# Patient Record
Sex: MALE | Race: WHITE | NOT HISPANIC OR LATINO | Employment: PART TIME | URBAN - METROPOLITAN AREA
[De-identification: names, ages, dates, MRNs, and addresses within clinical notes are randomized per-mention and may not be internally consistent; named-entity substitution may affect disease eponyms.]

---

## 2017-01-03 ENCOUNTER — OFFICE VISIT (OUTPATIENT)
Dept: SLEEP MEDICINE | Facility: CLINIC | Age: 57
End: 2017-01-03
Attending: FAMILY MEDICINE
Payer: MEDICARE

## 2017-01-03 VITALS
HEART RATE: 70 BPM | HEIGHT: 70 IN | DIASTOLIC BLOOD PRESSURE: 74 MMHG | OXYGEN SATURATION: 92 % | WEIGHT: 315 LBS | SYSTOLIC BLOOD PRESSURE: 106 MMHG | BODY MASS INDEX: 45.1 KG/M2 | TEMPERATURE: 98.4 F

## 2017-01-03 DIAGNOSIS — E66.01 MORBID OBESITY DUE TO EXCESS CALORIES (H): ICD-10-CM

## 2017-01-03 DIAGNOSIS — G47.33 OSA (OBSTRUCTIVE SLEEP APNEA): Primary | ICD-10-CM

## 2017-01-03 DIAGNOSIS — J41.8 MIXED SIMPLE AND MUCOPURULENT CHRONIC BRONCHITIS (H): ICD-10-CM

## 2017-01-03 DIAGNOSIS — G47.00 INSOMNIA, UNSPECIFIED TYPE: ICD-10-CM

## 2017-01-03 PROCEDURE — 99204 OFFICE O/P NEW MOD 45 MIN: CPT | Performed by: PHYSICIAN ASSISTANT

## 2017-01-03 RX ORDER — ZOLPIDEM TARTRATE 10 MG/1
10 TABLET ORAL
Qty: 30 TABLET | Refills: 1 | Status: ON HOLD | COMMUNITY
Start: 2017-01-03 | End: 2019-09-14

## 2017-01-03 RX ORDER — ARIPIPRAZOLE 10 MG/1
5 TABLET ORAL DAILY
Qty: 30 TABLET | Status: ON HOLD | COMMUNITY
Start: 2017-01-03 | End: 2019-09-14

## 2017-01-03 NOTE — MR AVS SNAPSHOT
"              After Visit Summary   1/3/2017    Petey Archibald    MRN: [id number]           Patient Information     Date Of Birth          1960        Visit Information        Provider Department      1/3/2017 11:00 AM Goltz, Bennett Ezra, PA-C [facility]        Today's Diagnoses     NEL (obstructive sleep apnea)    -  1     Morbid obesity due to excess calories (H)  BMI 40-50         Mixed simple and mucopurulent chronic bronchitis (HCC) CT 4-06 wnl and neg bronch for hemoptesis spirometry 7-26-16  FVC=59% & FEV1=46%         Insomnia, unspecified type           Care Instructions    MY TREATMENT INFORMATION FOR SLEEP APNEA-  Petey THOMPSON Archibald    DOCTOR : Bennett Ezra Goltz, PA-C  SLEEP CENTER : [facility]     MY CONTACT NUMBER: [phone]  If I haven't had a sleep study yet, what can I expect?  A personal story from [employer]  https://www.[employer].com/watch?v=AxPLmlRpnCs    Am I having a home sleep study?  Here is a video in case you get home and want to make sure you have done it correctly  https://www.[employer].com/watch?v=QRZ4N2yCfm6&feature=youtu.be    Frequently asked questions:  1. What is Obstructive Sleep Apnea (NEL)? NEL is the most common type of sleep apnea. Apnea literally means, \"without breath.\" It is characterized by repetitive pauses in breathing, despite continued effort to breathe, and is usually associated with a reduction in blood oxygen saturation. Apneas can last 10 to over 60 seconds. It is caused by narrowing or collapse of the upper airway as muscles relax during sleep. Severity of sleep apnea is determined by frequency of breathing events and their effect on your sleep and oxygen levels determined during sleep testing.   2. What are the consequences of NEL? Symptoms include: daytime sleepiness- possibly increasing the risk of falling asleep while driving, unrefreshing/restless sleep, snoring, insomnia, waking frequently to urinate, waking with heartburn or reflux, reduced " concentration and memory, and morning headaches. Other health consequences may include development of high blood pressure and other cardiovascular disease in persons who are susceptible. Untreated NEL  can contribute to heart disease, stroke and diabetes.   3. What are the treatment options? In most situations, sleep apnea is a lifelong disease that must be managed with daily therapy. Medications are not effective for sleep apnea and surgery is generally not performed until other therapies have been tried. Therapy is usually tailored to the individual patient based on many factors including your wishes as well as severity of sleep apnea and severity of obesity. Continuous Positive Airway (CPAP) is the most reliable treatment. An oral device to hold your jaw forward is usually the next most reliable option. Other options include postioning devices (to keep you off your back), weight loss, and surgery including a tongue pacing device. There is more detail about some of these options below.    1. CPAP-  WHAT DOES IT DO AND HOW CAN I LEARN TO WEAR IT?                               BEFORE I START, CAN I WATCH A MOVIE TO GET A PLAN ON HOW TO USE CPAP?  https://www.[employer].com/watch?i=c0E27mh546T  Continuous positive airway pressure, or CPAP, is the most effective treatment for obstructive sleep apnea. It works by blowing room air, through a mask, to hold your throat open. A decision to use CPAP is a major step forward in the pursuit of a healthier life. The successful use of CPAP will help you breathe easier, sleep better and live healthier. You can choose CPAP equipment from any durable medical equipment provider that meets your needs.  Using CPAP can be a positive experience if you keep these vaughan points in mind:  1. Commitment  CPAP is not a quick fix for your problem. It involves a long-term commitment to improve your sleep and your health.    2. Communication  Stay in close communication with both your sleep doctor  "and your CPAP supplier. Ask lots of questions and seek help when you need it.    3. Consistency  Use CPAP all night, every night and for every nap. You will receive the maximum health benefits from CPAP when you use it every time that you sleep. This will also make it easier for your body to adjust to the treatment.    4. Correction  The first machine and mask that you try may not be the best ones for you. Work with your sleep doctor and your CPAP supplier to make corrections to your equipment selection. Ask about trying a different type of machine or mask if you have ongoing problems. Make sure that your mask is a good fit and learn to use your equipment properly.    5. Challenge  Tell a family member or close friend to ask you each morning if you used your CPAP the previous night. Have someone to challenge you to give it your best effort.    6. Connection   Your adjustment to CPAP will be easier if you are able to connect with others who use the same treatment. Ask your sleep doctor if there is a support group in your area for people who have sleep apnea, or look for one on the Internet.  7. Comfort   Increase your level of comfort by using a saline spray, decongestant or heated humidifier if CPAP irritates your nose, mouth or throat. Use your unit's \"ramp\" setting to slowly get used to the air pressure level. There may be soft pads you can buy that will fit over your mask straps. Look on www.CPAP.com for accessories that can help make CPAP use more comfortable.  8. Cleaning   Clean your mask, tubing and headgear on a regular basis. Put this time in your schedule so that you don't forget to do it. Check and replace the filters for your CPAP unit and humidifier.    9. Completion   Although you are never finished with CPAP therapy, you should reward yourself by celebrating the completion of your first month of treatment. Expect this first month to be your hardest period of adjustment. It will involve some trial and " error as you find the machine, mask and pressure settings that are right for you.    10. Continuation  After your first month of treatment, continue to make a daily commitment to use your CPAP all night, every night and for every nap.    CPAP-Tips to starting with success:  Begin using your CPAP for short periods of time during the day while you watch TV or read.    Use CPAP every night and for every nap. Using it less often reduces the health benefits and makes it harder for your body to get used to it.    Make small adjustments to your mask, tubing, straps and headgear until you get the right fit. Tightening the mask may actually worsen the leak.  If it leaves significant marks on your face or irritates the bridge of your nose, it may not be the best mask for you.  Speak with the person who supplied the mask and consider trying other masks. Insurances will allow you to try different masks during the first month of starting CPAP.  Insurance also covers a new mask, hose and filter about every 6 months.    Use a saline nasal spray to ease mild nasal congestion. Neti-Pot or saline nasal rinses may also help. Nasal gel sprays can help reduce nasal dryness.  Biotene mouthwash can be helpful to protect your teeth if you experience frequent dry mouth.  Dry mouth may be a sign of air escaping out of your mouth or out of the mask in the case of a full face mask.  Speak with your provider if you expect that is the case.     Take a nasal decongestant to relieve more severe nasal or sinus congestion.  Do not use Afrin (oxymetazoline) nasal spray more than 3 days in a row.  Speak with your sleep doctor if your nasal congestion is chronic.    Use a heated humidifier that fits your CPAP model to enhance your breathing comfort. Adjust the heat setting up if you get a dry nose or throat, down if you get condensation in the hose or mask.  Position the CPAP lower than you so that any condensation in the hose drains back into the  machine rather than towards the mask.    Try a system that uses nasal pillows if traditional masks give you problems.    Clean your mask, tubing and headgear once a week. Make sure the equipment dries fully.    Regularly check and replace the filters for your CPAP unit and humidifier.    Work closely with your sleep provider and your CPAP supplier to make sure that you have the machine, mask and air pressure setting that works best for you. It is better to stop using it and call your provider to solve problems than to lay awake all night frustrated with the device.    BESIDES CPAP, WHAT OTHER THERAPIES ARE THERE?  Positioning Device  Positioning devices are generally used when sleep apnea is mild and only occurs on your back.This example shows a pillow that straps around the waist. It may be appropriate for those whose sleep study shows milder sleep apnea that occurs primarily when lying flat on one's back. Preliminary studies have shown benefit but effectiveness at home may need to be verified by a home sleep test. These devices are generally not covered by medical insurance.                      Oral Appliance  What is oral appliance therapy?  An oral appliance is a small acrylic device that fits over the upper and lower teeth or tongue (similar to an orthodontic retainer or a mouth guard). This device slightly advances the lower jaw or tongue, which moves the base of the tongue forward, opens the airway, improves breathing and can effectively treat snoring and obstructive sleep apnea sleep apnea. The appliance is fabricated and customized by a qualified dentist with experience in treating snoring and sleep apnea. Oral appliances are usually well tolerated and have relatively high compliance by patients1, 2, 3.  When is an oral appliance indicated?  Oral appliance therapy is recommended as a first-line treatment for patients with primary snoring, mild sleep apnea, and for patients with moderate sleep apnea who  prefer appliance therapy to use of CPAP4, 5. Severity of sleep apnea is determined by sleep testing and is based on the number of respiratory events per hour of sleep.   How successful is oral appliance therapy?  The success rate of oral appliance therapy in patients with mild sleep apnea is 75-80% while in patients with moderate sleep apnea it is 50-70%. The chance of success in patients with severe sleep apnea is 40-50%. The research also shows that oral appliances have a beneficial effect on the cardiovascular health of NEL patients at the same magnitude as CPAP therapy7.  Oral appliances should be a second-line treatment in cases of severe sleep apnea, but if not completely successful then a combination therapy utilizing CPAP plus oral appliance therapy may be effective. Oral appliances tend to be effective in a broad range of patients although studies show that the patients who have the highest success are females, younger patients, those with milder disease, and less severe obesity. 3, 6.   The chances of success are lower in patients who have more severe NEL, are older, and those who are morbidly obese.     Example of an oral appliance   Finding a dentist that practices dental sleep medicine  Specific training is available through the American Academy of Dental Sleep Medicine for dentists interested in working in the field of sleep. To find a dentist who is educated in the field of sleep and the use of oral appliances, near you, visit the Web site of the American Academy of Dental Sleep Medicine; also see   http://www.accpstorage.org/newOrganization/patients/oralAppliances.pdf  To search for a dentist certified in these practices:  Http://aadsm.org/FindADentist.aspx?1  1. Gelacio et al. Objectively measured vs self-reported compliance during oral appliance therapy for sleep-disordered breathing. Chest 2013; 144(5): 5845-2556.  2. Garth et al. Objective measurement of compliance during oral  appliance therapy for sleep-disordered breathing. Thorax 2013; 68(1): 91-96.  3. Nanci, et al. Mandibular advancement devices in 620 men and women with NEL and snoring: tolerability and predictors of treatment success. Chest 2004; 125: 5145-5000.  4. Ivonne et al. Oral appliances for snoring and NEL: a review. Sleep 2006; 29: 244-262.  5. Mario et al. Oral appliance treatment for NEL: an update. J Clin Sleep Med 2014; 10(2): 215-227.  6. Hortencia et al. Predictors of OSAH treatment outcome. J Dent Res 2007; 86: 8586-5590.  Weight Loss:    Weight management is a personal decision.  If you are interested in exploring weight loss strategies, the following discussion covers the impact on weight loss on sleep apnea and the approaches that may be successful.    Weight loss decreases severity of sleep apnea in most people with obesity. For those with mild obesity who have developed snoring with weight gain, even 15-30 pound weight loss can improve and occasionally eliminate sleep apnea.  Structured and life-long dietary and health habits are necessary to lose weight and keep healthier weight levels.     Though there may be significant health benefits from weight loss, long-term weight loss is very difficult to achieve- studies show success with dietary management in less than 10% of people. In addition, substantial weight loss may require years of dietary control and may be difficult if patients have severe obesity. In these cases, surgical management may be considered.  Finally, older individuals who have tolerated obesity without health complications may be less likely to benefit from weight loss strategies.    Your BMI is There is no weight on file to calculate BMI.  Body mass index (BMI) is one way to tell whether you are at a healthy weight, overweight, or obese. It measures your weight in relation to your height.  A BMI of 18.5 to 24.9 is in the healthy range. A person with a BMI of 25 to 29.9 is  considered overweight, and someone with a BMI of 30 or greater is considered obese. More than two-thirds of American adults are considered overweight or obese.  Being overweight or obese increases the risk for further weight gain. Excess weight may lead to heart disease and diabetes.  Creating and following plans for healthy eating and physical activity may help you improve your health.  Weight control is part of healthy lifestyle and includes exercise, emotional health, and healthy eating habits. Careful eating habits lifelong are the mainstay of weight control. Though there are significant health benefits from weight loss, long-term weight loss with diet alone may be very difficult to achieve- studies show long-term success with dietary management in less than 10% of people. Attaining a healthy weight may be especially difficult to achieve in those with severe obesity. In some cases, medications, devices and surgical management might be considered.  What can you do?  If you are overweight or obese and are interested in methods for weight loss, you should discuss this with your provider.     Consider reducing daily calorie intake by 500 calories.     Keep a food journal.     Avoiding skipping meals, consider cutting portions instead.    Diet combined with exercise helps maintain muscle while optimizing fat loss. Strength training is particularly important for building and maintaining muscle mass. Exercise helps reduce stress, increase energy, and improves fitness. Increasing exercise without diet control, however, may not burn enough calories to loose weight.       Start walking three days a week 10-20 minutes at a time    Work towards walking thirty minutes five days a week     Eventually, increase the speed of your walking for 1-2 minutes at time    In addition, we recommend that you review healthy lifestyles and methods for weight loss available through the National Institutes of Health patient information  sites:  http://win.niddk.nih.gov/publications/index.htm    And look into health and wellness programs that may be available through your health insurance provider, employer, local community center, or robert club.    Weight management plan: Patient was referred to their PCP to discuss a diet and exercise plan.  Surgery:  Upper Airway Surgery for NEL  Surgery for NEL is a second-line treatment option in the management of sleep apnea.  Surgery should be considered for patients who are having a difficult time tolerating CPAP.    Surgery for NEL is directed at areas that are responsible for narrowing or complete obstruction of the airway during sleep.  There are a wide range of procedures available to enlarge and/or stabilize the airway to prevent blockage of breathing in the three major areas where it can occur: the palate, tongue, and nasal regions.  Successful surgical treatment depends on the accurate identification of the factors responsible for obstructive sleep apnea in each person.  A personalized approach is required because there is no single treatment that works well for everyone.  Because of anatomic variation, consultation with an examination by a sleep surgeon is a critical first step in determining what surgical options are best for each patient.  In some cases, examination during sedation may be recommended in order to guide the selection of procedures.  Patients will be counseled about risks and benefits as well as the typical recovery course after surgery. Surgery is typically not a cure for a person s NEL.  However, surgery will often significantly improve one s NEL severity (termed  success rate ).  Even in the absence of a cure, surgery will decrease the cardiovascular risk associated with OSA7; improve overall quality of life8 (sleepiness, functionality, sleep quality, etc).      Palate Procedures:  Patients with NEL often have narrowing of their airway in the region of their tonsils and uvula.  The  goals of palate procedures are to widen the airway in this region as well as to help the tissues resist collapse.  Modern palate procedure techniques focus on tissue conservation and soft tissue rearrangement, rather than tissue removal.  Often the uvula is preserved in this procedure. Residual sleep apnea is common in patient after pharyngoplasty with an average reduction in sleep apnea events of 33%2.      Tongue Procedures:  While patients are awake, the muscles that surround the throat are active and keep this region open for breathing. These muscles relax during sleep, allowing the tongue and other structures to collapse and block breathing.  There are several different tongue procedures available.  Selection of a tongue base procedure depends on characteristics seen on physical exam.  Generally, procedures are aimed at removing bulky tissues in this area or preventing the back of the tongue from falling back during sleep.  Success rates for tongue surgery range from 50-62%3.    Hypoglossal Nerve Stimulation:  Hypoglossal nerve stimulation has recently received approval from the [place] Food and Drug Administration for the treatment of obstructive sleep apnea.  This is based on research showing that the system was safe and effective in treating sleep apnea6.  Results showed that the median AHI score decreased 68%, from 29.3 to 9.0. This therapy uses an implant system that senses breathing patterns and delivers mild stimulation to airway muscles, which keeps the airway open during sleep.  The system consists of three fully implanted components: a small generator (similar in size to a pacemaker), a breathing sensor, and a stimulation lead.  Using a small handheld remote, a patient turns the therapy on before bed and off upon awakening.    Candidates for this device must be greater than 22 years of age, have moderate to severe NEL (AHI between 20-65), BMI less than 32, have tried CPAP/oral appliance without  success, and have appropriate upper airway anatomy (determined by a sleep endoscopy performed by Dr. Jean).    Hypoglossal Nerve Stimulation Pathway:    The sleep surgeon s office will work with the patient through the insurance prior-authorization process (including communications and appeals).    Nasal Procedures:  Nasal obstruction can interfere with nasal breathing during the day and night.  Studies have shown that relief of nasal obstruction can improve the ability of some patients to tolerate positive airway pressure therapy for obstructive sleep apnea1.  Treatment options include medications such as nasal saline, topical corticosteroid and antihistamine sprays, and oral medications such as antihistamines or decongestants. Non-surgical treatments can include external nasal dilators for selected patients. If these are not successful by themselves, surgery can improve the nasal airway either alone or in combination with these other options.  Combination Procedures:  Combination of surgical procedures and other treatments may be recommended, particularly if patients have more than one area of narrowing or persistent positional disease.  The success rate of combination surgery ranges from 66-80%2,3.    1. Brian CARDENAS. The Role of the Nose in Snoring and Obstructive Sleep Apnoea: An Update.  Eur Arch Otorhinolaryngol. 2011; 268: 1365-73.  2.  Capnhan SM; Barney JA; Mesha JR; Pallanch JF; Clary MB; Jess SG; Jaysno ADAMES. Surgical modifications of the upper airway for obstructive sleep apnea in adults: a systematic review and meta-analysis. SLEEP 2010;33(10):7398-8147. Oksana CONTRERAS. Hypopharyngeal surgery in obstructive sleep apnea: an evidence-based medicine review.  Arch Otolaryngol Head Neck Surg. 2006 Feb;132(2):206-13.  3. Kendrick MORAH1, Silverio Y, Asad KASHMIR. The efficacy of anatomically based multilevel surgery for obstructive sleep apnea. Otolaryngol Head Neck Surg. 2003 Oct;129(4):327-35.  4. Kezirian E, Goldberg A.  Hypopharyngeal Surgery in Obstructive Sleep Apnea: An Evidence-Based Medicine Review. Arch Otolaryngol Head Neck Surg. 2006 Feb;132(2):206-13.  5. Estela ORELLANA et al. Upper-Airway Stimulation for Obstructive Sleep Apnea.  N Engl J Med. 2014 Jan 9;370(2):139-49.  6. Stephanie Y et al. Increased Incidence of Cardiovascular Disease in Middle-aged Men with Obstructive Sleep Apnea. Am J Respir Crit Care Med; 2002 166: 159-165  7. Sammie EM et al. Studying Life Effects and Effectiveness of Palatopharyngoplasty (SLEEP) study: Subjective Outcomes of Isolated Uvulopalatopharyngoplasty. Otolaryngol Head Neck Surg. 2011; 144: 623-631.        Your BMI is Body mass index is 45.57 kg/(m^2).  Weight management is a personal decision.  If you are interested in exploring weight loss strategies, the following discussion covers the approaches that may be successful. Body mass index (BMI) is one way to tell whether you are at a healthy weight, overweight, or obese. It measures your weight in relation to your height.  A BMI of 18.5 to 24.9 is in the healthy range. A person with a BMI of 25 to 29.9 is considered overweight, and someone with a BMI of 30 or greater is considered obese. More than two-thirds of American adults are considered overweight or obese.  Being overweight or obese increases the risk for further weight gain. Excess weight may lead to heart disease and diabetes.  Creating and following plans for healthy eating and physical activity may help you improve your health.  Weight control is part of healthy lifestyle and includes exercise, emotional health, and healthy eating habits. Careful eating habits lifelong are the mainstay of weight control. Though there are significant health benefits from weight loss, long-term weight loss with diet alone may be very difficult to achieve- studies show long-term success with dietary management in less than 10% of people. Attaining a healthy weight may be especially difficult to achieve in  those with severe obesity. In some cases, medications, devices and surgical management might be considered.  What can you do?  If you are overweight or obese and are interested in methods for weight loss, you should discuss this with your provider.     Consider reducing daily calorie intake by 500 calories.     Keep a food journal.     Avoiding skipping meals, consider cutting portions instead.    Diet combined with exercise helps maintain muscle while optimizing fat loss. Strength training is particularly important for building and maintaining muscle mass. Exercise helps reduce stress, increase energy, and improves fitness. Increasing exercise without diet control, however, may not burn enough calories to loose weight.       Start walking three days a week 10-20 minutes at a time    Work towards walking thirty minutes five days a week     Eventually, increase the speed of your walking for 1-2 minutes at time    In addition, we recommend that you review healthy lifestyles and methods for weight loss available through the National Institutes of Health patient information sites:  http://win.niddk.nih.gov/publications/index.htm    And look into health and wellness programs that may be available through your health insurance provider, employer, local community center, or robert club.    Weight management plan: Patient was referred to their PCP to discuss a diet and exercise plan.                    Follow-ups after your visit        Follow-up notes from your care team     Return in about 2 weeks (around 1/17/2017).      Your next 10 appointments already scheduled     Jan 06, 2017 10:45 AM   Anticoagulation Visit with  ANTICOAGULATION CLINIC   [facility] ([facility]    [address] [place] [id number]   [phone]            Jan 23, 2017  8:30 PM   Psg Split W/Tcm with BED 5 SH SLEEP   [facility] ([place]  [facility])    [address]  [place] [id number] [phone]            Feb 06, 2017 10:30 AM   Return Sleep Patient with Bennett Ezra Goltz, PA-C   [facility] ([facility])    [address]  [place] [id number] [phone]              Future tests that were ordered for you today     Open Future Orders        Priority Expected Expires Ordered    Comprehensive Sleep Study Routine  7/2/2017 1/3/2017            Who to contact     If you have questions or need follow up information about today's clinic visit or your schedule please contact [facility] directly at [phone].  Normal or non-critical lab and imaging results will be communicated to you by Kalionhart, letter or phone within 4 business days after the clinic has received the results. If you do not hear from us within 7 days, please contact the clinic through Kalionhart or phone. If you have a critical or abnormal lab result, we will notify you by phone as soon as possible.  Submit refill requests through [employer] or call your pharmacy and they will forward the refill request to us. Please allow 3 business days for your refill to be completed.          Additional Information About Your Visit        [employer] Information     [employer] gives you secure access to your electronic health record. If you see a primary care provider, you can also send messages to your care team and make appointments. If you have questions, please call your primary care clinic.  If you do not have a primary care provider, please call [phone] and they will assist you.        Care EveryWhere ID     This is your Care EveryWhere ID. This could be used by other organizations to access your [place] medical records  [id number]        Your Vitals Were     Pulse Temperature Height BMI (Body Mass Index) Pulse Oximetry       70 98.4  F (36.9  C) (Oral) 1.778 m (5' 10\") 45.57 kg/m2 " 92%        Blood Pressure from Last 3 Encounters:   01/03/17 106/74   12/23/16 102/70   08/11/16 110/70    Weight from Last 3 Encounters:   01/03/17 144.062 kg (317 lb 9.6 oz)   12/23/16 142.883 kg (315 lb)   08/11/16 140.615 kg (310 lb)              We Performed the Following     SLEEP EVALUATION & MANAGEMENT REFERRAL - ADULT          Today's Medication Changes          These changes are accurate as of: 1/3/17  1:15 PM.  If you have any questions, ask your nurse or doctor.               These medicines have changed or have updated prescriptions.        Dose/Directions    ABILIFY 10 MG tablet   This may have changed:  how much to take   Generic drug:  ARIPiprazole        Dose:  5 mg   Take 0.5 tablets (5 mg) by mouth daily   Quantity:  30 tablet   Refills:  0       warfarin 4 MG tablet   Commonly known as:  COUMADIN   This may have changed:  additional instructions   Used for:  Long term current use of anticoagulant therapy, History of pulmonary embolism        Dose:  4 mg   Take 1 tablet (4 mg) by mouth daily Take 2.5 tabs of coumadin today (10 mg),Take 2 tabs (8 mg)daily   Quantity:  62 tablet   Refills:  2         Stop taking these medicines if you haven't already. Please contact your care team if you have questions.     hydrOXYzine 25 MG capsule   Commonly known as:  VISTARIL           traZODone 100 MG tablet   Commonly known as:  DESYREL           varenicline 0.5 MG X 11 & 1 MG X 42 tablet   Commonly known as:  CHANTIX STARTING MONTH BRE                    Primary Care Provider Office Phone # Fax #    Hortensia Peck [phone]       [facility] [address]  [facility] [address]        Thank you!     Thank you for choosing [facility]  for your care. Our goal is always to provide you with excellent care. Hearing back from our patients is one way we can continue to improve our services. Please take a few minutes to complete the written survey  that you may receive in the mail after your visit with us. Thank you!             Your Updated Medication List - Protect others around you: Learn how to safely use, store and throw away your medicines at www.disposemymeds.org.          This list is accurate as of: 1/3/17  1:15 PM.  Always use your most recent med list.                   Brand Name Dispense Instructions for use    ABILIFY 10 MG tablet   Generic drug:  ARIPiprazole     30 tablet    Take 0.5 tablets (5 mg) by mouth daily       acetaminophen 325 MG tablet    TYLENOL    100 tablet    Take 1-2 tablets (325-650 mg) by mouth every 6 hours as needed       albuterol 108 (90 BASE) MCG/ACT Inhaler    albuterol    1 Inhaler    Inhale 2 puffs into the lungs every 6 hours as needed       AMBIEN 10 MG tablet   Generic drug:  zolpidem     30 tablet    Take 1 tablet (10 mg) by mouth nightly as needed for sleep       ANUSOL-HC 25 MG Suppository   Generic drug:  hydrocortisone     14 Suppository    INSERT ONE SUPPOSITORY RECTALLY UP TO TWICE A DAY AS NEEDED       BACTROBAN 2 % ointment   Generic drug:  mupirocin      Apply topically 2 times daily       BUPROPION HCL PO      Take 150 mg by mouth every morning       clotrimazole-betamethasone cream    LOTRISONE    60 g    Apply topically 2 times daily       EPIPEN 0.3 MG/0.3ML injection   Generic drug:  EPINEPHrine      Inject 0.3 mg into the muscle once as needed.       Ferrous Gluconate 324 (37.5 FE) MG Tabs      Take 1 tablet by mouth 2 times daily.       furosemide 40 MG tablet    LASIX     Take 2 tablets by mouth daily       hydrocortisone 2.5 % cream    ANUSOL-HC    28.35 g    Place rectally 2 times daily       loratadine 10 MG tablet    CLARITIN     Take 10 mg by mouth daily.       mometasone-formoterol 200-5 MCG/ACT oral inhaler    DULERA    13 g    Inhale 2 puffs into the lungs 2 times daily       Multi-vitamin Tabs tablet   Generic drug:  multivitamin, therapeutic with minerals      1 TABLET DAILY       nexIUM  40 MG CR capsule   Generic drug:  esomeprazole      1 CAPSULE DAILY at 8:00 am       * order for DME     1 each    Equipment being ordered: support compression hose BK  2 pair black 30mm HG  To be applied on arising & removed while lying down to go to sleep       * order for DME     1 Device    Equipment being ordered: CPAP with mask and tubing       PHOSPHATE ENEMA RE      Place 1 enema rectally as needed.       Potassium Chloride ER 20 MEQ Tbcr      Take 1 tablet by mouth 2 times daily.       sertraline 100 MG tablet    ZOLOFT     Take 1.5 tablets by mouth daily.       simvastatin 40 MG tablet    ZOCOR     Take 1 tablet by mouth daily.       SINGULAIR 10 MG tablet   Generic drug:  montelukast      1 TABLET DAILY at 8:00 am       spironolactone 25 MG tablet    ALDACTONE     1 TABLET EVERY MORNING at 8:00 am       tiotropium 18 MCG capsule    SPIRIVA HANDIHALER    30 capsule    Once daily       triamcinolone 0.1 % cream    KENALOG    80 g    Apply topically 2 times daily as needed       Vitamin D-3 1000 UNITS Caps      Take 2 capsules by mouth daily       warfarin 4 MG tablet    COUMADIN    62 tablet    Take 1 tablet (4 mg) by mouth daily Take 2.5 tabs of coumadin today (10 mg),Take 2 tabs (8 mg)daily       ZEASORB-AF EX      Externally apply topically once a week       * Notice:  This list has 2 medication(s) that are the same as other medications prescribed for you. Read the directions carefully, and ask your doctor or other care provider to review them with you.

## 2017-01-03 NOTE — NURSING NOTE
"Chief Complaint   Patient presents with     Sleep Problem     Follow up for new CPAP eval       Initial /74 mmHg  Pulse 70  Temp(Src) 98.4  F (36.9  C) (Oral)  Ht 1.778 m (5' 10\")  Wt 144.062 kg (317 lb 9.6 oz)  BMI 45.57 kg/m2  SpO2 92% Estimated body mass index is 45.57 kg/(m^2) as calculated from the following:    Height as of this encounter: 1.778 m (5' 10\").    Weight as of this encounter: 144.062 kg (317 lb 9.6 oz).  BP completed using cuff size: large right arm  Chantal Hernandez MA  [facility] [place]      "
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
## 2017-01-06 ENCOUNTER — ANTICOAGULATION THERAPY VISIT (OUTPATIENT)
Dept: NURSING | Facility: CLINIC | Age: 57
End: 2017-01-06
Payer: MEDICARE

## 2017-01-06 DIAGNOSIS — Z86.711 HISTORY OF PULMONARY EMBOLISM: ICD-10-CM

## 2017-01-06 DIAGNOSIS — Z79.01 LONG-TERM (CURRENT) USE OF ANTICOAGULANTS: Primary | ICD-10-CM

## 2017-01-06 LAB — INR POINT OF CARE: 1.9 (ref 0.86–1.14)

## 2017-01-06 PROCEDURE — 99207 ZZC NO CHARGE NURSE ONLY: CPT

## 2017-01-06 PROCEDURE — 36416 COLLJ CAPILLARY BLOOD SPEC: CPT

## 2017-01-06 PROCEDURE — 85610 PROTHROMBIN TIME: CPT | Mod: QW

## 2017-01-06 NOTE — Clinical Note
[facility]  [address]  [place] [facility] [address]  [phone]                                                                                                           Petey Archibald  [address]  [facility] [address]    January 6, 2017          Petey was in the clinic today        Sincerely,    Jacky Burr MD

## 2017-01-06 NOTE — PROGRESS NOTES
ANTICOAGULATION FOLLOW-UP CLINIC VISIT    Patient Name:  Petey Archibald  Date:  1/6/2017  Contact Type:  Face to Face    SUBJECTIVE:        OBJECTIVE    INR PROTIME   Date Value Ref Range Status   01/06/2017 1.9* 0.86 - 1.14 Final       ASSESSMENT / PLAN  INR assessment THER    Recheck INR In: 4 WEEKS    INR Location Clinic      Anticoagulation Summary as of 1/6/2017     INR goal 2.0-3.0   Selected INR 1.9! (1/6/2017)   Maintenance plan 10 mg (4 mg x 2.5) on Fri; 8 mg (4 mg x 2) all other days   Full instructions 10 mg on Fri; 8 mg all other days   Weekly total 58 mg   Plan last modified Lisa Ponce RN (11/18/2016)   Next INR check 2/3/2017   Target end date Indefinite    Indications   History of pulmonary embolism [Z86.711]  Long-term (current) use of anticoagulants [Z79.01] [Z79.01]         Anticoagulation Episode Summary     INR check location [facility]    Preferred lab     Send INR reminders to [facility] INR/PROTIME    Comments       Anticoagulation Care Providers     Provider Role Specialty Phone number    Jacky Burr MD [facility] [phone]            See the Encounter Report to view Anticoagulation Flowsheet and Dosing Calendar (Go to Encounters tab in chart review, and find the Anticoagulation Therapy Visit)        Lisa Ponce, RN

## 2017-01-06 NOTE — MR AVS SNAPSHOT
Petey Archibald   1/6/2017 10:45 AM   Anticoagulation Therapy Visit    Description:  56 year old male   Provider:  [facility]   Department:  Bx Nurse           INR as of 1/6/2017     Selected INR 1.9! (1/6/2017)      Anticoagulation Summary as of 1/6/2017     INR goal 2.0-3.0   Selected INR 1.9! (1/6/2017)   Full instructions 10 mg on Fri; 8 mg all other days   Next INR check 2/3/2017    Indications   History of pulmonary embolism [Z86.711]  Long-term (current) use of anticoagulants [Z79.01] [Z79.01]         Your next Anticoagulation Clinic appointment(s)     Feb 03, 2017 11:15 AM   Anticoagulation Visit with  [facility] CLINIC   [facility]    [address] [place] [id number] [phone]              Contact Numbers     [facility]  Please call  [phone] to cancel and/or reschedule your appointment   The direct line to the anticoagulant nurse is [phone] on Monday, Wednesday, and Friday. On Thursday, the anticoagulant nurse can be reached directly at [phone].         January 2017 Details    Sun Mon Tue Wed Thu Fri Sat     1               2               3               4               5               6      10 mg   See details      7      8 mg           8      8 mg         9      8 mg         10      8 mg         11      8 mg         12      8 mg         13      10 mg         14      8 mg           15      8 mg         16      8 mg         17      8 mg         18      8 mg         19      8 mg         20      10 mg         21      8 mg           22      8 mg         23      8 mg         24      8 mg         25      8 mg         26      8 mg         27      10 mg         28      8 mg           29      8 mg         30      8 mg         31      8 mg              Date Details   01/06 This INR check               How to take your warfarin dose     To take:  8 mg Take  2 of the 4 mg tablets.    To take:  10 mg Take 2.5 of the 4 mg tablets.           February 2017 Details    Sun Mon Tue Wed Thu Fri Sat        1      8 mg         2      8 mg         3            4                 5               6               7               8               9               10               11                 12               13               14               15               16               17               18                 19               20               21               22               23               24               25                 26               27               28                    Date Details   No additional details    Date of next INR:  2/3/2017         How to take your warfarin dose     To take:  8 mg Take 2 of the 4 mg tablets.    To take:  10 mg Take 2.5 of the 4 mg tablets.

## 2017-01-23 ENCOUNTER — THERAPY VISIT (OUTPATIENT)
Dept: SLEEP MEDICINE | Facility: CLINIC | Age: 57
End: 2017-01-23
Payer: MEDICARE

## 2017-01-23 DIAGNOSIS — G47.33 OSA (OBSTRUCTIVE SLEEP APNEA): ICD-10-CM

## 2017-01-23 DIAGNOSIS — E66.01 MORBID OBESITY DUE TO EXCESS CALORIES (H): ICD-10-CM

## 2017-01-23 DIAGNOSIS — J41.8 MIXED SIMPLE AND MUCOPURULENT CHRONIC BRONCHITIS (H): ICD-10-CM

## 2017-01-23 PROCEDURE — 95811 POLYSOM 6/>YRS CPAP 4/> PARM: CPT | Performed by: PSYCHIATRY & NEUROLOGY

## 2017-01-24 NOTE — PROGRESS NOTES
Completed a split night PSG per provider order.    Preliminary AHI >30.  A final therapeutic PAP pressure was achieved.    Supine REM was not seen on therapeutic pressure. (patient declined supine sleep)    Patient reports feeling refreshed in AM.

## 2017-01-25 NOTE — PROCEDURES
"SLEEP STUDY INTERPRETATION  SPLIT-NIGHT STUDY      Patient: Petey Archibald  YOB: 1960  Study Date: 1/23/2017  MRN: [id number]  Referring Provider: MD Riddle Walter  Ordering Provider: Goltz, PA-C, Bennett    Indications for Polysomnography: The patient is a 56 y old Male who is 5' 10\" and weighs 317.0 lbs.  His BMI is 45.9, Hubbell sleepiness scale is 2.0 and neck size is 45.0.  Relevant medical history includes treated NEL, morbid obesity, COPD.  A diagnostic polysomnogram was performed to evaluate for Sleep Apnea\Hypoventilation\hypoxemia.  After 190.0 minutes of sleep time the patient exhibited sufficient respiratory events qualifying him for a CPAP trial which was then initiated.      Polysomnogram Data:  A full night polysomnogram recorded the standard physiologic parameters including EEG, EOG, EMG, ECG, nasal and oral airflow.  Respiratory parameters of chest and abdominal movements were recorded with respiratory inductance plethysmography.  Oxygen saturation was recorded by pulse oximetry.      Diagnostic PSG  Sleep Architecture: Sleep fragmentation  The total recording time of the diagnostic portion of the study was 215.2 minutes.  The total sleep time was 190.0 minutes.  During the diagnostic portion of the study the sleep latency was 0.1 minutes.  REM latency was 142.0 minutes.  Arousal index was 30.9 arousals per hour.  Sleep efficiency was 88.3%.  Wake after sleep onset was 12.0 minutes.   The patient spent 1.8% of total sleep time in Stage N1, 70.3% in Stage N2, 20.5% in Stage N3 and 7.4% in REM.       Respiration: Severe sleep disordered breathing best characterized as moderate NEL with severe O2 desaturations primarily during REM sleep.  Borderline hypoventilation that would have most likely been clear hypoventilation if he would have slept supine.     Events - During the diagnostic portion of the study, the polysomnogram revealed a presence of 3 obstructive, 11 central, and - mixed " apneas resulting in an apnea index of 4.4 events per hour.  There were 53 hypopneas resulting in a hypopnea index of 16.7 events per hour.  The combined apnea/hypopnea index was 21.2 events per hour.  The REM AHI was 38.6 events per hour.  The supine AHI was - events per hour.  The RERA index was 0.6 events per hour.  The RDI was 21.8 events per hour.     Snoring - was reported as loud.    Respiratory rate and pattern - was notable for normal respiratory rate and pattern.    Sustained Sleep Associated Hypoventilation - Transcutaneous carbon dioxide monitoring was used, significant hypoventilation was borderline with a peak over 55 but it did not last for 10 minutes.    Sleep Associated Hypoxemia - (Greater than 5 minutes O2 sat below 89%) was / was not present.  Baseline oxygen saturation was 91.7%. Lowest oxygen saturation was 61.5%.  Time spent less than or equal to 88% was 75.1 minutes.  Time spent less than or equal to 89% was 137.1 minutes.  21.8 0.6 21.2     Treatment PSG  Sleep Architecture: Decreased sleep fragmentation  At 02:35:59 AM the patient was placed on CPAP treatment and was titrated at pressures ranging from 5 cmH2O up to 10 cmH2O.  The total recording time of the treatment portion of the study was 205.1 minutes.  The total sleep time was 191.5 minutes.  During the treatment portion of the study the sleep latency was 1.0 minutes.  REM latency was 36.0 minutes.  Arousal index was 6.3 arousals per hour.  Sleep efficiency was normal/increased/decreased at 93.4%.  Wake after sleep onset was 8.0 minutes.  The patient spent 1.8% of total sleep time in Stage N1, 40.5% in Stage N2, 8.1% in Stage N3 and 49.6% in REM.       Respiration: Obstructive events noted on the baseline portion of the study was nearly fully resolved with CPAP therapy.  Borderline hypoventilation improved to normal levels. Hypoxemia improved but did not resolve to normal O2 saturations.    Of note the patient did not have REM supine.     The optimal pressure was 10 cmH2O with an AHI of 2.3 events per hour.  Time in REM supine on final pressure was 0 minutes.     Movement Activity:     Periodic Limb Movements  o During the diagnostic portion of the study, there were 42 PLMs recorded. The PLM index was 13.3 movements per hour.  The PLM Arousal Index was 4.4 per hour.  o During the treatment portion of the study, there were 15 PLMs recorded. The PLM index was 4.7 movements per hour.  The PLM Arousal Index was 0.6 per hour.    REM EMG Activity - Excessive muscle activity was not present.    Nocturnal Behavior - Abnormal sleep related behaviors were not noted.    Bruxism - None apparent.    Cardiac Summary: Predominantly narrow QRS complexes preceded by P waves suggestive of sinus rhythm.  Occasional wide QRS complexes suggestive of PAC/PVC s.  During the diagnostic portion of the study, the average pulse rate was 72.7 bpm.  The minimum pulse rate was 60.0 bpm while the maximum pulse rate was 100.0 bpm.    During the treatment portion of the study, the average pulse rate was 64.7 bpm.  The minimum pulse rate was 53.8 bpm while the maximum pulse rate was 91.8 bpm.     Assessment:     Severe sleep disordered breathing best characterized as moderate NEL with severe O2 desaturations primarily during REM sleep.  Borderline hypoventilation that would have most likely been clear hypoventilation if he would have slept supine.     Obstructive events noted on the baseline portion of the study was nearly fully resolved with CPAP therapy.  Borderline hypoventilation improved to normal levels. Hypoxemia improved with correction of upper airway obstruction but did not resolve to normal O2 saturations.    Of note the patient did not have REM supine.    Recommendations:    Treatment of NEL with Auto-titrating PAP therapy.  Recommend clinical follow up with sleep management team, including review of compliance measures.  o Patients residual hypoxemia is likely  secondary to COPD and would benefit from supplemental O2.   o Would recommend pulmonary medicine follow up.      Advise regarding the risks of drowsy driving.    Suggest optimizing sleep schedule and avoiding sleep deprivation.    Weight management     Diagnostic Code(s): G47.33, G47.36, G47.9      Joseph Bangura MD

## 2017-02-03 ENCOUNTER — ANTICOAGULATION THERAPY VISIT (OUTPATIENT)
Dept: NURSING | Facility: CLINIC | Age: 57
End: 2017-02-03
Payer: MEDICARE

## 2017-02-03 DIAGNOSIS — Z79.01 LONG-TERM (CURRENT) USE OF ANTICOAGULANTS: Primary | ICD-10-CM

## 2017-02-03 DIAGNOSIS — Z86.711 HISTORY OF PULMONARY EMBOLISM: ICD-10-CM

## 2017-02-03 LAB — INR POINT OF CARE: 2.5 (ref 0.86–1.14)

## 2017-02-03 PROCEDURE — 85610 PROTHROMBIN TIME: CPT | Mod: QW

## 2017-02-03 PROCEDURE — 99207 ZZC NO CHARGE NURSE ONLY: CPT

## 2017-02-03 PROCEDURE — 36416 COLLJ CAPILLARY BLOOD SPEC: CPT

## 2017-02-03 NOTE — PROGRESS NOTES
ANTICOAGULATION FOLLOW-UP CLINIC VISIT    Patient Name:  Petey Archibald  Date:  2/3/2017  Contact Type:  Face to Face    SUBJECTIVE:     Patient Findings     Positives No Problem Findings           OBJECTIVE    INR PROTIME   Date Value Ref Range Status   02/03/2017 2.5* 0.86 - 1.14 Final       ASSESSMENT / PLAN  INR assessment THER    Recheck INR In: 3 WEEKS    INR Location Clinic      Anticoagulation Summary as of 2/3/2017     INR goal 2.0-3.0   Selected INR 2.5 (2/3/2017)   Maintenance plan 10 mg (4 mg x 2.5) on Fri; 8 mg (4 mg x 2) all other days   Full instructions 2/3: Hold; 2/4: Hold; 2/5: Hold; 2/6: Hold; Otherwise 10 mg on Fri; 8 mg all other days   Weekly total 58 mg   Plan last modified Lisa Ponce RN (11/18/2016)   Next INR check 2/17/2017   Target end date Indefinite    Indications   History of pulmonary embolism [Z86.711]  Long-term (current) use of anticoagulants [Z79.01] [Z79.01]         Anticoagulation Episode Summary     INR check location [facility]    Preferred lab     Send INR reminders to [facility] INR/PROTIME    Comments       Anticoagulation Care Providers     Provider Role Specialty Phone number    Jacky Burr MD [facility] [phone]            See the Encounter Report to view Anticoagulation Flowsheet and Dosing Calendar (Go to Encounters tab in chart review, and find the Anticoagulation Therapy Visit)        Lisa Ponce, RN

## 2017-02-03 NOTE — MR AVS SNAPSHOT
Petey Archibald   2/3/2017 11:15 AM   Anticoagulation Therapy Visit    Description:  56 year old male   Provider:   ANTICOAGULATION CLINIC   Department:  Bx Nurse           INR as of 2/3/2017     Selected INR 2.5 (2/3/2017)      Anticoagulation Summary as of 2/3/2017     INR goal 2.0-3.0   Selected INR 2.5 (2/3/2017)   Full instructions 2/3: Hold; 2/4: Hold; 2/5: Hold; 2/6: Hold; Otherwise 10 mg on Fri; 8 mg all other days   Next INR check 2/17/2017    Indications   History of pulmonary embolism [Z86.711]  Long-term (current) use of anticoagulants [Z79.01] [Z79.01]         Your next Anticoagulation Clinic appointment(s)     Feb 03, 2017 11:15 AM   Anticoagulation [facility] with  [facility] CLINIC   [facility]    [address] [id number] [phone]            Feb 17, 2017 10:00 AM   Anticoagulation Visit with  ANTICOAGULATION CLINIC   [facility]    [address] [id number] [phone]              Contact Numbers     [facility]  Please call  [phone] to cancel and/or reschedule your appointment   The direct line to the anticoagulant nurse is [phone] on Monday, Wednesday, and Friday. On Thursday, the anticoagulant nurse can be reached directly at [phone].         February 2017 Details    Sun Mon Tue Wed Thu Fri Sat        1               2               3      Hold   See details      4      Hold           5      Hold         6      Hold         7      8 mg         8      8 mg         9      8 mg         10      10 mg         11      8 mg           12      8 mg         13      8 mg         14      8 mg         15      8 mg         16      8 mg         17            18                 19               20               21               22               23                24               25                 26               27               28                    Date Details   02/03 This INR check       Date of next INR:  2/17/2017         How to take your warfarin dose     To take:  8 mg Take 2 of the 4 mg tablets.    To take:  10 mg Take 2.5 of the 4 mg tablets.    Hold Do not take your warfarin dose. See the Details table to the right for additional instructions.

## 2017-02-07 ENCOUNTER — TRANSFERRED RECORDS (OUTPATIENT)
Dept: HEALTH INFORMATION MANAGEMENT | Facility: CLINIC | Age: 57
End: 2017-02-07

## 2017-02-09 ENCOUNTER — OFFICE VISIT (OUTPATIENT)
Dept: SLEEP MEDICINE | Facility: CLINIC | Age: 57
End: 2017-02-09
Payer: MEDICARE

## 2017-02-09 VITALS
BODY MASS INDEX: 45.1 KG/M2 | SYSTOLIC BLOOD PRESSURE: 112 MMHG | HEART RATE: 75 BPM | TEMPERATURE: 98.6 F | WEIGHT: 315 LBS | OXYGEN SATURATION: 95 % | HEIGHT: 70 IN | DIASTOLIC BLOOD PRESSURE: 64 MMHG

## 2017-02-09 DIAGNOSIS — G47.34 NOCTURNAL HYPOXEMIA: ICD-10-CM

## 2017-02-09 DIAGNOSIS — G47.33 OSA (OBSTRUCTIVE SLEEP APNEA): Primary | ICD-10-CM

## 2017-02-09 PROCEDURE — 99214 OFFICE O/P EST MOD 30 MIN: CPT | Performed by: PHYSICIAN ASSISTANT
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
## 2017-02-09 NOTE — PATIENT INSTRUCTIONS

## 2017-02-09 NOTE — NURSING NOTE
"Chief Complaint   Patient presents with     Sleep Problem     PSG results       Initial /64 mmHg  Pulse 75  Temp(Src) 98.6  F (37  C) (Oral)  Ht 1.778 m (5' 10\")  Wt 143.79 kg (317 lb)  BMI 45.48 kg/m2  SpO2 95% Estimated body mass index is 45.48 kg/(m^2) as calculated from the following:    Height as of this encounter: 1.778 m (5' 10\").    Weight as of this encounter: 143.79 kg (317 lb).  Medication Reconciliation: complete   Chantal Hernandez MA  [facility] [place]      "

## 2017-02-17 ENCOUNTER — ANTICOAGULATION THERAPY VISIT (OUTPATIENT)
Dept: NURSING | Facility: CLINIC | Age: 57
End: 2017-02-17
Payer: MEDICARE

## 2017-02-17 DIAGNOSIS — Z86.711 HISTORY OF PULMONARY EMBOLISM: ICD-10-CM

## 2017-02-17 DIAGNOSIS — Z79.01 LONG-TERM (CURRENT) USE OF ANTICOAGULANTS: ICD-10-CM

## 2017-02-17 LAB — INR POINT OF CARE: 1 (ref 0.86–1.14)

## 2017-02-17 PROCEDURE — 36416 COLLJ CAPILLARY BLOOD SPEC: CPT

## 2017-02-17 PROCEDURE — 99207 ZZC NO CHARGE NURSE ONLY: CPT

## 2017-02-17 PROCEDURE — 85610 PROTHROMBIN TIME: CPT | Mod: QW

## 2017-02-17 NOTE — PROGRESS NOTES
ANTICOAGULATION FOLLOW-UP CLINIC VISIT    Patient Name:  Petey Archibald  Date:  2/17/2017  Contact Type:  Face to Face    SUBJECTIVE:     Patient Findings     Positives No Problem Findings    Comments Has colonoscopy 2/22/2017           OBJECTIVE    INR Protime   Date Value Ref Range Status   02/17/2017 1.0 0.86 - 1.14 Final       ASSESSMENT / PLAN  INR assessment SUB    Recheck INR In: 2 WEEKS    INR Location Clinic      Anticoagulation Summary as of 2/17/2017     INR goal 2.0-3.0   Today's INR 1.0!   Maintenance plan 10 mg (4 mg x 2.5) on Fri; 8 mg (4 mg x 2) all other days   Full instructions 2/18: Hold; 2/19: Hold; 2/20: Hold; 2/21: Hold; 2/22: 10 mg; Otherwise 10 mg on Fri; 8 mg all other days   Weekly total 58 mg   Plan last modified Lisa Ponce RN (11/18/2016)   Next INR check 3/3/2017   Target end date Indefinite    Indications   History of pulmonary embolism [Z86.711]  Long-term (current) use of anticoagulants [Z79.01] [Z79.01]         Anticoagulation Episode Summary     INR check location [facility]    Preferred lab     Send INR reminders to [facility] INR/PROTIME    Comments       Anticoagulation Care Providers     Provider Role Specialty Phone number    Jacky Burr MD [facility] [phone]            See the Encounter Report to view Anticoagulation Flowsheet and Dosing Calendar (Go to Encounters tab in chart review, and find the Anticoagulation Therapy Visit)        Lisa Ponce, RN

## 2017-02-17 NOTE — MR AVS SNAPSHOT
Petey Archibald   2/17/2017 10:00 AM   Anticoagulation Therapy Visit    Description:  56 year old male   Provider:   ANTICOAGULATION CLINIC   Department:  Bx Nurse           INR as of 2/17/2017     Today's INR 1.0!      Anticoagulation Summary as of 2/17/2017     INR goal 2.0-3.0   Today's INR 1.0!   Full instructions 2/18: Hold; 2/19: Hold; 2/20: Hold; 2/21: Hold; 2/22: 10 mg; Otherwise 10 mg on Fri; 8 mg all other days   Next INR check 3/3/2017    Indications   History of pulmonary embolism [Z86.711]  Long-term (current) use of anticoagulants [Z79.01] [Z79.01]         Your next Anticoagulation Clinic appointment(s)     Mar 03, 2017  9:45 AM CST   Anticoagulation Visit with  [facility]ULATION CLINIC   [facility]    [address] [place] [id number] [phone]              Contact Numbers     [facility] access hospital  Please call  [phone] to cancel and/or reschedule your appointment   The direct line to the anticoagulant nurse is [phone] on Monday, Wednesday, and Friday. On Thursday, the anticoagulant nurse can be reached directly at [phone].         February 2017 Details    Sun Mon Tue Wed Thu Fri Sat        1               2               3               4                 5               6               7               8               9               10               11                 12               13               14               15               16               17      10 mg   See details      18      Hold           19      Hold         20      Hold         21      Hold         22      10 mg         23      8 mg         24      10 mg         25      8 mg           26      8 mg         27      8 mg         28      8 mg              Date Details   02/17 This INR check               How to take your warfarin dose     To take:  8 mg Take 2 of the 4 mg tablets.    To take:  10 mg Take 2.5  of the 4 mg tablets.    Hold Do not take your warfarin dose. See the Details table to the right for additional instructions.                March 2017 Details    Sun Mon Tue Wed Thu Fri Sat        1      8 mg         2      8 mg         3            4                 5               6               7               8               9               10               11                 12               13               14               15               16               17               18                 19               20               21               22               23               24               25                 26               27               28               29               30               31                 Date Details   No additional details    Date of next INR:  3/3/2017         How to take your warfarin dose     To take:  8 mg Take 2 of the 4 mg tablets.    To take:  10 mg Take 2.5 of the 4 mg tablets.

## 2017-02-22 ENCOUNTER — THERAPY VISIT (OUTPATIENT)
Dept: SLEEP MEDICINE | Facility: CLINIC | Age: 57
End: 2017-02-22
Payer: MEDICARE

## 2017-02-22 VITALS — TEMPERATURE: 98.2 F

## 2017-02-22 DIAGNOSIS — G47.34 NOCTURNAL HYPOXEMIA: ICD-10-CM

## 2017-02-22 DIAGNOSIS — G47.33 OSA (OBSTRUCTIVE SLEEP APNEA): ICD-10-CM

## 2017-02-22 PROCEDURE — 95811 POLYSOM 6/>YRS CPAP 4/> PARM: CPT | Performed by: PSYCHIATRY & NEUROLOGY

## 2017-02-22 NOTE — MR AVS SNAPSHOT
After Visit Summary   2/22/2017    Petey Archibald    MRN: [id number]           Patient Information     Date Of Birth          1960        Visit Information        Provider Department      2/22/2017 8:30 PM BED 3  SLEEP [facility]        Today's Diagnoses     NEL (obstructive sleep apnea)        Nocturnal hypoxemia           Follow-ups after your visit        Your next 10 appointments already scheduled     Mar 03, 2017  9:45 AM CST   Anticoagulation Visit with [facility]   [facility]    [address] [place] [address] [id number] [phone]              Who to contact     If you have questions or need follow up information about today's clinic visit or your schedule please contact [facility] directly at [phone].  Normal or non-critical lab and imaging results will be communicated to you by [place], letter or phone within 4 business days after the clinic has received the results. If you do not hear from us within 7 days, please contact the clinic through MyChart or phone. If you have a critical or abnormal lab result, we will notify you by phone as soon as possible.  Submit refill requests through [employer] or call your pharmacy and they will forward the refill request to us. Please allow 3 business days for your refill to be completed.          Additional Information About Your Visit        MyChart Information     [employer] gives you secure access to your electronic health record. If you see a primary care provider, you can also send messages to your care team and make appointments. If you have questions, please call your primary care clinic.  If you do not have a primary care provider, please call [phone] and they will assist you.        Care EveryWhere ID     This is your Care EveryWhere ID. This could be used by other organizations to  access your [place] medical records  [id number]        Your Vitals Were     Temperature                   98.2  F (36.8  C)            Blood Pressure from Last 3 Encounters:   02/09/17 112/64   01/03/17 106/74   12/23/16 102/70    Weight from Last 3 Encounters:   02/09/17 (!) 143.8 kg (317 lb)   01/03/17 (!) 144.1 kg (317 lb 9.6 oz)   12/23/16 (!) 142.9 kg (315 lb)              We Performed the Following     Comprehensive Sleep Study          Today's Medication Changes          These changes are accurate as of: 2/22/17 11:59 PM.  If you have any questions, ask your nurse or doctor.               These medicines have changed or have updated prescriptions.        Dose/Directions    warfarin 4 MG tablet   Commonly known as:  COUMADIN   This may have changed:  additional instructions   Used for:  Long term current use of anticoagulant therapy, History of pulmonary embolism        Dose:  4 mg   Take 1 tablet (4 mg) by mouth daily Take 2.5 tabs of coumadin today (10 mg),Take 2 tabs (8 mg)daily   Quantity:  62 tablet   Refills:  2                Primary Care Provider Office Phone # Fax #    Hortensia Yuliana Peck [phone]       [facility] [address] [facility] [address]        Thank you!     Thank you for choosing [facility]  for your care. Our goal is always to provide you with excellent care. Hearing back from our patients is one way we can continue to improve our services. Please take a few minutes to complete the written survey that you may receive in the mail after your visit with us. Thank you!             Your Updated Medication List - Protect others around you: Learn how to safely use, store and throw away your medicines at www.disposemymeds.org.          This list is accurate as of: 2/22/17 11:59 PM.  Always use your most recent med list.                   Brand Name Dispense Instructions for use    ABILIFY 10 MG tablet   Generic drug:   ARIPiprazole     30 tablet    Take 0.5 tablets (5 mg) by mouth daily       acetaminophen 325 MG tablet    TYLENOL    100 tablet    Take 1-2 tablets (325-650 mg) by mouth every 6 hours as needed       albuterol 108 (90 BASE) MCG/ACT Inhaler    albuterol    1 Inhaler    Inhale 2 puffs into the lungs every 6 hours as needed       AMBIEN 10 MG tablet   Generic drug:  zolpidem     30 tablet    Take 1 tablet (10 mg) by mouth nightly as needed for sleep       ANUSOL-HC 25 MG Suppository   Generic drug:  hydrocortisone     14 Suppository    INSERT ONE SUPPOSITORY RECTALLY UP TO TWICE A DAY AS NEEDED       BACTROBAN 2 % ointment   Generic drug:  mupirocin      Apply topically 2 times daily       BUPROPION HCL PO      Take 150 mg by mouth every morning       clotrimazole-betamethasone cream    LOTRISONE    60 g    Apply topically 2 times daily       EPIPEN 0.3 MG/0.3ML injection   Generic drug:  EPINEPHrine      Inject 0.3 mg into the muscle once as needed.       Ferrous Gluconate 324 (37.5 FE) MG Tabs      Take 1 tablet by mouth 2 times daily.       furosemide 40 MG tablet    LASIX     Take 2 tablets by mouth daily       hydrocortisone 2.5 % cream    ANUSOL-HC    28.35 g    Place rectally 2 times daily       loratadine 10 MG tablet    CLARITIN     Take 10 mg by mouth daily.       mometasone-formoterol 200-5 MCG/ACT oral inhaler    DULERA    13 g    Inhale 2 puffs into the lungs 2 times daily       Multi-vitamin Tabs tablet   Generic drug:  multivitamin, therapeutic with minerals      1 TABLET DAILY       nexIUM 40 MG CR capsule   Generic drug:  esomeprazole      1 CAPSULE DAILY at 8:00 am       * order for DME     1 each    Equipment being ordered: support compression hose BK  2 pair black 30mm HG  To be applied on arising & removed while lying down to go to sleep       * order for DME     1 Device    Equipment being ordered: CPAP with mask and tubing       PHOSPHATE ENEMA RE      Place 1 enema rectally as needed.        Potassium Chloride ER 20 MEQ Tbcr      Take 1 tablet by mouth 2 times daily.       sertraline 100 MG tablet    ZOLOFT     Take 1.5 tablets by mouth daily.       simvastatin 40 MG tablet    ZOCOR     Take 1 tablet by mouth daily.       SINGULAIR 10 MG tablet   Generic drug:  montelukast      1 TABLET DAILY at 8:00 am       spironolactone 25 MG tablet    ALDACTONE     1 TABLET EVERY MORNING at 8:00 am       tiotropium 18 MCG capsule    SPIRIVA HANDIHALER    30 capsule    Once daily       triamcinolone 0.1 % cream    KENALOG    80 g    Apply topically 2 times daily as needed       Vitamin D-3 1000 UNITS Caps      Take 2 capsules by mouth daily       warfarin 4 MG tablet    COUMADIN    62 tablet    Take 1 tablet (4 mg) by mouth daily Take 2.5 tabs of coumadin today (10 mg),Take 2 tabs (8 mg)daily       ZEASORB-AF EX      Externally apply topically once a week       * Notice:  This list has 2 medication(s) that are the same as other medications prescribed for you. Read the directions carefully, and ask your doctor or other care provider to review them with you.

## 2017-02-23 ENCOUNTER — TELEPHONE (OUTPATIENT)
Dept: FAMILY MEDICINE | Facility: CLINIC | Age: 57
End: 2017-02-23

## 2017-02-23 DIAGNOSIS — J41.8 MIXED SIMPLE AND MUCOPURULENT CHRONIC BRONCHITIS (H): Primary | ICD-10-CM

## 2017-02-23 NOTE — TELEPHONE ENCOUNTER
NO PA   For dulera     Needs to come in for instruction on the replacement :     Albuterol  2 puffs prior to the new asmanax  Bid    Use the albut prn during the day     asmanex is the same steroid  That is in dulera   The dulera also has albuterol in it, which the pt already has an Rx  For and can use as above     Needs to be seen to instruct in the above

## 2017-02-23 NOTE — PROGRESS NOTES
Completed a all night titration PSG per provider order.    A final therapeutic PAP pressure was achieved.    Supine REM was not seen on therapeutic pressure.    Patient reports feeling refreshed in AM.

## 2017-02-23 NOTE — LETTER
My COPD Action Plan   Name: Petey Archibald    YOB: 1960    Date: 2/23/2017    My doctor: Hortensia Peck    My clinic: [address] [id number] [phone]   My COPD Medicine:       My Controller Medications: Mometasone (Asmanex)     My Rescue Medication:  Albuterol     Use of Oxygen:  Oxygen Not Prescribed   My COPD Severity: Moderate = FeV1 < 79% -50%        GREEN ZONE       Doing well today      Usual level of activity and exercise    Usual amount of cough and mucus    No shortness of breath    Can think clearly    Sleep well at night    Feel like eating Actions:      Take daily medicines    Use oxygen as prescribed    Follow regular exercise and diet plan    Avoid cigarette smoke and other irritants that harm the lungs             YELLOW ZONE          Having a bad day or flare up      Short of breath more than usual    Change in color or amount of mucus    More coughing or wheezing    Fever or chills    Less energy; trouble completing activities    Trouble thinking or focusing    Using quick relief inhaler or nebulizer more often    Poor sleep; symptoms wake me up    Do not feel like eating Actions:      Take daily medicines    Use quick relief inhaler every 4 hours    If you use oxygen, call you doctor to see if you should adjust your oxygen    Do breathing exercises or other things to help you relax    Let a loved one, friend or neighbor know you are feeling worse    If you have one or more symptoms, consider taking steroids or antibiotics (if they were prescribed)    Call your care team if you have 2 or more symptoms or symptoms don't improve           RED ZONE       Need medical care now      Severe shortness of breath (feel you can't breath)    Not enough breath to do any activity    Trouble walking or talking    Trouble coughing up mucus or blood in mucus    Frequent coughing    Rescue  medicines are not working    Not able to sleep because of breathing    Feel confused or drowsy    Chest pain  Actions:      Call 911 or     Have someone take you to the emergency room if you can't reach your care team        Electronically signed by: Hortensia Peck, February 23, 2017     Annual Reminders:  Meet with Care Team, Flu Shot every Fall and Pneumonia Shot at least once.    [facility] - [place], [place] - [address]

## 2017-02-23 NOTE — LETTER
My Asthma Action Plan  Name: Petey Archibald   YOB: 1960  Date: 2/23/2017   My doctor: Hortensia Peck   My clinic: [facility]      My Control Medicine: asmanex        Dose: bid   My Rescue Medicine: Albuterol (Proair/Ventolin/Proventil) HFA        Dose: prn    My Asthma Severity: mild persistent  Avoid your asthma triggers: Patient is unaware of triggers        GREEN ZONE   Good Control    I feel good    No cough or wheeze    Can work, sleep and play without asthma symptoms       Take your asthma control medicine every day.     1. If exercise triggers your asthma, take your rescue medication    15 minutes before exercise or sports, and    During exercise if you have asthma symptoms  2. Spacer to use with inhaler: If you have a spacer, make sure to use it with your inhaler             YELLOW ZONE Getting Worse  I have ANY of these:    I do not feel good    Cough or wheeze    Chest feels tight    Wake up at night   1. Keep taking your Green Zone medications  2. Start taking your rescue medicine:    every 20 minutes for up to 1 hour. Then every 4 hours for 24-48 hours.  3. If you stay in the Yellow Zone for more than 12-24 hours, contact your doctor.  4. If you do not return to the Green Zone in 12-24 hours or you get worse, start taking your oral steroid medicine if prescribed by your provider.           RED ZONE Medical Alert - Get Help  I have ANY of these:    I feel awful    Medicine is not helping    Breathing getting harder    Trouble walking or talking    Nose opens wide to breathe       1. Take your rescue medicine NOW  2. If your provider has prescribed an oral steroid medicine, start taking it NOW  3. Call your doctor NOW  4. If you are still in the Red Zone after 20 minutes and you have not reached your doctor:    Take your rescue medicine again and    Call 911 or go to the emergency room right away    See your regular doctor within 2 weeks of an  Emergency Room or Urgent Care visit for follow-up treatment.        The above medication may be given at school or day care?: Yes  Child can carry and use inhaler(s) at school with approval of school nurse?: Yes        Electronically signed by: Hortensia Peck, February 23, 2017    Annual Reminders:  Meet with Asthma Educator,  Flu Shot in the Fall, consider Pneumonia Vaccination for patients with asthma (aged 19 and older).    Pharmacy: [facility] - [address]                    Asthma Triggers  How To Control Things That Make Your Asthma Worse    Triggers are things that make your asthma worse.  Look at the list below to help you find your triggers and what you can do about them.  You can help prevent asthma flare-ups by staying away from your triggers.      Trigger                                                          What you can do   Cigarette Smoke  Tobacco smoke can make asthma worse. Do not allow smoking in your home, car or around you.  Be sure no one smokes at a child s day care or school.  If you smoke, ask your health care provider for ways to help you quit.  Ask family members to quit too.  Ask your health care provider for a referral to Quit Plan to help you quit smoking, or call 8-494-670-PLAN.     Colds, Flu, Bronchitis  These are common triggers of asthma. Wash your hands often.  Don t touch your eyes, nose or mouth.  Get a flu shot every year.     Dust Mites  These are tiny bugs that live in cloth or carpet. They are too small to see. Wash sheets and blankets in hot water every week.   Encase pillows and mattress in dust mite proof covers.  Avoid having carpet if you can. If you have carpet, vacuum weekly.   Use a dust mask and HEPA vacuum.   Pollen and Outdoor Mold  Some people are allergic to trees, grass, or weed pollen, or molds. Try to keep your windows closed.  Limit time out doors when pollen count is high.   Ask you health care provider about  taking medicine during allergy season.     Animal Dander  Some people are allergic to skin flakes, urine or saliva from pets with fur or feathers. Keep pets with fur or feathers out of your home.    If you can t keep the pet outdoors, then keep the pet out of your bedroom.  Keep the bedroom door closed.  Keep pets off cloth furniture and away from stuffed toys.     Mice, Rats, and Cockroaches  Some people are allergic to the waste from these pests.   Cover food and garbage.  Clean up spills and food crumbs.  Store grease in the refrigerator.   Keep food out of the bedroom.   Indoor Mold  This can be a trigger if your home has high moisture. Fix leaking faucets, pipes, or other sources of water.   Clean moldy surfaces.  Dehumidify basement if it is damp and smelly.   Smoke, Strong Odors, and Sprays  These can reduce air quality. Stay away from strong odors and sprays, such as perfume, powder, hair spray, paints, smoke incense, paint, cleaning products, candles and new carpet.   Exercise or Sports  Some people with asthma have this trigger. Be active!  Ask your doctor about taking medicine before sports or exercise to prevent symptoms.    Warm up for 5-10 minutes before and after sports or exercise.     Other Triggers of Asthma  Cold air:  Cover your nose and mouth with a scarf.  Sometimes laughing or crying can be a trigger.  Some medicines and food can trigger asthma.

## 2017-02-23 NOTE — PROCEDURES
"SLEEP STUDY INTERPRETATION  TITRATION STUDY      Patient: Petey Archibald  YOB: 1960  Study Date: 2/22/2017  MRN: [id number]  Referring Provider: MD Riddle Walter  Ordering Provider: Goltz, PA-C, Bennett    Indications for Polysomnography: The patient is a 56 y old Male who is 5' 10\" and weighs 308.0 lbs.  His BMI is 44.6, [place] sleepiness scale is 2.0 and neck size is 45 cm.  Relevant medical history includes severe sleep disordered breathing.  A treatment polysomnogram PAP titration was performed to evaluate the efficacy of bilevel PAP for treatment of sleep disordered breathing.    Polysomnogram Data:  A full night polysomnogram recorded the standard physiologic parameters including EEG, EOG, EMG, ECG, nasal and oral airflow.  Respiratory parameters of chest and abdominal movements were recorded with respiratory inductance plethysmography.  Oxygen saturation was recorded by pulse oximetry.      Treatment PSG:  Sleep Architecture: Sleep fragmentation  The total recording time of the study was 475.0 minutes.  The total sleep time was 445.0 minutes.  Sleep latency was 1.0 minutes.  REM latency was 166.0 minutes.  Arousal index was 36.5 arousals per hour.  Sleep efficiency was 93.7%.  Wake after sleep onset was 27.5 minutes.   The patient spent 4.4% of total sleep time in Stage N1, 58.7% in Stage N2, 16.3% in Stage N3 and 20.7% in REM.     Respiration: Uncertain what ideal treatment for patients sleep disordered breathing based upon this study.  Obstructive apneas resolved with an EPAP of 8 (while lateral in NREM) however EPAP pressures continued to be raised with no increase in pressure support.  Patient did mostly well at 17/12 but suspect that was primarily due to sleep consolidation at that period of time.      Snoring - was reported as loud.    Respiratory rate and pattern - was notable for normal respiratory rate and pattern.    Sustained Sleep Associated Hypoventilation - Transcutaneous " carbon dioxide monitoring was used, however significant hypoventilation was not present.    Sleep Associated Hypoxemia - (Greater than 5 minutes O2 sat below 89%) was not present.  Baseline oxygen saturation was 91.2%. Lowest oxygen saturation was 77.2%.  Time spent less than or equal to 88% was 0 minutes.  Time spent less than or equal to 89% was 0 minutes.    Movement Activity:     Periodic Limb Activity - There were 93 PLMs during the entire study. The PLM index was 12.5 movements per hour.  The PLM Arousal Index was 3.4 per hour.    REM EMG Activity - Excessive muscle activity was not clearly present in the absence of sleep disordered breathing.    Nocturnal Behavior - Abnormal sleep related behaviors were not noted.    Bruxism - None apparent.    Cardiac Summary: Sinus with PVCs and PACs  The average pulse rate was 65.6 bpm.  The minimum pulse rate was 54.3 bpm while the maximum pulse rate was 92.0 bpm. The rhythm is normal sinus. Arrhythmias were / were not noted.    Assessment:     Uncertain what ideal treatment for patients sleep disordered breathing based upon this study.  Obstructive apneas resolved with an EPAP of 8 however EPAP pressures continued to be raised with no increase in pressure support.  Patient did mostly well at 17/12 but suspect that was primarily due to sleep consolidation at that period of time.    Recommendations:    Treatment options at this time include:  1. Repeat study with AVAPs therapy  2. Considering the absence of obstructive apneas while supine at EPAP of 10 as well as the patients BMI could empirically set a PS of 8 and give the patient bilevel with 18/10 and follow up with device downloads and overnight oximetry.     Advise regarding the risks of drowsy driving.    Suggest optimizing sleep schedule and avoiding sleep deprivation.    Weight management     Diagnostic Code(s): G47.33, G47.36, G47.9      Joseph Bangura MD

## 2017-02-23 NOTE — TELEPHONE ENCOUNTER
Prior Authorization Request    1. Prior Authorization for the medication mometasone-formoterol (DULERA) 200-5 MCG/ACT oral inhaler        Requesting Provider: Hortensia Peck          Pt name: Petey Archibald        Pt : 1960        Pt MRN: [id number]        Last Office Visit: 2016           Insurance: Payor: MEDICARE / Plan: MEDICARE / Product Type: Medicare /         Insurance ID Number: [unfilled] [id number]        Prior Auth Contact Phone number:[phone]     BIN#:[id number]   PCN#:[id number]            To be completed by provider:

## 2017-02-24 ENCOUNTER — TELEPHONE (OUTPATIENT)
Dept: FAMILY MEDICINE | Facility: CLINIC | Age: 57
End: 2017-02-24

## 2017-02-24 DIAGNOSIS — J41.8 MIXED SIMPLE AND MUCOPURULENT CHRONIC BRONCHITIS (H): Primary | ICD-10-CM

## 2017-02-24 NOTE — TELEPHONE ENCOUNTER
The asmanex 220 should be covered  I sent the Rx  Yesterday and it has the little covered symbol  !  Double chek on the dose and see if the 220 bid is covered or not as it should be   The dulera is not covered     If this doesn't work:  The patient's caregivers will need to ask insurance which steroid inhaler is covered so we do not need to continue to try one after the other    If insurance won't tell them, then I'll Rx another

## 2017-02-24 NOTE — TELEPHONE ENCOUNTER
Prior Authorization Request    1. Prior Authorization for the medication Asmanex        Requesting Provider: Hortensia Peck          Pt name: Petey Archibald        Pt : 1960        Pt MRN: [id number]        Last Office Visit: 2017           Insurance: Payor: MEDICARE / Plan: MEDICARE / Product Type: Medicare /         Insurance ID Number: [unfilled]        Prior Auth Contact Phone number:     BIN#:   PCN#:            To be completed by provider:     2.   Refuse or Start Prior Auth:  Do not start Prior Auth, medication change required.  Please see the updated orders.

## 2017-02-24 NOTE — TELEPHONE ENCOUNTER
Spoke with pharmacist who states Pulmicort preferred by pt's insurance. Please send new rx if appropriate. Thanks!

## 2017-02-28 ENCOUNTER — TELEPHONE (OUTPATIENT)
Dept: FAMILY MEDICINE | Facility: CLINIC | Age: 57
End: 2017-02-28

## 2017-02-28 ENCOUNTER — TRANSFERRED RECORDS (OUTPATIENT)
Dept: HEALTH INFORMATION MANAGEMENT | Facility: CLINIC | Age: 57
End: 2017-02-28

## 2017-02-28 ENCOUNTER — OFFICE VISIT (OUTPATIENT)
Dept: FAMILY MEDICINE | Facility: CLINIC | Age: 57
End: 2017-02-28
Payer: MEDICARE

## 2017-02-28 VITALS
SYSTOLIC BLOOD PRESSURE: 110 MMHG | DIASTOLIC BLOOD PRESSURE: 60 MMHG | RESPIRATION RATE: 18 BRPM | TEMPERATURE: 97.8 F | BODY MASS INDEX: 43.81 KG/M2 | HEART RATE: 88 BPM | HEIGHT: 70 IN | WEIGHT: 306 LBS | OXYGEN SATURATION: 95 %

## 2017-02-28 DIAGNOSIS — Z86.718 HISTORY OF DVT OF LOWER EXTREMITY: ICD-10-CM

## 2017-02-28 DIAGNOSIS — L03.119 CELLULITIS OF LOWER EXTREMITY, UNSPECIFIED LATERALITY: ICD-10-CM

## 2017-02-28 DIAGNOSIS — Z86.711 HISTORY OF PULMONARY EMBOLISM: ICD-10-CM

## 2017-02-28 DIAGNOSIS — I82.442 ACUTE DEEP VEIN THROMBOSIS (DVT) OF TIBIAL VEIN OF LEFT LOWER EXTREMITY (H): Primary | ICD-10-CM

## 2017-02-28 DIAGNOSIS — I80.03 THROMBOPHLEBITIS OF SUPERFICIAL VEINS OF BOTH LOWER EXTREMITIES: ICD-10-CM

## 2017-02-28 DIAGNOSIS — I87.2 STASIS DERMATITIS OF BOTH LEGS: ICD-10-CM

## 2017-02-28 PROCEDURE — 99214 OFFICE O/P EST MOD 30 MIN: CPT | Performed by: FAMILY MEDICINE

## 2017-02-28 NOTE — NURSING NOTE
"Chief Complaint   Patient presents with     Musculoskeletal Problem     /60 (BP Location: Right arm, Patient Position: Chair, Cuff Size: Adult Large)  Pulse 88  Temp 97.8  F (36.6  C) (Tympanic)  Resp 18  Ht 5' 10\" (1.778 m)  Wt (!) 306 lb (138.8 kg)  SpO2 95%  BMI 43.91 kg/m2 Estimated body mass index is 43.91 kg/(m^2) as calculated from the following:    Height as of this encounter: 5' 10\" (1.778 m).    Weight as of this encounter: 306 lb (138.8 kg).  BP completed using cuff size: large   Kanchan Ovalle CMA    Health Maintenance Due   Topic Date Due     ADVANCE DIRECTIVE PLANNING Q5 YRS (NO INBASKET)  12/06/1976     OP ANNUAL INR REFERRAL  08/05/2015     Health Maintenance reviewed at today's visit patient asked to schedule/complete:   None, Health Maintenance up to date.    "

## 2017-02-28 NOTE — PROGRESS NOTES
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           Duration: x 1 week-red & tender with rope like med lower lower legson the RT for 7 d and the Lt for 5 d     Does have severe bilat lower 3rd stasis dermatitis that is woody     Description  Location: Both Lower Legs    Intensity:  mild    Accompanying signs and symptoms: swelling, redness     History  Previous similar problem: YES  Previous evaluation:  none    Precipitating or alleviating factors:  Trauma or overuse: no   Aggravating factors include: none    Therapies tried and outcome: Support Socks    Is active     Off coumadin for colonoscopy 2-7-17 for 7 d and back on it 2-14-17  During this time his INR was down to 1 ie wnl this would have been about a wk ago        Depression Followup    Status since last visit: Stable in remission on meds     See PHQ-9 for current symptoms.  Other associated symptoms: None    Complicating factors:   Significant life event:  No   Current substance abuse:  None  Anxiety or Panic symptoms:  No    PHQ-9  English<3  PHQ-9   Any Language            Problem list and histories reviewed & adjusted, as indicated.  Additional history: as documented    Labs reviewed in EPIC    Reviewed and updated as needed this visit by clinical staff       Reviewed and updated as needed this visit by Provider         ROS:  C: NEGATIVE for fever, chills, change in weight  I: NEGATIVE for worrisome rashes, moles or lesions  E: NEGATIVE for vision changes or irritation  E/M: NEGATIVE for ear, mouth and throat problems  R: NEGATIVE for significant cough or SOB  B: NEGATIVE for masses, tenderness or discharge  CV: NEGATIVE for chest pain, palpitations or peripheral edema  GI: NEGATIVE for nausea, abdominal pain, heartburn, or change in bowel  "habits  : NEGATIVE for frequency, dysuria, or hematuria  M: NEGATIVE for significant arthralgias or myalgia-tender med legs and red -distally   N: NEGATIVE for weakness, dizziness or paresthesias  E: NEGATIVE for temperature intolerance, skin/hair changes  H: NEGATIVE for bleeding problems  P: NEGATIVE for changes in mood or affect    OBJECTIVE:                                                    /60 (BP Location: Right arm, Patient Position: Chair, Cuff Size: Adult Large)  Pulse 88  Temp 97.8  F (36.6  C) (Tympanic)  Resp 18  Ht 5' 10\" (1.778 m)  Wt (!) 306 lb (138.8 kg)  SpO2 95%  BMI 43.91 kg/m2  Body mass index is 43.91 kg/(m^2).  GENERAL: healthy, alert, no distress and obese  EYES: Eyes grossly normal to inspection, PERRL and conjunctivae and sclerae normal  RESP: lungs clear to auscultation - no rales, rhonchi or wheezes  CV: regular rate and rhythm, normal S1 S2, no S3 or S4, no murmur, click or rub, no peripheral edema and peripheral pulses strong  MS: no gross musculoskeletal defects noted, no edema  SKIN: no suspicious lesions or rashes--severe woody stasis dermatitis of lower 3rd of both legs with red tender cord the dist 1/2 of both medially   NEURO: Normal strength and tone, mentation intact and speech normal  PSYCH: mentation appears normal, affect normal/bright    Diagnostic Test Results:  US : bilat superficital greateer saphenous & DVT in post tib v on Lt       ASSESSMENT/PLAN:                                                              ICD-10-CM    1. Stasis dermatitis of both legs I83.11     I83.12    2. Cellulitis of lower extremity, unspecified laterality-bilat  L03.119 US Lower Extremity Venous Duplex Bilateral   3. History of DVT of lower extremity Z86.718 US Lower Extremity Venous Duplex Bilateral   4. History of pulmonary embolism Z86.711 US Lower Extremity Venous Duplex Bilateral       PLAN    Spoke with pt and caregiver after the US   Needs to be with legs elevated> heart or " active, not legs dependent   See INR nurse tomorrow   See me in a wk or so   Hortensia Peck MD  [facility]

## 2017-02-28 NOTE — MR AVS SNAPSHOT
After Visit Summary   2/28/2017    Petey Archibald    MRN: [id number]           Patient Information     Date Of Birth          1960        Visit Information        Provider Department      2/28/2017 10:20 AM Hortensia Peck MD [facility]        Today's Diagnoses     Stasis dermatitis of both legs    -  1    Cellulitis of lower extremity, unspecified laterality-bilat         History of DVT of lower extremity        History of pulmonary embolism           Follow-ups after your visit        Your next 10 appointments already scheduled     Mar 03, 2017  9:45 AM CST   [facility]agulation [facility] with [facility]   [facility]    [address]  [facility] [id number] [phone]            Mar 08, 2017  4:00 PM CST   Return Sleep Patient with Bennett Ezra Goltz, PA-C   [facility]    [address]  [facility] [id number] [phone]              Future tests that were ordered for you today     Open Future Orders        Priority Expected Expires Ordered    US Lower Extremity Venous Duplex Bilateral Routine  2/28/2018 2/28/2017            Who to contact     If you have questions or need follow up information about today's clinic visit or your schedule please contact [facility] directly at [phone].  Normal or non-critical lab and imaging results will be communicated to you by MyChart, letter or phone within 4 business days after the clinic has received the results. If you do not hear from us within 7 days, please contact the clinic through MyChart or phone. If you have a critical or abnormal lab result, we will notify you by phone as soon as possible.  Submit refill requests through [employer] or call your pharmacy and they will forward the refill request  "to us. Please allow 3 business days for your refill to be completed.          Additional Information About Your Visit        [facility] Information     [facility] gives you secure access to your electronic health record. If you see a primary care provider, you can also send messages to your care team and make appointments. If you have questions, please call your primary care clinic.  If you do not have a primary care provider, please call [phone] and they will assist you.        Care EveryWhere ID     This is your Care EveryWhere ID. This could be used by other organizations to access your [place] medical records  [id number]        Your Vitals Were     Pulse Temperature Respirations Height Pulse Oximetry BMI (Body Mass Index)    88 97.8  F (36.6  C) (Tympanic) 18 5' 10\" (1.778 m) 95% 43.91 kg/m2       Blood Pressure from Last 3 Encounters:   02/28/17 110/60   02/09/17 112/64   01/03/17 106/74    Weight from Last 3 Encounters:   02/28/17 (!) 306 lb (138.8 kg)   02/09/17 (!) 317 lb (143.8 kg)   01/03/17 (!) 317 lb 9.6 oz (144.1 kg)                 Today's Medication Changes          These changes are accurate as of: 2/28/17 10:53 AM.  If you have any questions, ask your nurse or doctor.               These medicines have changed or have updated prescriptions.        Dose/Directions    warfarin 4 MG tablet   Commonly known as:  COUMADIN   This may have changed:  additional instructions   Used for:  Long term current use of anticoagulant therapy, History of pulmonary embolism        Dose:  4 mg   Take 1 tablet (4 mg) by mouth daily Take 2.5 tabs of coumadin today (10 mg),Take 2 tabs (8 mg)daily   Quantity:  62 tablet   Refills:  2                Primary Care Provider Office Phone # Fax #    Hortensia Peck [phone]       [facility] [address] [facility] [address]        Thank you!     Thank you for choosing [facility]  for your care. " Our goal is always to provide you with excellent care. Hearing back from our patients is one way we can continue to improve our services. Please take a few minutes to complete the written survey that you may receive in the mail after your visit with us. Thank you!             Your Updated Medication List - Protect others around you: Learn how to safely use, store and throw away your medicines at www.disposemymeds.org.          This list is accurate as of: 2/28/17 10:53 AM.  Always use your most recent med list.                   Brand Name Dispense Instructions for use    ABILIFY 10 MG tablet   Generic drug:  ARIPiprazole     30 tablet    Take 0.5 tablets (5 mg) by mouth daily       acetaminophen 325 MG tablet    TYLENOL    100 tablet    Take 1-2 tablets (325-650 mg) by mouth every 6 hours as needed       albuterol 108 (90 BASE) MCG/ACT Inhaler    albuterol    1 Inhaler    Inhale 2 puffs into the lungs every 6 hours as needed       AMBIEN 10 MG tablet   Generic drug:  zolpidem     30 tablet    Take 1 tablet (10 mg) by mouth nightly as needed for sleep       ANUSOL-HC 25 MG Suppository   Generic drug:  hydrocortisone     14 Suppository    INSERT ONE SUPPOSITORY RECTALLY UP TO TWICE A DAY AS NEEDED       BACTROBAN 2 % ointment   Generic drug:  mupirocin      Apply topically 2 times daily       budesonide 180 MCG/ACT inhaler    PULMICORT FLEXHALER    3 Inhaler    Inhale 2 puffs into the lungs 2 times daily       BUPROPION HCL PO      Take 150 mg by mouth every morning       clotrimazole-betamethasone cream    LOTRISONE    60 g    Apply topically 2 times daily       EPIPEN 0.3 MG/0.3ML injection   Generic drug:  EPINEPHrine      Inject 0.3 mg into the muscle once as needed.       Ferrous Gluconate 324 (37.5 FE) MG Tabs      Take 1 tablet by mouth 2 times daily.       furosemide 40 MG tablet    LASIX     Take 2 tablets by mouth daily       hydrocortisone 2.5 % cream    ANUSOL-HC    28.35 g    Place rectally 2 times daily        loratadine 10 MG tablet    CLARITIN     Take 10 mg by mouth daily.       Mometasone Furoate 200 MCG/ACT Aero     1 Inhaler    Inhale 2 puffs into the lungs 2 times daily       mometasone-formoterol 200-5 MCG/ACT oral inhaler    DULERA    13 g    Inhale 2 puffs into the lungs 2 times daily       Multi-vitamin Tabs tablet   Generic drug:  multivitamin, therapeutic with minerals      1 TABLET DAILY       nexIUM 40 MG CR capsule   Generic drug:  esomeprazole      1 CAPSULE DAILY at 8:00 am       * order for DME     1 each    Equipment being ordered: support compression hose BK  2 pair black 30mm HG  To be applied on arising & removed while lying down to go to sleep       * order for DME     1 Device    Equipment being ordered: CPAP with mask and tubing       PHOSPHATE ENEMA RE      Place 1 enema rectally as needed.       Potassium Chloride ER 20 MEQ Tbcr      Take 1 tablet by mouth 2 times daily.       sertraline 100 MG tablet    ZOLOFT     Take 1.5 tablets by mouth daily.       simvastatin 40 MG tablet    ZOCOR     Take 1 tablet by mouth daily.       SINGULAIR 10 MG tablet   Generic drug:  montelukast      1 TABLET DAILY at 8:00 am       spironolactone 25 MG tablet    ALDACTONE     1 TABLET EVERY MORNING at 8:00 am       tiotropium 18 MCG capsule    SPIRIVA HANDIHALER    30 capsule    Once daily       triamcinolone 0.1 % cream    KENALOG    80 g    Apply topically 2 times daily as needed       Vitamin D-3 1000 UNITS Caps      Take 2 capsules by mouth daily       warfarin 4 MG tablet    COUMADIN    62 tablet    Take 1 tablet (4 mg) by mouth daily Take 2.5 tabs of coumadin today (10 mg),Take 2 tabs (8 mg)daily       ZEASORB-AF EX      Externally apply topically once a week       * Notice:  This list has 2 medication(s) that are the same as other medications prescribed for you. Read the directions carefully, and ask your doctor or other care provider to review them with you.

## 2017-02-28 NOTE — TELEPHONE ENCOUNTER
[facility] calling.  Patient positive for DVT.  They are holding patient at office until further advise from provider.  Please call [phone]    Lauryn Alvarado RN  [employer] [employer]

## 2017-02-28 NOTE — TELEPHONE ENCOUNTER
[facility] Called:    Patient has DVT lower left calf - posterior tibial.  As well as  Right superficial GSV clot mid calf to ankle.  Left GSV clot at ankle.    Will pass message on ASAP to Dr. Peck who saw pt today - Lauryn LIU is calling results over to [facility] office now - but will send this as FYI to Dr. Peck.    Cate Thurston MD

## 2017-03-01 ENCOUNTER — ANTICOAGULATION THERAPY VISIT (OUTPATIENT)
Dept: NURSING | Facility: CLINIC | Age: 57
End: 2017-03-01
Payer: MEDICARE

## 2017-03-01 DIAGNOSIS — Z79.01 LONG-TERM (CURRENT) USE OF ANTICOAGULANTS: ICD-10-CM

## 2017-03-01 DIAGNOSIS — Z86.711 HISTORY OF PULMONARY EMBOLISM: ICD-10-CM

## 2017-03-01 LAB — INR POINT OF CARE: 1.9 (ref 0.86–1.14)

## 2017-03-01 PROCEDURE — 99207 ZZC NO CHARGE NURSE ONLY: CPT

## 2017-03-01 PROCEDURE — 36416 COLLJ CAPILLARY BLOOD SPEC: CPT

## 2017-03-01 PROCEDURE — 85610 PROTHROMBIN TIME: CPT | Mod: QW

## 2017-03-01 ASSESSMENT — PATIENT HEALTH QUESTIONNAIRE - PHQ9: SUM OF ALL RESPONSES TO PHQ QUESTIONS 1-9: 2

## 2017-03-01 NOTE — MR AVS SNAPSHOT
Petey Archibald   3/1/2017 3:30 PM   Anticoagulation Therapy Visit    Description:  56 year old male   Provider:  [facility]   Department:  [facility] Nurse           INR as of 3/1/2017     Today's INR 1.9!      Anticoagulation Summary as of 3/1/2017     INR goal 2.0-3.0   Today's INR 1.9!   Full instructions 10 mg on Fri; 8 mg all other days   Next INR check 3/15/2017    Indications   History of pulmonary embolism [Z86.711]  Long-term (current) use of anticoagulants [Z79.01] [Z79.01]         Your next Anticoagulation Clinic appointment(s)     Mar 15, 2017  3:30 PM CDT   Anticoagulation Visit with  ANTICOAGULATION CLINIC   [facility]    [address] [place] [id number] [phone]              Contact Numbers     [facility]  Please call  [phone] to cancel and/or reschedule your appointment   The direct line to the anticoagulant nurse is [phone] on Monday, Wednesday, and Friday. On Thursday, the anticoagulant nurse can be reached directly at [phone].         March 2017 Details    Sun Mon Tue Wed Thu Fri Sat        1      8 mg   See details      2      8 mg         3      10 mg         4      8 mg           5      8 mg         6      8 mg         7      8 mg         8      8 mg         9      8 mg         10      10 mg         11      8 mg           12      8 mg         13      8 mg         14      8 mg         15            16               17               18                 19               20               21               22               23               24               25                 26               27               28               29               30               31                 Date Details   03/01 This INR check       Date of next INR:  3/15/2017         How to take your warfarin dose     To take:  8 mg Take 2 of the 4 mg tablets.    To take:  10 mg Take  2.5 of the 4 mg tablets.

## 2017-03-01 NOTE — PROGRESS NOTES
ANTICOAGULATION FOLLOW-UP CLINIC VISIT    Patient Name:  Petey Archibald  Date:  3/1/2017  Contact Type:  Face to Face    SUBJECTIVE:     Patient Findings     Positives No Problem Findings (new clot in lower left leg)           OBJECTIVE    INR Protime   Date Value Ref Range Status   03/01/2017 1.9 (A) 0.86 - 1.14 Final       ASSESSMENT / PLAN  INR assessment THER    Recheck INR In: 2 WEEKS    INR Location Clinic      Anticoagulation Summary as of 3/1/2017     INR goal 2.0-3.0   Today's INR 1.9!   Maintenance plan 10 mg (4 mg x 2.5) on Fri; 8 mg (4 mg x 2) all other days   Full instructions 10 mg on Fri; 8 mg all other days   Weekly total 58 mg   Plan last modified Lisa Ponce RN (11/18/2016)   Next INR check 3/15/2017   Target end date Indefinite    Indications   History of pulmonary embolism [Z86.711]  Long-term (current) use of anticoagulants [Z79.01] [Z79.01]         Anticoagulation Episode Summary     INR check location [facility]    Preferred lab     Send INR reminders to [facility] INR/PROTIME    Comments       Anticoagulation Care Providers     Provider Role Specialty Phone number    Jacky Burr MD [facility] [phone]            See the Encounter Report to view Anticoagulation Flowsheet and Dosing Calendar (Go to Encounters tab in chart review, and find the Anticoagulation Therapy Visit)        Lisa Ponce, RN

## 2017-03-08 ENCOUNTER — OFFICE VISIT (OUTPATIENT)
Dept: SLEEP MEDICINE | Facility: CLINIC | Age: 57
End: 2017-03-08
Payer: MEDICARE

## 2017-03-08 VITALS
BODY MASS INDEX: 43.98 KG/M2 | WEIGHT: 307.2 LBS | OXYGEN SATURATION: 93 % | HEIGHT: 70 IN | SYSTOLIC BLOOD PRESSURE: 122 MMHG | DIASTOLIC BLOOD PRESSURE: 71 MMHG | TEMPERATURE: 98.6 F | HEART RATE: 69 BPM

## 2017-03-08 DIAGNOSIS — G47.33 OSA (OBSTRUCTIVE SLEEP APNEA): Primary | ICD-10-CM

## 2017-03-08 DIAGNOSIS — G47.34 NOCTURNAL HYPOXEMIA: ICD-10-CM

## 2017-03-08 PROCEDURE — 99214 OFFICE O/P EST MOD 30 MIN: CPT | Performed by: PHYSICIAN ASSISTANT
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
## 2017-03-08 NOTE — PATIENT INSTRUCTIONS

## 2017-03-08 NOTE — MR AVS SNAPSHOT
After Visit Summary   3/8/2017    Petey Archibald    MRN: [id number]           Patient Information     Date Of Birth          1960        Visit Information        Provider Department      3/8/2017 4:00 PM Goltz, Bennett Ezra, PA-C [facility] [place]        Today's Diagnoses     NEL (obstructive sleep apnea)    -  1    Nocturnal hypoxemia          Care Instructions      Your BMI is Body mass index is 44.08 kg/(m^2).  Weight management is a personal decision.  If you are interested in exploring weight loss strategies, the following discussion covers the approaches that may be successful. Body mass index (BMI) is one way to tell whether you are at a healthy weight, overweight, or obese. It measures your weight in relation to your height.  A BMI of 18.5 to 24.9 is in the healthy range. A person with a BMI of 25 to 29.9 is considered overweight, and someone with a BMI of 30 or greater is considered obese. More than two-thirds of American adults are considered overweight or obese.  Being overweight or obese increases the risk for further weight gain. Excess weight may lead to heart disease and diabetes.  Creating and following plans for healthy eating and physical activity may help you improve your health.  Weight control is part of healthy lifestyle and includes exercise, emotional health, and healthy eating habits. Careful eating habits lifelong are the mainstay of weight control. Though there are significant health benefits from weight loss, long-term weight loss with diet alone may be very difficult to achieve- studies show long-term success with dietary management in less than 10% of people. Attaining a healthy weight may be especially difficult to achieve in those with severe obesity. In some cases, medications, devices and surgical management might be considered.  What can you do?  If you are overweight or obese and are interested in methods for weight loss, you should discuss this  with your provider.     Consider reducing daily calorie intake by 500 calories.     Keep a food journal.     Avoiding skipping meals, consider cutting portions instead.    Diet combined with exercise helps maintain muscle while optimizing fat loss. Strength training is particularly important for building and maintaining muscle mass. Exercise helps reduce stress, increase energy, and improves fitness. Increasing exercise without diet control, however, may not burn enough calories to loose weight.       Start walking three days a week 10-20 minutes at a time    Work towards walking thirty minutes five days a week     Eventually, increase the speed of your walking for 1-2 minutes at time    In addition, we recommend that you review healthy lifestyles and methods for weight loss available through the National Institutes of Health patient information sites:  http://win.niddk.nih.gov/publications/index.htm    And look into health and wellness programs that may be available through your health insurance provider, employer, local community center, or robert club.    Weight management plan: Patient was referred to their PCP to discuss a diet and exercise plan.            Follow-ups after your visit        Follow-up notes from your care team     Return in about 2 months (around 5/8/2017).      Your next 10 appointments already scheduled     Mar 15, 2017  3:30 PM CDT   An[facility] with [facility]   [facility]    [address] [place] [id number] [phone]              Who to contact     If you have questions or need follow up information about today's clinic visit or your schedule please contact [facility] directly at [phone].  Normal or non-critical lab and imaging results will be communicated to you by MyChart, letter or phone within 4 business days after the clinic has  "received the results. If you do not hear from us within 7 days, please contact the clinic through [employer] or phone. If you have a critical or abnormal lab result, we will notify you by phone as soon as possible.  Submit refill requests through [employer] or call your pharmacy and they will forward the refill request to us. Please allow 3 business days for your refill to be completed.          Additional Information About Your Visit        [employer] Information     [employer] gives you secure access to your electronic health record. If you see a primary care provider, you can also send messages to your care team and make appointments. If you have questions, please call your primary care clinic.  If you do not have a primary care provider, please call [phone] and they will assist you.        Care EveryWhere ID     This is your Care EveryWhere ID. This could be used by other organizations to access your [place] medical records  [id number]        Your Vitals Were     Pulse Temperature Height Pulse Oximetry BMI (Body Mass Index)       69 98.6  F (37  C) (Oral) 1.778 m (5' 10\") 93% 44.08 kg/m2        Blood Pressure from Last 3 Encounters:   03/08/17 122/71   02/28/17 110/60   02/09/17 112/64    Weight from Last 3 Encounters:   03/08/17 (!) 139.3 kg (307 lb 3.2 oz)   02/28/17 (!) 138.8 kg (306 lb)   02/09/17 (!) 143.8 kg (317 lb)              We Performed the Following     Comprehensive DME          Today's Medication Changes          These changes are accurate as of: 3/8/17  4:53 PM.  If you have any questions, ask your nurse or doctor.               These medicines have changed or have updated prescriptions.        Dose/Directions    warfarin 4 MG tablet   Commonly known as:  COUMADIN   This may have changed:  additional instructions   Used for:  Long term current use of anticoagulant therapy, History of pulmonary embolism        Dose:  4 mg   Take 1 tablet (4 mg) by mouth daily Take 2.5 tabs of coumadin today (10 " mg),Take 2 tabs (8 mg)daily   Quantity:  62 tablet   Refills:  2                Primary Care Provider Office Phone # Fax #    Hortensia Peck [phone]       [facility][1] [place] [address]  [facility] [address]        Thank you!     Thank you for choosing [facility]  for your care. Our goal is always to provide you with excellent care. Hearing back from our patients is one way we can continue to improve our services. Please take a few minutes to complete the written survey that you may receive in the mail after your visit with us. Thank you!             Your Updated Medication List - Protect others around you: Learn how to safely use, store and throw away your medicines at www.disposemymeds.org.          This list is accurate as of: 3/8/17  4:53 PM.  Always use your most recent med list.                   Brand Name Dispense Instructions for use    ABILIFY 10 MG tablet   Generic drug:  ARIPiprazole     30 tablet    Take 0.5 tablets (5 mg) by mouth daily       acetaminophen 325 MG tablet    TYLENOL    100 tablet    Take 1-2 tablets (325-650 mg) by mouth every 6 hours as needed       albuterol 108 (90 BASE) MCG/ACT Inhaler    albuterol    1 Inhaler    Inhale 2 puffs into the lungs every 6 hours as needed       AMBIEN 10 MG tablet   Generic drug:  zolpidem     30 tablet    Take 1 tablet (10 mg) by mouth nightly as needed for sleep       ANUSOL-HC 25 MG Suppository   Generic drug:  hydrocortisone     14 Suppository    INSERT ONE SUPPOSITORY RECTALLY UP TO TWICE A DAY AS NEEDED       BACTROBAN 2 % ointment   Generic drug:  mupirocin      Apply topically 2 times daily       budesonide 180 MCG/ACT inhaler    PULMICORT FLEXHALER    3 Inhaler    Inhale 2 puffs into the lungs 2 times daily       BUPROPION HCL PO      Take 150 mg by mouth every morning       clotrimazole-betamethasone cream    LOTRISONE    60 g    Apply topically 2 times daily       EPIPEN 0.3  MG/0.3ML injection   Generic drug:  EPINEPHrine      Inject 0.3 mg into the muscle once as needed.       Ferrous Gluconate 324 (37.5 FE) MG Tabs      Take 1 tablet by mouth 2 times daily.       furosemide 40 MG tablet    LASIX     Take 2 tablets by mouth daily       hydrocortisone 2.5 % cream    ANUSOL-HC    28.35 g    Place rectally 2 times daily       loratadine 10 MG tablet    CLARITIN     Take 10 mg by mouth daily.       Mometasone Furoate 200 MCG/ACT Aero     1 Inhaler    Inhale 2 puffs into the lungs 2 times daily       mometasone-formoterol 200-5 MCG/ACT oral inhaler    DULERA    13 g    Inhale 2 puffs into the lungs 2 times daily       Multi-vitamin Tabs tablet   Generic drug:  multivitamin, therapeutic with minerals      1 TABLET DAILY       nexIUM 40 MG CR capsule   Generic drug:  esomeprazole      1 CAPSULE DAILY at 8:00 am       * order for DME     1 each    Equipment being ordered: support compression hose BK  2 pair black 30mm HG  To be applied on arising & removed while lying down to go to sleep       * order for DME     1 Device    Equipment being ordered: CPAP with mask and tubing       PHOSPHATE ENEMA RE      Place 1 enema rectally as needed.       Potassium Chloride ER 20 MEQ Tbcr      Take 1 tablet by mouth 2 times daily.       sertraline 100 MG tablet    ZOLOFT     Take 1.5 tablets by mouth daily.       simvastatin 40 MG tablet    ZOCOR     Take 1 tablet by mouth daily.       SINGULAIR 10 MG tablet   Generic drug:  montelukast      1 TABLET DAILY at 8:00 am       spironolactone 25 MG tablet    ALDACTONE     1 TABLET EVERY MORNING at 8:00 am       tiotropium 18 MCG capsule    SPIRIVA HANDIHALER    30 capsule    Once daily       triamcinolone 0.1 % cream    KENALOG    80 g    Apply topically 2 times daily as needed       Vitamin D-3 1000 UNITS Caps      Take 2 capsules by mouth daily       warfarin 4 MG tablet    COUMADIN    62 tablet    Take 1 tablet (4 mg) by mouth daily Take 2.5 tabs of  coumadin today (10 mg),Take 2 tabs (8 mg)daily       ZEASORB-AF EX      Externally apply topically once a week       * Notice:  This list has 2 medication(s) that are the same as other medications prescribed for you. Read the directions carefully, and ask your doctor or other care provider to review them with you.

## 2017-03-08 NOTE — NURSING NOTE
"Chief Complaint   Patient presents with     Sleep Problem     Follow up sleep study       Initial /71 (BP Location: Right arm, Patient Position: Chair, Cuff Size: Adult Regular)  Pulse 69  Temp 98.6  F (37  C) (Oral)  Ht 1.778 m (5' 10\")  Wt (!) 139.3 kg (307 lb 3.2 oz)  SpO2 93%  BMI 44.08 kg/m2 Estimated body mass index is 44.08 kg/(m^2) as calculated from the following:    Height as of this encounter: 1.778 m (5' 10\").    Weight as of this encounter: 139.3 kg (307 lb 3.2 oz).  Medication Reconciliation: complete     Chantal Hernandez MA  [facility] [place]      "

## 2017-03-09 ENCOUNTER — TELEPHONE (OUTPATIENT)
Dept: SLEEP MEDICINE | Facility: CLINIC | Age: 57
End: 2017-03-09

## 2017-03-13 ENCOUNTER — TELEPHONE (OUTPATIENT)
Dept: FAMILY MEDICINE | Facility: CLINIC | Age: 57
End: 2017-03-13

## 2017-03-13 DIAGNOSIS — K21.9 GASTROESOPHAGEAL REFLUX DISEASE, ESOPHAGITIS PRESENCE NOT SPECIFIED: Primary | ICD-10-CM

## 2017-03-13 NOTE — TELEPHONE ENCOUNTER
Prior Authorization Request    1. Prior Authorization for the medication esomeprazole 40 mg dr juarez   (patient reported)        Requesting Provider: Hortensia Peck          Pt name: Petey Acrhibald        Pt : 1960        Pt MRN: [id number]        Last Office Visit: 2017           Insurance: Payor: MEDICARE / Plan: MEDICARE / Product Type: Medicare /         Insurance ID Number: [unfilled][id number]        Prior Auth Contact Phone number:[phone]     BIN#:[id number]   PCN#:[id number]            To be completed by provider:     2.   Refuse or Start Prior Auth:  Please start Prior Auth.      If requesting prior auth initiation:       Diagnosis (with code): GERD without esphagitis (K21.9)      Patient has been on this medication since:       Prior Medications, dates used, reason for failure:     Symptoms were not adequately controlled on other PPI medications      Reasons why other medications are not adequate substitutions:    Symptoms controlled with current medication

## 2017-03-13 NOTE — TELEPHONE ENCOUNTER
Called to do a PA over the phone because CMM could not find the patient. They said that there should be a turn around time of 24-72 hours.

## 2017-03-14 RX ORDER — DEXLANSOPRAZOLE 60 MG/1
60 CAPSULE, DELAYED RELEASE ORAL DAILY
Qty: 30 CAPSULE | Refills: 0 | Status: SHIPPED | OUTPATIENT
Start: 2017-03-14 | End: 2017-05-01

## 2017-03-14 NOTE — TELEPHONE ENCOUNTER
PA was denied due to patient not trying and failing the following medications: Dexilant, Omeprazole and Pantoprazole. Please Advise......

## 2017-03-14 NOTE — TELEPHONE ENCOUNTER
Patient can try dexilant first. I will order today and we can monitor him for 2-4 weeks, if no improvement or worsening we go to the next medication until all 3 have been tried and failed. [facility] Please notify them. Nicole Joy Siegler, PA-C

## 2017-03-15 NOTE — TELEPHONE ENCOUNTER
Vasu from [facility] called asking if Dexilant is replacing esomeprazole?  Informed pt will try Dexilant as a replacement first as PA was denied.

## 2017-03-17 ENCOUNTER — ANTICOAGULATION THERAPY VISIT (OUTPATIENT)
Dept: NURSING | Facility: CLINIC | Age: 57
End: 2017-03-17
Payer: MEDICARE

## 2017-03-17 DIAGNOSIS — Z86.711 HISTORY OF PULMONARY EMBOLISM: ICD-10-CM

## 2017-03-17 DIAGNOSIS — Z79.01 LONG-TERM (CURRENT) USE OF ANTICOAGULANTS: ICD-10-CM

## 2017-03-17 LAB — INR POINT OF CARE: 1.9 (ref 0.86–1.14)

## 2017-03-17 PROCEDURE — 36416 COLLJ CAPILLARY BLOOD SPEC: CPT

## 2017-03-17 PROCEDURE — 99207 ZZC NO CHARGE NURSE ONLY: CPT

## 2017-03-17 PROCEDURE — 85610 PROTHROMBIN TIME: CPT | Mod: QW

## 2017-03-17 NOTE — PROGRESS NOTES
ANTICOAGULATION FOLLOW-UP CLINIC VISIT    Patient Name:  Petey Archibald  Date:  3/17/2017  Contact Type:  Face to Face    SUBJECTIVE:        OBJECTIVE    INR Protime   Date Value Ref Range Status   03/17/2017 1.9 (A) 0.86 - 1.14 Final       ASSESSMENT / PLAN  INR assessment THER    Recheck INR In: 3 WEEKS    INR Location Clinic      Anticoagulation Summary as of 3/17/2017     INR goal 2.0-3.0   Today's INR 1.9!   Maintenance plan 10 mg (4 mg x 2.5) on Mon, Fri; 8 mg (4 mg x 2) all other days   Full instructions 10 mg on Mon, Fri; 8 mg all other days   Weekly total 60 mg   Plan last modified Lisa Ponce RN (3/17/2017)   Next INR check 4/7/2017   Target end date Indefinite    Indications   History of pulmonary embolism [Z86.711]  Long-term (current) use of anticoagulants [Z79.01] [Z79.01]         Anticoagulation Episode Summary     INR check location [facility]    Preferred lab     Send INR reminders to [facility] INR/PROTIME    Comments       Anticoagulation Care Providers     Provider Role Specialty Phone number    Jacky Burr MD [facility] [phone]            See the Encounter Report to view Anticoagulation Flowsheet and Dosing Calendar (Go to Encounters tab in chart review, and find the Anticoagulation Therapy Visit)        Lisa Ponce, RN

## 2017-03-17 NOTE — MR AVS SNAPSHOT
Petey Archibald   3/17/2017 8:45 AM   Anticoagulation Therapy Visit    Description:  56 year old male   Provider:  [facility]   Department:  Bx Nurse           INR as of 3/17/2017     Today's INR 1.9!      Anticoagulation Summary as of 3/17/2017     INR goal 2.0-3.0   Today's INR 1.9!   Full instructions 10 mg on Mon, Fri; 8 mg all other days   Next INR check 4/7/2017    Indications   History of pulmonary embolism [Z86.711]  Long-term (current) use of anticoagulants [Z79.01] [Z79.01]         Your next Anticoagulation Clinic appointment(s)     Apr 07, 2017 10:15 AM CDT   [facility]    [address] [place] [id number] [phone]              Contact Numbers     [facility]  Please call  [phone] to cancel and/or reschedule your appointment   The direct line to the anticoagulant nurse is [phone] on Monday, Wednesday, and Friday. On Thursday, the anticoagulant nurse can be reached directly at [phone].         March 2017 Details    Sun Mon Tue Wed Thu Fri Sat        1               2               3               4                 5               6               7               8               9               10               11                 12               13               14               15               16               17      10 mg   See details      18      8 mg           19      8 mg         20      10 mg         21      8 mg         22      8 mg         23      8 mg         24      10 mg         25      8 mg           26      8 mg         27      10 mg         28      8 mg         29      8 mg         30      8 mg         31      10 mg           Date Details   03/17 This INR check               How to take your warfarin dose     To take:  8 mg Take 2 of the 4 mg tablets.    To take:  10 mg Take 2.5 of the  4 mg tablets.           April 2017 Details    Sun Mon Tue Wed Thu Fri Sat           1      8 mg           2      8 mg         3      10 mg         4      8 mg         5      8 mg         6      8 mg         7            8                 9               10               11               12               13               14               15                 16               17               18               19               20               21               22                 23               24               25               26               27               28               29                 30                      Date Details   No additional details    Date of next INR:  4/7/2017         How to take your warfarin dose     To take:  8 mg Take 2 of the 4 mg tablets.    To take:  10 mg Take 2.5 of the 4 mg tablets.

## 2017-03-22 ENCOUNTER — TELEPHONE (OUTPATIENT)
Dept: FAMILY MEDICINE | Facility: CLINIC | Age: 57
End: 2017-03-22

## 2017-03-22 NOTE — TELEPHONE ENCOUNTER
Reason for Call:  Form, our goal is to have forms completed with 72 hours, however, some forms may require a visit or additional information.    Type of letter, form or note:  medical    Who is the form from?: ESTEFANI Alvarez    Where did the form come from: form was faxed in    What clinic location was the form placed at?: [facility]    Where the form was placed: Dr's Box: Hortensia Peck MD    What number is listed as a contact on the form?: Fax # [phone]       Additional comments: Physician's Orders - May 2017     Call taken on 3/22/2017 at 12:29 PM by Chandra Vela

## 2017-03-24 ENCOUNTER — DOCUMENTATION ONLY (OUTPATIENT)
Dept: SLEEP MEDICINE | Facility: CLINIC | Age: 57
End: 2017-03-24

## 2017-03-24 DIAGNOSIS — G47.33 OSA (OBSTRUCTIVE SLEEP APNEA): Primary | ICD-10-CM

## 2017-03-24 NOTE — PROGRESS NOTES
Patient was offered choice of vendor and chose [facility].  Patient Petey Archibald was set up at [place] on March 24, 2017. Patient received a Resmed AirCurve 10 Bilevel. Pressures were set at 16/8 cm H2O.   Patient s ramp is 14/7 cm H2O for 30 min and FLEX/EPR is 2.  Patient received a Resmed Mask name: AIRFIT N20  Nasal mask Size Medium, heated tubing and heated humidifier.  Patient is enrolled in the STM Program and does need to meet compliance. Patient has a follow up on 5/23/17 with Bennett Goltz, PA.    Arti Novak

## 2017-03-27 ENCOUNTER — DOCUMENTATION ONLY (OUTPATIENT)
Dept: SLEEP MEDICINE | Facility: CLINIC | Age: 57
End: 2017-03-27

## 2017-03-27 NOTE — PROGRESS NOTES
3 DAY STM VISIT    Patient contacted for 3 day STM visit  Message left for patient to return call    Current settings:  16/8  Cm H20     Assessment:  No data due to error.   Action plan: Pt to have f/u 14 day STM visit.

## 2017-03-27 NOTE — PROGRESS NOTES
Patient returned call.    Pt reports sleeping all the way through the night,  He is feeling benefit using device.

## 2017-04-07 ENCOUNTER — ANTICOAGULATION THERAPY VISIT (OUTPATIENT)
Dept: NURSING | Facility: CLINIC | Age: 57
End: 2017-04-07
Payer: MEDICARE

## 2017-04-07 DIAGNOSIS — Z79.01 LONG-TERM (CURRENT) USE OF ANTICOAGULANTS: ICD-10-CM

## 2017-04-07 DIAGNOSIS — Z86.711 HISTORY OF PULMONARY EMBOLISM: ICD-10-CM

## 2017-04-07 LAB — INR POINT OF CARE: 2.8 (ref 0.86–1.14)

## 2017-04-07 PROCEDURE — 99207 ZZC NO CHARGE NURSE ONLY: CPT

## 2017-04-07 PROCEDURE — 36416 COLLJ CAPILLARY BLOOD SPEC: CPT

## 2017-04-07 PROCEDURE — 85610 PROTHROMBIN TIME: CPT | Mod: QW

## 2017-04-07 NOTE — PROGRESS NOTES
ANTICOAGULATION FOLLOW-UP CLINIC VISIT    Patient Name:  Petey Archibald  Date:  4/7/2017  Contact Type:  Face to Face    SUBJECTIVE:     Patient Findings     Positives No Problem Findings           OBJECTIVE    INR Protime   Date Value Ref Range Status   04/07/2017 2.8 (A) 0.86 - 1.14 Final       ASSESSMENT / PLAN  INR assessment THER    Recheck INR In: 4 WEEKS    INR Location Clinic      Anticoagulation Summary as of 4/7/2017     INR goal 2.0-3.0   Today's INR 2.8   Maintenance plan 10 mg (4 mg x 2.5) on Mon, Fri; 8 mg (4 mg x 2) all other days   Full instructions 10 mg on Mon, Fri; 8 mg all other days   Weekly total 60 mg   Plan last modified Lisa Ponce RN (3/17/2017)   Next INR check 5/5/2017   Target end date Indefinite    Indications   History of pulmonary embolism [Z86.711]  Long-term (current) use of anticoagulants [Z79.01] [Z79.01]         Anticoagulation Episode Summary     INR check location [facility]    Preferred lab     Send INR reminders to [facility] INR/PROTIME    Comments       Anticoagulation Care Providers     Provider Role Specialty Phone number    Jacky Burr MD [facility] [phone]            See the Encounter Report to view Anticoagulation Flowsheet and Dosing Calendar (Go to Encounters tab in chart review, and find the Anticoagulation Therapy Visit)        Lisa Ponce RN

## 2017-04-07 NOTE — MR AVS SNAPSHOT
Petey Archibald   4/7/2017 10:15 AM   Anticoagulation Therapy Visit    Description:  56 year old male   Provider:  [facility]   Department:  Bx Nurse           INR as of 4/7/2017     Today's INR 2.8      Anticoagulation Summary as of 4/7/2017     INR goal 2.0-3.0   Today's INR 2.8   Full instructions 10 mg on Mon, Fri; 8 mg all other days   Next INR check 5/5/2017    Indications   History of pulmonary embolism [Z86.711]  Long-term (current) use of anticoagulants [Z79.01] [Z79.01]         Your next Anticoagulation Clinic appointment(s)     May 05, 2017 10:15 AM CDT   Anticoagulation Visit with  ANTICOAGULATION CLINIC   [facility]    [address] [place] [id number] [phone]              Contact Numbers     [facility]  Please call  [phone] to cancel and/or reschedule your appointment   The direct line to the anticoagulant nurse is [phone] on Monday, Wednesday, and Friday. On Thursday, the anticoagulant nurse can be reached directly at [phone].         April 2017 Details    Sun Mon Tue Wed Thu Fri Sat           1                 2               3               4               5               6               7      10 mg   See details      8      8 mg           9      8 mg         10      10 mg         11      8 mg         12      8 mg         13      8 mg         14      10 mg         15      8 mg           16      8 mg         17      10 mg         18      8 mg         19      8 mg         20      8 mg         21      10 mg         22      8 mg           23      8 mg         24      10 mg         25      8 mg         26      8 mg         27      8 mg         28      10 mg         29      8 mg           30      8 mg                Date Details   04/07 This INR check               How to take your warfarin dose     To take:  8 mg Take 2 of the 4 mg tablets.    To  take:  10 mg Take 2.5 of the 4 mg tablets.           May 2017 Details    Sun Mon Tue Wed Thu Fri Sat      1      10 mg         2      8 mg         3      8 mg         4      8 mg         5            6                 7               8               9               10               11               12               13                 14               15               16               17               18               19               20                 21               22               23               24               25               26               27                 28               29               30               31                   Date Details   No additional details    Date of next INR:  5/5/2017         How to take your warfarin dose     To take:  8 mg Take 2 of the 4 mg tablets.    To take:  10 mg Take 2.5 of the 4 mg tablets.

## 2017-04-10 ENCOUNTER — DOCUMENTATION ONLY (OUTPATIENT)
Dept: SLEEP MEDICINE | Facility: CLINIC | Age: 57
End: 2017-04-10

## 2017-04-10 NOTE — PROGRESS NOTES
14 DAY STM VISIT    Message left for patient to return call    Assessment: Pt not meeting objective benchmarks for leak.  Action plan: Waiting for patient to return call and pt to have 30 day STM visit.   Device settings:    Bi-Level 16/8 cm H20    Target EPAP (95% of Target) 14 day average (Resmed): 0cm H20      Objective measures: 14 day rolling measure     % compliance greater than four hours rolling average 14 days: 85.71 %     95% OF Leak in litres Rolling Average 14 Days: 50.72 lpm last data upload        AHI Rolling Average 14 Day: 2.29  last data upload        Time mask on face 14 day average: 455 min         Objective measure goal  Compliance   Goal >70%  Leak   Goal < 10%  AHI  Goal < 5  Usage  Goal >240

## 2017-04-18 ENCOUNTER — TRANSFERRED RECORDS (OUTPATIENT)
Dept: HEALTH INFORMATION MANAGEMENT | Facility: CLINIC | Age: 57
End: 2017-04-18

## 2017-04-22 ENCOUNTER — TELEPHONE (OUTPATIENT)
Dept: FAMILY MEDICINE | Facility: CLINIC | Age: 57
End: 2017-04-22

## 2017-04-22 NOTE — TELEPHONE ENCOUNTER
He is scheduled for Wednesday for a eye problem, [place] plaque, please see what symptoms he is having what visioal changes. If any. Any recnet eye evaluation    Jacky Burr MD

## 2017-04-24 ENCOUNTER — DOCUMENTATION ONLY (OUTPATIENT)
Dept: SLEEP MEDICINE | Facility: CLINIC | Age: 57
End: 2017-04-24

## 2017-04-24 NOTE — PROGRESS NOTES
30 DAY STM VISIT    Message left for patient to return call     Assessment: Pt meeting objective benchmarks.     Action plan: Waiting for patient to return call  and Pt to have 6 month STM visit  Patient has a follow up visit with Bennett Goltz, PA on 5/23/17.   Device settings: 16/8 cmH2O    Objective measures: 14 day rolling measures      % compliance greater than four hours rolling average 14 days: 92.8%     95% OF Leak in litres Rolling Average 14 Days: 33.69 lpm  last  upload     AHI Rolling Average 14 Day: 1.69 last  upload     Time mask on face 14 day average: 494 min        Objective measure goal  Compliance   Goal >70%  Leak   Goal < 10%  AHI  Goal < 5  Usage  Goal >240

## 2017-05-01 ENCOUNTER — TELEPHONE (OUTPATIENT)
Dept: FAMILY MEDICINE | Facility: CLINIC | Age: 57
End: 2017-05-01

## 2017-05-01 DIAGNOSIS — K21.9 GASTROESOPHAGEAL REFLUX DISEASE, ESOPHAGITIS PRESENCE NOT SPECIFIED: ICD-10-CM

## 2017-05-01 NOTE — LETTER
[facility]  [address]  [address]  [place] [address]  [phone]                                                                                                           Petey Archibald  [address]  [facility] [address]    May 2, 2017          Dear Petey Archibald,      Discontinue the asmanex                 Sincerely,    Hortensia Peck MD

## 2017-05-01 NOTE — TELEPHONE ENCOUNTER
Bertrand from pt's group home called requesting PCP sent written order to discontinue Asmanex.  Rx was discontinue at clinic and replaced by Pulmicort but group home needs written order. Please advice. Order can be faxed to [phone].

## 2017-05-02 NOTE — TELEPHONE ENCOUNTER
Dexilant 60 mg      Last Written Prescription Date: 3/14/17  Last Fill Quantity: 30,  # refills: 0   Last Office Visit with [facility], [facility] or [facility] prescribing provider: 2/28/17                                         Next 5 appointments (look out 90 days)     May 03, 2017 10:15 AM CDT   PHYSICAL with Jacky Burr MD   [facility]    [address] [place] [id number] [phone]

## 2017-05-03 ENCOUNTER — RADIANT APPOINTMENT (OUTPATIENT)
Dept: GENERAL RADIOLOGY | Facility: CLINIC | Age: 57
End: 2017-05-03
Attending: FAMILY MEDICINE
Payer: MEDICARE

## 2017-05-03 ENCOUNTER — ANTICOAGULATION THERAPY VISIT (OUTPATIENT)
Dept: NURSING | Facility: CLINIC | Age: 57
End: 2017-05-03
Payer: MEDICARE

## 2017-05-03 ENCOUNTER — OFFICE VISIT (OUTPATIENT)
Dept: FAMILY MEDICINE | Facility: CLINIC | Age: 57
End: 2017-05-03
Payer: MEDICARE

## 2017-05-03 VITALS
BODY MASS INDEX: 41.09 KG/M2 | RESPIRATION RATE: 18 BRPM | SYSTOLIC BLOOD PRESSURE: 92 MMHG | TEMPERATURE: 97.9 F | HEIGHT: 70 IN | OXYGEN SATURATION: 95 % | HEART RATE: 71 BPM | DIASTOLIC BLOOD PRESSURE: 60 MMHG | WEIGHT: 287 LBS

## 2017-05-03 DIAGNOSIS — Z79.01 LONG TERM CURRENT USE OF ANTICOAGULANT THERAPY: ICD-10-CM

## 2017-05-03 DIAGNOSIS — R06.2 WHEEZING: ICD-10-CM

## 2017-05-03 DIAGNOSIS — Z86.711 HISTORY OF PULMONARY EMBOLISM: ICD-10-CM

## 2017-05-03 DIAGNOSIS — F17.200 TOBACCO USE DISORDER: ICD-10-CM

## 2017-05-03 DIAGNOSIS — Z00.00 ROUTINE GENERAL MEDICAL EXAMINATION AT A HEALTH CARE FACILITY: ICD-10-CM

## 2017-05-03 DIAGNOSIS — F17.200 TOBACCO USE DISORDER: Primary | ICD-10-CM

## 2017-05-03 DIAGNOSIS — M79.672 LEFT FOOT PAIN: ICD-10-CM

## 2017-05-03 DIAGNOSIS — H34.01: ICD-10-CM

## 2017-05-03 DIAGNOSIS — I73.9 PERIPHERAL VASCULAR DISEASE (H): ICD-10-CM

## 2017-05-03 DIAGNOSIS — Z79.01 LONG-TERM (CURRENT) USE OF ANTICOAGULANTS: ICD-10-CM

## 2017-05-03 DIAGNOSIS — H34.219 HOLLENHORST PLAQUE: ICD-10-CM

## 2017-05-03 DIAGNOSIS — F17.200 TOBACCO DEPENDENCE: Chronic | ICD-10-CM

## 2017-05-03 LAB
ALBUMIN SERPL-MCNC: 3.8 G/DL (ref 3.4–5)
ALBUMIN UR-MCNC: NEGATIVE MG/DL
ALP SERPL-CCNC: 89 U/L (ref 40–150)
ALT SERPL W P-5'-P-CCNC: 25 U/L (ref 0–70)
ANION GAP SERPL CALCULATED.3IONS-SCNC: 9 MMOL/L (ref 3–14)
APPEARANCE UR: CLEAR
APTT PPP: 39 SEC (ref 22–37)
AST SERPL W P-5'-P-CCNC: 17 U/L (ref 0–45)
BILIRUB SERPL-MCNC: 0.4 MG/DL (ref 0.2–1.3)
BILIRUB UR QL STRIP: NEGATIVE
BUN SERPL-MCNC: 13 MG/DL (ref 7–30)
CALCIUM SERPL-MCNC: 9.4 MG/DL (ref 8.5–10.1)
CHLORIDE SERPL-SCNC: 107 MMOL/L (ref 94–109)
CHOLEST SERPL-MCNC: 175 MG/DL
CO2 SERPL-SCNC: 26 MMOL/L (ref 20–32)
COLOR UR AUTO: YELLOW
CREAT SERPL-MCNC: 0.99 MG/DL (ref 0.66–1.25)
ERYTHROCYTE [DISTWIDTH] IN BLOOD BY AUTOMATED COUNT: 14.7 % (ref 10–15)
GFR SERPL CREATININE-BSD FRML MDRD: 77 ML/MIN/1.7M2
GLUCOSE SERPL-MCNC: 100 MG/DL (ref 70–99)
GLUCOSE UR STRIP-MCNC: NEGATIVE MG/DL
HCT VFR BLD AUTO: 42.6 % (ref 40–53)
HDLC SERPL-MCNC: 82 MG/DL
HGB BLD-MCNC: 13.7 G/DL (ref 13.3–17.7)
HGB UR QL STRIP: NEGATIVE
INR POINT OF CARE: 3 (ref 2–3)
INR PPP: 2.45 (ref 0.86–1.14)
KETONES UR STRIP-MCNC: NEGATIVE MG/DL
LDLC SERPL CALC-MCNC: 73 MG/DL
LEUKOCYTE ESTERASE UR QL STRIP: NEGATIVE
MCH RBC QN AUTO: 30.4 PG (ref 26.5–33)
MCHC RBC AUTO-ENTMCNC: 32.2 G/DL (ref 31.5–36.5)
MCV RBC AUTO: 95 FL (ref 78–100)
NITRATE UR QL: NEGATIVE
NONHDLC SERPL-MCNC: 93 MG/DL
PH UR STRIP: 7 PH (ref 5–7)
PLATELET # BLD AUTO: 217 10E9/L (ref 150–450)
POTASSIUM SERPL-SCNC: 4 MMOL/L (ref 3.4–5.3)
PROT SERPL-MCNC: 7.8 G/DL (ref 6.8–8.8)
RBC # BLD AUTO: 4.51 10E12/L (ref 4.4–5.9)
SODIUM SERPL-SCNC: 142 MMOL/L (ref 133–144)
SP GR UR STRIP: 1.01 (ref 1–1.03)
TRIGL SERPL-MCNC: 102 MG/DL
URATE SERPL-MCNC: 5.4 MG/DL (ref 3.5–7.2)
URN SPEC COLLECT METH UR: NORMAL
UROBILINOGEN UR STRIP-ACNC: 0.2 EU/DL (ref 0.2–1)
WBC # BLD AUTO: 4.2 10E9/L (ref 4–11)

## 2017-05-03 PROCEDURE — 99396 PREV VISIT EST AGE 40-64: CPT | Mod: 25 | Performed by: FAMILY MEDICINE

## 2017-05-03 PROCEDURE — 85027 COMPLETE CBC AUTOMATED: CPT | Performed by: FAMILY MEDICINE

## 2017-05-03 PROCEDURE — 80053 COMPREHEN METABOLIC PANEL: CPT | Performed by: FAMILY MEDICINE

## 2017-05-03 PROCEDURE — 85610 PROTHROMBIN TIME: CPT | Mod: QW

## 2017-05-03 PROCEDURE — 84550 ASSAY OF BLOOD/URIC ACID: CPT | Performed by: FAMILY MEDICINE

## 2017-05-03 PROCEDURE — 93000 ELECTROCARDIOGRAM COMPLETE: CPT | Performed by: FAMILY MEDICINE

## 2017-05-03 PROCEDURE — 85730 THROMBOPLASTIN TIME PARTIAL: CPT | Performed by: FAMILY MEDICINE

## 2017-05-03 PROCEDURE — 81003 URINALYSIS AUTO W/O SCOPE: CPT | Performed by: FAMILY MEDICINE

## 2017-05-03 PROCEDURE — 80061 LIPID PANEL: CPT | Performed by: FAMILY MEDICINE

## 2017-05-03 PROCEDURE — 85610 PROTHROMBIN TIME: CPT | Performed by: FAMILY MEDICINE

## 2017-05-03 PROCEDURE — 36416 COLLJ CAPILLARY BLOOD SPEC: CPT

## 2017-05-03 PROCEDURE — 71020 XR CHEST 2 VW: CPT

## 2017-05-03 PROCEDURE — 99207 ZZC NO CHARGE NURSE ONLY: CPT

## 2017-05-03 RX ORDER — DEXLANSOPRAZOLE 60 MG/1
CAPSULE, DELAYED RELEASE ORAL
Qty: 90 CAPSULE | Refills: 2 | Status: SHIPPED | OUTPATIENT
Start: 2017-05-03 | End: 2018-04-11

## 2017-05-03 RX ORDER — WARFARIN SODIUM 4 MG/1
TABLET ORAL
Qty: 64 TABLET | Refills: 1 | Status: SHIPPED | OUTPATIENT
Start: 2017-05-03 | End: 2017-05-31

## 2017-05-03 NOTE — PROGRESS NOTES
ANTICOAGULATION FOLLOW-UP CLINIC VISIT    Patient Name:  Petey Archibald  Date:  5/3/2017  Contact Type:  Face to Face    SUBJECTIVE:      ASSESSMENT/PLAN:  See: ANTICOAGULATION QIC flow sheet.    BX ANTICOAGULATION CLINIC

## 2017-05-03 NOTE — PATIENT INSTRUCTIONS
Preventive Health Recommendations  Male Ages 50   64    Yearly exam:             See your health care provider every year in order to  o   Review health changes.   o   Discuss preventive care.    o   Review your medicines if your doctor has prescribed any.     Have a cholesterol test every 5 years, or more frequently if you are at risk for high cholesterol/heart disease.     Have a diabetes test (fasting glucose) every three years. If you are at risk for diabetes, you should have this test more often.     Have a colonoscopy at age 50, or have a yearly FIT test (stool test). These exams will check for colon cancer.      Talk with your health care provider about whether or not a prostate cancer screening test (PSA) is right for you.    You should be tested each year for STDs (sexually transmitted diseases), if you re at risk.     Shots: Get a flu shot each year. Get a tetanus shot every 10 years.     Nutrition:    Eat at least 5 servings of fruits and vegetables daily.     Eat whole-grain bread, whole-wheat pasta and brown rice instead of white grains and rice.     Talk to your provider about Calcium and Vitamin D.     Lifestyle    Exercise for at least 150 minutes a week (30 minutes a day, 5 days a week). This will help you control your weight and prevent disease.     Limit alcohol to one drink per day.     No smoking.     Wear sunscreen to prevent skin cancer.     See your dentist every six months for an exam and cleaning.     See your eye doctor       [facility]    Phone- [phone]      [facility]  [facility]  Stroke [facility]  [facility] [address] [facility]  [address]  [place] [address].   [address]  Fax- [phone]  Phone [phone]

## 2017-05-03 NOTE — LETTER
[facility]  [address]  [facility] [id number] [phone]                                                                                                           Petey Archibald  [address]  [facility] [address]    May 6, 2017      Dear Petey,    The results of your recent tests were reviewed and are enclosed.     Please see attached lab results   They are all normal     THE FOLLOWING ARE EXPLANATIONS OF SOME OF OUR LAB TESTS     YOU DID NOT NECESSARILY HAVE ALL OF THESE DONE     Hgb is the blood iron level   WBC means White Blood Cells   Platelets are small blood cells that help with forming the blood clots along with other blood factors.   Electrolytes are Sodium, Potassium, Calcium, Magnesium, Phosphorus.   Liver tests are: AST, ALT, Bilirubin, Alkaline Phosphatase.   Kidney tests are Creatinine, GFR.   HDL Cholesterol - is the good cholesterol and it is good to have it high.   LDL cholesterol is the bad cholesterol and it is good to have it low.   It is recommended to have LDL less than 130 for people with hypertension and to have it less than 100 for people with heart disease, diabetes and chronic kidney disease.   Triglycerides are another type of lipid that can cause heart disease, like the cholesterol and should be kept low   Thyroid tests are TSH, T4, T3   Glucose is sugar.   A1c is a test that gives us an idea about how well was controlled the diabetes for the last 3 months.   PSA stands for Prostate Specific Antigen and it can be elevated with prostate cancer or prostate inflammation.     Please continue on the same medications     Your EKG is normal     Your chest x ray is normal     Uric acid ( gout ) is normal < 6.0       Thank you for choosing [facility].  We appreciate the opportunity to serve you and look forward to supporting your healthcare needs in the future.    If you have any questions  or concerns, please call me or my staff at [phone].      Sincerely,    Jacky Burr MD

## 2017-05-03 NOTE — NURSING NOTE
"Chief Complaint   Patient presents with     Physical       Initial BP 92/60  Pulse 71  Temp 97.9  F (36.6  C)  Resp 18  Ht 5' 10\" (1.778 m)  Wt 287 lb (130.2 kg)  SpO2 95%  BMI 41.18 kg/m2 Estimated body mass index is 41.18 kg/(m^2) as calculated from the following:    Height as of this encounter: 5' 10\" (1.778 m).    Weight as of this encounter: 287 lb (130.2 kg).  Medication Reconciliation: cheri Stephenson CMA      "

## 2017-05-03 NOTE — PROGRESS NOTES
SUBJECTIVE:     CC: Petey Archibald is an 56 year old male who presents for preventative health visit.     Healthy Habits:    Do you get at least three servings of calcium containing foods daily (dairy, green leafy vegetables, etc.)? Pt tries    Amount of exercise or daily activities, outside of work: 7 day(s) per week    Problems taking medications regularly No    Medication side effects: No    Have you had an eye exam in the past two years? Yes, recently and would like to discuss new findings    Do you see a dentist twice per year? yes    Do you have sleep apnea, excessive snoring or daytime drowsiness?no    Here with attendant from the group home he lives in. Doing well overall. Has been trying to losse some weight had knees surgery year or so ago and this is functioining well.     PROBLEMS TO ADD ON...    Today's PHQ-2 Score:   PHQ-2 ( 1999 Pf[employer]) 2/28/2017 12/23/2016   Q1: Little interest or pleasure in doing things 2 0   Q2: Feeling down, depressed or hopeless 2 0   PHQ-2 Score 4 0       Abuse: Current or Past(Physical, Sexual or Emotional)- No  Do you feel safe in your environment - Yes    Social History   Substance Use Topics     Smoking status: Current Every Day Smoker     Packs/day: 2.00     Years: 30.00     Types: Cigarettes     Smokeless tobacco: Never Used      Comment: started at age 18      Alcohol use No     The patient does not drink >3 drinks per day nor >7 drinks per week.    Last PSA:   PSA   Date Value Ref Range Status   02/28/2014 0.55 0 - 4 ug/L Final       Recent Labs   Lab Test  05/11/16   1003  10/01/15   1635  03/04/15   1030   CHOL  192  193  213*   HDL  94  79  94   LDL  77  95  104   TRIG  107  96  73   CHOLHDLRATIO   --   2.4  2.3   NHDL  98   --    --        Reviewed orders with patient. Reviewed health maintenance and updated orders accordingly - Yes    Reviewed and updated as needed this visit by clinical staff  Tobacco  Allergies  Med Hx  Surg Hx  Fam Hx  Soc Hx     "    Reviewed and updated as needed this visit by Provider            ROS:  C: NEGATIVE for fever, chills, change in weight  I: NEGATIVE for worrisome rashes, moles or lesions  E: NEGATIVE for vision changes or irritation  ENT: NEGATIVE for ear, mouth and throat problems  R: NEGATIVE for significant cough or SOB  CV: NEGATIVE for chest pain, palpitations or peripheral edema  GI: NEGATIVE for nausea, abdominal pain, heartburn, or change in bowel habits   male: negative for dysuria, hematuria, decreased urinary stream, erectile dysfunction, urethral discharge  M: NEGATIVE for significant arthralgias or myalgia  N: NEGATIVE for weakness, dizziness or paresthesias  E: NEGATIVE for temperature intolerance, skin/hair changes  H: NEGATIVE for bleeding problems  PSYCHIATRIC: some cognitive delay and some oppositional behviors in large things going well.     Problem list, Medication list, Allergies, and Medical/Social/Surgical histories reviewed in EPIC and updated as appropriate.  OBJECTIVE:     BP 92/60  Pulse 71  Temp 97.9  F (36.6  C)  Resp 18  Ht 5' 10\" (1.778 m)  Wt 287 lb (130.2 kg)  SpO2 95%  BMI 41.18 kg/m2  EXAM:  GENERAL: healthy, alert and no distress  EYES: Eyes grossly normal to inspection, PERRL and conjunctivae and sclerae normal  HENT: ear canals and TM's normal, nose and mouth without ulcers or lesions  NECK: no adenopathy, no asymmetry, masses, or scars and thyroid normal to palpation  RESP: lungs clear to auscultation - no rales, rhonchi or wheezes  CV: regular rate and rhythm, normal S1 S2, no S3 or S4, no murmur, click or rub, no peripheral edema and peripheral pulses strong  ABDOMEN: soft, nontender, no hepatosplenomegaly, no masses and bowel sounds normal  MS: no gross musculoskeletal defects noted, no edema  SKIN: no suspicious lesions or rashes  NEURO: Normal strength and tone, sensory exam grossly normal, mentation intact and sl cognitive impairment  PSYCH: concentration poor, affect flat, " "appearance well groomed and cooperative during interview and examination.     ASSESSMENT/PLAN:         ICD-10-CM    1. Tobacco use disorder F17.200 XR Chest 2 Views   2. Wheezing R06.2 XR Chest 2 Views   3. Hollenhorst plaque H34.219 Lipid Profile (Chol, Trig, HDL, LDL calc)     CBC with platelets     Comprehensive metabolic panel     Partial thromboplastin time     INR     Echocardiogram Complete     US Carotid Bilateral   4. Peripheral vascular disease (H) I73.9 Echocardiogram Complete     US Carotid Bilateral   5. History of pulmonary embolism Z86.711 EKG 12-lead complete w/read - Clinics   6. Tobacco dependence:40-50 pk yr hx F17.200 EKG 12-lead complete w/read - Clinics     XR Chest 2 Views     Echocardiogram Complete     US Carotid Bilateral   7. Routine general medical examination at a health care facility Z00.00 EKG 12-lead complete w/read - Clinics     CBC with platelets     UA reflex to Microscopic and Culture   8. Left foot pain M79.672 Uric acid   9. Transient retinal artery occlusion, right  H34.01 Echocardiogram Complete     US Carotid Bilateral       COUNSELING:  Reviewed preventive health counseling, as reflected in patient instructions       Regular exercise       Healthy diet/nutrition       Vision screening       Hearing screening       reports that he has been smoking Cigarettes.  He has a 60.00 pack-year smoking history. He has never used smokeless tobacco.    Estimated body mass index is 41.18 kg/(m^2) as calculated from the following:    Height as of this encounter: 5' 10\" (1.778 m).    Weight as of this encounter: 287 lb (130.2 kg).   Weight management plan: disucssed decrease portion size.     Counseling Resources:  ATP IV Guidelines  Pooled Cohorts Equation Calculator  FRAX Risk Assessment  ICSI Preventive Guidelines  Dietary Guidelines for Americans, 2010  USDA's MyPlate  ASA Prophylaxis  Lung CA Screening    Jacky Burr MD  [facility]  "

## 2017-05-03 NOTE — MR AVS SNAPSHOT
After Visit Summary   5/3/2017    Petey Archibald    MRN: [id number]           Patient Information     Date Of Birth          1960        Visit Information        Provider Department      5/3/2017 10:15 AM Jacky Burr MD [facility]        Today's Diagnoses     Tobacco use disorder    -  1    Wheezing        Hollenhorst plaque        Peripheral vascular disease (H)        History of pulmonary embolism        Tobacco dependence:40-50 pk yr hx        Routine general medical examination at a health care facility        Left foot pain        Transient retinal artery occlusion, right           Care Instructions      Preventive Health Recommendations  Male Ages 50 - 64    Yearly exam:             See your health care provider every year in order to  o   Review health changes.   o   Discuss preventive care.    o   Review your medicines if your doctor has prescribed any.     Have a cholesterol test every 5 years, or more frequently if you are at risk for high cholesterol/heart disease.     Have a diabetes test (fasting glucose) every three years. If you are at risk for diabetes, you should have this test more often.     Have a colonoscopy at age 50, or have a yearly FIT test (stool test). These exams will check for colon cancer.      Talk with your health care provider about whether or not a prostate cancer screening test (PSA) is right for you.    You should be tested each year for STDs (sexually transmitted diseases), if you re at risk.     Shots: Get a flu shot each year. Get a tetanus shot every 10 years.     Nutrition:    Eat at least 5 servings of fruits and vegetables daily.     Eat whole-grain bread, whole-wheat pasta and brown rice instead of white grains and rice.     Talk to your provider about Calcium and Vitamin D.     Lifestyle    Exercise for at least 150 minutes a week (30 minutes a day, 5 days a week). This will help you control your weight and  prevent disease.     Limit alcohol to one drink per day.     No smoking.     Wear sunscreen to prevent skin cancer.     See your dentist every six months for an exam and cleaning.     See your eye doctor       Nikki CHIN    Phone- [phone]      [facility]  [facility],  Stroke [facility] V[facility]  [address]  [address]  [place].   [address]  Fax- [phone]  Phone [phone]                    Follow-ups after your visit        Your next 10 appointments already scheduled     May 23, 2017 10:00 AM CDT   Return Sleep Patient with Bennett Ezra Goltz, PA-C   [facility] ([facility])    [address]  [address]  [place] [address] [phone]              Future tests that were ordered for you today     Open Future Orders        Priority Expected Expires Ordered    Echocardiogram Complete Routine 5/4/2017 5/3/2018 5/3/2017    US Carotid Bilateral Routine  5/3/2018 5/3/2017            Who to contact     If you have questions or need follow up information about today's clinic visit or your schedule please contact [facility] directly at [phone].  Normal or non-critical lab and imaging results will be communicated to you by [employer], letter or phone within 4 business days after the clinic has received the results. If you do not hear from us within 7 days, please contact the clinic through [employer] or phone. If you have a critical or abnormal lab result, we will notify you by phone as soon as possible.  Submit refill requests through [employer] or call your pharmacy and they will forward the refill request to us. Please allow 3 business days for your refill to be completed.          Additional Information About Your Visit        [employer] Information     [employer] gives you secure access to your electronic health record. If you see a primary care provider, you can also send messages to your  "care team and make appointments. If you have questions, please call your primary care clinic.  If you do not have a primary care provider, please call [phone] and they will assist you.        Care EveryWhere ID     This is your Care EveryWhere ID. This could be used by other organizations to access your [facility] medical records  [id number]        Your Vitals Were     Pulse Temperature Respirations Height Pulse Oximetry BMI (Body Mass Index)    71 97.9  F (36.6  C) 18 5' 10\" (1.778 m) 95% 41.18 kg/m2       Blood Pressure from Last 3 Encounters:   05/03/17 92/60   03/08/17 122/71   02/28/17 110/60    Weight from Last 3 Encounters:   05/03/17 287 lb (130.2 kg)   03/08/17 (!) 307 lb 3.2 oz (139.3 kg)   02/28/17 (!) 306 lb (138.8 kg)              We Performed the Following     CBC with platelets     Comprehensive metabolic panel     EKG 12-lead complete w/read - Clinics     INR     Lipid Profile (Chol, Trig, HDL, LDL calc)     Partial thromboplastin time     UA reflex to Microscopic and Culture     Uric acid          Today's Medication Changes          These changes are accurate as of: 5/3/17 11:19 AM.  If you have any questions, ask your nurse or doctor.               These medicines have changed or have updated prescriptions.        Dose/Directions    warfarin 4 MG tablet   Commonly known as:  COUMADIN   This may have changed:  additional instructions   Used for:  Long term current use of anticoagulant therapy, History of pulmonary embolism        Dose:  4 mg   Take 1 tablet (4 mg) by mouth daily Take 2.5 tabs of coumadin today (10 mg),Take 2 tabs (8 mg)daily   Quantity:  62 tablet   Refills:  2                Primary Care Provider Office Phone # Fax #    Hortensia Peck [phone]       [facility] [address]  [facility] [address]        Thank you!     Thank you for choosing [facility]  for your care. Our goal is always to provide " you with excellent care. Hearing back from our patients is one way we can continue to improve our services. Please take a few minutes to complete the written survey that you may receive in the mail after your visit with us. Thank you!             Your Updated Medication List - Protect others around you: Learn how to safely use, store and throw away your medicines at www.disposemymeds.org.          This list is accurate as of: 5/3/17 11:19 AM.  Always use your most recent med list.                   Brand Name Dispense Instructions for use    ABILIFY 10 MG tablet   Generic drug:  ARIPiprazole     30 tablet    Take 0.5 tablets (5 mg) by mouth daily       acetaminophen 325 MG tablet    TYLENOL    100 tablet    Take 1-2 tablets (325-650 mg) by mouth every 6 hours as needed       albuterol 108 (90 BASE) MCG/ACT Inhaler    albuterol    1 Inhaler    Inhale 2 puffs into the lungs every 6 hours as needed       AMBIEN 10 MG tablet   Generic drug:  zolpidem     30 tablet    Take 1 tablet (10 mg) by mouth nightly as needed for sleep       ANUSOL-HC 25 MG Suppository   Generic drug:  hydrocortisone     14 Suppository    INSERT ONE SUPPOSITORY RECTALLY UP TO TWICE A DAY AS NEEDED       BACTROBAN 2 % ointment   Generic drug:  mupirocin      Apply topically 2 times daily       budesonide 180 MCG/ACT inhaler    PULMICORT FLEXHALER    3 Inhaler    Inhale 2 puffs into the lungs 2 times daily       BUPROPION HCL PO      Take 150 mg by mouth every morning       clotrimazole-betamethasone cream    LOTRISONE    60 g    Apply topically 2 times daily       DEXILANT 60 MG Cpdr CR capsule   Generic drug:  dexlansoprazole     90 capsule    TAKE 1 CAPSULE BY MOUTH ONCE DAILY *1 TOTAL FILL*       EPIPEN 0.3 MG/0.3ML injection   Generic drug:  EPINEPHrine      Inject 0.3 mg into the muscle once as needed.       Ferrous Gluconate 324 (37.5 FE) MG Tabs      Take 1 tablet by mouth 2 times daily.       furosemide 40 MG tablet    LASIX     Take 2  tablets by mouth daily       hydrocortisone 2.5 % cream    ANUSOL-HC    28.35 g    Place rectally 2 times daily       loratadine 10 MG tablet    CLARITIN     Take 10 mg by mouth daily.       Mometasone Furoate 200 MCG/ACT Aero     1 Inhaler    Inhale 2 puffs into the lungs 2 times daily       mometasone-formoterol 200-5 MCG/ACT oral inhaler    DULERA    13 g    Inhale 2 puffs into the lungs 2 times daily       Multi-vitamin Tabs tablet   Generic drug:  multivitamin, therapeutic with minerals      1 TABLET DAILY       nexIUM 40 MG CR capsule   Generic drug:  esomeprazole      1 CAPSULE DAILY at 8:00 am       * order for DME     1 each    Equipment being ordered: support compression hose BK  2 pair black 30mm HG  To be applied on arising & removed while lying down to go to sleep       * order for DME     1 Device    Equipment being ordered: CPAP with mask and tubing       PHOSPHATE ENEMA RE      Place 1 enema rectally as needed.       Potassium Chloride ER 20 MEQ Tbcr      Take 1 tablet by mouth 2 times daily.       sertraline 100 MG tablet    ZOLOFT     Take 1.5 tablets by mouth daily.       simvastatin 40 MG tablet    ZOCOR     Take 1 tablet by mouth daily.       SINGULAIR 10 MG tablet   Generic drug:  montelukast      1 TABLET DAILY at 8:00 am       spironolactone 25 MG tablet    ALDACTONE     1 TABLET EVERY MORNING at 8:00 am       tiotropium 18 MCG capsule    SPIRIVA HANDIHALER    30 capsule    Once daily       triamcinolone 0.1 % cream    KENALOG    80 g    Apply topically 2 times daily as needed       Vitamin D-3 1000 UNITS Caps      Take 2 capsules by mouth daily       warfarin 4 MG tablet    COUMADIN    62 tablet    Take 1 tablet (4 mg) by mouth daily Take 2.5 tabs of coumadin today (10 mg),Take 2 tabs (8 mg)daily       ZEASORB-AF EX      Externally apply topically once a week       * Notice:  This list has 2 medication(s) that are the same as other medications prescribed for you. Read the directions  carefully, and ask your doctor or other care provider to review them with you.

## 2017-05-04 NOTE — PROGRESS NOTES
.  Please see attached lab results  They are all normal     THE FOLLOWING ARE EXPLANATIONS OF SOME OF OUR LAB TESTS     YOU DID NOT NECESSARILY HAVE ALL OF THESE DONE     Hgb is the blood iron level  WBC means White Blood Cells  Platelets are small blood cells that help with forming the blood clots along with other blood factors.  Electrolytes are Sodium, Potassium, Calcium, Magnesium, Phosphorus.  Liver tests are: AST, ALT, Bilirubin, Alkaline Phosphatase.  Kidney tests are Creatinine, GFR.  HDL Cholesterol - is the good cholesterol and it is good to have it high.  LDL cholesterol is the bad cholesterol and it is good to have it low.  It is recommended to have LDL less than 130 for people with hypertension and to have it less than 100 for people with heart disease, diabetes and chronic kidney disease.  Triglycerides are another type of lipid that can cause heart disease, like the cholesterol and should be kept low   Thyroid tests are TSH, T4, T3  Glucose is sugar.  A1c is a test that gives us an idea about how well was controlled the diabetes for the last 3 months.   PSA stands for Prostate Specific Antigen and it can be elevated with prostate cancer or prostate inflammation.    Please continue on the same medications    Your EKG is normal     Your chest x ray is normal     Uric acid ( gout ) is normal < 6.0

## 2017-05-11 ENCOUNTER — RADIANT APPOINTMENT (OUTPATIENT)
Dept: CARDIOLOGY | Facility: CLINIC | Age: 57
End: 2017-05-11
Attending: FAMILY MEDICINE

## 2017-05-11 DIAGNOSIS — H34.219 HOLLENHORST PLAQUE: ICD-10-CM

## 2017-05-11 DIAGNOSIS — H34.01: ICD-10-CM

## 2017-05-11 DIAGNOSIS — F17.200 TOBACCO DEPENDENCE: Chronic | ICD-10-CM

## 2017-05-11 DIAGNOSIS — I73.9 PERIPHERAL VASCULAR DISEASE (H): ICD-10-CM

## 2017-05-11 RX ADMIN — Medication 6 ML: at 10:30

## 2017-05-11 NOTE — LETTER
[facility] ECHO  [address] [address]                                                                                                           Sumit Archibald  [address]  [facility] [address]    May 15, 2017      Dear Sumit,    The results of your recent tests were reviewed and are enclosed.   The heart evaluation is normal.   Results for orders placed or performed in visit on 17   ECHO COMPLETE WITH OPTISON    Narrative    [id number]  [id number]  [id number]  [id number]^MARLENE^VENKATESH^SACHIN           [facility]  Diagnostic and [facility]  [address] [place] [address]     Name: SUMIT ARCHIBALD  MRN: [id number]  : 1960  Study Date: 2017 10:12 AM  Age: 56 yrs  Gender: Male  [place] [facility] [place]  Reason For Study: Transient retinal artery occlusion  History: Transient retinal artery occlusion  Ordering Physician: VENKATESH ROSARIO  Referring Physician: VENKATESH ROSARIO  Performed By: Anny Fenton RDCS     BSA: 2.4 m2  Height: 70 in  Weight: 287 lb  BP: 95/65 mmHg  _____________________________________________________________________________  __        Procedure  Echocardiogram with two-dimensional, color and spectral Doppler performed.  Contrast Optison. Optison (NDC #1206-4029-86) given intravenously. Patient was  given 6 ml mixture of 3 ml Optison and 6 ml saline. 3 ml wasted.  _____________________________________________________________________________  __        Interpretation Summary  No cardiac source for embolus identified.  _____________________________________________________________________________  __        Left Ventricle  Global and regional left ventricular function is normal with an EF of 55-60%.  Left ventricular wall thickness is normal. Left ventricular size is normal.  Normal left ventricular filling for age. No regional wall motion abnormalities  are  seen.     Right Ventricle  Right ventricular function, chamber size, wall motion, and thickness are  normal.     Atria  The right atria appears normal. Moderate left atrial enlargement is present.  The atrial septum is intact as assessed by color Doppler .     Mitral Valve  Mild mitral annular calcification is present.        Aortic Valve  Aortic valve is normal in structure and function.     Tricuspid Valve  The tricuspid valve is normal. Trace tricuspid insufficiency is present. The  right ventricular systolic pressure is approximated at 25.7 mmHg plus the  right atrial pressure.     Pulmonic Valve  The pulmonic valve is normal. Trace pulmonic insufficiency is present.     Vessels  The aorta root is normal. The pulmonary artery is normal. The inferior vena  cava is normal.     Pericardium  No pericardial effusion is present.     _____________________________________________________________________________  __  MMode/2D Measurements & Calculations  IVSd: 0.78 cm  LVIDd: 4.8 cm  LVIDs: 3.2 cm  LVPWd: 0.72 cm  FS: 35.0 %  EDV(Teich): 110.0 ml  ESV(Teich): 39.5 ml     LV mass(C)d: 118.4 grams  LV mass(C)dI: 48.7 grams/m2  Ao root diam: 3.2 cm  LA dimension: 5.1 cm  asc Aorta Diam: 3.4 cm  LA/Ao: 1.6  LVOT diam: 2.1 cm  LVOT area: 3.5 cm2  LA Volume (BP): 108.0 ml  LA Volume Index (BP): 44.4 ml/m2        Doppler Measurements & Calculations  MV E max taqueria: 132.4 cm/sec  MV A max taqueria: 87.0 cm/sec  MV E/A: 1.5  MV dec time: 0.19 sec     Ao V2 max: 139.0 cm/sec  Ao max P.0 mmHg  Ao V2 mean: 106.0 cm/sec  Ao mean P.0 mmHg  Ao V2 VTI: 32.1 cm  SHERI(I,D): 3.4 cm2  SHERI(V,D): 3.3 cm2  LV V1 max P.8 mmHg  LV V1 max: 130.2 cm/sec  LV V1 VTI: 31.2 cm  SV(LVOT): 109.3 ml  PA acc time: 0.14 sec  TR max taqueria: 253.6 cm/sec  TR max P.7 mmHg  SHERI Index (cm2/m2): 1.4  Lateral E/e': 12.2  Medial E/e': 10.2     _____________________________________________________________________________  __           Report approved by: BINDU  Valeriano Nelson 05/11/2017 11:09 AM              Thank you for choosing [facility].  We appreciate the opportunity to serve you and look forward to supporting your healthcare needs in the future.    If you have any questions or concerns, please call me or my staff at [phone].      Sincerely,    Jacky Burr MD

## 2017-05-18 ENCOUNTER — OFFICE VISIT (OUTPATIENT)
Dept: SURGERY | Facility: CLINIC | Age: 57
End: 2017-05-18
Payer: MEDICARE

## 2017-05-18 VITALS
HEART RATE: 75 BPM | SYSTOLIC BLOOD PRESSURE: 116 MMHG | BODY MASS INDEX: 41.37 KG/M2 | WEIGHT: 289 LBS | HEIGHT: 70 IN | DIASTOLIC BLOOD PRESSURE: 61 MMHG

## 2017-05-18 DIAGNOSIS — D23.5 BENIGN NEOPLASM OF SKIN OF TRUNK, EXCEPT SCROTUM: Primary | ICD-10-CM

## 2017-05-18 PROCEDURE — 99203 OFFICE O/P NEW LOW 30 MIN: CPT | Performed by: SURGERY

## 2017-05-18 NOTE — LETTER
"  May 18, 2017    [facility]  Surgery Consultation    RE:  Petey Archibald-:  [birth year]     HPI:Patient is a 56 year old male who is here for consultation requested by Jacky Burr [phone] for evaluation of left elbow cyst. Has been present for a few months. The patient is very bothered by this small lesion. He constantly scratches it and causes irritation and pain. He does not have any other lesions. He would like removal. Patient denies fevers, chills, nausea, vomiting, SOB, chest pain, abdominal pain.     PMH:  has a past medical history of Borderline mental retardation (2013); COPD (chronic obstructive pulmonary disease) (H); History of DVT of lower extremity; History of pulmonary embolism; Hyperlipidemia; Mild intellectual disabilities; Morbid obesity (H); Peripheral edema; Peripheral vascular disease (H); Tobacco dependence; and Venous stasis dermatitis.  PSH:  has a past surgical history that includes Rectal surgery.  Social History:  reports that he has been smoking Cigarettes. He has a 60.00 pack-year smoking history. He has never used smokeless tobacco. He reports that he does not drink alcohol or use illicit drugs.  Family History: family history includes DIABETES in his brother; Unknown/Adopted in his father and mother. There is no history of Coronary Artery Disease, Hypertension, Hyperlipidemia, CEREBROVASCULAR DISEASE, Breast Cancer, Colon Cancer, Prostate Cancer, Other Cancer, Depression, Anxiety Disorder, MENTAL ILLNESS, Substance Abuse, Anesthesia Reaction, Asthma, OSTEOPOROSIS, Genetic Disorder, Thyroid Disease, or Obesity.  Medications/Allergies: Home medications and allergies reviewed.     ROS: The 10 point Review of Systems is negative other than noted in the HPI.     Physical Exam:  /61  Pulse 75  Ht 5' 10\" (1.778 m)  Wt 289 lb (131.1 kg)  BMI 41.47 kg/m2  General: Generally appears well.  Eyes- Sclera Clear- No icterus  Respiratory- Chest " rise equal Bilateral  Integumentary-left elbow-small 1 cm hypertrophic skin lesion. No surrounding erythema or ecchymosis.     Impression and Plan:  Patient is a 56 year old male with hypertrophic symptomatic skin lesion     PLAN:   I described the pathophysiology of the patient's condition including further workup and management. It appears to be a hypertrophic skin and not a concerning lesion. I offered the patient watchful waiting versus excision. He is obsessive about the lesion and would like it removed as soon as possible. He is on Coumadin so like him to hold it for at least 1-2 days since he has been supratherapeutic therapeutic in the past. I'll plan to remove it under local at his convenience. The risks associated with the procedure including, but not limited to, recurrence, pain, hematoma, infection, and anesthetic reaction were discussed with the patient. The patient indicated understanding of the discussion, asked appropriate questions, and provided consent.         Thank you very much for this consult.     Jose Azevedo M.D.  [facility]  [phone]

## 2017-05-18 NOTE — MR AVS SNAPSHOT
After Visit Summary   5/18/2017    Petey Archibald    MRN: [id number]           Patient Information     Date Of Birth          1960        Visit Information        Provider Department      5/18/2017 10:00 AM Jose Azevedo MD [facility] General Surgery      Care Instructions    Your surgery is scheduled for 5/26/17 12:15 pm at [facility]        Follow-ups after your visit        Your next 10 appointments already scheduled     May 23, 2017 10:00 AM CDT   Return Sleep Patient with Bennett Ezra Goltz, PA-C   [facility] ([facility])    [address]  [facility] [id number] [phone]            May 26, 2017   Procedure with Jose Azevedo MD   [facility] ([facility])    [address]  [facility] [address] [phone]           [facility] is located at [address] [place] [place]            May 31, 2017  3:30 PM CDT   Anticoagulation Visit with [facility]   [facility] ([facility])    [address]  [facility] [address] [phone]              Who to contact     If you have questions or need follow up information about today's clinic visit or your schedule please contact [facility] directly at [phone].  Normal or non-critical lab and imaging results will be communicated to you by SongAfterhart, letter or phone within 4 business days after the clinic has received the results. If you do not hear from us within 7 days, please contact the clinic through SongAfterhart or phone. If you have a critical or abnormal lab result, we will notify you by phone as soon as possible.  Submit refill requests through XenoOne or call your pharmacy and they will forward the refill request to us. Please allow 3 business  "days for your refill to be completed.          Additional Information About Your Visit        MyChart Information     [email or web address] gives you secure access to your electronic health record. If you see a primary care provider, you can also send messages to your care team and make appointments. If you have questions, please call your primary care clinic.  If you do not have a primary care provider, please call [phone] and they will assist you.        Care EveryWhere ID     This is your Care EveryWhere ID. This could be used by other organizations to access your [facility] medical records  [id number]        Your Vitals Were     Pulse Height BMI (Body Mass Index)             75 5' 10\" (1.778 m) 41.47 kg/m2          Blood Pressure from Last 3 Encounters:   05/18/17 116/61   05/03/17 92/60   03/08/17 122/71    Weight from Last 3 Encounters:   05/18/17 289 lb (131.1 kg)   05/03/17 287 lb (130.2 kg)   03/08/17 (!) 307 lb 3.2 oz (139.3 kg)              Today, you had the following     No orders found for display       Primary Care Provider Office Phone # Fax #    Jacky Burr [phone]       [facility] [address] [facility] [address]        Thank you!     Thank you for choosing [facility]  for your care. Our goal is always to provide you with excellent care. Hearing back from our patients is one way we can continue to improve our services. Please take a few minutes to complete the written survey that you may receive in the mail after your visit with us. Thank you!             Your Updated Medication List - Protect others around you: Learn how to safely use, store and throw away your medicines at www.disposemymeds.org.          This list is accurate as of: 5/18/17 11:11 AM.  Always use your most recent med list.                   Brand Name Dispense Instructions for use    ABILIFY 10 MG tablet   Generic drug:  ARIPiprazole     30 tablet    Take 0.5 tablets (5 " mg) by mouth daily       acetaminophen 325 MG tablet    TYLENOL    100 tablet    Take 1-2 tablets (325-650 mg) by mouth every 6 hours as needed       albuterol 108 (90 BASE) MCG/ACT Inhaler    albuterol    1 Inhaler    Inhale 2 puffs into the lungs every 6 hours as needed       AMBIEN 10 MG tablet   Generic drug:  zolpidem     30 tablet    Take 1 tablet (10 mg) by mouth nightly as needed for sleep       ANUSOL-HC 25 MG Suppository   Generic drug:  hydrocortisone     14 Suppository    INSERT ONE SUPPOSITORY RECTALLY UP TO TWICE A DAY AS NEEDED       BACTROBAN 2 % ointment   Generic drug:  mupirocin      Apply topically 2 times daily       budesonide 180 MCG/ACT inhaler    PULMICORT FLEXHALER    3 Inhaler    Inhale 2 puffs into the lungs 2 times daily       BUPROPION HCL PO      Take 150 mg by mouth every morning       clotrimazole-betamethasone cream    LOTRISONE    60 g    Apply topically 2 times daily       DEXILANT 60 MG Cpdr CR capsule   Generic drug:  dexlansoprazole     90 capsule    TAKE 1 CAPSULE BY MOUTH ONCE DAILY *1 TOTAL FILL*       EPIPEN 0.3 MG/0.3ML injection   Generic drug:  EPINEPHrine      Inject 0.3 mg into the muscle once as needed.       Ferrous Gluconate 324 (37.5 FE) MG Tabs      Take 1 tablet by mouth 2 times daily.       furosemide 40 MG tablet    LASIX     Take 2 tablets by mouth daily       hydrocortisone 2.5 % cream    ANUSOL-HC    28.35 g    Place rectally 2 times daily       loratadine 10 MG tablet    CLARITIN     Take 10 mg by mouth daily.       Mometasone Furoate 200 MCG/ACT Aero     1 Inhaler    Inhale 2 puffs into the lungs 2 times daily       mometasone-formoterol 200-5 MCG/ACT oral inhaler    DULERA    13 g    Inhale 2 puffs into the lungs 2 times daily       Multi-vitamin Tabs tablet   Generic drug:  multivitamin, therapeutic with minerals      1 TABLET DAILY       nexIUM 40 MG CR capsule   Generic drug:  esomeprazole      1 CAPSULE DAILY at 8:00 am       * order for DME     1  each    Equipment being ordered: support compression hose BK  2 pair black 30mm HG  To be applied on arising & removed while lying down to go to sleep       * order for DME     1 Device    Equipment being ordered: CPAP with mask and tubing       PHOSPHATE ENEMA RE      Place 1 enema rectally as needed.       Potassium Chloride ER 20 MEQ Tbcr      Take 1 tablet by mouth 2 times daily.       sertraline 100 MG tablet    ZOLOFT     Take 1.5 tablets by mouth daily.       simvastatin 40 MG tablet    ZOCOR     Take 1 tablet by mouth daily.       SINGULAIR 10 MG tablet   Generic drug:  montelukast      1 TABLET DAILY at 8:00 am       spironolactone 25 MG tablet    ALDACTONE     1 TABLET EVERY MORNING at 8:00 am       tiotropium 18 MCG capsule    SPIRIVA HANDIHALER    30 capsule    Once daily       triamcinolone 0.1 % cream    KENALOG    80 g    Apply topically 2 times daily as needed       Vitamin D-3 1000 UNITS Caps      Take 2 capsules by mouth daily       warfarin 4 MG tablet    COUMADIN    64 tablet    10 mg (2.5 tabs) mon & fri & 8 mg row       ZEASORB-AF EX      Externally apply topically once a week       * Notice:  This list has 2 medication(s) that are the same as other medications prescribed for you. Read the directions carefully, and ask your doctor or other care provider to review them with you.

## 2017-05-18 NOTE — PROGRESS NOTES
"[facility]  Surgery Consultation    HPI:Patient is a 56 year old male who is here for consultation requested by Jacky Burr [phone] for evaluation of left elbow cyst. Has been present for a few months. The patient is very bothered by this small lesion. He constantly scratches it and causes irritation and pain. He does not have any other lesions. He would like removal. Patient denies fevers, chills, nausea, vomiting, SOB, chest pain, abdominal pain.    PMH:   has a past medical history of Borderline mental retardation (2/20/2013); COPD (chronic obstructive pulmonary disease) (H); History of DVT of lower extremity; History of pulmonary embolism; Hyperlipidemia; Mild intellectual disabilities; Morbid obesity (H); Peripheral edema; Peripheral vascular disease (H); Tobacco dependence; and Venous stasis dermatitis.  PSH:    has a past surgical history that includes Rectal surgery.  Social History:    reports that he has been smoking Cigarettes.  He has a 60.00 pack-year smoking history. He has never used smokeless tobacco. He reports that he does not drink alcohol or use illicit drugs.  Family History:   family history includes DIABETES in his brother; Unknown/Adopted in his father and mother. There is no history of Coronary Artery Disease, Hypertension, Hyperlipidemia, CEREBROVASCULAR DISEASE, Breast Cancer, Colon Cancer, Prostate Cancer, Other Cancer, Depression, Anxiety Disorder, MENTAL ILLNESS, Substance Abuse, Anesthesia Reaction, Asthma, OSTEOPOROSIS, Genetic Disorder, Thyroid Disease, or Obesity.  Medications/Allergies: Home medications and allergies reviewed.    ROS:  The 10 point Review of Systems is negative other than noted in the HPI.    Physical Exam:  /61  Pulse 75  Ht 5' 10\" (1.778 m)  Wt 289 lb (131.1 kg)  BMI 41.47 kg/m2  General: Generally appears well.  Eyes- Sclera Clear- No icterus  Respiratory- Chest rise equal Bilateral  Integumentary-left elbow-small 1 cm " hypertrophic skin lesion. No surrounding erythema or ecchymosis.    Impression and Plan:  Patient is a 56 year old male with hypertrophic symptomatic skin lesion    PLAN:   I described the pathophysiology of the patient's condition including further workup and management. It appears to be a hypertrophic skin and not a concerning lesion. I offered the patient watchful waiting versus excision. He is obsessive about the lesion and would like it removed as soon as possible. He is on Coumadin so like him to hold it for at least 1-2 days since he has been supratherapeutic therapeutic in the past. I'll plan to remove it under local at his convenience. The risks associated with the procedure including, but not limited to, recurrence, pain, hematoma, infection, and anesthetic reaction were discussed with the patient. The patient indicated understanding of the discussion, asked appropriate questions, and provided consent.         Thank you very much for this consult.    Jose Azevedo M.D.  [facility]  [phone]    Please route or send letter to:  Primary Care Provider (PCP) and Referring Provider

## 2017-05-23 ENCOUNTER — OFFICE VISIT (OUTPATIENT)
Dept: SLEEP MEDICINE | Facility: CLINIC | Age: 57
End: 2017-05-23
Payer: MEDICARE

## 2017-05-23 VITALS
TEMPERATURE: 98.7 F | HEIGHT: 70 IN | WEIGHT: 295 LBS | BODY MASS INDEX: 42.23 KG/M2 | DIASTOLIC BLOOD PRESSURE: 77 MMHG | HEART RATE: 72 BPM | OXYGEN SATURATION: 97 % | SYSTOLIC BLOOD PRESSURE: 117 MMHG

## 2017-05-23 DIAGNOSIS — G47.33 OSA (OBSTRUCTIVE SLEEP APNEA): Primary | ICD-10-CM

## 2017-05-23 DIAGNOSIS — G47.34 NOCTURNAL HYPOXEMIA: ICD-10-CM

## 2017-05-23 PROCEDURE — 99214 OFFICE O/P EST MOD 30 MIN: CPT | Performed by: PHYSICIAN ASSISTANT

## 2017-05-23 NOTE — NURSING NOTE
"Chief Complaint   Patient presents with     CPAP Follow Up     follow up felipa       Initial /77  Pulse 72  Temp 98.7  F (37.1  C) (Oral)  Ht 1.778 m (5' 10\")  Wt 133.8 kg (295 lb)  SpO2 97%  BMI 42.33 kg/m2 Estimated body mass index is 42.33 kg/(m^2) as calculated from the following:    Height as of this encounter: 1.778 m (5' 10\").    Weight as of this encounter: 133.8 kg (295 lb).  Medication Reconciliation: complete   ESS 9/24  Courtney Dailey MA      "

## 2017-05-23 NOTE — MR AVS SNAPSHOT
After Visit Summary   5/23/2017    Petey Archibald    MRN: [id number]           Patient Information     Date Of Birth          1960        Visit Information        Provider Department      5/23/2017 10:00 AM Goltz, Bennett Ezra, PA-C [facility] [place]        Today's Diagnoses     NEL (obstructive sleep apnea)    -  1    Nocturnal hypoxemia          Care Instructions      Your BMI is Body mass index is 42.33 kg/(m^2).  Weight management is a personal decision.  If you are interested in exploring weight loss strategies, the following discussion covers the approaches that may be successful. Body mass index (BMI) is one way to tell whether you are at a healthy weight, overweight, or obese. It measures your weight in relation to your height.  A BMI of 18.5 to 24.9 is in the healthy range. A person with a BMI of 25 to 29.9 is considered overweight, and someone with a BMI of 30 or greater is considered obese. More than two-thirds of American adults are considered overweight or obese.  Being overweight or obese increases the risk for further weight gain. Excess weight may lead to heart disease and diabetes.  Creating and following plans for healthy eating and physical activity may help you improve your health.  Weight control is part of healthy lifestyle and includes exercise, emotional health, and healthy eating habits. Careful eating habits lifelong are the mainstay of weight control. Though there are significant health benefits from weight loss, long-term weight loss with diet alone may be very difficult to achieve- studies show long-term success with dietary management in less than 10% of people. Attaining a healthy weight may be especially difficult to achieve in those with severe obesity. In some cases, medications, devices and surgical management might be considered.  What can you do?  If you are overweight or obese and are interested in methods for weight loss, you should discuss  this with your provider.     Consider reducing daily calorie intake by 500 calories.     Keep a food journal.     Avoiding skipping meals, consider cutting portions instead.    Diet combined with exercise helps maintain muscle while optimizing fat loss. Strength training is particularly important for building and maintaining muscle mass. Exercise helps reduce stress, increase energy, and improves fitness. Increasing exercise without diet control, however, may not burn enough calories to loose weight.       Start walking three days a week 10-20 minutes at a time    Work towards walking thirty minutes five days a week     Eventually, increase the speed of your walking for 1-2 minutes at time    In addition, we recommend that you review healthy lifestyles and methods for weight loss available through the National Institutes of Health patient information sites:  http://win.niddk.nih.gov/publications/index.htm    And look into health and wellness programs that may be available through your health insurance provider, employer, local community center, or robert club.    Weight management plan: Patient was referred to their PCP to discuss a diet and exercise plan.            Follow-ups after your visit        Your next 10 appointments already scheduled     May 24, 2017 10:30 AM CDT   Oximetry Drop Off with  SLEEP CENTER DME   [facility] ([facility])    [address] [address] [phone]            May 24, 2017  3:30 PM CDT   Office Visit with Jacky Burr MD   [facility]    [address]  [facility] [id number] [phone]           Bring a current list of meds and any records pertaining to this visit.  For Physicals, please bring immunization records and any forms needing to be filled out.  Please arrive 10 minutes early to complete  paperwork.            May 26, 2017 12:00 PM CDT   [facility] Endoscopy with Jose Azevedo MD   [facility]    [facility])   [phone]            May 26, 2017   Procedure with Jose Azevedo MD   [facility] ([facility])    [address]  [place] [address] [phone]           [facility] is located at [address] GUY Friedman            May 31, 2017  3:30 PM CDT   Anticoagulation Visit with BX ANTICOAGULATION CLINIC   [facility])    [address]  [facility] [address] [phone]            Jun 23, 2017  3:30 PM CDT   Return Sleep Patient with Bennett Ezra Goltz, PA-C   [facility] ([facility])    [address]  [facility] [address] [phone]              Future tests that were ordered for you today     Open Future Orders        Priority Expected Expires Ordered    Overnight oximetry study Routine  11/19/2017 5/23/2017            Who to contact     If you have questions or need follow up information about today's clinic visit or your schedule please contact [facility] directly at [phone].  Normal or non-critical lab and imaging results will be communicated to you by MyChart, letter or phone within 4 business days after the clinic has received the results. If you do not hear from us within 7 days, please contact the clinic through MyChart or phone. If you have a critical or abnormal lab result, we will notify you by phone as soon as possible.  Submit refill requests through [employer] or call your pharmacy and they will forward the refill request to us. Please allow 3 business days for your refill to be completed.          Additional  "Information About Your Visit        MyChart Information     [employer] gives you secure access to your electronic health record. If you see a primary care provider, you can also send messages to your care team and make appointments. If you have questions, please call your primary care clinic.  If you do not have a primary care provider, please call [phone] and they will assist you.        Care EveryWhere ID     This is your Care EveryWhere ID. This could be used by other organizations to access your [place] medical records  [id number]        Your Vitals Were     Pulse Temperature Height Pulse Oximetry BMI (Body Mass Index)       72 98.7  F (37.1  C) (Oral) 1.778 m (5' 10\") 97% 42.33 kg/m2        Blood Pressure from Last 3 Encounters:   05/23/17 117/77   05/18/17 116/61   05/03/17 92/60    Weight from Last 3 Encounters:   05/23/17 133.8 kg (295 lb)   05/18/17 131.1 kg (289 lb)   05/03/17 130.2 kg (287 lb)              We Performed the Following     Comprehensive DME        Primary Care Provider Office Phone # Fax #    Jacky Burr [phone]       [facility] [address] [address] [facility] [address]        Thank you!     Thank you for choosing [facility]  for your care. Our goal is always to provide you with excellent care. Hearing back from our patients is one way we can continue to improve our services. Please take a few minutes to complete the written survey that you may receive in the mail after your visit with us. Thank you!             Your Updated Medication List - Protect others around you: Learn how to safely use, store and throw away your medicines at www.disposemymeds.org.          This list is accurate as of: 5/23/17 10:50 AM.  Always use your most recent med list.                   Brand Name Dispense Instructions for use    ABILIFY 10 MG tablet   Generic drug:  ARIPiprazole     30 tablet    Take 0.5 tablets (5 mg) by mouth daily       " acetaminophen 325 MG tablet    TYLENOL    100 tablet    Take 1-2 tablets (325-650 mg) by mouth every 6 hours as needed       albuterol 108 (90 BASE) MCG/ACT Inhaler    albuterol    1 Inhaler    Inhale 2 puffs into the lungs every 6 hours as needed       AMBIEN 10 MG tablet   Generic drug:  zolpidem     30 tablet    Take 1 tablet (10 mg) by mouth nightly as needed for sleep       ANUSOL-HC 25 MG Suppository   Generic drug:  hydrocortisone     14 Suppository    INSERT ONE SUPPOSITORY RECTALLY UP TO TWICE A DAY AS NEEDED       BACTROBAN 2 % ointment   Generic drug:  mupirocin      Apply topically 2 times daily       budesonide 180 MCG/ACT inhaler    PULMICORT FLEXHALER    3 Inhaler    Inhale 2 puffs into the lungs 2 times daily       BUPROPION HCL PO      Take 150 mg by mouth every morning       clotrimazole-betamethasone cream    LOTRISONE    60 g    Apply topically 2 times daily       DEXILANT 60 MG Cpdr CR capsule   Generic drug:  dexlansoprazole     90 capsule    TAKE 1 CAPSULE BY MOUTH ONCE DAILY *1 TOTAL FILL*       EPIPEN 0.3 MG/0.3ML injection   Generic drug:  EPINEPHrine      Inject 0.3 mg into the muscle once as needed.       Ferrous Gluconate 324 (37.5 FE) MG Tabs      Take 1 tablet by mouth 2 times daily.       furosemide 40 MG tablet    LASIX     Take 2 tablets by mouth daily       hydrocortisone 2.5 % cream    ANUSOL-HC    28.35 g    Place rectally 2 times daily       loratadine 10 MG tablet    CLARITIN     Take 10 mg by mouth daily.       Mometasone Furoate 200 MCG/ACT Aero     1 Inhaler    Inhale 2 puffs into the lungs 2 times daily       mometasone-formoterol 200-5 MCG/ACT oral inhaler    DULERA    13 g    Inhale 2 puffs into the lungs 2 times daily       Multi-vitamin Tabs tablet   Generic drug:  multivitamin, therapeutic with minerals      1 TABLET DAILY       nexIUM 40 MG CR capsule   Generic drug:  esomeprazole      1 CAPSULE DAILY at 8:00 am       * order for DME     1 each    Equipment being  ordered: support compression hose BK  2 pair black 30mm HG  To be applied on arising & removed while lying down to go to sleep       * order for DME     1 Device    Equipment being ordered: CPAP with mask and tubing       PHOSPHATE ENEMA RE      Place 1 enema rectally as needed.       Potassium Chloride ER 20 MEQ Tbcr      Take 1 tablet by mouth 2 times daily.       sertraline 100 MG tablet    ZOLOFT     Take 1.5 tablets by mouth daily.       simvastatin 40 MG tablet    ZOCOR     Take 1 tablet by mouth daily.       SINGULAIR 10 MG tablet   Generic drug:  montelukast      1 TABLET DAILY at 8:00 am       spironolactone 25 MG tablet    ALDACTONE     1 TABLET EVERY MORNING at 8:00 am       tiotropium 18 MCG capsule    SPIRIVA HANDIHALER    30 capsule    Once daily       triamcinolone 0.1 % cream    KENALOG    80 g    Apply topically 2 times daily as needed       Vitamin D-3 1000 UNITS Caps      Take 2 capsules by mouth daily       warfarin 4 MG tablet    COUMADIN    64 tablet    10 mg (2.5 tabs) mon & fri & 8 mg row       ZEASORB-AF EX      Externally apply topically once a week       * Notice:  This list has 2 medication(s) that are the same as other medications prescribed for you. Read the directions carefully, and ask your doctor or other care provider to review them with you.

## 2017-05-23 NOTE — MR AVS SNAPSHOT
After Visit Summary   5/23/2017    Petey Archibald    MRN: [id number]           Patient Information     Date Of Birth          1960        Visit Information        Provider Department      5/23/2017 10:30 AM BED 7  SLEEP [facility]        Today's Diagnoses     NEL (obstructive sleep apnea)    -  1       Follow-ups after your visit        Your next 10 appointments already scheduled     May 24, 2017 10:30 AM CDT   Oximetry Drop Off with  SLEEP CENTER DME   [facility] ([facility])    [address]  [facility] [address]   [phone]            May 24, 2017  3:30 PM CDT   Office Visit with Jacky Burr MD   [facility] ([facility])    [address]  [facility] [address] [phone]           Bring a current list of meds and any records pertaining to this visit.  For Physicals, please bring immunization records and any forms needing to be filled out.  Please arrive 10 minutes early to complete paperwork.            May 26, 2017 12:00 PM CDT   [facility] Endoscopy with Jose Azevedo MD   [facility])    [facility])   [phone]            May 26, 2017   Procedure with Jose Azevedo MD   [facility] ([facility])    [address] [facility] [address]   [phone]           [facility] is located at [address] [place]            May 31, 2017  3:30 PM CDT   Anticoagulation Visit with BX ANTICOAGULATION CLINIC   [facility]    [address]  [facility] [id number] [phone]            Jun 23, 2017  3:30 PM CDT   Return Sleep Patient  with Bennett Ezra Goltz, PA-C   [facility] ([facility])    [address] [address] [phone]              Future tests that were ordered for you today     Open Future Orders        Priority Expected Expires Ordered    Overnight oximetry study Routine  11/19/2017 5/23/2017            Who to contact     If you have questions or need follow up information about today's clinic visit or your schedule please contact [facility] directly at [phone].  Normal or non-critical lab and imaging results will be communicated to you by [employer], letter or phone within 4 business days after the clinic has received the results. If you do not hear from us within 7 days, please contact the clinic through [employer] or phone. If you have a critical or abnormal lab result, we will notify you by phone as soon as possible.  Submit refill requests through [employer] ([email or web address]) or call your pharmacy and they will forward the refill request to us. Please allow 3 business days for your refill to be completed.          Additional Information About Your Visit        [employer] Information     [employer] ([email or web address]) gives you secure access to your electronic health record. If you see a primary care provider, you can also send messages to your care team and make appointments. If you have questions, please call your primary care clinic.  If you do not have a primary care provider, please call [phone] and they will assist you.        Care EveryWhere ID     This is your Care EveryWhere ID. This could be used by other organizations to access your [place] medical records  [id number]         Blood Pressure from Last 3 Encounters:   05/23/17 117/77   05/18/17 116/61   05/03/17 92/60    Weight from Last 3 Encounters:   05/23/17 133.8 kg (295 lb)   05/18/17 131.1 kg (289 lb)   05/03/17 130.2 kg (287 lb)              Today, you had the following     No orders found for display        Primary Care Provider Office Phone # Fax #    Jacky Burr [phone]       [facility] [address]  [facility] [address]        Thank you!     Thank you for choosing [facility]  for your care. Our goal is always to provide you with excellent care. Hearing back from our patients is one way we can continue to improve our services. Please take a few minutes to complete the written survey that you may receive in the mail after your visit with us. Thank you!             Your Updated Medication List - Protect others around you: Learn how to safely use, store and throw away your medicines at www.disposemymeds.org.          This list is accurate as of: 5/23/17  2:12 PM.  Always use your most recent med list.                   Brand Name Dispense Instructions for use    ABILIFY 10 MG tablet   Generic drug:  ARIPiprazole     30 tablet    Take 0.5 tablets (5 mg) by mouth daily       acetaminophen 325 MG tablet    TYLENOL    100 tablet    Take 1-2 tablets (325-650 mg) by mouth every 6 hours as needed       albuterol 108 (90 BASE) MCG/ACT Inhaler    albuterol    1 Inhaler    Inhale 2 puffs into the lungs every 6 hours as needed       AMBIEN 10 MG tablet   Generic drug:  zolpidem     30 tablet    Take 1 tablet (10 mg) by mouth nightly as needed for sleep       ANUSOL-HC 25 MG Suppository   Generic drug:  hydrocortisone     14 Suppository    INSERT ONE SUPPOSITORY RECTALLY UP TO TWICE A DAY AS NEEDED       BACTROBAN 2 % ointment   Generic drug:  mupirocin      Apply topically 2 times daily       budesonide 180 MCG/ACT inhaler    PULMICORT FLEXHALER    3 Inhaler    Inhale 2 puffs into the lungs 2 times daily       BUPROPION HCL PO      Take 150 mg by mouth every morning       clotrimazole-betamethasone cream    LOTRISONE    60 g    Apply topically 2 times daily       DEXILANT 60 MG Cpdr CR capsule   Generic drug:  dexlansoprazole     90 capsule    TAKE 1 CAPSULE BY  MOUTH ONCE DAILY *1 TOTAL FILL*       EPIPEN 0.3 MG/0.3ML injection   Generic drug:  EPINEPHrine      Inject 0.3 mg into the muscle once as needed.       Ferrous Gluconate 324 (37.5 FE) MG Tabs      Take 1 tablet by mouth 2 times daily.       furosemide 40 MG tablet    LASIX     Take 2 tablets by mouth daily       hydrocortisone 2.5 % cream    ANUSOL-HC    28.35 g    Place rectally 2 times daily       loratadine 10 MG tablet    CLARITIN     Take 10 mg by mouth daily.       Mometasone Furoate 200 MCG/ACT Aero     1 Inhaler    Inhale 2 puffs into the lungs 2 times daily       mometasone-formoterol 200-5 MCG/ACT oral inhaler    DULERA    13 g    Inhale 2 puffs into the lungs 2 times daily       Multi-vitamin Tabs tablet   Generic drug:  multivitamin, therapeutic with minerals      1 TABLET DAILY       nexIUM 40 MG CR capsule   Generic drug:  esomeprazole      1 CAPSULE DAILY at 8:00 am       * order for DME     1 each    Equipment being ordered: support compression hose BK  2 pair black 30mm HG  To be applied on arising & removed while lying down to go to sleep       * order for DME     1 Device    Equipment being ordered: CPAP with mask and tubing       PHOSPHATE ENEMA RE      Place 1 enema rectally as needed.       Potassium Chloride ER 20 MEQ Tbcr      Take 1 tablet by mouth 2 times daily.       sertraline 100 MG tablet    ZOLOFT     Take 1.5 tablets by mouth daily.       simvastatin 40 MG tablet    ZOCOR     Take 1 tablet by mouth daily.       SINGULAIR 10 MG tablet   Generic drug:  montelukast      1 TABLET DAILY at 8:00 am       spironolactone 25 MG tablet    ALDACTONE     1 TABLET EVERY MORNING at 8:00 am       tiotropium 18 MCG capsule    SPIRIVA HANDIHALER    30 capsule    Once daily       triamcinolone 0.1 % cream    KENALOG    80 g    Apply topically 2 times daily as needed       Vitamin D-3 1000 UNITS Caps      Take 2 capsules by mouth daily       warfarin 4 MG tablet    COUMADIN    64 tablet    10 mg (2.5  tabs) mon & fri & 8 mg row       ZEASORB-AF EX      Externally apply topically once a week       * Notice:  This list has 2 medication(s) that are the same as other medications prescribed for you. Read the directions carefully, and ask your doctor or other care provider to review them with you.

## 2017-05-23 NOTE — PROGRESS NOTES
Patient instructed on JESSIKA use. Patient demonstrated and verbalized knowledge of use. Insurance was verified by financial securing. Device programmed. Device will be returned tomorrow before noon.      Araceli Daly  Clinical Coordinator

## 2017-05-23 NOTE — PATIENT INSTRUCTIONS
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
## 2017-05-24 ENCOUNTER — OFFICE VISIT (OUTPATIENT)
Dept: FAMILY MEDICINE | Facility: CLINIC | Age: 57
End: 2017-05-24
Payer: MEDICARE

## 2017-05-24 ENCOUNTER — DOCUMENTATION ONLY (OUTPATIENT)
Dept: SLEEP MEDICINE | Facility: CLINIC | Age: 57
End: 2017-05-24

## 2017-05-24 VITALS
DIASTOLIC BLOOD PRESSURE: 68 MMHG | TEMPERATURE: 97.7 F | BODY MASS INDEX: 42.33 KG/M2 | SYSTOLIC BLOOD PRESSURE: 106 MMHG | WEIGHT: 295 LBS | RESPIRATION RATE: 18 BRPM | OXYGEN SATURATION: 97 % | HEART RATE: 68 BPM

## 2017-05-24 DIAGNOSIS — Z79.01 LONG-TERM (CURRENT) USE OF ANTICOAGULANTS: ICD-10-CM

## 2017-05-24 DIAGNOSIS — Z86.711 HISTORY OF PULMONARY EMBOLISM: ICD-10-CM

## 2017-05-24 DIAGNOSIS — L98.9 SKIN LESION: Primary | ICD-10-CM

## 2017-05-24 PROCEDURE — 99213 OFFICE O/P EST LOW 20 MIN: CPT | Performed by: FAMILY MEDICINE

## 2017-05-24 NOTE — MR AVS SNAPSHOT
After Visit Summary   5/24/2017    Petey Archibald    MRN: [id number]           Patient Information     Date Of Birth          1960        Visit Information        Provider Department      5/24/2017 3:30 PM Jacky Burr MD [facility]         Follow-ups after your visit        Your next 10 appointments already scheduled     May 26, 2017 12:00 PM CDT   [facility] Endoscopy with Jose Azevedo MD   [facility]    [facility] ([facility])   [phone]            May 26, 2017   Procedure with Jose Azevedo MD   [facility] Endoscopy ([facility])    [address] [address] [phone]           [facility] is located at [address] [place]            May 31, 2017  3:30 PM CDT   Anticoagulation Visit with [facility]UL[facility]   [facility] ([facility])    [address]  [facility] [address] [phone]            Jun 23, 2017  3:30 PM CDT   Return Sleep Patient with Bennett Ezra Goltz, PA-C   [facility] ([facility])    [address]  [place] [facility] [address] [phone]              Future tests that were ordered for you today     Open Future Orders        Priority Expected Expires Ordered    Overnight oximetry study Routine  11/19/2017 5/23/2017            Who to contact     If you have questions or need follow up information about today's clinic visit or your schedule please contact [facility] directly at [phone].  Normal or non-critical lab and imaging results will be communicated to you by MyChart, letter or phone within 4 business days after the clinic has received the results. If you do not  hear from us within 7 days, please contact the clinic through [employer] or phone. If you have a critical or abnormal lab result, we will notify you by phone as soon as possible.  Submit refill requests through [employer] or call your pharmacy and they will forward the refill request to us. Please allow 3 business days for your refill to be completed.          Additional Information About Your Visit        ttwickharClinical Ink Information     [employer] gives you secure access to your electronic health record. If you see a primary care provider, you can also send messages to your care team and make appointments. If you have questions, please call your primary care clinic.  If you do not have a primary care provider, please call [phone] and they will assist you.        Care EveryWhere ID     This is your Care EveryWhere ID. This could be used by other organizations to access your [facility] medical records  [id number]        Your Vitals Were     Pulse Temperature Respirations Pulse Oximetry BMI (Body Mass Index)       68 97.7  F (36.5  C) 18 97% 42.33 kg/m2        Blood Pressure from Last 3 Encounters:   05/24/17 106/68   05/23/17 117/77   05/18/17 116/61    Weight from Last 3 Encounters:   05/24/17 295 lb (133.8 kg)   05/23/17 295 lb (133.8 kg)   05/18/17 289 lb (131.1 kg)              Today, you had the following     No orders found for display       Primary Care Provider Office Phone # Fax #    Jacky Burr [phone]       [facility] [address] [facility] [address]        Thank you!     Thank you for choosing [facility]  for your care. Our goal is always to provide you with excellent care. Hearing back from our patients is one way we can continue to improve our services. Please take a few minutes to complete the written survey that you may receive in the mail after your visit with us. Thank you!             Your Updated Medication List - Protect others  around you: Learn how to safely use, store and throw away your medicines at www.disposemymeds.org.          This list is accurate as of: 5/24/17  5:04 PM.  Always use your most recent med list.                   Brand Name Dispense Instructions for use    ABILIFY 10 MG tablet   Generic drug:  ARIPiprazole     30 tablet    Take 0.5 tablets (5 mg) by mouth daily       acetaminophen 325 MG tablet    TYLENOL    100 tablet    Take 1-2 tablets (325-650 mg) by mouth every 6 hours as needed       albuterol 108 (90 BASE) MCG/ACT Inhaler    albuterol    1 Inhaler    Inhale 2 puffs into the lungs every 6 hours as needed       AMBIEN 10 MG tablet   Generic drug:  zolpidem     30 tablet    Take 1 tablet (10 mg) by mouth nightly as needed for sleep       ANUSOL-HC 25 MG Suppository   Generic drug:  hydrocortisone     14 Suppository    INSERT ONE SUPPOSITORY RECTALLY UP TO TWICE A DAY AS NEEDED       BACTROBAN 2 % ointment   Generic drug:  mupirocin      Apply topically 2 times daily       budesonide 180 MCG/ACT inhaler    PULMICORT FLEXHALER    3 Inhaler    Inhale 2 puffs into the lungs 2 times daily       BUPROPION HCL PO      Take 150 mg by mouth every morning       clotrimazole-betamethasone cream    LOTRISONE    60 g    Apply topically 2 times daily       DEXILANT 60 MG Cpdr CR capsule   Generic drug:  dexlansoprazole     90 capsule    TAKE 1 CAPSULE BY MOUTH ONCE DAILY *1 TOTAL FILL*       EPIPEN 0.3 MG/0.3ML injection   Generic drug:  EPINEPHrine      Inject 0.3 mg into the muscle once as needed.       Ferrous Gluconate 324 (37.5 FE) MG Tabs      Take 1 tablet by mouth 2 times daily.       furosemide 40 MG tablet    LASIX     Take 2 tablets by mouth daily       hydrocortisone 2.5 % cream    ANUSOL-HC    28.35 g    Place rectally 2 times daily       loratadine 10 MG tablet    CLARITIN     Take 10 mg by mouth daily.       Mometasone Furoate 200 MCG/ACT Aero     1 Inhaler    Inhale 2 puffs into the lungs 2 times daily        mometasone-formoterol 200-5 MCG/ACT oral inhaler    DULERA    13 g    Inhale 2 puffs into the lungs 2 times daily       Multi-vitamin Tabs tablet   Generic drug:  multivitamin, therapeutic with minerals      1 TABLET DAILY       nexIUM 40 MG CR capsule   Generic drug:  esomeprazole      1 CAPSULE DAILY at 8:00 am       * order for DME     1 each    Equipment being ordered: support compression hose BK  2 pair black 30mm HG  To be applied on arising & removed while lying down to go to sleep       * order for DME     1 Device    Equipment being ordered: CPAP with mask and tubing       PHOSPHATE ENEMA RE      Place 1 enema rectally as needed.       Potassium Chloride ER 20 MEQ Tbcr      Take 1 tablet by mouth 2 times daily.       sertraline 100 MG tablet    ZOLOFT     Take 1.5 tablets by mouth daily.       simvastatin 40 MG tablet    ZOCOR     Take 1 tablet by mouth daily.       SINGULAIR 10 MG tablet   Generic drug:  montelukast      1 TABLET DAILY at 8:00 am       spironolactone 25 MG tablet    ALDACTONE     1 TABLET EVERY MORNING at 8:00 am       tiotropium 18 MCG capsule    SPIRIVA HANDIHALER    30 capsule    Once daily       triamcinolone 0.1 % cream    KENALOG    80 g    Apply topically 2 times daily as needed       Vitamin D-3 1000 UNITS Caps      Take 2 capsules by mouth daily       warfarin 4 MG tablet    COUMADIN    64 tablet    10 mg (2.5 tabs) mon & fri & 8 mg row       ZEASORB-AF EX      Externally apply topically once a week       * Notice:  This list has 2 medication(s) that are the same as other medications prescribed for you. Read the directions carefully, and ask your doctor or other care provider to review them with you.
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
## 2017-05-24 NOTE — NURSING NOTE
"Chief Complaint   Patient presents with     Medication Request       Initial /68  Pulse 68  Temp 97.7  F (36.5  C)  Resp 18  Wt 295 lb (133.8 kg)  SpO2 97%  BMI 42.33 kg/m2 Estimated body mass index is 42.33 kg/(m^2) as calculated from the following:    Height as of 5/23/17: 5' 10\" (1.778 m).    Weight as of this encounter: 295 lb (133.8 kg).  Medication Reconciliation: cheri Stephenson CMA      "

## 2017-05-25 NOTE — NURSING NOTE
Overnight oximetry returned to clinic today 05/24/2017. The encounter was routed to the provider for review. A copy of these results have also been scanned.    Recording date:05/23/2017    Duration: 07:09:28    Araceli Edmond  Sleep Clinic - Specialist

## 2017-05-26 ENCOUNTER — TELEPHONE (OUTPATIENT)
Dept: SLEEP MEDICINE | Facility: CLINIC | Age: 57
End: 2017-05-26

## 2017-05-26 ENCOUNTER — SURGERY (OUTPATIENT)
Age: 57
End: 2017-05-26

## 2017-05-26 ENCOUNTER — HOSPITAL ENCOUNTER (OUTPATIENT)
Facility: CLINIC | Age: 57
Discharge: HOME | End: 2017-05-26
Attending: SURGERY | Admitting: SURGERY
Payer: MEDICARE

## 2017-05-26 ENCOUNTER — OFFICE VISIT (OUTPATIENT)
Dept: SURGERY | Facility: PHYSICIAN GROUP | Age: 57
End: 2017-05-26
Payer: MEDICARE

## 2017-05-26 VITALS — SYSTOLIC BLOOD PRESSURE: 116 MMHG | DIASTOLIC BLOOD PRESSURE: 78 MMHG | HEART RATE: 69 BPM

## 2017-05-26 DIAGNOSIS — G47.34 NOCTURNAL HYPOXEMIA: ICD-10-CM

## 2017-05-26 DIAGNOSIS — G47.33 OSA (OBSTRUCTIVE SLEEP APNEA): Primary | ICD-10-CM

## 2017-05-26 PROCEDURE — 11601 EXC TR-EXT MAL+MARG 0.6-1 CM: CPT | Performed by: SURGERY

## 2017-05-26 PROCEDURE — 11600 EXC TR-EXT MAL+MARG 0.5 CM/<: CPT | Performed by: SURGERY

## 2017-05-26 PROCEDURE — 88305 TISSUE EXAM BY PATHOLOGIST: CPT | Performed by: SURGERY

## 2017-05-26 PROCEDURE — 11401 EXC TR-EXT B9+MARG 0.6-1 CM: CPT | Performed by: SURGERY

## 2017-05-26 PROCEDURE — 12031 INTMD RPR S/A/T/EXT 2.5 CM/<: CPT

## 2017-05-26 PROCEDURE — 88305 TISSUE EXAM BY PATHOLOGIST: CPT | Mod: 26 | Performed by: SURGERY

## 2017-05-26 PROCEDURE — 12031 INTMD RPR S/A/T/EXT 2.5 CM/<: CPT | Performed by: SURGERY

## 2017-05-26 RX ORDER — BUPIVACAINE HYDROCHLORIDE 5 MG/ML
10 INJECTION, SOLUTION EPIDURAL; INTRACAUDAL ONCE
Status: DISCONTINUED | OUTPATIENT
Start: 2017-05-26 | End: 2017-05-26 | Stop reason: HOSPADM

## 2017-05-26 RX ORDER — LIDOCAINE HYDROCHLORIDE 10 MG/ML
10 INJECTION, SOLUTION EPIDURAL; INFILTRATION; INTRACAUDAL; PERINEURAL ONCE
Status: DISCONTINUED | OUTPATIENT
Start: 2017-05-26 | End: 2017-05-26 | Stop reason: HOSPADM

## 2017-05-26 NOTE — TELEPHONE ENCOUNTER
Oximetry results were obtained for the date of 5/23/2017.  The patient was on a treatment of BiPAP 16/8 cm.  The study showed a valid recording time of 7:09 with a 4% desaturation index of 7.9/hr.  The mean oxygen saturation was 90.8% and the lowest SpO2 was 81% (which appears to be artifact).  The patient spent 9.9 minutes below 89% SpO2.    This study does suggest residual hypoxemia and possible mild residual apnea. A few of the desaturation events appear to be artifacts of movement, but others may be true. He had a period of about an hour and a half where his oxygen sat between 88-89% in the middle of the night.  I called and left a message with one of the staff at his living facility that I would be placing an order for supplemental oxygen to be bled in to his BiPAP, 2 lpm. The staff member said he would notify the manager on Monday.  Bennett Goltz, PA-C

## 2017-05-26 NOTE — LETTER
[facility]  [address]           [address]           [place] [address]            [phone]    Petey Archibald  [address]  [facility] [place] [place] [address]  May 31, 2017      Dear Petey,    This letter is to inform you of the results of your pathology report from your recent procedure.   FINAL DIAGNOSIS:   Skin, left elbow, excision   - Verrucous keratosis extending to margin of biopsy   - No evidence of malignancy     This is a benign (non-cancerous) lesion that does not require any further treatment.       If you have any further questions, please do not hesitate to call: [facility] [phone].    Sincerely,  Jose Azevedo M.D.  [facility]

## 2017-05-26 NOTE — OP NOTE
Pre-Operative Diagnosis- Left elbow skin lesion    Post-Operative Diagnosis- Same    Procedure- Excision of 1 cm left elbow skin lesion with layered closure    Surgeon- Jolly    Assistant- None    Anesthesia- 1% lidocaine with epi    Specimen-Skin lesion    Procedure  Consent was obtained. A surgical time out was performed. The patient was prepped and draped in the usual sterile fashion with Chloraprep. Using a sterile technique, 1 percent lidocaine with epinephrine was used to infiltrate the area. After adequate anesthesia was confirmed, a number 15 scalpel was used to make an elliptical incision overlying the lesion. Using sharp dissection a 1 cm skin nodule wascircumferentially freed. There was no bleeding and the wound bed was dry.   The skin was closed in multiple layers with a 3.0 and 4.0 Vicryl. Steri-strips were applied. Site was dry with no visible bleeding. Patient tolerated the procedure well without complications. All sponge, instrument, and needle counts were correct at the conclusion of the case.      Jose Azevedo M.D.  [facility]  [phone]    Please route or send letter to:  Primary Care Provider (PCP) and Referring Provider

## 2017-05-30 LAB — COPATH REPORT: NORMAL

## 2017-05-31 ENCOUNTER — ANTICOAGULATION THERAPY VISIT (OUTPATIENT)
Dept: NURSING | Facility: CLINIC | Age: 57
End: 2017-05-31
Payer: MEDICARE

## 2017-05-31 DIAGNOSIS — Z79.01 LONG-TERM (CURRENT) USE OF ANTICOAGULANTS: ICD-10-CM

## 2017-05-31 DIAGNOSIS — Z86.711 HISTORY OF PULMONARY EMBOLISM: ICD-10-CM

## 2017-05-31 DIAGNOSIS — Z79.01 LONG TERM CURRENT USE OF ANTICOAGULANT THERAPY: ICD-10-CM

## 2017-05-31 LAB — INR POINT OF CARE: 1.6 (ref 2–3)

## 2017-05-31 PROCEDURE — 36416 COLLJ CAPILLARY BLOOD SPEC: CPT

## 2017-05-31 PROCEDURE — 85610 PROTHROMBIN TIME: CPT | Mod: QW

## 2017-05-31 PROCEDURE — 99207 ZZC NO CHARGE NURSE ONLY: CPT

## 2017-05-31 RX ORDER — WARFARIN SODIUM 4 MG/1
TABLET ORAL
Qty: 64 TABLET | Refills: 1 | Status: SHIPPED | OUTPATIENT
Start: 2017-05-31 | End: 2017-06-14

## 2017-05-31 NOTE — PROGRESS NOTES
ANTICOAGULATION FOLLOW-UP CLINIC VISIT    Patient Name:  Petey Archibald  Date:  5/31/2017  Contact Type:  Face to Face    SUBJECTIVE:  Bleeding Signs/Symptoms: None  Thromboembolic Signs/Symptoms: None    Medication Changes:  No  Dietary Changes:  No  Bacterial/Viral Infection:  No    Missed Coumadin Doses:  None  Other Concerns:  No      ASSESSMENT/PLAN:  See: ANTICOAGULATION QIC flow sheet.    {BX ANTICOAGULATION CLINIC

## 2017-05-31 NOTE — MR AVS SNAPSHOT
Petey Archibald   5/31/2017 3:30 PM   Anticoagulation Therapy Visit    Description:  56 year old male   Provider:  [facility]   Department:  Bx Nurse           INR as of 5/31/2017     Today's INR 1.6!      Anticoagulation Summary as of 5/31/2017     INR goal 2.0-3.0   Today's INR 1.6!   Full instructions 5/31: 12 mg; Otherwise 10 mg on Mon, Fri; 8 mg all other days   Next INR check 6/14/2017    Indications   History of pulmonary embolism [Z86.711]  Long-term (current) use of anticoagulants [Z79.01] [Z79.01]         Your next Anticoagulation Clinic appointment(s)     Jun 14, 2017  3:30 PM CDT   Anticoagulation Visit with  ANTICOAGULATION CLINIC   [facility] ([facility])    [address] [place] [address] [id number] [phone]              Contact Numbers     [facility]  Please call  [phone] to cancel and/or reschedule your appointment   The direct line to the anticoagulant nurse is [phone] on Monday, Wednesday, and Friday. On Thursday, the anticoagulant nurse can be reached directly at [phone].         May 2017 Details    Sun Mon Tue Wed Thu Fri Sat      1               2               3               4               5               6                 7               8               9               10               11               12               13                 14               15               16               17               18               19               20                 21               22               23               24               25               26               27                 28               29               30               31      12 mg   See details          Date Details   05/31 This INR check               How to take your warfarin dose     To take:  12 mg Take 3 of the 4 mg tablets.           June 2017 Details    Sun Mon Tue Wed Thu Fri Sat         1       8 mg         2      10 mg         3      8 mg           4      8 mg         5      10 mg         6      8 mg         7      8 mg         8      8 mg         9      10 mg         10      8 mg           11      8 mg         12      10 mg         13      8 mg         14            15               16               17                 18               19               20               21               22               23               24                 25               26               27               28               29               30                 Date Details   No additional details    Date of next INR:  6/14/2017         How to take your warfarin dose     To take:  8 mg Take 2 of the 4 mg tablets.    To take:  10 mg Take 2.5 of the 4 mg tablets.

## 2017-06-11 ENCOUNTER — NURSE TRIAGE (OUTPATIENT)
Dept: NURSING | Facility: CLINIC | Age: 57
End: 2017-06-11

## 2017-06-14 ENCOUNTER — ANTICOAGULATION THERAPY VISIT (OUTPATIENT)
Dept: NURSING | Facility: CLINIC | Age: 57
End: 2017-06-14
Payer: MEDICARE

## 2017-06-14 DIAGNOSIS — Z79.01 LONG TERM CURRENT USE OF ANTICOAGULANT THERAPY: ICD-10-CM

## 2017-06-14 DIAGNOSIS — Z79.01 LONG-TERM (CURRENT) USE OF ANTICOAGULANTS: ICD-10-CM

## 2017-06-14 DIAGNOSIS — Z86.711 HISTORY OF PULMONARY EMBOLISM: ICD-10-CM

## 2017-06-14 LAB — INR POINT OF CARE: 2.3 (ref 2–3)

## 2017-06-14 PROCEDURE — 36416 COLLJ CAPILLARY BLOOD SPEC: CPT

## 2017-06-14 PROCEDURE — 85610 PROTHROMBIN TIME: CPT | Mod: QW

## 2017-06-14 PROCEDURE — 99207 ZZC NO CHARGE NURSE ONLY: CPT

## 2017-06-14 RX ORDER — WARFARIN SODIUM 4 MG/1
TABLET ORAL
Qty: 64 TABLET | Refills: 1 | Status: SHIPPED | OUTPATIENT
Start: 2017-06-14 | End: 2017-07-12

## 2017-06-14 RX ORDER — WARFARIN SODIUM 4 MG/1
TABLET ORAL
Qty: 64 TABLET | Refills: 1 | Status: SHIPPED | OUTPATIENT
Start: 2017-06-14 | End: 2017-09-06

## 2017-06-14 NOTE — MR AVS SNAPSHOT
Petey Archibald   6/14/2017 3:30 PM   Anticoagulation Therapy Visit    Description:  56 year old male   Provider:  [facility]   Department:  Bx Nurse           INR as of 6/14/2017     Today's INR 2.3      Anticoagulation Summary as of 6/14/2017     INR goal 2.0-3.0   Today's INR 2.3   Full instructions 10 mg on Mon, Fri; 8 mg all other days   Next INR check 7/12/2017    Indications   History of pulmonary embolism [Z86.711]  Long-term (current) use of anticoagulants [Z79.01] [Z79.01]         Your next Anticoagulation Clinic appointment(s)     Jul 12, 2017  3:30 PM CDT   Anticoagulation Visit with  ANTICOAGULATION CLINIC   [facility] ([facility])    [address] [place] [address] [phone]              Contact Numbers     [facility]  Please call  [phone] to cancel and/or reschedule your appointment   The direct line to the anticoagulant nurse is [phone] on Monday, Wednesday, and Friday. On Thursday, the anticoagulant nurse can be reached directly at [phone].         June 2017 Details    Sun Mon Tue Wed Thu Fri Sat         1               2               3                 4               5               6               7               8               9               10                 11               12               13               14      8 mg   See details      15      8 mg         16      10 mg         17      8 mg           18      8 mg         19      10 mg         20      8 mg         21      8 mg         22      8 mg         23      10 mg         24      8 mg           25      8 mg         26      10 mg         27      8 mg         28      8 mg         29      8 mg         30      10 mg           Date Details   06/14 This INR check               How to take your warfarin dose     To take:  8 mg Take 2 of the 4 mg tablets.    To take:  10 mg Take 2.5 of the 4 mg  tablets.           July 2017 Details    Sun Mon Tue Wed Thu Fri Sat           1      8 mg           2      8 mg         3      10 mg         4      8 mg         5      8 mg         6      8 mg         7      10 mg         8      8 mg           9      8 mg         10      10 mg         11      8 mg         12            13               14               15                 16               17               18               19               20               21               22                 23               24               25               26               27               28               29                 30               31                     Date Details   No additional details    Date of next INR:  7/12/2017         How to take your warfarin dose     To take:  8 mg Take 2 of the 4 mg tablets.    To take:  10 mg Take 2.5 of the 4 mg tablets.

## 2017-06-14 NOTE — PROGRESS NOTES
ANTICOAGULATION FOLLOW-UP CLINIC VISIT    Patient Name:  Petey Archibald  Date:  6/14/2017    SUBJECTIVE:    Indication: {INDICATION FOR ANTICOAGULATION:344573}    Bleeding Signs/Symptoms:  None  Thromboembolic Signs/Symptoms:  None    Medication Changes:  No  Dietary Changes:  No  Activity Changes: No  Bacterial/Viral Infection:  No    Missed Coumadin Doses:  None    Other Concerns:  No    OBJECTIVE:    INR Today:  2.3  Current Dose: 60 mg weekly    Therapeutic INR for goal of 2-3    PLAN:    New Dose: No Change      Next INR: 1 month    A Coumadin dosing card (or book) was given to the patient today.    Face to face time with the patient: 15 minutes.        Prudence Machuca RN             ANTICOAGULATION FOLLOW-UP CLINIC VISIT    Patient Name:  Petey Archibald  Date:  6/14/2017  Contact Type:  Face to Face    SUBJECTIVE:     Patient Findings     Positives No Problem Findings           OBJECTIVE    INR Protime   Date Value Ref Range Status   06/14/2017 2.3 2.0 - 3.0 Final       ASSESSMENT / PLAN  INR assessment THER    Recheck INR In: 4 WEEKS    INR Location Clinic      Anticoagulation Summary as of 6/14/2017     INR goal 2.0-3.0   Today's INR 2.3   Maintenance plan 10 mg (4 mg x 2.5) on Mon, Fri; 8 mg (4 mg x 2) all other days   Full instructions 10 mg on Mon, Fri; 8 mg all other days   Weekly total 60 mg   No change documented Prudence Machuca RN   Plan last modified Lisa Ponce RN (3/17/2017)   Next INR check 7/12/2017   Target end date Indefinite    Indications   History of pulmonary embolism [Z86.711]  Long-term (current) use of anticoagulants [Z79.01] [Z79.01]         Anticoagulation Episode Summary     INR check location [facility]    Preferred lab     Send INR reminders to [facility] INR/PROTIME    Comments       Anticoagulation Care Providers     Provider Role Specialty Phone number    Jacky Burr MD [facility] [phone]            See the Encounter Report to view  Anticoagulation Flowsheet and Dosing Calendar (Go to Encounters tab in chart review, and find the Anticoagulation Therapy Visit)        Prudence Machuca RN

## 2017-07-12 ENCOUNTER — ANTICOAGULATION THERAPY VISIT (OUTPATIENT)
Dept: NURSING | Facility: CLINIC | Age: 57
End: 2017-07-12
Payer: MEDICARE

## 2017-07-12 DIAGNOSIS — Z79.01 LONG TERM CURRENT USE OF ANTICOAGULANT THERAPY: ICD-10-CM

## 2017-07-12 DIAGNOSIS — Z86.711 HISTORY OF PULMONARY EMBOLISM: ICD-10-CM

## 2017-07-12 DIAGNOSIS — Z79.01 LONG-TERM (CURRENT) USE OF ANTICOAGULANTS: ICD-10-CM

## 2017-07-12 LAB — INR POINT OF CARE: 2 (ref 2–3)

## 2017-07-12 PROCEDURE — 36416 COLLJ CAPILLARY BLOOD SPEC: CPT

## 2017-07-12 PROCEDURE — 99207 ZZC NO CHARGE NURSE ONLY: CPT

## 2017-07-12 PROCEDURE — 85610 PROTHROMBIN TIME: CPT | Mod: QW

## 2017-07-12 RX ORDER — WARFARIN SODIUM 4 MG/1
TABLET ORAL
Qty: 64 TABLET | Refills: 1 | Status: SHIPPED | OUTPATIENT
Start: 2017-07-12 | End: 2017-08-09

## 2017-07-12 NOTE — MR AVS SNAPSHOT
Petey Archibald   7/12/2017 3:30 PM   Anticoagulation Therapy Visit    Description:  56 year old male   Provider:  [facility]   Department:  Bx Nurse           INR as of 7/12/2017     Today's INR 2.0      Anticoagulation Summary as of 7/12/2017     INR goal 2.0-3.0   Today's INR 2.0   Full instructions 10 mg on Mon, Fri; 8 mg all other days   Next INR check 8/9/2017    Indications   History of pulmonary embolism [Z86.711]  Long-term (current) use of anticoagulants [Z79.01] [Z79.01]         Your next Anticoagulation Clinic appointment(s)     Aug 09, 2017  3:30 PM CDT   Anticoagulation Visit with  ANTICOAGULATION CLINIC   [facility] ([facility])    [address] [place] [address] [phone]              Contact Numbers     [facility]  Please call  [phone] to cancel and/or reschedule your appointment   The direct line to the anticoagulant nurse is [phone] on Monday, Wednesday, and Friday. On Thursday, the anticoagulant nurse can be reached directly at [phone].         July 2017 Details    Sun Mon Tue Wed Thu Fri Sat           1                 2               3               4               5               6               7               8                 9               10               11               12      8 mg   See details      13      8 mg         14      10 mg         15      8 mg           16      8 mg         17      10 mg         18      8 mg         19      8 mg         20      8 mg         21      10 mg         22      8 mg           23      8 mg         24      10 mg         25      8 mg         26      8 mg         27      8 mg         28      10 mg         29      8 mg           30      8 mg         31      10 mg               Date Details   07/12 This INR check               How to take your warfarin dose     To take:  8 mg Take 2 of the 4 mg tablets.    To  take:  10 mg Take 2.5 of the 4 mg tablets.           August 2017 Details    Sun Mon Tue Wed Thu Fri Sat       1      8 mg         2      8 mg         3      8 mg         4      10 mg         5      8 mg           6      8 mg         7      10 mg         8      8 mg         9            10               11               12                 13               14               15               16               17               18               19                 20               21               22               23               24               25               26                 27               28               29               30               31                  Date Details   No additional details    Date of next INR:  8/9/2017         How to take your warfarin dose     To take:  8 mg Take 2 of the 4 mg tablets.    To take:  10 mg Take 2.5 of the 4 mg tablets.

## 2017-07-12 NOTE — PROGRESS NOTES
ANTICOAGULATION FOLLOW-UP CLINIC VISIT    Patient Name:  Petey Archibald  Date:  7/12/2017  Contact Type:  Face to Face    SUBJECTIVE:  Bleeding Signs/Symptoms: None  Thromboembolic Signs/Symptoms: None    Medication Changes:  No  Dietary Changes:  No  Bacterial/Viral Infection:  No    Missed Coumadin Doses:  None  Other Concerns:  No      ASSESSMENT/PLAN:  See: ANTICOAGULATION QIC flow sheet.    {[facility]

## 2017-08-09 ENCOUNTER — ANTICOAGULATION THERAPY VISIT (OUTPATIENT)
Dept: NURSING | Facility: CLINIC | Age: 57
End: 2017-08-09
Payer: MEDICARE

## 2017-08-09 DIAGNOSIS — Z79.01 LONG-TERM (CURRENT) USE OF ANTICOAGULANTS: ICD-10-CM

## 2017-08-09 DIAGNOSIS — Z86.711 HISTORY OF PULMONARY EMBOLISM: ICD-10-CM

## 2017-08-09 DIAGNOSIS — Z79.01 LONG TERM CURRENT USE OF ANTICOAGULANT THERAPY: ICD-10-CM

## 2017-08-09 LAB — INR POINT OF CARE: 2.5 (ref 2–3)

## 2017-08-09 PROCEDURE — 99207 ZZC NO CHARGE NURSE ONLY: CPT

## 2017-08-09 PROCEDURE — 85610 PROTHROMBIN TIME: CPT | Mod: QW

## 2017-08-09 PROCEDURE — 36416 COLLJ CAPILLARY BLOOD SPEC: CPT

## 2017-08-09 RX ORDER — WARFARIN SODIUM 4 MG/1
TABLET ORAL
Qty: 64 TABLET | Refills: 1 | Status: SHIPPED | OUTPATIENT
Start: 2017-08-09 | End: 2017-11-01

## 2017-08-09 RX ORDER — WARFARIN SODIUM 4 MG/1
TABLET ORAL
Qty: 64 TABLET | Refills: 1 | Status: SHIPPED | OUTPATIENT
Start: 2017-08-09 | End: 2017-10-04

## 2017-08-09 NOTE — MR AVS SNAPSHOT
Petey Archibald   8/9/2017 3:30 PM   Anticoagulation Therapy Visit    Description:  56 year old male   Provider:  [facility]   Department:  Bx Nurse           INR as of 8/9/2017     Today's INR 2.5      Anticoagulation Summary as of 8/9/2017     INR goal 2.0-3.0   Today's INR 2.5   Full instructions 10 mg on Mon, Fri; 8 mg all other days   Next INR check 9/6/2017    Indications   History of pulmonary embolism [Z86.711]  Long-term (current) use of anticoagulants [Z79.01] [Z79.01]         Your next Anticoagulation Clinic appointment(s)     Sep 06, 2017  3:30 PM CDT   Anticoagulation Visit with  ANTICOAGULATION CLINIC   [facility] ([facility])    [address] [place] [address] [phone]              Contact Numbers     [facility]  Please call  [phone] to cancel and/or reschedule your appointment   The direct line to the anticoagulant nurse is [phone] on Monday, Wednesday, and Friday. On Thursday, the anticoagulant nurse can be reached directly at [phone].         August 2017 Details    Sun Mon Tue Wed Thu Fri Sat       1               2               3               4               5                 6               7               8               9      8 mg   See details      10      8 mg         11      10 mg         12      8 mg           13      8 mg         14      10 mg         15      8 mg         16      8 mg         17      8 mg         18      10 mg         19      8 mg           20      8 mg         21      10 mg         22      8 mg         23      8 mg         24      8 mg         25      10 mg         26      8 mg           27      8 mg         28      10 mg         29      8 mg         30      8 mg         31      8 mg            Date Details   08/09 This INR check               How to take your warfarin dose     To take:  8 mg Take 2 of the 4 mg tablets.    To  take:  10 mg Take 2.5 of the 4 mg tablets.           September 2017 Details    Sun Mon Tue Wed Thu Fri Sat          1      10 mg         2      8 mg           3      8 mg         4      10 mg         5      8 mg         6            7               8               9                 10               11               12               13               14               15               16                 17               18               19               20               21               22               23                 24               25               26               27               28               29               30                Date Details   No additional details    Date of next INR:  9/6/2017         How to take your warfarin dose     To take:  8 mg Take 2 of the 4 mg tablets.    To take:  10 mg Take 2.5 of the 4 mg tablets.

## 2017-08-09 NOTE — PROGRESS NOTES
ANTICOAGULATION FOLLOW-UP CLINIC VISIT    Patient Name:  Petey Archibald  Date:  8/9/2017  Contact Type:  Face to Face    SUBJECTIVE:     Patient Findings     Positives Antibiotic use or infection    Comments Has cold symptoms.            OBJECTIVE    INR Protime   Date Value Ref Range Status   08/09/2017 2.5 2.0 - 3.0 Final       ASSESSMENT / PLAN  INR assessment THER    Recheck INR In: 4 WEEKS    INR Location Clinic      Anticoagulation Summary as of 8/9/2017     INR goal 2.0-3.0   Today's INR 2.5   Maintenance plan 10 mg (4 mg x 2.5) on Mon, Fri; 8 mg (4 mg x 2) all other days   Full instructions 10 mg on Mon, Fri; 8 mg all other days   Weekly total 60 mg   No change documented Prudence Machuca RN   Plan last modified Lisa Ponce RN (3/17/2017)   Next INR check 9/6/2017   Target end date Indefinite    Indications   History of pulmonary embolism [Z86.711]  Long-term (current) use of anticoagulants [Z79.01] [Z79.01]         Anticoagulation Episode Summary     INR check location [facility]    Preferred lab     Send INR reminders to [facility] INR/PROTIME    Comments       Anticoagulation Care Providers     Provider Role Specialty Phone number    Jacky Burr MD [facility] [phone]            See the Encounter Report to view Anticoagulation Flowsheet and Dosing Calendar (Go to Encounters tab in chart review, and find the Anticoagulation Therapy Visit)        Prudence Machuca RN

## 2017-08-09 NOTE — PROGRESS NOTES
"  ANTICOAGULATION FOLLOW-UP CLINIC VISIT    Patient Name:  Petey Archibald  Date:  8/9/2017    SUBJECTIVE:    Indication: {INDICATION FOR ANTICOAGULATION:075126}    Bleeding Signs/Symptoms:  {NONESTARS:528934::\"None\"}  Thromboembolic Signs/Symptoms:  {NONESTARS:113042::\"None\"}    Medication Changes:  {YES/NO WITH DEFAULT:942541:a:\"No\"}  Dietary Changes:  {YES/NO WITH DEFAULT:414566:a:\"No\"}  Activity Changes: {YES/NO WITH DEFAULT:222834::\"No\"}  Bacterial/Viral Infection:  {YES/NO WITH DEFAULT:154292:a:\"No\"}    Missed Coumadin Doses:  {NONE/THIS WEEK/OVER WEEK:313550:a:\"None\"}    Other Concerns:  {YES/NO WITH DEFAULT:395314:a:\"No\"}    OBJECTIVE:    INR Today:  ***  Current Dose:  ***    ASSESSMENT:    {THERAPEUTIC/SUBTHERAPEUTIC/SUPRATHERAPEUTIC:405069:a:\"Therapeutic\"} INR for goal of {NUMBERS 2-3/2.5/3.5/3-4:659070:a:\"2-3\"}    PLAN:    New Dose: {NO CHANGE:715669::\"No Change\"}      Next INR: {TIME FRAME RECHECK:769898::\"1 month\"}    A Coumadin dosing card (or book) was given to the patient today.    Face to face time with the patient: *** minutes.        ***            "

## 2017-08-10 ENCOUNTER — HOSPITAL ENCOUNTER (EMERGENCY)
Facility: CLINIC | Age: 57
Discharge: HOME | End: 2017-08-10
Attending: EMERGENCY MEDICINE | Admitting: EMERGENCY MEDICINE
Payer: MEDICARE

## 2017-08-10 VITALS
DIASTOLIC BLOOD PRESSURE: 55 MMHG | WEIGHT: 300 LBS | SYSTOLIC BLOOD PRESSURE: 106 MMHG | BODY MASS INDEX: 42.95 KG/M2 | OXYGEN SATURATION: 96 % | RESPIRATION RATE: 20 BRPM | HEIGHT: 70 IN | TEMPERATURE: 98.2 F

## 2017-08-10 DIAGNOSIS — T63.444A BEE STING REACTION, UNDETERMINED INTENT, INITIAL ENCOUNTER: ICD-10-CM

## 2017-08-10 PROCEDURE — 99282 EMERGENCY DEPT VISIT SF MDM: CPT

## 2017-08-10 ASSESSMENT — ENCOUNTER SYMPTOMS: TROUBLE SWALLOWING: 0

## 2017-08-10 NOTE — ED AVS SNAPSHOT
Emergency Department    [address] [facility] [address]    Phone:  [phone]    Fax:  [phone]                                       Petey Archibald   MRN: [id number]    Department:   Emergency Department   Date of Visit:  8/10/2017           Patient Information     Date Of Birth          1960        Your diagnoses for this visit were:     Bee sting reaction, undetermined intent, initial encounter        You were seen by Carlos Sandoval MD.      Follow-up Information     Call Jacky Burr MD.    Specialty:  Family Practice    Why:  As needed    Contact information:    [address]  [facility] [address] [phone]          Follow up with  Emergency Department.    Specialty:  EMERGENCY MEDICINE    Why:  If symptoms worsen    Contact information:    [address] [facility] [address] [phone]      Discharge References/Attachments     INSECT STING, LOCAL REACTION (ENGLISH)      Future Appointments        Provider Department Dept Phone Center    9/6/2017 3:30 PM [facility]TICO[facility]ULATION CLINIC [facility] [phone] BLX      24 Hour Appointment Hotline       To make an appointment at any [facility], call [phone] ([phone]). If you don't have a family doctor or clinic, we will help you find one. [facility] are conveniently located to serve the needs of you and your family.             Review of your medicines      Our records show that you are taking the medicines listed below. If these are incorrect, please call your family doctor or clinic.        Dose / Directions Last dose taken    ABILIFY 10 MG tablet   Dose:  5 mg   Quantity:  30 tablet   Generic drug:  ARIPiprazole        Take 0.5 tablets (5 mg) by mouth daily   Refills:  0        acetaminophen 325 MG tablet   Commonly known as:  TYLENOL   Dose:  325-650 mg   Quantity:  100 tablet        Take 1-2 tablets (325-650 mg) by mouth every 6  hours as needed   Refills:  3        albuterol 108 (90 BASE) MCG/ACT Inhaler   Commonly known as:  albuterol   Dose:  2 puff   Quantity:  1 Inhaler        Inhale 2 puffs into the lungs every 6 hours as needed   Refills:  0        AMBIEN 10 MG tablet   Dose:  10 mg   Quantity:  30 tablet   Generic drug:  zolpidem        Take 1 tablet (10 mg) by mouth nightly as needed for sleep   Refills:  1        ANUSOL-HC 25 MG Suppository   Quantity:  14 Suppository   Generic drug:  hydrocortisone        INSERT ONE SUPPOSITORY RECTALLY UP TO TWICE A DAY AS NEEDED   Refills:  0        BACTROBAN 2 % ointment   Generic drug:  mupirocin        Apply topically 2 times daily   Refills:  0        budesonide 180 MCG/ACT inhaler   Commonly known as:  PULMICORT FLEXHALER   Dose:  2 puff   Quantity:  3 Inhaler        Inhale 2 puffs into the lungs 2 times daily   Refills:  1        BUPROPION HCL PO   Dose:  150 mg        Take 150 mg by mouth every morning   Refills:  0        clotrimazole-betamethasone cream   Commonly known as:  LOTRISONE   Quantity:  60 g        Apply topically 2 times daily   Refills:  5        DEXILANT 60 MG Cpdr CR capsule   Quantity:  90 capsule   Generic drug:  dexlansoprazole        TAKE 1 CAPSULE BY MOUTH ONCE DAILY *1 TOTAL FILL*   Refills:  2        EPIPEN 0.3 MG/0.3ML injection   Dose:  0.3 mg   Generic drug:  EPINEPHrine        Inject 0.3 mg into the muscle once as needed.   Refills:  0        Ferrous Gluconate 324 (37.5 FE) MG Tabs   Dose:  1 tablet        Take 1 tablet by mouth 2 times daily.   Refills:  0        furosemide 40 MG tablet   Commonly known as:  LASIX   Dose:  2 tablet        Take 2 tablets by mouth daily   Refills:  0        hydrocortisone 2.5 % cream   Commonly known as:  ANUSOL-HC   Quantity:  28.35 g        Place rectally 2 times daily   Refills:  3        loratadine 10 MG tablet   Commonly known as:  CLARITIN   Dose:  10 mg        Take 10 mg by mouth daily.   Refills:  0        Mometasone  Furoate 200 MCG/ACT Aero   Dose:  2 puff   Quantity:  1 Inhaler        Inhale 2 puffs into the lungs 2 times daily   Refills:  0        mometasone-formoterol 200-5 MCG/ACT oral inhaler   Commonly known as:  DULERA   Dose:  2 puff   Quantity:  13 g        Inhale 2 puffs into the lungs 2 times daily   Refills:  1        Multi-vitamin Tabs tablet   Generic drug:  multivitamin, therapeutic with minerals        1 TABLET DAILY   Refills:  0        * order for DME   Quantity:  1 each        Equipment being ordered: support compression hose BK  2 pair black 30mm HG  To be applied on arising & removed while lying down to go to sleep   Refills:  0        * order for DME   Quantity:  1 Device        Equipment being ordered: CPAP with mask and tubing   Refills:  0        * order for DME   Quantity:  1 Device        Oxygen 2 Li/min at night and bled into BiPAP   Refills:  0        PHOSPHATE ENEMA RE   Dose:  1 enema        Place 1 enema rectally as needed.   Refills:  0        Potassium Chloride ER 20 MEQ Tbcr   Dose:  1 tablet        Take 1 tablet by mouth 2 times daily.   Refills:  0        sertraline 100 MG tablet   Commonly known as:  ZOLOFT   Dose:  1.5 tablet        Take 1.5 tablets by mouth daily.   Refills:  0        simvastatin 40 MG tablet   Commonly known as:  ZOCOR   Dose:  1 tablet        Take 1 tablet by mouth daily.   Refills:  0        SINGULAIR 10 MG tablet   Generic drug:  montelukast        1 TABLET DAILY at 8:00 am   Refills:  0        spironolactone 25 MG tablet   Commonly known as:  ALDACTONE        1 TABLET EVERY MORNING at 8:00 am   Refills:  0        tiotropium 18 MCG capsule   Commonly known as:  SPIRIVA HANDIHALER   Quantity:  30 capsule        Once daily   Refills:  11        triamcinolone 0.1 % cream   Commonly known as:  KENALOG   Quantity:  80 g        Apply topically 2 times daily as needed   Refills:  3        Vitamin D-3 1000 UNITS Caps   Dose:  2 capsule        Take 2 capsules by mouth daily    Refills:  0        * warfarin 4 MG tablet   Commonly known as:  COUMADIN   Quantity:  64 tablet        12 MG (3 TABS) today only,10 mg( 2.5 tabs Mondays and Fridays, 8 mg all other days ( 2 tabs)   Refills:  1        * warfarin 4 MG tablet   Commonly known as:  COUMADIN   Quantity:  64 tablet        Take 10 mg Monday and Friday (2.5 tabs) and 8 mg all other days (2 tabs)   Refills:  1        * warfarin 4 MG tablet   Commonly known as:  COUMADIN   Quantity:  64 tablet        Take 10 mg Monday and Friday (2.5 tabs) and 8 mg all other days (2 tabs)   Refills:  1        ZEASORB-AF EX        Externally apply topically once a week   Refills:  0        * Notice:  This list has 6 medication(s) that are the same as other medications prescribed for you. Read the directions carefully, and ask your doctor or other care provider to review them with you.            Orders Needing Specimen Collection     None      Pending Results     No orders found from 8/8/2017 to 8/11/2017.            Pending Culture Results     No orders found from 8/8/2017 to 8/11/2017.            Pending Results Instructions     If you had any lab results that were not finalized at the time of your Discharge, you can call the ED Lab Result RN at [phone]. You will be contacted by this team for any positive Lab results or changes in treatment. The nurses are available 7 days a week from 10A to 6:30P.  You can leave a message 24 hours per day and they will return your call.        Test Results From Your Hospital Stay               Clinical Quality Measure: Blood Pressure Screening     Your blood pressure was checked while you were in the emergency department today. The last reading we obtained was  BP: 105/59 . Please read the guidelines below about what these numbers mean and what you should do about them.  If your systolic blood pressure (the top number) is less than 120 and your diastolic blood pressure (the bottom number) is less than 80, then your  blood pressure is normal. There is nothing more that you need to do about it.  If your systolic blood pressure (the top number) is 120-139 or your diastolic blood pressure (the bottom number) is 80-89, your blood pressure may be higher than it should be. You should have your blood pressure rechecked within a year by a primary care provider.  If your systolic blood pressure (the top number) is 140 or greater or your diastolic blood pressure (the bottom number) is 90 or greater, you may have high blood pressure. High blood pressure is treatable, but if left untreated over time it can put you at risk for heart attack, stroke, or kidney failure. You should have your blood pressure rechecked by a primary care provider within the next 4 weeks.  If your provider in the emergency department today gave you specific instructions to follow-up with your doctor or provider even sooner than that, you should follow that instruction and not wait for up to 4 weeks for your follow-up visit.        Thank you for choosing [facility]       Thank you for choosing [facility] for your care. Our goal is always to provide you with excellent care. Hearing back from our patients is one way we can continue to improve our services. Please take a few minutes to complete the written survey that you may receive in the mail after you visit with us. Thank you!        [employer] Information     [employer] gives you secure access to your electronic health record. If you see a primary care provider, you can also send messages to your care team and make appointments. If you have questions, please call your primary care clinic.  If you do not have a primary care provider, please call [phone] and they will assist you.        Care EveryWhere ID     This is your Care EveryWhere ID. This could be used by other organizations to access your [facility] medical records  [id number]        Equal Access to Services     GUERRERO OZUNA AH: pranay Anthony  elke obrien waxay idiin hayaan adeeg kharash la'aan ah. So [facility] [phone].    ATENCIÓN: Si habla tapan, tiene a combs disposición servicios gratuitos de asistencia lingüística. Llame al [phone].    We comply with applicable federal civil rights laws and [place] laws. We do not discriminate on the basis of race, color, national origin, age, disability sex, sexual orientation or gender identity.            After Visit Summary       This is your record. Keep this with you and show to your community pharmacist(s) and doctor(s) at your next visit.

## 2017-08-10 NOTE — ED AVS SNAPSHOT
Emergency Department    [address] [place] [address]    Phone:  [phone]    Fax:  [phone]                                       Petey Archibald   MRN: [id number]    Department:   Emergency Department   Date of Visit:  8/10/2017           After Visit Summary Signature Page     I have received my discharge instructions, and my questions have been answered. I have discussed any challenges I see with this plan with the nurse or doctor.    ..........................................................................................................................................  Patient/Patient Representative Signature      ..........................................................................................................................................  Patient Representative Print Name and Relationship to Patient    ..................................................               ................................................  Date                                            Time    ..........................................................................................................................................  Reviewed by Signature/Title    ...................................................              ..............................................  Date                                                            Time

## 2017-08-10 NOTE — LETTER
To Whom it may concern:      Petey Archibald was seen in our Emergency Department today, 08/10/17.     Sincerely,  Carlos Sandoval MD

## 2017-08-10 NOTE — ED NOTES
Bed: ED19  Expected date:   Expected time:   Means of arrival:   Comments:  John 431  56/M  Allergic reaction

## 2017-08-10 NOTE — ED PROVIDER NOTES
"  History     Chief Complaint:  Allergic reaction    HPI   Petey Archibald is a 56 year old male with a history of allergy to bees, who presents via EMS with an allergic reaction. The patient states that he was outdoors smoking this morning at 0630 when he was stung on the left lower quadrant of his stomach by a bee. He gave himself a shot of epinephrine in his right leg and called EMS. En route he was administered 50 mg of IV benadryl. He did have some hives on his left arm for EMS. The patient notes a history of a bee allergy that he was diagnosed with by his primary care provider, however he denies having ever had a serious reaction with difficulty swallowing or breathing. He denies currently having any difficulty with swallowing or breathing.    Allergies:  Augmentin  Bees  Penicillins    Medications:    Coumadin  Dexilant  Budesonide  Mometasone furoate  Abilify  Ambien  Kenalog  Albuterol  Dulera  Tiotropium  Lotrisone  Lasix  Zoloft  Claritin  Epinephrine  Simvastatin  Spriranolactone    Past Medical History:    Borderline mental retardation  COPD  DVT  PE  Hyperlipidemia  Morbid obesity  Peripheral edema  Peripheral vascular disease  Tobacco dependance  Venous statis dermatitis    Past Surgical History:    Excise malignant lesions  Rectal surgery    Family History:    Diabetes    Social History:  The patient was accompanied to the ED by EMS.  Smoking Status: Yes  Smokeless Tobacco: No  Alcohol Use: No   Marital Status:  Single [1]    Review of Systems   HENT: Negative for trouble swallowing.    Skin: Positive for rash.   All other systems reviewed and are negative.    Physical Exam   Vitals:  Patient Vitals for the past 24 hrs:   BP Temp Temp src Heart Rate Resp SpO2 Height Weight   08/10/17 0800 105/59 - - 75 - 93 % - -   08/10/17 0730 102/53 - - 81 - 97 % - -   08/10/17 0715 102/53 - - - - 96 % - -   08/10/17 0704 104/54 98.2  F (36.8  C) Oral 71 16 97 % 1.778 m (5' 10\") 136.1 kg (300 lb)      Patient " "Vitals for the past 24 hrs:   BP Temp Temp src Heart Rate Resp SpO2 Height Weight   08/10/17 0800 105/59 - - 75 - 93 % - -   08/10/17 0730 102/53 - - 81 - 97 % - -   08/10/17 0715 102/53 - - - - 96 % - -   08/10/17 0704 104/54 98.2  F (36.8  C) Oral 71 16 97 % 1.778 m (5' 10\") 136.1 kg (300 lb)         Physical Exam  Nursing note and vitals reviewed.  Constitutional:  Oriented to person, place, and time. Cooperative.   HENT:   Nose:    Nose normal.   Mouth/Throat:   Mucous membranes are normal. No intraoral edema.  Eyes:    Conjunctivae normal and EOM are normal.      Pupils are equal, round, and reactive to light.   Neck:    Trachea normal.   Cardiovascular:  Normal rate, regular rhythm, normal heart sounds and normal pulses. No murmur heard.  Pulmonary/Chest:  Scattered expiratory wheezing.  Abdominal:   Soft. Normal appearance and bowel sounds are normal.      There is no tenderness.      There is no rebound and no CVA tenderness.   Musculoskeletal:  Extremities atraumatic x 4.   Lymphadenopathy:  No cervical adenopathy.   Neurological:   Alert and oriented to person, place, and time. Normal strength.      No cranial nerve deficit or sensory deficit. GCS eye subscore is 4. GCS verbal subscore is 5. GCS motor subscore is 6.   Skin:    Skin is intact.  1 Urticarial lesion to the left abdominal wall about 5 cm in diameter.  Psychiatric:   Normal mood and affect.     Emergency Department Course     Emergency Department Course:  Nursing notes and vitals reviewed.  I performed an exam of the patient as documented above.     0810 I rechecked with the patient    I discussed the treatment plan with the patient. They expressed understanding of this plan and consented to discharge. They will be discharged home with instructions for care and follow up. In addition, the patient will return to the emergency department if their symptoms persist, worsen, if new symptoms arise or if there is any concern.  All questions were " answered.     I personally answered all related questions prior to discharge.    Impression & Plan      Medical Decision Making:  Petey Archibald is a 56 year old male who came in by ambulance for further evaluation of a bee sting reaction. He gave himself an epi shot injection despite the fact that he only had hives present. EMS also provided him with IV benadryl. Upon arrival here he has one urticarial lesion to his abdominal wall but no other symptoms or worrisome findings on exam. We watched him here for about an hour and a half to make sure that he did not have any worsening symptoms. He seemed fine here and safe for discharge. I recommended using cold compresses and benadryl as needed. Certainly if he does develop any systemic reaction, he should seek immediately reevaluation. I also instructed him not to use the Epipen for a limited local reaction in the future.    Diagnosis:    ICD-10-CM    1. Bee sting reaction, undetermined intent, initial encounter T63.444A       Disposition:   Discharged    Scribe Disclosure:  I, Ke De Anda, am serving as a scribe at 7:11 AM on 8/10/2017 to document services personally performed by Carlos Sandoval MD, based on my observations and the provider's statements to me.    EMERGENCY DEPARTMENT       Carlos Sandoval MD  08/10/17 9120

## 2017-08-10 NOTE — ED NOTES
Pt resting comfortably watching TV with caregiver at bedside. C/o itching at sites of hive on left wrist and sting on abdomen. No breathing or swallowing difficulty.

## 2017-08-31 ENCOUNTER — MEDICAL CORRESPONDENCE (OUTPATIENT)
Dept: HEALTH INFORMATION MANAGEMENT | Facility: CLINIC | Age: 57
End: 2017-08-31

## 2017-08-31 ENCOUNTER — TELEPHONE (OUTPATIENT)
Dept: FAMILY MEDICINE | Facility: CLINIC | Age: 57
End: 2017-08-31

## 2017-08-31 NOTE — TELEPHONE ENCOUNTER
Patient's group home assistant came in with another patient but brought forms in for Petey regarding a refill for Petey's Epi-pen. Forms given to Dr. Ojeda. Dr. ojeda signed the forms and I made copies for patients chart.

## 2017-09-06 ENCOUNTER — ANTICOAGULATION THERAPY VISIT (OUTPATIENT)
Dept: NURSING | Facility: CLINIC | Age: 57
End: 2017-09-06
Payer: MEDICARE

## 2017-09-06 DIAGNOSIS — Z86.711 HISTORY OF PULMONARY EMBOLISM: ICD-10-CM

## 2017-09-06 DIAGNOSIS — Z79.01 LONG-TERM (CURRENT) USE OF ANTICOAGULANTS: ICD-10-CM

## 2017-09-06 DIAGNOSIS — Z79.01 LONG TERM CURRENT USE OF ANTICOAGULANT THERAPY: ICD-10-CM

## 2017-09-06 LAB — INR POINT OF CARE: 2.6 (ref 2–3)

## 2017-09-06 PROCEDURE — 99207 ZZC NO CHARGE NURSE ONLY: CPT

## 2017-09-06 PROCEDURE — 36416 COLLJ CAPILLARY BLOOD SPEC: CPT

## 2017-09-06 PROCEDURE — 85610 PROTHROMBIN TIME: CPT | Mod: QW

## 2017-09-06 RX ORDER — WARFARIN SODIUM 4 MG/1
TABLET ORAL
Qty: 64 TABLET | Refills: 1 | Status: SHIPPED | OUTPATIENT
Start: 2017-09-06 | End: 2017-12-13

## 2017-09-06 NOTE — MR AVS SNAPSHOT
Petey Archibald   9/6/2017 3:30 PM   Anticoagulation Therapy Visit    Description:  56 year old male   Provider:   ANTICOAGULATION CLINIC   Department:  Bx Nurse           INR as of 9/6/2017     Today's INR 2.6      Anticoagulation Summary as of 9/6/2017     INR goal 2.0-3.0   Today's INR 2.6   Full instructions 10 mg on Mon, Fri; 8 mg all other days   Next INR check 10/4/2017    Indications   History of pulmonary embolism [Z86.711]  Long-term (current) use of anticoagulants [Z79.01] [Z79.01]         Your next Anticoagulation Clinic appointment(s)     Oct 04, 2017  3:15 PM CDT   Anticoagulation Visit with  ANTICOAGULATION CLINIC   [facility] ([facility])    [address] [place] [address] [phone]              Contact Numbers     [facility]  Please call  [phone] to cancel and/or reschedule your appointment   The direct line to the anticoagulant nurse is [phone] on Monday, Wednesday, and Friday. On Thursday, the anticoagulant nurse can be reached directly at [phone].         September 2017 Details    Sun Mon Tue Wed Thu Fri Sat          1               2                 3               4               5               6      8 mg   See details      7      8 mg         8      10 mg         9      8 mg           10      8 mg         11      10 mg         12      8 mg         13      8 mg         14      8 mg         15      10 mg         16      8 mg           17      8 mg         18      10 mg         19      8 mg         20      8 mg         21      8 mg         22      10 mg         23      8 mg           24      8 mg         25      10 mg         26      8 mg         27      8 mg         28      8 mg         29      10 mg         30      8 mg          Date Details   09/06 This INR check               How to take your warfarin dose     To take:  8 mg Take 2 of the 4 mg tablets.    To  take:  10 mg Take 2.5 of the 4 mg tablets.           October 2017 Details    Sun Mon Tue Wed Thu Fri Sat     1      8 mg         2      10 mg         3      8 mg         4            5               6               7                 8               9               10               11               12               13               14                 15               16               17               18               19               20               21                 22               23               24               25               26               27               28                 29               30               31                    Date Details   No additional details    Date of next INR:  10/4/2017         How to take your warfarin dose     To take:  8 mg Take 2 of the 4 mg tablets.    To take:  10 mg Take 2.5 of the 4 mg tablets.

## 2017-09-06 NOTE — PROGRESS NOTES
ANTICOAGULATION FOLLOW-UP CLINIC VISIT    Patient Name:  Petey Archibald  Date:  9/6/2017  Contact Type:  Face to Face    SUBJECTIVE:     Patient Findings     Positives No Problem Findings           OBJECTIVE    INR Protime   Date Value Ref Range Status   09/06/2017 2.6 2.0 - 3.0 Final       ASSESSMENT / PLAN  INR assessment THER    Recheck INR In: 4 WEEKS    INR Location Clinic      Anticoagulation Summary as of 9/6/2017     INR goal 2.0-3.0   Today's INR 2.6   Maintenance plan 10 mg (4 mg x 2.5) on Mon, Fri; 8 mg (4 mg x 2) all other days   Full instructions 10 mg on Mon, Fri; 8 mg all other days   Weekly total 60 mg   No change documented Prudence Machuca RN   Plan last modified Lisa Ponce RN (3/17/2017)   Next INR check 10/4/2017   Target end date Indefinite    Indications   History of pulmonary embolism [Z86.711]  Long-term (current) use of anticoagulants [Z79.01] [Z79.01]         Anticoagulation Episode Summary     INR check location [facility]    Preferred lab     Send INR reminders to [facility] INR/PROTIME    Comments       Anticoagulation Care Providers     Provider Role Specialty Phone number    Jacky Burr MD [facility] [phone]            See the Encounter Report to view Anticoagulation Flowsheet and Dosing Calendar (Go to Encounters tab in chart review, and find the Anticoagulation Therapy Visit)        Prudence Machuca, RN

## 2017-09-22 ENCOUNTER — DOCUMENTATION ONLY (OUTPATIENT)
Dept: SLEEP MEDICINE | Facility: CLINIC | Age: 57
End: 2017-09-22

## 2017-09-22 NOTE — PROGRESS NOTES
6 Month [facility] visit    Data only recheck    Objective measures: 14 day rolling measures     Device settings:    BiPAP 16/8 cm    Objective measures: 14 day rolling measures      Compliance   (Goal >70%)  % compliance greater than four hours rolling average 14 days: 100 %      Leak   (Goal < 10%)  No Data Recorded/100      AHI  (Goal < 5)  AHI Rolling Average 14 Day: 1.54      Usage  (Goal >240)  Time mask on face 14 day average: 491 min    Assessment:Pt meeting objective benchmarks.   Action plan:  pt to follow up per provider request (1-2 yrs)

## 2017-10-04 ENCOUNTER — ANTICOAGULATION THERAPY VISIT (OUTPATIENT)
Dept: NURSING | Facility: CLINIC | Age: 57
End: 2017-10-04
Payer: MEDICARE

## 2017-10-04 DIAGNOSIS — Z86.711 HISTORY OF PULMONARY EMBOLISM: ICD-10-CM

## 2017-10-04 DIAGNOSIS — Z79.01 LONG TERM CURRENT USE OF ANTICOAGULANT THERAPY: ICD-10-CM

## 2017-10-04 DIAGNOSIS — Z79.01 LONG-TERM (CURRENT) USE OF ANTICOAGULANTS: ICD-10-CM

## 2017-10-04 LAB — INR POINT OF CARE: 2.1 (ref 2–3)

## 2017-10-04 PROCEDURE — 99207 ZZC NO CHARGE NURSE ONLY: CPT

## 2017-10-04 PROCEDURE — 36416 COLLJ CAPILLARY BLOOD SPEC: CPT

## 2017-10-04 PROCEDURE — 85610 PROTHROMBIN TIME: CPT | Mod: QW

## 2017-10-04 RX ORDER — WARFARIN SODIUM 4 MG/1
TABLET ORAL
Qty: 64 TABLET | Refills: 1 | Status: SHIPPED | OUTPATIENT
Start: 2017-10-04 | End: 2017-11-29

## 2017-10-04 NOTE — MR AVS SNAPSHOT
Petey Archibald   10/4/2017 3:15 PM   Anticoagulation Therapy Visit    Description:  56 year old male   Provider:  [facility]   Department:  Bx Nurse           INR as of 10/4/2017     Today's INR 2.1      Anticoagulation Summary as of 10/4/2017     INR goal 2.0-3.0   Today's INR 2.1   Full instructions 10 mg on Mon, Fri; 8 mg all other days   Next INR check 11/1/2017    Indications   History of pulmonary embolism [Z86.711]  Long-term (current) use of anticoagulants [Z79.01] [Z79.01]         Your next Anticoagulation Clinic appointment(s)     Nov 01, 2017  3:15 PM CDT   Anticoagulation Visit with  [facility]ULATION CLINIC   [facility]    [address] [place] [id number] [phone]              Contact Numbers     [facility]  Please call  [phone] to cancel and/or reschedule your appointment   The direct line to the anticoagulant nurse is [phone] on Monday, Wednesday, and Friday. On Thursday, the anticoagulant nurse can be reached directly at [phone].         October 2017 Details    Sun Mon Tue Wed Thu Fri Sat     1               2               3               4      8 mg   See details      5      8 mg         6      10 mg         7      8 mg           8      8 mg         9      10 mg         10      8 mg         11      8 mg         12      8 mg         13      10 mg         14      8 mg           15      8 mg         16      10 mg         17      8 mg         18      8 mg         19      8 mg         20      10 mg         21      8 mg           22      8 mg         23      10 mg         24      8 mg         25      8 mg         26      8 mg         27      10 mg         28      8 mg           29      8 mg         30      10 mg         31      8 mg              Date Details   10/04 This INR check               How to take your warfarin dose     To take:  8 mg Take 2  of the 4 mg tablets.    To take:  10 mg Take 2.5 of the 4 mg tablets.           November 2017 Details    Sun Mon Tue Wed Thu Fri Sat        1            2               3               4                 5               6               7               8               9               10               11                 12               13               14               15               16               17               18                 19               20               21               22               23               24               25                 26               27               28               29               30                  Date Details   No additional details    Date of next INR:  11/1/2017         How to take your warfarin dose     To take:  8 mg Take 2 of the 4 mg tablets.

## 2017-10-04 NOTE — PROGRESS NOTES
ANTICOAGULATION FOLLOW-UP CLINIC VISIT    Patient Name:  Petey Archibald  Date:  10/4/2017  Contact Type:  Face to Face    SUBJECTIVE:     Patient Findings     Positives No Problem Findings           OBJECTIVE    INR Protime   Date Value Ref Range Status   10/04/2017 2.1 2.0 - 3.0 Final       ASSESSMENT / PLAN  INR assessment THER    Recheck INR In: 4 WEEKS    INR Location Clinic      Anticoagulation Summary as of 10/4/2017     INR goal 2.0-3.0   Today's INR 2.1   Maintenance plan 10 mg (4 mg x 2.5) on Mon, Fri; 8 mg (4 mg x 2) all other days   Full instructions 10 mg on Mon, Fri; 8 mg all other days   Weekly total 60 mg   No change documented Prudence Machuca RN   Plan last modified Lisa Ponce RN (3/17/2017)   Next INR check 11/1/2017   Target end date Indefinite    Indications   History of pulmonary embolism [Z86.711]  Long-term (current) use of anticoagulants [Z79.01] [Z79.01]         Anticoagulation Episode Summary     INR check location [facility]    Preferred lab     Send INR reminders to [facility] INR/PROTIME    Comments       Anticoagulation Care Providers     Provider Role Specialty Phone number    Silvino Baker MD [facility] [phone]            See the Encounter Report to view Anticoagulation Flowsheet and Dosing Calendar (Go to Encounters tab in chart review, and find the Anticoagulation Therapy Visit)        Prudence Machuca RN

## 2017-11-01 ENCOUNTER — ANTICOAGULATION THERAPY VISIT (OUTPATIENT)
Dept: NURSING | Facility: CLINIC | Age: 57
End: 2017-11-01
Payer: MEDICARE

## 2017-11-01 DIAGNOSIS — Z79.01 LONG TERM CURRENT USE OF ANTICOAGULANT THERAPY: ICD-10-CM

## 2017-11-01 DIAGNOSIS — Z86.711 HISTORY OF PULMONARY EMBOLISM: ICD-10-CM

## 2017-11-01 DIAGNOSIS — Z79.01 LONG-TERM (CURRENT) USE OF ANTICOAGULANTS: ICD-10-CM

## 2017-11-01 LAB — INR POINT OF CARE: 2.6 (ref 2–3)

## 2017-11-01 PROCEDURE — 36416 COLLJ CAPILLARY BLOOD SPEC: CPT

## 2017-11-01 PROCEDURE — 85610 PROTHROMBIN TIME: CPT | Mod: QW

## 2017-11-01 PROCEDURE — 99207 ZZC NO CHARGE NURSE ONLY: CPT

## 2017-11-01 RX ORDER — WARFARIN SODIUM 4 MG/1
TABLET ORAL
Qty: 64 TABLET | Refills: 1 | Status: SHIPPED | OUTPATIENT
Start: 2017-11-01 | End: 2017-11-01

## 2017-11-01 RX ORDER — WARFARIN SODIUM 4 MG/1
TABLET ORAL
Qty: 64 TABLET | Refills: 1 | Status: SHIPPED | OUTPATIENT
Start: 2017-11-01 | End: 2018-01-31

## 2017-11-01 NOTE — MR AVS SNAPSHOT
Petey Archibald   11/1/2017 3:15 PM   Anticoagulation Therapy Visit    Description:  56 year old male   Provider:   ANTICOAGULATION CLINIC   Department:  Bx Nurse           INR as of 11/1/2017     Today's INR 2.6      Anticoagulation Summary as of 11/1/2017     INR goal 2.0-3.0   Today's INR 2.6   Full instructions 10 mg on Mon, Fri; 8 mg all other days   Next INR check 11/29/2017    Indications   History of pulmonary embolism [Z86.711]  Long-term (current) use of anticoagulants [Z79.01] [Z79.01]         Your next Anticoagulation Clinic appointment(s)     Nov 29, 2017  3:15 PM CST   Anticoagulation Visit with  [facility]ATION CLINIC   [facility]    [address] [place] [address] [phone]              Contact Numbers     [facility]  Please call  [phone] to cancel and/or reschedule your appointment   The direct line to the anticoagulant nurse is [phone] on Monday, Wednesday, and Friday. On Thursday, the anticoagulant nurse can be reached directly at [phone].         November 2017 Details    Sun Mon Tue Wed Thu Fri Sat        1      8 mg   See details      2      8 mg         3      10 mg         4      8 mg           5      8 mg         6      10 mg         7      8 mg         8      8 mg         9      8 mg         10      10 mg         11      8 mg           12      8 mg         13      10 mg         14      8 mg         15      8 mg         16      8 mg         17      10 mg         18      8 mg           19      8 mg         20      10 mg         21      8 mg         22      8 mg         23      8 mg         24      10 mg         25      8 mg           26      8 mg         27      10 mg         28      8 mg         29            30                  Date Details   11/01 This INR check       Date of next INR:  11/29/2017         How to take your warfarin dose     To take:   8 mg Take 2 of the 4 mg tablets.    To take:  10 mg Take 2.5 of the 4 mg tablets.

## 2017-11-01 NOTE — PROGRESS NOTES
ANTICOAGULATION FOLLOW-UP CLINIC VISIT    Patient Name:  Petey Archibald  Date:  11/1/2017  Contact Type:  Face to Face    SUBJECTIVE:     Patient Findings     Positives No Problem Findings           OBJECTIVE    INR Protime   Date Value Ref Range Status   11/01/2017 2.6 2.0 - 3.0 Final       ASSESSMENT / PLAN  INR assessment THER    Recheck INR In: 4 WEEKS    INR Location Clinic      Anticoagulation Summary as of 11/1/2017     INR goal 2.0-3.0   Today's INR 2.6   Maintenance plan 10 mg (4 mg x 2.5) on Mon, Fri; 8 mg (4 mg x 2) all other days   Full instructions 10 mg on Mon, Fri; 8 mg all other days   Weekly total 60 mg   No change documented Prudence Machuca RN   Plan last modified Lisa Ponce RN (3/17/2017)   Next INR check 11/29/2017   Target end date Indefinite    Indications   History of pulmonary embolism [Z86.711]  Long-term (current) use of anticoagulants [Z79.01] [Z79.01]         Anticoagulation Episode Summary     INR check location [facility]    Preferred lab     Send INR reminders to [facility] INR/PROTIME    Comments       Anticoagulation Care Providers     Provider Role Specialty Phone number    Silvino Baker MD [facility] [phone]            See the Encounter Report to view Anticoagulation Flowsheet and Dosing Calendar (Go to Encounters tab in chart review, and find the Anticoagulation Therapy Visit)        Prudence Machuca RN

## 2017-11-29 ENCOUNTER — ANTICOAGULATION THERAPY VISIT (OUTPATIENT)
Dept: NURSING | Facility: CLINIC | Age: 57
End: 2017-11-29
Payer: MEDICARE

## 2017-11-29 DIAGNOSIS — Z79.01 LONG-TERM (CURRENT) USE OF ANTICOAGULANTS: ICD-10-CM

## 2017-11-29 DIAGNOSIS — Z79.01 LONG TERM CURRENT USE OF ANTICOAGULANT THERAPY: ICD-10-CM

## 2017-11-29 DIAGNOSIS — Z86.711 HISTORY OF PULMONARY EMBOLISM: ICD-10-CM

## 2017-11-29 LAB — INR POINT OF CARE: 3.7 (ref 2–3)

## 2017-11-29 PROCEDURE — 99207 ZZC NO CHARGE NURSE ONLY: CPT

## 2017-11-29 PROCEDURE — 36416 COLLJ CAPILLARY BLOOD SPEC: CPT

## 2017-11-29 PROCEDURE — 85610 PROTHROMBIN TIME: CPT | Mod: QW

## 2017-11-29 RX ORDER — WARFARIN SODIUM 4 MG/1
TABLET ORAL
Qty: 64 TABLET | Refills: 1 | Status: SHIPPED | OUTPATIENT
Start: 2017-11-29 | End: 2018-09-05

## 2017-11-29 NOTE — MR AVS SNAPSHOT
Petey Archibald   11/29/2017 3:15 PM   Anticoagulation Therapy Visit    Description:  56 year old male   Provider:  [facility]   Department:  Bx Nurse           INR as of 11/29/2017     Today's INR 3.7!      Anticoagulation Summary as of 11/29/2017     INR goal 2.0-3.0   Today's INR 3.7!   Full instructions 11/29: Hold; Otherwise 10 mg on Mon, Fri; 8 mg all other days   Next INR check 12/13/2017    Indications   History of pulmonary embolism [Z86.711]  Long-term (current) use of anticoagulants [Z79.01] [Z79.01]         Your next Anticoagulation Clinic appointment(s)     Dec 13, 2017  3:15 PM CST   Anticoagulation Visit with  ANTICOAGULATION CLINIC   [facility]    [address] [place] [id number] [phone]              Contact Numbers     [facility]  Please call  [phone] to cancel and/or reschedule your appointment   The direct line to the anticoagulant nurse is [phone] on Monday, Wednesday, and Friday. On Thursday, the anticoagulant nurse can be reached directly at [phone].         November 2017 Details    Sun Mon Tue Wed Thu Fri Sat        1               2               3               4                 5               6               7               8               9               10               11                 12               13               14               15               16               17               18                 19               20               21               22               23               24               25                 26               27               28               29      Hold   See details      30      8 mg            Date Details   11/29 This INR check               How to take your warfarin dose     To take:  8 mg Take 2 of the 4 mg tablets.    Hold Do not take your warfarin dose. See the Details table to the right  for additional instructions.                December 2017 Details    Sun Mon Tue Wed Thu Fri Sat          1      10 mg         2      8 mg           3      8 mg         4      10 mg         5      8 mg         6      8 mg         7      8 mg         8      10 mg         9      8 mg           10      8 mg         11      10 mg         12      8 mg         13            14               15               16                 17               18               19               20               21               22               23                 24               25               26               27               28               29               30                 31                      Date Details   No additional details    Date of next INR:  12/13/2017         How to take your warfarin dose     To take:  8 mg Take 2 of the 4 mg tablets.    To take:  10 mg Take 2.5 of the 4 mg tablets.

## 2017-11-29 NOTE — PROGRESS NOTES
ANTICOAGULATION FOLLOW-UP CLINIC VISIT    Patient Name:  Petey Archibald  Date:  11/29/2017  Contact Type:  Face to Face    SUBJECTIVE:     Patient Findings     Positives Inflammation    Comments Pain in feet (callus, lump)           OBJECTIVE    INR Protime   Date Value Ref Range Status   11/29/2017 3.7 (A) 2.0 - 3.0 Final       ASSESSMENT / PLAN  INR assessment SUPRA    Recheck INR In: 2 WEEKS    INR Location Clinic      Anticoagulation Summary as of 11/29/2017     INR goal 2.0-3.0   Today's INR 3.7!   Maintenance plan 10 mg (4 mg x 2.5) on Mon, Fri; 8 mg (4 mg x 2) all other days   Full instructions 11/29: Hold; Otherwise 10 mg on Mon, Fri; 8 mg all other days   Weekly total 60 mg   Plan last modified Lisa Ponce RN (3/17/2017)   Next INR check 12/13/2017   Target end date Indefinite    Indications   History of pulmonary embolism [Z86.711]  Long-term (current) use of anticoagulants [Z79.01] [Z79.01]         Anticoagulation Episode Summary     INR check location [facility]    Preferred lab     Send INR reminders to [facility] INR/PROTIME    Comments       Anticoagulation Care Providers     Provider Role Specialty Phone number    Silvino Baker MD [facility] [phone]            See the Encounter Report to view Anticoagulation Flowsheet and Dosing Calendar (Go to Encounters tab in chart review, and find the Anticoagulation Therapy Visit)        Prudence Machuca RN

## 2017-11-30 ENCOUNTER — OFFICE VISIT (OUTPATIENT)
Dept: FAMILY MEDICINE | Facility: CLINIC | Age: 57
End: 2017-11-30
Payer: MEDICARE

## 2017-11-30 VITALS
DIASTOLIC BLOOD PRESSURE: 68 MMHG | RESPIRATION RATE: 20 BRPM | TEMPERATURE: 98.1 F | HEART RATE: 67 BPM | OXYGEN SATURATION: 94 % | SYSTOLIC BLOOD PRESSURE: 100 MMHG | BODY MASS INDEX: 43.19 KG/M2 | WEIGHT: 301 LBS

## 2017-11-30 DIAGNOSIS — L84 CALLUS OF FOOT: Primary | ICD-10-CM

## 2017-11-30 PROCEDURE — 99213 OFFICE O/P EST LOW 20 MIN: CPT | Performed by: FAMILY MEDICINE

## 2017-11-30 NOTE — PROGRESS NOTES
SUBJECTIVE:   Petey Archibald is a 56 year old male who presents to clinic today for the following health issues:      Patient is accompanied by , Bertrand.    Callus and Bunion       Duration: x 2-4 weeks    Description (location/character/radiation): Both feet. Podiatrist came to home and shaved skin off but has since grown back.    Intensity:  moderate    Accompanying signs and symptoms:     History (similar episodes/previous evaluation): YES    Precipitating or alleviating factors: Walking    Therapies tried and outcome: Soaking   Callus on sides of both feet at base of 5th toe          Problem list and histories reviewed & adjusted, as indicated.  Additional history: as documented    Labs reviewed in EPIC    Reviewed and updated as needed this visit by clinical staffTobacco  Allergies  Meds  Problems  Med Hx  Surg Hx  Fam Hx  Soc Hx        Reviewed and updated as needed this visit by Provider  Allergies  Meds  Problems         ROS:  CONSTITUTIONAL:NEGATIVE for fever, chills, change in weight  INTEGUMENTARY/SKIN: lumps or bumps callus on both feet    OBJECTIVE:                                                    /68 (BP Location: Left arm, Patient Position: Sitting, Cuff Size: Adult Large)  Pulse 67  Temp 98.1  F (36.7  C)  Resp 20  Wt (!) 301 lb (136.5 kg)  SpO2 94%  BMI 43.19 kg/m2  Body mass index is 43.19 kg/(m^2).  GENERAL APPEARANCE: healthy, alert and no distress  SKIN: no suspicious lesions or rashes and callus on both feet, 5th metatarsal head.  Callus shaved down with scalpel.  No bleeding    Diagnostic test results:  none        ASSESSMENT/PLAN:                                                      ICD-10-CM    1. Callus of foot L84        Follow up with Provider - as needed   Patient Instructions   Wear good fitting shoes.    Use curated.by board or pumice stone to file down callus as it starts to form      Raúl Riddle MD  [facility]  [facility]ER[facility]ES

## 2017-11-30 NOTE — PATIENT INSTRUCTIONS
Wear good fitting shoes.    Use norris board or pumice stone to file down callus as it starts to form

## 2017-11-30 NOTE — MR AVS SNAPSHOT
After Visit Summary   11/30/2017    Petey Archibald    MRN: [id number]           Patient Information     Date Of Birth          1960        Visit Information        Provider Department      11/30/2017 3:30 PM Raúl Riddle MD [facility]        Today's Diagnoses     Callus of foot    -  1      Care Instructions    Wear good fitting shoes.    Use norris board or pumice stone to file down callus as it starts to form          Follow-ups after your visit        Follow-up notes from your care team     Return if symptoms worsen or fail to improve.      Your next 10 appointments already scheduled     Dec 13, 2017  3:15 PM CST   Anticoagulation Visit with [facility]   [facility]    [address] [place] [id number] [phone]              Who to contact     If you have questions or need follow up information about today's clinic visit or your schedule please contact [facility] directly at [phone].  Normal or non-critical lab and imaging results will be communicated to you by MyChart, letter or phone within 4 business days after the clinic has received the results. If you do not hear from us within 7 days, please contact the clinic through MyChart or phone. If you have a critical or abnormal lab result, we will notify you by phone as soon as possible.  Submit refill requests through [employer] or call your pharmacy and they will forward the refill request to us. Please allow 3 business days for your refill to be completed.          Additional Information About Your Visit        MyChart Information     [employer] gives you secure access to your electronic health record. If you see a primary care provider, you can also send messages to your care team and make appointments. If you have questions, please call your primary care  clinic.  If you do not have a primary care provider, please call [phone] and they will assist you.        Care EveryWhere ID     This is your Care EveryWhere ID. This could be used by other organizations to access your [facility] medical records  [id number]        Your Vitals Were     Pulse Temperature Respirations Pulse Oximetry BMI (Body Mass Index)       67 98.1  F (36.7  C) 20 94% 43.19 kg/m2        Blood Pressure from Last 3 Encounters:   11/30/17 100/68   08/10/17 106/55   05/26/17 116/78    Weight from Last 3 Encounters:   11/30/17 (!) 301 lb (136.5 kg)   08/10/17 300 lb (136.1 kg)   05/24/17 295 lb (133.8 kg)              Today, you had the following     No orders found for display       Primary Care Provider Office Phone # Fax #    Jacky Burr [phone]       [address] [facility] [address]        Equal Access to Services     [facility] Center[facility] : Hadii aad ku hadasho Soomaali, waaxda luqadaha, qaybta kaalmada adeegyada, waxay idiin hayeileenn yue khamina villasenor . So [facility] [phone].    ATENCIÓN: Si habla español, tiene a combs disposición servicios gratuitos de asistencia lingüística. Llame al [phone].    We comply with applicable federal civil rights laws and [place] laws. We do not discriminate on the basis of race, color, national origin, age, disability, sex, sexual orientation, or gender identity.            Thank you!     [facility]. Our goal is always to provide you with excellent care. Hearing back from our patients is one way we can continue to improve our services. Please take a few minutes to complete the written survey that you may receive in the mail after your visit with us. Thank you!             Your Updated Medication List - Protect others around you: Learn how to safely use, store and throw away your medicines at www.disposemymeds.org.          This list is accurate as of: 11/30/17   5:14 PM.  Always use your most recent med list.                   Brand Name Dispense Instructions for use Diagnosis    ABILIFY 10 MG tablet   Generic drug:  ARIPiprazole     30 tablet    Take 0.5 tablets (5 mg) by mouth daily        acetaminophen 325 MG tablet    TYLENOL    100 tablet    Take 1-2 tablets (325-650 mg) by mouth every 6 hours as needed    Knee pain, right       albuterol 108 (90 BASE) MCG/ACT Inhaler    PROAIR HFA    1 Inhaler    Inhale 2 puffs into the lungs every 6 hours as needed    Pulmonary emphysema, unspecified emphysema type (H)       AMBIEN 10 MG tablet   Generic drug:  zolpidem     30 tablet    Take 1 tablet (10 mg) by mouth nightly as needed for sleep        ANUSOL-HC 25 MG Suppository   Generic drug:  hydrocortisone     14 Suppository    INSERT ONE SUPPOSITORY RECTALLY UP TO TWICE A DAY AS NEEDED    External hemorrhoids       BACTROBAN 2 % ointment   Generic drug:  mupirocin      Apply topically 2 times daily        budesonide 180 MCG/ACT inhaler    PULMICORT FLEXHALER    3 Inhaler    Inhale 2 puffs into the lungs 2 times daily    Mixed simple and mucopurulent chronic bronchitis (H)       BUPROPION HCL PO      Take 150 mg by mouth every morning        clotrimazole-betamethasone cream    LOTRISONE    60 g    Apply topically 2 times daily    Dermatitis       DEXILANT 60 MG Cpdr CR capsule   Generic drug:  dexlansoprazole     90 capsule    TAKE 1 CAPSULE BY MOUTH ONCE DAILY *1 TOTAL FILL*    Gastroesophageal reflux disease, esophagitis presence not specified       EPIPEN 0.3 MG/0.3ML injection   Generic drug:  EPINEPHrine      Inject 0.3 mg into the muscle once as needed.        Ferrous Gluconate 324 (37.5 FE) MG Tabs      Take 1 tablet by mouth 2 times daily.        furosemide 40 MG tablet    LASIX     Take 2 tablets by mouth daily        hydrocortisone 2.5 % cream    ANUSOL-HC    28.35 g    Place rectally 2 times daily    Rectal pain       loratadine 10 MG tablet    CLARITIN     Take 10 mg  by mouth daily.        Mometasone Furoate 200 MCG/ACT Aero     1 Inhaler    Inhale 2 puffs into the lungs 2 times daily    Mixed simple and mucopurulent chronic bronchitis (H)       mometasone-formoterol 200-5 MCG/ACT oral inhaler    DULERA    13 g    Inhale 2 puffs into the lungs 2 times daily    Intermittent asthma, uncomplicated       Multi-vitamin Tabs tablet   Generic drug:  multivitamin, therapeutic with minerals      1 TABLET DAILY        * order for DME     1 each    Equipment being ordered: support compression hose BK  2 pair black 30mm HG  To be applied on arising & removed while lying down to go to sleep    Localized edema       * order for DME     1 Device    Equipment being ordered: CPAP with mask and tubing    NEL (obstructive sleep apnea)       * order for DME     1 Device    Oxygen 2 Li/min at night and bled into BiPAP    Nocturnal hypoxemia       PHOSPHATE ENEMA RE      Place 1 enema rectally as needed.        Potassium Chloride ER 20 MEQ Tbcr      Take 1 tablet by mouth 2 times daily.        sertraline 100 MG tablet    ZOLOFT     Take 1.5 tablets by mouth daily.        simvastatin 40 MG tablet    ZOCOR     Take 1 tablet by mouth daily.        SINGULAIR 10 MG tablet   Generic drug:  montelukast      1 TABLET DAILY at 8:00 am        spironolactone 25 MG tablet    ALDACTONE     1 TABLET EVERY MORNING at 8:00 am        tiotropium 18 MCG capsule    SPIRIVA HANDIHALER    30 capsule    Once daily    Wheezing       triamcinolone 0.1 % cream    KENALOG    80 g    Apply topically 2 times daily as needed    Dermatitis       Vitamin D-3 1000 UNITS Caps      Take 2 capsules by mouth daily        * warfarin 4 MG tablet    COUMADIN    64 tablet    10 mg( 2.5 tabs Mondays and Fridays, 8 mg all other days ( 2 tabs)    Long term current use of anticoagulant therapy, History of pulmonary embolism       * warfarin 4 MG tablet    COUMADIN    64 tablet    Take 10 mg Monday and Friday (2.5 tabs) and 8 mg all other days  (2 tabs)    Long term current use of anticoagulant therapy, History of pulmonary embolism       * warfarin 4 MG tablet    COUMADIN    64 tablet    Hold warfarin today ( 11/29/17) 10 mg Monday and Friday and 8 mg all other days.    Long term current use of anticoagulant therapy, History of pulmonary embolism       ZEASORB-AF EX      Externally apply topically once a week        * Notice:  This list has 6 medication(s) that are the same as other medications prescribed for you. Read the directions carefully, and ask your doctor or other care provider to review them with you.

## 2017-11-30 NOTE — NURSING NOTE
"Chief Complaint   Patient presents with     Bunion       Initial /68 (BP Location: Left arm, Patient Position: Sitting, Cuff Size: Adult Large)  Pulse 67  Temp 98.1  F (36.7  C)  Resp 20  Wt (!) 301 lb (136.5 kg)  SpO2 94%  BMI 43.19 kg/m2 Estimated body mass index is 43.19 kg/(m^2) as calculated from the following:    Height as of 8/10/17: 5' 10\" (1.778 m).    Weight as of this encounter: 301 lb (136.5 kg).  Medication Reconciliation: complete     Princess ANTONIO Palumbo, HONG      "

## 2017-12-13 ENCOUNTER — ANTICOAGULATION THERAPY VISIT (OUTPATIENT)
Dept: NURSING | Facility: CLINIC | Age: 57
End: 2017-12-13
Payer: MEDICARE

## 2017-12-13 DIAGNOSIS — Z79.01 LONG TERM CURRENT USE OF ANTICOAGULANT THERAPY: ICD-10-CM

## 2017-12-13 DIAGNOSIS — Z86.711 HISTORY OF PULMONARY EMBOLISM: ICD-10-CM

## 2017-12-13 LAB — INR POINT OF CARE: 2.3 (ref 0.86–1.14)

## 2017-12-13 PROCEDURE — 85610 PROTHROMBIN TIME: CPT | Mod: QW

## 2017-12-13 PROCEDURE — 99207 ZZC NO CHARGE NURSE ONLY: CPT

## 2017-12-13 PROCEDURE — 36416 COLLJ CAPILLARY BLOOD SPEC: CPT

## 2017-12-13 RX ORDER — WARFARIN SODIUM 4 MG/1
TABLET ORAL
Qty: 45 TABLET | Refills: 1 | Status: SHIPPED | OUTPATIENT
Start: 2017-12-13 | End: 2018-01-03

## 2017-12-13 NOTE — PROGRESS NOTES
ANTICOAGULATION FOLLOW-UP CLINIC VISIT    Patient Name:  Petey Archibald  Date:  12/13/2017  Contact Type:  Face to Face    SUBJECTIVE:     Patient Findings     Positives No Problem Findings           OBJECTIVE    INR Protime   Date Value Ref Range Status   12/13/2017 2.3 (A) 0.86 - 1.14 Final       ASSESSMENT / PLAN  INR assessment THER    Recheck INR In: 3 WEEKS    INR Location Clinic      Anticoagulation Summary as of 12/13/2017     INR goal 2.0-3.0   Today's INR 2.3   Maintenance plan 10 mg (4 mg x 2.5) on Mon, Fri; 8 mg (4 mg x 2) all other days   Full instructions 10 mg on Mon, Fri; 8 mg all other days   Weekly total 60 mg   No change documented Owen Beckett RN   Plan last modified Lisa Ponce RN (3/17/2017)   Next INR check 1/3/2018   Target end date Indefinite    Indications   History of pulmonary embolism [Z86.711]  Long-term (current) use of anticoagulants [Z79.01] [Z79.01]         Anticoagulation Episode Summary     INR check location [facility]    Preferred lab     Send INR reminders to [facility] INR/PROTIME    Comments       Anticoagulation Care Providers     Provider Role Specialty Phone number    Silvino Bakre MD [facility] [phone]            See the Encounter Report to view Anticoagulation Flowsheet and Dosing Calendar (Go to Encounters tab in chart review, and find the Anticoagulation Therapy Visit)    Dosage adjustment made based on physician directed care plan. Patient is here with staff, coumadin refilled and Rx e-scribed to [facility]. Patient currently is living at [facility] [phone].     Owen Beckett RN

## 2017-12-13 NOTE — MR AVS SNAPSHOT
Petey Archibald   12/13/2017 3:15 PM   Anticoagulation Therapy Visit    Description:  57 year old male   Provider:   ANTICOAGULATION CLINIC   Department:  Bx Nurse           INR as of 12/13/2017     Today's INR 2.3      Anticoagulation Summary as of 12/13/2017     INR goal 2.0-3.0   Today's INR 2.3   Full instructions 10 mg on Mon, Fri; 8 mg all other days   Next INR check 1/3/2018    Indications   History of pulmonary embolism [Z86.711]  Long-term (current) use of anticoagulants [Z79.01] [Z79.01]         Your next Anticoagulation Clinic appointment(s)     Jan 03, 2018  3:15 PM CST   Anticoagulation Visit with  ANTICOAGULATION CLINIC   [facility]    [address] [place] [id number] [phone]              Contact Numbers     [facility]  Please call  [phone] to cancel and/or reschedule your appointment   The direct line to the anticoagulant nurse is [phone] on Monday, Wednesday, and Friday. On Thursday, the anticoagulant nurse can be reached directly at [phone].         December 2017 Details    Sun Mon Tue Wed Thu Fri Sat          1               2                 3               4               5               6               7               8               9                 10               11               12               13      8 mg   See details      14      8 mg         15      10 mg         16      8 mg           17      8 mg         18      10 mg         19      8 mg         20      8 mg         21      8 mg         22      10 mg         23      8 mg           24      8 mg         25      10 mg         26      8 mg         27      8 mg         28      8 mg         29      10 mg         30      8 mg           31      8 mg                Date Details   12/13 This INR check               How to take your warfarin dose     To take:  8 mg Take 2 of the 4 mg tablets.    To  take:  10 mg Take 2.5 of the 4 mg tablets.           January 2018 Details    Sun Mon Tue Wed Thu Fri Sat      1      10 mg         2      8 mg         3            4               5               6                 7               8               9               10               11               12               13                 14               15               16               17               18               19               20                 21               22               23               24               25               26               27                 28               29               30               31                   Date Details   No additional details    Date of next INR:  1/3/2018         How to take your warfarin dose     To take:  8 mg Take 2 of the 4 mg tablets.    To take:  10 mg Take 2.5 of the 4 mg tablets.

## 2018-01-03 ENCOUNTER — ANTICOAGULATION THERAPY VISIT (OUTPATIENT)
Dept: NURSING | Facility: CLINIC | Age: 58
End: 2018-01-03
Payer: MEDICARE

## 2018-01-03 DIAGNOSIS — Z79.01 LONG TERM CURRENT USE OF ANTICOAGULANT THERAPY: ICD-10-CM

## 2018-01-03 DIAGNOSIS — Z86.711 HISTORY OF PULMONARY EMBOLISM: ICD-10-CM

## 2018-01-03 LAB — INR POINT OF CARE: 2 (ref 0.86–1.14)

## 2018-01-03 PROCEDURE — 85610 PROTHROMBIN TIME: CPT | Mod: QW

## 2018-01-03 PROCEDURE — 99207 ZZC NO CHARGE NURSE ONLY: CPT

## 2018-01-03 PROCEDURE — 36416 COLLJ CAPILLARY BLOOD SPEC: CPT

## 2018-01-03 RX ORDER — WARFARIN SODIUM 4 MG/1
TABLET ORAL
Qty: 60 TABLET | Refills: 1 | Status: SHIPPED | OUTPATIENT
Start: 2018-01-03 | End: 2018-05-23

## 2018-01-03 NOTE — PROGRESS NOTES
ANTICOAGULATION FOLLOW-UP CLINIC VISIT    Patient Name:  Petey Archibald  Date:  1/3/2018  Contact Type:  Face to Face    SUBJECTIVE:     Patient Findings     Positives No Problem Findings           OBJECTIVE    INR Protime   Date Value Ref Range Status   01/03/2018 2.0 (A) 0.86 - 1.14 Final       ASSESSMENT / PLAN  INR assessment THER    Recheck INR In: 4 WEEKS    INR Location Clinic      Anticoagulation Summary as of 1/3/2018     INR goal 2.0-3.0   Today's INR 2.0   Maintenance plan 10 mg (4 mg x 2.5) on Mon, Fri; 8 mg (4 mg x 2) all other days   Full instructions 10 mg on Mon, Fri; 8 mg all other days   Weekly total 60 mg   No change documented Owen Beckett RN   Plan last modified Lisa Ponce RN (1/2/2018)   Next INR check 1/31/2018   Target end date Indefinite    Indications   History of pulmonary embolism [Z86.711]  Long-term (current) use of anticoagulants [Z79.01] [Z79.01]         Anticoagulation Episode Summary     [facility] check [facility]    Preferred lab     Send INR reminders to [facility] INR/PROTIME    Comments       Anticoagulation Care Providers     Provider Role Specialty Phone number    Silvino Baker MD [facility] [phone]            See the Encounter Report to view Anticoagulation Flowsheet and Dosing Calendar (Go to Encounters tab in chart review, and find the Anticoagulation Therapy Visit)    Dosage adjustment made based on physician directed care plan. No changes made. Rx sent to [facility].     Owen Beckett RN

## 2018-01-03 NOTE — MR AVS SNAPSHOT
Petey Archibald   1/3/2018 3:15 PM   Anticoagulation Therapy Visit    Description:  57 year old male   Provider:   ANTICOAGULATION CLINIC   Department:  Bx Nurse           INR as of 1/3/2018     Today's INR 2.0      Anticoagulation Summary as of 1/3/2018     INR goal 2.0-3.0   Today's INR 2.0   Full instructions 10 mg on Mon, Fri; 8 mg all other days   Next INR check 1/31/2018    Indications   History of pulmonary embolism [Z86.711]  Long-term (current) use of anticoagulants [Z79.01] [Z79.01]         Your next Anticoagulation Clinic appointment(s)     Jan 31, 2018  3:15 PM CST   Anticoagulation Visit with  [facility]ICOAGULATION CLINIC   [facility]    [address] [place] [address] [phone]              Contact Numbers     [facility]  Please call  [phone] to cancel and/or reschedule your appointment   The direct line to the anticoagulant nurse is [phone] on Monday, Wednesday, and Friday. On Thursday, the anticoagulant nurse can be reached directly at [phone].         January 2018 Details    Sun Mon Tue Wed Thu Fri Sat      1               2               3      8 mg   See details      4      8 mg         5      10 mg         6      8 mg           7      8 mg         8      10 mg         9      8 mg         10      8 mg         11      8 mg         12      10 mg         13      8 mg           14      8 mg         15      10 mg         16      8 mg         17      8 mg         18      8 mg         19      10 mg         20      8 mg           21      8 mg         22      10 mg         23      8 mg         24      8 mg         25      8 mg         26      10 mg         27      8 mg           28      8 mg         29      10 mg         30      8 mg         31                Date Details   01/03 This INR check       Date of next INR:  1/31/2018         How to take your warfarin dose      To take:  8 mg Take 2 of the 4 mg tablets.    To take:  10 mg Take 2.5 of the 4 mg tablets.

## 2018-01-11 ENCOUNTER — TELEPHONE (OUTPATIENT)
Dept: NURSING | Facility: CLINIC | Age: 58
End: 2018-01-11

## 2018-01-11 ENCOUNTER — TELEPHONE (OUTPATIENT)
Dept: FAMILY MEDICINE | Facility: CLINIC | Age: 58
End: 2018-01-11

## 2018-01-11 NOTE — TELEPHONE ENCOUNTER
[facility]  calling asking for the diagnosis codes for the patient.     Went over them in detail with her.     Bee Alcala RN  01/11/18  3:53 PM

## 2018-01-11 NOTE — TELEPHONE ENCOUNTER
[facility] looking for orders on patient. Need hold and bridging orders before they can schedule colonoscopy. Will forward to provider.

## 2018-01-31 ENCOUNTER — ANTICOAGULATION THERAPY VISIT (OUTPATIENT)
Dept: NURSING | Facility: CLINIC | Age: 58
End: 2018-01-31
Payer: MEDICARE

## 2018-01-31 DIAGNOSIS — Z79.01 LONG-TERM (CURRENT) USE OF ANTICOAGULANTS: ICD-10-CM

## 2018-01-31 DIAGNOSIS — Z86.711 HISTORY OF PULMONARY EMBOLISM: ICD-10-CM

## 2018-01-31 DIAGNOSIS — Z79.01 LONG TERM CURRENT USE OF ANTICOAGULANT THERAPY: ICD-10-CM

## 2018-01-31 LAB — INR POINT OF CARE: 2.1 (ref 2–3)

## 2018-01-31 PROCEDURE — 36416 COLLJ CAPILLARY BLOOD SPEC: CPT

## 2018-01-31 PROCEDURE — 85610 PROTHROMBIN TIME: CPT | Mod: QW

## 2018-01-31 PROCEDURE — 99207 ZZC NO CHARGE NURSE ONLY: CPT

## 2018-01-31 RX ORDER — WARFARIN SODIUM 4 MG/1
TABLET ORAL
Qty: 64 TABLET | Refills: 1 | Status: SHIPPED | OUTPATIENT
Start: 2018-01-31 | End: 2018-02-28

## 2018-01-31 NOTE — MR AVS SNAPSHOT
Petey Archibald   1/31/2018 3:15 PM   Anticoagulation Therapy Visit    Description:  57 year old male   Provider:  [facility] ANTICOAGULATION CLINIC   Department:  Bx Nurse           INR as of 1/31/2018     Today's INR 2.1      Anticoagulation Summary as of 1/31/2018     INR goal 2.0-3.0   Today's INR 2.1   Full instructions 10 mg on Mon, Fri; 8 mg all other days   Next INR check 2/28/2018    Indications   History of pulmonary embolism [Z86.711]  Long-term (current) use of anticoagulants [Z79.01] [Z79.01]         Your next Anticoagulation Clinic appointment(s)     Feb 28, 2018  3:30 PM CST   Anticoagulation Visit with  ANTICOAGULATION CLINIC   [facility]    [address] [place] [address] [phone]              Contact Numbers     [facility]  Please call  [phone] to cancel and/or reschedule your appointment   The direct line to the anticoagulant nurse is [phone] on Monday, Wednesday, and Friday. On Thursday, the anticoagulant nurse can be reached directly at [phone].         January 2018 Details    Sun Mon Tue Wed Thu Fri Sat      1               2               3               4               5               6                 7               8               9               10               11               12               13                 14               15               16               17               18               19               20                 21               22               23               24               25               26               27                 28               29               30               31      8 mg   See details          Date Details   01/31 This INR check               How to take your warfarin dose     To take:  8 mg Take 2 of the 4 mg tablets.           February 2018 Details    Sun Mon Tue Wed Thu Fri Sat         1      8 mg         2       10 mg         3      8 mg           4      8 mg         5      10 mg         6      8 mg         7      8 mg         8      8 mg         9      10 mg         10      8 mg           11      8 mg         12      10 mg         13      8 mg         14      8 mg         15      8 mg         16      10 mg         17      8 mg           18      8 mg         19      10 mg         20      8 mg         21      8 mg         22      8 mg         23      10 mg         24      8 mg           25      8 mg         26      10 mg         27      8 mg         28                Date Details   No additional details    Date of next INR:  2/28/2018         How to take your warfarin dose     To take:  8 mg Take 2 of the 4 mg tablets.    To take:  10 mg Take 2.5 of the 4 mg tablets.

## 2018-01-31 NOTE — PROGRESS NOTES
ANTICOAGULATION FOLLOW-UP CLINIC VISIT    Patient Name:  Petey Archibald  Date:  1/31/2018  Contact Type:  Face to Face    SUBJECTIVE:     Patient Findings     Positives No Problem Findings           OBJECTIVE    INR Protime   Date Value Ref Range Status   01/31/2018 2.1 2.0 - 3.0 Final       ASSESSMENT / PLAN  INR assessment THER    Recheck INR In: 4 WEEKS    INR Location Clinic      Anticoagulation Summary as of 1/31/2018     INR goal 2.0-3.0   Today's INR 2.1   Maintenance plan 10 mg (4 mg x 2.5) on Mon, Fri; 8 mg (4 mg x 2) all other days   Full instructions 10 mg on Mon, Fri; 8 mg all other days   Weekly total 60 mg   No change documented Prudence Machuca RN   Plan last modified Lisa Ponce RN (1/2/2018)   Next INR check 2/28/2018   Target end date Indefinite    Indications   History of pulmonary embolism [Z86.711]  Long-term (current) use of anticoagulants [Z79.01] [Z79.01]         Anticoagulation Episode Summary     INR check location [facility]    Preferred lab     Send INR reminders to [facility] INR/PROTIME    Comments       Anticoagulation Care Providers     Provider Role Specialty Phone number    Silvino Baker MD [facility] [phone]            See the Encounter Report to view Anticoagulation Flowsheet and Dosing Calendar (Go to Encounters tab in chart review, and find the Anticoagulation Therapy Visit)        Prudence Machuca RN

## 2018-02-02 ENCOUNTER — OFFICE VISIT (OUTPATIENT)
Dept: FAMILY MEDICINE | Facility: CLINIC | Age: 58
End: 2018-02-02
Payer: MEDICARE

## 2018-02-02 VITALS
TEMPERATURE: 97 F | HEIGHT: 70 IN | WEIGHT: 313 LBS | DIASTOLIC BLOOD PRESSURE: 72 MMHG | BODY MASS INDEX: 44.81 KG/M2 | OXYGEN SATURATION: 99 % | SYSTOLIC BLOOD PRESSURE: 110 MMHG | RESPIRATION RATE: 20 BRPM | HEART RATE: 64 BPM

## 2018-02-02 DIAGNOSIS — Z12.11 SPECIAL SCREENING FOR MALIGNANT NEOPLASMS, COLON: ICD-10-CM

## 2018-02-02 DIAGNOSIS — Z01.818 PREOP GENERAL PHYSICAL EXAM: Primary | ICD-10-CM

## 2018-02-02 DIAGNOSIS — R41.83: ICD-10-CM

## 2018-02-02 DIAGNOSIS — Z86.0100 HISTORY OF COLONIC POLYPS: ICD-10-CM

## 2018-02-02 PROCEDURE — 99214 OFFICE O/P EST MOD 30 MIN: CPT | Performed by: FAMILY MEDICINE
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
## 2018-02-02 NOTE — MR AVS SNAPSHOT
After Visit Summary   2/2/2018    Petey Archibald    MRN: [id number]           Patient Information     Date Of Birth          1960        Visit Information        Provider Department      2/2/2018 4:00 PM Raúl Riddle MD [facility]        Today's Diagnoses     Preop general physical exam    -  1    History of colonic polyps        Special screening for malignant neoplasms, colon        Borderline mental retardation          Care Instructions      Before Your Surgery      Call your surgeon if there is any change in your health. This includes signs of a cold or flu (such as a sore throat, runny nose, cough, rash or fever).    Do not smoke, drink alcohol or take over the counter medicine (unless your surgeon or primary care doctor tells you to) for the 24 hours before and after surgery.    If you take prescribed drugs: Follow your doctor s orders about which medicines to take and which to stop until after surgery.    Eating and drinking prior to surgery: follow the instructions from your surgeon    Take a shower or bath the night before surgery. Use the soap your surgeon gave you to gently clean your skin. If you do not have soap from your surgeon, use your regular soap. Do not shave or scrub the surgery site.  Wear clean pajamas and have clean sheets on your bed.           Follow-ups after your visit        Your next 10 appointments already scheduled     Feb 28, 2018  3:30 PM CST   Anticoagulation Visit with [facility]   [facility] ([facility])    [address] [place] [id number] [phone]              Who to contact     If you have questions or need follow up information about today's clinic visit or your schedule please contact [facility] directly at [phone].  Normal or non-critical lab and imaging results will be  "communicated to you by [employer], letter or phone within 4 business days after the clinic has received the results. If you do not hear from us within 7 days, please contact the clinic through [employer] or phone. If you have a critical or abnormal lab result, we will notify you by phone as soon as possible.  Submit refill requests through [employer] or call your pharmacy and they will forward the refill request to us. Please allow 3 business days for your refill to be completed.          Additional Information About Your Visit        [employer] Information     [employer] gives you secure access to your electronic health record. If you see a primary care provider, you can also send messages to your care team and make appointments. If you have questions, please call your primary care clinic.  If you do not have a primary care provider, please call [phone] and they will assist you.        Care EveryWhere ID     This is your Care EveryWhere ID. This could be used by other organizations to access your [facility] medical records  [id number]        Your Vitals Were     Pulse Temperature Respirations Height Pulse Oximetry BMI (Body Mass Index)    64 97  F (36.1  C) (Tympanic) 20 5' 10\" (1.778 m) 99% 44.91 kg/m2       Blood Pressure from Last 3 Encounters:   02/02/18 110/72   11/30/17 100/68   08/10/17 106/55    Weight from Last 3 Encounters:   02/02/18 (!) 313 lb (142 kg)   11/30/17 (!) 301 lb (136.5 kg)   08/10/17 300 lb (136.1 kg)              Today, you had the following     No orders found for display       Primary Care Provider    Jacky Burr MD       No address on file        Equal Access to Services     [facility]: Hadii aad ku hadasho Sosungali, waaxda luqadaha, qaybta kaalmada eleni, lashae villasenor . So [facility] [phone].    ATENCIÓN: Si habla español, tiene a combs disposición servicios gratuitos de asistencia lingüística. Llame al [phone].    We comply with applicable federal " civil rights laws and [place] laws. We do not discriminate on the basis of race, color, national origin, age, disability, sex, sexual orientation, or gender identity.            Thank you!     Thank you for choosing [facility]  for your care. Our goal is always to provide you with excellent care. Hearing back from our patients is one way we can continue to improve our services. Please take a few minutes to complete the written survey that you may receive in the mail after your visit with us. Thank you!             Your Updated Medication List - Protect others around you: Learn how to safely use, store and throw away your medicines at www.disposemymeds.org.          This list is accurate as of 2/2/18  4:53 PM.  Always use your most recent med list.                   Brand Name Dispense Instructions for use Diagnosis    ABILIFY 10 MG tablet   Generic drug:  ARIPiprazole     30 tablet    Take 0.5 tablets (5 mg) by mouth daily        acetaminophen 325 MG tablet    TYLENOL    100 tablet    Take 1-2 tablets (325-650 mg) by mouth every 6 hours as needed    Knee pain, right       albuterol 108 (90 BASE) MCG/ACT Inhaler    PROAIR HFA    1 Inhaler    Inhale 2 puffs into the lungs every 6 hours as needed    Pulmonary emphysema, unspecified emphysema type (H)       AMBIEN 10 MG tablet   Generic drug:  zolpidem     30 tablet    Take 1 tablet (10 mg) by mouth nightly as needed for sleep        ANUSOL-HC 25 MG Suppository   Generic drug:  hydrocortisone     14 Suppository    INSERT ONE SUPPOSITORY RECTALLY UP TO TWICE A DAY AS NEEDED    External hemorrhoids       BACTROBAN 2 % ointment   Generic drug:  mupirocin      Apply topically 2 times daily        budesonide 180 MCG/ACT inhaler    PULMICORT FLEXHALER    3 Inhaler    Inhale 2 puffs into the lungs 2 times daily    Mixed simple and mucopurulent chronic bronchitis (H)       BUPROPION HCL PO      Take 150 mg by mouth every morning         clotrimazole-betamethasone cream    LOTRISONE    60 g    Apply topically 2 times daily    Dermatitis       DEXILANT 60 MG Cpdr CR capsule   Generic drug:  dexlansoprazole     90 capsule    TAKE 1 CAPSULE BY MOUTH ONCE DAILY *1 TOTAL FILL*    Gastroesophageal reflux disease, esophagitis presence not specified       EPIPEN 0.3 MG/0.3ML injection   Generic drug:  EPINEPHrine      Inject 0.3 mg into the muscle once as needed.        Ferrous Gluconate 324 (37.5 FE) MG Tabs      Take 1 tablet by mouth 2 times daily.        furosemide 40 MG tablet    LASIX     Take 2 tablets by mouth daily        hydrocortisone 2.5 % cream    ANUSOL-HC    28.35 g    Place rectally 2 times daily    Rectal pain       loratadine 10 MG tablet    CLARITIN     Take 10 mg by mouth daily.        Mometasone Furoate 200 MCG/ACT Aero     1 Inhaler    Inhale 2 puffs into the lungs 2 times daily    Mixed simple and mucopurulent chronic bronchitis (H)       mometasone-formoterol 200-5 MCG/ACT oral inhaler    DULERA    13 g    Inhale 2 puffs into the lungs 2 times daily    Intermittent asthma, uncomplicated       Multi-vitamin Tabs tablet   Generic drug:  multivitamin, therapeutic with minerals      1 TABLET DAILY        * order for DME     1 each    Equipment being ordered: support compression hose BK  2 pair black 30mm HG  To be applied on arising & removed while lying down to go to sleep    Localized edema       * order for DME     1 Device    Equipment being ordered: CPAP with mask and tubing    NEL (obstructive sleep apnea)       * order for DME     1 Device    Oxygen 2 Li/min at night and bled into BiPAP    Nocturnal hypoxemia       PHOSPHATE ENEMA RE      Place 1 enema rectally as needed.        Potassium Chloride ER 20 MEQ Tbcr      Take 1 tablet by mouth 2 times daily.        sertraline 100 MG tablet    ZOLOFT     Take 1.5 tablets by mouth daily.        simvastatin 40 MG tablet    ZOCOR     Take 1 tablet by mouth daily.        SINGULAIR 10 MG  tablet   Generic drug:  montelukast      1 TABLET DAILY at 8:00 am        spironolactone 25 MG tablet    ALDACTONE     1 TABLET EVERY MORNING at 8:00 am        tiotropium 18 MCG capsule    SPIRIVA HANDIHALER    30 capsule    Once daily    Wheezing       triamcinolone 0.1 % cream    KENALOG    80 g    Apply topically 2 times daily as needed    Dermatitis       Vitamin D-3 1000 UNITS Caps      Take 2 capsules by mouth daily        * warfarin 4 MG tablet    COUMADIN    64 tablet    Hold warfarin today ( 11/29/17) 10 mg Monday and Friday and 8 mg all other days.    Long term current use of anticoagulant therapy, History of pulmonary embolism       * warfarin 4 MG tablet    COUMADIN    60 tablet    10 mg( 2.5 tabs Mondays and Fridays, 8 mg all other days ( 2 tabs)    Long term current use of anticoagulant therapy, History of pulmonary embolism       * warfarin 4 MG tablet    COUMADIN    64 tablet    Take 10 mg Monday and Friday (2.5 tabs) and 8 mg all other days (2 tabs)    Long term current use of anticoagulant therapy, History of pulmonary embolism       ZEASORB-AF EX      Externally apply topically once a week        * Notice:  This list has 6 medication(s) that are the same as other medications prescribed for you. Read the directions carefully, and ask your doctor or other care provider to review them with you.

## 2018-02-21 ENCOUNTER — TRANSFERRED RECORDS (OUTPATIENT)
Dept: HEALTH INFORMATION MANAGEMENT | Facility: CLINIC | Age: 58
End: 2018-02-21

## 2018-02-28 ENCOUNTER — ANTICOAGULATION THERAPY VISIT (OUTPATIENT)
Dept: NURSING | Facility: CLINIC | Age: 58
End: 2018-02-28
Payer: MEDICARE

## 2018-02-28 DIAGNOSIS — Z86.711 HISTORY OF PULMONARY EMBOLISM: ICD-10-CM

## 2018-02-28 DIAGNOSIS — Z79.01 LONG TERM CURRENT USE OF ANTICOAGULANT THERAPY: ICD-10-CM

## 2018-02-28 DIAGNOSIS — Z79.01 LONG-TERM (CURRENT) USE OF ANTICOAGULANTS: ICD-10-CM

## 2018-02-28 LAB — INR POINT OF CARE: 1.7 (ref 2–3)

## 2018-02-28 PROCEDURE — 36416 COLLJ CAPILLARY BLOOD SPEC: CPT

## 2018-02-28 PROCEDURE — 85610 PROTHROMBIN TIME: CPT | Mod: QW

## 2018-02-28 PROCEDURE — 99207 ZZC NO CHARGE NURSE ONLY: CPT

## 2018-02-28 RX ORDER — WARFARIN SODIUM 4 MG/1
TABLET ORAL
Qty: 65 TABLET | Refills: 1 | Status: SHIPPED | OUTPATIENT
Start: 2018-02-28 | End: 2018-04-04

## 2018-02-28 NOTE — MR AVS SNAPSHOT
Petey Archibald   2/28/2018 3:30 PM   Anticoagulation Therapy Visit    Description:  57 year old male   Provider:  [facility]   Department:  Bx Nurse           INR as of 2/28/2018     Today's INR 1.7!      Anticoagulation Summary as of 2/28/2018     INR goal 2.0-3.0   Today's INR 1.7!   Full instructions 2/28: 10 mg; Otherwise 10 mg on Mon, Fri; 8 mg all other days   Next INR check 3/14/2018    Indications   History of pulmonary embolism [Z86.711]  Long-term (current) use of anticoagulants [Z79.01] [Z79.01]         Your next Anticoagulation Clinic appointment(s)     Mar 14, 2018  3:30 PM CDT   Anticoagulation Visit with  ANTICOAGULATION CLINIC   [facility]    [address] [place] [address] [id number] [phone]              Contact Numbers     [facility]  Please call  [phone] to cancel and/or reschedule your appointment   The direct line to the anticoagulant nurse is [phone] on Monday, Wednesday, and Friday. On Thursday, the anticoagulant nurse can be reached directly at [phone].         February 2018 Details    Sun Mon Tue Wed Thu Fri Sat         1               2               3                 4               5               6               7               8               9               10                 11               12               13               14               15               16               17                 18               19               20               21               22               23               24                 25               26               27               28      10 mg   See details          Date Details   02/28 This INR check               How to take your warfarin dose     To take:  10 mg Take 2.5 of the 4 mg tablets.           March 2018 Details    Sun Mon Tue Wed Thu Fri Sat         1      8 mg         2      10 mg         3       8 mg           4      8 mg         5      10 mg         6      8 mg         7      8 mg         8      8 mg         9      10 mg         10      8 mg           11      8 mg         12      10 mg         13      8 mg         14            15               16               17                 18               19               20               21               22               23               24                 25               26               27               28               29               30               31                Date Details   No additional details    Date of next INR:  3/14/2018         How to take your warfarin dose     To take:  8 mg Take 2 of the 4 mg tablets.    To take:  10 mg Take 2.5 of the 4 mg tablets.

## 2018-02-28 NOTE — TELEPHONE ENCOUNTER
.     Requested Prescriptions   Pending Prescriptions Disp Refills     warfarin (COUMADIN) 4 MG tablet 64 tablet 1     Sig: Take 10 mg Monday and Friday (2.5 tabs) and 8 mg all other days (2 tabs)    There is no refill protocol information for this order        Prescription approved per [facility] Refill Protocol.

## 2018-02-28 NOTE — PROGRESS NOTES
ANTICOAGULATION FOLLOW-UP CLINIC VISIT    Patient Name:  Petey Archibald  Date:  2/28/2018  Contact Type:  Face to Face    SUBJECTIVE:     Patient Findings     Positives No Problem Findings    Comments Post colonoscopy.             OBJECTIVE    INR Protime   Date Value Ref Range Status   02/28/2018 1.7 (A) 2.0 - 3.0 Final       ASSESSMENT / PLAN  INR assessment SUB    Recheck INR In: 2 WEEKS    INR Location Clinic      Anticoagulation Summary as of 2/28/2018     INR goal 2.0-3.0   Today's INR 1.7!   Maintenance plan 10 mg (4 mg x 2.5) on Mon, Fri; 8 mg (4 mg x 2) all other days   Full instructions 2/28: 10 mg; Otherwise 10 mg on Mon, Fri; 8 mg all other days   Weekly total 60 mg   Plan last modified Lisa Ponce RN (1/2/2018)   Next INR check 3/14/2018   Target end date Indefinite    Indications   History of pulmonary embolism [Z86.711]  Long-term (current) use of anticoagulants [Z79.01] [Z79.01]         Anticoagulation Episode Summary     INR check location [facility]    Preferred lab     Send INR reminders to [facility] INR/PROTIME    Comments       Anticoagulation Care Providers     Provider Role Specialty Phone number    Silvino Baker MD [facility] [phone]            See the Encounter Report to view Anticoagulation Flowsheet and Dosing Calendar (Go to Encounters tab in chart review, and find the Anticoagulation Therapy Visit)        Prudence Machuca RN

## 2018-03-08 ENCOUNTER — TELEPHONE (OUTPATIENT)
Dept: FAMILY MEDICINE | Facility: CLINIC | Age: 58
End: 2018-03-08

## 2018-03-08 NOTE — TELEPHONE ENCOUNTER
Prior Authorization Retail Medication Request    Medication/Dose: DEXILANT 60 MG CPDR CR capsule  ICD code (if different than what is on RX):  Previously Tried and Failed:  Rationale:    Insurance Name: [employer]   Insurance ID: [id number]      Pharmacy Information (if different than what is on RX)  Name:   Phone:

## 2018-03-12 ENCOUNTER — OFFICE VISIT (OUTPATIENT)
Dept: FAMILY MEDICINE | Facility: CLINIC | Age: 58
End: 2018-03-12
Payer: MEDICARE

## 2018-03-12 VITALS
HEIGHT: 70 IN | OXYGEN SATURATION: 96 % | HEART RATE: 68 BPM | TEMPERATURE: 98.1 F | SYSTOLIC BLOOD PRESSURE: 100 MMHG | BODY MASS INDEX: 44.09 KG/M2 | DIASTOLIC BLOOD PRESSURE: 60 MMHG | WEIGHT: 308 LBS

## 2018-03-12 DIAGNOSIS — M25.461 EFFUSION OF KNEE JOINT RIGHT: ICD-10-CM

## 2018-03-12 DIAGNOSIS — M25.561 ACUTE PAIN OF RIGHT KNEE: Primary | ICD-10-CM

## 2018-03-12 PROCEDURE — 99213 OFFICE O/P EST LOW 20 MIN: CPT | Performed by: FAMILY MEDICINE

## 2018-03-12 NOTE — PROGRESS NOTES
"  SUBJECTIVE:   Petey Archibald is a 57 year old male who presents to clinic today for the following health issues:        Musculoskeletal problem/pain      Duration: Fell yesterday at [facility]    Description  Location: Right knee    Intensity:  moderate    Accompanying signs and symptoms: swelling and discoloration of area    History  Previous similar problem: no   Previous evaluation:  none    Precipitating or alleviating factors:  Trauma or overuse: no   Aggravating factors include: sitting    Therapies tried and outcome: ice      Bloody nose      Duration: One episode unable to tell length of time    Description (location/character/radiation): Bilateral nostril blood    Intensity:  moderate    Accompanying signs and symptoms: None    History (similar episodes/previous evaluation): None    Precipitating or alleviating factors: None    Therapies tried and outcome: None       Problem list and histories reviewed & adjusted, as indicated.  Additional history: as documented    Labs reviewed in EPIC    Reviewed and updated as needed this visit by clinical staff  Tobacco  Allergies  Meds  Problems  Med Hx  Surg Hx  Fam Hx  Soc Hx        Reviewed and updated as needed this visit by Provider  Allergies  Meds  Problems         ROS:  CONSTITUTIONAL: NEGATIVE for fever, chills, change in weight  EYES: NEGATIVE for vision changes or irritation  ENT/MOUTH: NEGATIVE for ear, mouth and throat problems.  Hx recent Nose bleed  RESP: NEGATIVE for significant cough or SOB  CV: NEGATIVE for chest pain, palpitations or peripheral edema  GI: NEGATIVE for nausea, abdominal pain, heartburn, or change in bowel habits  MUSCULOSKELETAL:+right knee pain  NEURO: NEGATIVE for weakness, dizziness or paresthesias    OBJECTIVE:     /60 (BP Location: Left arm, Patient Position: Sitting, Cuff Size: Adult Large)  Pulse 68  Temp 98.1  F (36.7  C) (Tympanic)  Ht 5' 10\" (1.778 m)  Wt (!) 308 lb (139.7 kg)  SpO2 96%  BMI 44.19 " kg/m2  Body mass index is 44.19 kg/(m^2).   GENERAL: alert and no acute distress  EYES: Eyes grossly normal to inspection, PERRL and conjunctivae and sclerae normal  HENT: ear canals and TM's normal, nose and mouth without ulcers or lesions.  No active nosebleeding  NECK: no adenopathy, no asymmetry, masses, or scars and thyroid normal to palpation  RESP: lungs clear to auscultation - no rales, rhonchi or wheezes  CV: regular rate and rhythm, normal S1 S2, no S3 or S4, no murmur, click or rub, no peripheral edema and peripheral pulses strong  ABDOMEN: soft, nontender, no hepatosplenomegaly, no masses and bowel sounds normal  MS: Normal ROM/strength in b/l LE's (knees and ankles).  +mild point tenderness over bruise/swelling in right knee; mild amount of ballotable fluid in right knee. Negative anterior/posterior drawer signs and Carlitos's/Apley's tests.  Gait/ambulation is normal--no hesitation or unilateral side favored.  SKIN: no suspicious lesions or rashes  NEURO: Normal strength and tone, mentation intact and speech normal  PSYCH: mentation appears normal, affect normal/bright    Diagnostic Test Results:  none     ASSESSMENT/PLAN:     Problem List Items Addressed This Visit     Right knee pain - Primary      Other Visit Diagnoses     Effusion of knee joint right               --Recommended RICE therapy: rest (avoid repetitive movements that cause pain to knee, etc), ice packs 3-4 times daily x 1-2 weeks, compression with knee brace, and elevation of the joint to help decrease swelling.    May use OTC Tylenol prn pain.   Avoid NSAIDs due to warfarin use.    --RTC for scheduled INR check this Wednesday.  If pain/sweling worsens, may see provided during INR OV (or sooner if needed)      Jovana Martin, DO  [facility]

## 2018-03-12 NOTE — NURSING NOTE
"Chief Complaint   Patient presents with     Fall     Fell at [facility] yesterday with injury to right knee     Epistaxis     One episode of bloody nose on Saturday.       Initial /60 (BP Location: Left arm, Patient Position: Sitting, Cuff Size: Adult Large)  Pulse 68  Temp 98.1  F (36.7  C) (Tympanic)  Ht 5' 10\" (1.778 m)  Wt (!) 308 lb (139.7 kg)  SpO2 96%  BMI 44.19 kg/m2 Estimated body mass index is 44.19 kg/(m^2) as calculated from the following:    Height as of this encounter: 5' 10\" (1.778 m).    Weight as of this encounter: 308 lb (139.7 kg).  Medication Reconciliation: complete     Debbie Kwon LPN  "

## 2018-03-14 ENCOUNTER — ANTICOAGULATION THERAPY VISIT (OUTPATIENT)
Dept: NURSING | Facility: CLINIC | Age: 58
End: 2018-03-14
Payer: MEDICARE

## 2018-03-14 LAB — INR POINT OF CARE: 2.9 (ref 0.86–1.14)

## 2018-03-14 PROCEDURE — 36416 COLLJ CAPILLARY BLOOD SPEC: CPT

## 2018-03-14 PROCEDURE — 85610 PROTHROMBIN TIME: CPT | Mod: QW

## 2018-03-14 PROCEDURE — 99207 ZZC NO CHARGE NURSE ONLY: CPT

## 2018-03-14 NOTE — MR AVS SNAPSHOT
Petey Archibald   3/14/2018 3:30 PM   Anticoagulation Therapy Visit    Description:  57 year old male   Provider[facility]  [facility]   Department:  Bx Nurse           INR as of 3/14/2018     Today's INR 2.9      Anticoagulation Summary as of 3/14/2018     INR goal 2.0-3.0   Today's INR 2.9   Full instructions 10 mg on Mon, Fri; 8 mg all other days   Next INR check 4/4/2018    Indications   History of pulmonary embolism [Z86.711]  Long-term (current) use of anticoagulants [Z79.01] [Z79.01]         Your next Anticoagulation Clinic appointment(s)     Apr 04, 2018  3:30 PM CDT   Anticoagulation Visit with  ANTICOAGULATION CLINIC   [facility] ([facility])    [address] [place] [address] [id number] [phone]              Contact Numbers     [facility]  Please call  [phone] to cancel and/or reschedule your appointment   The direct line to the anticoagulant nurse is [phone] on Monday, Wednesday, and Friday. On Thursday, the anticoagulant nurse can be reached directly at [phone].         March 2018 Details    Sun Mon Tue Wed Thu Fri Sat         1               2               3                 4               5               6               7               8               9               10                 11               12               13               14      8 mg   See details      15      8 mg         16      10 mg         17      8 mg           18      8 mg         19      10 mg         20      8 mg         21      8 mg         22      8 mg         23      10 mg         24      8 mg           25      8 mg         26      10 mg         27      8 mg         28      8 mg         29      8 mg         30      10 mg         31      8 mg          Date Details   03/14 This INR check               How to take your warfarin dose     To take:  8 mg Take 2 of the 4 mg tablets.    To take:  10 mg Take  2.5 of the 4 mg tablets.           April 2018 Details    Sun Mon Tue Wed Thu Fri Sat     1      8 mg         2      10 mg         3      8 mg         4            5               6               7                 8               9               10               11               12               13               14                 15               16               17               18               19               20               21                 22               23               24               25               26               27               28                 29               30                     Date Details   No additional details    Date of next INR:  4/4/2018         How to take your warfarin dose     To take:  8 mg Take 2 of the 4 mg tablets.    To take:  10 mg Take 2.5 of the 4 mg tablets.

## 2018-03-15 NOTE — TELEPHONE ENCOUNTER
Prior Authorization Approval    Authorization Effective Date: 3/15/2018  Authorization Expiration Date: 12/31/2018  Medication: DEXILANT 60 MG CPDR CR capsule - APPROVED  Approved Dose/Quantity: 90 FOR 90 DAYS   Reference #: [id number]   Insurance Company: [employer] ([facility]) - Phone [phone] Fax [phone]  Expected CoPay: $0.00     CoPay Card Available:      Foundation Assistance Needed:    Which Pharmacy is filling the prescription (Not needed for infusion/clinic administered): [facility] - [place], [place] - [address] [address]  Pharmacy Notified: Yes  Patient Notified: Yes

## 2018-03-26 ENCOUNTER — TELEPHONE (OUTPATIENT)
Dept: FAMILY MEDICINE | Facility: CLINIC | Age: 58
End: 2018-03-26

## 2018-03-27 ENCOUNTER — NURSE TRIAGE (OUTPATIENT)
Dept: NURSING | Facility: CLINIC | Age: 58
End: 2018-03-27

## 2018-03-27 NOTE — TELEPHONE ENCOUNTER
This should be okay, patient to continue with regular administration time tomorrow. Recheck INR on 4/4/18 as scheduled. Triage to call after 10 pm with this message today.

## 2018-03-27 NOTE — TELEPHONE ENCOUNTER
Clinic Action Needed:Yes  Reason for Call: Petey a worker from the [facility] group [place] where the patient lives, called to let the clinic know they gave him his regular Monday dose of warfarin this morning, instead of this evening as they usually do. Patient is fine, at his baseline status. They would like a call from the clinic after 10 am on Tuesday to speak with the nurse from the [facility], to let them know if any changes are needed with the warfarin and INR routine.   Routed to: Dr Riddle's care team    Estephania Thakkar RN, [facility]

## 2018-03-27 NOTE — TELEPHONE ENCOUNTER
Call back to group home without answer left message to call back and ask to speak with the triage nurse.

## 2018-03-28 NOTE — TELEPHONE ENCOUNTER
"Caller is patient's personal care attendant. Said the other PCA in the home forgot to give the patient his Dexilant medication both yesterday and today. Caller asks what he should do about that. Denies that patient is c/o heartburn this evening. Nurse told caller to resume patient's usual medication schedule tomorrow morning. Caller indicated that he understood.     Diamond Rubalcava RN/FNA    Additional Information    Negative: Drug overdose and nurse unable to answer question    Negative: Caller requesting information not related to medicine    Negative: Caller requesting a prescription for Strep throat and has a positive culture result    Negative: Rash while taking a medication or within 3 days of stopping it    Negative: Immunization reaction suspected    Negative: [1] Asthma and [2] having symptoms of asthma (cough, wheezing, etc)    Negative: MORE THAN A DOUBLE DOSE of a prescription or over-the-counter (OTC) drug    Negative: [1] DOUBLE DOSE (an extra dose or lesser amount) of over-the-counter (OTC) drug AND [2] any symptoms (e.g., dizziness, nausea, pain, sleepiness)    Negative: [1] DOUBLE DOSE (an extra dose or lesser amount) of prescription drug AND [2] any symptoms (e.g., dizziness, nausea, pain, sleepiness)    Negative: Took another person's prescription drug    Negative: [1] DOUBLE DOSE (an extra dose or lesser amount) of prescription drug AND [2] NO symptoms (Exception: a double dose of antibiotics)    Negative: Diabetes drug error or overdose (e.g., insulin or extra dose)    Negative: [1] Request for URGENT new prescription or refill of \"essential\" medication (i.e., likelihood of harm to patient if not taken) AND [2] triager unable to fill per unit policy    Negative: [1] Prescription not at pharmacy AND [2] was prescribed today by PCP    Negative: Pharmacy calling with prescription questions and triager unable to answer question    Negative: Caller has URGENT medication question about med that PCP " prescribed and triager unable to answer question    Negative: Caller has NON-URGENT medication question about med that PCP prescribed and triager unable to answer question    Negative: Caller requesting a NON-URGENT new prescription or refill and triager unable to refill per unit policy    Negative: Caller has medication question about med not prescribed by PCP and triager unable to answer question (e.g., compatibility with other med, storage)    Negative: [1] DOUBLE DOSE (an extra dose or lesser amount) of over-the-counter (OTC) drug AND [2] NO symptoms (all triage questions negative)    Negative: [1] DOUBLE DOSE (an extra dose or lesser amount) of antibiotic drug AND [2] NO symptoms (all triage questions negative)    Caller has medication question only, adult not sick, and triager answers question    Protocols used: [facility]

## 2018-04-04 ENCOUNTER — ANTICOAGULATION THERAPY VISIT (OUTPATIENT)
Dept: NURSING | Facility: CLINIC | Age: 58
End: 2018-04-04
Payer: MEDICARE

## 2018-04-04 DIAGNOSIS — Z79.01 LONG-TERM (CURRENT) USE OF ANTICOAGULANTS: ICD-10-CM

## 2018-04-04 DIAGNOSIS — Z79.01 LONG TERM CURRENT USE OF ANTICOAGULANT THERAPY: ICD-10-CM

## 2018-04-04 DIAGNOSIS — Z86.711 HISTORY OF PULMONARY EMBOLISM: ICD-10-CM

## 2018-04-04 LAB — INR POINT OF CARE: 3.4 (ref 2–3)

## 2018-04-04 PROCEDURE — 85610 PROTHROMBIN TIME: CPT | Mod: QW

## 2018-04-04 PROCEDURE — 36416 COLLJ CAPILLARY BLOOD SPEC: CPT

## 2018-04-04 PROCEDURE — 99207 ZZC NO CHARGE NURSE ONLY: CPT

## 2018-04-04 RX ORDER — WARFARIN SODIUM 4 MG/1
TABLET ORAL
Qty: 65 TABLET | Refills: 1 | Status: SHIPPED | OUTPATIENT
Start: 2018-04-04 | End: 2018-04-25

## 2018-04-04 NOTE — PROGRESS NOTES
ANTICOAGULATION FOLLOW-UP CLINIC VISIT    Patient Name:  Petey Archibald  Date:  4/4/2018  Contact Type:  Face to Face    SUBJECTIVE:     Patient Findings     Positives No Problem Findings           OBJECTIVE    INR Protime   Date Value Ref Range Status   04/04/2018 3.4 (A) 2.0 - 3.0 Final       ASSESSMENT / PLAN  INR assessment SUPRA    Recheck INR In: 3 WEEKS    INR Location Clinic      Anticoagulation Summary as of 4/4/2018     INR goal 2.0-3.0   Today's INR 3.4!   Maintenance plan 10 mg (4 mg x 2.5) on Mon, Fri; 8 mg (4 mg x 2) all other days   Full instructions 4/4: Hold; Otherwise 10 mg on Mon, Fri; 8 mg all other days   Weekly total 60 mg   Plan last modified Lisa Ponce RN (1/2/2018)   Next INR check 4/25/2018   Target end date Indefinite    Indications   History of pulmonary embolism [Z86.711]  Long-term (current) use of anticoagulants [Z79.01] [Z79.01]         Anticoagulation Episode Summary     INR check location [facility]    Preferred lab     Send INR reminders to [facility] INR/PROTIME    Comments       Anticoagulation Care Providers     Provider Role Specialty Phone number    Silvino Baker MD [facility] [phone]            See the Encounter Report to view Anticoagulation Flowsheet and Dosing Calendar (Go to Encounters tab in chart review, and find the Anticoagulation Therapy Visit)        Prudence Machuca RN

## 2018-04-04 NOTE — MR AVS SNAPSHOT
Petey Archibald   4/4/2018 3:30 PM   Anticoagulation Therapy Visit    Description:  57 year old male   Provider:  [facility]   Department:  Bx Nurse           INR as of 4/4/2018     Today's INR 3.4!      Anticoagulation Summary as of 4/4/2018     INR goal 2.0-3.0   Today's INR 3.4!   Full instructions 4/4: Hold; Otherwise 10 mg on Mon, Fri; 8 mg all other days   Next INR check 4/25/2018    Indications   History of pulmonary embolism [Z86.711]  Long-term (current) use of anticoagulants [Z79.01] [Z79.01]         Your next Anticoagulation Clinic appointment(s)     Apr 25, 2018  3:30 PM CDT   Anticoagulation Visit with  [facility]ULATION CLINIC   [facility]    [address] [place] [address] [id number] [phone]              Contact Numbers     [facility]  Please call  [phone] to cancel and/or reschedule your appointment   The direct line to the anticoagulant nurse is [phone] on Monday, Wednesday, and Friday. On Thursday, the anticoagulant nurse can be reached directly at [phone].         April 2018 Details    Sun Mon Tue Wed Thu Fri Sat     1               2               3               4      Hold   See details      5      8 mg         6      10 mg         7      8 mg           8      8 mg         9      10 mg         10      8 mg         11      8 mg         12      8 mg         13      10 mg         14      8 mg           15      8 mg         16      10 mg         17      8 mg         18      8 mg         19      8 mg         20      10 mg         21      8 mg           22      8 mg         23      10 mg         24      8 mg         25            26               27               28                 29               30                     Date Details   04/04 This INR check       Date of next INR:  4/25/2018         How to take your warfarin dose     To take:  8 mg Take 2 of  the 4 mg tablets.    To take:  10 mg Take 2.5 of the 4 mg tablets.    Hold Do not take your warfarin dose. See the Details table to the right for additional instructions.

## 2018-04-11 DIAGNOSIS — K21.9 GASTROESOPHAGEAL REFLUX DISEASE, ESOPHAGITIS PRESENCE NOT SPECIFIED: ICD-10-CM

## 2018-04-11 NOTE — TELEPHONE ENCOUNTER
"Requested Prescriptions   Pending Prescriptions Disp Refills     DEXILANT 60 MG CPDR CR capsule [Pharmacy Med Name: DEXILANT 60MG DR CAPS]  Last Written Prescription Date:  5/3/17  Last Fill Quantity: 90,  # refills: 2   Last office visit: 3/12/2018 with prescribing provider:  Veronica   Future Office Visit:      10     Sig: TAKE 1 CAPSULE BY MOUTH ONCE DAILY (FOR HEARTBURN)    PPI Protocol Passed    4/11/2018  3:33 PM       Passed - Not on Clopidogrel (unless Pantoprazole ordered)       Passed - No diagnosis of osteoporosis on record       Passed - Recent (12 mo) or future (30 days) visit within the authorizing provider's specialty    Patient had office visit in the last 12 months or has a visit in the next 30 days with authorizing provider or within the authorizing provider's specialty.  See \"Patient Info\" tab in inbasket, or \"Choose Columns\" in Meds & Orders section of the refill encounter.           Passed - Patient is age 18 or older          "

## 2018-04-12 RX ORDER — DEXLANSOPRAZOLE 60 MG/1
CAPSULE, DELAYED RELEASE ORAL
Qty: 90 CAPSULE | Refills: 2 | Status: SHIPPED | OUTPATIENT
Start: 2018-04-12 | End: 2021-01-22

## 2018-04-25 ENCOUNTER — ANTICOAGULATION THERAPY VISIT (OUTPATIENT)
Dept: NURSING | Facility: CLINIC | Age: 58
End: 2018-04-25
Payer: MEDICARE

## 2018-04-25 DIAGNOSIS — Z79.01 LONG-TERM (CURRENT) USE OF ANTICOAGULANTS: ICD-10-CM

## 2018-04-25 DIAGNOSIS — Z79.01 LONG TERM CURRENT USE OF ANTICOAGULANT THERAPY: ICD-10-CM

## 2018-04-25 DIAGNOSIS — Z86.711 HISTORY OF PULMONARY EMBOLISM: ICD-10-CM

## 2018-04-25 LAB — INR POINT OF CARE: 2.2 (ref 2–3)

## 2018-04-25 PROCEDURE — 85610 PROTHROMBIN TIME: CPT | Mod: QW

## 2018-04-25 PROCEDURE — 99207 ZZC NO CHARGE NURSE ONLY: CPT

## 2018-04-25 PROCEDURE — 36416 COLLJ CAPILLARY BLOOD SPEC: CPT

## 2018-04-25 RX ORDER — WARFARIN SODIUM 4 MG/1
TABLET ORAL
Qty: 65 TABLET | Refills: 1 | Status: SHIPPED | OUTPATIENT
Start: 2018-04-25 | End: 2018-05-23

## 2018-04-25 NOTE — MR AVS SNAPSHOT
Petey Archibald   4/25/2018 3:30 PM   Anticoagulation Therapy Visit    Description:  57 year old male   Provider:  [facility]   Department:  [facility]           INR as of 4/25/2018     Today's INR 2.2      Anticoagulation Summary as of 4/25/2018     INR goal 2.0-3.0   Today's INR 2.2   Full instructions 10 mg on Mon, Fri; 8 mg all other days   Next INR check 5/23/2018    Indications   History of pulmonary embolism [Z86.711]  Long-term (current) use of anticoagulants [Z79.01] [Z79.01]         Your next Anticoagulation Clinic appointment(s)     May 23, 2018  3:30 PM CDT   Anticoagulation Visit with  [facility] CLINIC   [facility]    [address] [place] [id number] [phone]              Contact Numbers     [facility]  Please call  [phone] to cancel and/or reschedule your appointment   The direct line to the anticoagulant nurse is [phone] on Monday, Wednesday, and Friday. On Thursday, the anticoagulant nurse can be reached directly at [phone].         April 2018 Details    Sun Mon Tue Wed Thu Fri Sat     1               2               3               4               5               6               7                 8               9               10               11               12               13               14                 15               16               17               18               19               20               21                 22               23               24               25      8 mg   See details      26      8 mg         27      10 mg         28      8 mg           29      8 mg         30      10 mg               Date Details   04/25 This INR check               How to take your warfarin dose     To take:  8 mg Take 2 of the 4 mg tablets.    To take:  10 mg Take 2.5 of the 4 mg tablets.           May 2018 Details    Sun Mon Tue  Wed Thu Fri Sat       1      8 mg         2      8 mg         3      8 mg         4      10 mg         5      8 mg           6      8 mg         7      10 mg         8      8 mg         9      8 mg         10      8 mg         11      10 mg         12      8 mg           13      8 mg         14      10 mg         15      8 mg         16      8 mg         17      8 mg         18      10 mg         19      8 mg           20      8 mg         21      10 mg         22      8 mg         23            24               25               26                 27               28               29               30               31                  Date Details   No additional details    Date of next INR:  5/23/2018         How to take your warfarin dose     To take:  8 mg Take 2 of the 4 mg tablets.    To take:  10 mg Take 2.5 of the 4 mg tablets.

## 2018-04-25 NOTE — PROGRESS NOTES
ANTICOAGULATION FOLLOW-UP CLINIC VISIT    Patient Name:  Petey Archibald  Date:  4/25/2018  Contact Type:  Face to Face    SUBJECTIVE:     Patient Findings     Positives No Problem Findings           OBJECTIVE    INR Protime   Date Value Ref Range Status   04/25/2018 2.2 2.0 - 3.0 Final       ASSESSMENT / PLAN  INR assessment THER    Recheck INR In: 4 WEEKS    INR Location Clinic      Anticoagulation Summary as of 4/25/2018     INR goal 2.0-3.0   Today's INR 2.2   Maintenance plan 10 mg (4 mg x 2.5) on Mon, Fri; 8 mg (4 mg x 2) all other days   Full instructions 10 mg on Mon, Fri; 8 mg all other days   Weekly total 60 mg   No change documented Prudence Machuca RN   Plan last modified Lisa Ponce RN (1/2/2018)   Next INR check 5/23/2018   Target end date Indefinite    Indications   History of pulmonary embolism [Z86.711]  Long-term (current) use of anticoagulants [Z79.01] [Z79.01]         Anticoagulation Episode Summary     INR check location [facility]    Preferred lab     Send INR reminders to [facility] INR/PROTIME    Comments       Anticoagulation Care Providers     Provider Role Specialty Phone number    Silvino Baker MD [facility] [phone]            See the Encounter Report to view Anticoagulation Flowsheet and Dosing Calendar (Go to Encounters tab in chart review, and find the Anticoagulation Therapy Visit)        Prudence Machuca RN

## 2018-04-27 ENCOUNTER — TELEPHONE (OUTPATIENT)
Dept: FAMILY MEDICINE | Facility: CLINIC | Age: 58
End: 2018-04-27

## 2018-04-27 NOTE — TELEPHONE ENCOUNTER
Reason for Call:  Form, our goal is to have forms completed with 72 hours, however, some forms may require a visit or additional information.    Type of letter, form or note:  medical    Who is the form from?: Home care    Where did the form come from: form was faxed in    What clinic location was the form placed at?: [facility]    Where the form was placed: 's Box: Raúl Riddle MD    What number is listed as a contact on the form?: [phone]  Phone        Additional comments: ESTEFANI campo [facility] recert     Call taken on 4/27/2018 at 4:49 PM by Catherine Lombardo

## 2018-05-03 ENCOUNTER — OFFICE VISIT (OUTPATIENT)
Dept: FAMILY MEDICINE | Facility: CLINIC | Age: 58
End: 2018-05-03
Payer: MEDICARE

## 2018-05-03 VITALS
BODY MASS INDEX: 43.48 KG/M2 | RESPIRATION RATE: 20 BRPM | SYSTOLIC BLOOD PRESSURE: 108 MMHG | OXYGEN SATURATION: 96 % | TEMPERATURE: 98.5 F | DIASTOLIC BLOOD PRESSURE: 62 MMHG | WEIGHT: 303 LBS | HEART RATE: 67 BPM

## 2018-05-03 DIAGNOSIS — L84 CALLUS OF FOOT: Primary | ICD-10-CM

## 2018-05-03 PROCEDURE — 99213 OFFICE O/P EST LOW 20 MIN: CPT | Performed by: FAMILY MEDICINE

## 2018-05-03 NOTE — PATIENT INSTRUCTIONS
Wear shoes with wider toe box, avoid pressure on metatarsal area  Use pumice stone to keep callus from forming

## 2018-05-03 NOTE — MR AVS SNAPSHOT
After Visit Summary   5/3/2018    Petey Archibald    MRN: [id number]           Patient Information     Date Of Birth          1960        Visit Information        Provider Department      5/3/2018 11:30 AM Raúl Riddle MD [facility]        Today's Diagnoses     Callus of foot    -  1      Care Instructions    Wear shoes with wider toe box, avoid pressure on metatarsal area  Use pumice stone to keep callus from forming           Follow-ups after your visit        Follow-up notes from your care team     Return if symptoms worsen or fail to improve.      Your next 10 appointments already scheduled     May 23, 2018  3:30 PM CDT   Anticoagulation Visit with [facility]   [facility]    [address] [place] [id number] [phone]              Who to contact     If you have questions or need follow up information about today's clinic visit or your schedule please contact [facility] directly at [phone].  Normal or non-critical lab and imaging results will be communicated to you by SpecifiedByhart, letter or phone within 4 business days after the clinic has received the results. If you do not hear from us within 7 days, please contact the clinic through SpecifiedByhart or phone. If you have a critical or abnormal lab result, we will notify you by phone as soon as possible.  Submit refill requests through [employer] or call your pharmacy and they will forward the refill request to us. Please allow 3 business days for your refill to be completed.          Additional Information About Your Visit        MyChart Information     [employer] gives you secure access to your electronic health record. If you see a primary care provider, you can also send messages to your care team and make appointments. If you have questions, please call your primary  care clinic.  If you do not have a primary care provider, please call [phone] and they will assist you.        Care EveryWhere ID     This is your Care EveryWhere ID. This could be used by other organizations to access your [facility] medical records  [id number]        Your Vitals Were     Pulse Temperature Respirations Pulse Oximetry BMI (Body Mass Index)       67 98.5  F (36.9  C) (Tympanic) 20 96% 43.48 kg/m2        Blood Pressure from Last 3 Encounters:   05/03/18 108/62   03/12/18 100/60   02/02/18 110/72    Weight from Last 3 Encounters:   05/03/18 303 lb (137.4 kg)   03/12/18 (!) 308 lb (139.7 kg)   02/02/18 (!) 313 lb (142 kg)              Today, you had the following     No orders found for display       Primary Care Provider Office Phone # Fax #    Raúl Riddle [phone]       [address] [facility] [address]        Equal Access to Services     [facility]: Hadii aad ku hadasho Soomaali, waaxda luqadaha, qaybta kaalmada adeegyada, waxay idiin hayeileenn yue villasenor . So [facility] [phone].    ATENCIÓN: Si habla español, tiene a combs disposición servicios gratuitos de asistencia lingüística. Llame al [phone].    We comply with applicable federal civil rights laws and [place] laws. We do not discriminate on the basis of race, color, national origin, age, disability, sex, sexual orientation, or gender identity.            Thank you!     Thank you for choosing [facility]  for your care. Our goal is always to provide you with excellent care. Hearing back from our patients is one way we can continue to improve our services. Please take a few minutes to complete the written survey that you may receive in the mail after your visit with us. Thank you!             Your Updated Medication List - Protect others around you: Learn how to safely use, store and throw away your medicines at www.disposemymeds.org.          This list is accurate as  of 5/3/18 11:59 AM.  Always use your most recent med list.                   Brand Name Dispense Instructions for use Diagnosis    ABILIFY 10 MG tablet   Generic drug:  ARIPiprazole     30 tablet    Take 0.5 tablets (5 mg) by mouth daily        acetaminophen 325 MG tablet    TYLENOL    100 tablet    Take 1-2 tablets (325-650 mg) by mouth every 6 hours as needed    Knee pain, right       albuterol 108 (90 Base) MCG/ACT Inhaler    PROAIR HFA    1 Inhaler    Inhale 2 puffs into the lungs every 6 hours as needed    Pulmonary emphysema, unspecified emphysema type (H)       AMBIEN 10 MG tablet   Generic drug:  zolpidem     30 tablet    Take 1 tablet (10 mg) by mouth nightly as needed for sleep        ANUSOL-HC 25 MG Suppository   Generic drug:  hydrocortisone     14 Suppository    INSERT ONE SUPPOSITORY RECTALLY UP TO TWICE A DAY AS NEEDED    External hemorrhoids       BACTROBAN 2 % ointment   Generic drug:  mupirocin      Apply topically 2 times daily        budesonide 180 MCG/ACT inhaler    PULMICORT FLEXHALER    3 Inhaler    Inhale 2 puffs into the lungs 2 times daily    Mixed simple and mucopurulent chronic bronchitis (H)       BUPROPION HCL PO      Take 150 mg by mouth every morning        clotrimazole-betamethasone cream    LOTRISONE    60 g    Apply topically 2 times daily    Dermatitis       DEXILANT 60 MG Cpdr CR capsule   Generic drug:  dexlansoprazole     90 capsule    TAKE 1 CAPSULE BY MOUTH ONCE DAILY (FOR HEARTBURN)    Gastroesophageal reflux disease, esophagitis presence not specified       EPIPEN 0.3 MG/0.3ML injection   Generic drug:  EPINEPHrine      Inject 0.3 mg into the muscle once as needed.        Ferrous Gluconate 324 (37.5 Fe) MG Tabs      Take 1 tablet by mouth 2 times daily.        furosemide 40 MG tablet    LASIX     Take 2 tablets by mouth daily        hydrocortisone 2.5 % cream    ANUSOL-HC    28.35 g    Place rectally 2 times daily    Rectal pain       loratadine 10 MG tablet    CLARITIN      Take 10 mg by mouth daily.        Mometasone Furoate 200 MCG/ACT Aero     1 Inhaler    Inhale 2 puffs into the lungs 2 times daily    Mixed simple and mucopurulent chronic bronchitis (H)       mometasone-formoterol 200-5 MCG/ACT oral inhaler    DULERA    13 g    Inhale 2 puffs into the lungs 2 times daily    Intermittent asthma, uncomplicated       Multi-vitamin Tabs tablet   Generic drug:  multivitamin, therapeutic with minerals      1 TABLET DAILY        * order for DME     1 each    Equipment being ordered: support compression hose BK  2 pair black 30mm HG  To be applied on arising & removed while lying down to go to sleep    Localized edema       * order for DME     1 Device    Equipment being ordered: CPAP with mask and tubing    NEL (obstructive sleep apnea)       * order for DME     1 Device    Oxygen 2 Li/min at night and bled into BiPAP    Nocturnal hypoxemia       PHOSPHATE ENEMA RE      Place 1 enema rectally as needed.        Potassium Chloride ER 20 MEQ Tbcr      Take 1 tablet by mouth 2 times daily.        sertraline 100 MG tablet    ZOLOFT     Take 1.5 tablets by mouth daily.        simvastatin 40 MG tablet    ZOCOR     Take 1 tablet by mouth daily.        SINGULAIR 10 MG tablet   Generic drug:  montelukast      1 TABLET DAILY at 8:00 am        spironolactone 25 MG tablet    ALDACTONE     1 TABLET EVERY MORNING at 8:00 am        tiotropium 18 MCG capsule    SPIRIVA HANDIHALER    30 capsule    Once daily    Wheezing       triamcinolone 0.1 % cream    KENALOG    80 g    Apply topically 2 times daily as needed    Dermatitis       Vitamin D-3 1000 units Caps      Take 2 capsules by mouth daily        * warfarin 4 MG tablet    COUMADIN    64 tablet    Hold warfarin today ( 11/29/17) 10 mg Monday and Friday and 8 mg all other days.    Long term current use of anticoagulant therapy, History of pulmonary embolism       * warfarin 4 MG tablet    COUMADIN    60 tablet    10 mg( 2.5 tabs Mondays and Fridays, 8  mg all other days ( 2 tabs)    Long term current use of anticoagulant therapy, History of pulmonary embolism       * warfarin 4 MG tablet    COUMADIN    65 tablet    10 mg (2.5 tabs) Mon and Fri and 8 mg (2 tabs) all other days.    Long term current use of anticoagulant therapy, History of pulmonary embolism       ZEASORB-AF EX      Externally apply topically once a week        * Notice:  This list has 6 medication(s) that are the same as other medications prescribed for you. Read the directions carefully, and ask your doctor or other care provider to review them with you.

## 2018-05-03 NOTE — NURSING NOTE
"Chief Complaint   Patient presents with     Musculoskeletal Problem     LT foot       Initial /62 (BP Location: Right arm, Patient Position: Sitting, Cuff Size: Adult Large)  Pulse 67  Temp 98.5  F (36.9  C) (Tympanic)  Resp 20  Wt 303 lb (137.4 kg)  SpO2 96%  BMI 43.48 kg/m2 Estimated body mass index is 43.48 kg/(m^2) as calculated from the following:    Height as of 3/12/18: 5' 10\" (1.778 m).    Weight as of this encounter: 303 lb (137.4 kg).  Medication Reconciliation: complete     Princess ANTONIO Palumbo CMA      "

## 2018-05-03 NOTE — PROGRESS NOTES
SUBJECTIVE:   Petey Archibald is a 57 year old male who presents to clinic today for the following health issues:    Joint Pain    Onset: couple months    Description:   Location: LT foot, LT side  Character: painful    Intensity: moderate    Progression of Symptoms: worse    Accompanying Signs & Symptoms:  Other symptoms: swelling and redness    History:   Previous similar pain: YES      Precipitating factors:   Trauma or overuse: no     Alleviating factors:  Improved by: nothing    Therapies Tried and outcome: None. Was advised by group home nurse to soak it but he says he did not have anything to soak it in.    Pain from shoes rubbing on side of foot          Problem list and histories reviewed & adjusted, as indicated.  Additional history: as documented    Labs reviewed in EPIC    Reviewed and updated as needed this visit by clinical staff  Tobacco  Allergies  Meds  Problems  Med Hx  Surg Hx  Fam Hx  Soc Hx        Reviewed and updated as needed this visit by Provider  Allergies  Meds  Problems         ROS:  CONSTITUTIONAL:NEGATIVE for fever, chills, change in weight  INTEGUMENTARY/SKIN: callus both feet  MUSCULOSKELETAL: POSITIVE  for arthralgias feet    OBJECTIVE:                                                    /62 (BP Location: Right arm, Patient Position: Sitting, Cuff Size: Adult Large)  Pulse 67  Temp 98.5  F (36.9  C) (Tympanic)  Resp 20  Wt 303 lb (137.4 kg)  SpO2 96%  BMI 43.48 kg/m2  Body mass index is 43.48 kg/(m^2).  GENERAL APPEARANCE: healthy, alert and no distress  MS: extremities normal- no gross deformities noted  ORTHO: Ankle Exam:   Knee:normal appearance, normal on palpation  Lower leg:normal appearance, normal on palpation    ANKLE  Inspection:Swelling:none   Tender:none  Non-tender:all normal  Range of Motion:dorsiflexion:  full, plantarflexion:  full, inversion:  full, eversion:  full  Strength:dorsiflexion:  5/5, plantarflexion:  5/5, inversion: 5/5,  eversion:5/5  Special tests:negative anterior drawer, negative talar tilt, negative valgus stress  Stance: normal    FOOT  foot exam : Inspection Palpation:   Swelling: lateral swelling  Tender::peroneal tendon:  None,  Slight tenderness at 5th metatarsal head  Non-tender:remainder of exam  Range of Motion:flexion of toes:  full, extension of toes  full      SKIN: callus on both feet at lateral 5th metatarsal head  Soft tissue puffiness Lt foot lateral area but no localized fluid    Callus debrided, shaved down on both feet    Diagnostic test results:  none      ASSESSMENT/PLAN:                                                        ICD-10-CM    1. Callus of foot L84     bilateral feet       Follow up with Provider - as needed    Patient Instructions   Wear shoes with wider toe box, avoid pressure on metatarsal area  Use pumice stone to keep callus from forming       Raúl Riddle MD  [facility]

## 2018-05-10 DIAGNOSIS — Z91.030 ALLERGIC TO BEES: Primary | ICD-10-CM

## 2018-05-10 NOTE — TELEPHONE ENCOUNTER
"Requested Prescriptions   Pending Prescriptions Disp Refills     EPIPEN 2-BRE 0.3 MG/0.3ML injection 2-pack [Pharmacy Med Name: EPIPEN 2-BRE INJ 0.3MG]  Last Written Prescription Date:  n/a  Last Fill Quantity: n/a,  # refills: n/a   Last office visit: 5/3/2018 with prescribing provider:  Venkatesh   Future Office Visit:      0     Sig: INJECT 0.3MG INTRAMUSCULAR ONE TIME FOR ONE DOSE AS NEEDED FOR ANAPHYLAXIS (CALL 911 IF YOU HAVE TO GIVE) (FOR BEE STINGS) **LABEL EACH PEN*    Anaphylaxis Kits Protocol Passed    5/10/2018  4:30 PM       Passed - Recent (12 mo) or future (30 days) visit witin the authorizing provider's specialty    Patient had office visit in the last 12 months or has a visit in the next 30 days with authorizing provider or within the authorizing provider's specialty.  See \"Patient Info\" tab in inbasket, or \"Choose Columns\" in Meds & Orders section of the refill encounter.           Passed - Patient is age 5 or older          "

## 2018-05-11 DIAGNOSIS — E78.00 HYPERCHOLESTEROLEMIA: ICD-10-CM

## 2018-05-11 DIAGNOSIS — K21.9 GASTROESOPHAGEAL REFLUX DISEASE, ESOPHAGITIS PRESENCE NOT SPECIFIED: Primary | ICD-10-CM

## 2018-05-11 DIAGNOSIS — R60.0 LOCALIZED EDEMA: ICD-10-CM

## 2018-05-12 NOTE — TELEPHONE ENCOUNTER
"Requested Prescriptions   Pending Prescriptions Disp Refills     potassium chloride SA (K-DUR/KLOR-CON M) 20 MEQ CR tablet [Pharmacy Med Name: POTASSIUM Cl 20MEQ MICRO ER TB]  Last Written Prescription Date:   Last Fill Quantity: ,  # refills:    Last office visit: 5/3/2018 with prescribing provider:  DAWNA   Future Office Visit:      11     Sig: TAKE 1 TABLET BY MOUTH TWICE DAILY. (LOW POTASSIUM)    Potassium Supplements Protocol Failed    5/11/2018  4:06 PM       Failed - Normal serum potassium in past 12 months    Recent Labs   Lab Test  05/03/17   1059   POTASSIUM  4.0                   Passed - Recent (12 mo) or future (30 days) visit within the authorizing provider's specialty    Patient had office visit in the last 12 months or has a visit in the next 30 days with authorizing provider or within the authorizing provider's specialty.  See \"Patient Info\" tab in inbasket, or \"Choose Columns\" in Meds & Orders section of the refill encounter.           Passed - Patient is age 18 or older        furosemide (LASIX) 40 MG tablet [Pharmacy Med Name: FUROSEMIDE TAB 40MG]  Last Written Prescription Date:    Last Fill Quantity: ,  # refills:    Last office visit: 5/3/2018 with prescribing provider:  DAWNA   Future Office Visit:      11     Sig: TAKE 1 TABLET BY MOUTH TWICE DAILY (7AM & 3PM) (DIURETIC)    Diuretics (Including Combos) Protocol Failed    5/11/2018  4:06 PM       Failed - Normal serum creatinine on file in past 12 months    Recent Labs   Lab Test  05/03/17   1059   CR  0.99             Failed - Normal serum potassium on file in past 12 months    Recent Labs   Lab Test  05/03/17   1059   POTASSIUM  4.0                   Failed - Normal serum sodium on file in past 12 months    Recent Labs   Lab Test  05/03/17   1059   NA  142             Passed - Blood pressure under 140/90 in past 12 months    BP Readings from Last 3 Encounters:   05/03/18 108/62   03/12/18 100/60   02/02/18 110/72                Passed - " "Recent (12 mo) or future (30 days) visit within the authorizing provider's specialty    Patient had office visit in the last 12 months or has a visit in the next 30 days with authorizing provider or within the authorizing provider's specialty.  See \"Patient Info\" tab in inbasket, or \"Choose Columns\" in Meds & Orders section of the refill encounter.           Passed - Patient is age 18 or older        simvastatin (ZOCOR) 40 MG tablet [Pharmacy Med Name: SIMVASTATIN 40MG TABS]  Last Written Prescription Date:    Last Fill Quantity: ,  # refills:    Last office visit: 5/3/2018 with prescribing provider:  DAWNA   Future Office Visit:      11     Sig: TAKE 1 TABLET BY MOUTH EVERY MORNING.  (CHOLESTEROL)    Statins Protocol Failed    5/11/2018  4:06 PM       Failed - LDL on file in past 12 months    Recent Labs   Lab Test  05/03/17   1059   LDL  73            Passed - No abnormal creatine kinase in past 12 months    No lab results found.            Passed - Recent (12 mo) or future (30 days) visit within the authorizing provider's specialty    Patient had office visit in the last 12 months or has a visit in the next 30 days with authorizing provider or within the authorizing provider's specialty.  See \"Patient Info\" tab in inbasket, or \"Choose Columns\" in Meds & Orders section of the refill encounter.           Passed - Patient is age 18 or older        spironolactone (ALDACTONE) 25 MG tablet [Pharmacy Med Name: SPIRONOLACTONE 25MG TABS]  Last Written Prescription Date:    Last Fill Quantity: ,  # refills:    Last office visit: 5/3/2018 with prescribing provider:  DAWNA   Future Office Visit:      11     Sig: TAKE 1 TABLET BY MOUTH ONCE DAILY. (HIGH BLOOD PRESSURE)    Diuretics (Including Combos) Protocol Failed    5/11/2018  4:06 PM       Failed - Normal serum creatinine on file in past 12 months    Recent Labs   Lab Test  05/03/17   1059   CR  0.99             Failed - Normal serum potassium on file in past 12 months    " "Recent Labs   Lab Test  05/03/17   1059   POTASSIUM  4.0                   Failed - Normal serum sodium on file in past 12 months    Recent Labs   Lab Test  05/03/17   1059   NA  142             Passed - Blood pressure under 140/90 in past 12 months    BP Readings from Last 3 Encounters:   05/03/18 108/62   03/12/18 100/60   02/02/18 110/72                Passed - Recent (12 mo) or future (30 days) visit within the authorizing provider's specialty    Patient had office visit in the last 12 months or has a visit in the next 30 days with authorizing provider or within the authorizing provider's specialty.  See \"Patient Info\" tab in inbasket, or \"Choose Columns\" in Meds & Orders section of the refill encounter.           Passed - Patient is age 18 or older        montelukast (SINGULAIR) 10 MG tablet [Pharmacy Med Name: MONTELUKAST TAB 10MG]  Last Written Prescription Date:    Last Fill Quantity: ,  # refills:    Last office visit: 5/3/2018 with prescribing provider:  DAWNA   Future Office Visit:      11     Sig: TAKE 1 TABLET BY MOUTH EVERY MORNING. (ASTHMA)    Leukotriene Inhibitors Protocol Passed    5/11/2018  4:06 PM       Passed - Patient is age 12 or older    If patient is under 16, ok to refill using age based dosing.          Passed - Recent (12 mo) or future (30 days) visit within the authorizing provider's specialty    Patient had office visit in the last 12 months or has a visit in the next 30 days with authorizing provider or within the authorizing provider's specialty.  See \"Patient Info\" tab in inbasket, or \"Choose Columns\" in Meds & Orders section of the refill encounter.              "

## 2018-05-14 PROBLEM — Z91.030 ALLERGIC TO BEES: Status: ACTIVE | Noted: 2018-05-14

## 2018-05-14 RX ORDER — EPINEPHRINE 0.3 MG/.3ML
INJECTION INTRAMUSCULAR
Qty: 2 ML | Refills: 3 | Status: SHIPPED | OUTPATIENT
Start: 2018-05-14 | End: 2018-12-06

## 2018-05-14 RX ORDER — FUROSEMIDE 40 MG
TABLET ORAL
Qty: 62 TABLET | Refills: 11 | Status: SHIPPED | OUTPATIENT
Start: 2018-05-14 | End: 2019-08-12

## 2018-05-14 RX ORDER — SIMVASTATIN 40 MG
TABLET ORAL
Qty: 31 TABLET | Refills: 11 | Status: SHIPPED | OUTPATIENT
Start: 2018-05-14 | End: 2021-04-13

## 2018-05-14 RX ORDER — POTASSIUM CHLORIDE 1500 MG/1
TABLET, EXTENDED RELEASE ORAL
Qty: 62 TABLET | Refills: 11 | Status: SHIPPED | OUTPATIENT
Start: 2018-05-14 | End: 2021-03-23

## 2018-05-14 RX ORDER — MONTELUKAST SODIUM 10 MG/1
TABLET ORAL
Qty: 90 TABLET | Refills: 1 | Status: SHIPPED | OUTPATIENT
Start: 2018-05-14 | End: 2018-11-09

## 2018-05-14 RX ORDER — SPIRONOLACTONE 25 MG/1
TABLET ORAL
Qty: 31 TABLET | Refills: 11 | Status: SHIPPED | OUTPATIENT
Start: 2018-05-14 | End: 2021-04-13

## 2018-05-14 NOTE — TELEPHONE ENCOUNTER
Routing refill request to provider for review/approval because:  All medications need associated diagnoses and patient is overdue for the pended labs per the RN protocol, RNs unable to fill.

## 2018-05-23 ENCOUNTER — ANTICOAGULATION THERAPY VISIT (OUTPATIENT)
Dept: NURSING | Facility: CLINIC | Age: 58
End: 2018-05-23
Payer: MEDICARE

## 2018-05-23 DIAGNOSIS — Z79.01 LONG TERM CURRENT USE OF ANTICOAGULANT THERAPY: ICD-10-CM

## 2018-05-23 DIAGNOSIS — Z79.01 LONG-TERM (CURRENT) USE OF ANTICOAGULANTS: ICD-10-CM

## 2018-05-23 DIAGNOSIS — Z86.711 HISTORY OF PULMONARY EMBOLISM: ICD-10-CM

## 2018-05-23 LAB — INR POINT OF CARE: 2.3 (ref 2–3)

## 2018-05-23 PROCEDURE — 36416 COLLJ CAPILLARY BLOOD SPEC: CPT

## 2018-05-23 PROCEDURE — 85610 PROTHROMBIN TIME: CPT | Mod: QW

## 2018-05-23 PROCEDURE — 99207 ZZC NO CHARGE NURSE ONLY: CPT

## 2018-05-23 RX ORDER — WARFARIN SODIUM 4 MG/1
TABLET ORAL
Qty: 65 TABLET | Refills: 1 | Status: SHIPPED | OUTPATIENT
Start: 2018-05-23 | End: 2018-07-25

## 2018-05-23 NOTE — MR AVS SNAPSHOT
Petey Archibald   5/23/2018 3:30 PM   Anticoagulation Therapy Visit    Description:  57 year old male   Provider:  [facility]   Department:  Bx Nurse           INR as of 5/23/2018     Today's INR 2.3      Anticoagulation Summary as of 5/23/2018     INR goal 2.0-3.0   Today's INR 2.3   Full warfarin instructions 10 mg on Mon, Fri; 8 mg all other days   Next INR check 6/20/2018    Indications   History of pulmonary embolism [Z86.711]  Long-term (current) use of anticoagulants [Z79.01] [Z79.01]         Your next Anticoagulation Clinic appointment(s)     Jun 20, 2018  4:00 PM CDT   Anticoagulation Visit with  ANTICOAGULATION CLINIC   [facility] ([facility])    [address] [place] [address] [phone]              Contact Numbers     [facility]  Please call  [phone] to cancel and/or reschedule your appointment   The direct line to the anticoagulant nurse is [phone] on Monday, Wednesday, and Friday. On Thursday, the anticoagulant nurse can be reached directly at [phone].         May 2018 Details    Sun Mon Tue Wed Thu Fri Sat       1               2               3               4               5                 6               7               8               9               10               11               12                 13               14               15               16               17               18               19                 20               21               22               23      8 mg   See details      24      8 mg         25      10 mg         26      8 mg           27      8 mg         28      10 mg         29      8 mg         30      8 mg         31      8 mg            Date Details   05/23 This INR check               How to take your warfarin dose     To take:  8 mg Take 2 of the 4 mg tablets.    To take:  10 mg Take 2.5 of the 4 mg tablets.            June 2018 Details    Sun Mon Tue Wed Thu Fri Sat          1      10 mg         2      8 mg           3      8 mg         4      10 mg         5      8 mg         6      8 mg         7      8 mg         8      10 mg         9      8 mg           10      8 mg         11      10 mg         12      8 mg         13      8 mg         14      8 mg         15      10 mg         16      8 mg           17      8 mg         18      10 mg         19      8 mg         20            21               22               23                 24               25               26               27               28               29               30                Date Details   No additional details    Date of next INR:  6/20/2018         How to take your warfarin dose     To take:  8 mg Take 2 of the 4 mg tablets.    To take:  10 mg Take 2.5 of the 4 mg tablets.

## 2018-05-23 NOTE — PROGRESS NOTES
ANTICOAGULATION FOLLOW-UP CLINIC VISIT    Patient Name:  Petey Archibald  Date:  5/23/2018  Contact Type:  Face to Face    SUBJECTIVE:     Patient Findings     Positives Bruising           OBJECTIVE    INR Protime   Date Value Ref Range Status   05/23/2018 2.3 2.0 - 3.0 Final       ASSESSMENT / PLAN  INR assessment THER    Recheck INR In: 4 WEEKS    INR Location Clinic      Anticoagulation Summary as of 5/23/2018     INR goal 2.0-3.0   Today's INR 2.3   Warfarin maintenance plan 10 mg (4 mg x 2.5) on Mon, Fri; 8 mg (4 mg x 2) all other days   Full warfarin instructions 10 mg on Mon, Fri; 8 mg all other days   Weekly warfarin total 60 mg   No change documented Prudence Machuca RN   Plan last modified Lisa Ponce RN (1/2/2018)   Next INR check 6/20/2018   Target end date Indefinite    Indications   History of pulmonary embolism [Z86.711]  Long-term (current) use of anticoagulants [Z79.01] [Z79.01]         Anticoagulation Episode Summary     INR check location [facility]    Preferred lab     Send INR reminders to [facility] INR/PROTIME    Comments       Anticoagulation Care Providers     Provider Role Specialty Phone number    Silvino Baker MD [facility] [phone]            See the Encounter Report to view Anticoagulation Flowsheet and Dosing Calendar (Go to Encounters tab in chart review, and find the Anticoagulation Therapy Visit)        Prudence Machuca, RN

## 2018-06-22 ENCOUNTER — ANTICOAGULATION THERAPY VISIT (OUTPATIENT)
Dept: NURSING | Facility: CLINIC | Age: 58
End: 2018-06-22
Payer: MEDICARE

## 2018-06-22 DIAGNOSIS — Z79.01 LONG-TERM (CURRENT) USE OF ANTICOAGULANTS: ICD-10-CM

## 2018-06-22 DIAGNOSIS — Z86.711 HISTORY OF PULMONARY EMBOLISM: ICD-10-CM

## 2018-06-22 LAB — INR POINT OF CARE: 2.2 (ref 0.86–1.14)

## 2018-06-22 PROCEDURE — 85610 PROTHROMBIN TIME: CPT | Mod: QW

## 2018-06-22 PROCEDURE — 99207 ZZC NO CHARGE NURSE ONLY: CPT

## 2018-06-22 PROCEDURE — 36416 COLLJ CAPILLARY BLOOD SPEC: CPT

## 2018-06-22 NOTE — PROGRESS NOTES
ANTICOAGULATION FOLLOW-UP CLINIC VISIT    Patient Name:  Petey Archiblad  Date:  6/22/2018  Contact Type:  Face to Face    SUBJECTIVE:     Patient Findings     Positives No Problem Findings           OBJECTIVE    INR Protime   Date Value Ref Range Status   06/22/2018 2.2 (A) 0.86 - 1.14 Final       ASSESSMENT / PLAN  INR assessment THER    Recheck INR In: 4 WEEKS    INR Location Clinic      Anticoagulation Summary as of 6/22/2018     INR goal 2.0-3.0   Today's INR 2.2   Warfarin maintenance plan 10 mg (4 mg x 2.5) on Mon, Fri; 8 mg (4 mg x 2) all other days   Full warfarin instructions 10 mg on Mon, Fri; 8 mg all other days   Weekly warfarin total 60 mg   No change documented Lisa Ponce RN   Plan last modified Lisa Ponce RN (1/2/2018)   Next INR check 7/25/2018   Target end date Indefinite    Indications   History of pulmonary embolism [Z86.711]  Long-term (current) use of anticoagulants [Z79.01] [Z79.01]         Anticoagulation Episode Summary     INR check location [facility]    Preferred lab     Send INR reminders to [facility] INR/PROTIME    Comments       Anticoagulation Care Providers     Provider Role Specialty Phone number    Sivlino Baker MD [facility] [phone]            See the Encounter Report to view Anticoagulation Flowsheet and Dosing Calendar (Go to Encounters tab in chart review, and find the Anticoagulation Therapy Visit)        Lisa Ponce RN

## 2018-06-22 NOTE — MR AVS SNAPSHOT
Petey Archibald   6/22/2018 2:30 PM   Anticoagulation Therapy Visit    Description:  57 year old male   Provider:  [facility]   Department:  Bx Nurse           INR as of 6/22/2018     Today's INR 2.2      Anticoagulation Summary as of 6/22/2018     INR goal 2.0-3.0   Today's INR 2.2   Full warfarin instructions 10 mg on Mon, Fri; 8 mg all other days   Next INR check 7/25/2018    Indications   History of pulmonary embolism [Z86.711]  Long-term (current) use of anticoagulants [Z79.01] [Z79.01]         Your next Anticoagulation Clinic appointment(s)     Jul 25, 2018  3:30 PM CDT   Anticoagulation [facility] with  [facility] CLINIC   [facility]    [address] [place] [address] [phone]              Contact Numbers     [facility]  Please call  [phone] to cancel and/or reschedule your appointment   The direct line to the anticoagulant nurse is [phone] on Monday, Wednesday, and Friday. On Thursday, the anticoagulant nurse can be reached directly at [phone].         June 2018 Details    Sun Mon Tue Wed Thu Fri Sat          1               2                 3               4               5               6               7               8               9                 10               11               12               13               14               15               16                 17               18               19               20               21               22      10 mg   See details      23      8 mg           24      8 mg         25      10 mg         26      8 mg         27      8 mg         28      8 mg         29      10 mg         30      8 mg          Date Details   06/22 This INR check               How to take your warfarin dose     To take:  8 mg Take 2 of the 4 mg tablets.    To take:  10 mg Take 2.5 of the 4 mg tablets.           July 2018  Details    Sun Mon Tue Wed Thu Fri Sat     1      8 mg         2      10 mg         3      8 mg         4      8 mg         5      8 mg         6      10 mg         7      8 mg           8      8 mg         9      10 mg         10      8 mg         11      8 mg         12      8 mg         13      10 mg         14      8 mg           15      8 mg         16      10 mg         17      8 mg         18      8 mg         19      8 mg         20      10 mg         21      8 mg           22      8 mg         23      10 mg         24      8 mg         25            26               27               28                 29               30               31                    Date Details   No additional details    Date of next INR:  7/25/2018         How to take your warfarin dose     To take:  8 mg Take 2 of the 4 mg tablets.    To take:  10 mg Take 2.5 of the 4 mg tablets.

## 2018-07-25 ENCOUNTER — ANTICOAGULATION THERAPY VISIT (OUTPATIENT)
Dept: NURSING | Facility: CLINIC | Age: 58
End: 2018-07-25
Payer: MEDICARE

## 2018-07-25 DIAGNOSIS — Z86.711 HISTORY OF PULMONARY EMBOLISM: ICD-10-CM

## 2018-07-25 DIAGNOSIS — Z79.01 LONG TERM CURRENT USE OF ANTICOAGULANT THERAPY: ICD-10-CM

## 2018-07-25 LAB — INR POINT OF CARE: 2.4 (ref 2–3)

## 2018-07-25 PROCEDURE — 36416 COLLJ CAPILLARY BLOOD SPEC: CPT

## 2018-07-25 PROCEDURE — 99207 ZZC NO CHARGE NURSE ONLY: CPT

## 2018-07-25 PROCEDURE — 85610 PROTHROMBIN TIME: CPT | Mod: QW

## 2018-07-25 RX ORDER — WARFARIN SODIUM 4 MG/1
TABLET ORAL
Qty: 65 TABLET | Refills: 1 | Status: SHIPPED | OUTPATIENT
Start: 2018-07-25 | End: 2018-09-05

## 2018-07-25 NOTE — TELEPHONE ENCOUNTER
Requested Prescriptions   Pending Prescriptions Disp Refills     warfarin (COUMADIN) 4 MG tablet 65 tablet 1     Sig: 10 mg( 2.5 tabs Mondays and Fridays, 8 mg all other days ( 2 tabs)    There is no refill protocol information for this order

## 2018-07-25 NOTE — MR AVS SNAPSHOT
Petey Archibald   7/25/2018 3:30 PM   Anticoagulation Therapy Visit    Description:  57 year old male   Provider[facility]  [facility]   Department:  Bx Nurse           INR as of 7/25/2018     Today's INR 2.4      Anticoagulation Summary as of 7/25/2018     INR goal 2.0-3.0   Today's INR 2.4   Full warfarin instructions 10 mg on Mon, Fri; 8 mg all other days   Next INR check 9/5/2018    Indications   History of pulmonary embolism [Z86.711]  Long-term (current) use of anticoagulants [Z79.01] [Z79.01]         Your next Anticoagulation Clinic appointment(s)     Sep 05, 2018  3:15 PM CDT   Anticoagulation Visit with  ANTICOAGULATION CLINIC   [facility]    [address] [place] [address] [phone]              Contact Numbers     [facility]  Please call  [phone] to cancel and/or reschedule your appointment   The direct line to the anticoagulant nurse is [phone] on Monday, Wednesday, and Friday. On Thursday, the anticoagulant nurse can be reached directly at [phone].         July 2018 Details    Sun Mon Tue Wed Thu Fri Sat     1               2               3               4               5               6               7                 8               9               10               11               12               13               14                 15               16               17               18               19               20               21                 22               23               24               25      8 mg   See details      26      8 mg         27      10 mg         28      8 mg           29      8 mg         30      10 mg         31      8 mg              Date Details   07/25 This INR check               How to take your warfarin dose     To take:  8 mg Take 2 of the 4 mg tablets.    To take:  10 mg Take 2.5 of the 4 mg tablets.            August 2018 Details    Sun Mon Tue Wed Thu Fri Sat        1      8 mg         2      8 mg         3      10 mg         4      8 mg           5      8 mg         6      10 mg         7      8 mg         8      8 mg         9      8 mg         10      10 mg         11      8 mg           12      8 mg         13      10 mg         14      8 mg         15      8 mg         16      8 mg         17      10 mg         18      8 mg           19      8 mg         20      10 mg         21      8 mg         22      8 mg         23      8 mg         24      10 mg         25      8 mg           26      8 mg         27      10 mg         28      8 mg         29      8 mg         30      8 mg         31      10 mg           Date Details   No additional details            How to take your warfarin dose     To take:  8 mg Take 2 of the 4 mg tablets.    To take:  10 mg Take 2.5 of the 4 mg tablets.           September 2018 Details    Sun Mon Tue Wed u Fri Sat           1      8 mg           2      8 mg         3      10 mg         4      8 mg         5            6               7               8                 9               10               11               12               13               14               15                 16               17               18               19               20               21               22                 23               24               25               26               27               28               29                 30                      Date Details   No additional details    Date of next INR:  9/5/2018         How to take your warfarin dose     To take:  8 mg Take 2 of the 4 mg tablets.    To take:  10 mg Take 2.5 of the 4 mg tablets.

## 2018-07-25 NOTE — PROGRESS NOTES
ANTICOAGULATION FOLLOW-UP CLINIC VISIT    Patient Name:  Petey Archibald  Date:  7/25/2018  Contact Type:  Face to Face    SUBJECTIVE:     Patient Findings     Positives No Problem Findings           OBJECTIVE    INR Protime   Date Value Ref Range Status   07/25/2018 2.4 2.0 - 3.0 Final       ASSESSMENT / PLAN  INR assessment THER    Recheck INR In: 6 WEEKS    INR Location Clinic      Anticoagulation Summary as of 7/25/2018     INR goal 2.0-3.0   Today's INR 2.4   Warfarin maintenance plan 10 mg (4 mg x 2.5) on Mon, Fri; 8 mg (4 mg x 2) all other days   Full warfarin instructions 10 mg on Mon, Fri; 8 mg all other days   Weekly warfarin total 60 mg   No change documented Prudence Machuca RN   Plan last modified Lisa Ponce RN (1/2/2018)   Next INR check 9/5/2018   Target end date Indefinite    Indications   History of pulmonary embolism [Z86.711]  Long-term (current) use of anticoagulants [Z79.01] [Z79.01]         Anticoagulation Episode Summary     INR check location [facility]    Preferred lab     Send INR reminders to [facility] INR/PROTIME    Comments       Anticoagulation Care Providers     Provider Role Specialty Phone number    Silvino Baker MD [facility] [phone]            See the Encounter Report to view Anticoagulation Flowsheet and Dosing Calendar (Go to Encounters tab in chart review, and find the Anticoagulation Therapy Visit)        Prudence Machuca, RN

## 2018-08-14 ENCOUNTER — TELEPHONE (OUTPATIENT)
Dept: FAMILY MEDICINE | Facility: CLINIC | Age: 58
End: 2018-08-14

## 2018-08-14 ENCOUNTER — OFFICE VISIT (OUTPATIENT)
Dept: FAMILY MEDICINE | Facility: CLINIC | Age: 58
End: 2018-08-14
Payer: MEDICARE

## 2018-08-14 VITALS
HEART RATE: 69 BPM | RESPIRATION RATE: 18 BRPM | OXYGEN SATURATION: 96 % | SYSTOLIC BLOOD PRESSURE: 110 MMHG | DIASTOLIC BLOOD PRESSURE: 66 MMHG | BODY MASS INDEX: 43.19 KG/M2 | WEIGHT: 301 LBS

## 2018-08-14 DIAGNOSIS — M79.675 PAIN AND SWELLING OF TOE OF LEFT FOOT: ICD-10-CM

## 2018-08-14 DIAGNOSIS — M79.89 PAIN AND SWELLING OF TOE OF LEFT FOOT: ICD-10-CM

## 2018-08-14 DIAGNOSIS — L05.01 PILONIDAL CYST WITH ABSCESS: Primary | ICD-10-CM

## 2018-08-14 LAB
GRAM STN SPEC: ABNORMAL
SPECIMEN SOURCE: ABNORMAL

## 2018-08-14 PROCEDURE — 87077 CULTURE AEROBIC IDENTIFY: CPT | Performed by: PHYSICIAN ASSISTANT

## 2018-08-14 PROCEDURE — 10080 I&D PILONIDAL CYST SIMPLE: CPT | Performed by: PHYSICIAN ASSISTANT

## 2018-08-14 PROCEDURE — 87205 SMEAR GRAM STAIN: CPT | Performed by: PHYSICIAN ASSISTANT

## 2018-08-14 PROCEDURE — 99214 OFFICE O/P EST MOD 30 MIN: CPT | Mod: 25 | Performed by: PHYSICIAN ASSISTANT

## 2018-08-14 PROCEDURE — 87070 CULTURE OTHR SPECIMN AEROBIC: CPT | Performed by: PHYSICIAN ASSISTANT

## 2018-08-14 RX ORDER — METRONIDAZOLE 500 MG/1
500 TABLET ORAL 2 TIMES DAILY
Qty: 14 TABLET | Refills: 0 | Status: SHIPPED | OUTPATIENT
Start: 2018-08-14 | End: 2018-09-05

## 2018-08-14 RX ORDER — CIPROFLOXACIN 500 MG/1
500 TABLET, FILM COATED ORAL 2 TIMES DAILY
Qty: 14 TABLET | Refills: 0 | Status: SHIPPED | OUTPATIENT
Start: 2018-08-14 | End: 2018-09-05

## 2018-08-14 ASSESSMENT — ENCOUNTER SYMPTOMS
PSYCHIATRIC NEGATIVE: 1
RESPIRATORY NEGATIVE: 1
CARDIOVASCULAR NEGATIVE: 1
NEUROLOGICAL NEGATIVE: 1
ROS GI COMMENTS: AS IN HPI
EYES NEGATIVE: 1
CONSTITUTIONAL NEGATIVE: 1

## 2018-08-14 NOTE — MR AVS SNAPSHOT
After Visit Summary   8/14/2018    Petey Archibald    MRN: [id number]           Patient Information     Date Of Birth          1960        Visit Information        Provider Department      8/14/2018 11:50 AM Leah Engle PA-C [facility]        Today's Diagnoses     Pain and swelling of toe of left foot    -  1    Pilonidal cyst with abscess           Follow-ups after your visit        Additional Services     ORTHO  REFERRAL       [facility] is referring you to the Orthopedic  Services at [facility].       The  Representative will assist you in the coordination of your Orthopedic and Musculoskeletal Care as prescribed by your physician.    The  Representative will call you within 1 business day to help schedule your appointment, or you may contact the  Representative at:    All areas ~ [phone]     Type of Referral : [facility]       Timeframe requested: 3 - 5 days    Coverage of these services is subject to the terms and limitations of your health insurance plan.  Please call member services at your health plan with any benefit or coverage questions.      If X-rays, CT or MRI's have been performed, please contact the facility where they were done to arrange for , prior to your scheduled appointment.  Please bring this referral request to your appointment and present it to your specialist.                  Your next 10 appointments already scheduled     Sep 05, 2018  3:15 PM CDT   Anticoagulation Visit with [facility]   [facility]    [address] [place] [address] [id number] [phone]              Who to contact     If you have questions or need follow up information about today's clinic visit or your schedule please  [facility] at [phone].  Normal or non-critical lab and imaging results will be communicated to you by MyChart, letter or phone within 4 business days after the clinic has received the results. If you do not hear from us within 7 days, please contact the clinic through [employer] or phone. If you have a critical or abnormal lab result, we will notify you by phone as soon as possible.  Submit refill requests through [employer] or call your pharmacy and they will forward the refill request to us. Please allow 3 business days for your refill to be completed.          Additional Information About Your Visit        [employer] Information     [employer] gives you secure access to your electronic health record. If you see a primary care provider, you can also send messages to your care team and make appointments. If you have questions, please call your primary care clinic.  If you do not have a primary care provider, please call [phone] and they will assist you.        Care EveryWhere ID     This is your Care EveryWhere ID. This could be used by other organizations to access your [place] medical records  [id number]        Your Vitals Were     Pulse Respirations Pulse Oximetry BMI (Body Mass Index)          69 18 96% 43.19 kg/m2         Blood Pressure from Last 3 Encounters:   08/14/18 110/66   05/03/18 108/62   03/12/18 100/60    Weight from Last 3 Encounters:   08/14/18 301 lb (136.5 kg)   05/03/18 303 lb (137.4 kg)   03/12/18 (!) 308 lb (139.7 kg)              We Performed the Following     DEPRESSION ACTION PLAN (DAP)     ORTHO  REFERRAL          Today's Medication Changes          These changes are accurate as of 8/14/18 12:50 PM.  If you have any questions, ask your nurse or doctor.               Start taking these medicines.        Dose/Directions    ciprofloxacin 500 MG tablet   Commonly known as:  CIPRO   Used for:  Pilonidal cyst with abscess   Started by:   Leah Engle PA-C        Dose:  500 mg   Take 1 tablet (500 mg) by mouth 2 times daily   Quantity:  14 tablet   Refills:  0       metroNIDAZOLE 500 MG tablet   Commonly known as:  FLAGYL   Used for:  Pilonidal cyst with abscess   Started by:  Leah Engle PA-C        Dose:  500 mg   Take 1 tablet (500 mg) by mouth 2 times daily   Quantity:  14 tablet   Refills:  0            Where to get your medicines      These medications were sent to [facility] - [facility] [id number] [place] [address]  [id number] [place] [address]     Phone:  [phone]     ciprofloxacin 500 MG tablet    metroNIDAZOLE 500 MG tablet                Primary Care Provider Office Phone # Fax #    Raúl Riddle [phone] [address] [facility] [address]        Equal Access to Services     [facility] : Hadii deysi ku hadasho Sosungali, waaxda luqadaha, qaybta kaalmada adeegyada, waxay flaviain hayeileenn yue villasenor . So [facility] [phone].    ATENCIÓN: Si habla español, tiene a combs disposición servicios gratuitos de asistencia lingüística. Llame al [phone].    We comply with applicable federal civil rights laws and [place] laws. We do not discriminate on the basis of race, color, national origin, age, disability, sex, sexual orientation, or gender identity.            Thank you!     Thank you for choosing [facility]  for your care. Our goal is always to provide you with excellent care. Hearing back from our patients is one way we can continue to improve our services. Please take a few minutes to complete the written survey that you may receive in the mail after your visit with us. Thank you!             Your Updated Medication List - Protect others around you: Learn how to safely use, store and throw away your medicines at www.disposemymeds.org.          This list is accurate as of 8/14/18 12:50 PM.  Always use your most  recent med list.                   Brand Name Dispense Instructions for use Diagnosis    ABILIFY 10 MG tablet   Generic drug:  ARIPiprazole     30 tablet    Take 0.5 tablets (5 mg) by mouth daily        acetaminophen 325 MG tablet    TYLENOL    100 tablet    Take 1-2 tablets (325-650 mg) by mouth every 6 hours as needed    Knee pain, right       albuterol 108 (90 Base) MCG/ACT inhaler    PROAIR HFA    1 Inhaler    Inhale 2 puffs into the lungs every 6 hours as needed    Pulmonary emphysema, unspecified emphysema type (H)       AMBIEN 10 MG tablet   Generic drug:  zolpidem     30 tablet    Take 1 tablet (10 mg) by mouth nightly as needed for sleep        ANUSOL-HC 25 MG Suppository   Generic drug:  hydrocortisone     14 Suppository    INSERT ONE SUPPOSITORY RECTALLY UP TO TWICE A DAY AS NEEDED    External hemorrhoids       BACTROBAN 2 % ointment   Generic drug:  mupirocin      Apply topically 2 times daily        BUPROPION HCL PO      Take 150 mg by mouth every morning        ciprofloxacin 500 MG tablet    CIPRO    14 tablet    Take 1 tablet (500 mg) by mouth 2 times daily    Pilonidal cyst with abscess       clotrimazole-betamethasone cream    LOTRISONE    60 g    Apply topically 2 times daily    Dermatitis       DEXILANT 60 MG Cpdr CR capsule   Generic drug:  dexlansoprazole     90 capsule    TAKE 1 CAPSULE BY MOUTH ONCE DAILY (FOR HEARTBURN)    Gastroesophageal reflux disease, esophagitis presence not specified       * EPIPEN 0.3 MG/0.3ML injection   Generic drug:  EPINEPHrine      Inject 0.3 mg into the muscle once as needed.        * EPIPEN 2-BRE 0.3 MG/0.3ML injection 2-pack   Generic drug:  EPINEPHrine     2 mL    INJECT 0.3MG INTRAMUSCULAR ONE TIME FOR ONE DOSE AS NEEDED FOR ANAPHYLAXIS (CALL 911 IF YOU HAVE TO GIVE) (FOR BEE STINGS) **LABEL EACH PEN*    Allergic to bees       * Ferrous Gluconate 324 (37.5 Fe) MG Tabs      Take 1 tablet by mouth 2 times daily.        * ferrous gluconate 324 (38 Fe) MG tablet     FERGON    200 tablet    TAKE 1 TABLET BY MOUTH TWICE DAILY (IRON SUPPLEMENT)    Other iron deficiency anemias (CODE)       furosemide 40 MG tablet    LASIX    62 tablet    TAKE 1 TABLET BY MOUTH TWICE DAILY (7AM & 3PM) (DIURETIC)    Localized edema       hydrocortisone 2.5 % cream    ANUSOL-HC    28.35 g    Place rectally 2 times daily    Rectal pain       loratadine 10 MG tablet    CLARITIN     Take 10 mg by mouth daily.        metroNIDAZOLE 500 MG tablet    FLAGYL    14 tablet    Take 1 tablet (500 mg) by mouth 2 times daily    Pilonidal cyst with abscess       mometasone furoate 200 MCG/ACT inhaler    ASMANEX HFA    1 Inhaler    Inhale 2 puffs into the lungs 2 times daily    Mixed simple and mucopurulent chronic bronchitis (H)       mometasone-formoterol 200-5 MCG/ACT oral inhaler    DULERA    13 g    Inhale 2 puffs into the lungs 2 times daily    Intermittent asthma, uncomplicated       montelukast 10 MG tablet    SINGULAIR    90 tablet    TAKE 1 TABLET BY MOUTH EVERY MORNING. (ASTHMA)    Gastroesophageal reflux disease, esophagitis presence not specified       Multi-vitamin Tabs tablet   Generic drug:  multivitamin, therapeutic with minerals      1 TABLET DAILY        * order for DME     1 each    Equipment being ordered: support compression hose BK  2 pair black 30mm HG  To be applied on arising & removed while lying down to go to sleep    Localized edema       * order for DME     1 Device    Equipment being ordered: CPAP with mask and tubing    NEL (obstructive sleep apnea)       * order for DME     1 Device    Oxygen 2 Li/min at night and bled into BiPAP    Nocturnal hypoxemia       PHOSPHATE ENEMA RE      Place 1 enema rectally as needed.        Potassium Chloride ER 20 MEQ Tbcr      Take 1 tablet by mouth 2 times daily.        potassium chloride SA 20 MEQ CR tablet    K-DUR/KLOR-CON M    62 tablet    TAKE 1 TABLET BY MOUTH TWICE DAILY. (LOW POTASSIUM)    Localized edema       PULMICORT FLEXHALER 180  MCG/ACT inhaler   Generic drug:  budesonide     1 each    INHALE 2 PUFFS INTO THE LUNGS TWICE DAILY. (COPD)    Mixed simple and mucopurulent chronic bronchitis (H)       sertraline 100 MG tablet    ZOLOFT     Take 1.5 tablets by mouth daily.        simvastatin 40 MG tablet    ZOCOR    31 tablet    TAKE 1 TABLET BY MOUTH EVERY MORNING.  (CHOLESTEROL)    Hypercholesterolemia       SPIRIVA HANDIHALER 18 MCG capsule   Generic drug:  tiotropium     90 capsule    INHALE CONTENTS OF ONE CAPSULE BY MOUTH ONCE DAILY. (BRONCHITIS & EMPHYSEMA)    Wheezing       spironolactone 25 MG tablet    ALDACTONE    31 tablet    TAKE 1 TABLET BY MOUTH ONCE DAILY. (HIGH BLOOD PRESSURE)    Localized edema       triamcinolone 0.1 % cream    KENALOG    80 g    Apply topically 2 times daily as needed    Dermatitis       Vitamin D-3 1000 units Caps      Take 2 capsules by mouth daily        * warfarin 4 MG tablet    COUMADIN    64 tablet    Hold warfarin today ( 11/29/17) 10 mg Monday and Friday and 8 mg all other days.    Long term current use of anticoagulant therapy, History of pulmonary embolism       * warfarin 4 MG tablet    COUMADIN    65 tablet    10 mg (2.5 tabs) Mon and Fri and 8 mg (2 tabs) all other days.    Long term current use of anticoagulant therapy, History of pulmonary embolism       * warfarin 4 MG tablet    COUMADIN    65 tablet    10 mg( 2.5 tabs Mondays and Fridays, 8 mg all other days ( 2 tabs)    Long term current use of anticoagulant therapy, History of pulmonary embolism       ZEASORB-AF EX      Externally apply topically once a week        * Notice:  This list has 10 medication(s) that are the same as other medications prescribed for you. Read the directions carefully, and ask your doctor or other care provider to review them with you.
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
## 2018-08-14 NOTE — TELEPHONE ENCOUNTER
Pharmacy calling regarding coumadin and cipro.   Per Tiago- I will be check INR next week. Ok to fill  Maya Silveira RN- Triage [employer]

## 2018-08-14 NOTE — LETTER
My Depression Action Plan  Name: Petey Archibald   Date of Birth 1960  Date: 8/14/2018    My doctor: Raúl Riddle   My clinic: [address] [address]  [phone]          GREEN    ZONE   Good Control    What it looks like:     Things are going generally well. You have normal up s and down s. You may even feel depressed from time to time, but bad moods usually last less than a day.   What you need to do:  1. Continue to care for yourself (see self care plan)  2. Check your depression survival kit and update it as needed  3. Follow your physician s recommendations including any medication.  4. Do not stop taking medication unless you consult with your physician first.           YELLOW         ZONE Getting Worse    What it looks like:     Depression is starting to interfere with your life.     It may be hard to get out of bed; you may be starting to isolate yourself from others.    Symptoms of depression are starting to last most all day and this has happened for several days.     You may have suicidal thoughts but they are not constant.   What you need to do:     1. Call your care team, your response to treatment will improve if you keep your care team informed of your progress. Yellow periods are signs an adjustment may need to be made.     2. Continue your self-care, even if you have to fake it!    3. Talk to someone in your support network    4. Open up your depression survival kit           RED    ZONE Medical Alert - Get Help    What it looks like:     Depression is seriously interfering with your life.     You may experience these or other symptoms: You can t get out of bed most days, can t work or engage in other necessary activities, you have trouble taking care of basic hygiene, or basic responsibilities, thoughts of suicide or death that will not go away, self-injurious behavior.     What you need to  do:  1. Call your care team and request a same-day appointment. If they are not available (weekends or after hours) call your local crisis line, emergency room or 911.            Depression Self Care Plan / Survival Kit    Self-Care for Depression  Here s the deal. Your body and mind are really not as separate as most people think.  What you do and think affects how you feel and how you feel influences what you do and think. This means if you do things that people who feel good do, it will help you feel better.  Sometimes this is all it takes.  There is also a place for medication and therapy depending on how severe your depression is, so be sure to consult with your medical provider and/ or Behavioral Health Consultant if your symptoms are worsening or not improving.     In order to better manage my stress, I will:    Exercise  Get some form of exercise, every day. This will help reduce pain and release endorphins, the  feel good  chemicals in your brain. This is almost as good as taking antidepressants!  This is not the same as joining a gym and then never going! (they count on that by the way ) It can be as simple as just going for a walk or doing some gardening, anything that will get you moving.      Hygiene   Maintain good hygiene (Get out of bed in the morning, Make your bed, Brush your teeth, Take a shower, and Get dressed like you were going to work, even if you are unemployed).  If your clothes don't fit try to get ones that do.    Diet  I will strive to eat foods that are good for me, drink plenty of water, and avoid excessive sugar, caffeine, alcohol, and other mood-altering substances.  Some foods that are helpful in depression are: complex carbohydrates, B vitamins, flaxseed, fish or fish oil, fresh fruits and vegetables.    Psychotherapy  I agree to participate in Individual Therapy (if recommended).    Medication  If prescribed medications, I agree to take them.  Missing doses can result in serious  side effects.  I understand that drinking alcohol, or other illicit drug use, may cause potential side effects.  I will not stop my medication abruptly without first discussing it with my provider.    Staying Connected With Others  I will stay in touch with my friends, family members, and my primary care provider/team.    Use your imagination  Be creative.  We all have a creative side; it doesn t matter if it s oil painting, sand castles, or mud pies! This will also kick up the endorphins.    Witness Beauty  (AKA stop and smell the roses) Take a look outside, even in mid-winter. Notice colors, textures. Watch the squirrels and birds.     Service to others  Be of service to others.  There is always someone else in need.  By helping others we can  get out of ourselves  and remember the really important things.  This also provides opportunities for practicing all the other parts of the program.    Humor  Laugh and be silly!  Adjust your TV habits for less news and crime-drama and more comedy.    Control your stress  Try breathing deep, massage therapy, biofeedback, and meditation. Find time to relax each day.     My support system    Clinic Contact:  Phone number:    Contact 1:  Phone number:    Contact 2:  Phone number:    Scientologist/:  Phone number:    Therapist:  Phone number:    Local crisis center:    Phone number:    Other community support:  Phone number:

## 2018-08-16 ENCOUNTER — OFFICE VISIT (OUTPATIENT)
Dept: PODIATRY | Facility: CLINIC | Age: 58
End: 2018-08-16
Payer: MEDICARE

## 2018-08-16 VITALS
DIASTOLIC BLOOD PRESSURE: 62 MMHG | WEIGHT: 301 LBS | HEIGHT: 70 IN | SYSTOLIC BLOOD PRESSURE: 108 MMHG | BODY MASS INDEX: 43.09 KG/M2

## 2018-08-16 DIAGNOSIS — L84 CALLUS: Primary | ICD-10-CM

## 2018-08-16 PROCEDURE — 11056 PARNG/CUTG B9 HYPRKR LES 2-4: CPT | Performed by: PODIATRIST

## 2018-08-16 PROCEDURE — 99203 OFFICE O/P NEW LOW 30 MIN: CPT | Mod: 25 | Performed by: PODIATRIST

## 2018-08-16 NOTE — PATIENT INSTRUCTIONS
Thank you for choosing [facility]!    DR. MENDOSA'S CLINIC LOCATIONS:   MONDAY - [place] TUESDAY - [place]   [address] [facility]  [address]   [place] [address] [place] [address]   [phone]       THURSDAY AM - [place] THURSDAY PM - [place]   [address]   [place] [address] [place] [address]   [phone]       FRIDAY AM - [place] SET UP SURGERY: [phone] [address] APPOINTMENTS: [phone]   [place] [address] BILLING QUESTIONS: [phone] FAX NUMBER: [phone]     Follow Up:  As needed    CALLUS / CORNS / IPKs  When there is excessive friction or pressure on the skin, the body responds by making the skin thicker to protect the deeper structures from becoming exposed. While this works well to protect the deeper structures, the thickened skin can increase pressure and pain.    CALLUS: Flat, diffuse thickening are simple calluses and they are usually caused by friction. Often these are the result of rubbing on a shoe or going barefoot.    CORNS: Calluses with a central core between the toes are called corns. These result from prominent joints on adjacent toes rubbing together. Theses are a symptom of bone malalignment and will always recur unless the underlying bones are addressed surgically.    IPKs: Calluses with a central core on the ball of the foot are usually IPKs (intractable plantar keratosis). These are caused by excessive pressure from the metatarsals, the bones that make up the ball of the foot. Often one of these bones is too long or too prominent.  Again, these will always recur unless the underlying bone issue is addressed. There is no cure for these. They will either go away by themselves, recur, or more could develop.    ROUTINE MAINTENANCE  1. File them down with a pumice stone or callus file a couple times a week.   2. An electric callus  removing device. Amope Pedi Perfect Electronic Pedicure Foot File and Callus Remover can be a good option.   3. Lotion can be applied to soften the callus. A urea based cream such as Kersal or Vanicream or thicker cream with shea butter are good options.  4. Toe spacers or toe covers can be used for corns, gel pads can be used for other lesions on the bottom of the foot.   If there is a surgical pathology noted, such as a prominent bone, often this needs to be addressed surgically to minimize recurrence. However, sometimes the lesion simply migrates to another spot after surgery, so it is not a guaranteed cure.         PRICE THERAPY  Many aches and pains throughout the foot and ankle can be helped with many simple treatments. This is usually described as PRICE Therapy.      P - Protection - often times, inflammation/pain in the lower extremity is not able to improve simply because the areas involved are never allowed to rest. Every step we take can bother the problematic area. Protecting those areas is an important step in the healing process. This may involve a walking cast boot, a special insert/orthotic device, an ankle brace, or simply avoiding barefoot walking.    R - Rest - in addition to protecting the foot/ankle, resting is an important, but often times difficult, treatment option. Getting off your feet when they bother you, and specifically avoiding activities that cause pain/discomfort, are very beneficial to prevent, and treat, foot/ankle pain.      I - Ice - icing regularly can help to decrease inflammation and swelling in the foot, thus decreasing pain. Using an ice pack or a bag of frozen veggies works very well. Ice for 20 minutes multiple times per day as needed.  Do not place the ice directly on the skin as this can cause tissue damage.    C - Compression - using a compression wrap or an ACE wrap can help to decrease swelling, which can help to decrease pain. Wearing the wraps is generally not  needed at night, but they should be worn on a regular basis when you are going to be on your feet for prolonged periods as gravity tends to pull fluids down to your feet/ankles.    E - Elevation - elevating your lower extremities multiple times daily for 15-20 minutes can help to decrease swelling, which works well in decreasing pain levels.    NSAID/Tylenol - Anti-inflammatories like Aleve or ibuprofen, and/or a pain medication, such as Tylenol, can help to improve pain levels and get the issue resolved sooner rather than later. Anyone with liver issues should be careful with Tylenol, and anyone with high blood pressure or heart, stomach or kidney issues should be careful with anti-inflammatories. Please ask if you have questions about these medications, including dosage.      Body Mass Index (BMI)  Many things can cause foot and ankle problems. Foot structure, activity level, foot mechanics and injuries are common causes of pain. One very important issue that often goes unmentioned, is body weight. Extra weight can cause increased stress on muscles, ligaments, bones and tendons. Sometimes just a few extra pounds is all it takes to put one over her/his threshold. Without reducing that stress, it can be difficult to alleviate pain. Some people are uncomfortable addressing this issue, but we feel it is important for you to think about it. As Foot &  Ankle specialists, our job is addressing the lower extremity problem and possible causes. Regarding extra body weight, we encourage patients to discuss diet and weight management plans with their primary care doctors. It is this team approach that gives you the best opportunity for pain relief and getting you back on your feet.

## 2018-08-16 NOTE — MR AVS SNAPSHOT
After Visit Summary   8/16/2018    Petey Archibald    MRN: [id number]           Patient Information     Date Of Birth          1960        Visit Information        Provider Department      8/16/2018 11:15 AM Domingo Mendosa DPM [facility]        Care Instructions    Thank you for choosing [facility]!    DR. MENDOSA'S CLINIC LOCATIONS:   MONDAY - [place] TUESDAY - [place]   [address] [place] [address]  [address]   [place] [address] [place] [address]   [phone]       THURSDAY AM - [place] THURSDAY PM - [place]   [address]   [place] [address] [place] [address]   [phone]       FRIDAY AM - [place] SET UP SURGERY: [phone] [address] APPOINTMENTS: [phone]   [place] [address] BILLING QUESTIONS: [phone] FAX NUMBER: [phone]     Follow Up:  As needed    CALLUS / CORNS / IPKs  When there is excessive friction or pressure on the skin, the body responds by making the skin thicker to protect the deeper structures from becoming exposed. While this works well to protect the deeper structures, the thickened skin can increase pressure and pain.    CALLUS: Flat, diffuse thickening are simple calluses and they are usually caused by friction. Often these are the result of rubbing on a shoe or going barefoot.    CORNS: Calluses with a central core between the toes are called corns. These result from prominent joints on adjacent toes rubbing together. Theses are a symptom of bone malalignment and will always recur unless the underlying bones are addressed surgically.    IPKs: Calluses with a central core on the ball of the foot are usually IPKs (intractable plantar keratosis). These are caused by excessive pressure from the metatarsals, the bones that make up the ball of the foot. Often one of these bones is too long or too  prominent.  Again, these will always recur unless the underlying bone issue is addressed. There is no cure for these. They will either go away by themselves, recur, or more could develop.    ROUTINE MAINTENANCE  1. File them down with a pumice stone or callus file a couple times a week.   2. An electric callus removing device. Amope Pedi Perfect Electronic Pedicure Foot File and Callus Remover can be a good option.   3. Lotion can be applied to soften the callus. A urea based cream such as Kersal or Vanicream or thicker cream with shea butter are good options.  4. Toe spacers or toe covers can be used for corns, gel pads can be used for other lesions on the bottom of the foot.   If there is a surgical pathology noted, such as a prominent bone, often this needs to be addressed surgically to minimize recurrence. However, sometimes the lesion simply migrates to another spot after surgery, so it is not a guaranteed cure.         PRICE THERAPY  Many aches and pains throughout the foot and ankle can be helped with many simple treatments. This is usually described as PRICE Therapy.      P - Protection - often times, inflammation/pain in the lower extremity is not able to improve simply because the areas involved are never allowed to rest. Every step we take can bother the problematic area. Protecting those areas is an important step in the healing process. This may involve a walking cast boot, a special insert/orthotic device, an ankle brace, or simply avoiding barefoot walking.    R - Rest - in addition to protecting the foot/ankle, resting is an important, but often times difficult, treatment option. Getting off your feet when they bother you, and specifically avoiding activities that cause pain/discomfort, are very beneficial to prevent, and treat, foot/ankle pain.      I - Ice - icing regularly can help to decrease inflammation and swelling in the foot, thus decreasing pain. Using an ice pack or a bag of frozen veggies  works very well. Ice for 20 minutes multiple times per day as needed.  Do not place the ice directly on the skin as this can cause tissue damage.    C - Compression - using a compression wrap or an ACE wrap can help to decrease swelling, which can help to decrease pain. Wearing the wraps is generally not needed at night, but they should be worn on a regular basis when you are going to be on your feet for prolonged periods as gravity tends to pull fluids down to your feet/ankles.    E - Elevation - elevating your lower extremities multiple times daily for 15-20 minutes can help to decrease swelling, which works well in decreasing pain levels.    NSAID/Tylenol - Anti-inflammatories like Aleve or ibuprofen, and/or a pain medication, such as Tylenol, can help to improve pain levels and get the issue resolved sooner rather than later. Anyone with liver issues should be careful with Tylenol, and anyone with high blood pressure or heart, stomach or kidney issues should be careful with anti-inflammatories. Please ask if you have questions about these medications, including dosage.      Body Mass Index (BMI)  Many things can cause foot and ankle problems. Foot structure, activity level, foot mechanics and injuries are common causes of pain. One very important issue that often goes unmentioned, is body weight. Extra weight can cause increased stress on muscles, ligaments, bones and tendons. Sometimes just a few extra pounds is all it takes to put one over her/his threshold. Without reducing that stress, it can be difficult to alleviate pain. Some people are uncomfortable addressing this issue, but we feel it is important for you to think about it. As Foot &  Ankle specialists, our job is addressing the lower extremity problem and possible causes. Regarding extra body weight, we encourage patients to discuss diet and weight management plans with their primary care doctors. It is this team approach that gives you the best  opportunity for pain relief and getting you back on your feet.            Follow-ups after your visit        Your next 10 appointments already scheduled     Aug 21, 2018 11:50 AM CDT   [facility] Engle [facility]-C   [facility]    [address] [place] [address]   [phone]            Sep 05, 2018  3:15 PM CDT   Anticoagulation Visit with [facility]   [facility]    [address] [place] [id number]   [phone]              Who to contact     If you have questions or need follow up information about today's clinic visit or your schedule please contact [facility] directly at [phone].  Normal or non-critical lab and imaging results will be communicated to you by MyChart, letter or phone within 4 business days after the clinic has received the results. If you do not hear from us within 7 days, please contact the clinic through [employer] or phone. If you have a critical or abnormal lab result, we will notify you by phone as soon as possible.  Submit refill requests through [employer] or call your pharmacy and they will forward the refill request to us. Please allow 3 business days for your refill to be completed.          Additional Information About Your Visit        MyChart Information     [employer] gives you secure access to your electronic health record. If you see a primary care provider, you can also send messages to your care team and make appointments. If you have questions, please call your primary care clinic.  If you do not have a primary care provider, please call [phone] and they will assist you.        Care EveryWhere ID     This is your Care EveryWhere ID. This could be used by other organizations to access your [facility] medical records  [id number]       "  Your Vitals Were     Height BMI (Body Mass Index)                5' 10\" (1.778 m) 43.19 kg/m2           Blood Pressure from Last 3 Encounters:   08/16/18 108/62   08/14/18 110/66   05/03/18 108/62    Weight from Last 3 Encounters:   08/16/18 301 lb (136.5 kg)   08/14/18 301 lb (136.5 kg)   05/03/18 303 lb (137.4 kg)              Today, you had the following     No orders found for display       Primary Care Provider Office Phone # Fax #    Raúl Riddle [phone] [address] [facility] [address]        Equal Access to Services     BABAK OZUNA : Hadii deysi braggo Sodana, waaxda luqadaha, qaybta kaalmada adeegyada, lashae villasenor . So [facility] [phone].    ATENCIÓN: Si habla español, tiene a combs disposición servicios gratuitos de asistencia lingüística. [facility] [phone].    We comply with applicable federal civil rights laws and [place] laws. We do not discriminate on the basis of race, color, national origin, age, disability, sex, sexual orientation, or gender identity.            Thank you!     Thank you for choosing [facility]  for your care. Our goal is always to provide you with excellent care. Hearing back from our patients is one way we can continue to improve our services. Please take a few minutes to complete the written survey that you may receive in the mail after your visit with us. Thank you!             Your Updated Medication List - Protect others around you: Learn how to safely use, store and throw away your medicines at www.disposemymeds.org.          This list is accurate as of 8/16/18 11:41 AM.  Always use your most recent med list.                   Brand Name Dispense Instructions for use Diagnosis    ABILIFY 10 MG tablet   Generic drug:  ARIPiprazole     30 tablet    Take 0.5 tablets (5 mg) by mouth daily        acetaminophen 325 MG tablet    TYLENOL    100 tablet    Take 1-2 tablets (325-650 mg) by mouth every " 6 hours as needed    Knee pain, right       albuterol 108 (90 Base) MCG/ACT inhaler    PROAIR HFA    1 Inhaler    Inhale 2 puffs into the lungs every 6 hours as needed    Pulmonary emphysema, unspecified emphysema type (H)       AMBIEN 10 MG tablet   Generic drug:  zolpidem     30 tablet    Take 1 tablet (10 mg) by mouth nightly as needed for sleep        ANUSOL-HC 25 MG Suppository   Generic drug:  hydrocortisone     14 Suppository    INSERT ONE SUPPOSITORY RECTALLY UP TO TWICE A DAY AS NEEDED    External hemorrhoids       BACTROBAN 2 % ointment   Generic drug:  mupirocin      Apply topically 2 times daily        BUPROPION HCL PO      Take 150 mg by mouth every morning        ciprofloxacin 500 MG tablet    CIPRO    14 tablet    Take 1 tablet (500 mg) by mouth 2 times daily    Pilonidal cyst with abscess       clotrimazole-betamethasone cream    LOTRISONE    60 g    Apply topically 2 times daily    Dermatitis       DEXILANT 60 MG Cpdr CR capsule   Generic drug:  dexlansoprazole     90 capsule    TAKE 1 CAPSULE BY MOUTH ONCE DAILY (FOR HEARTBURN)    Gastroesophageal reflux disease, esophagitis presence not specified       * EPIPEN 0.3 MG/0.3ML injection   Generic drug:  EPINEPHrine      Inject 0.3 mg into the muscle once as needed.        * EPIPEN 2-BRE 0.3 MG/0.3ML injection 2-pack   Generic drug:  EPINEPHrine     2 mL    INJECT 0.3MG INTRAMUSCULAR ONE TIME FOR ONE DOSE AS NEEDED FOR ANAPHYLAXIS (CALL 911 IF YOU HAVE TO GIVE) (FOR BEE STINGS) **LABEL EACH PEN*    Allergic to bees       * Ferrous Gluconate 324 (37.5 Fe) MG Tabs      Take 1 tablet by mouth 2 times daily.        * ferrous gluconate 324 (38 Fe) MG tablet    FERGON    200 tablet    TAKE 1 TABLET BY MOUTH TWICE DAILY (IRON SUPPLEMENT)    Other iron deficiency anemias (CODE)       furosemide 40 MG tablet    LASIX    62 tablet    TAKE 1 TABLET BY MOUTH TWICE DAILY (7AM & 3PM) (DIURETIC)    Localized edema       hydrocortisone 2.5 % cream    ANUSOL-HC     28.35 g    Place rectally 2 times daily    Rectal pain       loratadine 10 MG tablet    CLARITIN     Take 10 mg by mouth daily.        metroNIDAZOLE 500 MG tablet    FLAGYL    14 tablet    Take 1 tablet (500 mg) by mouth 2 times daily    Pilonidal cyst with abscess       mometasone furoate 200 MCG/ACT inhaler    ASMANEX HFA    1 Inhaler    Inhale 2 puffs into the lungs 2 times daily    Mixed simple and mucopurulent chronic bronchitis (H)       mometasone-formoterol 200-5 MCG/ACT oral inhaler    DULERA    13 g    Inhale 2 puffs into the lungs 2 times daily    Intermittent asthma, uncomplicated       montelukast 10 MG tablet    SINGULAIR    90 tablet    TAKE 1 TABLET BY MOUTH EVERY MORNING. (ASTHMA)    Gastroesophageal reflux disease, esophagitis presence not specified       Multi-vitamin Tabs tablet   Generic drug:  multivitamin, therapeutic with minerals      1 TABLET DAILY        * order for DME     1 each    Equipment being ordered: support compression hose BK  2 pair black 30mm HG  To be applied on arising & removed while lying down to go to sleep    Localized edema       * order for DME     1 Device    Equipment being ordered: CPAP with mask and tubing    NEL (obstructive sleep apnea)       * order for DME     1 Device    Oxygen 2 Li/min at night and bled into BiPAP    Nocturnal hypoxemia       PHOSPHATE ENEMA RE      Place 1 enema rectally as needed.        Potassium Chloride ER 20 MEQ Tbcr      Take 1 tablet by mouth 2 times daily.        potassium chloride SA 20 MEQ CR tablet    K-DUR/KLOR-CON M    62 tablet    TAKE 1 TABLET BY MOUTH TWICE DAILY. (LOW POTASSIUM)    Localized edema       PULMICORT FLEXHALER 180 MCG/ACT inhaler   Generic drug:  budesonide     1 each    INHALE 2 PUFFS INTO THE LUNGS TWICE DAILY. (COPD)    Mixed simple and mucopurulent chronic bronchitis (H)       sertraline 100 MG tablet    ZOLOFT     Take 1.5 tablets by mouth daily.        simvastatin 40 MG tablet    ZOCOR    31 tablet    TAKE  1 TABLET BY MOUTH EVERY MORNING.  (CHOLESTEROL)    Hypercholesterolemia       SPIRIVA HANDIHALER 18 MCG capsule   Generic drug:  tiotropium     90 capsule    INHALE CONTENTS OF ONE CAPSULE BY MOUTH ONCE DAILY. (BRONCHITIS & EMPHYSEMA)    Wheezing       spironolactone 25 MG tablet    ALDACTONE    31 tablet    TAKE 1 TABLET BY MOUTH ONCE DAILY. (HIGH BLOOD PRESSURE)    Localized edema       triamcinolone 0.1 % cream    KENALOG    80 g    Apply topically 2 times daily as needed    Dermatitis       Vitamin D-3 1000 units Caps      Take 2 capsules by mouth daily        * warfarin 4 MG tablet    COUMADIN    64 tablet    Hold warfarin today ( 11/29/17) 10 mg Monday and Friday and 8 mg all other days.    Long term current use of anticoagulant therapy, History of pulmonary embolism       * warfarin 4 MG tablet    COUMADIN    65 tablet    10 mg (2.5 tabs) Mon and Fri and 8 mg (2 tabs) all other days.    Long term current use of anticoagulant therapy, History of pulmonary embolism       * warfarin 4 MG tablet    COUMADIN    65 tablet    10 mg( 2.5 tabs Mondays and Fridays, 8 mg all other days ( 2 tabs)    Long term current use of anticoagulant therapy, History of pulmonary embolism       ZEASORB-AF EX      Externally apply topically once a week        * Notice:  This list has 10 medication(s) that are the same as other medications prescribed for you. Read the directions carefully, and ask your doctor or other care provider to review them with you.

## 2018-08-16 NOTE — LETTER
"    8/16/2018         RE: Petey Archibald  [address]  [facility] [place] [address]        Dear Colleague,    Thank you for referring your patient, Petey Archibald, to the [facility]. Please see a copy of my visit note below.    Foot & Ankle Surgery  August 16, 2018    CC: \"wart?\"    I was asked to see Petey Archibald regarding the chief complaint by:  ELIAS Engle PA-C    HPI:  Pt is a 57 year old male who presents with above complaint.  Skin lesion \"left, both\" x 1 month.  Describes burning pain, shooting pain. 8/10 daily, worse with walking/standing. He has had this shaved in the past by Dr Dang    ROS:   Pos for CC.  The patient denies current nausea, vomiting, chills, fevers, belly pain, calf pain, chest pain or SOB.  Complete remainder of ROS is otherwise neg.    VITALS:    Vitals:    08/16/18 1126   BP: 108/62   Weight: 301 lb (136.5 kg)   Height: 5' 10\" (1.778 m)       PMH:    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     COPD (chronic obstructive pulmonary disease) (H)      History of DVT of lower extremity      History of pulmonary embolism      Hyperlipidemia      Mild intellectual disabilities      Morbid obesity (H)      Peripheral edema      Peripheral vascular disease (H)      Tobacco dependence      Venous stasis dermatitis        SXHX:    Past Surgical History:   Procedure Laterality Date     EXCISE MALIGNANT LESIONS, TRUNK/ARMS/LEGS 0.5CM OR LESS Left 5/26/2017    Procedure: EXCISE MALIGNANT LESIONS, TRUNK/ARMS/LEGS 0.5CM OR LESS;  EXCISION LEFT ELBOW MASS;  Surgeon: Jose Azevedo MD;  Location: [facility]     RECTAL SURGERY      perianal abscess?        MEDS:    Current Outpatient Prescriptions   Medication     acetaminophen (TYLENOL) 325 MG tablet     albuterol (ALBUTEROL) 108 (90 BASE) MCG/ACT inhaler     ANUSOL-HC 25 MG RE SUPP     ARIPiprazole (ABILIFY) 10 MG tablet     BUPROPION HCL PO     Cholecalciferol (VITAMIN D-3) 1000 " UNITS CAPS     ciprofloxacin (CIPRO) 500 MG tablet     clotrimazole-betamethasone (LOTRISONE) cream     DEXILANT 60 MG CPDR CR capsule     EPINEPHrine (EPIPEN) 0.3 MG/0.3ML injection     EPIPEN 2-BRE 0.3 MG/0.3ML injection 2-pack     ferrous gluconate (FERGON) 324 (38 Fe) MG tablet     Ferrous Gluconate 324 (37.5 FE) MG TABS     furosemide (LASIX) 40 MG tablet     hydrocortisone (ANUSOL-HC) 2.5 % rectal cream     loratadine (CLARITIN) 10 MG tablet     metroNIDAZOLE (FLAGYL) 500 MG tablet     Miconazole Nitrate (ZEASORB-AF EX)     Mometasone Furoate 200 MCG/ACT AERO     mometasone-formoterol (DULERA) 200-5 MCG/ACT oral inhaler     montelukast (SINGULAIR) 10 MG tablet     MULTI-VITAMIN OR TABS     mupirocin (BACTROBAN) 2 % ointment     order for DME     order for DME     order for DME     Potassium Chloride ER 20 MEQ TBCR     potassium chloride SA (K-DUR/KLOR-CON M) 20 MEQ CR tablet     PULMICORT FLEXHALER 180 MCG/ACT inhaler     sertraline (ZOLOFT) 100 MG tablet     simvastatin (ZOCOR) 40 MG tablet     Sodium Phosphates (PHOSPHATE ENEMA RE)     SPIRIVA HANDIHALER 18 MCG capsule     spironolactone (ALDACTONE) 25 MG tablet     triamcinolone (KENALOG) 0.1 % cream     warfarin (COUMADIN) 4 MG tablet     warfarin (COUMADIN) 4 MG tablet     warfarin (COUMADIN) 4 MG tablet     zolpidem (AMBIEN) 10 MG tablet     No current facility-administered medications for this visit.        ALL:     Allergies   Allergen Reactions     Augmentin      Bees      Penicillins        FMH:    Family History   Problem Relation Age of Onset     Diabetes Brother      Unknown/Adopted Mother      Unknown/Adopted Father      Coronary Artery Disease No family hx of      Hypertension No family hx of      Hyperlipidemia No family hx of      Cerebrovascular Disease No family hx of      Breast Cancer No family hx of      Colon Cancer No family hx of      Prostate Cancer No family hx of      Other Cancer No family hx of      Depression No family hx of       Anxiety Disorder No family hx of      Mental Illness No family hx of      Substance Abuse No family hx of      Anesthesia Reaction No family hx of      Asthma No family hx of      Osteoperosis No family hx of      Genetic Disorder No family hx of      Thyroid Disease No family hx of      Obesity No family hx of        SocHx:    Social History     Social History     Marital status: Single     Spouse name: N/A     Number of children: N/A     Years of education: N/A     Occupational History     Not on file.     Social History Main Topics     Smoking status: Current Every Day Smoker     Packs/day: 2.00     Years: 30.00     Types: Cigarettes     Smokeless tobacco: Never Used      Comment: started at age 18      Alcohol use No     Drug use: No     Sexual activity: No     Other Topics Concern     Parent/Sibling W/ Cabg, Mi Or Angioplasty Before 65f 55m? No     Social History Narrative           EXAMINATION:  Gen:   No apparent distress  Neuro:   A&Ox3, no deficits  Psych:    Answering questions appropriately for age and situation with normal affect  Head:    NCAT  Eye:    Visual scanning without deficit  Ear:    Response to auditory stimuli wnl  Lung:    Non-labored breathing on RA noted  Abd:    NTND per patient report  Lymph:    Neg for pitting/non-pitting edema BLE  Vasc:    Pulses palpable, CFT minimally delayed  Neuro:    Light touch sensation intact to all sensory nerve distributions without paresthesias  Derm:    Mild callusing L>R foot lateral 5th metatarsal head  MSK:    ROM, strength wnl without limitation, no pain on palpation noted.  Calf:    Neg for redness, swelling or tenderness      Assessment:  57 year old male with callus L>R foot      Plan:  Discussed etiologies, anatomy and options  1.  Callus L>R foot lateral 5th MPJ  -We discussed that many calluses are caused by pressure or shearing forces on the skin, and these can often be treated sufficiently with shoes, inserts and local callus debridement.  Some  calluses, however, can have a deep core, and while home treatments are helpful, occasional clinical debridement may be necessary.  In certain cases, surgical excision may be required if no other treatments have proven sufficient.  -Our home callus instruction handout was dispensed, detailing home therapies to minimize regrowth of the calluses.    -debrided with 15 blade x 2 without incident      Follow up:  prn or sooner with acute issues      Patient's medical history was reviewed today    Body mass index is 43.19 kg/(m^2).  Weight management plan: Patient was referred to their PCP to discuss a diet and exercise plan.        Domingo De La Torre DPM FACFAS FACFAOM  Podiatric Foot & Ankle Surgeon  [facility]  [phone]        Again, thank you for allowing me to participate in the care of your patient.        Sincerely,        Domingo De La Torre DPM, TRU

## 2018-08-17 LAB
BACTERIA SPEC CULT: ABNORMAL
BACTERIA SPEC CULT: ABNORMAL
SPECIMEN SOURCE: ABNORMAL

## 2018-08-21 ENCOUNTER — OFFICE VISIT (OUTPATIENT)
Dept: FAMILY MEDICINE | Facility: CLINIC | Age: 58
End: 2018-08-21
Payer: MEDICARE

## 2018-08-21 VITALS
RESPIRATION RATE: 18 BRPM | BODY MASS INDEX: 43.48 KG/M2 | DIASTOLIC BLOOD PRESSURE: 74 MMHG | SYSTOLIC BLOOD PRESSURE: 120 MMHG | OXYGEN SATURATION: 99 % | WEIGHT: 303 LBS | TEMPERATURE: 97.6 F | HEART RATE: 61 BPM

## 2018-08-21 DIAGNOSIS — L05.01 PILONIDAL CYST WITH ABSCESS: Primary | ICD-10-CM

## 2018-08-21 DIAGNOSIS — F32.5 MAJOR DEPRESSION IN COMPLETE REMISSION (H): ICD-10-CM

## 2018-08-21 PROCEDURE — 99024 POSTOP FOLLOW-UP VISIT: CPT | Performed by: PHYSICIAN ASSISTANT

## 2018-08-21 ASSESSMENT — ENCOUNTER SYMPTOMS
PSYCHIATRIC NEGATIVE: 1
NEUROLOGICAL NEGATIVE: 1
RESPIRATORY NEGATIVE: 1
CARDIOVASCULAR NEGATIVE: 1
GASTROINTESTINAL NEGATIVE: 1
MUSCULOSKELETAL NEGATIVE: 1
CONSTITUTIONAL NEGATIVE: 1
EYES NEGATIVE: 1
ROS SKIN COMMENTS: AS IN HPI

## 2018-08-21 ASSESSMENT — ANXIETY QUESTIONNAIRES
3. WORRYING TOO MUCH ABOUT DIFFERENT THINGS: SEVERAL DAYS
2. NOT BEING ABLE TO STOP OR CONTROL WORRYING: NOT AT ALL
1. FEELING NERVOUS, ANXIOUS, OR ON EDGE: NOT AT ALL
4. TROUBLE RELAXING: SEVERAL DAYS
7. FEELING AFRAID AS IF SOMETHING AWFUL MIGHT HAPPEN: SEVERAL DAYS
GAD7 TOTAL SCORE: 6
6. BECOMING EASILY ANNOYED OR IRRITABLE: MORE THAN HALF THE DAYS
5. BEING SO RESTLESS THAT IT IS HARD TO SIT STILL: SEVERAL DAYS
7. FEELING AFRAID AS IF SOMETHING AWFUL MIGHT HAPPEN: SEVERAL DAYS

## 2018-08-21 ASSESSMENT — PATIENT HEALTH QUESTIONNAIRE - PHQ9
SUM OF ALL RESPONSES TO PHQ QUESTIONS 1-9: 0
SUM OF ALL RESPONSES TO PHQ QUESTIONS 1-9: 0
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
## 2018-08-22 ENCOUNTER — TELEPHONE (OUTPATIENT)
Dept: FAMILY MEDICINE | Facility: CLINIC | Age: 58
End: 2018-08-22

## 2018-08-22 ENCOUNTER — NURSE TRIAGE (OUTPATIENT)
Dept: NURSING | Facility: CLINIC | Age: 58
End: 2018-08-22

## 2018-08-22 ASSESSMENT — PATIENT HEALTH QUESTIONNAIRE - PHQ9: SUM OF ALL RESPONSES TO PHQ QUESTIONS 1-9: 0

## 2018-08-22 ASSESSMENT — ANXIETY QUESTIONNAIRES: GAD7 TOTAL SCORE: 6

## 2018-08-23 NOTE — TELEPHONE ENCOUNTER
Bertrand, staff person at [facility], [phone] called to report medications that were missed this a.m.  Iron  Montulecast  Spironolactone  Lasix  Simvastatin  sertraline  Aripiprazole  Bupropion  dexilant  Multi vitamin  V-D  Loratidine,  Pulmicort  Potassium  Clotrimazole-betamethasone  Bertrand will give the BID meds, He'll call if any problems come up.  Routed: P [id number] BX Dr Venkatesh GONZALEZ, RN [facility]

## 2018-08-23 NOTE — TELEPHONE ENCOUNTER
Bertrand, staff person at [facility], [phone] called to report medications that were missed this a.m.  Iron  Montulecast  Spironolactone  Lasix  Simvastatin  sertraline  Aripiprazole  Bupropion  dexilant  Multi vitamin  V-D  Loratidine,  Pulmicort  Potassium  Clotrimazole-betamethasone  Bertrand will give the BID meds, He'll call if any problems come up.  Routed: P [id number] BX Dr Venkatesh GONZALEZ, RN [facility]

## 2018-08-27 NOTE — PROGRESS NOTES
"Foot & Ankle Surgery  August 16, 2018    CC: \"wart?\"    I was asked to see Petey Archibald regarding the chief complaint by:  ELIAS Engle PA-C    HPI:  Pt is a 57 year old male who presents with above complaint.  Skin lesion \"left, both\" x 1 month.  Describes burning pain, shooting pain. 8/10 daily, worse with walking/standing. He has had this shaved in the past by Dr Dang    ROS:   Pos for CC.  The patient denies current nausea, vomiting, chills, fevers, belly pain, calf pain, chest pain or SOB.  Complete remainder of ROS is otherwise neg.    VITALS:    Vitals:    08/16/18 1126   BP: 108/62   Weight: 301 lb (136.5 kg)   Height: 5' 10\" (1.778 m)       PMH:    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     COPD (chronic obstructive pulmonary disease) (H)      History of DVT of lower extremity      History of pulmonary embolism      Hyperlipidemia      Mild intellectual disabilities      Morbid obesity (H)      Peripheral edema      Peripheral vascular disease (H)      Tobacco dependence      Venous stasis dermatitis        SXHX:    Past Surgical History:   Procedure Laterality Date     EXCISE MALIGNANT LESIONS, TRUNK/ARMS/LEGS 0.5CM OR LESS Left 5/26/2017    Procedure: EXCISE MALIGNANT LESIONS, TRUNK/ARMS/LEGS 0.5CM OR LESS;  EXCISION LEFT ELBOW MASS;  Surgeon: Jose Azevedo MD;  Location: [facility]     RECTAL SURGERY      perianal abscess?        MEDS:    Current Outpatient Prescriptions   Medication     acetaminophen (TYLENOL) 325 MG tablet     albuterol (ALBUTEROL) 108 (90 BASE) MCG/ACT inhaler     ANUSOL-HC 25 MG RE SUPP     ARIPiprazole (ABILIFY) 10 MG tablet     BUPROPION HCL PO     Cholecalciferol (VITAMIN D-3) 1000 UNITS CAPS     ciprofloxacin (CIPRO) 500 MG tablet     clotrimazole-betamethasone (LOTRISONE) cream     DEXILANT 60 MG CPDR CR capsule     EPINEPHrine (EPIPEN) 0.3 MG/0.3ML injection     EPIPEN 2-BRE 0.3 MG/0.3ML injection 2-pack     ferrous gluconate " (FERGON) 324 (38 Fe) MG tablet     Ferrous Gluconate 324 (37.5 FE) MG TABS     furosemide (LASIX) 40 MG tablet     hydrocortisone (ANUSOL-HC) 2.5 % rectal cream     loratadine (CLARITIN) 10 MG tablet     metroNIDAZOLE (FLAGYL) 500 MG tablet     Miconazole Nitrate (ZEASORB-AF EX)     Mometasone Furoate 200 MCG/ACT AERO     mometasone-formoterol (DULERA) 200-5 MCG/ACT oral inhaler     montelukast (SINGULAIR) 10 MG tablet     MULTI-VITAMIN OR TABS     mupirocin (BACTROBAN) 2 % ointment     order for DME     order for DME     order for DME     Potassium Chloride ER 20 MEQ TBCR     potassium chloride SA (K-DUR/KLOR-CON M) 20 MEQ CR tablet     PULMICORT FLEXHALER 180 MCG/ACT inhaler     sertraline (ZOLOFT) 100 MG tablet     simvastatin (ZOCOR) 40 MG tablet     Sodium Phosphates (PHOSPHATE ENEMA RE)     SPIRIVA HANDIHALER 18 MCG capsule     spironolactone (ALDACTONE) 25 MG tablet     triamcinolone (KENALOG) 0.1 % cream     warfarin (COUMADIN) 4 MG tablet     warfarin (COUMADIN) 4 MG tablet     warfarin (COUMADIN) 4 MG tablet     zolpidem (AMBIEN) 10 MG tablet     No current facility-administered medications for this visit.        ALL:     Allergies   Allergen Reactions     Augmentin      Bees      Penicillins        FMH:    Family History   Problem Relation Age of Onset     Diabetes Brother      Unknown/Adopted Mother      Unknown/Adopted Father      Coronary Artery Disease No family hx of      Hypertension No family hx of      Hyperlipidemia No family hx of      Cerebrovascular Disease No family hx of      Breast Cancer No family hx of      Colon Cancer No family hx of      Prostate Cancer No family hx of      Other Cancer No family hx of      Depression No family hx of      Anxiety Disorder No family hx of      Mental Illness No family hx of      Substance Abuse No family hx of      Anesthesia Reaction No family hx of      Asthma No family hx of      Osteoperosis No family hx of      Genetic Disorder No family hx of       Thyroid Disease No family hx of      Obesity No family hx of        SocHx:    Social History     Social History     Marital status: Single     Spouse name: N/A     Number of children: N/A     Years of education: N/A     Occupational History     Not on file.     Social History Main Topics     Smoking status: Current Every Day Smoker     Packs/day: 2.00     Years: 30.00     Types: Cigarettes     Smokeless tobacco: Never Used      Comment: started at age 18      Alcohol use No     Drug use: No     Sexual activity: No     Other Topics Concern     Parent/Sibling W/ Cabg, Mi Or Angioplasty Before 65f 55m? No     Social History Narrative           EXAMINATION:  Gen:   No apparent distress  Neuro:   A&Ox3, no deficits  Psych:    Answering questions appropriately for age and situation with normal affect  Head:    NCAT  Eye:    Visual scanning without deficit  Ear:    Response to auditory stimuli wnl  Lung:    Non-labored breathing on RA noted  Abd:    NTND per patient report  Lymph:    Neg for pitting/non-pitting edema BLE  Vasc:    Pulses palpable, CFT minimally delayed  Neuro:    Light touch sensation intact to all sensory nerve distributions without paresthesias  Derm:    Mild callusing L>R foot lateral 5th metatarsal head  MSK:    ROM, strength wnl without limitation, no pain on palpation noted.  Calf:    Neg for redness, swelling or tenderness      Assessment:  57 year old male with callus L>R foot      Plan:  Discussed etiologies, anatomy and options  1.  Callus L>R foot lateral 5th MPJ  -We discussed that many calluses are caused by pressure or shearing forces on the skin, and these can often be treated sufficiently with shoes, inserts and local callus debridement.  Some calluses, however, can have a deep core, and while home treatments are helpful, occasional clinical debridement may be necessary.  In certain cases, surgical excision may be required if no other treatments have proven sufficient.  -Our home callus  instruction handout was dispensed, detailing home therapies to minimize regrowth of the calluses.    -debrided with 15 blade x 2 without incident      Follow up:  prn or sooner with acute issues      Patient's medical history was reviewed today    Body mass index is 43.19 kg/(m^2).  Weight management plan: Patient was referred to their PCP to discuss a diet and exercise plan.        Domingo De La Torre DPM FACFAS FACFAOM  Podiatric Foot & Ankle Surgeon  [facility]  [phone]

## 2018-09-05 ENCOUNTER — ANTICOAGULATION THERAPY VISIT (OUTPATIENT)
Dept: NURSING | Facility: CLINIC | Age: 58
End: 2018-09-05
Payer: MEDICARE

## 2018-09-05 ENCOUNTER — OFFICE VISIT (OUTPATIENT)
Dept: FAMILY MEDICINE | Facility: CLINIC | Age: 58
End: 2018-09-05
Payer: MEDICARE

## 2018-09-05 VITALS
BODY MASS INDEX: 43.48 KG/M2 | HEART RATE: 76 BPM | DIASTOLIC BLOOD PRESSURE: 74 MMHG | OXYGEN SATURATION: 95 % | WEIGHT: 303 LBS | TEMPERATURE: 97.5 F | SYSTOLIC BLOOD PRESSURE: 112 MMHG

## 2018-09-05 DIAGNOSIS — L03.319 CELLULITIS AND ABSCESS OF TRUNK: Primary | ICD-10-CM

## 2018-09-05 DIAGNOSIS — L02.219 CELLULITIS AND ABSCESS OF TRUNK: Primary | ICD-10-CM

## 2018-09-05 DIAGNOSIS — Z86.711 HISTORY OF PULMONARY EMBOLISM: ICD-10-CM

## 2018-09-05 DIAGNOSIS — Z79.01 LONG TERM CURRENT USE OF ANTICOAGULANT THERAPY: ICD-10-CM

## 2018-09-05 LAB — INR POINT OF CARE: 3.4 (ref 2–3)

## 2018-09-05 PROCEDURE — 99207 ZZC NO CHARGE NURSE ONLY: CPT

## 2018-09-05 PROCEDURE — 99213 OFFICE O/P EST LOW 20 MIN: CPT | Performed by: PHYSICIAN ASSISTANT

## 2018-09-05 PROCEDURE — 36416 COLLJ CAPILLARY BLOOD SPEC: CPT

## 2018-09-05 PROCEDURE — 85610 PROTHROMBIN TIME: CPT | Mod: QW

## 2018-09-05 RX ORDER — CEPHALEXIN 500 MG/1
500 CAPSULE ORAL 3 TIMES DAILY
Qty: 30 CAPSULE | Refills: 0 | Status: SHIPPED | OUTPATIENT
Start: 2018-09-05 | End: 2019-01-15

## 2018-09-05 RX ORDER — WARFARIN SODIUM 4 MG/1
TABLET ORAL
Qty: 64 TABLET | Refills: 1 | Status: SHIPPED | OUTPATIENT
Start: 2018-09-05 | End: 2018-09-19

## 2018-09-05 NOTE — PROGRESS NOTES
ANTICOAGULATION FOLLOW-UP CLINIC VISIT    Patient Name:  Petey Archibald  Date:  9/5/2018  Contact Type:  Face to Face    SUBJECTIVE:        OBJECTIVE    INR Protime   Date Value Ref Range Status   09/05/2018 3.4 (A) 2.0 - 3.0 Final       ASSESSMENT / PLAN  INR assessment SUPRA    Recheck INR In: 2 WEEKS    INR Location Clinic      Anticoagulation Summary as of 9/5/2018     INR goal 2.0-3.0   Today's INR 3.4!   Warfarin maintenance plan 10 mg (4 mg x 2.5) on Mon, Fri; 8 mg (4 mg x 2) all other days   Full warfarin instructions 9/5: Hold; Otherwise 10 mg on Mon, Fri; 8 mg all other days   Weekly warfarin total 60 mg   Plan last modified Lisa Ponce RN (1/2/2018)   Next INR check 9/19/2018   Target end date Indefinite    Indications   History of pulmonary embolism [Z86.711]  Long-term (current) use of anticoagulants [Z79.01] [Z79.01]         Anticoagulation Episode Summary     INR check location [facility]    Preferred lab     Send INR reminders to [facility] INR/PROTIME    Comments       Anticoagulation Care Providers     Provider Role Specialty Phone number    Silvino Baker MD [facility] [phone]            See the Encounter Report to view Anticoagulation Flowsheet and Dosing Calendar (Go to Encounters tab in chart review, and find the Anticoagulation Therapy Visit)        Prudence Machuca RN

## 2018-09-05 NOTE — LETTER
[facility]  [address]  [place] [address]  Phone: [phone]  Fax: [phone]    September 5, 2018        Petey Archibald  [address]  [place] [address]          To whom it may concern:    RE: Petey Archibald    Patient was seen and treated today at our clinic and missed work.        Please contact me for questions or concerns.      Sincerely,        Yolande aLcy PA-C

## 2018-09-05 NOTE — MR AVS SNAPSHOT
Petey Archibald   9/5/2018 3:15 PM   Anticoagulation Therapy Visit    Description:  57 year old male   Provider:  [facility]   Department:  Bx Nurse           INR as of 9/5/2018     Today's INR 3.4!      Anticoagulation Summary as of 9/5/2018     INR goal 2.0-3.0   Today's INR 3.4!   Full warfarin instructions 9/5: Hold; Otherwise 10 mg on Mon, Fri; 8 mg all other days   Next INR check 9/19/2018    Indications   History of pulmonary embolism [Z86.711]  Long-term (current) use of anticoagulants [Z79.01] [Z79.01]         Your next Anticoagulation Clinic appointment(s)     Sep 19, 2018  3:15 PM CDT   Anticoagulation Visit with  ANTICOAGULATION CLINIC   [facility]    [address] [place] [id number] [phone]              Contact Numbers     [facility]  Please call  [phone] to cancel and/or reschedule your appointment   The direct line to the anticoagulant nurse is [phone] on Monday, Wednesday, and Friday. On Thursday, the anticoagulant nurse can be reached directly at [phone].         September 2018 Details    Sun Mon Tue Wed Thu Fri Sat           1                 2               3               4               5      Hold   See details      6      8 mg         7      10 mg         8      8 mg           9      8 mg         10      10 mg         11      8 mg         12      8 mg         13      8 mg         14      10 mg         15      8 mg           16      8 mg         17      10 mg         18      8 mg         19            20               21               22                 23               24               25               26               27               28               29                 30                      Date Details   09/05 This INR check       Date of next INR:  9/19/2018         How to take your warfarin dose     To take:  8 mg Take 2 of the 4  mg tablets.    To take:  10 mg Take 2.5 of the 4 mg tablets.    Hold Do not take your warfarin dose. See the Details table to the right for additional instructions.
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
## 2018-09-05 NOTE — MR AVS SNAPSHOT
After Visit Summary   9/5/2018    Petey Archibald    MRN: [id number]           Patient Information     Date Of Birth          1960        Visit Information        Provider Department      9/5/2018 3:50 PM Yolande Lacy PA-C [facility]        Today's Diagnoses     Cellulitis and abscess of trunk    -  1      Care Instructions      Warm compresses/ soaks 10-15 minutes 3-4 times daily.    Start antibiotics today    Watch for fever, red streaking coming from the area.  Cellulitis:  Most skin infections follow breaks in the skin (for example, from cuts, puncture wounds, animal bites, splinters, thorns, or burns). Bacteria (especially staphylococcus or streptococcus) then invade the wound and cause the infection. Some infections start with a closed rash (that is, the skin is not broken). Examples are insect bites, chickenpox, scabies, or acne. If a child picks at these rashes, the skin can become broken and then infected.   Deeper wounds (for example, puncture wounds) are much more likely to become infected than superficial wounds (for example, scrapes). The hands are at increased risk for infection from puncture wounds. The penetrating claws or teeth of cats pose a major risk for infection.   Cellulitis (skin infection) can sometimes start without any recent wound infection. This type of cellulitis is spread from the bloodstream and can be serious it is not treated.   How long does it last?   With appropriate antibiotics and warm soaks, the wound infection should improve within 24 to 48 hours.  Any red streaking or red patches should stop spreading. The area of the wound should also be much less tender within 48 hours.   What is the treatment?   Antibiotics -finish complete course even if infection has already improved  Warm soaks or local heat For open wounds that are infected, proper cleaning is important for healing. Soak the wounded area in warm water  or put a warm, wet cloth on the wound for 20 minutes three times a day. Use a warm saltwater solution containing 2 teaspoons of table salt per quart of water. Use this solution to remove all the pus and loose scabs. (Don't use hydrogen peroxide because it will damage healthy tissue and delay healing.)   Fever and pain relief Take acetaminophen or ibuprofen for fever over 102F (39C) or the pain relief.   How can I help prevent infections?   Wash all new wounds vigorously with soap and water for 5 to 10 minutes to remove dirt and bacteria. Soak puncture wounds in warm, soapy water for 15 minutes. Do this as soon as possible after the injury occurs, because the longer you wait, the less the benefit.   Do not pick at insect bites, scabs, or other areas of irritated skin.   Applying an antibiotic ointment after cleaning might be helpful.   Call IMMEDIATELY if:   The redness keeps spreading.   The wound becomes extremely painful.   Call during office hours if:   The fever is not gone 48 hours after starting the antibiotic.   The wound infection does not look better 3 days after taking an antibiotic.   The wound isn't completely healed within 10 days.             Follow-ups after your visit        Your next 10 appointments already scheduled     Sep 19, 2018  3:15 PM CDT   Anticoagulation Visit with [facility]   [facility]    [address] [place] [id number] [phone]              Who to contact     If you have questions or need follow up information about today's clinic visit or your schedule please contact [facility] directly at [phone].  Normal or non-critical lab and imaging results will be communicated to you by MyChart, letter or phone within 4 business days after the clinic has received the results. If you do not hear from us within 7 days, please contact the  clinic through [employer] or phone. If you have a critical or abnormal lab result, we will notify you by phone as soon as possible.  Submit refill requests through [employer] or call your pharmacy and they will forward the refill request to us. Please allow 3 business days for your refill to be completed.          Additional Information About Your Visit        [employer] Information     [employer] gives you secure access to your electronic health record. If you see a primary care provider, you can also send messages to your care team and make appointments. If you have questions, please call your primary care clinic.  If you do not have a primary care provider, please call [phone] and they will assist you.        Care EveryWhere ID     This is your Care EveryWhere ID. This could be used by other organizations to access your [place] medical records  [id number]        Your Vitals Were     Pulse Temperature Pulse Oximetry BMI (Body Mass Index)          76 97.5  F (36.4  C) (Tympanic) 95% 43.48 kg/m2         Blood Pressure from Last 3 Encounters:   09/05/18 112/74   08/21/18 120/74   08/16/18 108/62    Weight from Last 3 Encounters:   09/05/18 303 lb (137.4 kg)   08/21/18 303 lb (137.4 kg)   08/16/18 301 lb (136.5 kg)              Today, you had the following     No orders found for display         Today's Medication Changes          These changes are accurate as of 9/5/18  4:03 PM.  If you have any questions, ask your nurse or doctor.               Start taking these medicines.        Dose/Directions    cephALEXin 500 MG capsule   Commonly known as:  KEFLEX   Used for:  Cellulitis and abscess of trunk   Started by:  Yolande Lacy PA-C        Dose:  500 mg   Take 1 capsule (500 mg) by mouth 3 times daily   Quantity:  30 capsule   Refills:  0         These medicines have changed or have updated prescriptions.        Dose/Directions    warfarin 4 MG tablet   Commonly known as:  COUMADIN   This may have changed:     - additional instructions  - Another medication with the same name was removed. Continue taking this medication, and follow the directions you see here.   Used for:  Long term current use of anticoagulant therapy, History of pulmonary embolism   Changed by:  Raúl Riddle MD        Hold warfarin today (9/05/18) 10 mg Monday and Friday and 8 mg all other days.   Quantity:  64 tablet   Refills:  1            Where to get your medicines      These medications were sent to [facility] - [place], [place] - [address]  [id number] [place] [address] [address] [facility] [address]     Phone:  [phone]     cephALEXin 500 MG capsule         Some of these will need a paper prescription and others can be bought over the counter.  Ask your nurse if you have questions.     Bring a paper prescription for each of these medications     warfarin 4 MG tablet                Primary Care Provider Office Phone # Fax #    Raúl Riddle [phone] [address] [facility] [address]        Equal Access to Services     BAB[facility] HospitalJASSI AH: Hadii aad ku hadasho Soomaali, waaxda luqadaha, qaybta kaalmada adeegyada, waxay idiin hayaan yue kharaduran villasenor . So [facility] [phone].    ATENCIÓN: Si habla español, tiene a combs disposición servicios gratuitos de asistencia lingüística. LlOur [facility] [phone].    We comply with applicable federal civil rights laws and [place] laws. We do not discriminate on the basis of race, color, national origin, age, disability, sex, sexual orientation, or gender identity.            Thank you!     Thank you for choosing [facility]  for your care. Our goal is always to provide you with excellent care. Hearing back from our patients is one way we can continue to improve our services. Please take a few minutes to complete the written survey that you may receive in the mail after your visit with us. Thank you!             Your Updated Medication List  - Protect others around you: Learn how to safely use, store and throw away your medicines at www.disposemymeds.org.          This list is accurate as of 9/5/18  4:03 PM.  Always use your most recent med list.                   Brand Name Dispense Instructions for use Diagnosis    ABILIFY 10 MG tablet   Generic drug:  ARIPiprazole     30 tablet    Take 0.5 tablets (5 mg) by mouth daily        acetaminophen 325 MG tablet    TYLENOL    100 tablet    Take 1-2 tablets (325-650 mg) by mouth every 6 hours as needed    Knee pain, right       albuterol 108 (90 Base) MCG/ACT inhaler    PROAIR HFA    1 Inhaler    Inhale 2 puffs into the lungs every 6 hours as needed    Pulmonary emphysema, unspecified emphysema type (H)       AMBIEN 10 MG tablet   Generic drug:  zolpidem     30 tablet    Take 1 tablet (10 mg) by mouth nightly as needed for sleep        ANUSOL-HC 25 MG Suppository   Generic drug:  hydrocortisone     14 Suppository    INSERT ONE SUPPOSITORY RECTALLY UP TO TWICE A DAY AS NEEDED    External hemorrhoids       BACTROBAN 2 % ointment   Generic drug:  mupirocin      Apply topically 2 times daily        BUPROPION HCL PO      Take 150 mg by mouth every morning        cephALEXin 500 MG capsule    KEFLEX    30 capsule    Take 1 capsule (500 mg) by mouth 3 times daily    Cellulitis and abscess of trunk       clotrimazole-betamethasone cream    LOTRISONE    60 g    Apply topically 2 times daily    Dermatitis       DEXILANT 60 MG Cpdr CR capsule   Generic drug:  dexlansoprazole     90 capsule    TAKE 1 CAPSULE BY MOUTH ONCE DAILY (FOR HEARTBURN)    Gastroesophageal reflux disease, esophagitis presence not specified       * EPIPEN 0.3 MG/0.3ML injection   Generic drug:  EPINEPHrine      Inject 0.3 mg into the muscle once as needed.        * EPIPEN 2-BRE 0.3 MG/0.3ML injection 2-pack   Generic drug:  EPINEPHrine     2 mL    INJECT 0.3MG INTRAMUSCULAR ONE TIME FOR ONE DOSE AS NEEDED FOR ANAPHYLAXIS (CALL 911 IF YOU HAVE TO  GIVE) (FOR BEE STINGS) **LABEL EACH PEN*    Allergic to bees       * Ferrous Gluconate 324 (37.5 Fe) MG Tabs      Take 1 tablet by mouth 2 times daily.        * ferrous gluconate 324 (38 Fe) MG tablet    FERGON    200 tablet    TAKE 1 TABLET BY MOUTH TWICE DAILY (IRON SUPPLEMENT)    Other iron deficiency anemias (CODE)       furosemide 40 MG tablet    LASIX    62 tablet    TAKE 1 TABLET BY MOUTH TWICE DAILY (7AM & 3PM) (DIURETIC)    Localized edema       hydrocortisone 2.5 % cream    ANUSOL-HC    28.35 g    Place rectally 2 times daily    Rectal pain       loratadine 10 MG tablet    CLARITIN     Take 10 mg by mouth daily.        mometasone furoate 200 MCG/ACT inhaler    ASMANEX HFA    1 Inhaler    Inhale 2 puffs into the lungs 2 times daily    Mixed simple and mucopurulent chronic bronchitis (H)       mometasone-formoterol 200-5 MCG/ACT oral inhaler    DULERA    13 g    Inhale 2 puffs into the lungs 2 times daily    Intermittent asthma, uncomplicated       montelukast 10 MG tablet    SINGULAIR    90 tablet    TAKE 1 TABLET BY MOUTH EVERY MORNING. (ASTHMA)    Gastroesophageal reflux disease, esophagitis presence not specified       Multi-vitamin Tabs tablet   Generic drug:  multivitamin, therapeutic with minerals      1 TABLET DAILY        * order for DME     1 each    Equipment being ordered: support compression hose BK  2 pair black 30mm HG  To be applied on arising & removed while lying down to go to sleep    Localized edema       * order for DME     1 Device    Equipment being ordered: CPAP with mask and tubing    NEL (obstructive sleep apnea)       * order for DME     1 Device    Oxygen 2 Li/min at night and bled into BiPAP    Nocturnal hypoxemia       PHOSPHATE ENEMA RE      Place 1 enema rectally as needed.        Potassium Chloride ER 20 MEQ Tbcr      Take 1 tablet by mouth 2 times daily.        potassium chloride SA 20 MEQ CR tablet    K-DUR/KLOR-CON M    62 tablet    TAKE 1 TABLET BY MOUTH TWICE DAILY. (LOW  POTASSIUM)    Localized edema       PULMICORT FLEXHALER 180 MCG/ACT inhaler   Generic drug:  budesonide     1 each    INHALE 2 PUFFS INTO THE LUNGS TWICE DAILY. (COPD)    Mixed simple and mucopurulent chronic bronchitis (H)       sertraline 100 MG tablet    ZOLOFT     Take 1.5 tablets by mouth daily.        simvastatin 40 MG tablet    ZOCOR    31 tablet    TAKE 1 TABLET BY MOUTH EVERY MORNING.  (CHOLESTEROL)    Hypercholesterolemia       SPIRIVA HANDIHALER 18 MCG capsule   Generic drug:  tiotropium     90 capsule    INHALE CONTENTS OF ONE CAPSULE BY MOUTH ONCE DAILY. (BRONCHITIS & EMPHYSEMA)    Wheezing       spironolactone 25 MG tablet    ALDACTONE    31 tablet    TAKE 1 TABLET BY MOUTH ONCE DAILY. (HIGH BLOOD PRESSURE)    Localized edema       triamcinolone 0.1 % cream    KENALOG    80 g    Apply topically 2 times daily as needed    Dermatitis       Vitamin D-3 1000 units Caps      Take 2 capsules by mouth daily        warfarin 4 MG tablet    COUMADIN    64 tablet    Hold warfarin today (9/05/18) 10 mg Monday and Friday and 8 mg all other days.    Long term current use of anticoagulant therapy, History of pulmonary embolism       ZEASORB-AF EX      Externally apply topically once a week        * Notice:  This list has 7 medication(s) that are the same as other medications prescribed for you. Read the directions carefully, and ask your doctor or other care provider to review them with you.

## 2018-09-05 NOTE — PATIENT INSTRUCTIONS
Warm compresses/ soaks 10-15 minutes 3-4 times daily.    Start antibiotics today    Watch for fever, red streaking coming from the area.  Cellulitis:  Most skin infections follow breaks in the skin (for example, from cuts, puncture wounds, animal bites, splinters, thorns, or burns). Bacteria (especially staphylococcus or streptococcus) then invade the wound and cause the infection. Some infections start with a closed rash (that is, the skin is not broken). Examples are insect bites, chickenpox, scabies, or acne. If a child picks at these rashes, the skin can become broken and then infected.   Deeper wounds (for example, puncture wounds) are much more likely to become infected than superficial wounds (for example, scrapes). The hands are at increased risk for infection from puncture wounds. The penetrating claws or teeth of cats pose a major risk for infection.   Cellulitis (skin infection) can sometimes start without any recent wound infection. This type of cellulitis is spread from the bloodstream and can be serious it is not treated.   How long does it last?   With appropriate antibiotics and warm soaks, the wound infection should improve within 24 to 48 hours.  Any red streaking or red patches should stop spreading. The area of the wound should also be much less tender within 48 hours.   What is the treatment?   Antibiotics -finish complete course even if infection has already improved  Warm soaks or local heat For open wounds that are infected, proper cleaning is important for healing. Soak the wounded area in warm water or put a warm, wet cloth on the wound for 20 minutes three times a day. Use a warm saltwater solution containing 2 teaspoons of table salt per quart of water. Use this solution to remove all the pus and loose scabs. (Don't use hydrogen peroxide because it will damage healthy tissue and delay healing.)   Fever and pain relief Take acetaminophen or ibuprofen for fever over 102F (39C) or the pain  relief.   How can I help prevent infections?   Wash all new wounds vigorously with soap and water for 5 to 10 minutes to remove dirt and bacteria. Soak puncture wounds in warm, soapy water for 15 minutes. Do this as soon as possible after the injury occurs, because the longer you wait, the less the benefit.   Do not pick at insect bites, scabs, or other areas of irritated skin.   Applying an antibiotic ointment after cleaning might be helpful.   Call IMMEDIATELY if:   The redness keeps spreading.   The wound becomes extremely painful.   Call during office hours if:   The fever is not gone 48 hours after starting the antibiotic.   The wound infection does not look better 3 days after taking an antibiotic.   The wound isn't completely healed within 10 days.

## 2018-09-10 ENCOUNTER — RADIANT APPOINTMENT (OUTPATIENT)
Dept: GENERAL RADIOLOGY | Facility: CLINIC | Age: 58
End: 2018-09-10
Attending: PHYSICIAN ASSISTANT
Payer: MEDICARE

## 2018-09-10 ENCOUNTER — OFFICE VISIT (OUTPATIENT)
Dept: FAMILY MEDICINE | Facility: CLINIC | Age: 58
End: 2018-09-10
Payer: MEDICARE

## 2018-09-10 VITALS
HEART RATE: 77 BPM | BODY MASS INDEX: 42.76 KG/M2 | SYSTOLIC BLOOD PRESSURE: 132 MMHG | RESPIRATION RATE: 16 BRPM | WEIGHT: 298 LBS | OXYGEN SATURATION: 97 % | TEMPERATURE: 97.8 F | DIASTOLIC BLOOD PRESSURE: 78 MMHG

## 2018-09-10 DIAGNOSIS — S63.641A SPRAIN OF METACARPOPHALANGEAL (MCP) JOINT OF RIGHT THUMB, INITIAL ENCOUNTER: Primary | ICD-10-CM

## 2018-09-10 DIAGNOSIS — M79.644 THUMB PAIN, RIGHT: ICD-10-CM

## 2018-09-10 DIAGNOSIS — F17.200 TOBACCO DEPENDENCE: Chronic | ICD-10-CM

## 2018-09-10 PROCEDURE — 99214 OFFICE O/P EST MOD 30 MIN: CPT | Performed by: PHYSICIAN ASSISTANT

## 2018-09-10 PROCEDURE — 73130 X-RAY EXAM OF HAND: CPT | Mod: RT

## 2018-09-10 NOTE — MR AVS SNAPSHOT
After Visit Summary   9/10/2018    Petey Archibald    MRN: [id number]           Patient Information     Date Of Birth          1960        Visit Information        Provider Department      9/10/2018 12:50 PM Yolande Lacy PA-C [facility]        Today's Diagnoses     Sprain of metacarpophalangeal (MCP) joint of right thumb, initial encounter    -  1    Thumb pain, right        Tobacco dependence:40-50 pk yr hx           Follow-ups after your visit        Follow-up notes from your care team     Return in about 1 week (around 9/17/2018) for Recheck if needed.      Your next 10 appointments already scheduled     Sep 19, 2018  3:15 PM CDT   [facility]   [facility]    [address] [place] [id number] [phone]              Who to contact     If you have questions or need follow up information about today's clinic visit or your schedule please contact [facility] directly at [phone].  Normal or non-critical lab and imaging results will be communicated to you by MyChart, letter or phone within 4 business days after the clinic has received the results. If you do not hear from us within 7 days, please contact the clinic through [employer] or phone. If you have a critical or abnormal lab result, we will notify you by phone as soon as possible.  Submit refill requests through [employer] or call your pharmacy and they will forward the refill request to us. Please allow 3 business days for your refill to be completed.          Additional Information About Your Visit        MyChart Information     [employer] gives you secure access to your electronic health record. If you see a primary care provider, you can also send messages to your care team and make appointments. If you have questions,  please call your primary care clinic.  If you do not have a primary care provider, please call [phone] and they will assist you.        Care EveryWhere ID     This is your Care EveryWhere ID. This could be used by other organizations to access your [place] medical records  [id number]        Your Vitals Were     Pulse Temperature Respirations Pulse Oximetry BMI (Body Mass Index)       77 97.8  F (36.6  C) (Tympanic) 16 97% 42.76 kg/m2        Blood Pressure from Last 3 Encounters:   09/10/18 132/78   09/05/18 112/74   08/21/18 120/74    Weight from Last 3 Encounters:   09/10/18 298 lb (135.2 kg)   09/05/18 303 lb (137.4 kg)   08/21/18 303 lb (137.4 kg)              We Performed the Following     COPD ACTION PLAN     XR Hand Right G/E 3 Views        Primary Care Provider Office Phone # Fax #    Raúl Riddle [phone]       [address] [facility] [facility] [address]        Equal Access to Services     [facility]: Hadii aad ku hadasho Soomaali, waaxda luqadaha, qaybta kaalmada adeegyada, waxbrennen villasenor . So [facility] [phone].    ATENCIÓN: Si habla español, tiene a combs disposición servicios gratuitos de asistencia lingüística. Llame al [phone].    We comply with applicable federal civil rights laws and [place] laws. We do not discriminate on the basis of race, color, national origin, age, disability, sex, sexual orientation, or gender identity.            Thank you!     [facility]  [facility]. Our goal is always to provide you with excellent care. Hearing back from our patients is one way we can continue to improve our services. Please take a few minutes to complete the written survey that you may receive in the mail after your visit with us. Thank you!             Your Updated Medication List - Protect others around you: Learn how to safely use, store and throw away your medicines at  www.disposemymeds.org.          This list is accurate as of 9/10/18  2:07 PM.  Always use your most recent med list.                   Brand Name Dispense Instructions for use Diagnosis    ABILIFY 10 MG tablet   Generic drug:  ARIPiprazole     30 tablet    Take 0.5 tablets (5 mg) by mouth daily        acetaminophen 325 MG tablet    TYLENOL    100 tablet    Take 1-2 tablets (325-650 mg) by mouth every 6 hours as needed    Knee pain, right       albuterol 108 (90 Base) MCG/ACT inhaler    PROAIR HFA    1 Inhaler    Inhale 2 puffs into the lungs every 6 hours as needed    Pulmonary emphysema, unspecified emphysema type (H)       AMBIEN 10 MG tablet   Generic drug:  zolpidem     30 tablet    Take 1 tablet (10 mg) by mouth nightly as needed for sleep        ANUSOL-HC 25 MG Suppository   Generic drug:  hydrocortisone     14 Suppository    INSERT ONE SUPPOSITORY RECTALLY UP TO TWICE A DAY AS NEEDED    External hemorrhoids       BACTROBAN 2 % ointment   Generic drug:  mupirocin      Apply topically 2 times daily        BUPROPION HCL PO      Take 150 mg by mouth every morning        cephALEXin 500 MG capsule    KEFLEX    30 capsule    Take 1 capsule (500 mg) by mouth 3 times daily    Cellulitis and abscess of trunk       clotrimazole-betamethasone cream    LOTRISONE    60 g    Apply topically 2 times daily    Dermatitis       DEXILANT 60 MG Cpdr CR capsule   Generic drug:  dexlansoprazole     90 capsule    TAKE 1 CAPSULE BY MOUTH ONCE DAILY (FOR HEARTBURN)    Gastroesophageal reflux disease, esophagitis presence not specified       * EPIPEN 0.3 MG/0.3ML injection   Generic drug:  EPINEPHrine      Inject 0.3 mg into the muscle once as needed.        * EPIPEN 2-BRE 0.3 MG/0.3ML injection 2-pack   Generic drug:  EPINEPHrine     2 mL    INJECT 0.3MG INTRAMUSCULAR ONE TIME FOR ONE DOSE AS NEEDED FOR ANAPHYLAXIS (CALL 911 IF YOU HAVE TO GIVE) (FOR BEE STINGS) **LABEL EACH PEN*    Allergic to bees       * Ferrous Gluconate 324 (37.5  Fe) MG Tabs      Take 1 tablet by mouth 2 times daily.        * ferrous gluconate 324 (38 Fe) MG tablet    FERGON    200 tablet    TAKE 1 TABLET BY MOUTH TWICE DAILY (IRON SUPPLEMENT)    Other iron deficiency anemias (CODE)       furosemide 40 MG tablet    LASIX    62 tablet    TAKE 1 TABLET BY MOUTH TWICE DAILY (7AM & 3PM) (DIURETIC)    Localized edema       hydrocortisone 2.5 % cream    ANUSOL-HC    28.35 g    Place rectally 2 times daily    Rectal pain       loratadine 10 MG tablet    CLARITIN     Take 10 mg by mouth daily.        mometasone furoate 200 MCG/ACT inhaler    ASMANEX HFA    1 Inhaler    Inhale 2 puffs into the lungs 2 times daily    Mixed simple and mucopurulent chronic bronchitis (H)       mometasone-formoterol 200-5 MCG/ACT oral inhaler    DULERA    13 g    Inhale 2 puffs into the lungs 2 times daily    Intermittent asthma, uncomplicated       montelukast 10 MG tablet    SINGULAIR    90 tablet    TAKE 1 TABLET BY MOUTH EVERY MORNING. (ASTHMA)    Gastroesophageal reflux disease, esophagitis presence not specified       Multi-vitamin Tabs tablet   Generic drug:  multivitamin, therapeutic with minerals      1 TABLET DAILY        * order for DME     1 each    Equipment being ordered: support compression hose BK  2 pair black 30mm HG  To be applied on arising & removed while lying down to go to sleep    Localized edema       * order for DME     1 Device    Equipment being ordered: CPAP with mask and tubing    NEL (obstructive sleep apnea)       * order for DME     1 Device    Oxygen 2 Li/min at night and bled into BiPAP    Nocturnal hypoxemia       PHOSPHATE ENEMA RE      Place 1 enema rectally as needed.        Potassium Chloride ER 20 MEQ Tbcr      Take 1 tablet by mouth 2 times daily.        potassium chloride SA 20 MEQ CR tablet    K-DUR/KLOR-CON M    62 tablet    TAKE 1 TABLET BY MOUTH TWICE DAILY. (LOW POTASSIUM)    Localized edema       PULMICORT FLEXHALER 180 MCG/ACT inhaler   Generic drug:   budesonide     1 each    INHALE 2 PUFFS INTO THE LUNGS TWICE DAILY. (COPD)    Mixed simple and mucopurulent chronic bronchitis (H)       sertraline 100 MG tablet    ZOLOFT     Take 1.5 tablets by mouth daily.        simvastatin 40 MG tablet    ZOCOR    31 tablet    TAKE 1 TABLET BY MOUTH EVERY MORNING.  (CHOLESTEROL)    Hypercholesterolemia       SPIRIVA HANDIHALER 18 MCG capsule   Generic drug:  tiotropium     90 capsule    INHALE CONTENTS OF ONE CAPSULE BY MOUTH ONCE DAILY. (BRONCHITIS & EMPHYSEMA)    Wheezing       spironolactone 25 MG tablet    ALDACTONE    31 tablet    TAKE 1 TABLET BY MOUTH ONCE DAILY. (HIGH BLOOD PRESSURE)    Localized edema       triamcinolone 0.1 % cream    KENALOG    80 g    Apply topically 2 times daily as needed    Dermatitis       Vitamin D-3 1000 units Caps      Take 2 capsules by mouth daily        warfarin 4 MG tablet    COUMADIN    64 tablet    Hold warfarin today (9/05/18) 10 mg Monday and Friday and 8 mg all other days.    Long term current use of anticoagulant therapy, History of pulmonary embolism       ZEASORB-AF EX      Externally apply topically once a week        * Notice:  This list has 7 medication(s) that are the same as other medications prescribed for you. Read the directions carefully, and ask your doctor or other care provider to review them with you.

## 2018-09-10 NOTE — PROGRESS NOTES
SUBJECTIVE:   Petey Archibald is a 57 year old male who presents to clinic today for the following health issues:    fall      Duration: Saturday    Description (location/character/radiation): patient states that he [place] a nature center and tripped over a log, injuring his right hand, forearm and knee. Then yesterday he noticed a large bruise on his right thigh. He states that his hand hurts the most, hard to lift anything, palm, pain in fingers as well    Intensity:  mild, moderate    Accompanying signs and symptoms: see above    History (similar episodes/previous evaluation): None    Precipitating or alleviating factors: None    Therapies tried and outcome: None     Reviewed and updated as needed this visit by clinical staff  Tobacco  Allergies  Meds  Problems  Med Hx  Surg Hx  Fam Hx  Soc Hx        Reviewed and updated as needed this visit by Provider  Tobacco  Allergies  Meds  Problems  Med Hx  Surg Hx  Fam Hx  Soc Hx        Additional complaints: None    HPI additional notes: Petey presents today with   Chief Complaint   Patient presents with     Fall          ROS:  C: Negative for fever, chills, recent change in weight  Skin: POSITIVE for laceration. Negative for warmth, swelling or redness  ENT: Negative for ear, mouth and throat problems  MS: Positive for right thumb/hand pain  P: Negative for changes in mood or affect    Chart Review:  History   Smoking Status     Current Every Day Smoker     Packs/day: 2.00     Years: 30.00     Types: Cigarettes   Smokeless Tobacco     Never Used     Comment: started at age 18      PHQ-9 SCORE 12/23/2016 2/28/2017 8/21/2018   Total Score - - -   Total Score MyChart - - 0   Total Score 1 2 0     CARL-7 SCORE 7/21/2016 12/23/2016 8/21/2018   Total Score - - 6 (mild anxiety)   Total Score 13 0 6     Recent Labs   Lab Test  05/03/17   1059  05/11/16   1003  10/01/15   1635   02/13/12   2201   02/01/11   1644   A1C   --    --    --    --   5.7   --    5.9   LDL  73  77  95   < >   --    < >  110.8*   HDL  82  94  79   < >   --    < >  68*   TRIG  102  107  96   < >   --    < >  121   ALT  25  28  36   < >   --    < >  34.0   CR  0.99  1.10   --    < >   --    < >  1.1   GFRESTIMATED  77  69   --    < >   --    --    --    GFRESTBLACK  >90   GFR Calc    83   --    < >   --    --    --    POTASSIUM  4.0  4.0   --    < >   --    < >  3.9    < > = values in this interval not displayed.      BP Readings from Last 3 Encounters:   09/10/18 132/78   09/05/18 112/74   08/21/18 120/74    Wt Readings from Last 3 Encounters:   09/10/18 298 lb (135.2 kg)   09/05/18 303 lb (137.4 kg)   08/21/18 303 lb (137.4 kg)                  Patient Active Problem List   Diagnosis     Ankle pain     GERD (gastroesophageal reflux disease)     Peripheral vascular disease (H)     History of pulmonary embolism     Tobacco dependence:40-50 pk yr hx     Borderline mental retardation     History of DVT of lower extremity     Right knee pain     Burn of second degree of trunk.leg,perineum 2ndary to urine exposure      Pilonidal cyst     Asymptomatic varicose veins, bilateral     Callus of foot on Rt lat dist  since 8-15      Morbid obesity due to excess calories (H)  BMI 40-50     Hypercholesterolemia     Mixed simple and mucopurulent chronic bronchitis (HCC) CT 4-06 wnl and neg bronch for hemoptesis spirometry 7-26-16  FVC=59% & FEV1=46%     Major depression in complete remission (HCC) on meds      Long-term (current) use of anticoagulants [Z79.01]     Glucose intolerance (impaired glucose tolerance)     Other iron deficiency anemia     Localized edema L>R ankles      Erectile dysfunction, unspecified erectile dysfunction type     Stasis dermatitis of both legs     Arch pain of left foot 2ndary to edema and tendonitis      NEL (obstructive sleep apnea)     Hematemesis without nausea after smoking      Anxiety     Thrombophlebitis of superficial veins of both lower extremities:  Greater Saphenous VV      Acute deep vein thrombosis (DVT) of tibial vein of left lower extremity (H)     History of colonic polyps     Allergic to bees     Past Surgical History:   Procedure Laterality Date     EXCISE MALIGNANT LESIONS, TRUNK/ARMS/LEGS 0.5CM OR LESS Left 5/26/2017    Procedure: EXCISE MALIGNANT LESIONS, TRUNK/ARMS/LEGS 0.5CM OR LESS;  EXCISION LEFT ELBOW MASS;  Surgeon: Jose Azevedo MD;  Location: [facility]     RECTAL SURGERY      perianal abscess?     Problem list, Medication list, Allergies, Medical/Social/Surg hx reviewed in [facility], updated as appropriate.   OBJECTIVE:                                                    /78  Pulse 77  Temp 97.8  F (36.6  C) (Tympanic)  Resp 16  Wt 298 lb (135.2 kg)  SpO2 97%  BMI 42.76 kg/m2  Body mass index is 42.76 kg/(m^2).  GENERAL: healthy, alert, in no acute distress  ORTHO: Hand/Finger Exam: Inspection:Thumb:  slight swelling, ecchymosis  Tender: Metacarpals:  1st metacarpal  Non-tender: Carpals:  scaphoid, lunate, trapezium, triquetrum, Thumb:   proximal phalanx, distal phalanx, PIP joint, no thenar eminence wasting  Range of Motion Thumb:   opposition   decreased, flexion MCP   decreased, extension MCP   decreased, flexion IP   decreased, extension IP   decreased  Strength: extension  4-/5, flexion  4-/5, abduction  4-/5, adduction  4-/5, opposition  4-/5  PSYCH: Alert and oriented times 3;  Able to articulate logical thoughts. Affect is normal.    Diagnostic test results: WNL, awaiting radiologist review.       ASSESSMENT/PLAN:                                                          ICD-10-CM    1. Sprain of metacarpophalangeal (MCP) joint of right thumb, initial encounter S63.641A    2. Thumb pain, right M79.644 XR Hand Right G/E 3 Views   3. Tobacco dependence:40-50 pk yr hx F17.200 COPD ACTION PLAN     Discussed result with pt. Will call pt if radiologist reading differs.  Pt should wear wrist brace for next three days, can remove  sooner if symptoms improve.  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  Okay to work during the day as he does not need to use his right hand as much.  Tylenol for pain.    Forms completed for pt group home with diagnoses and assessments today.      Please see patient instructions for treatment details.    Return in about 1 week (around 9/17/2018) for Recheck if needed.    Yolande Lacy PA-C  [facility]

## 2018-09-19 ENCOUNTER — ANTICOAGULATION THERAPY VISIT (OUTPATIENT)
Dept: NURSING | Facility: CLINIC | Age: 58
End: 2018-09-19
Payer: MEDICARE

## 2018-09-19 DIAGNOSIS — Z86.711 HISTORY OF PULMONARY EMBOLISM: ICD-10-CM

## 2018-09-19 DIAGNOSIS — Z79.01 LONG TERM CURRENT USE OF ANTICOAGULANT THERAPY: Primary | ICD-10-CM

## 2018-09-19 LAB — INR POINT OF CARE: 2.2 (ref 2–3)

## 2018-09-19 PROCEDURE — 36416 COLLJ CAPILLARY BLOOD SPEC: CPT

## 2018-09-19 PROCEDURE — 85610 PROTHROMBIN TIME: CPT | Mod: QW

## 2018-09-19 PROCEDURE — 99207 ZZC NO CHARGE NURSE ONLY: CPT

## 2018-09-19 RX ORDER — WARFARIN SODIUM 4 MG/1
TABLET ORAL
Qty: 75 TABLET | Refills: 1 | Status: SHIPPED | OUTPATIENT
Start: 2018-09-19 | End: 2018-10-10

## 2018-09-19 NOTE — PROGRESS NOTES
ANTICOAGULATION FOLLOW-UP CLINIC VISIT    Patient Name:  Petey Archibald  Date:  9/19/2018  Contact Type:  Face to Face    SUBJECTIVE:     Patient Findings     Positives Inflammation    Comments Sprained right wrist one week ago.           OBJECTIVE    INR Protime   Date Value Ref Range Status   09/19/2018 2.2 2.0 - 3.0 Final       ASSESSMENT / PLAN  INR assessment THER    Recheck INR In: 3 WEEKS    INR Location Clinic      Anticoagulation Summary as of 9/19/2018     INR goal 2.0-3.0   Today's INR 2.2   Warfarin maintenance plan 10 mg (4 mg x 2.5) on Mon, Fri; 8 mg (4 mg x 2) all other days   Full warfarin instructions 10 mg on Mon, Fri; 8 mg all other days   Weekly warfarin total 60 mg   No change documented Prudence Machuca RN   Plan last modified Lisa Ponce RN (1/2/2018)   Next INR check 10/10/2018   Target end date Indefinite    Indications   History of pulmonary embolism [Z86.711]  Long-term (current) use of anticoagulants [Z79.01] [Z79.01]         Anticoagulation Episode Summary     INR check location [facility]    Preferred lab     Send INR reminders to [facility] INR/PROTIME    Comments       Anticoagulation Care Providers     Provider Role Specialty Phone number    Silvino Baker MD [facility] [phone]            See the Encounter Report to view Anticoagulation Flowsheet and Dosing Calendar (Go to Encounters tab in chart review, and find the Anticoagulation Therapy Visit)        Prudence Machuca RN

## 2018-09-19 NOTE — MR AVS SNAPSHOT
Petey Archibald   9/19/2018 3:15 PM   Anticoagulation Therapy Visit    Description:  57 year old male   Provider:  [facility]   Department:  Bx Nurse           INR as of 9/19/2018     Today's INR 2.2      Anticoagulation Summary as of 9/19/2018     INR goal 2.0-3.0   Today's INR 2.2   Full warfarin instructions 10 mg on Mon, Fri; 8 mg all other days   Next INR check 10/10/2018    Indications   History of pulmonary embolism [Z86.711]  Long-term (current) use of anticoagulants [Z79.01] [Z79.01]         Your next Anticoagulation Clinic appointment(s)     Oct 10, 2018  3:15 PM CDT   Anticoagulation Visit with  ANTICOAGULATION CLINIC   [facility]    [address] [place] [id number] [phone]              Contact Numbers     [facility]  Please call  [phone] to cancel and/or reschedule your appointment   The direct line to the anticoagulant nurse is [phone] on Monday, Wednesday, and Friday. On Thursday, the anticoagulant nurse can be reached directly at [phone].         September 2018 Details    Sun Mon Tue Wed Thu Fri Sat           1                 2               3               4               5               6               7               8                 9               10               11               12               13               14               15                 16               17               18               19      8 mg   See details      20      8 mg         21      10 mg         22      8 mg           23      8 mg         24      10 mg         25      8 mg         26      8 mg         27      8 mg         28      10 mg         29      8 mg           30      8 mg                Date Details   09/19 This INR check               How to take your warfarin dose     To take:  8 mg Take 2 of the 4 mg tablets.    To take:  10 mg Take 2.5 of the 4 mg  tablets.           October 2018 Details    Sun Mon Tue Wed Thu Fri Sat      1      10 mg         2      8 mg         3      8 mg         4      8 mg         5      10 mg         6      8 mg           7      8 mg         8      10 mg         9      8 mg         10            11               12               13                 14               15               16               17               18               19               20                 21               22               23               24               25               26               27                 28               29               30               31                   Date Details   No additional details    Date of next INR:  10/10/2018         How to take your warfarin dose     To take:  8 mg Take 2 of the 4 mg tablets.    To take:  10 mg Take 2.5 of the 4 mg tablets.

## 2018-10-10 ENCOUNTER — ANTICOAGULATION THERAPY VISIT (OUTPATIENT)
Dept: NURSING | Facility: CLINIC | Age: 58
End: 2018-10-10
Payer: MEDICARE

## 2018-10-10 DIAGNOSIS — Z86.711 HISTORY OF PULMONARY EMBOLISM: ICD-10-CM

## 2018-10-10 DIAGNOSIS — Z79.01 LONG TERM CURRENT USE OF ANTICOAGULANT THERAPY: ICD-10-CM

## 2018-10-10 LAB — INR POINT OF CARE: 2.5 (ref 2–3)

## 2018-10-10 PROCEDURE — 85610 PROTHROMBIN TIME: CPT | Mod: QW

## 2018-10-10 PROCEDURE — 99207 ZZC NO CHARGE NURSE ONLY: CPT

## 2018-10-10 PROCEDURE — 36416 COLLJ CAPILLARY BLOOD SPEC: CPT

## 2018-10-10 RX ORDER — WARFARIN SODIUM 4 MG/1
TABLET ORAL
Qty: 75 TABLET | Refills: 1 | Status: SHIPPED | OUTPATIENT
Start: 2018-10-10 | End: 2018-11-07

## 2018-10-10 NOTE — MR AVS SNAPSHOT
Petey Archibald   10/10/2018 3:15 PM   Anticoagulation Therapy Visit    Description:  57 year old male   Provider:  [facility]   Department:  Bx Nurse           INR as of 10/10/2018     Today's INR 2.5      Anticoagulation Summary as of 10/10/2018     INR goal 2.0-3.0   Today's INR 2.5   Full warfarin instructions 10 mg on Mon, Fri; 8 mg all other days   Next INR check 11/7/2018    Indications   History of pulmonary embolism [Z86.711]  Long-term (current) use of anticoagulants [Z79.01] [Z79.01]         Your next Anticoagulation Clinic appointment(s)     Nov 07, 2018  3:15 PM CST   Anticoagulation Visit with  ANTICOAGULATION CLINIC   [facility]    [address] [place] [id number] [phone]              Contact Numbers     [facility]  Please call  [phone] to cancel and/or reschedule your appointment   The direct line to the anticoagulant nurse is [phone] on Monday, Wednesday, and Friday. On Thursday, the anticoagulant nurse can be reached directly at [phone].         October 2018 Details    Sun Mon Tue Wed Thu Fri Sat      1               2               3               4               5               6                 7               8               9               10      8 mg   See details      11      8 mg         12      10 mg         13      8 mg           14      8 mg         15      10 mg         16      8 mg         17      8 mg         18      8 mg         19      10 mg         20      8 mg           21      8 mg         22      10 mg         23      8 mg         24      8 mg         25      8 mg         26      10 mg         27      8 mg           28      8 mg         29      10 mg         30      8 mg         31      8 mg             Date Details   10/10 This INR check               How to take your warfarin dose     To take:  8 mg Take 2 of the 4 mg  tablets.    To take:  10 mg Take 2.5 of the 4 mg tablets.           November 2018 Details    Sun Mon Tue Wed Thu Fri Sat         1      8 mg         2      10 mg         3      8 mg           4      8 mg         5      10 mg         6      8 mg         7            8               9               10                 11               12               13               14               15               16               17                 18               19               20               21               22               23               24                 25               26               27               28               29               30                 Date Details   No additional details    Date of next INR:  11/7/2018         How to take your warfarin dose     To take:  8 mg Take 2 of the 4 mg tablets.    To take:  10 mg Take 2.5 of the 4 mg tablets.

## 2018-10-10 NOTE — PROGRESS NOTES
ANTICOAGULATION FOLLOW-UP CLINIC VISIT    Patient Name:  Petey Archibald  Date:  10/10/2018  Contact Type:  Face to Face    SUBJECTIVE:        OBJECTIVE    INR Protime   Date Value Ref Range Status   10/10/2018 2.5 2.0 - 3.0 Final       ASSESSMENT / PLAN  INR assessment THER    Recheck INR In: 4 WEEKS    INR Location Clinic      Anticoagulation Summary as of 10/10/2018     INR goal 2.0-3.0   Today's INR 2.5   Warfarin maintenance plan 10 mg (4 mg x 2.5) on Mon, Fri; 8 mg (4 mg x 2) all other days   Full warfarin instructions 10 mg on Mon, Fri; 8 mg all other days   Weekly warfarin total 60 mg   No change documented Prudence Machuca RN   Plan last modified Lisa Ponce RN (1/2/2018)   Next INR check 11/7/2018   Target end date Indefinite    Indications   History of pulmonary embolism [Z86.711]  Long-term (current) use of anticoagulants [Z79.01] [Z79.01]         Anticoagulation Episode Summary     INR check location [facility]    Preferred lab     Send INR reminders to [facility] INR/PROTIME    Comments       Anticoagulation Care Providers     Provider Role Specialty Phone number    Silvino Baker MD [facility] [phone]            See the Encounter Report to view Anticoagulation Flowsheet and Dosing Calendar (Go to Encounters tab in chart review, and find the Anticoagulation Therapy Visit)        Prudence Machuca RN

## 2018-11-07 ENCOUNTER — NURSE TRIAGE (OUTPATIENT)
Dept: NURSING | Facility: CLINIC | Age: 58
End: 2018-11-07

## 2018-11-07 ENCOUNTER — ANTICOAGULATION THERAPY VISIT (OUTPATIENT)
Dept: NURSING | Facility: CLINIC | Age: 58
End: 2018-11-07
Payer: MEDICARE

## 2018-11-07 DIAGNOSIS — Z86.711 HISTORY OF PULMONARY EMBOLISM: ICD-10-CM

## 2018-11-07 DIAGNOSIS — Z79.01 LONG TERM CURRENT USE OF ANTICOAGULANT THERAPY: ICD-10-CM

## 2018-11-07 LAB — INR POINT OF CARE: 3.4 (ref 2–3)

## 2018-11-07 PROCEDURE — 85610 PROTHROMBIN TIME: CPT | Mod: QW

## 2018-11-07 PROCEDURE — 36416 COLLJ CAPILLARY BLOOD SPEC: CPT

## 2018-11-07 PROCEDURE — 99207 ZZC NO CHARGE NURSE ONLY: CPT

## 2018-11-07 RX ORDER — WARFARIN SODIUM 4 MG/1
TABLET ORAL
Qty: 75 TABLET | Refills: 1 | Status: SHIPPED | OUTPATIENT
Start: 2018-11-07 | End: 2018-11-21

## 2018-11-07 NOTE — MR AVS SNAPSHOT
Petey Archibald   11/7/2018 3:15 PM   Anticoagulation Therapy Visit    Description:  57 year old male   Provider:   ANTICOAGULATION CLINIC   Department:  Bx Nurse           INR as of 11/7/2018     Today's INR 3.4!      Anticoagulation Summary as of 11/7/2018     INR goal 2.0-3.0   Today's INR 3.4!   Full warfarin instructions 10 mg on Mon; 8 mg all other days   Next INR check 11/21/2018    Indications   History of pulmonary embolism [Z86.711]  Long-term (current) use of anticoagulants [Z79.01] [Z79.01]         Your next Anticoagulation Clinic appointment(s)     Nov 21, 2018  3:30 PM CST   Anticoagulation Visit with  ANTICOAGULATION CLINIC   [facility] ([facility]    [address] [place] [id number] [phone]              Contact Numbers     [facility]  Please call  [phone] to cancel and/or reschedule your appointment   The direct line to the anticoagulant nurse is [phone] on Monday, Wednesday, and Friday. On Thursday, the anticoagulant nurse can be reached directly at [phone].         November 2018 Details    Sun Mon Tue Wed Thu Fri Sat         1               2               3                 4               5               6               7      8 mg   See details      8      8 mg         9      8 mg         10      8 mg           11      8 mg         12      10 mg         13      8 mg         14      8 mg         15      8 mg         16      8 mg         17      8 mg           18      8 mg         19      10 mg         20      8 mg         21            22               23               24                 25               26               27               28               29               30                 Date Details   11/07 This INR check       Date of next INR:  11/21/2018         How to take your warfarin dose     To take:  8 mg Take 2 of the 4 mg tablets.    To take:  10 mg  Take 2.5 of the 4 mg tablets.

## 2018-11-07 NOTE — PROGRESS NOTES
ANTICOAGULATION FOLLOW-UP CLINIC VISIT    Patient Name:  Petey Archibald  Date:  11/7/2018  Contact Type:  Face to Face    SUBJECTIVE:     Patient Findings     Positives Change in diet/appetite           OBJECTIVE    INR Protime   Date Value Ref Range Status   11/07/2018 3.4 (A) 2.0 - 3.0 Final       ASSESSMENT / PLAN  INR assessment SUPRA    Recheck INR In: 2 WEEKS    INR Location Clinic      Anticoagulation Summary as of 11/7/2018     INR goal 2.0-3.0   Today's INR 3.4!   Warfarin maintenance plan 10 mg (4 mg x 2.5) on Mon; 8 mg (4 mg x 2) all other days   Full warfarin instructions 10 mg on Mon; 8 mg all other days   Weekly warfarin total 58 mg   Plan last modified Prudence Machuca RN (11/7/2018)   Next INR check 11/21/2018   Target end date Indefinite    Indications   History of pulmonary embolism [Z86.711]  Long-term (current) use of anticoagulants [Z79.01] [Z79.01]         Anticoagulation Episode Summary     INR check location [facility]    Preferred lab     Send INR reminders to [facility] INR/PROTIME    Comments       Anticoagulation Care Providers     Provider Role Specialty Phone number    Silvino Baker MD [facility] [phone]            See the Encounter Report to view Anticoagulation Flowsheet and Dosing Calendar (Go to Encounters tab in chart review, and find the Anticoagulation Therapy Visit)        Prudence Machuca RN

## 2018-11-07 NOTE — TELEPHONE ENCOUNTER
Clinic Action Needed:No    Reason for Call: nurse Bertrand with [facility] calling to request that Warfarin orders be sent to [facility] from his INR order today.  nurse Bertrand has orders, but orders were never sent to [facility].  Phoned pharmacy and spoke to pharmacist Loy.  Gave verbal orders as written by Anticoagulation nurse Prudence Machuca RN.  See notes and order dated 11/7/2018 in chart for 10 mg Monday and 8 mg all other days, INR recheck on 11/21/18.  Loy will send out Warfarin tonight.     Routed to: Not routed.    Valeri Egan, RN  [facility]

## 2018-11-18 ENCOUNTER — HOSPITAL ENCOUNTER (EMERGENCY)
Facility: CLINIC | Age: 58
Discharge: HOME | End: 2018-11-18
Attending: EMERGENCY MEDICINE | Admitting: EMERGENCY MEDICINE
Payer: MEDICARE

## 2018-11-18 VITALS
TEMPERATURE: 98.5 F | RESPIRATION RATE: 16 BRPM | DIASTOLIC BLOOD PRESSURE: 59 MMHG | SYSTOLIC BLOOD PRESSURE: 118 MMHG | WEIGHT: 310.8 LBS | HEIGHT: 77 IN | BODY MASS INDEX: 36.7 KG/M2 | OXYGEN SATURATION: 97 %

## 2018-11-18 DIAGNOSIS — S05.01XA ABRASION OF RIGHT CORNEA, INITIAL ENCOUNTER: ICD-10-CM

## 2018-11-18 PROCEDURE — 25000125 ZZHC RX 250

## 2018-11-18 PROCEDURE — 25000125 ZZHC RX 250: Performed by: EMERGENCY MEDICINE

## 2018-11-18 PROCEDURE — 99283 EMERGENCY DEPT VISIT LOW MDM: CPT

## 2018-11-18 RX ORDER — PROPARACAINE HYDROCHLORIDE 5 MG/ML
1 SOLUTION/ DROPS OPHTHALMIC ONCE
Status: COMPLETED | OUTPATIENT
Start: 2018-11-18 | End: 2018-11-18

## 2018-11-18 RX ORDER — POLYMYXIN B SULFATE AND TRIMETHOPRIM 1; 10000 MG/ML; [USP'U]/ML
1 SOLUTION OPHTHALMIC
Qty: 1 BOTTLE | Refills: 0 | Status: SHIPPED | OUTPATIENT
Start: 2018-11-18 | End: 2018-11-25

## 2018-11-18 RX ADMIN — FLUORESCEIN SODIUM: 1 STRIP OPHTHALMIC at 10:31

## 2018-11-18 RX ADMIN — PROPARACAINE HYDROCHLORIDE 1 DROP: 5 SOLUTION/ DROPS OPHTHALMIC at 10:08

## 2018-11-18 ASSESSMENT — VISUAL ACUITY
OS: 20/100
OD: OTHER (SEE COMMENTS)

## 2018-11-18 ASSESSMENT — ENCOUNTER SYMPTOMS
SORE THROAT: 0
EYE DISCHARGE: 1
EYE ITCHING: 1
EYE REDNESS: 1
EYE PAIN: 1
PHOTOPHOBIA: 0

## 2018-11-18 NOTE — ED AVS SNAPSHOT
Emergency Department    [address] [place] [id number]    Phone:  [phone]    Fax:  [phone]                                       Petey Archibald   MRN: [id number]    Department:   Emergency Department   Date of Visit:  11/18/2018           Patient Information     Date Of Birth          1960        Your diagnoses for this visit were:     Abrasion of right cornea, initial encounter        You were seen by Alyce Garcia MD.      Follow-up Information     Please follow up.    Why:  Follow up with your opthamologist in 3 days        Discharge Instructions         Corneal Abrasion    You have received a scratch or scrape (abrasion) to your cornea. The cornea is the clear part in the front of the eye. This sensitive area is very painful when injured. You may make tears frequently, and your vision may be blurry until the injury heals. You may be sensitive to light.  This part of the body heals quickly. You can expect the pain to go away within 24 to 48 hours. If the abrasion is large or deep, your doctor may apply an eye patch, although this is not always done. An antibiotic ointment or eye drops may also be used to prevent infection.  Numbing drops may be used to relieve the pain temporarily so that your eyes can be examined. But these drops can t be prescribed for home use because that would prevent healing and lead to more serious problems. Also, if you can t feel your eye, there is a chance of accidentally injuring it further without knowing it.  Home care    A cold pack may be applied over the eye (or eye patch) for 20 minutes at a time, to reduce pain. To make a cold pack, put ice cubes in a plastic bag that seals at the top. Wrap the bag in a clean, thin towel or cloth.    You may use acetaminophen or ibuprofen to control pain, unless another pain medicine was prescribed. Note: If you have chronic liver or kidney disease or have ever had a stomach ulcer or GI bleeding,  talk with your doctor before using these medicines.    Rest your eyes and don t read until symptoms are gone.    If you use contact lenses, don t wear them until all symptoms are gone.    If your vision is affected by the corneal abrasion or if an eye patch was applied, don t drive a motor vehicle or operate machinery until all symptoms are gone. You may have trouble judging distances using only one eye.    If your eyes are sensitive to light, try wearing sunglasses, or stay indoors until symptoms go away.  Follow-up care  Follow up with your healthcare provider, or as advised.    If no patch was put on your eye and the pain continues for more than 48 hours, you should have another exam. Contact your healthcare provider to arrange this.    If your eye was patched and you were asked to remove the patch yourself, see your healthcare provider. Contact your healthcare provider if you still have pain after the patch is removed.    If you were given a return appointment for patch removal and re-examination, be sure to keep the appointment. Leaving the patch in place longer than advised could be harmful.  When to seek medical advice  Call your healthcare provider right away if any of these occur.    Increasing eye pain or pain that does not improve after 24 hours    Discharge from the eye    Increasing redness of the eye or swelling of the eyelids    Worsening vision    Symptoms get worse after the abrasion has healed  Date Last Reviewed: 7/1/2017 2000-2018 [employer]. [address] [place] [address]. All rights reserved. This information is not intended as a substitute for professional medical care. Always follow your healthcare professional's instructions.          Your next 10 appointments already scheduled     Nov 21, 2018  3:30 PM CST   Anticoagulation Visit with [facility]   [facility] ([facility])    [address]  [address] [id number] [phone]              24 Hour Appointment Hotline       To make an appointment at any [facility], call [phone] ([phone]). If you don't have a family doctor or clinic, we will help you find one. [facility] are conveniently located to serve the needs of you and your family.             Review of your medicines      START taking        Dose / Directions Last dose taken    trimethoprim-polymyxin b ophthalmic solution   Commonly known as:  POLYTRIM   Dose:  1 drop   Quantity:  1 Bottle        Apply 1 drop to eye every 3 hours for 7 days   Refills:  0          Our records show that you are taking the medicines listed below. If these are incorrect, please call your family doctor or clinic.        Dose / Directions Last dose taken    ABILIFY 10 MG tablet   Dose:  5 mg   Quantity:  30 tablet   Generic drug:  ARIPiprazole        Take 0.5 tablets (5 mg) by mouth daily   Refills:  0        acetaminophen 325 MG tablet   Commonly known as:  TYLENOL   Dose:  325-650 mg   Quantity:  100 tablet        Take 1-2 tablets (325-650 mg) by mouth every 6 hours as needed   Refills:  3        albuterol 108 (90 Base) MCG/ACT inhaler   Commonly known as:  PROAIR HFA   Dose:  2 puff   Quantity:  1 Inhaler        Inhale 2 puffs into the lungs every 6 hours as needed   Refills:  0        AMBIEN 10 MG tablet   Dose:  10 mg   Quantity:  30 tablet   Generic drug:  zolpidem        Take 1 tablet (10 mg) by mouth nightly as needed for sleep   Refills:  1        ANUSOL-HC 25 MG Suppository   Quantity:  14 Suppository   Generic drug:  hydrocortisone        INSERT ONE SUPPOSITORY RECTALLY UP TO TWICE A DAY AS NEEDED   Refills:  0        BACTROBAN 2 % ointment   Generic drug:  mupirocin        Apply topically 2 times daily   Refills:  0        BUPROPION HCL PO   Dose:  150 mg        Take 150 mg by mouth every morning   Refills:  0        cephALEXin 500 MG capsule   Commonly  known as:  KEFLEX   Dose:  500 mg   Quantity:  30 capsule        Take 1 capsule (500 mg) by mouth 3 times daily   Refills:  0        clotrimazole-betamethasone cream   Commonly known as:  LOTRISONE   Quantity:  60 g        Apply topically 2 times daily   Refills:  5        DEXILANT 60 MG Cpdr CR capsule   Quantity:  90 capsule   Generic drug:  dexlansoprazole        TAKE 1 CAPSULE BY MOUTH ONCE DAILY (FOR HEARTBURN)   Refills:  2        * EPIPEN 0.3 MG/0.3ML injection   Dose:  0.3 mg   Generic drug:  EPINEPHrine        Inject 0.3 mg into the muscle once as needed.   Refills:  0        * EPIPEN 2-BRE 0.3 MG/0.3ML injection 2-pack   Quantity:  2 mL   Generic drug:  EPINEPHrine        INJECT 0.3MG INTRAMUSCULAR ONE TIME FOR ONE DOSE AS NEEDED FOR ANAPHYLAXIS (CALL 911 IF YOU HAVE TO GIVE) (FOR BEE STINGS) **LABEL EACH PEN*   Refills:  3        * Ferrous Gluconate 324 (37.5 Fe) MG Tabs   Dose:  1 tablet        Take 1 tablet by mouth 2 times daily.   Refills:  0        * ferrous gluconate 324 (38 Fe) MG tablet   Commonly known as:  FERGON   Quantity:  200 tablet        TAKE 1 TABLET BY MOUTH TWICE DAILY (IRON SUPPLEMENT)   Refills:  3        furosemide 40 MG tablet   Commonly known as:  LASIX   Quantity:  62 tablet        TAKE 1 TABLET BY MOUTH TWICE DAILY (7AM & 3PM) (DIURETIC)   Refills:  11        hydrocortisone 2.5 % cream   Commonly known as:  ANUSOL-HC   Quantity:  28.35 g        Place rectally 2 times daily   Refills:  3        loratadine 10 MG tablet   Commonly known as:  CLARITIN   Dose:  10 mg        Take 10 mg by mouth daily.   Refills:  0        mometasone furoate 200 MCG/ACT inhaler   Commonly known as:  ASMANEX HFA   Dose:  2 puff   Quantity:  1 Inhaler        Inhale 2 puffs into the lungs 2 times daily   Refills:  0        mometasone-formoterol 200-5 MCG/ACT oral inhaler   Commonly known as:  DULERA   Dose:  2 puff   Quantity:  13 g        Inhale 2 puffs into the lungs 2 times daily   Refills:  1         montelukast 10 MG tablet   Commonly known as:  SINGULAIR   Quantity:  30 tablet        TAKE 1 TABLET BY MOUTH EVERY MORNING. (ASTHMA)   Refills:  11        Multi-vitamin Tabs tablet   Generic drug:  multivitamin, therapeutic with minerals        1 TABLET DAILY   Refills:  0        * order for DME   Quantity:  1 each        Equipment being ordered: support compression hose BK  2 pair black 30mm HG  To be applied on arising & removed while lying down to go to sleep   Refills:  0        * order for DME   Quantity:  1 Device        Equipment being ordered: CPAP with mask and tubing   Refills:  0        * order for DME   Quantity:  1 Device        Oxygen 2 Li/min at night and bled into BiPAP   Refills:  0        PHOSPHATE ENEMA RE   Dose:  1 enema        Place 1 enema rectally as needed.   Refills:  0        potassium chloride ER 20 MEQ ER tablet   Commonly known as:  K-TAB   Dose:  1 tablet        Take 1 tablet by mouth 2 times daily.   Refills:  0        potassium chloride SA 20 MEQ CR tablet   Commonly known as:  K-DUR/KLOR-CON M   Quantity:  62 tablet        TAKE 1 TABLET BY MOUTH TWICE DAILY. (LOW POTASSIUM)   Refills:  11        PULMICORT FLEXHALER 180 MCG/ACT inhaler   Quantity:  1 each   Generic drug:  budesonide        INHALE 2 PUFFS INTO THE LUNGS TWICE DAILY   Refills:  10        sertraline 100 MG tablet   Commonly known as:  ZOLOFT   Dose:  1.5 tablet        Take 1.5 tablets by mouth daily.   Refills:  0        simvastatin 40 MG tablet   Commonly known as:  ZOCOR   Quantity:  31 tablet        TAKE 1 TABLET BY MOUTH EVERY MORNING.  (CHOLESTEROL)   Refills:  11        SPIRIVA HANDIHALER 18 MCG capsule   Quantity:  90 capsule   Generic drug:  tiotropium        INHALE CONTENTS OF ONE CAPSULE BY MOUTH ONCE DAILY. (BRONCHITIS & EMPHYSEMA)   Refills:  1        spironolactone 25 MG tablet   Commonly known as:  ALDACTONE   Quantity:  31 tablet        TAKE 1 TABLET BY MOUTH ONCE DAILY. (HIGH BLOOD PRESSURE)   Refills:   11        triamcinolone 0.1 % cream   Commonly known as:  KENALOG   Quantity:  80 g        Apply topically 2 times daily as needed   Refills:  3        Vitamin D-3 1000 units Caps   Dose:  2 capsule        Take 2 capsules by mouth daily   Refills:  0        warfarin 4 MG tablet   Commonly known as:  COUMADIN   Quantity:  75 tablet        10 mg Monday and 8 mg all other days.   Refills:  1        ZEASORB-AF EX        Externally apply topically once a week   Refills:  0        * Notice:  This list has 7 medication(s) that are the same as other medications prescribed for you. Read the directions carefully, and ask your doctor or other care provider to review them with you.            Prescriptions were sent or printed at these locations (1 Prescription)                   Other Prescriptions                Printed at Department/Unit printer (1 of 1)         trimethoprim-polymyxin b (POLYTRIM) ophthalmic solution                Orders Needing Specimen Collection     None      Pending Results     No orders found from 11/16/2018 to 11/19/2018.            Pending Culture Results     No orders found from 11/16/2018 to 11/19/2018.            Pending Results Instructions     If you had any lab results that were not finalized at the time of your Discharge, you can call the ED Lab Result RN at [phone]. You will be contacted by this team for any positive Lab results or changes in treatment. The nurses are available 7 days a week from 10A to 6:30P.  You can leave a message 24 hours per day and they will return your call.        Test Results From Your Hospital Stay               Clinical Quality Measure: Blood Pressure Screening     Your blood pressure was checked while you were in the emergency department today. The last reading we obtained was  BP: 118/59 . Please read the guidelines below about what these numbers mean and what you should do about them.  If your systolic blood pressure (the top number) is less than 120 and  your diastolic blood pressure (the bottom number) is less than 80, then your blood pressure is normal. There is nothing more that you need to do about it.  If your systolic blood pressure (the top number) is 120-139 or your diastolic blood pressure (the bottom number) is 80-89, your blood pressure may be higher than it should be. You should have your blood pressure rechecked within a year by a primary care provider.  If your systolic blood pressure (the top number) is 140 or greater or your diastolic blood pressure (the bottom number) is 90 or greater, you may have high blood pressure. High blood pressure is treatable, but if left untreated over time it can put you at risk for heart attack, stroke, or kidney failure. You should have your blood pressure rechecked by a primary care provider within the next 4 weeks.  If your provider in the emergency department today gave you specific instructions to follow-up with your doctor or provider even sooner than that, you should follow that instruction and not wait for up to 4 weeks for your follow-up visit.        Thank you for choosing [facility]       Thank you for choosing [facility] for your care. Our goal is always to provide you with excellent care. Hearing back from our patients is one way we can continue to improve our services. Please take a few minutes to complete the written survey that you may receive in the mail after you visit with us. Thank you!        [employer] Information     [employer] gives you secure access to your electronic health record. If you see a primary care provider, you can also send messages to your care team and make appointments. If you have questions, please call your primary care clinic.  If you do not have a primary care provider, please call [phone] and they will assist you.        Care EveryWhere ID     This is your Care EveryWhere ID. This could be used by other organizations to access your [facility] medical records  [id number]         Equal Access to Services     GUERRERO OZUNA : Caleb Hopkins, pranay obrien, lashae sullivan. So [facility] [phone].    ATENCIÓN: Si habla español, tiene a combs disposición servicios gratuitos de asistencia lingüística. Llame al [phone].    We comply with applicable federal civil rights laws and [place] laws. We do not discriminate on the basis of race, color, national origin, age, disability, sex, sexual orientation, or gender identity.            After Visit Summary       This is your record. Keep this with you and show to your community pharmacist(s) and doctor(s) at your next visit.

## 2018-11-18 NOTE — DISCHARGE INSTRUCTIONS
Corneal Abrasion    You have received a scratch or scrape (abrasion) to your cornea. The cornea is the clear part in the front of the eye. This sensitive area is very painful when injured. You may make tears frequently, and your vision may be blurry until the injury heals. You may be sensitive to light.  This part of the body heals quickly. You can expect the pain to go away within 24 to 48 hours. If the abrasion is large or deep, your doctor may apply an eye patch, although this is not always done. An antibiotic ointment or eye drops may also be used to prevent infection.  Numbing drops may be used to relieve the pain temporarily so that your eyes can be examined. But these drops can t be prescribed for home use because that would prevent healing and lead to more serious problems. Also, if you can t feel your eye, there is a chance of accidentally injuring it further without knowing it.  Home care    A cold pack may be applied over the eye (or eye patch) for 20 minutes at a time, to reduce pain. To make a cold pack, put ice cubes in a plastic bag that seals at the top. Wrap the bag in a clean, thin towel or cloth.    You may use acetaminophen or ibuprofen to control pain, unless another pain medicine was prescribed. Note: If you have chronic liver or kidney disease or have ever had a stomach ulcer or GI bleeding, talk with your doctor before using these medicines.    Rest your eyes and don t read until symptoms are gone.    If you use contact lenses, don t wear them until all symptoms are gone.    If your vision is affected by the corneal abrasion or if an eye patch was applied, don t drive a motor vehicle or operate machinery until all symptoms are gone. You may have trouble judging distances using only one eye.    If your eyes are sensitive to light, try wearing sunglasses, or stay indoors until symptoms go away.  Follow-up care  Follow up with your healthcare provider, or as advised.    If no patch was put on  your eye and the pain continues for more than 48 hours, you should have another exam. Contact your healthcare provider to arrange this.    If your eye was patched and you were asked to remove the patch yourself, see your healthcare provider. Contact your healthcare provider if you still have pain after the patch is removed.    If you were given a return appointment for patch removal and re-examination, be sure to keep the appointment. Leaving the patch in place longer than advised could be harmful.  When to seek medical advice  Call your healthcare provider right away if any of these occur.    Increasing eye pain or pain that does not improve after 24 hours    Discharge from the eye    Increasing redness of the eye or swelling of the eyelids    Worsening vision    Symptoms get worse after the abrasion has healed  Date Last Reviewed: 7/1/2017 2000-2018 [employer]. [address] [place] [address], [place] [address]. All rights reserved. This information is not intended as a substitute for professional medical care. Always follow your healthcare professional's instructions.

## 2018-11-18 NOTE — LETTER
November 18, 2018      To Whom It May Concern:      Petey Archibald was seen in our Emergency Department today, 11/18/18.  I expect his condition to improve over the next 1 days.  He may return to work/school when improved.    Sincerely,        Alyce Garcia MD

## 2018-11-18 NOTE — ED PROVIDER NOTES
"  History     Chief Complaint:  Eye Problem     HPI   Petey Archibald is a 57 year old male on Warfarin after a pulmonary embolism and with a history of COPD and borderline mental retardation who presents to the emergency department today for evaluation of an eye problem. Patient reports that last night around 2300, he noticed right eye pain, itching, redness, and tearing. He notes having a cut on his thumb last night for which he touched his eye and says pain occurred shortly after. He endorses blurry vision when looking at the chart here and says the numbing drops helped his pain. He denies photophobia, recent illness, throat pain, issues with his left eye, and he does not wear contacts.      Allergies:  Augmentin  Bees  Penicillins      Medications:    Bupropion   Dexilant   Lasix  Zoloft  Zocor   Aldactone   Warfarin     Past Medical History:    Borderline mental retardation   COPD (chronic obstructive pulmonary disease)   DVT of lower extremity   Pulmonary embolism   Hyperlipidemia   Mild intellectual disabilities   Morbid obesity   Peripheral edema   Peripheral vascular disease   Venous stasis dermatitis    Past Surgical History:    Excise malignant lesions, trunk/arms/legs, left  Rectal surgery     Family History:    Brother: Diabetes  No other significant family history.     Social History:  Smoking Status: Current Every Day Smoker   Packs/day: 2.00   Years: 30.00   Types: Cigarettes   Smokeless Tobacco: Never Used  Alcohol Use: Negative    Marital Status: Single      Review of Systems   HENT: Negative for sore throat.    Eyes: Positive for pain (right), discharge (right), redness (right), itching (right) and visual disturbance (right). Negative for photophobia.   All other systems reviewed and are negative.    Physical Exam   First Vitals:  BP: 118/59  Heart Rate: 72  Temp: 98.5  F (36.9  C)  Resp: 16  Height: 195.6 cm (6' 5\")  Weight: 141 kg (310 lb 12.8 oz)  SpO2: 97 %  Visual Acuity-Left: " 20/100  Visual Acuity-Right: other (see comments) (Patient states that entire visual field is blurred )    Physical Exam  Physical Exam   Constitutional:  Patient is oriented to person, place, and time. They appear well-developed and well-nourished. Mild distress secondary to eye pain.    HENT:   Mouth/Throat:   Oropharynx is clear and moist.   Eyes:    Conjunctival injection. No mattering. No photophobia. Pupils are equal, round, and reactive to light. Wood's Lamp shows corneal abrasion at 6 o'clock with no ulceration, dendrites, or laceration.  Neck:    Normal range of motion.   Cardiovascular: Normal rate, regular rhythm and normal heart sounds.  Exam reveals no gallop and no friction rub.  No murmur heard.  Pulmonary/Chest:  Effort normal and breath sounds normal. Patient has no wheezes. Patient has no rales.   Abdominal:   Soft. Bowel sounds are normal. Patient exhibits no mass. There is no tenderness. There is no rebound and no guarding.   Musculoskeletal:  Normal range of motion. Patient exhibits no edema.   Neurological:   Patient is alert and oriented to person, place, and time. Patient has normal strength. No cranial nerve deficit or sensory deficit. GCS 15  Skin:   Skin is warm and dry. No rash noted. No erythema.   Psychiatric:   Patient has a normal mood and affect. Patient's behavior is normal. Judgment and thought content normal.     Emergency Department Course     Interventions:  1031 fluorescein 1 mg Right Eye   1008 Alcaine 1 drop Right Eye     Emergency Department Course:    0958 Nursing notes and vitals reviewed.    1015 I performed an exam of the patient as documented above.     1034 I personally answered all related questions prior to discharge.    Impression & Plan      Medical Decision Making:  Petey Archibald is a 57 year old male who presents for evaluation of eye pain. A broad differential diagnosis was considered including bacterial conjunctivitis, viral conjunctivitis, foreign body,  corneal abrasion, chemical vs allergic conjunctivitis, corneal ulcer, HSV, herpes zoster ophthalmicus, endophthalmitis, orbital cellulitis, etc. Exam is consistent with a corneal abrasion. The patient was treated with antibiotics and is to follow-up with opthalmology for a 24 hour recheck. Instructed to return for visual acuity changes, fever, orbital swelling or any worsening.     Diagnosis:    ICD-10-CM    1. Abrasion of right cornea, initial encounter S05.01XA      Disposition:   The patient is discharged to home.    Discharge Medications:  Discharge Medication List as of 11/18/2018 10:27 AM      START taking these medications    Details   trimethoprim-polymyxin b (POLYTRIM) ophthalmic solution Apply 1 drop to eye every 3 hours for 7 days, Disp-1 Bottle, R-0, Local Print           Scribe Disclosure:  Niki TA, am serving as a scribe at 10:14 AM on 11/18/2018 to document services personally performed by Alyce Garcia MD based on my observations and the provider's statements to me.       EMERGENCY DEPARTMENT       Alyce Garcia MD  11/18/18 [id number]

## 2018-11-18 NOTE — ED AVS SNAPSHOT
Emergency Department    [address] [place] [address]    Phone:  [phone]    Fax:  [phone]                                       Petey Archibald   MRN: [id number]    Department:   Emergency Department   Date of Visit:  11/18/2018           After Visit Summary Signature Page     I have received my discharge instructions, and my questions have been answered. I have discussed any challenges I see with this plan with the nurse or doctor.    ..........................................................................................................................................  Patient/Patient Representative Signature      ..........................................................................................................................................  Patient Representative Print Name and Relationship to Patient    ..................................................               ................................................  Date                                   Time    ..........................................................................................................................................  Reviewed by Signature/Title    ...................................................              ..............................................  Date                                               Time          22EPIC Rev 08/18

## 2018-11-21 ENCOUNTER — ANTICOAGULATION THERAPY VISIT (OUTPATIENT)
Dept: NURSING | Facility: CLINIC | Age: 58
End: 2018-11-21
Payer: MEDICARE

## 2018-11-21 DIAGNOSIS — Z79.01 LONG TERM CURRENT USE OF ANTICOAGULANT THERAPY: ICD-10-CM

## 2018-11-21 DIAGNOSIS — Z86.711 HISTORY OF PULMONARY EMBOLISM: ICD-10-CM

## 2018-11-21 LAB — INR POINT OF CARE: 2.8 (ref 2–3)

## 2018-11-21 PROCEDURE — 85610 PROTHROMBIN TIME: CPT | Mod: QW

## 2018-11-21 PROCEDURE — 36416 COLLJ CAPILLARY BLOOD SPEC: CPT

## 2018-11-21 PROCEDURE — 99207 ZZC NO CHARGE NURSE ONLY: CPT

## 2018-11-21 RX ORDER — WARFARIN SODIUM 4 MG/1
TABLET ORAL
Qty: 75 TABLET | Refills: 1 | Status: SHIPPED | OUTPATIENT
Start: 2018-11-21 | End: 2018-12-05

## 2018-11-21 NOTE — PROGRESS NOTES
ANTICOAGULATION FOLLOW-UP CLINIC VISIT    Patient Name:  Petey Archibald  Date:  11/21/2018  Contact Type:  Face to Face    SUBJECTIVE:     Patient Findings     Positives No Problem Findings           OBJECTIVE    INR Protime   Date Value Ref Range Status   11/21/2018 2.8 2.0 - 3.0 Final       ASSESSMENT / PLAN  INR assessment THER    Recheck INR In: 2 WEEKS    INR Location Clinic      Anticoagulation Summary as of 11/21/2018     INR goal 2.0-3.0   Today's INR 2.8   Warfarin maintenance plan 10 mg (4 mg x 2.5) on Mon; 8 mg (4 mg x 2) all other days   Full warfarin instructions 10 mg on Mon; 8 mg all other days   Weekly warfarin total 58 mg   No change documented Prudence Machuca RN   Plan last modified Prudence Machuca RN (11/7/2018)   Next INR check 12/5/2018   Target end date Indefinite    Indications   History of pulmonary embolism [Z86.711]  Long-term (current) use of anticoagulants [Z79.01] [Z79.01]         Anticoagulation Episode Summary     INR check location [facility]    Preferred lab     Send INR reminders to [facility] INR/PROTIME    Comments       Anticoagulation Care Providers     Provider Role Specialty Phone number    OliviaSilvino puentes MD [facility] [phone]            See the Encounter Report to view Anticoagulation Flowsheet and Dosing Calendar (Go to Encounters tab in chart review, and find the Anticoagulation Therapy Visit)        Prudence Machuca RN

## 2018-11-21 NOTE — MR AVS SNAPSHOT
Petey Archibald   11/21/2018 3:30 PM   Anticoagulation Therapy Visit    Description:  57 year old male   Provider:   ANTICOAGULATION CLINIC   Department:  Bx Nurse           INR as of 11/21/2018     Today's INR 2.8      Anticoagulation Summary as of 11/21/2018     INR goal 2.0-3.0   Today's INR 2.8   Full warfarin instructions 10 mg on Mon; 8 mg all other days   Next INR check 12/5/2018    Indications   History of pulmonary embolism [Z86.711]  Long-term (current) use of anticoagulants [Z79.01] [Z79.01]         Your next Anticoagulation Clinic appointment(s)     Dec 05, 2018  3:30 PM CST   Anticoagulation Visit with  ANTICOAGULATION CLINIC   [facility] ([facility])    [address] [place] [address] [phone]              Contact Numbers     [facility]  Please call  [phone] to cancel and/or reschedule your appointment   The direct line to the anticoagulant nurse is [phone] on Monday, Wednesday, and Friday. On Thursday, the anticoagulant nurse can be reached directly at [phone].         November 2018 Details    Sun Mon Tue Wed Thu Fri Sat         1               2               3                 4               5               6               7               8               9               10                 11               12               13               14               15               16               17                 18               19               20               21      8 mg   See details      22      8 mg         23      8 mg         24      8 mg           25      8 mg         26      10 mg         27      8 mg         28      8 mg         29      8 mg         30      8 mg           Date Details   11/21 This INR check               How to take your warfarin dose     To take:  8 mg Take 2 of the 4 mg tablets.    To take:  10 mg Take 2.5 of the 4 mg tablets.            December 2018 Details    Sun Mon Tue Wed Thu Fri Sat           1      8 mg           2      8 mg         3      10 mg         4      8 mg         5            6               7               8                 9               10               11               12               13               14               15                 16               17               18               19               20               21               22                 23               24               25               26               27               28               29                 30               31                     Date Details   No additional details    Date of next INR:  12/5/2018         How to take your warfarin dose     To take:  8 mg Take 2 of the 4 mg tablets.    To take:  10 mg Take 2.5 of the 4 mg tablets.

## 2018-11-23 DIAGNOSIS — R06.2 WHEEZING: ICD-10-CM

## 2018-11-24 NOTE — TELEPHONE ENCOUNTER
"Requested Prescriptions   Pending Prescriptions Disp Refills     SPIRIVA HANDIHALER 18 MCG inhaled capsule [Pharmacy Med Name: SPIRIVA 18MCG HANDIHALER]  Last Written Prescription Date:  05/07/2018  Last Fill Quantity: 90,  # refills: 1   Last office visit: 9/10/2018 with prescribing provider:  ROSA   Future Office Visit:      11     Sig: INHALE CONTENTS OF 1 CAPSULE INTO THE LUNGS ONCE DAILY (BRONCHITIS & EMPHYSEMA)    Asthma Maintenance Inhalers - Anticholinergics Passed    11/23/2018  8:01 PM       Passed - Patient is age 12 years or older       Passed - Recent (12 mo) or future (30 days) visit within the authorizing provider's specialty    Patient had office visit in the last 12 months or has a visit in the next 30 days with authorizing provider or within the authorizing provider's specialty.  See \"Patient Info\" tab in inbasket, or \"Choose Columns\" in Meds & Orders section of the refill encounter.                "

## 2018-11-26 RX ORDER — TIOTROPIUM BROMIDE 18 UG/1
CAPSULE ORAL; RESPIRATORY (INHALATION)
Qty: 90 CAPSULE | Refills: 0 | Status: SHIPPED | OUTPATIENT
Start: 2018-11-26 | End: 2019-02-22

## 2018-11-26 NOTE — TELEPHONE ENCOUNTER
Prescription approved per [facility] Refill Protocol.

## 2018-11-28 ENCOUNTER — RADIANT APPOINTMENT (OUTPATIENT)
Dept: GENERAL RADIOLOGY | Facility: CLINIC | Age: 58
End: 2018-11-28
Attending: FAMILY MEDICINE
Payer: MEDICARE

## 2018-11-28 ENCOUNTER — OFFICE VISIT (OUTPATIENT)
Dept: FAMILY MEDICINE | Facility: CLINIC | Age: 58
End: 2018-11-28
Payer: MEDICARE

## 2018-11-28 VITALS
OXYGEN SATURATION: 97 % | SYSTOLIC BLOOD PRESSURE: 110 MMHG | TEMPERATURE: 98.2 F | HEIGHT: 77 IN | RESPIRATION RATE: 22 BRPM | DIASTOLIC BLOOD PRESSURE: 56 MMHG | WEIGHT: 313 LBS | BODY MASS INDEX: 36.96 KG/M2 | HEART RATE: 89 BPM

## 2018-11-28 DIAGNOSIS — M79.671 RIGHT FOOT PAIN: Primary | ICD-10-CM

## 2018-11-28 DIAGNOSIS — M79.671 RIGHT FOOT PAIN: ICD-10-CM

## 2018-11-28 DIAGNOSIS — R10.32 LEFT GROIN PAIN: ICD-10-CM

## 2018-11-28 PROCEDURE — 84550 ASSAY OF BLOOD/URIC ACID: CPT | Performed by: FAMILY MEDICINE

## 2018-11-28 PROCEDURE — 73630 X-RAY EXAM OF FOOT: CPT | Mod: RT

## 2018-11-28 PROCEDURE — 36415 COLL VENOUS BLD VENIPUNCTURE: CPT | Performed by: FAMILY MEDICINE

## 2018-11-28 PROCEDURE — 99214 OFFICE O/P EST MOD 30 MIN: CPT | Performed by: FAMILY MEDICINE
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
## 2018-11-28 NOTE — NURSING NOTE
"Chief Complaint   Patient presents with     Groin Pain     Musculoskeletal Problem     /56  Pulse 89  Temp 98.2  F (36.8  C) (Tympanic)  Resp 22  Ht 6' 5\" (1.956 m)  Wt 313 lb (142 kg)  SpO2 97%  BMI 37.12 kg/m2 Estimated body mass index is 37.12 kg/(m^2) as calculated from the following:    Height as of this encounter: 6' 5\" (1.956 m).    Weight as of this encounter: 313 lb (142 kg).  BP completed using cuff size: jin Ovalle CMA    Health Maintenance Due   Topic Date Due     MEDICARE ANNUAL WELLNESS VISIT  12/06/1976     ADVANCE DIRECTIVE PLANNING Q5 YRS  12/06/2013     OP ANNUAL INR REFERRAL  08/05/2015     INFLUENZA VACCINE (1) 09/01/2018     Health Maintenance reviewed at today's visit patient asked to schedule/complete:   Routine Health Visit: Patient agrees to schedule  Immunizations:  Patient agrees to schedule    "

## 2018-11-28 NOTE — MR AVS SNAPSHOT
After Visit Summary   11/28/2018    Petey Archibald    MRN: [id number]           Patient Information     Date Of Birth          1960        Visit Information        Provider Department      11/28/2018 1:45 PM Silvino Baker MD [facility]        Today's Diagnoses     Right foot pain    -  1    Left groin pain          Care Instructions    Will use tylenol for pain. No restrictions on activities.  Will check uric acid to see if the patient has gout in his right forefoot.  As he is chronically on warfarin our only choice at this point for pain control is Tylenol.  I had a discussion with the patient and the person from the group home that if his uric acid does come back elevated the treatment will be to get him on medication to prevent this in the future.  Given his anticoagulation he stuck a little bit with treatment if this is in fact gout.  I did not see anything on his x-ray that would suggest any bony abnormalities.    I think the left groin pain is musculo skeletal in nature probably more muscular.  We again talked about Tylenol as theTreatment for that. He will return if he is not improving with the treatment.  I did not restrict his activities at all.  His job requires him to stand all day and I think he is able to do that.          Follow-ups after your visit        Follow-up notes from your care team     Return in about 2 weeks (around 12/12/2018), or if symptoms worsen or fail to improve.      Your next 10 appointments already scheduled     Dec 05, 2018  3:30 PM CST   Anticoagulation Visit with [facility]   [facility] ([facility])    [address] [place] [id number] [phone]              Who to contact     If you have questions or need follow up information about today's clinic visit or your schedule please contact [facility]  "[facility] directly at [phone].  Normal or non-critical lab and imaging results will be communicated to you by MyChart, letter or phone within 4 business days after the clinic has received the results. If you do not hear from us within 7 days, please contact the clinic through MyChart or phone. If you have a critical or abnormal lab result, we will notify you by phone as soon as possible.  Submit refill requests through [employer] or call your pharmacy and they will forward the refill request to us. Please allow 3 business days for your refill to be completed.          Additional Information About Your Visit        [employer] Information     [employer] gives you secure access to your electronic health record. If you see a primary care provider, you can also send messages to your care team and make appointments. If you have questions, please call your primary care clinic.  If you do not have a primary care provider, please call [phone] and they will assist you.        Care EveryWhere ID     This is your Care EveryWhere ID. This could be used by other organizations to access your [facility] medical records  [id number]        Your Vitals Were     Pulse Temperature Respirations Height Pulse Oximetry BMI (Body Mass Index)    89 98.2  F (36.8  C) (Tympanic) 22 6' 5\" (1.956 m) 97% 37.12 kg/m2       Blood Pressure from Last 3 Encounters:   11/28/18 110/56   11/18/18 118/59   09/10/18 132/78    Weight from Last 3 Encounters:   11/28/18 313 lb (142 kg)   11/18/18 310 lb 12.8 oz (141 kg)   09/10/18 298 lb (135.2 kg)              We Performed the Following     Uric acid        Primary Care Provider Office Phone # Fax #    Raúl Riddle [phone]       [address] [facility] [address]        Equal Access to Services     GUERRERO OZUNA : Caleb braggo Sosungali, waaxda luqadaha, qaybta kaalmada adeegyada, lashae carlson. So [facility] [phone].    ATENCIÓN: Si " ileana phelan, tiene a combs disposición servicios gratuitos de asistencia lingüística. Jenn jean-baptiste [phone].    We comply with applicable federal civil rights laws and [place] laws. We do not discriminate on the basis of race, color, national origin, age, disability, sex, sexual orientation, or gender identity.            Thank you!     Thank you for choosing [facility]  for your care. Our goal is always to provide you with excellent care. Hearing back from our patients is one way we can continue to improve our services. Please take a few minutes to complete the written survey that you may receive in the mail after your visit with us. Thank you!             Your Updated Medication List - Protect others around you: Learn how to safely use, store and throw away your medicines at www.disposemymeds.org.          This list is accurate as of 11/28/18  4:07 PM.  Always use your most recent med list.                   Brand Name Dispense Instructions for use Diagnosis    ABILIFY 10 MG tablet   Generic drug:  ARIPiprazole     30 tablet    Take 0.5 tablets (5 mg) by mouth daily        acetaminophen 325 MG tablet    TYLENOL    100 tablet    Take 1-2 tablets (325-650 mg) by mouth every 6 hours as needed    Knee pain, right       albuterol 108 (90 Base) MCG/ACT inhaler    PROAIR HFA    1 Inhaler    Inhale 2 puffs into the lungs every 6 hours as needed    Pulmonary emphysema, unspecified emphysema type (H)       AMBIEN 10 MG tablet   Generic drug:  zolpidem     30 tablet    Take 1 tablet (10 mg) by mouth nightly as needed for sleep        ANUSOL-HC 25 MG suppository   Generic drug:  hydrocortisone     14 Suppository    INSERT ONE SUPPOSITORY RECTALLY UP TO TWICE A DAY AS NEEDED    External hemorrhoids       BACTROBAN 2 % external ointment   Generic drug:  mupirocin      Apply topically 2 times daily        BUPROPION HCL PO      Take 150 mg by mouth every morning        cephALEXin 500 MG capsule     KEFLEX    30 capsule    Take 1 capsule (500 mg) by mouth 3 times daily    Cellulitis and abscess of trunk       clotrimazole-betamethasone 1-0.05 % external cream    LOTRISONE    60 g    Apply topically 2 times daily    Dermatitis       DEXILANT 60 MG Cpdr CR capsule   Generic drug:  dexlansoprazole     90 capsule    TAKE 1 CAPSULE BY MOUTH ONCE DAILY (FOR HEARTBURN)    Gastroesophageal reflux disease, esophagitis presence not specified       * EPIPEN 0.3 MG/0.3ML injection   Generic drug:  EPINEPHrine      Inject 0.3 mg into the muscle once as needed.        * EPIPEN 2-BRE 0.3 MG/0.3ML injection 2-pack   Generic drug:  EPINEPHrine     2 mL    INJECT 0.3MG INTRAMUSCULAR ONE TIME FOR ONE DOSE AS NEEDED FOR ANAPHYLAXIS (CALL 911 IF YOU HAVE TO GIVE) (FOR BEE STINGS) **LABEL EACH PEN*    Allergic to bees       * Ferrous Gluconate 324 (37.5 Fe) MG Tabs      Take 1 tablet by mouth 2 times daily.        * ferrous gluconate 324 (38 Fe) MG tablet    FERGON    200 tablet    TAKE 1 TABLET BY MOUTH TWICE DAILY (IRON SUPPLEMENT)    Other iron deficiency anemias (CODE)       furosemide 40 MG tablet    LASIX    62 tablet    TAKE 1 TABLET BY MOUTH TWICE DAILY (7AM & 3PM) (DIURETIC)    Localized edema       hydrocortisone 2.5 % cream    ANUSOL-HC    28.35 g    Place rectally 2 times daily    Rectal pain       loratadine 10 MG tablet    CLARITIN     Take 10 mg by mouth daily.        mometasone furoate 200 MCG/ACT inhaler    ASMANEX HFA    1 Inhaler    Inhale 2 puffs into the lungs 2 times daily    Mixed simple and mucopurulent chronic bronchitis (H)       mometasone-formoterol 200-5 MCG/ACT inhaler    DULERA    13 g    Inhale 2 puffs into the lungs 2 times daily    Intermittent asthma, uncomplicated       montelukast 10 MG tablet    SINGULAIR    30 tablet    TAKE 1 TABLET BY MOUTH EVERY MORNING. (ASTHMA)    Gastroesophageal reflux disease, esophagitis presence not specified       Multi-vitamin tablet   Generic drug:  multivitamin  w/minerals      1 TABLET DAILY        * order for DME     1 each    Equipment being ordered: support compression hose BK  2 pair black 30mm HG  To be applied on arising & removed while lying down to go to sleep    Localized edema       * order for DME     1 Device    Equipment being ordered: CPAP with mask and tubing    NEL (obstructive sleep apnea)       * order for DME     1 Device    Oxygen 2 Li/min at night and bled into BiPAP    Nocturnal hypoxemia       PHOSPHATE ENEMA RE      Place 1 enema rectally as needed.        potassium chloride ER 20 MEQ CR tablet    K-TAB     Take 1 tablet by mouth 2 times daily.        potassium chloride ER 20 MEQ CR tablet    K-DUR/KLOR-CON M    62 tablet    TAKE 1 TABLET BY MOUTH TWICE DAILY. (LOW POTASSIUM)    Localized edema       PULMICORT FLEXHALER 180 MCG/ACT inhaler   Generic drug:  budesonide     1 each    INHALE 2 PUFFS INTO THE LUNGS TWICE DAILY    Mixed simple and mucopurulent chronic bronchitis (H)       sertraline 100 MG tablet    ZOLOFT     Take 1.5 tablets by mouth daily.        simvastatin 40 MG tablet    ZOCOR    31 tablet    TAKE 1 TABLET BY MOUTH EVERY MORNING.  (CHOLESTEROL)    Hypercholesterolemia       SPIRIVA HANDIHALER 18 MCG inhaled capsule   Generic drug:  tiotropium     90 capsule    INHALE CONTENTS OF 1 CAPSULE INTO THE LUNGS ONCE DAILY (BRONCHITIS & EMPHYSEMA)    Wheezing       spironolactone 25 MG tablet    ALDACTONE    31 tablet    TAKE 1 TABLET BY MOUTH ONCE DAILY. (HIGH BLOOD PRESSURE)    Localized edema       triamcinolone 0.1 % external cream    KENALOG    80 g    Apply topically 2 times daily as needed    Dermatitis       Vitamin D-3 1000 units Caps      Take 2 capsules by mouth daily        warfarin 4 MG tablet    COUMADIN    75 tablet    10 mg Monday and 8 mg all other days.    Long term current use of anticoagulant therapy, History of pulmonary embolism       ZEASORB-AF EX      Externally apply topically once a week        * Notice:  This list  has 7 medication(s) that are the same as other medications prescribed for you. Read the directions carefully, and ask your doctor or other care provider to review them with you.

## 2018-11-28 NOTE — PATIENT INSTRUCTIONS
Will use tylenol for pain. No restrictions on activities.  Will check uric acid to see if the patient has gout in his right forefoot.  As he is chronically on warfarin our only choice at this point for pain control is Tylenol.  I had a discussion with the patient and the person from the group home that if his uric acid does come back elevated the treatment will be to get him on medication to prevent this in the future.  Given his anticoagulation he stuck a little bit with treatment if this is in fact gout.  I did not see anything on his x-ray that would suggest any bony abnormalities.    I think the left groin pain is musculo skeletal in nature probably more muscular.  We again talked about Tylenol as theTreatment for that. He will return if he is not improving with the treatment.  I did not restrict his activities at all.  His job requires him to stand all day and I think he is able to do that.

## 2018-11-29 LAB — URATE SERPL-MCNC: 5.2 MG/DL (ref 3.5–7.2)

## 2018-11-29 NOTE — PROGRESS NOTES
Dear Petey,    Your tests were all normal. A copy of your tests are available in My Chart.    Glad to see you at your appointment.  If you have any questions feel free to call.      Sincerely,      PAVAN Stratton.

## 2018-12-05 ENCOUNTER — NURSE TRIAGE (OUTPATIENT)
Dept: NURSING | Facility: CLINIC | Age: 58
End: 2018-12-05

## 2018-12-05 ENCOUNTER — ANTICOAGULATION THERAPY VISIT (OUTPATIENT)
Dept: NURSING | Facility: CLINIC | Age: 58
End: 2018-12-05
Payer: MEDICARE

## 2018-12-05 DIAGNOSIS — Z79.01 LONG TERM CURRENT USE OF ANTICOAGULANT THERAPY: ICD-10-CM

## 2018-12-05 DIAGNOSIS — Z86.711 HISTORY OF PULMONARY EMBOLISM: ICD-10-CM

## 2018-12-05 LAB — INR POINT OF CARE: 2.8 (ref 2–3)

## 2018-12-05 PROCEDURE — 99207 ZZC NO CHARGE NURSE ONLY: CPT

## 2018-12-05 PROCEDURE — 85610 PROTHROMBIN TIME: CPT | Mod: QW

## 2018-12-05 PROCEDURE — 36416 COLLJ CAPILLARY BLOOD SPEC: CPT

## 2018-12-05 RX ORDER — WARFARIN SODIUM 4 MG/1
TABLET ORAL
Qty: 75 TABLET | Refills: 1 | Status: SHIPPED | OUTPATIENT
Start: 2018-12-05 | End: 2019-01-02

## 2018-12-05 NOTE — PROGRESS NOTES
ANTICOAGULATION FOLLOW-UP CLINIC VISIT    Patient Name:  Petey Archibald  Date:  12/5/2018  Contact Type:  Face to Face    SUBJECTIVE:     Patient Findings     Positives No Problem Findings           OBJECTIVE    INR Protime   Date Value Ref Range Status   12/05/2018 2.8 2.0 - 3.0 Final       ASSESSMENT / PLAN  INR assessment THER    Recheck INR In: 4 WEEKS    INR Location Clinic      Anticoagulation Summary as of 12/5/2018     INR goal 2.0-3.0   Today's INR 2.8   Warfarin maintenance plan 10 mg (4 mg x 2.5) on Mon; 8 mg (4 mg x 2) all other days   Full warfarin instructions 10 mg on Mon; 8 mg all other days   Weekly warfarin total 58 mg   No change documented Prudence Machuca RN   Plan last modified Prudence Machuca RN (11/7/2018)   Next INR check 1/2/2019   Target end date Indefinite    Indications   History of pulmonary embolism [Z86.711]  Long-term (current) use of anticoagulants [Z79.01] [Z79.01]         Anticoagulation Episode Summary     INR check location [facility]    Preferred lab     Send INR reminders to [facility] INR/PROTIME    Comments       Anticoagulation Care Providers     Provider Role Specialty Phone number    OliviaSilvino puentes MD [facility] [phone]            See the Encounter Report to view Anticoagulation Flowsheet and Dosing Calendar (Go to Encounters tab in chart review, and find the Anticoagulation Therapy Visit)        Prudence Machuca RN

## 2018-12-05 NOTE — MR AVS SNAPSHOT
Petey Archibald   12/5/2018 3:30 PM   Anticoagulation Therapy Visit    Description:  57 year old male   Provider:   ANTICOAGULATION CLINIC   Department:  Bx Nurse           INR as of 12/5/2018     Today's INR 2.8      Anticoagulation Summary as of 12/5/2018     INR goal 2.0-3.0   Today's INR 2.8   Full warfarin instructions 10 mg on Mon; 8 mg all other days   Next INR check 1/2/2019    Indications   History of pulmonary embolism [Z86.711]  Long-term (current) use of anticoagulants [Z79.01] [Z79.01]         Your next Anticoagulation Clinic appointment(s)     Jan 02, 2019  3:30 PM CST   Anticoagulation Visit with  ANTICOAGULATION CLINIC   [facility] ([facility]    [address] [place] [id number] [phone]              Contact Numbers     [facility]  Please call  [phone] to cancel and/or reschedule your appointment   The direct line to the anticoagulant nurse is [phone] on Monday, Wednesday, and Friday. On Thursday, the anticoagulant nurse can be reached directly at [phone].         December 2018 Details    Sun Mon Tue Wed Thu Fri Sat           1                 2               3               4               5      8 mg   See details      6      8 mg         7      8 mg         8      8 mg           9      8 mg         10      10 mg         11      8 mg         12      8 mg         13      8 mg         14      8 mg         15      8 mg           16      8 mg         17      10 mg         18      8 mg         19      8 mg         20      8 mg         21      8 mg         22      8 mg           23      8 mg         24      10 mg         25      8 mg         26      8 mg         27      8 mg         28      8 mg         29      8 mg           30      8 mg         31      10 mg               Date Details   12/05 This INR check               How to take your warfarin dose     To take:  8 mg  Take 2 of the 4 mg tablets.    To take:  10 mg Take 2.5 of the 4 mg tablets.           January 2019 Details    Sun Mon Tue Wed Thu Fri Sat       1      8 mg         2            3               4               5                 6               7               8               9               10               11               12                 13               14               15               16               17               18               19                 20               21               22               23               24               25               26                 27               28               29               30               31                  Date Details   No additional details    Date of next INR:  1/2/2019         How to take your warfarin dose     To take:  8 mg Take 2 of the 4 mg tablets.

## 2018-12-06 DIAGNOSIS — Z91.030 ALLERGIC TO BEES: ICD-10-CM

## 2018-12-06 NOTE — TELEPHONE ENCOUNTER
"Requested Prescriptions   Pending Prescriptions Disp Refills     EPINEPHrine (EPIPEN 2-BRE) 0.3 MG/0.3ML injection 2-pack  Last Written Prescription Date:  5/14/2018  Last Fill Quantity: 2 mL,  # refills: 3   Last office visit: 11/28/2018 with prescribing provider:  Olivia   Future Office Visit:     2 mL 3    Anaphylaxis Kits Protocol Passed    12/6/2018  2:34 PM       Passed - Recent (12 mo) or future (30 days) visit witin the authorizing provider's specialty    Patient had office visit in the last 12 months or has a visit in the next 30 days with authorizing provider or within the authorizing provider's specialty.  See \"Patient Info\" tab in inbasket, or \"Choose Columns\" in Meds & Orders section of the refill encounter.             Passed - Patient is age 5 or older           "

## 2018-12-06 NOTE — TELEPHONE ENCOUNTER
Patient's ESTEFANI FrankAntonio  Elif Thurston (ph # [phone]) called about patient's Warfarin prescription that the clinic faxed and verbally called to [facility] today and the pharmacy now is saying that they never received on their end. 8:11 PM advised RX would be called to pharmacy and will call the group home back once completed. 8:12 PM Called [facility] # [phone] and spoke with pharmacist who will get prescription entered and filled and delivered out to patient. 8:16 PM Called and advised patient's  that the prescription will be worked on to get filled and delivered to the group home.    Reason for Disposition    [1] Prescription not at pharmacy AND [2] was prescribed today by PCP    Protocols used: MEDICATION QUESTION CALL-ADULT-AH

## 2018-12-10 RX ORDER — EPINEPHRINE 0.3 MG/.3ML
INJECTION SUBCUTANEOUS
Qty: 2 ML | Refills: 3 | Status: SHIPPED | OUTPATIENT
Start: 2018-12-10

## 2019-01-02 ENCOUNTER — ANTICOAGULATION THERAPY VISIT (OUTPATIENT)
Dept: NURSING | Facility: CLINIC | Age: 59
End: 2019-01-02
Payer: MEDICARE

## 2019-01-02 DIAGNOSIS — Z86.711 HISTORY OF PULMONARY EMBOLISM: ICD-10-CM

## 2019-01-02 DIAGNOSIS — Z79.01 LONG TERM CURRENT USE OF ANTICOAGULANT THERAPY: ICD-10-CM

## 2019-01-02 LAB — INR POINT OF CARE: 2.1 (ref 0.86–1.14)

## 2019-01-02 PROCEDURE — 99207 ZZC NO CHARGE NURSE ONLY: CPT

## 2019-01-02 PROCEDURE — 85610 PROTHROMBIN TIME: CPT | Mod: QW

## 2019-01-02 PROCEDURE — 36416 COLLJ CAPILLARY BLOOD SPEC: CPT

## 2019-01-02 RX ORDER — WARFARIN SODIUM 4 MG/1
TABLET ORAL
Qty: 75 TABLET | Refills: 1 | Status: SHIPPED | OUTPATIENT
Start: 2019-01-02 | End: 2019-02-06

## 2019-01-12 NOTE — MR AVS SNAPSHOT
Petey Archibald   5/3/2017 10:45 AM   Anticoagulation Therapy Visit    Description:  56 year old male   Provider:  [facility]   Department:  Bx Nurse           INR as of 5/3/2017     Today's INR 3.0      Anticoagulation Summary as of 5/3/2017     INR goal 2.0-3.0   Today's INR 3.0   Full instructions 10 mg on Mon, Fri; 8 mg all other days   Next INR check 5/31/2017    Indications   History of pulmonary embolism [Z86.711]  Long-term (current) use of anticoagulants [Z79.01] [Z79.01]         Your next Anticoagulation Clinic appointment(s)     May 31, 2017  3:30 PM CDT   Anticoagulation Visit with  ANTICOAGULATION CLINIC   [facility]    [address] [place] [id number] [phone]              Contact Numbers     [facility]  Please call  [phone] to cancel and/or reschedule your appointment   The direct line to the anticoagulant nurse is [phone] on Monday, Wednesday, and Friday. On Thursday, the anticoagulant nurse can be reached directly at [phone].         May 2017 Details    Sun Mon Tue Wed Thu Fri Sat      1               2               3      8 mg   See details      4      8 mg         5      10 mg         6      8 mg           7      8 mg         8      10 mg         9      8 mg         10      8 mg         11      8 mg         12      10 mg         13      8 mg           14      8 mg         15      10 mg         16      8 mg         17      8 mg         18      8 mg         19      10 mg         20      8 mg           21      8 mg         22      10 mg         23      8 mg         24      8 mg         25      8 mg         26      10 mg         27      8 mg           28      8 mg         29      10 mg         30      8 mg         31                Date Details   05/03 This INR check       Date of next INR:  5/31/2017         How to take your warfarin dose     To  take:  8 mg Take 2 of the 4 mg tablets.    To take:  10 mg Take 2.5 of the 4 mg tablets.            12-Jan-2019 09:49

## 2019-01-15 ENCOUNTER — OFFICE VISIT (OUTPATIENT)
Dept: FAMILY MEDICINE | Facility: CLINIC | Age: 59
End: 2019-01-15
Payer: MEDICARE

## 2019-01-15 VITALS
DIASTOLIC BLOOD PRESSURE: 68 MMHG | HEART RATE: 68 BPM | BODY MASS INDEX: 37.95 KG/M2 | TEMPERATURE: 97 F | SYSTOLIC BLOOD PRESSURE: 110 MMHG | RESPIRATION RATE: 18 BRPM | OXYGEN SATURATION: 99 % | WEIGHT: 315 LBS

## 2019-01-15 DIAGNOSIS — K61.0 PERIANAL ABSCESS: Primary | ICD-10-CM

## 2019-01-15 PROCEDURE — 99213 OFFICE O/P EST LOW 20 MIN: CPT | Performed by: PHYSICIAN ASSISTANT

## 2019-01-15 ASSESSMENT — ENCOUNTER SYMPTOMS
MUSCULOSKELETAL NEGATIVE: 1
CONSTITUTIONAL NEGATIVE: 1
PSYCHIATRIC NEGATIVE: 1
ROS GI COMMENTS: AS IN HPI
RESPIRATORY NEGATIVE: 1
NEUROLOGICAL NEGATIVE: 1
ROS SKIN COMMENTS: AS IN HPI
CARDIOVASCULAR NEGATIVE: 1
EYES NEGATIVE: 1
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
## 2019-01-17 ENCOUNTER — TRANSFERRED RECORDS (OUTPATIENT)
Dept: HEALTH INFORMATION MANAGEMENT | Facility: CLINIC | Age: 59
End: 2019-01-17

## 2019-01-21 ENCOUNTER — TELEPHONE (OUTPATIENT)
Dept: FAMILY MEDICINE | Facility: CLINIC | Age: 59
End: 2019-01-21

## 2019-01-21 NOTE — TELEPHONE ENCOUNTER
Doris Galeana called for OK for 4 day hold for Petey's coumadin for a colonoscopy.  ok given to Zaina [phone]  Verified with Dr Riddle

## 2019-02-06 ENCOUNTER — ANTICOAGULATION THERAPY VISIT (OUTPATIENT)
Dept: NURSING | Facility: CLINIC | Age: 59
End: 2019-02-06
Payer: MEDICARE

## 2019-02-06 DIAGNOSIS — Z86.711 HISTORY OF PULMONARY EMBOLISM: ICD-10-CM

## 2019-02-06 DIAGNOSIS — Z79.01 LONG TERM CURRENT USE OF ANTICOAGULANT THERAPY: ICD-10-CM

## 2019-02-06 LAB — INR POINT OF CARE: 2.3 (ref 0.86–1.14)

## 2019-02-06 PROCEDURE — 36416 COLLJ CAPILLARY BLOOD SPEC: CPT

## 2019-02-06 PROCEDURE — 85610 PROTHROMBIN TIME: CPT | Mod: QW

## 2019-02-06 PROCEDURE — 99207 ZZC NO CHARGE NURSE ONLY: CPT

## 2019-02-06 RX ORDER — WARFARIN SODIUM 4 MG/1
TABLET ORAL
Qty: 75 TABLET | Refills: 1 | Status: SHIPPED | OUTPATIENT
Start: 2019-02-06 | End: 2019-03-06

## 2019-02-06 NOTE — TELEPHONE ENCOUNTER
warfarin (COUMADIN) 4 MG tablet  Last Written Prescription Date: 1/2/19  Last Fill Quantity: 75,  # refills: 1   Last office visit: 2/6/2019 with prescribing provider: BLANCA Chacon   Future Office Visit:      Dosing: 10 mg every Mon; 8 mg all other days    Prescription approved per [facility] Refill Protocol.

## 2019-02-06 NOTE — PROGRESS NOTES
ANTICOAGULATION FOLLOW-UP CLINIC VISIT    Patient Name:  Petey Archibald  Date:  2019  Contact Type:  Face to Face    SUBJECTIVE:     Patient Findings     Positives:   No Problem Findings           OBJECTIVE    INR Protime   Date Value Ref Range Status   2019 2.3 (A) 0.86 - 1.14 Final       ASSESSMENT / PLAN  INR assessment THER    Recheck INR In: 4 WEEKS    INR Location Clinic      Anticoagulation Summary  As of 2019    INR goal:   2.0-3.0   TTR:   73.1 % (2.9 y)   INR used for dosin.3 (2019)   Warfarin maintenance plan:   10 mg (4 mg x 2.5) every Mon; 8 mg (4 mg x 2) all other days   Full warfarin instructions:   10 mg every Mon; 8 mg all other days   Weekly warfarin total:   58 mg   No change documented:   Owen Beckett RN   Plan last modified:   Prudence Machuca RN (2018)   Next INR check:   2019   Target end date:   Indefinite    Indications    History of pulmonary embolism [Z86.711]  Long-term (current) use of anticoagulants [Z79.01] [Z79.01]             Anticoagulation Episode Summary     INR check location:   [facility]    Preferred lab:       Send INR reminders to:   [facility] INR/PROTIME    Comments:         Anticoagulation Care Providers     Provider Role Specialty Phone number    Silvino Baker MD [facility] [phone]            See the Encounter Report to view Anticoagulation Flowsheet and Dosing Calendar (Go to Encounters tab in chart review, and find the Anticoagulation Therapy Visit)    Dosage adjustment made based on physician directed care plan.    Owen Beckett RN

## 2019-02-08 ENCOUNTER — APPOINTMENT (OUTPATIENT)
Dept: GENERAL RADIOLOGY | Facility: CLINIC | Age: 59
End: 2019-02-08
Attending: EMERGENCY MEDICINE
Payer: MEDICARE

## 2019-02-08 ENCOUNTER — HOSPITAL ENCOUNTER (EMERGENCY)
Facility: CLINIC | Age: 59
Discharge: HOME | End: 2019-02-08
Attending: EMERGENCY MEDICINE | Admitting: EMERGENCY MEDICINE
Payer: MEDICARE

## 2019-02-08 VITALS
BODY MASS INDEX: 41.75 KG/M2 | SYSTOLIC BLOOD PRESSURE: 142 MMHG | OXYGEN SATURATION: 96 % | HEIGHT: 73 IN | TEMPERATURE: 98.7 F | HEART RATE: 70 BPM | RESPIRATION RATE: 12 BRPM | WEIGHT: 315 LBS | DIASTOLIC BLOOD PRESSURE: 93 MMHG

## 2019-02-08 DIAGNOSIS — R79.1 SUBTHERAPEUTIC INTERNATIONAL NORMALIZED RATIO (INR): ICD-10-CM

## 2019-02-08 DIAGNOSIS — R07.9 CHEST PAIN, UNSPECIFIED TYPE: ICD-10-CM

## 2019-02-08 LAB
ALBUMIN SERPL-MCNC: 3.4 G/DL (ref 3.4–5)
ALP SERPL-CCNC: 83 U/L (ref 40–150)
ALT SERPL W P-5'-P-CCNC: 29 U/L (ref 0–70)
ANION GAP SERPL CALCULATED.3IONS-SCNC: 5 MMOL/L (ref 3–14)
AST SERPL W P-5'-P-CCNC: 24 U/L (ref 0–45)
BASOPHILS # BLD AUTO: 0 10E9/L (ref 0–0.2)
BASOPHILS NFR BLD AUTO: 0.3 %
BILIRUB SERPL-MCNC: 0.4 MG/DL (ref 0.2–1.3)
BUN SERPL-MCNC: 17 MG/DL (ref 7–30)
CALCIUM SERPL-MCNC: 8.9 MG/DL (ref 8.5–10.1)
CHLORIDE SERPL-SCNC: 105 MMOL/L (ref 94–109)
CO2 SERPL-SCNC: 28 MMOL/L (ref 20–32)
CREAT SERPL-MCNC: 1.03 MG/DL (ref 0.66–1.25)
D DIMER PPP FEU-MCNC: <0.3 UG/ML FEU (ref 0–0.5)
DIFFERENTIAL METHOD BLD: ABNORMAL
EOSINOPHIL # BLD AUTO: 0.1 10E9/L (ref 0–0.7)
EOSINOPHIL NFR BLD AUTO: 2.8 %
ERYTHROCYTE [DISTWIDTH] IN BLOOD BY AUTOMATED COUNT: 14.7 % (ref 10–15)
GFR SERPL CREATININE-BSD FRML MDRD: 78 ML/MIN/{1.73_M2}
GLUCOSE SERPL-MCNC: 98 MG/DL (ref 70–99)
HCT VFR BLD AUTO: 39 % (ref 40–53)
HGB BLD-MCNC: 12.6 G/DL (ref 13.3–17.7)
IMM GRANULOCYTES # BLD: 0 10E9/L (ref 0–0.4)
IMM GRANULOCYTES NFR BLD: 0.3 %
INR PPP: 1.81 (ref 0.86–1.14)
INTERPRETATION ECG - MUSE: NORMAL
LYMPHOCYTES # BLD AUTO: 1.2 10E9/L (ref 0.8–5.3)
LYMPHOCYTES NFR BLD AUTO: 30.5 %
MCH RBC QN AUTO: 30.1 PG (ref 26.5–33)
MCHC RBC AUTO-ENTMCNC: 32.3 G/DL (ref 31.5–36.5)
MCV RBC AUTO: 93 FL (ref 78–100)
MONOCYTES # BLD AUTO: 0.3 10E9/L (ref 0–1.3)
MONOCYTES NFR BLD AUTO: 8.5 %
NEUTROPHILS # BLD AUTO: 2.3 10E9/L (ref 1.6–8.3)
NEUTROPHILS NFR BLD AUTO: 57.6 %
NRBC # BLD AUTO: 0 10*3/UL
NRBC BLD AUTO-RTO: 0 /100
PLATELET # BLD AUTO: 213 10E9/L (ref 150–450)
POTASSIUM SERPL-SCNC: 4 MMOL/L (ref 3.4–5.3)
PROT SERPL-MCNC: 7.8 G/DL (ref 6.8–8.8)
RBC # BLD AUTO: 4.19 10E12/L (ref 4.4–5.9)
SODIUM SERPL-SCNC: 138 MMOL/L (ref 133–144)
TROPONIN I SERPL-MCNC: <0.015 UG/L (ref 0–0.04)
WBC # BLD AUTO: 4 10E9/L (ref 4–11)

## 2019-02-08 PROCEDURE — 85025 COMPLETE CBC W/AUTO DIFF WBC: CPT | Performed by: EMERGENCY MEDICINE

## 2019-02-08 PROCEDURE — A9270 NON-COVERED ITEM OR SERVICE: HCPCS | Mod: GY | Performed by: EMERGENCY MEDICINE

## 2019-02-08 PROCEDURE — 99285 EMERGENCY DEPT VISIT HI MDM: CPT | Mod: 25

## 2019-02-08 PROCEDURE — 85379 FIBRIN DEGRADATION QUANT: CPT | Performed by: EMERGENCY MEDICINE

## 2019-02-08 PROCEDURE — 85610 PROTHROMBIN TIME: CPT | Performed by: EMERGENCY MEDICINE

## 2019-02-08 PROCEDURE — 80053 COMPREHEN METABOLIC PANEL: CPT | Performed by: EMERGENCY MEDICINE

## 2019-02-08 PROCEDURE — 71101 X-RAY EXAM UNILAT RIBS/CHEST: CPT | Mod: RT

## 2019-02-08 PROCEDURE — 25000132 ZZH RX MED GY IP 250 OP 250 PS 637: Mod: GY | Performed by: EMERGENCY MEDICINE

## 2019-02-08 PROCEDURE — 93005 ELECTROCARDIOGRAM TRACING: CPT

## 2019-02-08 PROCEDURE — 84484 ASSAY OF TROPONIN QUANT: CPT | Performed by: EMERGENCY MEDICINE

## 2019-02-08 RX ORDER — ACETAMINOPHEN 500 MG
1000 TABLET ORAL ONCE
Status: COMPLETED | OUTPATIENT
Start: 2019-02-08 | End: 2019-02-08

## 2019-02-08 RX ADMIN — ACETAMINOPHEN 1000 MG: 500 TABLET, FILM COATED ORAL at 13:59

## 2019-02-08 ASSESSMENT — MIFFLIN-ST. JEOR: SCORE: 2313.58

## 2019-02-08 NOTE — ED AVS SNAPSHOT
Emergency Department  [address] [place] [address]  Phone:  [phone]  Fax:  [phone]                                    Petey Archibald   MRN: [id number]    Department:   Emergency Department   Date of Visit:  2/8/2019           After Visit Summary Signature Page    I have received my discharge instructions, and my questions have been answered. I have discussed any challenges I see with this plan with the nurse or doctor.    ..........................................................................................................................................  Patient/Patient Representative Signature      ..........................................................................................................................................  Patient Representative Print Name and Relationship to Patient    ..................................................               ................................................  Date                                   Time    ..........................................................................................................................................  Reviewed by Signature/Title    ...................................................              ..............................................  Date                                               Time          22EPIC Rev 08/18

## 2019-02-08 NOTE — ED PROVIDER NOTES
History     Chief Complaint:  Chest pain     HPI   history somewhat limited as patient is a poor historian.  Supplemented by electronic chart review and his group home staff at bedside    Petey Archibald is a 58 year old male, with a history of borderline mental retardation, HDL, COPD, PE, and DVT, who presents with his group home facilitator to the emergency department for evaluation of right sided chest pain. The patient reports he was sitting at the table early this morning when he started experiencing some low right sided chest pain with inhalation.  He denies any shortness of breath, fever, cough, or headache. He notes he has fallen twice outside in the past three days due to ice and notes some hand pain and knee pain, but reports these are getting better and he is not actually worried about those areas at all today.  He remains ambulatory. He denies taking any pain medication today. He notes a history of DVT, but denies any history of PE or heart problems, although per his chart he has a history of PE.      CARDIAC RISK FACTORS:  Sex:    M  Tobacco:   Yes  Hypertension:   No  Hyperlipidemia:  Yes  Diabetes:   No  Family History:  Unknown    PE/DVT RISK FACTORS:  Sex:    M  Hormones:   No  Tobacco:   Yes  Cancer:   No  Travel:   No  Surgery:   No  Other immobilization: No  Personal history:  Yes  Family history:  Unknown    Allergies:  Augmentin  Penicillins     Medications:    Albuterol  Abilify  Bupropion  Dexilant  Epipen  Ferrous gluconate  Lasix  Miconazole  Mometasone Furoate  Singulair  Potassium Chloride  Spironolactone  Warfarin  Ambien    Past Medical History:    Thrombophlebitis  DVT  Anxiety  NEL  ED  Anemia  Hypercholesterolemia  Depression  Pilonidal cyst  Varicose veins  Borderline mental retardation  Peripheral vascular disease  PE  GERD  COPD  HDL  Tobacco dependence    Past Surgical History:    Excise malignant lesions  Rectal surgery    Family History:    Family history limited due to  "adoption.  Sister: diabetes    Social History:  Smoking status: Current every day smoker of 2.0 ppd for 30 years.  Alcohol use: No  The patient presents to the emergency department with his group home facilitator.    Review of Systems   All other systems reviewed and are negative.    Physical Exam   Patient Vitals for the past 24 hrs:   BP Temp Temp src Pulse Heart Rate Resp SpO2 Height Weight   02/08/19 1439 -- -- -- -- -- 12 -- -- --   02/08/19 1237 (!) 142/93 -- -- 70 64 19 96 % -- --   02/08/19 1147 125/70 98.7  F (37.1  C) Oral 65 -- 16 97 % 1.854 m (6' 1\") 145 kg (319 lb 9.6 oz)     Physical Exam  General: Nontoxic-appearing male recumbent in room 15, group home staff at bedside  HENT: mucous membranes moist  CV: regular rate, regular rhythm, mild symmetric lower extremity edema, no JVD, palpable symmetric radial pulses, no murmur audible  Resp: clear throughout, normal effort, no crackles or wheezing  GI: abdomen soft protuberant and nontender, no guarding, negative Kelly's sign  MSK: no bony tenderness to chest  Skin: appropriately warm and dry, no erythema or vesicles to chest wall, venous stasis changes to both lower legs  Neuro: alert, clear speech, oriented though somewhat poor historian  Psych: calm, cooperative      Emergency Department Course   ECG (11:50:51):  Rate 65 bpm. UT interval 130. QRS duration 96. QT/QTc 360/374. P-R-T axes 10 4 19. Normal sinus rhythm. Interpreted at 1205 by Clif Stinson MD.    Imaging:  Radiographic findings were communicated with the patient and group home facilitator who voiced understanding of the findings.    Ribs XR, unilat 3 views + PA chest, right  IMPRESSION:   1. No radiographic evidence of acute trauma within the chest.  2. No visualized right rib fracture.  As read by Radiology.    Laboratory:  CBC: HGB 12.6 (L) o/w WNL (WBC 4.0, )  CMP: AWNL (Creatinine 1.03)    Troponin I (1236): <0.015  INR: 1.81 (H)  D dimer: <0.3    Interventions:  1359 " Tylenol 1000 mg PO    Emergency Department Course:  Past medical records, nursing notes, and vitals reviewed.  1203: I performed an exam of the patient and obtained history, as documented above.    IV inserted and blood drawn.    I performed electronic chart review in [facility].  The patient was placed on continuous cardiac and pulse ox monitoring.    The patient was sent for a ribs x-ray while in the emergency department, findings above.    1353: I rechecked the patient. Explained findings to the patient.    Findings and plan explained to the Patient and other:group home facilitator. Patient discharged home with instructions regarding supportive care, medications, and reasons to return. The importance of close follow-up was reviewed.   Impression & Plan    Medical Decision Making:  The cause of his presenting right-sided chest discomfort is unconfirmed despite thorough evaluation as summarized above.  With his history of PE, recurrent PE was certainly considered especially given his slightly subtherapeutic INR, though his vital signs are not highly suggestive of clinically significant PE and his d-dimer is normal.  I do not think the radiation and other risks associated with PE scan can be currently justified.  ACS thought to be very unlikely given the nature of his discomfort as well as a normal troponin after many hours of symptoms.  No evidence of shingles, rib fracture, hemothorax or pneumothorax, pneumonia, or referred pain from a abdominal or retroperitoneal conditions such as biliary colic.  He declined any additional analgesics.  He and his group home staff are very comfortable with the plan for discharge to his group home, with close outpatient follow-up and return precautions reviewed for sudden worsening at any hour.    Diagnosis:    ICD-10-CM   1. Chest pain, unspecified type R07.9           2. Subtherapeutic international normalized ratio (INR) R79.1     Disposition:  Discharged to home with instructions  for follow up.      This record was created at least in part using electronic voice recognition software, so please excuse any typographical errors.    Julio Burt  2/8/2019    EMERGENCY DEPARTMENT  Scribe Disclosure:  I, Julio Burt, am serving as a scribe at 12:03 PM on 2/8/2019 to document services personally performed by Clif Stinson MD based on my observations and the provider's statements to me.      Clif Stinson MD  02/08/19 3446

## 2019-02-08 NOTE — LETTER
February 8, 2019      To Whom It May Concern:      Petey Archibald was seen in our Emergency Department today, 02/08/19.  He may return to work/school as he wishes.    Sincerely,

## 2019-02-09 DIAGNOSIS — E78.2 MIXED HYPERLIPIDEMIA: ICD-10-CM

## 2019-02-09 DIAGNOSIS — F33.1 MAJOR DEPRESSIVE DISORDER, RECURRENT EPISODE, MODERATE (H): ICD-10-CM

## 2019-02-09 DIAGNOSIS — R60.0 LOCALIZED EDEMA: ICD-10-CM

## 2019-02-11 ENCOUNTER — PATIENT OUTREACH (OUTPATIENT)
Dept: CARE COORDINATION | Facility: CLINIC | Age: 59
End: 2019-02-11

## 2019-02-11 NOTE — PROGRESS NOTES
Clinic Care Coordination Contact  [facility]/Voicemail    Referral Source: ED Follow-Up  Clinical Data: Care Coordinator Outreach  Outreach attempted x 2.  Please see contact log for detailed outreach attempt dates. SW CC left contact information for call back about an additional needs.   Plan: No further outreaches will be made at this time unless a new referral is made or a change in the pt's status occurs.  JAMEEL Gil   Social Work Care Coordinator  [phone]

## 2019-02-19 ENCOUNTER — OFFICE VISIT (OUTPATIENT)
Dept: FAMILY MEDICINE | Facility: CLINIC | Age: 59
End: 2019-02-19
Payer: MEDICARE

## 2019-02-19 VITALS
BODY MASS INDEX: 42.88 KG/M2 | WEIGHT: 315 LBS | OXYGEN SATURATION: 97 % | DIASTOLIC BLOOD PRESSURE: 64 MMHG | SYSTOLIC BLOOD PRESSURE: 110 MMHG | RESPIRATION RATE: 18 BRPM | HEART RATE: 77 BPM

## 2019-02-19 DIAGNOSIS — S70.01XA: ICD-10-CM
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
## 2019-02-19 DIAGNOSIS — S76.212A GROIN STRAIN, LEFT, INITIAL ENCOUNTER: ICD-10-CM

## 2019-02-19 PROCEDURE — 99213 OFFICE O/P EST LOW 20 MIN: CPT | Performed by: FAMILY MEDICINE
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
## 2019-02-20 ENCOUNTER — TELEPHONE (OUTPATIENT)
Dept: FAMILY MEDICINE | Facility: CLINIC | Age: 59
End: 2019-02-20

## 2019-02-20 NOTE — TELEPHONE ENCOUNTER
Prior Authorization Retail Medication Request    Medication/Dose:   ICD code (if different than what is on RX):     Previously Tried and Failed:     Rationale:       Insurance Name:  [employer]  Insurance ID:  [id number]      Pharmacy Information (if different than what is on RX)  Name:  [facility]   Phone:  [phone]

## 2019-02-22 DIAGNOSIS — R06.2 WHEEZING: ICD-10-CM

## 2019-02-25 RX ORDER — TIOTROPIUM BROMIDE 18 UG/1
CAPSULE ORAL; RESPIRATORY (INHALATION)
Qty: 90 CAPSULE | Refills: 1 | Status: ON HOLD | OUTPATIENT
Start: 2019-02-25 | End: 2020-07-26

## 2019-02-25 NOTE — TELEPHONE ENCOUNTER
"Requested Prescriptions   Pending Prescriptions Disp Refills     SPIRIVA HANDIHALER 18 MCG inhaled capsule [Pharmacy Med Name: SPIRIVA 18MCG HANDIHALER]  Last Written Prescription Date:  11/26/2018  Last Fill Quantity: 90,  # refills: 0   Last office visit: 2/19/2019 with prescribing provider:  Dr Baker   ACT Total Scores 2/18/2013 5/1/2013 12/16/2013   ACT TOTAL SCORE 22 25 20   ASTHMA ER VISITS 0 = None 0 = None 0 = None   ASTHMA HOSPITALIZATIONS 0 = None 0 = None 0 = None       Future Office Visit:      11     Sig: INHALE CONTENTS OF 1 CAPSULE INTO THE LUNGS ONCE DAILY. FOR BRONCHITIS AND EMPHYSEMA.    Asthma Maintenance Inhalers - Anticholinergics Passed - 2/22/2019  7:56 PM       Passed - Patient is age 12 years or older       Passed - Recent (12 mo) or future (30 days) visit within the authorizing provider's specialty    Patient had office visit in the last 12 months or has a visit in the next 30 days with authorizing provider or within the authorizing provider's specialty.  See \"Patient Info\" tab in inbasket, or \"Choose Columns\" in Meds & Orders section of the refill encounter.             Passed - Medication is active on med list          "

## 2019-02-26 NOTE — TELEPHONE ENCOUNTER
Prior Authorization Approval    Authorization Effective Date: 2/26/2019  Authorization Expiration Date: 12/31/2019  Medication: DEXILANT 60 MG CPDR CR capsule- APPROVED  Approved Dose/Quantity:   Reference #: [id number]   Insurance Company: [employer] Part D - Phone [phone] Fax [phone]  Expected CoPay:       CoPay Card Available:      Foundation Assistance Needed:    Which Pharmacy is filling the prescription (Not needed for infusion/clinic administered): [facility] [employer] - [facility] [id number] [place] [address]. S.  Pharmacy Notified: Yes, medication will be mailed to the patient.  Patient Notified: Yes

## 2019-02-26 NOTE — TELEPHONE ENCOUNTER
PA Initiation    Medication: DEXILANT 60 MG CPDR CR capsule- INITIATED  Insurance Company: [employer] Part D - Phone [phone] Fax [phone]  Pharmacy Filling the Rx: [facility] [employer] - [place], [place] - [address]  Filling Pharmacy Phone: [phone]  Filling Pharmacy Fax:    Start Date: 2/26/2019

## 2019-03-06 ENCOUNTER — ANTICOAGULATION THERAPY VISIT (OUTPATIENT)
Dept: NURSING | Facility: CLINIC | Age: 59
End: 2019-03-06
Payer: MEDICARE

## 2019-03-06 ENCOUNTER — TELEPHONE (OUTPATIENT)
Dept: FAMILY MEDICINE | Facility: CLINIC | Age: 59
End: 2019-03-06

## 2019-03-06 DIAGNOSIS — Z86.711 HISTORY OF PULMONARY EMBOLISM: ICD-10-CM

## 2019-03-06 DIAGNOSIS — Z79.01 LONG TERM CURRENT USE OF ANTICOAGULANT THERAPY: ICD-10-CM

## 2019-03-06 LAB — INR POINT OF CARE: 2.3 (ref 0.86–1.14)

## 2019-03-06 PROCEDURE — 36416 COLLJ CAPILLARY BLOOD SPEC: CPT

## 2019-03-06 PROCEDURE — 85610 PROTHROMBIN TIME: CPT | Mod: QW

## 2019-03-06 PROCEDURE — 99207 ZZC NO CHARGE NURSE ONLY: CPT

## 2019-03-06 RX ORDER — WARFARIN SODIUM 4 MG/1
TABLET ORAL
Qty: 75 TABLET | Refills: 1 | Status: SHIPPED | OUTPATIENT
Start: 2019-03-06 | End: 2019-04-03

## 2019-03-06 NOTE — TELEPHONE ENCOUNTER
warfarin (COUMADIN) 4 MG tablet 75 tablet 1 2/6/2019  No   Sig: 10 mg Monday and 8 mg all other days.   Sent to pharmacy as: warfarin (COUMADIN) 4 MG tablet     Prescription approved per [facility] Refill Protocol.

## 2019-03-06 NOTE — PROGRESS NOTES
ANTICOAGULATION FOLLOW-UP CLINIC VISIT    Patient Name:  Petey Archibald  Date:  3/6/2019  Contact Type:  Face to Face    SUBJECTIVE:     Patient Findings     Positives:   No Problem Findings           OBJECTIVE    INR Protime   Date Value Ref Range Status   2019 2.3 (A) 0.86 - 1.14 Final       ASSESSMENT / PLAN  INR assessment THER    Recheck INR In: 4 WEEKS    INR Location Clinic      Anticoagulation Summary  As of 3/6/2019    INR goal:   2.0-3.0   TTR:   72.8 % (2.9 y)   INR used for dosin.3 (3/6/2019)   Warfarin maintenance plan:   10 mg (4 mg x 2.5) every Mon; 8 mg (4 mg x 2) all other days   Full warfarin instructions:   10 mg every Mon; 8 mg all other days   Weekly warfarin total:   58 mg   No change documented:   Owen Beckett RN   Plan last modified:   Prudence Machuca RN (2018)   Next INR check:   4/3/2019   Target end date:   Indefinite    Indications    History of pulmonary embolism [Z86.711]  Long-term (current) use of anticoagulants [Z79.01] [Z79.01]             Anticoagulation Episode Summary     INR check location:   [facility]    Preferred lab:       Send INR reminders to:   [facility] INR/PROTIME    Comments:         Anticoagulation Care Providers     Provider Role Specialty Phone number    Silvino Baker MD [facility] [phone]            See the Encounter Report to view Anticoagulation Flowsheet and Dosing Calendar (Go to Encounters tab in chart review, and find the Anticoagulation Therapy Visit)    Dosage adjustment made based on physician directed care plan.    Owen Beckett RN

## 2019-03-20 ENCOUNTER — HOSPITAL ENCOUNTER (EMERGENCY)
Facility: CLINIC | Age: 59
Discharge: HOME | End: 2019-03-21
Attending: EMERGENCY MEDICINE | Admitting: EMERGENCY MEDICINE
Payer: MEDICARE

## 2019-03-20 DIAGNOSIS — R05.9 COUGH: ICD-10-CM

## 2019-03-20 PROCEDURE — 87804 INFLUENZA ASSAY W/OPTIC: CPT | Mod: 91 | Performed by: EMERGENCY MEDICINE

## 2019-03-20 PROCEDURE — 99284 EMERGENCY DEPT VISIT MOD MDM: CPT | Mod: 25

## 2019-03-20 ASSESSMENT — ENCOUNTER SYMPTOMS
RHINORRHEA: 0
COUGH: 1
DIAPHORESIS: 1
BLOOD IN STOOL: 0
FEVER: 0
SHORTNESS OF BREATH: 0
ABDOMINAL PAIN: 1
CHILLS: 0

## 2019-03-20 ASSESSMENT — MIFFLIN-ST. JEOR: SCORE: 2247.35

## 2019-03-20 NOTE — ED AVS SNAPSHOT
Emergency Department  [address] [place] [address]  Phone:  [phone]  Fax:  [phone]                                    Petey Archibald   MRN: [id number]    Department:   Emergency Department   Date of Visit:  3/20/2019           After Visit Summary Signature Page    I have received my discharge instructions, and my questions have been answered. I have discussed any challenges I see with this plan with the nurse or doctor.    ..........................................................................................................................................  Patient/Patient Representative Signature      ..........................................................................................................................................  Patient Representative Print Name and Relationship to Patient    ..................................................               ................................................  Date                                   Time    ..........................................................................................................................................  Reviewed by Signature/Title    ...................................................              ..............................................  Date                                               Time          22EPIC Rev 08/18

## 2019-03-21 ENCOUNTER — APPOINTMENT (OUTPATIENT)
Dept: GENERAL RADIOLOGY | Facility: CLINIC | Age: 59
End: 2019-03-21
Attending: EMERGENCY MEDICINE
Payer: MEDICARE

## 2019-03-21 VITALS
TEMPERATURE: 98 F | HEIGHT: 69 IN | RESPIRATION RATE: 18 BRPM | DIASTOLIC BLOOD PRESSURE: 69 MMHG | BODY MASS INDEX: 46.65 KG/M2 | SYSTOLIC BLOOD PRESSURE: 120 MMHG | HEART RATE: 81 BPM | WEIGHT: 315 LBS | OXYGEN SATURATION: 94 %

## 2019-03-21 LAB
FLUAV+FLUBV AG SPEC QL: NEGATIVE
FLUAV+FLUBV AG SPEC QL: NEGATIVE
SPECIMEN SOURCE: NORMAL

## 2019-03-21 PROCEDURE — 71046 X-RAY EXAM CHEST 2 VIEWS: CPT

## 2019-03-21 NOTE — ED TRIAGE NOTES
Pt shalonda from [facility] group home in [place] for cough x several days, pt states worse today and now having shortness of breath while taking stairs

## 2019-03-21 NOTE — ED NOTES
Bed: ED25  Expected date: 3/20/19  Expected time: 11:00 PM  Means of arrival: Ambulance  Comments:  AYDEN [address] 58M SOB

## 2019-03-21 NOTE — ED PROVIDER NOTES
"  History     Chief Complaint:    Cough       HPI   Petey Archibald is an anticoagulated 58 year old male with a history of COPD, PE, and DVT who presents to the ED via EMS from his group home for evaluation of a cough. The patient states that he developed a dry cough yesterday. His cough has persisted today and he also complains of some diaphoresis and stomach pain if he coughs. He presented tonight out of concerns for pneumonia. Of note, several of the other residents at his group home also have coughs as well. He denies any increased shortness of breath from baseline, chest pain, hemoptysis, fever, chills, rhinorrhea, otalgia, black or bloody bowel movements, or urinary symptoms.     Allergies:  Augmentin  Bees  Penicillins     Medications:    Tylenol  Albuterol  Anusol  Abilify  Bupropion  Dexilant  Lasix  Zeasorb  Dulera  Singulair  Zoloft  Zocor  Spiriva  Coumadin  Ambien     Past Medical History:    Borderline mental retardation  COPD  DVT  PE  HLD  Mild intellectual disabilities  Morbid obesity  Peripheral edema   Venous stasis dermatitis    Past Surgical History:    Excise malignant lesions, trunk/arms/legs  Rectal surgery    Family History:    Diabetes    Social History:  Current every day smoker.  Negative for alcohol use.  Marital Status:  Single [1]      Review of Systems   Constitutional: Positive for diaphoresis. Negative for chills and fever.   HENT: Negative for ear pain and rhinorrhea.    Respiratory: Positive for cough. Negative for shortness of breath.    Cardiovascular: Negative for chest pain.   Gastrointestinal: Positive for abdominal pain. Negative for blood in stool.   All other systems reviewed and are negative.    Physical Exam   First Vitals:  BP: 125/73  Pulse: 86  Temp: 98  F (36.7  C)  Resp: 18  Height: 175.3 cm (5' 9\")  Weight: 144.7 kg (319 lb)  SpO2: 93 %      Physical Exam  SKIN:  Warm, dry.  HEMATOLOGIC/IMMUNOLOGIC/LYMPHATIC:  No pallor.  HENT:  Normal phonation.  No stridor.  " No JVD.  EYES:  Conjunctivae normal.  CARDIOVASCULAR:  Regular rate and rhythm.  No murmur.  RESPIRATORY:  No respiratory distress, breath sounds equal and normal.  MUSCULOSKELETAL:  Morbidly obese body habitus.  NEUROLOGIC:  Alert, conversant.  PSYCHIATRIC:  Normal mood.    Emergency Department Course   Imaging:  Radiographic findings were communicated with the patient who voiced understanding of the findings.  XR Chest PA and LAT:   Minimal interstitial prominence in the lower lungs. No  consolidative infiltrates or other acute findings. Stable heart size,  near the upper limits of normal as per radiology.    Laboratory:  Influenza A/B: negative    Emergency Department Course:  Nursing notes and vitals reviewed. ([id number]) I performed an exam of the patient as documented above.     The patient was sent for a chest XR while in the emergency department, findings above.     I rechecked the patient and discussed the results of his workup thus far.     Findings and plan explained to the Patient. Patient discharged home with instructions regarding supportive care, medications, and reasons to return. The importance of close follow-up was reviewed.     I personally reviewed the laboratory results with the Patient and answered all related questions prior to discharge.   Impression & Plan    Medical Decision Making:  This patient presents with a dry cough. Considered pneumonia or influenza. He is anticoagulated and has recently been therapeutic with respect to his thrombus. I do not think he required other testing and he is clinically well appearing. Vital signs reassuring. Advised follow up with primary care if needed.       Diagnosis:    ICD-10-CM    1. Cough R05        Disposition:  discharged to home    Scribe Disclosure:  I,  Toni Lujan, am serving as a scribe on 3/20/2019 at 11:36 PM to personally document services performed by Blayne Worrell MD based on my observations and the provider's statements to me.         Toni Lujan  3/20/2019    EMERGENCY DEPARTMENT       Blayne Worrell MD  03/21/19 1600

## 2019-03-29 ENCOUNTER — OFFICE VISIT (OUTPATIENT)
Dept: FAMILY MEDICINE | Facility: CLINIC | Age: 59
End: 2019-03-29
Payer: MEDICARE

## 2019-03-29 ENCOUNTER — ANCILLARY PROCEDURE (OUTPATIENT)
Dept: GENERAL RADIOLOGY | Facility: CLINIC | Age: 59
End: 2019-03-29
Attending: FAMILY MEDICINE
Payer: MEDICARE

## 2019-03-29 VITALS
TEMPERATURE: 96.7 F | HEIGHT: 69 IN | WEIGHT: 315 LBS | RESPIRATION RATE: 20 BRPM | DIASTOLIC BLOOD PRESSURE: 60 MMHG | BODY MASS INDEX: 46.65 KG/M2 | SYSTOLIC BLOOD PRESSURE: 128 MMHG | OXYGEN SATURATION: 96 % | HEART RATE: 85 BPM

## 2019-03-29 DIAGNOSIS — R93.89 ABNORMAL CHEST X-RAY: ICD-10-CM

## 2019-03-29 DIAGNOSIS — J41.8 MIXED SIMPLE AND MUCOPURULENT CHRONIC BRONCHITIS (H): ICD-10-CM

## 2019-03-29 DIAGNOSIS — F17.200 TOBACCO DEPENDENCE: ICD-10-CM

## 2019-03-29 DIAGNOSIS — J44.1 CHRONIC OBSTRUCTIVE PULMONARY DISEASE WITH ACUTE EXACERBATION (H): Primary | ICD-10-CM

## 2019-03-29 DIAGNOSIS — R09.89 ABNORMAL LUNG SOUNDS: ICD-10-CM

## 2019-03-29 DIAGNOSIS — F32.5 MAJOR DEPRESSION IN COMPLETE REMISSION (H): ICD-10-CM

## 2019-03-29 LAB
BASOPHILS # BLD AUTO: 0 10E9/L (ref 0–0.2)
BASOPHILS NFR BLD AUTO: 0.2 %
DIFFERENTIAL METHOD BLD: ABNORMAL
EOSINOPHIL # BLD AUTO: 0.1 10E9/L (ref 0–0.7)
EOSINOPHIL NFR BLD AUTO: 2.6 %
ERYTHROCYTE [DISTWIDTH] IN BLOOD BY AUTOMATED COUNT: 15 % (ref 10–15)
HCT VFR BLD AUTO: 40.7 % (ref 40–53)
HGB BLD-MCNC: 13.4 G/DL (ref 13.3–17.7)
LYMPHOCYTES # BLD AUTO: 1.3 10E9/L (ref 0.8–5.3)
LYMPHOCYTES NFR BLD AUTO: 24.1 %
MCH RBC QN AUTO: 30.5 PG (ref 26.5–33)
MCHC RBC AUTO-ENTMCNC: 32.9 G/DL (ref 31.5–36.5)
MCV RBC AUTO: 93 FL (ref 78–100)
MONOCYTES # BLD AUTO: 0.6 10E9/L (ref 0–1.3)
MONOCYTES NFR BLD AUTO: 10.9 %
NEUTROPHILS # BLD AUTO: 3.4 10E9/L (ref 1.6–8.3)
NEUTROPHILS NFR BLD AUTO: 62.2 %
PLATELET # BLD AUTO: 252 10E9/L (ref 150–450)
RBC # BLD AUTO: 4.39 10E12/L (ref 4.4–5.9)
WBC # BLD AUTO: 5.4 10E9/L (ref 4–11)

## 2019-03-29 PROCEDURE — 99214 OFFICE O/P EST MOD 30 MIN: CPT | Performed by: FAMILY MEDICINE

## 2019-03-29 PROCEDURE — 71046 X-RAY EXAM CHEST 2 VIEWS: CPT | Mod: FY

## 2019-03-29 PROCEDURE — 85025 COMPLETE CBC W/AUTO DIFF WBC: CPT | Performed by: FAMILY MEDICINE

## 2019-03-29 PROCEDURE — 36415 COLL VENOUS BLD VENIPUNCTURE: CPT | Performed by: FAMILY MEDICINE

## 2019-03-29 RX ORDER — GUAIFENESIN 600 MG/1
1200 TABLET, EXTENDED RELEASE ORAL 2 TIMES DAILY
Qty: 60 TABLET | Refills: 0 | Status: SHIPPED | OUTPATIENT
Start: 2019-03-29 | End: 2020-05-18

## 2019-03-29 ASSESSMENT — MIFFLIN-ST. JEOR: SCORE: 2247.35

## 2019-03-29 NOTE — LETTER
[facility]  [address]  [facility] [address]  [phone]                                                                                                           Petey Archibald  [address]  [facility] [address]    April 1, 2019      Dear Petey,    Please see attached lab results   They are all normal     THE FOLLOWING ARE EXPLANATIONS OF SOME OF OUR LAB TESTS     YOU DID NOT NECESSARILY HAVE ALL OF THESE DONE     Hgb is the blood iron level   WBC means White Blood Cells   Platelets are small blood    cells that help with forming the blood clots along with other blood factors.   Electrolytes are Sodium, Potassium, Calcium, Magnesium, Phosphorus.   Liver tests are: AST, ALT, Bilirubin, Alkaline Phosphatase.   Kidney tests are Creatinine, GFR.   HDL Choles   terol - is the good cholesterol and it is good to have it high.   LDL cholesterol is the bad cholesterol and it is good to have it low.   It is recommended to have LDL less than 130 for people with hypertension and to have it less than 100 for people with   heart disease, diabetes and chronic kidney disease.   Triglycerides are another type of lipid that can cause heart disease, like the cholesterol and should be kept low   Thyroid tests are TSH, T4, T3   Glucose is sugar.   A1c is a test that gives us an idea    about how well was controlled the diabetes for the last 3 months.   PSA stands for Prostate Specific Antigen and it can be elevated with prostate cancer or prostate inflammation.     Please continue on the same medications     Thank you for choosing [facility].  We appreciate the opportunity to serve you and look forward to supporting your healthcare needs in the future.    If you have any questions or concerns, please call me or my staff at [phone].      Sincerely,    Hortensia Peck MD    Results for orders placed or performed in visit  on 03/29/19   XR Chest 2 Views    Narrative    CHEST TWO VIEW 3/29/2019 4:52 PM     HISTORY: Acute bronchitis. Tobacco dependence.     COMPARISON: 3/21/2019.      Impression    IMPRESSION: Persistent mild interstitial prominence at the lung bases  which could represent nodular infiltrates or pulmonary nodules. No  significant pleural effusion or pneumothorax. Cardiac silhouette is  within normal limits. The bones are unremarkable.     DARION COLE MD   CBC with platelets differential   Result Value Ref Range    WBC 5.4 4.0 - 11.0 10e9/L    RBC Count 4.39 (L) 4.4 - 5.9 10e12/L    Hemoglobin 13.4 13.3 - 17.7 g/dL    Hematocrit 40.7 40.0 - 53.0 %    MCV 93 78 - 100 fl    MCH 30.5 26.5 - 33.0 pg    MCHC 32.9 31.5 - 36.5 g/dL    RDW 15.0 10.0 - 15.0 %    Platelet Count 252 150 - 450 10e9/L    % Neutrophils 62.2 %    % Lymphocytes 24.1 %    % Monocytes 10.9 %    % Eosinophils 2.6 %    % Basophils 0.2 %    Absolute Neutrophil 3.4 1.6 - 8.3 10e9/L    Absolute Lymphocytes 1.3 0.8 - 5.3 10e9/L    Absolute Monocytes 0.6 0.0 - 1.3 10e9/L    Absolute Eosinophils 0.1 0.0 - 0.7 10e9/L    Absolute Basophils 0.0 0.0 - 0.2 10e9/L    Diff Method Automated Method

## 2019-03-29 NOTE — LETTER
[facility]  [address]  [facility] [address] [phone]                                                                                                           Petey Archibald  [address]  [facility] [address]    April 1, 2019      Dear Petey,    Chronic changes continue at the lung bases     With the heavy smoking history , you need a lung CT to screen for lung cancer    Enclosed is a copy of the results.     Thank you for choosing [facility].  We appreciate the opportunity to serve you and look forward to supporting your healthcare needs in the future.    If you have any questions or concerns, please call me or my staff at [phone].      Sincerely,    Hortensia Peck MD

## 2019-03-29 NOTE — PATIENT INSTRUCTIONS
1. QUIT SMOKING !!!!!!    2.  Run a cold air vaporizer as much as possible. If you cannot,  boil water and breath the warm vapors 2-3 times a day to try to open up the sinuses take 2400mgm of guaifenesin per 24 hours   You can do this by taking  Mucinex plain blue  1200 mg  One tablet twice a day (This may come as 600mg/tablet and you need to take 2 tabs twice a day) or you could buy the cheaper  generic 400mgm / tab and take 2 tablets 3 x a day or 1 and 1/2 tablets 4 x a day . .Guaifenesin is  the major component of most cough syrups, because it makes the mucus less thick, and therefore it drains out better and you are less likely to cough from it dripping on the back of your throat.  Irrigate the  nose with plain water under the kitchen sink faucet or the shower.  Ines pots, spray bottles, etc accumulate bacteria and are not recommended.   The tickle in the throat is also helped by gargling with vinegar and honey mixture, or pop or mouth wash as these coat the throat.  Please try to rinse teeth with water after using these .   Do not use sudafed or pseudephedrine as it dries the mucus up so it is harder to get it out, and it can raise your BP     3. , Weight Loss Tips  1. Do not eat after 6 hrs before your expected bedtime  2. Have your heaviest meal for breakfast, a slightly lighter meal at lunch and a snack 6 hrs before bed  3. No sugar/calorie drinks except milk ie no fruit juice, pop, alcohol.  4. Drink milk 30min before meals to decrease your hunger. Also it is excellent as part of your last meal of the day snack  5. Drink lots of water  6. Increase fiber in diet: all bran cereal, salads, popcorn etc  7. Have only one small serving of fruit a day about 1/2 cup (as this is high in sugar)  8. EXERCISE is the bottom line. Without it, you will gain weight even on a low calorie diet. Best if done 2-3X a day as can    Being overweight contributes to high blood pressure and high cholesterol, both of which cause  heart attacks, strokes and kidney failure, prediabetes and diabetes, arthritis, and liver disease     4. Shingrex is a 2 shot series that prevents shingles 97% of the time, as opposed to the old shingles shot that only prevented it at 40-50%  It costs less for medicare at a pharmacy  You should get it starting at 50 yrs old get the 2nd shot 5-6 mo after the first one

## 2019-03-29 NOTE — PROGRESS NOTES
SUBJECTIVE:   Petey Archibald is a 58 year old male who presents to clinic today for the following health issues:    ED/UC Follow Up    Facility:  [facility]  Date of visit: 03/20/2019  Reason for visit: Cough   Current Status: Not Better    ENT Symptoms             Symptoms: cc Present Absent Comment   Fever/Chills   x    Fatigue   x    Muscle Aches   x    Eye Irritation   x    Sneezing   x    Nasal Baldev/Drg   x    Sinus Pressure/Pain   x    Loss of smell   x    Dental pain   x    Sore Throat  x     Swollen Glands  x     Ear Pain/Fullness   x    Cough  x     Wheeze   x    Chest Pain   x    Shortness of breath   x    Rash   x    Other   x      Symptom duration:  03/10/2019   Symptom severity:  Mild   Treatments tried:  Tylenol   Contacts:  None     COPD Follow-Up      Symptoms are currently: stable    Current fatigue or dyspnea with ambulation: worsened from baseline    Shortness of breath: stable    Increased or change in Cough/Sputum: Yes    Fever(s): No    Baseline ambulation without stopping to rest: na. Able to walk up 2 flights of stairs without stopping to rest.    Any ER/UC or hospital admissions since your last visit? Yes - ER/UC     History   Smoking Status     Current Every Day Smoker     Packs/day: 1.00     Years: 40 yrs and conttinues to smoke     Types: Cigarettes   Smokeless Tobacco     Never Used     Comment: started at age 18     FEV1,=66%FVC=61%  In 7-16  CXR  3-21-19  Prominent bibasilar interstitial markings    Depression and Anxiety Follow-Up      Status since last visit: No change    Other associated symptoms:None    Complicating factors:     Significant life event: No     Current substance abuse: None    PHQ 12/23/2016 2/28/2017 8/21/2018   PHQ-9 Total Score 1 2 0   Q9: Suicide Ideation Not at all Not at all Not at all     CARL-7 SCORE 7/21/2016 12/23/2016 8/21/2018   Total Score - - 6 (mild anxiety)   Total Score 13 0 6       PHQ-9  English=1  PHQ-9   Any Language  CARL-7=12  Suicide Assessment  "Five-step Evaluation and Treatment (SAFE-T)      Amount of exercise or physical activity: None    Problems taking medications regularly: No    Medication side effects: none    Diet: regular (no restrictions)        Problem list and histories reviewed & adjusted, as indicated.  Additional history: as documented    Labs reviewed in EPIC    Reviewed and updated as needed this visit by clinical staff  Tobacco  Allergies  Meds  Problems  Med Hx  Surg Hx  Fam Hx  Soc Hx        Reviewed and updated as needed this visit by Provider         ROS:  CONSTITUTIONAL: NEGATIVE for fever, chills, change in weight  INTEGUMENTARY/SKIN: NEGATIVE for worrisome rashes, moles or lesions  EYES: NEGATIVE for vision changes or irritation  ENT/MOUTH: POSITIVE for , nasal congestion, postnasal drainage, rhinorrhea-clear and sore throat  RESP:POSITIVE for , cough-productive and dyspnea on exertion  BREAST: NEGATIVE for masses, tenderness or discharge  CV: NEGATIVE for chest pain, palpitations or peripheral edema  GI: NEGATIVE for nausea, abdominal pain, heartburn, or change in bowel habits  : NEGATIVE for frequency, dysuria, or hematuria  MUSCULOSKELETAL: NEGATIVE for significant arthralgias or myalgia  NEURO: NEGATIVE for weakness, dizziness or paresthesias  ENDOCRINE: NEGATIVE for temperature intolerance, skin/hair changes  HEME: NEGATIVE for bleeding problems  PSYCHIATRIC: NEGATIVE for changes in mood or affect    OBJECTIVE:     /60   Pulse 85   Temp 96.7  F (35.9  C) (Tympanic)   Resp 20   Ht 1.753 m (5' 9\")   Wt 144.7 kg (319 lb)   SpO2 96%   BMI 47.11 kg/m    Body mass index is 47.11 kg/m .  GENERAL: healthy, alert, no distress and obese  EYES: Eyes grossly normal to inspection, PERRL and conjunctivae and sclerae normal  HENT: ear canals and TM's normal, nose and mouth without ulcers or lesions  NECK: no adenopathy, no asymmetry, masses, or scars and thyroid normal to palpation  RESP: rhonchi bibasilar  CV: regular " rate and rhythm, normal S1 S2, no S3 or S4, no murmur, click or rub, no peripheral edema and peripheral pulses strong  MS: no gross musculoskeletal defects noted, no edema  SKIN: no suspicious lesions or rashes  NEURO: Normal strength and tone, mentation intact and speech normal  PSYCH: confused, affect normal/bright and appearance disheveled    Diagnostic Test Results:  Results for orders placed or performed in visit on 03/29/19   XR Chest 2 Views    Narrative    CHEST TWO VIEW 3/29/2019 4:52 PM     HISTORY: Acute bronchitis. Tobacco dependence.     COMPARISON: 3/21/2019.      Impression    IMPRESSION: Persistent mild interstitial prominence at the lung bases  which could represent nodular infiltrates or pulmonary nodules. No  significant pleural effusion or pneumothorax. Cardiac silhouette is  within normal limits. The bones are unremarkable.     DARION COLE MD   CBC with platelets differential   Result Value Ref Range    WBC 5.4 4.0 - 11.0 10e9/L    RBC Count 4.39 (L) 4.4 - 5.9 10e12/L    Hemoglobin 13.4 13.3 - 17.7 g/dL    Hematocrit 40.7 40.0 - 53.0 %    MCV 93 78 - 100 fl    MCH 30.5 26.5 - 33.0 pg    MCHC 32.9 31.5 - 36.5 g/dL    RDW 15.0 10.0 - 15.0 %    Platelet Count 252 150 - 450 10e9/L    % Neutrophils 62.2 %    % Lymphocytes 24.1 %    % Monocytes 10.9 %    % Eosinophils 2.6 %    % Basophils 0.2 %    Absolute Neutrophil 3.4 1.6 - 8.3 10e9/L    Absolute Lymphocytes 1.3 0.8 - 5.3 10e9/L    Absolute Monocytes 0.6 0.0 - 1.3 10e9/L    Absolute Eosinophils 0.1 0.0 - 0.7 10e9/L    Absolute Basophils 0.0 0.0 - 0.2 10e9/L    Diff Method Automated Method        ASSESSMENT/PLAN:               ICD-10-CM    1. Chronic obstructive pulmonary disease with acute exacerbation (H) J44.1    2. Mixed simple and mucopurulent chronic bronchitis (H) J41.8    3. Abnormal chest x-ray-3-19 prominent bibasilar interstitial  R93.89    4. Tobacco dependence:40-50 pk yr hx F17.200 CBC with platelets differential     XR Chest 2 Views    5. Major depression in complete remission (HCC) on meds  F32.5    6. Abnormal lung sounds-bibasilar rhonchi R09.89        Patient Instructions   1. QUIT SMOKING !!!!!!    2.  Run a cold air vaporizer as much as possible. If you cannot,  boil water and breath the warm vapors 2-3 times a day to try to open up the sinuses take 2400mgm of guaifenesin per 24 hours   You can do this by taking  Mucinex plain blue  1200 mg  One tablet twice a day (This may come as 600mg/tablet and you need to take 2 tabs twice a day) or you could buy the cheaper  generic 400mgm / tab and take 2 tablets 3 x a day or 1 and 1/2 tablets 4 x a day . .Guaifenesin is  the major component of most cough syrups, because it makes the mucus less thick, and therefore it drains out better and you are less likely to cough from it dripping on the back of your throat.  Irrigate the  nose with plain water under the kitchen sink faucet or the shower.  Ines pots, spray bottles, etc accumulate bacteria and are not recommended.   The tickle in the throat is also helped by gargling with vinegar and honey mixture, or pop or mouth wash as these coat the throat.  Please try to rinse teeth with water after using these .   Do not use sudafed or pseudephedrine as it dries the mucus up so it is harder to get it out, and it can raise your BP     3. , Weight Loss Tips  1. Do not eat after 6 hrs before your expected bedtime  2. Have your heaviest meal for breakfast, a slightly lighter meal at lunch and a snack 6 hrs before bed  3. No sugar/calorie drinks except milk ie no fruit juice, pop, alcohol.  4. Drink milk 30min before meals to decrease your hunger. Also it is excellent as part of your last meal of the day snack  5. Drink lots of water  6. Increase fiber in diet: all bran cereal, salads, popcorn etc  7. Have only one small serving of fruit a day about 1/2 cup (as this is high in sugar)  8. EXERCISE is the bottom line. Without it, you will gain weight even on a  low calorie diet. Best if done 2-3X a day as can    Being overweight contributes to high blood pressure and high cholesterol, both of which cause heart attacks, strokes and kidney failure, prediabetes and diabetes, arthritis, and liver disease     4. Shingrex is a 2 shot series that prevents shingles 97% of the time, as opposed to the old shingles shot that only prevented it at 40-50%  It costs less for medicare at a pharmacy  You should get it starting at 50 yrs old get the 2nd shot 5-6 mo after the first one        Pt with chronic bronchitis --may need inhalers -will try as above 1st     Refuses to quit smoking    Hortensia Peck MD  [facility]

## 2019-03-29 NOTE — NURSING NOTE
"Chief Complaint   Patient presents with     Hospital F/U     URI     Recheck Medication     /60   Pulse 85   Temp 96.7  F (35.9  C) (Tympanic)   Resp 20   Ht 1.753 m (5' 9\")   Wt 144.7 kg (319 lb)   SpO2 96%   BMI 47.11 kg/m   Estimated body mass index is 47.11 kg/m  as calculated from the following:    Height as of this encounter: 1.753 m (5' 9\").    Weight as of this encounter: 144.7 kg (319 lb).  BP completed using cuff size: jin Ovalle CMA    Health Maintenance Due   Topic Date Due     ZOSTER IMMUNIZATION (1 of 2) 12/06/2008     ADVANCE DIRECTIVE PLANNING Q5 YRS  12/06/2013     OP ANNUAL INR REFERRAL  08/05/2015     PHQ-9 Q6 MONTHS  02/21/2019     COLONOSCOPY Q1 YR  02/21/2019     Health Maintenance reviewed at today's visit patient asked to schedule/complete:   Colon Cancer:  Patient agrees to schedule  Depression:  Patient agrees to schedule  Immunizations:  Patient agrees to schedule    "

## 2019-03-30 PROBLEM — R09.89 ABNORMAL LUNG SOUNDS: Status: ACTIVE | Noted: 2019-03-30

## 2019-04-02 ENCOUNTER — OFFICE VISIT (OUTPATIENT)
Dept: FAMILY MEDICINE | Facility: CLINIC | Age: 59
End: 2019-04-02
Payer: MEDICARE

## 2019-04-02 ENCOUNTER — TELEPHONE (OUTPATIENT)
Dept: FAMILY MEDICINE | Facility: CLINIC | Age: 59
End: 2019-04-02

## 2019-04-02 VITALS
WEIGHT: 315 LBS | SYSTOLIC BLOOD PRESSURE: 132 MMHG | BODY MASS INDEX: 46.65 KG/M2 | HEART RATE: 70 BPM | OXYGEN SATURATION: 98 % | HEIGHT: 69 IN | RESPIRATION RATE: 14 BRPM | DIASTOLIC BLOOD PRESSURE: 76 MMHG

## 2019-04-02 DIAGNOSIS — R09.89 ABNORMAL LUNG SOUNDS: ICD-10-CM

## 2019-04-02 DIAGNOSIS — E66.01 MORBID OBESITY (H): ICD-10-CM

## 2019-04-02 DIAGNOSIS — R93.89 ABNORMAL CHEST X-RAY: ICD-10-CM

## 2019-04-02 DIAGNOSIS — F33.0 MILD EPISODE OF RECURRENT MAJOR DEPRESSIVE DISORDER (H): ICD-10-CM

## 2019-04-02 DIAGNOSIS — F17.200 TOBACCO DEPENDENCE: ICD-10-CM

## 2019-04-02 DIAGNOSIS — I80.03 THROMBOPHLEBITIS OF SUPERFICIAL VEINS OF BOTH LOWER EXTREMITIES: ICD-10-CM

## 2019-04-02 DIAGNOSIS — Z01.818 PREOP GENERAL PHYSICAL EXAM: Primary | ICD-10-CM

## 2019-04-02 DIAGNOSIS — Z86.711 HISTORY OF PULMONARY EMBOLISM: ICD-10-CM

## 2019-04-02 DIAGNOSIS — J41.8 MIXED SIMPLE AND MUCOPURULENT CHRONIC BRONCHITIS (H): ICD-10-CM

## 2019-04-02 DIAGNOSIS — G47.33 OSA (OBSTRUCTIVE SLEEP APNEA): ICD-10-CM

## 2019-04-02 DIAGNOSIS — Z86.0100 HISTORY OF COLONIC POLYPS: ICD-10-CM

## 2019-04-02 DIAGNOSIS — E78.00 HYPERCHOLESTEROLEMIA: ICD-10-CM

## 2019-04-02 DIAGNOSIS — Z79.01 LONG TERM CURRENT USE OF ANTICOAGULANT THERAPY: ICD-10-CM

## 2019-04-02 DIAGNOSIS — R09.02 HYPOXIA: ICD-10-CM

## 2019-04-02 DIAGNOSIS — R60.0 LOCALIZED EDEMA: ICD-10-CM

## 2019-04-02 DIAGNOSIS — R41.83: ICD-10-CM

## 2019-04-02 DIAGNOSIS — R73.02 GLUCOSE INTOLERANCE (IMPAIRED GLUCOSE TOLERANCE): ICD-10-CM

## 2019-04-02 LAB
BASOPHILS # BLD AUTO: 0 10E9/L (ref 0–0.2)
BASOPHILS NFR BLD AUTO: 0.2 %
DIFFERENTIAL METHOD BLD: ABNORMAL
EOSINOPHIL # BLD AUTO: 0.2 10E9/L (ref 0–0.7)
EOSINOPHIL NFR BLD AUTO: 3.4 %
ERYTHROCYTE [DISTWIDTH] IN BLOOD BY AUTOMATED COUNT: 15.1 % (ref 10–15)
HCT VFR BLD AUTO: 39.6 % (ref 40–53)
HGB BLD-MCNC: 12.9 G/DL (ref 13.3–17.7)
LYMPHOCYTES # BLD AUTO: 1.4 10E9/L (ref 0.8–5.3)
LYMPHOCYTES NFR BLD AUTO: 30.6 %
MCH RBC QN AUTO: 30.3 PG (ref 26.5–33)
MCHC RBC AUTO-ENTMCNC: 32.6 G/DL (ref 31.5–36.5)
MCV RBC AUTO: 93 FL (ref 78–100)
MONOCYTES # BLD AUTO: 0.6 10E9/L (ref 0–1.3)
MONOCYTES NFR BLD AUTO: 13.3 %
NEUTROPHILS # BLD AUTO: 2.3 10E9/L (ref 1.6–8.3)
NEUTROPHILS NFR BLD AUTO: 52.5 %
PLATELET # BLD AUTO: 227 10E9/L (ref 150–450)
RBC # BLD AUTO: 4.26 10E12/L (ref 4.4–5.9)
WBC # BLD AUTO: 4.4 10E9/L (ref 4–11)

## 2019-04-02 PROCEDURE — 99215 OFFICE O/P EST HI 40 MIN: CPT | Performed by: FAMILY MEDICINE

## 2019-04-02 PROCEDURE — 85025 COMPLETE CBC W/AUTO DIFF WBC: CPT | Performed by: FAMILY MEDICINE

## 2019-04-02 PROCEDURE — 36415 COLL VENOUS BLD VENIPUNCTURE: CPT | Performed by: FAMILY MEDICINE

## 2019-04-02 PROCEDURE — 82947 ASSAY GLUCOSE BLOOD QUANT: CPT | Performed by: FAMILY MEDICINE

## 2019-04-02 ASSESSMENT — ANXIETY QUESTIONNAIRES
1. FEELING NERVOUS, ANXIOUS, OR ON EDGE: SEVERAL DAYS
3. WORRYING TOO MUCH ABOUT DIFFERENT THINGS: MORE THAN HALF THE DAYS
6. BECOMING EASILY ANNOYED OR IRRITABLE: NEARLY EVERY DAY
IF YOU CHECKED OFF ANY PROBLEMS ON THIS QUESTIONNAIRE, HOW DIFFICULT HAVE THESE PROBLEMS MADE IT FOR YOU TO DO YOUR WORK, TAKE CARE OF THINGS AT HOME, OR GET ALONG WITH OTHER PEOPLE: SOMEWHAT DIFFICULT
7. FEELING AFRAID AS IF SOMETHING AWFUL MIGHT HAPPEN: MORE THAN HALF THE DAYS
2. NOT BEING ABLE TO STOP OR CONTROL WORRYING: SEVERAL DAYS
GAD7 TOTAL SCORE: 11
5. BEING SO RESTLESS THAT IT IS HARD TO SIT STILL: SEVERAL DAYS

## 2019-04-02 ASSESSMENT — PATIENT HEALTH QUESTIONNAIRE - PHQ9
SUM OF ALL RESPONSES TO PHQ QUESTIONS 1-9: 13
5. POOR APPETITE OR OVEREATING: SEVERAL DAYS

## 2019-04-02 ASSESSMENT — MIFFLIN-ST. JEOR: SCORE: 2258.69

## 2019-04-02 ASSESSMENT — PAIN SCALES - GENERAL: PAINLEVEL: NO PAIN (0)

## 2019-04-02 NOTE — LETTER
My Depression Action Plan  Name: Petey Archibald   Date of Birth 1960  Date: 4/2/2019    My doctor: Raúl Riddle   My clinic: [address] [id number] [phone]          GREEN    ZONE   Good Control    What it looks like:     Things are going generally well. You have normal up s and down s. You may even feel depressed from time to time, but bad moods usually last less than a day.   What you need to do:  1. Continue to care for yourself (see self care plan)  2. Check your depression survival kit and update it as needed  3. Follow your physician s recommendations including any medication.  4. Do not stop taking medication unless you consult with your physician first.           YELLOW         ZONE Getting Worse    What it looks like:     Depression is starting to interfere with your life.     It may be hard to get out of bed; you may be starting to isolate yourself from others.    Symptoms of depression are starting to last most all day and this has happened for several days.     You may have suicidal thoughts but they are not constant.   What you need to do:     1. Call your care team, your response to treatment will improve if you keep your care team informed of your progress. Yellow periods are signs an adjustment may need to be made.     2. Continue your self-care, even if you have to fake it!    3. Talk to someone in your support network    4. Open up your depression survival kit           RED    ZONE Medical Alert - Get Help    What it looks like:     Depression is seriously interfering with your life.     You may experience these or other symptoms: You can t get out of bed most days, can t work or engage in other necessary activities, you have trouble taking care of basic hygiene, or basic responsibilities, thoughts of suicide or death that will not go away, self-injurious behavior.     What you need to  do:  1. Call your care team and request a same-day appointment. If they are not available (weekends or after hours) call your local crisis line, emergency room or 911.            Depression Self Care Plan / Survival Kit    Self-Care for Depression  Here s the deal. Your body and mind are really not as separate as most people think.  What you do and think affects how you feel and how you feel influences what you do and think. This means if you do things that people who feel good do, it will help you feel better.  Sometimes this is all it takes.  There is also a place for medication and therapy depending on how severe your depression is, so be sure to consult with your medical provider and/ or Behavioral Health Consultant if your symptoms are worsening or not improving.     In order to better manage my stress, I will:    Exercise  Get some form of exercise, every day. This will help reduce pain and release endorphins, the  feel good  chemicals in your brain. This is almost as good as taking antidepressants!  This is not the same as joining a gym and then never going! (they count on that by the way ) It can be as simple as just going for a walk or doing some gardening, anything that will get you moving.      Hygiene   Maintain good hygiene (Get out of bed in the morning, Make your bed, Brush your teeth, Take a shower, and Get dressed like you were going to work, even if you are unemployed).  If your clothes don't fit try to get ones that do.    Diet  I will strive to eat foods that are good for me, drink plenty of water, and avoid excessive sugar, caffeine, alcohol, and other mood-altering substances.  Some foods that are helpful in depression are: complex carbohydrates, B vitamins, flaxseed, fish or fish oil, fresh fruits and vegetables.    Psychotherapy  I agree to participate in Individual Therapy (if recommended).    Medication  If prescribed medications, I agree to take them.  Missing doses can result in serious  side effects.  I understand that drinking alcohol, or other illicit drug use, may cause potential side effects.  I will not stop my medication abruptly without first discussing it with my provider.    Staying Connected With Others  I will stay in touch with my friends, family members, and my primary care provider/team.    Use your imagination  Be creative.  We all have a creative side; it doesn t matter if it s oil painting, sand castles, or mud pies! This will also kick up the endorphins.    Witness Beauty  (AKA stop and smell the roses) Take a look outside, even in mid-winter. Notice colors, textures. Watch the squirrels and birds.     Service to others  Be of service to others.  There is always someone else in need.  By helping others we can  get out of ourselves  and remember the really important things.  This also provides opportunities for practicing all the other parts of the program.    Humor  Laugh and be silly!  Adjust your TV habits for less news and crime-drama and more comedy.    Control your stress  Try breathing deep, massage therapy, biofeedback, and meditation. Find time to relax each day.     My support system    Clinic Contact:  Phone number:    Contact 1:  Phone number:    Contact 2:  Phone number:    [facility]/:  Phone number:    Therapist:  Phone number:    Local crisis center:    Phone number:    Other community support:  Phone number:

## 2019-04-02 NOTE — PATIENT INSTRUCTIONS
1. Please stop fish oil,  aspirin, any NSAIDs ( incuding aleve advil, ibuprofen, etc--use tylenol= acetaminophen instead)  vitamin D, and multivitamins for 7 days prior to and 7 days after surgery     2.  Weight Loss Tips  1. Do not eat after 6 hrs before your expected bedtime  2. Have your heaviest meal for breakfast, a slightly lighter meal at lunch and a snack 6 hrs before bed  3. No sugar/calorie drinks except milk ie no fruit juice, pop, alcohol.  4. Drink milk 30min before meals to decrease your hunger. Also it is excellent as part of your last meal of the day snack  5. Drink lots of water  6. Increase fiber in diet: all bran cereal, salads, popcorn etc  7. Have only one small serving of fruit a day about 1/2 cup (as this is high in sugar)  8. EXERCISE is the bottom line. Without it, you will gain weight even on a low calorie diet. Best if done 2-3X a day as can    Being overweight contributes to high blood pressure and high cholesterol, both of which cause heart attacks, strokes and kidney failure, prediabetes and diabetes, arthritis, and liver disease     Before Your Surgery      Call your surgeon if there is any change in your health. This includes signs of a cold or flu (such as a sore throat, runny nose, cough, rash or fever).    Do not smoke, drink alcohol or take over the counter medicine (unless your surgeon or primary care doctor tells you to) for the 24 hours before and after surgery.    If you take prescribed drugs: Follow your doctor s orders about which medicines to take and which to stop until after surgery.    Eating and drinking prior to surgery: follow the instructions from your surgeon    Take a shower or bath the night before surgery. Use the soap your surgeon gave you to gently clean your skin. If you do not have soap from your surgeon, use your regular soap. Do not shave or scrub the surgery site.  Wear clean pajamas and have clean sheets on your bed.     1. PT NEEDS TO KEEP INR < 2.0 FOR  THE COLONOSCOPY ON 4-15-19 ##########  Should hold the warfarin for 2 days prior and after
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
## 2019-04-02 NOTE — LETTER
My COPD Action Plan     Name: Petey Archibald    YOB: 1960   Date: 4/2/2019    My doctor: Hortensia Peck MD   My clinic: [address] [id number] [phone]  My Controller Medicine: Formoterol/mometasone (Dulera)   Dose: bid     My Rescue Medicine: Albuterol (Proair/Ventolin/Proventil) inhaler   Dose: prn     My Flare Up Medicine: 0   Dose: 0     My COPD Severity: Moderate = FeV1 < 79% -50%      Use of Oxygen: Oxygen Not Prescribed      Make sure you've had your pneumonia   vaccines.          GREEN ZONE       Doing well today      Usual level of activity and exercise    Usual amount of cough and mucus    No shortness of breath    Usual level of health (thinking clearly, sleeping well, feel like eating) Actions:      Take daily medicines    Use oxygen as prescribed    Follow regular exercise and diet plan    Avoid cigarette smoke and other irritants that harm the lungs           YELLOW ZONE          Having a bad day or flare up      Short of breath more than usual    A lot more sputum (mucus) than usual    Sputum looks yellow, green, tan, brown or bloody    More coughing or wheezing    Fever or chills    Less energy; trouble completing activities    Trouble thinking or focusing    Using quick relief inhaler or nebulizer more often    Poor sleep; symptoms wake me up    Do not feel like eating Actions:      Get plenty of rest    Take daily medicines    Use quick relief inhaler every -- hours    If you use oxygen, call you doctor to see if you should adjust your oxygen    Do breathing exercises or other things to help you relax    Let a loved one, friend or neighbor know you are feeling worse    Call your care team if you have 2 or more symptoms.  Start taking steroids or antibiotics if directed by your care team           RED ZONE       Need medical care now      Severe shortness of breath (feel you can't  breathe)    Fever, chills    Not enough breath to do any activity    Trouble coughing up mucus, walking or talking    Blood in mucus    Frequent coughing   Rescue medicines are not working    Not able to sleep because of breathing    Feel confused or drowsy    Chest pain    Actions:      Call your health care team.  If you cannot reach your care team, call 911 or go to the emergency room.        Annual Reminders:  Meet with Care Team, Flu Shot every Fall  Pharmacy: [facility] - [place], [place] - [address]

## 2019-04-02 NOTE — LETTER
April 5, 2019      Petey Archibald  [address] [place] [address]        Dear ,    We are writing to inform you of your test results.    They are all normal     THE FOLLOWING ARE EXPLANATIONS OF SOME OF OUR LAB TESTS     YOU DID NOT NECESSARILY HAVE ALL OF THESE DONE     Hgb is the blood iron level   WBC means White Blood Cells   Platelets are small blood    cells that help with forming the blood clots along with other blood factors.   Electrolytes are Sodium, Potassium, Calcium, Magnesium, Phosphorus.   Liver tests are: AST, ALT, Bilirubin, Alkaline Phosphatase.   Kidney tests are Creatinine, GFR.   HDL Choles   terol - is the good cholesterol and it is good to have it high.   LDL cholesterol is the bad cholesterol and it is good to have it low.   It is recommended to have LDL less than 130 for people with hypertension and to have it less than 100 for people with   heart disease, diabetes and chronic kidney disease.   Triglycerides are another type of lipid that can cause heart disease, like the cholesterol and should be kept low   Thyroid tests are TSH, T4, T3   Glucose is sugar.   A1c is a test that gives us an idea    about how well was controlled the diabetes for the last 3 months.   PSA stands for Prostate Specific Antigen and it can be elevated with prostate cancer or prostate inflammation.     Please continue on the same medications    Resulted Orders   CBC with platelets differential   Result Value Ref Range    WBC 4.4 4.0 - 11.0 10e9/L    RBC Count 4.26 (L) 4.4 - 5.9 10e12/L    Hemoglobin 12.9 (L) 13.3 - 17.7 g/dL    Hematocrit 39.6 (L) 40.0 - 53.0 %    MCV 93 78 - 100 fl    MCH 30.3 26.5 - 33.0 pg    MCHC 32.6 31.5 - 36.5 g/dL    RDW 15.1 (H) 10.0 - 15.0 %    Platelet Count 227 150 - 450 10e9/L    Diff Method Automated Method     % Neutrophils 52.5 %    % Lymphocytes 30.6 %    % Monocytes 13.3 %    % Eosinophils 3.4 %    % Basophils 0.2 %    Absolute Neutrophil 2.3 1.6 - 8.3  10e9/L    Absolute Lymphocytes 1.4 0.8 - 5.3 10e9/L    Absolute Monocytes 0.6 0.0 - 1.3 10e9/L    Absolute Eosinophils 0.2 0.0 - 0.7 10e9/L    Absolute Basophils 0.0 0.0 - 0.2 10e9/L   Glucose   Result Value Ref Range    Glucose 90 70 - 99 mg/dL       If you have any questions or concerns, please call the clinic at the number listed above.       Sincerely,        Hortensia Peck MD
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
## 2019-04-02 NOTE — TELEPHONE ENCOUNTER
You will see pt next wk for INR   Needs to hold coumadin for 2-15-19 procedure     Was to be a colonoscopy but is per Dr Ohara, a urologist and [place] [facility] at [facility] where they usually do not go as have their own colonoscopy centers

## 2019-04-03 ENCOUNTER — ANTICOAGULATION THERAPY VISIT (OUTPATIENT)
Dept: NURSING | Facility: CLINIC | Age: 59
End: 2019-04-03
Payer: MEDICARE

## 2019-04-03 DIAGNOSIS — Z79.01 LONG TERM CURRENT USE OF ANTICOAGULANT THERAPY: ICD-10-CM

## 2019-04-03 DIAGNOSIS — Z86.711 HISTORY OF PULMONARY EMBOLISM: ICD-10-CM

## 2019-04-03 LAB
GLUCOSE SERPL-MCNC: 90 MG/DL (ref 70–99)
INR POINT OF CARE: 2.3 (ref 0.86–1.14)

## 2019-04-03 PROCEDURE — 36416 COLLJ CAPILLARY BLOOD SPEC: CPT

## 2019-04-03 PROCEDURE — 99207 ZZC NO CHARGE NURSE ONLY: CPT

## 2019-04-03 PROCEDURE — 85610 PROTHROMBIN TIME: CPT | Mod: QW

## 2019-04-03 RX ORDER — WARFARIN SODIUM 4 MG/1
TABLET ORAL
Qty: 75 TABLET | Refills: 1 | Status: SHIPPED | OUTPATIENT
Start: 2019-04-03 | End: 2019-05-08

## 2019-04-03 ASSESSMENT — ANXIETY QUESTIONNAIRES: GAD7 TOTAL SCORE: 11

## 2019-04-03 NOTE — PROGRESS NOTES
ANTICOAGULATION FOLLOW-UP CLINIC VISIT    Patient Name:  Petey Archibald  Date:  4/3/2019  Contact Type:  Face to Face    SUBJECTIVE:     Patient Findings            OBJECTIVE    INR Protime   Date Value Ref Range Status   2019 2.3 (A) 0.86 - 1.14 Final       ASSESSMENT / PLAN  INR assessment THER    Recheck INR In: 4 WEEKS    INR Location Clinic      Anticoagulation Summary  As of 4/3/2019    INR goal:   2.0-3.0   TTR:   73.5 % (3 y)   INR used for dosin.3 (4/3/2019)   Warfarin maintenance plan:   10 mg (4 mg x 2.5) every Mon; 8 mg (4 mg x 2) all other days   Full warfarin instructions:   10 mg every Mon; 8 mg all other days   Weekly warfarin total:   58 mg   No change documented:   Owen Beckett RN   Plan last modified:   Prudence Machuca RN (2018)   Next INR check:   2019   Target end date:   Indefinite    Indications    History of pulmonary embolism [Z86.711]  Long term current use of anticoagulant therapy [Z79.01]             Anticoagulation Episode Summary     INR check location:   [facility]    Preferred lab:       Send INR reminders to:   [facility] INR/PROTIME    Comments:         Anticoagulation Care Providers     Provider Role Specialty Phone number    Silvino Baker MD [facility] [phone]            See the Encounter Report to view Anticoagulation Flowsheet and Dosing Calendar (Go to Encounters tab in chart review, and find the Anticoagulation Therapy Visit)    Dosage adjustment made based on physician directed care plan.    The patient was assessed for diet, medication, and activity level changes, missed or extra doses, bruising or bleeding, with no problem findings.    Coumadin Rx sent to [facility].     Owen Beckett RN

## 2019-04-10 ENCOUNTER — TELEPHONE (OUTPATIENT)
Dept: FAMILY MEDICINE | Facility: CLINIC | Age: 59
End: 2019-04-10

## 2019-04-10 NOTE — TELEPHONE ENCOUNTER
Our goal is to have forms completed within 72 hours, however some forms may require a visit or additional information.    What clinic location was the form placed at [facility] or [place].?     Who is the form from?   Where did the form come from? Faxed to clinic   The form was placed in the inbox of Raúl Riddle MD      Please fax to [phone]  Phone number: [phone]    Additional comments: ESTEFANI herreraell med review     Call take on 4/10/2019 at 10:21 AM by Catherine Lombardo

## 2019-04-15 ENCOUNTER — ANESTHESIA EVENT (OUTPATIENT)
Dept: GASTROENTEROLOGY | Facility: CLINIC | Age: 59
End: 2019-04-15
Payer: MEDICARE

## 2019-04-15 ENCOUNTER — HOSPITAL ENCOUNTER (OUTPATIENT)
Facility: CLINIC | Age: 59
Discharge: HOME | End: 2019-04-15
Attending: INTERNAL MEDICINE | Admitting: INTERNAL MEDICINE
Payer: MEDICARE

## 2019-04-15 ENCOUNTER — ANESTHESIA (OUTPATIENT)
Dept: GASTROENTEROLOGY | Facility: CLINIC | Age: 59
End: 2019-04-15
Payer: MEDICARE

## 2019-04-15 VITALS
RESPIRATION RATE: 14 BRPM | DIASTOLIC BLOOD PRESSURE: 78 MMHG | SYSTOLIC BLOOD PRESSURE: 118 MMHG | OXYGEN SATURATION: 94 % | HEART RATE: 72 BPM

## 2019-04-15 LAB — COLONOSCOPY: NORMAL

## 2019-04-15 PROCEDURE — 88305 TISSUE EXAM BY PATHOLOGIST: CPT | Performed by: INTERNAL MEDICINE

## 2019-04-15 PROCEDURE — 25000128 H RX IP 250 OP 636: Performed by: NURSE ANESTHETIST, CERTIFIED REGISTERED

## 2019-04-15 PROCEDURE — 45380 COLONOSCOPY AND BIOPSY: CPT | Performed by: INTERNAL MEDICINE

## 2019-04-15 PROCEDURE — 45385 COLONOSCOPY W/LESION REMOVAL: CPT | Performed by: INTERNAL MEDICINE

## 2019-04-15 PROCEDURE — 25000125 ZZHC RX 250: Performed by: ANESTHESIOLOGY

## 2019-04-15 PROCEDURE — 88305 TISSUE EXAM BY PATHOLOGIST: CPT | Mod: 26,76 | Performed by: INTERNAL MEDICINE

## 2019-04-15 PROCEDURE — 40000010 ZZH STATISTIC ANES STAT CODE-CRNA PER MINUTE: Performed by: INTERNAL MEDICINE

## 2019-04-15 PROCEDURE — 25000125 ZZHC RX 250: Performed by: NURSE ANESTHETIST, CERTIFIED REGISTERED

## 2019-04-15 PROCEDURE — 37000008 ZZH ANESTHESIA TECHNICAL FEE, 1ST 30 MIN: Performed by: INTERNAL MEDICINE

## 2019-04-15 PROCEDURE — 25800030 ZZH RX IP 258 OP 636: Performed by: NURSE ANESTHETIST, CERTIFIED REGISTERED

## 2019-04-15 RX ORDER — PROPOFOL 10 MG/ML
INJECTION, EMULSION INTRAVENOUS PRN
Status: DISCONTINUED | OUTPATIENT
Start: 2019-04-15 | End: 2019-04-15

## 2019-04-15 RX ORDER — FLUMAZENIL 0.1 MG/ML
0.2 INJECTION, SOLUTION INTRAVENOUS
Status: DISCONTINUED | OUTPATIENT
Start: 2019-04-15 | End: 2019-04-15 | Stop reason: HOSPADM

## 2019-04-15 RX ORDER — IPRATROPIUM BROMIDE AND ALBUTEROL SULFATE 2.5; .5 MG/3ML; MG/3ML
3 SOLUTION RESPIRATORY (INHALATION) ONCE
Status: COMPLETED | OUTPATIENT
Start: 2019-04-15 | End: 2019-04-15

## 2019-04-15 RX ORDER — PROPOFOL 10 MG/ML
INJECTION, EMULSION INTRAVENOUS CONTINUOUS PRN
Status: DISCONTINUED | OUTPATIENT
Start: 2019-04-15 | End: 2019-04-15

## 2019-04-15 RX ORDER — ONDANSETRON 2 MG/ML
4 INJECTION INTRAMUSCULAR; INTRAVENOUS EVERY 6 HOURS PRN
Status: DISCONTINUED | OUTPATIENT
Start: 2019-04-15 | End: 2019-04-15 | Stop reason: HOSPADM

## 2019-04-15 RX ORDER — ONDANSETRON 2 MG/ML
INJECTION INTRAMUSCULAR; INTRAVENOUS PRN
Status: DISCONTINUED | OUTPATIENT
Start: 2019-04-15 | End: 2019-04-15

## 2019-04-15 RX ORDER — LIDOCAINE HYDROCHLORIDE 20 MG/ML
INJECTION, SOLUTION INFILTRATION; PERINEURAL PRN
Status: DISCONTINUED | OUTPATIENT
Start: 2019-04-15 | End: 2019-04-15

## 2019-04-15 RX ORDER — SODIUM CHLORIDE, SODIUM LACTATE, POTASSIUM CHLORIDE, CALCIUM CHLORIDE 600; 310; 30; 20 MG/100ML; MG/100ML; MG/100ML; MG/100ML
INJECTION, SOLUTION INTRAVENOUS CONTINUOUS PRN
Status: DISCONTINUED | OUTPATIENT
Start: 2019-04-15 | End: 2019-04-15

## 2019-04-15 RX ORDER — NALOXONE HYDROCHLORIDE 0.4 MG/ML
.1-.4 INJECTION, SOLUTION INTRAMUSCULAR; INTRAVENOUS; SUBCUTANEOUS
Status: DISCONTINUED | OUTPATIENT
Start: 2019-04-15 | End: 2019-04-15 | Stop reason: HOSPADM

## 2019-04-15 RX ORDER — ONDANSETRON 4 MG/1
4 TABLET, ORALLY DISINTEGRATING ORAL EVERY 6 HOURS PRN
Status: DISCONTINUED | OUTPATIENT
Start: 2019-04-15 | End: 2019-04-15 | Stop reason: HOSPADM

## 2019-04-15 RX ADMIN — SODIUM CHLORIDE, POTASSIUM CHLORIDE, SODIUM LACTATE AND CALCIUM CHLORIDE: 600; 310; 30; 20 INJECTION, SOLUTION INTRAVENOUS at 08:48

## 2019-04-15 RX ADMIN — PROPOFOL 20 MG: 10 INJECTION, EMULSION INTRAVENOUS at 08:51

## 2019-04-15 RX ADMIN — PROPOFOL 15 MG: 10 INJECTION, EMULSION INTRAVENOUS at 08:59

## 2019-04-15 RX ADMIN — IPRATROPIUM BROMIDE AND ALBUTEROL SULFATE 3 ML: .5; 3 SOLUTION RESPIRATORY (INHALATION) at 08:34

## 2019-04-15 RX ADMIN — ONDANSETRON 4 MG: 2 INJECTION INTRAMUSCULAR; INTRAVENOUS at 08:57

## 2019-04-15 RX ADMIN — PROPOFOL 100 MCG/KG/MIN: 10 INJECTION, EMULSION INTRAVENOUS at 08:52

## 2019-04-15 RX ADMIN — DEXMEDETOMIDINE HYDROCHLORIDE 8 MCG: 100 INJECTION, SOLUTION INTRAVENOUS at 08:52

## 2019-04-15 RX ADMIN — DEXMEDETOMIDINE HYDROCHLORIDE 12 MCG: 100 INJECTION, SOLUTION INTRAVENOUS at 08:48

## 2019-04-15 RX ADMIN — LIDOCAINE HYDROCHLORIDE 60 MG: 20 INJECTION, SOLUTION INFILTRATION; PERINEURAL at 08:53

## 2019-04-15 ASSESSMENT — COPD QUESTIONNAIRES
CAT_SEVERITY: MODERATE
COPD: 1

## 2019-04-15 ASSESSMENT — LIFESTYLE VARIABLES: TOBACCO_USE: 1

## 2019-04-15 NOTE — ANESTHESIA PREPROCEDURE EVALUATION
Anesthesia Pre-Procedure Evaluation    Patient: Petey Archibald   MRN: [id number] : 1960          Preoperative Diagnosis: GI BLEED    Procedure(s):  COLONOSCOPY    Past Medical History:   Diagnosis Date     Borderline mental retardation 2013     COPD (chronic obstructive pulmonary disease) (H)      History of DVT of lower extremity      History of pulmonary embolism      Hyperlipidemia      Mild intellectual disabilities      Morbid obesity (H)      Peripheral edema      Peripheral vascular disease (H)      Tobacco dependence      Venous stasis dermatitis      Past Surgical History:   Procedure Laterality Date     EXCISE MALIGNANT LESIONS, TRUNK/ARMS/LEGS 0.5CM OR LESS Left 2017    Procedure: EXCISE MALIGNANT LESIONS, TRUNK/ARMS/LEGS 0.5CM OR LESS;  EXCISION LEFT ELBOW MASS;  Surgeon: Jose Azevedo MD;  Location: [facility]     RECTAL SURGERY      perianal abscess?       Anesthesia Evaluation     . Pt has had prior anesthetic. Type: General    No history of anesthetic complications          ROS/MED HX    ENT/Pulmonary:     (+)sleep apnea, tobacco use, Current use moderate COPD, uses CPAP , . Other pulmonary disease Recent Rhonci noted, did not say URI.   (-) asthma and recent URI   Neurologic:     (+)Developmental delay  level of function: borderline MR listed     Cardiovascular:     (+) Dyslipidemia, ----. Taking blood thinners Pt has received instructions: Instructions Given to patient: stopped coumadin 5 days ago. . . :. .      (-) hypertension and CAD   METS/Exercise Tolerance:  4 - Raking leaves, gardening   Hematologic:     (+) History of blood clots pt is anticoagulated, -      Musculoskeletal:         GI/Hepatic:     (+) GERD      (-) liver disease   Renal/Genitourinary:      (-) renal disease   Endo:     (+) Obesity, .   (-) Type I DM and Type II DM   Psychiatric:     (+) psychiatric history depression      Infectious Disease:         Malignancy:         Other:                       "    Physical Exam      Airway   Mallampati: II  TM distance: >3 FB  Neck ROM: full    Dental   (+) chipped and missing  Comment: Many rotting and missing teeth. Nothing loose per patient.    Cardiovascular   Rhythm and rate: regular and normal  (-) no murmur    Pulmonary (+) wheezes       Other findings: Wheezing bilaterally        Lab Results   Component Value Date    WBC 4.4 04/02/2019    HGB 12.9 (L) 04/02/2019    HCT 39.6 (L) 04/02/2019     04/02/2019     02/08/2019    POTASSIUM 4.0 02/08/2019    CHLORIDE 105 02/08/2019    CO2 28 02/08/2019    BUN 17 02/08/2019    CR 1.03 02/08/2019    GLC 90 04/02/2019    JESSICA 8.9 02/08/2019    PHOS 3.5 03/12/2013    ALBUMIN 3.4 02/08/2019    PROTTOTAL 7.8 02/08/2019    ALT 29 02/08/2019    AST 24 02/08/2019    ALKPHOS 83 02/08/2019    BILITOTAL 0.4 02/08/2019    PTT 39 (H) 05/03/2017    INR 2.3 (A) 04/03/2019    FIBR 0.0 02/11/2011       Preop Vitals  BP Readings from Last 3 Encounters:   04/02/19 132/76   03/29/19 128/60   03/21/19 120/69    Pulse Readings from Last 3 Encounters:   04/02/19 70   03/29/19 85   03/21/19 81      Resp Readings from Last 3 Encounters:   04/02/19 14   03/29/19 20   03/21/19 18    SpO2 Readings from Last 3 Encounters:   04/02/19 98%   03/29/19 96%   03/21/19 94%      Temp Readings from Last 1 Encounters:   03/29/19 35.9  C (96.7  F) (Tympanic)    Ht Readings from Last 1 Encounters:   04/02/19 1.753 m (5' 9\")      Wt Readings from Last 1 Encounters:   04/02/19 145.8 kg (321 lb 8 oz)    Estimated body mass index is 47.48 kg/m  as calculated from the following:    Height as of 4/2/19: 1.753 m (5' 9\").    Weight as of 4/2/19: 145.8 kg (321 lb 8 oz).       Anesthesia Plan      History & Physical Review  History and physical reviewed and following examination; no interval change.    ASA Status:  3 .    NPO Status:  > 8 hours    Plan for MAC Reason for MAC:  Deep or markedly invasive procedure (G8) and Chronic cardiopulmonary disease (G9)  PONV " prophylaxis:  Ondansetron (or other 5HT-3)  Preop duoneb ordered and to be used prior to colonoscopy.       Postoperative Care      Consents  Anesthetic plan, risks, benefits and alternatives discussed with:  Patient..                 Thomas Ruiz MD

## 2019-04-15 NOTE — PROCEDURES
PRE-PROCEDURE H&P    CHIEF COMPLAINT / REASON FOR PROCEDURE:  Surveillance colonoscopy, history of polyps in the past, suboptimal prep    PERTINENT HISTORY :    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     COPD (chronic obstructive pulmonary disease) (H)      History of DVT of lower extremity      History of pulmonary embolism      Hyperlipidemia      Mild intellectual disabilities      Morbid obesity (H)      Peripheral edema      Peripheral vascular disease (H)      Tobacco dependence      Venous stasis dermatitis       Past Surgical History:   Procedure Laterality Date     EXCISE MALIGNANT LESIONS, TRUNK/ARMS/LEGS 0.5CM OR LESS Left 5/26/2017    Procedure: EXCISE MALIGNANT LESIONS, TRUNK/ARMS/LEGS 0.5CM OR LESS;  EXCISION LEFT ELBOW MASS;  Surgeon: Jose Azevedo MD;  Location: [facility]     RECTAL SURGERY      perianal abscess?         Bleeding tendencies:  No    Relevant Family History:  NONE     Relevant Social History:  NONE      A relevant review of systems was performed and was negative    Current symptoms include: none    ALLERGIES/SENSITIVITIES:   Allergies   Allergen Reactions     Bees      Augmentin Rash     Penicillins Rash       CURRENT MEDICATIONS:   Prior to Admission Medications   Prescriptions Last Dose Informant Patient Reported? Taking?   ANUSOL-HC 25 MG RE SUPP   No No   Sig: INSERT ONE SUPPOSITORY RECTALLY UP TO TWICE A DAY AS NEEDED   ARIPiprazole (ABILIFY) 10 MG tablet   Yes No   Sig: Take 0.5 tablets (5 mg) by mouth daily   BUPROPION HCL PO   Yes No   Sig: Take 150 mg by mouth every morning   Cholecalciferol (VITAMIN D-3) 1000 UNITS CAPS   Yes No   Sig: Take 2 capsules by mouth daily   DEXILANT 60 MG CPDR CR capsule   No No   Sig: TAKE 1 CAPSULE BY MOUTH ONCE DAILY (FOR HEARTBURN)   EPINEPHrine (EPIPEN 2-BRE) 0.3 MG/0.3ML injection 2-pack   No No   Sig: INJECT 0.3MG INTRAMUSCULAR ONE TIME FOR ONE DOSE AS NEEDED FOR ANAPHYLAXIS (CALL 911 IF YOU  HAVE TO GIVE) (FOR BEE STINGS) **LABEL EACH PEN*   EPINEPHrine (EPIPEN) 0.3 MG/0.3ML injection   Yes No   Sig: Inject 0.3 mg into the muscle once as needed.   Ferrous Gluconate 324 (37.5 FE) MG TABS   Yes No   Sig: Take 1 tablet by mouth 2 times daily.   MULTI-VITAMIN OR TABS   Yes No   Si TABLET DAILY   Miconazole Nitrate (ZEASORB-AF EX)   Yes No   Sig: Externally apply topically once a week   Mometasone Furoate 200 MCG/ACT AERO   No No   Sig: Inhale 2 puffs into the lungs 2 times daily   PULMICORT FLEXHALER 180 MCG/ACT inhaler   No No   Sig: INHALE 2 PUFFS INTO THE LUNGS TWICE DAILY   SPIRIVA HANDIHALER 18 MCG inhaled capsule   No No   Sig: INHALE CONTENTS OF 1 CAPSULE INTO THE LUNGS ONCE DAILY. FOR BRONCHITIS AND EMPHYSEMA.   Sodium Phosphates (PHOSPHATE ENEMA RE)   Yes No   Sig: Place 1 enema rectally as needed.   acetaminophen (TYLENOL) 325 MG tablet   No No   Sig: Take 1-2 tablets (325-650 mg) by mouth every 6 hours as needed   albuterol (ALBUTEROL) 108 (90 BASE) MCG/ACT inhaler   No No   Sig: Inhale 2 puffs into the lungs every 6 hours as needed   clotrimazole-betamethasone (LOTRISONE) cream   No No   Sig: Apply topically 2 times daily   ferrous gluconate (FERGON) 324 (38 Fe) MG tablet   No No   Sig: TAKE 1 TABLET BY MOUTH TWICE DAILY (IRON SUPPLEMENT)   furosemide (LASIX) 40 MG tablet   No No   Sig: TAKE 1 TABLET BY MOUTH TWICE DAILY (7AM & 3PM) (DIURETIC)   guaiFENesin (MUCINEX) 600 MG 12 hr tablet   No No   Sig: Take 2 tablets (1,200 mg) by mouth 2 times daily   hydrocortisone (ANUSOL-HC) 2.5 % rectal cream   No No   Sig: Place rectally 2 times daily   loratadine (CLARITIN) 10 MG tablet   Yes No   Sig: Take 10 mg by mouth daily.   mometasone-formoterol (DULERA) 200-5 MCG/ACT oral inhaler   No No   Sig: Inhale 2 puffs into the lungs 2 times daily   montelukast (SINGULAIR) 10 MG tablet   No No   Sig: TAKE 1 TABLET BY MOUTH EVERY MORNING. (ASTHMA)   mupirocin (BACTROBAN) 2 % ointment   Yes No   Sig: Apply  topically 2 times daily   order for DME   No No   Sig: Equipment being ordered: support compression hose BK  2 pair black 30mm HG   To be applied on arising & removed while lying down to go to sleep   order for DME   No No   Sig: Equipment being ordered: CPAP with mask and tubing   order for DME   No No   Sig: Oxygen 2 Li/min  at night and bled into BiPAP   potassium chloride SA (K-DUR/KLOR-CON M) 20 MEQ CR tablet   No No   Sig: TAKE 1 TABLET BY MOUTH TWICE DAILY. (LOW POTASSIUM)   sertraline (ZOLOFT) 100 MG tablet   Yes No   Sig: Take 1.5 tablets by mouth daily.   simvastatin (ZOCOR) 40 MG tablet   No No   Sig: TAKE 1 TABLET BY MOUTH EVERY MORNING.  (CHOLESTEROL)   spironolactone (ALDACTONE) 25 MG tablet   No No   Sig: TAKE 1 TABLET BY MOUTH ONCE DAILY. (HIGH BLOOD PRESSURE)   triamcinolone (KENALOG) 0.1 % cream   No No   Sig: Apply topically 2 times daily as needed   trimethoprim-polymyxin b (POLYTRIM) ophthalmic solution   No No   Sig: Apply 1 drop to eye every 3 hours for 7 days   warfarin (COUMADIN) 4 MG tablet 4/10/2019  No No   Sig: 10 mg Monday and 8 mg all other days.   zolpidem (AMBIEN) 10 MG tablet   Yes No   Sig: Take 1 tablet (10 mg) by mouth nightly as needed for sleep      Facility-Administered Medications: None        PRE-SEDATION ASSESSMENT:    Lung Exam:  normal  Heart Exam:  normal  Airway Exam: normal  Previous reaction to anesthesia/sedation:   No  Sedation plan based on assessment: mac  ASA Classification:  3 - Severe systemic disease, but not incapacitating    Comments: rule out polyps    IMPRESSION:  Screening     PLAN:  colonoscopy     Jovana Ohara MD  [facility]  Office: [phone]

## 2019-04-15 NOTE — ANESTHESIA CARE TRANSFER NOTE
Patient: Petey Archibald    Procedure(s):  COMBINED COLONOSCOPY, SINGLE OR MULTIPLE BIOPSY/POLYPECTOMY BY BIOPSY    Diagnosis: GI BLEED  Diagnosis Additional Information: No value filed.    Anesthesia Type:   MAC     Note:  Airway :Face Mask  Destination: endo #34.        Vitals: (Last set prior to Anesthesia Care Transfer)    CRNA VITALS  4/15/2019 0845 - 4/15/2019 0915      4/15/2019             Resp Rate (set):  10                Electronically Signed By: MARINO Briones CRNA  April 15, 2019  9:15 AM

## 2019-04-15 NOTE — ANESTHESIA POSTPROCEDURE EVALUATION
Patient: Petey Archibald    Procedure(s):  COMBINED COLONOSCOPY, SINGLE OR MULTIPLE BIOPSY/POLYPECTOMY BY BIOPSY    Diagnosis:GI BLEED  Diagnosis Additional Information: No value filed.    Anesthesia Type:  MAC    Note:  Anesthesia Post Evaluation    Patient location during evaluation: PACU  Patient participation: Able to fully participate in evaluation  Level of consciousness: awake and alert  Pain management: adequate  Airway patency: patent  Cardiovascular status: acceptable  Respiratory status: acceptable  Hydration status: acceptable  PONV: none     Anesthetic complications: None          Last vitals:  Vitals:    04/15/19 0920 04/15/19 0930 04/15/19 0940   BP: 104/60 120/80 118/78   Pulse: 69 76 72   Resp:      SpO2: 98% 94% 94%         Electronically Signed By: Thomas Ruiz MD  April 15, 2019  3:04 PM

## 2019-04-16 LAB — COPATH REPORT: NORMAL

## 2019-04-24 ENCOUNTER — ANTICOAGULATION THERAPY VISIT (OUTPATIENT)
Dept: NURSING | Facility: CLINIC | Age: 59
End: 2019-04-24
Payer: MEDICARE

## 2019-04-24 DIAGNOSIS — Z79.01 LONG TERM CURRENT USE OF ANTICOAGULANT THERAPY: ICD-10-CM

## 2019-04-24 DIAGNOSIS — Z86.711 HISTORY OF PULMONARY EMBOLISM: ICD-10-CM

## 2019-04-24 LAB — INR POINT OF CARE: 1.4 (ref 0.86–1.14)

## 2019-04-24 PROCEDURE — 99207 ZZC NO CHARGE NURSE ONLY: CPT

## 2019-04-24 PROCEDURE — 36416 COLLJ CAPILLARY BLOOD SPEC: CPT

## 2019-04-24 PROCEDURE — 85610 PROTHROMBIN TIME: CPT | Mod: QW

## 2019-04-24 NOTE — PROGRESS NOTES
ANTICOAGULATION FOLLOW-UP CLINIC VISIT    Patient Name:  Petey Archibald  Date:  2019  Contact Type:  Face to Face    SUBJECTIVE:     Patient Findings            OBJECTIVE    INR Protime   Date Value Ref Range Status   2019 1.4 (A) 0.86 - 1.14 Final       ASSESSMENT / PLAN  INR assessment SUB    Recheck INR In: 2 WEEKS    INR Location Clinic      Anticoagulation Summary  As of 2019    INR goal:   2.0-3.0   TTR:   72.7 % (3.1 y)   INR used for dosin.4! (2019)   Warfarin maintenance plan:   10 mg (4 mg x 2.5) every Mon; 8 mg (4 mg x 2) all other days   Full warfarin instructions:   : 12 mg; Otherwise 10 mg every Mon; 8 mg all other days   Weekly warfarin total:   58 mg   Plan last modified:   Prudence Machuca RN (2018)   Next INR check:   2019   Target end date:   Indefinite    Indications    History of pulmonary embolism [Z86.711]  Long term current use of anticoagulant therapy [Z79.01]             Anticoagulation Episode Summary     INR check location:   Anticoagulation Clinic    Preferred lab:       Send INR reminders to:   [facility] INR/PROTIME    Comments:         Anticoagulation Care Providers     Provider Role Specialty Phone number    Silvino Baker MD [facility] [phone]            See the Encounter Report to view Anticoagulation Flowsheet and Dosing Calendar (Go to Encounters tab in chart review, and find the Anticoagulation Therapy Visit)    Dosage adjustment made based on physician directed care plan.    The patient was assessed for diet, medication, and activity level changes, missed or extra doses, bruising or bleeding, with no problem findings.      Owen Beckett RN

## 2019-05-02 ENCOUNTER — HOSPITAL ENCOUNTER (OUTPATIENT)
Dept: CT IMAGING | Facility: CLINIC | Age: 59
Discharge: HOME | End: 2019-05-02
Attending: FAMILY MEDICINE | Admitting: FAMILY MEDICINE
Payer: MEDICARE

## 2019-05-02 DIAGNOSIS — F17.200 TOBACCO DEPENDENCE: ICD-10-CM

## 2019-05-02 DIAGNOSIS — R93.89 ABNORMAL CHEST X-RAY: ICD-10-CM

## 2019-05-02 DIAGNOSIS — R09.89 ABNORMAL LUNG SOUNDS: ICD-10-CM

## 2019-05-02 PROCEDURE — 71250 CT THORAX DX C-: CPT

## 2019-05-07 ENCOUNTER — OFFICE VISIT (OUTPATIENT)
Dept: FAMILY MEDICINE | Facility: CLINIC | Age: 59
End: 2019-05-07
Payer: MEDICARE

## 2019-05-07 VITALS
SYSTOLIC BLOOD PRESSURE: 102 MMHG | DIASTOLIC BLOOD PRESSURE: 64 MMHG | RESPIRATION RATE: 16 BRPM | HEART RATE: 77 BPM | TEMPERATURE: 98.4 F | OXYGEN SATURATION: 96 % | WEIGHT: 315 LBS | BODY MASS INDEX: 46.52 KG/M2

## 2019-05-07 DIAGNOSIS — E66.01 MORBID OBESITY DUE TO EXCESS CALORIES (H): ICD-10-CM

## 2019-05-07 DIAGNOSIS — R73.02 GLUCOSE INTOLERANCE (IMPAIRED GLUCOSE TOLERANCE): ICD-10-CM

## 2019-05-07 DIAGNOSIS — K61.1 PERIRECTAL ABSCESS: Primary | ICD-10-CM

## 2019-05-07 PROCEDURE — 99214 OFFICE O/P EST MOD 30 MIN: CPT | Performed by: FAMILY MEDICINE

## 2019-05-07 PROCEDURE — 46050 I&D PERIANAL ABSCESS SUPFC: CPT | Performed by: FAMILY MEDICINE

## 2019-05-07 PROCEDURE — 99207 ZZC NO CHARGE LOS: CPT | Performed by: FAMILY MEDICINE

## 2019-05-07 RX ORDER — CLINDAMYCIN HCL 300 MG
300 CAPSULE ORAL 3 TIMES DAILY
Qty: 21 CAPSULE | Refills: 0 | Status: ON HOLD | OUTPATIENT
Start: 2019-05-07 | End: 2019-09-14

## 2019-05-07 RX ORDER — IBUPROFEN 200 MG
1 CAPSULE ORAL 3 TIMES DAILY PRN
Qty: 25 EACH | Refills: 1 | Status: SHIPPED | OUTPATIENT
Start: 2019-05-07

## 2019-05-07 NOTE — PROGRESS NOTES
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  When to seek medical advice  Call your healthcare provider right away if any of these occur:    Increasing redness or swelling    Red streaks in the skin leading away from the wound    Increasing local pain or swelling    Continued pus draining from the wound 2 days after treatment    Fever of 100.4 F (38 C) or higher, or as directed by your healthcare provider    Boil returns when you are at home  Date Last Reviewed: 9/1/2016 2000-2018 [employer]. [address] [place] [address]. All rights reserved. This information is not intended as a substitute for professional medical care. Always follow your healthcare professional's instructions.           Patient Education     Perianal Abscess, Antibiotic Treatment  Glands near the anus can become blocked. This can lead to infection. If the infection can't drain, a collection of pus called an abscess may form. Symptoms of an abscess include anal or rectal pain, itching, swelling, and fever. Frequently the abscess results in a fistula, which is an abnormal connection between the abscess and the skin where pus drains. A fistula may sometimes be seen on exam and may require other testing and treatments.  Your healthcare provider will likely  drain the abscess. In some cases, he or she will also prescribe antibiotics. People with artificial valves, diabetes, weak immune systems, and certain other conditions always need antibiotics.  Home care    Abscesses are almost always drained. Follow any instructions from your provider about care of the incision site.    If you are prescribed antibiotics, take them exactly as prescribed. Take all of the antibiotic medicine as prescribed. Continue it even if you start feeling better. Finish all of the medicine unless your healthcare provider tells you to stop.    Try sitz baths. Sit in a tub filled with about 6 inches of hot water for 15 to 30 minutes. Test the water temperature before sitting down to ensure it will not burn you. Repeat this twice a day until pain is relieved.    Unless a pain medicine has been prescribed, you may take an over-the-counter medicine, such as ibuprofen, for pain.     Passing stools may be painful. If so, ask your healthcare provider about using a stool-softener for a short time. Gradually adding fiber to your diet, or taking a fiber supplement, is also helpful.  Follow-up care  Follow up with your doctor, or as advised.  When to seek medical advice  Call your healthcare provider right away if any of the following occur:    Increasing pain, swelling, or redness    Pus draining from the abscess    Fever of 100.4 F (38 C) or higher that continues for a day after starting antibiotics, or as directed by your healthcare provider    Other symptoms such as rectal bleeding, abdominal pain, or chronic diarrhea. Your provider will evaluate whether the abscess may indicate other medical conditions.  Date Last Reviewed: 3/1/2018    8299-8542 [employer]. [address] [place] [address], [place] [address]. All rights reserved. This information is not intended as a substitute for professional medical care. Always follow your healthcare professional's instructions.           Patient Education      Patient Education    Clindamycin Hydrochloride Oral capsule    Clindamycin Palmitate Hydrochloride Oral solution    Clindamycin Phosphate Medicated topical pledget    Clindamycin Phosphate Solution for injection    Clindamycin Phosphate Topical foam    Clindamycin Phosphate Topical gel    Clindamycin Phosphate Topical lotion    Clindamycin Phosphate Topical solution    Clindamycin Phosphate Vaginal cream    Clindamycin Phosphate Vaginal suppository  Clindamycin Hydrochloride Oral capsule  What is this medicine?  CLINDAMYCIN (GARY Lyon) is a lincosamide antibiotic. It is used to treat certain kinds of bacterial infections. It will not work for colds, flu, or other viral infections.  This medicine may be used for other purposes; ask your health care provider or pharmacist if you have questions.  What should I tell my health care provider before I take this medicine?  They need to know if you have any of these conditions:    kidney disease    liver disease    stomach problems like colitis    an unusual or allergic reaction to clindamycin, lincomycin, or other medicines, foods, dyes like tartrazine or preservatives    pregnant or trying to get pregnant    breast-feeding  How should I use this medicine?  Take this medicine by mouth with a full glass of water. Follow the directions on the prescription label. You can take this medicine with food or on an empty stomach. If the medicine upsets your stomach, take it with food. Take your medicine at regular intervals. Do not take your medicine more often than directed. Take all of your medicine as directed even if you think your are better. Do not skip doses or stop your medicine early.  Talk to your pediatrician regarding the use of this medicine in children. Special care may be needed.  Overdosage: If you think you have taken too much of this medicine contact a poison control center or emergency room at once.  NOTE: This medicine is only for you. Do not share this  medicine with others.  What if I miss a dose?  If you miss a dose, take it as soon as you can. If it is almost time for your next dose, take only that dose. Do not take double or extra doses.  What may interact with this medicine?    birth control pills    chloramphenicol    erythromycin    kaolin products  This list may not describe all possible interactions. Give your health care provider a list of all the medicines, herbs, non-prescription drugs, or dietary supplements you use. Also tell them if you smoke, drink alcohol, or use illegal drugs. Some items may interact with your medicine.  What should I watch for while using this medicine?  Tell your doctor or healthcare professional if your symptoms do not start to get better or if they get worse.  Do not treat diarrhea with over the counter products. Contact your doctor if you have diarrhea that lasts more than 2 days or if it is severe and watery.  What side effects may I notice from receiving this medicine?  Side effects that you should report to your doctor or health care professional as soon as possible:    allergic reactions like skin rash, itching or hives, swelling of the face, lips, or tongue    dark urine    pain on swallowing    redness, blistering, peeling or loosening of the skin, including inside the mouth    unusual bleeding or bruising    unusually weak or tired    yellowing of eyes or skin  Side effects that usually do not require medical attention (report to your doctor or health care professional if they continue or are bothersome):    diarrhea    itching in the rectal or genital area    joint pain    nausea, vomiting    stomach pain  This list may not describe all possible side effects. Call your doctor for medical advice about side effects. You may report side effects to FDA at 9-719-BLS-4566.  Where should I keep my medicine?  Keep out of the reach of children.  Store at room temperature between 20 and 25 degrees C (68 and 77 degrees F). Throw  away any unused medicine after the expiration date.  NOTE:This sheet is a summary. It may not cover all possible information. If you have questions about this medicine, talk to your doctor, pharmacist, or health care provider. Copyright  2016 [employer]                   Dear Petey,    PERIANAL ABSCESS DRAINED 5-10 ML OF PUS    WITHOUT COMPLICATION     KEEP 4 X 4 GAUZE CHANGE 2-3 TIMES PER DAY     SOAK TWICE DAILY     RETURN TO WORK ON Saturday MAY 11        Thank you for choosing [facility].  We appreciate the opportunity to serve you and look forward to supporting your healthcare needs in the future.        If you have any questions or concerns, please call me or my staff at [phone].            Sincerely,        Tobi Messina Jr MD      .(K61.1) Perirectal abscess  (primary encounter diagnosis)  Comment:    Plan: clindamycin (CLEOCIN) 300 MG capsule         THREE TIMES DAILY X 7 DAYS                    ALL THE ABOVE PROBLEMS ARE STABLE AND MED CHANGES AS NOTED        Diet:  MEDITERRANEAN DIET AND DIABETES AND WEIGHT LOSS         Exercise:  AS TOLERATED   Exercises Range of motion, balance, isometric, and strengthening exercises 30 repetitions twice daily of involved joints            .TOBI MESSINA MD 5/7/2019 12:34 PM  May 8, 2019      As there is no other assessment/plan for any of the other conditions, audits to: 01588 (office visit-no charge) and 75729 I&D Perirectal Abscess.

## 2019-05-07 NOTE — Clinical Note
As there is no other assessment/plan for any of the other conditions, audits to: 56744 (office visit-no charge) and 40642 I&D Perirectal Abscess. I DON'T KNOW HOW OR WHERE TO PUT IN THE SECOND CODE   ,THAT IS PERIRECTAL ABSCESS

## 2019-05-07 NOTE — PATIENT INSTRUCTIONS
Patient Education     Abscess (Incision & Drainage)  An abscess is sometimes called a boil. It happens when bacteria get trapped under the skin and start to grow. Pus forms inside the abscess as the body responds to the bacteria. An abscess can happen with an insect bite, ingrown hair, blocked oil gland, pimple, cyst, or puncture wound.  Your healthcare provider has drained the pus from your abscess. If the abscess pocket was large, your healthcare provider may have put in gauze packing. Your provider will need to remove it on your next visit. He or she may also replace it at that time. You may not need antibiotics to treat a simple abscess, unless the infection is spreading into the skin around the wound (cellulitis).  The wound will take about 1 to 2 weeks to heal, depending on the size of the abscess. Healthy tissue will grow from the bottom and sides of the opening until it seals over.  Home care  These tips can help your wound heal:    The wound may drain for the first 2 days. Cover the wound with a clean dry dressing. Change the dressing if it becomes soaked with blood or pus.    If a gauze packing was placed inside the abscess pocket, you may be told to remove it yourself. You may do this in the shower. Once the packing is removed, you should wash the area in the shower, or clean the area as directed by your provider. Continue to do this until the skin opening has closed. Make sure you wash your hands after changing the packing or cleaning the wound.    If you were prescribed antibiotics, take them as directed until they are all gone.    You may use acetaminophen or ibuprofen to control pain, unless another pain medicine was prescribed. If you have liver disease or ever had a stomach ulcer, talk with your doctor before using these medicines.  Follow-up care  Follow up with your healthcare provider, or as advised. If a gauze packing was put in your wound, it should be removed in 1 to 2 days. Check your wound  every day for any signs that the infection is getting worse. The signs are listed below.  When to seek medical advice  Call your healthcare provider right away if any of these occur:    Increasing redness or swelling    Red streaks in the skin leading away from the wound    Increasing local pain or swelling    Continued pus draining from the wound 2 days after treatment    Fever of 100.4 F (38 C) or higher, or as directed by your healthcare provider    Boil returns when you are at home  Date Last Reviewed: 9/1/2016 2000-2018 [employer]. [address] [place] [address]. All rights reserved. This information is not intended as a substitute for professional medical care. Always follow your healthcare professional's instructions.           Patient Education     Perianal Abscess, Antibiotic Treatment  Glands near the anus can become blocked. This can lead to infection. If the infection can't drain, a collection of pus called an abscess may form. Symptoms of an abscess include anal or rectal pain, itching, swelling, and fever. Frequently the abscess results in a fistula, which is an abnormal connection between the abscess and the skin where pus drains. A fistula may sometimes be seen on exam and may require other testing and treatments.  Your healthcare provider will likely drain the abscess. In some cases, he or she will also prescribe antibiotics. People with artificial valves, diabetes, weak immune systems, and certain other conditions always need antibiotics.  Home care    Abscesses are almost always drained. Follow any instructions from your provider about care of the incision site.    If you are prescribed antibiotics, take them exactly as prescribed. Take all of the antibiotic medicine as prescribed. Continue it even if you start feeling better. Finish all of the medicine unless your healthcare provider tells you to stop.    Try sitz baths. Sit in a tub filled with about 6 inches of hot  water for 15 to 30 minutes. Test the water temperature before sitting down to ensure it will not burn you. Repeat this twice a day until pain is relieved.    Unless a pain medicine has been prescribed, you may take an over-the-counter medicine, such as ibuprofen, for pain.     Passing stools may be painful. If so, ask your healthcare provider about using a stool-softener for a short time. Gradually adding fiber to your diet, or taking a fiber supplement, is also helpful.  Follow-up care  Follow up with your doctor, or as advised.  When to seek medical advice  Call your healthcare provider right away if any of the following occur:    Increasing pain, swelling, or redness    Pus draining from the abscess    Fever of 100.4 F (38 C) or higher that continues for a day after starting antibiotics, or as directed by your healthcare provider    Other symptoms such as rectal bleeding, abdominal pain, or chronic diarrhea. Your provider will evaluate whether the abscess may indicate other medical conditions.  Date Last Reviewed: 3/1/2018    7356-3274 [employer]. [address] [place] [address]. All rights reserved. This information is not intended as a substitute for professional medical care. Always follow your healthcare professional's instructions.           Patient Education     Patient Education    Clindamycin Hydrochloride Oral capsule    Clindamycin Palmitate Hydrochloride Oral solution    Clindamycin Phosphate Medicated topical pledget    Clindamycin Phosphate Solution for injection    Clindamycin Phosphate Topical foam    Clindamycin Phosphate Topical gel    Clindamycin Phosphate Topical lotion    Clindamycin Phosphate Topical solution    Clindamycin Phosphate Vaginal cream    Clindamycin Phosphate Vaginal suppository  Clindamycin Hydrochloride Oral capsule  What is this medicine?  CLINDAMYCIN (KLIN da MYE sin) is a lincosamide antibiotic. It is used to treat certain kinds of bacterial  infections. It will not work for colds, flu, or other viral infections.  This medicine may be used for other purposes; ask your health care provider or pharmacist if you have questions.  What should I tell my health care provider before I take this medicine?  They need to know if you have any of these conditions:    kidney disease    liver disease    stomach problems like colitis    an unusual or allergic reaction to clindamycin, lincomycin, or other medicines, foods, dyes like tartrazine or preservatives    pregnant or trying to get pregnant    breast-feeding  How should I use this medicine?  Take this medicine by mouth with a full glass of water. Follow the directions on the prescription label. You can take this medicine with food or on an empty stomach. If the medicine upsets your stomach, take it with food. Take your medicine at regular intervals. Do not take your medicine more often than directed. Take all of your medicine as directed even if you think your are better. Do not skip doses or stop your medicine early.  Talk to your pediatrician regarding the use of this medicine in children. Special care may be needed.  Overdosage: If you think you have taken too much of this medicine contact a poison control center or emergency room at once.  NOTE: This medicine is only for you. Do not share this medicine with others.  What if I miss a dose?  If you miss a dose, take it as soon as you can. If it is almost time for your next dose, take only that dose. Do not take double or extra doses.  What may interact with this medicine?    birth control pills    chloramphenicol    erythromycin    kaolin products  This list may not describe all possible interactions. Give your health care provider a list of all the medicines, herbs, non-prescription drugs, or dietary supplements you use. Also tell them if you smoke, drink alcohol, or use illegal drugs. Some items may interact with your medicine.  What should I watch for while  using this medicine?  Tell your doctor or healthcare professional if your symptoms do not start to get better or if they get worse.  Do not treat diarrhea with over the counter products. Contact your doctor if you have diarrhea that lasts more than 2 days or if it is severe and watery.  What side effects may I notice from receiving this medicine?  Side effects that you should report to your doctor or health care professional as soon as possible:    allergic reactions like skin rash, itching or hives, swelling of the face, lips, or tongue    dark urine    pain on swallowing    redness, blistering, peeling or loosening of the skin, including inside the mouth    unusual bleeding or bruising    unusually weak or tired    yellowing of eyes or skin  Side effects that usually do not require medical attention (report to your doctor or health care professional if they continue or are bothersome):    diarrhea    itching in the rectal or genital area    joint pain    nausea, vomiting    stomach pain  This list may not describe all possible side effects. Call your doctor for medical advice about side effects. You may report side effects to FDA at 5-620-JTG-3074.  Where should I keep my medicine?  Keep out of the reach of children.  Store at room temperature between 20 and 25 degrees C (68 and 77 degrees F). Throw away any unused medicine after the expiration date.  NOTE:This sheet is a summary. It may not cover all possible information. If you have questions about this medicine, talk to your doctor, pharmacist, or health care provider. Copyright  2016 [employer]                   Dear Petey,    PERIANAL ABSCESS DRAINED 5-10 ML OF PUS    WITHOUT COMPLICATION     KEEP 4 X 4 GAUZE CHANGE 2-3 TIMES PER DAY     SOAK TWICE DAILY     RETURN TO WORK ON Saturday MAY 11        Thank you for choosing [facility].  We appreciate the opportunity to serve you and look forward to supporting your healthcare needs in  the future.        If you have any questions or concerns, please call me or my staff at [phone].            Sincerely,        Wilman Peck Jr MD      .(K61.1) Perirectal abscess  (primary encounter diagnosis)  Comment:    Plan: clindamycin (CLEOCIN) 300 MG capsule         THREE TIMES DAILY X 7 DAYS

## 2019-05-07 NOTE — LETTER
[address]  [facility] [address] [phone]                                                                                                               Petey Archibald    [address]    [facility] [address]        May 7, 2019            Dear Petey,    PERIANAL ABSCESS DRAINED 5-10 ML OF PUS    WITHOUT COMPLICATION     KEEP 4 X 4 GAUZE CHANGE 2-3 TIMES PER DAY     SOAK TWICE DAILY     RETURN TO WORK ON Saturday MAY 11        Thank you for choosing [facility].  We appreciate the opportunity to serve you and look forward to supporting your healthcare needs in the future.        If you have any questions or concerns, please call me or my staff at [phone].            Sincerely,        Wilman Peck Jr MD

## 2019-05-08 ENCOUNTER — ANTICOAGULATION THERAPY VISIT (OUTPATIENT)
Dept: NURSING | Facility: CLINIC | Age: 59
End: 2019-05-08
Payer: MEDICARE

## 2019-05-08 ENCOUNTER — TELEPHONE (OUTPATIENT)
Dept: FAMILY MEDICINE | Facility: CLINIC | Age: 59
End: 2019-05-08

## 2019-05-08 DIAGNOSIS — Z79.01 LONG TERM CURRENT USE OF ANTICOAGULANT THERAPY: ICD-10-CM

## 2019-05-08 DIAGNOSIS — Z86.711 HISTORY OF PULMONARY EMBOLISM: ICD-10-CM

## 2019-05-08 LAB — INR POINT OF CARE: 3.7 (ref 0.86–1.14)

## 2019-05-08 PROCEDURE — 36416 COLLJ CAPILLARY BLOOD SPEC: CPT

## 2019-05-08 PROCEDURE — 99207 ZZC NO CHARGE NURSE ONLY: CPT

## 2019-05-08 PROCEDURE — 85610 PROTHROMBIN TIME: CPT | Mod: QW

## 2019-05-08 RX ORDER — WARFARIN SODIUM 4 MG/1
TABLET ORAL
Qty: 75 TABLET | Refills: 1 | Status: SHIPPED | OUTPATIENT
Start: 2019-05-08 | End: 2019-06-21

## 2019-05-08 NOTE — PROGRESS NOTES
ANTICOAGULATION FOLLOW-UP CLINIC VISIT    Patient Name:  Petey Archibald  Date:  5/8/2019  Contact Type:  Face to Face    SUBJECTIVE:  Patient Findings     Positives:   Other complaints (Yesterday pt had PERIANAL ABSCESS DRAINED 5-10 ML OF PUS)        Clinical Outcomes     Negatives:   Major bleeding event, Thromboembolic event, Anticoagulation-related hospital admission, Anticoagulation-related ED visit, Anticoagulation-related fatality           OBJECTIVE    INR Protime   Date Value Ref Range Status   05/08/2019 3.7 (A) 0.86 - 1.14 Final       ASSESSMENT / PLAN  INR assessment SUPRA    Recheck INR In: 2 WEEKS    INR Location Clinic      Anticoagulation Summary  As of 5/8/2019    INR goal:   2.0-3.0   TTR:   72.4 % (3.1 y)   INR used for dosing:   3.7! (5/8/2019)   Warfarin maintenance plan:   10 mg (4 mg x 2.5) every Mon; 8 mg (4 mg x 2) all other days   Full warfarin instructions:   5/8: Hold; Otherwise 10 mg every Mon; 8 mg all other days   Weekly warfarin total:   58 mg   Plan last modified:   Prudence Machuca RN (11/7/2018)   Next INR check:   5/22/2019   Target end date:   Indefinite    Indications    History of pulmonary embolism [Z86.711]  Long term current use of anticoagulant therapy [Z79.01]             Anticoagulation Episode Summary     INR check location:   Anticoagulation Clinic    Preferred lab:       Send INR reminders to:   [facility] INR/PROTIME    Comments:         Anticoagulation Care Providers     Provider Role Specialty Phone number    Silvino Baker MD [facility] [phone]            See the Encounter Report to view Anticoagulation Flowsheet and Dosing Calendar (Go to Encounters tab in chart review, and find the Anticoagulation Therapy Visit)    Dosage adjustment made based on physician directed care plan.    The patient was assessed for diet, medication, and activity level changes, missed or extra doses, bruising or bleeding, with no problem findings.    Rx sent to  [facility].     Owen Beckett RN

## 2019-05-08 NOTE — TELEPHONE ENCOUNTER
INR today is 3.7. Pt instructed to hold warfarin on 5/8/19 then resume 10 mg on mondays and 8 mg all other days, pt to recheck INR in clinic on 5/22/19.      warfarin (COUMADIN) 4 MG tablet  Last Written Prescription Date:  4/3/19  Last Fill Quantity: 75,  # refills: 1   Last office visit: 5/7/2019 with prescribing provider:  Dr. Wilman Peck    Future Office Visit:      Prescription approved per [facility] [place] [facility].

## 2019-05-23 ENCOUNTER — OFFICE VISIT (OUTPATIENT)
Dept: SLEEP MEDICINE | Facility: CLINIC | Age: 59
End: 2019-05-23
Payer: MEDICARE

## 2019-05-23 VITALS
SYSTOLIC BLOOD PRESSURE: 125 MMHG | HEIGHT: 69 IN | OXYGEN SATURATION: 94 % | RESPIRATION RATE: 16 BRPM | WEIGHT: 315 LBS | HEART RATE: 63 BPM | DIASTOLIC BLOOD PRESSURE: 73 MMHG | BODY MASS INDEX: 46.65 KG/M2

## 2019-05-23 DIAGNOSIS — G47.33 OSA (OBSTRUCTIVE SLEEP APNEA): Primary | ICD-10-CM

## 2019-05-23 DIAGNOSIS — R09.02 HYPOXIA: ICD-10-CM

## 2019-05-23 DIAGNOSIS — F51.04 CHRONIC INSOMNIA: ICD-10-CM

## 2019-05-23 PROCEDURE — 99214 OFFICE O/P EST MOD 30 MIN: CPT | Performed by: PHYSICIAN ASSISTANT

## 2019-05-23 ASSESSMENT — MIFFLIN-ST. JEOR: SCORE: 2260.96

## 2019-05-23 NOTE — PATIENT INSTRUCTIONS
As much as possible, try to only be in bed 8 hours per night with a consistent bedtime and wake time.      Your BMI is Body mass index is 47.55 kg/m .  Weight management is a personal decision.  If you are interested in exploring weight loss strategies, the following discussion covers the approaches that may be successful. Body mass index (BMI) is one way to tell whether you are at a healthy weight, overweight, or obese. It measures your weight in relation to your height.  A BMI of 18.5 to 24.9 is in the healthy range. A person with a BMI of 25 to 29.9 is considered overweight, and someone with a BMI of 30 or greater is considered obese. More than two-thirds of American adults are considered overweight or obese.  Being overweight or obese increases the risk for further weight gain. Excess weight may lead to heart disease and diabetes.  Creating and following plans for healthy eating and physical activity may help you improve your health.  Weight control is part of healthy lifestyle and includes exercise, emotional health, and healthy eating habits. Careful eating habits lifelong are the mainstay of weight control. Though there are significant health benefits from weight loss, long-term weight loss with diet alone may be very difficult to achieve- studies show long-term success with dietary management in less than 10% of people. Attaining a healthy weight may be especially difficult to achieve in those with severe obesity. In some cases, medications, devices and surgical management might be considered.  What can you do?  If you are overweight or obese and are interested in methods for weight loss, you should discuss this with your provider.     Consider reducing daily calorie intake by 500 calories.     Keep a food journal.     Avoiding skipping meals, consider cutting portions instead.    Diet combined with exercise helps maintain muscle while optimizing fat loss. Strength training is particularly important for  building and maintaining muscle mass. Exercise helps reduce stress, increase energy, and improves fitness. Increasing exercise without diet control, however, may not burn enough calories to loose weight.       Start walking three days a week 10-20 minutes at a time    Work towards walking thirty minutes five days a week     Eventually, increase the speed of your walking for 1-2 minutes at time    In addition, we recommend that you review healthy lifestyles and methods for weight loss available through the National Institutes of Health patient information sites:  http://win.niddk.nih.gov/publications/index.htm    And look into health and wellness programs that may be available through your health insurance provider, employer, local community center, or [employer].    Weight management plan: Patient was referred to their PCP to discuss a diet and exercise plan.
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
## 2019-05-23 NOTE — NURSING NOTE
"Chief Complaint   Patient presents with     CPAP Follow Up       Initial /73   Pulse 63   Resp 16   Ht 1.753 m (5' 9\")   Wt 146.1 kg (322 lb)   SpO2 94%   BMI 47.55 kg/m   Estimated body mass index is 47.55 kg/m  as calculated from the following:    Height as of this encounter: 1.753 m (5' 9\").    Weight as of this encounter: 146.1 kg (322 lb).    Medication Reconciliation: complete     ESS 3  Jesusita Briseno        "

## 2019-05-24 ENCOUNTER — ANTICOAGULATION THERAPY VISIT (OUTPATIENT)
Dept: NURSING | Facility: CLINIC | Age: 59
End: 2019-05-24
Payer: MEDICARE

## 2019-05-24 DIAGNOSIS — Z79.01 LONG TERM CURRENT USE OF ANTICOAGULANT THERAPY: ICD-10-CM

## 2019-05-24 DIAGNOSIS — Z86.711 HISTORY OF PULMONARY EMBOLISM: ICD-10-CM

## 2019-05-24 LAB — INR POINT OF CARE: 2.3 (ref 0.86–1.14)

## 2019-05-24 PROCEDURE — 85610 PROTHROMBIN TIME: CPT | Mod: QW

## 2019-05-24 PROCEDURE — 36416 COLLJ CAPILLARY BLOOD SPEC: CPT

## 2019-05-24 PROCEDURE — 99207 ZZC NO CHARGE NURSE ONLY: CPT

## 2019-05-24 NOTE — PROGRESS NOTES
ANTICOAGULATION FOLLOW-UP CLINIC VISIT    Patient Name:  Petey Archibald  Date:  2019  Contact Type:  Face to Face    SUBJECTIVE:  Patient Findings     Comments:   The patient was assessed for diet, medication, and activity level changes, missed or extra doses, bruising or bleeding, with no problem findings.          Clinical Outcomes     Comments:   The patient was assessed for diet, medication, and activity level changes, missed or extra doses, bruising or bleeding, with no problem findings.             OBJECTIVE    INR Protime   Date Value Ref Range Status   2019 2.3 (A) 0.86 - 1.14 Final       ASSESSMENT / PLAN  INR assessment THER    Recheck INR In: 4 WEEKS    INR Location Clinic      Anticoagulation Summary  As of 2019    INR goal:   2.0-3.0   TTR:   72.1 % (3.2 y)   INR used for dosin.3 (2019)   Warfarin maintenance plan:   10 mg (4 mg x 2.5) every Mon; 8 mg (4 mg x 2) all other days   Full warfarin instructions:   10 mg every Mon; 8 mg all other days   Weekly warfarin total:   58 mg   Plan last modified:   Prudence Machuca RN (2018)   Next INR check:   2019   Target end date:   Indefinite    Indications    History of pulmonary embolism [Z86.711]  Long term current use of anticoagulant therapy [Z79.01]             Anticoagulation Episode Summary     INR check location:   Anticoagulation Clinic    Preferred lab:       Send INR reminders to:   [facility] INR/PROTIME    Comments:         Anticoagulation Care Providers     Provider Role Specialty Phone number    OliviaSilvino MD [facility] [phone]            See the Encounter Report to view Anticoagulation Flowsheet and Dosing Calendar (Go to Encounters tab in chart review, and find the Anticoagulation Therapy Visit)        Lisa Ponce RN

## 2019-06-05 ENCOUNTER — ANCILLARY PROCEDURE (OUTPATIENT)
Dept: GENERAL RADIOLOGY | Facility: CLINIC | Age: 59
End: 2019-06-05
Attending: PHYSICIAN ASSISTANT
Payer: MEDICARE

## 2019-06-05 ENCOUNTER — OFFICE VISIT (OUTPATIENT)
Dept: FAMILY MEDICINE | Facility: CLINIC | Age: 59
End: 2019-06-05
Payer: MEDICARE

## 2019-06-05 VITALS
DIASTOLIC BLOOD PRESSURE: 60 MMHG | HEART RATE: 70 BPM | TEMPERATURE: 98 F | SYSTOLIC BLOOD PRESSURE: 120 MMHG | RESPIRATION RATE: 16 BRPM | WEIGHT: 315 LBS | BODY MASS INDEX: 47.26 KG/M2

## 2019-06-05 DIAGNOSIS — R10.32 LEFT GROIN PAIN: ICD-10-CM

## 2019-06-05 DIAGNOSIS — M16.12 PRIMARY OSTEOARTHRITIS OF LEFT HIP: Primary | ICD-10-CM

## 2019-06-05 DIAGNOSIS — E66.01 MORBID OBESITY (H): ICD-10-CM

## 2019-06-05 PROCEDURE — 99214 OFFICE O/P EST MOD 30 MIN: CPT | Performed by: PHYSICIAN ASSISTANT

## 2019-06-05 PROCEDURE — 73502 X-RAY EXAM HIP UNI 2-3 VIEWS: CPT | Mod: FY

## 2019-06-05 ASSESSMENT — ENCOUNTER SYMPTOMS
GASTROINTESTINAL NEGATIVE: 1
RESPIRATORY NEGATIVE: 1
EYES NEGATIVE: 1
CONSTITUTIONAL NEGATIVE: 1
PSYCHIATRIC NEGATIVE: 1
CARDIOVASCULAR NEGATIVE: 1
NEUROLOGICAL NEGATIVE: 1

## 2019-06-05 NOTE — PATIENT INSTRUCTIONS
Patient Education     Osteoarthritis  Osteoarthritis (also called degenerative joint disease) happens when the cartilage in a joint becomes damaged and worn. This may be due to age, wear and tear, overuse of the joint, or other problems. Osteoarthritis can affect any joint. But it is most common in hands, knees, spine, hips, and feet. Symptoms include joint stiffness, pain, and swelling.  Home care    When a joint is more sore than usual, rest it for a day or two.    Heat can help relieve stiffness. Take a hot bath or apply a heating pad for up to 30 minutes at a time. If symptoms are worse in the morning, using heat just after awakening can help relax the muscle and soothe the joints.     Ice helps relieve pain and swelling. It is often used after activity. Use a cold pack wrapped in a thin cloth on the joint for 10 to 15 minutes at a time.     Alternating hot and cold can also help relieve pain. Try this for 20 minutes at a time, several times per day.    Exercise helps prevent the muscles and ligaments around the joint from becoming weak. It also helps maintain function in the joint.  Be as active as you can. Talk to your healthcare provider about what activity program is best for you.    Excess weight puts a lot of extra strain on weight-bearing joints of the lower back, hips, knees, feet and ankles. If you are overweight, talk to your healthcare provider about a safe and effective weight loss program.    Use anti-inflammatory medicines as prescribed for pain. This includes acetaminophen or NSAIDs such as ibuprofen or naproxen. If needed, topical or injected medicines may be recommended. Talk to your healthcare provider if these options are not enough to manage your pain.    Talk with your healthcare provider about devices that might help improve your function and reduce pain.  Follow-up care  Follow up with your healthcare provider as advised by our staff.  When to seek medical advice  Call your healthcare  provider right away if any of these occur:    Redness or swelling of a painful joint    Discharge or pus from a painful joint    Fever of 100.4 F (38 C) or higher, or as directed by your healthcare provider    Worsening joint pain    Decreased ability to move the joint or bear weight on the joint  Date Last Reviewed: 3/1/2017    2020-4521 [employer]. [address] [place] [address]. All rights reserved. This information is not intended as a substitute for professional medical care. Always follow your healthcare professional's instructions.
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
## 2019-06-21 ENCOUNTER — ANTICOAGULATION THERAPY VISIT (OUTPATIENT)
Dept: NURSING | Facility: CLINIC | Age: 59
End: 2019-06-21
Payer: MEDICARE

## 2019-06-21 DIAGNOSIS — Z79.01 LONG TERM CURRENT USE OF ANTICOAGULANT THERAPY: ICD-10-CM

## 2019-06-21 DIAGNOSIS — Z86.711 HISTORY OF PULMONARY EMBOLISM: Primary | ICD-10-CM

## 2019-06-21 LAB — INR POINT OF CARE: 2.9 (ref 0.86–1.14)

## 2019-06-21 PROCEDURE — 85610 PROTHROMBIN TIME: CPT | Mod: QW

## 2019-06-21 PROCEDURE — 36416 COLLJ CAPILLARY BLOOD SPEC: CPT

## 2019-06-21 PROCEDURE — 99207 ZZC NO CHARGE NURSE ONLY: CPT

## 2019-06-21 RX ORDER — WARFARIN SODIUM 4 MG/1
TABLET ORAL
Qty: 135 TABLET | Refills: 0 | Status: SHIPPED | OUTPATIENT
Start: 2019-06-21 | End: 2019-07-17

## 2019-06-21 RX ORDER — WARFARIN SODIUM 10 MG/1
TABLET ORAL
Qty: 45 TABLET | Refills: 0 | Status: SHIPPED | OUTPATIENT
Start: 2019-06-21 | End: 2019-08-28

## 2019-07-17 DIAGNOSIS — Z79.01 LONG TERM CURRENT USE OF ANTICOAGULANT THERAPY: ICD-10-CM

## 2019-07-17 DIAGNOSIS — Z86.711 HISTORY OF PULMONARY EMBOLISM: ICD-10-CM

## 2019-07-17 RX ORDER — WARFARIN SODIUM 4 MG/1
TABLET ORAL
Qty: 135 TABLET | Refills: 0 | Status: SHIPPED | OUTPATIENT
Start: 2019-07-17 | End: 2019-07-22

## 2019-07-17 NOTE — TELEPHONE ENCOUNTER
"Requested Prescriptions   Pending Prescriptions Disp Refills     warfarin (COUMADIN) 4 MG tablet  Last Written Prescription Date:  2019  Last Fill Quantity: 135 tablet,  # refills: 0   Last office visit: 2019 with prescribing provider:  Kadie   Future Office Visit:     135 tablet 0     Si mg on all days except on  or as directed by the anticoagulation clinic       Vitamin K Antagonists Failed - 2019  3:37 PM        Failed - INR is within goal in the past 6 weeks     Confirm INR is within goal in the past 6 weeks.     Recent Labs   Lab Test 19   INR 2.9*                       Passed - Recent (12 mo) or future (30 days) visit within the authorizing provider's specialty     Patient had office visit in the last 12 months or has a visit in the next 30 days with authorizing provider or within the authorizing provider's specialty.  See \"Patient Info\" tab in inbasket, or \"Choose Columns\" in Meds & Orders section of the refill encounter.              Passed - Medication is active on med list        Passed - Patient is 18 years of age or older           "

## 2019-07-17 NOTE — TELEPHONE ENCOUNTER
Full warfarin instructions: 10 mg every Mon; 8 mg all other days    Prescription approved per [facility] Refill Protocol.

## 2019-07-17 NOTE — TELEPHONE ENCOUNTER
Reason for Call:  Medication or medication refill:    Do you use a [facility]?  Name of the pharmacy and phone number for the current request:  [facility]    Name of the medication requested: warfarin    Other request: pt will run out before appt on monday    Can we leave a detailed message on this number? YES    Phone number patient can be reached at: Home number on file [phone] (home)    Best Time: any    Call taken on 7/17/2019 at 3:36 PM by LONNIE THOMPSON

## 2019-07-22 ENCOUNTER — ANTICOAGULATION THERAPY VISIT (OUTPATIENT)
Dept: NURSING | Facility: CLINIC | Age: 59
End: 2019-07-22
Payer: MEDICARE

## 2019-07-22 DIAGNOSIS — Z86.711 HISTORY OF PULMONARY EMBOLISM: ICD-10-CM

## 2019-07-22 DIAGNOSIS — Z79.01 LONG TERM CURRENT USE OF ANTICOAGULANT THERAPY: ICD-10-CM

## 2019-07-22 LAB — INR POINT OF CARE: 2.6 (ref 0.86–1.14)

## 2019-07-22 PROCEDURE — 85610 PROTHROMBIN TIME: CPT | Mod: QW

## 2019-07-22 PROCEDURE — 99207 ZZC NO CHARGE NURSE ONLY: CPT

## 2019-07-22 PROCEDURE — 36416 COLLJ CAPILLARY BLOOD SPEC: CPT

## 2019-07-22 RX ORDER — WARFARIN SODIUM 4 MG/1
TABLET ORAL
Qty: 175 TABLET | Refills: 0 | Status: SHIPPED | OUTPATIENT
Start: 2019-07-22 | End: 2019-08-28

## 2019-07-22 NOTE — PROGRESS NOTES
ANTICOAGULATION FOLLOW-UP CLINIC VISIT    Patient Name:  Petey Archibald  Date:  2019  Contact Type:  Face to Face    SUBJECTIVE:  Patient Findings     Comments:   The patient was assessed for diet, medication, and activity level changes, missed or extra doses, bruising or bleeding, with no problem findings.          Clinical Outcomes     Negatives:   Major bleeding event, Thromboembolic event, Anticoagulation-related hospital admission, Anticoagulation-related ED visit, Anticoagulation-related fatality    Comments:   The patient was assessed for diet, medication, and activity level changes, missed or extra doses, bruising or bleeding, with no problem findings.             OBJECTIVE    INR Protime   Date Value Ref Range Status   2019 2.6 (A) 0.86 - 1.14 Final       ASSESSMENT / PLAN  No question data found.  Anticoagulation Summary  As of 2019    INR goal:   2.0-3.0   TTR:   73.4 % (3.3 y)   INR used for dosin.6 (2019)   Warfarin maintenance plan:   10 mg (4 mg x 2.5) every Mon; 8 mg (4 mg x 2) all other days   Full warfarin instructions:   10 mg every Mon; 8 mg all other days   Weekly warfarin total:   58 mg   Plan last modified:   Prudence Machuca RN (2018)   Next INR check:   2019   Target end date:   Indefinite    Indications    History of pulmonary embolism [Z86.711]  Long term current use of anticoagulant therapy [Z79.01]             Anticoagulation Episode Summary     INR check location:   Anticoagulation Clinic    Preferred lab:       Send INR reminders to:   EDU SUGGS    Comments:         Anticoagulation Care Providers     Provider Role Specialty Phone number    OliviaSilvino puentes MD [facility] [phone]            See the Encounter Report to view Anticoagulation Flowsheet and Dosing Calendar (Go to Encounters tab in chart review, and find the Anticoagulation Therapy Visit)        Ysabel Tena RN

## 2019-08-01 ENCOUNTER — OFFICE VISIT (OUTPATIENT)
Dept: FAMILY MEDICINE | Facility: CLINIC | Age: 59
End: 2019-08-01
Payer: MEDICARE

## 2019-08-01 VITALS
HEIGHT: 69 IN | OXYGEN SATURATION: 95 % | BODY MASS INDEX: 46.65 KG/M2 | RESPIRATION RATE: 18 BRPM | TEMPERATURE: 97.8 F | HEART RATE: 97 BPM | WEIGHT: 315 LBS | SYSTOLIC BLOOD PRESSURE: 110 MMHG | DIASTOLIC BLOOD PRESSURE: 50 MMHG

## 2019-08-01 DIAGNOSIS — Z79.01 LONG TERM CURRENT USE OF ANTICOAGULANT THERAPY: ICD-10-CM

## 2019-08-01 DIAGNOSIS — I80.03 THROMBOPHLEBITIS OF SUPERFICIAL VEINS OF BOTH LOWER EXTREMITIES: ICD-10-CM

## 2019-08-01 DIAGNOSIS — F41.9 ANXIETY: ICD-10-CM

## 2019-08-01 DIAGNOSIS — R09.02 HYPOXIA: ICD-10-CM

## 2019-08-01 DIAGNOSIS — J41.8 MIXED SIMPLE AND MUCOPURULENT CHRONIC BRONCHITIS (H): ICD-10-CM

## 2019-08-01 DIAGNOSIS — F32.5 MAJOR DEPRESSION IN COMPLETE REMISSION (H): ICD-10-CM

## 2019-08-01 DIAGNOSIS — R91.8 PULMONARY NODULES: ICD-10-CM

## 2019-08-01 DIAGNOSIS — G47.33 OSA (OBSTRUCTIVE SLEEP APNEA): ICD-10-CM

## 2019-08-01 DIAGNOSIS — Z86.711 HISTORY OF PULMONARY EMBOLISM: ICD-10-CM

## 2019-08-01 DIAGNOSIS — F17.200 TOBACCO DEPENDENCE: ICD-10-CM

## 2019-08-01 DIAGNOSIS — I87.2 STASIS DERMATITIS OF BOTH LEGS: ICD-10-CM

## 2019-08-01 DIAGNOSIS — E66.01 MORBID OBESITY (H): ICD-10-CM

## 2019-08-01 DIAGNOSIS — R73.02 GLUCOSE INTOLERANCE (IMPAIRED GLUCOSE TOLERANCE): ICD-10-CM

## 2019-08-01 DIAGNOSIS — E78.00 HYPERCHOLESTEROLEMIA: ICD-10-CM

## 2019-08-01 DIAGNOSIS — Z00.00 ROUTINE GENERAL MEDICAL EXAMINATION AT A HEALTH CARE FACILITY: Primary | ICD-10-CM

## 2019-08-01 DIAGNOSIS — L02.215 PERINEAL ABSCESS, SUPERFICIAL: ICD-10-CM

## 2019-08-01 DIAGNOSIS — R60.0 LOCALIZED EDEMA: ICD-10-CM

## 2019-08-01 PROCEDURE — 99214 OFFICE O/P EST MOD 30 MIN: CPT | Mod: 25 | Performed by: FAMILY MEDICINE

## 2019-08-01 PROCEDURE — 99396 PREV VISIT EST AGE 40-64: CPT | Performed by: FAMILY MEDICINE

## 2019-08-01 RX ORDER — CEPHALEXIN 500 MG/1
500 CAPSULE ORAL 4 TIMES DAILY
Qty: 40 CAPSULE | Refills: 0 | Status: SHIPPED | OUTPATIENT
Start: 2019-08-01 | End: 2019-08-27

## 2019-08-01 ASSESSMENT — ANXIETY QUESTIONNAIRES
2. NOT BEING ABLE TO STOP OR CONTROL WORRYING: NOT AT ALL
5. BEING SO RESTLESS THAT IT IS HARD TO SIT STILL: NOT AT ALL
6. BECOMING EASILY ANNOYED OR IRRITABLE: SEVERAL DAYS
IF YOU CHECKED OFF ANY PROBLEMS ON THIS QUESTIONNAIRE, HOW DIFFICULT HAVE THESE PROBLEMS MADE IT FOR YOU TO DO YOUR WORK, TAKE CARE OF THINGS AT HOME, OR GET ALONG WITH OTHER PEOPLE: NOT DIFFICULT AT ALL
1. FEELING NERVOUS, ANXIOUS, OR ON EDGE: NOT AT ALL
7. FEELING AFRAID AS IF SOMETHING AWFUL MIGHT HAPPEN: NOT AT ALL
3. WORRYING TOO MUCH ABOUT DIFFERENT THINGS: NOT AT ALL
GAD7 TOTAL SCORE: 1

## 2019-08-01 ASSESSMENT — MIFFLIN-ST. JEOR: SCORE: 2270.03

## 2019-08-01 ASSESSMENT — PATIENT HEALTH QUESTIONNAIRE - PHQ9
SUM OF ALL RESPONSES TO PHQ QUESTIONS 1-9: 2
5. POOR APPETITE OR OVEREATING: NOT AT ALL

## 2019-08-01 ASSESSMENT — ACTIVITIES OF DAILY LIVING (ADL): CURRENT_FUNCTION: TRANSPORTATION REQUIRES ASSISTANCE

## 2019-08-01 NOTE — PATIENT INSTRUCTIONS
Preventive Health Recommendations  Male Ages 50 - 64    Yearly exam:             See your health care provider every year in order to  o   Review health changes.   o   Discuss preventive care.    o   Review your medicines if your doctor has prescribed any.     Have a cholesterol test every 5 years, or more frequently if you are at risk for high cholesterol/heart disease.     Have a diabetes test (fasting glucose) every three years. If you are at risk for diabetes, you should have this test more often.     Have a colonoscopy at age 50, or have a yearly FIT test (stool test). These exams will check for colon cancer.      Talk with your health care provider about whether or not a prostate cancer screening test (PSA) is right for you.    You should be tested each year for STDs (sexually transmitted diseases), if you re at risk.     Shots: Get a flu shot each year. Get a tetanus shot every 10 years.     Nutrition:    Eat at least 5 servings of fruits and vegetables daily.     Eat whole-grain bread, whole-wheat pasta and brown rice instead of white grains and rice.     Get adequate Calcium and Vitamin D.     Lifestyle    Exercise for at least 150 minutes a week (30 minutes a day, 5 days a week). This will help you control your weight and prevent disease.     Limit alcohol to one drink per day.     No smoking.     Wear sunscreen to prevent skin cancer.     See your dentist every six months for an exam and cleaning.     See your eye doctor every 1 to 2 years.    1. Please do your breast exam every mo, when you  Change the  calendar page or set an alarm on your cell phone Do a  visual check for dimples, inversion or indentation or any different position of the nipple Feel manually  for any 1cm or larger  size mass ie about the size of an almond Be sure to cover the entire area of both breasts : this extends back to the back on either side and from the collar bone to the bottom of the breasts where you can begin to feel  ribs.  Men are advised to do this exam also as they now have a higher rate of breast cancer , like women do .     2.  Weight Loss Tips  1. Do not eat after 6 hrs before your expected bedtime  2. Have your heaviest meal for breakfast, a slightly lighter meal at lunch and a snack 6 hrs before bed  3. No sugar/calorie drinks except milk ie no fruit juice, pop, alcohol.  4. Drink milk 30min before meals to decrease your hunger. Also it is excellent as part of your last meal of the day snack  5. Drink lots of water  6. Increase fiber in diet: all bran cereal, salads, popcorn etc  7. Have only one small serving of fruit a day about 1/2 cup (as this is high in sugar)  8. EXERCISE is the bottom line. Without it, you will gain weight even on a low calorie diet. Best if done 2-3X a day as can    Being overweight contributes to high blood pressure and high cholesterol, both of which cause heart attacks, strokes and kidney failure, prediabetes and diabetes, arthritis, and liver disease     3. Shingrex is a 2 shot series that prevents shingles 97% of the time, as opposed to the old shingles shot that only prevented it at 40-50%  It costs less for medicare at a pharmacy  You should get it starting at 50 yrs old get the 2nd shot 5-6 mo after the first one    4. Warm soaks 10min 2 times a day     Press dry

## 2019-08-01 NOTE — PROGRESS NOTES
"SUBJECTIVE:   CC: Petey Archibald is an 58 year old male who presents for preventative health visit.     Healthy Habits:     In general, how would you rate your overall health?  Fair    Frequency of exercise:  1 day/week    Duration of exercise:  N/A    Do you usually eat at least 4 servings of fruit and vegetables a day, include whole grains    & fiber and avoid regularly eating high fat or \"junk\" foods?  No    Taking medications regularly:  Yes    Barriers to taking medications:  None    Medication side effects:  Other    Ability to successfully perform activities of daily living:  Transportation requires assistance    Home Safety:  No safety concerns identified    Hearing Impairment:  No hearing concerns    In the past 6 months, have you been bothered by leaking of urine?  No    In general, how would you rate your overall mental or emotional health?  Fair      PHQ-2 Total Score: 0    Additional concerns today:  Yes    CELLULITIS AND PUS LESION     -post Rt pros buttock  - onset ?? 3-4 d   -hx of perirectal abscess inpast   -has hi glucose   -n systemic symptoms   - leaves for /[facility] [place] in 2 d so refuses I&D     Glucose Intolerance  Follow-up      How often are you checking your blood sugar? Not at all    What time of day are you checking your blood sugars (select all that apply)?      Have you had any blood sugars above 200?  No    Have you had any blood sugars below 70?  No    What symptoms do you notice when your blood sugar is low?  None    What concerns do you have today about your diabetes? None     Do you have any of these symptoms? (Select all that apply)  No numbness or tingling in feet.  No redness, sores or blisters on feet.  No complaints of excessive thirst.  No reports of blurry vision.  No significant changes to weight.     Have you had a diabetic eye exam in the last 12 months? No    BP Readings from Last 2 Encounters:   08/01/19 110/50   06/05/19 120/60     Hemoglobin A1C (%)   Date Value "   02/13/2012 5.7   02/01/2011 5.9     LDL Cholesterol Calculated (mg/dL)   Date Value   05/03/2017 73   05/11/2016 77       Diabetes Management Resources  Hyperlipidemia:ldl Follow-Up      Are you having any of the following symptoms? (Select all that apply)  No complaints of shortness of breath, chest pain or pressure.  No increased sweating or nausea with activity.  No left-sided neck or arm pain.  No complaints of pain in calves when walking 1-2 blocks.    Are you regularly taking any medication or supplement to lower your cholesterol?   SIMVASTATIN 40MGM     Are you having muscle aches or other side effects that you think could be caused by your cholesterol lowering medication?  No    TOBACCO ABUSE    50 pk yr hx   --conts to smoke   - ABNORMAL CT  CT 5-19 : RML 4mm node--> rept in a yr     MORBID OBESITY    --BMI = 47  --sedentary life style   -comorbid GLu intol, NEL , hi LDL     Depression and Anxiety with Mental Decrease     How are you doing with your depression since your last visit? No change    How are you doing with your anxiety since your last visit?  No change    Are you having other symptoms that might be associated with depression or anxiety? No    Have you had a significant life event? No     Do you have any concerns with your use of alcohol or other drugs? No    Social History     Tobacco Use     Smoking status: Current Every Day Smoker     Packs/day: 1.00     Years: 30.00     Pack years: 30.00     Types: Cigarettes     Smokeless tobacco: Current User     Tobacco comment: started at age 18    Substance Use Topics     Alcohol use: No     Alcohol/week: 0.0 oz     Drug use: No     PHQ 8/21/2018 4/2/2019 8/1/2019   PHQ-9 Total Score 0 13 2   Q9: Thoughts of better off dead/self-harm past 2 weeks Not at all More than half the days Not at all     CARL-7 SCORE 8/21/2018 4/2/2019 8/1/2019   Total Score 6 (mild anxiety) - -   Total Score 6 11 1           Suicide Assessment Five-step Evaluation and Treatment  (SAFE-T)          Chronic Anticoagulation for recur DVT (with edema and stasis dermatitis ) , PE      Duration: yrs    Description (location/character/radiation): above    Intensity:  severe    Accompanying signs and symptoms: edema of legs     History (similar episodes/previous evaluation): None    Precipitating or alleviating factors: None    Therapies tried and outcome: None          COPD; s/po PE, NEL  with hYPOXIA       Overall, how are your COPD symptoms since your last clinic visit?  No change    How much fatigue or shortness of breath do you have when you are walking?  None    How much shortness of breath do you have when you are resting?  None    How often do you cough? Rarely    Have you noticed any change in your sputum/phlegm?  No    Have you experienced a recent fever? No    Please describe how far you can walk without stopping to rest:  1-2 miles    How many flights of stairs are you able to walk up without stopping?  None    Have you had any Emergency Room Visits, Urgent Care Visits, or Hospital Admissions because of your COPD since your last office visit?  No    History   Smoking Status     Current Every Day Smoker     Packs/day: 1.00     Years: 30.00     Types: Cigarettes   Smokeless Tobacco     Current User     Comment: started at age 18      No results found for: FEV1, HNT4QRZ    Today's PHQ-2 Score:   PHQ-2 ( 1999 Pfizer) 8/1/2019   Q1: Little interest or pleasure in doing things 0   Q2: Feeling down, depressed or hopeless 0   PHQ-2 Score 0   Q1: Little interest or pleasure in doing things Not at all   Q2: Feeling down, depressed or hopeless Not at all   PHQ-2 Score 0     Abuse: Current or Past(Physical, Sexual or Emotional)- No  Do you feel safe in your environment? Yes    Social History     Tobacco Use     Smoking status: Current Every Day Smoker     Packs/day: 1.00     Years: 30.00     Pack years: 30.00     Types: Cigarettes     Smokeless tobacco: Current User     Tobacco comment: started at  "age 18    Substance Use Topics     Alcohol use: No     Alcohol/week: 0.0 oz     Alcohol Use 8/1/2019   Prescreen: >3 drinks/day or >7 drinks/week? Not Applicable   Prescreen: >3 drinks/day or >7 drinks/week? -   No flowsheet data found.    Last PSA:   PSA   Date Value Ref Range Status   02/28/2014 0.55 0 - 4 ug/L Final     Reviewed orders with patient. Reviewed health maintenance and updated orders accordingly - Yes  Labs reviewed in EPIC    Reviewed and updated as needed this visit by clinical staff  Tobacco  Allergies  Meds  Problems  Med Hx  Surg Hx  Fam Hx  Soc Hx          Reviewed and updated as needed this visit by Provider            Review of Systems  CONSTITUTIONAL: NEGATIVE for fever, chills, change in weight  INTEGUMENTARY/SKIN: NEGATIVE for worrisome rashes, moles or lesions pOS pus lesion of buttocks   Severe woody dermatitis of lower legs with 2+ edema of ankles   EYES: NEGATIVE for vision changes or irritation  ENT: NEGATIVE for ear, mouth and throat problems  RESP: NEGATIVE for significant cough or SOB  CV: NEGATIVE for chest pain, palpitations or peripheral edema  GI: NEGATIVE for nausea, abdominal pain, heartburn, or change in bowel habits   male: negative for dysuria, hematuria, decreased urinary stream, erectile dysfunction, urethral discharge  MUSCULOSKELETAL: NEGATIVE for significant arthralgias or myalgia  NEURO: NEGATIVE for weakness, dizziness or paresthesias  ENDOCRINE: NEGATIVE for temperature intolerance, skin/hair changes  HEME/ALLERGY/IMMUNE: NEGATIVE for bleeding problems  PSYCHIATRIC: NEGATIVE for changes in mood or affect    OBJECTIVE:   /50   Pulse 97   Temp 97.8  F (36.6  C) (Tympanic)   Resp 18   Ht 1.753 m (5' 9\")   Wt 147 kg (324 lb)   SpO2 95%   BMI 47.85 kg/m      Physical Exam  GENERAL: healthy, alert, no distress and obese  EYES: Eyes grossly normal to inspection, PERRL and conjunctivae and sclerae normal  NECK: no adenopathy, no asymmetry, masses, or " scars and thyroid normal to palpation  RESP: lungs clear to auscultation - no rales, rhonchi or wheezes  CV: regular rate and rhythm, normal S1 S2, no S3 or S4, no murmur, click or rub, no peripheral edema and peripheral pulses strong  ABDOMEN: soft, nontender, no hepatosplenomegaly, no masses and bowel sounds normal  MS: no gross musculoskeletal defects noted, no edema  SKIN: no suspicious lesions or rashes  POSITIVE for worrisome rashes, moles or lesions pOS pus lesion of buttocks 1.5 cm diam with some surrounding red   Severe woody dermatitis of lower legs with 2+ edema of ankles   NEURO: Normal strength and tone, mentation intact and speech normal  PSYCH: mentation appears normal, affect normal/bright    Diagnostic Test Results:  Labs reviewed in Epic    ASSESSMENT/PLAN:       ICD-10-CM    1. Routine general medical examination at a health care facility Z00.00    2. Perineal abscess, superficial-issue of treating  L02.215    3. Pulmonary nodules: RML < 4mm--> rept CT in 5-20 R91.8    4. Mixed simple and mucopurulent chronic bronchitis (H) J41.8    5. NEL (obstructive sleep apnea) G47.33    6. Tobacco dependence:40-50 pk yr hx F17.200 TOBACCO CESSATION ORDER FOR    7. History of pulmonary embolism Z86.711    8. Hypoxia R09.02    9. Hypercholesterolemia E78.00    10. Glucose intolerance (impaired glucose tolerance) R73.02    11. Long term current use of anticoagulant therapy Z79.01    12. Localized edema L>R ankles  R60.0    13. Stasis dermatitis of both legs I87.2    14. Thrombophlebitis of superficial veins of both lower extremities: Greater Saphenous VV  I80.03    15. Major depression in complete remission (H) F32.5    16. Anxiety F41.9    17. Morbid obesity (H) BMI 40-50 E66.01        COUNSELING:   Reviewed preventive health counseling, as reflected in patient instructions       Regular exercise       Healthy diet/nutrition    Patient Instructions     Preventive Health Recommendations  Male Ages 50 -  64    Yearly exam:             See your health care provider every year in order to  o   Review health changes.   o   Discuss preventive care.    o   Review your medicines if your doctor has prescribed any.     Have a cholesterol test every 5 years, or more frequently if you are at risk for high cholesterol/heart disease.     Have a diabetes test (fasting glucose) every three years. If you are at risk for diabetes, you should have this test more often.     Have a colonoscopy at age 50, or have a yearly FIT test (stool test). These exams will check for colon cancer.      Talk with your health care provider about whether or not a prostate cancer screening test (PSA) is right for you.    You should be tested each year for STDs (sexually transmitted diseases), if you re at risk.     Shots: Get a flu shot each year. Get a tetanus shot every 10 years.     Nutrition:    Eat at least 5 servings of fruits and vegetables daily.     Eat whole-grain bread, whole-wheat pasta and brown rice instead of white grains and rice.     Get adequate Calcium and Vitamin D.     Lifestyle    Exercise for at least 150 minutes a week (30 minutes a day, 5 days a week). This will help you control your weight and prevent disease.     Limit alcohol to one drink per day.     No smoking.     Wear sunscreen to prevent skin cancer.     See your dentist every six months for an exam and cleaning.     See your eye doctor every 1 to 2 years.    1. Please do your breast exam every mo, when you  Change the  calendar page or set an alarm on your cell phone Do a  visual check for dimples, inversion or indentation or any different position of the nipple Feel manually  for any 1cm or larger  size mass ie about the size of an almond Be sure to cover the entire area of both breasts : this extends back to the back on either side and from the collar bone to the bottom of the breasts where you can begin to feel ribs.  Men are advised to do this exam also as they now  "have a higher rate of breast cancer , like women do .     2.  Weight Loss Tips  1. Do not eat after 6 hrs before your expected bedtime  2. Have your heaviest meal for breakfast, a slightly lighter meal at lunch and a snack 6 hrs before bed  3. No sugar/calorie drinks except milk ie no fruit juice, pop, alcohol.  4. Drink milk 30min before meals to decrease your hunger. Also it is excellent as part of your last meal of the day snack  5. Drink lots of water  6. Increase fiber in diet: all bran cereal, salads, popcorn etc  7. Have only one small serving of fruit a day about 1/2 cup (as this is high in sugar)  8. EXERCISE is the bottom line. Without it, you will gain weight even on a low calorie diet. Best if done 2-3X a day as can    Being overweight contributes to high blood pressure and high cholesterol, both of which cause heart attacks, strokes and kidney failure, prediabetes and diabetes, arthritis, and liver disease     3. Shingrex is a 2 shot series that prevents shingles 97% of the time, as opposed to the old shingles shot that only prevented it at 40-50%  It costs less for medicare at a pharmacy  You should get it starting at 50 yrs old get the 2nd shot 5-6 mo after the first one    4. Warm soaks 10min 2 times a day     Press dry       Estimated body mass index is 47.85 kg/m  as calculated from the following:    Height as of this encounter: 1.753 m (5' 9\").    Weight as of this encounter: 147 kg (324 lb).     Weight management plan: Discussed healthy diet and exercise guidelines     reports that he has been smoking cigarettes.  He has a 30.00 pack-year smoking history. He uses smokeless tobacco.  Tobacco Cessation Action Plan: Information offered: Patient not interested at this time    Counseling Resources:  ATP IV Guidelines  Pooled Cohorts Equation Calculator  FRAX Risk Assessment  ICSI Preventive Guidelines  Dietary Guidelines for Americans, 2010  USDA's MyPlate  ASA Prophylaxis  Lung CA Screening    Hortensia " MD Liv  [facility]    Today's PHQ-2 Score:   PHQ-2 ( 1999 Pfizer) 8/1/2019 8/1/2019   Q1: Little interest or pleasure in doing things 0 0   Q2: Feeling down, depressed or hopeless 0 0   PHQ-2 Score 0 0   Q1: Little interest or pleasure in doing things Not at all Not at all   Q2: Feeling down, depressed or hopeless Not at all Not at all   PHQ-2 Score 0 0         Social History     Tobacco Use     Smoking status: Current Every Day Smoker     Packs/day: 1.00     Years: 30.00     Pack years: 30.00     Types: Cigarettes     Smokeless tobacco: Current User     Tobacco comment: started at age 18    Substance Use Topics     Alcohol use: No     Alcohol/week: 0.0 oz     If you drink alcohol do you typically have >3 drinks per day or >7 drinks per week? No                      Last PSA:   PSA   Date Value Ref Range Status   02/28/2014 0.55 0 - 4 ug/L Final         Reviewed and updated as needed this visit by clinical staff  Tobacco  Allergies  Meds  Problems  Med Hx  Surg Hx  Fam Hx  Soc Hx          Reviewed and updated as needed this visit by Provider            Counseling Resources:  ATP IV Guidelines  Pooled Cohorts Equation Calculator  FRAX Risk Assessment  ICSI Preventive Guidelines  Dietary Guidelines for Americans, 2010  USDA's MyPlate  ASA Prophylaxis  Lung CA Screening    Hortensia Peck MD  [facility]

## 2019-08-02 ASSESSMENT — ANXIETY QUESTIONNAIRES: GAD7 TOTAL SCORE: 1

## 2019-08-12 ENCOUNTER — OFFICE VISIT (OUTPATIENT)
Dept: FAMILY MEDICINE | Facility: CLINIC | Age: 59
End: 2019-08-12
Payer: MEDICARE

## 2019-08-12 VITALS
HEART RATE: 74 BPM | OXYGEN SATURATION: 97 % | TEMPERATURE: 98 F | DIASTOLIC BLOOD PRESSURE: 70 MMHG | RESPIRATION RATE: 18 BRPM | SYSTOLIC BLOOD PRESSURE: 120 MMHG

## 2019-08-12 DIAGNOSIS — R60.0 LOCALIZED EDEMA: ICD-10-CM

## 2019-08-12 DIAGNOSIS — I87.2 VENOUS STASIS DERMATITIS OF LEFT LOWER EXTREMITY: Primary | ICD-10-CM

## 2019-08-12 DIAGNOSIS — R73.02 GLUCOSE INTOLERANCE (IMPAIRED GLUCOSE TOLERANCE): ICD-10-CM

## 2019-08-12 PROCEDURE — 99214 OFFICE O/P EST MOD 30 MIN: CPT | Performed by: PHYSICIAN ASSISTANT

## 2019-08-12 RX ORDER — FUROSEMIDE 40 MG
TABLET ORAL
Qty: 270 TABLET | Refills: 3 | Status: ON HOLD | OUTPATIENT
Start: 2019-08-12 | End: 2019-09-14

## 2019-08-12 NOTE — PROGRESS NOTES
Subjective     Petey Archibald is a 58 year old male who presents to clinic today for the following health issues:    HPI   Wound Check      Duration: 1 week    Description (location/character/radiation): rt lower leg    Intensity:  moderate    Accompanying signs and symptoms: ulcer like sore, clear liquid discharge    History (similar episodes/previous evaluation): None    Precipitating or alleviating factors: None    Therapies tried and outcome: antibiotic cream, bandage- caused burning so pt took off bandage     He does not tolerate tight pressure bandages        Patient Active Problem List   Diagnosis     Ankle pain     Morbid obesity     GERD (gastroesophageal reflux disease)     Peripheral vascular disease (H)     History of pulmonary embolism     Tobacco dependence:40-50 pk yr hx     Borderline mental retardation     History of DVT of lower extremity     Right knee pain     Burn of second degree of trunk.leg,perineum 2ndary to urine exposure      Pilonidal cyst     Asymptomatic varicose veins, bilateral     Callus of foot on Rt lat dist  since 8-15      Morbid obesity due to excess calories (H)  BMI 40-50     Hypercholesterolemia     Mixed simple and mucopurulent chronic bronchitis (HCC) CT 4-06 wnl and neg bronch for hemoptesis spirometry 7-26-16  FVC=59% & w mod restriction  and lung age of 82 in 56 y/o      Major depression in complete remission (HCC) on meds      Long term current use of anticoagulant therapy     Glucose intolerance (impaired glucose tolerance)     Other iron deficiency anemia     Localized edema L>R ankles      Erectile dysfunction, unspecified erectile dysfunction type     Stasis dermatitis of both legs     Arch pain of left foot 2ndary to edema and tendonitis      NEL (obstructive sleep apnea)     Hematemesis without nausea after smoking      Anxiety     Thrombophlebitis of superficial veins of both lower extremities: Greater Saphenous VV      Acute deep vein thrombosis (DVT) of  tibial vein of left lower extremity (H)     History of colonic polyps     Allergic to bees     Abnormal lung sounds-bibasilar rhonchi     Hypoxia     Abnormal chest x-ray-3-19 prominent bibasilar interstitial      Past Surgical History:   Procedure Laterality Date     COLONOSCOPY N/A 4/15/2019    Procedure: COMBINED COLONOSCOPY, SINGLE OR MULTIPLE BIOPSY/POLYPECTOMY BY BIOPSY;  Surgeon: Jovana Ohara MD;  Location:  GI     EXCISE MALIGNANT LESIONS, TRUNK/ARMS/LEGS 0.5CM OR LESS Left 5/26/2017    Procedure: EXCISE MALIGNANT LESIONS, TRUNK/ARMS/LEGS 0.5CM OR LESS;  EXCISION LEFT ELBOW MASS;  Surgeon: Jose Azevedo MD;  Location:  GI     RECTAL SURGERY      perianal abscess?       Social History     Tobacco Use     Smoking status: Current Every Day Smoker     Packs/day: 1.00     Years: 30.00     Pack years: 30.00     Types: Cigarettes     Smokeless tobacco: Current User     Tobacco comment: started at age 18    Substance Use Topics     Alcohol use: No     Alcohol/week: 0.0 oz     Family History   Problem Relation Age of Onset     Diabetes Brother      Unknown/Adopted Mother      Unknown/Adopted Father          Current Outpatient Medications   Medication Sig Dispense Refill     acetaminophen (TYLENOL) 325 MG tablet Take 1-2 tablets (325-650 mg) by mouth every 6 hours as needed 100 tablet 3     albuterol (ALBUTEROL) 108 (90 BASE) MCG/ACT inhaler Inhale 2 puffs into the lungs every 6 hours as needed 1 Inhaler 0     ANUSOL-HC 25 MG RE SUPP INSERT ONE SUPPOSITORY RECTALLY UP TO TWICE A DAY AS NEEDED 14 Suppository 0     ARIPiprazole (ABILIFY) 10 MG tablet Take 0.5 tablets (5 mg) by mouth daily 30 tablet      BUPROPION HCL PO Take 150 mg by mouth every morning       cephALEXin (KEFLEX) 500 MG capsule Take 1 capsule (500 mg) by mouth 4 times daily 40 capsule 0     Cholecalciferol (VITAMIN D-3) 1000 UNITS CAPS Take 2 capsules by mouth daily       clindamycin (CLEOCIN) 300 MG capsule Take 1 capsule (300  "mg) by mouth 3 times daily 21 capsule 0     clotrimazole-betamethasone (LOTRISONE) cream Apply topically 2 times daily 60 g 5     DEXILANT 60 MG CPDR CR capsule TAKE 1 CAPSULE BY MOUTH ONCE DAILY (FOR HEARTBURN) 90 capsule 2     EPINEPHrine (EPIPEN 2-BRE) 0.3 MG/0.3ML injection 2-pack INJECT 0.3MG INTRAMUSCULAR ONE TIME FOR ONE DOSE AS NEEDED FOR ANAPHYLAXIS (CALL 911 IF YOU HAVE TO GIVE) (FOR BEE STINGS) **LABEL EACH PEN* 2 mL 3     EPINEPHrine (EPIPEN) 0.3 MG/0.3ML injection Inject 0.3 mg into the muscle once as needed.       ferrous gluconate (FERGON) 324 (38 Fe) MG tablet TAKE 1 TABLET BY MOUTH TWICE DAILY (IRON SUPPLEMENT) 200 tablet 3     Ferrous Gluconate 324 (37.5 FE) MG TABS Take 1 tablet by mouth 2 times daily.       furosemide (LASIX) 40 MG tablet Take 40 mg daily in AM and 80 mg daily in  tablet 3     Gauze Pads & Dressings (GAUZE PADS 4\"X4\") 4\"X4\" PADS 1 each 3 times daily as needed 25 each 1     guaiFENesin (MUCINEX) 600 MG 12 hr tablet Take 2 tablets (1,200 mg) by mouth 2 times daily 60 tablet 0     hydrocortisone (ANUSOL-HC) 2.5 % rectal cream Place rectally 2 times daily 28.35 g 3     loratadine (CLARITIN) 10 MG tablet Take 10 mg by mouth daily.       Miconazole Nitrate (ZEASORB-AF EX) Externally apply topically once a week       Mometasone Furoate 200 MCG/ACT AERO Inhale 2 puffs into the lungs 2 times daily 1 Inhaler 0     mometasone-formoterol (DULERA) 200-5 MCG/ACT oral inhaler Inhale 2 puffs into the lungs 2 times daily 13 g 1     montelukast (SINGULAIR) 10 MG tablet TAKE 1 TABLET BY MOUTH EVERY MORNING. (ASTHMA) 30 tablet 11     MULTI-VITAMIN OR TABS 1 TABLET DAILY       mupirocin (BACTROBAN) 2 % ointment Apply topically 2 times daily       order for DME Oxygen 2 Li/min  at night and bled into BiPAP 1 Device 0     order for DME Equipment being ordered: CPAP with mask and tubing 1 Device 0     order for DME Equipment being ordered: support compression hose BK  2 pair black 30mm HG   To be " applied on arising & removed while lying down to go to sleep 1 each 0     potassium chloride SA (K-DUR/KLOR-CON M) 20 MEQ CR tablet TAKE 1 TABLET BY MOUTH TWICE DAILY. (LOW POTASSIUM) 62 tablet 11     PULMICORT FLEXHALER 180 MCG/ACT inhaler INHALE 2 PUFFS INTO THE LUNGS TWICE DAILY 1 each 10     sertraline (ZOLOFT) 100 MG tablet Take 1.5 tablets by mouth daily.       simvastatin (ZOCOR) 40 MG tablet TAKE 1 TABLET BY MOUTH EVERY MORNING.  (CHOLESTEROL) 31 tablet 11     Sodium Phosphates (PHOSPHATE ENEMA RE) Place 1 enema rectally as needed.       SPIRIVA HANDIHALER 18 MCG inhaled capsule INHALE CONTENTS OF 1 CAPSULE INTO THE LUNGS ONCE DAILY. FOR BRONCHITIS AND EMPHYSEMA. 90 capsule 1     spironolactone (ALDACTONE) 25 MG tablet TAKE 1 TABLET BY MOUTH ONCE DAILY. (HIGH BLOOD PRESSURE) 31 tablet 11     triamcinolone (KENALOG) 0.1 % cream Apply topically 2 times daily as needed 80 g 3     warfarin (COUMADIN) 10 MG tablet 10 mg on Mondays or as directed by the anticoagulation clinic 45 tablet 0     warfarin (COUMADIN) 4 MG tablet Take 10 MG( 2 1/2 tablets) on Monday and 8 MG (2 tablets) all other days,  or as directed by the anticoagulation clinic 175 tablet 0     zolpidem (AMBIEN) 10 MG tablet Take 1 tablet (10 mg) by mouth nightly as needed for sleep 30 tablet 1     Allergies   Allergen Reactions     Bees      Augmentin Rash     Penicillins Rash       Reviewed and updated as needed this visit by Provider       Review of Systems   Constitutional: Negative.    HENT: Negative.    Eyes: Negative.    Respiratory: Negative.    Cardiovascular: Negative.    Gastrointestinal: Negative.    Genitourinary: Negative.    Musculoskeletal: Negative.    Skin:        As in HPI   Neurological: Negative.    Psychiatric/Behavioral: Negative.          Objective    /70 (Cuff Size: Adult Large)   Pulse 74   Temp 98  F (36.7  C) (Tympanic)   Resp 18   SpO2 97%   Physical Exam   Constitutional: He is oriented to person, place, and  time. He appears well-developed and well-nourished. No distress.   HENT:   Head: Normocephalic.   Right Ear: External ear normal.   Left Ear: External ear normal.   Nose: Nose normal.   Eyes: Conjunctivae and EOM are normal.   Neck: Normal range of motion.   Pulmonary/Chest: Effort normal.   Musculoskeletal:        Right ankle: He exhibits swelling.        Left ankle: He exhibits swelling.        Right lower leg: He exhibits swelling and edema.        Left lower leg: He exhibits swelling and edema. He exhibits no tenderness and no bony tenderness.   Neurological: He is alert and oriented to person, place, and time.   Skin: He is not diaphoretic.   Bilateral lower legs chronic hyperpigmentation, L > R.  Overlying open area of drainage on left side.  No purulent drainage.     Psychiatric: He has a normal mood and affect. Judgment normal.       Diagnostic Test Results:  No results found for this or any previous visit (from the past 24 hour(s)).        Assessment & Plan   Problem List Items Addressed This Visit        Endocrine    Glucose intolerance (impaired glucose tolerance)       Other    Localized edema L>R ankles     Relevant Medications    furosemide (LASIX) 40 MG tablet      Other Visit Diagnoses     Venous stasis dermatitis of left lower extremity    -  Primary         He just completed Keflex for a pilonidal abscess today - I would like him to watch the left leg wound closely and if it gets worse in the next few days he needs to come in right away.  At this time, it looks more consistent with dermatitis than cellulitis.   Increase Lasix for one week to see if fluid retention improves  Elevate the leg whenever sedentary  We also discussed the importance of weight loss    Tobacco Cessation:   reports that he has been smoking cigarettes.  He has a 30.00 pack-year smoking history. He uses smokeless tobacco.  Tobacco Cessation Action Plan: not addressed today      BMI:   Estimated body mass index is 47.85 kg/m  as  "calculated from the following:    Height as of 8/1/19: 1.753 m (5' 9\").    Weight as of 8/1/19: 147 kg (324 lb).   Weight management plan: Discussed healthy diet and exercise guidelines        There are no Patient Instructions on file for this visit.    No follow-ups on file.    Leah Engle PA-C  [facility]    "

## 2019-08-13 ASSESSMENT — ENCOUNTER SYMPTOMS
MUSCULOSKELETAL NEGATIVE: 1
ROS SKIN COMMENTS: AS IN HPI
PSYCHIATRIC NEGATIVE: 1
NEUROLOGICAL NEGATIVE: 1
CARDIOVASCULAR NEGATIVE: 1
RESPIRATORY NEGATIVE: 1
GASTROINTESTINAL NEGATIVE: 1
EYES NEGATIVE: 1
CONSTITUTIONAL NEGATIVE: 1

## 2019-08-22 ENCOUNTER — APPOINTMENT (OUTPATIENT)
Dept: GENERAL RADIOLOGY | Facility: CLINIC | Age: 59
End: 2019-08-22
Attending: EMERGENCY MEDICINE
Payer: MEDICARE

## 2019-08-22 ENCOUNTER — TELEPHONE (OUTPATIENT)
Dept: FAMILY MEDICINE | Facility: CLINIC | Age: 59
End: 2019-08-22

## 2019-08-22 ENCOUNTER — OFFICE VISIT (OUTPATIENT)
Dept: FAMILY MEDICINE | Facility: CLINIC | Age: 59
End: 2019-08-22
Payer: MEDICARE

## 2019-08-22 ENCOUNTER — HOSPITAL ENCOUNTER (EMERGENCY)
Facility: CLINIC | Age: 59
Discharge: HOME | End: 2019-08-22
Attending: EMERGENCY MEDICINE | Admitting: EMERGENCY MEDICINE
Payer: MEDICARE

## 2019-08-22 ENCOUNTER — APPOINTMENT (OUTPATIENT)
Dept: ULTRASOUND IMAGING | Facility: CLINIC | Age: 59
End: 2019-08-22
Attending: EMERGENCY MEDICINE
Payer: MEDICARE

## 2019-08-22 VITALS
WEIGHT: 315 LBS | RESPIRATION RATE: 16 BRPM | HEART RATE: 80 BPM | DIASTOLIC BLOOD PRESSURE: 82 MMHG | OXYGEN SATURATION: 94 % | TEMPERATURE: 97.5 F | BODY MASS INDEX: 49.47 KG/M2 | SYSTOLIC BLOOD PRESSURE: 122 MMHG

## 2019-08-22 VITALS
RESPIRATION RATE: 16 BRPM | SYSTOLIC BLOOD PRESSURE: 136 MMHG | TEMPERATURE: 97.7 F | HEIGHT: 69 IN | DIASTOLIC BLOOD PRESSURE: 83 MMHG | BODY MASS INDEX: 46.65 KG/M2 | WEIGHT: 315 LBS | HEART RATE: 71 BPM | OXYGEN SATURATION: 99 %

## 2019-08-22 DIAGNOSIS — R73.02 GLUCOSE INTOLERANCE (IMPAIRED GLUCOSE TOLERANCE): ICD-10-CM

## 2019-08-22 DIAGNOSIS — L02.215 PERINEAL ABSCESS, SUPERFICIAL: ICD-10-CM

## 2019-08-22 DIAGNOSIS — R60.0 LOWER EXTREMITY EDEMA: ICD-10-CM

## 2019-08-22 DIAGNOSIS — L97.921 ULCER OF LEFT LOWER EXTREMITY, LIMITED TO BREAKDOWN OF SKIN (H): Primary | ICD-10-CM

## 2019-08-22 DIAGNOSIS — Z86.718 HISTORY OF DVT OF LOWER EXTREMITY: Primary | ICD-10-CM

## 2019-08-22 DIAGNOSIS — Z79.01 LONG TERM CURRENT USE OF ANTICOAGULANT THERAPY: ICD-10-CM

## 2019-08-22 DIAGNOSIS — I87.2 STASIS DERMATITIS OF BOTH LEGS: ICD-10-CM

## 2019-08-22 DIAGNOSIS — I89.0 LYMPHEDEMA OF LEFT LEG: ICD-10-CM

## 2019-08-22 DIAGNOSIS — I87.2 VENOUS STASIS DERMATITIS OF LEFT LOWER EXTREMITY: ICD-10-CM

## 2019-08-22 DIAGNOSIS — E66.01 MORBID OBESITY (H): ICD-10-CM

## 2019-08-22 DIAGNOSIS — R79.1 SUBTHERAPEUTIC INTERNATIONAL NORMALIZED RATIO (INR): ICD-10-CM

## 2019-08-22 LAB
ANION GAP SERPL CALCULATED.3IONS-SCNC: 3 MMOL/L (ref 3–14)
BASOPHILS # BLD AUTO: 0 10E9/L (ref 0–0.2)
BASOPHILS NFR BLD AUTO: 0.2 %
BUN SERPL-MCNC: 19 MG/DL (ref 7–30)
CALCIUM SERPL-MCNC: 8.8 MG/DL (ref 8.5–10.1)
CHLORIDE SERPL-SCNC: 106 MMOL/L (ref 94–109)
CO2 BLDCOV-SCNC: 28 MMOL/L (ref 21–28)
CO2 SERPL-SCNC: 30 MMOL/L (ref 20–32)
CREAT SERPL-MCNC: 1.01 MG/DL (ref 0.66–1.25)
DIFFERENTIAL METHOD BLD: ABNORMAL
EOSINOPHIL # BLD AUTO: 0.1 10E9/L (ref 0–0.7)
EOSINOPHIL NFR BLD AUTO: 3.1 %
ERYTHROCYTE [DISTWIDTH] IN BLOOD BY AUTOMATED COUNT: 15.4 % (ref 10–15)
GFR SERPL CREATININE-BSD FRML MDRD: 80 ML/MIN/{1.73_M2}
GLUCOSE SERPL-MCNC: 108 MG/DL (ref 70–99)
HCT VFR BLD AUTO: 37.8 % (ref 40–53)
HGB BLD-MCNC: 12.1 G/DL (ref 13.3–17.7)
IMM GRANULOCYTES # BLD: 0 10E9/L (ref 0–0.4)
IMM GRANULOCYTES NFR BLD: 0.5 %
INR PPP: 1.1 (ref 0.86–1.14)
LACTATE BLD-SCNC: 0.8 MMOL/L (ref 0.7–2.1)
LYMPHOCYTES # BLD AUTO: 1 10E9/L (ref 0.8–5.3)
LYMPHOCYTES NFR BLD AUTO: 23.7 %
MCH RBC QN AUTO: 30.3 PG (ref 26.5–33)
MCHC RBC AUTO-ENTMCNC: 32 G/DL (ref 31.5–36.5)
MCV RBC AUTO: 95 FL (ref 78–100)
MONOCYTES # BLD AUTO: 0.5 10E9/L (ref 0–1.3)
MONOCYTES NFR BLD AUTO: 10.8 %
NEUTROPHILS # BLD AUTO: 2.6 10E9/L (ref 1.6–8.3)
NEUTROPHILS NFR BLD AUTO: 61.7 %
NRBC # BLD AUTO: 0 10*3/UL
NRBC BLD AUTO-RTO: 0 /100
PCO2 BLDV: 54 MM HG (ref 40–50)
PH BLDV: 7.31 PH (ref 7.32–7.43)
PLATELET # BLD AUTO: 233 10E9/L (ref 150–450)
PO2 BLDV: 24 MM HG (ref 25–47)
POTASSIUM SERPL-SCNC: 4.1 MMOL/L (ref 3.4–5.3)
RBC # BLD AUTO: 4 10E12/L (ref 4.4–5.9)
SAO2 % BLDV FROM PO2: 36 %
SODIUM SERPL-SCNC: 139 MMOL/L (ref 133–144)
WBC # BLD AUTO: 4.2 10E9/L (ref 4–11)

## 2019-08-22 PROCEDURE — 25000128 H RX IP 250 OP 636: Performed by: EMERGENCY MEDICINE

## 2019-08-22 PROCEDURE — 99285 EMERGENCY DEPT VISIT HI MDM: CPT | Mod: 25

## 2019-08-22 PROCEDURE — 85610 PROTHROMBIN TIME: CPT | Performed by: EMERGENCY MEDICINE

## 2019-08-22 PROCEDURE — 96372 THER/PROPH/DIAG INJ SC/IM: CPT

## 2019-08-22 PROCEDURE — 99214 OFFICE O/P EST MOD 30 MIN: CPT | Performed by: FAMILY MEDICINE

## 2019-08-22 PROCEDURE — 83605 ASSAY OF LACTIC ACID: CPT

## 2019-08-22 PROCEDURE — 80048 BASIC METABOLIC PNL TOTAL CA: CPT | Performed by: EMERGENCY MEDICINE

## 2019-08-22 PROCEDURE — 82803 BLOOD GASES ANY COMBINATION: CPT

## 2019-08-22 PROCEDURE — 93971 EXTREMITY STUDY: CPT | Mod: LT

## 2019-08-22 PROCEDURE — 85025 COMPLETE CBC W/AUTO DIFF WBC: CPT | Performed by: EMERGENCY MEDICINE

## 2019-08-22 PROCEDURE — 73590 X-RAY EXAM OF LOWER LEG: CPT | Mod: LT

## 2019-08-22 PROCEDURE — 25000132 ZZH RX MED GY IP 250 OP 250 PS 637: Mod: GY | Performed by: EMERGENCY MEDICINE

## 2019-08-22 RX ORDER — ACETAMINOPHEN 325 MG/1
650 TABLET ORAL ONCE
Status: COMPLETED | OUTPATIENT
Start: 2019-08-22 | End: 2019-08-22

## 2019-08-22 RX ADMIN — ACETAMINOPHEN 650 MG: 325 TABLET, FILM COATED ORAL at 06:37

## 2019-08-22 RX ADMIN — ENOXAPARIN SODIUM 150 MG: 150 INJECTION SUBCUTANEOUS at 07:25

## 2019-08-22 ASSESSMENT — ENCOUNTER SYMPTOMS
WOUND: 1
FEVER: 0

## 2019-08-22 ASSESSMENT — MIFFLIN-ST. JEOR: SCORE: 2329

## 2019-08-22 NOTE — ED NOTES
Bed: ED06  Expected date:   Expected time:   Means of arrival:   Comments:  429 58m male sores on legs green here now

## 2019-08-22 NOTE — PATIENT INSTRUCTIONS
1.  Weight Loss Tips  1. Do not eat after 6 hrs before your expected bedtime  2. Have your heaviest meal for breakfast, a slightly lighter meal at lunch and a snack 6 hrs before bed  3. No sugar/calorie drinks except milk ie no fruit juice, pop, alcohol.  4. Drink milk 30min before meals to decrease your hunger. Also it is excellent as part of your last meal of the day snack  5. Drink lots of water  6. Increase fiber in diet: all bran cereal, salads, popcorn etc  7. Have only one small serving of fruit a day about 1/2 cup (as this is high in sugar)  8. EXERCISE is the bottom line. Without it, you will gain weight even on a low calorie diet. Best if done 2-3X a day as can    Being overweight contributes to high blood pressure and high cholesterol, both of which cause heart attacks, strokes and kidney failure, prediabetes and diabetes, arthritis, and liver disease     2. Warm water soaks 10min 2 times a day and squeeze dry     3. STOP SMOKING !!!!!!! @@@@@@

## 2019-08-22 NOTE — ED TRIAGE NOTES
Patient from group home.  Cellulitis to left lower leg.  Has appt later today.  Insomnia.  Throbbing pain.  Drainage, group home changing dressing everyday.  No fever.  Blood glucose 86.  Was on antibiotic, EMS thinks cephalexin.  Unsure if he has completed or is still taking.

## 2019-08-22 NOTE — ED PROVIDER NOTES
"  History     Chief Complaint:  Wound Infection    HPI   Petey Archibald is a 58 year old male who is on warfarin who presents for a wound check on his left lower leg. The patient reports he has had this pain for a few weeks. He was prescribed antibiotics and reports he finished them a few weeks ago for a presumed cellulitis. He describes the pain as throbbing and shooting, which he says is new today. He reports drainage and lives in a group home and the staff their has been changing his dressing every day. He notes pain in his groin starting a few days ago as well. He states this pain only hurts when he sits and moves, and he believes this might be a muscle pull. He denies fever.    Allergies:  Augmentin  Penicillins    Medications:    Lasix  Keflex  Coumadin  Epipen  Singulair    Past Medical History:    Borderline mental retardation  COPD  Deep venous thrombosis  Pulmonary embolism  Hyperlipidemia  Mild intellectual disabilities  Obesity  Peripheral edema  Peripheral vascular disease  Sleep apnea  Venous stasis dermatitis    Past Surgical History:    Rectal surgery    Family History:    Diabetes    Social History:  Smoking status: Yes, 1.0 packs/day  Alcohol use: No  Drug use: No  PCP: Raúl Riddle  Marital Status:  Single [1]    Review of Systems   Constitutional: Negative for fever.   Skin: Positive for wound.   All other systems reviewed and are negative.        Physical Exam     Patient Vitals for the past 24 hrs:   BP Temp Temp src Pulse Heart Rate Resp SpO2 Height Weight   08/22/19 0514 129/70 97.7  F (36.5  C) Oral 77 77 16 94 % 1.753 m (5' 9\") (!) 152.9 kg (337 lb)     Physical Exam  Nursing note and vitals reviewed.  Constitutional:  Oriented to person, place, and time. Cooperative.   HENT:   Nose:    Nose normal.   Mouth/Throat:   Mucous membranes are normal.   Eyes:    Conjunctivae normal and EOM are normal.      Pupils are equal, round, and reactive to light.   Neck:    Trachea normal. "   Cardiovascular:  Normal rate, regular rhythm, and normal heart sounds. No murmur heard. 1+ DP pulse in the left.   Pulmonary/Chest:  Effort normal and scattered expiratory wheezes.   Abdominal:   Soft. Normal appearance and bowel sounds are normal.      There is no tenderness.      There is no rebound and no CVA tenderness.   Musculoskeletal:  1-2+ bilateral lower extremity pitting edema with chronic venous stasis changes to left lower extremity as well as a small open wound which is weeping but no significant erythema or warmth.   Lymphadenopathy:  No cervical adenopathy.   Neurological:   Alert and oriented to person, place, and time. Normal strength.      No cranial nerve deficit or sensory deficit. GCS eye subscore is 4. GCS verbal subscore is 5. GCS motor subscore is 6.   Skin:    Skin is intact. No rash noted.   Psychiatric:   Normal mood and affect.    Emergency Department Course   Imaging:  Radiographic findings were communicated with the patient who voiced understanding of the findings.    US Lower Extremity Venous Duplex Left  No evidence for deep venous thrombosis in the left lower  extremity veins.  As read by Radiology.    XR Tibia & Fibula Left 2 Views  No acute fracture or dislocation. No bone destruction to  suggest osteomyelitis.  As read by Radiology.    Laboratory:  CBC: HGB 12.1 (L) o/w WNL (WBC 4.2, )  BMP: Glucose 108 (H) o/w WNL (Creatinine 1.01)  INR: 1.10  ISTAT gases: pH 7.31 (L) 54 (H)/24 (L)/28/36; Lactic Acid (Resulted: 0629): 0.8     Interventions:  0637: Tylenol 650 mg PO  0725: Lovenox 150 mg Subcutaneous    Emergency Department Course:  Past medical records, nursing notes, and vitals reviewed.  0600: I performed an exam of the patient and obtained history, as documented above.  The patient was sent for a US Lower Extremity Venous Duplex Left, XR Tibia & Fibula Left 2 Views while in the emergency department, findings above.  Blood drawn.    0645: I rechecked the patient.  Explained findings to the patient.    0744: I rechecked the patient. Findings and plan explained to the Patient. Patient discharged home with instructions regarding supportive care, medications, and reasons to return. The importance of close follow-up was reviewed.     Impression & Plan    Medical Decision Making:  This is a 58-year-old male who came in for further evaluation of pain, swelling, and weeping to his left lower leg.  Essentially the only thing that is new today apparently is the sharp shooting pains that he has.  The wound itself in the leg itself work as they have though recently according to the patient.  His work-up was started by Dr. Gama prior to my arrival which included an x-ray.  I then proceeded with the above blood work and an ultrasound of his lower extremity when his INR came back in the normal range.  It is a little unclear why his INR is normal, as his group home supplies his medications.  He was provided an injection of Lovenox here.  He has an appointment later today with his primary physician, and I instructed him to keep that appointment.  I will send him out with a prescription for 5 more days of Lovenox as well in order to hopefully get his INR back to a therapeutic range.  I indicated that his physician may end up referring him to the wound clinic if this wound continues to have problems healing.  At this point I felt comfortable discharging him home.    Diagnosis:    ICD-10-CM   1. Lower extremity edema R60.0   2. Venous stasis dermatitis of left lower extremity I87.2   3. Subtherapeutic international normalized ratio (INR) R79.1     Disposition:  discharged to home    Discharge Medications:  New Prescriptions   ENOXAPARIN (LOVENOX) 150 MG/ML SYRINGE    Inject 1 mL (150 mg) Subcutaneous every 12 hours     Mohsen Giron  8/22/2019    EMERGENCY DEPARTMENT  I, Mohsen Girno, am serving as a scribe at 6:00 AM on 8/22/2019 to document services personally performed by Lori  Carlos REZA MD based on my observations and the provider's statements to me.        Carlos Sandoval MD  08/22/19 0889

## 2019-08-22 NOTE — TELEPHONE ENCOUNTER
Patient will have INR Lab drawn at a different [facility] today rather than INR Clinic Nurse.    Standing order placed for INR per Protocol.    Dorothy Gilman, RN, RN  Anticoagulation Nurse - [facility], [place]

## 2019-08-22 NOTE — PROGRESS NOTES
Subjective     Petey Archibald is a 58 year old male who presents to clinic today for the following health issues:    HPI   ED/UC Followup:    Facility:  [facility]  Date of visit: 08/22/2019  Reason for visit: Wound Check/leg pain  Current Status: Pain in legs has subsided, recheck wound.       Glucose Intolerance   Follow-up      How often are you checking your blood sugar? Not at all    Hi FBS     What time of day are you checking your blood sugars (select all that apply)?      Have you had any blood sugars above 200?  No    Have you had any blood sugars below 70?  No    What symptoms do you notice when your blood sugar is low?  None    What concerns do you have today about your diabetes? None     Do you have any of these symptoms? (Select all that apply)  No numbness or tingling in feet.  No redness, sores or blisters on feet.  No complaints of excessive thirst.  No reports of blurry vision.  No significant changes to weight.     Have you had a diabetic eye exam in the last 12 months? No    BP Readings from Last 2 Encounters:   08/22/19 122/82   08/22/19 136/83     Hemoglobin A1C (%)   Date Value   02/13/2012 5.7   02/01/2011 5.9     LDL Cholesterol Calculated (mg/dL)   Date Value   05/03/2017 73   05/11/2016 77       MORBID OBESITY    --BMI + 49.9  -comorbid glu intol   --sedentary life style     LYPHEDEDEMA- Lt leg /STASIS DERMATITIS -bilat   WEEPING SUPERFICIAL WOUND - Lt pretib     -onset ? In [facility] ?  -has not elevated   - aree wrapping   -long hx of edema of anl==kles & legs       PERIRECTAL ABSCESS     - I&d a mo ago just before road trip to [facility]   -resolved on the trip         Reviewed and updated as needed this visit by Provider  Tobacco  Allergies  Meds  Problems  Med Hx  Surg Hx  Fam Hx         Review of Systems   ROS COMP: CONSTITUTIONAL: NEGATIVE for fever, chills, change in weight  INTEGUMENTARY/SKIN: NEGATIVE for worrisome rashes, moles or lesions POS ulcer Lt leg and bilat edema   EYES:  NEGATIVE for vision changes or irritation  ENT/MOUTH: NEGATIVE for ear, mouth and throat problems  RESP: NEGATIVE for significant cough or SOB  BREAST: NEGATIVE for masses, tenderness or discharge  CV: NEGATIVE for chest pain, palpitations or peripheral edema  GI: NEGATIVE for nausea, abdominal pain, heartburn, or change in bowel habits  : NEGATIVE for frequency, dysuria, or hematuria  MUSCULOSKELETAL: NEGATIVE for significant arthralgias or myalgia  NEURO: NEGATIVE for weakness, dizziness or paresthesias  ENDOCRINE: NEGATIVE for temperature intolerance, skin/hair changes  HEME: NEGATIVE for bleeding problems  PSYCHIATRIC: NEGATIVE for changes in mood or affect      Objective    /82 (Patient Position: Sitting, Cuff Size: Adult Regular)   Pulse 80   Temp 97.5  F (36.4  C) (Tympanic)   Resp 16   Wt (!) 152 kg (335 lb)   SpO2 94%   BMI 49.47 kg/m    Body mass index is 49.47 kg/m .  Physical Exam   GENERAL: healthy, alert and no distress  EYES: Eyes grossly normal to inspection, PERRL and conjunctivae and sclerae normal  RESP: lungs clear to auscultation - no rales, rhonchi or wheezes  CV: regular rate and rhythm, normal S1 S2, no S3 or S4, no murmur, click or rub, no peripheral edema and peripheral pulses strong  MS: no gross musculoskeletal defects noted, no edema  SKIN: no suspicious lesions or rashes POS  8-10 cm area of loss of one layer of skin and weeping thru this ; bilat 1=2+ pretib pitting with brown changes   NEURO: Normal strength and tone, mentation intact and speech normal  PSYCH: tangential, affect normal/bright, judgement and insight impaired and appearance disheveled    Diagnostic Test Results:  Labs reviewed in Epic  Results for orders placed or performed during the hospital encounter of 08/22/19   XR Tibia & Fibula Left 2 Views    Narrative    XR TIBIA & FIBULA LT 2 VW  8/22/2019 6:01 AM     HISTORY: Wound, pain.    COMPARISON: None.       Impression    IMPRESSION: No acute fracture or  dislocation. No bone destruction to  suggest osteomyelitis.    LYNN CAMACHO MD   US Lower Extremity Venous Duplex Left    Narrative    US LOWER EXTREMITY VENOUS DUPLEX LEFT   8/22/2019 7:30 AM     HISTORY: Left leg pain.    COMPARISON: None.    TECHNIQUE: Spectral doppler and waveform analysis were performed on  the left lower extremity veins.    FINDINGS: The left common femoral, femoral, and popliteal veins are  patent, compressible, and negative for deep venous thrombosis. Calf  veins are segmentally seen but appear negative for DVT where  visualized. The peroneal veins could not be visualized. The exam was  somewhat limited related to patient body habitus.      Impression    IMPRESSION: No evidence for deep venous thrombosis in the left lower  extremity veins.    MELA HIDALGO MD   CBC with platelets + differential   Result Value Ref Range    WBC 4.2 4.0 - 11.0 10e9/L    RBC Count 4.00 (L) 4.4 - 5.9 10e12/L    Hemoglobin 12.1 (L) 13.3 - 17.7 g/dL    Hematocrit 37.8 (L) 40.0 - 53.0 %    MCV 95 78 - 100 fl    MCH 30.3 26.5 - 33.0 pg    MCHC 32.0 31.5 - 36.5 g/dL    RDW 15.4 (H) 10.0 - 15.0 %    Platelet Count 233 150 - 450 10e9/L    Diff Method Automated Method     % Neutrophils 61.7 %    % Lymphocytes 23.7 %    % Monocytes 10.8 %    % Eosinophils 3.1 %    % Basophils 0.2 %    % Immature Granulocytes 0.5 %    Nucleated RBCs 0 0 /100    Absolute Neutrophil 2.6 1.6 - 8.3 10e9/L    Absolute Lymphocytes 1.0 0.8 - 5.3 10e9/L    Absolute Monocytes 0.5 0.0 - 1.3 10e9/L    Absolute Eosinophils 0.1 0.0 - 0.7 10e9/L    Absolute Basophils 0.0 0.0 - 0.2 10e9/L    Abs Immature Granulocytes 0.0 0 - 0.4 10e9/L    Absolute Nucleated RBC 0.0    Basic metabolic panel   Result Value Ref Range    Sodium 139 133 - 144 mmol/L    Potassium 4.1 3.4 - 5.3 mmol/L    Chloride 106 94 - 109 mmol/L    Carbon Dioxide 30 20 - 32 mmol/L    Anion Gap 3 3 - 14 mmol/L    Glucose 108 (H) 70 - 99 mg/dL    Urea Nitrogen 19 7 - 30 mg/dL     "Creatinine 1.01 0.66 - 1.25 mg/dL    GFR Estimate 80 >60 mL/min/[1.73_m2]    GFR Estimate If Black >90 >60 mL/min/[1.73_m2]    Calcium 8.8 8.5 - 10.1 mg/dL   INR   Result Value Ref Range    INR 1.10 0.86 - 1.14   ISTAT gases lactate abimael POCT   Result Value Ref Range    Ph Venous 7.31 (L) 7.32 - 7.43 pH    PCO2 Venous 54 (H) 40 - 50 mm Hg    PO2 Venous 24 (L) 25 - 47 mm Hg    Bicarbonate Venous 28 21 - 28 mmol/L    O2 Sat Venous 36 %    Lactic Acid 0.8 0.7 - 2.1 mmol/L           Assessment & Plan       ICD-10-CM    1. Ulcer of left lower extremity, limited to breakdown of skin (H) Lt pretib middle  L97.921    2. Lymphedema of left leg I89.0    3. Stasis dermatitis of both legs I87.2    4. Perineal abscess, superficial-resolved L02.215    5. Glucose intolerance (impaired glucose tolerance) R73.02    6. Morbid obesity (H) BMI 40-50 E66.01         Tobacco Cessation:   reports that he has been smoking cigarettes.  He has a 30.00 pack-year smoking history. He uses smokeless tobacco.  Tobacco Cessation Action Plan: Self help information given to patient      BMI:   Estimated body mass index is 49.47 kg/m  as calculated from the following:    Height as of an earlier encounter on 8/22/19: 1.753 m (5' 9\").    Weight as of this encounter: 152 kg (335 lb).   Weight management plan: Discussed healthy diet and exercise guidelines        Patient Instructions   1.  Weight Loss Tips  1. Do not eat after 6 hrs before your expected bedtime  2. Have your heaviest meal for breakfast, a slightly lighter meal at lunch and a snack 6 hrs before bed  3. No sugar/calorie drinks except milk ie no fruit juice, pop, alcohol.  4. Drink milk 30min before meals to decrease your hunger. Also it is excellent as part of your last meal of the day snack  5. Drink lots of water  6. Increase fiber in diet: all bran cereal, salads, popcorn etc  7. Have only one small serving of fruit a day about 1/2 cup (as this is high in sugar)  8. EXERCISE is the bottom " line. Without it, you will gain weight even on a low calorie diet. Best if done 2-3X a day as can    Being overweight contributes to high blood pressure and high cholesterol, both of which cause heart attacks, strokes and kidney failure, prediabetes and diabetes, arthritis, and liver disease     2. Warm water soaks 10min 2 times a day and squeeze dry     3. STOP SMOKING !!!!!!! @@@@@@  as the ulcer won't heal     Return in about 5 days (around 8/27/2019) for wound check.    Hortensia Peck MD  [facility]

## 2019-08-22 NOTE — PROGRESS NOTES
"Subjective     Petey Archibald is a 58 year old male who presents to clinic today for the following health issues:    HPI   Edema      Duration: ***    Description (location/character/radiation): ***    Intensity:  {mild,moderate,severe:686445}    Accompanying signs and symptoms: ***    History (similar episodes/previous evaluation): {.:468790::\"None\"}    Precipitating or alleviating factors: {.:724030::\"None\"}    Therapies tried and outcome: {.:848652::\"None\"}     {additonal problems for provider to add (Optional):807447}    {HIST REVIEW/ LINKS 2 (Optional):719649}    {Additional problems for the provider to add (optional):765129}  Reviewed and updated as needed this visit by Provider         Review of Systems   {ROS COMP (Optional):690104}      Objective    There were no vitals taken for this visit.  There is no height or weight on file to calculate BMI.  Physical Exam   {Exam List (Optional):946759}    {Diagnostic Test Results (Optional):385029::\"Diagnostic Test Results:\",\"Labs reviewed in Epic\"}        {PROVIDER CHARTING PREFERENCE:423419}      "

## 2019-08-22 NOTE — ED AVS SNAPSHOT
Emergency Department  [address] [place] [id number]  Phone:  [phone]  Fax:  [phone]                                    Petey Archibald   MRN: [id number]    Department:   Emergency Department   Date of Visit:  8/22/2019           After Visit Summary Signature Page    I have received my discharge instructions, and my questions have been answered. I have discussed any challenges I see with this plan with the nurse or doctor.    ..........................................................................................................................................  Patient/Patient Representative Signature      ..........................................................................................................................................  Patient Representative Print Name and Relationship to Patient    ..................................................               ................................................  Date                                   Time    ..........................................................................................................................................  Reviewed by Signature/Title    ...................................................              ..............................................  Date                                               Time          22EPIC Rev 08/18

## 2019-08-23 ENCOUNTER — NURSE TRIAGE (OUTPATIENT)
Dept: NURSING | Facility: CLINIC | Age: 59
End: 2019-08-23

## 2019-08-24 ENCOUNTER — TELEPHONE (OUTPATIENT)
Dept: FAMILY MEDICINE | Facility: CLINIC | Age: 59
End: 2019-08-24

## 2019-08-24 DIAGNOSIS — Z86.718 HISTORY OF DVT OF LOWER EXTREMITY: ICD-10-CM

## 2019-08-24 DIAGNOSIS — Z86.711 HISTORY OF PULMONARY EMBOLISM: Primary | ICD-10-CM

## 2019-08-24 DIAGNOSIS — Z79.01 LONG TERM CURRENT USE OF ANTICOAGULANT THERAPY: ICD-10-CM

## 2019-08-24 NOTE — TELEPHONE ENCOUNTER
Called Dai back at group home.  Pt runs out of the Lovenox shots this evening.  He is still getting Warfarin but INR done in ER on 8/22 was very low.  She is not sure if the Warfarin tablets are old or not  Refill the Lovenox to have him continue getting that twice daily.  He has appt to see Dr Peck on Tuesday.  He should get INR done then also

## 2019-08-24 NOTE — TELEPHONE ENCOUNTER
Reason for Call:  Other call back    Detailed comments: Dai RN at Petey's group  Home needs direction and orders for Petey's INR yet today and is asking for the doctor to return a call today.    Petey is taking Lovenox 2 x day, also taking Warfarin,     Question is need an order for more Warfarin after today.    Phone Number Patient can be reached at:   Dai: [phone]    Best Time: yet this morning    Can we leave a detailed message on this number? YES    Call taken on 8/24/2019 at 8:51 AM by Radha Roth

## 2019-08-24 NOTE — TELEPHONE ENCOUNTER
Dai LIU from [facility] calls in with question regarding pt's coumadin ( Hx of DVT and PEs )    Pt seen in ED yesterday for wound check of left lower leg  Pt had subtherapeutic INR - given Rx for Lovenox     Dai now calls - saying   Pt did receive 8 mg coumadin today   As well 150 mg Lovenox BID today     So actually call is after the fact - but the RN Dai's question is > does he continue to receive both the coumadin and Lovenox ??    Last INR per Dai was 1.10 -- thus we assume yes he should be receiving both to get that INR up     But Dai will call PMD in morning Saturday ( prior to giving both coumadin and Lovenox ) to make sure pt IS to continue both medications     Protocol and care advice reviewed  Caller states understanding of the recommended disposition  Advised to call back if further questions or concerns    Sergo Padron , RN / [facility]        Reason for Disposition    Caller has URGENT medication question about med that PCP prescribed and triager unable to answer question    Protocols used: MEDICATION QUESTION CALL-A-

## 2019-08-27 ENCOUNTER — OFFICE VISIT (OUTPATIENT)
Dept: FAMILY MEDICINE | Facility: CLINIC | Age: 59
End: 2019-08-27
Payer: MEDICARE

## 2019-08-27 ENCOUNTER — TELEPHONE (OUTPATIENT)
Dept: FAMILY MEDICINE | Facility: CLINIC | Age: 59
End: 2019-08-27

## 2019-08-27 VITALS
DIASTOLIC BLOOD PRESSURE: 86 MMHG | WEIGHT: 315 LBS | OXYGEN SATURATION: 95 % | SYSTOLIC BLOOD PRESSURE: 130 MMHG | HEART RATE: 88 BPM | BODY MASS INDEX: 46.65 KG/M2 | TEMPERATURE: 97.5 F | RESPIRATION RATE: 14 BRPM | HEIGHT: 69 IN

## 2019-08-27 DIAGNOSIS — Z86.718 HISTORY OF DVT OF LOWER EXTREMITY: ICD-10-CM

## 2019-08-27 DIAGNOSIS — L97.921 ULCER OF LEFT LOWER EXTREMITY, LIMITED TO BREAKDOWN OF SKIN (H): Primary | ICD-10-CM

## 2019-08-27 DIAGNOSIS — I89.0 LYMPHEDEMA OF LEFT LEG: ICD-10-CM

## 2019-08-27 DIAGNOSIS — Z79.01 LONG TERM CURRENT USE OF ANTICOAGULANT THERAPY: ICD-10-CM

## 2019-08-27 DIAGNOSIS — R73.02 GLUCOSE INTOLERANCE (IMPAIRED GLUCOSE TOLERANCE): ICD-10-CM

## 2019-08-27 DIAGNOSIS — F17.200 TOBACCO DEPENDENCE: ICD-10-CM

## 2019-08-27 DIAGNOSIS — Z86.711 HISTORY OF PULMONARY EMBOLISM: ICD-10-CM

## 2019-08-27 DIAGNOSIS — L02.419 CELLULITIS AND ABSCESS OF LEG: ICD-10-CM

## 2019-08-27 DIAGNOSIS — I87.2 VENOUS STASIS DERMATITIS OF LEFT LOWER EXTREMITY: ICD-10-CM

## 2019-08-27 DIAGNOSIS — E66.01 MORBID OBESITY (H): ICD-10-CM

## 2019-08-27 DIAGNOSIS — L03.119 CELLULITIS AND ABSCESS OF LEG: ICD-10-CM

## 2019-08-27 LAB — INR PPP: 1.38 (ref 0.86–1.14)

## 2019-08-27 PROCEDURE — 85610 PROTHROMBIN TIME: CPT | Performed by: FAMILY MEDICINE

## 2019-08-27 PROCEDURE — 99214 OFFICE O/P EST MOD 30 MIN: CPT | Performed by: FAMILY MEDICINE

## 2019-08-27 PROCEDURE — 36415 COLL VENOUS BLD VENIPUNCTURE: CPT | Performed by: FAMILY MEDICINE

## 2019-08-27 RX ORDER — CEPHALEXIN 500 MG/1
500 CAPSULE ORAL 4 TIMES DAILY
Qty: 40 CAPSULE | Refills: 0 | Status: ON HOLD | OUTPATIENT
Start: 2019-08-27 | End: 2019-09-14

## 2019-08-27 ASSESSMENT — MIFFLIN-ST. JEOR: SCORE: 2283.64

## 2019-08-27 NOTE — TELEPHONE ENCOUNTER
Patient was seen by Dr. Peck today for wound assessment/care.    Patient's INR is still sub-therapeutic at 1.4 today.  He is also on Lovenox BID.    Current coumadin dosing is 10 mg Monday, and 8 mg all other day.    Okay to increase coumadin dosing? Or prefer to stay at current dose due to wound?    Please advise.  Thank you.  Dorothy Gilman, RN, RN  Anticoagulation Nurse - [facility] [place]

## 2019-08-27 NOTE — PROGRESS NOTES
Subjective     Petey Archibald is a 58 year old male who presents to clinic today for the following health issues:    HPI     Lt LOwer Leg Venous Stasis Ulcer with Stasis Dermatitis with Chronic Lymphedema - now worse with new area Cellulitis Lt lat calf   With Hx of DVT on chronic anticoagulation       Duration: x 2-3 weeks    Onset post long car ride to [facility]     Description  Location: Left Lower Leg    Intensity:  severe    Accompanying signs and symptoms: drainage, swelling and redness with weeping from ant pretib ulcer     New areas of red with cntral punctatate pedro lat prox Lt calf since last wk     History  Previous similar problem: no   Previous evaluation:  Yes    Precipitating or alleviating factors:  Trauma or overuse: no   Aggravating factors include: none  Therapies tried and outcome: Wrap with nonbrathable dresing  and Antibiotic Ointment  Has been elevating leg about 2hr x 2 per day     Glucose Intolerance Follow-up      How often are you checking your blood sugar? Not at all    Hi FBS     What time of day are you checking your blood sugars (select all that apply)?      Have you had any blood sugars above 200?  No    Have you had any blood sugars below 70?  No    What symptoms do you notice when your blood sugar is low?  None    What concerns do you have today about your diabetes? None     Do you have any of these symptoms? (Select all that apply)  No numbness or tingling in feet.  No redness, sores or blisters on feet.  No complaints of excessive thirst.  No reports of blurry vision.  No significant changes to weight.     Have you had a diabetic eye exam in the last 12 months? No    BP Readings from Last 2 Encounters:   08/22/19 122/82   08/22/19 136/83     Hemoglobin A1C (%)   Date Value   02/13/2012 5.7   02/01/2011 5.9     LDL Cholesterol Calculated (mg/dL)   Date Value   05/03/2017 73   05/11/2016 77       MORBID OBESITY      --BMI + 49.9    -comorbid glu intol     --sedentary life style  "      TOBACCO ABUSE      History   Smoking Status     Current Every Day Smoker     Packs/day: 1.00     Years: 40 yrs and conttinues to smoke     Types: Cigarettes   Smokeless Tobacco     Never Used     Comment: started at age 18     FEV1,=66%FVC=61%  In 7-16  CXR  3-21-19  Prominent bibasilar interstitial markings  The nicotine causes poor healing of his wounds         How many servings of fruits and vegetables do you eat daily?  2-3    On average, how many sweetened beverages do you drink each day (soda, juice, sweet tea, etc)?   5    How many days per week do you miss taking your medication? 0              Reviewed and updated as needed this visit by Provider  Allergies         Review of Systems   ROS COMP: CONSTITUTIONAL: NEGATIVE for fever, chills, change in weight POS for continuing to gain   INTEGUMENTARY/SKIN: NEGATIVE for worrisome rashes, moles or lesions POS Lt leg ulcers and new cellulitis   EYES: NEGATIVE for vision changes or irritation  ENT/MOUTH: NEGATIVE for ear, mouth and throat problems  RESP: NEGATIVE for significant cough or SOB  BREAST: NEGATIVE for masses, tenderness or discharge  CV: NEGATIVE for chest pain, palpitations or peripheral edema  GI: NEGATIVE for nausea, abdominal pain, heartburn, or change in bowel habits  : NEGATIVE for frequency, dysuria, or hematuria  MUSCULOSKELETAL: NEGATIVE for significant arthralgias or myalgia  NEURO: NEGATIVE for weakness, dizziness or paresthesias  ENDOCRINE: NEGATIVE for temperature intolerance, skin/hair changes  HEME: NEGATIVE for bleeding problems  PSYCHIATRIC: NEGATIVE for changes in mood or affect  Angry when finds he cannot rtw       Objective    /86   Pulse 88   Temp 97.5  F (36.4  C) (Tympanic)   Resp 14   Ht 1.753 m (5' 9\")   Wt 148.3 kg (327 lb)   SpO2 95%   BMI 48.29 kg/m    Body mass index is 48.29 kg/m .  Physical Exam   GENERAL: healthy, alert, no distress and morbidly obese  EYES: Eyes grossly normal to inspection, PERRL " "and conjunctivae and sclerae normal  RESP: lungs clear to auscultation - no rales, rhonchi or wheezes  MS: no gross musculoskeletal defects noted, no edema  SKIN: no suspicious lesions or rashes POS 3 ulcers through skin on mid pretib : 2x1.3cm, 1x.5 cm and one 1 cm diam more distally --all oozing fluid   POS new red area x 12 cm prox lat calf with central punctate oozing blood & tender   4+ pre tib pitting with underlying brown changes   NEURO: Normal strength and tone, mentation intact and speech normal  PSYCH: concentration poor, inattentive, affect normal/bright, anxious, judgement and insight impaired and appearance well groomed  ANGRY  When told he cannot rtw     Diagnostic Test Results:  Labs reviewed in Epic  Results for orders placed or performed in visit on 08/27/19   INR   Result Value Ref Range    INR 1.38 (H) 0.86 - 1.14           Assessment & Plan       ICD-10-CM    1. Ulcer of left lower extremity, limited to breakdown of skin (H) Lt pretib middle  L97.921 cephALEXin (KEFLEX) 500 MG capsule   2. Lymphedema of left leg I89.0    3. Cellulitis and abscess of leg- new Lt prox lat calf L03.119     L02.419    4. Venous stasis dermatitis of left lower extremity I87.2    5. History of DVT of lower extremity Z86.718 INR   6. Long term current use of anticoagulant therapy Z79.01 INR   7. Tobacco dependence:40-50 pk yr hx F17.200    8. Glucose intolerance (impaired glucose tolerance) R73.02    9. Morbid obesity (H) BMI 40-50 E66.01         Tobacco Cessation:   reports that he has been smoking cigarettes.  He has a 30.00 pack-year smoking history. He uses smokeless tobacco.  Tobacco Cessation Action Plan: Self help information given to patient      BMI:   Estimated body mass index is 48.29 kg/m  as calculated from the following:    Height as of this encounter: 1.753 m (5' 9\").    Weight as of this encounter: 148.3 kg (327 lb).   Weight management plan: Discussed healthy diet and exercise guidelines        Patient " Instructions   1. Keep the injured area above the level of the heart as much as possible to help decrease the pain and  the healing time . Put pillows on either side while sleeping to keep the arm or leg elevated   Keep it elevated   As much as can     Put books under foot of bed to elevate leg     2. Warm soaks 10min 2 times a day and press dry    3. Cover only with 1-2 layers of breathable cotton dressing  Could be high cotton socks   Will need to be changed when seeps through  NO slicky coverings and NO ointment     Keep open to air as much as can    NO TAPE on skin     4. Weight Loss Tips  1. Do not eat after 6 hrs before your expected bedtime  2. Have your heaviest meal for breakfast, a slightly lighter meal at lunch and a snack 6 hrs before bed  3. No sugar/calorie drinks except milk ie no fruit juice, pop, alcohol.  4. Drink milk 30min before meals to decrease your hunger. Also it is excellent as part of your last meal of the day snack  5. Drink lots of water  6. Increase fiber in diet: all bran cereal, salads, popcorn etc  7. Have only one small serving of fruit a day about 1/2 cup (as this is high in sugar)  8. EXERCISE is the bottom line. Without it, you will gain weight even on a low calorie diet. Best if done 2-3X a day as can    Being overweight contributes to high blood pressure and high cholesterol, both of which cause heart attacks, strokes and kidney failure, prediabetes and diabetes, arthritis, and liver disease     5. Would be best to quit smoking as the nicotine results in poor healing     6 as this likely started up on the long car trip to [facility] in early 8-19, he should not go to the state fair or anywhere that requires long standing as this would exacerbate it further     I  Explained the treatment and the reason for it   That he needs to treat aggressively so will heal   stacey has nicotine on board and hi sugar--both of which decrease healing   Plus the edema and oozing      Return in  about 3 days (around 8/30/2019).    Hortensia Peck MD  [facility]

## 2019-08-27 NOTE — TELEPHONE ENCOUNTER
Spoke with  Braden. Patient was seen in clinic today for re-assessment of lower leg wound.    He was given Cephalexin at appt to take 1 cap 4 times/day for 10 days.    He is also on Lovenox BID and has been since INR sub-therapeutic.    Patient's INR result today is still sub-therapeutic at 1.4.    Message sent to provider seen today to get further directions on coumadin dosing. Okay to increase dose due to wound?    Will await a message back from Provider.       Once received, group [facility] will need to be contact.      Dorothy Gilman, RN, RN  Anticoagulation Nurse - [facility] [place]

## 2019-08-27 NOTE — PATIENT INSTRUCTIONS
1. Keep the injured area above the level of the heart as much as possible to help decrease the pain and  the healing time . Put pillows on either side while sleeping to keep the arm or leg elevated   Keep it elevated   As much as can     Put books under foot of bed to elevate leg     2. Warm soaks 10min 2 times a day and press dry    3. Cover only with 1-2 layers of breathable cotton dressing  Could be high cotton socks   Will need to be changed when seeps through  NO slicky coverings and NO ointment     Keep open to air as much as can    NO TAPE on skin     4. Weight Loss Tips  1. Do not eat after 6 hrs before your expected bedtime  2. Have your heaviest meal for breakfast, a slightly lighter meal at lunch and a snack 6 hrs before bed  3. No sugar/calorie drinks except milk ie no fruit juice, pop, alcohol.  4. Drink milk 30min before meals to decrease your hunger. Also it is excellent as part of your last meal of the day snack  5. Drink lots of water  6. Increase fiber in diet: all bran cereal, salads, popcorn etc  7. Have only one small serving of fruit a day about 1/2 cup (as this is high in sugar)  8. EXERCISE is the bottom line. Without it, you will gain weight even on a low calorie diet. Best if done 2-3X a day as can    Being overweight contributes to high blood pressure and high cholesterol, both of which cause heart attacks, strokes and kidney failure, prediabetes and diabetes, arthritis, and liver disease     5. Would be best to quit smoking as the nicotine results in poor healing     6 as this likely started up on the long car trip to [facility] in early 8-19, he should not go to the state fair or anywhere that requires long standing as this would exacerbate it further

## 2019-08-27 NOTE — NURSING NOTE
"Chief Complaint   Patient presents with     RECHECK     /86   Pulse 88   Temp 97.5  F (36.4  C) (Tympanic)   Resp 14   Ht 1.753 m (5' 9\")   Wt 148.3 kg (327 lb)   SpO2 95%   BMI 48.29 kg/m   Estimated body mass index is 48.29 kg/m  as calculated from the following:    Height as of this encounter: 1.753 m (5' 9\").    Weight as of this encounter: 148.3 kg (327 lb).  BP completed using cuff size: jin Ovalle CMA    Health Maintenance Due   Topic Date Due     ADVANCE CARE PLANNING  1960     ZOSTER IMMUNIZATION (1 of 2) 12/06/2008     Health Maintenance reviewed at today's visit patient asked to schedule/complete:   Immunizations:  Patient agrees to schedule    "

## 2019-08-28 RX ORDER — WARFARIN SODIUM 4 MG/1
TABLET ORAL
Qty: 175 TABLET | Refills: 0 | Status: SHIPPED | OUTPATIENT
Start: 2019-08-28 | End: 2019-08-30

## 2019-08-28 RX ORDER — WARFARIN SODIUM 10 MG/1
TABLET ORAL
Qty: 45 TABLET | Refills: 0 | Status: ON HOLD | OUTPATIENT
Start: 2019-08-28 | End: 2019-09-14

## 2019-08-28 NOTE — TELEPHONE ENCOUNTER
Left a message for [facility] to give us a call back.  We would like to increase patient's coumadin dosing for the next couple of days to boost INR. He is currently on Lovenox.  Dorothy Gliman, RN, RN  Anticoagulation Nurse - [facility], [place]

## 2019-08-28 NOTE — TELEPHONE ENCOUNTER
[facility] called and confirmed dosing for coumadin.     After INR result for tomorrow, specific dosing and next INR date will need to be on new script for Coumadin to [facility].    Dorothy Gilman, RN, RN  Anticoagulation Nurse - [facility], [place]

## 2019-08-28 NOTE — TELEPHONE ENCOUNTER
Noted. See INR Results note for details as well.  Dorothy Gilman, RN, RN  Anticoagulation Nurse - [facility], [place]

## 2019-08-28 NOTE — TELEPHONE ENCOUNTER
Hortensia Peck MD   Physician   Family Practice   Telephone Encounter   Signed   Creation Time:  8/27/2019  5:38 PM                  Yes  Increase the coumadin to get him off lovenex, which he dislikes==the subcutaneous heme

## 2019-08-28 NOTE — TELEPHONE ENCOUNTER
Patient has an appointment for wound check tomorrow in clinic. Spoke to Nurse Dai, with [facility].  Coumadin dose will be increased this evening and continue lovenox.  Patient will have INR Checked in Clinic tomorrow.  Dorothy Gilman, RN, RN  Anticoagulation Nurse - [facility], [place]      Prescription approved per [facility].

## 2019-08-29 ENCOUNTER — TELEPHONE (OUTPATIENT)
Dept: FAMILY MEDICINE | Facility: CLINIC | Age: 59
End: 2019-08-29

## 2019-08-29 ENCOUNTER — OFFICE VISIT (OUTPATIENT)
Dept: FAMILY MEDICINE | Facility: CLINIC | Age: 59
End: 2019-08-29
Payer: MEDICARE

## 2019-08-29 VITALS
WEIGHT: 315 LBS | TEMPERATURE: 98.9 F | SYSTOLIC BLOOD PRESSURE: 120 MMHG | HEIGHT: 69 IN | OXYGEN SATURATION: 97 % | DIASTOLIC BLOOD PRESSURE: 70 MMHG | RESPIRATION RATE: 16 BRPM | HEART RATE: 99 BPM | BODY MASS INDEX: 46.65 KG/M2

## 2019-08-29 DIAGNOSIS — E66.01 MORBID OBESITY (H): ICD-10-CM

## 2019-08-29 DIAGNOSIS — I89.0 LYMPHEDEMA OF LEFT LEG: ICD-10-CM

## 2019-08-29 DIAGNOSIS — L97.921 ULCER OF LEFT LOWER EXTREMITY, LIMITED TO BREAKDOWN OF SKIN (H): Primary | ICD-10-CM

## 2019-08-29 PROCEDURE — 99213 OFFICE O/P EST LOW 20 MIN: CPT | Performed by: FAMILY MEDICINE

## 2019-08-29 ASSESSMENT — MIFFLIN-ST. JEOR: SCORE: 2274.57

## 2019-08-29 NOTE — TELEPHONE ENCOUNTER
Nurse at [facility] is concerned about sore on shin. Rates it as a stage 1-2 wound.  Dr. ANTONIO Peck wanted dressing change with acrylic but RN, Dai, mentions every time this is done a layer of skin is peeled off along with dressing.  Dai has tried using Telfa instead and notices wound heals better with that instead of acrylic and does not peel his skin off.       Would like provider to recheck and consider continuing with Telfa and/or letting wound not be covered (unless going outside) as patient will be going back to work soon.    Petey has appt today at 1pm with provider. Should discuss.    Princess ANTONIO Palumbo, CMA

## 2019-08-29 NOTE — PROGRESS NOTES
Subjective     Petey Archibald is a 58 year old male who presents to clinic today for the following health issues:    HPI     Left Lower Leg Venous Stasis Ulcer w/ Stasis Dermatitis w/ Chronic Lymphedema - Now Worse w/ New Area Cellulitis Left Lat Calf   w/ Hx of DVT on Chronic Anticoagulation       Duration: x 2-3 weeks    Onset post long car ride to [facility]     Description  Location: Left Lower Leg    Intensity:  severe    Accompanying signs and symptoms: drainage, swelling and redness with weeping from ant pretib ulcer     New areas of red with cntral punctatate pedro lat prox Lt calf since last wk     History  Previous similar problem: no   Previous evaluation:  Yes    Precipitating or alleviating factors:  Trauma or overuse: no   Aggravating factors include: none  Therapies tried and outcome: Wrap with breathable dresing  Has been elevating leg about 2hr x 2 per day   Bid 10 min warm soaks and leaving open amap           BP Readings from Last 2 Encounters:   08/22/19 122/82   08/22/19 136/83     Hemoglobin A1C (%)   Date Value   02/13/2012 5.7   02/01/2011 5.9     LDL Cholesterol Calculated (mg/dL)   Date Value   05/03/2017 73   05/11/2016 77     MORBID OBESITY      --BMI + 49.9    -comorbid glu intol     --sedentary life style       How many servings of fruits and vegetables do you eat daily?  2-3    On average, how many sweetened beverages do you drink each day (soda, juice, sweet tea, etc)?   5    How many days per week do you miss taking your medication? 0                Reviewed and updated as needed this visit by Provider         Review of Systems   ROS COMP: CONSTITUTIONAL: NEGATIVE for fever, chills, change in weight  INTEGUMENTARY/SKIN: NEGATIVE for worrisome rashes, moles or lesions POS ulcerand edema of LT lower leg   EYES: NEGATIVE for vision changes or irritation  ENT/MOUTH: NEGATIVE for ear, mouth and throat problems  RESP: NEGATIVE for significant cough or SOB  BREAST: NEGATIVE for masses,  "tenderness or discharge  CV: NEGATIVE for chest pain, palpitations or peripheral edema  GI: NEGATIVE for nausea, abdominal pain, heartburn, or change in bowel habits  : NEGATIVE for frequency, dysuria, or hematuria  MUSCULOSKELETAL: NEGATIVE for significant arthralgias or myalgia  NEURO: NEGATIVE for weakness, dizziness or paresthesias  ENDOCRINE: NEGATIVE for temperature intolerance, skin/hair changes  HEME: NEGATIVE for bleeding problems  PSYCHIATRIC: NEGATIVE for changes in mood or affect      Objective    /70   Pulse 99   Temp 98.9  F (37.2  C) (Tympanic)   Resp 16   Ht 1.753 m (5' 9\")   Wt 147.4 kg (325 lb)   SpO2 97%   BMI 47.99 kg/m    Body mass index is 47.99 kg/m .  Physical Exam   GENERAL: healthy, alert, no distress and obese  EYES: Eyes grossly normal to inspection, PERRL and conjunctivae and sclerae normal  RESP: lungs clear to auscultation - no rales, rhonchi or wheezes  MS: no gross musculoskeletal defects noted, no edema  SKIN: no suspicious lesions or rashes  POS 2+ pretib pit on Lt and leg purple to red with 15cm diam red and firm lat prox calf with central punture --> black blood   Ulcer addy=w prox coalesced : 3x1.2 cm and dist < 1cm square and looks dry and forming yellow eschar -tho has been oozing   NEURO: Normal strength and tone, mentation intact and speech normal  PSYCH: mentation appears normal, affect normal/bright    Diagnostic Test Results:  Labs reviewed in Epic  Results for orders placed or performed in visit on 08/27/19   INR   Result Value Ref Range    INR 1.38 (H) 0.86 - 1.14           Assessment & Plan       ICD-10-CM    1. Ulcer of left lower extremity, limited to breakdown of skin (H) Lt pretib middle  L97.921    2. Lymphedema of left leg I89.0    3. Morbid obesity (H) BMI 40-50 E66.01         Tobacco Cessation:   reports that he has been smoking cigarettes.  He has a 30.00 pack-year smoking history. He uses smokeless tobacco.  Tobacco Cessation Action Plan: Self " "help information given to patient      BMI:   Estimated body mass index is 47.99 kg/m  as calculated from the following:    Height as of this encounter: 1.753 m (5' 9\").    Weight as of this encounter: 147.4 kg (325 lb).   Weight management plan: Discussed healthy diet and exercise guidelines        Patient Instructions   Cont bid 10min warm water soak with pressing dry     Keep elevated amap     Max 2 layer cotton or open --amap       Return in about 5 days (around 9/3/2019) for wound check.    Hortensia Peck MD  [facility]        "

## 2019-08-29 NOTE — PATIENT INSTRUCTIONS
Cont bid 10min warm water soak with pressing dry     Keep elevated amap     Max 2 layer cotton or open --amap

## 2019-08-29 NOTE — NURSING NOTE
"Chief Complaint   Patient presents with     RECHECK     /70   Pulse 99   Temp 98.9  F (37.2  C) (Tympanic)   Resp 16   Ht 1.753 m (5' 9\")   Wt 147.4 kg (325 lb)   SpO2 97%   BMI 47.99 kg/m   Estimated body mass index is 47.99 kg/m  as calculated from the following:    Height as of this encounter: 1.753 m (5' 9\").    Weight as of this encounter: 147.4 kg (325 lb).  BP completed using cuff size: jin Ovalle CMA    Health Maintenance Due   Topic Date Due     ADVANCE CARE PLANNING  1960     ZOSTER IMMUNIZATION (1 of 2) 12/06/2008     Health Maintenance reviewed at today's visit patient asked to schedule/complete:   Immunizations:  Patient agrees to schedule    "

## 2019-08-30 ENCOUNTER — ANTICOAGULATION THERAPY VISIT (OUTPATIENT)
Dept: NURSING | Facility: CLINIC | Age: 59
End: 2019-08-30
Payer: MEDICARE

## 2019-08-30 DIAGNOSIS — Z86.718 HISTORY OF DVT OF LOWER EXTREMITY: ICD-10-CM

## 2019-08-30 DIAGNOSIS — Z79.01 LONG TERM CURRENT USE OF ANTICOAGULANT THERAPY: ICD-10-CM

## 2019-08-30 DIAGNOSIS — Z86.711 HISTORY OF PULMONARY EMBOLISM: ICD-10-CM

## 2019-08-30 LAB — INR POINT OF CARE: 1.3 (ref 0.86–1.14)

## 2019-08-30 PROCEDURE — 36416 COLLJ CAPILLARY BLOOD SPEC: CPT

## 2019-08-30 PROCEDURE — 99207 ZZC NO CHARGE NURSE ONLY: CPT

## 2019-08-30 PROCEDURE — 85610 PROTHROMBIN TIME: CPT | Mod: QW

## 2019-08-30 RX ORDER — WARFARIN SODIUM 4 MG/1
TABLET ORAL
Qty: 30 TABLET | Refills: 0 | Status: SHIPPED | OUTPATIENT
Start: 2019-08-30 | End: 2019-09-04

## 2019-08-30 NOTE — PROGRESS NOTES
ANTICOAGULATION FOLLOW-UP CLINIC VISIT    Patient Name:  Petey Archibald  Date:  2019  Contact Type:  Face to Face    SUBJECTIVE:  Patient Findings     Positives:   Emergency department visit    Comments:   Pt is here with staff from a group home. Pt and staff do not know why pt's INR is subtherapeutic. Pt denies changes in diet,activity or medication. Pt is on an antibiotic for an infected leg wound. Pt was in the ER on 19.        Clinical Outcomes     Comments:   Pt is here with staff from a group home. Pt and staff do not know why pt's INR is subtherapeutic. Pt denies changes in diet,activity or medication. Pt is on an antibiotic for an infected leg wound. Pt was in the ER on 19.           OBJECTIVE    INR Protime   Date Value Ref Range Status   2019 1.3 (A) 0.86 - 1.14 Final       ASSESSMENT / PLAN  INR assessment SUB    Recheck INR In: 4 DAYS    INR Location Clinic      Anticoagulation Summary  As of 2019    INR goal:   2.0-3.0   TTR:   72.1 % (3.4 y)   INR used for dosin.3! (2019)   Warfarin maintenance plan:   8 mg (4 mg x 2) every Wed; 10 mg (4 mg x 2.5) all other days   Full warfarin instructions:   : 12 mg; Otherwise 8 mg every Wed; 10 mg all other days   Weekly warfarin total:   68 mg   Plan last modified:   Nereida Grant RN (2019)   Next INR check:   9/3/2019   Target end date:   Indefinite    Indications    History of pulmonary embolism [Z86.711]  Long term current use of anticoagulant therapy [Z79.01]             Anticoagulation Episode Summary     INR check location:   Anticoagulation Clinic    Preferred lab:       Send INR reminders to:   [facility]    Comments:   [facility]; A new Coumadin Prescription will need to sent to [facility] with current dose and next INR check.      Anticoagulation Care Providers     Provider Role Specialty Phone number    Silvino Baker MD [facility] [phone]            See  the Encounter Report to view Anticoagulation Flowsheet and Dosing Calendar (Go to Encounters tab in chart review, and find the Anticoagulation Therapy Visit)    Pt INR is 1.3 today. Pt was in the ER on 8/22/19 and treated for an infected leg wound with an antibiotic. Pt has been seeing  for wound evaluation. Dr. Peck initiated lovenox every 12 hours for the subtherapeutic INR level. Staff from the [facility] have been giving this to pt BID. Pt and staff advised to take 12 mg today 8/30/19 then 8 mg on Wednesdays and 10 mg all the other days. This is a maintenance dose increase for now. Pt and staff advised to continue lovenox until INR is therapeutic. Pt scheduled for follow up with  on 9/3/19. Pt will have an IV blood draw to check INR then. The result will be sent to [facility] dose since the [facility] clinic is not available. Refills of lovenox and warfarin sent to [facility]. Staff aware if signs of clotting (pain, tenderness, swelling, color change in leg or arm, SOB) and bleeding occur (blood in stool, urine, large bruising, bleeding gums, nosebleeds) to have INR check sooner. If sx severe report to ER or concerned for stroke call 911. If general questions or concerns arise, call clinic.       Nereida Grant RN

## 2019-09-03 ENCOUNTER — OFFICE VISIT (OUTPATIENT)
Dept: FAMILY MEDICINE | Facility: CLINIC | Age: 59
End: 2019-09-03
Payer: MEDICARE

## 2019-09-03 VITALS
HEART RATE: 83 BPM | SYSTOLIC BLOOD PRESSURE: 126 MMHG | WEIGHT: 315 LBS | DIASTOLIC BLOOD PRESSURE: 88 MMHG | HEIGHT: 69 IN | RESPIRATION RATE: 18 BRPM | TEMPERATURE: 97.5 F | BODY MASS INDEX: 46.65 KG/M2 | OXYGEN SATURATION: 96 %

## 2019-09-03 DIAGNOSIS — Z79.01 LONG TERM CURRENT USE OF ANTICOAGULANT THERAPY: ICD-10-CM

## 2019-09-03 DIAGNOSIS — L97.921 ULCER OF LEFT LOWER EXTREMITY, LIMITED TO BREAKDOWN OF SKIN (H): Primary | ICD-10-CM

## 2019-09-03 DIAGNOSIS — Z86.711 HISTORY OF PULMONARY EMBOLISM: ICD-10-CM

## 2019-09-03 DIAGNOSIS — E66.01 MORBID OBESITY (H): ICD-10-CM

## 2019-09-03 LAB — INR PPP: 1.64 (ref 0.86–1.14)

## 2019-09-03 PROCEDURE — 99213 OFFICE O/P EST LOW 20 MIN: CPT | Performed by: FAMILY MEDICINE

## 2019-09-03 PROCEDURE — 85610 PROTHROMBIN TIME: CPT | Performed by: FAMILY MEDICINE

## 2019-09-03 PROCEDURE — 36415 COLL VENOUS BLD VENIPUNCTURE: CPT | Performed by: FAMILY MEDICINE

## 2019-09-03 ASSESSMENT — MIFFLIN-ST. JEOR: SCORE: 2333.54

## 2019-09-03 NOTE — PATIENT INSTRUCTIONS
1.  Weight Loss Tips  1. Do not eat after 6 hrs before your expected bedtime  2. Have your heaviest meal for breakfast, a slightly lighter meal at lunch and a snack 6 hrs before bed  3. No sugar/calorie drinks except milk ie no fruit juice, pop, alcohol.  4. Drink milk 30min before meals to decrease your hunger. Also it is excellent as part of your last meal of the day snack  5. Drink lots of water  6. Increase fiber in diet: all bran cereal, salads, popcorn etc  7. Have only one small serving of fruit a day about 1/2 cup (as this is high in sugar)  8. EXERCISE is the bottom line. Without it, you will gain weight even on a low calorie diet. Best if done 2-3X a day as can    Being overweight contributes to high blood pressure and high cholesterol, both of which cause heart attacks, strokes and kidney failure, prediabetes and diabetes, arthritis, and liver disease ''    2. See the INR nurse when she is in ####needs  To get the INR > 2 before can discharge the lovenox      3. Continue 10mgm a day of warfarin     4. Leave the leg ulcer open --no dressing     Or only a single layer of cotton dressing eg a sock     5. Continue the soaks in plain warm water 10min 2 times a day and press dry

## 2019-09-03 NOTE — PROGRESS NOTES
Subjective     Petey Archibald is a 58 year old male who presents to clinic today for the following health issues:    HPI   Follow up      Duration:     Description (location/character/radiation): pt is here to follow up on left leg ulcer.    Intensity:  moderate    Accompanying signs and symptoms: none    History (similar episodes/previous evaluation): None    Precipitating or alleviating factors: None    Therapies tried and outcome: None     Left Lower Leg Venous Stasis Ulcer w/ Stasis Dermatitis w/ Chronic Lymphedema - Now Worse w/ New Area Cellulitis Left Lat Calf   w/ Hx of DVT on Chronic Anticoagulation       Duration: x 2-3 weeks    Onset post long car ride to [facility]     Description  Location: Left Lower Leg    Intensity:  severe    Accompanying signs and symptoms: drainage, swelling and redness with weeping from ant pretib ulcer     New areas of red with cntral punctatate pedro lat prox Lt calf since last wk     History  Previous similar problem: no   Previous evaluation:  Yes    Precipitating or alleviating factors:  Trauma or overuse: no   Aggravating factors include: none  Therapies tried and outcome: Wrap with breathable dresing  Has been elevating leg about 2hr x 2 per day   Bid 10 min warm soaks and leaving open amap           BP Readings from Last 2 Encounters:   08/22/19 122/82   08/22/19 136/83     Hemoglobin A1C (%)   Date Value   02/13/2012 5.7   02/01/2011 5.9     LDL Cholesterol Calculated (mg/dL)   Date Value   05/03/2017 73   05/11/2016 77     MORBID OBESITY      --BMI + 49.9    -comorbid glu intol     --sedentary life style       ANTICOAGULATION    -chronic for recur DVT and PE   -placed on lovenox and now needs to get the INR on coumadin> 2 to stop it         How many servings of fruits and vegetables do you eat daily?  2-3    On average, how many sweetened beverages do you drink each day (soda, juice, sweet tea, etc)?   5    How many days per week do you miss taking your medication?  "0                Reviewed and updated as needed this visit by Provider         Review of Systems   ROS COMP: CONSTITUTIONAL: NEGATIVE for fever, chills, change in weight  INTEGUMENTARY/SKIN: NEGATIVE for worrisome rashes, moles or lesions POS ulcerand edema of LT lower leg   EYES: NEGATIVE for vision changes or irritation  ENT/MOUTH: NEGATIVE for ear, mouth and throat problems  RESP: NEGATIVE for significant cough or SOB  BREAST: NEGATIVE for masses, tenderness or discharge  CV: NEGATIVE for chest pain, palpitations or peripheral edema  GI: NEGATIVE for nausea, abdominal pain, heartburn, or change in bowel habits  : NEGATIVE for frequency, dysuria, or hematuria  MUSCULOSKELETAL: NEGATIVE for significant arthralgias or myalgia  NEURO: NEGATIVE for weakness, dizziness or paresthesias  ENDOCRINE: NEGATIVE for temperature intolerance, skin/hair changes  HEME: NEGATIVE for bleeding problems  PSYCHIATRIC: NEGATIVE for changes in mood or affect      Objective    /88 (Patient Position: Sitting, Cuff Size: Adult Large)   Pulse 83   Temp 97.5  F (36.4  C) (Tympanic)   Resp 18   Ht 1.753 m (5' 9\")   Wt (!) 153.3 kg (338 lb)   SpO2 96%   BMI 49.91 kg/m    Body mass index is 49.91 kg/m .  Physical Exam   GENERAL: healthy, alert, no distress and obese  EYES: Eyes grossly normal to inspection, PERRL and conjunctivae and sclerae normal  RESP: lungs clear to auscultation - no rales, rhonchi or wheezes  MS: no gross musculoskeletal defects noted, no edema  SKIN: no suspicious lesions or rashes  POS 2+ pretib pit on Lt and leg purple to red with 15cm diam red and firm lat prox calf with central punture --> black blood   Ulcer addy=w prox coalesced : 3x1.2 cm and dist < 1cm square and looks dry and forming yellow eschar -no longer  oozing and is cicatricing with 2 areas of epithelialization more prox x 1.6 cm ech   NEURO: Normal strength and tone, mentation intact and speech normal  PSYCH: mentation appears normal, affect " "normal/bright    Diagnostic Test Results:  Labs reviewed in Epic  Results for orders placed or performed in visit on 09/03/19   INR   Result Value Ref Range    INR 1.64 (H) 0.86 - 1.14           Assessment & Plan       ICD-10-CM    1. Ulcer of left lower extremity, limited to breakdown of skin (H) Lt pretib middle  L97.921    2. History of pulmonary embolism Z86.711 INR   3. Long term current use of anticoagulant therapy Z79.01    4. Morbid obesity (H) BMI 40-50 E66.01         Tobacco Cessation:   reports that he has been smoking cigarettes.  He has a 30.00 pack-year smoking history. He uses smokeless tobacco.  Tobacco Cessation Action Plan: Self help information given to patient      BMI:   Estimated body mass index is 47.99 kg/m  as calculated from the following:    Height as of this encounter: 1.753 m (5' 9\").    Weight as of this encounter: 147.4 kg (325 lb).   Weight management plan: Discussed healthy diet and exercise guidelines        Patient Instructions   1.  Weight Loss Tips  1. Do not eat after 6 hrs before your expected bedtime  2. Have your heaviest meal for breakfast, a slightly lighter meal at lunch and a snack 6 hrs before bed  3. No sugar/calorie drinks except milk ie no fruit juice, pop, alcohol.  4. Drink milk 30min before meals to decrease your hunger. Also it is excellent as part of your last meal of the day snack  5. Drink lots of water  6. Increase fiber in diet: all bran cereal, salads, popcorn etc  7. Have only one small serving of fruit a day about 1/2 cup (as this is high in sugar)  8. EXERCISE is the bottom line. Without it, you will gain weight even on a low calorie diet. Best if done 2-3X a day as can    Being overweight contributes to high blood pressure and high cholesterol, both of which cause heart attacks, strokes and kidney failure, prediabetes and diabetes, arthritis, and liver disease ''    2. See the INR nurse when she is in ####    3. Continue 10mgm a day of warfarin     4. " Leave the leg ulcer open --no dressing     Or only a single layer of cotton dressing eg a sock     5. Continue the soaks in plain warm water 10min 2 times a day and press dry       Return in about 3 days (around 9/6/2019).    Hortensia Peck MD  [facility]

## 2019-09-04 ENCOUNTER — TELEPHONE (OUTPATIENT)
Dept: FAMILY MEDICINE | Facility: CLINIC | Age: 59
End: 2019-09-04

## 2019-09-04 ENCOUNTER — ANTICOAGULATION THERAPY VISIT (OUTPATIENT)
Dept: NURSING | Facility: CLINIC | Age: 59
End: 2019-09-04
Payer: MEDICARE

## 2019-09-04 DIAGNOSIS — Z79.01 LONG TERM CURRENT USE OF ANTICOAGULANT THERAPY: ICD-10-CM

## 2019-09-04 DIAGNOSIS — Z86.711 HISTORY OF PULMONARY EMBOLISM: ICD-10-CM

## 2019-09-04 LAB — INR POINT OF CARE: 1.9 (ref 0.86–1.14)

## 2019-09-04 PROCEDURE — 99207 ZZC NO CHARGE NURSE ONLY: CPT

## 2019-09-04 PROCEDURE — 36416 COLLJ CAPILLARY BLOOD SPEC: CPT

## 2019-09-04 PROCEDURE — 85610 PROTHROMBIN TIME: CPT | Mod: QW

## 2019-09-04 RX ORDER — WARFARIN SODIUM 4 MG/1
TABLET ORAL
Qty: 17 TABLET | Refills: 0 | Status: ON HOLD | OUTPATIENT
Start: 2019-09-04 | End: 2019-09-14

## 2019-09-04 NOTE — PROGRESS NOTES
ANTICOAGULATION FOLLOW-UP CLINIC VISIT    Patient Name:  Petey Archibald  Date:  2019  Contact Type:  Face to Face    SUBJECTIVE:  Patient Findings     Comments:   cellulitis        Clinical Outcomes     Comments:   cellulitis           OBJECTIVE    INR Protime   Date Value Ref Range Status   2019 1.9 (A) 0.86 - 1.14 Final       ASSESSMENT / PLAN  INR assessment SUB    Recheck INR In: 1 WEEK    INR Location Clinic      Anticoagulation Summary  As of 2019    INR goal:   2.0-3.0   TTR:   71.8 % (3.4 y)   INR used for dosin.9! (2019)   Warfarin maintenance plan:   8 mg (4 mg x 2) every Wed; 10 mg (4 mg x 2.5) all other days   Full warfarin instructions:   8 mg every Wed; 10 mg all other days   Weekly warfarin total:   68 mg   Plan last modified:   Lisa Ponce RN (2019)   Next INR check:   2019   Target end date:   Indefinite    Indications    History of pulmonary embolism [Z86.711]  Long term current use of anticoagulant therapy [Z79.01]             Anticoagulation Episode Summary     INR check location:   Anticoagulation Clinic    Preferred lab:       Send INR reminders to:   [facility]    Comments:   [facility]; A new Coumadin Prescription will need to sent to [facility] with current dose and next INR check.      Anticoagulation Care Providers     Provider Role Specialty Phone number    Silvino Baker MD [facility] [phone]            See the Encounter Report to view Anticoagulation Flowsheet and Dosing Calendar (Go to Encounters tab in chart review, and find the Anticoagulation Therapy Visit)        Lisa Ponce RN

## 2019-09-04 NOTE — TELEPHONE ENCOUNTER
Per chart review, a separate telephone encounter was made this afternoon with the group home requesting INR results. A nurse spoke with them. No further action needed.

## 2019-09-04 NOTE — TELEPHONE ENCOUNTER
Dai, nurse at the group home calling with multiple requests    She is requesting to have patient stop lovenox injections as they have to send a nurse to the group home twice a day to give the injections.  INR was 1.9 today in clinic,  Please advise if lovenox can be stopped or check an INR sooner than 9/11    Pt has OV tomorrow and they are hoping he can get a note to go back to work as well.    Also wondering if warfarin RX was called or sent in to [facility] as pt currently has no warfarin.

## 2019-09-04 NOTE — TELEPHONE ENCOUNTER
Reason for Call:  Request for results:    Name of test or procedure: inr        Date of test of procedure: 9-3    Location of the test or procedure: [facility]    OK to leave the result message on voice mail or with a family member? YES    Phone number Patient can be reached at:  Home number on file [phone] (home)    Additional comments: [phone]  Call taken on 9/4/2019 at 10:17 AM by LONNIE THOMPSON

## 2019-09-04 NOTE — TELEPHONE ENCOUNTER
Receipt of Escript for warfarin by [facility] noted for today when chart reviewed.    Alyce Thurston, PharmD BCACP  Anticoagulation Clinical Pharmacist

## 2019-09-04 NOTE — TELEPHONE ENCOUNTER
INR can be checked at clinic when he is at clinic for his appt tomorrow.  Then can determine about stopping the Lovenox.  Notify group home that Rx for Warfarin was sent to [facility] pharmacy and they have it

## 2019-09-05 ENCOUNTER — OFFICE VISIT (OUTPATIENT)
Dept: FAMILY MEDICINE | Facility: CLINIC | Age: 59
End: 2019-09-05
Payer: MEDICARE

## 2019-09-05 VITALS
HEIGHT: 69 IN | DIASTOLIC BLOOD PRESSURE: 60 MMHG | WEIGHT: 315 LBS | SYSTOLIC BLOOD PRESSURE: 120 MMHG | HEART RATE: 96 BPM | OXYGEN SATURATION: 95 % | BODY MASS INDEX: 46.65 KG/M2 | RESPIRATION RATE: 20 BRPM | TEMPERATURE: 97.3 F

## 2019-09-05 DIAGNOSIS — Z79.01 LONG TERM CURRENT USE OF ANTICOAGULANT THERAPY: ICD-10-CM

## 2019-09-05 DIAGNOSIS — Z86.718 HISTORY OF DVT OF LOWER EXTREMITY: ICD-10-CM

## 2019-09-05 DIAGNOSIS — I89.0 LYMPHEDEMA OF LEFT LEG: ICD-10-CM

## 2019-09-05 DIAGNOSIS — L02.419 CELLULITIS AND ABSCESS OF LEG: ICD-10-CM

## 2019-09-05 DIAGNOSIS — L03.119 CELLULITIS AND ABSCESS OF LEG: ICD-10-CM

## 2019-09-05 DIAGNOSIS — Z86.711 HISTORY OF PULMONARY EMBOLISM: ICD-10-CM

## 2019-09-05 DIAGNOSIS — F17.200 TOBACCO DEPENDENCE: ICD-10-CM

## 2019-09-05 DIAGNOSIS — E66.01 MORBID OBESITY (H): ICD-10-CM

## 2019-09-05 DIAGNOSIS — L97.921 ULCER OF LEFT LOWER EXTREMITY, LIMITED TO BREAKDOWN OF SKIN (H): Primary | ICD-10-CM

## 2019-09-05 LAB — INR PPP: 1.74 (ref 0.86–1.14)

## 2019-09-05 PROCEDURE — 85610 PROTHROMBIN TIME: CPT | Performed by: FAMILY MEDICINE

## 2019-09-05 PROCEDURE — 36415 COLL VENOUS BLD VENIPUNCTURE: CPT | Performed by: FAMILY MEDICINE

## 2019-09-05 PROCEDURE — 99213 OFFICE O/P EST LOW 20 MIN: CPT | Performed by: FAMILY MEDICINE

## 2019-09-05 ASSESSMENT — MIFFLIN-ST. JEOR: SCORE: 2306.32

## 2019-09-05 NOTE — PATIENT INSTRUCTIONS
1. Continue warm water soaks for 10min 2 times a day     2. Press dry     3. Keep open to the air or only 1 layer of cotton on the wound     4. Elevate the leg as much as possible

## 2019-09-05 NOTE — TELEPHONE ENCOUNTER
Spoke with Guillermo at group home, regarding need for INR check today while Petey is in clinic, an also placed lab appointment for INR using standing order at 1:45 after office visit.    Alyce Thurston, PharmD BCACP  Anticoagulation Clinical Pharmacist

## 2019-09-05 NOTE — TELEPHONE ENCOUNTER
Call from Brent Lala nurse who covers patients group home.     Patient receives his Lovenox 9am and 9pm. He only has 1 left for this evening then he will be out. So if patient is to receive this shot at 0900 on 9/6/2019 he will need an order sent to [facility].     This was pended for provider review.

## 2019-09-05 NOTE — PROGRESS NOTES
Subjective     Petey Archibald is a 58 year old male who presents to clinic today for the following health issues:    HPI     Left Lower Leg Venous Stasis Ulcer w/ Stasis Dermatitis w/ Chronic Lymphedema -  w/ Cellulitis Left Lat Calf -Now Improving   w/ Hx of DVT on Chronic Anticoagulation       Duration: > 2-3 weeks    Onset post long car ride to [facility]     Description  Location: Left Lower Leg-ulcer and edema   With new area cellulitis 2 weeks ago     Intensity:  severe    Accompanying signs and symptoms: drainage, swelling and redness with weeping from ant pretib ulcer     New areas of red with cntral punctatate pedro lat prox Lt calf since last wk     History  Previous similar problem: no   Previous evaluation:  Yes    Precipitating or alleviating factors:  Trauma or overuse: no   Aggravating factors include: none  Therapies tried and outcome: Wrap with breathable dressing  Has been elevating leg about 2hr x 2 per day   Bid 10 min warm soaks and leaving open amap --> doing much better --dry and not oozing and getting smaller       ANTICOAGULATION FOR DVTs     - INR was low and conts to be 1.64 today   Was 1.94 2 d ago   -no more lovenox left and wants to be off the lovenox     BP Readings from Last 2 Encounters:   08/22/19 122/82   08/22/19 136/83     Hemoglobin A1C (%)   Date Value   02/13/2012 5.7   02/01/2011 5.9     LDL Cholesterol Calculated (mg/dL)   Date Value   05/03/2017 73   05/11/2016 77     MORBID OBESITY      --BMI + 49.9    -comorbid glu intol     --sedentary life style     TOBACCO ABUSE    History   Smoking Status     Current Every Day Smoker     Packs/day: 1.00     Years: 42 yrs and conttinues to smoke     Types: Cigarettes   Smokeless Tobacco     Never Used     Comment: started at age 18     FEV1,=66%FVC=61%  In 7-16  CXR  3-21-19  Prominent bibasilar interstitial markings  The nicotine causes poor healing of his wounds       How many servings of fruits and vegetables do you eat daily?   "2-3    On average, how many sweetened beverages do you drink each day (soda, juice, sweet tea, etc)?   5    How many days per week do you miss taking your medication? 0              Reviewed and updated as needed this visit by Provider         Review of Systems   ROS COMP: CONSTITUTIONAL: NEGATIVE for fever, chills, change in weight  INTEGUMENTARY/SKIN: NEGATIVE for worrisome rashes, moles or lesions POS purple to brown Lt leg with 1+ pitting edema and 4.5 cm Lt lat middle ulcer with dry eschar and some epithelial islands ; the lat prox Lt calf is still tense but the area is decreasing and nontender   POS multiple purple spots on abdomen from lovenox   EYES: NEGATIVE for vision changes or irritation  ENT/MOUTH: NEGATIVE for ear, mouth and throat problems  RESP: NEGATIVE for significant cough or SOB  BREAST: NEGATIVE for masses, tenderness or discharge  CV: NEGATIVE for chest pain, palpitations or peripheral edema  GI: NEGATIVE for nausea, abdominal pain, heartburn, or change in bowel habits  : NEGATIVE for frequency, dysuria, or hematuria  MUSCULOSKELETAL: NEGATIVE for significant arthralgias or myalgia  NEURO: NEGATIVE for weakness, dizziness or paresthesias  ENDOCRINE: NEGATIVE for temperature intolerance, skin/hair changes  HEME: NEGATIVE for bleeding problems  PSYCHIATRIC: NEGATIVE for changes in mood or affect      Objective    /60   Pulse 96   Temp 97.3  F (36.3  C) (Tympanic)   Resp 20   Ht 1.753 m (5' 9\")   Wt (!) 150.6 kg (332 lb)   SpO2 95%   BMI 49.03 kg/m    Body mass index is 49.03 kg/m .  Physical Exam   GENERAL: healthy, alert, no distress and obese  EYES: Eyes grossly normal to inspection, PERRL and conjunctivae and sclerae normal  RESP: lungs clear to auscultation - no rales, rhonchi or wheezes  MS: no gross musculoskeletal defects noted, no edema  SKIN: no suspicious lesions or rashes POS purple to brown Lt leg with 1+ pitting edema and 4.5 cm Lt lat middle ulcer with dry eschar and " "some epithelial islands ; the lat prox Lt calf is still tense but the area is decreasing and nontender   POS multiple purple spots on abdomen from lovenox  NEURO: Normal strength and tone, mentation intact and speech normal  PSYCH: mentation appears normal, affect normal/bright --up set as wants off the lovenox     Diagnostic Test Results:  Labs reviewed in Epic  Results for orders placed or performed in visit on 09/05/19   INR   Result Value Ref Range    INR 1.74 (H) 0.86 - 1.14           Assessment & Plan       ICD-10-CM    1. Ulcer of left lower extremity, limited to breakdown of skin (H) Lt pretib middle  L97.921    2. Cellulitis and abscess of leg- new Lt prox lat calf L03.119     L02.419    3. Lymphedema of left leg I89.0    4. History of DVT of lower extremity Z86.718 INR   5. Long term current use of anticoagulant therapy Z79.01 INR   6. Tobacco dependence:40-50 pk yr hx F17.200    7. Morbid obesity (H) BMI 40-50 E66.01         Tobacco Cessation:   reports that he has been smoking cigarettes.  He has a 30.00 pack-year smoking history. He uses smokeless tobacco.  Tobacco Cessation Action Plan: Self help information given to patient      BMI:   Estimated body mass index is 49.03 kg/m  as calculated from the following:    Height as of this encounter: 1.753 m (5' 9\").    Weight as of this encounter: 150.6 kg (332 lb).   Weight management plan: Discussed healthy diet and exercise guidelines        Patient Instructions   1. Continue warm water soaks for 10min 2 times a day     2. Press dry     3. Keep open to the air or only 1 layer of cotton on the wound     4. Elevate the leg as much as possible       Return in about 4 days (around 9/9/2019) for wound check.    Hortensia Peck MD  [facility]        "

## 2019-09-05 NOTE — LETTER
[facility]  [address]  [place] [address]  Phone: [phone]  Fax: [phone]          November 15, 2019    Petey Archibald                                                                                                                                                        [address]  [place] [address]              Dear Petey,    We have been unsuccessful in our attempts to reach you by phone. We had received a refill request for the enoxaparin (LOVENOX) 150MG/ML syringe.  This is not active on your medication list.  Are you still needing this?    Group Home Staff: Please call clinic and update us. [facility] [phone] between the hours of 7:00am - 5:00 pm, Monday through Friday.      Sincerely,    Princess ANTONIO Palumbo [facility] TEAM

## 2019-09-05 NOTE — NURSING NOTE
"Chief Complaint   Patient presents with     RECHECK     /60   Pulse 96   Temp 97.3  F (36.3  C) (Tympanic)   Resp 20   Ht 1.753 m (5' 9\")   Wt (!) 150.6 kg (332 lb)   SpO2 95%   BMI 49.03 kg/m   Estimated body mass index is 49.03 kg/m  as calculated from the following:    Height as of this encounter: 1.753 m (5' 9\").    Weight as of this encounter: 150.6 kg (332 lb).  BP completed using cuff size: jin Ovalle CMA    Health Maintenance Due   Topic Date Due     ADVANCE CARE PLANNING  1960     ZOSTER IMMUNIZATION (1 of 2) 12/06/2008     INFLUENZA VACCINE (1) 09/01/2019     Health Maintenance reviewed at today's visit patient asked to schedule/complete:   Immunizations:  Patient agrees to schedule    "

## 2019-09-06 ENCOUNTER — TELEPHONE (OUTPATIENT)
Dept: FAMILY MEDICINE | Facility: CLINIC | Age: 59
End: 2019-09-06

## 2019-09-06 DIAGNOSIS — Z79.01 LONG TERM CURRENT USE OF ANTICOAGULANT THERAPY: ICD-10-CM

## 2019-09-06 DIAGNOSIS — Z86.711 HISTORY OF PULMONARY EMBOLISM: ICD-10-CM

## 2019-09-06 RX ORDER — WARFARIN SODIUM 5 MG/1
TABLET ORAL
Qty: 8 TABLET | Refills: 0 | Status: SHIPPED | OUTPATIENT
Start: 2019-09-06 | End: 2019-09-09

## 2019-09-06 NOTE — TELEPHONE ENCOUNTER
ANTICOAGULATION MANAGEMENT     Patient Name:  Petey Archibald  Date:  9/6/2019    ASSESSMENT /SUBJECTIVE:      Today's INR result of 1.74 is Subtherapeutic. Goal INR of 2.0-3.0      Warfarin dose taken: Warfarin taken as previously instructed    Diet: No new diet changes affecting INR    Medication changes/ interactions: No new medications/supplements affecting INR    Previous INR Subtherapeutic     S/S of bleeding or thromboembolism No    New injury or illness:  Yes: patient has an ulcer, is elevating leg     Upcoming surgery, procedure or cardioversion:  No    Additional findings: patient is currently on lovenox for subtherapeutic INR and group home is having difficulty finding staff to give injection and patient does not like the injections, spoke with Dr. Peck today and she would like to discontinue the lovenox injections and increase wafarin dosing to increase INR.        Plan    Spoke with Dai group home nurse and discussed dosing.  5 MG tablets sent to [facility] pharmacy and she will transcribe the orders for the group home staff.   regarding INR result and instructed       Warfarin Dosing Instructions:Change your warfarin dose to 15 MG Friday and Saturday and 10 MG Sunday     Instructed patient to follow up no later than: 9/9/19, appointment scheduled for 11 am.     Education provided: No      Dai group home nurse,  verbalizes understanding and agrees to warfarin dosing plan.    Instructed to call the Anticoagulation Clinic for any changes, questions or concerns. (#[phone])        OBJECTIVE    INR   Date Value Ref Range Status   09/05/2019 1.74 (H) 0.86 - 1.14 Final             Anticoagulation Summary  As of 9/6/2019    INR goal:   2.0-3.0   TTR:   71.8 % (3.4 y)   INR used for dosing:   No new INR was available at the time of this encounter.   Warfarin maintenance plan:   No maintenance plan   Full warfarin instructions:   9/6: 15 mg; 9/7: 15 mg; 9/8: 10 mg   Plan last modified:    Ysabel Tena RN (9/6/2019)   Next INR check:   9/9/2019   Target end date:   Indefinite    Indications    History of pulmonary embolism [Z86.711]  Long term current use of anticoagulant therapy [Z79.01]             Anticoagulation Episode Summary     INR check location:   Anticoagulation Clinic    Preferred lab:       [facility]    Comments:   [facility]; A new Coumadin Prescription will need to sent to [facility] with current dose and next INR check.      Anticoagulation Care Providers     Provider Role Specialty Phone number    Silvino Baker MD [facility] [phone]

## 2019-09-07 NOTE — TELEPHONE ENCOUNTER
enoxaparin (LOVENOX) 150 MG/ML syringe  Last Written Prescription Date:  08/30/2019  Last Fill Quantity: 8,  # refills: 1   Last office visit: 9/5/2019 with prescribing provider:  09/05/2019   Future Office Visit:   Next 5 appointments (look out 90 days)    Sep 10, 2019 10:00 AM CDT  [facility] Visit [facility] [address] [place] [address]  [phone]         Requested Prescriptions   Pending Prescriptions Disp Refills     enoxaparin (LOVENOX) 150 MG/ML syringe 8 Syringe 1     Sig: Inject 1 mL (150 mg) Subcutaneous every 12 hours       There is no refill protocol information for this order

## 2019-09-09 ENCOUNTER — ANTICOAGULATION THERAPY VISIT (OUTPATIENT)
Dept: NURSING | Facility: CLINIC | Age: 59
End: 2019-09-09
Payer: MEDICARE

## 2019-09-09 DIAGNOSIS — Z86.711 HISTORY OF PULMONARY EMBOLISM: ICD-10-CM

## 2019-09-09 DIAGNOSIS — Z79.01 LONG TERM CURRENT USE OF ANTICOAGULANT THERAPY: ICD-10-CM

## 2019-09-09 LAB — INR POINT OF CARE: 3.5 (ref 0.86–1.14)

## 2019-09-09 PROCEDURE — 99207 ZZC NO CHARGE NURSE ONLY: CPT

## 2019-09-09 PROCEDURE — 85610 PROTHROMBIN TIME: CPT | Mod: QW

## 2019-09-09 PROCEDURE — 36416 COLLJ CAPILLARY BLOOD SPEC: CPT

## 2019-09-09 RX ORDER — WARFARIN SODIUM 5 MG/1
TABLET ORAL
Qty: 14 TABLET | Refills: 0 | Status: ON HOLD | OUTPATIENT
Start: 2019-09-09 | End: 2019-09-14

## 2019-09-09 NOTE — PROGRESS NOTES
ANTICOAGULATION FOLLOW-UP CLINIC VISIT    Patient Name:  Petey Archibald  Date:  9/9/2019  Contact Type:  Face to Face    SUBJECTIVE:         OBJECTIVE    INR Protime   Date Value Ref Range Status   09/09/2019 3.5 (A) 0.86 - 1.14 Final       ASSESSMENT / PLAN  INR assessment SUPRA      Anticoagulation Summary  As of 9/9/2019    INR goal:   2.0-3.0   TTR:   71.7 % (3.5 y)   INR used for dosing:   3.5! (9/9/2019)   Warfarin maintenance plan:   No maintenance plan   Full warfarin instructions:   9/9: 7.5 mg; 9/10: 10 mg; 9/11: 10 mg; 9/12: 10 mg; 9/13: 10 mg; 9/14: 10 mg; 9/15: 10 mg   Plan last modified:   Ysabel Tena RN (9/6/2019)   Next INR check:   9/16/2019   Target end date:   Indefinite    Indications    History of pulmonary embolism [Z86.711]  Long term current use of anticoagulant therapy [Z79.01]             Anticoagulation Episode Summary     INR check location:   Anticoagulation Clinic    Preferred lab:       Send INR reminders to:   [facility]    Comments:   [facility]; A new Coumadin Prescription will need to sent to [facility] with current dose and next INR check.      Anticoagulation Care Providers     Provider Role Specialty Phone number    Silvino Baker MD [facility] [phone]            See the Encounter Report to view Anticoagulation Flowsheet and Dosing Calendar (Go to Encounters tab in chart review, and find the Anticoagulation Therapy Visit)        Ysabel Tena RN

## 2019-09-10 ENCOUNTER — OFFICE VISIT (OUTPATIENT)
Dept: FAMILY MEDICINE | Facility: CLINIC | Age: 59
End: 2019-09-10
Payer: MEDICARE

## 2019-09-10 ENCOUNTER — TELEPHONE (OUTPATIENT)
Dept: FAMILY MEDICINE | Facility: CLINIC | Age: 59
End: 2019-09-10

## 2019-09-10 VITALS
DIASTOLIC BLOOD PRESSURE: 72 MMHG | WEIGHT: 315 LBS | BODY MASS INDEX: 46.65 KG/M2 | RESPIRATION RATE: 18 BRPM | OXYGEN SATURATION: 96 % | TEMPERATURE: 98.1 F | HEART RATE: 75 BPM | HEIGHT: 69 IN | SYSTOLIC BLOOD PRESSURE: 120 MMHG

## 2019-09-10 DIAGNOSIS — L97.921 ULCER OF LEFT LOWER EXTREMITY, LIMITED TO BREAKDOWN OF SKIN (H): Primary | ICD-10-CM

## 2019-09-10 DIAGNOSIS — Z79.01 LONG TERM CURRENT USE OF ANTICOAGULANT THERAPY: ICD-10-CM

## 2019-09-10 DIAGNOSIS — E66.01 MORBID OBESITY DUE TO EXCESS CALORIES (H): ICD-10-CM

## 2019-09-10 DIAGNOSIS — I89.0 LYMPHEDEMA OF LEFT LEG: ICD-10-CM

## 2019-09-10 PROCEDURE — 99213 OFFICE O/P EST LOW 20 MIN: CPT | Performed by: FAMILY MEDICINE

## 2019-09-10 ASSESSMENT — MIFFLIN-ST. JEOR: SCORE: 2315.39

## 2019-09-10 NOTE — PROGRESS NOTES
Subjective     Petey Archibald is a 58 year old male who presents to clinic today for the following health issues:    HPI     Left Lower Leg Venous Stasis Ulcer w/ Stasis Dermatitis w/ Chronic Lymphedema -  w/ Cellulitis Left Lat Calf -Now Improving   w/ Hx of DVT on Chronic Anticoagulation       Duration: > 2-3 weeks    Onset post long car ride to [facility]     Description  Location: Left Lower Leg-ulcer and edema   With new area cellulitis 2 weeks ago     Intensity:  severe    Accompanying signs and symptoms: drainage, swelling and redness with weeping from ant pretib ulcer     New areas of red with cntral punctatate pedro lat prox Lt calf since last wk     History  Previous similar problem: no   Previous evaluation:  Yes    Precipitating or alleviating factors:  Trauma or overuse: no   Aggravating factors include: none  Therapies tried and outcome: Wrap with breathable dressing  Has been elevating leg about 2hr x 2 per day   Bid 10 min warm soaks and leaving open amap --> doing much better --dry and not oozing and getting smaller     ANTICOAGULATION FOR DVTs       - INR was low and conts to be 1.64 today     Was 1.94 2 d ago now up to 3.5 on 10mgm aday      off the lovenox for 3 d     BP Readings from Last 2 Encounters:   08/22/19 122/82   08/22/19 136/83     Hemoglobin A1C (%)   Date Value   02/13/2012 5.7   02/01/2011 5.9     LDL Cholesterol Calculated (mg/dL)   Date Value   05/03/2017 73   05/11/2016 77     MORBID OBESITY      --BMI + 49.9    -comorbid glu intol     --sedentary life style     TOBACCO ABUSE    History   Smoking Status     Current Every Day Smoker     Packs/day: 1.00     Years: 42 yrs and conttinues to smoke     Types: Cigarettes   Smokeless Tobacco     Never Used     Comment: started at age 18     FEV1,=66%FVC=61%  In 7-16  CXR  3-21-19  Prominent bibasilar interstitial markings  The nicotine causes poor healing of his wounds       How many servings of fruits and vegetables do you eat daily?   2-3    On average, how many sweetened beverages do you drink each day (soda, juice, sweet tea, etc)?   5    How many days per week do you miss taking your medication? 0              Reviewed and updated as needed this visit by Provider         Review of Systems   ROS COMP: CONSTITUTIONAL: NEGATIVE for fever, chills, change in weight  INTEGUMENTARY/SKIN: NEGATIVE for worrisome rashes, moles or lesions - Lt leg ulcer   EYES: NEGATIVE for vision changes or irritation  ENT/MOUTH: NEGATIVE for ear, mouth and throat problems  RESP: NEGATIVE for significant cough or SOB  BREAST: NEGATIVE for masses, tenderness or discharge  CV: NEGATIVE for chest pain, palpitations or peripheral edema  GI: NEGATIVE for nausea, abdominal pain, heartburn, or change in bowel habits  : NEGATIVE for frequency, dysuria, or hematuria  MUSCULOSKELETAL: NEGATIVE for significant arthralgias or myalgia  NEURO: NEGATIVE for weakness, dizziness or paresthesias  ENDOCRINE: NEGATIVE for temperature intolerance, skin/hair changes  HEME: NEGATIVE for bleeding problems  PSYCHIATRIC: NEGATIVE for changes in mood or affect      Objective    There were no vitals taken for this visit.  There is no height or weight on file to calculate BMI.  Physical Exam   GENERAL: healthy, alert, no distress and obese  EYES: Eyes grossly normal to inspection, PERRL and conjunctivae and sclerae normal  RESP: lungs clear to auscultation - no rales, rhonchi or wheezes  MS: no gross musculoskeletal defects noted, no edema  SKIN: no suspicious lesions or rashes  POS 3x1.2 cm dry eschar over Lt lower leg middle of lat ulcer and Lat area nontender tho firm fibrous x 8 cm diam   NEURO: Normal strength and tone, mentation intact and speech normal  PSYCH: mentation appears normal, affect normal/bright    Diagnostic Test Results:  Labs reviewed in Epic  Results for orders placed or performed in visit on 09/09/19   INR point of care   Result Value Ref Range    INR Protime 3.5 (A) 0.86 -  "1.14           Assessment & Plan       ICD-10-CM    1. Ulcer of left lower extremity, limited to breakdown of skin (H) Lt pretib middle  L97.921    2. Lymphedema of left leg I89.0    3. Long term current use of anticoagulant therapy Z79.01    4. Morbid obesity due to excess calories (H)  BMI 40-50 E66.01         Tobacco Cessation:   reports that he has been smoking cigarettes.  He has a 30.00 pack-year smoking history. He uses smokeless tobacco.  Tobacco Cessation Action Plan: Self help information given to patient      BMI:   Estimated body mass index is 49.32 kg/m  as calculated from the following:    Height as of this encounter: 1.753 m (5' 9\").    Weight as of this encounter: 151.5 kg (334 lb).   Weight management plan: Discussed healthy diet and exercise guidelines        Patient Instructions   1. Stop the soaks     2. Continue to keep the sore on your Left leg  open  To the air     3. SEE THE INR NURSE TOMORROW = Sept 11, 2019 #####    As the inr was up to 3.5 suddenly on 10mgm a day , it may overshoot   We need to monitor q few d to be sure     Return in about 2 weeks (around 9/24/2019) for wound check.    Hortensia Peck MD  [facility]        "

## 2019-09-10 NOTE — LETTER
[facility]  [address]  [place] [address]  [phone]                                                                                                           Petey Archibald  [address]  [place] [address]    September 10, 2019      Dear Petey,    Seen by me today.    Ok to return to your day program     Thank you for choosing [facility].  We appreciate the opportunity to serve you and look forward to supporting your healthcare needs in the future.    If you have any questions or concerns, please call me or my staff at [phone].      Sincerely,    Hortensia Peck MD

## 2019-09-10 NOTE — PATIENT INSTRUCTIONS
1. Stop the soaks     2. Continue to keep the sore on your Left leg  open  To the air     3. SEE THE INR NURSE TOMORROW = Sept 11, 2019 #####

## 2019-09-10 NOTE — TELEPHONE ENCOUNTER
Due to staffing at [facility] clinic left VM inquiring if OK to move to lab only INR 09/16,or to appointment earlier in the day.  Requested call back to [phone].      Standing INR order on file, would need to be rescheduled to lab, or earlier in day.      Alyce Thurston, PharmD BCACP  Anticoagulation Clinical Pharmacist

## 2019-09-10 NOTE — NURSING NOTE
"Chief Complaint   Patient presents with     RECHECK     /72 (Patient Position: Sitting, Cuff Size: Adult Regular)   Pulse 75   Temp 98.1  F (36.7  C) (Tympanic)   Resp 18   Ht 1.753 m (5' 9\")   Wt (!) 151.5 kg (334 lb)   SpO2 96%   BMI 49.32 kg/m   Estimated body mass index is 49.32 kg/m  as calculated from the following:    Height as of this encounter: 1.753 m (5' 9\").    Weight as of this encounter: 151.5 kg (334 lb).  BP completed using cuff size: large   Kanchan Ovalle CMA    Health Maintenance Due   Topic Date Due     ADVANCE CARE PLANNING  1960     ZOSTER IMMUNIZATION (1 of 2) 12/06/2008     INFLUENZA VACCINE (1) 09/01/2019     Health Maintenance reviewed at today's visit patient asked to schedule/complete:   Immunizations:  Patient agrees to schedule    "

## 2019-09-10 NOTE — LETTER
[facility]  [address]  [place] [address] [phone]                                                                                                           Petey Archibald  [address]  [place] [address]    September 10, 2019      Dear Petey,    Seen by me today.    OK to RTW on 9-11-19     Thank you for choosing [facility].  We appreciate the opportunity to serve you and look forward to supporting your healthcare needs in the future.    If you have any questions or concerns, please call me or my staff at [phone].      Sincerely,    Hortensia Peck MD

## 2019-09-11 NOTE — TELEPHONE ENCOUNTER
Will be seen today by [facility] nurse 09/11/2019, appt note to reschedule 09/16 appt if needs recheck 09/16.    Aylce Thurston, PharmD BCACP  Anticoagulation Clinical Pharmacist

## 2019-09-13 ENCOUNTER — HOSPITAL ENCOUNTER (INPATIENT)
Facility: CLINIC | Age: 59
LOS: 6 days | Discharge: NURSING, REHAB OR LONG-TERM CARE | DRG: 603 | End: 2019-09-20
Attending: EMERGENCY MEDICINE | Admitting: INTERNAL MEDICINE
Payer: MEDICARE

## 2019-09-13 DIAGNOSIS — L03.116 LEFT LEG CELLULITIS: Primary | ICD-10-CM

## 2019-09-13 DIAGNOSIS — R79.89 ELEVATED SERUM CREATININE: ICD-10-CM

## 2019-09-13 DIAGNOSIS — S81.802A WOUND OF LEFT LOWER EXTREMITY, INITIAL ENCOUNTER: ICD-10-CM

## 2019-09-13 DIAGNOSIS — L03.116 CELLULITIS OF LEFT LOWER EXTREMITY: ICD-10-CM

## 2019-09-13 PROCEDURE — 96374 THER/PROPH/DIAG INJ IV PUSH: CPT

## 2019-09-13 PROCEDURE — 99285 EMERGENCY DEPT VISIT HI MDM: CPT | Mod: 25

## 2019-09-14 ENCOUNTER — APPOINTMENT (OUTPATIENT)
Dept: ULTRASOUND IMAGING | Facility: CLINIC | Age: 59
DRG: 603 | End: 2019-09-14
Attending: EMERGENCY MEDICINE
Payer: MEDICARE

## 2019-09-14 PROBLEM — L03.116 LEFT LEG CELLULITIS: Status: ACTIVE | Noted: 2019-09-14

## 2019-09-14 LAB
ALBUMIN SERPL-MCNC: 3.2 G/DL (ref 3.4–5)
ALP SERPL-CCNC: 79 U/L (ref 40–150)
ALT SERPL W P-5'-P-CCNC: 32 U/L (ref 0–70)
ANION GAP SERPL CALCULATED.3IONS-SCNC: 7 MMOL/L (ref 3–14)
AST SERPL W P-5'-P-CCNC: 21 U/L (ref 0–45)
BASOPHILS # BLD AUTO: 0 10E9/L (ref 0–0.2)
BASOPHILS NFR BLD AUTO: 0.1 %
BILIRUB SERPL-MCNC: 0.4 MG/DL (ref 0.2–1.3)
BUN SERPL-MCNC: 25 MG/DL (ref 7–30)
CALCIUM SERPL-MCNC: 9 MG/DL (ref 8.5–10.1)
CHLORIDE SERPL-SCNC: 101 MMOL/L (ref 94–109)
CO2 BLDCOV-SCNC: 31 MMOL/L (ref 21–28)
CO2 SERPL-SCNC: 27 MMOL/L (ref 20–32)
CREAT SERPL-MCNC: 1.41 MG/DL (ref 0.66–1.25)
DIFFERENTIAL METHOD BLD: ABNORMAL
EOSINOPHIL # BLD AUTO: 0.1 10E9/L (ref 0–0.7)
EOSINOPHIL NFR BLD AUTO: 1.4 %
ERYTHROCYTE [DISTWIDTH] IN BLOOD BY AUTOMATED COUNT: 15.1 % (ref 10–15)
GFR SERPL CREATININE-BSD FRML MDRD: 53 ML/MIN/{1.73_M2}
GLUCOSE SERPL-MCNC: 98 MG/DL (ref 70–99)
HCT VFR BLD AUTO: 36.7 % (ref 40–53)
HGB BLD-MCNC: 11.8 G/DL (ref 13.3–17.7)
IMM GRANULOCYTES # BLD: 0 10E9/L (ref 0–0.4)
IMM GRANULOCYTES NFR BLD: 0.3 %
INR PPP: 2.35 (ref 0.86–1.14)
LACTATE BLD-SCNC: 0.9 MMOL/L (ref 0.7–2.1)
LYMPHOCYTES # BLD AUTO: 1.2 10E9/L (ref 0.8–5.3)
LYMPHOCYTES NFR BLD AUTO: 14.5 %
MCH RBC QN AUTO: 30.1 PG (ref 26.5–33)
MCHC RBC AUTO-ENTMCNC: 32.2 G/DL (ref 31.5–36.5)
MCV RBC AUTO: 94 FL (ref 78–100)
MONOCYTES # BLD AUTO: 0.8 10E9/L (ref 0–1.3)
MONOCYTES NFR BLD AUTO: 10.3 %
NEUTROPHILS # BLD AUTO: 5.9 10E9/L (ref 1.6–8.3)
NEUTROPHILS NFR BLD AUTO: 73.4 %
NRBC # BLD AUTO: 0 10*3/UL
NRBC BLD AUTO-RTO: 0 /100
PCO2 BLDV: 49 MM HG (ref 40–50)
PH BLDV: 7.41 PH (ref 7.32–7.43)
PLATELET # BLD AUTO: 270 10E9/L (ref 150–450)
PO2 BLDV: 17 MM HG (ref 25–47)
POTASSIUM SERPL-SCNC: 3.9 MMOL/L (ref 3.4–5.3)
PROT SERPL-MCNC: 7.7 G/DL (ref 6.8–8.8)
RBC # BLD AUTO: 3.92 10E12/L (ref 4.4–5.9)
SAO2 % BLDV FROM PO2: 24 %
SODIUM SERPL-SCNC: 135 MMOL/L (ref 133–144)
WBC # BLD AUTO: 8.2 10E9/L (ref 4–11)

## 2019-09-14 PROCEDURE — 83605 ASSAY OF LACTIC ACID: CPT

## 2019-09-14 PROCEDURE — 25000128 H RX IP 250 OP 636: Performed by: EMERGENCY MEDICINE

## 2019-09-14 PROCEDURE — 99223 1ST HOSP IP/OBS HIGH 75: CPT | Mod: AI | Performed by: INTERNAL MEDICINE

## 2019-09-14 PROCEDURE — 25000128 H RX IP 250 OP 636: Performed by: INTERNAL MEDICINE

## 2019-09-14 PROCEDURE — 25000132 ZZH RX MED GY IP 250 OP 250 PS 637: Mod: GY | Performed by: INTERNAL MEDICINE

## 2019-09-14 PROCEDURE — 12000000 ZZH R&B MED SURG/OB

## 2019-09-14 PROCEDURE — 93971 EXTREMITY STUDY: CPT | Mod: LT

## 2019-09-14 PROCEDURE — 85610 PROTHROMBIN TIME: CPT | Performed by: EMERGENCY MEDICINE

## 2019-09-14 PROCEDURE — 40000275 ZZH STATISTIC RCP TIME EA 10 MIN

## 2019-09-14 PROCEDURE — 80053 COMPREHEN METABOLIC PANEL: CPT | Performed by: EMERGENCY MEDICINE

## 2019-09-14 PROCEDURE — 85025 COMPLETE CBC W/AUTO DIFF WBC: CPT | Performed by: EMERGENCY MEDICINE

## 2019-09-14 PROCEDURE — 99207 ZZC MOONLIGHTING INDICATOR: CPT | Performed by: INTERNAL MEDICINE

## 2019-09-14 PROCEDURE — 25800030 ZZH RX IP 258 OP 636: Performed by: INTERNAL MEDICINE

## 2019-09-14 PROCEDURE — 94660 CPAP INITIATION&MGMT: CPT

## 2019-09-14 PROCEDURE — 99207 ZZC NON-BILLABLE SERV PER CHARTING: CPT | Performed by: INTERNAL MEDICINE

## 2019-09-14 PROCEDURE — 25000132 ZZH RX MED GY IP 250 OP 250 PS 637: Mod: GY | Performed by: EMERGENCY MEDICINE

## 2019-09-14 PROCEDURE — 82803 BLOOD GASES ANY COMBINATION: CPT

## 2019-09-14 RX ORDER — TRAZODONE HYDROCHLORIDE 100 MG/1
100 TABLET ORAL
COMMUNITY
End: 2020-05-18

## 2019-09-14 RX ORDER — WARFARIN SODIUM 5 MG/1
10 TABLET ORAL DAILY
COMMUNITY
End: 2019-09-22

## 2019-09-14 RX ORDER — POTASSIUM CHLORIDE 1500 MG/1
20-40 TABLET, EXTENDED RELEASE ORAL
Status: DISCONTINUED | OUTPATIENT
Start: 2019-09-14 | End: 2019-09-20 | Stop reason: HOSPADM

## 2019-09-14 RX ORDER — METOCLOPRAMIDE 10 MG/1
10 TABLET ORAL EVERY 6 HOURS PRN
Status: DISCONTINUED | OUTPATIENT
Start: 2019-09-14 | End: 2019-09-20 | Stop reason: HOSPADM

## 2019-09-14 RX ORDER — HYDROCODONE BITARTRATE AND ACETAMINOPHEN 5; 325 MG/1; MG/1
2 TABLET ORAL ONCE
Status: COMPLETED | OUTPATIENT
Start: 2019-09-14 | End: 2019-09-14

## 2019-09-14 RX ORDER — FUROSEMIDE 40 MG
40 TABLET ORAL EVERY MORNING
COMMUNITY
End: 2020-08-06

## 2019-09-14 RX ORDER — WARFARIN SODIUM 5 MG/1
10 TABLET ORAL
Status: COMPLETED | OUTPATIENT
Start: 2019-09-14 | End: 2019-09-14

## 2019-09-14 RX ORDER — BUPROPION HYDROCHLORIDE 150 MG/1
150 TABLET, EXTENDED RELEASE ORAL DAILY
COMMUNITY
End: 2021-06-02

## 2019-09-14 RX ORDER — LORATADINE 10 MG/1
10 TABLET ORAL DAILY
Status: DISCONTINUED | OUTPATIENT
Start: 2019-09-14 | End: 2019-09-20 | Stop reason: HOSPADM

## 2019-09-14 RX ORDER — METOCLOPRAMIDE HYDROCHLORIDE 5 MG/ML
10 INJECTION INTRAMUSCULAR; INTRAVENOUS EVERY 6 HOURS PRN
Status: DISCONTINUED | OUTPATIENT
Start: 2019-09-14 | End: 2019-09-20 | Stop reason: HOSPADM

## 2019-09-14 RX ORDER — ALBUTEROL SULFATE 90 UG/1
2 AEROSOL, METERED RESPIRATORY (INHALATION) EVERY 6 HOURS PRN
Status: DISCONTINUED | OUTPATIENT
Start: 2019-09-14 | End: 2019-09-20 | Stop reason: HOSPADM

## 2019-09-14 RX ORDER — CEFTRIAXONE 1 G/1
1 INJECTION, POWDER, FOR SOLUTION INTRAMUSCULAR; INTRAVENOUS EVERY 24 HOURS
Status: DISCONTINUED | OUTPATIENT
Start: 2019-09-14 | End: 2019-09-17

## 2019-09-14 RX ORDER — POTASSIUM CHLORIDE 7.45 MG/ML
10 INJECTION INTRAVENOUS
Status: DISCONTINUED | OUTPATIENT
Start: 2019-09-14 | End: 2019-09-20 | Stop reason: HOSPADM

## 2019-09-14 RX ORDER — ARIPIPRAZOLE 5 MG/1
5 TABLET ORAL DAILY
Status: DISCONTINUED | OUTPATIENT
Start: 2019-09-14 | End: 2019-09-20 | Stop reason: HOSPADM

## 2019-09-14 RX ORDER — SIMVASTATIN 40 MG
40 TABLET ORAL AT BEDTIME
Status: DISCONTINUED | OUTPATIENT
Start: 2019-09-14 | End: 2019-09-20 | Stop reason: HOSPADM

## 2019-09-14 RX ORDER — POTASSIUM CHLORIDE 1.5 G/1.58G
20-40 POWDER, FOR SOLUTION ORAL
Status: DISCONTINUED | OUTPATIENT
Start: 2019-09-14 | End: 2019-09-20 | Stop reason: HOSPADM

## 2019-09-14 RX ORDER — POTASSIUM CHLORIDE 29.8 MG/ML
20 INJECTION INTRAVENOUS
Status: DISCONTINUED | OUTPATIENT
Start: 2019-09-14 | End: 2019-09-20 | Stop reason: HOSPADM

## 2019-09-14 RX ORDER — OXYCODONE HYDROCHLORIDE 5 MG/1
5-10 TABLET ORAL
Status: DISCONTINUED | OUTPATIENT
Start: 2019-09-14 | End: 2019-09-20 | Stop reason: HOSPADM

## 2019-09-14 RX ORDER — ZOLPIDEM TARTRATE 5 MG/1
10 TABLET ORAL
Status: DISCONTINUED | OUTPATIENT
Start: 2019-09-14 | End: 2019-09-20 | Stop reason: HOSPADM

## 2019-09-14 RX ORDER — ARIPIPRAZOLE 5 MG/1
5 TABLET ORAL DAILY
COMMUNITY
End: 2024-03-05

## 2019-09-14 RX ORDER — PROCHLORPERAZINE MALEATE 10 MG
10 TABLET ORAL EVERY 6 HOURS PRN
Status: DISCONTINUED | OUTPATIENT
Start: 2019-09-14 | End: 2019-09-20 | Stop reason: HOSPADM

## 2019-09-14 RX ORDER — NALOXONE HYDROCHLORIDE 0.4 MG/ML
.1-.4 INJECTION, SOLUTION INTRAMUSCULAR; INTRAVENOUS; SUBCUTANEOUS
Status: DISCONTINUED | OUTPATIENT
Start: 2019-09-14 | End: 2019-09-20 | Stop reason: HOSPADM

## 2019-09-14 RX ORDER — VARENICLINE TARTRATE 1 MG/1
1 TABLET, FILM COATED ORAL 2 TIMES DAILY
COMMUNITY
End: 2020-05-18

## 2019-09-14 RX ORDER — PROCHLORPERAZINE 25 MG
25 SUPPOSITORY, RECTAL RECTAL EVERY 12 HOURS PRN
Status: DISCONTINUED | OUTPATIENT
Start: 2019-09-14 | End: 2019-09-20 | Stop reason: HOSPADM

## 2019-09-14 RX ORDER — MULTIVITAMIN,THERAPEUTIC
1 TABLET ORAL DAILY
COMMUNITY
End: 2021-04-13

## 2019-09-14 RX ORDER — FUROSEMIDE 40 MG
40 TABLET ORAL
Status: DISCONTINUED | OUTPATIENT
Start: 2019-09-14 | End: 2019-09-20 | Stop reason: HOSPADM

## 2019-09-14 RX ORDER — ACETAMINOPHEN 325 MG/1
650 TABLET ORAL EVERY 4 HOURS PRN
Status: DISCONTINUED | OUTPATIENT
Start: 2019-09-14 | End: 2019-09-18

## 2019-09-14 RX ORDER — BUPROPION HYDROCHLORIDE 75 MG/1
150 TABLET ORAL EVERY MORNING
Status: DISCONTINUED | OUTPATIENT
Start: 2019-09-14 | End: 2019-09-20 | Stop reason: HOSPADM

## 2019-09-14 RX ORDER — POTASSIUM CHLORIDE 1500 MG/1
20 TABLET, EXTENDED RELEASE ORAL DAILY
Status: DISCONTINUED | OUTPATIENT
Start: 2019-09-14 | End: 2019-09-20 | Stop reason: HOSPADM

## 2019-09-14 RX ORDER — LIDOCAINE 40 MG/G
CREAM TOPICAL
Status: DISCONTINUED | OUTPATIENT
Start: 2019-09-14 | End: 2019-09-20 | Stop reason: HOSPADM

## 2019-09-14 RX ORDER — FERROUS GLUCONATE 324(38)MG
324 TABLET ORAL
Status: DISCONTINUED | OUTPATIENT
Start: 2019-09-14 | End: 2019-09-20 | Stop reason: HOSPADM

## 2019-09-14 RX ORDER — NALTREXONE HYDROCHLORIDE 50 MG/1
100 TABLET, FILM COATED ORAL DAILY
COMMUNITY
End: 2021-03-23

## 2019-09-14 RX ORDER — SODIUM CHLORIDE 9 MG/ML
INJECTION, SOLUTION INTRAVENOUS CONTINUOUS
Status: ACTIVE | OUTPATIENT
Start: 2019-09-14 | End: 2019-09-14

## 2019-09-14 RX ORDER — MONTELUKAST SODIUM 10 MG/1
10 TABLET ORAL AT BEDTIME
Status: DISCONTINUED | OUTPATIENT
Start: 2019-09-14 | End: 2019-09-20 | Stop reason: HOSPADM

## 2019-09-14 RX ORDER — ONDANSETRON 4 MG/1
4 TABLET, ORALLY DISINTEGRATING ORAL EVERY 6 HOURS PRN
Status: DISCONTINUED | OUTPATIENT
Start: 2019-09-14 | End: 2019-09-20 | Stop reason: HOSPADM

## 2019-09-14 RX ORDER — ONDANSETRON 2 MG/ML
4 INJECTION INTRAMUSCULAR; INTRAVENOUS EVERY 6 HOURS PRN
Status: DISCONTINUED | OUTPATIENT
Start: 2019-09-14 | End: 2019-09-20 | Stop reason: HOSPADM

## 2019-09-14 RX ORDER — CLOTRIMAZOLE 1 %
CREAM (GRAM) TOPICAL 2 TIMES DAILY
Status: DISCONTINUED | OUTPATIENT
Start: 2019-09-14 | End: 2019-09-20 | Stop reason: HOSPADM

## 2019-09-14 RX ORDER — POTASSIUM CL/LIDO/0.9 % NACL 10MEQ/0.1L
10 INTRAVENOUS SOLUTION, PIGGYBACK (ML) INTRAVENOUS
Status: DISCONTINUED | OUTPATIENT
Start: 2019-09-14 | End: 2019-09-20 | Stop reason: HOSPADM

## 2019-09-14 RX ORDER — CEFAZOLIN SODIUM 1 G/3ML
1 INJECTION, POWDER, FOR SOLUTION INTRAMUSCULAR; INTRAVENOUS ONCE
Status: COMPLETED | OUTPATIENT
Start: 2019-09-14 | End: 2019-09-14

## 2019-09-14 RX ORDER — ACETAMINOPHEN 325 MG/1
325-650 TABLET ORAL EVERY 6 HOURS PRN
Status: DISCONTINUED | OUTPATIENT
Start: 2019-09-14 | End: 2019-09-20 | Stop reason: HOSPADM

## 2019-09-14 RX ORDER — PANTOPRAZOLE SODIUM 40 MG/1
40 TABLET, DELAYED RELEASE ORAL DAILY
Status: DISCONTINUED | OUTPATIENT
Start: 2019-09-14 | End: 2019-09-20 | Stop reason: HOSPADM

## 2019-09-14 RX ORDER — FUROSEMIDE 80 MG
80 TABLET ORAL EVERY EVENING
COMMUNITY
End: 2020-08-06

## 2019-09-14 RX ADMIN — SIMVASTATIN 40 MG: 40 TABLET, FILM COATED ORAL at 03:44

## 2019-09-14 RX ADMIN — SIMVASTATIN 40 MG: 40 TABLET, FILM COATED ORAL at 19:48

## 2019-09-14 RX ADMIN — UMECLIDINIUM 1 PUFF: 62.5 AEROSOL, POWDER ORAL at 09:20

## 2019-09-14 RX ADMIN — CEFTRIAXONE SODIUM 1 G: 1 INJECTION, POWDER, FOR SOLUTION INTRAMUSCULAR; INTRAVENOUS at 05:24

## 2019-09-14 RX ADMIN — SERTRALINE HYDROCHLORIDE 150 MG: 100 TABLET ORAL at 09:10

## 2019-09-14 RX ADMIN — MONTELUKAST 10 MG: 10 TABLET, FILM COATED ORAL at 03:44

## 2019-09-14 RX ADMIN — ARIPIPRAZOLE 5 MG: 5 TABLET ORAL at 09:10

## 2019-09-14 RX ADMIN — MELATONIN 2000 UNITS: at 09:10

## 2019-09-14 RX ADMIN — CLOTRIMAZOLE: 1 CREAM TOPICAL at 14:09

## 2019-09-14 RX ADMIN — ACETAMINOPHEN 650 MG: 325 TABLET, FILM COATED ORAL at 17:00

## 2019-09-14 RX ADMIN — MONTELUKAST 10 MG: 10 TABLET, FILM COATED ORAL at 19:48

## 2019-09-14 RX ADMIN — HYDROCODONE BITARTRATE AND ACETAMINOPHEN 2 TABLET: 5; 325 TABLET ORAL at 00:37

## 2019-09-14 RX ADMIN — VANCOMYCIN HYDROCHLORIDE 2000 MG: 5 INJECTION, POWDER, LYOPHILIZED, FOR SOLUTION INTRAVENOUS at 05:54

## 2019-09-14 RX ADMIN — FUROSEMIDE 40 MG: 40 TABLET ORAL at 09:11

## 2019-09-14 RX ADMIN — Medication 1 MG: at 19:48

## 2019-09-14 RX ADMIN — Medication 1 MG: at 03:44

## 2019-09-14 RX ADMIN — FERROUS GLUCONATE 324 MG: 324 TABLET ORAL at 09:10

## 2019-09-14 RX ADMIN — LORATADINE 10 MG: 10 TABLET ORAL at 09:11

## 2019-09-14 RX ADMIN — SODIUM CHLORIDE: 9 INJECTION, SOLUTION INTRAVENOUS at 03:57

## 2019-09-14 RX ADMIN — FUROSEMIDE 40 MG: 40 TABLET ORAL at 17:00

## 2019-09-14 RX ADMIN — BUPROPION HYDROCHLORIDE 150 MG: 75 TABLET, FILM COATED ORAL at 09:10

## 2019-09-14 RX ADMIN — WARFARIN SODIUM 10 MG: 5 TABLET ORAL at 17:03

## 2019-09-14 RX ADMIN — FLUTICASONE FUROATE AND VILANTEROL TRIFENATATE 1 PUFF: 200; 25 POWDER RESPIRATORY (INHALATION) at 09:20

## 2019-09-14 RX ADMIN — POTASSIUM CHLORIDE 20 MEQ: 1500 TABLET, EXTENDED RELEASE ORAL at 09:11

## 2019-09-14 RX ADMIN — CEFAZOLIN 1 G: 1 INJECTION, POWDER, FOR SOLUTION INTRAMUSCULAR; INTRAVENOUS at 00:23

## 2019-09-14 RX ADMIN — CLOTRIMAZOLE: 1 CREAM TOPICAL at 19:48

## 2019-09-14 RX ADMIN — PANTOPRAZOLE SODIUM 40 MG: 40 TABLET, DELAYED RELEASE ORAL at 09:10

## 2019-09-14 ASSESSMENT — ENCOUNTER SYMPTOMS
DIAPHORESIS: 1
FEVER: 0
WOUND: 1
COLOR CHANGE: 1
MYALGIAS: 0
CHILLS: 0

## 2019-09-14 ASSESSMENT — ACTIVITIES OF DAILY LIVING (ADL)
ADLS_ACUITY_SCORE: 12
ADLS_ACUITY_SCORE: 10

## 2019-09-14 ASSESSMENT — MIFFLIN-ST. JEOR: SCORE: 2315.38

## 2019-09-14 NOTE — PHARMACY-ADMISSION MEDICATION HISTORY
Admission medication history interview status for the 9/13/2019  admission is complete. See EPIC admission navigator for prior to admission medications     Medication history source reliability:Moderate. Patient reported he does not know his medications and that his pharmacy would have most up to date records. Patient reported last doses of medications as yesterday.     Actions taken by pharmacist (provider contacted, etc): Called [facility] pharmacy to obtain medication list.      Additional medication history information not noted on PTA med list :  1. Patient no longer using enoxaparin, switched to warfarin. Latest warfarin dose was 7.5 mg on Monday 9/9 and 10 mg all other days and recheck INR on 9/16.     Medication reconciliation/reorder completed by provider prior to medication history? Yes    Time spent in this activity: 40 minutes    Prior to Admission medications    Medication Sig Last Dose Taking? Auth Provider   acetaminophen (TYLENOL) 325 MG tablet Take 1-2 tablets (325-650 mg) by mouth every 6 hours as needed PRN Yes Raúl Riddle MD   albuterol (ALBUTEROL) 108 (90 BASE) MCG/ACT inhaler Inhale 2 puffs into the lungs every 6 hours as needed PRN Yes Wilman Peck MD   ARIPiprazole (ABILIFY) 5 MG tablet Take 5 mg by mouth daily 9/13/2019 Yes Unknown, Entered By History   buPROPion (WELLBUTRIN SR) 150 MG 12 hr tablet Take 300 mg by mouth daily (2 x 150 mg tablet) 9/13/2019 Yes Unknown, Entered By History   DEXILANT 60 MG CPDR CR capsule TAKE 1 CAPSULE BY MOUTH ONCE DAILY (FOR HEARTBURN) 9/13/2019 Yes Raúl Riddle MD   EPINEPHrine (EPIPEN 2-BRE) 0.3 MG/0.3ML injection 2-pack INJECT 0.3MG INTRAMUSCULAR ONE TIME FOR ONE DOSE AS NEEDED FOR ANAPHYLAXIS (CALL 911 IF YOU HAVE TO GIVE) (FOR BEE STINGS) **LABEL EACH PEN* PRN Yes Raúl Riddle MD   ferrous gluconate (FERGON) 324 (38 Fe) MG tablet TAKE 1 TABLET BY MOUTH TWICE DAILY (IRON SUPPLEMENT) 9/13/2019 Yes Raúl Riddle MD   furosemide  (LASIX) 40 MG tablet Take 40 mg by mouth every morning 9/13/2019 Yes Unknown, Entered By History   furosemide (LASIX) 80 MG tablet Take 80 mg by mouth every evening 9/13/2019 Yes Unknown, Entered By History   guaiFENesin (MUCINEX) 600 MG 12 hr tablet Take 2 tablets (1,200 mg) by mouth 2 times daily 9/13/2019 Yes Hortensia Peck MD   loratadine (CLARITIN) 10 MG tablet Take 10 mg by mouth daily. 9/13/2019 Yes Reported, Patient   montelukast (SINGULAIR) 10 MG tablet TAKE 1 TABLET BY MOUTH EVERY MORNING. (ASTHMA) 9/13/2019 Yes Raúl Riddle MD   multivitamin, therapeutic (THERA-VIT) TABS tablet Take 1 tablet by mouth daily 9/13/2019 Yes Unknown, Entered By History   naltrexone (DEPADE/REVIA) 50 MG tablet Take 100 mg by mouth daily ( 2 x 50 mg tablet) 9/13/2019 Yes Unknown, Entered By History   potassium chloride SA (K-DUR/KLOR-CON M) 20 MEQ CR tablet TAKE 1 TABLET BY MOUTH TWICE DAILY. (LOW POTASSIUM) 9/13/2019 Yes Raúl Riddle MD   PULMICORT FLEXHALER 180 MCG/ACT inhaler INHALE 2 PUFFS INTO THE LUNGS TWICE DAILY 9/13/2019 Yes Raúl Riddle MD   sertraline (ZOLOFT) 100 MG tablet Take 100 mg by mouth daily  9/13/2019 Yes Reported, Patient   simvastatin (ZOCOR) 40 MG tablet TAKE 1 TABLET BY MOUTH EVERY MORNING.  (CHOLESTEROL) 9/13/2019 Yes Raúl Riddle MD   SPIRIVA HANDIHALER 18 MCG inhaled capsule INHALE CONTENTS OF 1 CAPSULE INTO THE LUNGS ONCE DAILY. FOR BRONCHITIS AND EMPHYSEMA. 9/13/2019 Yes Hortensia Peck MD   spironolactone (ALDACTONE) 25 MG tablet TAKE 1 TABLET BY MOUTH ONCE DAILY. (HIGH BLOOD PRESSURE) 9/13/2019 Yes Raúl Riddle MD   traZODone (DESYREL) 100 MG tablet Take 100 mg by mouth nightly as needed for sleep PRN Yes Unknown, Entered By History   varenicline (CHANTIX) 1 MG tablet Take 1 mg by mouth 2 times daily 9/13/2019 Yes Unknown, Entered By History   warfarin ANTICOAGULANT (COUMADIN) 5 MG tablet Take 10 mg by mouth daily From 9/10/19 until INR recheck on  "9/16/19 9/13/2019 Yes Unknown, Entered By History   Gauze Pads & Dressings (GAUZE PADS 4\"X4\") 4\"X4\" PADS 1 each 3 times daily as needed   Wilman Peck MD   order for DME Oxygen 2 Li/min  at night and bled into BiPAP   Goltz, Bennett Ezra, PA-C   order for DME Equipment being ordered: CPAP with mask and tubing   Raúl Riddle MD   order for DME Equipment being ordered: support compression hose BK  2 pair black 30mm HG   To be applied on arising & removed while lying down to go to sleep   Hortensia Peck MD         "

## 2019-09-14 NOTE — H&P
[facility]    History and Physical  Hospitalist       Date of Admission:  9/13/2019    Assessment & Plan      This is a 58-year-old male with history of sleep apnea on CPAP, peripheral vascular disease, chronic venous stasis dermatitis, hyperlipidemia, history of DVT and PE in the past, on chronic anticoagulation on Coumadin, intellectual disabilities, GERD, recent history of left lower extremity cellulitis, now comes to the ER again with left leg swelling, pain and redness.     ASSESSMENT AND PLAN:   1.  Left lower extremity cellulitis:  This is a 58-year-old male with multiple comorbidities as mentioned above, with left leg swelling, redness, ulcers and tenderness, all consistent with cellulitis at this time.  Ultrasound of the lower extremity is negative for DVT.  I am not concerned about the DVT, as he is already anticoagulated.  We will start him on vancomycin and ceftriaxone, as he is ALLERGIC TO PENICILLIN.  We will ask PT/OT to put a lymphedema wrap, ask the nurse to evaluate his left leg, and we will see how he does.  If he does not get better and continues  to have problems, it will be a good idea to obtain the MRI to rule out any abscess or osteomyelitis.   2.  Acute on chronic renal failure:  Baseline creatinine 0.9-1.1.  The patient is on Lasix 40 in the morning and 80 at night, and Aldactone.  I will decrease the Lasix to 40 b.i.d. from now and hold Aldactone, give him IV fluids for a few hours, and we will repeat his creatinine in the morning   3.  Sleep apnea:  On CPAP.  We will continue with that.   4.  History of peripheral edema and chronic venous stasis:  OT to put a lymphedema wrap.   5.  History of chronic obstructive pulmonary disease:  Is in good control at this time.  He is on Dulera and Singulair.  We will continue with that.   6.  History of deep venous thrombosis and pulmonary embolism in the past:  On chronic anticoagulation with Coumadin.  INR is therapeutic at 2.35.   We will ask Pharmacy to dose  his Coumadin.   7.  Gastroesophageal reflux disease:  On PPI.  We will continue with that.      DEEP VENOUS THROMBOSIS PROPHYLAXIS:  With Coumadin.      CODE STATUS:  Full code.      The case was discussed with the ER physician and the nursing staff taking care of the patient.         KALI REED MD         DVT Prophylaxis: Warfarin  Code Status: Full Code    Disposition: Expected discharge in 2 days once stable.    Kali Reed MD    Primary Care Physician   Hortensia Peck    Chief Complaint   Left leg swelling, redness and wound     History is obtained from the patient    History of Present Illness   Admitted:     09/13/2019      HISTORY OF PRESENT ILLNESS:  This is a 58-year-old male with history of sleep apnea on CPAP, peripheral vascular disease, chronic venous stasis dermatitis, hyperlipidemia, history of DVT and PE in the past, on chronic anticoagulation on Coumadin, intellectual disabilities, GERD, recent history of left lower extremity cellulitis, now comes to the ER again with left leg swelling, pain and redness.      According to the patient, he has infection to his left leg 2 weeks ago, and ulcer, and was getting better.  He was just done with 2 weeks of antibiotic, Keflex, last Wednesday and was doing fine until 2 days, then he started noticing that his left leg started to swell again and redness today.  He was at work and started hurting, swelling and redness and drainage from the ulcers, so he came to the ER.  The patient denies any fever, chills, nausea, vomiting, headache, dizziness, lightheadedness, abdominal pain, back pain, dysuria, hematuria, constipation, diarrhea, but does have a lot of sweating.  His leg is not getting better and getting worse, so he comes to the ER for evaluation.  The rest of the review of system is negative at this time.     Past Medical History    I have reviewed this patient's medical history and updated it with pertinent information if  needed.   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     COPD (chronic obstructive pulmonary disease) (H)      History of DVT of lower extremity      History of pulmonary embolism      Hyperlipidemia      Mild intellectual disabilities      Morbid obesity (H)      Peripheral edema      Peripheral vascular disease (H)      Sleep apnea      Tobacco dependence      Venous stasis dermatitis        Past Surgical History   I have reviewed this patient's surgical history and updated it with pertinent information if needed.  Past Surgical History:   Procedure Laterality Date     COLONOSCOPY N/A 4/15/2019    Procedure: COMBINED COLONOSCOPY, SINGLE OR MULTIPLE BIOPSY/POLYPECTOMY BY BIOPSY;  Surgeon: Jovana Ohara MD;  Location:  GI     EXCISE MALIGNANT LESIONS, TRUNK/ARMS/LEGS 0.5CM OR LESS Left 5/26/2017    Procedure: EXCISE MALIGNANT LESIONS, TRUNK/ARMS/LEGS 0.5CM OR LESS;  EXCISION LEFT ELBOW MASS;  Surgeon: Jose Azevedo MD;  Location:  GI     RECTAL SURGERY      perianal abscess?       Prior to Admission Medications   Prior to Admission Medications   Prescriptions Last Dose Informant Patient Reported? Taking?   ANUSOL-HC 25 MG RE SUPP   No No   Sig: INSERT ONE SUPPOSITORY RECTALLY UP TO TWICE A DAY AS NEEDED   ARIPiprazole (ABILIFY) 10 MG tablet   Yes No   Sig: Take 0.5 tablets (5 mg) by mouth daily   BUPROPION HCL PO   Yes No   Sig: Take 150 mg by mouth every morning   Cholecalciferol (VITAMIN D-3) 1000 UNITS CAPS   Yes No   Sig: Take 2 capsules by mouth daily   DEXILANT 60 MG CPDR CR capsule   No No   Sig: TAKE 1 CAPSULE BY MOUTH ONCE DAILY (FOR HEARTBURN)   EPINEPHrine (EPIPEN 2-BRE) 0.3 MG/0.3ML injection 2-pack   No No   Sig: INJECT 0.3MG INTRAMUSCULAR ONE TIME FOR ONE DOSE AS NEEDED FOR ANAPHYLAXIS (CALL 911 IF YOU HAVE TO GIVE) (FOR BEE STINGS) **LABEL EACH PEN*   EPINEPHrine (EPIPEN) 0.3 MG/0.3ML injection   Yes No   Sig: Inject 0.3 mg into the muscle  "once as needed.   Ferrous Gluconate 324 (37.5 FE) MG TABS   Yes No   Sig: Take 1 tablet by mouth 2 times daily.   Gauze Pads & Dressings (GAUZE PADS 4\"X4\") 4\"X4\" PADS   No No   Si each 3 times daily as needed   MULTI-VITAMIN OR TABS   Yes No   Si TABLET DAILY   Miconazole Nitrate (ZEASORB-AF EX)   Yes No   Sig: Externally apply topically once a week   Mometasone Furoate 200 MCG/ACT AERO   No No   Sig: Inhale 2 puffs into the lungs 2 times daily   PULMICORT FLEXHALER 180 MCG/ACT inhaler   No No   Sig: INHALE 2 PUFFS INTO THE LUNGS TWICE DAILY   SPIRIVA HANDIHALER 18 MCG inhaled capsule   No No   Sig: INHALE CONTENTS OF 1 CAPSULE INTO THE LUNGS ONCE DAILY. FOR BRONCHITIS AND EMPHYSEMA.   Sodium Phosphates (PHOSPHATE ENEMA RE)   Yes No   Sig: Place 1 enema rectally as needed.   acetaminophen (TYLENOL) 325 MG tablet   No No   Sig: Take 1-2 tablets (325-650 mg) by mouth every 6 hours as needed   albuterol (ALBUTEROL) 108 (90 BASE) MCG/ACT inhaler   No No   Sig: Inhale 2 puffs into the lungs every 6 hours as needed   cephALEXin (KEFLEX) 500 MG capsule   No No   Sig: Take 1 capsule (500 mg) by mouth 4 times daily   clindamycin (CLEOCIN) 300 MG capsule   No No   Sig: Take 1 capsule (300 mg) by mouth 3 times daily   clotrimazole-betamethasone (LOTRISONE) cream   No No   Sig: Apply topically 2 times daily   enoxaparin (LOVENOX) 150 MG/ML syringe   No No   Sig: Inject 1 mL (150 mg) Subcutaneous every 12 hours   ferrous gluconate (FERGON) 324 (38 Fe) MG tablet   No No   Sig: TAKE 1 TABLET BY MOUTH TWICE DAILY (IRON SUPPLEMENT)   furosemide (LASIX) 40 MG tablet   No No   Sig: Take 40 mg daily in AM and 80 mg daily in PM   guaiFENesin (MUCINEX) 600 MG 12 hr tablet   No No   Sig: Take 2 tablets (1,200 mg) by mouth 2 times daily   hydrocortisone (ANUSOL-HC) 2.5 % rectal cream   No No   Sig: Place rectally 2 times daily   loratadine (CLARITIN) 10 MG tablet   Yes No   Sig: Take 10 mg by mouth daily.   mometasone-formoterol " (DULERA) 200-5 MCG/ACT oral inhaler   No No   Sig: Inhale 2 puffs into the lungs 2 times daily   montelukast (SINGULAIR) 10 MG tablet   No No   Sig: TAKE 1 TABLET BY MOUTH EVERY MORNING. (ASTHMA)   mupirocin (BACTROBAN) 2 % ointment   Yes No   Sig: Apply topically 2 times daily   order for DME   No No   Sig: Equipment being ordered: support compression hose BK  2 pair black 30mm HG   To be applied on arising & removed while lying down to go to sleep   order for DME   No No   Sig: Equipment being ordered: CPAP with mask and tubing   order for DME   No No   Sig: Oxygen 2 Li/min  at night and bled into BiPAP   potassium chloride SA (K-DUR/KLOR-CON M) 20 MEQ CR tablet   No No   Sig: TAKE 1 TABLET BY MOUTH TWICE DAILY. (LOW POTASSIUM)   sertraline (ZOLOFT) 100 MG tablet   Yes No   Sig: Take 1.5 tablets by mouth daily.   simvastatin (ZOCOR) 40 MG tablet   No No   Sig: TAKE 1 TABLET BY MOUTH EVERY MORNING.  (CHOLESTEROL)   spironolactone (ALDACTONE) 25 MG tablet   No No   Sig: TAKE 1 TABLET BY MOUTH ONCE DAILY. (HIGH BLOOD PRESSURE)   triamcinolone (KENALOG) 0.1 % cream   No No   Sig: Apply topically 2 times daily as needed   warfarin (COUMADIN) 10 MG tablet   No No   Sig: 10 mg on Mondays or as directed by the anticoagulation clinic   warfarin (COUMADIN) 4 MG tablet   No No   Sig: Take 2 tablets (8 mg) on Wed and 2.5 tablets (10 mg) all other days or as directed by the Anticoagulation Clinic. Next INR check 9/11/19   warfarin (COUMADIN) 5 MG tablet   No No   Sig: Take 1 1/2 tablets(7.5MG) on 9/9  and 2 tablets (10 MG) all other days and recheck INR on 9/16/19 as directed by the anticoagulation clinc   zolpidem (AMBIEN) 10 MG tablet   Yes No   Sig: Take 1 tablet (10 mg) by mouth nightly as needed for sleep      Facility-Administered Medications: None     Allergies   Allergies   Allergen Reactions     Bees      Augmentin Rash     Penicillins Rash       Social History   I have reviewed this patient's social history and  updated it with pertinent information if needed. Petey Archibald  reports that he has been smoking cigarettes.  He has a 30.00 pack-year smoking history. He uses smokeless tobacco. He reports that he does not drink alcohol or use drugs.    Family History   I have reviewed this patient's family history and updated it with pertinent information if needed.   Family History   Problem Relation Age of Onset     Diabetes Brother      Unknown/Adopted Mother      Unknown/Adopted Father        Review of Systems   CONSTITUTIONAL:  negative  EYES:  negative  HEENT:  negative  RESPIRATORY:  negative  CARDIOVASCULAR:  negative  GASTROINTESTINAL:  negative  GENITOURINARY:  negative  INTEGUMENT/BREAST:  negative  HEMATOLOGIC/LYMPHATIC:  negative  ALLERGIC/IMMUNOLOGIC:  negative  ENDOCRINE:  negative  MUSCULOSKELETAL:  positive for  Left leg swelling and pain   NEUROLOGICAL:  negative  BEHAVIOR/PSYCH:  negative    Physical Exam   Temp: 99.4  F (37.4  C) Temp src: Oral BP: 109/66 Pulse: 87   Resp: 16 SpO2: 98 % O2 Device: None (Room air)    Vital Signs with Ranges  Temp:  [99.4  F (37.4  C)] 99.4  F (37.4  C)  Pulse:  [87] 87  Resp:  [16] 16  BP: (109)/(66) 109/66  SpO2:  [98 %] 98 %  334 lbs 0 oz    Constitutional: Awake, alert, cooperative, no apparent distress.  Eyes: Conjunctiva and pupils examined and normal.  HEENT: Moist mucous membranes, normal dentition.  Respiratory: Clear to auscultation bilaterally, no crackles or wheezing.  Cardiovascular: Regular rate and rhythm, normal S1 and S2, and no murmur noted.  GI: Soft, distended because of obesity, non-tender, normal bowel sounds.  Lymph/Hematologic: No anterior cervical or supraclavicular adenopathy.  Skin: No rashes, no cyanosis, no edema.  Musculoskeletal: Left lower extremity is swollen from ankle to the knee, has anterior shin ulcer with clear serous drainage, erythema, warmth and tenderness positive.  Neurologic: Cranial nerves 2-12 intact, normal strength and  sensation.  Psychiatric: Alert, oriented to person, place and time, no obvious anxiety or depression.    Data   Data reviewed today:  I personally reviewed no images or EKG's today. U/S negative for DVT  Recent Labs   Lab 09/14/19  0018 09/09/19   WBC 8.2  --    HGB 11.8*  --    MCV 94  --      --    INR 2.35* 3.5*     --    POTASSIUM 3.9  --    CHLORIDE 101  --    CO2 27  --    BUN 25  --    CR 1.41*  --    ANIONGAP 7  --    JESSICA 9.0  --    GLC 98  --    ALBUMIN 3.2*  --    PROTTOTAL 7.7  --    BILITOTAL 0.4  --    ALKPHOS 79  --    ALT 32  --    AST 21  --        Recent Results (from the past 24 hour(s))   US Lower Extremity Venous Duplex Left    Narrative    ULTRASOUND VENOUS LEFT LOWER EXTREMITY WITH DOPPLER   9/14/2019 1:14  AM     HISTORY: Left leg swelling.    COMPARISON: 8/22/2019.    TECHNIQUE: Spectral waveform and color Doppler evaluation were  performed.    FINDINGS: Normal compressibility of the left common femoral, femoral,  popliteal, posterior tibial, peroneal and greater saphenous veins.  Unremarkable Doppler waveform evaluation of the left common femoral,  femoral and popliteal veins.      Impression    IMPRESSION: No evidence of thrombus in the major veins of the left  lower extremity.    DMITRI HARTMAN MD

## 2019-09-14 NOTE — H&P
Admitted:     09/13/2019      HISTORY OF PRESENT ILLNESS:  This is a 58-year-old male with history of sleep apnea on CPAP, peripheral vascular disease, chronic venous stasis dermatitis, hyperlipidemia, history of DVT and PE in the past, on chronic anticoagulation on Coumadin, intellectual disabilities, GERD, recent history of left lower extremity cellulitis, now comes to the ER again with left leg swelling, pain and redness.      According to the patient, he has infection to his left leg 2 weeks ago, and ulcer, and was getting better.  He was just done with 2 weeks of antibiotic, Keflex, last Wednesday and was doing fine until 2 days, then he started noticing that his left leg started to swell again and redness today.  He was at work and started hurting, swelling and redness and drainage from the ulcers, so he came to the ER.  The patient denies any fever, chills, nausea, vomiting, headache, dizziness, lightheadedness, abdominal pain, back pain, dysuria, hematuria, constipation, diarrhea, but does have a lot of sweating.  His leg is not getting better and getting worse, so he comes to the ER for evaluation.  The rest of the review of system is negative at this time.      ASSESSMENT AND PLAN:   1.  Left lower extremity cellulitis:  This is a 58-year-old male with multiple comorbidities as mentioned above, with left leg swelling, redness, ulcers and tenderness, all consistent with cellulitis at this time.  Ultrasound of the lower extremity is negative for DVT.  I am not concerned about the DVT, as he is already anticoagulated.  We will start him on vancomycin and ceftriaxone, as he is ALLERGIC TO PENICILLIN.  We will ask PT/OT to put a lymphedema wrap, ask the nurse to evaluate his left leg, and we will see how he does.  If he does not get better and continues  to have problems, it will be a good idea to obtain the MRI to rule out any abscess or osteomyelitis.   2.  Acute on chronic renal failure:  Baseline creatinine  0.9-1.1.  The patient is on Lasix 40 in the morning and 80 at night, and Aldactone.  I will decrease the Lasix to 40 b.i.d. from now and hold Aldactone, give him IV fluids for a few hours, and we will repeat his creatinine in the morning   3.  Sleep apnea:  On CPAP.  We will continue with that.   4.  History of peripheral edema and chronic venous stasis:  OT to put a lymphedema wrap.   5.  History of chronic obstructive pulmonary disease:  Is in good control at this time.  He is on Dulera and Singulair.  We will continue with that.   6.  History of deep venous thrombosis and pulmonary embolism in the past:  On chronic anticoagulation with Coumadin.  INR is therapeutic at 2.35.  We will ask Pharmacy to dose  his Coumadin.   7.  Gastroesophageal reflux disease:  On PPI.  We will continue with that.      DEEP VENOUS THROMBOSIS PROPHYLAXIS:  With Coumadin.      CODE STATUS:  Full code.      The case was discussed with the ER physician and the nursing staff taking care of the patient.         MATHEUS SAWYER MD             D: 2019   T: 2019   MT: MICHELLE      Name:     SUMIT HUTCHINS   MRN:      [id number]        Account:      [id number]   :      1960        Admitted:     2019                   Document: [id number]       cc: Raúl Riddle MD

## 2019-09-14 NOTE — PLAN OF CARE
A&Ox4. VSS on CPAP overnight. Up SBA. Denies pain, nausea, SOB, but stated SANCHEZ. CMS intact. Rashy redness under bilat breasts. Rash over bilat buttocks with multiple scabs from itching, continues into raised, dry, patchy rash over post and anterior thighs. LLE with open wound on shin, wound cleansed and adaptic placed with gauze wrap, scant purulent drainage noted. +3 LLE edema through knee with redness and tenderness. IVF infusing at 100/hr, intmt abx. Lymphedema and WOC to see today.

## 2019-09-14 NOTE — PROVIDER NOTIFICATION
MD Notification    Notified Person: MD    Notified Person Name: Chrystal Reed    Notification Date/Time: 9/14/19 @ 0530    Notification Interaction: page/phone    Purpose of Notification: pt has dry, raised rash over buttocks through upper thighs, patches on anterior/posterior thighs. Should isolation precautions be initiated until evaluated?    Orders Received: place patient on contact isolation and review with care team on day shift.    Comments:

## 2019-09-14 NOTE — ED PROVIDER NOTES
History     Chief Complaint:  Wound Infection     HPI   Petey Archibald is a 58 year old male on Coumadin with history of COPD, DVT in the left lower extremity, and PE who presents with a wound infection. The patient reports that he had this wound from some time ago that it has since reopened. He states that he has received antibiotic shots to clear up the redness, last one 3 days ago, but the redness, as well as new swelling, returned today, prompting him to come in. He notes that the wound did scab over for some time, so his PCP cleared him to return to work, but it eventually worsened. He states that today's presentation is worse and different than yesterday. He denies any fever, chills, or body aches, but endorses that he was extremely diaphoretic at work today.     Allergies:  Augmentin  Penicillins     Medications:    Albuterol  Abilify  Bupropion  Dexilant  Lovenox  Lasix  Singulair  Simvastatin  Zoloft  Warfarin  Ambien    Past Medical History:    Borderline mental retardation  COPD  H/o DVT of lower extremity  H/o PE  Hyperlipidemia  Mild intellectual disabilities  Morbid obesity  Peripheral edema  Peripheral vascular disease  Sleep apnea  Tobacco dependence  Venous stasis dermatitis    Past Surgical History:    Excise malignant lesions, trunk/arm/legs  Rectal surgery    Family History:    Diabetes    Social History:  Smoking status: Current every day smoker  Alcohol use: No  Drug use: No  PCP: Raúl Riddle  Presents to the ED with himself  Marital Status:  Single     Review of Systems   Constitutional: Positive for diaphoresis. Negative for chills and fever.   Cardiovascular: Positive for leg swelling.   Musculoskeletal: Negative for myalgias.   Skin: Positive for color change and wound.   All other systems reviewed and are negative.        Physical Exam     Patient Vitals for the past 24 hrs:   BP Temp Temp src Pulse Resp SpO2 Weight   09/14/19 0002 109/66 99.4  F (37.4  C) Oral 87 16 98 % (!) 151.5  kg (334 lb)       Physical Exam  Constitutional:  Appears well-developed and well-nourished. Cooperative.   HENT:   Head:    Atraumatic.   Mouth/Throat:   Oropharynx is without erythema or exudate and mucous     membranes are tacky.  Eyes:    Conjunctivae normal and EOM are normal.      Pupils are equal, round, and reactive to light.   Neck:    Normal range of motion. Neck supple.   Cardiovascular:  Normal rate, regular rhythm, normal heart sounds and radial and    dorsalis pedis pulses are 2+ and symmetric.    Pulmonary/Chest:  Effort normal and breath sounds normal.   Abdominal:   Soft. Bowel sounds are normal.      No splenomegaly or hepatomegaly. No tenderness. No rebound.   Musculoskeletal:  No calf tenderness. Normal range of motion. No edema and no tenderness.   Neurological:  Alert. Normal strength. No cranial nerve deficit.  Skin:    2+ edema bilateral lower extremities, left more than right. Chronic venous stasis changes, bilateral more than right. There is a shallow wound on his right anterior medial shin that has some adherent purulent looking material and cloudy, serous drainage. The skin surrounding this wound and extending to his knee and medial distal thigh is warm and erythematous, without creputus or areas of fluctuance. Skin is warm and dry.   Psychiatric:   Normal mood and affect.     Emergency Department Course   Imaging:  Radiographic findings were communicated with the patient who voiced understanding of the findings.    US Lower Extremity Venous Duplex Left  No evidence of thrombus in the major veins of the left  lower extremity.  As read by Radiology.    Laboratory:  ISTAT VBG: pH: 7.41, PCO2: 49, PO2: 17 (L), Bicarbonate: 31 (H), O2 Sat: 24, Lactic Acid: 0.9  CBC: HGB 11.8 (L) o/w WNL (WBC 8.2, )  CMP: Creatinine 1.41 (H), GFR 53 (L), Albumin 3.2 (L)  INR: 2.35 (H)    Interventions:  0023: 1 g Ancef IV  0037: 2 tablets 5-325 mg Norco PO    Emergency Department Course:  Past medical  records, nursing notes, and vitals reviewed.  0007: I performed an exam of the patient and obtained history, as documented above.    IV inserted and blood drawn.    The patient was sent for a lower extremity venous duplex ultrasound while in the emergency department, findings above.    Findings and plan explained to the patient who consents to admission.     Discussed the patient with Dr. Reed, who will admit the patient to a medical bed for further monitoring, evaluation, and treatment.     Impression & Plan    Medical Decision Making:  The patient presents complaining of increased redness and swelling and pain and drainage on his left lower leg. He has a chronic wound.     His wound seemed to get infected and he saw his doctor about this on the 27th. He took a course of Keflex, which he has completed. He said that the wound was looking better over the last week, but once again it appears infected.  He has not have fever, but said that he felt sweaty all day.     On exam, he has an open wound, but has some cloudy, serous discharge. There is surrounding erythema of the wound and it extends proximally up his leg. This looks like cellultitis. I gave him a dose of IV Keflex. Because of the rapid progression of redness that he has seen today, we will bring him into the hospital for further IV antibiotics. Thankfully, there is no sign of sepsis. Lab testing was returned and looks unremarkable aside from mild elevation in the patient's creatinine.    I discussed the case with Dr. Reed, who agreed to accept for admission. I discussed the plan for hospitalization with the patient and he voices understanding.     Diagnosis:    ICD-10-CM    1. Wound of left lower extremity, initial encounter S81.802A    2. Cellulitis of left lower extremity L03.116    3. Elevated serum creatinine R79.89        Disposition: Admitted to medical bed.     I, Martita Tadeo, am serving as a scribe at 12:07 AM on 9/14/2019 to document services  personally performed by Ar Reich MD based on my observations and the provider's statements to me.     Martita Tadeo  9/13/2019    EMERGENCY DEPARTMENT       Ar Reich MD  09/15/19 0606

## 2019-09-14 NOTE — PHARMACY-VANCOMYCIN DOSING SERVICE
Pharmacy Vancomycin Initial Note  Date of Service 2019  Patient's  1960  58 year old, male    Indication: Skin and Soft Tissue Infection    Current estimated CrCl = CrCl cannot be calculated (Unknown ideal weight.).    Creatinine for last 3 days  2019: 12:18 AM Creatinine 1.41 mg/dL    Recent Vancomycin Level(s) for last 3 days  No results found for requested labs within last 72 hours.      Vancomycin IV Administrations (past 72 hours)      No vancomycin orders with administrations in past 72 hours.                Nephrotoxins and other renal medications (From now, onward)    Start     Dose/Rate Route Frequency Ordered Stop    19 0800  furosemide (LASIX) tablet 40 mg      40 mg Oral 2 TIMES DAILY. 19 0303      19 0500  vancomycin (VANCOCIN) 2,000 mg in sodium chloride 0.9 % 500 mL intermittent infusion      2,000 mg  over 2 Hours Intravenous ONCE 19 0438      19 0438  vancomycin place liao - receiving intermittent dosing      1 each Intravenous SEE ADMIN INSTRUCTIONS 19 043            Contrast Orders - past 72 hours (72h ago, onward)    None                Plan:  1.  Start vancomycin  2000 mg IV once.   2.  Goal Trough Level: 15-20 mg/L   3.  Pharmacy will check trough levels as appropriate in 1-3 Days.    4. Serum creatinine levels will be ordered daily for the first week of therapy and at least twice weekly for subsequent weeks.    5. Roslyn method utilized to dose vancomycin therapy: Method 1    [facility]

## 2019-09-14 NOTE — PROGRESS NOTES
"Brief f/u not:  Pt admitted earlier this morning. Admission H&P, diagnostics & treatment plan reviewed.  BP 99/49 (BP Location: Right arm)   Pulse 75   Temp 98.8  F (37.1  C) (Oral)   Resp 18   Ht 1.753 m (5' 9\")   Wt (!) 151.5 kg (334 lb)   SpO2 94%   BMI 49.32 kg/m     Feet- v dirty + dry scaly skin L>R. Poss tinea pedis on both feet.    Imp/plan:  Continue current treatment plan.  - clean feet  - apply moisturizing lotion foot feet     Tinea pedis:  - start clotrimazole cr bid  - encourage pt not to walk barefoot, keep feet well moisturized & clean    Nora Berger MD.  Hospitalist [phone] (7am -6 pm)    "

## 2019-09-14 NOTE — ED NOTES
"[facility]  ED Nurse Handoff Report    ED Chief complaint: Wound Infection (pt has wound to left lower leg - noticed redness and warmth going up the leg today. denies fevers)      ED Diagnosis:   Final diagnoses:   Wound of left lower extremity, initial encounter   Cellulitis of left lower extremity   Elevated serum creatinine       Code Status: Full Code    Allergies:   Allergies   Allergen Reactions     Bees      Augmentin Rash     Penicillins Rash       Activity level - Baseline/Home:  Independent  Activity Level - Current:   Independent    Patient's Preferred language: english   Needed?: No    Isolation: No  Infection: Not Applicable  Bariatric?: Yes    Vital Signs:   Vitals:    09/14/19 0002   BP: 109/66   Pulse: 87   Resp: 16   Temp: 99.4  F (37.4  C)   TempSrc: Oral   SpO2: 98%   Weight: (!) 151.5 kg (334 lb)       Cardiac Rhythm: ,        Pain level:      Is this patient confused?: No   Does this patient have a guardian?  No         If yes, is there guardianship documents in the Epic \"Code/ACP\" activity?  N/A         Guardian Notified?  N/A  [place] - Suicide Severity Rating Scale Completed?  Yes  If yes, what color did the patient score?  White    Patient Report: Initial Complaint: pt with lower left leg wound that has been having redness and warmth spread up toward knee and inner thigh today. Pt states he has been having intermittent chills today, but no fevers.   Focused Assessment: left leg more swollen than right, wound noted with mild purulent drainage, redness and warmth noted spreading up toward left knee and inner thigh. Pt c/o pain to area   Tests Performed: labs, US  Abnormal Results: no dvt noted on ultrasound  Treatments provided: pain management and antibiotics    Family Comments: n/a    OBS brochure/video discussed/provided to patient/family: N/A              Name of person given brochure if not patient: n/a              Relationship to patient: n/a    ED " Medications:   Medications   ceFAZolin (ANCEF) 1 g vial to attach to  ml bag for ADULT or 50 ml bag for PEDS (1 g Intravenous New Bag 9/14/19 0023)   HYDROcodone-acetaminophen (NORCO) 5-325 MG per tablet 2 tablet (2 tablets Oral Given 9/14/19 0037)       Drips infusing?:  No    For the majority of the shift this patient was Green.   Interventions performed were reassurance and information.    Severe Sepsis OR Septic Shock Diagnosis Present: No    To be done/followed up on inpatient unit:  n/a    ED NURSE PHONE NUMBER: *[phone]

## 2019-09-14 NOTE — PLAN OF CARE
A/ox4. Up with SBA. LLE dressing CDI. LLE red, warm, +2-3 edema. Redness outlined. Regular diet. Rash on buttocks/upper posterior legs. Dyspnea on exertion. CPAP when sleeping. T max 100.6, tylenol in use. Feet washed with soap and water. Discharge pending progress.

## 2019-09-14 NOTE — PROGRESS NOTES
RECEIVING UNIT ED HANDOFF REVIEW    ED Nurse Handoff Report was reviewed by: Joanne Smith RN on September 14, 2019 at 2:42 AM

## 2019-09-15 LAB
ANION GAP SERPL CALCULATED.3IONS-SCNC: 3 MMOL/L (ref 3–14)
BASOPHILS # BLD AUTO: 0 10E9/L (ref 0–0.2)
BASOPHILS NFR BLD AUTO: 0.2 %
BUN SERPL-MCNC: 21 MG/DL (ref 7–30)
CALCIUM SERPL-MCNC: 8.8 MG/DL (ref 8.5–10.1)
CHLORIDE SERPL-SCNC: 105 MMOL/L (ref 94–109)
CO2 SERPL-SCNC: 30 MMOL/L (ref 20–32)
CREAT SERPL-MCNC: 1.06 MG/DL (ref 0.66–1.25)
DIFFERENTIAL METHOD BLD: ABNORMAL
EOSINOPHIL # BLD AUTO: 0.2 10E9/L (ref 0–0.7)
EOSINOPHIL NFR BLD AUTO: 3.2 %
ERYTHROCYTE [DISTWIDTH] IN BLOOD BY AUTOMATED COUNT: 14.9 % (ref 10–15)
GFR SERPL CREATININE-BSD FRML MDRD: 75 ML/MIN/{1.73_M2}
GLUCOSE SERPL-MCNC: 129 MG/DL (ref 70–99)
HCT VFR BLD AUTO: 33.8 % (ref 40–53)
HGB BLD-MCNC: 10.7 G/DL (ref 13.3–17.7)
IMM GRANULOCYTES # BLD: 0 10E9/L (ref 0–0.4)
IMM GRANULOCYTES NFR BLD: 0.3 %
INR PPP: 2.38 (ref 0.86–1.14)
LYMPHOCYTES # BLD AUTO: 0.8 10E9/L (ref 0.8–5.3)
LYMPHOCYTES NFR BLD AUTO: 13.5 %
MCH RBC QN AUTO: 29.8 PG (ref 26.5–33)
MCHC RBC AUTO-ENTMCNC: 31.7 G/DL (ref 31.5–36.5)
MCV RBC AUTO: 94 FL (ref 78–100)
MONOCYTES # BLD AUTO: 0.5 10E9/L (ref 0–1.3)
MONOCYTES NFR BLD AUTO: 8.6 %
NEUTROPHILS # BLD AUTO: 4.4 10E9/L (ref 1.6–8.3)
NEUTROPHILS NFR BLD AUTO: 74.2 %
NRBC # BLD AUTO: 0 10*3/UL
NRBC BLD AUTO-RTO: 0 /100
PLATELET # BLD AUTO: 235 10E9/L (ref 150–450)
POTASSIUM SERPL-SCNC: 3.8 MMOL/L (ref 3.4–5.3)
RBC # BLD AUTO: 3.59 10E12/L (ref 4.4–5.9)
SODIUM SERPL-SCNC: 138 MMOL/L (ref 133–144)
VANCOMYCIN SERPL-MCNC: 3.6 MG/L
WBC # BLD AUTO: 5.9 10E9/L (ref 4–11)

## 2019-09-15 PROCEDURE — 80202 ASSAY OF VANCOMYCIN: CPT | Performed by: INTERNAL MEDICINE

## 2019-09-15 PROCEDURE — 25000128 H RX IP 250 OP 636: Performed by: INTERNAL MEDICINE

## 2019-09-15 PROCEDURE — 25000132 ZZH RX MED GY IP 250 OP 250 PS 637: Mod: GY | Performed by: INTERNAL MEDICINE

## 2019-09-15 PROCEDURE — 12000000 ZZH R&B MED SURG/OB

## 2019-09-15 PROCEDURE — 80048 BASIC METABOLIC PNL TOTAL CA: CPT | Performed by: INTERNAL MEDICINE

## 2019-09-15 PROCEDURE — 25800030 ZZH RX IP 258 OP 636: Performed by: INTERNAL MEDICINE

## 2019-09-15 PROCEDURE — 85610 PROTHROMBIN TIME: CPT | Performed by: INTERNAL MEDICINE

## 2019-09-15 PROCEDURE — 99233 SBSQ HOSP IP/OBS HIGH 50: CPT | Performed by: INTERNAL MEDICINE

## 2019-09-15 PROCEDURE — 85025 COMPLETE CBC W/AUTO DIFF WBC: CPT | Performed by: INTERNAL MEDICINE

## 2019-09-15 PROCEDURE — 36415 COLL VENOUS BLD VENIPUNCTURE: CPT | Performed by: INTERNAL MEDICINE

## 2019-09-15 RX ORDER — VARENICLINE TARTRATE 1 MG/1
1 TABLET, FILM COATED ORAL 2 TIMES DAILY
Status: DISCONTINUED | OUTPATIENT
Start: 2019-09-15 | End: 2019-09-20 | Stop reason: HOSPADM

## 2019-09-15 RX ORDER — NALTREXONE HYDROCHLORIDE 50 MG/1
100 TABLET, FILM COATED ORAL
Status: DISCONTINUED | OUTPATIENT
Start: 2019-09-15 | End: 2019-09-20 | Stop reason: HOSPADM

## 2019-09-15 RX ORDER — WARFARIN SODIUM 5 MG/1
10 TABLET ORAL
Status: COMPLETED | OUTPATIENT
Start: 2019-09-15 | End: 2019-09-15

## 2019-09-15 RX ORDER — BUPROPION HYDROCHLORIDE 150 MG/1
300 TABLET, EXTENDED RELEASE ORAL DAILY
Status: DISCONTINUED | OUTPATIENT
Start: 2019-09-15 | End: 2019-09-15

## 2019-09-15 RX ORDER — ARIPIPRAZOLE 5 MG/1
5 TABLET ORAL DAILY
Status: DISCONTINUED | OUTPATIENT
Start: 2019-09-15 | End: 2019-09-15

## 2019-09-15 RX ORDER — NALTREXONE HYDROCHLORIDE 50 MG/1
100 TABLET, FILM COATED ORAL DAILY
Status: DISCONTINUED | OUTPATIENT
Start: 2019-09-15 | End: 2019-09-15

## 2019-09-15 RX ORDER — TRAZODONE HYDROCHLORIDE 50 MG/1
100 TABLET, FILM COATED ORAL
Status: DISCONTINUED | OUTPATIENT
Start: 2019-09-15 | End: 2019-09-20 | Stop reason: HOSPADM

## 2019-09-15 RX ADMIN — CLOTRIMAZOLE: 1 CREAM TOPICAL at 22:49

## 2019-09-15 RX ADMIN — CLOTRIMAZOLE: 1 CREAM TOPICAL at 08:48

## 2019-09-15 RX ADMIN — UMECLIDINIUM 1 PUFF: 62.5 AEROSOL, POWDER ORAL at 08:34

## 2019-09-15 RX ADMIN — FUROSEMIDE 40 MG: 40 TABLET ORAL at 17:03

## 2019-09-15 RX ADMIN — MONTELUKAST 10 MG: 10 TABLET, FILM COATED ORAL at 19:55

## 2019-09-15 RX ADMIN — VANCOMYCIN HYDROCHLORIDE 2000 MG: 5 INJECTION, POWDER, LYOPHILIZED, FOR SOLUTION INTRAVENOUS at 19:55

## 2019-09-15 RX ADMIN — MELATONIN 2000 UNITS: at 08:29

## 2019-09-15 RX ADMIN — ACETAMINOPHEN 650 MG: 325 TABLET, FILM COATED ORAL at 22:45

## 2019-09-15 RX ADMIN — ACETAMINOPHEN 650 MG: 325 TABLET, FILM COATED ORAL at 08:45

## 2019-09-15 RX ADMIN — FERROUS GLUCONATE 324 MG: 324 TABLET ORAL at 08:30

## 2019-09-15 RX ADMIN — FLUTICASONE FUROATE AND VILANTEROL TRIFENATATE 1 PUFF: 200; 25 POWDER RESPIRATORY (INHALATION) at 08:33

## 2019-09-15 RX ADMIN — POTASSIUM CHLORIDE 20 MEQ: 1500 TABLET, EXTENDED RELEASE ORAL at 08:29

## 2019-09-15 RX ADMIN — VARENICLINE TARTRATE 1 MG: 1 TABLET, FILM COATED ORAL at 19:55

## 2019-09-15 RX ADMIN — FUROSEMIDE 40 MG: 40 TABLET ORAL at 08:30

## 2019-09-15 RX ADMIN — SERTRALINE HYDROCHLORIDE 150 MG: 100 TABLET ORAL at 08:30

## 2019-09-15 RX ADMIN — VARENICLINE TARTRATE 1 MG: 1 TABLET, FILM COATED ORAL at 12:50

## 2019-09-15 RX ADMIN — ARIPIPRAZOLE 5 MG: 5 TABLET ORAL at 08:29

## 2019-09-15 RX ADMIN — PANTOPRAZOLE SODIUM 40 MG: 40 TABLET, DELAYED RELEASE ORAL at 08:29

## 2019-09-15 RX ADMIN — LORATADINE 10 MG: 10 TABLET ORAL at 08:30

## 2019-09-15 RX ADMIN — SIMVASTATIN 40 MG: 40 TABLET, FILM COATED ORAL at 19:55

## 2019-09-15 RX ADMIN — BUPROPION HYDROCHLORIDE 150 MG: 75 TABLET, FILM COATED ORAL at 08:30

## 2019-09-15 RX ADMIN — Medication 1 MG: at 19:55

## 2019-09-15 RX ADMIN — WARFARIN SODIUM 10 MG: 5 TABLET ORAL at 17:03

## 2019-09-15 RX ADMIN — VANCOMYCIN HYDROCHLORIDE 2000 MG: 5 INJECTION, POWDER, LYOPHILIZED, FOR SOLUTION INTRAVENOUS at 10:00

## 2019-09-15 RX ADMIN — CEFTRIAXONE SODIUM 1 G: 1 INJECTION, POWDER, FOR SOLUTION INTRAMUSCULAR; INTRAVENOUS at 05:31

## 2019-09-15 ASSESSMENT — ACTIVITIES OF DAILY LIVING (ADL)
ADLS_ACUITY_SCORE: 12

## 2019-09-15 ASSESSMENT — MIFFLIN-ST. JEOR: SCORE: 2350.77

## 2019-09-15 NOTE — PLAN OF CARE
Shift Note: A&O, up with SBA, dressing to LLE CDI, CMS intact, red and warm, redness outlined, +2-3 edema, pt states his leg is feeling much better, slight pain and itching to left knee, scabbing red rash on buttocks and back of upper legs, low grade temps, baseline tremors to bilat UE, CPAP at night, SANCHEZ, O2 stable on RA, WOC and lymphedema consult

## 2019-09-15 NOTE — PLAN OF CARE
A&Ox4. Baseline tremors in BUE. VSS on room air (wears CPAP at night). SANCHEZ. Up with SBA. Redness to LLE receding marked borders. CMS intact, +2 edema to LLE. Dressing changed to wound on LLE. Tylenol given x1 for mild pain to L knee, provided relief. Continues on IV antibiotics. Scabbing red rash on buttocks and back of upper legs, JEANMARIE. Denies itchiness. WOC and lymphedema consults. Plan for return to group home in 1-2 more days.

## 2019-09-15 NOTE — PROGRESS NOTES
[facility]    Medicine Progress Note - Hospitalist Service        Date of Admission:  9/13/2019 11:58 PM    Assessment & Plan:   This is a 58-year-old male with history of sleep apnea on CPAP, peripheral vascular disease, chronic venous stasis dermatitis, hyperlipidemia, history of DVT and PE in the past, on chronic anticoagulation on Coumadin, intellectual disabilities, GERD, recent history of left lower extremity cellulitis, now comes to the ER again with left leg swelling, pain and redness.      Left lower extremity cellulitis with chronic left leg wound   History of peripheral edema and chronic venous stasis    -History of chronic left leg wound, presenting with redness, warmth of the left lower extremity consistent with cellulitis.  -Ultrasound of the lower extremity is negative for DVT.   -Continue vancomycin and ceftriaxone, monitor renal function closely.  Anticipate discontinuing vancomycin tomorrow.  -PT consult for lymphedema treatment  -Elevate left lower extremity  -Wound care consult  -Will likely need outpatient wound care referral at discharge.  -Erythema and warmth improving    Acute kidney injury on CKD-3  -Baseline creatinine 0.9-1.1.    -Presented with a creatinine of 1.41, now improved to 1.06 with fluid overnight.  -To new Lasix at a reduced dose of 40 mg p.o. twice daily.  Hold Aldactone for now.  Anticipate resuming at discharge.    Obstructive sleep apnea:    -CPAP per home settings.    History of chronic obstructive pulmonary disease:    -Not in acute exacerbation.  Continue prior to admission Dulera and Singulair.      History of deep venous thrombosis and pulmonary embolism   -On chronic anticoagulation with Coumadin.    -INR is therapeutic   -Pharmacy to dose  his Coumadin.     Severe obesity  -Lifestyle modification and follow-up as outpatient.    Borderline mental retardation  -Patient apparently lives in a group home.  -No active issues with this at the  "moment.      Diet: Combination Diet Regular Diet Adult     DVT Prophylaxis: Warfarin   Tapia Catheter: not present  Code Status: Full Code     Disposition Plan    Expected discharge: 2 - 3 days, recommended to prior living arrangement   Entered: Kevin Chavez MD 09/15/2019, 8:55 AM        The patient's care was discussed with the Bedside Nurse and Patient.    Kevin Chavez MD  Hospitalist Service  [facility]    ______________________________________________________________________    Interval History   Afebrile. LLE  Erythema and warmth slightly better, area of redness appears to be receding.  No chest pain or dyspnea.    Data reviewed today: I reviewed all medications, new labs and imaging results over the last 24 hours. I personally reviewed no images or EKG's today.    Physical Exam   Vital signs:  Temp: 99.2  F (37.3  C) Temp src: Oral BP: 108/49 Pulse: 74   Resp: 14 SpO2: 94 % O2 Device: None (Room air)   Height: 175.3 cm (5' 9\") Weight: (!) 151.5 kg (334 lb)  Estimated body mass index is 49.32 kg/m  as calculated from the following:    Height as of this encounter: 1.753 m (5' 9\").    Weight as of this encounter: 151.5 kg (334 lb).      Wt Readings from Last 2 Encounters:   09/14/19 (!) 151.5 kg (334 lb)   09/10/19 (!) 151.5 kg (334 lb)       Gen: AAOX3, NAD  HEENT: Supple neck, moist oral mucosa, no pallor  Resp: CTA B/L, normal WOB, no crackles, no wheezes  CVS: RRR, no murmur  Abd/GI: Soft, non-tender. BS- normoactive.  No G/R/R  Skin: Erythema of the left leg with a chronic appearing wound on the left shin.  Erythematous area marked, receding, color fading, warmth+  MSK: Chronic venous stasis, edema with chronic discoloration.    Neuro- CN- intact. No focal deficits.        Data   Recent Labs   Lab 09/15/19  0527 09/14/19  0018 09/09/19   WBC 5.9 8.2  --    HGB 10.7* 11.8*  --    MCV 94 94  --     270  --    INR 2.38* 2.35* 3.5*    135  --    POTASSIUM 3.8 3.9  --  "   CHLORIDE 105 101  --    CO2 30 27  --    BUN 21 25  --    CR 1.06 1.41*  --    ANIONGAP 3 7  --    JESSICA 8.8 9.0  --    * 98  --    ALBUMIN  --  3.2*  --    PROTTOTAL  --  7.7  --    BILITOTAL  --  0.4  --    ALKPHOS  --  79  --    ALT  --  32  --    AST  --  21  --        No results found for this or any previous visit (from the past 24 hour(s)).  Medications     Warfarin Therapy Reminder         ARIPiprazole  5 mg Oral Daily     buPROPion  150 mg Oral QAM     cefTRIAXone  1 g Intravenous Q24H     clotrimazole   Topical BID     ferrous gluconate  324 mg Oral Daily with breakfast     fluticasone-vilanterol  1 puff Inhalation Daily     furosemide  40 mg Oral BID     loratadine  10 mg Oral Daily     montelukast  10 mg Oral At Bedtime     pantoprazole  40 mg Oral Daily     potassium chloride ER  20 mEq Oral Daily     sertraline  150 mg Oral Daily     simvastatin  40 mg Oral At Bedtime     sodium chloride (PF)  3 mL Intracatheter Q8H     umeclidinium  1 puff Inhalation Daily     vancomycin (VANCOCIN) IV  2,000 mg Intravenous Q12H     vitamin D3  2,000 Units Oral Daily     warfarin ANTICOAGULANT  10 mg Oral ONCE at 18:00

## 2019-09-15 NOTE — CONSULTS
Asked my hospitalist to review possible error on PTA medication list of patient taking Naltrexone. Upon review of patients chart notes I am unable to find any discussion regarding the addition of this med by patients medical team. Naltrexone does have a off label indication for weight control. This pt is clinically morbidly obese. Pharmacy intern who obtained med history called pt's pharmacy([facility]) to obtain med list because pt.was unable to provide for her. Naltrexone was told verbally from pt pharmacy that it was on his profile thus pharmacy intern added.     Ke Thurston RP

## 2019-09-15 NOTE — PHARMACY-VANCOMYCIN DOSING SERVICE
Pharmacy Vancomycin Note  Date of Service September 15, 2019  Patient's  1960   58 year old, male    Indication: Skin and Soft Tissue Infection  Goal Trough Level: 10-15 mg/L  Day of Therapy: 2  Current Vancomycin regimen:  2000 mg IV x 1 intermittant due to crcl    Current estimated CrCl = Estimated Creatinine Clearance: 108 mL/min (based on SCr of 1.06 mg/dL).    Creatinine for last 3 days  2019: 12:18 AM Creatinine 1.41 mg/dL  9/15/2019:  5:27 AM Creatinine 1.06 mg/dL    Recent Vancomycin Levels (past 3 days)  9/15/2019:  5:27 AM Vancomycin Level 3.6 mg/L    Vancomycin IV Administrations (past 72 hours)                   vancomycin (VANCOCIN) 2,000 mg in sodium chloride 0.9 % 500 mL intermittent infusion (mg) 2,000 mg New Bag 19 0554                Nephrotoxins and other renal medications (From now, onward)    Start     Dose/Rate Route Frequency Ordered Stop    09/15/19 0815  vancomycin (VANCOCIN) 2,000 mg in sodium chloride 0.9 % 500 mL intermittent infusion      2,000 mg  over 2 Hours Intravenous EVERY 12 HOURS 09/15/19 0805      19 0800  furosemide (LASIX) tablet 40 mg      40 mg Oral 2 TIMES DAILY. 19 0303               Contrast Orders - past 72 hours (72h ago, onward)    None          Interpretation of levels and current regimen:  Trough level is  Subtherapeutic    Has serum creatinine changed > 50% in last 72 hours: Yes    Urine output:  good urine output    Renal Function: Improving    Plan:  1.  Increase Dose to 2000 mg iv q12hr  2.  Pharmacy will check trough levels as appropriate in 1-3 Days.    3. Serum creatinine levels will be ordered daily for the first week of therapy and at least twice weekly for subsequent weeks.      Ke Thurston RPH        .

## 2019-09-16 ENCOUNTER — TELEPHONE (OUTPATIENT)
Dept: FAMILY MEDICINE | Facility: CLINIC | Age: 59
End: 2019-09-16

## 2019-09-16 LAB
ANION GAP SERPL CALCULATED.3IONS-SCNC: 5 MMOL/L (ref 3–14)
BUN SERPL-MCNC: 21 MG/DL (ref 7–30)
CALCIUM SERPL-MCNC: 8.7 MG/DL (ref 8.5–10.1)
CHLORIDE SERPL-SCNC: 106 MMOL/L (ref 94–109)
CO2 SERPL-SCNC: 29 MMOL/L (ref 20–32)
CREAT SERPL-MCNC: 0.91 MG/DL (ref 0.66–1.25)
GFR SERPL CREATININE-BSD FRML MDRD: >90 ML/MIN/{1.73_M2}
GLUCOSE SERPL-MCNC: 114 MG/DL (ref 70–99)
INR PPP: 2.3 (ref 0.86–1.14)
POTASSIUM SERPL-SCNC: 3.7 MMOL/L (ref 3.4–5.3)
SODIUM SERPL-SCNC: 140 MMOL/L (ref 133–144)

## 2019-09-16 PROCEDURE — 25000132 ZZH RX MED GY IP 250 OP 250 PS 637: Mod: GY | Performed by: INTERNAL MEDICINE

## 2019-09-16 PROCEDURE — 80048 BASIC METABOLIC PNL TOTAL CA: CPT | Performed by: INTERNAL MEDICINE

## 2019-09-16 PROCEDURE — 25000128 H RX IP 250 OP 636: Performed by: INTERNAL MEDICINE

## 2019-09-16 PROCEDURE — 99232 SBSQ HOSP IP/OBS MODERATE 35: CPT | Performed by: INTERNAL MEDICINE

## 2019-09-16 PROCEDURE — 40000275 ZZH STATISTIC RCP TIME EA 10 MIN

## 2019-09-16 PROCEDURE — 40000894 ZZH STATISTIC OT IP EVAL DEFER

## 2019-09-16 PROCEDURE — 85610 PROTHROMBIN TIME: CPT | Performed by: INTERNAL MEDICINE

## 2019-09-16 PROCEDURE — 12000000 ZZH R&B MED SURG/OB

## 2019-09-16 PROCEDURE — 36415 COLL VENOUS BLD VENIPUNCTURE: CPT | Performed by: INTERNAL MEDICINE

## 2019-09-16 RX ORDER — WARFARIN SODIUM 5 MG/1
10 TABLET ORAL
Status: COMPLETED | OUTPATIENT
Start: 2019-09-16 | End: 2019-09-16

## 2019-09-16 RX ADMIN — CLOTRIMAZOLE: 1 CREAM TOPICAL at 08:28

## 2019-09-16 RX ADMIN — MELATONIN 2000 UNITS: at 07:48

## 2019-09-16 RX ADMIN — BUPROPION HYDROCHLORIDE 150 MG: 75 TABLET, FILM COATED ORAL at 07:48

## 2019-09-16 RX ADMIN — PANTOPRAZOLE SODIUM 40 MG: 40 TABLET, DELAYED RELEASE ORAL at 07:50

## 2019-09-16 RX ADMIN — FERROUS GLUCONATE 324 MG: 324 TABLET ORAL at 07:50

## 2019-09-16 RX ADMIN — VARENICLINE TARTRATE 1 MG: 1 TABLET, FILM COATED ORAL at 20:22

## 2019-09-16 RX ADMIN — ARIPIPRAZOLE 5 MG: 5 TABLET ORAL at 07:49

## 2019-09-16 RX ADMIN — POTASSIUM CHLORIDE 20 MEQ: 1500 TABLET, EXTENDED RELEASE ORAL at 07:49

## 2019-09-16 RX ADMIN — Medication 1 MG: at 21:44

## 2019-09-16 RX ADMIN — VARENICLINE TARTRATE 1 MG: 1 TABLET, FILM COATED ORAL at 07:48

## 2019-09-16 RX ADMIN — CEFTRIAXONE SODIUM 1 G: 1 INJECTION, POWDER, FOR SOLUTION INTRAMUSCULAR; INTRAVENOUS at 05:38

## 2019-09-16 RX ADMIN — FUROSEMIDE 40 MG: 40 TABLET ORAL at 15:52

## 2019-09-16 RX ADMIN — CLOTRIMAZOLE: 1 CREAM TOPICAL at 20:22

## 2019-09-16 RX ADMIN — LORATADINE 10 MG: 10 TABLET ORAL at 07:49

## 2019-09-16 RX ADMIN — UMECLIDINIUM 1 PUFF: 62.5 AEROSOL, POWDER ORAL at 07:50

## 2019-09-16 RX ADMIN — FUROSEMIDE 40 MG: 40 TABLET ORAL at 07:48

## 2019-09-16 RX ADMIN — SERTRALINE HYDROCHLORIDE 150 MG: 100 TABLET ORAL at 07:50

## 2019-09-16 RX ADMIN — SIMVASTATIN 40 MG: 40 TABLET, FILM COATED ORAL at 21:44

## 2019-09-16 RX ADMIN — FLUTICASONE FUROATE AND VILANTEROL TRIFENATATE 1 PUFF: 200; 25 POWDER RESPIRATORY (INHALATION) at 07:49

## 2019-09-16 RX ADMIN — WARFARIN SODIUM 10 MG: 5 TABLET ORAL at 18:28

## 2019-09-16 RX ADMIN — MONTELUKAST 10 MG: 10 TABLET, FILM COATED ORAL at 21:44

## 2019-09-16 ASSESSMENT — ACTIVITIES OF DAILY LIVING (ADL)
ADLS_ACUITY_SCORE: 14

## 2019-09-16 ASSESSMENT — MIFFLIN-ST. JEOR: SCORE: 2329.46

## 2019-09-16 NOTE — PLAN OF CARE
Discharge Planner OT   Patient plan for discharge: Group home  Current status: OT/Lymph orders received, chart reviewed, and discussed with patient and RN. Patient Awaiting wound care consult.  Per discussion with patient, RN and per chart, plans for return to group home as early as tomorrow. Will defer lymph services to next level of care  (likely pt will benefit from OP Lymph services).   Barriers to return to prior living situation: Defer to medical team   Recommendations for discharge: Defer to medical team   Rationale for recommendations: Due to short length of stay, will defer lymph services to next level of care. Will cancel/complete IP OT orders/evaluation.        Entered by: Raúl Valera 09/16/2019 10:25 AM

## 2019-09-16 NOTE — TELEPHONE ENCOUNTER
Petey was no show for appointment due to being hospitalized 09/13/2019 for lower leg cellulitis.  No concern for DVT, INR at admission 2.35 per venous draw.    On IV vanco and ceftriaxone, warfarin dosing per inpatient pharmacy.    Will need follow up INR scheduled after discharge.    Alyce Thurston, PharmD BCACP  Anticoagulation Clinical Pharmacist

## 2019-09-16 NOTE — PLAN OF CARE
Shift Note: A&O, up with assist of one, cellulitis to LLE, covered with nonadherent dressing and gauze, WOC to see pt today, lymphedema consult for after WOC sees pt, redness outlined and receding, tylenol given for left knee pain along with ice pack, +2 edema to LLE, CMS intact, rash to buttocks and upper legs, pink/red and scabbed, baseline tremors to bilat UE

## 2019-09-16 NOTE — PROGRESS NOTES
[facility]    Medicine Progress Note - Hospitalist Service        Date of Admission:  9/13/2019 11:58 PM    Assessment & Plan:   This is a 58-year-old male with history of sleep apnea on CPAP, peripheral vascular disease, chronic venous stasis dermatitis, hyperlipidemia, history of DVT and PE in the past, on chronic anticoagulation on Coumadin, intellectual disabilities, GERD, recent history of left lower extremity cellulitis, now comes to the ER again with left leg swelling, pain and redness.      Left lower extremity cellulitis with chronic left leg wound   History of peripheral edema and chronic venous stasis    -History of chronic left leg wound, presenting with redness, warmth of the left lower extremity consistent with cellulitis.  -Ultrasound of the lower extremity is negative for DVT.   -PT consult for lymphedema treatment  -Elevate left lower extremity  -Wound care consult  -Will likely need outpatient wound care referral at discharge.  -Erythema and warmth improving  -Disontinue vancomycin after today's dose. Continue ceftriaxone.    Acute kidney injury on CKD-3  -Baseline creatinine 0.9-1.1.    -Presented with a creatinine of 1.41, now improved to 1.06 with fluid overnight.  -Lasix at a reduced dose of 40 mg p.o. twice daily.  Hold Aldactone for now.  Anticipate resuming at discharge.    Obstructive sleep apnea:    -CPAP per home settings.    History of chronic obstructive pulmonary disease:    -Not in acute exacerbation.  Continue prior to admission Dulera and Singulair.      History of deep venous thrombosis and pulmonary embolism   -On chronic anticoagulation with Coumadin.    -INR is therapeutic   -Pharmacy to dose Coumadin.     Severe obesity  -Lifestyle modification and follow-up as outpatient.    Borderline mental retardation  -Patient apparently lives in a group home.  -No active issues with this at the moment.      Diet: Combination Diet Regular Diet Adult     DVT Prophylaxis:  "Warfarin   Tapia Catheter: not present  Code Status: Full Code     Disposition Plan    Expected discharge: 2 - 3 days, recommended to prior living arrangement   Entered: Kevin Chavez MD 09/16/2019, 7:57 AM        The patient's care was discussed with the Bedside Nurse and Patient.    Kevin Chavez MD  Hospitalist Service  [facility]    ______________________________________________________________________    Interval History   Afebrile.  Left lower extremity erythema improving.  Denies chest pain or dyspnea.  No diarrhea.  Renal function stable.    Data reviewed today: I reviewed all medications, new labs and imaging results over the last 24 hours. I personally reviewed no images or EKG's today.    Physical Exam   Vital signs:  Temp: 98.9  F (37.2  C) Temp src: Oral BP: 122/67 Pulse: 74   Resp: 16 SpO2: 96 % O2 Device: None (Room air)   Height: 175.3 cm (5' 9\") Weight: (!) 152.9 kg (337 lb 1.6 oz)  Estimated body mass index is 49.78 kg/m  as calculated from the following:    Height as of this encounter: 1.753 m (5' 9\").    Weight as of this encounter: 152.9 kg (337 lb 1.6 oz).      Wt Readings from Last 2 Encounters:   09/16/19 (!) 152.9 kg (337 lb 1.6 oz)   09/10/19 (!) 151.5 kg (334 lb)       Gen: AAOX3, NAD  Resp: CTA B/L, normal WOB, no crackles, no wheezes  CVS: RRR, no murmur  Abd/GI: Soft, non-tender. BS- normoactive.   Skin: Erythema of the left leg with a chronic appearing wound on the left shin.  Erythematous area fading and receding  MSK: Chronic venous stasis, edema with chronic discoloration.    Neuro- CN- intact. No focal deficits.        Data   Recent Labs   Lab 09/16/19  0653 09/15/19  0527 09/14/19  0018   WBC  --  5.9 8.2   HGB  --  10.7* 11.8*   MCV  --  94 94   PLT  --  235 270   INR 2.30* 2.38* 2.35*    138 135   POTASSIUM 3.7 3.8 3.9   CHLORIDE 106 105 101   CO2 29 30 27   BUN 21 21 25   CR 0.91 1.06 1.41*   ANIONGAP 5 3 7   JESSICA 8.7 8.8 9.0   * 129* 98 "   ALBUMIN  --   --  3.2*   PROTTOTAL  --   --  7.7   BILITOTAL  --   --  0.4   ALKPHOS  --   --  79   ALT  --   --  32   AST  --   --  21       No results found for this or any previous visit (from the past 24 hour(s)).  Medications     Warfarin Therapy Reminder         ARIPiprazole  5 mg Oral Daily     buPROPion  150 mg Oral QAM     cefTRIAXone  1 g Intravenous Q24H     clotrimazole   Topical BID     ferrous gluconate  324 mg Oral Daily with breakfast     fluticasone-vilanterol  1 puff Inhalation Daily     furosemide  40 mg Oral BID     loratadine  10 mg Oral Daily     montelukast  10 mg Oral At Bedtime     pantoprazole  40 mg Oral Daily     potassium chloride ER  20 mEq Oral Daily     sertraline  150 mg Oral Daily     simvastatin  40 mg Oral At Bedtime     sodium chloride (PF)  3 mL Intracatheter Q8H     umeclidinium  1 puff Inhalation Daily     vancomycin (VANCOCIN) IV  2,000 mg Intravenous Q12H     varenicline  1 mg Oral BID     vitamin D3  2,000 Units Oral Daily

## 2019-09-17 LAB
CREAT SERPL-MCNC: 0.89 MG/DL (ref 0.66–1.25)
GFR SERPL CREATININE-BSD FRML MDRD: >90 ML/MIN/{1.73_M2}
INR PPP: 2.54 (ref 0.86–1.14)
PROCALCITONIN SERPL-MCNC: 0.1 NG/ML

## 2019-09-17 PROCEDURE — 84145 PROCALCITONIN (PCT): CPT | Performed by: INTERNAL MEDICINE

## 2019-09-17 PROCEDURE — 25000132 ZZH RX MED GY IP 250 OP 250 PS 637: Mod: GY | Performed by: INTERNAL MEDICINE

## 2019-09-17 PROCEDURE — G0463 HOSPITAL OUTPT CLINIC VISIT: HCPCS

## 2019-09-17 PROCEDURE — 85610 PROTHROMBIN TIME: CPT | Performed by: INTERNAL MEDICINE

## 2019-09-17 PROCEDURE — 25000125 ZZHC RX 250: Performed by: INTERNAL MEDICINE

## 2019-09-17 PROCEDURE — 40000901 ZZH STATISTIC WOC PT EDUCATION, 0-15 MIN

## 2019-09-17 PROCEDURE — 82565 ASSAY OF CREATININE: CPT | Performed by: INTERNAL MEDICINE

## 2019-09-17 PROCEDURE — 12000000 ZZH R&B MED SURG/OB

## 2019-09-17 PROCEDURE — 99232 SBSQ HOSP IP/OBS MODERATE 35: CPT | Performed by: INTERNAL MEDICINE

## 2019-09-17 PROCEDURE — 36415 COLL VENOUS BLD VENIPUNCTURE: CPT | Performed by: INTERNAL MEDICINE

## 2019-09-17 PROCEDURE — 25000128 H RX IP 250 OP 636: Performed by: INTERNAL MEDICINE

## 2019-09-17 RX ORDER — CEFTRIAXONE 2 G/1
2 INJECTION, POWDER, FOR SOLUTION INTRAMUSCULAR; INTRAVENOUS EVERY 24 HOURS
Status: DISCONTINUED | OUTPATIENT
Start: 2019-09-18 | End: 2019-09-20

## 2019-09-17 RX ORDER — CLINDAMYCIN PHOSPHATE 900 MG/50ML
900 INJECTION, SOLUTION INTRAVENOUS EVERY 8 HOURS
Status: DISCONTINUED | OUTPATIENT
Start: 2019-09-17 | End: 2019-09-20

## 2019-09-17 RX ADMIN — MELATONIN 2000 UNITS: at 08:27

## 2019-09-17 RX ADMIN — PANTOPRAZOLE SODIUM 40 MG: 40 TABLET, DELAYED RELEASE ORAL at 08:27

## 2019-09-17 RX ADMIN — Medication 1 MG: at 21:58

## 2019-09-17 RX ADMIN — LORATADINE 10 MG: 10 TABLET ORAL at 08:27

## 2019-09-17 RX ADMIN — CEFTRIAXONE SODIUM 1 G: 1 INJECTION, POWDER, FOR SOLUTION INTRAMUSCULAR; INTRAVENOUS at 05:33

## 2019-09-17 RX ADMIN — CLINDAMYCIN PHOSPHATE 900 MG: 900 INJECTION, SOLUTION INTRAVENOUS at 18:34

## 2019-09-17 RX ADMIN — SIMVASTATIN 40 MG: 40 TABLET, FILM COATED ORAL at 21:58

## 2019-09-17 RX ADMIN — CLINDAMYCIN PHOSPHATE 900 MG: 900 INJECTION, SOLUTION INTRAVENOUS at 10:49

## 2019-09-17 RX ADMIN — ACETAMINOPHEN 650 MG: 325 TABLET, FILM COATED ORAL at 03:44

## 2019-09-17 RX ADMIN — WARFARIN SODIUM 8 MG: 5 TABLET ORAL at 18:56

## 2019-09-17 RX ADMIN — POTASSIUM CHLORIDE 20 MEQ: 1500 TABLET, EXTENDED RELEASE ORAL at 08:27

## 2019-09-17 RX ADMIN — FERROUS GLUCONATE 324 MG: 324 TABLET ORAL at 08:27

## 2019-09-17 RX ADMIN — MONTELUKAST 10 MG: 10 TABLET, FILM COATED ORAL at 21:58

## 2019-09-17 RX ADMIN — VARENICLINE TARTRATE 1 MG: 1 TABLET, FILM COATED ORAL at 20:07

## 2019-09-17 RX ADMIN — FUROSEMIDE 40 MG: 40 TABLET ORAL at 16:16

## 2019-09-17 RX ADMIN — CLOTRIMAZOLE: 1 CREAM TOPICAL at 10:48

## 2019-09-17 RX ADMIN — UMECLIDINIUM 1 PUFF: 62.5 AEROSOL, POWDER ORAL at 08:29

## 2019-09-17 RX ADMIN — BUPROPION HYDROCHLORIDE 150 MG: 75 TABLET, FILM COATED ORAL at 08:27

## 2019-09-17 RX ADMIN — ARIPIPRAZOLE 5 MG: 5 TABLET ORAL at 08:27

## 2019-09-17 RX ADMIN — SERTRALINE HYDROCHLORIDE 150 MG: 100 TABLET ORAL at 08:26

## 2019-09-17 RX ADMIN — VARENICLINE TARTRATE 1 MG: 1 TABLET, FILM COATED ORAL at 08:27

## 2019-09-17 RX ADMIN — FUROSEMIDE 40 MG: 40 TABLET ORAL at 08:27

## 2019-09-17 RX ADMIN — CLOTRIMAZOLE: 1 CREAM TOPICAL at 20:08

## 2019-09-17 RX ADMIN — FLUTICASONE FUROATE AND VILANTEROL TRIFENATATE 1 PUFF: 200; 25 POWDER RESPIRATORY (INHALATION) at 08:29

## 2019-09-17 ASSESSMENT — ACTIVITIES OF DAILY LIVING (ADL)
ADLS_ACUITY_SCORE: 14

## 2019-09-17 ASSESSMENT — MIFFLIN-ST. JEOR: SCORE: 2309.04

## 2019-09-17 NOTE — PROGRESS NOTES
"[facility]  WO Nurse Inpatient Wound Assessment   Reason for consultation: Evaluate and treat LLE wound     Assessment  Left anterior lower extremity wound due to Venous Ulcer- wound is currently an intact scab with pinpoint areas of serosanguinous drainage noted to previous dressing. Pt reports scab falls off with soaking at times and has been around for a couple months. Redness, slight warmth and swelling noted to LLE but has not moved beyond line traced on skin.  Status: initial assessment, has scar tissue present and scabbed over    Treatment Plan  LLE wound: Tues and Fri and PRN when soiled   1. Clean with microklenz and gently pat dry (if previous dressing sticks to wound please use NS or cleanser to help remove)  2. Cut 4x3 cm piece of adaptic and apply over scab.   3. Label Optifoam or Mepilex Foam dressing with time/date, initials and \"T\" for treatment  4. Apply foam dressing so that scab is under dressing- may pull back for assessments and re-stick due to silicone dressing  5. Ok to apply wraps or compression garment over this- just please be careful to not roll up dressing with application    Orders Written  Recommended provider order: Outpatient wound clinic or lymphadema clinic to address LE swelling  WO Nurse follow-up plan:signing off  Nursing to notify the Provider(s) and re-consult the [facility] Nurse if wound(s) deteriorates or new skin concern.    Patient History  According to provider note(s):  This is a 58-year-old male with history of sleep apnea on CPAP, peripheral vascular disease, chronic venous stasis dermatitis, hyperlipidemia, history of DVT and PE in the past, on chronic anticoagulation on Coumadin, intellectual disabilities, GERD, recent history of left lower extremity cellulitis, now comes to the ER again with left leg swelling, pain and redness.     Objective Data  Containment of urine/stool: unknown    Active Diet Order  Orders Placed This Encounter      Combination Diet " Regular Diet Adult      Output:   I/O last 3 completed shifts:  In: -   Out: 3100 [Urine:3100]    Risk Assessment:   Sensory Perception: 4-->no impairment  Moisture: 4-->rarely moist  Activity: 3-->walks occasionally  Mobility: 3-->slightly limited  Nutrition: 3-->adequate  Friction and Shear: 3-->no apparent problem  Mynor Score: 20                          Labs:   Recent Labs   Lab 09/17/19  0728  09/15/19  0527 09/14/19  0018   ALBUMIN  --   --   --  3.2*   HGB  --   --  10.7* 11.8*   INR 2.54*   < > 2.38* 2.35*   WBC  --   --  5.9 8.2    < > = values in this interval not displayed.       Physical Exam  Skin inspection: focused LLE    Wound Location:  Left anterior lower extremity  Date of last photo - none taken today  Wound History: Pt reports wound has been present for a couple months- has been seeing regular doctor for wound care. PCP was having him soak LE and apply a dressing  Measurements (length x width x depth, in cm) 8 cm x 4 cm  x  0.1 cm   Wound Base: 98 % eschar, may have slight open cracks but largely scabbed over currently  Tunneling N/A  Undermining N/A  Palpation of the wound bed: normal   Periwound skin: dry/scaly, hemosiderin staining and erythema  Color: normal and consistent with surrounding tissue and red  Temperature: normal   Drainage:, scant  Description of drainage: serosanguinous  Odor: none  Pain: denies , none    Interventions  Current support surface: Standard  Atmos Air mattress  Current off-loading measures: Heel off-loading boot(s)  Visual inspection of wound(s) completed  Wound Care: done per plan of care  Supplies: gathered, placed at the bedside, discussed with RN and discussed with patient  Education provided: plan of care, wound progress and need to follow-up outpatient with wound clinic  Discussed plan of care with Patient and Nurse    Amarilis Claros RN

## 2019-09-17 NOTE — PLAN OF CARE
Afebrile. WOC assessed wound today and wrote for dsg orders. Up ambulating georges independently with steady gait. Elevating leg when in bed. ID consulted today.

## 2019-09-17 NOTE — PROGRESS NOTES
Focus: Lt shin ulcer  S: Consulted for above focus. History, progress notes and orders reviewed. Unable to complete consult today secondary to staffing issues. Will f/u in am prior to pts anticipated discharge  Serina SETH RN

## 2019-09-17 NOTE — PLAN OF CARE
A&Ox4, VSS. Had a headache overnight- tylenol given; effective. Tolerating regular diet. LLE redness, marked, elevated on pillows. Dressing CDI. Iced arm after IV vanco infiltrated. Plan to discharge back to group home today.

## 2019-09-17 NOTE — CONSULTS
[facility]    Infectious Disease Consultation     Date of Admission:  9/13/2019  Date of Consult (When I saw the patient): 09/17/19    Assessment & Plan   Petey Archibald is a 58 year old male who was admitted on 9/13/2019.     Impression:  1. 58 y.o with history of DVT in the left lower extremity, and PE who presents with a wound infection.   2. The patient reports that he had this wound from some time ago that it has since reopened.  3. There is surrounding redness and cellulitis.   4. He was started on IV Clindamycin and Ancef.     Recommendations:   Continue on the same antibiotics  Keep the leg elevated at all times   Will continue to follow along     Jenifer Quispe MD    Reason for Consult   Reason for consult: I was asked by Dr. Chavez to evaluate this patient for LLE cellulitis.    Primary Care Physician   Hortensia Peck    Chief Complaint   LLE cellulitis     History is obtained from the patient and medical records    History of Present Illness   Petey Archibald is a 58 year old male with history of sleep apnea on CPAP, peripheral vascular disease, chronic venous stasis dermatitis, hyperlipidemia, history of DVT and PE in the past, on chronic anticoagulation on Coumadin, intellectual disabilities, GERD, recent history of left lower extremity cellulitis, now comes to the ER again with left leg swelling, pain and redness.     Past Medical History   I have reviewed this patient's medical history and updated it with pertinent information if needed.   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     COPD (chronic obstructive pulmonary disease) (H)      History of DVT of lower extremity      History of pulmonary embolism      Hyperlipidemia      Mild intellectual disabilities      Morbid obesity (H)      Peripheral edema      Peripheral vascular disease (H)      Sleep apnea      Tobacco dependence      Venous stasis dermatitis        Past Surgical  "History   I have reviewed this patient's surgical history and updated it with pertinent information if needed.  Past Surgical History:   Procedure Laterality Date     COLONOSCOPY N/A 4/15/2019    Procedure: COMBINED COLONOSCOPY, SINGLE OR MULTIPLE BIOPSY/POLYPECTOMY BY BIOPSY;  Surgeon: Jovana Ohara MD;  Location:  GI     EXCISE MALIGNANT LESIONS, TRUNK/ARMS/LEGS 0.5CM OR LESS Left 2017    Procedure: EXCISE MALIGNANT LESIONS, TRUNK/ARMS/LEGS 0.5CM OR LESS;  EXCISION LEFT ELBOW MASS;  Surgeon: Jose Azevedo MD;  Location:  GI     RECTAL SURGERY      perianal abscess?       Prior to Admission Medications   Prior to Admission Medications   Prescriptions Last Dose Informant Patient Reported? Taking?   ARIPiprazole (ABILIFY) 5 MG tablet 2019 Pharmacy Yes Yes   Sig: Take 5 mg by mouth daily   DEXILANT 60 MG CPDR CR capsule 2019 Pharmacy No Yes   Sig: TAKE 1 CAPSULE BY MOUTH ONCE DAILY (FOR HEARTBURN)   EPINEPHrine (EPIPEN 2-BRE) 0.3 MG/0.3ML injection 2-pack PRN Pharmacy No Yes   Sig: INJECT 0.3MG INTRAMUSCULAR ONE TIME FOR ONE DOSE AS NEEDED FOR ANAPHYLAXIS (CALL 911 IF YOU HAVE TO GIVE) (FOR BEE STINGS) **LABEL EACH PEN*   Gauze Pads & Dressings (GAUZE PADS 4\"X4\") 4\"X4\" PADS   No No   Si each 3 times daily as needed   PULMICORT FLEXHALER 180 MCG/ACT inhaler 2019 Pharmacy No Yes   Sig: INHALE 2 PUFFS INTO THE LUNGS TWICE DAILY   SPIRIVA HANDIHALER 18 MCG inhaled capsule 2019 Pharmacy No Yes   Sig: INHALE CONTENTS OF 1 CAPSULE INTO THE LUNGS ONCE DAILY. FOR BRONCHITIS AND EMPHYSEMA.   acetaminophen (TYLENOL) 325 MG tablet PRN Pharmacy No Yes   Sig: Take 1-2 tablets (325-650 mg) by mouth every 6 hours as needed   albuterol (ALBUTEROL) 108 (90 BASE) MCG/ACT inhaler PRN Pharmacy No Yes   Sig: Inhale 2 puffs into the lungs every 6 hours as needed   buPROPion (WELLBUTRIN SR) 150 MG 12 hr tablet 2019 Pharmacy Yes Yes   Sig: Take 300 mg by mouth daily (2 x 150 mg tablet) "   ferrous gluconate (FERGON) 324 (38 Fe) MG tablet 9/13/2019 Pharmacy No Yes   Sig: TAKE 1 TABLET BY MOUTH TWICE DAILY (IRON SUPPLEMENT)   furosemide (LASIX) 40 MG tablet 9/13/2019 Pharmacy Yes Yes   Sig: Take 40 mg by mouth every morning   furosemide (LASIX) 80 MG tablet 9/13/2019 Pharmacy Yes Yes   Sig: Take 80 mg by mouth every evening   guaiFENesin (MUCINEX) 600 MG 12 hr tablet 9/13/2019 Pharmacy No Yes   Sig: Take 2 tablets (1,200 mg) by mouth 2 times daily   loratadine (CLARITIN) 10 MG tablet 9/13/2019 Pharmacy Yes Yes   Sig: Take 10 mg by mouth daily.   montelukast (SINGULAIR) 10 MG tablet 9/13/2019 Pharmacy No Yes   Sig: TAKE 1 TABLET BY MOUTH EVERY MORNING. (ASTHMA)   multivitamin, therapeutic (THERA-VIT) TABS tablet 9/13/2019 Pharmacy Yes Yes   Sig: Take 1 tablet by mouth daily   naltrexone (DEPADE/REVIA) 50 MG tablet 9/13/2019 Pharmacy Yes Yes   Sig: Take 100 mg by mouth daily ( 2 x 50 mg tablet)   order for DME   No No   Sig: Equipment being ordered: support compression hose BK  2 pair black 30mm HG   To be applied on arising & removed while lying down to go to sleep   order for DME   No No   Sig: Equipment being ordered: CPAP with mask and tubing   order for DME   No No   Sig: Oxygen 2 Li/min  at night and bled into BiPAP   potassium chloride SA (K-DUR/KLOR-CON M) 20 MEQ CR tablet 9/13/2019 Pharmacy No Yes   Sig: TAKE 1 TABLET BY MOUTH TWICE DAILY. (LOW POTASSIUM)   sertraline (ZOLOFT) 100 MG tablet 9/13/2019 Pharmacy Yes Yes   Sig: Take 100 mg by mouth daily    simvastatin (ZOCOR) 40 MG tablet 9/13/2019 Pharmacy No Yes   Sig: TAKE 1 TABLET BY MOUTH EVERY MORNING.  (CHOLESTEROL)   spironolactone (ALDACTONE) 25 MG tablet 9/13/2019 Pharmacy No Yes   Sig: TAKE 1 TABLET BY MOUTH ONCE DAILY. (HIGH BLOOD PRESSURE)   traZODone (DESYREL) 100 MG tablet PRN Pharmacy Yes Yes   Sig: Take 100 mg by mouth nightly as needed for sleep   varenicline (CHANTIX) 1 MG tablet 9/13/2019 Pharmacy Yes Yes   Sig: Take 1 mg by  mouth 2 times daily   warfarin ANTICOAGULANT (COUMADIN) 5 MG tablet 9/13/2019 Pharmacy Yes Yes   Sig: Take 10 mg by mouth daily From 9/10/19 until INR recheck on 9/16/19      Facility-Administered Medications: None     Allergies   Allergies   Allergen Reactions     Bees      Augmentin Rash     Penicillins Rash       Immunization History   Immunization History   Administered Date(s) Administered     FLU 6-35 months 10/20/2016     Influenza (H1N1) 05/20/2010     Influenza (High Dose) 3 valent vaccine 10/03/2013     Influenza (IIV3) PF 10/12/1999, 11/23/2012, 10/03/2013     Influenza Vaccine IM > 6 months Valent IIV4 10/25/2014, 10/01/2015, 10/20/2016, 10/30/2017, 10/20/2018     Pneumo Conj 13-V (2010&after) 07/26/2016     Pneumococcal 23 valent 11/01/2003, 05/20/2010     TDAP Vaccine (Adacel) 02/18/2013     Td (Adult), Adsorbed 12/22/1999       Social History   I have reviewed this patient's social history and updated it with pertinent information if needed. Petey JAY Archibald  reports that he has been smoking cigarettes.  He has a 30.00 pack-year smoking history. He uses smokeless tobacco. He reports that he does not drink alcohol or use drugs.    Family History   I have reviewed this patient's family history and updated it with pertinent information if needed.   Family History   Problem Relation Age of Onset     Diabetes Brother      Unknown/Adopted Mother      Unknown/Adopted Father        Review of Systems   The 10 point Review of Systems is negative other than noted in the HPI or here.     Physical Exam   Temp: 97.8  F (36.6  C) Temp src: Oral BP: 129/68 Pulse: 70   Resp: 16 SpO2: 95 % O2 Device: None (Room air)    Vital Signs with Ranges  Temp:  [96.4  F (35.8  C)-99.2  F (37.3  C)] 97.8  F (36.6  C)  Pulse:  [67-79] 70  Resp:  [16] 16  BP: (115-129)/(63-68) 129/68  SpO2:  [95 %] 95 %  332 lbs 9.6 oz  Body mass index is 49.12 kg/m .    GENERAL APPEARANCE:  alert and no distress  EYES: Eyes grossly normal to  inspection, PERRL and conjunctivae and sclerae normal  HENT: ear canals and TM's normal and nose and mouth without ulcers or lesions  NECK: no adenopathy, no asymmetry, masses, or scars and thyroid normal to palpation  RESP: lungs clear to auscultation - no rales, rhonchi or wheezes  CV: regular rates and rhythm, normal S1 S2, no S3 or S4 and no murmur, click or rub  LYMPHATICS: normal ant/post cervical and supraclavicular nodes  ABDOMEN: soft, nontender, without hepatosplenomegaly or masses and bowel sounds normal  MS: left leg redness with a small wound in the shin   SKIN: no suspicious lesions or rashes      Data   Lab Results   Component Value Date    WBC 5.9 09/15/2019    HGB 10.7 (L) 09/15/2019    HCT 33.8 (L) 09/15/2019     09/15/2019     09/16/2019    POTASSIUM 3.7 09/16/2019    CHLORIDE 106 09/16/2019    CO2 29 09/16/2019    BUN 21 09/16/2019    CR 0.89 09/17/2019     (H) 09/16/2019    DD <0.3 02/08/2019    TROPI <0.015 02/08/2019    AST 21 09/14/2019    ALT 32 09/14/2019    ALKPHOS 79 09/14/2019    BILITOTAL 0.4 09/14/2019    INR 2.54 (H) 09/17/2019     No results for input(s): CULT in the last 168 hours.  Recent Labs   Lab Test 08/14/18  1334   CULT Single colony  Coagulase negative Staphylococcus  Susceptibility testing not routinely done  *  Moderate growth  Gram positive bacilli resembling diphtheroids  No further identification  *       Amount of time performed on this consult: 45 minutes. This includes face to face assessment and care coordination with the primary team.

## 2019-09-17 NOTE — PROGRESS NOTES
[facility]    Medicine Progress Note - Hospitalist Service        Date of Admission:  9/13/2019 11:58 PM    Assessment & Plan:   This is a 58-year-old male with history of sleep apnea on CPAP, peripheral vascular disease, chronic venous stasis dermatitis, hyperlipidemia, history of DVT and PE in the past, on chronic anticoagulation on Coumadin, intellectual disabilities, GERD, recent history of left lower extremity cellulitis, now comes to the ER again with left leg swelling, pain and redness.      Left lower extremity cellulitis with chronic left leg wound   History of peripheral edema and chronic venous stasis    -History of chronic left leg wound, presenting with redness, warmth of the left lower extremity consistent with cellulitis.  -Ultrasound of the lower extremity is negative for DVT.   -PT consult for lymphedema treatment  -Elevate left lower extremity  -Wound care following  -Will likely need outpatient wound care referral at discharge.  -Erythema and warmth about the same today  -Slow improvement, continue ceftriaxone, add clindamycin.  We will get second opinion from ID regarding antibiotic coverage     Acute kidney injury on CKD-3  -Baseline creatinine 0.9-1.1.    -Presented with a creatinine of 1.41, now improved to 1.06 with fluid overnight.  -Lasix at a reduced dose of 40 mg p.o. twice daily.  Hold Aldactone for now.  Anticipate resuming at discharge.     Obstructive sleep apnea:    -CPAP per home settings.     History of chronic obstructive pulmonary disease:    -Not in acute exacerbation.  Continue prior to admission Dulera and Singulair.       History of deep venous thrombosis and pulmonary embolism   -On chronic anticoagulation with Coumadin.    -INR is therapeutic   -Pharmacy to dose Coumadin.      Severe obesity  -Lifestyle modification and follow-up as outpatient.     Borderline mental retardation  -Patient apparently lives in a group home.  -No active issues with this at the  "moment.    Diet: Combination Diet Regular Diet Adult     DVT Prophylaxis: Warfarin   Tapia Catheter: not present  Code Status: Full Code     Disposition Plan    Expected discharge: 1-2 days, pending improvement of cellulitis  Entered: Kevin Chavez MD 09/17/2019, 9:19 AM        The patient's care was discussed with the Bedside Nurse and Patient.    Kevin Chavez MD  Hospitalist Service  [facility]    ______________________________________________________________________    Interval History   Left lower extremity erythema about the same.  Afebrile.  No nausea or vomiting.  No diarrhea.    Data reviewed today: I reviewed all medications, new labs and imaging results over the last 24 hours. I personally reviewed no images or EKG's today.    Physical Exam   Vital signs:  Temp: 97.8  F (36.6  C) Temp src: Oral BP: 129/68 Pulse: 70   Resp: 16 SpO2: 95 % O2 Device: None (Room air)   Height: 175.3 cm (5' 9\") Weight: (!) 150.9 kg (332 lb 9.6 oz)  Estimated body mass index is 49.12 kg/m  as calculated from the following:    Height as of this encounter: 1.753 m (5' 9\").    Weight as of this encounter: 150.9 kg (332 lb 9.6 oz).      Wt Readings from Last 2 Encounters:   09/17/19 (!) 150.9 kg (332 lb 9.6 oz)   09/10/19 (!) 151.5 kg (334 lb)       Gen: AAOX2-3, NAD  HEENT: Supple neck, moist oral mucosa, no pallor  Resp: CTA B/L, normal WOB, no crackles, no wheezes  CVS: RRR, no murmur  Abd/GI: Soft, non-tender. BS- normoactive.   Skin: Warm, dry no rashes  MSK: Left lower extremity erythema unchanged from yesterday, within the marked lines.  Neuro- CN- intact. No focal deficits.      Data   Recent Labs   Lab 09/17/19 0728 09/16/19 0653 09/15/19  0527 09/14/19  0018   WBC  --   --  5.9 8.2   HGB  --   --  10.7* 11.8*   MCV  --   --  94 94   PLT  --   --  235 270   INR 2.54* 2.30* 2.38* 2.35*   NA  --  140 138 135   POTASSIUM  --  3.7 3.8 3.9   CHLORIDE  --  106 105 101   CO2  --  29 30 27   BUN  --  21 " 21 25   CR 0.89 0.91 1.06 1.41*   ANIONGAP  --  5 3 7   JESSICA  --  8.7 8.8 9.0   GLC  --  114* 129* 98   ALBUMIN  --   --   --  3.2*   PROTTOTAL  --   --   --  7.7   BILITOTAL  --   --   --  0.4   ALKPHOS  --   --   --  79   ALT  --   --   --  32   AST  --   --   --  21       No results found for this or any previous visit (from the past 24 hour(s)).  Medications     Warfarin Therapy Reminder         ARIPiprazole  5 mg Oral Daily     buPROPion  150 mg Oral QAM     [START ON 9/18/2019] cefTRIAXone  2 g Intravenous Q24H     clindamycin  900 mg Intravenous Q8H     clotrimazole   Topical BID     ferrous gluconate  324 mg Oral Daily with breakfast     fluticasone-vilanterol  1 puff Inhalation Daily     furosemide  40 mg Oral BID     loratadine  10 mg Oral Daily     montelukast  10 mg Oral At Bedtime     pantoprazole  40 mg Oral Daily     potassium chloride ER  20 mEq Oral Daily     sertraline  150 mg Oral Daily     simvastatin  40 mg Oral At Bedtime     sodium chloride (PF)  3 mL Intracatheter Q8H     umeclidinium  1 puff Inhalation Daily     varenicline  1 mg Oral BID     vitamin D3  2,000 Units Oral Daily

## 2019-09-17 NOTE — PLAN OF CARE
VSS. LLE improving, WOC nurse still to see. Ambulating in halls with A1 and to elevate leg with 2 pillows when in bed. Possible discharge back to group home tomorrow on PO antbs

## 2019-09-18 LAB
CREAT SERPL-MCNC: 0.81 MG/DL (ref 0.66–1.25)
GFR SERPL CREATININE-BSD FRML MDRD: >90 ML/MIN/{1.73_M2}
INR PPP: 3.09 (ref 0.86–1.14)

## 2019-09-18 PROCEDURE — 99232 SBSQ HOSP IP/OBS MODERATE 35: CPT | Performed by: INTERNAL MEDICINE

## 2019-09-18 PROCEDURE — 82565 ASSAY OF CREATININE: CPT | Performed by: INTERNAL MEDICINE

## 2019-09-18 PROCEDURE — 12000000 ZZH R&B MED SURG/OB

## 2019-09-18 PROCEDURE — 25000128 H RX IP 250 OP 636: Performed by: INTERNAL MEDICINE

## 2019-09-18 PROCEDURE — 85610 PROTHROMBIN TIME: CPT | Performed by: INTERNAL MEDICINE

## 2019-09-18 PROCEDURE — 25000132 ZZH RX MED GY IP 250 OP 250 PS 637: Mod: GY

## 2019-09-18 PROCEDURE — 25000125 ZZHC RX 250: Performed by: INTERNAL MEDICINE

## 2019-09-18 PROCEDURE — 25000132 ZZH RX MED GY IP 250 OP 250 PS 637: Mod: GY | Performed by: INTERNAL MEDICINE

## 2019-09-18 PROCEDURE — 36415 COLL VENOUS BLD VENIPUNCTURE: CPT | Performed by: INTERNAL MEDICINE

## 2019-09-18 RX ORDER — WARFARIN SODIUM 5 MG/1
5 TABLET ORAL
Status: COMPLETED | OUTPATIENT
Start: 2019-09-18 | End: 2019-09-18

## 2019-09-18 RX ADMIN — SERTRALINE HYDROCHLORIDE 150 MG: 100 TABLET ORAL at 08:09

## 2019-09-18 RX ADMIN — FUROSEMIDE 40 MG: 40 TABLET ORAL at 16:01

## 2019-09-18 RX ADMIN — FLUTICASONE FUROATE AND VILANTEROL TRIFENATATE 1 PUFF: 200; 25 POWDER RESPIRATORY (INHALATION) at 08:02

## 2019-09-18 RX ADMIN — VARENICLINE TARTRATE 1 MG: 1 TABLET, FILM COATED ORAL at 08:08

## 2019-09-18 RX ADMIN — ARIPIPRAZOLE 5 MG: 5 TABLET ORAL at 08:12

## 2019-09-18 RX ADMIN — ZOLPIDEM TARTRATE 10 MG: 5 TABLET, FILM COATED ORAL at 22:07

## 2019-09-18 RX ADMIN — LORATADINE 10 MG: 10 TABLET ORAL at 08:12

## 2019-09-18 RX ADMIN — MONTELUKAST 10 MG: 10 TABLET, FILM COATED ORAL at 22:03

## 2019-09-18 RX ADMIN — CEFTRIAXONE SODIUM 2 G: 2 INJECTION, POWDER, FOR SOLUTION INTRAMUSCULAR; INTRAVENOUS at 06:02

## 2019-09-18 RX ADMIN — CLINDAMYCIN PHOSPHATE 900 MG: 900 INJECTION, SOLUTION INTRAVENOUS at 17:49

## 2019-09-18 RX ADMIN — BUPROPION HYDROCHLORIDE 150 MG: 75 TABLET, FILM COATED ORAL at 08:11

## 2019-09-18 RX ADMIN — SIMVASTATIN 40 MG: 40 TABLET, FILM COATED ORAL at 22:03

## 2019-09-18 RX ADMIN — FUROSEMIDE 40 MG: 40 TABLET ORAL at 08:08

## 2019-09-18 RX ADMIN — POTASSIUM CHLORIDE 20 MEQ: 1500 TABLET, EXTENDED RELEASE ORAL at 08:13

## 2019-09-18 RX ADMIN — CLINDAMYCIN PHOSPHATE 900 MG: 900 INJECTION, SOLUTION INTRAVENOUS at 09:27

## 2019-09-18 RX ADMIN — VARENICLINE TARTRATE 1 MG: 1 TABLET, FILM COATED ORAL at 20:06

## 2019-09-18 RX ADMIN — MELATONIN 2000 UNITS: at 08:08

## 2019-09-18 RX ADMIN — CLOTRIMAZOLE: 1 CREAM TOPICAL at 08:02

## 2019-09-18 RX ADMIN — CLOTRIMAZOLE: 1 CREAM TOPICAL at 20:06

## 2019-09-18 RX ADMIN — CLINDAMYCIN PHOSPHATE 900 MG: 900 INJECTION, SOLUTION INTRAVENOUS at 01:49

## 2019-09-18 RX ADMIN — WARFARIN SODIUM 5 MG: 5 TABLET ORAL at 17:49

## 2019-09-18 RX ADMIN — PANTOPRAZOLE SODIUM 40 MG: 40 TABLET, DELAYED RELEASE ORAL at 08:08

## 2019-09-18 RX ADMIN — FERROUS GLUCONATE 324 MG: 324 TABLET ORAL at 08:12

## 2019-09-18 RX ADMIN — UMECLIDINIUM 1 PUFF: 62.5 AEROSOL, POWDER ORAL at 08:02

## 2019-09-18 ASSESSMENT — ACTIVITIES OF DAILY LIVING (ADL)
ADLS_ACUITY_SCORE: 14

## 2019-09-18 ASSESSMENT — MIFFLIN-ST. JEOR: SCORE: 2318.57

## 2019-09-18 NOTE — PLAN OF CARE
VSS on RA. A&O x4. Pt denies pain. LLE red, marked and receding. Mepilex dressing changed on LLE wound. Scant purulent drainage present. Pt elevating LLE. Sequential Compression device on R leg, on & off per pt comfort. Ambulated the halls independently x2 this am. PIV saline locked. Adequate PO intake. Voiding and stooling pattern normal per pt statement. Plan continues to be discharging back to group home when switched to PO ABX. Pt wants discharge asap.

## 2019-09-18 NOTE — PLAN OF CARE
A& O x 4, VSS, no c/o pain.  LLE, red, marked and receding; has mepilex on, CDI. Up independently. Using urinal at bedside, good UOP. Educated pt on elevating LLE, not always compliant. PIV, SL, getting intermittent IV ABX.  Discharge back to group home when able to switch po ABX.

## 2019-09-18 NOTE — PROGRESS NOTES
[facility]    Infectious Disease Progress Note    Date of Service (when I saw the patient): 09/18/2019     Assessment & Plan   Petey Archibald is a 58 year old male who was admitted on 9/13/2019.       Impression:  1. 58 y.o with history of DVT in the left lower extremity, and PE who presents with a wound infection.   2. The patient reports that he had this wound from some time ago that it has since reopened.  3. There is surrounding redness and cellulitis.   4. He was started on IV Clindamycin and Ancef.      Recommendations:   Continue on the same antibiotics, I say another 24- 48 hours   Keep the leg elevated at all times   Will continue to follow along        Jenifer Quispe MD    Interval History   Leg is about the same   No fevers   Induration persists     Physical Exam   Temp: 96.4  F (35.8  C) Temp src: Oral BP: 116/65 Pulse: 79 Heart Rate: 71 Resp: 16 SpO2: 96 % O2 Device: None (Room air)    Vitals:    09/16/19 0130 09/17/19 0551 09/18/19 0658   Weight: (!) 152.9 kg (337 lb 1.6 oz) (!) 150.9 kg (332 lb 9.6 oz) (!) 151.8 kg (334 lb 11.2 oz)     Vital Signs with Ranges  Temp:  [96.4  F (35.8  C)-99  F (37.2  C)] 96.4  F (35.8  C)  Pulse:  [79] 79  Heart Rate:  [71-82] 71  Resp:  [16] 16  BP: (116-138)/(65-78) 116/65  SpO2:  [94 %-98 %] 96 %    Constitutional: Awake, alert, cooperative, no apparent distress  Lungs: Clear to auscultation bilaterally, no crackles or wheezing  Cardiovascular: Regular rate and rhythm, normal S1 and S2, and no murmur noted  Abdomen: Normal bowel sounds, soft, non-distended, non-tender  Skin: No rashes, no cyanosis, no edema  Other:    Medications     Warfarin Therapy Reminder         ARIPiprazole  5 mg Oral Daily     buPROPion  150 mg Oral QAM     cefTRIAXone  2 g Intravenous Q24H     clindamycin  900 mg Intravenous Q8H     clotrimazole   Topical BID     ferrous gluconate  324 mg Oral Daily with breakfast     fluticasone-vilanterol  1 puff Inhalation Daily      furosemide  40 mg Oral BID     loratadine  10 mg Oral Daily     montelukast  10 mg Oral At Bedtime     pantoprazole  40 mg Oral Daily     potassium chloride ER  20 mEq Oral Daily     sertraline  150 mg Oral Daily     simvastatin  40 mg Oral At Bedtime     sodium chloride (PF)  3 mL Intracatheter Q8H     umeclidinium  1 puff Inhalation Daily     varenicline  1 mg Oral BID     vitamin D3  2,000 Units Oral Daily     warfarin ANTICOAGULANT  5 mg Oral ONCE at 18:00       Data   All microbiology laboratory data reviewed.  Recent Labs   Lab Test 09/15/19  0527 09/14/19  0018 08/22/19  0550   WBC 5.9 8.2 4.2   HGB 10.7* 11.8* 12.1*   HCT 33.8* 36.7* 37.8*   MCV 94 94 95    270 233     Recent Labs   Lab Test 09/18/19  0717 09/17/19  0728 09/16/19  0653   CR 0.81 0.89 0.91     No lab results found.  Recent Labs   Lab Test 08/14/18  1334   CULT Single colony  Coagulase negative Staphylococcus  Susceptibility testing not routinely done  *  Moderate growth  Gram positive bacilli resembling diphtheroids  No further identification  *       Attestation:  Total time on the floor involved in the patient's care: 35 minutes. Total time spent in counseling/care coordination: >50%

## 2019-09-18 NOTE — PROGRESS NOTES
[facility]    Medicine Progress Note - Hospitalist Service        Date of Admission:  9/13/2019 11:58 PM    Assessment & Plan:   This is a 58-year-old male with history of sleep apnea on CPAP, peripheral vascular disease, chronic venous stasis dermatitis, hyperlipidemia, history of DVT and PE in the past, on chronic anticoagulation on Coumadin, intellectual disabilities, GERD, recent history of left lower extremity cellulitis, now comes to the ER again with left leg swelling, pain and redness.      Left lower extremity cellulitis with chronic left leg wound   History of peripheral edema and chronic venous stasis    -History of chronic left leg wound, presenting with redness, warmth of the left lower extremity consistent with cellulitis.  -Ultrasound of the lower extremity is negative for DVT.   -Elevate left lower extremity  -Wound care following  -Will likely need outpatient wound care referral at discharge.  -Erythema and warmth about the same today  -Slow improvement, continue ceftriaxone, and clindamycin.    -ID following, anticipate another 24 to 48 hours of IV antibiotics.  -Patient very eager to be discharged but explained to him that his cellulitis is not substantially better to transition to p.o. antibiotics yet    Acute kidney injury on CKD-3  -Baseline creatinine 0.9-1.1.    -Presented with a creatinine of 1.41, now improved to 1.06 with fluid overnight.  -Lasix at a reduced dose of 40 mg p.o. twice daily.    -Hold Aldactone for now.    -Anticipate resuming at discharge.     Obstructive sleep apnea:    -CPAP per home settings.     History of chronic obstructive pulmonary disease:    -Not in acute exacerbation.  Continue prior to admission Dulera and Singulair.       History of deep venous thrombosis and pulmonary embolism   -On chronic anticoagulation with Coumadin.    -INR is therapeutic   -Pharmacy to dose Coumadin.      Severe obesity  -Lifestyle modification and follow-up as  "outpatient.     Borderline mental retardation  -Patient apparently lives in a group home.  -No active issues with this at the moment.    Diet: Combination Diet Regular Diet Adult     DVT Prophylaxis: Warfarin   Tapia Catheter: not present  Code Status: Full Code     Disposition Plan    Expected discharge: 1-2 days, pending improvement of cellulitis  Entered: Kevin Chavez MD 09/18/2019, 2:29 PM        The patient's care was discussed with the Bedside Nurse and Patient.    Kevin Chavez MD  Hospitalist Service  [facility]    ______________________________________________________________________    Interval History   Very slow improvement.  The redness is about the same.  Continues to be slightly warm.  Afebrile.    Data reviewed today: I reviewed all medications, new labs and imaging results over the last 24 hours. I personally reviewed no images or EKG's today.    Physical Exam   Vital signs:  Temp: 96.4  F (35.8  C) Temp src: Oral BP: 116/65 Pulse: 79 Heart Rate: 71 Resp: 16 SpO2: 96 % O2 Device: None (Room air)   Height: 175.3 cm (5' 9\") Weight: (!) 151.8 kg (334 lb 11.2 oz)  Estimated body mass index is 49.43 kg/m  as calculated from the following:    Height as of this encounter: 1.753 m (5' 9\").    Weight as of this encounter: 151.8 kg (334 lb 11.2 oz).      Wt Readings from Last 2 Encounters:   09/18/19 (!) 151.8 kg (334 lb 11.2 oz)   09/10/19 (!) 151.5 kg (334 lb)       Gen: AAOX2-3, NAD  HEENT: Supple neck, moist oral mucosa, no pallor  Resp: CTA B/L, normal WOB, no crackles, no wheezes  CVS: RRR, no murmur  Abd/GI: Soft, non-tender. BS- normoactive.   Skin: Warm, dry no rashes  MSK: Left lower extremity erythema unchanged from yesterday, within the marked lines however color not substantially different.  Neuro- CN- intact. No focal deficits.      Data   Recent Labs   Lab 09/18/19  0717 09/17/19  0728 09/16/19  0653 09/15/19  0527 09/14/19  0018   WBC  --   --   --  5.9 8.2   HGB  --  "  --   --  10.7* 11.8*   MCV  --   --   --  94 94   PLT  --   --   --  235 270   INR 3.09* 2.54* 2.30* 2.38* 2.35*   NA  --   --  140 138 135   POTASSIUM  --   --  3.7 3.8 3.9   CHLORIDE  --   --  106 105 101   CO2  --   --  29 30 27   BUN  --   --  21 21 25   CR 0.81 0.89 0.91 1.06 1.41*   ANIONGAP  --   --  5 3 7   JESSICA  --   --  8.7 8.8 9.0   GLC  --   --  114* 129* 98   ALBUMIN  --   --   --   --  3.2*   PROTTOTAL  --   --   --   --  7.7   BILITOTAL  --   --   --   --  0.4   ALKPHOS  --   --   --   --  79   ALT  --   --   --   --  32   AST  --   --   --   --  21       No results found for this or any previous visit (from the past 24 hour(s)).  Medications     Warfarin Therapy Reminder         ARIPiprazole  5 mg Oral Daily     buPROPion  150 mg Oral QAM     cefTRIAXone  2 g Intravenous Q24H     clindamycin  900 mg Intravenous Q8H     clotrimazole   Topical BID     ferrous gluconate  324 mg Oral Daily with breakfast     fluticasone-vilanterol  1 puff Inhalation Daily     furosemide  40 mg Oral BID     loratadine  10 mg Oral Daily     montelukast  10 mg Oral At Bedtime     pantoprazole  40 mg Oral Daily     potassium chloride ER  20 mEq Oral Daily     sertraline  150 mg Oral Daily     simvastatin  40 mg Oral At Bedtime     sodium chloride (PF)  3 mL Intracatheter Q8H     umeclidinium  1 puff Inhalation Daily     varenicline  1 mg Oral BID     vitamin D3  2,000 Units Oral Daily     warfarin ANTICOAGULANT  5 mg Oral ONCE at 18:00

## 2019-09-19 LAB
CREAT SERPL-MCNC: 0.84 MG/DL (ref 0.66–1.25)
GFR SERPL CREATININE-BSD FRML MDRD: >90 ML/MIN/{1.73_M2}
INR PPP: 2.85 (ref 0.86–1.14)

## 2019-09-19 PROCEDURE — 25000132 ZZH RX MED GY IP 250 OP 250 PS 637: Mod: GY | Performed by: INTERNAL MEDICINE

## 2019-09-19 PROCEDURE — 82565 ASSAY OF CREATININE: CPT | Performed by: INTERNAL MEDICINE

## 2019-09-19 PROCEDURE — 36415 COLL VENOUS BLD VENIPUNCTURE: CPT | Performed by: INTERNAL MEDICINE

## 2019-09-19 PROCEDURE — 85610 PROTHROMBIN TIME: CPT | Performed by: INTERNAL MEDICINE

## 2019-09-19 PROCEDURE — 25000125 ZZHC RX 250: Performed by: INTERNAL MEDICINE

## 2019-09-19 PROCEDURE — 25000132 ZZH RX MED GY IP 250 OP 250 PS 637: Mod: GY

## 2019-09-19 PROCEDURE — 25000128 H RX IP 250 OP 636: Performed by: INTERNAL MEDICINE

## 2019-09-19 PROCEDURE — 12000000 ZZH R&B MED SURG/OB

## 2019-09-19 PROCEDURE — 99232 SBSQ HOSP IP/OBS MODERATE 35: CPT | Performed by: INTERNAL MEDICINE

## 2019-09-19 RX ADMIN — FLUTICASONE FUROATE AND VILANTEROL TRIFENATATE 1 PUFF: 200; 25 POWDER RESPIRATORY (INHALATION) at 08:25

## 2019-09-19 RX ADMIN — POTASSIUM CHLORIDE 20 MEQ: 1500 TABLET, EXTENDED RELEASE ORAL at 08:26

## 2019-09-19 RX ADMIN — WARFARIN SODIUM 7.5 MG: 2.5 TABLET ORAL at 17:55

## 2019-09-19 RX ADMIN — BUPROPION HYDROCHLORIDE 150 MG: 75 TABLET, FILM COATED ORAL at 08:26

## 2019-09-19 RX ADMIN — CLOTRIMAZOLE: 1 CREAM TOPICAL at 10:15

## 2019-09-19 RX ADMIN — MONTELUKAST 10 MG: 10 TABLET, FILM COATED ORAL at 21:11

## 2019-09-19 RX ADMIN — CEFTRIAXONE SODIUM 2 G: 2 INJECTION, POWDER, FOR SOLUTION INTRAMUSCULAR; INTRAVENOUS at 05:06

## 2019-09-19 RX ADMIN — CLINDAMYCIN PHOSPHATE 900 MG: 900 INJECTION, SOLUTION INTRAVENOUS at 10:15

## 2019-09-19 RX ADMIN — CLINDAMYCIN PHOSPHATE 900 MG: 900 INJECTION, SOLUTION INTRAVENOUS at 17:55

## 2019-09-19 RX ADMIN — FUROSEMIDE 40 MG: 40 TABLET ORAL at 16:21

## 2019-09-19 RX ADMIN — LORATADINE 10 MG: 10 TABLET ORAL at 08:26

## 2019-09-19 RX ADMIN — MELATONIN 2000 UNITS: at 08:33

## 2019-09-19 RX ADMIN — TRAZODONE HYDROCHLORIDE 100 MG: 50 TABLET ORAL at 21:11

## 2019-09-19 RX ADMIN — VARENICLINE TARTRATE 1 MG: 1 TABLET, FILM COATED ORAL at 21:11

## 2019-09-19 RX ADMIN — UMECLIDINIUM 1 PUFF: 62.5 AEROSOL, POWDER ORAL at 08:25

## 2019-09-19 RX ADMIN — FUROSEMIDE 40 MG: 40 TABLET ORAL at 08:27

## 2019-09-19 RX ADMIN — FERROUS GLUCONATE 324 MG: 324 TABLET ORAL at 08:26

## 2019-09-19 RX ADMIN — PANTOPRAZOLE SODIUM 40 MG: 40 TABLET, DELAYED RELEASE ORAL at 08:27

## 2019-09-19 RX ADMIN — ARIPIPRAZOLE 5 MG: 5 TABLET ORAL at 08:26

## 2019-09-19 RX ADMIN — CLOTRIMAZOLE: 1 CREAM TOPICAL at 21:11

## 2019-09-19 RX ADMIN — SERTRALINE HYDROCHLORIDE 150 MG: 100 TABLET ORAL at 08:26

## 2019-09-19 RX ADMIN — SIMVASTATIN 40 MG: 40 TABLET, FILM COATED ORAL at 21:11

## 2019-09-19 RX ADMIN — VARENICLINE TARTRATE 1 MG: 1 TABLET, FILM COATED ORAL at 08:26

## 2019-09-19 RX ADMIN — CLINDAMYCIN PHOSPHATE 900 MG: 900 INJECTION, SOLUTION INTRAVENOUS at 01:12

## 2019-09-19 ASSESSMENT — ACTIVITIES OF DAILY LIVING (ADL)
ADLS_ACUITY_SCORE: 14

## 2019-09-19 NOTE — PROGRESS NOTES
SPIRITUAL HE[facility] Progress Note  [facility]    Received request for  visit, and made a referral to Fr. Parker.  Chaplains are available per additional need or request for support.                                                                                                                                                 Latoya Kamara M.A.  Staff   Pager [phone]  Phone [phone]

## 2019-09-19 NOTE — PLAN OF CARE
[id number] shift. Vitals stable. A&O, ambulating halls independently. No c/o pain. Eating well. LLE 2-3+ edema and redness. Wound to LLE- mepilex CDI. ID following, continuing IV abx. Discharge back to group home 1-2 days.

## 2019-09-19 NOTE — PROGRESS NOTES
[facility]    Medicine Progress Note - Hospitalist Service        Date of Admission:  9/13/2019 11:58 PM    Assessment & Plan:   This is a 58-year-old male with history of sleep apnea on CPAP, peripheral vascular disease, chronic venous stasis dermatitis, hyperlipidemia, history of DVT and PE in the past, on chronic anticoagulation on Coumadin, intellectual disabilities, GERD, recent history of left lower extremity cellulitis, now comes to the ER again with left leg swelling, pain and redness.      Left lower extremity cellulitis with chronic left leg wound   History of peripheral edema and chronic venous stasis    -History of chronic left leg wound, presenting with redness, warmth of the left lower extremity consistent with cellulitis.  -Ultrasound of the lower extremity is negative for DVT.   -Elevate left lower extremity  -Wound care following  -Will likely need outpatient wound care referral at discharge.  -Erythema and warmth about the same today  -Slow improvement, continue ceftriaxone, and clindamycin.    -ID following, anticipate another 24 to 48 hours of IV antibiotics.  -Patient very eager to be discharged but explained to him that his cellulitis is not substantially better to transition to p.o. antibiotics yet    Acute kidney injury on CKD-3  -Baseline creatinine 0.9-1.1.    -Presented with a creatinine of 1.41, now improved to 1.06 with fluid overnight.  -Lasix at a reduced dose of 40 mg p.o. twice daily.    -Hold Aldactone for now.    -Anticipate resuming at discharge.     Obstructive sleep apnea:    -CPAP per home settings.     History of chronic obstructive pulmonary disease:    -Not in acute exacerbation.  Continue prior to admission Dulera and Singulair.       History of deep venous thrombosis and pulmonary embolism   -On chronic anticoagulation with Coumadin.    -INR is therapeutic   -Pharmacy to dose Coumadin.      Severe obesity  -Lifestyle modification and follow-up as  "outpatient.     Borderline mental retardation  -Patient apparently lives in a group home.  -No active issues with this at the moment.    Diet: Combination Diet Regular Diet Adult     DVT Prophylaxis: Warfarin   Tapia Catheter: not present  Code Status: Full Code     Disposition Plan    Expected discharge: 1-2 days, pending improvement of cellulitis  Entered: Nadira Westfall MD 09/19/2019, 12:53 PM        The patient's care was discussed with the Bedside Nurse and Patient.        ______________________________________________________________________    Interval History   Very slow improvement.  The redness is about the same.  Continues to be slightly warm.  Afebrile.    Data reviewed today: I reviewed all medications, new labs and imaging results over the last 24 hours. I personally reviewed no images or EKG's today.    Physical Exam   Vital signs:  Temp: 97.6  F (36.4  C) Temp src: Tympanic BP: 134/69   Heart Rate: 69 Resp: 16 SpO2: 95 % O2 Device: None (Room air)   Height: 175.3 cm (5' 9\") Weight: (!) 151.8 kg (334 lb 11.2 oz)  Estimated body mass index is 49.43 kg/m  as calculated from the following:    Height as of this encounter: 1.753 m (5' 9\").    Weight as of this encounter: 151.8 kg (334 lb 11.2 oz).      Wt Readings from Last 2 Encounters:   09/18/19 (!) 151.8 kg (334 lb 11.2 oz)   09/10/19 (!) 151.5 kg (334 lb)       Gen: AAOX2-3, NAD  HEENT: Supple neck, moist oral mucosa, no pallor  Resp: CTA B/L, normal WOB, no crackles, no wheezes  CVS: RRR, no murmur  Abd/GI: Soft, non-tender. BS- normoactive.   Skin: Warm, dry no rashes  MSK: Left lower extremity erythema unchanged from yesterday, within the marked lines however color not substantially different.  Neuro- CN- intact. No focal deficits.      Data   Recent Labs   Lab 09/19/19  0728 09/18/19  0717 09/17/19  0728 09/16/19  0653 09/15/19  0527 09/14/19  0018   WBC  --   --   --   --  5.9 8.2   HGB  --   --   --   --  10.7* 11.8*   MCV  --   --   --   --  94 " 94   PLT  --   --   --   --  235 270   INR 2.85* 3.09* 2.54* 2.30* 2.38* 2.35*   NA  --   --   --  140 138 135   POTASSIUM  --   --   --  3.7 3.8 3.9   CHLORIDE  --   --   --  106 105 101   CO2  --   --   --  29 30 27   BUN  --   --   --  21 21 25   CR 0.84 0.81 0.89 0.91 1.06 1.41*   ANIONGAP  --   --   --  5 3 7   JESSICA  --   --   --  8.7 8.8 9.0   GLC  --   --   --  114* 129* 98   ALBUMIN  --   --   --   --   --  3.2*   PROTTOTAL  --   --   --   --   --  7.7   BILITOTAL  --   --   --   --   --  0.4   ALKPHOS  --   --   --   --   --  79   ALT  --   --   --   --   --  32   AST  --   --   --   --   --  21       No results found for this or any previous visit (from the past 24 hour(s)).  Medications     Warfarin Therapy Reminder         ARIPiprazole  5 mg Oral Daily     buPROPion  150 mg Oral QAM     cefTRIAXone  2 g Intravenous Q24H     clindamycin  900 mg Intravenous Q8H     clotrimazole   Topical BID     ferrous gluconate  324 mg Oral Daily with breakfast     fluticasone-vilanterol  1 puff Inhalation Daily     furosemide  40 mg Oral BID     loratadine  10 mg Oral Daily     montelukast  10 mg Oral At Bedtime     pantoprazole  40 mg Oral Daily     potassium chloride ER  20 mEq Oral Daily     sertraline  150 mg Oral Daily     simvastatin  40 mg Oral At Bedtime     sodium chloride (PF)  3 mL Intracatheter Q8H     umeclidinium  1 puff Inhalation Daily     varenicline  1 mg Oral BID     vitamin D3  2,000 Units Oral Daily     warfarin ANTICOAGULANT  7.5 mg Oral ONCE at 18:00

## 2019-09-19 NOTE — PLAN OF CARE
A&Ox4. VSS on RA. IV intermittently infusing anx. Independent. Tolerating regular diet. Lung sounds diminished. Redden/pink buttocks. CMS intact. LLE +2 edema, RLE +1. Mepilex on leg CDI. Denies pain.

## 2019-09-19 NOTE — CONSULTS
SW Consult Note:    Care Transition Initial Assessment - SW     Met with: Patient    Active Problems:    Left leg cellulitis    Cellulitis of left lower extremity       DATA  Lives With: facility resident      Quality of Family Relationships: helpful, involved, supportive  Description of Support System: Supportive, Involved  Who is your support system?: Facility resident(s)/Staff  Support Assessment: Adequate family and caregiver support, Adequate social supports.   Identified issues/concerns regarding health management: Patient is a 58 year old male that was admitted to the hospital on 9/13/19 with a primary diagnosis of left leg cellulitis.  The tentative date for discharge is 9/20/19.  Reviewed chart and spoke with patient regarding discharge plans.  Patient reports that he resides at [facility] and is ready to get back home.  SW discussed transportation upon discharge with patient and he stated the facility would come to pick him up and facilitated a call for SW to talk with Ray at the [facility].  Ray confirmed that they will come to get patient when ready for discharge.  SW will call [phone] when discharge plan is known.          Quality of Family Relationships: helpful, involved, supportive     ASSESSMENT  Cognitive Status:  awake, alert and oriented  Concerns to be addressed: Discharge planning.     PLAN  Financial costs for the patient includes: N/A  Patient given options and choices for discharge: Return to [facility]  Patient/family is agreeable to the plan?  Yes  Transportation/person available to transport on day of discharge: Group Home Staff  Patient Goals and Preferences: [facility]  Patient anticipates discharging to: [facility]    SHER Garzon, LGSW

## 2019-09-19 NOTE — PROGRESS NOTES
Renar did not meet with this patient regarding discharge planning.  Patient may be ready for discharge to home today with outpatient follow up.  Renar did get the patient into the wound healing clinic for the week of 9/23 and patient has a PCP follow up for INR draw as well.  SW to meet with patient regarding any other discharge planning needs/transportation.

## 2019-09-19 NOTE — PROGRESS NOTES
[facility]    Infectious Disease Progress Note    Date of Service (when I saw the patient): 09/19/2019     Assessment & Plan   Petey Archibald is a 58 year old male who was admitted on 9/13/2019.       Impression:  1. 58 y.o with history of DVT in the left lower extremity, and PE who presents with a wound infection.   2. The patient reports that he had this wound from some time ago that it has since reopened.  3. There is surrounding redness and cellulitis.   4. He was started on IV Clindamycin and Ancef.      Recommendations:   Continue on the same antibiotics  Keep the leg elevated at all times   Will continue to follow along        Jenifer Quispe MD    Interval History   Leg is about the same   No fevers   Induration persists     Physical Exam   Temp: 97.6  F (36.4  C) Temp src: Tympanic BP: 134/69   Heart Rate: 69 Resp: 16 SpO2: 95 % O2 Device: None (Room air)    Vitals:    09/16/19 0130 09/17/19 0551 09/18/19 0658   Weight: (!) 152.9 kg (337 lb 1.6 oz) (!) 150.9 kg (332 lb 9.6 oz) (!) 151.8 kg (334 lb 11.2 oz)     Vital Signs with Ranges  Temp:  [96.9  F (36.1  C)-99.1  F (37.3  C)] 97.6  F (36.4  C)  Heart Rate:  [66-74] 69  Resp:  [16-18] 16  BP: ()/(54-69) 134/69  SpO2:  [94 %-97 %] 95 %    Constitutional: Awake, alert, cooperative, no apparent distress  Lungs: Clear to auscultation bilaterally, no crackles or wheezing  Cardiovascular: Regular rate and rhythm, normal S1 and S2, and no murmur noted  Abdomen: Normal bowel sounds, soft, non-distended, non-tender  Skin: No rashes, no cyanosis, no edema  Other:    Medications     Warfarin Therapy Reminder         ARIPiprazole  5 mg Oral Daily     buPROPion  150 mg Oral QAM     cefTRIAXone  2 g Intravenous Q24H     clindamycin  900 mg Intravenous Q8H     clotrimazole   Topical BID     ferrous gluconate  324 mg Oral Daily with breakfast     fluticasone-vilanterol  1 puff Inhalation Daily     furosemide  40 mg Oral BID     loratadine  10 mg  Oral Daily     montelukast  10 mg Oral At Bedtime     pantoprazole  40 mg Oral Daily     potassium chloride ER  20 mEq Oral Daily     sertraline  150 mg Oral Daily     simvastatin  40 mg Oral At Bedtime     sodium chloride (PF)  3 mL Intracatheter Q8H     umeclidinium  1 puff Inhalation Daily     varenicline  1 mg Oral BID     vitamin D3  2,000 Units Oral Daily     warfarin ANTICOAGULANT  7.5 mg Oral ONCE at 18:00       Data   All microbiology laboratory data reviewed.  Recent Labs   Lab Test 09/15/19  0527 09/14/19  0018 08/22/19  0550   WBC 5.9 8.2 4.2   HGB 10.7* 11.8* 12.1*   HCT 33.8* 36.7* 37.8*   MCV 94 94 95    270 233     Recent Labs   Lab Test 09/19/19  0728 09/18/19  0717 09/17/19  0728   CR 0.84 0.81 0.89     No lab results found.  Recent Labs   Lab Test 08/14/18  1334   CULT Single colony  Coagulase negative Staphylococcus  Susceptibility testing not routinely done  *  Moderate growth  Gram positive bacilli resembling diphtheroids  No further identification  *       Attestation:  Total time on the floor involved in the patient's care: 35 minutes. Total time spent in counseling/care coordination: >50%

## 2019-09-19 NOTE — PLAN OF CARE
AO x4.  VSS on room air.  Cleocin IV antibiotic.  IV currently saline locked.  Good appetite on regular diet.  Ambulating independently within room and in hallways.  +2 edema in left leg, cellulitis in left lower extremities outlined with pen and receding.  Mepilex dressing on shin wound, scant amount of yellow drainage.  Lotrimin cream applied to bilateral feet.  Denied pain.  Plan for 7.5 mg coumadin this evening and probably discharge back to his group home tomorrow, 9/20/19.  Nursing will continue to monitor.

## 2019-09-20 VITALS
SYSTOLIC BLOOD PRESSURE: 114 MMHG | TEMPERATURE: 98.2 F | RESPIRATION RATE: 18 BRPM | BODY MASS INDEX: 46.65 KG/M2 | OXYGEN SATURATION: 93 % | DIASTOLIC BLOOD PRESSURE: 72 MMHG | HEIGHT: 69 IN | HEART RATE: 80 BPM | WEIGHT: 315 LBS

## 2019-09-20 LAB
CREAT SERPL-MCNC: 0.87 MG/DL (ref 0.66–1.25)
GFR SERPL CREATININE-BSD FRML MDRD: >90 ML/MIN/{1.73_M2}
INR PPP: 2.73 (ref 0.86–1.14)

## 2019-09-20 PROCEDURE — 25000132 ZZH RX MED GY IP 250 OP 250 PS 637: Mod: GY | Performed by: INTERNAL MEDICINE

## 2019-09-20 PROCEDURE — 25000125 ZZHC RX 250: Performed by: INTERNAL MEDICINE

## 2019-09-20 PROCEDURE — 99239 HOSP IP/OBS DSCHRG MGMT >30: CPT | Performed by: INTERNAL MEDICINE

## 2019-09-20 PROCEDURE — G0463 HOSPITAL OUTPT CLINIC VISIT: HCPCS

## 2019-09-20 PROCEDURE — 36415 COLL VENOUS BLD VENIPUNCTURE: CPT | Performed by: INTERNAL MEDICINE

## 2019-09-20 PROCEDURE — 85610 PROTHROMBIN TIME: CPT | Performed by: INTERNAL MEDICINE

## 2019-09-20 PROCEDURE — 82565 ASSAY OF CREATININE: CPT | Performed by: INTERNAL MEDICINE

## 2019-09-20 PROCEDURE — 25000128 H RX IP 250 OP 636: Performed by: INTERNAL MEDICINE

## 2019-09-20 RX ORDER — WARFARIN SODIUM 5 MG/1
10 TABLET ORAL
Status: DISCONTINUED | OUTPATIENT
Start: 2019-09-20 | End: 2019-09-20 | Stop reason: HOSPADM

## 2019-09-20 RX ORDER — ZOLPIDEM TARTRATE 10 MG/1
10 TABLET ORAL
Qty: 30 TABLET | Refills: 1 | COMMUNITY
Start: 2019-09-20 | End: 2020-05-18

## 2019-09-20 RX ORDER — CLOTRIMAZOLE 1 %
CREAM (GRAM) TOPICAL 2 TIMES DAILY
Qty: 60 G | Refills: 0 | Status: SHIPPED | OUTPATIENT
Start: 2019-09-20

## 2019-09-20 RX ORDER — CEPHALEXIN 500 MG/1
500 CAPSULE ORAL EVERY 6 HOURS
Qty: 28 CAPSULE | Refills: 0 | Status: SHIPPED | OUTPATIENT
Start: 2019-09-20 | End: 2019-09-27

## 2019-09-20 RX ORDER — CEPHALEXIN 500 MG/1
500 CAPSULE ORAL EVERY 6 HOURS SCHEDULED
Status: DISCONTINUED | OUTPATIENT
Start: 2019-09-20 | End: 2019-09-20 | Stop reason: HOSPADM

## 2019-09-20 RX ADMIN — BUPROPION HYDROCHLORIDE 150 MG: 75 TABLET, FILM COATED ORAL at 09:37

## 2019-09-20 RX ADMIN — UMECLIDINIUM 1 PUFF: 62.5 AEROSOL, POWDER ORAL at 09:38

## 2019-09-20 RX ADMIN — FLUTICASONE FUROATE AND VILANTEROL TRIFENATATE 1 PUFF: 200; 25 POWDER RESPIRATORY (INHALATION) at 09:38

## 2019-09-20 RX ADMIN — MELATONIN 2000 UNITS: at 09:37

## 2019-09-20 RX ADMIN — ACETAMINOPHEN 650 MG: 325 TABLET, FILM COATED ORAL at 06:02

## 2019-09-20 RX ADMIN — SERTRALINE HYDROCHLORIDE 150 MG: 100 TABLET ORAL at 09:38

## 2019-09-20 RX ADMIN — CLINDAMYCIN PHOSPHATE 900 MG: 900 INJECTION, SOLUTION INTRAVENOUS at 02:12

## 2019-09-20 RX ADMIN — LORATADINE 10 MG: 10 TABLET ORAL at 09:38

## 2019-09-20 RX ADMIN — PANTOPRAZOLE SODIUM 40 MG: 40 TABLET, DELAYED RELEASE ORAL at 09:37

## 2019-09-20 RX ADMIN — VARENICLINE TARTRATE 1 MG: 1 TABLET, FILM COATED ORAL at 09:37

## 2019-09-20 RX ADMIN — CEFTRIAXONE SODIUM 2 G: 2 INJECTION, POWDER, FOR SOLUTION INTRAMUSCULAR; INTRAVENOUS at 06:03

## 2019-09-20 RX ADMIN — CLOTRIMAZOLE: 1 CREAM TOPICAL at 09:40

## 2019-09-20 RX ADMIN — CLINDAMYCIN PHOSPHATE 900 MG: 900 INJECTION, SOLUTION INTRAVENOUS at 09:41

## 2019-09-20 RX ADMIN — FERROUS GLUCONATE 324 MG: 324 TABLET ORAL at 09:38

## 2019-09-20 RX ADMIN — CEPHALEXIN 500 MG: 500 CAPSULE ORAL at 14:13

## 2019-09-20 RX ADMIN — POTASSIUM CHLORIDE 20 MEQ: 1500 TABLET, EXTENDED RELEASE ORAL at 09:37

## 2019-09-20 RX ADMIN — ARIPIPRAZOLE 5 MG: 5 TABLET ORAL at 09:38

## 2019-09-20 RX ADMIN — FUROSEMIDE 40 MG: 40 TABLET ORAL at 09:38

## 2019-09-20 ASSESSMENT — ACTIVITIES OF DAILY LIVING (ADL)
ADLS_ACUITY_SCORE: 12
ADLS_ACUITY_SCORE: 14
ADLS_ACUITY_SCORE: 12

## 2019-09-20 NOTE — PROGRESS NOTES
Writer called [facility] [phone] as patient has discharge rx as cephalexin and clotrimazole.  Confirmed that the cephalexin is in route to be delivered to the group home and he needs clotrimazole over the counter (not covered).  Writer updated bedside RN regarding meds and she will send the patient with the clotrimazole in his bin.  SW getting transportation arranged.

## 2019-09-20 NOTE — DISCHARGE INSTRUCTIONS
Left shin wound:  Every other day and prn:  1.  Cleanse with wound cleanser.   2.  Apply skin prep to periwound if needed to help dressing stick and to protect from tape, let dry.   3.  Drizzle a small amount of Skintegrity wound gel over the main scabby and open areas.   4.  Cover with PolyMem strip.  Secure with additional tape or foam dressing as needed.      *Follow-up with wound clinic prn if worsens or does not heal in next few weeks  *Clean and moisturize lower legs and feet daily, use Sween 24 cream to left plantar foot/heel.

## 2019-09-20 NOTE — PROVIDER NOTIFICATION
Clarified with Dr. Westfall regarding coumadin order for discharge. Per MD, okay to continue 10 mg and recheck INR as stated on discharge order.

## 2019-09-20 NOTE — DISCHARGE SUMMARY
[facility]  Discharge Summary        Petey Archibald MRN# [id number]   YOB: 1960 Age: 58 year old     Date of Admission:  9/13/2019  Date of Discharge:  9/20/2019  Admitting Physician:  Kali Reed MD  Discharge Physician: Nadira Westfall MD  Discharging Service: Hospitalist     Primary Provider: Hortensia Peck  Primary Care Physician Phone Number: [phone]         Discharge Diagnoses/Problem Oriented Hospital Course (Providers):    Petey Archibald was admitted on 9/13/2019 by Kali Reed MD and I would refer you to their history and physical.  The following problems were addressed during his hospitalization:  Assessment & Plan:   This is a 58-year-old male with history of sleep apnea on CPAP, peripheral vascular disease, chronic venous stasis dermatitis, hyperlipidemia, history of DVT and PE in the past, on chronic anticoagulation on Coumadin, intellectual disabilities, GERD, recent history of left lower extremity cellulitis, now comes to the ER again with left leg swelling, pain and redness.      Left lower extremity cellulitis with chronic left leg wound   History of peripheral edema and chronic venous stasis    -History of chronic left leg wound, presenting with redness, warmth of the left lower extremity consistent with cellulitis.  -Ultrasound of the lower extremity is negative for DVT.   -Elevate left lower extremity  -Wound care following  -Will likely need outpatient wound care referral at discharge.  -Erythema and warmth about the same today  -Slow improvement, continue ceftriaxone, and clindamycin.    -ID following ok to discharge with keflex for 7days   Pt has rash on this bottom needs out pt follow up with dermatology. We dont have dermatology [facility] at [facility]    Acute kidney injury on CKD-3  -Baseline creatinine 0.9-1.1.    -Presented with a creatinine of 1.41, now improved to 1.06 with fluid overnight.  -Lasix at a reduced  dose of 40 mg p.o. twice daily.    -Hold Aldactone for now.    -Anticipate resuming at discharge.     Obstructive sleep apnea:    -CPAP per home settings.     History of chronic obstructive pulmonary disease:    -Not in acute exacerbation.  Continue prior to admission Dulera and Singulair.       History of deep venous thrombosis and pulmonary embolism   -On chronic anticoagulation with Coumadin.    -INR is therapeutic   -Pharmacy to dose Coumadin.      Severe obesity  -Lifestyle modification and follow-up as outpatient.     Borderline mental retardation  -Patient apparently lives in a group home.  -No active issues with this at the moment.     Diet: Combination Diet Regular Diet Adult     DVT Prophylaxis: Warfarin   Tapia Catheter: not present  Code Status: Full Code        Disposition Plan      Expected discharge: back to group home today     Nadira Westfall MD         Code Status:      Full Code        Brief Hospital Stay Summary Sent Home With Patient in AVS:        Reason for your hospital stay      LLE cellulitis                 Important Results:      See below         Pending Results:        Unresulted Labs Ordered in the Past 30 Days of this Admission     No orders found from 8/14/2019 to 9/14/2019.            Discharge Instructions and Follow-Up:      Follow-up Appointments     Follow-up and recommended labs and tests       Follow up with primary care provider, Hortensia Peck, within 7 days for   hospital follow- up.  The following labs/tests are recommended: recheck   CBC,bmp ,  INR in  1week.    F/u with dermatology in 1-2weeks for rash on his bottom               Discharge Disposition:      Discharged to home        Discharge Medications:        Current Discharge Medication List      START taking these medications    Details   cephALEXin (KEFLEX) 500 MG capsule Take 1 capsule (500 mg) by mouth every 6 hours for 7 days  Qty: 28 capsule, Refills: 0    Associated Diagnoses: Left leg cellulitis       clotrimazole (LOTRIMIN) 1 % external cream Apply topically 2 times daily  Qty: 60 g, Refills: 0    Associated Diagnoses: Left leg cellulitis      vitamin D3 2000 units tablet Take 2,000 Units by mouth daily         CONTINUE these medications which have CHANGED    Details   zolpidem (AMBIEN) 10 MG tablet Take 1 tablet (10 mg) by mouth nightly as needed for sleep  Qty: 30 tablet, Refills: 1         CONTINUE these medications which have NOT CHANGED    Details   acetaminophen (TYLENOL) 325 MG tablet Take 1-2 tablets (325-650 mg) by mouth every 6 hours as needed  Qty: 100 tablet, Refills: 3    Associated Diagnoses: Knee pain, right      albuterol (ALBUTEROL) 108 (90 BASE) MCG/ACT inhaler Inhale 2 puffs into the lungs every 6 hours as needed  Qty: 1 Inhaler, Refills: 0    Associated Diagnoses: Pulmonary emphysema, unspecified emphysema type (H)      ARIPiprazole (ABILIFY) 5 MG tablet Take 5 mg by mouth daily      buPROPion (WELLBUTRIN SR) 150 MG 12 hr tablet Take 300 mg by mouth daily (2 x 150 mg tablet)      DEXILANT 60 MG CPDR CR capsule TAKE 1 CAPSULE BY MOUTH ONCE DAILY (FOR HEARTBURN)  Qty: 90 capsule, Refills: 2    Associated Diagnoses: Gastroesophageal reflux disease, esophagitis presence not specified      EPINEPHrine (EPIPEN 2-BRE) 0.3 MG/0.3ML injection 2-pack INJECT 0.3MG INTRAMUSCULAR ONE TIME FOR ONE DOSE AS NEEDED FOR ANAPHYLAXIS (CALL 911 IF YOU HAVE TO GIVE) (FOR BEE STINGS) **LABEL EACH PEN*  Qty: 2 mL, Refills: 3    Associated Diagnoses: Allergic to bees      ferrous gluconate (FERGON) 324 (38 Fe) MG tablet TAKE 1 TABLET BY MOUTH TWICE DAILY (IRON SUPPLEMENT)  Qty: 200 tablet, Refills: 3    Associated Diagnoses: Other iron deficiency anemias (CODE)      !! furosemide (LASIX) 40 MG tablet Take 40 mg by mouth every morning      !! furosemide (LASIX) 80 MG tablet Take 80 mg by mouth every evening      guaiFENesin (MUCINEX) 600 MG 12 hr tablet Take 2 tablets (1,200 mg) by mouth 2 times daily  Qty: 60  "tablet, Refills: 0    Comments: Needs to be seen for more      loratadine (CLARITIN) 10 MG tablet Take 10 mg by mouth daily.      montelukast (SINGULAIR) 10 MG tablet TAKE 1 TABLET BY MOUTH EVERY MORNING. (ASTHMA)  Qty: 30 tablet, Refills: 11    Associated Diagnoses: Gastroesophageal reflux disease, esophagitis presence not specified      multivitamin, therapeutic (THERA-VIT) TABS tablet Take 1 tablet by mouth daily      naltrexone (DEPADE/REVIA) 50 MG tablet Take 100 mg by mouth daily ( 2 x 50 mg tablet)      potassium chloride SA (K-DUR/KLOR-CON M) 20 MEQ CR tablet TAKE 1 TABLET BY MOUTH TWICE DAILY. (LOW POTASSIUM)  Qty: 62 tablet, Refills: 11    Comments: URGENT REQUEST [id number] MMO  Associated Diagnoses: Localized edema      PULMICORT FLEXHALER 180 MCG/ACT inhaler INHALE 2 PUFFS INTO THE LUNGS TWICE DAILY  Qty: 1 each, Refills: 10    Associated Diagnoses: Mixed simple and mucopurulent chronic bronchitis (H)      sertraline (ZOLOFT) 100 MG tablet Take 100 mg by mouth daily       simvastatin (ZOCOR) 40 MG tablet TAKE 1 TABLET BY MOUTH EVERY MORNING.  (CHOLESTEROL)  Qty: 31 tablet, Refills: 11    Comments: URGENT REQUEST [id number] MMO  Associated Diagnoses: Hypercholesterolemia      SPIRIVA HANDIHALER 18 MCG inhaled capsule INHALE CONTENTS OF 1 CAPSULE INTO THE LUNGS ONCE DAILY. FOR BRONCHITIS AND EMPHYSEMA.  Qty: 90 capsule, Refills: 1    Associated Diagnoses: Wheezing      spironolactone (ALDACTONE) 25 MG tablet TAKE 1 TABLET BY MOUTH ONCE DAILY. (HIGH BLOOD PRESSURE)  Qty: 31 tablet, Refills: 11    Comments: URGENT REQUEST [id number] MMO  Associated Diagnoses: Localized edema      traZODone (DESYREL) 100 MG tablet Take 100 mg by mouth nightly as needed for sleep      varenicline (CHANTIX) 1 MG tablet Take 1 mg by mouth 2 times daily      warfarin ANTICOAGULANT (COUMADIN) 5 MG tablet Take 10 mg by mouth daily From 9/10/19 until INR recheck on 9/16/19      Gauze Pads & Dressings (GAUZE PADS 4\"X4\") 4\"X4\" PADS 1 each 3 " "times daily as needed  Qty: 25 each, Refills: 1    Associated Diagnoses: Perirectal abscess      !! order for DME Oxygen 2 Li/min  at night and bled into BiPAP  Qty: 1 Device, Refills: 0    Associated Diagnoses: Nocturnal hypoxemia      !! order for DME Equipment being ordered: CPAP with mask and tubing  Qty: 1 Device, Refills: 0    Associated Diagnoses: NEL (obstructive sleep apnea)      !! order for DME Equipment being ordered: support compression hose BK  2 pair black 30mm HG   To be applied on arising & removed while lying down to go to sleep  Qty: 1 each, Refills: 0    Associated Diagnoses: Localized edema       !! - Potential duplicate medications found. Please discuss with provider.      STOP taking these medications       BUPROPION HCL PO Comments:   Reason for Stopping:         Cholecalciferol (VITAMIN D-3) 1000 UNITS CAPS Comments:   Reason for Stopping:         mometasone-formoterol (DULERA) 200-5 MCG/ACT oral inhaler Comments:   Reason for Stopping:                 Allergies:         Allergies   Allergen Reactions     Bees      Augmentin Rash     Penicillins Rash           Consultations This Hospital Stay:      Consultation during this admission received from infectious disease        Condition and Physical on Discharge:      Discharge condition: Stable   Vitals: Blood pressure 114/72, pulse 80, temperature 98.2  F (36.8  C), temperature source Oral, resp. rate 18, height 1.753 m (5' 9\"), weight (!) 151.8 kg (334 lb 11.2 oz), SpO2 93 %.     Constitutional: Alert and awake, obese    Lungs: CTA   Cardiovascular: RRR   Abdomen: Soft, NT, ND, BS+   Skin: LLE cellulitis improving.    Other:          Discharge Time:      Greater than 30 minutes.        Image Results From This Hospital Stay (For Non-EPIC Providers):        Results for orders placed or performed during the hospital encounter of 09/13/19   US Lower Extremity Venous Duplex Left    Narrative    ULTRASOUND VENOUS LEFT LOWER EXTREMITY WITH DOPPLER   " 9/14/2019 1:14  AM     HISTORY: Left leg swelling.    COMPARISON: 8/22/2019.    TECHNIQUE: Spectral waveform and color Doppler evaluation were  performed.    FINDINGS: Normal compressibility of the left common femoral, femoral,  popliteal, posterior tibial, peroneal and greater saphenous veins.  Unremarkable Doppler waveform evaluation of the left common femoral,  femoral and popliteal veins.      Impression    IMPRESSION: No evidence of thrombus in the major veins of the left  lower extremity.    DMITRI HARTMAN MD             Most Recent Lab Results In EPIC (For Non-EPIC Providers):    Most Recent 3 CBC's:  Recent Labs   Lab Test 09/15/19  0527 09/14/19  0018 08/22/19  0550   WBC 5.9 8.2 4.2   HGB 10.7* 11.8* 12.1*   MCV 94 94 95    270 233      Most Recent 3 BMP's:  Recent Labs   Lab Test 09/20/19  0634 09/19/19 0728 09/18/19 0717  09/16/19  0653 09/15/19  0527 09/14/19  0018   NA  --   --   --   --  140 138 135   POTASSIUM  --   --   --   --  3.7 3.8 3.9   CHLORIDE  --   --   --   --  106 105 101   CO2  --   --   --   --  29 30 27   BUN  --   --   --   --  21 21 25   CR 0.87 0.84 0.81   < > 0.91 1.06 1.41*   ANIONGAP  --   --   --   --  5 3 7   JESSICA  --   --   --   --  8.7 8.8 9.0   GLC  --   --   --   --  114* 129* 98    < > = values in this interval not displayed.     Most Recent 3 Troponin's:  Recent Labs   Lab Test 02/08/19  1236   TROPI <0.015     Most Recent 3 INR's:  Recent Labs   Lab Test 09/20/19 0634 09/19/19 0728 09/18/19 0717   INR 2.73* 2.85* 3.09*     Most Recent 2 LFT's:  Recent Labs   Lab Test 09/14/19  0018 02/08/19  1236   AST 21 24   ALT 32 29   ALKPHOS 79 83   BILITOTAL 0.4 0.4     Most Recent Cholesterol Panel:  Recent Labs   Lab Test 05/03/17  1059   CHOL 175   LDL 73   HDL 82   TRIG 102     Most Recent 6 Bacteria Isolates From Any Culture (See EPIC Reports for Culture Details):  Recent Labs   Lab Test 08/14/18  1334   CULT Single colony  Coagulase negative  Staphylococcus  Susceptibility testing not routinely done  *  Moderate growth  Gram positive bacilli resembling diphtheroids  No further identification  *     Most Recent TSH, T4 and HgbA1c:   Recent Labs   Lab Test 02/13/12  2201   A1C 5.7

## 2019-09-20 NOTE — PROGRESS NOTES
EXCUSE FROM WORK DUE TO ILLNESS      Mr. Petey Archibald was hospitalized due to acute medical illness. He can return to work without restrictions on 9/21/19    Nadira Westfall MD   hospitalist   [facility]   [phone]

## 2019-09-20 NOTE — PLAN OF CARE
AO x4, VSS on RA. Denies pain.  Refuses PCDs-ambulation encouraged.  LLE red/edematous, keep elevated with 2 pillows. Lotramin cream applied to cracked skin on L foot.  IV abx continued, frequent urination in urinal. Trazodone given for sleep.  Discharge back to group home probably tomorrow on PO abx, nursing continue to monitor.

## 2019-09-20 NOTE — PROGRESS NOTES
[facility]    Infectious Disease Progress Note    Date of Service (when I saw the patient): 09/20/2019     Assessment & Plan   Petey Archibald is a 58 year old male who was admitted on 9/13/2019.       Impression:  1. 58 y.o with history of DVT in the left lower extremity, and PE who presents with a wound infection.   2. The patient reports that he had this wound from some time ago that it has since reopened.  3. There is surrounding redness and cellulitis.   4. He was started on IV Clindamycin and ceftriaxone.      Recommendations:   7 days of IV done switch to oral keflex for 7 more days.        Jenifer Quispe MD    Interval History   No fevers   The leg is improved     Physical Exam   Temp: 98.2  F (36.8  C) Temp src: Oral BP: 114/72 Pulse: 80 Heart Rate: 85 Resp: 18 SpO2: 93 % O2 Device: None (Room air)    Vitals:    09/16/19 0130 09/17/19 0551 09/18/19 0658   Weight: (!) 152.9 kg (337 lb 1.6 oz) (!) 150.9 kg (332 lb 9.6 oz) (!) 151.8 kg (334 lb 11.2 oz)     Vital Signs with Ranges  Temp:  [97.2  F (36.2  C)-98.6  F (37  C)] 98.2  F (36.8  C)  Pulse:  [68-80] 80  Heart Rate:  [85] 85  Resp:  [16-18] 18  BP: (100-127)/(52-73) 114/72  SpO2:  [93 %-96 %] 93 %    Constitutional: Awake, alert, cooperative, no apparent distress  Lungs: Clear to auscultation bilaterally, no crackles or wheezing  Cardiovascular: Regular rate and rhythm, normal S1 and S2, and no murmur noted  Abdomen: Normal bowel sounds, soft, non-distended, non-tender  Skin: No rashes, no cyanosis, no edema  Other:    Medications     Warfarin Therapy Reminder         ARIPiprazole  5 mg Oral Daily     buPROPion  150 mg Oral QAM     cefTRIAXone  2 g Intravenous Q24H     clindamycin  900 mg Intravenous Q8H     clotrimazole   Topical BID     ferrous gluconate  324 mg Oral Daily with breakfast     fluticasone-vilanterol  1 puff Inhalation Daily     furosemide  40 mg Oral BID     loratadine  10 mg Oral Daily     montelukast  10 mg Oral At  Bedtime     pantoprazole  40 mg Oral Daily     potassium chloride ER  20 mEq Oral Daily     sertraline  150 mg Oral Daily     simvastatin  40 mg Oral At Bedtime     sodium chloride (PF)  3 mL Intracatheter Q8H     umeclidinium  1 puff Inhalation Daily     varenicline  1 mg Oral BID     vitamin D3  2,000 Units Oral Daily     warfarin ANTICOAGULANT  10 mg Oral ONCE at 18:00       Data   All microbiology laboratory data reviewed.  Recent Labs   Lab Test 09/15/19  0527 09/14/19  0018 08/22/19  0550   WBC 5.9 8.2 4.2   HGB 10.7* 11.8* 12.1*   HCT 33.8* 36.7* 37.8*   MCV 94 94 95    270 233     Recent Labs   Lab Test 09/20/19  0634 09/19/19  0728 09/18/19  0717   CR 0.87 0.84 0.81     No lab results found.  Recent Labs   Lab Test 08/14/18  1334   CULT Single colony  Coagulase negative Staphylococcus  Susceptibility testing not routinely done  *  Moderate growth  Gram positive bacilli resembling diphtheroids  No further identification  *       Attestation:  Total time on the floor involved in the patient's care: 35 minutes. Total time spent in counseling/care coordination: >50%

## 2019-09-20 NOTE — PROGRESS NOTES
[facility]  WO Nurse Inpatient Wound Assessment   Reason for consultation: Evaluate and treat LLE wound     Assessment  Left anterior lower extremity wound due to possibly Venous Ulcer- remains stable and scabby but will adjust wound cares to help promote more effective debridement and healing of area.      Posterior thighs and buttocks with newer large rashy area, defined edges - unclear etiology, possibly fungal, defer to MD for diagnosis and treatment.     Treatment Plan  Left shin wound:  Every other day and prn:  1.  Cleanse with wound cleanser.   2.  Apply skin prep to periwound if needed to help dressing stick and to protect from tape, let dry.   3.  Drizzle a small amount of Skintegrity wound gel over the main scabby and open areas.   4.  Cover with PolyMem strip.  Secure with additional tape or foam dressing as needed.      Clean and moisturize lower legs and feet daily, use Sween 24 cream to left plantar foot/heel.      Orders updated  Recommended provider order: Outpatient wound clinic or lymphadema clinic to address LE swelling  WOC Nurse follow-up plan:  Weekly and prn; pt planning to discharge back to group home today  Nursing to notify the Provider(s) and re-consult the WO Nurse if wound(s) deteriorates or new skin concern.    Patient History  According to provider note(s):  This is a 58-year-old male with history of sleep apnea on CPAP, peripheral vascular disease, chronic venous stasis dermatitis, hyperlipidemia, history of DVT and PE in the past, on chronic anticoagulation on Coumadin, intellectual disabilities, GERD, recent history of left lower extremity cellulitis, now comes to the ER again with left leg swelling, pain and redness.     Objective Data  Containment of urine/stool: unknown    Active Diet Order  Orders Placed This Encounter      Combination Diet Regular Diet Adult    Output:   I/O last 3 completed shifts:  In: 1240 [P.O.:1240]  Out: 5350 [Urine:5350]    Risk  Assessment:   Sensory Perception: 4-->no impairment  Moisture: 4-->rarely moist  Activity: 3-->walks occasionally  Mobility: 4-->no limitation  Nutrition: 3-->adequate  Friction and Shear: 3-->no apparent problem  Mynor Score: 21                          Labs:   Recent Labs   Lab 09/20/19  0634  09/15/19  0527 09/14/19  0018   ALBUMIN  --   --   --  3.2*   HGB  --   --  10.7* 11.8*   INR 2.73*   < > 2.38* 2.35*   WBC  --   --  5.9 8.2    < > = values in this interval not displayed.       Physical Exam  Skin inspection: focused LLE    Wound Location:  Left anterior lower extremity  Date of last photo 9-20-19            Wound History: Pt reports wound has been present for a couple months- has been seeing regular doctor for wound care. PCP was having him soak LE and apply a dressing  Measurements (length x width x depth, in cm) 8 cm x 4 cm  x  0.1 cm - main area is really just 4 x 2cm, scabs obscuring depth but looks shallow  Wound Base: greenish-tinged dark scabby tissue, mostly dry; scant red-yellow moist tissue  Tunneling N/A  Undermining N/A  Palpation of the wound bed: normal   Periwound skin: dry/scaly, hemosiderin staining and erythema  Color: normal and consistent with surrounding tissue and red  Temperature: normal   Drainage: scant  Description of drainage: serosanguinous  Odor: none  Pain: denies , none     Posterior thighs/buttocks: pt denies much itching or discomfort, denies hx eczema or other skin issues.   9-20-19          Interventions  Current support surface: Standard  Atmos Air mattress  Current off-loading measures: pillows  Visual inspection of wound(s) completed  Wound Care: done per plan of care  Supplies: gathered, placed at the bedside, discussed with RN and discussed with patient  Education provided: plan of care  Discussed plan of care with Patient and Nurse    Sandi Weinberg RN

## 2019-09-20 NOTE — TELEPHONE ENCOUNTER
Patient is still Inpatient.  Dorothy Gilman, RN  Anticoagulation Nurse - [facility], [place]

## 2019-09-20 NOTE — PROGRESS NOTES
KELLEN Discharge Note:    D/I:  SW placed call to [facility] and spoke with Ray ([phone]) who stated that he will come to  patient for discharge at 4:30 today. Requesting that nurse bring patient down to door for pickup and to discuss discharge orders.  Discussed with bedside nurse and she is in agreement.     P: SW will remain available to assist as needed.    Herbert Hines, MSW, LGSW

## 2019-09-20 NOTE — PLAN OF CARE
Pt A&Ox4; VSS; on RA. LLE edematous/reddened; denies numbness or tingling; LLE elevated on 2 pillows; wound dressing changed; switched to PO abx. Rash on buttocks/posterior thighs/MD aware/dermatology consult as out pt. Pt up independently. Tolerating PO. Voiding without difficulties. Pt discharging back to group home this evening. Will continue to monitor.

## 2019-09-22 ENCOUNTER — NURSE TRIAGE (OUTPATIENT)
Dept: NURSING | Facility: CLINIC | Age: 59
End: 2019-09-22

## 2019-09-22 DIAGNOSIS — I82.442 ACUTE DEEP VEIN THROMBOSIS (DVT) OF TIBIAL VEIN OF LEFT LOWER EXTREMITY (H): ICD-10-CM

## 2019-09-22 DIAGNOSIS — Z86.711 HISTORY OF PULMONARY EMBOLISM: Primary | ICD-10-CM

## 2019-09-22 RX ORDER — WARFARIN SODIUM 5 MG/1
10 TABLET ORAL DAILY
Qty: 60 TABLET | Refills: 0 | Status: SHIPPED | OUTPATIENT
Start: 2019-09-22 | End: 2019-09-23

## 2019-09-22 NOTE — TELEPHONE ENCOUNTER
On call note, Pt was discharged without Rx for Coumadin.  Rx to restart the Coumadin (Warfarin) at 10 mg daily until INR can be checked on 9/23/19.  Then dosing will be adjusted based on INR

## 2019-09-22 NOTE — TELEPHONE ENCOUNTER
"Dai (Nurse from [facility]). Dai reports patient was released from hospital yesterday for cellulitis of leg. Stating patient was sent home with out Coumadin. Stating staff had requested a prescription for Coumadin from her today.    Discharge instructions state Follow-up Appointments     Follow-up and recommended labs and tests       Follow up with primary care provider, Hortensia Peck, within 7 days for   hospital follow- up.  The following labs/tests are recommended: recheck   CBC,bmp ,  INR in  1week.    F/u with dermatology in 1-2weeks for rash on his bottom         Paged on call Dr Riddle through [employer] at 1257 pm to call Dai at  [phone]. Advised Dai if no return call with in 20 minutes to call back. Caller verbalized understanding. Denies further questions.    Afua Mckeon RN  [facility]      Reason for Disposition    [1] Request for URGENT new prescription or refill of \"essential\" medication (i.e., likelihood of harm to patient if not taken) AND [2] triager unable to fill per unit policy    Additional Information    Negative: Drug overdose and nurse unable to answer question    Negative: Caller requesting information not related to medicine    Negative: Caller requesting a prescription for Strep throat and has a positive culture result    Negative: Rash while taking a medication or within 3 days of stopping it    Negative: Immunization reaction suspected    Negative: [1] Asthma and [2] having symptoms of asthma (cough, wheezing, etc)    Negative: MORE THAN A DOUBLE DOSE of a prescription or over-the-counter (OTC) drug    Negative: [1] DOUBLE DOSE (an extra dose or lesser amount) of over-the-counter (OTC) drug AND [2] any symptoms (e.g., dizziness, nausea, pain, sleepiness)    Negative: [1] DOUBLE DOSE (an extra dose or lesser amount) of prescription drug AND [2] any symptoms (e.g., dizziness, nausea, pain, sleepiness)    Negative: Took another person's " prescription drug    Negative: [1] DOUBLE DOSE (an extra dose or lesser amount) of prescription drug AND [2] NO symptoms (Exception: a double dose of antibiotics)    Negative: Diabetes drug error or overdose (e.g., insulin or extra dose)    Protocols used: MEDICATION QUESTION CALL-A-AH

## 2019-09-23 ENCOUNTER — TELEPHONE (OUTPATIENT)
Dept: FAMILY MEDICINE | Facility: CLINIC | Age: 59
End: 2019-09-23

## 2019-09-23 ENCOUNTER — ANTICOAGULATION THERAPY VISIT (OUTPATIENT)
Dept: NURSING | Facility: CLINIC | Age: 59
End: 2019-09-23
Payer: MEDICARE

## 2019-09-23 DIAGNOSIS — Z79.01 LONG TERM CURRENT USE OF ANTICOAGULANT THERAPY: ICD-10-CM

## 2019-09-23 DIAGNOSIS — Z86.711 HISTORY OF PULMONARY EMBOLISM: ICD-10-CM

## 2019-09-23 LAB — INR POINT OF CARE: 3.1 (ref 0.86–1.14)

## 2019-09-23 PROCEDURE — 36416 COLLJ CAPILLARY BLOOD SPEC: CPT

## 2019-09-23 PROCEDURE — 85610 PROTHROMBIN TIME: CPT | Mod: QW

## 2019-09-23 PROCEDURE — 99207 ZZC NO CHARGE NURSE ONLY: CPT

## 2019-09-23 RX ORDER — WARFARIN SODIUM 4 MG/1
TABLET ORAL
Qty: 14 TABLET | Refills: 0 | Status: SHIPPED | OUTPATIENT
Start: 2019-09-23 | End: 2019-10-18

## 2019-09-23 NOTE — PATIENT INSTRUCTIONS
If bleeding starts on leg, get INR checked anytime during clinic hours.    If you have any questions and want to talk to an INR nurse immediately call [phone] Monday-Friday 8AM to 5:30PM.

## 2019-09-23 NOTE — PROGRESS NOTES
ANTICOAGULATION FOLLOW-UP CLINIC VISIT    Patient Name:  Petey Archibald  Date:  9/23/2019  Contact Type:  Face to Face    SUBJECTIVE:  Patient Findings     Positives:   Change in medications (on Keflex until 09/27/2019), Change in diet/appetite (decreased appetite), Hospital admission (for cellulitus, discharged 09/20/2019), Bruising (at IV sites, fading)    Comments:   Assessed for S/S bleeding, clotting, medication, diet, health, activity and alcohol changes.  Range of motion with decreased swelling, now resolving and improving.        Clinical Outcomes     Negatives:   Major bleeding event, Thromboembolic event, Anticoagulation-related hospital admission, Anticoagulation-related ED visit, Anticoagulation-related fatality    Comments:   Assessed for S/S bleeding, clotting, medication, diet, health, activity and alcohol changes.  Range of motion with decreased swelling, now resolving and improving.           OBJECTIVE    INR Protime   Date Value Ref Range Status   09/23/2019 3.1 (A) 0.86 - 1.14 Final       ASSESSMENT / PLAN  INR assessment SUPRA    Recheck INR In: 4 DAYS    INR Location Clinic      Anticoagulation Summary  As of 9/23/2019    INR goal:   2.0-3.0   TTR:   71.6 % (3.5 y)   INR used for dosing:   3.1! (9/23/2019)   Warfarin maintenance plan:   10 mg (4 mg x 2.5) every Mon; 8 mg (4 mg x 2) all other days   Full warfarin instructions:   9/23: 6 mg; 9/25: 6 mg; Otherwise 10 mg every Mon; 8 mg all other days   Weekly warfarin total:   58 mg   Plan last modified:   Alyce Thurston RPH (9/23/2019)   Next INR check:   9/27/2019   Target end date:   Indefinite    Indications    History of pulmonary embolism [Z86.711]  Long term current use of anticoagulant therapy [Z79.01]             Anticoagulation Episode Summary     INR check location:   Anticoagulation Clinic    Preferred lab:       Send INR reminders to:   EDU SUGGS    Comments:   REM intermediate; A new Coumadin Prescription will  need to sent to [facility] with current dose and next INR check.      Anticoagulation Care Providers     Provider Role Specialty Phone number    Silvino Baker MD [facility] [phone]            See the Encounter Report to view Anticoagulation Flowsheet and Dosing Calendar (Go to Encounters tab in chart review, and find the Anticoagulation Therapy Visit)    Post hospital discharge.  Will decrease dose ~5% from last week total, and more evenly spread to account for half hold and higher dosing given over weekend.  Will start to go back towards previous maintenance dose prior to cellulitis, 8mg daily except 10mg Mondays.  Recheck Friday, instructed to come in for INR if acute bleeding, central INR phone # given for nurse questions.    Alyce Thurston [facility]

## 2019-09-23 NOTE — TELEPHONE ENCOUNTER
Dai, nurse, call to setup INR appt for patient.  He was just Discharged from hospital over the weekend.  Appt scheduled for today at .  Dorothy Gilman RN  Anticoagulation Nurse - [facility], [place]

## 2019-09-24 ENCOUNTER — HOSPITAL ENCOUNTER (OUTPATIENT)
Dept: WOUND CARE | Facility: CLINIC | Age: 59
Discharge: HOME | End: 2019-09-24
Attending: SURGERY | Admitting: SURGERY
Payer: MEDICARE

## 2019-09-24 ENCOUNTER — OFFICE VISIT (OUTPATIENT)
Dept: FAMILY MEDICINE | Facility: CLINIC | Age: 59
End: 2019-09-24
Payer: MEDICARE

## 2019-09-24 VITALS
SYSTOLIC BLOOD PRESSURE: 135 MMHG | WEIGHT: 315 LBS | RESPIRATION RATE: 20 BRPM | HEART RATE: 71 BPM | TEMPERATURE: 97.2 F | DIASTOLIC BLOOD PRESSURE: 74 MMHG | BODY MASS INDEX: 49.44 KG/M2

## 2019-09-24 VITALS
RESPIRATION RATE: 18 BRPM | HEIGHT: 69 IN | HEART RATE: 88 BPM | DIASTOLIC BLOOD PRESSURE: 80 MMHG | BODY MASS INDEX: 46.65 KG/M2 | WEIGHT: 315 LBS | OXYGEN SATURATION: 92 % | TEMPERATURE: 97.1 F | SYSTOLIC BLOOD PRESSURE: 138 MMHG

## 2019-09-24 DIAGNOSIS — I73.9 PAD (PERIPHERAL ARTERY DISEASE) (H): ICD-10-CM

## 2019-09-24 DIAGNOSIS — L03.119 CELLULITIS AND ABSCESS OF LEG: ICD-10-CM

## 2019-09-24 DIAGNOSIS — L97.922 ULCER OF LEFT LOWER EXTREMITY WITH FAT LAYER EXPOSED (H): Primary | ICD-10-CM

## 2019-09-24 DIAGNOSIS — L02.419 CELLULITIS AND ABSCESS OF LEG: ICD-10-CM

## 2019-09-24 DIAGNOSIS — L97.921 ULCER OF LEFT LOWER EXTREMITY, LIMITED TO BREAKDOWN OF SKIN (H): Primary | ICD-10-CM

## 2019-09-24 DIAGNOSIS — Z23 NEED FOR PROPHYLACTIC VACCINATION AND INOCULATION AGAINST INFLUENZA: ICD-10-CM

## 2019-09-24 PROCEDURE — G0008 ADMIN INFLUENZA VIRUS VAC: HCPCS | Performed by: FAMILY MEDICINE

## 2019-09-24 PROCEDURE — 99213 OFFICE O/P EST LOW 20 MIN: CPT | Performed by: SURGERY

## 2019-09-24 PROCEDURE — A6209 FOAM DRSG <=16 SQ IN W/O BDR: HCPCS

## 2019-09-24 PROCEDURE — 97602 WOUND(S) CARE NON-SELECTIVE: CPT

## 2019-09-24 PROCEDURE — G0463 HOSPITAL OUTPT CLINIC VISIT: HCPCS

## 2019-09-24 PROCEDURE — 90682 RIV4 VACC RECOMBINANT DNA IM: CPT | Performed by: FAMILY MEDICINE

## 2019-09-24 PROCEDURE — 99214 OFFICE O/P EST MOD 30 MIN: CPT | Mod: 25 | Performed by: FAMILY MEDICINE

## 2019-09-24 RX ORDER — NICOTINE 21 MG/24HR
1 PATCH, TRANSDERMAL 24 HOURS TRANSDERMAL EVERY 24 HOURS
Qty: 30 PATCH | Refills: 3 | Status: SHIPPED | OUTPATIENT
Start: 2019-09-24 | End: 2020-05-18

## 2019-09-24 ASSESSMENT — MIFFLIN-ST. JEOR: SCORE: 2315.39

## 2019-09-24 NOTE — PROGRESS NOTES
Subjective     Petey Archibald is a 58 year old male who presents to clinic today for the following health issues:    HPI     Hospital Follow-up Visit:    Hospital/Nursing Home/IP Rehab Facility: [facility]  Date of Admission: 09/13/2019  Date of Discharge: 09/20/2019  Reason(s) for Admission:  left leg swelling, pain and redness-reonset s/p worked on ft all day             Problems taking medications regularly:  None       Medication changes since discharge: Yes       Problems adhering to non-medication therapy:  None    Summary of hospitalization:  [facility] discharge summary reviewed  Diagnostic Tests/Treatments reviewed.  Follow up needed: wound care clinic   Other Healthcare Providers Involved in Patient s Care:         Surgical follow-up appointment - wo clinic   Update since discharge: improved.     Post Discharge Medication Reconciliation: discharge medications reconciled, continue medications without change.  Plan of care communicated with patient and caregiver     Coding guidelines for this visit:  Type of Medical   Decision Making Face-to-Face Visit       within 7 Days of discharge Face-to-Face Visit        within 14 days of discharge   Moderate Complexity 07908 [id number]   High Complexity 20345 19155        Left Lower Leg Venous Stasis Ulcer w/ Stasis Dermatitis w/ Chronic Lymphedema -  w/ Cellulitis Left Lat Calf -Now Improving   w/ Hx of DVT on Chronic Anticoagulation       Duration: > 2-3 weeks    Onset post long car ride to [place] in 8-19     Description  Location: Left Lower Leg-ulcer and edema   With new area cellulitis > 2 weeks ago - was stable but reactivated by working on ft last wk --> admission    Intensity:  severe    Accompanying signs and symptoms: drainage, swelling and redness with weeping from ant pretib ulcer -now resolving again and is smaller than when last seen 3 weeks ago     New areas of red with cntral punctatate pedro lat prox Lt calf since last wk      History  Previous similar problem: no   Previous evaluation:  Yes    Precipitating or alleviating factors:  Trauma or overuse: no   Aggravating factors include: none  Therapies tried and outcome: Wrap with breathable dressing  Has been elevating leg about 2hr x 2 per day   Bid 10 min warm soaks and leaving open amap --> doing much better --dry and not oozing and getting smaller       BP Readings from Last 2 Encounters:   08/22/19 122/82   08/22/19 136/83     Hemoglobin A1C (%)   Date Value   02/13/2012 5.7   02/01/2011 5.9     LDL Cholesterol Calculated (mg/dL)   Date Value   05/03/2017 73   05/11/2016 77     MORBID OBESITY      --BMI + 49.9    -comorbid glu intol     --sedentary life style     TOBACCO ABUSE    History   Smoking Status     Current Every Day Smoker     Packs/day: 1.00     Years: 40 yrs and conttinues to smoke     Types: Cigarettes   Smokeless Tobacco     Never Used     Comment: started at age 18     FEV1,=66%FVC=61%  In 7-16  CXR  3-21-19  Prominent bibasilar interstitial markings  The nicotine causes poor healing of his wounds       How many servings of fruits and vegetables do you eat daily?  2-3    On average, how many sweetened beverages do you drink each day (soda, juice, sweet tea, etc)?   5    How many days per week do you miss taking your medication? 0              Reviewed and updated as needed this visit by Provider         Review of Systems   ROS COMP: CONSTITUTIONAL: NEGATIVE for fever, chills, change in weight  INTEGUMENTARY/SKIN: NEGATIVE for worrisome rashes, moles or lesions POS ulcer Lt leg with edema and red   EYES: NEGATIVE for vision changes or irritation  ENT/MOUTH: NEGATIVE for ear, mouth and throat problems  RESP: NEGATIVE for significant cough or SOB  BREAST: NEGATIVE for masses, tenderness or discharge  CV: NEGATIVE for chest pain, palpitations or peripheral edema  GI: NEGATIVE for nausea, abdominal pain, heartburn, or change in bowel habits  : NEGATIVE for frequency,  dysuria, or hematuria  MUSCULOSKELETAL: NEGATIVE for significant arthralgias or myalgia  NEURO: NEGATIVE for weakness, dizziness or paresthesias  ENDOCRINE: NEGATIVE for temperature intolerance, skin/hair changes  HEME: NEGATIVE for bleeding problems  PSYCHIATRIC: NEGATIVE for changes in mood or affect      Objective    There were no vitals taken for this visit.  There is no height or weight on file to calculate BMI.  Physical Exam   GENERAL: healthy, alert, no distress and obese  EYES: Eyes grossly normal to inspection, PERRL and conjunctivae and sclerae normal  RESP: lungs clear to auscultation - no rales, rhonchi or wheezes  MS: no gross musculoskeletal defects noted, no edema  SKIN: no suspicious lesions or rashes POS  Lt leg with edema and red edson lat and firm   Ulcer now smaller with 3 of them x 2-3 cm diam @ lower pretib --one is epithelialized and the others have thickening edges that are closing   NEURO: Normal strength and tone, mentation intact and speech normal  PSYCH: mentation appears normal, affect normal/bright    Diagnostic Test Results:  Labs reviewed in Epic  Results for orders placed or performed in visit on 09/23/19   INR point of care   Result Value Ref Range    INR Protime 3.1 (A) 0.86 - 1.14           Assessment & Plan       ICD-10-CM    1. Ulcer of left lower extremity, limited to breakdown of skin (H) Lt pretib middle  L97.921    2. Cellulitis and abscess of leg- new Lt prox lat calf L03.119     L02.419    3. Need for prophylactic vaccination and inoculation against influenza Z23 INFLUENZA QUAD, RECOMBINANT, P-FREE (RIV4) (FLUBLOCK) [24433]     Vaccine Administration, Initial [70613]     ADMIN INFLUENZA (For MEDICARE Patients ONLY) []        Tobacco Cessation:   reports that he has been smoking cigarettes. He has a 30.00 pack-year smoking history. He uses smokeless tobacco.  Tobacco Cessation Action Plan: Information offered: Patient not interested at this time      BMI:   Estimated  "body mass index is 49.32 kg/m  as calculated from the following:    Height as of this encounter: 1.753 m (5' 9\").    Weight as of this encounter: 151.5 kg (334 lb).   Weight management plan: Discussed healthy diet and exercise guidelines        Patient Instructions   1. Continue the prior wound care     2. Keep the injured area above the level of the heart as much as possible to help decrease the pain and  the healing time . Put pillows on either side while sleeping to keep the arm or leg elevated  And no work till healed     3.  Weight Loss Tips  1. Do not eat after 6 hrs before your expected bedtime  2. Have your heaviest meal for breakfast, a slightly lighter meal at lunch and a snack 6 hrs before bed  3. No sugar/calorie drinks except milk ie no fruit juice, pop, alcohol.  4. Drink milk 30min before meals to decrease your hunger. Also it is excellent as part of your last meal of the day snack  5. Drink lots of water  6. Increase fiber in diet: all bran cereal, salads, popcorn etc  7. Have only one small serving of fruit a day about 1/2 cup (as this is high in sugar)  8. EXERCISE is the bottom line. Without it, you will gain weight even on a low calorie diet. Best if done 2-3X a day as can    Being overweight contributes to high blood pressure and high cholesterol, both of which cause heart attacks, strokes and kidney failure, prediabetes and diabetes, arthritis, and liver disease         Return in about 1 week (around 10/1/2019) for wound check.    Hortensia Peck MD  [facility]        "

## 2019-09-24 NOTE — PROGRESS NOTES
Patient arrived for wound care visit. Certified Wound Care Nurse time spent evaluating patient record, completed a full evaluation and documented wound(s) & pablito-wound skin; provided recommendation based on treatment plan. Applied dressing, reviewed discharge instructions, patient education, and discussed plan of care with appropriate medical team staff members and patient and/or family members.

## 2019-09-24 NOTE — DISCHARGE INSTRUCTIONS
"      [facility]  [address] [facility] [address] [address]  Appointment Phone [phone] Nurse Advisors [phone]    Petey Archibald      1960  [facility] Cardiovascular Support Blood Health Vascular 1 tablet twice daily   Use Nicotine Patch Daily to stop smoking.  Wound Dressing Change:Left Anterior Lower Leg  Cleanse wound and surrounding skin with: soap and water in the shower  Cover wound with Plurogel on Hydrofera blue ready  Secure with roll gauze   Change dressing daily.    Compression: Apply Edemawear to be worn at all times.   You have a compression wrap Spandigrip G until you get the Edemawear    M. Ruslan Davis M.D.. September 24, 2019    Call us at [phone] if you have any questions about your wounds, have redness or swelling around your wound, have a fever of 101 or greater or if you have any other problems or concerns. We answer the phone Monday through Friday 8 am to 4 pm, please leave a message as we check the voicemail frequently throughout the day.     Follow up with Provider -2 weeks   No working until next follow up in 2 weeks    Please order \"EdemaWear Open Toe\"  from [employer] or call [phone] to place an order for 50 gram tube    How to Quit Smoking  Smoking is one of the hardest habits to break. About half of all people who have ever smoked have been able to quit. Most people who still smoke want to quit. Here are some of the best ways to stop smoking.    Keep trying  Most smokers make many attempts at quitting before they are successful. It s important not to give up.  Go cold turkey  Most former smokers quit cold turkey (all at once). Trying to cut back gradually doesn't seem to work as well, perhaps because it continues the smoking habit. Also, it is possible to inhale more while smoking fewer cigarettes. This results in the same amount of nicotine in your body.  Get support  Support " programs can be a big help, especially for heavy smokers. These groups offer lectures, ways to change behavior, and peer support. Here are some ways to find a support program:    Free national quitline: 800-QUIT-NOW (326-290-6622).    Hospital quit-smoking programs.    American Lung Association: (285.858.4648).    American Cancer Society (025-363-7346).  Support at home is important too. Nonsmokers can offer praise and encouragement. If the smoker in your life finds it hard to quit, encourage them to keep trying.  Over-the-counter medicines  Nicotine replacement therapy may make quitting easier. Certain aids, such as the nicotine patch, gum, and lozenges, are available without a prescription. It is best to use these under a doctor s care, though. The skin patch provides a steady supply of nicotine. Nicotine gum and lozenges give temporary bursts of low levels of nicotine. Both methods reduce the craving for cigarettes. Warning: If you have nausea, vomiting, dizziness, weakness, or a fast heartbeat, stop using these products and see your doctor.  Prescription medicines  After reviewing your smoking patterns and past attempts to quit, your doctor may offer a prescription medicine such as bupropion, varenicline, a nicotine inhaler, or nasal spray. Each has advantages and side effects. Your doctor can review these with you.  Health benefits of quitting  The benefits of quitting start right away and keep improving the longer you go without smoking. These benefits occur at any age.  So whether you are 17 or 70, quitting is a good decision. Some of the benefits include:    20 minutes: Blood pressure and pulse return to normal. Improve wound healing    8 hours: Oxygen levels return to normal.    2 days: Ability to smell and taste begin to improve as damaged nerves regrow.    2 to 3 weeks: Circulation and lung function improve.    1 to 9 months: Coughing, congestion, and shortness of breath decrease; tiredness  "decreases.    1 year: Risk of heart attack decreases by half.    5 years: Risk of lung cancer decreases by half; risk of stroke becomes the same as a nonsmoker s.  For more on how to quit smoking, try these online resources:     Smokefree.gov    \"Clearing the Air\" booklet from the National Cancer [employer]: smokefree.gov/sites/default/files/pdf/clearing-the-air-accessible.pdf  Date Last Reviewed: 3/1/2017    8834-6583 [employer]. [address] [place] [address] 8[address]86. All rights reserved. This information is not intended as a substitute for professional medical care. Always follow your healthcare professional's instructions.             "

## 2019-09-24 NOTE — NURSING NOTE
"Chief Complaint   Patient presents with     Hospital F/U     RECHECK     Imm/Inj     /80   Pulse 88   Temp 97.1  F (36.2  C) (Tympanic)   Resp 18   Ht 1.753 m (5' 9\")   Wt (!) 151.5 kg (334 lb)   SpO2 92%   BMI 49.32 kg/m   Estimated body mass index is 49.32 kg/m  as calculated from the following:    Height as of this encounter: 1.753 m (5' 9\").    Weight as of this encounter: 151.5 kg (334 lb).  BP completed using cuff size: regular   Kanchan Ovalle CMA    Health Maintenance Due   Topic Date Due     ADVANCE CARE PLANNING  1960     ZOSTER IMMUNIZATION (1 of 2) 12/06/2008     Health Maintenance reviewed at today's visit patient asked to schedule/complete:   Immunizations:  Patient agrees to schedule    "

## 2019-09-25 ENCOUNTER — TELEPHONE (OUTPATIENT)
Dept: WOUND CARE | Facility: CLINIC | Age: 59
End: 2019-09-25

## 2019-09-25 ENCOUNTER — PATIENT OUTREACH (OUTPATIENT)
Dept: CARE COORDINATION | Facility: CLINIC | Age: 59
End: 2019-09-25

## 2019-09-25 DIAGNOSIS — L03.116 CELLULITIS OF LEFT LOWER EXTREMITY: Primary | ICD-10-CM

## 2019-09-25 NOTE — PROGRESS NOTES
Clinic Care Coordination Contact  Care Team Conversations    [facility] RN called and spoke with patient and group home staff to follow up on the patient's recent hospitalization for LLE cellulitis.    The patient reported he can bend his leg, he does have some peeling on his knee.  He confirmed he has been doing his leg wraps as directed and has been elevating his legs as much as possible.  The patient requested that [facility] RN speak with Braden, one of his group home staff members for further information.    Braden updated that they were informed today that the medications prescribed to the patient yesterday are not covered by his insurance (nicotine patches, hesperidin, vitamin d3).  Braden was instructed to have patient purchase hesperidin on [employer] and that patient would need to pay for the nicotine patches and vitamin d3 out-of-pocket.  [facility] RN agreed to call back if any additional options/resources are available to patient.    [facility] RN called and spoke with patient's pharmacy ([facility] ph: [phone]); they updated that the patient's insurance that would cover OTC medications ([employer] plan) is inactive as of 8/30/19.  He still has Part D insurance coverage ([employer]).    [facility] RN called back to group [facility] to update with this information and discuss options; had to leave voicemail on machine and requested call back.    Herbert Martinez RN  Clinic Care Coordinator  [facility]  Ph: [phone]

## 2019-09-25 NOTE — TELEPHONE ENCOUNTER
[facility] called and left a message on the nurse triage line that the 3 prescriptions that were sent to their pharmacy yesterday are not covered by the patient's insurance and that Hesperidin is only available through [employer]. Will route to Dr. Davis's .

## 2019-09-25 NOTE — TELEPHONE ENCOUNTER
Patient was notified during his office appointment that these would not be covered and he should be paying for them out of pocket.  Will advise to get hesperidin online.  Braden was notified at group home

## 2019-09-26 NOTE — PATIENT INSTRUCTIONS
1. Continue the prior wound care     2. Keep the injured area above the level of the heart as much as possible to help decrease the pain and  the healing time . Put pillows on either side while sleeping to keep the arm or leg elevated  And no work till healed     3.  Weight Loss Tips  1. Do not eat after 6 hrs before your expected bedtime  2. Have your heaviest meal for breakfast, a slightly lighter meal at lunch and a snack 6 hrs before bed  3. No sugar/calorie drinks except milk ie no fruit juice, pop, alcohol.  4. Drink milk 30min before meals to decrease your hunger. Also it is excellent as part of your last meal of the day snack  5. Drink lots of water  6. Increase fiber in diet: all bran cereal, salads, popcorn etc  7. Have only one small serving of fruit a day about 1/2 cup (as this is high in sugar)  8. EXERCISE is the bottom line. Without it, you will gain weight even on a low calorie diet. Best if done 2-3X a day as can    Being overweight contributes to high blood pressure and high cholesterol, both of which cause heart attacks, strokes and kidney failure, prediabetes and diabetes, arthritis, and liver disease

## 2019-09-26 NOTE — PROGRESS NOTES
Clinic Care Coordination Contact  Care Team Conversations    [facility] RN spoke with Bertrand at the patient's group home to inform him of the patient's insurance situation as revealed by patient's pharmacy yesterday.  Bertrand stated they were actually already aware of this situation and are currently working on it.  [facility] RN reviewed the patient's new medication prescriptions with Bertrand, informing him that the hesperidin-diosmin would need to be purchased online per wound clinic RN and that the other two medications (nicotine patches and vitamin d3) would be out-of-pocket.  Recommended [employer] as possible option to obtain a few weeks of nicotine patches and [employer] as a resource for medication coupons.  Bertrand noted the above information and agreed to contact [facility] RN with additional questions/concerns.    Herbert Martinez, RN  Clinic Care Coordinator  [facility]  Ph: [phone]

## 2019-09-27 ENCOUNTER — ANTICOAGULATION THERAPY VISIT (OUTPATIENT)
Dept: ANTICOAGULATION | Facility: CLINIC | Age: 59
End: 2019-09-27
Payer: MEDICARE

## 2019-09-27 ENCOUNTER — TELEPHONE (OUTPATIENT)
Dept: FAMILY MEDICINE | Facility: CLINIC | Age: 59
End: 2019-09-27

## 2019-09-27 DIAGNOSIS — Z86.711 HISTORY OF PULMONARY EMBOLISM: ICD-10-CM

## 2019-09-27 DIAGNOSIS — Z79.01 LONG TERM CURRENT USE OF ANTICOAGULANT THERAPY: ICD-10-CM

## 2019-09-27 LAB — INR POINT OF CARE: 1.7 (ref 0.86–1.14)

## 2019-09-27 PROCEDURE — 85610 PROTHROMBIN TIME: CPT | Mod: QW

## 2019-09-27 PROCEDURE — 99207 ZZC NO CHARGE NURSE ONLY: CPT

## 2019-09-27 PROCEDURE — 36416 COLLJ CAPILLARY BLOOD SPEC: CPT

## 2019-09-27 NOTE — PROGRESS NOTES
ANTICOAGULATION FOLLOW-UP CLINIC VISIT    Patient Name:  ePtey Archibald  Date:  2019  Contact Type:  Face to Face    SUBJECTIVE:  Patient Findings     Comments:   Signs/symptoms of thrombosis - .nop  The patient was assessed for diet, medication, and activity level changes, missed or extra doses, bruising or bleeding, with no problem findings.          Clinical Outcomes     Comments:   The patient was assessed for diet, medication, and activity level changes, missed or extra doses, bruising or bleeding, with no problem findings.             OBJECTIVE    INR Protime   Date Value Ref Range Status   2019 1.7 (A) 0.86 - 1.14 Final       ASSESSMENT / PLAN  INR assessment SUB    Recheck INR In: 1 WEEK    INR Location Clinic      Anticoagulation Summary  As of 2019    INR goal:   2.0-3.0   TTR:   71.6 % (3.5 y)   INR used for dosin.7! (2019)   Warfarin maintenance plan:   10 mg (4 mg x 2.5) every Mon; 8 mg (4 mg x 2) all other days   Full warfarin instructions:   : 10 mg; Otherwise 10 mg every Mon; 8 mg all other days   Weekly warfarin total:   58 mg   Plan last modified:   Alyce Thurston RP (2019)   Next INR check:   10/4/2019   Target end date:   Indefinite    Indications    History of pulmonary embolism [Z86.711]  Long term current use of anticoagulant therapy [Z79.01]             Anticoagulation Episode Summary     INR check location:   Anticoagulation Clinic    Preferred lab:       Send INR reminders to:   EDU SUGGS    Comments:   REM correction; A new Coumadin Prescription will need to sent to [facility] with current dose and next INR check.      Anticoagulation Care Providers     Provider Role Specialty Phone number    Silvino Baker MD [facility] [phone]            See the Encounter Report to view Anticoagulation Flowsheet and Dosing Calendar (Go to Encounters tab in chart review, and find the Anticoagulation Therapy  Visit)        Lisa Ponce RN

## 2019-09-27 NOTE — TELEPHONE ENCOUNTER
Braden, [facility] patient missed 11:00 INR appt today; rescheduled appt to [place] location.  Dorothy Gilman, RN  Anticoagulation Nurse - [facility] [place]

## 2019-10-04 ENCOUNTER — ANTICOAGULATION THERAPY VISIT (OUTPATIENT)
Dept: ANTICOAGULATION | Facility: CLINIC | Age: 59
End: 2019-10-04
Payer: MEDICARE

## 2019-10-04 DIAGNOSIS — Z86.711 HISTORY OF PULMONARY EMBOLISM: ICD-10-CM

## 2019-10-04 DIAGNOSIS — Z79.01 LONG TERM CURRENT USE OF ANTICOAGULANT THERAPY: ICD-10-CM

## 2019-10-04 LAB — INR POINT OF CARE: 2 (ref 0.86–1.14)

## 2019-10-04 PROCEDURE — 99207 ZZC NO CHARGE NURSE ONLY: CPT

## 2019-10-04 PROCEDURE — 85610 PROTHROMBIN TIME: CPT | Mod: QW

## 2019-10-04 PROCEDURE — 36416 COLLJ CAPILLARY BLOOD SPEC: CPT

## 2019-10-04 RX ORDER — WARFARIN SODIUM 4 MG/1
TABLET ORAL
Qty: 16 TABLET
Start: 2019-10-04 | End: 2020-01-08

## 2019-10-04 NOTE — PROGRESS NOTES
ANTICOAGULATION FOLLOW-UP CLINIC VISIT    Patient Name:  Petey Archibald  Date:  10/4/2019  Contact Type:  Face to Face    SUBJECTIVE:  Patient Findings     Comments:   The patient was assessed for diet, medication, and activity level changes, missed or extra doses, bruising or bleeding, with no problem findings.          Clinical Outcomes     Comments:   The patient was assessed for diet, medication, and activity level changes, missed or extra doses, bruising or bleeding, with no problem findings.             OBJECTIVE    INR Protime   Date Value Ref Range Status   10/04/2019 2.0 (A) 0.86 - 1.14 Final       ASSESSMENT / PLAN  INR assessment THER    Recheck INR In: 2 WEEKS    INR Location Clinic      Anticoagulation Summary  As of 10/4/2019    INR goal:   2.0-3.0   TTR:   71.3 % (3.5 y)   INR used for dosin.0 (10/4/2019)   Warfarin maintenance plan:   10 mg (4 mg x 2.5) every Mon; 8 mg (4 mg x 2) all other days   Full warfarin instructions:   10 mg every Mon; 8 mg all other days   Weekly warfarin total:   58 mg   Plan last modified:   Alyce Thurston [facility] (2019)   Next INR check:   10/18/2019   Target end date:   Indefinite    Indications    History of pulmonary embolism [Z86.711]  Long term current use of anticoagulant therapy [Z79.01]             Anticoagulation Episode Summary     INR check location:   Anticoagulation Clinic    Preferred lab:       Send INR reminders to:   [facility] CALLIE SUGGS    Comments:   REM shelter; A new Coumadin Prescription will need to sent to [facility] with current dose and next INR check.      Anticoagulation Care Providers     Provider Role Specialty Phone number    Silvino Baker MD [facility] [phone]            See the Encounter Report to view Anticoagulation Flowsheet and Dosing Calendar (Go to Encounters tab in chart review, and find the Anticoagulation Therapy Visit)        Lisa Ponce RN

## 2019-10-09 ENCOUNTER — HOSPITAL ENCOUNTER (OUTPATIENT)
Dept: WOUND CARE | Facility: CLINIC | Age: 59
Discharge: HOME | End: 2019-10-09
Attending: SURGERY | Admitting: SURGERY
Payer: MEDICARE

## 2019-10-09 VITALS — DIASTOLIC BLOOD PRESSURE: 73 MMHG | TEMPERATURE: 97.8 F | RESPIRATION RATE: 16 BRPM | SYSTOLIC BLOOD PRESSURE: 114 MMHG

## 2019-10-09 DIAGNOSIS — L97.922 ULCER OF LEFT LOWER EXTREMITY WITH FAT LAYER EXPOSED (H): ICD-10-CM

## 2019-10-09 DIAGNOSIS — I89.0 LYMPHEDEMA OF LEFT LEG: Primary | ICD-10-CM

## 2019-10-09 PROCEDURE — G0463 HOSPITAL OUTPT CLINIC VISIT: HCPCS

## 2019-10-09 PROCEDURE — 99213 OFFICE O/P EST LOW 20 MIN: CPT | Performed by: SURGERY

## 2019-10-09 NOTE — ADDENDUM NOTE
Encounter addended by: Crystal Spencer RN on: 10/9/2019 3:46 PM   Actions taken: LDA properties accepted, Clinical Note Signed

## 2019-10-09 NOTE — DISCHARGE INSTRUCTIONS
[facility]  [address] [facility] [address], [place] [address]  Appointment Phone [phone] Nurse Advisors [phone]    Petey Archibald      1960  Kiera Clarke Cardiovascular Support Blood Health Vascular 1 tablet twice daily     Call [phone] to get medication or Amazon    Wound Dressing Change:Bilateral Lower leg  Cleanse skin with soap and water, Apply Cerave Lotion  Apply Edemawear on to skin  Apply Twice a Day  Apply Coolflex Velcro Wraps applied in the AM and taken at bed time.      FERNANDO Davis M.D.. October 9, 2019    Call us at [phone] if you have any questions about your wounds, have redness or swelling around your wound, have a fever of 101 or greater or if you have any other problems or concerns. We answer the phone Monday through Friday 8 am to 4 pm, please leave a message as we check the voicemail frequently throughout the day.     Follow up with Provider - 3 months

## 2019-10-09 NOTE — PROGRESS NOTES
[facility] Progress Note    Subject: Petey Archibald left lower extremity ulcerations have resolved and closed since her last evaluation.  He ceased tobacco utilization.  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  Personal care attendant present who resides at [facility].  He denies pain    Patient Active Problem List   Diagnosis     Ankle pain     Morbid obesity     GERD (gastroesophageal reflux disease)     Peripheral vascular disease (H)     History of pulmonary embolism     Tobacco dependence:40-50 pk yr hx     Borderline mental retardation     History of DVT of lower extremity     Right knee pain     Burn of second degree of trunk.leg,perineum 2ndary to urine exposure      Pilonidal cyst     Asymptomatic varicose veins, bilateral     Callus of foot on Rt lat dist  since 8-15      Morbid obesity due to excess calories (H)  BMI 40-50     Hypercholesterolemia     Mixed simple and mucopurulent chronic bronchitis (HCC) CT 4-06 wnl and neg bronch for hemoptesis spirometry 7-26-16  FVC=59% & w mod restriction  and lung age of 82 in 54 y/o      Major depression in complete remission (HCC) on meds      Long term current use of anticoagulant therapy     Glucose intolerance (impaired glucose tolerance)     Other iron deficiency anemia     Localized edema L>R ankles      Erectile dysfunction, unspecified erectile dysfunction type     Stasis dermatitis of both legs     Arch pain of left foot 2ndary to edema and tendonitis      NEL (obstructive sleep apnea)     Hematemesis without nausea after smoking      Anxiety     Thrombophlebitis of superficial veins of both lower extremities: Greater Saphenous VV      Acute deep vein thrombosis (DVT) of tibial vein of left lower extremity (H)     History of colonic polyps     Allergic to bees     Abnormal lung sounds-bibasilar rhonchi     Hypoxia     Abnormal chest x-ray-3-19 prominent bibasilar interstitial      Ulcer of left lower extremity with fat layer  exposed (H)     Lymphedema of left leg     Left leg cellulitis     Cellulitis of left lower extremity     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     COPD (chronic obstructive pulmonary disease) (H)      History of DVT of lower extremity      History of pulmonary embolism      Hyperlipidemia      Mild intellectual disabilities      Morbid obesity (H)      Peripheral edema      Peripheral vascular disease (H)      Sleep apnea      Tobacco dependence      Venous stasis dermatitis      Exam:  /73 (BP Location: Left arm)   Temp 97.8  F (36.6  C) (Temporal)   Resp 16      General appearance is morbidly obese 58-year-old male, palpable left dorsalis pedis pulse, closed wounds left lower extremity with chronic venous stasis changes and subdermal edema, lymphatic dysfunction, Phlebolymphedema.        Impression: Closed left lower extremity venous hypertensive ulceration, smoking cessation past several weeks.    Plan: Ceramide based cream to lower extremities twice a day, EdemaWear directly on skin covered with cool flex Velcro compression.  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  Discussed goal of low recurrence rate with daily cares as described.  Lifelong tobacco cessation.  Patient will return to the clinic in 12 weeks time    Rustam Davis MD on 10/9/2019 at 2:59 PM

## 2019-10-18 ENCOUNTER — ANTICOAGULATION THERAPY VISIT (OUTPATIENT)
Dept: NURSING | Facility: CLINIC | Age: 59
End: 2019-10-18
Payer: MEDICARE

## 2019-10-18 DIAGNOSIS — Z86.711 HISTORY OF PULMONARY EMBOLISM: ICD-10-CM

## 2019-10-18 DIAGNOSIS — Z79.01 LONG TERM CURRENT USE OF ANTICOAGULANT THERAPY: ICD-10-CM

## 2019-10-18 LAB — INR POINT OF CARE: 2.9 (ref 0.86–1.14)

## 2019-10-18 PROCEDURE — 36416 COLLJ CAPILLARY BLOOD SPEC: CPT

## 2019-10-18 PROCEDURE — 99207 ZZC NO CHARGE NURSE ONLY: CPT

## 2019-10-18 PROCEDURE — 85610 PROTHROMBIN TIME: CPT | Mod: QW

## 2019-10-18 RX ORDER — WARFARIN SODIUM 4 MG/1
TABLET ORAL
Qty: 45 TABLET | Refills: 0 | Status: SHIPPED | OUTPATIENT
Start: 2019-10-18 | End: 2019-11-08

## 2019-10-18 NOTE — PROGRESS NOTES
ANTICOAGULATION FOLLOW-UP CLINIC VISIT    Patient Name:  Petey Archibald  Date:  10/18/2019  Contact Type:  Face to Face    SUBJECTIVE:  Patient Findings     Comments:   The patient was assessed for diet, medication, and activity level changes, missed or extra doses, bruising or bleeding, with no problem findings.        Clinical Outcomes     Negatives:   Major bleeding event, Thromboembolic event, Anticoagulation-related hospital admission, Anticoagulation-related ED visit, Anticoagulation-related fatality    Comments:   The patient was assessed for diet, medication, and activity level changes, missed or extra doses, bruising or bleeding, with no problem findings.           OBJECTIVE    INR Protime   Date Value Ref Range Status   10/18/2019 2.9 (A) 0.86 - 1.14 Final       ASSESSMENT / PLAN  INR assessment THER    Recheck INR In: 3 WEEKS    INR Location Clinic      Anticoagulation Summary  As of 10/18/2019    INR goal:   2.0-3.0   TTR:   71.6 % (3.5 y)   INR used for dosin.9 (10/18/2019)   Warfarin maintenance plan:   10 mg (4 mg x 2.5) every Mon; 8 mg (4 mg x 2) all other days   Full warfarin instructions:   10 mg every Mon; 8 mg all other days   Weekly warfarin total:   58 mg   No change documented:   Nereida Grant RN   Plan last modified:   Alyce Thurston [facility] (2019)   Next INR check:   2019   Target end date:   Indefinite    Indications    History of pulmonary embolism [Z86.711]  Long term current use of anticoagulant therapy [Z79.01]             Anticoagulation Episode Summary     INR check location:   Anticoagulation Clinic    Preferred lab:       Send INR reminders to:   EDU SUGGS    Comments:   REM half-way; A new Coumadin Prescription will need to sent to [facility] with current dose and next INR check.      Anticoagulation Care Providers     Provider Role Specialty Phone number    Silvino Baker MD [facility] [phone]            See  the Encounter Report to view Anticoagulation Flowsheet and Dosing Calendar (Go to Encounters tab in chart review, and find the Anticoagulation Therapy Visit)    Pt INR is 2.9 today. Pt is here with staff from a group home. Pt and staff advised to continue taking 10 mg on Mondays and 8 mg all the other days. Recheck INR in 3 weeks or sooner as needed. Rx of warfarin sent to [facility] as requested. Petey and Group Home staff aware if signs of clotting (pain, tenderness, swelling, color change in leg or arm, SOB) and bleeding occur (blood in stool, urine, large bruising, bleeding gums, nosebleeds) to have INR check sooner. If sx severe report to ER or concerned for stroke call 911. If general questions or concerns arise, call clinic.         Nereida Grant RN

## 2019-10-24 ENCOUNTER — OFFICE VISIT (OUTPATIENT)
Dept: FAMILY MEDICINE | Facility: CLINIC | Age: 59
End: 2019-10-24
Payer: MEDICARE

## 2019-10-24 VITALS
HEART RATE: 76 BPM | TEMPERATURE: 97 F | BODY MASS INDEX: 46.65 KG/M2 | RESPIRATION RATE: 18 BRPM | DIASTOLIC BLOOD PRESSURE: 80 MMHG | WEIGHT: 315 LBS | OXYGEN SATURATION: 97 % | SYSTOLIC BLOOD PRESSURE: 126 MMHG | HEIGHT: 69 IN

## 2019-10-24 DIAGNOSIS — I89.0 LYMPHEDEMA OF LEFT LEG: ICD-10-CM

## 2019-10-24 DIAGNOSIS — F17.200 TOBACCO DEPENDENCE: ICD-10-CM

## 2019-10-24 DIAGNOSIS — I87.2 STASIS DERMATITIS OF BOTH LEGS: ICD-10-CM

## 2019-10-24 DIAGNOSIS — L97.921 ULCER OF LEFT LOWER EXTREMITY, LIMITED TO BREAKDOWN OF SKIN (H): Primary | ICD-10-CM

## 2019-10-24 DIAGNOSIS — E66.01 MORBID OBESITY (H): ICD-10-CM

## 2019-10-24 PROCEDURE — 99213 OFFICE O/P EST LOW 20 MIN: CPT | Performed by: FAMILY MEDICINE

## 2019-10-24 ASSESSMENT — MIFFLIN-ST. JEOR: SCORE: 2319.93

## 2019-10-24 NOTE — LETTER
[facility]  [address]  [place] [facility] [place] [facility] [address]  [phone]                                                                                                           Petey Archibald  [address]  [facility] [address]    October 24, 2019      Dear Petey,    Seen by me today.        Thank you for choosing [facility].  We appreciate the opportunity to serve you and look forward to supporting your healthcare needs in the future.    If you have any questions or concerns, please call me or my staff at [phone].      Sincerely,    Hortensia Peck MD

## 2019-10-24 NOTE — PROGRESS NOTES
Subjective     Petey Archibald is a 58 year old male who presents to clinic today for the following health issues:    HPI     Left Lower Leg Venous Stasis Ulcer w/ Stasis Dermatitis w/ Chronic Lymphedema -  w/ Cellulitis Left Lat Calf -Now Improving   w/ Hx of DVT on Chronic Anticoagulation       Duration: below  And has resolved and reoccurred several times, related to dependency ie when goes to work even for 2 hrs --> worse but pt loves his job     Was gone 10-9 19 at wound care appt     Onset post long car ride to [facility] in 6-19     Description  Location: Left Lower Leg-ulcer and edema   With new area cellulitis 2 weeks ago     Intensity:  severe    Accompanying signs and symptoms: drainage, swelling and redness with weeping from ant pretib ulcer     New areas of red with cntral punctatate pedro lat prox Lt calf since last wk     History  Previous similar problem: no   Previous evaluation:  Yes    Precipitating or alleviating factors:  Trauma or overuse: no   Aggravating factors include: none  Therapies tried and outcome: Wrap with breathable dressing  Has been elevating leg about 2hr x 2 per day   Bid 10 min warm soaks and leaving open amap --> doing much better -lexi one small one reappeared over the last wk   Using cerava cream bid on leg         BP Readings from Last 2 Encounters:   08/22/19 122/82   08/22/19 136/83     Hemoglobin A1C (%)   Date Value   02/13/2012 5.7   02/01/2011 5.9     LDL Cholesterol Calculated (mg/dL)   Date Value   05/03/2017 73   05/11/2016 77     MORBID OBESITY      --BMI + 49.9    -comorbid glu intol     --sedentary life style     Eats bags of candy --large one over 2 days     TOBACCO ABUSE    History   Smoking Status     Current Every Day Smoker     Packs/day: 1.00     Years: 40 yrs and conttinues to smoke     Types: Cigarettes   Smokeless Tobacco     Never Used     Comment: started at age 18     FEV1,=66%FVC=61%  In 7-16  CXR  3-21-19  Prominent bibasilar interstitial  "markings  The nicotine causes poor healing of his wounds   Is slowly stopping   States he is down to 2-4 a day and his last cig was last pm   Wants to quit for the 1st time       How many servings of fruits and vegetables do you eat daily?  2-3    On average, how many sweetened beverages do you drink each day (soda, juice, sweet tea, etc)?   5    How many days per week do you miss taking your medication? 0              Reviewed and updated as needed this visit by Provider  Tobacco  Allergies  Meds  Problems  Med Hx  Surg Hx  Fam Hx         Review of Systems   ROS COMP: CONSTITUTIONAL: NEGATIVE for fever, chills, change in weight-gaining  INTEGUMENTARY/SKIN: NEGATIVE for worrisome rashes, moles or lesions POS stasis dermatitis  And ulcer Lt pretib  EYES: NEGATIVE for vision changes or irritation  ENT/MOUTH: NEGATIVE for ear, mouth and throat problems  RESP: NEGATIVE for significant cough or SOB  BREAST: NEGATIVE for masses, tenderness or discharge  CV: NEGATIVE for chest pain, palpitations or peripheral edema  GI: NEGATIVE for nausea, abdominal pain, heartburn, or change in bowel habits  : NEGATIVE for frequency, dysuria, or hematuria  MUSCULOSKELETAL: NEGATIVE for significant arthralgias or myalgia  NEURO: NEGATIVE for weakness, dizziness or paresthesias  ENDOCRINE: NEGATIVE for temperature intolerance, skin/hair changes  HEME: NEGATIVE for bleeding problems  PSYCHIATRIC: NEGATIVE for changes in mood or affect      Objective    /80   Pulse 76   Temp 97  F (36.1  C) (Tympanic)   Resp 18   Ht 1.753 m (5' 9\")   Wt (!) 152 kg (335 lb)   SpO2 97%   BMI 49.47 kg/m    Body mass index is 49.47 kg/m .  Physical Exam   GENERAL: healthy, alert, no distress and obese  EYES: Eyes grossly normal to inspection, PERRL and conjunctivae and sclerae normal  RESP: lungs clear to auscultation - no rales, rhonchi or wheezes  MS: no gross musculoskeletal defects noted, no edema  SKIN: no suspicious lesions or rashes  " "POS ulcer x 1x.5cm Lt mid pretib  wpurple to red skin change and 1+ pretib pitting bilat   NEURO: Normal strength and tone, mentation intact and speech normal  PSYCH: inattentive, affect normal/bright, judgement and insight impaired and appearance well groomed    Diagnostic Test Results:  Labs reviewed in Epic        Assessment & Plan       ICD-10-CM    1. Ulcer of left lower extremity, limited to breakdown of skin (H) Lt pretib middle  L97.921    2. Lymphedema of left leg I89.0    3. Stasis dermatitis of both legs I87.2    4. Tobacco dependence:40-50 pk yr hx F17.200    5. Morbid obesity (H) BMI 40-50 E66.01         Tobacco Cessation:   reports that he has been smoking cigarettes. He has a 30.00 pack-year smoking history. He uses smokeless tobacco.  Tobacco Cessation Action Plan: pt is cutting down   Now on last cig last pm     BMI:   Estimated body mass index is 49.47 kg/m  as calculated from the following:    Height as of this encounter: 1.753 m (5' 9\").    Weight as of this encounter: 152 kg (335 lb).   Weight management plan: Discussed healthy diet and exercise guidelines   Discussed not eating a bag of candy at nite         Patient Instructions   1.  Weight Loss Tips  1. Do not eat after 6 hrs before your expected bedtime  2. Have your heaviest meal for breakfast, a slightly lighter meal at lunch and a snack 6 hrs before bed  3. No sugar/calorie drinks except milk ie no fruit juice, pop, alcohol.  4. Drink milk 30min before meals to decrease your hunger. Also it is excellent as part of your last meal of the day snack  5. Drink lots of water  6. Increase fiber in diet: all bran cereal, salads, popcorn etc  7. Have only one small serving of fruit a day about 1/2 cup (as this is high in sugar)  8. EXERCISE is the bottom line. Without it, you will gain weight even on a low calorie diet. Best if done 2-3X a day as can    Being overweight contributes to high blood pressure and high cholesterol, both of which cause " heart attacks, strokes and kidney failure, prediabetes and diabetes, arthritis, and liver disease     2. Up to 2 hrs/ d work --now only one d a wk     3.Keep the injured area above the level of the heart as much as possible to help decrease the pain and  the healing time . Put pillows on either side while sleeping to keep the arm or leg elevated     Put foot of bed on books    Will chek into a new mattress       Return in about 2 weeks (around 11/7/2019).    Hortensia Peck MD  [facility]

## 2019-10-24 NOTE — NURSING NOTE
"Chief Complaint   Patient presents with     RECHECK     /80   Pulse 76   Temp 97  F (36.1  C) (Tympanic)   Resp 18   Ht 1.753 m (5' 9\")   Wt (!) 152 kg (335 lb)   SpO2 97%   BMI 49.47 kg/m   Estimated body mass index is 49.47 kg/m  as calculated from the following:    Height as of this encounter: 1.753 m (5' 9\").    Weight as of this encounter: 152 kg (335 lb).  BP completed using cuff size: jin Ovalle CMA    Health Maintenance Due   Topic Date Due     ADVANCE CARE PLANNING  1960     ZOSTER IMMUNIZATION (1 of 2) 12/06/2008     Health Maintenance reviewed at today's visit patient asked to schedule/complete:   Immunizations:  Patient agrees to schedule    "

## 2019-10-24 NOTE — PATIENT INSTRUCTIONS
1.  Weight Loss Tips  1. Do not eat after 6 hrs before your expected bedtime  2. Have your heaviest meal for breakfast, a slightly lighter meal at lunch and a snack 6 hrs before bed  3. No sugar/calorie drinks except milk ie no fruit juice, pop, alcohol.  4. Drink milk 30min before meals to decrease your hunger. Also it is excellent as part of your last meal of the day snack  5. Drink lots of water  6. Increase fiber in diet: all bran cereal, salads, popcorn etc  7. Have only one small serving of fruit a day about 1/2 cup (as this is high in sugar)  8. EXERCISE is the bottom line. Without it, you will gain weight even on a low calorie diet. Best if done 2-3X a day as can    Being overweight contributes to high blood pressure and high cholesterol, both of which cause heart attacks, strokes and kidney failure, prediabetes and diabetes, arthritis, and liver disease     2. Up to 2 hrs/ d work --now only one d a wk     3.Keep the injured area above the level of the heart as much as possible to help decrease the pain and  the healing time . Put pillows on either side while sleeping to keep the arm or leg elevated     Put foot of bed on books    Will chek into a new mattress

## 2019-11-06 NOTE — TELEPHONE ENCOUNTER
Medication discontinued when admitted to ED 9/13/19.     Called home number on file [phone].  This number is actually staff member Hunter Montes De Oca's cell.  Will update to list just as an additional contact # instead.  Hunter was driving when I called but believes patient is still taking this med.    Home number is [phone].  LVM asking staff to call clinic back to clarify if patient is still taking Enoxaparin.  Waiting callback.    Princess ANTONIO Palumbo, [facility]

## 2019-11-08 ENCOUNTER — ANTICOAGULATION THERAPY VISIT (OUTPATIENT)
Dept: NURSING | Facility: CLINIC | Age: 59
End: 2019-11-08
Payer: MEDICARE

## 2019-11-08 DIAGNOSIS — Z79.01 LONG TERM CURRENT USE OF ANTICOAGULANT THERAPY: ICD-10-CM

## 2019-11-08 DIAGNOSIS — Z86.711 HISTORY OF PULMONARY EMBOLISM: ICD-10-CM

## 2019-11-08 LAB — INR POINT OF CARE: 2.4 (ref 0.86–1.14)

## 2019-11-08 PROCEDURE — 99207 ZZC NO CHARGE NURSE ONLY: CPT

## 2019-11-08 PROCEDURE — 85610 PROTHROMBIN TIME: CPT | Mod: QW

## 2019-11-08 PROCEDURE — 36416 COLLJ CAPILLARY BLOOD SPEC: CPT

## 2019-11-08 RX ORDER — WARFARIN SODIUM 4 MG/1
TABLET ORAL
Qty: 60 TABLET | Refills: 0 | Status: SHIPPED | OUTPATIENT
Start: 2019-11-08 | End: 2019-12-06

## 2019-11-08 NOTE — PROGRESS NOTES
ANTICOAGULATION FOLLOW-UP CLINIC VISIT    Patient Name:  Petey Archibald  Date:  2019  Contact Type:  Face to Face    SUBJECTIVE:  Patient Findings     Comments:   The patient was assessed for diet, medication, and activity level changes, missed or extra doses, bruising or bleeding, with no problem findings.          Clinical Outcomes     Negatives:   Major bleeding event, Thromboembolic event, Anticoagulation-related hospital admission, Anticoagulation-related ED visit, Anticoagulation-related fatality    Comments:   The patient was assessed for diet, medication, and activity level changes, missed or extra doses, bruising or bleeding, with no problem findings.             OBJECTIVE    INR Protime   Date Value Ref Range Status   2019 2.4 (A) 0.86 - 1.14 Final       ASSESSMENT / PLAN  No question data found.  Anticoagulation Summary  As of 2019    INR goal:   2.0-3.0   TTR:   72.0 % (3.6 y)   INR used for dosin.4 (2019)   Warfarin maintenance plan:   10 mg (4 mg x 2.5) every Mon; 8 mg (4 mg x 2) all other days   Full warfarin instructions:   10 mg every Mon; 8 mg all other days   Weekly warfarin total:   58 mg   No change documented:   Ysabel Tena RN   Plan last modified:   Alyce Thurston [facility] (2019)   Next INR check:   2019   Target end date:   Indefinite    Indications    History of pulmonary embolism [Z86.711]  Long term current use of anticoagulant therapy [Z79.01]             Anticoagulation Episode Summary     INR check location:   Anticoagulation Clinic    Preferred lab:       Send INR reminders to:   EDU SUGGS    Comments:   REM FDC; A new Coumadin Prescription will need to sent to [facility] with current dose and next INR check.      Anticoagulation Care Providers     Provider Role Specialty Phone number    Silvino Baker MD [facility] [phone]            See the Encounter Report to view Anticoagulation  Flowsheet and Dosing Calendar (Go to Encounters tab in chart review, and find the Anticoagulation Therapy Visit)        Ysabel Tena RN

## 2019-11-15 ENCOUNTER — OFFICE VISIT (OUTPATIENT)
Dept: FAMILY MEDICINE | Facility: CLINIC | Age: 59
End: 2019-11-15
Payer: MEDICARE

## 2019-11-15 VITALS
WEIGHT: 315 LBS | HEIGHT: 69 IN | BODY MASS INDEX: 46.65 KG/M2 | HEART RATE: 87 BPM | RESPIRATION RATE: 12 BRPM | TEMPERATURE: 99.1 F | DIASTOLIC BLOOD PRESSURE: 60 MMHG | OXYGEN SATURATION: 94 % | SYSTOLIC BLOOD PRESSURE: 110 MMHG

## 2019-11-15 DIAGNOSIS — E66.01 MORBID OBESITY (H): ICD-10-CM

## 2019-11-15 DIAGNOSIS — R25.1 TREMOR: Primary | ICD-10-CM

## 2019-11-15 DIAGNOSIS — I89.0 LYMPHEDEMA OF LEFT LEG: ICD-10-CM

## 2019-11-15 DIAGNOSIS — I87.2 STASIS DERMATITIS OF BOTH LEGS: ICD-10-CM

## 2019-11-15 DIAGNOSIS — B36.9 FUNGAL DERMATITIS: ICD-10-CM

## 2019-11-15 PROCEDURE — 99214 OFFICE O/P EST MOD 30 MIN: CPT | Performed by: FAMILY MEDICINE

## 2019-11-15 ASSESSMENT — MIFFLIN-ST. JEOR: SCORE: 2319.93

## 2019-11-15 NOTE — NURSING NOTE
"Chief Complaint   Patient presents with     RECHECK     BP 90/52   Pulse 87   Temp 99.1  F (37.3  C) (Tympanic)   Resp 12   Ht 1.753 m (5' 9\")   Wt (!) 152 kg (335 lb)   SpO2 94%   BMI 49.47 kg/m   Estimated body mass index is 49.47 kg/m  as calculated from the following:    Height as of this encounter: 1.753 m (5' 9\").    Weight as of this encounter: 152 kg (335 lb).  BP completed using cuff size: andres Ovalle CMA    Health Maintenance Due   Topic Date Due     ADVANCE CARE PLANNING  1960     ZOSTER IMMUNIZATION (1 of 2) 12/06/2008     Health Maintenance reviewed at today's visit patient asked to schedule/complete:   Immunizations:  Patient agrees to schedule    "

## 2019-11-15 NOTE — TELEPHONE ENCOUNTER
2ND ATTEMPT  Called group home again. Left message asking someone call clinic back and let us know if patient is still taking this or not.     Letter mailed out just in case.    Princess ANTONIO Palumbo, [facility]

## 2019-11-15 NOTE — PATIENT INSTRUCTIONS
1.  Weight Loss Tips  1. Do not eat after 6 hrs before your expected bedtime  2. Have your heaviest meal for breakfast, a slightly lighter meal at lunch and a snack 6 hrs before bed  3. No sugar/calorie drinks except milk ie no fruit juice, pop, alcohol.  4. Drink milk 30min before meals to decrease your hunger. Also it is excellent as part of your last meal of the day snack  5. Drink lots of water  6. Increase fiber in diet: all bran cereal, salads, popcorn etc  7. Have only one small serving of fruit a day about 1/2 cup (as this is high in sugar)  8. EXERCISE is the bottom line. Without it, you will gain weight even on a low calorie diet. Best if done 2-3X a day as can    Being overweight contributes to high blood pressure and high cholesterol, both of which cause heart attacks, strokes and kidney failure, prediabetes and diabetes, arthritis, and liver disease     You must also decrease your caloric intake and especially decrease the carbs or carbohydrates as these are the most harmful regarding the above health risks    2. Keep the injured area above the level of the heart as much as possible to help decrease the pain and  the healing time . Put pillows on either side while sleeping to keep the arm or leg elevated   The left leg especially to keep the ulcers from forming     3. Lotrimin to lt GROIN and keep open and dry

## 2019-11-15 NOTE — PROGRESS NOTES
Subjective     Petey Archibald is a 58 year old male who presents to clinic today for the following health issues:    HPI     Left Lower Leg Venous Stasis with Hx of Ulcer-now healed  w/ Stasis Dermatitis w/ Chronic Lymphedema -  w/ Cellulitis Left Lat Calf -Now Improving   w/ Hx of DVT on Chronic Anticoagulation       Duration: below  And has resolved and reoccurred several times, related to dependency ie when goes to work even for 2 hrs --> worse but pt loves his job     Was gone 10-9 19 at wound care appt & conts without ulcer     Initial Onset post long car ride to [facility] in 6-19 & reoccurred post standing as a --now only doing 2 hr / wk     Description  Location: Left Lower Leg-> RTand edema     Intensity:  severe    Accompanying signs and symptoms: leg aches after long standing         History  Previous similar problem: no   Previous evaluation:  Yes    Precipitating or alleviating factors:  Trauma or overuse: no   Aggravating factors include: none  Therapies tried and outcome: Wrap with breathable dressing  Has been elevating leg about 2hr x 2 per day   Bid 10 min warm soaks and leaving open amap --> doing much better -lexi one small one reappeared over the last wk   Using cerava cream bid on leg   BP Readings from Last 2 Encounters:   08/22/19 122/82   08/22/19 136/83     Hemoglobin A1C (%)   Date Value   02/13/2012 5.7   02/01/2011 5.9     LDL Cholesterol Calculated (mg/dL)   Date Value   05/03/2017 73   05/11/2016 77     MORBID OBESITY      --BMI + 49.9    -comorbid glu intol     --sedentary life style     Eats bags of candy --large one over 2 days     TOBACCO ABUSE    History   Smoking Status     Current Every Day Smoker     Packs/day: 1.00     Years: 42 yrs and conttinues to smoke- tho intermittently tries to quit      Types: Cigarettes   Smokeless Tobacco     Never Used     Comment: started at age 18     FEV1,=66%FVC=61%  In 7-16  CXR  3-21-19  Prominent bibasilar interstitial  markings  The nicotine causes poor healing of his wounds   Is slowly stopping   States he is down to 2-4 a day and his last cig was last pm   Wants to quit for the 1st time       How many servings of fruits and vegetables do you eat daily?  2-3    On average, how many sweetened beverages do you drink each day (soda, juice, sweet tea, etc)?   5    How many days per week do you miss taking your medication? 0    Rash:Lt Groin       Duration: weeks     Description  Location: above   Itching: mild    Intensity:  moderate    Accompanying signs and symptoms: None    History (similar episodes/previous evaluation): yes    Precipitating or alleviating factors:  New exposures:  None but sits a lot with legs with panniculus resting on legs   Recent travel: no      Therapies tried and outcome: none but has lotrimin at the house     TREMOR of hands       Duration: up to 10 yrs but much worse since 11-12-19 with rapid uncontrollable hand flapping so he cannot eat     Description (location/character/radiation): above     Intensity:  severe    Accompanying signs and symptoms: 0    History (similar episodes/previous evaluation): None    Precipitating or alleviating factors: None    Therapies tried and outcome: None             Reviewed and updated as needed this visit by Provider         Review of Systems   ROS COMP: CONSTITUTIONAL: NEGATIVE for fever, chills, change in weight  INTEGUMENTARY/SKIN: NEGATIVE for worrisome rashes, moles or lesions POS red rash Lt groin   EYES: NEGATIVE for vision changes or irritation  ENT/MOUTH: NEGATIVE for ear, mouth and throat problems  RESP: NEGATIVE for significant cough or SOB  BREAST: NEGATIVE for masses, tenderness or discharge  CV: NEGATIVE for chest pain, palpitations or peripheral edema  GI: NEGATIVE for nausea, abdominal pain, heartburn, or change in bowel habits  : NEGATIVE for frequency, dysuria, or hematuria  MUSCULOSKELETAL: NEGATIVE for significant arthralgias or myalgia  NEURO:  "NEGATIVE for weakness, dizziness or paresthesias POS hand tremor   ENDOCRINE: NEGATIVE for temperature intolerance, skin/hair changes  HEME: NEGATIVE for bleeding problems  PSYCHIATRIC: NEGATIVE for changes in mood or affect      Objective    BP 90/52   Pulse 87   Temp 99.1  F (37.3  C) (Tympanic)   Resp 12   Ht 1.753 m (5' 9\")   Wt (!) 152 kg (335 lb)   SpO2 94%   BMI 49.47 kg/m    Body mass index is 49.47 kg/m .  Physical Exam   GENERAL: healthy, alert and no distress  EYES: Eyes grossly normal to inspection, PERRL and conjunctivae and sclerae normal  RESP: lungs clear to auscultation - no rales, rhonchi or wheezes  CV: regular rate and rhythm, normal S1 S2, no S3 or S4, no murmur, click or rub, no peripheral edema and peripheral pulses strong  MS: no gross musculoskeletal defects noted, no edema  SKIN: no suspicious lesions or rashes POS red rash Lt groin -confluent   NEURO: Normal strength and tone, mentation intact and speech normal POS rapid flapping of hands so cannot hold anything   PSYCH: concentration poor, inattentive, affect normal/bright, anxious, judgement and insight impaired and appearance well groomed    Diagnostic Test Results:  Labs reviewed in Epic        Assessment & Plan       ICD-10-CM    1. Tremor of UEs for 10 yrs but worse since 11-12-19  R25.1 NEUROLOGY ADULT REFERRAL   2. Lymphedema of left leg I89.0    3. Stasis dermatitis of both legs I87.2    4. Fungal dermatitis:Lt Groin - hx of recurrence  B36.9    5. Morbid obesity (H) BMI 40-50 E66.01         Tobacco Cessation:   reports that he has been smoking cigarettes. He has a 30.00 pack-year smoking history. He uses smokeless tobacco.  Tobacco Cessation Action Plan: Information offered: Patient not interested at this time      BMI:   Estimated body mass index is 49.47 kg/m  as calculated from the following:    Height as of this encounter: 1.753 m (5' 9\").    Weight as of this encounter: 152 kg (335 lb).   Weight management plan: " Discussed healthy diet and exercise guidelines        Patient Instructions   1.  Weight Loss Tips  1. Do not eat after 6 hrs before your expected bedtime  2. Have your heaviest meal for breakfast, a slightly lighter meal at lunch and a snack 6 hrs before bed  3. No sugar/calorie drinks except milk ie no fruit juice, pop, alcohol.  4. Drink milk 30min before meals to decrease your hunger. Also it is excellent as part of your last meal of the day snack  5. Drink lots of water  6. Increase fiber in diet: all bran cereal, salads, popcorn etc  7. Have only one small serving of fruit a day about 1/2 cup (as this is high in sugar)  8. EXERCISE is the bottom line. Without it, you will gain weight even on a low calorie diet. Best if done 2-3X a day as can    Being overweight contributes to high blood pressure and high cholesterol, both of which cause heart attacks, strokes and kidney failure, prediabetes and diabetes, arthritis, and liver disease     You must also decrease your caloric intake and especially decrease the carbs or carbohydrates as these are the most harmful regarding the above health risks    2. Keep the injured area above the level of the heart as much as possible to help decrease the pain and  the healing time . Put pillows on either side while sleeping to keep the arm or leg elevated   The left leg especially to keep the ulcers from forming     3. Lotrimin to lt GROIN and keep open and dry       Return in about 3 months (around 2/15/2020) for BP Recheck, depression.    Hortensia Peck MD  [facility]

## 2019-11-21 ENCOUNTER — TELEPHONE (OUTPATIENT)
Dept: FAMILY MEDICINE | Facility: CLINIC | Age: 59
End: 2019-11-21

## 2019-11-21 ENCOUNTER — MEDICAL CORRESPONDENCE (OUTPATIENT)
Dept: HEALTH INFORMATION MANAGEMENT | Facility: CLINIC | Age: 59
End: 2019-11-21

## 2019-11-21 NOTE — TELEPHONE ENCOUNTER
"Our goal is to have forms completed within 72 hours, however some forms may require a visit or additional information.    What clinic location was the form placed at [facility] or [place].?     Who is the form from?   Where did the form come from? Faxed to clinic   The form was placed in the inbox of Hortensia Peck MD      Please fax to [phone]  Phone number: [phone]    Additional comments[facility] self adherent wrap 4\" X 5 yd    Call take on 11/21/2019 at 3:13 PM by Catherine Lombardo                                "

## 2019-12-06 ENCOUNTER — ANTICOAGULATION THERAPY VISIT (OUTPATIENT)
Dept: NURSING | Facility: CLINIC | Age: 59
End: 2019-12-06
Payer: MEDICARE

## 2019-12-06 DIAGNOSIS — Z79.01 LONG TERM CURRENT USE OF ANTICOAGULANT THERAPY: ICD-10-CM

## 2019-12-06 DIAGNOSIS — Z86.711 HISTORY OF PULMONARY EMBOLISM: ICD-10-CM

## 2019-12-06 LAB — INR POINT OF CARE: 3.2 (ref 0.86–1.14)

## 2019-12-06 PROCEDURE — 36416 COLLJ CAPILLARY BLOOD SPEC: CPT

## 2019-12-06 PROCEDURE — 99207 ZZC NO CHARGE NURSE ONLY: CPT

## 2019-12-06 PROCEDURE — 85610 PROTHROMBIN TIME: CPT | Mod: QW

## 2019-12-06 RX ORDER — WARFARIN SODIUM 4 MG/1
TABLET ORAL
Qty: 30 TABLET | Refills: 0 | Status: SHIPPED | OUTPATIENT
Start: 2019-12-06 | End: 2019-12-20

## 2019-12-06 NOTE — PROGRESS NOTES
ANTICOAGULATION FOLLOW-UP CLINIC VISIT    Patient Name:  Petey Archibald  Date:  12/6/2019  Contact Type:  Face to Face    SUBJECTIVE:  Patient Findings     Comments:   The patient was assessed for diet, medication, and activity level changes, missed or extra doses, bruising or bleeding, with no problem findings.          Clinical Outcomes     Negatives:   Major bleeding event, Thromboembolic event, Anticoagulation-related hospital admission, Anticoagulation-related ED visit, Anticoagulation-related fatality    Comments:   The patient was assessed for diet, medication, and activity level changes, missed or extra doses, bruising or bleeding, with no problem findings.             OBJECTIVE    INR Protime   Date Value Ref Range Status   12/06/2019 3.2 (A) 0.86 - 1.14 Final       ASSESSMENT / PLAN  No question data found.  Anticoagulation Summary  As of 12/6/2019    INR goal:   2.0-3.0   TTR:   74.0 % (11.9 mo)   INR used for dosing:   3.2! (12/6/2019)   Warfarin maintenance plan:   10 mg (4 mg x 2.5) every Mon; 8 mg (4 mg x 2) all other days   Full warfarin instructions:   10 mg every Mon; 8 mg all other days   Weekly warfarin total:   58 mg   No change documented:   Ysabel Tena RN   Plan last modified:   Alyce Thurston [facility] (9/23/2019)   Next INR check:   12/20/2019   Target end date:   Indefinite    Indications    History of pulmonary embolism [Z86.711]  Long term current use of anticoagulant therapy [Z79.01]             Anticoagulation Episode Summary     INR check location:   Anticoagulation Clinic    Preferred lab:       Send INR reminders to:   EDU SUGGS    Comments:   REM snf; A new Coumadin Prescription will need to sent to [facility] with current dose and next INR check.      Anticoagulation Care Providers     Provider Role Specialty Phone number    Silvino Baker MD [facility] [phone]            See the Encounter Report to view Anticoagulation  Flowsheet and Dosing Calendar (Go to Encounters tab in chart review, and find the Anticoagulation Therapy Visit)    Patient denies any known changes.  Last 2 readings were within range.  Will continue current maintenance dose and recheck INR in 2 weeks.  Rx sent to [facility] is aware if signs of clotting (pain, tenderness, swelling, color change in leg or arm, SOB) and bleeding occur (blood in stool, urine, large bruising, bleeding gums, nosebleeds) to have INR check sooner. If sx severe report to ER or concerned for stroke call 911. If general questions or concerns arise, call clinic.         Ysabel Tena RN

## 2019-12-10 DIAGNOSIS — L97.922 ULCER OF LEFT LOWER EXTREMITY WITH FAT LAYER EXPOSED (H): ICD-10-CM

## 2019-12-10 NOTE — TELEPHONE ENCOUNTER
"Requested Prescriptions   Pending Prescriptions Disp Refills     cholecalciferol (VITAMIN D3) 5000 units (125 mcg) capsule [Pharmacy Med Name: VITAMIN D3 5,000 IU CAPS]  Last Written Prescription Date:  9/24/2019  Last Fill Quantity: 60 tablet,  # refills: 0   Last office visit: 11/15/2019 with prescribing provider:  DUGLAS Peck   Future Office Visit:   Next 5 appointments (look out 90 days)    Jan 08, 2020  9:15 AM CST  Return Visit with Rustam Davis MD  [facility] [place] [facility] [address] [facility] [address] [place] [id number]  [phone]           11     Sig: TAKE 2 CAPSULES (10,000UNITS) BY MOUTH ONCE DAILY. **NON-COVERED MED** *1 TOTAL FILL*       Vitamin Supplements (Adult) Protocol Passed - 12/10/2019  7:09 AM        Passed - High dose Vitamin D not ordered        Passed - Recent (12 mo) or future (30 days) visit within the authorizing provider's specialty     Patient has had an office visit with the authorizing provider or a provider within the authorizing providers department within the previous 12 mos or has a future within next 30 days. See \"Patient Info\" tab in inbasket, or \"Choose Columns\" in Meds & Orders section of the refill encounter.              Passed - Medication is active on med list           "

## 2019-12-12 NOTE — TELEPHONE ENCOUNTER
Prescription approved per [facility] Refill Protocol for 6 months of refills since last appointment, which was 11/15/2019.    MD had asked for pt to return around 2/15/2020. Med filled through 02/2020.    [employer] message sent to pt informing pt that recheck is coming up.

## 2019-12-19 ENCOUNTER — TRANSFERRED RECORDS (OUTPATIENT)
Dept: HEALTH INFORMATION MANAGEMENT | Facility: CLINIC | Age: 59
End: 2019-12-19

## 2019-12-20 ENCOUNTER — ANTICOAGULATION THERAPY VISIT (OUTPATIENT)
Dept: NURSING | Facility: CLINIC | Age: 59
End: 2019-12-20
Payer: MEDICARE

## 2019-12-20 DIAGNOSIS — Z79.01 LONG TERM CURRENT USE OF ANTICOAGULANT THERAPY: ICD-10-CM

## 2019-12-20 DIAGNOSIS — Z86.711 HISTORY OF PULMONARY EMBOLISM: ICD-10-CM

## 2019-12-20 LAB — INR POINT OF CARE: 3.6 (ref 0.86–1.14)

## 2019-12-20 PROCEDURE — 85610 PROTHROMBIN TIME: CPT | Mod: QW

## 2019-12-20 PROCEDURE — 99207 ZZC NO CHARGE NURSE ONLY: CPT

## 2019-12-20 PROCEDURE — 36416 COLLJ CAPILLARY BLOOD SPEC: CPT

## 2019-12-20 RX ORDER — WARFARIN SODIUM 4 MG/1
TABLET ORAL
Qty: 30 TABLET | Refills: 0 | Status: SHIPPED | OUTPATIENT
Start: 2019-12-20 | End: 2020-01-03

## 2019-12-20 NOTE — PROGRESS NOTES
ANTICOAGULATION FOLLOW-UP CLINIC VISIT    Patient Name:  Petey Archibald  Date:  12/20/2019  Contact Type:  Face to Face    SUBJECTIVE:  Patient Findings     Positives:   Signs/symptoms of bleeding (patient states that he did cough up a small amount of blood today when coughing.  )    Comments:   Patient denies any identifiable changes that caused the supratherapeutic INR.           Clinical Outcomes     Comments:   Patient denies any identifiable changes that caused the supratherapeutic INR.              OBJECTIVE    INR Protime   Date Value Ref Range Status   12/20/2019 3.6 (A) 0.86 - 1.14 Final       ASSESSMENT / PLAN  No question data found.  Anticoagulation Summary  As of 12/20/2019    INR goal:   2.0-3.0   TTR:   70.0 % (11.9 mo)   INR used for dosing:   3.6! (12/20/2019)   Warfarin maintenance plan:   8 mg (4 mg x 2) every day   Full warfarin instructions:   12/20: 4 mg; Otherwise 8 mg every day   Weekly warfarin total:   56 mg   Plan last modified:   Ysabel Tena RN (12/20/2019)   Next INR check:   1/3/2020   Target end date:   Indefinite    Indications    History of pulmonary embolism [Z86.711]  Long term current use of anticoagulant therapy [Z79.01]             Anticoagulation Episode Summary     INR check location:   Anticoagulation Clinic    Preferred lab:       Send INR reminders to:   [facility]    Comments:   REM halfway; A new Coumadin Prescription will need to sent to [facility] with current dose and next INR check.      Anticoagulation Care Providers     Provider Role Specialty Phone number    Silvino Baker MD [facility] [phone]            See the Encounter Report to view Anticoagulation Flowsheet and Dosing Calendar (Go to Encounters tab in chart review, and find the Anticoagulation Therapy Visit)    Dosage adjustment made based on physician directed care plan.    Patient did state that he coughed up a small amount of blood today when he was  coughing.  No further episodes.  Advised to be seen by PCP if continues.  Denies any other changes.  Will decrease dose today, take 4 MG and then decrease maintenance dose ~4 % and recheck INR in 2 weeks.  Rx also sent to [facility].     Ysabel Tena RN

## 2020-01-03 ENCOUNTER — ANTICOAGULATION THERAPY VISIT (OUTPATIENT)
Dept: NURSING | Facility: CLINIC | Age: 60
End: 2020-01-03
Payer: MEDICARE

## 2020-01-03 DIAGNOSIS — Z79.01 LONG TERM CURRENT USE OF ANTICOAGULANT THERAPY: ICD-10-CM

## 2020-01-03 DIAGNOSIS — Z86.711 HISTORY OF PULMONARY EMBOLISM: ICD-10-CM

## 2020-01-03 LAB — INR POINT OF CARE: 2.6 (ref 0.86–1.14)

## 2020-01-03 PROCEDURE — 36416 COLLJ CAPILLARY BLOOD SPEC: CPT

## 2020-01-03 PROCEDURE — 85610 PROTHROMBIN TIME: CPT | Mod: QW

## 2020-01-03 PROCEDURE — 99207 ZZC NO CHARGE NURSE ONLY: CPT

## 2020-01-03 RX ORDER — WARFARIN SODIUM 4 MG/1
TABLET ORAL
Qty: 30 TABLET | Refills: 0 | Status: SHIPPED | OUTPATIENT
Start: 2020-01-03 | End: 2020-01-08

## 2020-01-03 NOTE — PROGRESS NOTES
ANTICOAGULATION FOLLOW-UP CLINIC VISIT    Patient Name:  Petey Archibald  Date:  1/3/2020  Contact Type:  Face to Face    SUBJECTIVE:  Patient Findings     Comments:   The patient was assessed for diet, medication, and activity level changes, missed or extra doses, bruising or bleeding, with no problem findings.          Clinical Outcomes     Negatives:   Major bleeding event, Thromboembolic event, Anticoagulation-related hospital admission, Anticoagulation-related ED visit, Anticoagulation-related fatality    Comments:   The patient was assessed for diet, medication, and activity level changes, missed or extra doses, bruising or bleeding, with no problem findings.             OBJECTIVE    INR Protime   Date Value Ref Range Status   2020 2.6 (A) 0.86 - 1.14 Final       ASSESSMENT / PLAN  INR assessment THER    Recheck INR In: 2 WEEKS    INR Location Clinic      Anticoagulation Summary  As of 1/3/2020    INR goal:   2.0-3.0   TTR:   67.7 % (11.9 mo)   INR used for dosin.6 (1/3/2020)   Warfarin maintenance plan:   8 mg (4 mg x 2) every day   Full warfarin instructions:   8 mg every day   Weekly warfarin total:   56 mg   No change documented:   Ysabel Tena RN   Plan last modified:   Ysabel Tena RN (2019)   Next INR check:   2020   Priority:   Maintenance   Target end date:   Indefinite    Indications    History of pulmonary embolism [Z86.711]  Long term current use of anticoagulant therapy [Z79.01]             Anticoagulation Episode Summary     INR check location:   Anticoagulation Clinic    Preferred lab:       Send INR reminders to:   EDU SUGGS    Comments:   REM long-term; A new Coumadin Prescription will need to sent to [facility] with current dose and next INR check.      Anticoagulation Care Providers     Provider Role Specialty Phone number    Silvino Baker MD [facility] [phone]            See the Encounter Report to view  Anticoagulation Flowsheet and Dosing Calendar (Go to Encounters tab in chart review, and find the Anticoagulation Therapy Visit)    Dosage adjustment made based on physician directed care plan.     Pt denies any changes in diet,health or activity. Petey is aware if signs of clotting (pain, tenderness, swelling, color change in leg or arm, SOB) and bleeding occur (blood in stool, urine, large bruising, bleeding gums, nosebleeds) to have INR check sooner. If sx severe report to ER or concerned for stroke call 911. If general questions or concerns arise, call clinic.         Ysabel Tena RN

## 2020-01-04 ENCOUNTER — HOSPITAL ENCOUNTER (EMERGENCY)
Facility: CLINIC | Age: 60
Discharge: HOME | End: 2020-01-05
Attending: EMERGENCY MEDICINE | Admitting: EMERGENCY MEDICINE
Payer: MEDICARE

## 2020-01-04 DIAGNOSIS — K61.0 PERIANAL ABSCESS: ICD-10-CM

## 2020-01-04 DIAGNOSIS — Z79.01 ANTICOAGULATED ON COUMADIN: ICD-10-CM

## 2020-01-04 DIAGNOSIS — N17.9 ACUTE KIDNEY INJURY (H): ICD-10-CM

## 2020-01-04 PROCEDURE — 87070 CULTURE OTHR SPECIMN AEROBIC: CPT | Performed by: EMERGENCY MEDICINE

## 2020-01-04 PROCEDURE — 87077 CULTURE AEROBIC IDENTIFY: CPT | Performed by: EMERGENCY MEDICINE

## 2020-01-04 PROCEDURE — 87186 SC STD MICRODIL/AGAR DIL: CPT | Performed by: EMERGENCY MEDICINE

## 2020-01-04 PROCEDURE — 99285 EMERGENCY DEPT VISIT HI MDM: CPT | Mod: 25

## 2020-01-04 RX ORDER — ACETAMINOPHEN 325 MG/1
650 TABLET ORAL ONCE
Status: DISCONTINUED | OUTPATIENT
Start: 2020-01-04 | End: 2020-01-05 | Stop reason: HOSPADM

## 2020-01-04 ASSESSMENT — MIFFLIN-ST. JEOR: SCORE: 2208.33

## 2020-01-04 NOTE — LETTER
January 5, 2020      To Whom It May Concern:      Petey Archibald was seen in our Emergency Department today, 01/05/20.  I expect his condition to improve over the next 2 days.  He may return to work/school when improved.    Sincerely,        Rosy Priest RN

## 2020-01-04 NOTE — ED AVS SNAPSHOT
Emergency Department  [address] [place] [id number]  Phone:  [phone]  Fax:  [phone]                                    Petey Archibald   MRN: [id number]    Department:   Emergency Department   Date of Visit:  1/4/2020           After Visit Summary Signature Page    I have received my discharge instructions, and my questions have been answered. I have discussed any challenges I see with this plan with the nurse or doctor.    ..........................................................................................................................................  Patient/Patient Representative Signature      ..........................................................................................................................................  Patient Representative Print Name and Relationship to Patient    ..................................................               ................................................  Date                                   Time    ..........................................................................................................................................  Reviewed by Signature/Title    ...................................................              ..............................................  Date                                               Time          22EPIC Rev 08/18

## 2020-01-05 ENCOUNTER — APPOINTMENT (OUTPATIENT)
Dept: CT IMAGING | Facility: CLINIC | Age: 60
End: 2020-01-05
Attending: EMERGENCY MEDICINE
Payer: MEDICARE

## 2020-01-05 VITALS
TEMPERATURE: 98.3 F | DIASTOLIC BLOOD PRESSURE: 57 MMHG | RESPIRATION RATE: 19 BRPM | SYSTOLIC BLOOD PRESSURE: 101 MMHG | HEIGHT: 64 IN | OXYGEN SATURATION: 100 % | HEART RATE: 79 BPM | WEIGHT: 315 LBS | BODY MASS INDEX: 53.78 KG/M2

## 2020-01-05 LAB
ANION GAP SERPL CALCULATED.3IONS-SCNC: 7 MMOL/L (ref 3–14)
APTT PPP: 63 SEC (ref 22–37)
BASOPHILS # BLD AUTO: 0 10E9/L (ref 0–0.2)
BASOPHILS NFR BLD AUTO: 0.3 %
BUN SERPL-MCNC: 22 MG/DL (ref 7–30)
CALCIUM SERPL-MCNC: 8.9 MG/DL (ref 8.5–10.1)
CHLORIDE SERPL-SCNC: 103 MMOL/L (ref 94–109)
CO2 BLDCOV-SCNC: 26 MMOL/L (ref 21–28)
CO2 SERPL-SCNC: 26 MMOL/L (ref 20–32)
CREAT SERPL-MCNC: 1.32 MG/DL (ref 0.66–1.25)
DIFFERENTIAL METHOD BLD: ABNORMAL
EOSINOPHIL # BLD AUTO: 0.1 10E9/L (ref 0–0.7)
EOSINOPHIL NFR BLD AUTO: 1.2 %
ERYTHROCYTE [DISTWIDTH] IN BLOOD BY AUTOMATED COUNT: 16.7 % (ref 10–15)
GFR SERPL CREATININE-BSD FRML MDRD: 58 ML/MIN/{1.73_M2}
GLUCOSE SERPL-MCNC: 95 MG/DL (ref 70–99)
HCT VFR BLD AUTO: 32.8 % (ref 40–53)
HGB BLD-MCNC: 10.4 G/DL (ref 13.3–17.7)
IMM GRANULOCYTES # BLD: 0 10E9/L (ref 0–0.4)
IMM GRANULOCYTES NFR BLD: 0.1 %
INR PPP: 2.36 (ref 0.86–1.14)
LACTATE BLD-SCNC: 0.8 MMOL/L (ref 0.7–2.1)
LACTATE BLD-SCNC: 0.9 MMOL/L (ref 0.7–2)
LYMPHOCYTES # BLD AUTO: 1.3 10E9/L (ref 0.8–5.3)
LYMPHOCYTES NFR BLD AUTO: 16.9 %
MCH RBC QN AUTO: 29.2 PG (ref 26.5–33)
MCHC RBC AUTO-ENTMCNC: 31.7 G/DL (ref 31.5–36.5)
MCV RBC AUTO: 92 FL (ref 78–100)
MONOCYTES # BLD AUTO: 0.7 10E9/L (ref 0–1.3)
MONOCYTES NFR BLD AUTO: 9.6 %
NEUTROPHILS # BLD AUTO: 5.5 10E9/L (ref 1.6–8.3)
NEUTROPHILS NFR BLD AUTO: 71.9 %
NRBC # BLD AUTO: 0 10*3/UL
NRBC BLD AUTO-RTO: 0 /100
PCO2 BLDV: 45 MM HG (ref 40–50)
PH BLDV: 7.38 PH (ref 7.32–7.43)
PLATELET # BLD AUTO: 231 10E9/L (ref 150–450)
PO2 BLDV: 28 MM HG (ref 25–47)
POTASSIUM SERPL-SCNC: 3.5 MMOL/L (ref 3.4–5.3)
RBC # BLD AUTO: 3.56 10E12/L (ref 4.4–5.9)
SAO2 % BLDV FROM PO2: 50 %
SODIUM SERPL-SCNC: 136 MMOL/L (ref 133–144)
WBC # BLD AUTO: 7.6 10E9/L (ref 4–11)

## 2020-01-05 PROCEDURE — 74177 CT ABD & PELVIS W/CONTRAST: CPT

## 2020-01-05 PROCEDURE — 85730 THROMBOPLASTIN TIME PARTIAL: CPT | Performed by: EMERGENCY MEDICINE

## 2020-01-05 PROCEDURE — 25000125 ZZHC RX 250: Performed by: EMERGENCY MEDICINE

## 2020-01-05 PROCEDURE — 80048 BASIC METABOLIC PNL TOTAL CA: CPT | Performed by: EMERGENCY MEDICINE

## 2020-01-05 PROCEDURE — 85025 COMPLETE CBC W/AUTO DIFF WBC: CPT | Performed by: EMERGENCY MEDICINE

## 2020-01-05 PROCEDURE — 25000128 H RX IP 250 OP 636: Performed by: EMERGENCY MEDICINE

## 2020-01-05 PROCEDURE — 83605 ASSAY OF LACTIC ACID: CPT

## 2020-01-05 PROCEDURE — 82803 BLOOD GASES ANY COMBINATION: CPT

## 2020-01-05 PROCEDURE — 85610 PROTHROMBIN TIME: CPT | Performed by: EMERGENCY MEDICINE

## 2020-01-05 PROCEDURE — 83605 ASSAY OF LACTIC ACID: CPT | Performed by: EMERGENCY MEDICINE

## 2020-01-05 RX ORDER — CIPROFLOXACIN 500 MG/1
500 TABLET, FILM COATED ORAL 2 TIMES DAILY
Qty: 10 TABLET | Refills: 0 | Status: SHIPPED | OUTPATIENT
Start: 2020-01-05 | End: 2020-05-18

## 2020-01-05 RX ORDER — IOPAMIDOL 755 MG/ML
135 INJECTION, SOLUTION INTRAVASCULAR ONCE
Status: COMPLETED | OUTPATIENT
Start: 2020-01-05 | End: 2020-01-05

## 2020-01-05 RX ORDER — METRONIDAZOLE 500 MG/1
500 TABLET ORAL 2 TIMES DAILY
Qty: 10 TABLET | Refills: 0 | Status: SHIPPED | OUTPATIENT
Start: 2020-01-05 | End: 2020-05-18

## 2020-01-05 RX ADMIN — IOPAMIDOL 135 ML: 755 INJECTION, SOLUTION INTRAVENOUS at 01:35

## 2020-01-05 RX ADMIN — SODIUM CHLORIDE 80 ML: 9 INJECTION, SOLUTION INTRAVENOUS at 01:34

## 2020-01-05 ASSESSMENT — ENCOUNTER SYMPTOMS
ABDOMINAL PAIN: 0
NAUSEA: 0
FEVER: 0
VOMITING: 0
RECTAL PAIN: 1

## 2020-01-05 NOTE — ED TRIAGE NOTES
Patient presents to the ED with complaints of rectal pain and bleeding for the past week but did not report to group home staff until today. Patient says that stools appear normal in color but that he doesn't really pay that much attention.

## 2020-01-05 NOTE — DISCHARGE INSTRUCTIONS
Antibiotics as instructed. These will likely affect Coumadin level, so you need a repeat INR in 3-5 days.  Kidney function is up a little today. Increase fluids. This should be rechecked in 3-5 days as well to ensure it is better and not worsening.   Tylenol as needed for pain.  Return with worsening pain, redness, fever >100.4F, vomiting, abdominal pain, or ANY OTHER CONCERNS.  You will likely continue to have a small amount of bloody appearing drainage from the site. Consider sitting in a few inches of warm water in the bathtub (Sitz bath) 2 times a day for the next few days.   See your doctor this week for labs as above and wound recheck.

## 2020-01-05 NOTE — ED PROVIDER NOTES
"  History     Chief Complaint:  Rectal Bleeding       The history is provided by the patient.      Petey Archibald is a 59 year old male on Coumadin for history of DVT/PE who presents with rectal bleeding. Petey has had non-radiating pain \"in my rectum\" for the last week, especially when he sits. He told staff at his group home about this just today. They evaluated the area and identified bleeding so he was sent for further evaluation. He denies abdominal pain, nausea, vomiting, or fevers. He denies any urinary symptoms. His last bowel movement was 3 days ago which is normal for him. He has had a colonoscopy in the past and was told he had polyps.     Allergies:  Bees  Augmentin  Penicillins     Medications:    Abilify   Wellbutrin SR  Dexilant   Ferrous gluconate   Furosemide  Hesperidin-Diosmin  Potassium chloride SA  Sertraline   Simvastatin   Trazodone   Varenicline   Warfarin  Zolpidem    Past Medical History:    Borderline mental retardation   COPD (chronic obstructive pulmonary disease)  GERD  DVT of lower extremity  Depression    Pulmonary embolism   Hyperlipidemia   Mild intellectual disabilities   Morbid obesity    Peripheral edema   Peripheral vascular disease    Sleep apnea   Tobacco dependence   Venous stasis dermatitis     Past Surgical History:    Rectal surgery   Biopsy/polypectomy     Family History:    Diabetes     Social History:  Smoking Status: current every day smoker  Smokeless Tobacco: current user  Alcohol Use: No  Drug Use: No  PCP: Hortensia Peck   Presents alone.    Review of Systems   Constitutional: Negative for fever.   Gastrointestinal: Positive for anal bleeding and rectal pain. Negative for abdominal pain, blood in stool, constipation, diarrhea, nausea and vomiting.   Genitourinary: Negative.    Skin: Positive for color change.   All other systems reviewed and are negative.    Physical Exam     Patient Vitals for the past 24 hrs:   BP Temp Temp src Pulse Heart Rate " "Resp SpO2 Height Weight   01/05/20 0254 101/57 -- -- 79 -- 19 100 % -- --   01/04/20 2317 137/69 98.3  F (36.8  C) Oral -- 94 16 97 % 1.626 m (5' 4\") 149.2 kg (329 lb)      Physical Exam  General: Well-developed and well-nourished. Well appearing middle aged  man. Cooperative.  Head:  Atraumatic.  Eyes:  Conjunctivae, lids, and sclerae are normal.  ENT:    Normal nose. Moist mucous membranes.  Neck:  Supple. Normal range of motion.  CV:  Regular rate and rhythm. Normal heart sounds with no murmurs, rubs, or gallops detected.  Resp:  No respiratory distress. Clear to auscultation bilaterally without decreased breath sounds, wheezing, rales, or rhonchi.  GI:  Soft, protuberant abdomen. Non-distended. Non-tender.    :  There is an area of tenderness on the medial right gluteus posterior and lateral to the anus actively draining bloody/purulent discharge without significant surrounding fluctuance or induration as photographed below.  TYRONE without return of blood.  MS:  Normal ROM. Trace bilateral lower extremity edema.  Skin:  Warm. Non-diaphoretic. No pallor. Discoloration with mild erythema on the bilateral buttocks and posterior thighs with excoriations of the bilateral buttocks as photographed below. Superficial, chronic appearing wound on the plantar right heel without tenderness, exudate, or surrounding cellulitis.  Neuro:  Awake. A&Ox3. Normal strength.  Psych: Normal mood and affect. Normal speech.  Vitals reviewed.               Emergency Department Course     Imaging:  Radiology findings were communicated with the patient who voiced understanding of the findings.    CT Abdomen Pelvis w Contrast  Final Result  IMPRESSION:   1.  Small amount of superficial inflammatory change right gluteal region medially.  2.  No focal abscess demonstrated.  3.  No evidence of obstruction.  Reading per radiology     Laboratory:  Laboratory findings were communicated with the patient who voiced understanding of the " "findings.    ISTAT gases lactate abimael POCT: pH: 7.38, PCO2: 45, PO2: 28, Bicarbonate: 26, O2 Sat: 50, Lactic acid: 0.8     CBC:  WBC 7.6, HGB 10.4 (L), , o/w WNL     INR: 2.36 (H)    BMP: GFR estimate 58 (L), o/w WNL (Creatinine: 1.32 (H))     Partial thromboplastin time: 63 (H)    Lactic Acid whole blood (0002): 0.9      Wound culture: pending     Emergency Department Course:  Past medical records, nursing notes, and vitals reviewed.    2338 I performed an exam of the patient as documented above.    IV was inserted and blood was drawn for laboratory testing, results above.     The patient was sent for a CT abdomen pelvis while in the emergency department, results above.       0210 Patient rechecked and updated. He is comfortable with discharge.      Findings and plan explained to the patient. Patient discharged home with instructions regarding supportive care, medications, and reasons to return. The importance of close follow-up was reviewed. The patient was prescribed ciprofloxacin and Flagyl.       Impression & Plan   Medical Decision Making:  Petey is a 59 year old man on Coumadin for history of DVT/PE presenting with 1 week of pain \"in my rectum\" of which he just told his group home workers.  They looked in the area and noted bleeding and sent him to the emergency department.  On exam he appears quite well and has no evidence of systemic disease such as fever or vomiting.  He has no abdominal tenderness. He does have abnormal skin coloring on the buttocks and posterior thighs.  Portions of this seems to simply be hyperpigmentation and are not blanching although other portions are mildly erythematous and blanching.  There are also excoriations to the buttocks as photographed above.  Most notably he has an area of active purulent and bloody drainage perianally on the medial right buttock.  This is tender and likely the source for patient's pain and symptoms today.  I probed to this opening with a cotton " swab and it did seem to be somewhat deep.  However, there is no surrounding fluctuance to suggest persistent abscess and no induration.  As such, he was sent for CT scan of the abdomen and pelvis to evaluate extent of this which, fortunately, reveals only a small amount of superficial changes without any residual abscess.  His laboratory studies are significant for creatinine of 1.32 which is increased from his baseline of 0.87 just over 3 months ago.  However, he has no leukocytosis or lactic acidosis with baseline normocytic anemia.  His INR is therapeutic at 2.36.      He required no interventions while under my care and his work-up here is overall reassuring.  At this point there is no indication for admission.  I will treat this perianal abscess with antibiotics (Cipro and Flagyl as he is allergic to penicillins) though he will need close monitoring of this and the skin changes of his buttocks and posterior thighs.  These changes are not clearly cellulitis and do not require separate antibiotics. I discussed with patient that antibiotics will affect Coumadin and he needs a repeat INR in the next few days.  He also understands that his kidney function is increased today and he should increase his fluids and have repeat creatinine in the next few days to ensure it is not worsening.  He will use Tylenol for pain but will return with worsening pain or development of any new concerns.  He understands he will likely continue have some drainage from the site and may benefit from sitz baths.  RN relayed these instructions to patient's group home as well so he can have appropriate primary care follow-up this week for wound recheck and laboratory studies.  All the patient's questions were answered and he verbalized understanding.  Amenable to discharge.    Discharge Diagnosis:    ICD-10-CM    1. Perianal abscess K61.0    2. Acute kidney injury (H) N17.9    3. Anticoagulated on Coumadin Z79.01        Disposition:  The  patient was discharged home.    Discharge Medications:  New Prescriptions    CIPROFLOXACIN (CIPRO) 500 MG TABLET    Take 1 tablet (500 mg) by mouth 2 times daily for 5 days    METRONIDAZOLE (FLAGYL) 500 MG TABLET    Take 1 tablet (500 mg) by mouth 2 times daily for 5 days       Scribe Disclosure:  CELY Azar Parker, am serving as a scribe at 11:38 PM on 1/4/2020 to document services personally performed by Rita Leach MD based on my observations and the provider's statements to me.      1/4/2020   Rita Leach MD Dixson, Kylie S, MD  01/07/20 2050

## 2020-01-05 NOTE — ED NOTES
RN spoke with patient and received permission to call group home and review patient's condition, course here in the ER, and discharge instructions with .RN called [facility] at [phone] and spoke with Zac (caregiver at group home). RN updated Zac on patient condition, discharge diagnoses, and follow up care. RN reviewed all discharge instructions with caregiver. Zac verbalized understanding. Zac is going to wake up additional group home client and come to get patient. RN updated patient on continued POC and waits.

## 2020-01-05 NOTE — ED NOTES
RN ambulated patient to triage lobby after reviewing discharge instructions with patient. Patient gait is stable. Patient updated on continued POC and wait for group home caregiver. Patient verbalized understanding. Patient took at seat on bench in vestibule. Triage staff updated regarding patient discharge and wait for group home ride. Care of patient relinquished.

## 2020-01-07 ENCOUNTER — TELEPHONE (OUTPATIENT)
Dept: FAMILY MEDICINE | Facility: CLINIC | Age: 60
End: 2020-01-07

## 2020-01-07 LAB
BACTERIA SPEC CULT: ABNORMAL
BACTERIA SPEC CULT: ABNORMAL
Lab: ABNORMAL
SPECIMEN SOURCE: ABNORMAL

## 2020-01-07 ASSESSMENT — ENCOUNTER SYMPTOMS
DIARRHEA: 0
COLOR CHANGE: 1
ANAL BLEEDING: 1
CONSTIPATION: 0
BLOOD IN STOOL: 0
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
## 2020-01-07 NOTE — TELEPHONE ENCOUNTER
Phone message left at Group Home asking them to please schedule Petey for an INR. He was placed on antibiotic and Flagyl when at the ER and needs and INR check. Asked them to schedule for either tomorrow or Friday.

## 2020-01-08 ENCOUNTER — OFFICE VISIT (OUTPATIENT)
Dept: FAMILY MEDICINE | Facility: CLINIC | Age: 60
End: 2020-01-08
Payer: MEDICARE

## 2020-01-08 ENCOUNTER — ANTICOAGULATION THERAPY VISIT (OUTPATIENT)
Dept: NURSING | Facility: CLINIC | Age: 60
End: 2020-01-08
Payer: MEDICARE

## 2020-01-08 ENCOUNTER — HOSPITAL ENCOUNTER (OUTPATIENT)
Dept: WOUND CARE | Facility: CLINIC | Age: 60
Discharge: HOME | End: 2020-01-08
Attending: SURGERY | Admitting: SURGERY
Payer: MEDICARE

## 2020-01-08 ENCOUNTER — TELEPHONE (OUTPATIENT)
Dept: EMERGENCY MEDICINE | Facility: CLINIC | Age: 60
End: 2020-01-08

## 2020-01-08 VITALS
TEMPERATURE: 97.6 F | SYSTOLIC BLOOD PRESSURE: 126 MMHG | HEART RATE: 75 BPM | RESPIRATION RATE: 19 BRPM | DIASTOLIC BLOOD PRESSURE: 75 MMHG

## 2020-01-08 VITALS
DIASTOLIC BLOOD PRESSURE: 66 MMHG | SYSTOLIC BLOOD PRESSURE: 134 MMHG | BODY MASS INDEX: 57.5 KG/M2 | WEIGHT: 315 LBS | TEMPERATURE: 98.2 F | HEART RATE: 83 BPM | OXYGEN SATURATION: 96 % | RESPIRATION RATE: 16 BRPM

## 2020-01-08 DIAGNOSIS — Z79.01 LONG TERM CURRENT USE OF ANTICOAGULANT THERAPY: ICD-10-CM

## 2020-01-08 DIAGNOSIS — Z86.711 HISTORY OF PULMONARY EMBOLISM: ICD-10-CM

## 2020-01-08 DIAGNOSIS — N17.9 ACUTE RENAL FAILURE, UNSPECIFIED ACUTE RENAL FAILURE TYPE (H): ICD-10-CM

## 2020-01-08 DIAGNOSIS — K61.0 PERIANAL ABSCESS: Primary | ICD-10-CM

## 2020-01-08 DIAGNOSIS — Z86.718 HISTORY OF DVT OF LOWER EXTREMITY: ICD-10-CM

## 2020-01-08 LAB — INR POINT OF CARE: 2.3 (ref 0.86–1.14)

## 2020-01-08 PROCEDURE — G0463 HOSPITAL OUTPT CLINIC VISIT: HCPCS

## 2020-01-08 PROCEDURE — 99212 OFFICE O/P EST SF 10 MIN: CPT | Performed by: SURGERY

## 2020-01-08 PROCEDURE — 36416 COLLJ CAPILLARY BLOOD SPEC: CPT

## 2020-01-08 PROCEDURE — 99207 ZZC NO CHARGE NURSE ONLY: CPT

## 2020-01-08 PROCEDURE — 85610 PROTHROMBIN TIME: CPT | Mod: QW

## 2020-01-08 PROCEDURE — 99214 OFFICE O/P EST MOD 30 MIN: CPT | Performed by: PHYSICIAN ASSISTANT

## 2020-01-08 PROCEDURE — 82565 ASSAY OF CREATININE: CPT | Performed by: PHYSICIAN ASSISTANT

## 2020-01-08 RX ORDER — DIPHENHYDRAMINE HCL 50 MG
50 CAPSULE ORAL EVERY 4 HOURS PRN
COMMUNITY
Start: 2020-01-01 | End: 2021-06-02

## 2020-01-08 RX ORDER — MICONAZOLE NITRATE 20 MG/G
POWDER TOPICAL
Status: ON HOLD | COMMUNITY
Start: 2019-05-09 | End: 2020-07-26

## 2020-01-08 RX ORDER — WARFARIN SODIUM 4 MG/1
TABLET ORAL
Qty: 10 TABLET | Refills: 0 | Status: SHIPPED | OUTPATIENT
Start: 2020-01-08 | End: 2020-01-20

## 2020-01-08 RX ORDER — GABAPENTIN 100 MG/1
400 CAPSULE ORAL 3 TIMES DAILY
COMMUNITY
Start: 2019-12-19

## 2020-01-08 ASSESSMENT — ENCOUNTER SYMPTOMS
NEUROLOGICAL NEGATIVE: 1
CONSTITUTIONAL NEGATIVE: 1
GASTROINTESTINAL NEGATIVE: 1
MUSCULOSKELETAL NEGATIVE: 1
PSYCHIATRIC NEGATIVE: 1
CARDIOVASCULAR NEGATIVE: 1
ROS SKIN COMMENTS: AS IN HPI
RESPIRATORY NEGATIVE: 1
EYES NEGATIVE: 1

## 2020-01-08 ASSESSMENT — PATIENT HEALTH QUESTIONNAIRE - PHQ9: SUM OF ALL RESPONSES TO PHQ QUESTIONS 1-9: 6

## 2020-01-08 NOTE — TELEPHONE ENCOUNTER
"[facility] Emergency Department Lab result notification [Adult-Male]    [place] [place] ED lab result protocol used  General Culture Protocol    Reason for call  Notify of lab results, assess symptoms,  review ED providers recommendations/discharge instructions (if necessary) and advise per ED lab result f/u protocol    Lab Result (including Rx patient on, if applicable)  Final Wound culture (perianal abscess) report on 1/7/20  Emergency Dept discharge antibiotic prescribed: Ciprofloxacin (Cipro) 500 mg tablet, 1 tablet (500 mg) by mouth 2 times daily for 5 days AND Flagyl.  #1. Bacteria, Light growth Methicillin resistant Staphylococcus aureus, which is [RESISTANT] to antibiotics.  Incision and Drainage performed in [facility] ED [Yes / No]: No  Patient to be notified of result, symptoms assessed and advised per [facility] ED lab result protocol.    Information table from ED Provider visit on 1/4/20  Symptoms reported at ED visit (Chief complaint, HPI)    Petey Archibald is a 59 year old male on Coumadin for history of DVT/PE who presents with rectal bleeding. Petey has had non-radiating pain \"in my rectum\" for the last week, especially when he sits. He told staff at his group home about this just today. They evaluated the area and identified bleeding so he was sent for further evaluation. He denies abdominal pain, nausea, vomiting, or fevers. He denies any urinary symptoms. His last bowel movement was 3 days ago which is normal for him. He has had a colonoscopy in the past and was told he had polyps.    Significant Medical hx, if applicable (i.e. CKD, diabetes) None   Allergies Allergies   Allergen Reactions     Bees      Augmentin Rash     Penicillins Rash      Weight, if applicable Wt Readings from Last 2 Encounters:   01/04/20 149.2 kg (329 lb)   11/15/19 (!) 152 kg (335 lb)      Coumadin/Warfarin [Yes /No] Yes   Creatinine Level (mg/dl) Creatinine   Date Value Ref Range Status   01/05/2020 1.32 (H) " "0.66 - 1.25 mg/dL Final      Creatinine clearance (ml/min), if applicable Serum creatinine: 1.32 mg/dL (H) 01/05/20 0002  Estimated creatinine clearance: 79.1 mL/min (A)   ED providers Impression and Plan (applicable information) Petey is a 59 year old man on Coumadin for history of DVT/PE presenting with 1 week of pain \"in my rectum\" of which he just told his group home workers.  They looked in the area and noted bleeding and sent him to the emergency department.  On exam he appears quite well and has no evidence of systemic disease such as fever or vomiting.  He has no abdominal tenderness. He does have abnormal skin coloring on the buttocks and posterior thighs.  Portions of this seems to simply be hyperpigmentation and are not blanching although other portions are mildly erythematous and blanching.  There are also excoriations to the buttocks as photographed above.  Most notably he has an area of active purulent and bloody drainage perianally on the medial right buttock.  This is tender and likely the source for patient's pain and symptoms today.  I probed to this opening with a cotton swab and it did seem to be somewhat deep.  However, there is no surrounding fluctuance to suggest persistent abscess and no induration.  As such, he was sent for CT scan of the abdomen and pelvis to evaluate extent of this which, fortunately, reveals only a small amount of superficial changes without any residual abscess.  His laboratory studies are significant for creatinine of 1.32 which is increased from his baseline of 0.87 just over 3 months ago.  However, he has no leukocytosis or lactic acidosis with baseline normocytic anemia.  His INR is therapeutic at 2.36.       He required no interventions while under my care and his work-up here is overall reassuring.  At this point there is no indication for admission.  I will treat this perianal abscess with antibiotics (Cipro and Flagyl as he is allergic to penicillins) though he " will need close monitoring of this and the skin changes of his buttocks and posterior thighs.  These changes are not clearly cellulitis and do not require separate antibiotics. I discussed with patient that antibiotics will affect Coumadin and he needs a repeat INR in the next few days.  He also understands that his kidney function is increased today and he should increase his fluids and have repeat creatinine in the next few days to ensure it is not worsening.  He will use Tylenol for pain but will return with worsening pain or development of any new concerns.  He understands he will likely continue have some drainage from the site and may benefit from sitz baths.  RN relayed these instructions to patient's group home as well so he can have appropriate primary care follow-up this week for wound recheck and laboratory studies.  All the patient's questions were answered and he verbalized understanding.  Amenable to discharge.   ED diagnosis  Perianal abscess      ED provider Rita Leach MD RN Assessment (Patient s current Symptoms), include time called.  [Insert Left message here if message left]  Spoke with staff person, unable to give this nurse an update on any ongoing symptoms.  Supervisor / nurse available at house later today at 13:00, advised to call back.   No nurse available on call currently.   Will attempt to call supervisor today at 13;00 when available for better update on status / symptoms.    [place] Emergency Department Provider Name & Recommendations (included time consulted)  Did review with Dr. Treierweiler, if improving, culture more likely a contaminant.  If doing well, okay to continue with currently prescribed antibiotics.   If patient not improving or doing well, to continue with previously prescribed antibiotics and add Bactrim DS 1 tab PO BID x 7 days.           PCP follow-up Questions asked: YES       Yadira Dunbar RN    [facility]   Lung Nodule and ED Lab Results  F/U RN  Epic pool (ED late result f/u RN) : P [id number]   # [phone]    Copy of Lab result  Order   Wound Culture Aerobic Bacterial [[id number]] (Order [id number])   Order Requisition     Wound Culture Aerobic Bacterial (Order #[id number]) on 1/4/20   Exam Information     Exam Date Exam Time Accession # Results    1/4/20 11:47 PM [id number]    Component Results     Specimen Information: Wound        Component Collected Lab   Specimen Description 01/04/2020 11:47    Wound Right Buttock    Special Requests 01/04/2020 11:47    Specimen collected in eSwab transport (white cap)    Culture Micro 01/04/2020 11:47    Heavy growth   Mixed fecal kade    Culture Micro Abnormal  01/04/2020 11:47    Light growth   Methicillin resistant Staphylococcus aureus (MRSA)    Susceptibility     Methicillin resistant staphylococcus aureus (mrsa)     Antibiotic Interpretation Sensitivity Method Status   CLINDAMYCIN Sensitive <=0.25 ug/mL ELLIE Final    This isolate DOES NOT demonstrate inducible clindamycin resistance in vitro.   Clindamycin is susceptible and could be used when indicated, however,   erythromycin is resistant and should not be used.   ERYTHROMYCIN Resistant >=8 ug/mL ELLIE Final   GENTAMICIN Sensitive <=0.5 ug/mL ELLIE Final   LINEZOLID Sensitive 2 ug/mL ELLIE Final   OXACILLIN Resistant >=4 ug/mL ELLIE Final   TETRACYCLINE Sensitive <=1 ug/mL ELLIE Final   Trimethoprim/Sulfa Sensitive <=0.5/9.5 ug/mL ELLIE Final   VANCOMYCIN Sensitive 1 ug/mL ELLIE Final

## 2020-01-08 NOTE — PROGRESS NOTES
ANTICOAGULATION FOLLOW-UP CLINIC VISIT    Patient Name:  Petey Archibald  Date:  2020  Contact Type:  Face to Face    SUBJECTIVE:  Patient Findings     Comments:   The patient was assessed for diet, medication, and activity level changes, missed or extra doses, bruising or bleeding,  On antibiotic for perianal abcess.         Clinical Outcomes     Comments:   The patient was assessed for diet, medication, and activity level changes, missed or extra doses, bruising or bleeding,  On antibiotic for perianal abcess.            OBJECTIVE    INR Protime   Date Value Ref Range Status   2020 2.3 (A) 0.86 - 1.14 Final       ASSESSMENT / PLAN  INR assessment THER    Recheck INR In: 2 WEEKS    INR Location Clinic      Anticoagulation Summary  As of 2020    INR goal:   2.0-3.0   TTR:   67.7 % (11.9 mo)   INR used for dosin.3 (2020)   Warfarin maintenance plan:   8 mg (4 mg x 2) every day   Full warfarin instructions:   8 mg every day   Weekly warfarin total:   56 mg   Plan last modified:   Ysabel Tena RN (2019)   Next INR check:   2020   Priority:   Maintenance   Target end date:   Indefinite    Indications    History of pulmonary embolism [Z86.711]  Long term current use of anticoagulant therapy [Z79.01]             Anticoagulation Episode Summary     INR check location:   Anticoagulation Clinic    Preferred lab:       Send INR reminders to:   EDU SUGGS    Comments:   [facility] living; A new Coumadin Prescription will need to sent to [facility] with current dose and next INR check.      Anticoagulation Care Providers     Provider Role Specialty Phone number    Silvino Baker MD [facility] [phone]            See the Encounter Report to view Anticoagulation Flowsheet and Dosing Calendar (Go to Encounters tab in chart review, and find the Anticoagulation Therapy Visit)        Lisa Ponce RN               A

## 2020-01-08 NOTE — LETTER
[facility]  [address]  [place] [address]  Phone: [phone]  Fax: [phone]    January 8, 2020        Petey Archibald  [address]  [place] [facility] [address]          To whom it may concern:    RE: Petey Archibald    Patient was seen and treated today at our clinic and missed work.    Please contact me for questions or concerns.      Sincerely,        Leah Engle PA-C

## 2020-01-08 NOTE — DISCHARGE INSTRUCTIONS
[facility]  [address] [facility] [address] [place] [address]  Appointment Phone [phone]     Petey THOMPSON Dragan      1960  Your wound is Healed!! Dressings are no longer required.     Compression:Apply and wear daily for the rest of your life.  You have a compression socks or wraps that are supposed to be removed at night and put back on first thing in the morning.   Please remove compression dressing if toes turn blue and/or tingle and can not be relieved by raising the leg for one hour.      FERNANDO Davis M.D.. January 8, 2020    Once the ulcer has healed, you will need to use compression stocking to help the pumping action in your veins. The stockings will also reduce swelling.  Home care  Follow these tips when caring for yourself at home:    Use any special compression dressings or stockings as directed.    Walk regularly. This helps the blood flow better in your legs.    Maintain a healthy weight. If you are overweight, talk with your provider about a weight loss program.    If you smoke, quit smoking. This will ease your symptoms and lower the chance that the disease will get worse. Join a stop-smoking program or ask your provider for help in quitting.    Check your feet and legs for skin breaks or color changes. Report these to your provider. This could be an early sign of an ulcer.    When standing, shift your weight from one leg to the other.    When sitting for long periods, put your feet up. Move your feet and ankles often to get your calf muscles moving. Get up and walk from time to time.
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
## 2020-01-09 ENCOUNTER — TELEPHONE (OUTPATIENT)
Dept: FAMILY MEDICINE | Facility: CLINIC | Age: 60
End: 2020-01-09

## 2020-01-09 DIAGNOSIS — Z79.01 LONG TERM CURRENT USE OF ANTICOAGULANT THERAPY: ICD-10-CM

## 2020-01-09 DIAGNOSIS — Z86.711 HISTORY OF PULMONARY EMBOLISM: ICD-10-CM

## 2020-01-09 LAB
CREAT SERPL-MCNC: 1.24 MG/DL (ref 0.66–1.25)
GFR SERPL CREATININE-BSD FRML MDRD: 62 ML/MIN/{1.73_M2}

## 2020-01-09 NOTE — RESULT ENCOUNTER NOTE
Petey    Your lab tests are complete and I have reviewed the results.     - Your lab results look great; everything is normal.    If you have any questions or concerns, please feel free to call or send a LEYIO message.    Sincerely,  Tiago Engle PA-C

## 2020-01-09 NOTE — TELEPHONE ENCOUNTER
Anticoagulation Management    [facility] on c[facility]floxacin 500 mg BID for 5 days starting this evening.  Patient scheduled to have INR rechecked on 1/22/20 in clinic with INR nurse.  Consulted with pharmacist and due to patient being started on metrodinazole 500 mg BID for 5 days on 1/6/20 and the weekend, patient was advised to be seen to have INR redrawn tomorrow 1/10/20    Called and spoke with [facility] and scheduled an appt with INR nurse for 11am tomorrow.  Group home staff requested appt be made and that he would speak with  to make sure it works for transportation.  Advised that INR nurse is only there until 11am and if need to go in afternoon to go to the lab. Staff verbalized understanding.    At this time, patient plans to be seen by INR nurse at 11am.          Lore Dugan RN

## 2020-01-09 NOTE — TELEPHONE ENCOUNTER
Spoke with Group home caregiver and he encouraged to call back after 1P to speak with Braden, caregiver whom brought Petey to clinic yesterday.    Tawanda Pierre RN  [facility]  Emergency Dept Lab Result RN  Ph# [phone]

## 2020-01-10 NOTE — TELEPHONE ENCOUNTER
See clinic provider, Leah Engle PA-C, note on 1/8/2020  Symptoms improving.    Dr Jhaveri recommended colorectal surgery consult.      Close encounter.     Tawanda Pierre RN  [facility]  Emergency Dept Lab Result RN  Ph# [phone]

## 2020-01-20 DIAGNOSIS — Z79.01 LONG TERM CURRENT USE OF ANTICOAGULANT THERAPY: ICD-10-CM

## 2020-01-20 DIAGNOSIS — Z86.711 HISTORY OF PULMONARY EMBOLISM: ICD-10-CM

## 2020-01-20 RX ORDER — WARFARIN SODIUM 4 MG/1
TABLET ORAL
Qty: 4 TABLET | Refills: 0
Start: 2020-01-20 | End: 2020-01-22

## 2020-01-20 NOTE — TELEPHONE ENCOUNTER
Reason for Call:  Medication or medication refill:    Do you use a [facility]?  Name of the pharmacy and phone number for the current request:  [email or web address] - [place], [place] - [address]    Name of the medication requested: warfarin ANTICOAGULANT (COUMADIN) 4 MG tablet       Other request: Hunter who works at the patients group [place] called to get this medication refilled for the patient. He stated that he is currently out of the medication so he will be coming in for an INR today and will need this medication filled as soon as possible.     Can we leave a detailed message on this number? YES    Phone number patient can be reached at: Other phone number: [phone]    Best Time: Any    Call taken on 1/20/2020 at 8:32 AM by Kristal Carrera

## 2020-01-20 NOTE — TELEPHONE ENCOUNTER
Talked with Bertrand at Group home.  Pt has INR appt for Wednesday.  Ordered enough coumadin to cover him until wed appt.

## 2020-01-20 NOTE — TELEPHONE ENCOUNTER
"Requested Prescriptions   Pending Prescriptions Disp Refills     warfarin ANTICOAGULANT (COUMADIN) 4 MG tablet  Last Written Prescription Date:  1/8/2020  Last Fill Quantity: 10 tablet,  # refills: 0   Last office visit: 1/8/2020 with prescribing provider:  Kadie   Future Office Visit:     10 tablet 0     Sig: take 2 tablets (8 mg ) every day until seen on  1/22/2020 as directed by the INR clinic.       Vitamin K Antagonists Failed - 1/20/2020  8:34 AM        Failed - INR is within goal in the past 6 weeks     Confirm INR is within goal in the past 6 weeks.     Recent Labs   Lab Test 01/08/20   INR 2.3*                       Passed - Recent (12 mo) or future (30 days) visit within the authorizing provider's specialty     Patient has had an office visit with the authorizing provider or a provider within the authorizing providers department within the previous 12 mos or has a future within next 30 days. See \"Patient Info\" tab in inbasket, or \"Choose Columns\" in Meds & Orders section of the refill encounter.              Passed - Medication is active on med list        Passed - Patient is 18 years of age or older           "

## 2020-01-22 ENCOUNTER — ANTICOAGULATION THERAPY VISIT (OUTPATIENT)
Dept: NURSING | Facility: CLINIC | Age: 60
End: 2020-01-22
Payer: MEDICARE

## 2020-01-22 DIAGNOSIS — Z79.01 LONG TERM CURRENT USE OF ANTICOAGULANT THERAPY: ICD-10-CM

## 2020-01-22 DIAGNOSIS — Z86.711 HISTORY OF PULMONARY EMBOLISM: ICD-10-CM

## 2020-01-22 LAB — INR POINT OF CARE: 2.3 (ref 0.86–1.14)

## 2020-01-22 PROCEDURE — 36416 COLLJ CAPILLARY BLOOD SPEC: CPT

## 2020-01-22 PROCEDURE — 85610 PROTHROMBIN TIME: CPT | Mod: QW

## 2020-01-22 PROCEDURE — 99207 ZZC NO CHARGE NURSE ONLY: CPT

## 2020-01-22 RX ORDER — WARFARIN SODIUM 4 MG/1
TABLET ORAL
Qty: 60 TABLET | Refills: 0
Start: 2020-01-22 | End: 2020-01-24

## 2020-01-22 NOTE — PROGRESS NOTES
ANTICOAGULATION FOLLOW-UP CLINIC VISIT    Patient Name:  Petey Archibald  Date:  2020  Contact Type:  Face to Face    SUBJECTIVE:  Patient Findings     Comments:   Signs/symptoms of thrombosis - .nop  The patient was assessed for diet, medication, and activity level changes, missed or extra doses, bruising or bleeding, with no problem findings.          Clinical Outcomes     Comments:   The patient was assessed for diet, medication, and activity level changes, missed or extra doses, bruising or bleeding, with no problem findings.             OBJECTIVE    INR Protime   Date Value Ref Range Status   2020 2.3 (A) 0.86 - 1.14 Final       ASSESSMENT / PLAN  INR assessment THER    Recheck INR In: 4 WEEKS    INR Location Clinic      Anticoagulation Summary  As of 2020    INR goal:   2.0-3.0   TTR:   67.7 % (11.9 mo)   INR used for dosin.3 (2020)   Warfarin maintenance plan:   8 mg (4 mg x 2) every day   Full warfarin instructions:   8 mg every day   Weekly warfarin total:   56 mg   Plan last modified:   Ysabel Tena RN (2019)   Next INR check:   2020   Priority:   Maintenance   Target end date:   Indefinite    Indications    History of pulmonary embolism [Z86.711]  Long term current use of anticoagulant therapy [Z79.01]             Anticoagulation Episode Summary     INR check location:   Anticoagulation Clinic    Preferred lab:       Send INR reminders to:   EDU SUGGS    Comments:   [facility]; A new Coumadin Prescription will need to sent to [facility] with current dose and next INR check.      Anticoagulation Care Providers     Provider Role Specialty Phone number    Silvino Bakre MD [facility] [phone]            See the Encounter Report to view Anticoagulation Flowsheet and Dosing Calendar (Go to Encounters tab in chart review, and find the Anticoagulation Therapy Visit)        Lisa Ponce RN

## 2020-01-24 DIAGNOSIS — Z86.711 HISTORY OF PULMONARY EMBOLISM: ICD-10-CM

## 2020-01-24 DIAGNOSIS — Z79.01 LONG TERM CURRENT USE OF ANTICOAGULANT THERAPY: ICD-10-CM

## 2020-01-24 RX ORDER — WARFARIN SODIUM 4 MG/1
TABLET ORAL
Qty: 60 TABLET | Refills: 0 | Status: SHIPPED | OUTPATIENT
Start: 2020-01-24 | End: 2020-02-19

## 2020-01-24 NOTE — TELEPHONE ENCOUNTER
Call from [facility].     States they never received the Warfarin order for patient from visit 1/22/2020.   Emergency dose was given to home last night so patient did not miss any dosages.     Asking for this to be sent to [facility].

## 2020-02-08 NOTE — PROGRESS NOTES
[facility] [place] Progress Note    Subject: Petey Archibald, 58-year-old male, resides in group home, works as a  where he stands for long hours, presents for evaluation of chronic left lower examinee venous leg ulceration, likely mixed, 1 pack of cigarettes use on a daily basis for multiple years.  A care attendant is present for today's evaluation.  Medical record reviewed, recent hospitalization for management of cellulitis related to left lower extremity venous leg ulcer, September 13 through September 20, remains on Keflex.  Is on chronic warfarin for history of deep venous thromboses.  History of sleep apnea on CPAP.  Morbid obesity.  Vitamin D supplementation initiated at 2000 units daily.  Denies history of cerebrovascular accident, myocardial infarction.  No history of diabetes.  Chronic bilateral lower extremity edema, lymphatic dysfunction associated with venous disease, will order right and left lower examinee venous insufficiency evaluation to be obtained after resolution of left lower semi-cellulitis.    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     COPD (chronic obstructive pulmonary disease) (H)      History of DVT of lower extremity      History of pulmonary embolism      Hyperlipidemia      Mild intellectual disabilities      Morbid obesity (H)      Peripheral edema      Peripheral vascular disease (H)      Sleep apnea      Tobacco dependence      Venous stasis dermatitis      Patient Active Problem List   Diagnosis     Ankle pain     Morbid obesity     GERD (gastroesophageal reflux disease)     Peripheral vascular disease (H)     History of pulmonary embolism     Tobacco dependence:40-50 pk yr hx     Borderline mental retardation     History of DVT of lower extremity     Right knee pain     Burn of second degree of trunk.leg,perineum 2ndary to urine exposure      Pilonidal cyst     Asymptomatic varicose veins, bilateral      Callus of foot on Rt lat dist  since 8-15      Morbid obesity due to excess calories (H)  BMI 40-50     Hypercholesterolemia     Mixed simple and mucopurulent chronic bronchitis (HCC) CT 4-06 wnl and neg bronch for hemoptesis spirometry 7-26-16  FVC=59% & w mod restriction  and lung age of 82 in 56 y/o      Major depression in complete remission (HCC) on meds      Long term current use of anticoagulant therapy     Glucose intolerance (impaired glucose tolerance)     Other iron deficiency anemia     Localized edema L>R ankles      Erectile dysfunction, unspecified erectile dysfunction type     Stasis dermatitis of both legs     Arch pain of left foot 2ndary to edema and tendonitis      NEL (obstructive sleep apnea)     Hematemesis without nausea after smoking      Anxiety     Thrombophlebitis of superficial veins of both lower extremities: Greater Saphenous VV      Acute deep vein thrombosis (DVT) of tibial vein of left lower extremity (H)     History of colonic polyps     Allergic to bees     Abnormal lung sounds-bibasilar rhonchi     Hypoxia     Abnormal chest x-ray-3-19 prominent bibasilar interstitial      Ulcer of left lower extremity with fat layer exposed (H)     Lymphedema of left leg     Left leg cellulitis     Cellulitis of left lower extremity     Social Hx:   Social History     Socioeconomic History     Marital status: Single     Spouse name: Not on file     Number of children: Not on file     Years of education: Not on file     Highest education level: Not on file   Occupational History     Not on file   Social Needs     Financial resource strain: Not on file     Food insecurity:     Worry: Not on file     Inability: Not on file     Transportation needs:     Medical: Not on file     Non-medical: Not on file   Tobacco Use     Smoking status: Current Every Day Smoker     Packs/day: 1.00     Years: 30.00     Pack years: 30.00     Types: Cigarettes     Smokeless tobacco: Current User     Tobacco comment:  started at age 18    Substance and Sexual Activity     Alcohol use: No     Alcohol/week: 0.0 standard drinks     Drug use: No     Sexual activity: Not Currently     Partners: Female   Lifestyle     Physical activity:     Days per week: Not on file     Minutes per session: Not on file     Stress: Not on file   Relationships     Social connections:     Talks on phone: Not on file     Gets together: Not on file     Attends [employer] service: Not on file     Active member of club or organization: Not on file     Attends meetings of clubs or organizations: Not on file     Relationship status: Not on file     Intimate partner violence:     Fear of current or ex partner: Not on file     Emotionally abused: Not on file     Physically abused: Not on file     Forced sexual activity: Not on file   Other Topics Concern     Parent/sibling w/ CABG, MI or angioplasty before 65F 55M? No   Social History Narrative     Not on file       Surgical Hx:   Past Surgical History:   Procedure Laterality Date     COLONOSCOPY N/A 4/15/2019    Procedure: COMBINED COLONOSCOPY, SINGLE OR MULTIPLE BIOPSY/POLYPECTOMY BY BIOPSY;  Surgeon: Jovana Ohara MD;  Location:  GI     EXCISE MALIGNANT LESIONS, TRUNK/ARMS/LEGS 0.5CM OR LESS Left 5/26/2017    Procedure: EXCISE MALIGNANT LESIONS, TRUNK/ARMS/LEGS 0.5CM OR LESS;  EXCISION LEFT ELBOW MASS;  Surgeon: Jose Azevedo MD;  Location:  GI     RECTAL SURGERY      perianal abscess?       Allergies:    Allergies   Allergen Reactions     Bees      Augmentin Rash     Penicillins Rash       Medications:   Current Outpatient Medications   Medication     acetaminophen (TYLENOL) 325 MG tablet     albuterol (ALBUTEROL) 108 (90 BASE) MCG/ACT inhaler     ARIPiprazole (ABILIFY) 5 MG tablet     buPROPion (WELLBUTRIN SR) 150 MG 12 hr tablet     cephALEXin (KEFLEX) 500 MG capsule     clotrimazole (LOTRIMIN) 1 % external cream     DEXILANT 60 MG CPDR CR capsule     EPINEPHrine (EPIPEN 2-BRE) 0.3  "MG/0.3ML injection 2-pack     ferrous gluconate (FERGON) 324 (38 Fe) MG tablet     furosemide (LASIX) 40 MG tablet     furosemide (LASIX) 80 MG tablet     Gauze Pads & Dressings (GAUZE PADS 4\"X4\") 4\"X4\" PADS     guaiFENesin (MUCINEX) 600 MG 12 hr tablet     Hesperidin-Diosmin 250-650 MG TABS     loratadine (CLARITIN) 10 MG tablet     montelukast (SINGULAIR) 10 MG tablet     multivitamin, therapeutic (THERA-VIT) TABS tablet     naltrexone (DEPADE/REVIA) 50 MG tablet     nicotine (NICODERM CQ) 21 MG/24HR 24 hr patch     order for DME     order for DME     order for DME     order for DME     potassium chloride SA (K-DUR/KLOR-CON M) 20 MEQ CR tablet     PULMICORT FLEXHALER 180 MCG/ACT inhaler     sertraline (ZOLOFT) 100 MG tablet     simvastatin (ZOCOR) 40 MG tablet     SPIRIVA HANDIHALER 18 MCG inhaled capsule     spironolactone (ALDACTONE) 25 MG tablet     traZODone (DESYREL) 100 MG tablet     varenicline (CHANTIX) 1 MG tablet     vitamin D3 (VITAMIN D3) 5000 units TABS tablet     vitamin D3 2000 units tablet     warfarin ANTICOAGULANT (COUMADIN) 4 MG tablet     zolpidem (AMBIEN) 10 MG tablet     No current facility-administered medications for this encounter.        Labs:   Recent Labs   Lab Test 09/23/19  09/15/19  0527 09/14/19  0018  02/13/12  2201   ALBUMIN  --   --   --  3.2*   < >  --    HGB  --   --  10.7* 11.8*   < > 14.1   INR 3.1*   < > 2.38* 2.35*   < >  --    WBC  --   --  5.9 8.2   < >  --    A1C  --   --   --   --   --  5.7    < > = values in this interval not displayed.     Nutrition requirements were discussed with patient today.  Objective:  /74 (BP Location: Left arm)   Pulse 71   Temp 97.2  F (36.2  C) (Temporal)   Resp 20   Wt (!) 151.9 kg (334 lb 12.8 oz)   BMI 49.44 kg/m    Wound (used by OP I only) 09/24/19 1240 Left anterior;lower leg cellulitis (Active)   Length (cm) 4.7 9/24/2019 12:00 PM   Width (cm) 3.4 9/24/2019 12:00 PM   Depth (cm) 0.2 9/24/2019 12:00 PM   Wound (cm^2) " 15.98 cm^2 9/24/2019 12:00 PM   Wound Volume (cm^3) 3.2 cm^3 9/24/2019 12:00 PM   Dressing Appearance moist drainage 9/24/2019 12:00 PM   Drainage Characteristics/Odor serosanguineous 9/24/2019 12:00 PM   Drainage Amount moderate 9/24/2019 12:00 PM   Thickness/Stage full thickness 9/24/2019 12:00 PM   Base slough 9/24/2019 12:00 PM   Periwound intact 9/24/2019 12:00 PM   Periwound Temperature warm 9/24/2019 12:00 PM   Periwound Skin Turgor soft 9/24/2019 12:00 PM   Edges open 9/24/2019 12:00 PM   Care, Wound non-select wound debridement performed 9/24/2019 12:00 PM        General:  Patient is alert and orientated, no acute distress.  Pulmonary clear to auscultation, cardiac regular rate and rhythm positive S1-S2 without murmur, abdomen morbidly obese, soft, nontender, no masses identified.  Nonpalpable bilateral pedal pulses, may be due to lower extremity edema.  Cellulitis left anterior tibial margin with necrotic ulcer and moderate to significant drainage, periwound inflammation and cellulitis.  Sensory and motor grossly intact.    Vascular: Nonpalpable pedal pulses, significant lower extremity edema.        Impression: Left lower extremity ulceration, cellulitis, continuing antibiotics, nonpalpable pedal pulses, ongoing tobacco use of presently 1 pack/day, morbid obesity, lymphatic dysfunction, nondiabetic  Barriers to healing include: smoking, nutrition. Patient education provided on each.   Plan:  We will dress the wounds with plurogel with Hydrofera Blue with edema wear, change in a daily basis.  Will require long-term adequate compression area obtain ankle-brachial index with toe pressures and bilateral venous insufficiency evaluation.  Tobacco cessation strongly counseled, he is currently on Chantix, will initiate NicoDerm patch, 21 mg on daily basis.  Complete current antibiotic regime.  Patient will return to the clinic in 2 weeks time.     Rustam Davis MD on 9/24/2019 at 1:41 PM         English

## 2020-02-19 ENCOUNTER — ANTICOAGULATION THERAPY VISIT (OUTPATIENT)
Dept: NURSING | Facility: CLINIC | Age: 60
End: 2020-02-19
Payer: MEDICARE

## 2020-02-19 ENCOUNTER — OFFICE VISIT (OUTPATIENT)
Dept: FAMILY MEDICINE | Facility: CLINIC | Age: 60
End: 2020-02-19
Payer: MEDICARE

## 2020-02-19 VITALS
WEIGHT: 315 LBS | HEIGHT: 64 IN | TEMPERATURE: 97.9 F | BODY MASS INDEX: 53.78 KG/M2 | SYSTOLIC BLOOD PRESSURE: 130 MMHG | DIASTOLIC BLOOD PRESSURE: 80 MMHG | HEART RATE: 85 BPM | OXYGEN SATURATION: 95 % | RESPIRATION RATE: 18 BRPM

## 2020-02-19 DIAGNOSIS — I82.442 ACUTE DEEP VEIN THROMBOSIS (DVT) OF TIBIAL VEIN OF LEFT LOWER EXTREMITY (H): ICD-10-CM

## 2020-02-19 DIAGNOSIS — T14.8XXA PULLED MUSCLE: Primary | ICD-10-CM

## 2020-02-19 DIAGNOSIS — Z86.711 HISTORY OF PULMONARY EMBOLISM: ICD-10-CM

## 2020-02-19 DIAGNOSIS — I80.03 THROMBOPHLEBITIS OF SUPERFICIAL VEINS OF BOTH LOWER EXTREMITIES: ICD-10-CM

## 2020-02-19 DIAGNOSIS — Z79.01 LONG TERM CURRENT USE OF ANTICOAGULANT THERAPY: ICD-10-CM

## 2020-02-19 LAB — INR POINT OF CARE: 2.3 (ref 0.86–1.14)

## 2020-02-19 PROCEDURE — 85610 PROTHROMBIN TIME: CPT | Mod: QW

## 2020-02-19 PROCEDURE — 36416 COLLJ CAPILLARY BLOOD SPEC: CPT

## 2020-02-19 PROCEDURE — 99213 OFFICE O/P EST LOW 20 MIN: CPT | Performed by: FAMILY MEDICINE

## 2020-02-19 PROCEDURE — 99207 ZZC NO CHARGE NURSE ONLY: CPT

## 2020-02-19 RX ORDER — WARFARIN SODIUM 4 MG/1
TABLET ORAL
Qty: 44 TABLET | Refills: 0
Start: 2020-02-19 | End: 2020-03-11

## 2020-02-19 ASSESSMENT — MIFFLIN-ST. JEOR: SCORE: 2190.19

## 2020-02-19 NOTE — PROGRESS NOTES
ANTICOAGULATION FOLLOW-UP CLINIC VISIT    Patient Name:  Petey Archibald  Date:  2020  Contact Type:  Face to Face    SUBJECTIVE:  Patient Findings     Comments:   Pulled muscle on left side of groin        Clinical Outcomes     Comments:   Pulled muscle on left side of groin           OBJECTIVE    INR Protime   Date Value Ref Range Status   2020 2.3 (A) 0.86 - 1.14 Final       ASSESSMENT / PLAN  INR assessment THER    Recheck INR In: 3 WEEKS    INR Location Clinic      Anticoagulation Summary  As of 2020    INR goal:   2.0-3.0   TTR:   70.7 % (11.9 mo)   INR used for dosin.3 (2020)   Warfarin maintenance plan:   8 mg (4 mg x 2) every day   Full warfarin instructions:   8 mg every day   Weekly warfarin total:   56 mg   Plan last modified:   Ysabel Tena RN (2019)   Next INR check:   3/11/2020   Priority:   Maintenance   Target end date:   Indefinite    Indications    History of pulmonary embolism [Z86.711]  Long term current use of anticoagulant therapy [Z79.01]             Anticoagulation Episode Summary     INR check location:   Anticoagulation Clinic    Preferred lab:       Send INR reminders to:   [facility]    Comments:   REM half-way; A new Coumadin Prescription will need to sent to [facility] with current dose and next INR check.      Anticoagulation Care Providers     Provider Role Specialty Phone number    Silvino Baker MD [facility] [phone]            See the Encounter Report to view Anticoagulation Flowsheet and Dosing Calendar (Go to Encounters tab in chart review, and find the Anticoagulation Therapy Visit)        Lisa Ponce RN

## 2020-02-19 NOTE — PROGRESS NOTES
"Subjective     Petey Archibald is a 59 year old male who presents to clinic today for the following health issues:    HPI     Musculoskeletal problem/pain      Duration: 02/18/2020    Description  Location: Right Groin    Intensity:  mild    Accompanying signs and symptoms: none    History  Previous similar problem: no   Previous evaluation:  none    Precipitating or alleviating factors:  Trauma or overuse: YES  Aggravating factors include: overuse    Therapies tried and outcome: acetaminophen/some relief      Was walking outside and he thinks he accidentally pulled a muscle in his groin.  Still able to walk.  No fevers.   Tylenol helps.  Unable to take NSAIDs due to blood thinner use.         Reviewed and updated as needed this visit by Provider  Tobacco  Allergies  Meds  Problems  Med Hx  Surg Hx  Fam Hx         Review of Systems   ROS COMP: CONSTITUTIONAL: NEGATIVE for fever, chills  INTEGUMENTARY/SKIN: NEGATIVE for worrisome rashes, moles or lesions  EYES: NEGATIVE for vision changes or irritation  ENT/MOUTH: NEGATIVE for ear, mouth and throat problems  RESP: NEGATIVE for significant cough or SOB  CV: NEGATIVE for chest pain, palpitations or peripheral edema  GI: NEGATIVE for nausea, abdominal pain, heartburn, or change in bowel habits  : NEGATIVE for frequency, dysuria, or hematuria  HEME: NEGATIVE for bleeding problems      Objective    /80   Pulse 85   Temp 97.9  F (36.6  C) (Tympanic)   Resp 18   Ht 1.626 m (5' 4\")   Wt 147.4 kg (325 lb)   SpO2 95%   BMI 55.79 kg/m    Body mass index is 55.79 kg/m .  Physical Exam   GENERAL: Alert and no distress  EYES: Eyes grossly normal to inspection, PERRL and conjunctivae and sclerae normal  NECK: no adenopathy, no asymmetry, masses, or scars and thyroid normal to palpation  RESP: lungs clear to auscultation - no rales, rhonchi or wheezes  CV: regular rate and rhythm, normal S1 S2, no S3 or S4, no murmur, click or rub, no peripheral edema and " peripheral pulses strong  ABDOMEN: soft, nontender, and bowel sounds normal  MSK/GYN (male): +TTP over left groin area; no palpable hernia or abnormal mass over area of pain.   SKIN: no suspicious lesions or rashes    Diagnostic Test Results:  Labs reviewed in Epic        Assessment & Plan   Problem List Items Addressed This Visit     Long term current use of anticoagulant therapy    Thrombophlebitis of superficial veins of both lower extremities: Greater Saphenous VV     Acute deep vein thrombosis (DVT) of tibial vein of left lower extremity (H)      Other Visit Diagnoses     Pulled muscle    -  Primary         RICE.    Continue Tylenol PRN pain.  Add frequent cold compression applications.  Avoid NSAIDs due to blood thinner use.   INR today is within therapeutic range at 2.3      See Patient Instructions  Return in about 2 weeks (around 3/4/2020) for re-check / follow-up, If Not Getting Any Better.    Jovana Martin, DO  [facility]

## 2020-02-25 ENCOUNTER — TELEPHONE (OUTPATIENT)
Dept: FAMILY MEDICINE | Facility: CLINIC | Age: 60
End: 2020-02-25

## 2020-02-25 ENCOUNTER — OFFICE VISIT (OUTPATIENT)
Dept: FAMILY MEDICINE | Facility: CLINIC | Age: 60
End: 2020-02-25
Payer: MEDICARE

## 2020-02-25 VITALS
WEIGHT: 315 LBS | BODY MASS INDEX: 55.61 KG/M2 | OXYGEN SATURATION: 96 % | SYSTOLIC BLOOD PRESSURE: 116 MMHG | HEART RATE: 75 BPM | DIASTOLIC BLOOD PRESSURE: 74 MMHG | TEMPERATURE: 98.4 F | RESPIRATION RATE: 20 BRPM

## 2020-02-25 DIAGNOSIS — D64.9 ANEMIA, UNSPECIFIED TYPE: ICD-10-CM

## 2020-02-25 DIAGNOSIS — R73.02 GLUCOSE INTOLERANCE (IMPAIRED GLUCOSE TOLERANCE): ICD-10-CM

## 2020-02-25 DIAGNOSIS — F32.5 MAJOR DEPRESSION IN COMPLETE REMISSION (H): ICD-10-CM

## 2020-02-25 DIAGNOSIS — I80.03 THROMBOPHLEBITIS OF SUPERFICIAL VEINS OF BOTH LOWER EXTREMITIES: ICD-10-CM

## 2020-02-25 DIAGNOSIS — L03.032 CELLULITIS OF TOE OF LEFT FOOT: Primary | ICD-10-CM

## 2020-02-25 DIAGNOSIS — I87.2 VENOUS STASIS DERMATITIS OF LEFT LOWER EXTREMITY: ICD-10-CM

## 2020-02-25 DIAGNOSIS — I89.0 LYMPHEDEMA OF LEFT LEG: ICD-10-CM

## 2020-02-25 DIAGNOSIS — E66.01 MORBID OBESITY DUE TO EXCESS CALORIES (H): ICD-10-CM

## 2020-02-25 LAB
ERYTHROCYTE [DISTWIDTH] IN BLOOD BY AUTOMATED COUNT: 15.7 % (ref 10–15)
HBA1C MFR BLD: 5.1 % (ref 0–5.6)
HCT VFR BLD AUTO: 39.5 % (ref 40–53)
HGB BLD-MCNC: 12 G/DL (ref 13.3–17.7)
MCH RBC QN AUTO: 28.8 PG (ref 26.5–33)
MCHC RBC AUTO-ENTMCNC: 30.4 G/DL (ref 31.5–36.5)
MCV RBC AUTO: 95 FL (ref 78–100)
PLATELET # BLD AUTO: 225 10E9/L (ref 150–450)
RBC # BLD AUTO: 4.17 10E12/L (ref 4.4–5.9)
WBC # BLD AUTO: 5 10E9/L (ref 4–11)

## 2020-02-25 PROCEDURE — 36415 COLL VENOUS BLD VENIPUNCTURE: CPT | Performed by: FAMILY MEDICINE

## 2020-02-25 PROCEDURE — 85027 COMPLETE CBC AUTOMATED: CPT | Performed by: FAMILY MEDICINE

## 2020-02-25 PROCEDURE — 99214 OFFICE O/P EST MOD 30 MIN: CPT | Performed by: FAMILY MEDICINE

## 2020-02-25 PROCEDURE — 83036 HEMOGLOBIN GLYCOSYLATED A1C: CPT | Performed by: FAMILY MEDICINE

## 2020-02-25 ASSESSMENT — ANXIETY QUESTIONNAIRES
6. BECOMING EASILY ANNOYED OR IRRITABLE: MORE THAN HALF THE DAYS
IF YOU CHECKED OFF ANY PROBLEMS ON THIS QUESTIONNAIRE, HOW DIFFICULT HAVE THESE PROBLEMS MADE IT FOR YOU TO DO YOUR WORK, TAKE CARE OF THINGS AT HOME, OR GET ALONG WITH OTHER PEOPLE: SOMEWHAT DIFFICULT
5. BEING SO RESTLESS THAT IT IS HARD TO SIT STILL: SEVERAL DAYS
3. WORRYING TOO MUCH ABOUT DIFFERENT THINGS: SEVERAL DAYS
1. FEELING NERVOUS, ANXIOUS, OR ON EDGE: SEVERAL DAYS
GAD7 TOTAL SCORE: 10
7. FEELING AFRAID AS IF SOMETHING AWFUL MIGHT HAPPEN: SEVERAL DAYS
2. NOT BEING ABLE TO STOP OR CONTROL WORRYING: MORE THAN HALF THE DAYS

## 2020-02-25 ASSESSMENT — PATIENT HEALTH QUESTIONNAIRE - PHQ9
5. POOR APPETITE OR OVEREATING: MORE THAN HALF THE DAYS
SUM OF ALL RESPONSES TO PHQ QUESTIONS 1-9: 3

## 2020-02-25 NOTE — PATIENT INSTRUCTIONS
"1. Shingrex is a 2 shot series that prevents shingles 97% of the time, as opposed to the old shingles shot that only prevented it at 40-50%  It costs less for medicare at a pharmacy  You should get it starting at 50 yrs old get the 2nd shot 5-6 mo after the first one    2.  Weight Loss Tips  1. Do not eat after 6 hrs before your expected bedtime  2. Have your heaviest meal for breakfast, a slightly lighter meal at lunch and a snack 6 hrs before bed  3. No sugar/calorie drinks except milk ie no fruit juice, pop, alcohol.  4. Drink milk 30min before meals to decrease your hunger. Also it is excellent as part of your last meal of the day snack  5. Drink lots of water  6. Increase fiber in diet: all bran cereal, salads, popcorn etc  7. Have only one small serving of fruit a day about 1/2 cup (as this is high in sugar)  8. EXERCISE is the bottom line. Without it, you will gain weight even on a low calorie diet. Best if done 2-3X a day as can    Being overweight contributes to high blood pressure and high cholesterol, both of which cause heart attacks, strokes and kidney failure, prediabetes and diabetes, arthritis, and liver disease     You must also decrease your caloric intake and especially decrease the carbs or carbohydrates as these are the most harmful regarding the above health risks    4. Keep the injured area above the level of the heart as much as possible to help decrease the pain and  the healing time . Put pillows on either side while sleeping to keep the arm or leg elevated     Put books under foot of bed to get it about 10-12 \" high    5. Warm water only soaks for 10min only 2 times a day and press dry       "

## 2020-02-25 NOTE — PROGRESS NOTES
Subjective     Petey Archibald is a 59 year old male who presents to clinic today for the following health issues:    HPI   Sore between  left great & 2nd  toe      Duration: couple weeks    No known trauma --same old shoes     Description (location/character/radiation): between left great toe and #2    Intensity:  moderate    Accompanying signs and symptoms: red,    History (similar episodes/previous evaluation): None    Precipitating or alleviating factors: has severe stasis dermatitis& edema of Lt lower leg from the DVT he had with poor circulation     Therapies tried and outcome: peroxide     Medication Followup of Anemia     Taking Medication as prescribed: NO    Side Effects:  None    Medication Helping Symptoms:  not applicable    Poor diet        MORBID OBESITY    --BMI = 55 and rising   - comorbid hi LDL , anemia, glu intol   -very inactive from his wt     Glucose Intolerance   Follow-up      How often are you checking your blood sugar? Not at all    What concerns do you have today about your diabetes? None and Getting infections often     Do you have any of these symptoms? (Select all that apply)  Redness, sores, or blisters on feet      BP Readings from Last 2 Encounters:   02/25/20 116/74   02/19/20 130/80     Hemoglobin A1C (%)   Date Value   02/25/2020 5.1   02/13/2012 5.7     LDL Cholesterol Calculated (mg/dL)   Date Value   05/03/2017 73   05/11/2016 77         Depression and Anxiety Follow-Up    How are you doing with your depression since your last visit? No change-in remission    How are you doing with your anxiety since your last visit?  No change    Are you having other symptoms that might be associated with depression or anxiety? No    Have you had a significant life event? No     Do you have any concerns with your use of alcohol or other drugs? No    Social History     Tobacco Use     Smoking status: Current Every Day Smoker     Packs/day: 1.00     Years: 30.00     Pack years: 30.00      Types: Cigarettes     Smokeless tobacco: Current User     Tobacco comment: started at age 18    Substance Use Topics     Alcohol use: No     Alcohol/week: 0.0 standard drinks     Drug use: No     PHQ 8/1/2019 1/8/2020 2/25/2020   PHQ-9 Total Score 2 6 3   Q9: Thoughts of better off dead/self-harm past 2 weeks Not at all Not at all Not at all     CARL-7 SCORE 4/2/2019 8/1/2019 2/25/2020   Total Score - - -   Total Score 11 1 10                   Reviewed and updated as needed this visit by Provider         Review of Systems   ROS COMP: CONSTITUTIONAL: NEGATIVE for fever, chills, change in weight pOS conts to gain   INTEGUMENTARY/SKIN: NEGATIVE for worrisome rashes, moles or lesions POS red and open sore Lt ft   EYES: NEGATIVE for vision changes or irritation  ENT/MOUTH: NEGATIVE for ear, mouth and throat problems  RESP: NEGATIVE for significant cough or SOB  BREAST: NEGATIVE for masses, tenderness or discharge  CV: NEGATIVE for chest pain, palpitations or peripheral edema  GI: NEGATIVE for nausea, abdominal pain, heartburn, or change in bowel habits  : NEGATIVE for frequency, dysuria, or hematuria  MUSCULOSKELETAL: NEGATIVE for significant arthralgias or myalgia  NEURO: NEGATIVE for weakness, dizziness or paresthesias  ENDOCRINE: NEGATIVE for temperature intolerance, skin/hair changes  HEME: NEGATIVE for bleeding problems  PSYCHIATRIC: NEGATIVE for changes in mood or affect      Objective    /74   Pulse 75   Temp 98.4  F (36.9  C) (Tympanic)   Resp 20   Wt 147 kg (324 lb)   SpO2 96%   BMI 55.61 kg/m    Body mass index is 55.61 kg/m .  Physical Exam   GENERAL: healthy, alert, pain in Lt ft from sore  distress and obese  EYES: Eyes grossly normal to inspection, PERRL and conjunctivae and sclerae normal  RESP: lungs clear to auscultation - no rales, rhonchi or wheezes  CV: regular rate and rhythm, normal S1 S2, no S3 or S4, no murmur, click or rub, no peripheral edema and peripheral pulses strong  MS: no  gross musculoskeletal defects noted, no edema  SKIN: no suspicious lesions or rashes POS   1. Red between big and 2nd Lt toes and puffy   2. Buttocks and perirectal area now clean of ulcers   3. Severe L>R stasis dermatitis with 3+ pretib edema   NEURO: Normal strength and tone, mentation intact and speech normal  PSYCH: mentation appears normal, affect normal/bright    Diagnostic Test Results:  Labs reviewed in Epic        Assessment & Plan       ICD-10-CM    1. Cellulitis between big and 2nd toe of left foot L03.032 CBC with platelets   2. Venous stasis dermatitis of left lower extremity I87.2    3. Thrombophlebitis of superficial veins of both lower extremities: Greater Saphenous VV  I80.03    4. Morbid obesity due to excess calories (H)  BMI 40-50 E66.01    5. Lymphedema of left leg I89.0    6. Anemia, unspecified type D64.9 CBC with platelets   7. Glucose intolerance (impaired glucose tolerance) R73.02 Hemoglobin A1c   8. Major depression in complete remission (H) F32.5           Patient Instructions   1. Shingrex is a 2 shot series that prevents shingles 97% of the time, as opposed to the old shingles shot that only prevented it at 40-50%  It costs less for medicare at a pharmacy  You should get it starting at 50 yrs old get the 2nd shot 5-6 mo after the first one    2.  Weight Loss Tips  1. Do not eat after 6 hrs before your expected bedtime  2. Have your heaviest meal for breakfast, a slightly lighter meal at lunch and a snack 6 hrs before bed  3. No sugar/calorie drinks except milk ie no fruit juice, pop, alcohol.  4. Drink milk 30min before meals to decrease your hunger. Also it is excellent as part of your last meal of the day snack  5. Drink lots of water  6. Increase fiber in diet: all bran cereal, salads, popcorn etc  7. Have only one small serving of fruit a day about 1/2 cup (as this is high in sugar)  8. EXERCISE is the bottom line. Without it, you will gain weight even on a low calorie diet. Best  "if done 2-3X a day as can    Being overweight contributes to high blood pressure and high cholesterol, both of which cause heart attacks, strokes and kidney failure, prediabetes and diabetes, arthritis, and liver disease     You must also decrease your caloric intake and especially decrease the carbs or carbohydrates as these are the most harmful regarding the above health risks    4. Keep the injured area above the level of the heart as much as possible to help decrease the pain and  the healing time . Put pillows on either side while sleeping to keep the arm or leg elevated     Put books under foot of bed to get it about 10-12 \" high    5. Warm water only soaks for 10min only 2 times a day and press dry           Return in about 1 week (around 3/3/2020) for wound check.    Hortensia Peck MD  [facility]    Weight management plan: Discussed healthy diet and exercise guidelines    Hortensia Peck MD    "

## 2020-02-25 NOTE — LETTER
[facility]  [address]  [facility] [address] [phone]                                                                                                           Petey Archibald  [address]  [facility][note 3] [address]    February 25, 2020      Dear Petey,    Please see attached lab results   They are all normal     THE FOLLOWING ARE EXPLANATIONS OF SOME OF OUR LAB TESTS     YOU DID NOT NECESSARILY HAVE ALL OF THESE DONE     Hgb is the blood iron level   WBC means White Blood Cells   Platelets are small blood    cells that help with forming the blood clots along with other blood factors.   Glucose is sugar.   A1c is a test that gives us an idea    about how well was controlled the diabetes for the last 3 months.     Please continue on the same medications     Thank you for choosing [facility].  We appreciate the opportunity to serve you and look forward to supporting your healthcare needs in the future.    If you have any questions or concerns, please call me or my staff at [phone].      Sincerely,    Hortensia Peck MD    Results for orders placed or performed in visit on 02/25/20   CBC with platelets     Status: Abnormal   Result Value Ref Range    WBC 5.0 4.0 - 11.0 10e9/L    RBC Count 4.17 (L) 4.4 - 5.9 10e12/L    Hemoglobin 12.0 (L) 13.3 - 17.7 g/dL    Hematocrit 39.5 (L) 40.0 - 53.0 %    MCV 95 78 - 100 fl    MCH 28.8 26.5 - 33.0 pg    MCHC 30.4 (L) 31.5 - 36.5 g/dL    RDW 15.7 (H) 10.0 - 15.0 %    Platelet Count 225 150 - 450 10e9/L   Hemoglobin A1c     Status: None   Result Value Ref Range    Hemoglobin A1C 5.1 0 - 5.6 %

## 2020-02-25 NOTE — TELEPHONE ENCOUNTER
Reason for Call:  Other   Fax number      Detailed comments: Braden from patients [facility] will call us with a fax number for form    Phone Number Patient can be reached at: Other phone number: [phone]    Best Time: anytime    Can we leave a detailed message on this number? YES    Call taken on 2/25/2020 at 12:24 PM by KOBI ZALDIVAR

## 2020-02-26 ASSESSMENT — ANXIETY QUESTIONNAIRES: GAD7 TOTAL SCORE: 10

## 2020-03-11 ENCOUNTER — OFFICE VISIT (OUTPATIENT)
Dept: FAMILY MEDICINE | Facility: CLINIC | Age: 60
End: 2020-03-11
Payer: MEDICARE

## 2020-03-11 ENCOUNTER — ANTICOAGULATION THERAPY VISIT (OUTPATIENT)
Dept: NURSING | Facility: CLINIC | Age: 60
End: 2020-03-11
Payer: MEDICARE

## 2020-03-11 VITALS
SYSTOLIC BLOOD PRESSURE: 120 MMHG | WEIGHT: 315 LBS | DIASTOLIC BLOOD PRESSURE: 80 MMHG | RESPIRATION RATE: 18 BRPM | TEMPERATURE: 98.2 F | HEART RATE: 64 BPM | BODY MASS INDEX: 53.78 KG/M2 | OXYGEN SATURATION: 95 % | HEIGHT: 64 IN

## 2020-03-11 DIAGNOSIS — B36.9 FUNGAL SKIN INFECTION: ICD-10-CM

## 2020-03-11 DIAGNOSIS — Z79.01 LONG TERM CURRENT USE OF ANTICOAGULANT THERAPY: ICD-10-CM

## 2020-03-11 DIAGNOSIS — Z86.711 HISTORY OF PULMONARY EMBOLISM: ICD-10-CM

## 2020-03-11 DIAGNOSIS — L97.523 CHRONIC ULCER OF LEFT FOOT WITH NECROSIS OF MUSCLE (H): Primary | ICD-10-CM

## 2020-03-11 LAB — INR POINT OF CARE: 2.7 (ref 0.86–1.14)

## 2020-03-11 PROCEDURE — 36416 COLLJ CAPILLARY BLOOD SPEC: CPT

## 2020-03-11 PROCEDURE — 99214 OFFICE O/P EST MOD 30 MIN: CPT | Performed by: FAMILY MEDICINE

## 2020-03-11 PROCEDURE — 99207 ZZC NO CHARGE LOS: CPT

## 2020-03-11 PROCEDURE — 85610 PROTHROMBIN TIME: CPT | Mod: QW

## 2020-03-11 RX ORDER — NYSTATIN AND TRIAMCINOLONE ACETONIDE 100000; 1 [USP'U]/G; MG/G
CREAM TOPICAL 2 TIMES DAILY
Qty: 60 G | Refills: 1 | Status: SHIPPED | OUTPATIENT
Start: 2020-03-11 | End: 2020-03-16

## 2020-03-11 RX ORDER — WARFARIN SODIUM 4 MG/1
TABLET ORAL
Qty: 56 TABLET | Refills: 0
Start: 2020-03-11 | End: 2020-04-08

## 2020-03-11 ASSESSMENT — MIFFLIN-ST. JEOR: SCORE: 2199.26

## 2020-03-11 NOTE — PROGRESS NOTES
"Subjective     Petey Archibald is a 59 year old male who presents to clinic today for the following health issues:    HPI     Rash--buttocks      Duration: 03/06/2020    Description:   Pain: no   Itching: yes    History (similar episodes/previous evaluation): None    Precipitating or alleviating factors: None    Therapies tried and outcome: none    Musculoskeletal problem/pain      Duration: Ongoing    Description  Location: Left Foot    Intensity:  severe    Accompanying signs and symptoms: Possible Infection    History  Previous similar problem: YES  Previous evaluation:  none    Precipitating or alleviating factors:  Trauma or overuse: no   Aggravating factors include: overuse    Therapies tried and outcome: Warm Soak and Elevation        Has had left foot ulcer for >1 month.  Seems to be worsening in the past couple of weeks.  He had seen another provider prior to this appt who recommended warm soaks daily.  The redness has increased and pain is worse, especially when he walks on his foot.  Has not had any abx up to this point.    Also has large itchy rash on his buttocks that is bleeding when he scratches it.  Rash is painful and getting worse.      Reviewed and updated as needed this visit by Provider  Tobacco  Allergies  Meds  Problems  Med Hx  Surg Hx  Fam Hx         Review of Systems   ROS COMP: CONSTITUTIONAL: NEGATIVE for fever, chills  EYES: NEGATIVE for vision changes or irritation  ENT/MOUTH: NEGATIVE for ear, mouth and throat problems  RESP: NEGATIVE for significant cough or SOB  CV: NEGATIVE for chest pain, palpitations or peripheral edema  GI: NEGATIVE for nausea, abdominal pain, heartburn, or change in bowel habits  : NEGATIVE for frequency, dysuria, or hematuria      Objective    /80   Pulse 64   Temp 98.2  F (36.8  C) (Tympanic)   Resp 18   Ht 1.626 m (5' 4\")   Wt 148.3 kg (327 lb)   SpO2 95%   BMI 56.13 kg/m    Body mass index is 56.13 kg/m .  Physical Exam   GENERAL: " Alert and no distress  EYES: Eyes grossly normal to inspection, PERRL and conjunctivae and sclerae normal  NECK: no adenopathy, no asymmetry, masses, or scars and thyroid normal to palpation  RESP: lungs clear to auscultation - no rales, rhonchi or wheezes  CV: regular rate and rhythm, normal S1 S2, no S3 or S4, no murmur, click or rub  ABDOMEN: soft, nontender, and bowel sounds normal  MSK:  Left big toe and midfoot area very TTP; +chornic venous stasis dermatitis.   SKIN: +large fungal rash over buttocks.  On the left foot (betern the first two toes) there is one large necrotic ulcer with erythema and swelling between that is TTP    Diagnostic Test Results:  Labs reviewed in Epic        Assessment & Plan   Problem List Items Addressed This Visit     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) - Primary    Relevant Orders    WOUND CARE REFERRAL    PODIATRY/FOOT & ANKLE SURGERY REFERRAL    Fungal skin infection    Relevant Medications    nystatin-triamcinolone (MYCOLOG II) 471395-5.1 UNIT/GM-% external cream         Chronic necrotic foot ulcer of left foot (>1 month), concern for osteomyelitis  Recommenced that he keep legs elevated frequently and daily--consider lymphedema clinic referral at follow-up OV   Refer to Wound Care and Podiatry.  Would have sent to ED for MRI to help r/o osteomyelitis, but was able to schedule Podiatry OV tomorrow at [place].  I think we can hold off another day for work-up and treatment with abx since this has been present for >1 month.  Will probably need debridement, so wound care will be imperative.    Fungal infection on buttocks: new, worsneing  Rx Mycolog BID x 1-2 weeks,  Keep area clean and dry; change clothing frequently to keep up good hygiene    See Patient Instructions  Return in about 1 week (around 3/18/2020) for re-check / follow-up.    Jovana Martin, DO  [facility]

## 2020-03-11 NOTE — PROGRESS NOTES
ANTICOAGULATION FOLLOW-UP CLINIC VISIT    Patient Name:  Petey Archibald  Date:  3/11/2020  Contact Type:  Face to Face    SUBJECTIVE:  Patient Findings     Comments:   The patient was assessed for diet, medication, and activity level changes, missed or extra doses, bruising or bleeding, with no problem findings.          Clinical Outcomes     Comments:   The patient was assessed for diet, medication, and activity level changes, missed or extra doses, bruising or bleeding, with no problem findings.             OBJECTIVE    INR Protime   Date Value Ref Range Status   2020 2.7 (A) 0.86 - 1.14 Final       ASSESSMENT / PLAN  INR assessment THER    Recheck INR In: 4 WEEKS    INR Location Clinic      Anticoagulation Summary  As of 3/11/2020    INR goal:   2.0-3.0   TTR:   70.7 % (11.9 mo)   INR used for dosin.7 (3/11/2020)   Warfarin maintenance plan:   8 mg (4 mg x 2) every day   Full warfarin instructions:   8 mg every day   Weekly warfarin total:   56 mg   No change documented:   Lisa Ponce RN   Plan last modified:   Ysabel Tena RN (2019)   Next INR check:   2020   Priority:   Maintenance   Target end date:   Indefinite    Indications    History of pulmonary embolism [Z86.711]  Long term current use of anticoagulant therapy [Z79.01]             Anticoagulation Episode Summary     INR check location:   Anticoagulation Clinic    Preferred lab:       Send INR reminders to:   EDU SUGGS    Comments:   REM FDC; A new Coumadin Prescription will need to sent to [facility] with current dose and next INR check.      Anticoagulation Care Providers     Provider Role Specialty Phone number    Silvino Baker MD [facility] [phone]            See the Encounter Report to view Anticoagulation Flowsheet and Dosing Calendar (Go to Encounters tab in chart review, and find the Anticoagulation Therapy Visit)        Lisa Ponce, RN

## 2020-03-12 ENCOUNTER — TELEPHONE (OUTPATIENT)
Dept: FAMILY MEDICINE | Facility: CLINIC | Age: 60
End: 2020-03-12

## 2020-03-12 ENCOUNTER — OFFICE VISIT (OUTPATIENT)
Dept: PODIATRY | Facility: CLINIC | Age: 60
End: 2020-03-12
Payer: MEDICARE

## 2020-03-12 VITALS
BODY MASS INDEX: 53.78 KG/M2 | DIASTOLIC BLOOD PRESSURE: 70 MMHG | WEIGHT: 315 LBS | SYSTOLIC BLOOD PRESSURE: 118 MMHG | HEIGHT: 64 IN

## 2020-03-12 DIAGNOSIS — S91.302A OPEN WOUND OF LEFT FOOT, INITIAL ENCOUNTER: Primary | ICD-10-CM

## 2020-03-12 DIAGNOSIS — B36.9 FUNGAL SKIN INFECTION: Primary | ICD-10-CM

## 2020-03-12 DIAGNOSIS — I73.9 PAD (PERIPHERAL ARTERY DISEASE) (H): ICD-10-CM

## 2020-03-12 PROCEDURE — 11042 DBRDMT SUBQ TIS 1ST 20SQCM/<: CPT | Performed by: PODIATRIST

## 2020-03-12 PROCEDURE — 99214 OFFICE O/P EST MOD 30 MIN: CPT | Mod: 25 | Performed by: PODIATRIST

## 2020-03-12 RX ORDER — SULFAMETHOXAZOLE AND TRIMETHOPRIM 400; 80 MG/1; MG/1
1 TABLET ORAL 2 TIMES DAILY
Qty: 14 TABLET | Refills: 0 | Status: SHIPPED | OUTPATIENT
Start: 2020-03-12 | End: 2020-05-18

## 2020-03-12 ASSESSMENT — MIFFLIN-ST. JEOR: SCORE: 2199.26

## 2020-03-12 NOTE — LETTER
"    3/12/2020         RE: Petey Archibald  [address]  [facility] [address]        Dear Colleague,    Thank you for referring your patient, Petey Archibald, to the [facility]. Please see a copy of my visit note below.    Foot & Ankle Surgery  March 12, 2020    CC: \"foot infection\"    I was asked to see Petey Archibald regarding the chief complaint by:  Dr. JAZZMINE Martin    HPI:  Pt is a 59 year old male who presents with above complaint.  14 day history of wound between 1st and 2nd toes left foot.  Patient states he was picking at skin and this caused the wound.  Non-diabetic.  There was concerns about osteomyelitis wth Dr. Martin, who considered admission to the hospital, but was able to get Petey into our clinic.  No antibiotics at this point.  Pain 5/10 with \"sitting, getting to step\", worse with \"soaking\".  No treatment    ROS:   Pos for CC.  The patient denies current nausea, vomiting, chills, fevers, belly pain, calf pain, chest pain or SOB.  Complete remainder of ROS is otherwise neg.    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     COPD (chronic obstructive pulmonary disease) (H)      History of DVT of lower extremity      History of pulmonary embolism      Hyperlipidemia      Mild intellectual disabilities      Morbid obesity (H)      Peripheral edema      Peripheral vascular disease (H)      Sleep apnea      Tobacco dependence      Venous stasis dermatitis        SXHX:    Past Surgical History:   Procedure Laterality Date     COLONOSCOPY N/A 4/15/2019    Procedure: COMBINED COLONOSCOPY, SINGLE OR MULTIPLE BIOPSY/POLYPECTOMY BY BIOPSY;  Surgeon: Jovana Ohara MD;  Location:  GI     EXCISE MALIGNANT LESIONS, TRUNK/ARMS/LEGS 0.5CM OR LESS Left 5/26/2017    Procedure: EXCISE MALIGNANT LESIONS, TRUNK/ARMS/LEGS " "0.5CM OR LESS;  EXCISION LEFT ELBOW MASS;  Surgeon: Joes Azevedo MD;  Location: [facility]     RECTAL SURGERY      perianal abscess?        MEDS:    Current Outpatient Medications   Medication     acetaminophen (TYLENOL) 325 MG tablet     albuterol (ALBUTEROL) 108 (90 BASE) MCG/ACT inhaler     ARIPiprazole (ABILIFY) 5 MG tablet     buPROPion (WELLBUTRIN SR) 150 MG 12 hr tablet     Cholecalciferol (VITAMIN D-3) 5000 UNIT/ML LIQD     cholecalciferol (VITAMIN D3) 5000 units (125 mcg) capsule     clotrimazole (LOTRIMIN) 1 % external cream     DESENEX 2 % external powder     DEXILANT 60 MG CPDR CR capsule     diphenhydrAMINE (BENADRYL) 50 MG capsule     EPINEPHrine (EPIPEN 2-BRE) 0.3 MG/0.3ML injection 2-pack     ferrous gluconate (FERGON) 324 (38 Fe) MG tablet     furosemide (LASIX) 40 MG tablet     furosemide (LASIX) 80 MG tablet     gabapentin (NEURONTIN) 100 MG capsule     Gauze Pads & Dressings (GAUZE PADS 4\"X4\") 4\"X4\" PADS     guaiFENesin (MUCINEX) 600 MG 12 hr tablet     Hesperidin-Diosmin 250-650 MG TABS     loratadine (CLARITIN) 10 MG tablet     montelukast (SINGULAIR) 10 MG tablet     Multiple Vitamin (DAILY VITAMIN PO)     multivitamin, therapeutic (THERA-VIT) TABS tablet     naltrexone (DEPADE/REVIA) 50 MG tablet     nicotine (NICODERM CQ) 21 MG/24HR 24 hr patch     nystatin-triamcinolone (MYCOLOG II) 494228-1.1 UNIT/GM-% external cream     order for DME     order for DME     order for DME     order for DME     order for DME     order for DME     potassium chloride SA (K-DUR/KLOR-CON M) 20 MEQ CR tablet     PULMICORT FLEXHALER 180 MCG/ACT inhaler     sertraline (ZOLOFT) 100 MG tablet     simvastatin (ZOCOR) 40 MG tablet     SPIRIVA HANDIHALER 18 MCG inhaled capsule     spironolactone (ALDACTONE) 25 MG tablet     traZODone (DESYREL) 100 MG tablet     varenicline (CHANTIX) 1 MG tablet     vitamin D3 2000 units tablet     warfarin ANTICOAGULANT (COUMADIN) 4 MG tablet     zolpidem (AMBIEN) 10 MG tablet     No " current facility-administered medications for this visit.        ALL:     Allergies   Allergen Reactions     Bees      Augmentin Rash     Penicillins Rash       FMH:    Family History   Problem Relation Age of Onset     Diabetes Brother      Unknown/Adopted Mother      Unknown/Adopted Father        SocHx:    Social History     Socioeconomic History     Marital status: Single     Spouse name: Not on file     Number of children: Not on file     Years of education: Not on file     Highest education level: Not on file   Occupational History     Not on file   Social Needs     Financial resource strain: Not on file     Food insecurity     Worry: Not on file     Inability: Not on file     Transportation needs     Medical: Not on file     Non-medical: Not on file   Tobacco Use     Smoking status: Current Every Day Smoker     Packs/day: 1.00     Years: 30.00     Pack years: 30.00     Types: Cigarettes     Smokeless tobacco: Current User     Tobacco comment: started at age 18    Substance and Sexual Activity     Alcohol use: No     Alcohol/week: 0.0 standard drinks     Drug use: No     Sexual activity: Not Currently     Partners: Female   Lifestyle     Physical activity     Days per week: Not on file     Minutes per session: Not on file     Stress: Not on file   Relationships     Social connections     Talks on phone: Not on file     Gets together: Not on file     Attends Mosque service: Not on file     Active member of club or organization: Not on file     Attends meetings of clubs or organizations: Not on file     Relationship status: Not on file     Intimate partner violence     Fear of current or ex partner: Not on file     Emotionally abused: Not on file     Physically abused: Not on file     Forced sexual activity: Not on file   Other Topics Concern     Parent/sibling w/ CABG, MI or angioplasty before 65F 55M? No   Social History Narrative     Not on file           EXAMINATION:  Gen:   No apparent distress  Neuro:    A&Ox3, no deficits  Psych:    Answering questions appropriately for age and situation with normal affect  Head:    NCAT  Eye:    Visual scanning without deficit  Ear:    Response to auditory stimuli wnl  Lung:    Non-labored breathing on RA noted  Abd:    NTND per patient report  Lymph:    Bilateral lower extremity edema  Vasc:    Pulses not palpable, CFT minimally delayed  Neuro:    Light touch sensation intact to all sensory nerve distributions without paresthesias  Derm:    Wound at lateral base of L great toe, down to sub-q tissue, < 20cm 2.  Mild surrounding erythema, although it's not warm to the touch.  Slight malodor but no purulence, no exposed deep fascia/bone/joint.    MSK:    ROM, strength wnl without limitation, no pain on palpation noted.  Calf:    Neg for redness or tenderness    Assessment:  59 year old male with full-thickness wound with mild surrounding erythema in setting of non-palpable pulses      Plan:  Discussed etiologies, anatomy and options  1.  Full-thickness wound left foot with mild surrounding erythema in setting of non-palpable pulses  -Excisional debridement was performed, full-thickness, sharply debriding the wound down to and including exposed sub-cutaneous tissue/fat, excising nonviable tissue to the above dimensions with a tissue nipper  -US TERESA and arterial doppler studies ordered @ [facility]  -surgical shoe dispensed  -Rx for iodosorb for every other day dressings; OTC med handout if not covered by insurance  -note for being in clinic today  -Rx for Bactrim BID x 7 days.  Not hot, but will cover for potential early infection.      Follow up:  2 weeks or sooner with acute issues      Patient's medical history was reviewed today    Body mass index is 56.13 kg/m .  Weight management plan: Patient was referred to their PCP to discuss a diet and exercise plan.        Domingo De La Torre DPM FACFAS FACFAOM  Podiatric Foot & Ankle Surgeon  [facility]  [phone]      Again, thank you for allowing me to participate in the care of your patient.        Sincerely,        Domingo De La Torre DPM, TRU

## 2020-03-12 NOTE — PROGRESS NOTES
"Foot & Ankle Surgery  March 12, 2020    CC: \"foot infection\"    I was asked to see Petey Archibald regarding the chief complaint by:  Dr. JAZZMINE Martin    HPI:  Pt is a 59 year old male who presents with above complaint.  14 day history of wound between 1st and 2nd toes left foot.  Patient states he was picking at skin and this caused the wound.  Non-diabetic.  There was concerns about osteomyelitis wth Dr. Martin, who considered admission to the hospital, but was able to get Petey into our clinic.  No antibiotics at this point.  Pain 5/10 with \"sitting, getting to step\", worse with \"soaking\".  No treatment    ROS:   Pos for CC.  The patient denies current nausea, vomiting, chills, fevers, belly pain, calf pain, chest pain or SOB.  Complete remainder of ROS is otherwise neg.    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     COPD (chronic obstructive pulmonary disease) (H)      History of DVT of lower extremity      History of pulmonary embolism      Hyperlipidemia      Mild intellectual disabilities      Morbid obesity (H)      Peripheral edema      Peripheral vascular disease (H)      Sleep apnea      Tobacco dependence      Venous stasis dermatitis        SXHX:    Past Surgical History:   Procedure Laterality Date     COLONOSCOPY N/A 4/15/2019    Procedure: COMBINED COLONOSCOPY, SINGLE OR MULTIPLE BIOPSY/POLYPECTOMY BY BIOPSY;  Surgeon: Jovana Ohara MD;  Location:  GI     EXCISE MALIGNANT LESIONS, TRUNK/ARMS/LEGS 0.5CM OR LESS Left 5/26/2017    Procedure: EXCISE MALIGNANT LESIONS, TRUNK/ARMS/LEGS 0.5CM OR LESS;  EXCISION LEFT ELBOW MASS;  Surgeon: Jose Azevedo MD;  Location:  GI     RECTAL SURGERY      perianal abscess?        MEDS:    Current Outpatient Medications   Medication     acetaminophen (TYLENOL) 325 MG tablet     albuterol " "(ALBUTEROL) 108 (90 BASE) MCG/ACT inhaler     ARIPiprazole (ABILIFY) 5 MG tablet     buPROPion (WELLBUTRIN SR) 150 MG 12 hr tablet     Cholecalciferol (VITAMIN D-3) 5000 UNIT/ML LIQD     cholecalciferol (VITAMIN D3) 5000 units (125 mcg) capsule     clotrimazole (LOTRIMIN) 1 % external cream     DESENEX 2 % external powder     DEXILANT 60 MG CPDR CR capsule     diphenhydrAMINE (BENADRYL) 50 MG capsule     EPINEPHrine (EPIPEN 2-BRE) 0.3 MG/0.3ML injection 2-pack     ferrous gluconate (FERGON) 324 (38 Fe) MG tablet     furosemide (LASIX) 40 MG tablet     furosemide (LASIX) 80 MG tablet     gabapentin (NEURONTIN) 100 MG capsule     Gauze Pads & Dressings (GAUZE PADS 4\"X4\") 4\"X4\" PADS     guaiFENesin (MUCINEX) 600 MG 12 hr tablet     Hesperidin-Diosmin 250-650 MG TABS     loratadine (CLARITIN) 10 MG tablet     montelukast (SINGULAIR) 10 MG tablet     Multiple Vitamin (DAILY VITAMIN PO)     multivitamin, therapeutic (THERA-VIT) TABS tablet     naltrexone (DEPADE/REVIA) 50 MG tablet     nicotine (NICODERM CQ) 21 MG/24HR 24 hr patch     nystatin-triamcinolone (MYCOLOG II) 621893-7.1 UNIT/GM-% external cream     order for DME     order for DME     order for DME     order for DME     order for DME     order for DME     potassium chloride SA (K-DUR/KLOR-CON M) 20 MEQ CR tablet     PULMICORT FLEXHALER 180 MCG/ACT inhaler     sertraline (ZOLOFT) 100 MG tablet     simvastatin (ZOCOR) 40 MG tablet     SPIRIVA HANDIHALER 18 MCG inhaled capsule     spironolactone (ALDACTONE) 25 MG tablet     traZODone (DESYREL) 100 MG tablet     varenicline (CHANTIX) 1 MG tablet     vitamin D3 2000 units tablet     warfarin ANTICOAGULANT (COUMADIN) 4 MG tablet     zolpidem (AMBIEN) 10 MG tablet     No current facility-administered medications for this visit.        ALL:     Allergies   Allergen Reactions     Bees      Augmentin Rash     Penicillins Rash       FMH:    Family History   Problem Relation Age of Onset     Diabetes Brother      " Unknown/Adopted Mother      Unknown/Adopted Father        SocHx:    Social History     Socioeconomic History     Marital status: Single     Spouse name: Not on file     Number of children: Not on file     Years of education: Not on file     Highest education level: Not on file   Occupational History     Not on file   Social Needs     Financial resource strain: Not on file     Food insecurity     Worry: Not on file     Inability: Not on file     Transportation needs     Medical: Not on file     Non-medical: Not on file   Tobacco Use     Smoking status: Current Every Day Smoker     Packs/day: 1.00     Years: 30.00     Pack years: 30.00     Types: Cigarettes     Smokeless tobacco: Current User     Tobacco comment: started at age 18    Substance and Sexual Activity     Alcohol use: No     Alcohol/week: 0.0 standard drinks     Drug use: No     Sexual activity: Not Currently     Partners: Female   Lifestyle     Physical activity     Days per week: Not on file     Minutes per session: Not on file     Stress: Not on file   Relationships     Social connections     Talks on phone: Not on file     Gets together: Not on file     Attends Yarsanism service: Not on file     Active member of club or organization: Not on file     Attends meetings of clubs or organizations: Not on file     Relationship status: Not on file     Intimate partner violence     Fear of current or ex partner: Not on file     Emotionally abused: Not on file     Physically abused: Not on file     Forced sexual activity: Not on file   Other Topics Concern     Parent/sibling w/ CABG, MI or angioplasty before 65F 55M? No   Social History Narrative     Not on file           EXAMINATION:  Gen:   No apparent distress  Neuro:   A&Ox3, no deficits  Psych:    Answering questions appropriately for age and situation with normal affect  Head:    NCAT  Eye:    Visual scanning without deficit  Ear:    Response to auditory stimuli wnl  Lung:    Non-labored breathing on RA  noted  Abd:    NTND per patient report  Lymph:    Bilateral lower extremity edema  Vasc:    Pulses not palpable, CFT minimally delayed  Neuro:    Light touch sensation intact to all sensory nerve distributions without paresthesias  Derm:    Wound at lateral base of L great toe, down to sub-q tissue, < 20cm 2.  Mild surrounding erythema, although it's not warm to the touch.  Slight malodor but no purulence, no exposed deep fascia/bone/joint.    MSK:    ROM, strength wnl without limitation, no pain on palpation noted.  Calf:    Neg for redness or tenderness    Assessment:  59 year old male with full-thickness wound with mild surrounding erythema in setting of non-palpable pulses      Plan:  Discussed etiologies, anatomy and options  1.  Full-thickness wound left foot with mild surrounding erythema in setting of non-palpable pulses  -Excisional debridement was performed, full-thickness, sharply debriding the wound down to and including exposed sub-cutaneous tissue/fat, excising nonviable tissue to the above dimensions with a tissue nipper  -US TERESA and arterial doppler studies ordered @ [facility]  -surgical shoe dispensed  -Rx for iodosorb for every other day dressings; OTC med handout if not covered by insurance  -note for being in clinic today  -Rx for Bactrim BID x 7 days.  Not hot, but will cover for potential early infection.      Follow up:  2 weeks or sooner with acute issues      Patient's medical history was reviewed today    Body mass index is 56.13 kg/m .  Weight management plan: Patient was referred to their PCP to discuss a diet and exercise plan.        Domingo De La Torre DPM FACFAS FACFAOM  Podiatric Foot & Ankle Surgeon  [facility]  [phone]

## 2020-03-12 NOTE — LETTER
March 12, 2020        TO WHOM IT MAY CONCERN:    Petey was seen in our office today for a check on his left foot.        Thank you.        Domingo De La Torre DPM FACFAS FACFAOM  Podiatric Foot & Ankle Surgeon  [facility]

## 2020-03-12 NOTE — PATIENT INSTRUCTIONS
Thank you for choosing [facility] / Foot & Ankle Surgery!    DR. MENDOSA'S CLINIC LOCATIONS:   MONDAY - [place] TUESDAY AM - [place]   [address] [facility]  [address]   [place] [address] [place] [address]   [phone]       THURSDAY AM - [place] THURSDAY PM - [place]   [address]   [place] [address] [place] [address]   [phone]       FRIDAY AM - [place] SET UP SURGERY: [phone] [address] APPOINTMENTS: [phone]   [place] [address] BILLING QUESTIONS: [phone] FAX NUMBER: [phone]     Follow up:  2 weeks        Please read through the following handouts and if you have any questions, please feel free to call us or send a [employer] message!      FOOT ULCER (WOUND) EDUCATION  Ulceration ofthe foot involves a break or hole in the skin. Skin is our best protection against infection. Skin is quite durable, however, the underlying tissues are fragile. For this reason, the wound is likely to deepen rapidly. Deep wounds usually get infected and require amputation. Prompt healing is therefore essential to avoid limb loss.     Foot ulcers do not heal without intervention. Walking on the foot and living your normal life is not typically compatible with healing the sore. Successful healing will require several months of significant alteration of your daily activities.   Ulcer complications frequently develop. This primarily includes infection of skin, which then spreads deep into your joints, bones and tendons. Spreading infection may travel up your leg and into other parts of your body. Deep infection is usually treated with amputation ofpart ofyour foot or your leg. Signs of infection include fever, chills, nausea, vomiting, erratic blood sugars, local redness, pus, strong odor and localized warmth. Signs of infection should be taken seriously. Prompt evaluation in the clinic  or hospital emergency room is required.   Ulcer treatment requires debridement or surgical removal of devitalized tissue. Your doctor will trim away callused, moistened, unhealthy tissue from the wound surface and margin. This helps to clean the wound and allows proper inspection. Debridement also stimulates healing even though the wound originally appears larger. Expect some bleeding with each debridement. You will be given instruction regarding wound bandaging. This often includes ointment and gauze. Avoid tape directly on the skin. Hand washing is essential since most infections will come from your fingertips. Ulcer care requires a no touch technique. Your fingers should not touch the margin or base of the wound.    HELPFUL HEALING TIPS:  1. Debridement: Getting rid of bad tissue makes way for good tissue to promote healing  2. Addressing Foot Deformities: Hammertoes and bunions can cause increased pressure  3. Pressure Reduction: If pressure remains to the wound, it won't heal  4. Good Pulses: If bloodflow is not getting to the foot, the ulcer will not heal  5. Good Nutrition: If you are not getting proper nutrition your body can't heal.Protein! At least 90g a day.  Supplements are a good way to help get this, such as Murali, Glucerna, Ensure. Also taking 5000units of Vitamin D a day.   6. Infection Control: Keep the ulcer clean with wound cleanser. DO NOT SOAK IT!  7. Moisture Control: Keep edema down and make sure that drainage is getting pulled away from the ulcer    IMPORTANCE OF DEBRIDEMENT    Reduces bioburden to help control or reduce infection. Even if an ulcer is not  infected,  the bacterial bioburden causes increased local inflammation.    Allows more accurate visualization of the wound base and edges, which allows for more precise staging.    Removes necrotic/non-viable tissue, which impedes wound healing, causes protein loss and can be a nidus for infection.    Stimulates new circulation  (angiogenesis) and allows adequate oxygen delivery to the wound.    Removes undermining and tunneling, and may help reduce abscess formation.    Releases healing growth factors at the edge of the wound.    Prepares the wound bed by leaving only tissues that are capable of regenerating.

## 2020-03-12 NOTE — TELEPHONE ENCOUNTER
Prior Authorization Retail Medication Request    Medication/Dose: nystatin-triamcinolone (MYCOLOG II) 631853-7.1 UNIT/GM-% external cream     ICD code (if different than what is on RX):     Previously Tried and Failed:     Rationale:       Insurance Name:     Insurance ID:         Pharmacy Information (if different than what is on RX)  Name:     Phone:

## 2020-03-13 ENCOUNTER — TELEPHONE (OUTPATIENT)
Dept: PODIATRY | Facility: CLINIC | Age: 60
End: 2020-03-13

## 2020-03-13 DIAGNOSIS — I73.9 PAD (PERIPHERAL ARTERY DISEASE) (H): ICD-10-CM

## 2020-03-13 DIAGNOSIS — S91.302A OPEN WOUND OF LEFT FOOT, INITIAL ENCOUNTER: Primary | ICD-10-CM

## 2020-03-13 NOTE — TELEPHONE ENCOUNTER
Braden @ [phone] calling regarding Petey Archibald.  He stated Petey's insurance will not cover the prescription and would like to talk about an alternative. Routing to Dr De La Torre to advise.            Tj Campos on 3/13/2020 at 1:12 PM

## 2020-03-13 NOTE — TELEPHONE ENCOUNTER
PA Initiation    Medication: nystatin-triamcinolone (MYCOLOG II) 033096-6.1 UNIT/GM-% external cream   Insurance Company: [employer] - Phone [phone] Fax [phone]  Pharmacy Filling the Rx: [facility], [facility]. [place], [place] - [address] [facility] [address]  Filling Pharmacy Phone: [phone]  Filling Pharmacy Fax: [phone]  Start Date: 3/13/2020

## 2020-03-16 ENCOUNTER — TELEPHONE (OUTPATIENT)
Dept: FAMILY MEDICINE | Facility: CLINIC | Age: 60
End: 2020-03-16

## 2020-03-16 RX ORDER — KETOCONAZOLE 20 MG/G
CREAM TOPICAL
Qty: 60 G | Refills: 1 | Status: SHIPPED | OUTPATIENT
Start: 2020-03-16 | End: 2020-07-17

## 2020-03-16 NOTE — TELEPHONE ENCOUNTER
Hunter returned call but provider was with another patient. Please call back at [phone] to discuss medication that is not covered and also is wondering about further testing in regards to circulation.   Afua Nash on 3/16/2020 at 11:33 AM

## 2020-03-16 NOTE — TELEPHONE ENCOUNTER
Hunter, caregiver, updated about different medication sent.  States understanding. Will call back if there are any issues with the medication.      No further action needed at this time.

## 2020-03-16 NOTE — TELEPHONE ENCOUNTER
Our goal is to have forms completed within 72 hours, however some forms may require a visit or additional information.    What clinic location was the form placed at [facility] or [place].?     Who is the form from?   Where did the form come from? Faxed to clinic   The form was placed in the inbox of Hortensia Peck MD      Please fax to [phone]  Phone number: [phone]    Additional comments: [facility] review     Call take on 3/16/2020 at 4:13 PM by Catherine Lombardo

## 2020-03-16 NOTE — TELEPHONE ENCOUNTER
I called and LVM to have Hunter return our call in to clinic, [place] number left.    Domingo De La Torre DPM FACFAS FACFAOM  Podiatric Foot & Ankle Surgeon  [facility]  [phone]

## 2020-03-16 NOTE — TELEPHONE ENCOUNTER
PRIOR AUTHORIZATION DENIED    Medication: nystatin-triamcinolone (MYCOLOG II) 059651-3.1 UNIT/GM-% external cream     Denial Date: 3/14/2020    Denial Rational:       Appeal Information:  If provider would like to appeal we will need a detailed letter of medical necessity to start the process. Then re-route this request back to the PA pool.

## 2020-03-16 NOTE — TELEPHONE ENCOUNTER
Called Hunter pt's care giver at [facility], and asked that he contact insurance for covered alterative and call triage back with information. He verbalized agreement with plan.

## 2020-03-16 NOTE — TELEPHONE ENCOUNTER
Reason for Call:  Other     Detailed comments: patient was in on 3/11/2020 for a fungal rash, given nystatin cream, and not covered by insurance.  Need something else that would be equal    Phone Number Patient can be reached at:  Hunter his manager at the home  [phone]    Best Time: today    [facility] pharmacy    Can we leave a detailed message on this number? YES    Call taken on 3/16/2020 at 11:40 AM by SAMI MATHIS

## 2020-03-16 NOTE — TELEPHONE ENCOUNTER
I called and spoke with Hunter. iodosorb can be purchased online.  Rx was given in hopes of having coverage through insurance but can be purchased online.  They will try this route and call with further questions.    Domingo De La Torre DPM FACFAS FACFAOM  Podiatric Foot & Ankle Surgeon  [facility]  [phone]

## 2020-03-16 NOTE — TELEPHONE ENCOUNTER
Hunter called back stating this Rx cannot be purchased online due to various reasons, and would like to discuss options. Please contact to discuss and advise.    Kaila Pantoja on 3/16/2020 at 2:42 PM

## 2020-03-16 NOTE — TELEPHONE ENCOUNTER
Patient Contact    Attempt # 1    Was call answered?  No.  Left message on voicemail with information to call me back.    On call back:    -Make aware of med change

## 2020-03-17 ENCOUNTER — TELEPHONE (OUTPATIENT)
Dept: FAMILY MEDICINE | Facility: CLINIC | Age: 60
End: 2020-03-17

## 2020-03-17 RX ORDER — SILVER SULFADIAZINE 10 MG/G
CREAM TOPICAL
Qty: 25 G | Refills: 0 | Status: SHIPPED | OUTPATIENT
Start: 2020-03-17 | End: 2020-04-23

## 2020-03-17 NOTE — TELEPHONE ENCOUNTER
Were they able to  the medication?      Yes, agree with recommendations.  If rash is better, no need to follow up.  If rash is still there, then needs to follow up after at least 2-3 weeks of trying the anti-fungal medication.

## 2020-03-17 NOTE — TELEPHONE ENCOUNTER
Patient Contact    Called caregiverHunter and relayed provider message. Yes, they have picked up medication. They verbalize follow-up instructions. No further action needed.

## 2020-03-17 NOTE — TELEPHONE ENCOUNTER
Rx for silvadene sent to [facility] pharmacy.  Patient will be notified.    Domingo De La Torre DPM FACFAS FACFAOM  Podiatric Foot & Ankle Surgeon  [facility]  [phone]

## 2020-03-17 NOTE — TELEPHONE ENCOUNTER
Call from caregiver, Hunter. They are wanting to know if they should come in for follow-up appointment.They have not been able to try topical ointment yet. Recommended they post-pone until they try medication and instructed to call back for follow-up in a week after trying the cream. Routing to provider as MADELYN.

## 2020-03-17 NOTE — TELEPHONE ENCOUNTER
Please see patient question regarding alternative Rx options and request for call back. Please advise if you would like triage to contact patient.    Patient phone number: [phone]      Daljit Newman ATC

## 2020-03-17 NOTE — TELEPHONE ENCOUNTER
Spoke with Hunter regarding patient prescription options. Notified that Dr. Llanos has placed a prescription order silvadene for patient available for  at [facility]. Hunter did not have any further questions at this time and was appreciative for the call.    Closing encounter.      Daljit Newman, ATC

## 2020-03-20 DIAGNOSIS — I82.442 ACUTE DEEP VEIN THROMBOSIS (DVT) OF TIBIAL VEIN OF LEFT LOWER EXTREMITY (H): ICD-10-CM

## 2020-03-20 DIAGNOSIS — Z86.711 HISTORY OF PULMONARY EMBOLISM: ICD-10-CM

## 2020-03-20 DIAGNOSIS — I80.03 THROMBOPHLEBITIS OF SUPERFICIAL VEINS OF BOTH LOWER EXTREMITIES: Primary | ICD-10-CM

## 2020-03-26 ENCOUNTER — TELEPHONE (OUTPATIENT)
Dept: PODIATRY | Facility: CLINIC | Age: 60
End: 2020-03-26

## 2020-03-26 ENCOUNTER — OFFICE VISIT (OUTPATIENT)
Dept: PODIATRY | Facility: CLINIC | Age: 60
End: 2020-03-26
Payer: MEDICARE

## 2020-03-26 VITALS
OXYGEN SATURATION: 97 % | WEIGHT: 315 LBS | HEART RATE: 67 BPM | SYSTOLIC BLOOD PRESSURE: 119 MMHG | DIASTOLIC BLOOD PRESSURE: 72 MMHG | BODY MASS INDEX: 53.78 KG/M2 | HEIGHT: 64 IN

## 2020-03-26 DIAGNOSIS — S91.302A OPEN WOUND OF LEFT FOOT, INITIAL ENCOUNTER: Primary | ICD-10-CM

## 2020-03-26 DIAGNOSIS — I73.9 PAD (PERIPHERAL ARTERY DISEASE) (H): ICD-10-CM

## 2020-03-26 PROCEDURE — 11042 DBRDMT SUBQ TIS 1ST 20SQCM/<: CPT | Performed by: PODIATRIST

## 2020-03-26 ASSESSMENT — MIFFLIN-ST. JEOR: SCORE: 2176.58

## 2020-03-26 NOTE — PROGRESS NOTES
"Foot & Ankle Surgery   March 26, 2020    S:  Pt is seen today for evaluation of wound lateral L great toe.  Surgical shoe.  There were issues about getting the iodosorb, and we finally settled on silvadene. They are concerned the wound is worse, although Petey thinks it may be smaller.  Finishing up the Bactrim.  They did not have the vascular studies done.      Vitals:    03/26/20 1053   BP: 119/72   Pulse: 67   SpO2: 97%   Weight: 146.1 kg (322 lb)   Height: 1.626 m (5' 4\")   '      ROS - Pos for CC.  Patient denies current nausea, vomiting, chills, fevers, belly pain, calf pain, chest pain or SOB.  Complete remainder of ROS it otherwise neg.      PE:  Gen:   No apparent distress  Eye:    Visual scanning without deficit  Ear:    Response to auditory stimuli wnl  Lung:    Non-labored breathing on RA noted  Abd:    NTND per patient report  Lymph:    Neg for pitting/non-pitting edema BLE  Vasc:    Pulses palpable, CFT minimally delayed  Neuro:    Light touch sensation intact to all sensory nerve distributions without paresthesias  Derm:    Baseline surrounding redness/discoloration around the wound.  No cellulitis.  Wound is 1.6 x 0.4cm, full-thickness wihtout deep probing, purulence, malodor  MSK:    ROM, strength wnl without limitation, no pain on palpation noted.  Calf:    Neg for redness, swelling or tenderness    Assessment:  59 year old male with improving wound lateral L great toe with exposed sub-q fat      Plan:  Discussed etiologies, anatomy and options  1.  improving wound lateral L great toe with exposed sub-q fat  -Excisional debridement was performed, full-thickness, sharply debriding the wound down to and including exposed sub-cutaneous tissue/fat, excising nonviable tissue to the above dimensions with a tissue nipper  -OTC med handout for iodosorb, they will attempt to get this.  Every other day dressings.  If continuing to use the silvadene, daily dressings  -no indication for PO abx  -continue " heel WB in surgical shoe  -Rad # given to schedule non-invasive studies.    Follow up:  2 weeks or sooner with acute issues      Body mass index is 55.27 kg/m .  Weight management plan: Patient was referred to their PCP to discuss a diet and exercise plan.         Domingo De La Torre DPM FACFAS FACFAOM  Podiatric Foot & Ankle Surgeon  [facility]  [phone]

## 2020-03-26 NOTE — PATIENT INSTRUCTIONS
Thank you for choosing [facility] Podiatry / Foot & Ankle Surgery!    DR. MENDOSA'S CLINIC LOCATIONS:   MONDAY - [place]  TUESDAY AM - [place]   [address] [facility]  [address]   [place] [address] [place] [address]   [phone]       THURSDAY AM - [place] THURSDAY PM - [place]   [address]   [place] [address] [place], [place] [address] [phone]       FRIDAY AM - [place] SET UP SURGERY: [phone] [address] APPOINTMENTS: [phone]   [place] [address] BILLING QUESTIONS: [phone] FAX NUMBER: [phone]     Follow up: 2 weeks      Please read through the following handouts and if you have any questions, please feel free to call us or send a [employer] message!      Products available online if you do not have insurance coverage:    1.  Iodosorb topical wound care gel   SMOKING CESSATION  What's in cigarette smoke? - Cigarette smoke contains over 4,000 chemicals. Nicotine is one of the main ingredients which is an insecticide/herbicide. It is poisonous to our nervous system, increases blood clotting risk, and decreases the body's defenses to fight off infection. Another chemical is Carbon Monoxide is an asphyxiating gas that permanently binds to blood cells and blocks the transport of oxygen. This leads to tissue death and decreases your metabolism. Tar is a chemical that coats your lungs and trachea which impairs new oxygen coming in and carbon dioxide getting out of your body.   How does smoking impact surgery? - Smoking is particularly hazardous with regards to surgery. Surgery puts stress on the body and a smoker's body is already under strain from these chemicals. Putting the two together, especially for an elective surgery, could be a recipe for disaster. Smoking before and after surgery increases your risk of heart problems, slow wound healing, delayed bone healing, blood clots,  wound infection and anesthesia complications.   What are the benefits of quitting? - In 20 minutes your blood pressure will drop back down to normal. In 8 hours the carbon monoxide (a toxic gas) levels in your blood stream will drop by half, and oxygen levels will return to normal. In 48 hours your chance of having a heart attack will have decreased. All nicotine will have left your body. Your sense of taste and smell will return to a normal level. In 72 hours your bronchial tubes will relax, and your energy levels will increase. In 2 weeks your circulation will increase, and it will continue to improve for the next 10 weeks.    Recommendations for elective surgery - Ideally, patients should quit smoking 8 weeks before and at least 2 weeks after elective surgery in order to avoid complications. Simply cutting back on the amount of cigarettes smoked per day does not offer any benefit or decrease the risk of poor wound healing, heart problems, and infection. Smokers should also start taking Vitamin C and B for two weeks before surgery and two weeks after surgery.    Ways to Stop Smokin. Nicotine patches, lozenges, or gum  2. Support Groups  3. Medications (see below)    List of Medications:  1. Varenicline Tartrate (CHANTIX)   2. Bupropion HCL (WELLBUTRIN, ZYBAN) - note: make sure Wellbutrin is for smoking cessation and not other issues   3. Nicotine Patch (NICODERM)   4. Nicotine Inhaler (NICOTROL)   5. Nicotine Gum Nicotine Polacrilex   6. Nicotine Lozenge: Nicotine Polacrilex (COMMIT)   * [place] offers a smoking support group as well!  Please visit: [email or web address]  If you are interested in these, ask about getting a prescription or talk to your primary care doctor about what may be the best way for you to quit.           Body Mass Index (BMI)  Many things can cause foot and ankle problems. Foot structure, activity level, foot mechanics and injuries are common causes of pain.    One  very important issue that often goes unmentioned, is body weight.  Extra weight can cause increased stress on muscles, ligaments, bones and tendons. Sometimes just a few extra pounds is all it takes to put one over her/his threshold. Without reducing that stress, it can be difficult to alleviate pain.      Some people are uncomfortable addressing this issue, but we feel it is important for you to think about it. As Foot & Ankle specialists, our job is addressing the lower extremity problem and possible causes.     Regarding extra body weight, we encourage patients to discuss diet and weight management plans with their primary care doctors. It is this team approach that gives you the best opportunity for pain relief and getting you back on your feet.

## 2020-03-26 NOTE — TELEPHONE ENCOUNTER
ARANZA Harrison to help Petey schedule his vascular studies per mentioned at his appt with Dr De La Torre this morning.    Phone number for radiology is [phone].

## 2020-03-27 NOTE — TELEPHONE ENCOUNTER
I called and spoke with Hunter.  Proceed with vascular studies, radiology number again given, [phone].  I will call with results.    Domingo De La Torre DPM [facility]  Podiatric Foot & Ankle Surgeon  [facility]  [phone]

## 2020-03-27 NOTE — TELEPHONE ENCOUNTER
Hunter called back this morning and wants to speak with Dr De La Torre. Routed to Dr. De La Torre to advise.    Onelia King, [facility]  Podiatry/Foot & Ankle Surgery  [facility]

## 2020-04-08 ENCOUNTER — ANTICOAGULATION THERAPY VISIT (OUTPATIENT)
Dept: NURSING | Facility: CLINIC | Age: 60
End: 2020-04-08

## 2020-04-08 DIAGNOSIS — Z79.01 LONG TERM CURRENT USE OF ANTICOAGULANT THERAPY: ICD-10-CM

## 2020-04-08 DIAGNOSIS — Z86.711 HISTORY OF PULMONARY EMBOLISM: ICD-10-CM

## 2020-04-08 DIAGNOSIS — I80.03 THROMBOPHLEBITIS OF SUPERFICIAL VEINS OF BOTH LOWER EXTREMITIES: ICD-10-CM

## 2020-04-08 DIAGNOSIS — I82.442 ACUTE DEEP VEIN THROMBOSIS (DVT) OF TIBIAL VEIN OF LEFT LOWER EXTREMITY (H): ICD-10-CM

## 2020-04-08 LAB — INR PPP: 2.5 (ref 0.86–1.14)

## 2020-04-08 PROCEDURE — 99207 ZZC NO CHARGE NURSE ONLY: CPT

## 2020-04-08 PROCEDURE — 36416 COLLJ CAPILLARY BLOOD SPEC: CPT | Performed by: FAMILY MEDICINE

## 2020-04-08 PROCEDURE — 85610 PROTHROMBIN TIME: CPT | Performed by: FAMILY MEDICINE

## 2020-04-08 RX ORDER — WARFARIN SODIUM 4 MG/1
TABLET ORAL
Qty: 84 TABLET | Refills: 0 | Status: ON HOLD
Start: 2020-04-08 | End: 2020-05-22

## 2020-04-08 NOTE — PROGRESS NOTES
ANTICOAGULATION FOLLOW-UP CLINIC VISIT    Patient Name:  Petey Archibald  Date:  2020  Contact Type:  Telephone    SUBJECTIVE:  Patient Findings     Comments:   The patient was assessed for diet, medication, and activity level changes, missed or extra doses, bruising or bleeding, with no problem findings.          Clinical Outcomes     Comments:   The patient was assessed for diet, medication, and activity level changes, missed or extra doses, bruising or bleeding, with no problem findings.             OBJECTIVE    INR   Date Value Ref Range Status   2020 2.50 (H) 0.86 - 1.14 Final       ASSESSMENT / PLAN  INR assessment THER    Recheck INR In: 6 WEEKS    INR Location Clinic      Anticoagulation Summary  As of 2020    INR goal:   2.0-3.0   TTR:   70.8 % (11.9 mo)   INR used for dosin.50 (2020)   Warfarin maintenance plan:   8 mg (4 mg x 2) every day   Full warfarin instructions:   8 mg every day   Weekly warfarin total:   56 mg   No change documented:   Lisa Ponce RN   Plan last modified:   Ysabel Tena RN (2019)   Next INR check:   2020   Priority:   Maintenance   Target end date:   Indefinite    Indications    History of pulmonary embolism [Z86.711]  Long term current use of anticoagulant therapy [Z79.01]             Anticoagulation Episode Summary     INR check location:   Anticoagulation Clinic    Preferred lab:       Send INR reminders to:   EDU SUGGS    Comments:   REM detention; A new Coumadin Prescription will need to sent to [facility] with current dose and next INR check.      Anticoagulation Care Providers     Provider Role Specialty Phone number    Silvino Baker MD [facility] [phone]            See the Encounter Report to view Anticoagulation Flowsheet and Dosing Calendar (Go to Encounters tab in chart review, and find the Anticoagulation Therapy Visit)        Lisa Ponce, RN

## 2020-04-15 ENCOUNTER — TELEPHONE (OUTPATIENT)
Dept: FAMILY MEDICINE | Facility: CLINIC | Age: 60
End: 2020-04-15

## 2020-04-15 NOTE — TELEPHONE ENCOUNTER
Reason for call:  Patient reporting a symptom    Symptom or request: rash    Duration (how long have symptoms been present): ongoing    Have you been treated for this before? Yes    Additional comments: Pt was prescribed ointment for rash on buttocks.  That has improved but now has rash on his thighs.  Can they just use the same ointment or does patient require a phone/office visit?  Please call caregiver Hunter at group home [phone].       Phone Number patient can be reached at:  Home number on file [phone] (home)    Best Time:  any    Can we leave a detailed message on this number:  YES    Call taken on 4/15/2020 at 10:21 AM by LONNIE THOMPSON

## 2020-04-15 NOTE — TELEPHONE ENCOUNTER
Hunter from pt's group home called to ask if pt can use ketoconazole for rash on thighs. Rash was noticed 3 days ago is red and itchy. No other know symptoms. Rx for ketoconazole was initially ordered for a rash on buttocks but that resolved. Can this medication be used on thighs now? Please advice. Ok to leave a detailed voice message with providers response.

## 2020-04-18 DIAGNOSIS — S91.302A OPEN WOUND OF LEFT FOOT, INITIAL ENCOUNTER: ICD-10-CM

## 2020-04-18 DIAGNOSIS — I73.9 PAD (PERIPHERAL ARTERY DISEASE) (H): ICD-10-CM

## 2020-04-18 NOTE — TELEPHONE ENCOUNTER
Refill request:    SILVER SULFADIAZINE 1% cream. Qty 50.    Summary: Apply a small amount to wound on toe daily with dressing changes.  Disp-25 g,R-0  E-Prescribe   Start: 3/17/2020  Ord/Sold: 3/17/2020

## 2020-04-20 NOTE — TELEPHONE ENCOUNTER
Routing refill request to provider for review/approval because:  Drug not on the [facility] refill protocol     Please review and authorize if appropriate.    Thank you,   Musa MCCALL RN

## 2020-04-23 RX ORDER — SILVER SULFADIAZINE 10 MG/G
CREAM TOPICAL
Qty: 25 G | Refills: 0 | Status: ON HOLD | OUTPATIENT
Start: 2020-04-23 | End: 2020-07-26

## 2020-04-27 ENCOUNTER — TELEPHONE (OUTPATIENT)
Dept: PODIATRY | Facility: CLINIC | Age: 60
End: 2020-04-27

## 2020-04-27 NOTE — TELEPHONE ENCOUNTER
Patient was last seen on 3/26/20 by Dr. De La Torre, was recommended to follow up in 2 weeks.     Returned call to Hunter to discuss what orders he was referring to: Silver Sulfadiazine 1% topical or if additional supplies were needed as well. Did note in VM that patient may be requested to have follow up visit to check wound status as well.     Provided number for Triage for return call.     Patricia Torrez, ATC

## 2020-04-27 NOTE — TELEPHONE ENCOUNTER
Hunter COBIAN saying they need a new order for patient's foot dressings. They called [facility] and [facility] will not provide new dressings without an order. Please call with any questions.

## 2020-04-28 NOTE — TELEPHONE ENCOUNTER
Hunter returned call to explain that the additional supplies needed were individually wrapped guaze pads. Please call [facility] to see if pt's insurance will cover them. Additionally, Hunter noted that pt was asked to have a follow up appt and would like a return call to set that up.

## 2020-04-29 NOTE — TELEPHONE ENCOUNTER
Phone call to [facility] in [place], [place]. Spoke with staff member in the medical supply department regarding insurance coverage of requested medical supplies. He stated that once they receive the order for the item and the completed nursing form detailing the specifics of the wound, they will verify the correct medical supply that is needed and check with patient's insurance at that time.    Provided [facility] office fax number for him to send nursing form so that the form and order can be completed and faxed back to them at the same time.    Fax for [facility]: [phone]    Verbalized understanding. Will watch for fax.    Joanne Carroll, ATC

## 2020-04-29 NOTE — TELEPHONE ENCOUNTER
Return call to Ophelia COBIAN requesting call back to schedule patient's follow up appointment with Dr. De La Torre.    Joanne Carroll, ATC

## 2020-05-18 ENCOUNTER — APPOINTMENT (OUTPATIENT)
Dept: ULTRASOUND IMAGING | Facility: CLINIC | Age: 60
DRG: 603 | End: 2020-05-18
Attending: PHYSICIAN ASSISTANT
Payer: MEDICARE

## 2020-05-18 ENCOUNTER — APPOINTMENT (OUTPATIENT)
Dept: GENERAL RADIOLOGY | Facility: CLINIC | Age: 60
DRG: 603 | End: 2020-05-18
Attending: PODIATRIST
Payer: MEDICARE

## 2020-05-18 ENCOUNTER — APPOINTMENT (OUTPATIENT)
Dept: MRI IMAGING | Facility: CLINIC | Age: 60
DRG: 603 | End: 2020-05-18
Attending: PODIATRIST
Payer: MEDICARE

## 2020-05-18 ENCOUNTER — HOSPITAL ENCOUNTER (INPATIENT)
Facility: CLINIC | Age: 60
LOS: 4 days | Discharge: NURSING, REHAB OR LONG-TERM CARE | DRG: 603 | End: 2020-05-22
Attending: PHYSICIAN ASSISTANT | Admitting: INTERNAL MEDICINE
Payer: MEDICARE

## 2020-05-18 DIAGNOSIS — Z86.718 PERSONAL HISTORY OF DVT (DEEP VEIN THROMBOSIS): ICD-10-CM

## 2020-05-18 DIAGNOSIS — Z79.01 LONG TERM CURRENT USE OF ANTICOAGULANT THERAPY: ICD-10-CM

## 2020-05-18 DIAGNOSIS — L03.116 LEFT LEG CELLULITIS: ICD-10-CM

## 2020-05-18 DIAGNOSIS — L03.116 CELLULITIS OF LEFT LOWER EXTREMITY: ICD-10-CM

## 2020-05-18 DIAGNOSIS — Z86.711 HISTORY OF PULMONARY EMBOLISM: ICD-10-CM

## 2020-05-18 DIAGNOSIS — Z86.718 HISTORY OF DVT OF LOWER EXTREMITY: Primary | ICD-10-CM

## 2020-05-18 PROBLEM — L08.9 SKIN INFECTION: Status: ACTIVE | Noted: 2020-05-18

## 2020-05-18 PROBLEM — L03.90 CELLULITIS: Status: ACTIVE | Noted: 2020-05-18

## 2020-05-18 PROBLEM — F25.0 SCHIZOAFFECTIVE DISORDER, BIPOLAR TYPE (H): Status: ACTIVE | Noted: 2020-05-18

## 2020-05-18 LAB
ALBUMIN SERPL-MCNC: 3.3 G/DL (ref 3.4–5)
ALP SERPL-CCNC: 76 U/L (ref 40–150)
ALT SERPL W P-5'-P-CCNC: 22 U/L (ref 0–70)
ANION GAP SERPL CALCULATED.3IONS-SCNC: 5 MMOL/L (ref 3–14)
AST SERPL W P-5'-P-CCNC: 22 U/L (ref 0–45)
BASOPHILS # BLD AUTO: 0 10E9/L (ref 0–0.2)
BASOPHILS NFR BLD AUTO: 0.1 %
BILIRUB SERPL-MCNC: 0.7 MG/DL (ref 0.2–1.3)
BUN SERPL-MCNC: 19 MG/DL (ref 7–30)
CALCIUM SERPL-MCNC: 9.1 MG/DL (ref 8.5–10.1)
CHLORIDE SERPL-SCNC: 106 MMOL/L (ref 94–109)
CO2 SERPL-SCNC: 26 MMOL/L (ref 20–32)
CREAT SERPL-MCNC: 1.08 MG/DL (ref 0.66–1.25)
CRP SERPL-MCNC: 152 MG/L (ref 0–8)
DIFFERENTIAL METHOD BLD: ABNORMAL
EOSINOPHIL # BLD AUTO: 0.1 10E9/L (ref 0–0.7)
EOSINOPHIL NFR BLD AUTO: 1.3 %
ERYTHROCYTE [DISTWIDTH] IN BLOOD BY AUTOMATED COUNT: 16.1 % (ref 10–15)
ERYTHROCYTE [SEDIMENTATION RATE] IN BLOOD BY WESTERGREN METHOD: 70 MM/H (ref 0–20)
GFR SERPL CREATININE-BSD FRML MDRD: 73 ML/MIN/{1.73_M2}
GLUCOSE SERPL-MCNC: 101 MG/DL (ref 70–99)
HCT VFR BLD AUTO: 36.8 % (ref 40–53)
HGB BLD-MCNC: 11.7 G/DL (ref 13.3–17.7)
IMM GRANULOCYTES # BLD: 0 10E9/L (ref 0–0.4)
IMM GRANULOCYTES NFR BLD: 0.1 %
INR PPP: 2.63 (ref 0.86–1.14)
LACTATE BLD-SCNC: 0.9 MMOL/L (ref 0.7–2)
LYMPHOCYTES # BLD AUTO: 1 10E9/L (ref 0.8–5.3)
LYMPHOCYTES NFR BLD AUTO: 12.5 %
MCH RBC QN AUTO: 29.3 PG (ref 26.5–33)
MCHC RBC AUTO-ENTMCNC: 31.8 G/DL (ref 31.5–36.5)
MCV RBC AUTO: 92 FL (ref 78–100)
MONOCYTES # BLD AUTO: 0.8 10E9/L (ref 0–1.3)
MONOCYTES NFR BLD AUTO: 9.4 %
NEUTROPHILS # BLD AUTO: 6.3 10E9/L (ref 1.6–8.3)
NEUTROPHILS NFR BLD AUTO: 76.6 %
NRBC # BLD AUTO: 0 10*3/UL
NRBC BLD AUTO-RTO: 0 /100
PLATELET # BLD AUTO: 249 10E9/L (ref 150–450)
POTASSIUM SERPL-SCNC: 3.7 MMOL/L (ref 3.4–5.3)
PROT SERPL-MCNC: 7.9 G/DL (ref 6.8–8.8)
RBC # BLD AUTO: 4 10E12/L (ref 4.4–5.9)
SODIUM SERPL-SCNC: 137 MMOL/L (ref 133–144)
WBC # BLD AUTO: 8.3 10E9/L (ref 4–11)

## 2020-05-18 PROCEDURE — 94660 CPAP INITIATION&MGMT: CPT

## 2020-05-18 PROCEDURE — G0378 HOSPITAL OBSERVATION PER HR: HCPCS

## 2020-05-18 PROCEDURE — 25000132 ZZH RX MED GY IP 250 OP 250 PS 637: Mod: GY | Performed by: INTERNAL MEDICINE

## 2020-05-18 PROCEDURE — 96366 THER/PROPH/DIAG IV INF ADDON: CPT

## 2020-05-18 PROCEDURE — 25800030 ZZH RX IP 258 OP 636: Performed by: PHYSICIAN ASSISTANT

## 2020-05-18 PROCEDURE — 96365 THER/PROPH/DIAG IV INF INIT: CPT

## 2020-05-18 PROCEDURE — G0463 HOSPITAL OUTPT CLINIC VISIT: HCPCS

## 2020-05-18 PROCEDURE — 87040 BLOOD CULTURE FOR BACTERIA: CPT | Performed by: PHYSICIAN ASSISTANT

## 2020-05-18 PROCEDURE — 99223 1ST HOSP IP/OBS HIGH 75: CPT | Mod: AI | Performed by: INTERNAL MEDICINE

## 2020-05-18 PROCEDURE — 36415 COLL VENOUS BLD VENIPUNCTURE: CPT

## 2020-05-18 PROCEDURE — 85025 COMPLETE CBC W/AUTO DIFF WBC: CPT | Performed by: PHYSICIAN ASSISTANT

## 2020-05-18 PROCEDURE — 40000275 ZZH STATISTIC RCP TIME EA 10 MIN

## 2020-05-18 PROCEDURE — 86140 C-REACTIVE PROTEIN: CPT | Performed by: PHYSICIAN ASSISTANT

## 2020-05-18 PROCEDURE — 25000128 H RX IP 250 OP 636: Performed by: PHYSICIAN ASSISTANT

## 2020-05-18 PROCEDURE — 99285 EMERGENCY DEPT VISIT HI MDM: CPT | Mod: 25

## 2020-05-18 PROCEDURE — 93971 EXTREMITY STUDY: CPT | Mod: LT

## 2020-05-18 PROCEDURE — 73720 MRI LWR EXTREMITY W/O&W/DYE: CPT | Mod: LT

## 2020-05-18 PROCEDURE — 85610 PROTHROMBIN TIME: CPT | Performed by: PHYSICIAN ASSISTANT

## 2020-05-18 PROCEDURE — 80053 COMPREHEN METABOLIC PANEL: CPT | Performed by: PHYSICIAN ASSISTANT

## 2020-05-18 PROCEDURE — 25000125 ZZHC RX 250: Performed by: INTERNAL MEDICINE

## 2020-05-18 PROCEDURE — 96368 THER/DIAG CONCURRENT INF: CPT

## 2020-05-18 PROCEDURE — 73630 X-RAY EXAM OF FOOT: CPT | Mod: LT

## 2020-05-18 PROCEDURE — 83605 ASSAY OF LACTIC ACID: CPT | Performed by: PHYSICIAN ASSISTANT

## 2020-05-18 PROCEDURE — 85652 RBC SED RATE AUTOMATED: CPT | Performed by: PHYSICIAN ASSISTANT

## 2020-05-18 RX ORDER — ARIPIPRAZOLE 5 MG/1
5 TABLET ORAL DAILY
Status: DISCONTINUED | OUTPATIENT
Start: 2020-05-19 | End: 2020-05-22 | Stop reason: HOSPADM

## 2020-05-18 RX ORDER — ONDANSETRON 2 MG/ML
4 INJECTION INTRAMUSCULAR; INTRAVENOUS EVERY 6 HOURS PRN
Status: DISCONTINUED | OUTPATIENT
Start: 2020-05-18 | End: 2020-05-22 | Stop reason: HOSPADM

## 2020-05-18 RX ORDER — CEFTRIAXONE 2 G/1
2 INJECTION, POWDER, FOR SOLUTION INTRAMUSCULAR; INTRAVENOUS EVERY 24 HOURS
Status: DISCONTINUED | OUTPATIENT
Start: 2020-05-19 | End: 2020-05-22 | Stop reason: HOSPADM

## 2020-05-18 RX ORDER — TRAZODONE HYDROCHLORIDE 100 MG/1
100 TABLET ORAL
Status: DISCONTINUED | OUTPATIENT
Start: 2020-05-18 | End: 2020-05-18

## 2020-05-18 RX ORDER — ALBUTEROL SULFATE 0.83 MG/ML
2.5 SOLUTION RESPIRATORY (INHALATION) EVERY 6 HOURS PRN
Status: DISCONTINUED | OUTPATIENT
Start: 2020-05-18 | End: 2020-05-22 | Stop reason: HOSPADM

## 2020-05-18 RX ORDER — ONDANSETRON 4 MG/1
4 TABLET, ORALLY DISINTEGRATING ORAL EVERY 6 HOURS PRN
Status: DISCONTINUED | OUTPATIENT
Start: 2020-05-18 | End: 2020-05-22 | Stop reason: HOSPADM

## 2020-05-18 RX ORDER — BUPROPION HYDROCHLORIDE 150 MG/1
300 TABLET, EXTENDED RELEASE ORAL DAILY
Status: DISCONTINUED | OUTPATIENT
Start: 2020-05-19 | End: 2020-05-22 | Stop reason: HOSPADM

## 2020-05-18 RX ORDER — ACETAMINOPHEN 325 MG/1
650 TABLET ORAL EVERY 4 HOURS PRN
Status: DISCONTINUED | OUTPATIENT
Start: 2020-05-18 | End: 2020-05-22 | Stop reason: HOSPADM

## 2020-05-18 RX ORDER — CEFAZOLIN SODIUM 2 G/100ML
2 INJECTION, SOLUTION INTRAVENOUS EVERY 8 HOURS
Status: DISCONTINUED | OUTPATIENT
Start: 2020-05-18 | End: 2020-05-18

## 2020-05-18 RX ORDER — TROLAMINE SALICYLATE 10 G/100G
CREAM TOPICAL PRN
COMMUNITY
End: 2023-02-14

## 2020-05-18 RX ORDER — CHOLECALCIFEROL (VITAMIN D3) 50 MCG
1 TABLET ORAL DAILY
COMMUNITY
End: 2021-03-23

## 2020-05-18 RX ORDER — PANTOPRAZOLE SODIUM 40 MG/1
40 TABLET, DELAYED RELEASE ORAL
Status: DISCONTINUED | OUTPATIENT
Start: 2020-05-19 | End: 2020-05-19

## 2020-05-18 RX ORDER — WARFARIN SODIUM 4 MG/1
8 TABLET ORAL
Status: COMPLETED | OUTPATIENT
Start: 2020-05-18 | End: 2020-05-18

## 2020-05-18 RX ORDER — TEMAZEPAM 15 MG/1
15 CAPSULE ORAL
COMMUNITY
End: 2023-02-14

## 2020-05-18 RX ORDER — AMOXICILLIN 250 MG
1 CAPSULE ORAL 2 TIMES DAILY
Status: DISCONTINUED | OUTPATIENT
Start: 2020-05-18 | End: 2020-05-22 | Stop reason: HOSPADM

## 2020-05-18 RX ORDER — AMOXICILLIN 250 MG
2 CAPSULE ORAL 2 TIMES DAILY
Status: DISCONTINUED | OUTPATIENT
Start: 2020-05-18 | End: 2020-05-22 | Stop reason: HOSPADM

## 2020-05-18 RX ORDER — AMMONIUM LACTATE 12 G/100G
LOTION TOPICAL PRN
COMMUNITY
End: 2021-06-02

## 2020-05-18 RX ORDER — NALOXONE HYDROCHLORIDE 0.4 MG/ML
.1-.4 INJECTION, SOLUTION INTRAMUSCULAR; INTRAVENOUS; SUBCUTANEOUS
Status: DISCONTINUED | OUTPATIENT
Start: 2020-05-18 | End: 2020-05-18

## 2020-05-18 RX ORDER — ACETAMINOPHEN 650 MG/1
650 SUPPOSITORY RECTAL EVERY 4 HOURS PRN
Status: DISCONTINUED | OUTPATIENT
Start: 2020-05-18 | End: 2020-05-22 | Stop reason: HOSPADM

## 2020-05-18 RX ORDER — HYDROMORPHONE HYDROCHLORIDE 1 MG/ML
0.2 INJECTION, SOLUTION INTRAMUSCULAR; INTRAVENOUS; SUBCUTANEOUS
Status: DISCONTINUED | OUTPATIENT
Start: 2020-05-18 | End: 2020-05-22 | Stop reason: HOSPADM

## 2020-05-18 RX ORDER — HYDROCODONE BITARTRATE AND ACETAMINOPHEN 5; 325 MG/1; MG/1
1-2 TABLET ORAL EVERY 4 HOURS PRN
Status: DISCONTINUED | OUTPATIENT
Start: 2020-05-18 | End: 2020-05-22 | Stop reason: HOSPADM

## 2020-05-18 RX ORDER — LIDOCAINE 40 MG/G
CREAM TOPICAL
Status: DISCONTINUED | OUTPATIENT
Start: 2020-05-18 | End: 2020-05-22 | Stop reason: HOSPADM

## 2020-05-18 RX ORDER — CEFTRIAXONE 1 G/1
1 INJECTION, POWDER, FOR SOLUTION INTRAMUSCULAR; INTRAVENOUS ONCE
Status: COMPLETED | OUTPATIENT
Start: 2020-05-18 | End: 2020-05-18

## 2020-05-18 RX ORDER — NALOXONE HYDROCHLORIDE 0.4 MG/ML
.1-.4 INJECTION, SOLUTION INTRAMUSCULAR; INTRAVENOUS; SUBCUTANEOUS
Status: DISCONTINUED | OUTPATIENT
Start: 2020-05-18 | End: 2020-05-22 | Stop reason: HOSPADM

## 2020-05-18 RX ORDER — CLINDAMYCIN PHOSPHATE 600 MG/50ML
600 INJECTION, SOLUTION INTRAVENOUS EVERY 8 HOURS
Status: DISCONTINUED | OUTPATIENT
Start: 2020-05-18 | End: 2020-05-22 | Stop reason: HOSPADM

## 2020-05-18 RX ORDER — LACTOBACILLUS RHAMNOSUS GG 10B CELL
1 CAPSULE ORAL 2 TIMES DAILY
Status: DISCONTINUED | OUTPATIENT
Start: 2020-05-18 | End: 2020-05-22 | Stop reason: HOSPADM

## 2020-05-18 RX ORDER — NICOTINE 21 MG/24HR
1 PATCH, TRANSDERMAL 24 HOURS TRANSDERMAL DAILY
Status: DISCONTINUED | OUTPATIENT
Start: 2020-05-18 | End: 2020-05-22 | Stop reason: HOSPADM

## 2020-05-18 RX ORDER — HYDROCORTISONE 25 MG/G
CREAM TOPICAL 2 TIMES DAILY PRN
COMMUNITY
End: 2021-06-02

## 2020-05-18 RX ORDER — FUROSEMIDE 40 MG
40 TABLET ORAL EVERY MORNING
Status: DISCONTINUED | OUTPATIENT
Start: 2020-05-19 | End: 2020-05-22 | Stop reason: HOSPADM

## 2020-05-18 RX ORDER — ACETAMINOPHEN 325 MG/1
325-650 TABLET ORAL EVERY 6 HOURS PRN
Status: DISCONTINUED | OUTPATIENT
Start: 2020-05-18 | End: 2020-05-18

## 2020-05-18 RX ORDER — SPIRONOLACTONE 25 MG/1
25 TABLET ORAL DAILY
Status: DISCONTINUED | OUTPATIENT
Start: 2020-05-19 | End: 2020-05-22 | Stop reason: HOSPADM

## 2020-05-18 RX ADMIN — CEFTRIAXONE SODIUM 1 G: 1 INJECTION, POWDER, FOR SOLUTION INTRAMUSCULAR; INTRAVENOUS at 12:13

## 2020-05-18 RX ADMIN — WARFARIN SODIUM 8 MG: 4 TABLET ORAL at 19:50

## 2020-05-18 RX ADMIN — CLINDAMYCIN PHOSPHATE 600 MG: 600 INJECTION, SOLUTION INTRAVENOUS at 18:15

## 2020-05-18 RX ADMIN — UMECLIDINIUM 1 PUFF: 62.5 AEROSOL, POWDER ORAL at 18:17

## 2020-05-18 RX ADMIN — VANCOMYCIN HYDROCHLORIDE 2500 MG: 5 INJECTION, POWDER, LYOPHILIZED, FOR SOLUTION INTRAVENOUS at 13:41

## 2020-05-18 RX ADMIN — Medication 1 CAPSULE: at 21:08

## 2020-05-18 RX ADMIN — CLINDAMYCIN PHOSPHATE 600 MG: 600 INJECTION, SOLUTION INTRAVENOUS at 23:20

## 2020-05-18 RX ADMIN — Medication 1 MG: at 21:08

## 2020-05-18 ASSESSMENT — ENCOUNTER SYMPTOMS
SHORTNESS OF BREATH: 0
ABDOMINAL PAIN: 0
FEVER: 0
COLOR CHANGE: 1
CHILLS: 0

## 2020-05-18 ASSESSMENT — MIFFLIN-ST. JEOR: SCORE: 2262.76

## 2020-05-18 NOTE — CONSULTS
[facility]    Infectious Disease Consultation     Date of Admission:  5/18/2020  Date of Consult (When I saw the patient): 05/18/20    Assessment & Plan   Petey Archibald is a 59 year old male who was admitted on 5/18/2020.     Impression:  1. 59 y.o male with history of recurrent cellulitis.   2. PVD.   3. Chronic venous stasis dermatitis.   4. Admitted with left lower extremity cellulitis and a wound between the first and second toe draining purulent drainage.   5. MRSA skin colonization. MRSA sensitive to clindamycin.   6. PCN allergy, but tolerates cephalosporins ok.     Recommendations:   Will do ceftriaxone and clindamycin for this, MRSA in past clinda sensitive.   Keep Left LE elevated at all times siting and lying down.   Follow up on the blood cultures.     Jenifer Quispe MD    Reason for Consult   Reason for consult: I was asked to evaluate this patient for left foot cellulitis     Primary Care Physician   Hortensia Peck    Chief Complaint   Left foot cellulitis     History is obtained from the patient and medical records    History of Present Illness   Petey Archibald is a 59 with sleep apnea on CPAP, peripheral vascular disease, chronic venous stasis dermatitis, hyperlipidemia, history of DVT and PE in the past, on chronic anticoagulation on Coumadin, intellectual disabilities, GERD, recent history of left lower extremity cellulitis, now comes to the ER again with left leg swelling, pain and redness along with an open wound on the foot which is draining purulent drainage.     Past Medical History   I have reviewed this patient's medical history and updated it with pertinent information if needed.   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     COPD (chronic obstructive pulmonary disease) (H)      History of DVT of lower extremity      History of pulmonary embolism      Hyperlipidemia      Mild intellectual disabilities      Morbid obesity (H)   "    Peripheral edema      Peripheral vascular disease (H)      Sleep apnea      Tobacco dependence      Venous stasis dermatitis        Past Surgical History   I have reviewed this patient's surgical history and updated it with pertinent information if needed.  Past Surgical History:   Procedure Laterality Date     COLONOSCOPY N/A 4/15/2019    Procedure: COMBINED COLONOSCOPY, SINGLE OR MULTIPLE BIOPSY/POLYPECTOMY BY BIOPSY;  Surgeon: Jovana Ohara MD;  Location:  GI     EXCISE MALIGNANT LESIONS, TRUNK/ARMS/LEGS 0.5CM OR LESS Left 2017    Procedure: EXCISE MALIGNANT LESIONS, TRUNK/ARMS/LEGS 0.5CM OR LESS;  EXCISION LEFT ELBOW MASS;  Surgeon: Jose Azevedo MD;  Location:  GI     RECTAL SURGERY      perianal abscess?       Prior to Admission Medications   Prior to Admission Medications   Prescriptions Last Dose Informant Patient Reported? Taking?   ARIPiprazole (ABILIFY) 5 MG tablet 2020 at Unknown time Pharmacy Yes Yes   Sig: Take 5 mg by mouth daily   Cadexomer Iodine, topical, 0.9% (IODOSORB) 0.9 % GEL gel   No No   Sig: Apply to wound left foot every other day with bandage   DESENEX 2 % external powder prn med  Yes Yes   DEXILANT 60 MG CPDR CR capsule 2020 at Unknown time Pharmacy No Yes   Sig: TAKE 1 CAPSULE BY MOUTH ONCE DAILY (FOR HEARTBURN)   EPINEPHrine (EPIPEN 2-BRE) 0.3 MG/0.3ML injection 2-pack prn med Pharmacy No Yes   Sig: INJECT 0.3MG INTRAMUSCULAR ONE TIME FOR ONE DOSE AS NEEDED FOR ANAPHYLAXIS (CALL 911 IF YOU HAVE TO GIVE) (FOR BEE STINGS) **LABEL EACH PEN*   Gauze Pads & Dressings (GAUZE PADS 4\"X4\") 4\"X4\" PADS   No No   Si each 3 times daily as needed   PULMICORT FLEXHALER 180 MCG/ACT inhaler 2020 at x1 Pharmacy No Yes   Sig: INHALE 2 PUFFS INTO THE LUNGS TWICE DAILY   SPIRIVA HANDIHALER 18 MCG inhaled capsule 2020 at Unknown time Pharmacy No Yes   Sig: INHALE CONTENTS OF 1 CAPSULE INTO THE LUNGS ONCE DAILY. FOR BRONCHITIS AND EMPHYSEMA.   Patient " taking differently: Inhale into the lungs At Bedtime    acetaminophen (TYLENOL) 325 MG tablet prn med Pharmacy No Yes   Sig: Take 1-2 tablets (325-650 mg) by mouth every 6 hours as needed   albuterol (ALBUTEROL) 108 (90 BASE) MCG/ACT inhaler prn med Pharmacy No Yes   Sig: Inhale 2 puffs into the lungs every 6 hours as needed   ammonium lactate (LAC-HYDRIN) 12 % external lotion prn med  Yes Yes   Sig: Apply topically as needed for dry skin feet   buPROPion (WELLBUTRIN SR) 150 MG 12 hr tablet 2020 at Unknown time Pharmacy Yes Yes   Sig: Take 300 mg by mouth daily (2 x 150 mg tablet)   clotrimazole (LOTRIMIN) 1 % external cream 2020 at x1  No Yes   Sig: Apply topically 2 times daily   diphenhydrAMINE (BENADRYL) 50 MG capsule prn med  Yes Yes   Si mg every 4 hours as needed for itching    ferrous gluconate (FERGON) 324 (38 Fe) MG tablet 2020 at x1 Pharmacy No Yes   Sig: TAKE 1 TABLET BY MOUTH TWICE DAILY (IRON SUPPLEMENT)   furosemide (LASIX) 40 MG tablet 2020 at Unknown time Pharmacy Yes Yes   Sig: Take 40 mg by mouth every morning   furosemide (LASIX) 80 MG tablet 2020 at Unknown time Pharmacy Yes Yes   Sig: Take 80 mg by mouth every evening At 1500 daily   gabapentin (NEURONTIN) 100 MG capsule 2020 at x1  Yes Yes   Si mg 3 times daily    hydrocortisone, Perianal, (ANUSOL-HC) 2.5 % cream prn med  Yes Yes   Sig: Place rectally 2 times daily as needed for hemorrhoids   ketoconazole (NIZORAL) 2 % external cream 2020 at Unknown time  No Yes   Sig: Apply topically twice daily to rash for two weeks then decrease to once daily until rash has resolved   Patient taking differently: daily Apply topically twice daily to rash for two weeks then decrease to once daily until rash has resolved   montelukast (SINGULAIR) 10 MG tablet 2020 at Unknown time Pharmacy No Yes   Sig: TAKE 1 TABLET BY MOUTH EVERY MORNING. (ASTHMA)   multivitamin, therapeutic (THERA-VIT) TABS tablet 2020  at Unknown time Pharmacy Yes Yes   Sig: Take 1 tablet by mouth daily   naltrexone (DEPADE/REVIA) 50 MG tablet 5/18/2020 at Unknown time Pharmacy Yes Yes   Sig: Take 100 mg by mouth daily ( 2 x 50 mg tablet)   order for DME   No No   Sig: Equipment being ordered: support compression hose BK  2 pair black 30mm HG   To be applied on arising & removed while lying down to go to sleep   order for DME   No No   Sig: Equipment being ordered: CPAP with mask and tubing   order for DME   No No   Sig: Oxygen 2 Li/min  at night and bled into BiPAP   order for DME   No No   Sig: [employer] [facility] Phone [phone] Fax [phone]  Primary Dressing Hydrofera Blue Ready   Qty 5 sheets  Secondary Dressing 4' roll gauze Qty 30  Secondary Dressing 2' medipore tape Qty 1  Secondary Dressing 4x4 gauze loaf Qty  1  Length of Need: 1 month  Frequency of dressing change: daily   order for DME   No No   Sig: [employer] [facility] Phone [phone] Fax [phone]  EdemaWear Size Medium/Yellow qty 4 sleeves  Length of Need: 1 month  Frequency of dressing change daily   order for DME   No No   Sig: [employer] Stockings  Phone #[phone]  Fax #[phone]  Coolflex Velcro wraps    Length of Need: Life Time  # of Pairs 1 set   order for DME   No No   Sig: Equipment being ordered: 1 surgical shoe   potassium chloride SA (K-DUR/KLOR-CON M) 20 MEQ CR tablet 5/18/2020 at x1 Pharmacy No Yes   Sig: TAKE 1 TABLET BY MOUTH TWICE DAILY. (LOW POTASSIUM)   sertraline (ZOLOFT) 100 MG tablet 5/18/2020 at Unknown time Pharmacy Yes Yes   Sig: Take 150 mg by mouth daily    silver sulfADIAZINE (SILVADENE) 1 % external cream 5/18/2020 at Unknown time  No Yes   Sig: Apply a small amount to wound on toe daily with dressing changes.   simvastatin (ZOCOR) 40 MG tablet 5/18/2020 at Unknown time Pharmacy No Yes   Sig: TAKE 1 TABLET BY MOUTH EVERY MORNING.  (CHOLESTEROL)   spironolactone (ALDACTONE) 25 MG tablet 5/18/2020 at Unknown time Pharmacy No  Yes   Sig: TAKE 1 TABLET BY MOUTH ONCE DAILY. (HIGH BLOOD PRESSURE)   temazepam (RESTORIL) 15 MG capsule prn med  Yes Yes   Sig: Take 15 mg by mouth nightly as needed for sleep   trolamine salicylate (ASPERCREME) 10 % external cream prn med  Yes Yes   Sig: Apply topically as needed for moderate pain knee   vitamin D3 (CHOLECALCIFEROL) 2000 units (50 mcg) tablet 5/18/2020 at Unknown time  Yes Yes   Sig: Take 1 tablet by mouth daily   warfarin ANTICOAGULANT (COUMADIN) 4 MG tablet 5/17/2020 at Unknown time  No Yes   Sig: Take 2 tabs (8 mg) every day RECHECK INR 5/20/2020   Patient taking differently: every evening Take 2 tabs (8 mg) every day RECHECK INR 5/20/2020      Facility-Administered Medications: None     Allergies   Allergies   Allergen Reactions     Bees      Augmentin Rash     Penicillins Rash       Immunization History   Immunization History   Administered Date(s) Administered     FLU 6-35 months 10/20/2016     Influenza (H1N1) 05/20/2010     Influenza (High Dose) 3 valent vaccine 10/03/2013     Influenza (IIV3) PF 10/12/1999, 11/23/2012, 10/03/2013     Influenza Quad, Recombinant, p-free (RIV4) 09/24/2019     Influenza Vaccine IM > 6 months Valent IIV4 10/25/2014, 10/01/2015, 10/20/2016, 10/30/2017, 10/20/2018     Pneumo Conj 13-V (2010&after) 07/26/2016     Pneumococcal 23 valent 11/01/2003, 05/20/2010     TDAP Vaccine (Adacel) 02/18/2013     Td (Adult), Adsorbed 12/22/1999       Social History   I have reviewed this patient's social history and updated it with pertinent information if needed. Petey Archibald  reports that he has been smoking cigarettes. He has a 30.00 pack-year smoking history. He uses smokeless tobacco. He reports that he does not drink alcohol or use drugs.    Family History   I have reviewed this patient's family history and updated it with pertinent information if needed.   Family History   Problem Relation Age of Onset     Diabetes Brother      Unknown/Adopted Mother       Unknown/Adopted Father        Review of Systems   The 10 point Review of Systems is negative other than noted in the HPI or here.     Physical Exam   Temp: 98.7  F (37.1  C) Temp src: Oral BP: 134/75   Heart Rate: 83 Resp: 18 SpO2: 98 % O2 Device: None (Room air)    Vital Signs with Ranges  Temp:  [98.4  F (36.9  C)-98.7  F (37.1  C)] 98.7  F (37.1  C)  Heart Rate:  [83] 83  Resp:  [16-18] 18  BP: (129-134)/(73-75) 134/75  SpO2:  [94 %-98 %] 98 %  320 lbs 0 oz  Body mass index is 45.92 kg/m .    GENERAL APPEARANCE:  alert and no distress  EYES: Eyes grossly normal to inspection, PERRL and conjunctivae and sclerae normal  HENT: ear canals and TM's normal and nose and mouth without ulcers or lesions  NECK: no adenopathy, no asymmetry, masses, or scars and thyroid normal to palpation  RESP: lungs clear to auscultation - no rales, rhonchi or wheezes  CV: regular rates and rhythm, normal S1 S2, no S3 or S4 and no murmur, click or rub  LYMPHATICS: normal ant/post cervical and supraclavicular nodes  ABDOMEN: soft, nontender, without hepatosplenomegaly or masses and bowel sounds normal  MS: Left LE redness and swelling along with foot wound with purulent drainage   SKIN: no suspicious lesions or rashes      Data   Lab Results   Component Value Date    WBC 8.3 05/18/2020    HGB 11.7 (L) 05/18/2020    HCT 36.8 (L) 05/18/2020     05/18/2020     05/18/2020    POTASSIUM 3.7 05/18/2020    CHLORIDE 106 05/18/2020    CO2 26 05/18/2020    BUN 19 05/18/2020    CR 1.08 05/18/2020     (H) 05/18/2020    DD <0.3 02/08/2019    TROPI <0.015 02/08/2019    AST 22 05/18/2020    ALT 22 05/18/2020    ALKPHOS 76 05/18/2020    BILITOTAL 0.7 05/18/2020    INR 2.63 (H) 05/18/2020     Recent Labs   Lab 05/18/20  1204   CULT PENDING  PENDING     Recent Labs   Lab Test 05/18/20  1204 01/04/20  2347 08/14/18  1334   CULT PENDING  PENDING Heavy growth  Mixed fecal kade    Light growth  Methicillin resistant Staphylococcus aureus  (MRSA)  * Single colony  Coagulase negative Staphylococcus  Susceptibility testing not routinely done  *  Moderate growth  Gram positive bacilli resembling diphtheroids  No further identification  *       Amount of time performed on this consult: 45 minutes. This includes face to face assessment and care coordination with the primary team.

## 2020-05-18 NOTE — ED PROVIDER NOTES
Emergency Department Attending Supervision Note  5/18/2020  12:31 PM      I evaluated this patient in conjunction with Nicol Scruggs PA-C.      Briefly, the patient presented with erythema to the left leg which started last night red last night. Last night, it was below his foot, and when he woke it was up to his knee. He also states he has felt pain in his left knee. He has been following with podiatry for an ulcer on his foot. He notes he fell on his left knee several years ago but denies any recent falls or injuries.     On my exam  Physical Exam   General:  Sitting on bed by self, comfortable appearing.   HENT:  No obvious trauma to head  Right Ear:  External ear normal.   Left Ear:  External ear normal.   Nose:  Nose normal.   Eyes:  Conjunctivae and EOM are normal.  Neck: Normal range of motion. Neck supple. No tracheal deviation present.   Pulm/Chest: No respiratory distress  M/S: Normal range of motion.   Neuro: Alert. GCS 15.  Skin: Skin is warm and dry. There is an area of cellulitis and warmth of the left leg from above the knee down to the foot as noted below. Wound between the great and second toe. Not diaphoretic. DP pulse +2 on left.  Psych: Normal mood and affect. Behavior is normal.                  My impression is cellulitis.    Brief MDM: Patient is concerned about the erythema above.  He has a prior DVT, but his INR is therapeutic and there is no DVT today.  He has a history of MRSA this was provided both Rocephin and vancomycin.  Given how rapidly the erythema has progressed, he will be admitted to the hospital.  His dorsalis pedis pulse was palpable at +2.  He does have known peripheral vascular disease. He will be admitted to the hospital for continued evaluation and treatment. Patient agrees. Dr. Grover has accepted the patient.      Diagnosis    ICD-10-CM    1. Cellulitis of left lower extremity  L03.116    2. Personal history of DVT (deep vein thrombosis)  Z86.718          Nam TA  Yolis, am serving as a scribe on 5/18/2020 at 12:31 PM to personally document services performed by Thomas Kelly DO based on my observations and the provider's statements to me.            Thomas Kelly DO  05/18/20 [id number]

## 2020-05-18 NOTE — ED PROVIDER NOTES
History     Chief Complaint: Wound check    HPI   Petey Archibald is an anticoaulated (Coumadin 4 mg) 59 year old male with a history of cellulitis, peripheral vascular disease, deep venous thrombosis, schizoaffective disorder, and morbid obesity who presents for a wound check. He notes his left leg became red last night, in addition to the chronic darkening of his skin. Last night, it was below his foot, and when he woke it was up to his knee. He also states he has felt pain in his left knee. He has been following with podiatry for an ulcer on his foot. He notes he fell on his left knee several years ago but denies any recent falls or injuries. He mentions a history of wheezing, though he feels this is unchanged from his baseline. He denies abdominal pain. He denies fevers, chills, chest pain, and shortness of breath.    Allergies:  Augmentin  Penicillins    Medications:    Benadryl  Gabapentin  Hesperidin-diosmin  Lasix  Singulair  Albuterol inhaler  Wellbutrin  Trazodone  Chantix  Warfarin  Ambien  Zoloft  Simvastatin  Spironolactone    Past Medical History:    COPD   PE   Hyperlipidemia  Mild intellectual disabilities  Morbid obesity  Peripheral edema  Peripheral vascular disease  Venous stasis dermatitis  Sleep apnea  Tobacco dependence    Past Surgical History:    Malignant lesion excision, left elbow  Perianal abscess drainage    Family History:    Diabetes mellitus (brother)    Social History:  Smoking status: Yes, 1 pack/day, started at age 18  Alcohol use: No  Drug use: No  PCP: Hortensia Peck  Lives in a group home  Marital Status:  Single [1]    Review of Systems   Constitutional: Negative for chills and fever.   Respiratory: Negative for shortness of breath.    Cardiovascular: Negative for chest pain.   Gastrointestinal: Negative for abdominal pain.   Skin: Positive for color change.   All other systems reviewed and are negative.    Physical Exam   First Vitals:  Patient Vitals for the past 24  "hrs:   BP Temp Temp src Heart Rate Resp SpO2 Height Weight   05/18/20 1147 129/73 98.4  F (36.9  C) Oral 83 16 94 % 1.778 m (5' 10\") 145.2 kg (320 lb)       Physical Exam  General: Alert and interactive. Appears in mild distress.   Eyes: The pupils are equal and round. EOMs intact. No scleral icterus.  ENT: No abnormalities to the external nose or ears. Mucous membranes moist. Posterior oropharynx is non-erythematous.      Neck: Trachea is in the midline. No nuchal rigidity.     CV: Regular rate and rhythm. S1 and S2 normal without murmur, click, gallop or rub. Left DP pulsations normal.   Resp: Breath sounds are clear bilaterally, without rhonchi, wheezes, rales. Non-labored, no retractions or accessory muscle use.     GI: Abdomen is soft without distension. No tenderness to palpation. No peritoneal signs.    MS: Moving all extremities well. No calf body TTP. Patient able to fully flex and extend the knee.   Skin: Warm and dry. Skin breakdown in between first and second toes, as noted below. This is chronic per chart review and patient report. There is 1 to 2+ edema in LLE with chronic venous stasis changes mid-shin and below. There is new erythema and warmth extending up past the knee.             Neuro: Alert and oriented x 3. No focal neurologic deficits. Good strength and sensation in upper and lower extremities.    Psych: Awake. Alert.  Normal affect. Appropriate interactions.  Lymph: No anterior or posterior cervical lymphadenopathy noted.    Emergency Department Course   Imaging:  Radiographic findings were communicated with the patient who voiced understanding of the findings.    US Lower Extremity Venous Duplex Left:  No evidence of deep venous thrombosis in the left lower extremity   As read by Radiology.    Laboratory:  CMP: Glucose 101 (H), Albumin 3.3 (L) o/w WNL (Creatinine 1.08)  CBC: HGB 11.7 (L) o/w WNL (WBC 8.3 and )  Lactic acid whole blood (resulted 1220): 0.9  INR: 2.63 (H)  Blood " Culture x2: Pending    Procedures: None.    Interventions:  1213 Ceftriaxone 1 g IV  1341 Vancomycin 2500 mg IV    Emergency Department Course:  Past medical records, nursing notes, and vitals reviewed.  1125: I performed an exam of the patient and obtained history, as documented above.    IV inserted and Blood drawn. This was sent to the lab for further testing, results above.    The patient was sent for a Lower Extremity US while in the emergency department, findings above.    1130: I rechecked the patient.     1230: I staffed the patient with Dr. Kelly.     1301: I rechecked the patient. Findings and plan explained to the patient who consents to admission.     1318: Discussed the patient with Dr. Grover, who will admit the patient to an Obs bed for further monitoring, evaluation, and treatment.     Impression & Plan    Medical Decision Making:  Petey Archibald is a 59 year old male who presents for evaluation of skin redness to the LLE with known history of LLE cellulitis and DVT. He has a history of DVT, therapeutic INR here. US negative for DVT. Good DP pulses and extremity well perfused.The history, physical exam, and supporting data are consistent with cellulitis, as pictured above in exam section. There do not appear at this time to be any complication of cellulitis including necrotizing fascitis, lymphangitis, lymphadenitis, abscess, osteomyelitis, sepsis, or shock. Cellulitis is fast growing and will require admission for IV antibiotics. The patient is not immunosuppressed or diabetic. No fever, systemic symptoms, leukocytosis. Area of cellulitis was outlined. Discussed care with Dr. Grover, who will admit to an observation bed for further evaluation and treatment to ensure resolution of cellulitis.      Critical Care time:  none    Diagnosis:    ICD-10-CM    1. Cellulitis of left lower extremity  L03.116    2. Personal history of DVT (deep vein thrombosis)  Z86.718      Disposition: Admitted to Obs  bed    I, Harshal Ramirez, am serving as a scribe at 12:09 PM on 5/18/2020 to document services personally performed by Alisia Scruggs, based on my observations and the provider's statements to me.     Harshal Ramirez  5/18/2020    EMERGENCY DEPARTMENT       Alisia Scruggs PA-C  05/18/20 1413

## 2020-05-18 NOTE — PROVIDER NOTIFICATION
Paged hospitalist for an order for CPAP per pt's request. Awaiting for the response.    No-Patient/Caregiver offered and refused free interpretation services.

## 2020-05-18 NOTE — PROGRESS NOTES
Observation goals  PRIOR TO DISCHARGE      diagnostic tests and consults completed and resulted : Not met  -vital signs normal or at patient baseline : met   Nurse to notify provider when observation goals have been met and patient is ready for discharge.           Patient transferred from ED @ 1450. A&O x4. Up with SBA. Denies chest pain or SOB. Voiding per urinal. MRI checklist completed and faxed. Declined pain meds. Will continue to monitor.

## 2020-05-18 NOTE — PHARMACY-ADMISSION MEDICATION HISTORY
Pharmacy Medication History  Admission medication history interview status for the 5/18/2020  admission is complete. See EPIC admission navigator for prior to admission medications     Medication history sources: [facility] (group [facility])  Medication history source reliability: Good  Adherence assessment: Good    Medication reconciliation completed by provider prior to medication history? No    Time spent in this activity: 25 minutes      Prior to Admission medications    Medication Sig Last Dose Taking? Auth Provider   acetaminophen (TYLENOL) 325 MG tablet Take 1-2 tablets (325-650 mg) by mouth every 6 hours as needed prn med Yes Raúl Riddle MD   albuterol (ALBUTEROL) 108 (90 BASE) MCG/ACT inhaler Inhale 2 puffs into the lungs every 6 hours as needed prn med Yes Wilman Peck MD   ammonium lactate (LAC-HYDRIN) 12 % external lotion Apply topically as needed for dry skin feet prn med Yes Unknown, Entered By History   ARIPiprazole (ABILIFY) 5 MG tablet Take 5 mg by mouth daily 5/18/2020 at Unknown time Yes Unknown, Entered By History   buPROPion (WELLBUTRIN SR) 150 MG 12 hr tablet Take 300 mg by mouth daily (2 x 150 mg tablet) 5/18/2020 at Unknown time Yes Unknown, Entered By History   clotrimazole (LOTRIMIN) 1 % external cream Apply topically 2 times daily 5/18/2020 at x1 Yes Nadira Westfall MD   DESENEX 2 % external powder  prn med Yes Reported, Patient   DEXILANT 60 MG CPDR CR capsule TAKE 1 CAPSULE BY MOUTH ONCE DAILY (FOR HEARTBURN) 5/18/2020 at Unknown time Yes Raúl Riddle MD   diphenhydrAMINE (BENADRYL) 50 MG capsule 50 mg every 4 hours as needed for itching  prn med Yes Reported, Patient   EPINEPHrine (EPIPEN 2-BRE) 0.3 MG/0.3ML injection 2-pack INJECT 0.3MG INTRAMUSCULAR ONE TIME FOR ONE DOSE AS NEEDED FOR ANAPHYLAXIS (CALL 911 IF YOU HAVE TO GIVE) (FOR BEE STINGS) **LABEL EACH PEN* prn med Yes Raúl Riddle MD   ferrous gluconate (FERGON) 324 (38 Fe) MG tablet TAKE 1 TABLET BY MOUTH TWICE  DAILY (IRON SUPPLEMENT) 5/18/2020 at x1 Yes Raúl Riddle MD   furosemide (LASIX) 40 MG tablet Take 40 mg by mouth every morning 5/18/2020 at Unknown time Yes Unknown, Entered By History   furosemide (LASIX) 80 MG tablet Take 80 mg by mouth every evening At 1500 daily 5/17/2020 at Unknown time Yes Unknown, Entered By History   gabapentin (NEURONTIN) 100 MG capsule 200 mg 3 times daily  5/18/2020 at x1 Yes Reported, Patient   hydrocortisone, Perianal, (ANUSOL-HC) 2.5 % cream Place rectally 2 times daily as needed for hemorrhoids prn med Yes Unknown, Entered By History   ketoconazole (NIZORAL) 2 % external cream Apply topically twice daily to rash for two weeks then decrease to once daily until rash has resolved  Patient taking differently: daily Apply topically twice daily to rash for two weeks then decrease to once daily until rash has resolved 5/17/2020 at Unknown time Yes Jovana Martin DO   montelukast (SINGULAIR) 10 MG tablet TAKE 1 TABLET BY MOUTH EVERY MORNING. (ASTHMA) 5/18/2020 at Unknown time Yes Raúl Riddle MD   multivitamin, therapeutic (THERA-VIT) TABS tablet Take 1 tablet by mouth daily 5/18/2020 at Unknown time Yes Unknown, Entered By History   naltrexone (DEPADE/REVIA) 50 MG tablet Take 100 mg by mouth daily ( 2 x 50 mg tablet) 5/18/2020 at Unknown time Yes Unknown, Entered By History   potassium chloride SA (K-DUR/KLOR-CON M) 20 MEQ CR tablet TAKE 1 TABLET BY MOUTH TWICE DAILY. (LOW POTASSIUM) 5/18/2020 at x1 Yes Raúl Riddle MD   PULMICORT FLEXHALER 180 MCG/ACT inhaler INHALE 2 PUFFS INTO THE LUNGS TWICE DAILY 5/18/2020 at x1 Yes Raúl Riddle MD   sertraline (ZOLOFT) 100 MG tablet Take 150 mg by mouth daily  5/18/2020 at Unknown time Yes Reported, Patient   silver sulfADIAZINE (SILVADENE) 1 % external cream Apply a small amount to wound on toe daily with dressing changes. 5/18/2020 at Unknown time Yes Maggy Knutson, DPM, Podiatry/Foot and Ankle Surgery   simvastatin (ZOCOR)  "40 MG tablet TAKE 1 TABLET BY MOUTH EVERY MORNING.  (CHOLESTEROL) 5/18/2020 at Unknown time Yes Raúl Riddle MD   SPIRIVA HANDIHALER 18 MCG inhaled capsule INHALE CONTENTS OF 1 CAPSULE INTO THE LUNGS ONCE DAILY. FOR BRONCHITIS AND EMPHYSEMA.  Patient taking differently: Inhale into the lungs At Bedtime  5/17/2020 at Unknown time Yes Hortensia Peck MD   spironolactone (ALDACTONE) 25 MG tablet TAKE 1 TABLET BY MOUTH ONCE DAILY. (HIGH BLOOD PRESSURE) 5/18/2020 at Unknown time Yes Raúl Riddle MD   temazepam (RESTORIL) 15 MG capsule Take 15 mg by mouth nightly as needed for sleep prn med Yes Unknown, Entered By History   trolamine salicylate (ASPERCREME) 10 % external cream Apply topically as needed for moderate pain knee prn med Yes Unknown, Entered By History   vitamin D3 (CHOLECALCIFEROL) 2000 units (50 mcg) tablet Take 1 tablet by mouth daily 5/18/2020 at Unknown time Yes Unknown, Entered By History   warfarin ANTICOAGULANT (COUMADIN) 4 MG tablet Take 2 tabs (8 mg) every day RECHECK INR 5/20/2020  Patient taking differently: every evening Take 2 tabs (8 mg) every day RECHECK INR 5/20/2020 5/17/2020 at Unknown time Yes Hortensia Peck MD   Cadexomer Iodine, topical, 0.9% (IODOSORB) 0.9 % GEL gel Apply to wound left foot every other day with bandage   Domingo De La Torre DPM   Gauze Pads & Dressings (GAUZE PADS 4\"X4\") 4\"X4\" PADS 1 each 3 times daily as needed   Wilman Peck MD   order for DME Equipment being ordered: 1 surgical shoe   Dominog De La Torre DPM   order for DME [employer] [facility] Phone [phone] Fax [phone]  EdemaWear Size Medium/Yellow qty 4 sleeves  Length of Need: 1 month  Frequency of dressing change daily   Rustam Davis MD   order for DME [employer]  Phone #[phone]  Fax #[phone]  Coolflex Velcro wraps    Length of Need: Life Time  # of Pairs 1 set   Rustam Davis MD   order for [facility] " Phone [phone] Fax [phone]  Primary Dressing Hydrofera Blue Ready   Qty 5 sheets  Secondary Dressing 4' roll gauze Qty 30  Secondary Dressing 2' medipore tape Qty 1  Secondary Dressing 4x4 gauze loaf Qty  1  Length of Need: 1 month  Frequency of dressing change: daily   Rustam Davis MD   order for DME Oxygen 2 Li/min  at night and bled into BiPAP   Goltz, Bennett Ezra, PA-C   order for DME Equipment being ordered: CPAP with mask and tubing   Raúl Riddle MD   order for DME Equipment being ordered: support compression hose BK  2 pair black 30mm HG   To be applied on arising & removed while lying down to go to sleep   Hortensia Peck MD Beth Mulder, PharmD

## 2020-05-18 NOTE — H&P
[facility]    History and Physical  Hospitalist       Date of Admission:  5/18/2020    Assessment & Plan   Petey Archibald is a 59 year old male with morbid obesity, history of DVT and PE but left lower extremity presents to the emergency department with complaints of sudden onset of erythema and tenderness on left lower extremity, patient does have a chronic left toe wound    Active Problems:    Cellulitis of left lower extremity    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)    Peripheral vascular disease (H)  Possible septic knee joint   --- Patient is a chronic smoker and has peripheral vascular disease, he follows up with podiatry   for his wound ulcer, he was last seen last month, he does a regular wound checks and cleaning with a silver sulfadiazine on the day of admission he noticed some erythema prior to going to bed but significant erythema which is expanding above his left knee joint was noted when he woke up.  --Dopplers done on lower extremity which is negative for any DVT or superficial thrombophlebitis.  ---left knee joint is very tender and swollen, consult orthopedics  For possible arthrocentesis, septic joint?  --Admit to observation, start on IV antibiotics, cefazolin and vancomycin as patient has history of MRSA in the past.  --- Patient has history of edema bilateral lower extremities, he is on Lasix and Spironolactone, will continue that we will be careful and hydrating him.  Currently no sign of sepsis, will obtain pro calcitonin levels for a.m.  Patient received Rocephin and vancomycin in the emergency department  --He does have a chronic wound near the base of left great toe, will request podiatry input.  Patient could have underlying severe peripheral vascular disease.    History of pulmonary embolism    History of DVT of lower extremity    Morbid obesity due to excess calories (H)  BMI 40-50  --Continue warfarin, pharmacy to dose warfarin, his INR  is therapeutic today    Tobacco dependence:40-50 pk yr hx  --- Continue nicotine patch    Hypercholesterolemia  --- Restart statin once medications reconciled    Mixed simple and mucopurulent chronic bronchitis (HCC) CT 4-06 wnl and neg bronch for hemoptesis spirometry 7-26-16  FVC=59% & w mod restriction  and lung age of 82 in 56 y/o   --- Continue his Spiriva, albuterol inhalers, patient is a chronic smoker, he still continues to smoke    NEL (obstructive sleep apnea)    Schizoaffective disorder, bipolar type (H)  --- Patient is on multiple medications for this including Wellbutrin, aripiprazole trazodone, restart once reconciliation completed    GERD (gastroesophageal reflux disease)  --- We will continue PPI        DVT Prophylaxis: Warfarin  Code Status: Full Code    Disposition: Expected discharge in 1 to 2 days    Eliz Grover MD    Primary Care Physician   Hortensia Peck    Chief Complaint   Erythema, tenderness on left lower extremity    History is obtained from the patient ER records and medical records    History of Present Illness   Petey Archibald is a 59 year old male with multiple medical problems including COPD, history of DVT PE, peripheral vascular disease, hyperlipidemia, mild intellectual disabilities, morbid obesity, peripheral edema who works as a  presents to the emergency department with sudden worsening of her left lower extremity erythema, as per patient when he went to bed he had noticed some erythema around his left to wound, when he woke up he noticed that the erythema had extended up onto his left leg and all the way up to his knee joint.  He denied any worsening of pain, he denied having any fever, chills, due to the rapid progression of the cellulitis he presents to the emergency department.  He had been treated with antibiotics for his cellulitis multiple times in the past, patient had ultrasound done in the emergency department which did not show any DVT, he was on  therapeutic anticoagulation with warfarin at the time of presentation.  He denied having any recent trauma to his leg.  In the emergency department he was noted to have a cellulitis which is extending about his knee joint, his white count is within normal limits, his BUN and creatinine is within normal limits.  His hemoglobin is 11.7, hematocrit 38.36.8 with a normal lactic acid levels and normal LFTs.  His INR was 2.63, blood cultures were obtained, ultrasound of lower extremity did not show any DVT.  Past Medical History    I have reviewed this patient's medical history and updated it with pertinent information if needed.   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     COPD (chronic obstructive pulmonary disease) (H)      History of DVT of lower extremity      History of pulmonary embolism      Hyperlipidemia      Mild intellectual disabilities      Morbid obesity (H)      Peripheral edema      Peripheral vascular disease (H)      Sleep apnea      Tobacco dependence      Venous stasis dermatitis        Past Surgical History   I have reviewed this patient's surgical history and updated it with pertinent information if needed.  Past Surgical History:   Procedure Laterality Date     COLONOSCOPY N/A 4/15/2019    Procedure: COMBINED COLONOSCOPY, SINGLE OR MULTIPLE BIOPSY/POLYPECTOMY BY BIOPSY;  Surgeon: Jovana Ohara MD;  Location:  GI     EXCISE MALIGNANT LESIONS, TRUNK/ARMS/LEGS 0.5CM OR LESS Left 5/26/2017    Procedure: EXCISE MALIGNANT LESIONS, TRUNK/ARMS/LEGS 0.5CM OR LESS;  EXCISION LEFT ELBOW MASS;  Surgeon: Jose Azevedo MD;  Location:  GI     RECTAL SURGERY      perianal abscess?       Prior to Admission Medications   Prior to Admission Medications   Prescriptions Last Dose Informant Patient Reported? Taking?   ARIPiprazole (ABILIFY) 5 MG tablet  Pharmacy Yes No   Sig: Take 5 mg by mouth daily   Cadexomer Iodine, topical, 0.9% (IODOSORB) 0.9 % GEL  "gel   No No   Sig: Apply to wound left foot every other day with bandage   Cholecalciferol (VITAMIN D-3) 5000 UNIT/ML LIQD   Yes No   DESENEX 2 % external powder   Yes No   DEXILANT 60 MG CPDR CR capsule  Pharmacy No No   Sig: TAKE 1 CAPSULE BY MOUTH ONCE DAILY (FOR HEARTBURN)   EPINEPHrine (EPIPEN 2-BRE) 0.3 MG/0.3ML injection 2-pack  Pharmacy No No   Sig: INJECT 0.3MG INTRAMUSCULAR ONE TIME FOR ONE DOSE AS NEEDED FOR ANAPHYLAXIS (CALL 911 IF YOU HAVE TO GIVE) (FOR BEE STINGS) **LABEL EACH PEN*   Gauze Pads & Dressings (GAUZE PADS 4\"X4\") 4\"X4\" PADS   No No   Si each 3 times daily as needed   Hesperidin-Diosmin 250-650 MG TABS   No No   Sig: Take 1 tablet by mouth 2 times daily   Multiple Vitamin (DAILY VITAMIN PO)   Yes No   PULMICORT FLEXHALER 180 MCG/ACT inhaler  Pharmacy No No   Sig: INHALE 2 PUFFS INTO THE LUNGS TWICE DAILY   SPIRIVA HANDIHALER 18 MCG inhaled capsule  Pharmacy No No   Sig: INHALE CONTENTS OF 1 CAPSULE INTO THE LUNGS ONCE DAILY. FOR BRONCHITIS AND EMPHYSEMA.   acetaminophen (TYLENOL) 325 MG tablet  Pharmacy No No   Sig: Take 1-2 tablets (325-650 mg) by mouth every 6 hours as needed   albuterol (ALBUTEROL) 108 (90 BASE) MCG/ACT inhaler  Pharmacy No No   Sig: Inhale 2 puffs into the lungs every 6 hours as needed   buPROPion (WELLBUTRIN SR) 150 MG 12 hr tablet  Pharmacy Yes No   Sig: Take 300 mg by mouth daily (2 x 150 mg tablet)   cholecalciferol (VITAMIN D3) 5000 units (125 mcg) capsule   No No   Sig: TAKE 2 CAPSULES (10,000UNITS) BY MOUTH ONCE DAILY. **NON-COVERED MED** *1 TOTAL FILL*   ciprofloxacin (CIPRO) 500 MG tablet   No No   Sig: Take 1 tablet (500 mg) by mouth 2 times daily for 5 days   clotrimazole (LOTRIMIN) 1 % external cream   No No   Sig: Apply topically 2 times daily   diphenhydrAMINE (BENADRYL) 50 MG capsule   Yes No   ferrous gluconate (FERGON) 324 (38 Fe) MG tablet  Pharmacy No No   Sig: TAKE 1 TABLET BY MOUTH TWICE DAILY (IRON SUPPLEMENT)   furosemide (LASIX) 40 MG tablet  " Pharmacy Yes No   Sig: Take 40 mg by mouth every morning   furosemide (LASIX) 80 MG tablet  Pharmacy Yes No   Sig: Take 80 mg by mouth every evening   gabapentin (NEURONTIN) 100 MG capsule   Yes No   guaiFENesin (MUCINEX) 600 MG 12 hr tablet  Pharmacy No No   Sig: Take 2 tablets (1,200 mg) by mouth 2 times daily   Patient not taking: Reported on 3/26/2020   ketoconazole (NIZORAL) 2 % external cream   No No   Sig: Apply topically twice daily to rash for two weeks then decrease to once daily until rash has resolved   loratadine (CLARITIN) 10 MG tablet  Pharmacy Yes No   Sig: Take 10 mg by mouth daily.   metroNIDAZOLE (FLAGYL) 500 MG tablet   No No   Sig: Take 1 tablet (500 mg) by mouth 2 times daily for 5 days   montelukast (SINGULAIR) 10 MG tablet  Pharmacy No No   Sig: TAKE 1 TABLET BY MOUTH EVERY MORNING. (ASTHMA)   multivitamin, therapeutic (THERA-VIT) TABS tablet  Pharmacy Yes No   Sig: Take 1 tablet by mouth daily   naltrexone (DEPADE/REVIA) 50 MG tablet  Pharmacy Yes No   Sig: Take 100 mg by mouth daily ( 2 x 50 mg tablet)   nicotine (NICODERM CQ) 21 MG/24HR 24 hr patch   No No   Sig: Place 1 patch onto the skin every 24 hours   Patient not taking: Reported on 3/26/2020   order for DME   No No   Sig: Equipment being ordered: support compression hose BK  2 pair black 30mm HG   To be applied on arising & removed while lying down to go to sleep   order for DME   No No   Sig: Equipment being ordered: CPAP with mask and tubing   order for DME   No No   Sig: Oxygen 2 Li/min  at night and bled into BiPAP   order for DME   No No   Sig: [employer] [facility] Phone [phone] Fax [phone]  Primary Dressing Hydrofera Blue Ready   Qty 5 sheets  Secondary Dressing 4' roll gauze Qty 30  Secondary Dressing 2' medipore tape Qty 1  Secondary Dressing 4x4 gauze loaf Qty  1  Length of Need: 1 month  Frequency of dressing change: daily   order for DME   No No   Sig: [facility] Phone [phone] Fax  [phone]  EdemaWear Size Medium/Yellow qty 4 sleeves  Length of Need: 1 month  Frequency of dressing change daily   order for DME   No No   Sig: [employer] Stock[employer]  Phone #[phone]  Fax #[phone]  Coolflex Velcro wraps    Length of Need: Life Time  # of Pairs 1 set   order for DME   No No   Sig: Equipment being ordered: 1 surgical shoe   potassium chloride SA (K-DUR/KLOR-CON M) 20 MEQ CR tablet  Pharmacy No No   Sig: TAKE 1 TABLET BY MOUTH TWICE DAILY. (LOW POTASSIUM)   sertraline (ZOLOFT) 100 MG tablet  Pharmacy Yes No   Sig: Take 100 mg by mouth daily    silver sulfADIAZINE (SILVADENE) 1 % external cream   No No   Sig: Apply a small amount to wound on toe daily with dressing changes.   simvastatin (ZOCOR) 40 MG tablet  Pharmacy No No   Sig: TAKE 1 TABLET BY MOUTH EVERY MORNING.  (CHOLESTEROL)   spironolactone (ALDACTONE) 25 MG tablet  Pharmacy No No   Sig: TAKE 1 TABLET BY MOUTH ONCE DAILY. (HIGH BLOOD PRESSURE)   sulfamethoxazole-trimethoprim (BACTRIM) 400-80 MG tablet   No No   Sig: Take 1 tablet by mouth 2 times daily   Patient not taking: Reported on 3/26/2020   traZODone (DESYREL) 100 MG tablet  Pharmacy Yes No   Sig: Take 100 mg by mouth nightly as needed for sleep   varenicline (CHANTIX) 1 MG tablet  Pharmacy Yes No   Sig: Take 1 mg by mouth 2 times daily   vitamin D3 2000 units tablet   Yes No   Sig: Take 2,000 Units by mouth daily   warfarin ANTICOAGULANT (COUMADIN) 4 MG tablet   No No   Sig: Take 2 tabs (8 mg) every day RECHECK INR 5/20/2020   zolpidem (AMBIEN) 10 MG tablet   Yes No   Sig: Take 1 tablet (10 mg) by mouth nightly as needed for sleep      Facility-Administered Medications: None     Allergies   Allergies   Allergen Reactions     Bees      Augmentin Rash     Penicillins Rash       Social History   I have reviewed this patient's social history and updated it with pertinent information if needed. Petey Archibald  reports that he has been smoking cigarettes. He has a  30.00 pack-year smoking history. He uses smokeless tobacco. He reports that he does not drink alcohol or use drugs.    Family History   I have reviewed this patient's family history and updated it with pertinent information if needed.   Family History   Problem Relation Age of Onset     Diabetes Brother      Unknown/Adopted Mother      Unknown/Adopted Father        Review of Systems   The 10 point Review of Systems is negative other than noted in the HPI or here.     Physical Exam   Temp: 98.4  F (36.9  C) Temp src: Oral BP: 129/73   Heart Rate: 83 Resp: 16 SpO2: 94 % O2 Device: None (Room air)    Vital Signs with Ranges  Temp:  [98.4  F (36.9  C)] 98.4  F (36.9  C)  Heart Rate:  [83] 83  Resp:  [16] 16  BP: (129)/(73) 129/73  SpO2:  [94 %] 94 %  320 lbs 0 oz    Constitutional: Awake, alert, cooperative, no apparent distress.  Morbidly obese eyes: Conjunctiva and pupils examined and normal.  HEENT: Moist mucous membranes, normal dentition.  Respiratory: Clear to auscultation bilaterally, no crackles or wheezing.  Cardiovascular: Regular rate and rhythm, normal S1 and S2, and no murmur noted.  GI: Soft, non-distended, non-tender, normal bowel sounds.  Lymph/Hematologic: No anterior cervical or supraclavicular adenopathy.  Skin: Edema and erythema noted left lower extremity  Musculoskeletal: Left lower extremity erythema noted extending above the knee joint on the left, he also has discoloration of chronic edema on the left lower extremity, there is an ulcer on the web of first and second toe which is healing bruising noted with a yellowish discharge  Neurologic: Cranial nerves 2-12 intact, normal strength and sensation.  Psychiatric: Alert, oriented to person, place and time, no obvious anxiety or depression.    Data   Data reviewed today:  I personally reviewed labs and imaging below  Recent Labs   Lab 05/18/20  1204   WBC 8.3   HGB 11.7*   MCV 92      INR 2.63*      POTASSIUM 3.7   CHLORIDE 106   CO2  26   BUN 19   CR 1.08   ANIONGAP 5   JESSICA 9.1   *   ALBUMIN 3.3*   PROTTOTAL 7.9   BILITOTAL 0.7   ALKPHOS 76   ALT 22   AST 22       Imaging:  Recent Results (from the past 24 hour(s))   US Lower Extremity Venous Duplex Left    Narrative    US LOWER EXTREMITY VENOUS DUPLEX LEFT  5/18/2020 12:45 PM    CLINICAL HISTORY/INDICATION: Spreading redness and swelling; history  of DVT and cellulitis    COMPARISON: 9/14/2019    TECHNIQUE:   Grayscale, color-flow, and spectral waveform analysis were performed  of the deep veins of the left lower extremity    FINDINGS:   The left common femoral vein, femoral vein and popliteal vein  demonstrate normal compressibility, spectral waveform, color flow and  augmentation.    The left posterior tibial vein, peroneal vein, and greater saphenous  vein are compressible.    The contralateral right common femoral vein demonstrates normal  compressibility, spectral waveform, color flow and augmentation.      Impression    IMPRESSION:   No evidence of deep venous thrombosis in the left lower extremity    SANIYA ANDERS DO

## 2020-05-18 NOTE — PROGRESS NOTES
[facility]  May 18, 2020    Consult received.  At [facility] this PM.    Chart reviewed with pictures.      Stat L foot XRs ordered and reviewed.  No gas or osseous erosions.    Will order MR of L foot.    Full consult to follow in AM.    Wound care per WOC RN.    IV abx to continue.    Discussed with RN.    Rustam Kendrick DPM, FACFAS  Pager: [phone]

## 2020-05-18 NOTE — CONSULTS
Consult Date:  05/18/2020      ORTHOPEDIC CONSULTATION      REASON FOR CONSULTATION:  I was asked to provide orthopedic consultation for this 59-year-old male who was admitted for observation to evaluate left lower leg cellulitis.  He has a history of chronic venous stasis dermatitis and he has a history of peripheral vascular disease.  He has been treated for an ulceration of the dorsal aspect of his left foot for many months, per his report.  He states he has been on antibiotics.  I was asked by Dr. Quispe to provide orthopedic consultation for him as it relates to his left knee.  Evidently, he has had a cellulitis consistent with MRSA in the past and this has been sensitive to clindamycin.      PAST MEDICAL HISTORY:  Includes a history of chronic venous stasis dermatitis, peripheral vascular disease, history of DVTs and pulmonary embolus in the past.  He is on chronic anticoagulation with Coumadin.  He has intellectual disabilities.  Recent history of left lower extremity cellulitis.  The patient is currently on ceftriaxone and clindamycin.  You can refer to his admission history and physical for the specifics of past medical history and medications.      REVIEW OF SYSTEMS:  He denies recent fever, chills, nausea, vomiting, diarrhea, chest pain or shortness of breath.      PHYSICAL EXAMINATION:   GENERAL:  He is an obese 59-year-old who is alert and oriented x 3, pleasant, and in no acute distress, afebrile, vital signs are stable.   EXTREMITIES:  His left foot is wrapped in a soft dressing.  Per the report of Dr. Quispe, he has an open wound on the dorsal aspect of his foot with some purulent drainage.  He has a cellulitis that extends above the knee.  He is able to flex and extend the knee actively with no pain.   LUNGS:  Clear to auscultation bilaterally.   CARDIOVASCULAR:  Demonstrates a regular rate and rhythm.   ABDOMEN:  Benign.      IMAGING:  No x-rays available at time of this dictation.      ASSESSMENT:      1.  Left lower leg cellulitis.  No evidence of septic arthritis of the left knee at this point.   The plan is to continue him on IV antibiotics.  We will follow his exam.     2.  Multiple medical problems.  He will be followed by the Medicine Service.         CARLOS BRIZUELA MD             D: 2020   T: 2020   MT: SONALI      Name:     SUMIT HUTCHINS   MRN:      [id number]        Account:       [id number]   :      1960           Consult Date:  2020      Document: [id number]

## 2020-05-18 NOTE — PROGRESS NOTES
[facility]  WOC Nurse Inpatient Wound Assessment     Reason for consultation: Evaluate and treat:  Base Lt great toe wound     Assessment  Lt great toe wound:  Per pt is a trauma wound from him pulling skin off area. Pt has been followed by Dr De La Torre DPM. Wound with dried base and surrounding dried light yellow drainage, erythema slight edema.    LLE cellulitis pre Dr. Grover. See photo below.     Treatment Plan  Wound care: Base Lt great toe  1. Change dressing daily  2. Remove dressing and clean with MicroKlenz  3. Pat skin dry  4. Apply Iodosorb gel to 1/4 piece of Adaptic  5. Apply Iodosorb gel side down to wound bed  6. Cover with 2 x 2  7. Secure [place]  8. Re-apply Spandage foot to knee in 2 layers.     Orders: In Epic  Recommended provider order: NA  WOC Nurse follow-up plan: weekly   Nursing to notify the Provider(s) and re-consult the WOC Nurse if wound(s) deteriorates or new skin concern.    Patient History  According to provider note(s):    Petey Archibald is a 59 year old male who was admitted on 5/18/2020.      Impression:  1. 59 y.o male with history of recurrent cellulitis.   2. PVD.   3. Chronic venous stasis dermatitis.   4. Admitted with left lower extremity cellulitis and a wound between the first and second toe draining purulent drainage.   5. MRSA skin colonization. MRSA sensitive to clindamycin.   6. PCN allergy, but tolerates cephalosporins ok.      Objective Data    Active Diet Order:  Orders Placed This Encounter      Combination Diet Low Saturated Fat Na <2400mg Diet, No Caffeine Diet    Output:   I/O last 3 completed shifts:  In: 100 [IV Piggyback:100]  Out: -     Risk Assessment:   Sensory Perception: 3-->slightly limited  Moisture: 4-->rarely moist  Activity: 3-->walks occasionally  Mobility: 3-->slightly limited  Nutrition: 3-->adequate  Friction and Shear: 2-->potential problem  Mynor Score: 18                          Labs:   Recent Labs   Lab  05/18/20  1204   ALBUMIN 3.3*   HGB 11.7*   INR 2.63*   WBC 8.3   .0*       Physical Exam  Skin inspection:      Baseline surrounding redness/discoloration around the wound.  No cellulitis.  Wound is 1.6 x 0.4cm, full-thickness wihtout deep probing, purulence, malodor  Wound Location:  Lt lateral great toe  Date of last WOC  Photo:5-18-20        Measurements (length x width x depth, in cm):  1.5cm x 1.0cm  X unable to determine  Wound Base: dried firbrin looking drainage  Tunneling: unable to determine  Undermining:unable to determine  Palpation of the wound bed: dried firm  Periwound skin: dried drainage, erythema, edema  Temperature: warm  Drainage: dried light yellow  Odor:None  Pain: tenderness    Interventions  Current support surface: atmos air  Current off-loading measures: Lt leg elevated on pillows  Visual inspection of wound(s) completed  Wound Care: tx plan initiated. Continue until assessed or changed by DPM  Supplies:in pt room  Education provided: Discussed with patient      Serena Garza RN, CWOCN

## 2020-05-18 NOTE — PROGRESS NOTES
RECEIVING UNIT ED HANDOFF REVIEW    ED Nurse Handoff Report was reviewed by: Anna Diaz RN on May 18, 2020 at 2:07 PM

## 2020-05-19 ENCOUNTER — APPOINTMENT (OUTPATIENT)
Dept: PHYSICAL THERAPY | Facility: CLINIC | Age: 60
DRG: 603 | End: 2020-05-19
Attending: INTERNAL MEDICINE
Payer: MEDICARE

## 2020-05-19 LAB
ANION GAP SERPL CALCULATED.3IONS-SCNC: 5 MMOL/L (ref 3–14)
BUN SERPL-MCNC: 14 MG/DL (ref 7–30)
CALCIUM SERPL-MCNC: 8.9 MG/DL (ref 8.5–10.1)
CHLORIDE SERPL-SCNC: 106 MMOL/L (ref 94–109)
CO2 SERPL-SCNC: 27 MMOL/L (ref 20–32)
CREAT SERPL-MCNC: 0.9 MG/DL (ref 0.66–1.25)
ERYTHROCYTE [DISTWIDTH] IN BLOOD BY AUTOMATED COUNT: 16.3 % (ref 10–15)
GFR SERPL CREATININE-BSD FRML MDRD: >90 ML/MIN/{1.73_M2}
GLUCOSE SERPL-MCNC: 122 MG/DL (ref 70–99)
HCT VFR BLD AUTO: 35 % (ref 40–53)
HGB BLD-MCNC: 10.9 G/DL (ref 13.3–17.7)
INR PPP: 3.38 (ref 0.86–1.14)
MCH RBC QN AUTO: 28.9 PG (ref 26.5–33)
MCHC RBC AUTO-ENTMCNC: 31.1 G/DL (ref 31.5–36.5)
MCV RBC AUTO: 93 FL (ref 78–100)
PLATELET # BLD AUTO: 242 10E9/L (ref 150–450)
POTASSIUM SERPL-SCNC: 4 MMOL/L (ref 3.4–5.3)
RBC # BLD AUTO: 3.77 10E12/L (ref 4.4–5.9)
SODIUM SERPL-SCNC: 138 MMOL/L (ref 133–144)
WBC # BLD AUTO: 6.5 10E9/L (ref 4–11)

## 2020-05-19 PROCEDURE — 36415 COLL VENOUS BLD VENIPUNCTURE: CPT | Performed by: INTERNAL MEDICINE

## 2020-05-19 PROCEDURE — A9585 GADOBUTROL INJECTION: HCPCS | Performed by: INTERNAL MEDICINE

## 2020-05-19 PROCEDURE — 99222 1ST HOSP IP/OBS MODERATE 55: CPT | Performed by: PODIATRIST

## 2020-05-19 PROCEDURE — G0378 HOSPITAL OBSERVATION PER HR: HCPCS

## 2020-05-19 PROCEDURE — 25000132 ZZH RX MED GY IP 250 OP 250 PS 637: Mod: GY | Performed by: INTERNAL MEDICINE

## 2020-05-19 PROCEDURE — 25500064 ZZH RX 255 OP 636: Performed by: INTERNAL MEDICINE

## 2020-05-19 PROCEDURE — 85610 PROTHROMBIN TIME: CPT | Performed by: INTERNAL MEDICINE

## 2020-05-19 PROCEDURE — 25000125 ZZHC RX 250: Performed by: INTERNAL MEDICINE

## 2020-05-19 PROCEDURE — 85027 COMPLETE CBC AUTOMATED: CPT | Performed by: INTERNAL MEDICINE

## 2020-05-19 PROCEDURE — 12000000 ZZH R&B MED SURG/OB

## 2020-05-19 PROCEDURE — 25000128 H RX IP 250 OP 636: Performed by: INTERNAL MEDICINE

## 2020-05-19 PROCEDURE — 96376 TX/PRO/DX INJ SAME DRUG ADON: CPT

## 2020-05-19 PROCEDURE — 80048 BASIC METABOLIC PNL TOTAL CA: CPT | Performed by: INTERNAL MEDICINE

## 2020-05-19 PROCEDURE — L3260 AMBULATORY SURGICAL BOOT EAC: HCPCS

## 2020-05-19 PROCEDURE — 99233 SBSQ HOSP IP/OBS HIGH 50: CPT | Performed by: INTERNAL MEDICINE

## 2020-05-19 PROCEDURE — 97161 PT EVAL LOW COMPLEX 20 MIN: CPT | Mod: GP | Performed by: PHYSICAL THERAPIST

## 2020-05-19 RX ORDER — POTASSIUM CHLORIDE 1500 MG/1
20 TABLET, EXTENDED RELEASE ORAL 2 TIMES DAILY WITH MEALS
Status: DISCONTINUED | OUTPATIENT
Start: 2020-05-19 | End: 2020-05-22 | Stop reason: HOSPADM

## 2020-05-19 RX ORDER — TEMAZEPAM 7.5 MG/1
15 CAPSULE ORAL
Status: DISCONTINUED | OUTPATIENT
Start: 2020-05-19 | End: 2020-05-22 | Stop reason: HOSPADM

## 2020-05-19 RX ORDER — PANTOPRAZOLE SODIUM 40 MG/1
40 TABLET, DELAYED RELEASE ORAL DAILY
Status: DISCONTINUED | OUTPATIENT
Start: 2020-05-20 | End: 2020-05-22 | Stop reason: HOSPADM

## 2020-05-19 RX ORDER — SIMVASTATIN 40 MG
40 TABLET ORAL AT BEDTIME
Status: DISCONTINUED | OUTPATIENT
Start: 2020-05-19 | End: 2020-05-22 | Stop reason: HOSPADM

## 2020-05-19 RX ORDER — GABAPENTIN 100 MG/1
200 CAPSULE ORAL 3 TIMES DAILY
Status: DISCONTINUED | OUTPATIENT
Start: 2020-05-19 | End: 2020-05-22 | Stop reason: HOSPADM

## 2020-05-19 RX ORDER — GADOBUTROL 604.72 MG/ML
15 INJECTION INTRAVENOUS ONCE
Status: COMPLETED | OUTPATIENT
Start: 2020-05-19 | End: 2020-05-19

## 2020-05-19 RX ORDER — MONTELUKAST SODIUM 10 MG/1
10 TABLET ORAL AT BEDTIME
Status: DISCONTINUED | OUTPATIENT
Start: 2020-05-19 | End: 2020-05-22 | Stop reason: HOSPADM

## 2020-05-19 RX ORDER — FUROSEMIDE 40 MG
80 TABLET ORAL EVERY EVENING
Status: DISCONTINUED | OUTPATIENT
Start: 2020-05-19 | End: 2020-05-22 | Stop reason: HOSPADM

## 2020-05-19 RX ORDER — CLOTRIMAZOLE 1 %
CREAM (GRAM) TOPICAL 2 TIMES DAILY
Status: DISCONTINUED | OUTPATIENT
Start: 2020-05-19 | End: 2020-05-22 | Stop reason: HOSPADM

## 2020-05-19 RX ORDER — WARFARIN SODIUM 2 MG/1
2 TABLET ORAL
Status: COMPLETED | OUTPATIENT
Start: 2020-05-19 | End: 2020-05-19

## 2020-05-19 RX ORDER — FERROUS GLUCONATE 324(38)MG
324 TABLET ORAL 2 TIMES DAILY WITH MEALS
Status: DISCONTINUED | OUTPATIENT
Start: 2020-05-19 | End: 2020-05-22 | Stop reason: HOSPADM

## 2020-05-19 RX ADMIN — BUPROPION HYDROCHLORIDE 300 MG: 150 TABLET, EXTENDED RELEASE ORAL at 08:58

## 2020-05-19 RX ADMIN — MONTELUKAST 10 MG: 10 TABLET, FILM COATED ORAL at 22:49

## 2020-05-19 RX ADMIN — FUROSEMIDE 80 MG: 40 TABLET ORAL at 16:01

## 2020-05-19 RX ADMIN — CLINDAMYCIN PHOSPHATE 600 MG: 600 INJECTION, SOLUTION INTRAVENOUS at 15:02

## 2020-05-19 RX ADMIN — WARFARIN SODIUM 2 MG: 2 TABLET ORAL at 17:51

## 2020-05-19 RX ADMIN — UMECLIDINIUM 1 PUFF: 62.5 AEROSOL, POWDER ORAL at 08:58

## 2020-05-19 RX ADMIN — Medication 1 CAPSULE: at 22:49

## 2020-05-19 RX ADMIN — TEMAZEPAM 15 MG: 7.5 CAPSULE ORAL at 22:48

## 2020-05-19 RX ADMIN — GABAPENTIN 200 MG: 100 CAPSULE ORAL at 22:49

## 2020-05-19 RX ADMIN — HYDROCODONE BITARTRATE AND ACETAMINOPHEN 1 TABLET: 5; 325 TABLET ORAL at 15:21

## 2020-05-19 RX ADMIN — SIMVASTATIN 40 MG: 40 TABLET, FILM COATED ORAL at 22:50

## 2020-05-19 RX ADMIN — SENNOSIDES AND DOCUSATE SODIUM 1 TABLET: 8.6; 5 TABLET ORAL at 08:58

## 2020-05-19 RX ADMIN — SENNOSIDES AND DOCUSATE SODIUM 1 TABLET: 8.6; 5 TABLET ORAL at 22:49

## 2020-05-19 RX ADMIN — POTASSIUM CHLORIDE 20 MEQ: 1500 TABLET, EXTENDED RELEASE ORAL at 17:51

## 2020-05-19 RX ADMIN — GABAPENTIN 200 MG: 100 CAPSULE ORAL at 16:01

## 2020-05-19 RX ADMIN — Medication 1 CAPSULE: at 08:58

## 2020-05-19 RX ADMIN — PANTOPRAZOLE SODIUM 40 MG: 40 TABLET, DELAYED RELEASE ORAL at 06:48

## 2020-05-19 RX ADMIN — SPIRONOLACTONE 25 MG: 25 TABLET ORAL at 08:58

## 2020-05-19 RX ADMIN — GADOBUTROL 15 ML: 604.72 INJECTION INTRAVENOUS at 00:32

## 2020-05-19 RX ADMIN — SERTRALINE HYDROCHLORIDE 150 MG: 100 TABLET ORAL at 15:02

## 2020-05-19 RX ADMIN — FUROSEMIDE 40 MG: 40 TABLET ORAL at 08:58

## 2020-05-19 RX ADMIN — CLINDAMYCIN PHOSPHATE 600 MG: 600 INJECTION, SOLUTION INTRAVENOUS at 06:48

## 2020-05-19 RX ADMIN — ARIPIPRAZOLE 5 MG: 5 TABLET ORAL at 08:58

## 2020-05-19 RX ADMIN — CEFTRIAXONE 2 G: 2 INJECTION, POWDER, FOR SOLUTION INTRAMUSCULAR; INTRAVENOUS at 11:20

## 2020-05-19 RX ADMIN — FERROUS GLUCONATE 324 MG: 324 TABLET ORAL at 18:29

## 2020-05-19 RX ADMIN — CLINDAMYCIN PHOSPHATE 600 MG: 600 INJECTION, SOLUTION INTRAVENOUS at 22:48

## 2020-05-19 ASSESSMENT — ACTIVITIES OF DAILY LIVING (ADL): ADLS_ACUITY_SCORE: 13

## 2020-05-19 NOTE — PHARMACY-ANTICOAGULATION SERVICE
Clinical Pharmacy - Warfarin Dosing Consult     Pharmacy has been consulted to manage this patient s warfarin therapy.  Indication: DVT/ PE Treatment  Therapy Goal: INR 2-3  Warfarin Prior to Admission: Yes  Warfarin PTA Regimen: 8 mg daily  Significant drug interactions: none significant    INR   Date Value Ref Range Status   05/18/2020 2.63 (H) 0.86 - 1.14 Final   04/08/2020 2.50 (H) 0.86 - 1.14 Final       Recommend warfarin 8 mg today.  Pharmacy will monitor Petey Archibald daily and order warfarin doses to achieve specified goal.      Please contact pharmacy as soon as possible if the warfarin needs to be held for a procedure or if the warfarin goals change.

## 2020-05-19 NOTE — PLAN OF CARE
[id number]: A/Ox4. VSS on RA. Norco effective for pain. LLE edema +3, blotchy/red. PIV SL + int abx. SBA + walker. Using urinal at bedside. Low sat fat/low sodium/no caffeine diet. Contact precautions maintained for MRSA. Discharge pending improvement.

## 2020-05-19 NOTE — PROGRESS NOTES
Observation goals  PRIOR TO DISCHARGE      Comments: -diagnostic tests and consults completed and resulted   -vital signs normal or at patient baseline   Nurse to notify provider when observation goals have been met and patient is ready for discharge.         Pt VSS. A&O. Low NA+ &Fat diet. Up  W/walker independently. Denies pain. Refused nicotine patch. Tolerating IV abx. Will continue to monitor.

## 2020-05-19 NOTE — PROGRESS NOTES
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  --Dopplers done on lower extremity which is negative for any DVT or superficial thrombophlebitis.  --- Patient seen by orthopedics today, they are recommending this to be more cellulitis than septic joint, his range of movement has improved significantly today, they also recommended to continue antibiotics for 1 month  --Appreciate input from infectious disease, currently patient is on clindamycin and ceftriaxone  ---  Continue Lasix and Spironolactone.  Patient had received vancomycin and Rocephin in the emergency department.  --Appreciate podiatry input regarding his toe wound, MRI obtained does not indicate osteomyelitis.  --- Patient could have underlying severe peripheral vascular disease.    History of pulmonary embolism    History of DVT of lower extremity    Morbid obesity due to excess calories (H)  BMI 40-50  --Continue warfarin, pharmacy to dose warfarin, his INR is therapeutic today    Tobacco dependence:40-50 pk yr hx  --- Continue nicotine patch     Hypercholesterolemia  ---  Restarted statin    Mixed simple and mucopurulent chronic bronchitis (HCC) CT 4-06 wnl and neg bronch for hemoptesis spirometry 7-26-16  FVC=59% & w mod restriction  and lung age of 82 in 56 y/o   --- Continue his Spiriva, albuterol inhalers, patient is a chronic smoker, he still continues to smoke    NEL (obstructive sleep apnea)  --Started sleep CPAP with home settings    Schizoaffective disorder, bipolar type (H)  --- Patient is on multiple medications for this including Wellbutrin, aripiprazole trazodone   --- Patient has decision-making capability but due to above he can get upset very fast.    GERD (gastroesophageal reflux disease)  --- We will continue PPI      Patient is on warfarin for DVT prophylaxis  Code Status: Full Code     Disposition: Expected discharge in 1 to 2 days    Eliz Grover MD  Text Page   (7am to 6pm)    Interval History   Patient is resting comfortably, he is asking to go home, we discussed about the need to be on IV antibiotics and improvement of cellulitis, he got upset and requested that he wants to leave and he wants to leave.  He is worried about his job, we discussed that the patient can leave against advice if he intends to.  He is tolerating diet well, denies any worsening of pain    -Data reviewed today: I reviewed all new labs and imaging results over the last 24 hours  Physical Exam   Temp: 98.2  F (36.8  C) Temp src: Oral BP: 130/71 Pulse: 71 Heart Rate: 68 Resp: 16 SpO2: 94 % O2 Device: None (Room air)    Vitals:    05/18/20 1147   Weight: 145.2 kg (320 lb)     Vital Signs with Ranges  Temp:  [98.2  F (36.8  C)-98.7  F (37.1  C)] 98.2  F (36.8  C)  Pulse:  [71-72] 71  Heart Rate:  [68-83] 68  Resp:  [16-18] 16  BP: (108-134)/(50-75) 130/71  FiO2 (%):  [21 %] 21 %  SpO2:  [92 %-98 %] 94 %  I/O last 3 completed shifts:  In: 1410 [P.O.:1310; IV Piggyback:100]  Out: 2600 [Urine:2600]    Constitutional: Awake, alert, cooperative, no apparent distress,  morbidly obese  Respiratory: Clear to auscultation bilaterally, no crackles or wheezing  Cardiovascular: Regular rate and rhythm, normal S1 and S2, and no murmur noted  GI: Normal bowel sounds, soft, non-distended, non-tender  Skin/Integumen: Left foot close to the great toe he has an open ulcer, his left lower extremity is erythematous, tender to touch the area of marking has significantly reduced  Neuro : moving all 4 extremities, no focal deficit noted     Medications     Warfarin Therapy Reminder         ARIPiprazole  5 mg Oral Daily     buPROPion  300 mg Oral Daily     cefTRIAXone  2 g Intravenous Q24H     clindamycin  600 mg Intravenous Q8H     furosemide  40 mg Oral QAM     lactobacillus rhamnosus (GG)  1 capsule Oral BID     nicotine  1 patch Transdermal Daily     nicotine   Transdermal Q8H     pantoprazole  40 mg Oral QAM AC     senna-docusate  1 tablet Oral BID    Or     senna-docusate  2 tablet Oral BID     sodium chloride (PF)  3 mL Intracatheter Q8H     spironolactone  25 mg Oral Daily     umeclidinium  1 puff Inhalation Daily     warfarin ANTICOAGULANT  2 mg Oral ONCE at 18:00       Data   Recent Labs   Lab 05/19/20  0702 05/18/20  1204   WBC 6.5 8.3   HGB 10.9* 11.7*   MCV 93 92    249   INR 3.38* 2.63*    137   POTASSIUM 4.0 3.7   CHLORIDE 106 106   CO2 27 26   BUN 14 19   CR 0.90 1.08   ANIONGAP 5 5   JESSICA 8.9 9.1   * 101*   ALBUMIN  --  3.3*   PROTTOTAL  --  7.9   BILITOTAL  --  0.7   ALKPHOS  --  76   ALT  --  22   AST  --  22     Recent Labs   Lab 05/19/20  0702 05/18/20  1204   * 101*       Imaging:   Recent Results (from the past 24 hour(s))   XR Foot Left G/E 3 Views    Narrative    FOOT LEFT THREE OR MORE VIEWS   5/18/2020 4:45 PM     HISTORY:  Forefoot ulcer, evaluate for osteomyelitis.    FINDINGS: Old healed fracture deformities of the third, fourth, and  fifth metatarsals.      Impression    IMPRESSION: No focal bone destruction.    PETER MCGEE MD   MR Foot Left  w/o & w Contrast    Narrative    EXAM: MR FOOT LEFT W/O and W CONTRAST  LOCATION: [facility]  DATE/TIME: 5/19/2020 12:00 AM    INDICATION: Wound between first and second toe. Evaluate for abscess or osteomyelitis.    COMPARISON: None.    TECHNIQUE: Routine. Additional postgadolinium T1 sequences were obtained.    IV CONTRAST: 15 mL Gadavist.    FINDINGS: Mild subcutaneous edema over the dorsal aspect of the forefoot. No drainable fluid collections or destructive bone lesions. Old fracture deformities of the fourth and fifth metatarsals. No acute fracture is seen. No abnormal marrow signal   intensity is identified. No joint effusion is seen.      Impression    IMPRESSION: Subcutaneous edema consistent with cellulitis. There is no evidence of an abscess or osteomyelitis.

## 2020-05-19 NOTE — PROGRESS NOTES
S: We received a order for a post op shoe L foot.  O:  I entered the patients room and he is alert laying down in bed.    A:  I donned a size XL [employer] Norma post op shoe on the L foot.  The fit is adequate.  P:  The patient will wear the shoe when weight bearing.  G:  Provide accommodative shoe.  Suresh CASTRO

## 2020-05-19 NOTE — PROGRESS NOTES
"   05/19/20 0910   Quick Adds   Type of Visit Initial PT Evaluation   Living Environment   Lives With facility resident   Living Arrangements group home   Home Accessibility no concerns;other (see comments)  (ramp access)   Living Environment Comment lives in a Group Home   Self-Care   Usual Activity Tolerance good   Current Activity Tolerance good   Equipment Currently Used at Home none   Activity/Exercise/Self-Care Comment normally independent with mobility and cares; gets occasional assist to dress L LE wound   Functional Level Prior   Ambulation 0-->independent   Transferring 0-->independent   Toileting 0-->independent   Bathing 0-->independent   Communication 0-->understands/communicates without difficulty   Swallowing 0-->swallows foods/liquids without difficulty   Cognition 1 - attention or memory deficits  (baseline \" intellectual disability\" noted in chart)   Fall history within last six months no   Which of the above functional risks had a recent onset or change? none   Prior Functional Level Comment normally independent without an assistive device   General Information   Onset of Illness/Injury or Date of Surgery - Date 05/18/20   Referring Physician Eliz Grover MD    Patient/Family Goals Statement return home   Pertinent History of Current Problem (include personal factors and/or comorbidities that impact the POC) per chart: Petey Archibald is a 59 year old male who was admitted for LLE cellulitis, increasing over the last 1-2 days.  Pt reports he pulled an area of skin off his L foot recently, between 1st and 2nd toes.  Pt denies any R foot issues.  Pt is nondiabetic.  Hx of venous stasis.  Hx of MRSA; no plans for surgery; see medical record for further information   Precautions/Limitations fall precautions   Weight-Bearing Status - LLE weight-bearing as tolerated   General Observations patient in bed; agreeable to eval   Cognitive Status Examination   Orientation orientation to person, place " "and time   Level of Consciousness alert   Follows Commands and Answers Questions 100% of the time   Personal Safety and Judgment intact   Memory impaired   Cognitive Comment \"intellectual disability\" per chart   Pain Assessment   Patient Currently in Pain No   Integumentary/Edema   Integumentary/Edema Comments L foot wound/cellulitis   Posture    Posture Comments WFL   Range of Motion (ROM)   ROM Comment WFL   Strength   Strength Comments WFL   Bed Mobility   Bed Mobility Comments mod I   Transfer Skills   Transfer Comments sit<>stand SBA to independent   Gait   Gait Comments SBA with use of walker; no LOB; able to ambulate 80 feet without a device; no LOB   Balance   Balance Comments intact   Sensory Examination   Sensory Perception Comments decreased sensation in feet per patient   General Therapy Interventions   Intervention Comments 1x eval only   Clinical Impression   Criteria for Skilled Therapeutic Intervention evaluation only;current level of function same as previous level of function   Clinical Presentation Stable/Uncomplicated   Clinical Presentation Rationale clinical judgement   Clinical Decision Making (Complexity) Low complexity   Therapy Frequency Other (see comments)  (1x eval only)   Predicted Duration of Therapy Intervention (days/wks) 1x eval only   Anticipated Discharge Disposition Home;Home with Assist   Risk & Benefits of therapy have been explained Yes   Patient, Family & other staff in agreement with plan of care Yes   [facility] TM \"6 Clicks\"   2016, Trustees of [facility], under license to [employer].  All rights reserved.   6 Clicks Short Forms Basic Mobility Inpatient Short Form   [facility]  \"6 Clicks\" V.2 Basic Mobility Inpatient Short Form   1. Turning from your back to your side while in a flat bed without using bedrails? 4 - None   2. Moving from lying on your back to sitting on the side of a flat bed without using bedrails? 3 - A Little   3. " Moving to and from a bed to a chair (including a wheelchair)? 4 - None   4. Standing up from a chair using your arms (e.g., wheelchair, or bedside chair)? 4 - None   5. To walk in hospital room? 4 - None   6. Climbing 3-5 steps with a railing? 3 - A Little   Basic Mobility Raw Score (Score out of 24.Lower scores equate to lower levels of function) 22   Total Evaluation Time   Total Evaluation Time (Minutes) 20

## 2020-05-19 NOTE — PROGRESS NOTES
[facility]    Infectious Disease Progress Note    Date of Service (when I saw the patient): 05/19/2020     Assessment & Plan   Petey Archibald is a 59 year old male who was admitted on 5/18/2020.     Impression:  1. 59 y.o male with history of recurrent cellulitis.   2. PVD.   3. Chronic venous stasis dermatitis.   4. Admitted with left lower extremity cellulitis and a wound between the first and second toe draining purulent drainage.   5. MRSA skin colonization. MRSA sensitive to clindamycin.   6. PCN allergy, but tolerates cephalosporins ok.      Recommendations:   Will do ceftriaxone and clindamycin for this, MRSA in past clinda sensitive.   Keep Left LE elevated at all times siting and lying down.   Follow up on the blood cultures.       Jenifer Quispe MD    Interval History   Afebrile WBC ok   The leg appreas to be the same     Physical Exam   Temp: 98.4  F (36.9  C) Temp src: Oral BP: 124/69 Pulse: 72 Heart Rate: 70 Resp: 16 SpO2: 93 % O2 Device: None (Room air)    Vitals:    05/18/20 1147   Weight: 145.2 kg (320 lb)     Vital Signs with Ranges  Temp:  [98.2  F (36.8  C)-98.7  F (37.1  C)] 98.4  F (36.9  C)  Pulse:  [72] 72  Heart Rate:  [70-83] 70  Resp:  [16-18] 16  BP: (108-134)/(50-75) 124/69  FiO2 (%):  [21 %] 21 %  SpO2:  [92 %-98 %] 93 %    Constitutional: Awake, alert, cooperative, no apparent distress  Lungs: Clear to auscultation bilaterally, no crackles or wheezing  Cardiovascular: Regular rate and rhythm, normal S1 and S2, and no murmur noted  Abdomen: Normal bowel sounds, soft, non-distended, non-tender  Skin: No rashes, no cyanosis, no edema  Other:    Medications     Warfarin Therapy Reminder         ARIPiprazole  5 mg Oral Daily     buPROPion  300 mg Oral Daily     cefTRIAXone  2 g Intravenous Q24H     clindamycin  600 mg Intravenous Q8H     furosemide  40 mg Oral QAM     lactobacillus rhamnosus (GG)  1 capsule Oral BID     nicotine  1 patch Transdermal Daily     nicotine    Transdermal Q8H     pantoprazole  40 mg Oral QAM AC     senna-docusate  1 tablet Oral BID    Or     senna-docusate  2 tablet Oral BID     sodium chloride (PF)  3 mL Intracatheter Q8H     spironolactone  25 mg Oral Daily     umeclidinium  1 puff Inhalation Daily     warfarin ANTICOAGULANT  2 mg Oral ONCE at 18:00       Data   All microbiology laboratory data reviewed.  Recent Labs   Lab Test 05/19/20  0702 05/18/20  1204 02/25/20  1209   WBC 6.5 8.3 5.0   HGB 10.9* 11.7* 12.0*   HCT 35.0* 36.8* 39.5*   MCV 93 92 95    249 225     Recent Labs   Lab Test 05/19/20  0702 05/18/20  1204 01/08/20  1417   CR 0.90 1.08 1.24     Recent Labs   Lab Test 05/18/20  1204   SED 70*     Recent Labs   Lab Test 05/18/20  1204 01/04/20  2347 08/14/18  1334   CULT No growth after 16 hours  No growth after 16 hours Heavy growth  Mixed fecal kade    Light growth  Methicillin resistant Staphylococcus aureus (MRSA)  * Single colony  Coagulase negative Staphylococcus  Susceptibility testing not routinely done  *  Moderate growth  Gram positive bacilli resembling diphtheroids  No further identification  *       Attestation:  Total time on the floor involved in the patient's care: 35 minutes. Total time spent in counseling/care coordination: >50%

## 2020-05-19 NOTE — CONSULTS
[facility]SULTATION  May 19, 2020      ASSESSMENT:   LLE cellulitis with L foot interdigital ulceration with skin breakdown     PLAN:  Reviewed patient's chart in epic.  Discussed condition and treatment options including pros and cons.    -MR neg for abscess or osteomyelitis.  -Erythema improving compared to pictures from yesterday.  -No indication for surgical intervention at this time.  No need for debridement clinically.  -Continue wound care per WOC RN.  Foot was redressed with Iodosorb and gauze.  Change daily.  -Abx per hospitalist.  -Pt should f/u with Podiatry clinic within 2 wks of discharge.  -Will sign off.    Rustam Kendrick DPM, FACFAS  Pager: [phone]      PATIENT HISTORY:  Petey Archibald is a 59 year old male who was admitted for LLE cellulitis, increasing over the last 1-2 days.  Pt reports he pulled an area of skin off his L foot recently, between 1st and 2nd toes.  Pt denies any R foot issues.  Pt is nondiabetic.  Hx of venous stasis.  Hx of MRSA.    I was requested to see this patient for this issue by Dr Grover.    Review of Systems:  Denies f/c/n/v.  Rest of 10 pt ROS neg except for HPI.     PAST MEDICAL HISTORY:   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     COPD (chronic obstructive pulmonary disease) (H)      History of DVT of lower extremity      History of pulmonary embolism      Hyperlipidemia      Mild intellectual disabilities      Morbid obesity (H)      Peripheral edema      Peripheral vascular disease (H)      Sleep apnea      Tobacco dependence      Venous stasis dermatitis         PAST SURGICAL HISTORY:   Past Surgical History:   Procedure Laterality Date     COLONOSCOPY N/A 4/15/2019    Procedure: COMBINED COLONOSCOPY, SINGLE OR MULTIPLE BIOPSY/POLYPECTOMY BY BIOPSY;  Surgeon: Jovana Ohara MD;  Location:  GI     EXCISE MALIGNANT LESIONS, TRUNK/ARMS/LEGS 0.5CM OR LESS Left 5/26/2017     Procedure: EXCISE MALIGNANT LESIONS, TRUNK/ARMS/LEGS 0.5CM OR LESS;  EXCISION LEFT ELBOW MASS;  Surgeon: Jose Azevedo MD;  Location: [facility]     RECTAL SURGERY      perianal abscess?        MEDICATIONS:   Current Facility-Administered Medications:      acetaminophen (TYLENOL) Suppository 650 mg, 650 mg, Rectal, Q4H PRN, Eliz Grover MD     acetaminophen (TYLENOL) tablet 650 mg, 650 mg, Oral, Q4H PRN, Eliz Grover MD     albuterol (PROVENTIL) neb solution 2.5 mg, 2.5 mg, Nebulization, Q6H PRN, Eliz Grover MD     ARIPiprazole (ABILIFY) tablet 5 mg, 5 mg, Oral, Daily, Eliz Grover MD, 5 mg at 05/19/20 0858     buPROPion (WELLBUTRIN SR) 12 hr tablet 300 mg, 300 mg, Oral, Daily, Eliz Grover MD, 300 mg at 05/19/20 0858     Cadexomer Iodine (topical) 0.9% (IODOSORB) 0.9 % gel, , Topical, Daily PRN, Eliz Grover MD     cefTRIAXone (ROCEPHIN) 2 g vial to attach to  ml bag for ADULTS or NS 50 ml bag for PEDS, 2 g, Intravenous, Q24H, Jenifer Quispe MD     clindamycin (CLEOCIN) infusion 600 mg, 600 mg, Intravenous, Q8H, Jenifer Quispe MD, Last Rate: 100 mL/hr at 05/19/20 0648, 600 mg at 05/19/20 0648     EPINEPHrine (ADRENALIN) kit 0.3 mg, 0.3 mg, Intramuscular, Once PRN, Eliz Grover MD     furosemide (LASIX) tablet 40 mg, 40 mg, Oral, QAM, Eliz Grover MD, 40 mg at 05/19/20 0858     HYDROcodone-acetaminophen (NORCO) 5-325 MG per tablet 1-2 tablet, 1-2 tablet, Oral, Q4H PRN, Eliz Grover MD     HYDROmorphone (PF) (DILAUDID) injection 0.2 mg, 0.2 mg, Intravenous, Q2H PRN, Eliz Grover MD     lactobacillus rhamnosus (GG) (CULTURELL) capsule 1 capsule, 1 capsule, Oral, BID, Jenifer Quispe MD, 1 capsule at 05/19/20 0858     lidocaine (LMX4) cream, , Topical, Q1H PRN, Eliz Grover MD     lidocaine 1 % 0.1-1 mL, 0.1-1 mL, Other, Q1H PRN, Eliz Grover MD     magnesium hydroxide (MILK OF MAGNESIA) suspension 30 mL, 30 mL, Oral, Daily PRN, Eliz Grover MD     melatonin  tablet 1 mg, 1 mg, Oral, At Bedtime PRN, Eliz Grover MD, 1 mg at 05/18/20 2108     naloxone (NARCAN) injection 0.1-0.4 mg, 0.1-0.4 mg, Intravenous, Q2 Min PRN, Eliz Grover MD     nicotine (NICODERM CQ) 21 MG/24HR 24 hr patch 1 patch, 1 patch, Transdermal, Daily, Eliz Grover MD     nicotine Patch in Place, , Transdermal, Q8H, Eliz Grover MD     ondansetron (ZOFRAN-ODT) ODT tab 4 mg, 4 mg, Oral, Q6H PRN **OR** ondansetron (ZOFRAN) injection 4 mg, 4 mg, Intravenous, Q6H PRN, Eliz Grover MD     pantoprazole (PROTONIX) EC tablet 40 mg, 40 mg, Oral, QAM AC, Eliz Grover MD, 40 mg at 05/19/20 0648     senna-docusate (SENOKOT-S/PERICOLACE) 8.6-50 MG per tablet 1 tablet, 1 tablet, Oral, BID, 1 tablet at 05/19/20 0858 **OR** senna-docusate (SENOKOT-S/PERICOLACE) 8.6-50 MG per tablet 2 tablet, 2 tablet, Oral, BID, Eliz Grover MD     sodium chloride (PF) 0.9% PF flush 3 mL, 3 mL, Intracatheter, q1 min prn, Eliz Grover MD     sodium chloride (PF) 0.9% PF flush 3 mL, 3 mL, Intracatheter, Q8H, Eliz Grover MD, 3 mL at 05/19/20 0648     spironolactone (ALDACTONE) tablet 25 mg, 25 mg, Oral, Daily, Eliz Grover MD, 25 mg at 05/19/20 0858     umeclidinium (INCRUSE ELLIPTA) 62.5 MCG/INH inhaler 1 puff, 1 puff, Inhalation, Daily, Eliz Grover MD, 1 puff at 05/19/20 0858     warfarin ANTICOAGULANT (COUMADIN) tablet 2 mg, 2 mg, Oral, ONCE at 18:00, Eliz Grover MD     Warfarin Therapy Reminder (Check START DATE - warfarin may be starting in the FUTURE), 1 each, Does not apply, Continuous PRN, Eliz Grover MD     ALLERGIES:    Allergies   Allergen Reactions     Bees      Augmentin Rash     Penicillins Rash        SOCIAL HISTORY:   Social History     Socioeconomic History     Marital status: Single     Spouse name: Not on file     Number of children: Not on file     Years of education: Not on file     Highest education level: Not on file   Occupational History     Not on file  "  Social Needs     Financial resource strain: Not on file     Food insecurity     Worry: Not on file     Inability: Not on file     Transportation needs     Medical: Not on file     Non-medical: Not on file   Tobacco Use     Smoking status: Current Every Day Smoker     Packs/day: 1.00     Years: 30.00     Pack years: 30.00     Types: Cigarettes     Smokeless tobacco: Current User     Tobacco comment: started at age 18    Substance and Sexual Activity     Alcohol use: No     Alcohol/week: 0.0 standard drinks     Drug use: No     Sexual activity: Not Currently     Partners: Female   Lifestyle     Physical activity     Days per week: Not on file     Minutes per session: Not on file     Stress: Not on file   Relationships     Social connections     Talks on phone: Not on file     Gets together: Not on file     Attends Worship service: Not on file     Active member of club or organization: Not on file     Attends meetings of clubs or organizations: Not on file     Relationship status: Not on file     Intimate partner violence     Fear of current or ex partner: Not on file     Emotionally abused: Not on file     Physically abused: Not on file     Forced sexual activity: Not on file   Other Topics Concern     Parent/sibling w/ CABG, MI or angioplasty before 65F 55M? No   Social History Narrative     Not on file        FAMILY HISTORY:   Family History   Problem Relation Age of Onset     Diabetes Brother      Unknown/Adopted Mother      Unknown/Adopted Father         EXAM:Vitals: /69   Pulse 72   Temp 98.4  F (36.9  C) (Oral)   Resp 16   Ht 1.778 m (5' 10\")   Wt 145.2 kg (320 lb)   SpO2 93%   BMI 45.92 kg/m    BMI= Body mass index is 45.92 kg/m .    General appearance: Patient is alert and fully cooperative with history & exam.  No sign of distress is noted during the visit.     Psychiatric: Affect is pleasant & appropriate.  Patient appears motivated to improve health.     Respiratory: Breathing is regular & " unlabored while sitting.     HEENT: Hearing is intact to spoken word.  Speech is clear.  No gross evidence of visual impairment that would impact ambulation.     Dermatologic: Approx 0.4 x 1.6cm area of skin breakdown interdigitally on L foot with mild local erythema.  Thin layer of fibrous slough came off with dressing.  No necrosis, deep probing, or purulence.  Overall wound appears granular.  Some chronic appearing discoloration with mild erythema to L lower leg, separate from foot erythema at this time.       Vascular: DP & PT pulses are intact & regular on the L.  B/l LE edema, chronic.  CFT and skin temperature are normal to both lower extremities.     Neurologic: Lower extremity sensation is intact to light touch.  No evidence of weakness or contracture in the lower extremities.      Musculoskeletal: Patient is ambulatory without assistive device or brace.  No gross ankle deformity noted.  No foot or ankle joint effusion is noted.      Imaging reviewed with pt:      FOOT LEFT THREE OR MORE VIEWS   5/18/2020 4:45 PM      HISTORY:  Forefoot ulcer, evaluate for osteomyelitis.     FINDINGS: Old healed fracture deformities of the third, fourth, and  fifth metatarsals.                                                                      IMPRESSION: No focal bone destruction.     PETER MCGEE MD        EXAM: MR FOOT LEFT W/O and W CONTRAST  LOCATION: [facility]  DATE/TIME: 5/19/2020 12:00 AM     INDICATION: Wound between first and second toe. Evaluate for abscess or osteomyelitis.     COMPARISON: None.     TECHNIQUE: Routine. Additional postgadolinium T1 sequences were obtained.     IV CONTRAST: 15 mL Gadavist.     FINDINGS: Mild subcutaneous edema over the dorsal aspect of the forefoot. No drainable fluid collections or destructive bone lesions. Old fracture deformities of the fourth and fifth metatarsals. No acute fracture is seen. No abnormal marrow signal   intensity is identified. No joint effusion  is seen.                                                                      IMPRESSION: Subcutaneous edema consistent with cellulitis. There is no evidence of an abscess or osteomyelitis.               Lab Results   Component Value Date    WBC 6.5 05/19/2020     Lab Results   Component Value Date    RBC 3.77 05/19/2020     Lab Results   Component Value Date    HGB 10.9 05/19/2020     Lab Results   Component Value Date    HCT 35.0 05/19/2020     No components found for: MCT  Lab Results   Component Value Date    MCV 93 05/19/2020     Lab Results   Component Value Date    MCH 28.9 05/19/2020     Lab Results   Component Value Date    MCHC 31.1 05/19/2020     Lab Results   Component Value Date    RDW 16.3 05/19/2020     Lab Results   Component Value Date     05/19/2020

## 2020-05-19 NOTE — PLAN OF CARE
PRIOR TO DISCHARGE      Comments: -diagnostic tests and consults completed and resulted- Not met  -vital signs normal or at patient baseline -Met  Nurse to notify provider when observation goals have been met and patient is ready for discharge.    A&0. VSS on RA. On low Na+, no caffeine diet. Upx SB. 2+ edema on LLE. MRI done. Getting  I.V antibiotic. Will continue to monitor.

## 2020-05-19 NOTE — PLAN OF CARE
Discharge Planner OT   Patient plan for discharge: see chart   Current status: IP OT orders received, chart reviewed, and discussed with treatment team including physical therapist. Pt under Observation status. Physical therapist completed OT screen and pt demonstrated no concerns for ADLs including toilet transfer. No skilled IP OT warranted, will cancel/complete IP OT orders/evaluation.  Barriers to return to prior living situation: Defer to IP PT   Recommendations for discharge: Defer to IP PT   Rationale for recommendations: No skilled IP OT warranted, will cancel/complete IP OT orders/evaluation.       Entered by: Raúl Valera 05/19/2020 3:36 PM

## 2020-05-19 NOTE — CONSULTS
Consult Date:  2020      Mr. Archibald is a 59-year-old male who was admitted with cellulitis of the left lower extremity and possible rule out for septic arthritis of his left knee.  I saw the patient last evening at which time his exam was consistent with a diagnosis of cellulitis.  No evidence of septic arthritis.  He had painless range of motion of his knee.  He was afebrile last night.  He continues to be afebrile.  White count has gone down from 83 to 63.      PHYSICAL EXAMINATION:  The area of cellulitis has decreased.  He has full painless range of motion of his knee.      ASSESSMENT:   1.  Left lower extremity cellulitis, improving on IV antibiotics.  No indication for surgical treatment at this time as it relates to his left knee.  Recommend that he be switched to oral antibiotics at some point and he will need to be on oral antibiotics for a month.   2.  Nonhealing ulceration, left foot.  This is being treated by Podiatry.  He can follow up with him.         CARLOS BRIZUELA MD             D: 2020   T: 2020   MT: SONALI      Name:     SUMIT ARCHIBALD   MRN:      [id number]        Account:       [id number]   :      1960           Consult Date:  2020      Document: [id number]

## 2020-05-19 NOTE — PLAN OF CARE
Discharge Planner PT   Patient plan for discharge: return home  Current status: PT: Order received; 1x evaluation completed for disposition planning; At baseline patient reports living in a Group Home; has a ramp entrance; no use of an assistive device prior to admit; uses a hospital bed; currently hospitalized with L LE wound and possible cellulitis. On eval patient mod I with bed mobility; sit>stand initially with SBA and then independent; able to ambulate in room initially 30 feet with walker and then x 80 feet without an assistive device; no LOB; Up in a wheelchair at end of session; Patient feels he is at or near his baseline. No current PT needs identified. Will complete order. Currently doesn't need an assistive device but if they change his weightbearing status may need one for discharge; nurse updated  Barriers to return to prior living situation: None anticipated  Recommendations for discharge: return Group Home  Rationale for recommendations: No current PT needs identified; up independently without an assistive device or LOB; reports he is at baseline.       Entered by: Shahana Kamara 05/19/2020 9:44 AM

## 2020-05-20 LAB
ERYTHROCYTE [DISTWIDTH] IN BLOOD BY AUTOMATED COUNT: 15.9 % (ref 10–15)
HCT VFR BLD AUTO: 37.2 % (ref 40–53)
HGB BLD-MCNC: 11.6 G/DL (ref 13.3–17.7)
INR PPP: 3.21 (ref 0.86–1.14)
MCH RBC QN AUTO: 28.7 PG (ref 26.5–33)
MCHC RBC AUTO-ENTMCNC: 31.2 G/DL (ref 31.5–36.5)
MCV RBC AUTO: 92 FL (ref 78–100)
PLATELET # BLD AUTO: 270 10E9/L (ref 150–450)
RBC # BLD AUTO: 4.04 10E12/L (ref 4.4–5.9)
WBC # BLD AUTO: 5.6 10E9/L (ref 4–11)

## 2020-05-20 PROCEDURE — 85610 PROTHROMBIN TIME: CPT | Performed by: INTERNAL MEDICINE

## 2020-05-20 PROCEDURE — 36415 COLL VENOUS BLD VENIPUNCTURE: CPT | Performed by: INTERNAL MEDICINE

## 2020-05-20 PROCEDURE — 25000132 ZZH RX MED GY IP 250 OP 250 PS 637: Mod: GY | Performed by: INTERNAL MEDICINE

## 2020-05-20 PROCEDURE — 99232 SBSQ HOSP IP/OBS MODERATE 35: CPT | Performed by: INTERNAL MEDICINE

## 2020-05-20 PROCEDURE — 25000128 H RX IP 250 OP 636: Performed by: INTERNAL MEDICINE

## 2020-05-20 PROCEDURE — 12000000 ZZH R&B MED SURG/OB

## 2020-05-20 PROCEDURE — 25000125 ZZHC RX 250: Performed by: INTERNAL MEDICINE

## 2020-05-20 PROCEDURE — 85027 COMPLETE CBC AUTOMATED: CPT | Performed by: INTERNAL MEDICINE

## 2020-05-20 RX ORDER — WARFARIN SODIUM 2 MG/1
2 TABLET ORAL
Status: COMPLETED | OUTPATIENT
Start: 2020-05-20 | End: 2020-05-20

## 2020-05-20 RX ADMIN — SERTRALINE HYDROCHLORIDE 150 MG: 100 TABLET ORAL at 08:43

## 2020-05-20 RX ADMIN — CLINDAMYCIN PHOSPHATE 600 MG: 600 INJECTION, SOLUTION INTRAVENOUS at 16:20

## 2020-05-20 RX ADMIN — CLINDAMYCIN PHOSPHATE 600 MG: 600 INJECTION, SOLUTION INTRAVENOUS at 06:57

## 2020-05-20 RX ADMIN — SENNOSIDES AND DOCUSATE SODIUM 1 TABLET: 8.6; 5 TABLET ORAL at 08:43

## 2020-05-20 RX ADMIN — GABAPENTIN 200 MG: 100 CAPSULE ORAL at 08:42

## 2020-05-20 RX ADMIN — Medication 1 CAPSULE: at 22:26

## 2020-05-20 RX ADMIN — SIMVASTATIN 40 MG: 40 TABLET, FILM COATED ORAL at 22:27

## 2020-05-20 RX ADMIN — FLUTICASONE FUROATE 1 PUFF: 100 POWDER RESPIRATORY (INHALATION) at 08:44

## 2020-05-20 RX ADMIN — UMECLIDINIUM 1 PUFF: 62.5 AEROSOL, POWDER ORAL at 08:44

## 2020-05-20 RX ADMIN — FUROSEMIDE 40 MG: 40 TABLET ORAL at 08:42

## 2020-05-20 RX ADMIN — Medication 1 CAPSULE: at 08:42

## 2020-05-20 RX ADMIN — FUROSEMIDE 80 MG: 40 TABLET ORAL at 16:20

## 2020-05-20 RX ADMIN — GABAPENTIN 200 MG: 100 CAPSULE ORAL at 22:26

## 2020-05-20 RX ADMIN — ARIPIPRAZOLE 5 MG: 5 TABLET ORAL at 08:43

## 2020-05-20 RX ADMIN — CEFTRIAXONE 2 G: 2 INJECTION, POWDER, FOR SOLUTION INTRAMUSCULAR; INTRAVENOUS at 10:22

## 2020-05-20 RX ADMIN — TEMAZEPAM 15 MG: 7.5 CAPSULE ORAL at 22:26

## 2020-05-20 RX ADMIN — SENNOSIDES AND DOCUSATE SODIUM 1 TABLET: 8.6; 5 TABLET ORAL at 22:27

## 2020-05-20 RX ADMIN — GABAPENTIN 200 MG: 100 CAPSULE ORAL at 16:20

## 2020-05-20 RX ADMIN — FERROUS GLUCONATE 324 MG: 324 TABLET ORAL at 08:42

## 2020-05-20 RX ADMIN — MONTELUKAST 10 MG: 10 TABLET, FILM COATED ORAL at 22:26

## 2020-05-20 RX ADMIN — POTASSIUM CHLORIDE 20 MEQ: 1500 TABLET, EXTENDED RELEASE ORAL at 17:32

## 2020-05-20 RX ADMIN — CLOTRIMAZOLE: 1 CREAM TOPICAL at 08:45

## 2020-05-20 RX ADMIN — WARFARIN SODIUM 2 MG: 2 TABLET ORAL at 17:32

## 2020-05-20 RX ADMIN — BUPROPION HYDROCHLORIDE 300 MG: 150 TABLET, EXTENDED RELEASE ORAL at 08:42

## 2020-05-20 RX ADMIN — PANTOPRAZOLE SODIUM 40 MG: 40 TABLET, DELAYED RELEASE ORAL at 08:42

## 2020-05-20 RX ADMIN — POTASSIUM CHLORIDE 20 MEQ: 1500 TABLET, EXTENDED RELEASE ORAL at 08:42

## 2020-05-20 RX ADMIN — SPIRONOLACTONE 25 MG: 25 TABLET ORAL at 08:42

## 2020-05-20 RX ADMIN — FERROUS GLUCONATE 324 MG: 324 TABLET ORAL at 17:32

## 2020-05-20 ASSESSMENT — ACTIVITIES OF DAILY LIVING (ADL)
ADLS_ACUITY_SCORE: 15
ADLS_ACUITY_SCORE: 15
ADLS_ACUITY_SCORE: 13
ADLS_ACUITY_SCORE: 15

## 2020-05-20 NOTE — PROGRESS NOTES
Pt VSS. A&O. Low NA+ &Fat diet. Up  W/walker independently. Denies pain. Pt appears frustrated about not being able to go home yet. Nurse tried to explain to pt why is not being discharge yet (IV abx for infection). Pt verbalized understanding. Dressing change done to left foot. Refused nicotine patch. Tolerating IV abx. Will continue to monitor.

## 2020-05-20 NOTE — PROGRESS NOTES
[facility]    Infectious Disease Progress Note    Date of Service (when I saw the patient): 05/20/2020     Assessment & Plan   Petey Archibald is a 59 year old male who was admitted on 5/18/2020.     Impression:  1. 59 y.o male with history of recurrent cellulitis.   2. PVD.   3. Chronic venous stasis dermatitis.   4. Admitted with left lower extremity cellulitis and a wound between the first and second toe draining purulent drainage.   5. MRSA skin colonization. MRSA sensitive to clindamycin.   6. PCN allergy, but tolerates cephalosporins ok.      Recommendations:   Leg about the same, slightly better, swollen, red, keep it elevated and continue on IV antibiotics.       Jenifer Quispe MD    Interval History   Afebrile WBC ok   The leg appreas to be the same     Physical Exam   Temp: 98.4  F (36.9  C) Temp src: Oral BP: 129/76 Pulse: 73 Heart Rate: 71 Resp: 16 SpO2: 93 % O2 Device: None (Room air)    Vitals:    05/18/20 1147   Weight: 145.2 kg (320 lb)     Vital Signs with Ranges  Temp:  [98.2  F (36.8  C)-99.3  F (37.4  C)] 98.4  F (36.9  C)  Pulse:  [71-83] 73  Heart Rate:  [71-75] 71  Resp:  [16] 16  BP: (109-129)/(65-76) 129/76  SpO2:  [93 %-95 %] 93 %    Constitutional: Awake, alert, cooperative, no apparent distress  Lungs: Clear to auscultation bilaterally, no crackles or wheezing  Cardiovascular: Regular rate and rhythm, normal S1 and S2, and no murmur noted  Abdomen: Normal bowel sounds, soft, non-distended, non-tender  Skin: No rashes, no cyanosis, no edema  Other:    Medications     Warfarin Therapy Reminder         ARIPiprazole  5 mg Oral Daily     buPROPion  300 mg Oral Daily     cefTRIAXone  2 g Intravenous Q24H     clindamycin  600 mg Intravenous Q8H     clotrimazole   Topical BID     ferrous gluconate  324 mg Oral BID w/meals     fluticasone  1 puff Inhalation Daily     furosemide  40 mg Oral QAM     furosemide  80 mg Oral QPM     gabapentin  200 mg Oral TID     lactobacillus  rhamnosus (GG)  1 capsule Oral BID     montelukast  10 mg Oral At Bedtime     nicotine  1 patch Transdermal Daily     nicotine   Transdermal Q8H     pantoprazole  40 mg Oral Daily     potassium chloride ER  20 mEq Oral BID w/meals     senna-docusate  1 tablet Oral BID    Or     senna-docusate  2 tablet Oral BID     sertraline  150 mg Oral Daily     simvastatin  40 mg Oral At Bedtime     sodium chloride (PF)  3 mL Intracatheter Q8H     spironolactone  25 mg Oral Daily     umeclidinium  1 puff Inhalation Daily     warfarin ANTICOAGULANT  2 mg Oral ONCE at 18:00       Data   All microbiology laboratory data reviewed.  Recent Labs   Lab Test 05/20/20  0645 05/19/20  0702 05/18/20  1204   WBC 5.6 6.5 8.3   HGB 11.6* 10.9* 11.7*   HCT 37.2* 35.0* 36.8*   MCV 92 93 92    242 249     Recent Labs   Lab Test 05/19/20  0702 05/18/20  1204 01/08/20  1417   CR 0.90 1.08 1.24     Recent Labs   Lab Test 05/18/20  1204   SED 70*     Recent Labs   Lab Test 05/18/20  1204 01/04/20  2347 08/14/18  1334   CULT No growth after 2 days  No growth after 2 days Heavy growth  Mixed fecal kade    Light growth  Methicillin resistant Staphylococcus aureus (MRSA)  * Single colony  Coagulase negative Staphylococcus  Susceptibility testing not routinely done  *  Moderate growth  Gram positive bacilli resembling diphtheroids  No further identification  *       Attestation:  Total time on the floor involved in the patient's care: 35 minutes. Total time spent in counseling/care coordination: >50%

## 2020-05-20 NOTE — PLAN OF CARE
Pt A&Ox4 this shift, VSS on RA. Pt up w/SBA and walker, voiding in urinal. IV saline locked, IV clindamycin given. L foot dressing CDI, LLE red, dusky, and +3 edematous. Pt denied pain this shift. Will continue to follow plan of care.

## 2020-05-20 NOTE — PROGRESS NOTES
[facility]    Hospitalist Progress Note    Assessment & Plan   Petey Archibald is a 59 year old male with morbid obesity, history of DVT and PE but left lower extremity presents to the emergency department with complaints of sudden onset of erythema and tenderness on left lower extremity, patient does have a chronic left toe wound     Cellulitis of left lower extremity  Chronic ulcer of left foot   Peripheral vascular disease (H)  --- Patient is a chronic smoker and has peripheral vascular disease, he follows up with podiatry   for his wound ulcer, he was last seen last month, he does a regular wound checks and cleaning with a silver sulfadiazine; on the day of admission he noticed some erythema prior to going to bed but significant erythema which is expanding above his left knee joint was noted when he woke up.  - no fever, no leucocytosis  - elevated  and ESR 70  - US LE- neg for DVT  - there was initial concern for septic arthritis of the left knee as it was red swollen and tender  - started initially on Cefazolin and Vanco  - Xray left foot- No focal bone destruction  - MRI left foot- Subcutaneous edema consistent with cellulitis. There is no evidence of an abscess or osteomyelitis  - Ortho consult appreciated- no evidence of septic arthritis  - Podiatry consulted- no surgical intervention recommended  - ID consult appreciated  - ABX were switched to Ceftriaxone and Clindamycin  - erythema seem to have improved compared with initial pictures from ER; has some chronic hyperpigmentation skin changes left shin  - afebrile, no leucocytosis  - continue Ceftriaxone/Clindamycin for now  - keep LLE elevated  - wound care as per WOC  - continue PTA Lasix and Aldactone for chronic LE swelling/lymphedema   - follow up with Podiatry in 2 weeks    History of pulmonary embolism  History of DVT of lower extremity  --Continue warfarin, pharmacy to dose warfarin  - INR today  3.21    Hypercholesterolemia  - Restarted PTA statin    Schizoaffective disorder, bipolar type (H)  - lives in a group home  - resumed PTA Wellbutrin, aripiprazole, Sertraline and Temazepam at bedtime prn  - Patient has decision-making capability but due to above he can get upset very fast.    Morbid obesity due to excess calories (H)  BMI 40-50    Tobacco dependence:40-50 pk yr hx  - Continue nicotine patch    COPD  - he is a current smoker  - denies SOB, no wheezing on ausculation   - PTA Pulmicort switched to Arnuity Ellipta, PTA Spiriva switched to Incruse Ellipta; cont Alb inh prn    NEL (obstructive sleep apnea)  - CPAP with home settings    GERD (gastroesophageal reflux disease)  - We will continue PPI      Patient is on warfarin for DVT prophylaxis  Code Status: Full Code     Disposition: Expected discharge in 1 to 2 days, once plan for po ABX known.    Leticia Conway MD      Interval History   Doing fine, apparently he was told that he will be discharge on Friday, he is OK with this plan but wants his group home to be notified that he will return on Friday; denies chest pain, no SOB, no N/V, no abd pain.; discussed with RN.     -Data reviewed today: I reviewed all new labs and imaging results over the last 24 hours    Physical Exam   Temp: 98.4  F (36.9  C) Temp src: Oral BP: 129/76 Pulse: 73 Heart Rate: 71 Resp: 16 SpO2: 93 % O2 Device: None (Room air)    Vitals:    05/18/20 1147   Weight: 145.2 kg (320 lb)     Vital Signs with Ranges  Temp:  [98.2  F (36.8  C)-99.3  F (37.4  C)] 98.4  F (36.9  C)  Pulse:  [71-83] 73  Heart Rate:  [71-75] 71  Resp:  [16] 16  BP: (109-129)/(65-76) 129/76  SpO2:  [93 %-95 %] 93 %  I/O last 3 completed shifts:  In: 240 [P.O.:240]  Out: 4000 [Urine:4000]    Constitutional: Awake, alert, cooperative, no apparent distress, morbidly obese  Respiratory: Clear to auscultation bilaterally, no crackles or wheezing  Cardiovascular: Regular rate and rhythm, normal S1 and S2, and  no murmur noted  GI: Normal bowel sounds, soft, non-distended, non-tender  Skin/Integumen: left foot- skin breakdown interdigitally, the erythema of his left lower extremity seems improved, still has chronic hyperpigmentation skin changes b/l shins left>right; varicose veins noted RLE;   Neuro: moving all 4 extremities, no focal deficit noted     Medications     Warfarin Therapy Reminder         ARIPiprazole  5 mg Oral Daily     buPROPion  300 mg Oral Daily     cefTRIAXone  2 g Intravenous Q24H     clindamycin  600 mg Intravenous Q8H     clotrimazole   Topical BID     ferrous gluconate  324 mg Oral BID w/meals     fluticasone  1 puff Inhalation Daily     furosemide  40 mg Oral QAM     furosemide  80 mg Oral QPM     gabapentin  200 mg Oral TID     lactobacillus rhamnosus (GG)  1 capsule Oral BID     montelukast  10 mg Oral At Bedtime     nicotine  1 patch Transdermal Daily     nicotine   Transdermal Q8H     pantoprazole  40 mg Oral Daily     potassium chloride ER  20 mEq Oral BID w/meals     senna-docusate  1 tablet Oral BID    Or     senna-docusate  2 tablet Oral BID     sertraline  150 mg Oral Daily     simvastatin  40 mg Oral At Bedtime     sodium chloride (PF)  3 mL Intracatheter Q8H     spironolactone  25 mg Oral Daily     umeclidinium  1 puff Inhalation Daily     warfarin ANTICOAGULANT  2 mg Oral ONCE at 18:00       Data   Recent Labs   Lab 05/20/20  0645 05/19/20  0702 05/18/20  1204   WBC 5.6 6.5 8.3   HGB 11.6* 10.9* 11.7*   MCV 92 93 92    242 249   INR 3.21* 3.38* 2.63*   NA  --  138 137   POTASSIUM  --  4.0 3.7   CHLORIDE  --  106 106   CO2  --  27 26   BUN  --  14 19   CR  --  0.90 1.08   ANIONGAP  --  5 5   JESSICA  --  8.9 9.1   GLC  --  122* 101*   ALBUMIN  --   --  3.3*   PROTTOTAL  --   --  7.9   BILITOTAL  --   --  0.7   ALKPHOS  --   --  76   ALT  --   --  22   AST  --   --  22     Recent Labs   Lab 05/19/20  0702 05/18/20  1204   * 101*       Imaging:   No results found for this or any  previous visit (from the past 24 hour(s)).

## 2020-05-21 LAB — INR PPP: 2.82 (ref 0.86–1.14)

## 2020-05-21 PROCEDURE — 25000128 H RX IP 250 OP 636: Performed by: INTERNAL MEDICINE

## 2020-05-21 PROCEDURE — 25000125 ZZHC RX 250: Performed by: INTERNAL MEDICINE

## 2020-05-21 PROCEDURE — 85610 PROTHROMBIN TIME: CPT | Performed by: INTERNAL MEDICINE

## 2020-05-21 PROCEDURE — 12000000 ZZH R&B MED SURG/OB

## 2020-05-21 PROCEDURE — 25000132 ZZH RX MED GY IP 250 OP 250 PS 637: Mod: GY | Performed by: INTERNAL MEDICINE

## 2020-05-21 PROCEDURE — 36415 COLL VENOUS BLD VENIPUNCTURE: CPT | Performed by: INTERNAL MEDICINE

## 2020-05-21 PROCEDURE — 99232 SBSQ HOSP IP/OBS MODERATE 35: CPT | Performed by: INTERNAL MEDICINE

## 2020-05-21 RX ORDER — WARFARIN SODIUM 4 MG/1
4 TABLET ORAL
Status: COMPLETED | OUTPATIENT
Start: 2020-05-21 | End: 2020-05-21

## 2020-05-21 RX ADMIN — MONTELUKAST 10 MG: 10 TABLET, FILM COATED ORAL at 20:46

## 2020-05-21 RX ADMIN — TEMAZEPAM 15 MG: 7.5 CAPSULE ORAL at 22:48

## 2020-05-21 RX ADMIN — POTASSIUM CHLORIDE 20 MEQ: 1500 TABLET, EXTENDED RELEASE ORAL at 18:04

## 2020-05-21 RX ADMIN — SPIRONOLACTONE 25 MG: 25 TABLET ORAL at 08:40

## 2020-05-21 RX ADMIN — Medication 1 CAPSULE: at 08:40

## 2020-05-21 RX ADMIN — CLINDAMYCIN PHOSPHATE 600 MG: 600 INJECTION, SOLUTION INTRAVENOUS at 07:53

## 2020-05-21 RX ADMIN — CLINDAMYCIN PHOSPHATE 600 MG: 600 INJECTION, SOLUTION INTRAVENOUS at 22:35

## 2020-05-21 RX ADMIN — Medication 1 CAPSULE: at 20:45

## 2020-05-21 RX ADMIN — UMECLIDINIUM 1 PUFF: 62.5 AEROSOL, POWDER ORAL at 08:39

## 2020-05-21 RX ADMIN — BUPROPION HYDROCHLORIDE 300 MG: 150 TABLET, EXTENDED RELEASE ORAL at 08:39

## 2020-05-21 RX ADMIN — CLOTRIMAZOLE: 1 CREAM TOPICAL at 08:39

## 2020-05-21 RX ADMIN — FUROSEMIDE 40 MG: 40 TABLET ORAL at 08:40

## 2020-05-21 RX ADMIN — FERROUS GLUCONATE 324 MG: 324 TABLET ORAL at 18:04

## 2020-05-21 RX ADMIN — SERTRALINE HYDROCHLORIDE 150 MG: 100 TABLET ORAL at 08:39

## 2020-05-21 RX ADMIN — GABAPENTIN 200 MG: 100 CAPSULE ORAL at 15:47

## 2020-05-21 RX ADMIN — CLINDAMYCIN PHOSPHATE 600 MG: 600 INJECTION, SOLUTION INTRAVENOUS at 14:25

## 2020-05-21 RX ADMIN — SENNOSIDES AND DOCUSATE SODIUM 2 TABLET: 8.6; 5 TABLET ORAL at 08:39

## 2020-05-21 RX ADMIN — GABAPENTIN 200 MG: 100 CAPSULE ORAL at 08:40

## 2020-05-21 RX ADMIN — FLUTICASONE FUROATE 1 PUFF: 100 POWDER RESPIRATORY (INHALATION) at 08:39

## 2020-05-21 RX ADMIN — FERROUS GLUCONATE 324 MG: 324 TABLET ORAL at 08:40

## 2020-05-21 RX ADMIN — CEFTRIAXONE 2 G: 2 INJECTION, POWDER, FOR SOLUTION INTRAMUSCULAR; INTRAVENOUS at 11:16

## 2020-05-21 RX ADMIN — POTASSIUM CHLORIDE 20 MEQ: 1500 TABLET, EXTENDED RELEASE ORAL at 08:40

## 2020-05-21 RX ADMIN — SIMVASTATIN 40 MG: 40 TABLET, FILM COATED ORAL at 20:45

## 2020-05-21 RX ADMIN — ARIPIPRAZOLE 5 MG: 5 TABLET ORAL at 08:40

## 2020-05-21 RX ADMIN — SENNOSIDES AND DOCUSATE SODIUM 1 TABLET: 8.6; 5 TABLET ORAL at 20:45

## 2020-05-21 RX ADMIN — PANTOPRAZOLE SODIUM 40 MG: 40 TABLET, DELAYED RELEASE ORAL at 08:40

## 2020-05-21 RX ADMIN — WARFARIN SODIUM 4 MG: 4 TABLET ORAL at 18:04

## 2020-05-21 RX ADMIN — GABAPENTIN 200 MG: 100 CAPSULE ORAL at 20:45

## 2020-05-21 RX ADMIN — FUROSEMIDE 80 MG: 40 TABLET ORAL at 15:47

## 2020-05-21 ASSESSMENT — ACTIVITIES OF DAILY LIVING (ADL)
ADLS_ACUITY_SCORE: 15

## 2020-05-21 NOTE — PLAN OF CARE
Pt A&Ox4, VSS on RA. Pt up w/SBA and walker, voiding in urinal. Regular diet. +3 edema and redness to LLE present. CMS intact. IV antibiotics given x1. Planning to discharge to group home tomorrow. Will continue to follow plan of care.

## 2020-05-21 NOTE — PROGRESS NOTES
Care Coordination:    I was able to connect with [facility]is group home contact [phone] , and he stated he can pick the pt up on Friday.  The Group home is near by in [place] so he can pick the pt up within an hour or two of notification.  I let the [facility] staff/charge know this.  Plan is the pt will discharge on Friday May 22 with oral antibiotics.      Mag Gomes RN, BSN Care Coordinator  [facility]  Mobile: [phone]

## 2020-05-21 NOTE — PROGRESS NOTES
Pt is A&O. VSS. RA. Denies pain. Up with SBA. Uses urinal. On IV abx. Discharge to group home tomorrow.

## 2020-05-21 NOTE — PROGRESS NOTES
[facility]    Infectious Disease Progress Note    Date of Service (when I saw the patient): 05/21/2020     Assessment & Plan   Petey Archibald is a 59 year old male who was admitted on 5/18/2020.     Impression:  1. 59 y.o male with history of recurrent cellulitis.   2. PVD.   3. Chronic venous stasis dermatitis.   4. Admitted with left lower extremity cellulitis and a wound between the first and second toe draining purulent drainage.   5. MRSA skin colonization. MRSA sensitive to clindamycin.   6. PCN allergy, but tolerates cephalosporins ok.      Recommendations:   Leg about the same, slightly better, swollen, red, keep it elevated and continue on IV antibiotics.       Jenifer Quispe MD    Interval History   Afebrile WBC ok   The leg appreas to be the same     Physical Exam   Temp: 98.1  F (36.7  C) Temp src: Oral BP: 118/71 Pulse: 71 Heart Rate: 69 Resp: 16 SpO2: 92 % O2 Device: None (Room air)    Vitals:    05/18/20 1147   Weight: 145.2 kg (320 lb)     Vital Signs with Ranges  Temp:  [98.1  F (36.7  C)-98.8  F (37.1  C)] 98.1  F (36.7  C)  Pulse:  [70-77] 71  Heart Rate:  [69-72] 69  Resp:  [16] 16  BP: (112-122)/(66-71) 118/71  SpO2:  [92 %-96 %] 92 %    Constitutional: Awake, alert, cooperative, no apparent distress  Lungs: Clear to auscultation bilaterally, no crackles or wheezing  Cardiovascular: Regular rate and rhythm, normal S1 and S2, and no murmur noted  Abdomen: Normal bowel sounds, soft, non-distended, non-tender  Skin: No rashes, no cyanosis, no edema  Other:    Medications     Warfarin Therapy Reminder         ARIPiprazole  5 mg Oral Daily     buPROPion  300 mg Oral Daily     cefTRIAXone  2 g Intravenous Q24H     clindamycin  600 mg Intravenous Q8H     clotrimazole   Topical BID     ferrous gluconate  324 mg Oral BID w/meals     fluticasone  1 puff Inhalation Daily     furosemide  40 mg Oral QAM     furosemide  80 mg Oral QPM     gabapentin  200 mg Oral TID     lactobacillus  rhamnosus (GG)  1 capsule Oral BID     montelukast  10 mg Oral At Bedtime     nicotine  1 patch Transdermal Daily     nicotine   Transdermal Q8H     pantoprazole  40 mg Oral Daily     potassium chloride ER  20 mEq Oral BID w/meals     senna-docusate  1 tablet Oral BID    Or     senna-docusate  2 tablet Oral BID     sertraline  150 mg Oral Daily     simvastatin  40 mg Oral At Bedtime     sodium chloride (PF)  3 mL Intracatheter Q8H     spironolactone  25 mg Oral Daily     umeclidinium  1 puff Inhalation Daily     warfarin ANTICOAGULANT  4 mg Oral ONCE at 18:00       Data   All microbiology laboratory data reviewed.  Recent Labs   Lab Test 05/20/20  0645 05/19/20  0702 05/18/20  1204   WBC 5.6 6.5 8.3   HGB 11.6* 10.9* 11.7*   HCT 37.2* 35.0* 36.8*   MCV 92 93 92    242 249     Recent Labs   Lab Test 05/19/20  0702 05/18/20  1204 01/08/20  1417   CR 0.90 1.08 1.24     Recent Labs   Lab Test 05/18/20  1204   SED 70*     Recent Labs   Lab Test 05/18/20  1204 01/04/20  2347 08/14/18  1334   CULT No growth after 3 days  No growth after 3 days Heavy growth  Mixed fecal kade    Light growth  Methicillin resistant Staphylococcus aureus (MRSA)  * Single colony  Coagulase negative Staphylococcus  Susceptibility testing not routinely done  *  Moderate growth  Gram positive bacilli resembling diphtheroids  No further identification  *       Attestation:  Total time on the floor involved in the patient's care: 35 minutes. Total time spent in counseling/care coordination: >50%

## 2020-05-21 NOTE — PROGRESS NOTES
[facility]    Hospitalist Progress Note    Assessment & Plan   Petey Archibald is a 59 year old male with morbid obesity, history of DVT and PE but left lower extremity presents to the emergency department with complaints of sudden onset of erythema and tenderness on left lower extremity, patient does have a chronic left toe wound     Cellulitis of left lower extremity  Chronic ulcer of left foot   Peripheral vascular disease (H)  --- Patient is a chronic smoker and has peripheral vascular disease, he follows up with podiatry   for his wound ulcer, he was last seen last month, he does a regular wound checks and cleaning with a silver sulfadiazine; on the day of admission he noticed some erythema prior to going to bed but significant erythema which is expanding above his left knee joint was noted when he woke up.  - no fever, no leucocytosis  - elevated  and ESR 70  - US LE- neg for DVT  - there was initial concern for septic arthritis of the left knee as it was red swollen and tender  - started initially on Cefazolin and Vanco  - Xray left foot- No focal bone destruction  - MRI left foot- Subcutaneous edema consistent with cellulitis. There is no evidence of an abscess or osteomyelitis  - Ortho consult appreciated- no evidence of septic arthritis  - Podiatry consulted- no surgical intervention recommended  - ID consult appreciated  - ABX were switched to Ceftriaxone and Clindamycin  - erythema seem to have improved compared with initial pictures from ER; has some chronic hyperpigmentation skin changes left shin  - afebrile, no leucocytosis  - continue Ceftriaxone/Clindamycin for now  - keep LLE elevated  - wound care as per WOC  - continue PTA Lasix and Aldactone for chronic LE swelling/lymphedema   - follow up with Podiatry in 2 weeks    History of pulmonary embolism  History of DVT of lower extremity  --Continue warfarin, pharmacy to dose warfarin  - INR today  2.82    Hypercholesterolemia  - Restarted PTA statin    Schizoaffective disorder, bipolar type (H)  - lives in a group home  - resumed PTA Wellbutrin, aripiprazole, Sertraline and Temazepam at bedtime prn  - Patient has decision-making capability but due to above he can get upset very fast.    Morbid obesity due to excess calories (H)  BMI 40-50    Tobacco dependence:40-50 pk yr hx  - Continue nicotine patch    COPD  - he is a current smoker  - denies SOB, no wheezing on ausculation   - PTA Pulmicort switched to Arnuity Ellipta, PTA Spiriva switched to Incruse Ellipta; cont Alb inh prn    NEL (obstructive sleep apnea)  - CPAP with home settings    GERD (gastroesophageal reflux disease)  - We will continue PPI      Patient is on warfarin for DVT prophylaxis  Code Status: Full Code     Disposition: Expected discharge tomorrow, once plan for po ABX known.    Leticia Conway MD      Interval History   Doing fine, no events overnight, erythema seems to improve compared with marked area; no C/P, no SOB, no N/V, no abd pain   - discussed with RN.     -Data reviewed today: I reviewed all new labs and imaging results over the last 24 hours    Physical Exam   Temp: 98.2  F (36.8  C) Temp src: Oral BP: 114/66 Pulse: 66 Heart Rate: 65 Resp: 16 SpO2: 95 % O2 Device: None (Room air)    Vitals:    05/18/20 1147   Weight: 145.2 kg (320 lb)     Vital Signs with Ranges  Temp:  [98.1  F (36.7  C)-98.8  F (37.1  C)] 98.2  F (36.8  C)  Pulse:  [66-77] 66  Heart Rate:  [65-72] 65  Resp:  [16] 16  BP: (112-122)/(66-71) 114/66  SpO2:  [92 %-96 %] 95 %  I/O last 3 completed shifts:  In: 240 [P.O.:240]  Out: 2525 [Urine:2525]    Constitutional: Awake, alert, cooperative, no apparent distress, morbidly obese  Respiratory: Clear to auscultation bilaterally, no crackles or wheezing  Cardiovascular: Regular rate and rhythm, normal S1 and S2, and no murmur noted  GI: Normal bowel sounds, soft, non-distended, non-tender  Skin/Integumen: left  foot- skin breakdown interdigitally, the erythema of his left lower extremity seems improved, still has chronic hyperpigmentation skin changes b/l shins left>right; varicose veins noted RLE;   Neuro: moving all 4 extremities, no focal deficit noted     Medications     Warfarin Therapy Reminder         ARIPiprazole  5 mg Oral Daily     buPROPion  300 mg Oral Daily     cefTRIAXone  2 g Intravenous Q24H     clindamycin  600 mg Intravenous Q8H     clotrimazole   Topical BID     ferrous gluconate  324 mg Oral BID w/meals     fluticasone  1 puff Inhalation Daily     furosemide  40 mg Oral QAM     furosemide  80 mg Oral QPM     gabapentin  200 mg Oral TID     lactobacillus rhamnosus (GG)  1 capsule Oral BID     montelukast  10 mg Oral At Bedtime     nicotine  1 patch Transdermal Daily     nicotine   Transdermal Q8H     pantoprazole  40 mg Oral Daily     potassium chloride ER  20 mEq Oral BID w/meals     senna-docusate  1 tablet Oral BID    Or     senna-docusate  2 tablet Oral BID     sertraline  150 mg Oral Daily     simvastatin  40 mg Oral At Bedtime     sodium chloride (PF)  3 mL Intracatheter Q8H     spironolactone  25 mg Oral Daily     umeclidinium  1 puff Inhalation Daily     warfarin ANTICOAGULANT  4 mg Oral ONCE at 18:00       Data   Recent Labs   Lab 05/21/20  0657 05/20/20  0645 05/19/20  0702 05/18/20  1204   WBC  --  5.6 6.5 8.3   HGB  --  11.6* 10.9* 11.7*   MCV  --  92 93 92   PLT  --  270 242 249   INR 2.82* 3.21* 3.38* 2.63*   NA  --   --  138 137   POTASSIUM  --   --  4.0 3.7   CHLORIDE  --   --  106 106   CO2  --   --  27 26   BUN  --   --  14 19   CR  --   --  0.90 1.08   ANIONGAP  --   --  5 5   JESSICA  --   --  8.9 9.1   GLC  --   --  122* 101*   ALBUMIN  --   --   --  3.3*   PROTTOTAL  --   --   --  7.9   BILITOTAL  --   --   --  0.7   ALKPHOS  --   --   --  76   ALT  --   --   --  22   AST  --   --   --  22     Recent Labs   Lab 05/19/20  0702 05/18/20  1204   * 101*       Imaging:   No results  found for this or any previous visit (from the past 24 hour(s)).

## 2020-05-22 VITALS
TEMPERATURE: 98.2 F | OXYGEN SATURATION: 94 % | WEIGHT: 315 LBS | RESPIRATION RATE: 16 BRPM | BODY MASS INDEX: 45.1 KG/M2 | DIASTOLIC BLOOD PRESSURE: 71 MMHG | SYSTOLIC BLOOD PRESSURE: 112 MMHG | HEIGHT: 70 IN | HEART RATE: 72 BPM

## 2020-05-22 LAB — INR PPP: 2.11 (ref 0.86–1.14)

## 2020-05-22 PROCEDURE — 25000132 ZZH RX MED GY IP 250 OP 250 PS 637: Mod: GY | Performed by: INTERNAL MEDICINE

## 2020-05-22 PROCEDURE — 36415 COLL VENOUS BLD VENIPUNCTURE: CPT | Performed by: INTERNAL MEDICINE

## 2020-05-22 PROCEDURE — 99239 HOSP IP/OBS DSCHRG MGMT >30: CPT | Performed by: INTERNAL MEDICINE

## 2020-05-22 PROCEDURE — 25000125 ZZHC RX 250: Performed by: INTERNAL MEDICINE

## 2020-05-22 PROCEDURE — 25000128 H RX IP 250 OP 636: Performed by: INTERNAL MEDICINE

## 2020-05-22 PROCEDURE — 85610 PROTHROMBIN TIME: CPT | Performed by: INTERNAL MEDICINE

## 2020-05-22 RX ORDER — LACTOBACILLUS RHAMNOSUS GG 10B CELL
1 CAPSULE ORAL 2 TIMES DAILY
Qty: 20 CAPSULE | Refills: 0 | Status: SHIPPED | OUTPATIENT
Start: 2020-05-22 | End: 2020-06-12

## 2020-05-22 RX ORDER — CLINDAMYCIN HCL 300 MG
300 CAPSULE ORAL 3 TIMES DAILY
Qty: 30 CAPSULE | Refills: 0 | Status: SHIPPED | OUTPATIENT
Start: 2020-05-22 | End: 2020-06-01

## 2020-05-22 RX ORDER — WARFARIN SODIUM 4 MG/1
TABLET ORAL
Qty: 84 TABLET | Refills: 0 | COMMUNITY
Start: 2020-05-22 | End: 2020-05-22

## 2020-05-22 RX ORDER — WARFARIN SODIUM 4 MG/1
TABLET ORAL
Qty: 84 TABLET | Refills: 0 | Status: SHIPPED | OUTPATIENT
Start: 2020-05-22 | End: 2020-05-26

## 2020-05-22 RX ORDER — CEPHALEXIN 500 MG/1
500 CAPSULE ORAL 3 TIMES DAILY
Qty: 30 CAPSULE | Refills: 0 | Status: SHIPPED | OUTPATIENT
Start: 2020-05-22 | End: 2020-06-01

## 2020-05-22 RX ORDER — WARFARIN SODIUM 4 MG/1
8 TABLET ORAL
Status: DISCONTINUED | OUTPATIENT
Start: 2020-05-22 | End: 2020-05-22 | Stop reason: HOSPADM

## 2020-05-22 RX ADMIN — GABAPENTIN 200 MG: 100 CAPSULE ORAL at 08:26

## 2020-05-22 RX ADMIN — POTASSIUM CHLORIDE 20 MEQ: 1500 TABLET, EXTENDED RELEASE ORAL at 08:27

## 2020-05-22 RX ADMIN — FERROUS GLUCONATE 324 MG: 324 TABLET ORAL at 08:27

## 2020-05-22 RX ADMIN — CLINDAMYCIN PHOSPHATE 600 MG: 600 INJECTION, SOLUTION INTRAVENOUS at 06:47

## 2020-05-22 RX ADMIN — BUPROPION HYDROCHLORIDE 300 MG: 150 TABLET, EXTENDED RELEASE ORAL at 08:26

## 2020-05-22 RX ADMIN — Medication 1 CAPSULE: at 08:26

## 2020-05-22 RX ADMIN — ARIPIPRAZOLE 5 MG: 5 TABLET ORAL at 08:26

## 2020-05-22 RX ADMIN — SERTRALINE HYDROCHLORIDE 150 MG: 100 TABLET ORAL at 08:26

## 2020-05-22 RX ADMIN — PANTOPRAZOLE SODIUM 40 MG: 40 TABLET, DELAYED RELEASE ORAL at 08:27

## 2020-05-22 RX ADMIN — CEFTRIAXONE 2 G: 2 INJECTION, POWDER, FOR SOLUTION INTRAMUSCULAR; INTRAVENOUS at 11:15

## 2020-05-22 RX ADMIN — SPIRONOLACTONE 25 MG: 25 TABLET ORAL at 08:27

## 2020-05-22 RX ADMIN — FLUTICASONE FUROATE 1 PUFF: 100 POWDER RESPIRATORY (INHALATION) at 08:31

## 2020-05-22 RX ADMIN — FUROSEMIDE 40 MG: 40 TABLET ORAL at 08:27

## 2020-05-22 RX ADMIN — SENNOSIDES AND DOCUSATE SODIUM 1 TABLET: 8.6; 5 TABLET ORAL at 08:27

## 2020-05-22 RX ADMIN — UMECLIDINIUM 1 PUFF: 62.5 AEROSOL, POWDER ORAL at 08:30

## 2020-05-22 ASSESSMENT — ACTIVITIES OF DAILY LIVING (ADL)
ADLS_ACUITY_SCORE: 15

## 2020-05-22 NOTE — PROGRESS NOTES
[facility]    Infectious Disease Progress Note    Date of Service (when I saw the patient): 05/22/2020     Assessment & Plan   Petey Archibald is a 59 year old male who was admitted on 5/18/2020.     Impression:  1. 59 y.o male with history of recurrent cellulitis.   2. PVD.   3. Chronic venous stasis dermatitis.   4. Admitted with left lower extremity cellulitis and a wound between the first and second toe draining purulent drainage.   5. MRSA skin colonization. MRSA sensitive to clindamycin.   6. PCN allergy, but tolerates cephalosporins ok.      Recommendations:   Ok to discharge on oral keflex 500 mg TID + clindamycin 300 mg TID along with BID probiotics for 10 days.       Jenifer Quispe MD    Interval History   Afebrile WBC ok   The leg appreas to be improved.     Physical Exam   Temp: 98.2  F (36.8  C) Temp src: Oral BP: 112/71 Pulse: 72 Heart Rate: 67 Resp: 16 SpO2: 94 % O2 Device: None (Room air)    Vitals:    05/18/20 1147   Weight: 145.2 kg (320 lb)     Vital Signs with Ranges  Temp:  [97.4  F (36.3  C)-98.2  F (36.8  C)] 98.2  F (36.8  C)  Pulse:  [66-72] 72  Heart Rate:  [65-84] 67  Resp:  [16] 16  BP: (104-117)/(66-73) 112/71  SpO2:  [92 %-95 %] 94 %    Constitutional: Awake, alert, cooperative, no apparent distress  Lungs: Clear to auscultation bilaterally, no crackles or wheezing  Cardiovascular: Regular rate and rhythm, normal S1 and S2, and no murmur noted  Abdomen: Normal bowel sounds, soft, non-distended, non-tender  Skin: No rashes, no cyanosis, no edema  Other:    Medications     Warfarin Therapy Reminder         ARIPiprazole  5 mg Oral Daily     buPROPion  300 mg Oral Daily     cefTRIAXone  2 g Intravenous Q24H     clindamycin  600 mg Intravenous Q8H     clotrimazole   Topical BID     ferrous gluconate  324 mg Oral BID w/meals     fluticasone  1 puff Inhalation Daily     furosemide  40 mg Oral QAM     furosemide  80 mg Oral QPM     gabapentin  200 mg Oral TID     lactobacillus  rhamnosus (GG)  1 capsule Oral BID     montelukast  10 mg Oral At Bedtime     nicotine  1 patch Transdermal Daily     nicotine   Transdermal Q8H     pantoprazole  40 mg Oral Daily     potassium chloride ER  20 mEq Oral BID w/meals     senna-docusate  1 tablet Oral BID    Or     senna-docusate  2 tablet Oral BID     sertraline  150 mg Oral Daily     simvastatin  40 mg Oral At Bedtime     sodium chloride (PF)  3 mL Intracatheter Q8H     spironolactone  25 mg Oral Daily     umeclidinium  1 puff Inhalation Daily       Data   All microbiology laboratory data reviewed.  Recent Labs   Lab Test 05/20/20  0645 05/19/20  0702 05/18/20  1204   WBC 5.6 6.5 8.3   HGB 11.6* 10.9* 11.7*   HCT 37.2* 35.0* 36.8*   MCV 92 93 92    242 249     Recent Labs   Lab Test 05/19/20  0702 05/18/20  1204 01/08/20  1417   CR 0.90 1.08 1.24     Recent Labs   Lab Test 05/18/20  1204   SED 70*     Recent Labs   Lab Test 05/18/20  1204 01/04/20  2347 08/14/18  1334   CULT No growth after 4 days  No growth after 4 days Heavy growth  Mixed fecal kade    Light growth  Methicillin resistant Staphylococcus aureus (MRSA)  * Single colony  Coagulase negative Staphylococcus  Susceptibility testing not routinely done  *  Moderate growth  Gram positive bacilli resembling diphtheroids  No further identification  *       Attestation:  Total time on the floor involved in the patient's care: 35 minutes. Total time spent in counseling/care coordination: >50%

## 2020-05-22 NOTE — DISCHARGE SUMMARY
[facility]  Hospitalist Discharge Summary      Date of Admission:  5/18/2020  Date of Discharge:  5/22/2020  2:23 PM  Discharging Provider: Leticia Conway MD      Discharge Diagnoses   Cellulitis of left lower extremity  Chronic ulcer of left foot     Chronic and stable medical problems:  Peripheral vascular disease   H/o DVT  H/o PE  COPD  HLP  NEL  Schizoaffective disorder, bipolar type   Tobacco use disorder      Follow-ups Needed After Discharge   Follow-up Appointments     Follow-up and recommended labs and tests       Follow up with [facility] within 2 weeks of discharge:    [phone]  Seek immediate care for worsening redness, drainage, swelling, pain,   fever, chills, nausea, vomiting.  L foot wound care per Wound RN instructions (Iodosorb and gauze dressing   daily).  Weight bearing as tolerated in post op shoe.         Follow-up and recommended labs and tests       Follow up with primary care provider, Hortensia Peck, within 7 days for   hospital follow- up.  No follow up labs or test are needed.    Follow up with Podiatry in 2 weeks.             Unresulted Labs Ordered in the Past 30 Days of this Admission     Date and Time Order Name Status Description    5/18/2020 1136 Blood culture Preliminary     5/18/2020 1136 Blood culture Preliminary       These results will be followed up by PMD    Discharge Disposition   Discharged to Group Home.  Condition at discharge: Good      Hospital Course   Petey Archibald is a 59 year old male with morbid obesity, history of DVT and PE but left lower extremity presents to the emergency department with complaints of sudden onset of erythema and tenderness on left lower extremity; patient does have a chronic left toe wound- followed by Podiatry; for a detailed HPI- please refer to H&P done by Dr Eliz Grover on 05/18/2020.      Cellulitis of left lower extremity  Chronic ulcer of left foot   Peripheral vascular disease (H)  --- Patient  is a chronic smoker and has peripheral vascular disease, he follows up with podiatry Dr. Felix  for his wound ulcer, he was last seen last month, he does a regular wound checks and cleaning with a silver sulfadiazine; on the day of admission he noticed some erythema prior to going to the bed but significant erythema which is expanding above his left knee joint was noted when he woke up.  - no fever, no leucocytosis  - elevated  and ESR 70  - US LE- neg for DVT  - there was initial concern for septic arthritis of the left knee as it was red swollen and tender  - started initially on Cefazolin and Vanco  - Xray left foot- No focal bone destruction  - MRI left foot- Subcutaneous edema consistent with cellulitis. There is no evidence of an abscess or osteomyelitis  - Ortho consult appreciated- no evidence of septic arthritis  - Podiatry consulted- no surgical intervention recommended  - ID consult appreciated  - ABX were switched to Ceftriaxone and Clindamycin while he was in the hospital  - erythema improved compared with initial pictures from ER; has some chronic hyperpigmentation skin changes left shin  - wound care as per WOC  - ID recommended Keflex 500 mg po TID and Clindamycin 300 mg po TID for 10 more days, along with probiotic BID  - keep LLE elevated  - continue PTA Lasix and Aldactone for chronic LE swelling/lymphedema   - follow up with Podiatry in 2 weeks     History of pulmonary embolism  History of DVT of lower extremity  --Continued warfarin, pharmacy to dose warfarin  - INR today was 2.11, plan for Coumadin 8 mg po tonight with repeat INR tomorrow 5/23 and further warfarin dosing based on INR.     Hypercholesterolemia  - continue PTA statin     Schizoaffective disorder, bipolar type (H)  - lives in a group home  - continue PTA Wellbutrin, aripiprazole, Sertraline and Temazepam at bedtime prn  - Patient has decision-making capability but due to above he can get upset very fast.     Morbid  obesity due to excess calories (H)  BMI 40-50     Tobacco dependence:40-50 pk yr hx  - nicotine patch while in the hospital     COPD  - he is a current smoker  - denies SOB, no wheezing on ausculation   - resume PTA Pulmicort, Spiriva and Alb inh prn after discharge.     NEL (obstructive sleep apnea)  - CPAP with home settings     GERD  - We will continue PPI       Consultations This Hospital Stay   PHARMACY TO DOSE VANCO  PHARMACY TO DOSE WARFARIN  CARE COORDINATOR IP CONSULT  PHYSICAL THERAPY ADULT IP CONSULT  OCCUPATIONAL THERAPY ADULT IP CONSULT  WOUND OSTOMY CONTINENCE NURSE  IP CONSULT  PHARMACY TO DOSE VANCO  PODIATRY IP CONSULT  ORTHOPEDIC SURGERY IP CONSULT  INFECTIOUS DISEASES IP CONSULT  PHARMACY TO DOSE WARFARIN  ORTHOPEDIC SURGERY IP CONSULT  ORTHOSIS EXTREMITY LOWER REFERRAL IP CONSULT    Code Status   Full Code    Time Spent on this Encounter   I, Leticia Conway MD, personally saw the patient today and spent greater than 30 minutes discharging this patient.       Leticia Conway MD  [facility]  ______________________________________________________________________    Physical Exam   Vital Signs: Temp: 98.2  F (36.8  C) Temp src: Oral BP: 112/71 Pulse: 72 Heart Rate: 67 Resp: 16 SpO2: 94 % O2 Device: None (Room air)    Weight: 320 lbs 0 oz     Constitutional: Awake, alert, cooperative, no apparent distress, morbidly obese  Respiratory: Clear to auscultation bilaterally, no crackles or wheezing  Cardiovascular: Regular rate and rhythm, normal S1 and S2, and no murmur noted  GI: Normal bowel sounds, soft, non-distended, non-tender  Skin/Integumen: left foot- skin breakdown interdigitally, the erythema of his left lower extremity improved, still has chronic hyperpigmentation skin changes b/l shins left>right; varicose veins noted RLE;   Neuro: moving all 4 extremities, no focal deficit noted        Primary Care Physician   Hortensia Peck    Discharge Orders      Follow-up and  recommended labs and tests     Follow up with [facility] within 2 weeks of discharge:  [phone]  Seek immediate care for worsening redness, drainage, swelling, pain, fever, chills, nausea, vomiting.  L foot wound care per Wound RN instructions (Iodosorb and gauze dressing daily).  Weight bearing as tolerated in post op shoe.     Reason for your hospital stay    Left lower extremity cellulitis.     Follow-up and recommended labs and tests     Follow up with primary care provider, Hortensia Peck, within 7 days for hospital follow- up.  No follow up labs or test are needed.    Follow up with Podiatry in 2 weeks.     Wound care and dressings    Instructions to care for your wound at home:    Wound care: Base Lt great toe  1. Change dressing daily  2. Remove dressing and clean with MicroKlenz  3. Pat skin dry  4. Apply Iodosorb gel to 1/4 piece of Adaptic  5. Apply Iodosorb gel side down to wound bed  6. Cover with 2 x 2  7. Secure [place]  8. Re-apply Spandage foot to knee in 2 layers.     Activity    Your activity upon discharge: activity as tolerated     Discharge Instructions    INR check on 5/23/2020; further warfarin dosing based on INR.     When to contact your care team    Call your primary doctor or return to ER if you have any of the following: temperature greater than 100.5 or less than 96, chills, increased redness, swelling or pain of the left leg,  increased shortness of breath, chest pain, dizziness, loss of consciousness, nausea, vomiting, severe abdominal pain or severe diarrhea.     Full Code     Diet    Follow this diet upon discharge: Orders Placed This Encounter      Combination Diet Low Saturated Fat Na <2400mg Diet, No Caffeine Diet       Significant Results and Procedures   Most Recent 3 CBC's:  Recent Labs   Lab Test 05/20/20  0645 05/19/20  0702 05/18/20  1204   WBC 5.6 6.5 8.3   HGB 11.6* 10.9* 11.7*   MCV 92 93 92    242 249     Most Recent 3 BMP's:  Recent Labs   Lab Test  05/19/20  0702 05/18/20  1204 01/08/20  1417 01/05/20  0002    137  --  136   POTASSIUM 4.0 3.7  --  3.5   CHLORIDE 106 106  --  103   CO2 27 26  --  26   BUN 14 19  --  22   CR 0.90 1.08 1.24 1.32*   ANIONGAP 5 5  --  7   JESSICA 8.9 9.1  --  8.9   * 101*  --  95     Most Recent 2 LFT's:  Recent Labs   Lab Test 05/18/20  1204 09/14/19  0018   AST 22 21   ALT 22 32   ALKPHOS 76 79   BILITOTAL 0.7 0.4     Most Recent 3 INR's:  Recent Labs   Lab Test 05/22/20  0700 05/21/20  0657 05/20/20  0645   INR 2.11* 2.82* 3.21*     Most Recent D-dimer:  Recent Labs   Lab Test 02/08/19  1236   DD <0.3     Most Recent ESR & CRP:  Recent Labs   Lab Test 05/18/20  1204   SED 70*   .0*   ,   Results for orders placed or performed during the hospital encounter of 05/18/20   US Lower Extremity Venous Duplex Left    Narrative    US LOWER EXTREMITY VENOUS DUPLEX LEFT  5/18/2020 12:45 PM    CLINICAL HISTORY/INDICATION: Spreading redness and swelling; history  of DVT and cellulitis    COMPARISON: 9/14/2019    TECHNIQUE:   Grayscale, color-flow, and spectral waveform analysis were performed  of the deep veins of the left lower extremity    FINDINGS:   The left common femoral vein, femoral vein and popliteal vein  demonstrate normal compressibility, spectral waveform, color flow and  augmentation.    The left posterior tibial vein, peroneal vein, and greater saphenous  vein are compressible.    The contralateral right common femoral vein demonstrates normal  compressibility, spectral waveform, color flow and augmentation.      Impression    IMPRESSION:   No evidence of deep venous thrombosis in the left lower extremity    SANIYA ANDERS DO   XR Foot Left G/E 3 Views    Narrative    FOOT LEFT THREE OR MORE VIEWS   5/18/2020 4:45 PM     HISTORY:  Forefoot ulcer, evaluate for osteomyelitis.    FINDINGS: Old healed fracture deformities of the third, fourth, and  fifth metatarsals.      Impression    IMPRESSION: No focal bone  destruction.    PETER MCGEE MD   MR Foot Left w/o & w Contrast    Narrative    EXAM: MR FOOT LEFT W/O and W CONTRAST  LOCATION: [facility]  DATE/TIME: 5/19/2020 12:00 AM    INDICATION: Wound between first and second toe. Evaluate for abscess or osteomyelitis.    COMPARISON: None.    TECHNIQUE: Routine. Additional postgadolinium T1 sequences were obtained.    IV CONTRAST: 15 mL Gadavist.    FINDINGS: Mild subcutaneous edema over the dorsal aspect of the forefoot. No drainable fluid collections or destructive bone lesions. Old fracture deformities of the fourth and fifth metatarsals. No acute fracture is seen. No abnormal marrow signal   intensity is identified. No joint effusion is seen.      Impression    IMPRESSION: Subcutaneous edema consistent with cellulitis. There is no evidence of an abscess or osteomyelitis.                      Discharge Medications   Current Discharge Medication List      START taking these medications    Details   cephALEXin (KEFLEX) 500 MG capsule Take 1 capsule (500 mg) by mouth 3 times daily for 10 days  Qty: 30 capsule, Refills: 0    Associated Diagnoses: Left leg cellulitis      clindamycin (CLEOCIN) 300 MG capsule Take 1 capsule (300 mg) by mouth 3 times daily for 10 days  Qty: 30 capsule, Refills: 0    Associated Diagnoses: Left leg cellulitis      lactobacillus rhamnosus, GG, (CULTURELL) capsule Take 1 capsule by mouth 2 times daily  Qty: 20 capsule, Refills: 0    Associated Diagnoses: Left leg cellulitis      Warfarin Therapy Reminder 1 each continuous prn  Qty: 1 each, Refills: 0    Associated Diagnoses: History of DVT of lower extremity         CONTINUE these medications which have CHANGED    Details   warfarin ANTICOAGULANT (COUMADIN) 4 MG tablet Take  2 tab (8 mg) tonight 5/22; CHECK INR 5/23/2020; further warfarin dosing as per INR (goal 2-3)  Qty: 84 tablet, Refills: 0    Associated Diagnoses: Long term current use of anticoagulant therapy; History of pulmonary  embolism         CONTINUE these medications which have NOT CHANGED    Details   acetaminophen (TYLENOL) 325 MG tablet Take 1-2 tablets (325-650 mg) by mouth every 6 hours as needed  Qty: 100 tablet, Refills: 3    Associated Diagnoses: Knee pain, right      albuterol (ALBUTEROL) 108 (90 BASE) MCG/ACT inhaler Inhale 2 puffs into the lungs every 6 hours as needed  Qty: 1 Inhaler, Refills: 0    Associated Diagnoses: Pulmonary emphysema, unspecified emphysema type (H)      ammonium lactate (LAC-HYDRIN) 12 % external lotion Apply topically as needed for dry skin feet      ARIPiprazole (ABILIFY) 5 MG tablet Take 5 mg by mouth daily      buPROPion (WELLBUTRIN SR) 150 MG 12 hr tablet Take 300 mg by mouth daily (2 x 150 mg tablet)      clotrimazole (LOTRIMIN) 1 % external cream Apply topically 2 times daily  Qty: 60 g, Refills: 0    Associated Diagnoses: Left leg cellulitis      DESENEX 2 % external powder       DEXILANT 60 MG CPDR CR capsule TAKE 1 CAPSULE BY MOUTH ONCE DAILY (FOR HEARTBURN)  Qty: 90 capsule, Refills: 2    Associated Diagnoses: Gastroesophageal reflux disease, esophagitis presence not specified      diphenhydrAMINE (BENADRYL) 50 MG capsule 50 mg every 4 hours as needed for itching       EPINEPHrine (EPIPEN 2-BRE) 0.3 MG/0.3ML injection 2-pack INJECT 0.3MG INTRAMUSCULAR ONE TIME FOR ONE DOSE AS NEEDED FOR ANAPHYLAXIS (CALL 911 IF YOU HAVE TO GIVE) (FOR BEE STINGS) **LABEL EACH PEN*  Qty: 2 mL, Refills: 3    Associated Diagnoses: Allergic to bees      ferrous gluconate (FERGON) 324 (38 Fe) MG tablet TAKE 1 TABLET BY MOUTH TWICE DAILY (IRON SUPPLEMENT)  Qty: 200 tablet, Refills: 3    Associated Diagnoses: Other iron deficiency anemias (CODE)      !! furosemide (LASIX) 40 MG tablet Take 40 mg by mouth every morning      !! furosemide (LASIX) 80 MG tablet Take 80 mg by mouth every evening At 1500 daily      gabapentin (NEURONTIN) 100 MG capsule 200 mg 3 times daily       hydrocortisone, Perianal, (ANUSOL-HC) 2.5  % cream Place rectally 2 times daily as needed for hemorrhoids      ketoconazole (NIZORAL) 2 % external cream Apply topically twice daily to rash for two weeks then decrease to once daily until rash has resolved  Qty: 60 g, Refills: 1    Associated Diagnoses: Fungal skin infection      montelukast (SINGULAIR) 10 MG tablet TAKE 1 TABLET BY MOUTH EVERY MORNING. (ASTHMA)  Qty: 30 tablet, Refills: 11    Associated Diagnoses: Gastroesophageal reflux disease, esophagitis presence not specified      multivitamin, therapeutic (THERA-VIT) TABS tablet Take 1 tablet by mouth daily      naltrexone (DEPADE/REVIA) 50 MG tablet Take 100 mg by mouth daily ( 2 x 50 mg tablet)      potassium chloride SA (K-DUR/KLOR-CON M) 20 MEQ CR tablet TAKE 1 TABLET BY MOUTH TWICE DAILY. (LOW POTASSIUM)  Qty: 62 tablet, Refills: 11    Comments: URGENT REQUEST [id number] [facility]  Associated Diagnoses: Localized edema      PULMICORT FLEXHALER 180 MCG/ACT inhaler INHALE 2 PUFFS INTO THE LUNGS TWICE DAILY  Qty: 1 each, Refills: 10    Associated Diagnoses: Mixed simple and mucopurulent chronic bronchitis (H)      sertraline (ZOLOFT) 100 MG tablet Take 150 mg by mouth daily       silver sulfADIAZINE (SILVADENE) 1 % external cream Apply a small amount to wound on toe daily with dressing changes.  Qty: 25 g, Refills: 0    Associated Diagnoses: Open wound of left foot, initial encounter; PAD (peripheral artery disease) (H)      simvastatin (ZOCOR) 40 MG tablet TAKE 1 TABLET BY MOUTH EVERY MORNING.  (CHOLESTEROL)  Qty: 31 tablet, Refills: 11    Comments: URGENT REQUEST [id number] [facility]  Associated Diagnoses: Hypercholesterolemia      SPIRIVA HANDIHALER 18 MCG inhaled capsule INHALE CONTENTS OF 1 CAPSULE INTO THE LUNGS ONCE DAILY. FOR BRONCHITIS AND EMPHYSEMA.  Qty: 90 capsule, Refills: 1    Associated Diagnoses: Wheezing      spironolactone (ALDACTONE) 25 MG tablet TAKE 1 TABLET BY MOUTH ONCE DAILY. (HIGH BLOOD PRESSURE)  Qty: 31 tablet, Refills: 11    Comments: URGENT  "REQUEST [id number] [facility]  Associated Diagnoses: Localized edema      temazepam (RESTORIL) 15 MG capsule Take 15 mg by mouth nightly as needed for sleep      trolamine salicylate (ASPERCREME) 10 % external cream Apply topically as needed for moderate pain knee      vitamin D3 (CHOLECALCIFEROL) 2000 units (50 mcg) tablet Take 1 tablet by mouth daily      Cadexomer Iodine, topical, 0.9% (IODOSORB) 0.9 % GEL gel Apply to wound left foot every other day with bandage  Qty: 40 g, Refills: 1    Associated Diagnoses: Open wound of left foot, initial encounter      Gauze Pads & Dressings (GAUZE PADS 4\"X4\") 4\"X4\" PADS 1 each 3 times daily as needed  Qty: 25 each, Refills: 1    Associated Diagnoses: Perirectal abscess      !! order for DME Equipment being ordered: 1 surgical shoe  Qty: 1 Device, Refills: 0    Associated Diagnoses: Open wound of left foot, initial encounter      !! order for DME [employer] Phone [phone] Fax [phone]  EdemaWear Size Medium/Yellow qty 4 sleeves  Length of Need: 1 month  Frequency of dressing change daily  Qty: 1 Device, Refills: 0    Associated Diagnoses: Ulcer of left lower extremity with fat layer exposed (H); Lymphedema of left leg      !! order for DME [employer]  Phone #[phone]  Fax #[phone]  Coolflex Velcro wraps    Length of Need: Life Time  # of Pairs 1 set  Qty: 30 days, Refills: 0    Associated Diagnoses: Ulcer of left lower extremity with fat layer exposed (H); Lymphedema of left leg      !! order for DME [employer] Phone [phone] Fax [phone]  Primary Dressing Hydrofera Blue Ready   Qty 5 sheets  Secondary Dressing 4' roll gauze Qty 30  Secondary Dressing 2' medipore tape Qty 1  Secondary Dressing 4x4 gauze loaf Qty  1  Length of Need: 1 month  Frequency of dressing change: daily  Qty: 30 days, Refills: 0    Associated Diagnoses: Ulcer of left lower extremity with fat layer exposed (H)      !! order for DME Oxygen 2 Li/min  at " night and bled into BiPAP  Qty: 1 Device, Refills: 0    Associated Diagnoses: Nocturnal hypoxemia      !! order for DME Equipment being ordered: CPAP with mask and tubing  Qty: 1 Device, Refills: 0    Associated Diagnoses: NEL (obstructive sleep apnea)      !! order for DME Equipment being ordered: support compression hose BK  2 pair black 30mm HG   To be applied on arising & removed while lying down to go to sleep  Qty: 1 each, Refills: 0    Associated Diagnoses: Localized edema       !! - Potential duplicate medications found. Please discuss with provider.        Allergies   Allergies   Allergen Reactions     Bees      Augmentin Rash     Penicillins Rash

## 2020-05-22 NOTE — PROGRESS NOTES
Care Coordination:    I spoke with ALEXA Lala at [facility] group Heath [phone].  She requested a home care referral be placed for the pt due to her not being able to go to the pt's group home daily for dressing changes and her having to train in non-medical staff to do dressing changes.  I told her he would most likely not meet criteria for home bound.  I sent an email referral to [facility] liaison and she confirmed that the pt would not have HC covered due to him no being home bound and this is a strict policy that is followed.    I relayed this information to Dai LIU and encouraged he to ask for a wound care referral from Podiatry in two weeks if there is not improvement in his wounds.      Please note I was on hold for 1.5 hours before I was able to speak with someone at pt' [facility] to make appointment for INR and PCP visit.  I paged Dr. Conway and told her I am on hold but that the clinic would not be able to do an INR draw on Saturdays because they are no longer open for INR draws on weekends due to COVID.  She did not believe to be true and said it should not be a problem.  When I finally did get through to the clinic they confirmed they cannot do an INR draw until Saturday.  During this long hold time the pt has discharged.    PCP and INR appointments made and pt's group home will make podiatry appointment.  I called the group home to update them on appointments that were not made until after the pt discharged:  1.  Follow up with primary care provider, Hortensia Peck, within 7 days for hospital follow- up.  No follow up labs or test are needed.  Hortensia Peck [phone] [address] [facility] [address]  You have telephone appointment on Wednesday May 27th at 11:10AM    2.  INR on 5/23/20  You have a lab only appointment on Tuesday May 26th at 10:45 AM.    3.  Follow up with Podiatry in 2 weeks.  Domingo De La Torre, TRU FACFAS FACFAOM  Podiatric Foot & Ankle  Surgeon  [facility]  [phone]      Mag Gomes RN, BSN Care Coordinator  [facility]  Mobile: [phone]

## 2020-05-22 NOTE — PLAN OF CARE
A&O x 4. VSS, on RA. Denies pain. Dressing on L foot cdi. Cms intact. Voids in urinal or BR. Cellulitis on left leg improving per patient. Ambulated in hallway 2X overnight. Patient refused ambulating with walker and gait belt. Steady gait.  Plan to discharge today once plan for PO abx known

## 2020-05-22 NOTE — PLAN OF CARE
Alert and oriented x 4. VSS, on room air. Denies pain. Dressing on left foot cdi. Cms intact. Voids in urinal. Cellulitis on left leg improving per patient. Ambulated in hallway. Patient refused ambulating with walker and gait belt. Steady gait.  Plan to discharge tomorrow once plan for PO abx known

## 2020-05-22 NOTE — PROGRESS NOTES
Spiritual Health  53    SH contacted Pt per length of stay. Pt has no SH needs at this time but is aware of SH services.     SH will remain available as needed.     Landy Doe  Chaplain Resident

## 2020-05-22 NOTE — PROGRESS NOTES
VSS on RA, CMS intact ex swelling LLE- encouraged to have lower ex elevated. Dressing changed- CDI. Tolerating oral intake and voiding adequately. Refuses to use walker and gait belt- educated on risk factors and steady gait at this time. Went over discharge instruction and medication with patient. Ready to discharge with group home staff.

## 2020-05-24 LAB
BACTERIA SPEC CULT: NO GROWTH
BACTERIA SPEC CULT: NO GROWTH
SPECIMEN SOURCE: NORMAL
SPECIMEN SOURCE: NORMAL

## 2020-05-26 ENCOUNTER — ANTICOAGULATION THERAPY VISIT (OUTPATIENT)
Dept: NURSING | Facility: CLINIC | Age: 60
End: 2020-05-26

## 2020-05-26 ENCOUNTER — DOCUMENTATION ONLY (OUTPATIENT)
Dept: FAMILY MEDICINE | Facility: CLINIC | Age: 60
End: 2020-05-26

## 2020-05-26 DIAGNOSIS — I80.03 THROMBOPHLEBITIS OF SUPERFICIAL VEINS OF BOTH LOWER EXTREMITIES: ICD-10-CM

## 2020-05-26 DIAGNOSIS — Z79.01 LONG TERM CURRENT USE OF ANTICOAGULANT THERAPY: ICD-10-CM

## 2020-05-26 DIAGNOSIS — Z86.711 HISTORY OF PULMONARY EMBOLISM: ICD-10-CM

## 2020-05-26 DIAGNOSIS — I82.442 ACUTE DEEP VEIN THROMBOSIS (DVT) OF TIBIAL VEIN OF LEFT LOWER EXTREMITY (H): ICD-10-CM

## 2020-05-26 LAB
CAPILLARY BLOOD COLLECTION: NORMAL
INR PPP: 2.5 (ref 0.86–1.14)

## 2020-05-26 PROCEDURE — 36416 COLLJ CAPILLARY BLOOD SPEC: CPT | Performed by: FAMILY MEDICINE

## 2020-05-26 PROCEDURE — 85610 PROTHROMBIN TIME: CPT | Performed by: FAMILY MEDICINE

## 2020-05-26 RX ORDER — WARFARIN SODIUM 4 MG/1
TABLET ORAL
Qty: 60 TABLET | Refills: 0 | Status: SHIPPED | OUTPATIENT
Start: 2020-05-26 | End: 2020-06-02

## 2020-05-26 NOTE — PROGRESS NOTES
ANTICOAGULATION  MANAGEMENT: Discharge Review    Petey Archibald chart reviewed for anticoagulation continuity of care    Hospital Admission on 5/18 for cellulitis.    Discharge disposition: group home     Results:    Recent labs: (last 7 days)     05/22/20  0700   INR 2.11*     Anticoagulation inpatient management:     less warfarin administered than maintenance regimen    Anticoagulation discharge instructions:     Warfarin dosing: Next INR 5/26 and take 8 mg 5/22 and resume home regimen   Bridging: No   INR goal change: No      Medication changes affecting anticoagulation: Yes: cleocin and keflex started on discharge     Additional factors affecting anticoagulation: Yes: cellulitis    Plan     Agree with dosing adjustment on discharge    Recommended follow up is scheduled    Anticoagulation Calendar updated    Ysabel Tena RN

## 2020-05-26 NOTE — PROGRESS NOTES
Pt denies any changes in diet,health or activity. Patient to take 8 mg daily and recheck INR in 1 week, continues on Keflex until 6/1/20. Petey is aware if signs of clotting (pain, tenderness, swelling, color change in leg or arm, SOB) and bleeding occur (blood in stool, urine, large bruising, bleeding gums, nosebleeds) to have INR check sooner. If sx severe report to ER or concerned for stroke call 911. If general questions or concerns arise, call clinic.  Ysabel Tena RN  [facility]

## 2020-05-27 ENCOUNTER — OFFICE VISIT (OUTPATIENT)
Dept: PODIATRY | Facility: CLINIC | Age: 60
End: 2020-05-27
Payer: MEDICARE

## 2020-05-27 ENCOUNTER — VIRTUAL VISIT (OUTPATIENT)
Dept: FAMILY MEDICINE | Facility: CLINIC | Age: 60
End: 2020-05-27
Payer: MEDICARE

## 2020-05-27 VITALS
HEIGHT: 70 IN | SYSTOLIC BLOOD PRESSURE: 118 MMHG | BODY MASS INDEX: 45.1 KG/M2 | DIASTOLIC BLOOD PRESSURE: 72 MMHG | WEIGHT: 315 LBS

## 2020-05-27 DIAGNOSIS — I87.2 STASIS DERMATITIS OF BOTH LEGS: ICD-10-CM

## 2020-05-27 DIAGNOSIS — L97.521 SKIN ULCER OF LEFT FOOT, LIMITED TO BREAKDOWN OF SKIN (H): Primary | ICD-10-CM

## 2020-05-27 DIAGNOSIS — L03.116 CELLULITIS OF LEFT LOWER EXTREMITY: Primary | ICD-10-CM

## 2020-05-27 PROCEDURE — 99213 OFFICE O/P EST LOW 20 MIN: CPT | Performed by: PODIATRIST

## 2020-05-27 PROCEDURE — 99442: CPT | Performed by: FAMILY MEDICINE

## 2020-05-27 ASSESSMENT — MIFFLIN-ST. JEOR: SCORE: 2262.76

## 2020-05-27 NOTE — LETTER
[facility]  [address] [address]  [facility] [address]  Phone: [phone]  Fax: [phone]          5/27/2020        Petey Archibald  [address]  [facility] [place] [address]          To Whom It May Concern:      Petey Archibald was seen and treated in clinic today. They are able to return to work on 5/28/20 with no restrictions. If there are any questions or concerns, please have them contact my office.        Thank you,        Joseph Flaherty, TRU

## 2020-05-27 NOTE — PATIENT INSTRUCTIONS
Thank you for choosing [facility] Podiatry / Foot & Ankle Surgery!    DR. MILES[facility]S CLINIC LOCATION     MONDAY - WEDNESDAY - THURSDAY   [address]  [address]   [place] [address]   [phone] / [phone]      SCHEDULE SURGERY: [phone]   APPOINTMENTS: [phone]   BILLING QUESTIONS: [phone]   RADIOLOGY: [phone]     Follow up: 3-4 weeks    Return to work tomorrow    Please read through the following handouts and if you have any questions, please feel free to call us or send a [email or web address] message!      Wound Care Recommendations:    1)  Keep the wound covered by a bandage when bathing.    2)  Gently clean the wound with soap water, separate from bath/shower water.      3)  Each day, apply iodosorb. Cover with large band-aid or gauze.      5)  Please seek immediate medical attention if any increasing redness, drainage, smell, or pain related to the wound.     6)  Please return to clinic in the period of time requested by Dr. Miles.

## 2020-05-27 NOTE — LETTER
5/27/2020         RE: Petey Archibald  [address]  [facility] [address]        Dear Colleague,    Thank you for referring your patient, Petey Archibald, to the [facility] PODI[facility]RY. Please see a copy of my visit note below.    ASSESSMENT/PLAN:    Encounter Diagnosis   Name Primary?     Skin ulcer of left foot, limited to breakdown of skin (H) Yes     Healing. No pain.    I think the redness on the dorsal aspect of his left foot is more related to chronic edema rather than cellulitis.  I suggested that he outlined the edge of the redness and monitor for any ascension.  The area is nonpainful on palpation.    He is to continue the current wound cares.  Daily cleansing, Iodosorb application, a light bandage.    The clinical signs of infection were reviewed.    I think it is reasonable for him to return to work.  A letter was provided with a start date of 5/28/2020.    Follow-up in 1 month.        Body mass index is 45.92 kg/m .    Weight management plan: Patient was referred to their PCP to discuss a diet and exercise plan.      Joseph Flaherty DPM, FACFAS, MS    [place] Department of Podiatry/Foot & Ankle Surgery      ____________________________________________________________________    HPI:         Chief Complaint: follow up after hospitalization 5/18 - 5/22/20. At the time Dr. Kendrick evaluated him for an ulceration and cellulitis of his left foot.   MRI was negative for abscess and osteomyelitis.  No surgical intervention was needed.  WOC RN recommended daily cleansing, Iodosorb application and bandage.   He was discharged on Keflex 500 mg po TID and Clindamycin 300 mg po TID for 10 more days, along with probiotic BID  Ambulating in a surgical shoe.   No new concerns.   He asks if he can return to work -      Patient Active Problem List   Diagnosis     Ankle pain     Morbid obesity     GERD (gastroesophageal reflux disease)     Peripheral vascular disease (H)     History of  pulmonary embolism     Tobacco dependence:40-50 pk yr hx     Borderline mental retardation     History of DVT of lower extremity     Right knee pain     Pilonidal cyst     Asymptomatic varicose veins, bilateral     Callus of foot on Rt lat dist  since 8-15      Morbid obesity due to excess calories (H)  BMI 40-50     Hypercholesterolemia     Mixed simple and mucopurulent chronic bronchitis (HCC) CT 4-06 wnl and neg bronch for hemoptesis spirometry 7-26-16  FVC=59% & w mod restriction  and lung age of 82 in 56 y/o      Major depression in complete remission (HCC) on meds      Long term current use of anticoagulant therapy     Glucose intolerance (impaired glucose tolerance)     Other iron deficiency anemia     Localized edema L>R ankles      Erectile dysfunction, unspecified erectile dysfunction type     Stasis dermatitis of both legs     Arch pain of left foot 2ndary to edema and tendonitis      NEL (obstructive sleep apnea)     Hematemesis without nausea after smoking      Anxiety     Thrombophlebitis of superficial veins of both lower extremities: Greater Saphenous VV      Acute deep vein thrombosis (DVT) of tibial vein of left lower extremity (H)     History of colonic polyps     Allergic to bees     Abnormal lung sounds-bibasilar rhonchi     Hypoxia     Abnormal chest x-ray-3-19 prominent bibasilar interstitial      Ulcer of left lower extremity with fat layer exposed (H)     Lymphedema of left leg     Left leg cellulitis     Cellulitis of left lower extremity     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)     Fungal skin infection     Schizoaffective disorder, bipolar type (H)     Cellulitis     Skin infection     Past Surgical History:   Procedure Laterality Date     COLONOSCOPY N/A 4/15/2019    Procedure: COMBINED COLONOSCOPY, SINGLE OR MULTIPLE BIOPSY/POLYPECTOMY BY BIOPSY;  Surgeon: Jovana Ohara MD;  Location:  GI     EXCISE MALIGNANT LESIONS, TRUNK/ARMS/LEGS 0.5CM OR LESS Left  5/26/2017    Procedure: EXCISE MALIGNANT LESIONS, TRUNK/ARMS/LEGS 0.5CM OR LESS;  EXCISION LEFT ELBOW MASS;  Surgeon: Jose Azevedo MD;  Location: [facility]     RECTAL SURGERY      perianal abscess?     Current Outpatient Medications   Medication Sig Dispense Refill     acetaminophen (TYLENOL) 325 MG tablet Take 1-2 tablets (325-650 mg) by mouth every 6 hours as needed 100 tablet 3     albuterol (ALBUTEROL) 108 (90 BASE) MCG/ACT inhaler Inhale 2 puffs into the lungs every 6 hours as needed 1 Inhaler 0     ammonium lactate (LAC-HYDRIN) 12 % external lotion Apply topically as needed for dry skin feet       ARIPiprazole (ABILIFY) 5 MG tablet Take 5 mg by mouth daily       buPROPion (WELLBUTRIN SR) 150 MG 12 hr tablet Take 300 mg by mouth daily (2 x 150 mg tablet)       Cadexomer Iodine, topical, 0.9% (IODOSORB) 0.9 % GEL gel Apply to wound left foot every other day with bandage 40 g 1     cephALEXin (KEFLEX) 500 MG capsule Take 1 capsule (500 mg) by mouth 3 times daily for 10 days 30 capsule 0     clindamycin (CLEOCIN) 300 MG capsule Take 1 capsule (300 mg) by mouth 3 times daily for 10 days 30 capsule 0     clotrimazole (LOTRIMIN) 1 % external cream Apply topically 2 times daily 60 g 0     DESENEX 2 % external powder        DEXILANT 60 MG CPDR CR capsule TAKE 1 CAPSULE BY MOUTH ONCE DAILY (FOR HEARTBURN) 90 capsule 2     diphenhydrAMINE (BENADRYL) 50 MG capsule 50 mg every 4 hours as needed for itching        EPINEPHrine (EPIPEN 2-BRE) 0.3 MG/0.3ML injection 2-pack INJECT 0.3MG INTRAMUSCULAR ONE TIME FOR ONE DOSE AS NEEDED FOR ANAPHYLAXIS (CALL 911 IF YOU HAVE TO GIVE) (FOR BEE STINGS) **LABEL EACH PEN* 2 mL 3     ferrous gluconate (FERGON) 324 (38 Fe) MG tablet TAKE 1 TABLET BY MOUTH TWICE DAILY (IRON SUPPLEMENT) 200 tablet 3     furosemide (LASIX) 40 MG tablet Take 40 mg by mouth every morning       furosemide (LASIX) 80 MG tablet Take 80 mg by mouth every evening At 1500 daily       gabapentin (NEURONTIN) 100  "MG capsule 200 mg 3 times daily        Gauze Pads & Dressings (GAUZE PADS 4\"X4\") 4\"X4\" PADS 1 each 3 times daily as needed 25 each 1     hydrocortisone, Perianal, (ANUSOL-HC) 2.5 % cream Place rectally 2 times daily as needed for hemorrhoids       ketoconazole (NIZORAL) 2 % external cream Apply topically twice daily to rash for two weeks then decrease to once daily until rash has resolved (Patient taking differently: daily Apply topically twice daily to rash for two weeks then decrease to once daily until rash has resolved) 60 g 1     lactobacillus rhamnosus, GG, (CULTURELL) capsule Take 1 capsule by mouth 2 times daily 20 capsule 0     montelukast (SINGULAIR) 10 MG tablet TAKE 1 TABLET BY MOUTH EVERY MORNING. (ASTHMA) 30 tablet 11     multivitamin, therapeutic (THERA-VIT) TABS tablet Take 1 tablet by mouth daily       naltrexone (DEPADE/REVIA) 50 MG tablet Take 100 mg by mouth daily ( 2 x 50 mg tablet)       order for DME Equipment being ordered: 1 surgical shoe 1 Device 0     order for DME [employer] Order Phone [phone] Fax [phone]  EdemaWear Size Medium/Yellow qty 4 sleeves  Length of Need: 1 month  Frequency of dressing change daily 1 Device 0     order for DME [employer] Stockings  Phone #[phone]  Fax #[phone]  Coolflex Velcro wraps    Length of Need: Life Time  # of Pairs 1 set 30 days 0     order for DME [employer] Order Phone [phone] Fax [phone]  Primary Dressing Hydrofera Blue Ready   Qty 5 sheets  Secondary Dressing 4' roll gauze Qty 30  Secondary Dressing 2' medipore tape Qty 1  Secondary Dressing 4x4 gauze loaf Qty  1  Length of Need: 1 month  Frequency of dressing change: daily 30 days 0     order for DME Oxygen 2 Li/min  at night and bled into BiPAP 1 Device 0     order for DME Equipment being ordered: CPAP with mask and tubing 1 Device 0     order for DME Equipment being ordered: support compression hose BK  2 pair black 30mm HG   To be applied on arising " "& removed while lying down to go to sleep 1 each 0     potassium chloride SA (K-DUR/KLOR-CON M) 20 MEQ CR tablet TAKE 1 TABLET BY MOUTH TWICE DAILY. (LOW POTASSIUM) 62 tablet 11     PULMICORT FLEXHALER 180 MCG/ACT inhaler INHALE 2 PUFFS INTO THE LUNGS TWICE DAILY 1 each 10     sertraline (ZOLOFT) 100 MG tablet Take 150 mg by mouth daily        silver sulfADIAZINE (SILVADENE) 1 % external cream Apply a small amount to wound on toe daily with dressing changes. 25 g 0     simvastatin (ZOCOR) 40 MG tablet TAKE 1 TABLET BY MOUTH EVERY MORNING.  (CHOLESTEROL) 31 tablet 11     SPIRIVA HANDIHALER 18 MCG inhaled capsule INHALE CONTENTS OF 1 CAPSULE INTO THE LUNGS ONCE DAILY. FOR BRONCHITIS AND EMPHYSEMA. (Patient taking differently: Inhale into the lungs At Bedtime ) 90 capsule 1     spironolactone (ALDACTONE) 25 MG tablet TAKE 1 TABLET BY MOUTH ONCE DAILY. (HIGH BLOOD PRESSURE) 31 tablet 11     temazepam (RESTORIL) 15 MG capsule Take 15 mg by mouth nightly as needed for sleep       trolamine salicylate (ASPERCREME) 10 % external cream Apply topically as needed for moderate pain knee       vitamin D3 (CHOLECALCIFEROL) 2000 units (50 mcg) tablet Take 1 tablet by mouth daily       warfarin ANTICOAGULANT (COUMADIN) 4 MG tablet Take  2 tab (8 mg) daily as directed by the INR cinic and recheck INR on 6/2/20; 60 tablet 0     Warfarin Therapy Reminder 1 each continuous prn 1 each 0       ROS:    A 10-point review of systems was performed.  It is positive for that noted in the HPI and as seen below.  All other systems found to be negative.     Numbness in feet?  yes   Calf pain with walking? no  Recent foot/ankle injury? no  Weight change  over past 12 months? no  Self perception as overweight? yes  Recent flu-like symptoms? no  Joint pain other than feet ? no    EXAM:    Vitals: /72   Ht 1.778 m (5' 10\")   Wt 145.2 kg (320 lb)   BMI 45.92 kg/m    BMI: Body mass index is 45.92 kg/m .  Height: 5' 10\"    Constitutional/ " general:  Pt is in no apparent distress, appears well-nourished.  Cooperative with history and physical exam.     Vascular:  Pedal pulses are palpable bilaterally for both the DP and PT arteries.  CFT < 3 sec.  No edema.  Pedal hair growth noted.     Neuro:  Alert and oriented x 3. Coordinated gait.  Light touch sensation is intact to the L4, L5, S1 distributions. No obvious deficits.  No evidence of neurological-based weakness, spasticity, or contracture in the lower extremities.     Derm: superficial, healing, ulcer in the dorsal left first web space. Erythema of the foot and hyperpigmentation of the leg consistent with changes from chronic edema.     Musculoskeletal:    Lower extremity muscle strength is normal.  Patient is ambulatory without an assistive device or brace .  No gross deformities.  No pain on palpation.    Joseph Flaherty DPM, FACFAS, MS    [place] Department of Podiatry/Foot & Ankle Surgery              Again, thank you for allowing me to participate in the care of your patient.        Sincerely,        Joseph Flaherty DPM

## 2020-05-27 NOTE — PROGRESS NOTES
ASSESSMENT/PLAN:    Encounter Diagnosis   Name Primary?     Skin ulcer of left foot, limited to breakdown of skin (H) Yes     Healing. No pain.    I think the redness on the dorsal aspect of his left foot is more related to chronic edema rather than cellulitis.  I suggested that he outlined the edge of the redness and monitor for any ascension.  The area is nonpainful on palpation.    He is to continue the current wound cares.  Daily cleansing, Iodosorb application, a light bandage.    The clinical signs of infection were reviewed.    I think it is reasonable for him to return to work.  A letter was provided with a start date of 5/28/2020.    Follow-up in 1 month.        Body mass index is 45.92 kg/m .    Weight management plan: Patient was referred to their PCP to discuss a diet and exercise plan.      Joseph Flaherty DPM, FACFAS, MS    [place] Department of Podiatry/Foot & Ankle Surgery      ____________________________________________________________________    HPI:         Chief Complaint: follow up after hospitalization 5/18 - 5/22/20. At the time Dr. Kendrick evaluated him for an ulceration and cellulitis of his left foot.   MRI was negative for abscess and osteomyelitis.  No surgical intervention was needed.  WOC RN recommended daily cleansing, Iodosorb application and bandage.   He was discharged on Keflex 500 mg po TID and Clindamycin 300 mg po TID for 10 more days, along with probiotic BID  Ambulating in a surgical shoe.   No new concerns.   He asks if he can return to work -      Patient Active Problem List   Diagnosis     Ankle pain     Morbid obesity     GERD (gastroesophageal reflux disease)     Peripheral vascular disease (H)     History of pulmonary embolism     Tobacco dependence:40-50 pk yr hx     Borderline mental retardation     History of DVT of lower extremity     Right knee pain     Pilonidal cyst     Asymptomatic varicose veins, bilateral     Callus of foot on Rt lat dist  since 8-15       Morbid obesity due to excess calories (H)  BMI 40-50     Hypercholesterolemia     Mixed simple and mucopurulent chronic bronchitis (HCC) CT 4-06 wnl and neg bronch for hemoptesis spirometry 7-26-16  FVC=59% & w mod restriction  and lung age of 82 in 56 y/o      Major depression in complete remission (HCC) on meds      Long term current use of anticoagulant therapy     Glucose intolerance (impaired glucose tolerance)     Other iron deficiency anemia     Localized edema L>R ankles      Erectile dysfunction, unspecified erectile dysfunction type     Stasis dermatitis of both legs     Arch pain of left foot 2ndary to edema and tendonitis      NEL (obstructive sleep apnea)     Hematemesis without nausea after smoking      Anxiety     Thrombophlebitis of superficial veins of both lower extremities: Greater Saphenous VV      Acute deep vein thrombosis (DVT) of tibial vein of left lower extremity (H)     History of colonic polyps     Allergic to bees     Abnormal lung sounds-bibasilar rhonchi     Hypoxia     Abnormal chest x-ray-3-19 prominent bibasilar interstitial      Ulcer of left lower extremity with fat layer exposed (H)     Lymphedema of left leg     Left leg cellulitis     Cellulitis of left lower extremity     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)     Fungal skin infection     Schizoaffective disorder, bipolar type (H)     Cellulitis     Skin infection     Past Surgical History:   Procedure Laterality Date     COLONOSCOPY N/A 4/15/2019    Procedure: COMBINED COLONOSCOPY, SINGLE OR MULTIPLE BIOPSY/POLYPECTOMY BY BIOPSY;  Surgeon: Jovana Ohara MD;  Location:  GI     EXCISE MALIGNANT LESIONS, TRUNK/ARMS/LEGS 0.5CM OR LESS Left 5/26/2017    Procedure: EXCISE MALIGNANT LESIONS, TRUNK/ARMS/LEGS 0.5CM OR LESS;  EXCISION LEFT ELBOW MASS;  Surgeon: Jose Azevedo MD;  Location:  GI     RECTAL SURGERY      perianal abscess?     Current Outpatient Medications   Medication Sig  "Dispense Refill     acetaminophen (TYLENOL) 325 MG tablet Take 1-2 tablets (325-650 mg) by mouth every 6 hours as needed 100 tablet 3     albuterol (ALBUTEROL) 108 (90 BASE) MCG/ACT inhaler Inhale 2 puffs into the lungs every 6 hours as needed 1 Inhaler 0     ammonium lactate (LAC-HYDRIN) 12 % external lotion Apply topically as needed for dry skin feet       ARIPiprazole (ABILIFY) 5 MG tablet Take 5 mg by mouth daily       buPROPion (WELLBUTRIN SR) 150 MG 12 hr tablet Take 300 mg by mouth daily (2 x 150 mg tablet)       Cadexomer Iodine, topical, 0.9% (IODOSORB) 0.9 % GEL gel Apply to wound left foot every other day with bandage 40 g 1     cephALEXin (KEFLEX) 500 MG capsule Take 1 capsule (500 mg) by mouth 3 times daily for 10 days 30 capsule 0     clindamycin (CLEOCIN) 300 MG capsule Take 1 capsule (300 mg) by mouth 3 times daily for 10 days 30 capsule 0     clotrimazole (LOTRIMIN) 1 % external cream Apply topically 2 times daily 60 g 0     DESENEX 2 % external powder        DEXILANT 60 MG CPDR CR capsule TAKE 1 CAPSULE BY MOUTH ONCE DAILY (FOR HEARTBURN) 90 capsule 2     diphenhydrAMINE (BENADRYL) 50 MG capsule 50 mg every 4 hours as needed for itching        EPINEPHrine (EPIPEN 2-BRE) 0.3 MG/0.3ML injection 2-pack INJECT 0.3MG INTRAMUSCULAR ONE TIME FOR ONE DOSE AS NEEDED FOR ANAPHYLAXIS (CALL 911 IF YOU HAVE TO GIVE) (FOR BEE STINGS) **LABEL EACH PEN* 2 mL 3     ferrous gluconate (FERGON) 324 (38 Fe) MG tablet TAKE 1 TABLET BY MOUTH TWICE DAILY (IRON SUPPLEMENT) 200 tablet 3     furosemide (LASIX) 40 MG tablet Take 40 mg by mouth every morning       furosemide (LASIX) 80 MG tablet Take 80 mg by mouth every evening At 1500 daily       gabapentin (NEURONTIN) 100 MG capsule 200 mg 3 times daily        Gauze Pads & Dressings (GAUZE PADS 4\"X4\") 4\"X4\" PADS 1 each 3 times daily as needed 25 each 1     hydrocortisone, Perianal, (ANUSOL-HC) 2.5 % cream Place rectally 2 times daily as needed for hemorrhoids       " ketoconazole (NIZORAL) 2 % external cream Apply topically twice daily to rash for two weeks then decrease to once daily until rash has resolved (Patient taking differently: daily Apply topically twice daily to rash for two weeks then decrease to once daily until rash has resolved) 60 g 1     lactobacillus rhamnosus, GG, (CULTURELL) capsule Take 1 capsule by mouth 2 times daily 20 capsule 0     montelukast (SINGULAIR) 10 MG tablet TAKE 1 TABLET BY MOUTH EVERY MORNING. (ASTHMA) 30 tablet 11     multivitamin, therapeutic (THERA-VIT) TABS tablet Take 1 tablet by mouth daily       naltrexone (DEPADE/REVIA) 50 MG tablet Take 100 mg by mouth daily ( 2 x 50 mg tablet)       order for DME Equipment being ordered: 1 surgical shoe 1 Device 0     order for DME [employer] [facility] Phone [phone] Fax [phone]  EdemaWear Size Medium/Yellow qty 4 sleeves  Length of Need: 1 month  Frequency of dressing change daily 1 Device 0     order for DME [employer] Stock[employer]  Phone #[phone]  Fax #[phone]  [employer]flex Velcro wraps    Length of Need: Life Time  # of Pairs 1 set 30 days 0     order for DME [employer] Order Phone [phone] Fax [phone]  Primary Dressing Hydrofera Blue Ready   Qty 5 sheets  Secondary Dressing 4' roll gauze Qty 30  Secondary Dressing 2' medipore tape Qty 1  Secondary Dressing 4x4 gauze loaf Qty  1  Length of Need: 1 month  Frequency of dressing change: daily 30 days 0     order for DME Oxygen 2 Li/min  at night and bled into BiPAP 1 Device 0     order for DME Equipment being ordered: CPAP with mask and tubing 1 Device 0     order for DME Equipment being ordered: support compression hose BK  2 pair black 30mm HG   To be applied on arising & removed while lying down to go to sleep 1 each 0     potassium chloride SA (K-DUR/KLOR-CON M) 20 MEQ CR tablet TAKE 1 TABLET BY MOUTH TWICE DAILY. (LOW POTASSIUM) 62 tablet 11     PULMICORT FLEXHALER 180 MCG/ACT inhaler INHALE 2 PUFFS INTO THE  "LUNGS TWICE DAILY 1 each 10     sertraline (ZOLOFT) 100 MG tablet Take 150 mg by mouth daily        silver sulfADIAZINE (SILVADENE) 1 % external cream Apply a small amount to wound on toe daily with dressing changes. 25 g 0     simvastatin (ZOCOR) 40 MG tablet TAKE 1 TABLET BY MOUTH EVERY MORNING.  (CHOLESTEROL) 31 tablet 11     SPIRIVA HANDIHALER 18 MCG inhaled capsule INHALE CONTENTS OF 1 CAPSULE INTO THE LUNGS ONCE DAILY. FOR BRONCHITIS AND EMPHYSEMA. (Patient taking differently: Inhale into the lungs At Bedtime ) 90 capsule 1     spironolactone (ALDACTONE) 25 MG tablet TAKE 1 TABLET BY MOUTH ONCE DAILY. (HIGH BLOOD PRESSURE) 31 tablet 11     temazepam (RESTORIL) 15 MG capsule Take 15 mg by mouth nightly as needed for sleep       trolamine salicylate (ASPERCREME) 10 % external cream Apply topically as needed for moderate pain knee       vitamin D3 (CHOLECALCIFEROL) 2000 units (50 mcg) tablet Take 1 tablet by mouth daily       warfarin ANTICOAGULANT (COUMADIN) 4 MG tablet Take  2 tab (8 mg) daily as directed by the INR cinic and recheck INR on 6/2/20; 60 tablet 0     Warfarin Therapy Reminder 1 each continuous prn 1 each 0       ROS:    A 10-point review of systems was performed.  It is positive for that noted in the HPI and as seen below.  All other systems found to be negative.     Numbness in feet?  yes   Calf pain with walking? no  Recent foot/ankle injury? no  Weight change  over past 12 months? no  Self perception as overweight? yes  Recent flu-like symptoms? no  Joint pain other than feet ? no    EXAM:    Vitals: /72   Ht 1.778 m (5' 10\")   Wt 145.2 kg (320 lb)   BMI 45.92 kg/m    BMI: Body mass index is 45.92 kg/m .  Height: 5' 10\"    Constitutional/ general:  Pt is in no apparent distress, appears well-nourished.  Cooperative with history and physical exam.     Vascular:  Pedal pulses are palpable bilaterally for both the DP and PT arteries.  CFT < 3 sec.  No edema.  Pedal hair growth noted. "     Neuro:  Alert and oriented x 3. Coordinated gait.  Light touch sensation is intact to the L4, L5, S1 distributions. No obvious deficits.  No evidence of neurological-based weakness, spasticity, or contracture in the lower extremities.     Derm: superficial, healing, ulcer in the dorsal left first web space. Erythema of the foot and hyperpigmentation of the leg consistent with changes from chronic edema.     Musculoskeletal:    Lower extremity muscle strength is normal.  Patient is ambulatory without an assistive device or brace .  No gross deformities.  No pain on palpation.    Joseph Flaherty DPM, FACFAS, MS    [place] Department of Podiatry/Foot & Ankle Surgery

## 2020-05-27 NOTE — PROGRESS NOTES
"Petey Archibald is a 59 year old male who is being evaluated via a billable telephone visit.      The patient has been notified of following:     \"This telephone visit will be conducted via a call between you and your physician/provider. We have found that certain health care needs can be provided without the need for a physical exam.  This service lets us provide the care you need with a short phone conversation.  If a prescription is necessary we can send it directly to your pharmacy.  If lab work is needed we can place an order for that and you can then stop by our lab to have the test done at a later time.    Telephone visits are billed at different rates depending on your insurance coverage. During this emergency period, for some insurers they may be billed the same as an in-person visit.  Please reach out to your insurance provider with any questions.    If during the course of the call the physician/provider feels a telephone visit is not appropriate, you will not be charged for this service.\"    Patient has given verbal consent for Telephone visit?  Yes    What phone number would you like to be contacted at? [phone]    How would you like to obtain your AVS? Mail a copy    Subjective     Petey Archibald is a 59 year old male who presents via phone visit today for the following health issues:    carol Harrison at group home answers phone.  Phone placed on speakerphone mode    HPI    Hospital Follow-up Visit:    Hospital/Nursing Home/IP Rehab Facility: [facility]  Date of Admission: 5-18-20  Date of Discharge: 5-22-20  Reason(s) for Admission: cellulitis in foot      Was your hospitalization related to COVID-19? No   Problems taking medications regularly:  None  Medication changes since discharge: None  Problems adhering to non-medication therapy:  None    Summary of hospitalization:  [facility] discharge summary reviewed  Diagnostic Tests/Treatments reviewed.  Follow up needed: " podiatry and here  Other Healthcare Providers Involved in Patient s Care:         Specialist appointment - podiatry  Update since discharge: improved.     Post Discharge Medication Reconciliation: discharge medications reconciled, continue medications without change.  Plan of care communicated with patient and caregiver          Pt had appt with Dr Flaherty, podiatry today.  See visit report in Epic  He approved Pt to return to work on Thursday         Allergies   Allergen Reactions     Bees      Augmentin Rash     Penicillins Rash       Reviewed and updated as needed this visit by Provider         Review of Systems   CONSTITUTIONAL: NEGATIVE for fever, chills, change in weight  INTEGUMENTARY/SKIN: POSITIVE for rash lower legs left  ENT/MOUTH: NEGATIVE for ear, mouth and throat problems  RESP: NEGATIVE for significant cough or SOB  CV: NEGATIVE for chest pain, palpitations or peripheral edema       Objective   Reported vitals:  There were no vitals taken for this visit.   healthy, alert and no distress  PSYCH: Alert and oriented times 3; coherent speech, normal   rate and volume, able to articulate logical thoughts, able   to abstract reason, no tangential thoughts, no hallucinations   or delusions  His affect is normal and pleasant  RESP: No cough, no audible wheezing, able to talk in full sentences  Remainder of exam unable to be completed due to telephone visits    Diagnostic Test Results:  Labs reviewed in Epic  none         Assessment/Plan:  1. Cellulitis of left lower extremity  improved    2. Stasis dermatitis of both legs  stable      No follow-ups on file.      Phone call duration:  12 minutes    Raúl Riddle MD

## 2020-06-02 ENCOUNTER — ANTICOAGULATION THERAPY VISIT (OUTPATIENT)
Dept: NURSING | Facility: CLINIC | Age: 60
End: 2020-06-02
Payer: MEDICARE

## 2020-06-02 DIAGNOSIS — Z86.711 HISTORY OF PULMONARY EMBOLISM: ICD-10-CM

## 2020-06-02 DIAGNOSIS — I82.442 ACUTE DEEP VEIN THROMBOSIS (DVT) OF TIBIAL VEIN OF LEFT LOWER EXTREMITY (H): ICD-10-CM

## 2020-06-02 DIAGNOSIS — I80.03 THROMBOPHLEBITIS OF SUPERFICIAL VEINS OF BOTH LOWER EXTREMITIES: ICD-10-CM

## 2020-06-02 DIAGNOSIS — Z79.01 LONG TERM CURRENT USE OF ANTICOAGULANT THERAPY: ICD-10-CM

## 2020-06-02 LAB — INR PPP: 4.2 (ref 0.86–1.14)

## 2020-06-02 PROCEDURE — 99207 ZZC NO CHARGE NURSE ONLY: CPT | Performed by: INTERNAL MEDICINE

## 2020-06-02 PROCEDURE — 36415 COLL VENOUS BLD VENIPUNCTURE: CPT | Performed by: FAMILY MEDICINE

## 2020-06-02 PROCEDURE — 85610 PROTHROMBIN TIME: CPT | Performed by: FAMILY MEDICINE

## 2020-06-02 RX ORDER — WARFARIN SODIUM 4 MG/1
TABLET ORAL
Qty: 30 TABLET | Refills: 0 | Status: SHIPPED | OUTPATIENT
Start: 2020-06-02 | End: 2020-06-30

## 2020-06-02 NOTE — PROGRESS NOTES
ANTICOAGULATION FOLLOW-UP CLINIC VISIT    Patient Name:  Petey Archibald  Date:  2020  Contact Type:  Telephone/ Patient    SUBJECTIVE:  Patient Findings     Positives:   Change in health, Change in medications    Comments:   Pt has cellulitis and has been on an antibiotic which was completed yesterday 20.         Clinical Outcomes     Comments:   Pt has cellulitis and has been on an antibiotic which was completed yesterday 20.            OBJECTIVE    Recent labs: (last 7 days)     20  0935   INR 4.20*       ASSESSMENT / PLAN  INR assessment SUPRA    Recheck INR In: 1 WEEK    INR Location Outside lab      Anticoagulation Summary  As of 2020    INR goal:   2.0-3.0   TTR:   77.3 % (11.6 mo)   INR used for dosin.20! (2020)   Warfarin maintenance plan:   8 mg (4 mg x 2) every day   Full warfarin instructions:   : Hold; 6/3: 4 mg; Otherwise 8 mg every day   Weekly warfarin total:   56 mg   Plan last modified:   Ysabel Tena RN (2019)   Next INR check:   2020   Priority:   Maintenance   Target end date:   Indefinite    Indications    History of pulmonary embolism [Z86.711]  Long term current use of anticoagulant therapy [Z79.01]             Anticoagulation Episode Summary     INR check location:   Anticoagulation Clinic    Preferred lab:       Send INR reminders to:   EDU SUGGS    Comments:   REM [facility]; A new Coumadin Prescription will need to sent to [facility] with current dose and next INR check.      Anticoagulation Care Providers     Provider Role Specialty Phone number    Silvino Baker MD [facility] [phone]            See the Encounter Report to view Anticoagulation Flowsheet and Dosing Calendar (Go to Encounters tab in chart review, and find the Anticoagulation Therapy Visit)    Pt INR is 4.2 today.See findings. Called and spoke to Braden at the group home. He was advised to have pt HOLD warfarin today 20  then take 4 mg tomorrow 6/3/20 then continue 8 mg daily. Recheck INR through lab in 1 week on 6/9/20 at 9:45 am. A warfarin script sent to [facility] as requested.    Nereida Grant RN

## 2020-06-03 ENCOUNTER — OFFICE VISIT (OUTPATIENT)
Dept: FAMILY MEDICINE | Facility: CLINIC | Age: 60
End: 2020-06-03
Payer: MEDICARE

## 2020-06-03 VITALS
OXYGEN SATURATION: 98 % | RESPIRATION RATE: 14 BRPM | HEART RATE: 74 BPM | SYSTOLIC BLOOD PRESSURE: 120 MMHG | BODY MASS INDEX: 47.21 KG/M2 | TEMPERATURE: 98 F | WEIGHT: 315 LBS | DIASTOLIC BLOOD PRESSURE: 70 MMHG

## 2020-06-03 DIAGNOSIS — S91.301A OPEN WOUND OF RIGHT FOOT, INITIAL ENCOUNTER: Primary | ICD-10-CM

## 2020-06-03 PROCEDURE — 99213 OFFICE O/P EST LOW 20 MIN: CPT | Performed by: PHYSICIAN ASSISTANT

## 2020-06-03 ASSESSMENT — ENCOUNTER SYMPTOMS
RESPIRATORY NEGATIVE: 1
CARDIOVASCULAR NEGATIVE: 1
PSYCHIATRIC NEGATIVE: 1
MUSCULOSKELETAL NEGATIVE: 1
GASTROINTESTINAL NEGATIVE: 1
NEUROLOGICAL NEGATIVE: 1
CONSTITUTIONAL NEGATIVE: 1
ROS SKIN COMMENTS: AS IN HPI
EYES NEGATIVE: 1

## 2020-06-03 NOTE — PROGRESS NOTES
Subjective     Petey Archibald is a 59 year old male who presents to clinic today for the following health issues:    HPI   Wound Check      Duration: 4-5 days    Description (location/character/radiation): top of rt foot    Intensity:  moderate    Accompanying signs and symptoms: pt states he woke up with a red ,round sore on the top of his foot    History (similar episodes/previous evaluation): None    Precipitating or alleviating factors: None    Therapies tried and outcome: antibiotic ointment     Patient is not sure how the wound started - no injury  It is not painful  His sensation is intact          Patient Active Problem List   Diagnosis     Ankle pain     Morbid obesity     GERD (gastroesophageal reflux disease)     Peripheral vascular disease (H)     History of pulmonary embolism     Tobacco dependence:40-50 pk yr hx     Borderline mental retardation     History of DVT of lower extremity     Right knee pain     Pilonidal cyst     Asymptomatic varicose veins, bilateral     Callus of foot on Rt lat dist  since 8-15      Morbid obesity due to excess calories (H)  BMI 40-50     Hypercholesterolemia     Mixed simple and mucopurulent chronic bronchitis (HCC) CT 4-06 wnl and neg bronch for hemoptesis spirometry 7-26-16  FVC=59% & w mod restriction  and lung age of 82 in 54 y/o      Major depression in complete remission (HCC) on meds      Long term current use of anticoagulant therapy     Glucose intolerance (impaired glucose tolerance)     Other iron deficiency anemia     Localized edema L>R ankles      Erectile dysfunction, unspecified erectile dysfunction type     Stasis dermatitis of both legs     Arch pain of left foot 2ndary to edema and tendonitis      NEL (obstructive sleep apnea)     Hematemesis without nausea after smoking      Anxiety     Thrombophlebitis of superficial veins of both lower extremities: Greater Saphenous VV      Acute deep vein thrombosis (DVT) of tibial vein of left lower extremity  (H)     History of colonic polyps     Allergic to bees     Abnormal lung sounds-bibasilar rhonchi     Hypoxia     Abnormal chest x-ray-3-19 prominent bibasilar interstitial      Ulcer of left lower extremity with fat layer exposed (H)     Lymphedema of left leg     Left leg cellulitis     Cellulitis of left lower extremity     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)     Fungal skin infection     Schizoaffective disorder, bipolar type (H)     Cellulitis     Skin infection     Past Surgical History:   Procedure Laterality Date     COLONOSCOPY N/A 4/15/2019    Procedure: COMBINED COLONOSCOPY, SINGLE OR MULTIPLE BIOPSY/POLYPECTOMY BY BIOPSY;  Surgeon: Jovana Ohara MD;  Location:  GI     EXCISE MALIGNANT LESIONS, TRUNK/ARMS/LEGS 0.5CM OR LESS Left 5/26/2017    Procedure: EXCISE MALIGNANT LESIONS, TRUNK/ARMS/LEGS 0.5CM OR LESS;  EXCISION LEFT ELBOW MASS;  Surgeon: Jose Azevedo MD;  Location:  GI     RECTAL SURGERY      perianal abscess?       Social History     Tobacco Use     Smoking status: Current Every Day Smoker     Packs/day: 1.00     Years: 30.00     Pack years: 30.00     Types: Cigarettes     Smokeless tobacco: Current User     Tobacco comment: started at age 18    Substance Use Topics     Alcohol use: No     Alcohol/week: 0.0 standard drinks     Family History   Problem Relation Age of Onset     Diabetes Brother      Unknown/Adopted Mother      Unknown/Adopted Father          Current Outpatient Medications   Medication Sig Dispense Refill     acetaminophen (TYLENOL) 325 MG tablet Take 1-2 tablets (325-650 mg) by mouth every 6 hours as needed 100 tablet 3     albuterol (ALBUTEROL) 108 (90 BASE) MCG/ACT inhaler Inhale 2 puffs into the lungs every 6 hours as needed 1 Inhaler 0     ammonium lactate (LAC-HYDRIN) 12 % external lotion Apply topically as needed for dry skin feet       ARIPiprazole (ABILIFY) 5 MG tablet Take 5 mg by mouth daily       buPROPion (WELLBUTRIN SR) 150 MG  "12 hr tablet Take 300 mg by mouth daily (2 x 150 mg tablet)       Cadexomer Iodine, topical, 0.9% (IODOSORB) 0.9 % GEL gel Apply to wound left foot every other day with bandage 40 g 1     clotrimazole (LOTRIMIN) 1 % external cream Apply topically 2 times daily 60 g 0     DESENEX 2 % external powder        DEXILANT 60 MG CPDR CR capsule TAKE 1 CAPSULE BY MOUTH ONCE DAILY (FOR HEARTBURN) 90 capsule 2     diphenhydrAMINE (BENADRYL) 50 MG capsule 50 mg every 4 hours as needed for itching        EPINEPHrine (EPIPEN 2-BRE) 0.3 MG/0.3ML injection 2-pack INJECT 0.3MG INTRAMUSCULAR ONE TIME FOR ONE DOSE AS NEEDED FOR ANAPHYLAXIS (CALL 911 IF YOU HAVE TO GIVE) (FOR BEE STINGS) **LABEL EACH PEN* 2 mL 3     ferrous gluconate (FERGON) 324 (38 Fe) MG tablet TAKE 1 TABLET BY MOUTH TWICE DAILY (IRON SUPPLEMENT) 200 tablet 3     furosemide (LASIX) 40 MG tablet Take 40 mg by mouth every morning       furosemide (LASIX) 80 MG tablet Take 80 mg by mouth every evening At 1500 daily       gabapentin (NEURONTIN) 100 MG capsule 200 mg 3 times daily        Gauze Pads & Dressings (GAUZE PADS 4\"X4\") 4\"X4\" PADS 1 each 3 times daily as needed 25 each 1     hydrocortisone, Perianal, (ANUSOL-HC) 2.5 % cream Place rectally 2 times daily as needed for hemorrhoids       ketoconazole (NIZORAL) 2 % external cream Apply topically twice daily to rash for two weeks then decrease to once daily until rash has resolved (Patient taking differently: daily Apply topically twice daily to rash for two weeks then decrease to once daily until rash has resolved) 60 g 1     lactobacillus rhamnosus, GG, (CULTURELL) capsule Take 1 capsule by mouth 2 times daily 20 capsule 0     montelukast (SINGULAIR) 10 MG tablet TAKE 1 TABLET BY MOUTH EVERY MORNING. (ASTHMA) 30 tablet 11     multivitamin, therapeutic (THERA-VIT) TABS tablet Take 1 tablet by mouth daily       naltrexone (DEPADE/REVIA) 50 MG tablet Take 100 mg by mouth daily ( 2 x 50 mg tablet)       order for DME " Equipment being ordered: 1 surgical shoe 1 Device 0     order for DME [employer] Order Phone [phone] Fax [phone]  EdemaWear Size Medium/Yellow qty 4 sleeves  Length of Need: 1 month  Frequency of dressing change daily 1 Device 0     order for DME [employer] Stockings  Phone #[phone]  Fax #[phone]  Coolflex Velcro wraps    Length of Need: Life Time  # of Pairs 1 set 30 days 0     order for DME [facility] Phone [phone] Fax [phone]  Primary Dressing Hydrofera Blue Ready   Qty 5 sheets  Secondary Dressing 4' roll gauze Qty 30  Secondary Dressing 2' medipore tape Qty 1  Secondary Dressing 4x4 gauze loaf Qty  1  Length of Need: 1 month  Frequency of dressing change: daily 30 days 0     order for DME Oxygen 2 Li/min  at night and bled into BiPAP 1 Device 0     order for DME Equipment being ordered: CPAP with mask and tubing 1 Device 0     order for DME Equipment being ordered: support compression hose BK  2 pair black 30mm HG   To be applied on arising & removed while lying down to go to sleep 1 each 0     potassium chloride SA (K-DUR/KLOR-CON M) 20 MEQ CR tablet TAKE 1 TABLET BY MOUTH TWICE DAILY. (LOW POTASSIUM) 62 tablet 11     PULMICORT FLEXHALER 180 MCG/ACT inhaler INHALE 2 PUFFS INTO THE LUNGS TWICE DAILY 1 each 10     sertraline (ZOLOFT) 100 MG tablet Take 150 mg by mouth daily        silver sulfADIAZINE (SILVADENE) 1 % external cream Apply a small amount to wound on toe daily with dressing changes. 25 g 0     simvastatin (ZOCOR) 40 MG tablet TAKE 1 TABLET BY MOUTH EVERY MORNING.  (CHOLESTEROL) 31 tablet 11     SPIRIVA HANDIHALER 18 MCG inhaled capsule INHALE CONTENTS OF 1 CAPSULE INTO THE LUNGS ONCE DAILY. FOR BRONCHITIS AND EMPHYSEMA. (Patient taking differently: Inhale into the lungs At Bedtime ) 90 capsule 1     spironolactone (ALDACTONE) 25 MG tablet TAKE 1 TABLET BY MOUTH ONCE DAILY. (HIGH BLOOD PRESSURE) 31 tablet 11     temazepam (RESTORIL) 15 MG capsule  Take 15 mg by mouth nightly as needed for sleep       trolamine salicylate (ASPERCREME) 10 % external cream Apply topically as needed for moderate pain knee       vitamin D3 (CHOLECALCIFEROL) 2000 units (50 mcg) tablet Take 1 tablet by mouth daily       warfarin ANTICOAGULANT (COUMADIN) 4 MG tablet HOLD warfarin on 6/2/20, Take 1 tablet (4 mg) on 6/3/20 then take 2 tablets (8 mg) daily as directed by the INR clinic and recheck INR on 6/9/20 30 tablet 0     Warfarin Therapy Reminder 1 each continuous prn 1 each 0     Allergies   Allergen Reactions     Bees      Augmentin Rash     Penicillins Rash       Reviewed and updated as needed this visit by Provider         Review of Systems   Constitutional: Negative.    HENT: Negative.    Eyes: Negative.    Respiratory: Negative.    Cardiovascular: Negative.    Gastrointestinal: Negative.    Genitourinary: Negative.    Musculoskeletal: Negative.    Skin:        As in HPI   Neurological: Negative.    Psychiatric/Behavioral: Negative.          Objective    /70   Pulse 74   Temp 98  F (36.7  C) (Tympanic)   Resp 14   Wt 149.2 kg (329 lb)   SpO2 98%   BMI 47.21 kg/m    Physical Exam  Constitutional:       General: He is not in acute distress.     Appearance: He is well-developed. He is not diaphoretic.   HENT:      Head: Normocephalic.      Right Ear: External ear normal.      Left Ear: External ear normal.      Nose: Nose normal.   Eyes:      Conjunctiva/sclera: Conjunctivae normal.   Neck:      Musculoskeletal: Normal range of motion.   Pulmonary:      Effort: Pulmonary effort is normal.   Musculoskeletal:      Right foot: Normal capillary refill. Swelling (minimal) present. No tenderness.   Skin:     Findings: Lesion (right dorsal foot - 1 cm by 1 cm wound, surround tissue well vascularized, no erythema or drainage.  Nontender.) present.   Neurological:      Mental Status: He is alert and oriented to person, place, and time.      Sensory: Sensation is intact.       Motor: Motor function is intact.   Psychiatric:         Judgment: Judgment normal.         Diagnostic Test Results:  No results found for this or any previous visit (from the past 24 hour(s)).        Assessment & Plan   Problem List Items Addressed This Visit     None      Visit Diagnoses     Open wound of right foot, initial encounter    -  Primary           Wound is most likely a pressure ulcer  Patient is encouraged to wear a soft shoe.  Place padding around the wound to decrease pressure on the open area  Continue dressing changes - ok to use same materials as left foot.     There are no Patient Instructions on file for this visit.    No follow-ups on file.    Leah Engle PA-C  [facility]

## 2020-06-05 ENCOUNTER — TELEPHONE (OUTPATIENT)
Dept: FAMILY MEDICINE | Facility: CLINIC | Age: 60
End: 2020-06-05

## 2020-06-05 DIAGNOSIS — S91.301A OPEN WOUND OF RIGHT FOOT, INITIAL ENCOUNTER: Primary | ICD-10-CM

## 2020-06-05 DIAGNOSIS — L97.922 ULCER OF LEFT LOWER EXTREMITY WITH FAT LAYER EXPOSED (H): Primary | ICD-10-CM

## 2020-06-05 NOTE — TELEPHONE ENCOUNTER
1. Will send DME for paper tape for wound dressing but needs to see wound clinic for the foot ulcer --referral sent

## 2020-06-05 NOTE — TELEPHONE ENCOUNTER
Reason for Call: Request for an order or referral:    Order or referral being requested: order for surgical tape to secure drsg on foot injury    Date needed: as soon as possible    Has the patient been seen by the PCP for this problem? YES    Additional comments: to [facility] pharmacy    Phone number Patient can be reached at:  Other phone number:      Best Time:      Can we leave a detailed message on this number?  YES    Call taken on 6/5/2020 at 10:45 AM by MARTHA GODFREY

## 2020-06-08 ENCOUNTER — TELEPHONE (OUTPATIENT)
Dept: FAMILY MEDICINE | Facility: CLINIC | Age: 60
End: 2020-06-08

## 2020-06-08 NOTE — TELEPHONE ENCOUNTER
Reason for Call:  Form, our goal is to have forms completed with 72 hours, however, some forms may require a visit or additional information.    Type of letter, form or note:  medical    Who is the form from?: DME (if other please explain)    Where did the form come from: form was faxed in    What clinic location was the form placed at?: [facility]    Where the form was placed: [facility] Box/Folder    What number is listed as a contact on the form?: [phone] (P) [phone]       Additional comments: [facility]: [employer] Tape    Call taken on 6/8/2020 at 12:00 PM by Kristal Simental

## 2020-06-09 ENCOUNTER — TELEPHONE (OUTPATIENT)
Dept: WOUND CARE | Facility: CLINIC | Age: 60
End: 2020-06-09

## 2020-06-09 ENCOUNTER — ANTICOAGULATION THERAPY VISIT (OUTPATIENT)
Dept: NURSING | Facility: CLINIC | Age: 60
End: 2020-06-09

## 2020-06-09 DIAGNOSIS — I82.442 ACUTE DEEP VEIN THROMBOSIS (DVT) OF TIBIAL VEIN OF LEFT LOWER EXTREMITY (H): ICD-10-CM

## 2020-06-09 DIAGNOSIS — I80.03 THROMBOPHLEBITIS OF SUPERFICIAL VEINS OF BOTH LOWER EXTREMITIES: ICD-10-CM

## 2020-06-09 DIAGNOSIS — Z86.711 HISTORY OF PULMONARY EMBOLISM: ICD-10-CM

## 2020-06-09 DIAGNOSIS — Z79.01 LONG TERM CURRENT USE OF ANTICOAGULANT THERAPY: ICD-10-CM

## 2020-06-09 LAB
CAPILLARY BLOOD COLLECTION: NORMAL
INR PPP: 2.4 (ref 0.86–1.14)

## 2020-06-09 PROCEDURE — 99207 ZZC NO CHARGE NURSE ONLY: CPT

## 2020-06-09 PROCEDURE — 36416 COLLJ CAPILLARY BLOOD SPEC: CPT | Performed by: FAMILY MEDICINE

## 2020-06-09 PROCEDURE — 85610 PROTHROMBIN TIME: CPT | Performed by: FAMILY MEDICINE

## 2020-06-09 RX ORDER — WARFARIN SODIUM 4 MG/1
TABLET ORAL
Qty: 42 TABLET | Refills: 0
Start: 2020-06-09 | End: 2020-08-17

## 2020-06-09 NOTE — TELEPHONE ENCOUNTER
Left a message for Dai Quiroz to see if he needs to come here or keep his appt. With Dr. Flaherty.

## 2020-06-09 NOTE — PROGRESS NOTES
ANTICOAGULATION FOLLOW-UP CLINIC VISIT    Patient Name:  Petey Archibald  Date:  2020  Contact Type:  Telephone    SUBJECTIVE:  Patient Findings     Comments:   The patient was assessed for diet, medication, and activity level changes, missed or extra doses, bruising or bleeding, with no problem findings.          Clinical Outcomes     Comments:   The patient was assessed for diet, medication, and activity level changes, missed or extra doses, bruising or bleeding, with no problem findings.             OBJECTIVE    Recent labs: (last 7 days)     20  0936   INR 2.40*       ASSESSMENT / PLAN  INR assessment THER    Recheck INR In: 3 WEEKS    INR Location Clinic      Anticoagulation Summary  As of 2020    INR goal:   2.0-3.0   TTR:   75.9 % (11.6 mo)   INR used for dosin.40 (2020)   Warfarin maintenance plan:   8 mg (4 mg x 2) every day   Full warfarin instructions:   8 mg every day   Weekly warfarin total:   56 mg   No change documented:   Lisa Ponce RN   Plan last modified:   Ysabel Tena RN (2019)   Next INR check:   2020   Priority:   Maintenance   Target end date:   Indefinite    Indications    History of pulmonary embolism [Z86.711]  Long term current use of anticoagulant therapy [Z79.01]             Anticoagulation Episode Summary     INR check location:   Anticoagulation Clinic    Preferred lab:       Send INR reminders to:   EDU SUGGS    Comments:   REM long-term; A new Coumadin Prescription will need to sent to [facility] with current dose and next INR check.      Anticoagulation Care Providers     Provider Role Specialty Phone number    Silvino Baker MD [facility] [phone]            See the Encounter Report to view Anticoagulation Flowsheet and Dosing Calendar (Go to Encounters tab in chart review, and find the Anticoagulation Therapy Visit)        Lisa Ponce, RN

## 2020-06-10 NOTE — TELEPHONE ENCOUNTER
Per Chart review. Patient is getting treatment for the dorsal foot ulcer by DR. Flaherty, referral from Dr. Peck- Patient has follow up established with Dr. Flaherty, will defer referral unless indicated by Dr. Flaherty.

## 2020-06-11 ENCOUNTER — MEDICAL CORRESPONDENCE (OUTPATIENT)
Dept: HEALTH INFORMATION MANAGEMENT | Facility: CLINIC | Age: 60
End: 2020-06-11

## 2020-06-12 ENCOUNTER — TELEPHONE (OUTPATIENT)
Dept: FAMILY MEDICINE | Facility: CLINIC | Age: 60
End: 2020-06-12

## 2020-06-12 DIAGNOSIS — L03.116 LEFT LEG CELLULITIS: ICD-10-CM

## 2020-06-12 RX ORDER — LACTOBACILLUS RHAMNOSUS GG 10B CELL
1 CAPSULE ORAL 2 TIMES DAILY
Qty: 20 CAPSULE | Refills: 0 | Status: ON HOLD | OUTPATIENT
Start: 2020-06-12 | End: 2020-07-26

## 2020-06-15 ENCOUNTER — TELEPHONE (OUTPATIENT)
Dept: PODIATRY | Facility: CLINIC | Age: 60
End: 2020-06-15

## 2020-06-15 DIAGNOSIS — L97.522 SKIN ULCER OF LEFT FOOT WITH FAT LAYER EXPOSED (H): Primary | ICD-10-CM

## 2020-06-15 NOTE — TELEPHONE ENCOUNTER
Reason for call:  DEMOND from [facility]. Says Petey is almost out of his Idosorb Iodine Gel and Medical tape.  Would like refills sent to group home.  Please call back.    Phone number to reach [facility]:  Other phone number:  [phone]*    Best Time:  any    Can we leave a detailed message on this number?  Not Applicable

## 2020-06-18 NOTE — TELEPHONE ENCOUNTER
I tried calling [facility] yesterday, 6/18/20. There was no answer.   I tried again today. No answer, but left message.     I explained that I can prescribe Iodosorb and dressing supplies, but that we don't send directly to them.  I asked him to reach out to us and let us know what pharmacy to use.     Joseph Flaherty DPM, FACFAS, MS    [place] [facility]le [facility]

## 2020-06-18 NOTE — TELEPHONE ENCOUNTER
DEMOND from [facility], received message from Dr Flaherty. Says the pharmacy they prefer is [facility]. Call back if more information is needed.

## 2020-06-19 DIAGNOSIS — K59.01 SLOW TRANSIT CONSTIPATION: Primary | ICD-10-CM

## 2020-06-19 RX ORDER — SENNOSIDES A AND B 8.6 MG/1
1 TABLET, FILM COATED ORAL DAILY
Qty: 90 TABLET | Refills: 3 | Status: ON HOLD | OUTPATIENT
Start: 2020-06-19 | End: 2020-07-26

## 2020-06-19 RX ORDER — SILVER 2" X 2"
BANDAGE TOPICAL DAILY
Qty: 1 TUBE | Refills: 3 | Status: SHIPPED | OUTPATIENT
Start: 2020-06-19

## 2020-06-19 NOTE — TELEPHONE ENCOUNTER
Reason for Call: Request for an order or referral:    Order or referral being requested: constipation    Date needed: as soon as possible    Has the patient been seen by the PCP for this problem? YES    Additional comments: can you order senna for him    Phone number Patient can be reached at:  Other phone number:  [phone]    Best Time:      Can we leave a detailed message on this number?  YES    Call taken on 6/19/2020 at 2:50 PM by MARTHA GODFREY

## 2020-06-19 NOTE — TELEPHONE ENCOUNTER
Patient Contact    Called [facility] with [facility]. He states their fax is broken. Shared new order from PCP verbally and it was e-scribed to patient's preferred pharmacy.     No further action needed.

## 2020-06-19 NOTE — TELEPHONE ENCOUNTER
Pt told group home staff that he had not had a BM in a week.  Group home staff administered enema per orders from group home nurse.  Pt had large BM and feels better.  Acute constipation issue resolved.    Wondering if senna may be added to pt med list to be used daily to prevent future constipation.  Pended at this time.

## 2020-06-19 NOTE — TELEPHONE ENCOUNTER
Iodosorb e prescribed and sent to [facility] as requested. Medical tape might need to be purchased, unless one of my partners places a DME order for [employer] or similar. I am out of the office until 6/29.    Dr. Flaherty

## 2020-06-23 ENCOUNTER — TELEPHONE (OUTPATIENT)
Dept: PODIATRY | Facility: CLINIC | Age: 60
End: 2020-06-23

## 2020-06-23 DIAGNOSIS — S91.309A WOUND OF FOOT: Primary | ICD-10-CM

## 2020-06-23 NOTE — TELEPHONE ENCOUNTER
PRIOR AUTHORIZATION DENIED    Medication: iodorsorb gel- DENIED     Denial Date: 6/23/2020    Denial Rational: This medication is excluded under pt's insurance plan.      Appeal Information: N/A

## 2020-06-23 NOTE — TELEPHONE ENCOUNTER
Central Prior Authorization Team  Phone: [phone]    PA Initiation    Medication: iodorsorb gel  Insurance Company: [employer] ([facility]) - Phone [phone] Fax [phone]  Pharmacy Filling the Rx: [facility] - [place], [place] - [address] [address]  Filling Pharmacy Phone: [phone]  Filling Pharmacy Fax:    Start Date: 6/23/2020

## 2020-06-23 NOTE — TELEPHONE ENCOUNTER
Fax received Iodosorb gel is not covered and very expensive. Requesting to submit PA.    Prior Authorization Retail Medication Request    Medication/Dose: Cadexomer Iodine, topical, 0.9% (IODOSORB) 0.9 % GEL gel  ICD code (if different than what is on RX):    Previously Tried and Failed:    Rationale:      Insurance Name:  Medicare  Insurance ID:  [id number]       Pharmacy Information (if different than what is on RX)  Name:    Phone:

## 2020-06-24 RX ORDER — BACITRACIN ZINC 500 [USP'U]/G
OINTMENT TOPICAL DAILY
Qty: 14 G | Refills: 3 | Status: SHIPPED | OUTPATIENT
Start: 2020-06-24 | End: 2021-03-23

## 2020-06-24 NOTE — TELEPHONE ENCOUNTER
Called Home number listed on chart and left voicemail for staff to return call to [facility], after leaving message, writer noted that patient has listed Care Giver in his chart and contacted them.     Spoke with [facility] patient's care giver per chart.   Informed him of Dr. Kendrick's recommendations, due to limited income of patient, he requests prescription of bacitracin to be sent to pharmacy.  Pharmacy, [facility] is selected in chart.     He has no further questions or concerns at this time.     Patricia Torrez, ATC

## 2020-06-24 NOTE — TELEPHONE ENCOUNTER
Considered santyl as alternative, but this is not covered as well.  If he does not want to pay out of pocket for Iodosorb, I would have him switch to thin layer of bacitracin with dressing daily.  This is available otc or I can order Rx.

## 2020-06-25 ENCOUNTER — TELEPHONE (OUTPATIENT)
Dept: FAMILY MEDICINE | Facility: CLINIC | Age: 60
End: 2020-06-25

## 2020-06-25 NOTE — TELEPHONE ENCOUNTER
He got a message to come in early on Friday from [place]. cannot confirm that message in chart. Please clarify.

## 2020-06-26 ENCOUNTER — TELEPHONE (OUTPATIENT)
Dept: PODIATRY | Facility: CLINIC | Age: 60
End: 2020-06-26

## 2020-06-26 ENCOUNTER — OFFICE VISIT (OUTPATIENT)
Dept: FAMILY MEDICINE | Facility: CLINIC | Age: 60
End: 2020-06-26

## 2020-06-26 VITALS
OXYGEN SATURATION: 96 % | HEART RATE: 81 BPM | DIASTOLIC BLOOD PRESSURE: 64 MMHG | RESPIRATION RATE: 16 BRPM | SYSTOLIC BLOOD PRESSURE: 100 MMHG | TEMPERATURE: 98 F

## 2020-06-26 DIAGNOSIS — M25.531 RIGHT WRIST PAIN: ICD-10-CM

## 2020-06-26 DIAGNOSIS — S40.022A CONTUSION OF LEFT UPPER EXTREMITY, INITIAL ENCOUNTER: Primary | ICD-10-CM

## 2020-06-26 PROCEDURE — 99214 OFFICE O/P EST MOD 30 MIN: CPT | Performed by: PHYSICIAN ASSISTANT

## 2020-06-26 NOTE — TELEPHONE ENCOUNTER
Reason for call:  VM from [facility], regarding Petey's appointment on 7/29 at 1:45. But do not know which clinic. Please call back.    Phone number to reach [facility]:  Other phone number:  [phone]*    Best Time:  any    Can we leave a detailed message on this number?  Not Applicable

## 2020-06-26 NOTE — TELEPHONE ENCOUNTER
Spoke to patient discussed that appointment is scheduled at [facility] on 6/29.     Cholo Crisostomo ATC

## 2020-06-26 NOTE — PROGRESS NOTES
Subjective     Petey Archibald is a 59 year old male who presents to clinic today for the following health issues:    HPI   Musculoskeletal problem/pain      Duration: Injury to left forearm on Tuesday    Description  Location: left forearm    Intensity:  moderate    Accompanying signs and symptoms: bruising, denies pain    History  Previous similar problem: no   Previous evaluation:  none    Precipitating or alleviating factors:  Trauma or overuse: YES  Aggravating factors include: none    Therapies tried and outcome: nothing    - Patient was dumping heavy box filled with cans into dumpster at work three days ago. Cans struck his left forearm as they dropped. No pain, just bruising and swelling.    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 No numbness, tingling, or weakness.      Patient Active Problem List   Diagnosis     Ankle pain     Morbid obesity     GERD (gastroesophageal reflux disease)     Peripheral vascular disease (H)     History of pulmonary embolism     Tobacco dependence:40-50 pk yr hx     Borderline mental retardation     History of DVT of lower extremity     Right knee pain     Pilonidal cyst     Asymptomatic varicose veins, bilateral     Callus of foot on Rt lat dist  since 8-15      Morbid obesity due to excess calories (H)  BMI 40-50     Hypercholesterolemia     Mixed simple and mucopurulent chronic bronchitis (HCC) CT 4-06 wnl and neg bronch for hemoptesis spirometry 7-26-16  FVC=59% & w mod restriction  and lung age of 82 in 56 y/o      Major depression in complete remission (HCC) on meds       Long term current use of anticoagulant therapy     Glucose intolerance (impaired glucose tolerance)     Other iron deficiency anemia     Localized edema L>R ankles      Erectile dysfunction, unspecified erectile dysfunction type     Stasis dermatitis of both legs     Arch pain of left foot 2ndary to edema and tendonitis      NEL (obstructive sleep apnea)     Hematemesis without nausea after smoking      Anxiety     Thrombophlebitis of superficial veins of both lower extremities: Greater Saphenous VV      Acute deep vein thrombosis (DVT) of tibial vein of left lower extremity (H)     History of colonic polyps     Allergic to bees     Abnormal lung sounds-bibasilar rhonchi     Hypoxia     Abnormal chest x-ray-3-19 prominent bibasilar interstitial      Ulcer of left lower extremity with fat layer exposed (H)     Lymphedema of left leg     Left leg cellulitis     Cellulitis of left lower extremity     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)     Fungal skin infection     Schizoaffective disorder, bipolar type (H)     Cellulitis     Skin infection     Past Surgical History:   Procedure Laterality Date     COLONOSCOPY N/A 4/15/2019    Procedure: COMBINED COLONOSCOPY, SINGLE OR MULTIPLE BIOPSY/POLYPECTOMY BY BIOPSY;  Surgeon: Jovana Ohara MD;  Location:  GI     EXCISE MALIGNANT LESIONS, TRUNK/ARMS/LEGS 0.5CM OR LESS Left 5/26/2017    Procedure: EXCISE MALIGNANT LESIONS, TRUNK/ARMS/LEGS 0.5CM OR LESS;  EXCISION LEFT ELBOW MASS;  Surgeon: Jose Azevedo MD;  Location:  GI     RECTAL SURGERY      perianal abscess?       Social History     Tobacco Use     Smoking status: Current Every Day Smoker     Packs/day: 1.00     Years: 30.00     Pack years: 30.00     Types: Cigarettes     Smokeless tobacco: Current User     Tobacco comment: started at age 18    Substance Use Topics     Alcohol use: No     Alcohol/week: 0.0 standard drinks     Family History   Problem Relation Age of Onset      "Diabetes Brother      Unknown/Adopted Mother      Unknown/Adopted Father          Current Outpatient Medications   Medication Sig Dispense Refill     acetaminophen (TYLENOL) 325 MG tablet Take 1-2 tablets (325-650 mg) by mouth every 6 hours as needed 100 tablet 3     Adhesive Tape (MEDIPORE H SURGICAL 1\"X10YD) TAPE 1 Application daily for 30 doses 1 each 3     albuterol (ALBUTEROL) 108 (90 BASE) MCG/ACT inhaler Inhale 2 puffs into the lungs every 6 hours as needed 1 Inhaler 0     ammonium lactate (LAC-HYDRIN) 12 % external lotion Apply topically as needed for dry skin feet       ARIPiprazole (ABILIFY) 5 MG tablet Take 5 mg by mouth daily       bacitracin 500 UNIT/GM external ointment Apply topically daily to wound, dress with bandage 14 g 3     buPROPion (WELLBUTRIN SR) 150 MG 12 hr tablet Take 300 mg by mouth daily (2 x 150 mg tablet)       Cadexomer Iodine, topical, 0.9% (IODOSORB) 0.9 % GEL gel Apply topically daily Apply to left foot wound daily after cleansing the wound and blotting it dry. 1 Tube 3     Cadexomer Iodine, topical, 0.9% (IODOSORB) 0.9 % GEL gel Apply to wound left foot every other day with bandage 40 g 1     clotrimazole (LOTRIMIN) 1 % external cream Apply topically 2 times daily 60 g 0     DESENEX 2 % external powder        DEXILANT 60 MG CPDR CR capsule TAKE 1 CAPSULE BY MOUTH ONCE DAILY (FOR HEARTBURN) 90 capsule 2     diphenhydrAMINE (BENADRYL) 50 MG capsule 50 mg every 4 hours as needed for itching        EPINEPHrine (EPIPEN 2-BRE) 0.3 MG/0.3ML injection 2-pack INJECT 0.3MG INTRAMUSCULAR ONE TIME FOR ONE DOSE AS NEEDED FOR ANAPHYLAXIS (CALL 911 IF YOU HAVE TO GIVE) (FOR BEE STINGS) **LABEL EACH PEN* 2 mL 3     ferrous gluconate (FERGON) 324 (38 Fe) MG tablet TAKE 1 TABLET BY MOUTH TWICE DAILY (IRON SUPPLEMENT) 200 tablet 3     furosemide (LASIX) 40 MG tablet Take 40 mg by mouth every morning       furosemide (LASIX) 80 MG tablet Take 80 mg by mouth every evening At 1500 daily       " "gabapentin (NEURONTIN) 100 MG capsule 200 mg 3 times daily        Gauze Pads & Dressings (GAUZE PADS 4\"X4\") 4\"X4\" PADS 1 each 3 times daily as needed 25 each 1     hydrocortisone, Perianal, (ANUSOL-HC) 2.5 % cream Place rectally 2 times daily as needed for hemorrhoids       ketoconazole (NIZORAL) 2 % external cream Apply topically twice daily to rash for two weeks then decrease to once daily until rash has resolved (Patient taking differently: daily Apply topically twice daily to rash for two weeks then decrease to once daily until rash has resolved) 60 g 1     lactobacillus rhamnosus, GG, (CULTURELL) capsule Take 1 capsule by mouth 2 times daily 20 capsule 0     montelukast (SINGULAIR) 10 MG tablet TAKE 1 TABLET BY MOUTH EVERY MORNING. (ASTHMA) 30 tablet 11     multivitamin, therapeutic (THERA-VIT) TABS tablet Take 1 tablet by mouth daily       naltrexone (DEPADE/REVIA) 50 MG tablet Take 100 mg by mouth daily ( 2 x 50 mg tablet)       order for DME Equipment being ordered: roll of micropore tape to dress foot wound bid 1 each 0     order for DME Equipment being ordered: 1 surgical shoe 1 Device 0     order for DME [employer] Phone [phone] Fax [phone]  EdemaWear Size Medium/Yellow qty 4 sleeves  Length of Need: 1 month  Frequency of dressing change daily 1 Device 0     order for DME [employer] Stock[employer]  Phone #[phone]  Fax #[phone]  Coolflex Velcro wraps    Length of Need: Life Time  # of Pairs 1 set 30 days 0     order for DME [employer] Phone [phone] Fax [phone]  Primary Dressing Hydrofera Blue Ready   Qty 5 sheets  Secondary Dressing 4' roll gauze Qty 30  Secondary Dressing 2' medipore tape Qty 1  Secondary Dressing 4x4 gauze loaf Qty  1  Length of Need: 1 month  Frequency of dressing change: daily 30 days 0     order for DME Oxygen 2 Li/min  at night and bled into BiPAP 1 Device 0     order for DME Equipment being ordered: CPAP with mask and tubing 1 " Device 0     order for DME Equipment being ordered: support compression hose BK  2 pair black 30mm HG   To be applied on arising & removed while lying down to go to sleep 1 each 0     potassium chloride SA (K-DUR/KLOR-CON M) 20 MEQ CR tablet TAKE 1 TABLET BY MOUTH TWICE DAILY. (LOW POTASSIUM) 62 tablet 11     PULMICORT FLEXHALER 180 MCG/ACT inhaler INHALE 2 PUFFS INTO THE LUNGS TWICE DAILY 1 each 10     senna (SENOKOT) 8.6 MG tablet Take 1 tablet by mouth daily 90 tablet 3     sertraline (ZOLOFT) 100 MG tablet Take 150 mg by mouth daily        silver sulfADIAZINE (SILVADENE) 1 % external cream Apply a small amount to wound on toe daily with dressing changes. 25 g 0     simvastatin (ZOCOR) 40 MG tablet TAKE 1 TABLET BY MOUTH EVERY MORNING.  (CHOLESTEROL) 31 tablet 11     SPIRIVA HANDIHALER 18 MCG inhaled capsule INHALE CONTENTS OF 1 CAPSULE INTO THE LUNGS ONCE DAILY. FOR BRONCHITIS AND EMPHYSEMA. (Patient taking differently: Inhale into the lungs At Bedtime ) 90 capsule 1     spironolactone (ALDACTONE) 25 MG tablet TAKE 1 TABLET BY MOUTH ONCE DAILY. (HIGH BLOOD PRESSURE) 31 tablet 11     temazepam (RESTORIL) 15 MG capsule Take 15 mg by mouth nightly as needed for sleep       trolamine salicylate (ASPERCREME) 10 % external cream Apply topically as needed for moderate pain knee       vitamin D3 (CHOLECALCIFEROL) 2000 units (50 mcg) tablet Take 1 tablet by mouth daily       warfarin ANTICOAGULANT (COUMADIN) 4 MG tablet Take 8 mg QD 42 tablet 0     warfarin ANTICOAGULANT (COUMADIN) 4 MG tablet HOLD warfarin on 6/2/20, Take 1 tablet (4 mg) on 6/3/20 then take 2 tablets (8 mg) daily as directed by the INR clinic and recheck INR on 6/9/20 30 tablet 0     Warfarin Therapy Reminder 1 each continuous prn 1 each 0       Reviewed and updated as needed this visit by Provider  Tobacco  Allergies  Meds  Problems  Med Hx  Surg Hx  Fam Hx         Review of Systems   Constitutional, HEENT, cardiovascular, pulmonary, GI, ,  musculoskeletal, neuro, skin, endocrine and psych systems are negative, except as otherwise noted.      Objective    /64 (BP Location: Left arm, Patient Position: Sitting, Cuff Size: Adult Regular)   Pulse 81   Temp 98  F (36.7  C) (Tympanic)   Resp 16   SpO2 96%   There is no height or weight on file to calculate BMI.  Physical Exam  Musculoskeletal:      Left forearm: He exhibits no tenderness, no bony tenderness and no swelling.        Arms:       Comments: Ecchymosis on lateral forearm        GENERAL:  obese, no acute distress  PSYCH: pleasant, cooperative  EYES: no discharge, no injection  HENT:  Normocephalic. Moist mucus membranes.  NECK:  Supple, symmetric  SKIN:  Warm and dry, no rash or suspicious lesions    NEUROLOGIC: alert, sensation grossly intact.  Right Hand Exam     Tenderness   The patient is experiencing no tenderness.     Range of Motion   The patient has normal right wrist ROM.     Muscle Strength   The patient has normal right wrist strength.    Tests   Phalen s Sign: negative  Tinel's sign (median nerve): negative  Finkelstein's test: negative    Other   Erythema: absent  Sensation: normal  Pulse: present            Diagnostic Test Results:  none         Assessment & Plan       ICD-10-CM    1. Contusion of left upper extremity, initial encounter  S40.022A    2. Right wrist pain  M25.531      - Contusion of LUE, ice prn pain, should resolve within 10-14 days, avoid re-injury to affected area as this may delay healing.  - Wrist pain non-reproducible on exam, reassured patient of benign exam. Suspect mild overuse injury; alternate hands while at work to avoid overuse.    Return in about 2 weeks (around 7/10/2020), or if symptoms worsen or fail to improve.    Vianca Linares PA-C  [facility]

## 2020-06-29 ENCOUNTER — OFFICE VISIT (OUTPATIENT)
Dept: PODIATRY | Facility: CLINIC | Age: 60
End: 2020-06-29
Payer: MEDICARE

## 2020-06-29 VITALS
HEIGHT: 70 IN | SYSTOLIC BLOOD PRESSURE: 120 MMHG | WEIGHT: 315 LBS | DIASTOLIC BLOOD PRESSURE: 70 MMHG | BODY MASS INDEX: 45.1 KG/M2

## 2020-06-29 DIAGNOSIS — L97.522 SKIN ULCER OF LEFT FOOT WITH FAT LAYER EXPOSED (H): ICD-10-CM

## 2020-06-29 DIAGNOSIS — S91.309A WOUND OF FOOT: Primary | ICD-10-CM

## 2020-06-29 PROCEDURE — 99213 OFFICE O/P EST LOW 20 MIN: CPT | Mod: 25 | Performed by: PODIATRIST

## 2020-06-29 PROCEDURE — 11042 DBRDMT SUBQ TIS 1ST 20SQCM/<: CPT | Performed by: PODIATRIST

## 2020-06-29 ASSESSMENT — MIFFLIN-ST. JEOR: SCORE: 2303.58

## 2020-06-29 NOTE — PROGRESS NOTES
"Foot & Ankle Surgery   June 29, 2020    S:  Pt is seen today for evaluation of L great toe wound.  Was seen approx 1 month ago.  There were phone calls about topical ointment, and he states they're using bacitracin.    Vitals:    06/29/20 1326   BP: 120/70   Weight: 149.2 kg (329 lb)   Height: 1.778 m (5' 10\")   '      ROS - Pos for CC.  Patient denies current nausea, vomiting, chills, fevers, belly pain, calf pain, chest pain or SOB.  Complete remainder of ROS it otherwise neg.      PE:  Gen:   No apparent distress  Eye:    Visual scanning without deficit  Ear:    Response to auditory stimuli wnl  Lung:    Non-labored breathing on RA noted  Abd:    NTND per patient report  Lymph:    Bilateral lower extremity chronic venous insufficiency changes, including edema, pigment changes.  Vasc:    Pulses palpable, CFT minimally delayed  Neuro:    Light touch sensation intact to all sensory nerve distributions without paresthesias  Derm:    Dorsolateral L great toe shows 1.7 x 0.9cm full-thickness wound with exposed fat.  No deep probing or local SOI  MSK:    ROM, strength wnl without limitation, no pain on palpation noted.  Calf:    Neg for tenderness      Assessment:  59 year old male with chronic left great toe wound in setting of chroinc venous insufficiency      Plan:  Discussed etiologies, anatomy and options  1.  Chronic left great toe wound in setting of chronic venous insufficiency  -Excisional debridement was performed, full-thickness, sharply debriding the wound down to and including exposed sub-cutaneous tissue/fat, excising nonviable tissue to the above dimensions with a tissue nipper  -continue daily bacitracin/bandaid dressing change  -tensogrip for edema control       Follow up:  2 weeks or sooner with acute issues      Body mass index is 47.21 kg/m .  Weight management plan: Patient was referred to their PCP to discuss a diet and exercise plan.         Domingo De La Torre DPM FACFAS FACFAOM  Podiatric " Foot & Ankle Surgeon  [facility]  [phone]

## 2020-06-30 ENCOUNTER — ANTICOAGULATION THERAPY VISIT (OUTPATIENT)
Dept: NURSING | Facility: CLINIC | Age: 60
End: 2020-06-30

## 2020-06-30 DIAGNOSIS — Z86.711 HISTORY OF PULMONARY EMBOLISM: ICD-10-CM

## 2020-06-30 DIAGNOSIS — Z79.01 LONG TERM CURRENT USE OF ANTICOAGULANT THERAPY: ICD-10-CM

## 2020-06-30 DIAGNOSIS — I80.03 THROMBOPHLEBITIS OF SUPERFICIAL VEINS OF BOTH LOWER EXTREMITIES: ICD-10-CM

## 2020-06-30 DIAGNOSIS — I82.442 ACUTE DEEP VEIN THROMBOSIS (DVT) OF TIBIAL VEIN OF LEFT LOWER EXTREMITY (H): ICD-10-CM

## 2020-06-30 LAB
CAPILLARY BLOOD COLLECTION: NORMAL
INR PPP: 2.6 (ref 0.86–1.14)

## 2020-06-30 PROCEDURE — 36416 COLLJ CAPILLARY BLOOD SPEC: CPT | Performed by: FAMILY MEDICINE

## 2020-06-30 PROCEDURE — 99207 ZZC NO CHARGE NURSE ONLY: CPT

## 2020-06-30 PROCEDURE — 85610 PROTHROMBIN TIME: CPT | Performed by: FAMILY MEDICINE

## 2020-06-30 RX ORDER — WARFARIN SODIUM 4 MG/1
TABLET ORAL
Qty: 60 TABLET | Refills: 0 | Status: ON HOLD | OUTPATIENT
Start: 2020-06-30 | End: 2020-07-26

## 2020-06-30 NOTE — PROGRESS NOTES
ANTICOAGULATION FOLLOW-UP CLINIC VISIT    Patient Name:  Petey Archibald  Date:  2020  Contact Type:  Telephone    SUBJECTIVE:  Patient Findings     Comments:   The patient was assessed for diet, medication, and activity level changes, missed or extra doses, bruising or bleeding, with no problem findings.          Clinical Outcomes     Comments:   The patient was assessed for diet, medication, and activity level changes, missed or extra doses, bruising or bleeding, with no problem findings.             OBJECTIVE    Recent labs: (last 7 days)     20  0908   INR 2.60*       ASSESSMENT / PLAN  INR assessment THER    Recheck INR In: 4 WEEKS    INR Location Clinic      Anticoagulation Summary  As of 2020    INR goal:   2.0-3.0   TTR:   75.9 % (11.6 mo)   INR used for dosin.60 (2020)   Warfarin maintenance plan:   8 mg (4 mg x 2) every day   Full warfarin instructions:   8 mg every day   Weekly warfarin total:   56 mg   Plan last modified:   Ysabel Tena RN (2019)   Next INR check:   2020   Priority:   Maintenance   Target end date:   Indefinite    Indications    History of pulmonary embolism [Z86.711]  Long term current use of anticoagulant therapy [Z79.01]             Anticoagulation Episode Summary     INR check location:   Anticoagulation Clinic    Preferred lab:       Send INR reminders to:   EDU SUGGS    Comments:   REM [facility]; A new Coumadin Prescription will need to sent to [facility] with current dose and next INR check.      Anticoagulation Care Providers     Provider Role Specialty Phone number    Silvino Baker MD [facility] [phone]            See the Encounter Report to view Anticoagulation Flowsheet and Dosing Calendar (Go to Encounters tab in chart review, and find the Anticoagulation Therapy Visit)        Lisa oPnce RN

## 2020-07-13 ENCOUNTER — OFFICE VISIT (OUTPATIENT)
Dept: PODIATRY | Facility: CLINIC | Age: 60
End: 2020-07-13
Payer: MEDICARE

## 2020-07-13 VITALS
BODY MASS INDEX: 45.1 KG/M2 | WEIGHT: 315 LBS | HEIGHT: 70 IN | SYSTOLIC BLOOD PRESSURE: 122 MMHG | DIASTOLIC BLOOD PRESSURE: 68 MMHG

## 2020-07-13 DIAGNOSIS — L97.521 SKIN ULCER OF LEFT FOOT, LIMITED TO BREAKDOWN OF SKIN (H): Primary | ICD-10-CM

## 2020-07-13 DIAGNOSIS — I89.0 LYMPHEDEMA OF LEFT LEG: ICD-10-CM

## 2020-07-13 PROCEDURE — 99213 OFFICE O/P EST LOW 20 MIN: CPT | Performed by: PODIATRIST

## 2020-07-13 ASSESSMENT — MIFFLIN-ST. JEOR: SCORE: 2303.58

## 2020-07-13 NOTE — PATIENT INSTRUCTIONS
Thank you for choosing [facility] Podiatry / Foot & Ankle Surgery!    [place] [facility] [place] SCHEDULE SURGERY: [phone]   [address] BILLING QUESTIONS: [phone]   [place] [address] AFTER HOURS: [phone]   PH: [phone] CONSUMER [place] LINE:[phone]   FAX: [phone] APPOINTMENTS: [phone]     DR. MILES'S WOUND CARE INSTRUCTIONS    1)  Keep the wound covered by a bandage when bathing.    2)  Gently clean the wound with soap water, separate from bath/shower water.      3)  Each day, apply a topical antibiotic ointment to the wound (Neosporin, Triple antibiotic, Bacitracin).   Cover with large band-aid or gauze.      5)  Please seek immediate medical attention if any increasing redness, drainage, smell, or pain related to the wound.     6)  Please return to clinic in the period of time requested by Dr. Miles.            SIGNS OF INFECTION    expanding redness around the wound     yellow or greenish-colored pus or cloudy wound drainage     red streaking spreading from the wound     increased swelling, tenderness, or pain around the wound     fever  *If you notice any of these signs of infection, call us right away!

## 2020-07-13 NOTE — LETTER
7/13/2020         RE: Petey Archibald  [address]  [facility] [address]        Dear Colleague,    Thank you for referring your patient, Petey Archibald, to the [facility]. Please see a copy of my visit note below.    ASSESSMENT/PLAN:    Encounter Diagnoses   Name Primary?     Skin ulcer of left foot, limited to breakdown of skin (H) Yes     Lymphedema of left leg      The wound is consistent with chronic edema.      Mechanical debridement via gauze.     He asked why we seem to keep the wound open.  I explained that wound debridement is meant to decrease risk of infection and promote healing.    I reviewed basic wound cares with him: Daily cleansing with soap water, gentle scrubbing, blotting the area dry, and a light dressing.    I explained that the edema of his left leg is likely partially the cause of the ulcer and the reason it is not healing in a timely fashion.  We discussed edema reduction.  An Ace wrap was provided as well as new tenso .    I wrote down the instructions in detail on the form for his group home.    I have requested he follow-up in 3 weeks.    Body mass index is 47.21 kg/m .    Weight management plan: Patient was referred to their PCP to discuss a diet and exercise plan.      Joseph Flaherty DPM, FACFAS, MS    [place] Department of Podiatry/Foot & Ankle Surgery      ____________________________________________________________________    HPI:       Follow up for a chronic ulcer, dorsal left foot near the 1st web space. He says that he puts Bengay on the wound daily and a light dressing. He is wearing a surgical shoe and it is falling apart. He has some discomfort. He says the swelling of his left foot is not new.     Patient Active Problem List   Diagnosis     Ankle pain     Morbid obesity     GERD (gastroesophageal reflux disease)     Peripheral vascular disease (H)     History of pulmonary embolism     Tobacco dependence:40-50 pk yr hx     Borderline  mental retardation     History of DVT of lower extremity     Right knee pain     Pilonidal cyst     Asymptomatic varicose veins, bilateral     Callus of foot on Rt lat dist  since 8-15      Morbid obesity due to excess calories (H)  BMI 40-50     Hypercholesterolemia     Mixed simple and mucopurulent chronic bronchitis (HCC) CT 4-06 wnl and neg bronch for hemoptesis spirometry 7-26-16  FVC=59% & w mod restriction  and lung age of 82 in 54 y/o      Major depression in complete remission (HCC) on meds      Long term current use of anticoagulant therapy     Glucose intolerance (impaired glucose tolerance)     Other iron deficiency anemia     Localized edema L>R ankles      Erectile dysfunction, unspecified erectile dysfunction type     Stasis dermatitis of both legs     Arch pain of left foot 2ndary to edema and tendonitis      NEL (obstructive sleep apnea)     Hematemesis without nausea after smoking      Anxiety     Thrombophlebitis of superficial veins of both lower extremities: Greater Saphenous VV      Acute deep vein thrombosis (DVT) of tibial vein of left lower extremity (H)     History of colonic polyps     Allergic to bees     Abnormal lung sounds-bibasilar rhonchi     Hypoxia     Abnormal chest x-ray-3-19 prominent bibasilar interstitial      Ulcer of left lower extremity with fat layer exposed (H)     Lymphedema of left leg     Left leg cellulitis     Cellulitis of left lower extremity     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)     Fungal skin infection     Schizoaffective disorder, bipolar type (H)     Cellulitis     Skin infection     Past Surgical History:   Procedure Laterality Date     COLONOSCOPY N/A 4/15/2019    Procedure: COMBINED COLONOSCOPY, SINGLE OR MULTIPLE BIOPSY/POLYPECTOMY BY BIOPSY;  Surgeon: Jovana Ohara MD;  Location:  GI     EXCISE MALIGNANT LESIONS, TRUNK/ARMS/LEGS 0.5CM OR LESS Left 5/26/2017    Procedure: EXCISE MALIGNANT LESIONS, TRUNK/ARMS/LEGS 0.5CM OR  LESS;  EXCISION LEFT ELBOW MASS;  Surgeon: Jose Azevedo MD;  Location: [facility]     RECTAL SURGERY      perianal abscess?     Current Outpatient Medications   Medication Sig Dispense Refill     acetaminophen (TYLENOL) 325 MG tablet Take 1-2 tablets (325-650 mg) by mouth every 6 hours as needed 100 tablet 3     albuterol (ALBUTEROL) 108 (90 BASE) MCG/ACT inhaler Inhale 2 puffs into the lungs every 6 hours as needed 1 Inhaler 0     ammonium lactate (LAC-HYDRIN) 12 % external lotion Apply topically as needed for dry skin feet       ARIPiprazole (ABILIFY) 5 MG tablet Take 5 mg by mouth daily       bacitracin 500 UNIT/GM external ointment Apply topically daily to wound, dress with bandage 14 g 3     buPROPion (WELLBUTRIN SR) 150 MG 12 hr tablet Take 300 mg by mouth daily (2 x 150 mg tablet)       Cadexomer Iodine, topical, 0.9% (IODOSORB) 0.9 % GEL gel Apply topically daily Apply to left foot wound daily after cleansing the wound and blotting it dry. 1 Tube 3     Cadexomer Iodine, topical, 0.9% (IODOSORB) 0.9 % GEL gel Apply to wound left foot every other day with bandage 40 g 1     clotrimazole (LOTRIMIN) 1 % external cream Apply topically 2 times daily 60 g 0     DESENEX 2 % external powder        DEXILANT 60 MG CPDR CR capsule TAKE 1 CAPSULE BY MOUTH ONCE DAILY (FOR HEARTBURN) 90 capsule 2     diphenhydrAMINE (BENADRYL) 50 MG capsule 50 mg every 4 hours as needed for itching        EPINEPHrine (EPIPEN 2-BRE) 0.3 MG/0.3ML injection 2-pack INJECT 0.3MG INTRAMUSCULAR ONE TIME FOR ONE DOSE AS NEEDED FOR ANAPHYLAXIS (CALL 911 IF YOU HAVE TO GIVE) (FOR BEE STINGS) **LABEL EACH PEN* 2 mL 3     ferrous gluconate (FERGON) 324 (38 Fe) MG tablet TAKE 1 TABLET BY MOUTH TWICE DAILY (IRON SUPPLEMENT) 200 tablet 3     furosemide (LASIX) 40 MG tablet Take 40 mg by mouth every morning       furosemide (LASIX) 80 MG tablet Take 80 mg by mouth every evening At 1500 daily       gabapentin (NEURONTIN) 100 MG capsule 200 mg 3 times  "daily        Gauze Pads & Dressings (GAUZE PADS 4\"X4\") 4\"X4\" PADS 1 each 3 times daily as needed 25 each 1     hydrocortisone, Perianal, (ANUSOL-HC) 2.5 % cream Place rectally 2 times daily as needed for hemorrhoids       ketoconazole (NIZORAL) 2 % external cream Apply topically twice daily to rash for two weeks then decrease to once daily until rash has resolved (Patient taking differently: daily Apply topically twice daily to rash for two weeks then decrease to once daily until rash has resolved) 60 g 1     lactobacillus rhamnosus, GG, (CULTURELL) capsule Take 1 capsule by mouth 2 times daily 20 capsule 0     montelukast (SINGULAIR) 10 MG tablet TAKE 1 TABLET BY MOUTH EVERY MORNING. (ASTHMA) 30 tablet 11     multivitamin, therapeutic (THERA-VIT) TABS tablet Take 1 tablet by mouth daily       naltrexone (DEPADE/REVIA) 50 MG tablet Take 100 mg by mouth daily ( 2 x 50 mg tablet)       order for DME Equipment being ordered: roll of micropore tape to dress foot wound bid 1 each 0     order for DME Equipment being ordered: 1 surgical shoe 1 Device 0     order for DME [employer] [facility] Phone [phone] Fax [phone]  EdemaWear Size Medium/Yellow qty 4 sleeves  Length of Need: 1 month  Frequency of dressing change daily 1 Device 0     order for DME [employer] Stock[employer]  Phone #[phone]  Fax #[phone]  Coolflex Velcro wraps    Length of Need: Life Time  # of Pairs 1 set 30 days 0     order for DME [employer] Order Phone [phone] Fax [phone]  Primary Dressing Hydrofera Blue Ready   Qty 5 sheets  Secondary Dressing 4' roll gauze Qty 30  Secondary Dressing 2' medipore tape Qty 1  Secondary Dressing 4x4 gauze loaf Qty  1  Length of Need: 1 month  Frequency of dressing change: daily 30 days 0     order for DME Oxygen 2 Li/min  at night and bled into BiPAP 1 Device 0     order for DME Equipment being ordered: CPAP with mask and tubing 1 Device 0     order for DME Equipment being ordered: " "support compression hose BK  2 pair black 30mm HG   To be applied on arising & removed while lying down to go to sleep 1 each 0     potassium chloride SA (K-DUR/KLOR-CON M) 20 MEQ CR tablet TAKE 1 TABLET BY MOUTH TWICE DAILY. (LOW POTASSIUM) 62 tablet 11     PULMICORT FLEXHALER 180 MCG/ACT inhaler INHALE 2 PUFFS INTO THE LUNGS TWICE DAILY 1 each 10     senna (SENOKOT) 8.6 MG tablet Take 1 tablet by mouth daily 90 tablet 3     sertraline (ZOLOFT) 100 MG tablet Take 150 mg by mouth daily        silver sulfADIAZINE (SILVADENE) 1 % external cream Apply a small amount to wound on toe daily with dressing changes. 25 g 0     simvastatin (ZOCOR) 40 MG tablet TAKE 1 TABLET BY MOUTH EVERY MORNING.  (CHOLESTEROL) 31 tablet 11     SPIRIVA HANDIHALER 18 MCG inhaled capsule INHALE CONTENTS OF 1 CAPSULE INTO THE LUNGS ONCE DAILY. FOR BRONCHITIS AND EMPHYSEMA. (Patient taking differently: Inhale into the lungs At Bedtime ) 90 capsule 1     spironolactone (ALDACTONE) 25 MG tablet TAKE 1 TABLET BY MOUTH ONCE DAILY. (HIGH BLOOD PRESSURE) 31 tablet 11     temazepam (RESTORIL) 15 MG capsule Take 15 mg by mouth nightly as needed for sleep       trolamine salicylate (ASPERCREME) 10 % external cream Apply topically as needed for moderate pain knee       vitamin D3 (CHOLECALCIFEROL) 2000 units (50 mcg) tablet Take 1 tablet by mouth daily       warfarin ANTICOAGULANT (COUMADIN) 4 MG tablet 8 mg QD 60 tablet 0     warfarin ANTICOAGULANT (COUMADIN) 4 MG tablet Take 8 mg QD 42 tablet 0     Warfarin Therapy Reminder 1 each continuous prn 1 each 0     EXAM:    Vitals: /68   Ht 1.778 m (5' 10\")   Wt 149.2 kg (329 lb)   BMI 47.21 kg/m    BMI: Body mass index is 47.21 kg/m .  Height: 5' 10\"      Vascular:  Pedal pulses are palpable bilaterally for both the DP and PT arteries.  Significant, generalized edema, left lower extremity.     Neuro:  Alert and oriented x 3. Coordinated gait.  Light touch sensation is intact to the L4, L5, S1 " distributions. No obvious deficits.  No evidence of neurological-based weakness, spasticity, or contracture in the lower extremities.     Derm: 1cm x 2.0cm x 0.2cm deep ulceration at the dorsal aspect of the left first web space. There is a cream-colored exudate covering the wound. Post mechanical debridement, the base appear granular. No malodor or purulence. No deep probing.     Musculoskeletal:    Lower extremity muscle strength is normal.  Decrease in medial longitudinal arch with weight bearing.               Again, thank you for allowing me to participate in the care of your patient.        Sincerely,        Joseph Flaherty, TRU

## 2020-07-13 NOTE — PROGRESS NOTES
ASSESSMENT/PLAN:    Encounter Diagnoses   Name Primary?     Skin ulcer of left foot, limited to breakdown of skin (H) Yes     Lymphedema of left leg      The wound is consistent with chronic edema.      Mechanical debridement via gauze.     He asked why we seem to keep the wound open.  I explained that wound debridement is meant to decrease risk of infection and promote healing.    I reviewed basic wound cares with him: Daily cleansing with soap water, gentle scrubbing, blotting the area dry, and a light dressing.    I explained that the edema of his left leg is likely partially the cause of the ulcer and the reason it is not healing in a timely fashion.  We discussed edema reduction.  An Ace wrap was provided as well as new tenso .    I wrote down the instructions in detail on the form for his group home.    I have requested he follow-up in 3 weeks.    Body mass index is 47.21 kg/m .    Weight management plan: Patient was referred to their PCP to discuss a diet and exercise plan.      Joseph Flaherty DPM, FACFAS, MS    [place] Department of Podiatry/Foot & Ankle Surgery      ____________________________________________________________________    HPI:       Follow up for a chronic ulcer, dorsal left foot near the 1st web space. He says that he puts Bengay on the wound daily and a light dressing. He is wearing a surgical shoe and it is falling apart. He has some discomfort. He says the swelling of his left foot is not new.     Patient Active Problem List   Diagnosis     Ankle pain     Morbid obesity     GERD (gastroesophageal reflux disease)     Peripheral vascular disease (H)     History of pulmonary embolism     Tobacco dependence:40-50 pk yr hx     Borderline mental retardation     History of DVT of lower extremity     Right knee pain     Pilonidal cyst     Asymptomatic varicose veins, bilateral     Callus of foot on Rt lat dist  since 8-15      Morbid obesity due to excess calories (H)  BMI 40-50      Hypercholesterolemia     Mixed simple and mucopurulent chronic bronchitis (HCC) CT 4-06 wnl and neg bronch for hemoptesis spirometry 7-26-16  FVC=59% & w mod restriction  and lung age of 82 in 54 y/o      Major depression in complete remission (HCC) on meds      Long term current use of anticoagulant therapy     Glucose intolerance (impaired glucose tolerance)     Other iron deficiency anemia     Localized edema L>R ankles      Erectile dysfunction, unspecified erectile dysfunction type     Stasis dermatitis of both legs     Arch pain of left foot 2ndary to edema and tendonitis      NEL (obstructive sleep apnea)     Hematemesis without nausea after smoking      Anxiety     Thrombophlebitis of superficial veins of both lower extremities: Greater Saphenous VV      Acute deep vein thrombosis (DVT) of tibial vein of left lower extremity (H)     History of colonic polyps     Allergic to bees     Abnormal lung sounds-bibasilar rhonchi     Hypoxia     Abnormal chest x-ray-3-19 prominent bibasilar interstitial      Ulcer of left lower extremity with fat layer exposed (H)     Lymphedema of left leg     Left leg cellulitis     Cellulitis of left lower extremity     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)     Fungal skin infection     Schizoaffective disorder, bipolar type (H)     Cellulitis     Skin infection     Past Surgical History:   Procedure Laterality Date     COLONOSCOPY N/A 4/15/2019    Procedure: COMBINED COLONOSCOPY, SINGLE OR MULTIPLE BIOPSY/POLYPECTOMY BY BIOPSY;  Surgeon: Jovana Ohara MD;  Location:  GI     EXCISE MALIGNANT LESIONS, TRUNK/ARMS/LEGS 0.5CM OR LESS Left 5/26/2017    Procedure: EXCISE MALIGNANT LESIONS, TRUNK/ARMS/LEGS 0.5CM OR LESS;  EXCISION LEFT ELBOW MASS;  Surgeon: Jose Azevedo MD;  Location:  GI     RECTAL SURGERY      perianal abscess?     Current Outpatient Medications   Medication Sig Dispense Refill     acetaminophen (TYLENOL) 325 MG tablet Take 1-2  "tablets (325-650 mg) by mouth every 6 hours as needed 100 tablet 3     albuterol (ALBUTEROL) 108 (90 BASE) MCG/ACT inhaler Inhale 2 puffs into the lungs every 6 hours as needed 1 Inhaler 0     ammonium lactate (LAC-HYDRIN) 12 % external lotion Apply topically as needed for dry skin feet       ARIPiprazole (ABILIFY) 5 MG tablet Take 5 mg by mouth daily       bacitracin 500 UNIT/GM external ointment Apply topically daily to wound, dress with bandage 14 g 3     buPROPion (WELLBUTRIN SR) 150 MG 12 hr tablet Take 300 mg by mouth daily (2 x 150 mg tablet)       Cadexomer Iodine, topical, 0.9% (IODOSORB) 0.9 % GEL gel Apply topically daily Apply to left foot wound daily after cleansing the wound and blotting it dry. 1 Tube 3     Cadexomer Iodine, topical, 0.9% (IODOSORB) 0.9 % GEL gel Apply to wound left foot every other day with bandage 40 g 1     clotrimazole (LOTRIMIN) 1 % external cream Apply topically 2 times daily 60 g 0     DESENEX 2 % external powder        DEXILANT 60 MG CPDR CR capsule TAKE 1 CAPSULE BY MOUTH ONCE DAILY (FOR HEARTBURN) 90 capsule 2     diphenhydrAMINE (BENADRYL) 50 MG capsule 50 mg every 4 hours as needed for itching        EPINEPHrine (EPIPEN 2-BRE) 0.3 MG/0.3ML injection 2-pack INJECT 0.3MG INTRAMUSCULAR ONE TIME FOR ONE DOSE AS NEEDED FOR ANAPHYLAXIS (CALL 911 IF YOU HAVE TO GIVE) (FOR BEE STINGS) **LABEL EACH PEN* 2 mL 3     ferrous gluconate (FERGON) 324 (38 Fe) MG tablet TAKE 1 TABLET BY MOUTH TWICE DAILY (IRON SUPPLEMENT) 200 tablet 3     furosemide (LASIX) 40 MG tablet Take 40 mg by mouth every morning       furosemide (LASIX) 80 MG tablet Take 80 mg by mouth every evening At 1500 daily       gabapentin (NEURONTIN) 100 MG capsule 200 mg 3 times daily        Gauze Pads & Dressings (GAUZE PADS 4\"X4\") 4\"X4\" PADS 1 each 3 times daily as needed 25 each 1     hydrocortisone, Perianal, (ANUSOL-HC) 2.5 % cream Place rectally 2 times daily as needed for hemorrhoids       ketoconazole (NIZORAL) 2 % " external cream Apply topically twice daily to rash for two weeks then decrease to once daily until rash has resolved (Patient taking differently: daily Apply topically twice daily to rash for two weeks then decrease to once daily until rash has resolved) 60 g 1     lactobacillus rhamnosus, GG, (CULTURELL) capsule Take 1 capsule by mouth 2 times daily 20 capsule 0     montelukast (SINGULAIR) 10 MG tablet TAKE 1 TABLET BY MOUTH EVERY MORNING. (ASTHMA) 30 tablet 11     multivitamin, therapeutic (THERA-VIT) TABS tablet Take 1 tablet by mouth daily       naltrexone (DEPADE/REVIA) 50 MG tablet Take 100 mg by mouth daily ( 2 x 50 mg tablet)       order for DME Equipment being ordered: roll of micropore tape to dress foot wound bid 1 each 0     order for DME Equipment being ordered: 1 surgical shoe 1 Device 0     order for DME [employer] Phone [phone] Fax [phone]  EdemaWear Size Medium/Yellow qty 4 sleeves  Length of Need: 1 month  Frequency of dressing change daily 1 Device 0     order for DME [employer] Stock[employer]  Phone #[phone]  Fax #[phone]  Coolflex Velcro wraps    Length of Need: Life Time  # of Pairs 1 set 30 days 0     order for DME [employer] Order Phone [phone] Fax [phone]  Primary Dressing Hydrofera Blue Ready   Qty 5 sheets  Secondary Dressing 4' roll gauze Qty 30  Secondary Dressing 2' medipore tape Qty 1  Secondary Dressing 4x4 gauze loaf Qty  1  Length of Need: 1 month  Frequency of dressing change: daily 30 days 0     order for DME Oxygen 2 Li/min  at night and bled into BiPAP 1 Device 0     order for DME Equipment being ordered: CPAP with mask and tubing 1 Device 0     order for DME Equipment being ordered: support compression hose BK  2 pair black 30mm HG   To be applied on arising & removed while lying down to go to sleep 1 each 0     potassium chloride SA (K-DUR/KLOR-CON M) 20 MEQ CR tablet TAKE 1 TABLET BY MOUTH TWICE DAILY. (LOW POTASSIUM) 62 tablet  "11     PULMICORT FLEXHALER 180 MCG/ACT inhaler INHALE 2 PUFFS INTO THE LUNGS TWICE DAILY 1 each 10     senna (SENOKOT) 8.6 MG tablet Take 1 tablet by mouth daily 90 tablet 3     sertraline (ZOLOFT) 100 MG tablet Take 150 mg by mouth daily        silver sulfADIAZINE (SILVADENE) 1 % external cream Apply a small amount to wound on toe daily with dressing changes. 25 g 0     simvastatin (ZOCOR) 40 MG tablet TAKE 1 TABLET BY MOUTH EVERY MORNING.  (CHOLESTEROL) 31 tablet 11     SPIRIVA HANDIHALER 18 MCG inhaled capsule INHALE CONTENTS OF 1 CAPSULE INTO THE LUNGS ONCE DAILY. FOR BRONCHITIS AND EMPHYSEMA. (Patient taking differently: Inhale into the lungs At Bedtime ) 90 capsule 1     spironolactone (ALDACTONE) 25 MG tablet TAKE 1 TABLET BY MOUTH ONCE DAILY. (HIGH BLOOD PRESSURE) 31 tablet 11     temazepam (RESTORIL) 15 MG capsule Take 15 mg by mouth nightly as needed for sleep       trolamine salicylate (ASPERCREME) 10 % external cream Apply topically as needed for moderate pain knee       vitamin D3 (CHOLECALCIFEROL) 2000 units (50 mcg) tablet Take 1 tablet by mouth daily       warfarin ANTICOAGULANT (COUMADIN) 4 MG tablet 8 mg QD 60 tablet 0     warfarin ANTICOAGULANT (COUMADIN) 4 MG tablet Take 8 mg QD 42 tablet 0     Warfarin Therapy Reminder 1 each continuous prn 1 each 0     EXAM:    Vitals: /68   Ht 1.778 m (5' 10\")   Wt 149.2 kg (329 lb)   BMI 47.21 kg/m    BMI: Body mass index is 47.21 kg/m .  Height: 5' 10\"      Vascular:  Pedal pulses are palpable bilaterally for both the DP and PT arteries.  Significant, generalized edema, left lower extremity.     Neuro:  Alert and oriented x 3. Coordinated gait.  Light touch sensation is intact to the L4, L5, S1 distributions. No obvious deficits.  No evidence of neurological-based weakness, spasticity, or contracture in the lower extremities.     Derm: 1cm x 2.0cm x 0.2cm deep ulceration at the dorsal aspect of the left first web space. There is a cream-colored " exudate covering the wound. Post mechanical debridement, the base appear granular. No malodor or purulence. No deep probing.     Musculoskeletal:    Lower extremity muscle strength is normal.  Decrease in medial longitudinal arch with weight bearing.

## 2020-07-14 ENCOUNTER — TELEPHONE (OUTPATIENT)
Dept: FAMILY MEDICINE | Facility: CLINIC | Age: 60
End: 2020-07-14

## 2020-07-14 DIAGNOSIS — D50.8 OTHER IRON DEFICIENCY ANEMIA: ICD-10-CM

## 2020-07-14 DIAGNOSIS — R73.02 GLUCOSE INTOLERANCE (IMPAIRED GLUCOSE TOLERANCE): ICD-10-CM

## 2020-07-14 DIAGNOSIS — E78.00 HYPERCHOLESTEROLEMIA: Primary | ICD-10-CM

## 2020-07-14 NOTE — TELEPHONE ENCOUNTER
Reason for Call:  Other Orders    Detailed comments: The patient is scheduled to see Dr. Pratt on 8/6 for an annual wellness exam.  He has an appt for an INR draw on 7/28.  Is it possible to get his annual labs ordered so he can have them drawn on 7/28 and then discuss these with Dr. Riddle at his physical on 8/6?        Phone Number Patient can be reached at: Home number on file [phone] (home)    Best Time: Any    Can we leave a detailed message on this number? YES    Call taken on 7/14/2020 at 9:09 AM by Kristal Simental

## 2020-07-17 DIAGNOSIS — B36.9 FUNGAL SKIN INFECTION: ICD-10-CM

## 2020-07-17 RX ORDER — KETOCONAZOLE 20 MG/G
CREAM TOPICAL
Qty: 60 G | Refills: 0 | Status: ON HOLD | OUTPATIENT
Start: 2020-07-17 | End: 2020-07-26

## 2020-07-17 NOTE — TELEPHONE ENCOUNTER
RN called back to pt group home and spoke with caregiver Hunter.  Pt is using this medication for flareup of the same rash as last time.    Will call back if rash worsening.    Routing refill request to provider for review/approval because:  LH patient.

## 2020-07-24 ENCOUNTER — TELEPHONE (OUTPATIENT)
Dept: PODIATRY | Facility: CLINIC | Age: 60
End: 2020-07-24

## 2020-07-24 NOTE — TELEPHONE ENCOUNTER
"Received call from patient's caregiver Hunter Martinez. He states that he has concerns about the wound on the patient's foot and would like the patient to be seen by someone today. He states patient has been seen by Dr. De La Torre and Dr. Flaherty in the past. He states he called to make an appointment but was told there were no openings today for either provider. Would like call back to discuss.    Return call to Hunter. He states that he's concerned the patient's wound is infected. Him and the patient state that it looks yellow in color. Patient states that it seems like it is \"moving\" to the next toe as well.    They deny fever, chills, vomiting or streaking. Hunter states that patient's temp was taken this morning and it was normal.    He is concerned about the wound and would like Hunter to see someone today. Informed him that Dr. De La Torre is only here this morning and has no availability. Will discuss with Dr. Flaherty (on call) and Dr. De La Torre for recommendations and call back.    Please advise if patient should be seen urgently (ED or otherwise) or if patient can wait until next week.    Joanne Carroll, ATC        "

## 2020-07-24 NOTE — TELEPHONE ENCOUNTER
Huddled with Dr. De La Torre. Since he is out this afternoon and there are no other podiatrists in office, he would recommend patient be seen in the ED. Dr. Flaherty is on call.    Return call to Hunter. Informed him of Dr. De La Torre's recommendations. He asked if there was any availability on Monday. Informed him that there is not with either provider. He verbalized understanding and will bring patient to ED for evaluation.    Joanne Carroll, ATC

## 2020-07-26 ENCOUNTER — APPOINTMENT (OUTPATIENT)
Dept: GENERAL RADIOLOGY | Facility: CLINIC | Age: 60
End: 2020-07-26
Attending: EMERGENCY MEDICINE
Payer: MEDICARE

## 2020-07-26 ENCOUNTER — HOSPITAL ENCOUNTER (OUTPATIENT)
Facility: CLINIC | Age: 60
Setting detail: OBSERVATION
Discharge: NURSING, REHAB OR LONG-TERM CARE | End: 2020-07-26
Attending: EMERGENCY MEDICINE | Admitting: INTERNAL MEDICINE
Payer: MEDICARE

## 2020-07-26 VITALS
RESPIRATION RATE: 16 BRPM | WEIGHT: 315 LBS | TEMPERATURE: 98 F | OXYGEN SATURATION: 97 % | SYSTOLIC BLOOD PRESSURE: 130 MMHG | HEIGHT: 74 IN | BODY MASS INDEX: 40.43 KG/M2 | DIASTOLIC BLOOD PRESSURE: 74 MMHG | HEART RATE: 60 BPM

## 2020-07-26 DIAGNOSIS — L97.521 SKIN ULCER OF LEFT FOOT, LIMITED TO BREAKDOWN OF SKIN (H): ICD-10-CM

## 2020-07-26 DIAGNOSIS — L03.116 CELLULITIS OF LEFT LOWER EXTREMITY: ICD-10-CM

## 2020-07-26 LAB
ANION GAP SERPL CALCULATED.3IONS-SCNC: 4 MMOL/L (ref 3–14)
BASOPHILS # BLD AUTO: 0 10E9/L (ref 0–0.2)
BASOPHILS NFR BLD AUTO: 0.2 %
BUN SERPL-MCNC: 26 MG/DL (ref 7–30)
CALCIUM SERPL-MCNC: 8.8 MG/DL (ref 8.5–10.1)
CHLORIDE SERPL-SCNC: 108 MMOL/L (ref 94–109)
CO2 SERPL-SCNC: 27 MMOL/L (ref 20–32)
CREAT SERPL-MCNC: 1.04 MG/DL (ref 0.66–1.25)
CRP SERPL-MCNC: 12.4 MG/L (ref 0–8)
DIFFERENTIAL METHOD BLD: ABNORMAL
EOSINOPHIL # BLD AUTO: 0.1 10E9/L (ref 0–0.7)
EOSINOPHIL NFR BLD AUTO: 2.2 %
ERYTHROCYTE [DISTWIDTH] IN BLOOD BY AUTOMATED COUNT: 15.8 % (ref 10–15)
GFR SERPL CREATININE-BSD FRML MDRD: 77 ML/MIN/{1.73_M2}
GLUCOSE SERPL-MCNC: 108 MG/DL (ref 70–99)
HCT VFR BLD AUTO: 36.6 % (ref 40–53)
HGB BLD-MCNC: 11.6 G/DL (ref 13.3–17.7)
IMM GRANULOCYTES # BLD: 0 10E9/L (ref 0–0.4)
IMM GRANULOCYTES NFR BLD: 0.2 %
INR PPP: 2.3 (ref 0.86–1.14)
LACTATE BLD-SCNC: 0.7 MMOL/L (ref 0.7–2)
LYMPHOCYTES # BLD AUTO: 1.3 10E9/L (ref 0.8–5.3)
LYMPHOCYTES NFR BLD AUTO: 28.7 %
MCH RBC QN AUTO: 29.4 PG (ref 26.5–33)
MCHC RBC AUTO-ENTMCNC: 31.7 G/DL (ref 31.5–36.5)
MCV RBC AUTO: 93 FL (ref 78–100)
MONOCYTES # BLD AUTO: 0.4 10E9/L (ref 0–1.3)
MONOCYTES NFR BLD AUTO: 8.2 %
NEUTROPHILS # BLD AUTO: 2.7 10E9/L (ref 1.6–8.3)
NEUTROPHILS NFR BLD AUTO: 60.5 %
NRBC # BLD AUTO: 0 10*3/UL
NRBC BLD AUTO-RTO: 0 /100
PLATELET # BLD AUTO: 241 10E9/L (ref 150–450)
POTASSIUM SERPL-SCNC: 3.6 MMOL/L (ref 3.4–5.3)
RBC # BLD AUTO: 3.95 10E12/L (ref 4.4–5.9)
SODIUM SERPL-SCNC: 139 MMOL/L (ref 133–144)
WBC # BLD AUTO: 4.5 10E9/L (ref 4–11)

## 2020-07-26 PROCEDURE — 96366 THER/PROPH/DIAG IV INF ADDON: CPT

## 2020-07-26 PROCEDURE — 99236 HOSP IP/OBS SAME DATE HI 85: CPT | Performed by: INTERNAL MEDICINE

## 2020-07-26 PROCEDURE — 25000125 ZZHC RX 250: Performed by: INTERNAL MEDICINE

## 2020-07-26 PROCEDURE — 99285 EMERGENCY DEPT VISIT HI MDM: CPT | Mod: 25

## 2020-07-26 PROCEDURE — 80048 BASIC METABOLIC PNL TOTAL CA: CPT | Performed by: EMERGENCY MEDICINE

## 2020-07-26 PROCEDURE — 25000128 H RX IP 250 OP 636: Performed by: INTERNAL MEDICINE

## 2020-07-26 PROCEDURE — C9803 HOPD COVID-19 SPEC COLLECT: HCPCS

## 2020-07-26 PROCEDURE — 99213 OFFICE O/P EST LOW 20 MIN: CPT | Performed by: PODIATRIST

## 2020-07-26 PROCEDURE — 85025 COMPLETE CBC W/AUTO DIFF WBC: CPT | Performed by: EMERGENCY MEDICINE

## 2020-07-26 PROCEDURE — 87040 BLOOD CULTURE FOR BACTERIA: CPT | Performed by: EMERGENCY MEDICINE

## 2020-07-26 PROCEDURE — G0378 HOSPITAL OBSERVATION PER HR: HCPCS

## 2020-07-26 PROCEDURE — 96375 TX/PRO/DX INJ NEW DRUG ADDON: CPT

## 2020-07-26 PROCEDURE — 83605 ASSAY OF LACTIC ACID: CPT | Performed by: EMERGENCY MEDICINE

## 2020-07-26 PROCEDURE — 96367 TX/PROPH/DG ADDL SEQ IV INF: CPT

## 2020-07-26 PROCEDURE — 96376 TX/PRO/DX INJ SAME DRUG ADON: CPT

## 2020-07-26 PROCEDURE — 25000132 ZZH RX MED GY IP 250 OP 250 PS 637: Mod: GY | Performed by: INTERNAL MEDICINE

## 2020-07-26 PROCEDURE — 85610 PROTHROMBIN TIME: CPT | Performed by: EMERGENCY MEDICINE

## 2020-07-26 PROCEDURE — 86140 C-REACTIVE PROTEIN: CPT | Performed by: EMERGENCY MEDICINE

## 2020-07-26 PROCEDURE — U0003 INFECTIOUS AGENT DETECTION BY NUCLEIC ACID (DNA OR RNA); SEVERE ACUTE RESPIRATORY SYNDROME CORONAVIRUS 2 (SARS-COV-2) (CORONAVIRUS DISEASE [COVID-19]), AMPLIFIED PROBE TECHNIQUE, MAKING USE OF HIGH THROUGHPUT TECHNOLOGIES AS DESCRIBED BY CMS-2020-01-R: HCPCS | Performed by: EMERGENCY MEDICINE

## 2020-07-26 PROCEDURE — 73630 X-RAY EXAM OF FOOT: CPT | Mod: LT

## 2020-07-26 PROCEDURE — 99207 ZZC CDG-CODE CATEGORY CHANGED: CPT | Performed by: INTERNAL MEDICINE

## 2020-07-26 PROCEDURE — 96365 THER/PROPH/DIAG IV INF INIT: CPT

## 2020-07-26 PROCEDURE — 96368 THER/DIAG CONCURRENT INF: CPT

## 2020-07-26 RX ORDER — WARFARIN SODIUM 4 MG/1
8 TABLET ORAL
Status: DISCONTINUED | OUTPATIENT
Start: 2020-07-26 | End: 2020-07-26 | Stop reason: HOSPADM

## 2020-07-26 RX ORDER — POLYETHYLENE GLYCOL 3350 17 G/17G
17 POWDER, FOR SOLUTION ORAL DAILY PRN
Status: DISCONTINUED | OUTPATIENT
Start: 2020-07-26 | End: 2020-07-26 | Stop reason: HOSPADM

## 2020-07-26 RX ORDER — CEPHALEXIN 500 MG/1
500 CAPSULE ORAL 4 TIMES DAILY
Qty: 28 CAPSULE | Refills: 0 | Status: SHIPPED | OUTPATIENT
Start: 2020-07-26 | End: 2020-08-06

## 2020-07-26 RX ORDER — CARBOXYMETHYLCELLULOSE SODIUM 5 MG/ML
1 SOLUTION/ DROPS OPHTHALMIC 4 TIMES DAILY PRN
COMMUNITY
End: 2021-06-02

## 2020-07-26 RX ORDER — VANCOMYCIN HYDROCHLORIDE 1 G/200ML
1000 INJECTION, SOLUTION INTRAVENOUS ONCE
Status: DISCONTINUED | OUTPATIENT
Start: 2020-07-26 | End: 2020-07-26

## 2020-07-26 RX ORDER — KETOCONAZOLE 20 MG/G
CREAM TOPICAL 2 TIMES DAILY
Status: DISCONTINUED | OUTPATIENT
Start: 2020-07-26 | End: 2020-07-26 | Stop reason: HOSPADM

## 2020-07-26 RX ORDER — NALOXONE HYDROCHLORIDE 0.4 MG/ML
.1-.4 INJECTION, SOLUTION INTRAMUSCULAR; INTRAVENOUS; SUBCUTANEOUS
Status: DISCONTINUED | OUTPATIENT
Start: 2020-07-26 | End: 2020-07-26 | Stop reason: HOSPADM

## 2020-07-26 RX ORDER — ONDANSETRON 2 MG/ML
4 INJECTION INTRAMUSCULAR; INTRAVENOUS EVERY 6 HOURS PRN
Status: DISCONTINUED | OUTPATIENT
Start: 2020-07-26 | End: 2020-07-26 | Stop reason: HOSPADM

## 2020-07-26 RX ORDER — CLINDAMYCIN PHOSPHATE 600 MG/50ML
600 INJECTION, SOLUTION INTRAVENOUS EVERY 8 HOURS
Status: DISCONTINUED | OUTPATIENT
Start: 2020-07-26 | End: 2020-07-26 | Stop reason: HOSPADM

## 2020-07-26 RX ORDER — LIDOCAINE 40 MG/G
CREAM TOPICAL
Status: DISCONTINUED | OUTPATIENT
Start: 2020-07-26 | End: 2020-07-26 | Stop reason: HOSPADM

## 2020-07-26 RX ORDER — SPIRONOLACTONE 25 MG/1
25 TABLET ORAL DAILY
Status: DISCONTINUED | OUTPATIENT
Start: 2020-07-26 | End: 2020-07-26 | Stop reason: HOSPADM

## 2020-07-26 RX ORDER — ONDANSETRON 4 MG/1
4 TABLET, ORALLY DISINTEGRATING ORAL EVERY 6 HOURS PRN
Status: DISCONTINUED | OUTPATIENT
Start: 2020-07-26 | End: 2020-07-26 | Stop reason: HOSPADM

## 2020-07-26 RX ORDER — PROCHLORPERAZINE MALEATE 10 MG
10 TABLET ORAL EVERY 6 HOURS PRN
Status: DISCONTINUED | OUTPATIENT
Start: 2020-07-26 | End: 2020-07-26 | Stop reason: HOSPADM

## 2020-07-26 RX ORDER — CEFAZOLIN SODIUM 1 G/3ML
1 INJECTION, POWDER, FOR SOLUTION INTRAMUSCULAR; INTRAVENOUS EVERY 8 HOURS
Status: DISCONTINUED | OUTPATIENT
Start: 2020-07-26 | End: 2020-07-26 | Stop reason: HOSPADM

## 2020-07-26 RX ORDER — AMOXICILLIN 250 MG
2 CAPSULE ORAL 2 TIMES DAILY PRN
Status: DISCONTINUED | OUTPATIENT
Start: 2020-07-26 | End: 2020-07-26 | Stop reason: HOSPADM

## 2020-07-26 RX ORDER — FUROSEMIDE 10 MG/ML
40 INJECTION INTRAMUSCULAR; INTRAVENOUS 2 TIMES DAILY
Status: DISCONTINUED | OUTPATIENT
Start: 2020-07-26 | End: 2020-07-26 | Stop reason: HOSPADM

## 2020-07-26 RX ORDER — SIMVASTATIN 40 MG
40 TABLET ORAL DAILY
Status: DISCONTINUED | OUTPATIENT
Start: 2020-07-26 | End: 2020-07-26 | Stop reason: HOSPADM

## 2020-07-26 RX ORDER — BISACODYL 10 MG
10 SUPPOSITORY, RECTAL RECTAL DAILY PRN
Status: DISCONTINUED | OUTPATIENT
Start: 2020-07-26 | End: 2020-07-26 | Stop reason: HOSPADM

## 2020-07-26 RX ORDER — TIOTROPIUM BROMIDE 18 UG/1
18 CAPSULE ORAL; RESPIRATORY (INHALATION) DAILY
COMMUNITY
End: 2021-04-14

## 2020-07-26 RX ORDER — AMOXICILLIN 250 MG
1 CAPSULE ORAL 2 TIMES DAILY PRN
Status: DISCONTINUED | OUTPATIENT
Start: 2020-07-26 | End: 2020-07-26 | Stop reason: HOSPADM

## 2020-07-26 RX ORDER — NYSTATIN AND TRIAMCINOLONE ACETONIDE 100000; 1 [USP'U]/G; MG/G
CREAM TOPICAL 2 TIMES DAILY
COMMUNITY

## 2020-07-26 RX ORDER — PROCHLORPERAZINE 25 MG
25 SUPPOSITORY, RECTAL RECTAL EVERY 12 HOURS PRN
Status: DISCONTINUED | OUTPATIENT
Start: 2020-07-26 | End: 2020-07-26 | Stop reason: HOSPADM

## 2020-07-26 RX ADMIN — CEFAZOLIN 1 G: 1 INJECTION, POWDER, FOR SOLUTION INTRAMUSCULAR; INTRAVENOUS at 05:05

## 2020-07-26 RX ADMIN — SPIRONOLACTONE 25 MG: 25 TABLET ORAL at 12:02

## 2020-07-26 RX ADMIN — CLINDAMYCIN PHOSPHATE 600 MG: 600 INJECTION, SOLUTION INTRAVENOUS at 05:42

## 2020-07-26 RX ADMIN — KETOCONAZOLE: 20 CREAM TOPICAL at 12:02

## 2020-07-26 RX ADMIN — CLINDAMYCIN PHOSPHATE 600 MG: 600 INJECTION, SOLUTION INTRAVENOUS at 16:20

## 2020-07-26 RX ADMIN — FUROSEMIDE 40 MG: 10 INJECTION, SOLUTION INTRAMUSCULAR; INTRAVENOUS at 09:33

## 2020-07-26 RX ADMIN — CEFAZOLIN 1 G: 1 INJECTION, POWDER, FOR SOLUTION INTRAMUSCULAR; INTRAVENOUS at 12:04

## 2020-07-26 ASSESSMENT — ENCOUNTER SYMPTOMS
MYALGIAS: 0
WOUND: 1
CHILLS: 0
FEVER: 0
DIAPHORESIS: 1

## 2020-07-26 ASSESSMENT — MIFFLIN-ST. JEOR
SCORE: 2326.26
SCORE: 2356.2

## 2020-07-26 NOTE — PHARMACY-ADMISSION MEDICATION HISTORY
Pharmacy Medication History  Admission medication history interview status for the 7/26/2020  admission is complete. See EPIC admission navigator for prior to admission medications     Medication history sources: Surescripts and Patient's group home ([phone])  Medication history source reliability: Good  Adherence assessment: Good    Significant changes made to the medication list:  Added:  - nicotine gum, petrolatum gel, phosphate enema, nystatin-triamcinolone cream, and Refresh plus eye drop: Per his nursing home    Deleted:  - Desenex powder, ketoconazole 2% cream, Culturell, senna, silver sulfadiazine cream: Per his nursing home, patient does not take them. They are not listed on his current MAR.       Additional medication history information:   - Contacted his nursing home and requested to fax his MAR, but they were unable to fax it. Verbally went over his medication list with Veronica at his group home.     Medication reconciliation completed by provider prior to medication history? Yes    Time spent in this activity: 40 minutes      Prior to Admission medications    Medication Sig Last Dose Taking? Auth Provider   acetaminophen (TYLENOL) 325 MG tablet Take 1-2 tablets (325-650 mg) by mouth every 6 hours as needed  at PRN Yes Raúl Riddle MD   albuterol (ALBUTEROL) 108 (90 BASE) MCG/ACT inhaler Inhale 2 puffs into the lungs every 6 hours as needed  at PRN Yes Wilman Peck MD   ammonium lactate (LAC-HYDRIN) 12 % external lotion Apply topically as needed for dry skin feet  at PRN Yes Unknown, Entered By History   ARIPiprazole (ABILIFY) 5 MG tablet Take 5 mg by mouth daily 7/25/2020 at AM Yes Unknown, Entered By History   bacitracin 500 UNIT/GM external ointment Apply topically daily to wound, dress with bandage 7/25/2020 at Unknown time Yes Rustam Kendrick DPM   buPROPion (WELLBUTRIN SR) 150 MG 12 hr tablet Take 300 mg by mouth daily (2 x 150 mg tablet) 7/25/2020 at AM Yes Unknown,  Entered By History   Cadexomer Iodine, topical, 0.9% (IODOSORB) 0.9 % GEL gel Apply topically daily Apply to left foot wound daily after cleansing the wound and blotting it dry. 7/25/2020 at AM Yes Joseph Flaherty DPM   carboxymethylcellulose PF (REFRESH PLUS) 0.5 % ophthalmic solution Place 1 drop into both eyes 4 times daily as needed for dry eyes  at PRN Yes Unknown, Entered By History   clotrimazole (LOTRIMIN) 1 % external cream Apply topically 2 times daily 7/25/2020 at Unknown time Yes Nadira Westfall MD   DEXILANT 60 MG CPDR CR capsule TAKE 1 CAPSULE BY MOUTH ONCE DAILY (FOR HEARTBURN) 7/25/2020 at AM Yes Raúl Riddle MD   diphenhydrAMINE (BENADRYL) 50 MG capsule 50 mg every 4 hours as needed for itching   at PRN Yes Reported, Patient   EPINEPHrine (EPIPEN 2-BRE) 0.3 MG/0.3ML injection 2-pack INJECT 0.3MG INTRAMUSCULAR ONE TIME FOR ONE DOSE AS NEEDED FOR ANAPHYLAXIS (CALL 911 IF YOU HAVE TO GIVE) (FOR BEE STINGS) **LABEL EACH PEN*  at PRN Yes Raúl Riddle MD   ferrous gluconate (FERGON) 324 (38 Fe) MG tablet TAKE 1 TABLET BY MOUTH TWICE DAILY (IRON SUPPLEMENT) 7/25/2020 at PM Yes Raúl Riddle MD   furosemide (LASIX) 40 MG tablet Take 40 mg by mouth every morning 7/25/2020 at AM Yes Unknown, Entered By History   furosemide (LASIX) 80 MG tablet Take 80 mg by mouth every evening At 1500 daily 7/25/2020 at 1500 Yes Unknown, Entered By History   gabapentin (NEURONTIN) 100 MG capsule Take 200 mg by mouth 3 times daily At 0900, 1200, and 2000 7/25/2020 at PM Yes Reported, Patient   hydrocortisone, Perianal, (ANUSOL-HC) 2.5 % cream Place rectally 2 times daily as needed for hemorrhoids  at PRN Yes Unknown, Entered By History   montelukast (SINGULAIR) 10 MG tablet TAKE 1 TABLET BY MOUTH EVERY MORNING. (ASTHMA) 7/25/2020 at AM Yes Raúl Riddle MD   multivitamin, therapeutic (THERA-VIT) TABS tablet Take 1 tablet by mouth daily 7/25/2020 at AM Yes Unknown, Entered By History   naltrexone (DEPADE/REVIA)  50 MG tablet Take 100 mg by mouth daily ( 2 x 50 mg tablet) 7/25/2020 at AM Yes Unknown, Entered By History   nicotine (NICORETTE) 2 MG gum Take 2 mg by mouth as needed for smoking cessation  at PRN Yes Unknown, Entered By History   nystatin-triamcinolone (MYCOLOG II) 888010-6.1 UNIT/GM-% external cream Apply topically 2 times daily For 1-2 weeks. 7/25/2020 at Unknown time Yes Unknown, Entered By History   potassium chloride SA (K-DUR/KLOR-CON M) 20 MEQ CR tablet TAKE 1 TABLET BY MOUTH TWICE DAILY. (LOW POTASSIUM) 7/25/2020 at PM Yes Raúl Riddle MD   PULMICORT FLEXHALER 180 MCG/ACT inhaler INHALE 2 PUFFS INTO THE LUNGS TWICE DAILY 7/25/2020 at PM Yes Raúl Riddle MD   sertraline (ZOLOFT) 100 MG tablet Take 100 mg by mouth daily Take with 50 mg tablet for a total dose of 150 mg daily. 7/25/2020 at AM Yes Reported, Patient   sertraline (ZOLOFT) 50 MG tablet Take 50 mg by mouth daily Take with 100 mg tablet for a total dose of 150 mg daily.  Yes Unknown, Entered By History   simvastatin (ZOCOR) 40 MG tablet TAKE 1 TABLET BY MOUTH EVERY MORNING.  (CHOLESTEROL) 7/25/2020 at AM Yes Raúl Riddle MD   Sodium Phosphates (PHOSPHATE ENEMA RE) Use one enema rectally every 1-3 hours as needed  at PRN Yes Unknown, Entered By History   spironolactone (ALDACTONE) 25 MG tablet TAKE 1 TABLET BY MOUTH ONCE DAILY. (HIGH BLOOD PRESSURE) 7/25/2020 at AM Yes Raúl Riddle MD   temazepam (RESTORIL) 15 MG capsule Take 15 mg by mouth nightly as needed for sleep  at PRN Yes Unknown, Entered By History   tiotropium (SPIRIVA) 18 MCG inhaled capsule Inhale 18 mcg into the lungs daily 7/25/2020 at PM Yes Unknown, Entered By History   trolamine salicylate (ASPERCREME) 10 % external cream Apply topically as needed for moderate pain knee  at PRN Yes Unknown, Entered By History   vitamin D3 (CHOLECALCIFEROL) 2000 units (50 mcg) tablet Take 1 tablet by mouth daily 7/25/2020 at AM Yes Unknown, Entered By History   warfarin ANTICOAGULANT  "(COUMADIN) 4 MG tablet Take 8 mg QD 7/25/2020 at 2000 Yes Raúl Riddle MD   White Petrolatum ointment Apply topically nightly as needed for dry skin  at PRN Yes Unknown, Entered By History   Gauze Pads & Dressings (GAUZE PADS 4\"X4\") 4\"X4\" PADS 1 each 3 times daily as needed   Wilman Peck MD   order for DME Equipment being ordered: roll of micropore tape to dress foot wound bid   Hortensia Peck MD   order for DME Equipment being ordered: 1 surgical shoe   Domingo De La Torre DPM   order for DME [employer] [facility] Phone [phone] Fax [phone]  EdemaWear Size Medium/Yellow qty 4 sleeves  Length of Need: 1 month  Frequency of dressing change daily   Rustam Davis MD   order for DME [employer]  Phone #[phone]  Fax #[phone]  Coolflex Velcro wraps    Length of Need: Life Time  # of Pairs 1 set   Rustam Davis MD   order for DME [employer] [facility] Phone [phone] Fax [phone]  Primary Dressing Hydrofera Blue Ready   Qty 5 sheets  Secondary Dressing 4' roll gauze Qty 30  Secondary Dressing 2' medipore tape Qty 1  Secondary Dressing 4x4 gauze loaf Qty  1  Length of Need: 1 month  Frequency of dressing change: daily   Rustam Davis MD   order for DME Oxygen 2 Li/min  at night and bled into BiPAP   Goltz, Bennett Ezra, PA-C   order for DME Equipment being ordered: CPAP with mask and tubing   Raúl Riddle MD   order for DME Equipment being ordered: support compression hose BK  2 pair black 30mm HG   To be applied on arising & removed while lying down to go to sleep   Hortensia Peck MD   Warfarin Therapy Reminder 1 each continuous prn   Leticia Conway MD       "

## 2020-07-26 NOTE — PROGRESS NOTES
RECEIVING UNIT ED HANDOFF REVIEW    ED Nurse Handoff Report was reviewed by: Chantal Lozano RN on July 26, 2020 at 7:40 AM

## 2020-07-26 NOTE — PROGRESS NOTES
Observation goals  PRIOR TO DISCHARGE      Comments: -diagnostic tests and consults completed and resulted: not met, waiting on podiatry consult   -vital signs normal or at patient baseline: met   -adequate pain control on oral analgesics : met  -tolerating oral antibiotics or has plans for home infusion setup : not met  -Follow-up plan established for repeat evaluation of cellulitis: not met    Nurse to notify provider when observation goals have been met and patient is ready for discharge.

## 2020-07-26 NOTE — ED NOTES
"[facility]  ED Nurse Handoff Report    ED Chief complaint: Wound Check      ED Diagnosis:   Final diagnoses:   Cellulitis of left lower extremity   Skin ulcer of left foot, limited to breakdown of skin (H)       Code Status: Full Code    Allergies:   Allergies   Allergen Reactions     Bees      Augmentin Rash     Penicillins Rash       Patient Story: Pt here with chronic lower extremity wounds. Tonight has complaint of left foot drainage and swelling.  Focused Assessment:  Pt from Group Home.  For Borderline MR.  Makes own Med decisions.  Has swelling to left foot.  Pts foot gets worse when standing at work.  Comes in tonight with worsening sx.  Was supposed to see a Podiatrist    Treatments and/or interventions provided: Blood, Blood Cxs.  Antibiotics and fluids  Patient's response to treatments and/or interventions: Pt wanted to go home but understands the need for Antibiotic therapy    To be done/followed up on inpatient unit:  Continue w ABX    Does this patient have any cognitive concerns?: Borderline MR    Activity level - Baseline/Home:  Independent  Activity Level - Current:   Stand with Assist    Patient's Preferred language: English   Needed?: No    Isolation: None and Contact   Infection: Not Applicable  MRSA  Bariatric?: No    Vital Signs:   Vitals:    07/26/20 0225 07/26/20 0234   BP: 119/56    Pulse: 70    Resp: 16    Temp: 97.9  F (36.6  C)    TempSrc: Oral    SpO2: 98%    Weight:  145.2 kg (320 lb)   Height:  1.88 m (6' 2\")       Cardiac Rhythm:     Was the PSS-3 completed:   Yes  What interventions are required if any?               Family Comments: NA  OBS brochure/video discussed/provided to patient/family: N/A              Name of person given brochure if not patient: na              Relationship to patient: na    For the majority of the shift this patient's behavior was Green.   Behavioral interventions performed were na.    ED NURSE PHONE NUMBER: [id number]       "

## 2020-07-26 NOTE — DISCHARGE SUMMARY
[facility]    Discharge Summary  Hospitalist    Date of Admission:  7/26/2020  Date of Discharge:  7/26/2020  Discharging Provider: Herbert Lisa PA-C    Discharge Diagnoses      Cellulitis of left lower extremity  Skin ulcer of left foot, limited to breakdown of skin (H)    History of Present Illness   Petey Archibald is an 59 year old male who presented with increased swelling surrounding chronic foot wound, admitted out of concern for cellulitis.    HPI from admission H&P:  Petey Archibald is a 59 year old male who presents to the emergency department at the urging of his podiatry team for possible increased redness of his left foot as well as some mid left foot pain.      Patient has some borderline or mild mental retardation, schizoaffective disorder.  His history sounds to be somewhat variable based on telephone conversations documented in the chart.  Patient tells me he presents to the emergency department because he he was told to by podiatry, actually presented later than encouraged as he wanted to go to work today.  His complaint is foot pain in his left foot.  He tells me that he currently has no pain in his foot, though at night he has noted some aching pain in his left dorsal midfoot.  He tells me that this occasionally keeps him from sleeping.  He does not believe that there has been any change in redness or swelling of his foot.  He has had no fevers, no chills.     Patient has a chronic left foot wound with history of necrotic muscle for which he follows closely with Dr. De La Torre of podiatry.  Most recently seen 7/13/2020 with mechanical debridement via gauze at that time by Dr. Flaherty.     On evaluation in the emergency department, patient with reassuring laboratory studies.  White count of 4.5, BMP within normal limits.  CRP is mildly elevated at 12.4, though this is an improvement from most recent hospitalization with cellulitis mid-May with CRP of 152 at that time.  On my  examination, patient does have some redness as well as some warmth involving the left great toe, though he has no tenderness with palpation.  No abscess appreciated.     5/18/20 photo of L foot wound from prior admission noted.  Patient's chronic wound actually appears to have had progression of healing since that time.  The wound overall does not appear worrisome.  Erythema is similar to that noted 5/18 during admission for cellulitis and at which time he had no evidence of osteomyelitis on MR imaging.  I do note, however, that patient's lower extremity swelling has progressed somewhat since 5/18 based on photographic evidence.  Note prior volume contraction wrinkles of toe present in May which are not currently seen.     Patient does not feel unwell.  Based on warmth and erythema with increased edema will treat as cellulitis.  Patient is eager to leave the hospital, and is actually hesitant to be admitted to the hospital.  Discussed that I could potentially discharge him with oral medications for his cellulitis, though remaining in the hospital to have podiatry evaluation and for assistance with follow-up plan may benefit him.  Following this discussion, patient is amenable to hospitalization.  Note, however, with patient's schizoaffective disorder  and mild cognitive impairment he appears to have a tendency to become angry quickly by his last admission note.  We will try to minimize duration of hospitalization while still treating cellulitis and chronic wound appropriately.    Hospital Course   Petey Archibald was admitted on 7/26/2020.  The following problems were addressed during his hospitalization:    Suspected L foot cellulitis in the setting of chronic left great toe ulceration with muscle necrosis: Follows with Dr. De La Torre of podiatry, and recently he has noted pain in his midfoot while sleeping, possible increased erythema per telephone encounters, though patient denies this.  Was recommended for  evaluation in the emergency department by his podiatry team.  In comparison of images from 5/18/2020 hospitalization and current encounter, wound bed appears similar versus improved.  Very minimal increase in erythema as well as minimal increase in swelling. No evidence of osteomyelitis or abscess on May 19 MRI.  -Podiatry consulted, no appreciation of worsening wound nor sign of infection. No procedural intervention recommended. Pt is to follow-up as outpatient as previously scheduled and continue with dressing changes at home.  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  -Continue warfarin     Tobacco use disorder: Smokes 1 pack/day  -encouraged cessation, patient in pre-contemplative stage      Suspect tinea corporis: Ketoconazole twice daily to right buttock rash     Morbid obesity: Increased risk of all cause mortality.  BMI greater than 41.  Likely contributes to lower extremity edema and poor wound healing     Obstructive sleep apnea: CPAP as per home regimen    Herbert Lisa PA-C    Significant Results and Procedures   As noted    Pending Results   These results will be followed up by myself or PCP  Unresulted Labs Ordered in the Past 30 Days of this Admission     Date and Time Order Name Status Description    7/26/2020 0407 Asymptomatic COVID-19 Virus (Coronavirus) by PCR In process     7/26/2020 0231 Blood culture Preliminary     7/26/2020 0231 Blood culture Preliminary           Code Status   Full Code       Primary Care Physician   Hortensia Peck    Physical Exam   Temp: 98  F (36.7  C) Temp src: Oral BP: 130/74 Pulse: 60   Resp: 16 SpO2: 97 % O2 Device: None (Room air)    Vitals:    07/26/20 0234 07/26/20 0846   Weight: 145.2 kg (320 lb) 148.1 kg  "(326 lb 9.6 oz)     Vital Signs with Ranges  Temp:  [98  F (36.7  C)] 98  F (36.7  C)  Pulse:  [60] 60  Resp:  [16] 16  BP: (130)/(74) 130/74  SpO2:  [97 %] 97 %  No intake/output data recorded.      Discharge Disposition   Discharged to home  Condition at discharge: Stable    Consultations This Hospital Stay   PHARMACY TO DOSE WARFARIN  PODIATRY IP CONSULT    Time Spent on this Encounter   I, Herbert Lisa PA-C, personally saw the patient today and spent less than or equal to 30 minutes discharging this patient.    Discharge Orders      Reason for your hospital stay    Foot wound, concerning for infection. You were seen by podiatry and cleared to discharge home. You have been provided a course of antibiotics for infection and will need to follow-up closely with podiatry for ongoing management of wound.     Follow-up and recommended labs and tests     Follow up with primary care provider, Hortensia Peck, within 7 days to evaluate medication change and for hospital follow- up.  No follow up labs or test are needed.     Activity    Your activity upon discharge: activity as tolerated     Diet    Follow this diet upon discharge: Orders Placed This Encounter      Regular Diet Adult     Discharge Medications   Discharge Medication List as of 7/26/2020  4:48 PM      START taking these medications    Details   cephALEXin (KEFLEX) 500 MG capsule Take 1 capsule (500 mg) by mouth 4 times daily for 7 days, Disp-28 capsule,R-0, E-Prescribe         CONTINUE these medications which have NOT CHANGED    Details   acetaminophen (TYLENOL) 325 MG tablet Take 1-2 tablets (325-650 mg) by mouth every 6 hours as needed, Disp-100 tablet,R-3, E-Prescribe      Adhesive Tape (MEDIPORE H SURGICAL 1\"X10YD) TAPE 1 Application daily for 30 doses, Disp-1 each,R-3, E-Prescribe      albuterol (ALBUTEROL) 108 (90 BASE) MCG/ACT inhaler Inhale 2 puffs into the lungs every 6 hours as needed, Disp-1 Inhaler,R-0, E-Prescribe      ammonium lactate " "(LAC-HYDRIN) 12 % external lotion Apply topically as needed for dry skin feetHistorical      ARIPiprazole (ABILIFY) 5 MG tablet Take 5 mg by mouth daily, Historical      bacitracin 500 UNIT/GM external ointment Apply topically daily to wound, dress with bandageDisp-14 g,[id number]      buPROPion (WELLBUTRIN SR) 150 MG 12 hr tablet Take 300 mg by mouth daily (2 x 150 mg tablet), Historical      Cadexomer Iodine, topical, 0.9% (IODOSORB) 0.9 % GEL gel Apply topically daily Apply to left foot wound daily after cleansing the wound and blotting it dry., Disp-1 Tube,R-3, [id number] g tube      carboxymethylcellulose PF (REFRESH PLUS) 0.5 % ophthalmic solution Place 1 drop into both eyes 4 times daily as needed for dry eyes, Historical      clotrimazole (LOTRIMIN) 1 % external cream Apply topically 2 times dailyDisp-60 g,[id number]      DEXILANT 60 MG CPDR CR capsule TAKE 1 CAPSULE BY MOUTH ONCE DAILY (FOR HEARTBURN), Disp-90 capsule,R-2, E-Prescribe      diphenhydrAMINE (BENADRYL) 50 MG capsule 50 mg every 4 hours as needed for itching , Historical      EPINEPHrine (EPIPEN 2-BRE) 0.3 MG/0.3ML injection 2-pack INJECT 0.3MG INTRAMUSCULAR ONE TIME FOR ONE DOSE AS NEEDED FOR ANAPHYLAXIS (CALL 911 IF YOU HAVE TO GIVE) (FOR BEE STINGS) **LABEL EACH PEN*, Disp-2 mL,R-3, E-Prescribe      ferrous gluconate (FERGON) 324 (38 Fe) MG tablet TAKE 1 TABLET BY MOUTH TWICE DAILY (IRON SUPPLEMENT), Disp-200 tablet,R-3, E-Prescribe      !! furosemide (LASIX) 40 MG tablet Take 40 mg by mouth every morning, Historical      !! furosemide (LASIX) 80 MG tablet Take 80 mg by mouth every evening At 1500 daily, Historical      gabapentin (NEURONTIN) 100 MG capsule Take 200 mg by mouth 3 times daily At 0900, 1200, and 2000, Historical      Gauze Pads & Dressings (GAUZE PADS 4\"X4\") 4\"X4\" PADS 1 each 3 times daily as needed, Disp-25 each,R-1, E-Prescribe      hydrocortisone, Perianal, (ANUSOL-HC) 2.5 % cream Place rectally 2 times daily as " needed for hemorrhoidsHistorical      montelukast (SINGULAIR) 10 MG tablet TAKE 1 TABLET BY MOUTH EVERY MORNING. (ASTHMA), Disp-30 tablet,R-11, E-Prescribe      multivitamin, therapeutic (THERA-VIT) TABS tablet Take 1 tablet by mouth daily, Historical      naltrexone (DEPADE/REVIA) 50 MG tablet Take 100 mg by mouth daily ( 2 x 50 mg tablet), Historical      nicotine (NICORETTE) 2 MG gum Take 2 mg by mouth as needed for smoking cessation, Historical      nystatin-triamcinolone (MYCOLOG II) 452772-2.1 UNIT/GM-% external cream Apply topically 2 times daily For 1-2 weeks.Historical      !! order for DME Equipment being ordered: roll of micropore tape to dress foot wound bidDisp-1 each,R-0, Local Print      !! order for DME Equipment being ordered: 1 surgical shoeDisp-1 Device,R-0, Local Print      !! order for DME [employer] [facility] Phone [phone] Fax [phone]  EdemaWear Size Medium/Yellow qty 4 sleeves  Length of Need: 1 month  Frequency of dressing change dailyDisp-1 Device, R-0, Local Print      !! order for DME [employer] Stockings  Phone #[phone]  Fax #[phone]  Coolflex Velcro wraps    Length of Need: Life Time  # of Pairs 1 setDisp-30 days, R-0, Local Print      !! order for DME [facility] Order Phone [phone] Fax [phone]  Primary Dressing Hydrofera Blue Ready   Qty 5 sheets  Secondary Dressing 4' roll gauze Qty 30  Secondary Dressing 2' medipore tape Qty 1  Secondary Dressing 4x4 gauze loaf Qty  1  Length of Need:  1 month  Frequency of dressing change: dailyDisp-30 days, R-0, Local Print      !! order for DME Oxygen 2 Li/min  at night and bled into BiPAPDisp-1 Device, R-0, Local Print      !! order for DME Equipment being ordered: CPAP with mask and tubingDisp-1 Device, R-0, Local Print      !! order for DME Equipment being ordered: support compression hose BK  2 pair black 30mm HG   To be applied on arising & removed while lying down to go to sleepDisp-1 each, R-0,  Local Print      potassium chloride SA (K-DUR/KLOR-CON M) 20 MEQ CR tablet TAKE 1 TABLET BY MOUTH TWICE DAILY. (LOW POTASSIUM), Disp-62 tablet,R-11, E-PrescribeURGENT REQUEST [id number] MMO      PULMICORT FLEXHALER 180 MCG/ACT inhaler INHALE 2 PUFFS INTO THE LUNGS TWICE DAILY, Disp-1 each,R-10, E-Prescribe      !! sertraline (ZOLOFT) 100 MG tablet Take 100 mg by mouth daily Take with 50 mg tablet for a total dose of 150 mg daily., Historical      !! sertraline (ZOLOFT) 50 MG tablet Take 50 mg by mouth daily Take with 100 mg tablet for a total dose of 150 mg daily., Historical      simvastatin (ZOCOR) 40 MG tablet TAKE 1 TABLET BY MOUTH EVERY MORNING.  (CHOLESTEROL), Disp-31 tablet,R-11, E-PrescribeURGENT REQUEST [id number] MMO      Sodium Phosphates (PHOSPHATE ENEMA RE) Use one enema rectally every 1-3 hours as needed, Historical      spironolactone (ALDACTONE) 25 MG tablet TAKE 1 TABLET BY MOUTH ONCE DAILY. (HIGH BLOOD PRESSURE), Disp-31 tablet,R-11, E-PrescribeURGENT REQUEST [id number] MMO      temazepam (RESTORIL) 15 MG capsule Take 15 mg by mouth nightly as needed for sleep, Historical      tiotropium (SPIRIVA) 18 MCG inhaled capsule Inhale 18 mcg into the lungs daily, Historical      trolamine salicylate (ASPERCREME) 10 % external cream Apply topically as needed for moderate pain kneeHistorical      vitamin D3 (CHOLECALCIFEROL) 2000 units (50 mcg) tablet Take 1 tablet by mouth daily, Historical      warfarin ANTICOAGULANT (COUMADIN) 4 MG tablet Take 8 mg QD, Disp-42 tablet,R-0, No Print [facility]Call[facility]      Warfarin Therapy Reminder 1 each continuous prn, Disp-1 each,R-0, E-Prescribe      White Petrolatum ointment Apply topically nightly as needed for dry skinHistorical       !! - Potential duplicate medications found. Please discuss with provider.      STOP taking these medications       ketoconazole (NIZORAL) 2 % external cream Comments:   Reason for Stopping:             Allergies   Allergies   Allergen Reactions      Bees      Augmentin Rash     Penicillins Rash     Data   Most Recent 3 CBC's:  Recent Labs   Lab Test 07/26/20  0235 05/20/20  0645 05/19/20  0702   WBC 4.5 5.6 6.5   HGB 11.6* 11.6* 10.9*   MCV 93 92 93    270 242      Most Recent 3 BMP's:  Recent Labs   Lab Test 07/26/20  0235 05/19/20  0702 05/18/20  1204    138 137   POTASSIUM 3.6 4.0 3.7   CHLORIDE 108 106 106   CO2 27 27 26   BUN 26 14 19   CR 1.04 0.90 1.08   ANIONGAP 4 5 5   JESSICA 8.8 8.9 9.1   * 122* 101*     Most Recent 2 LFT's:  Recent Labs   Lab Test 05/18/20  1204 09/14/19  0018   AST 22 21   ALT 22 32   ALKPHOS 76 79   BILITOTAL 0.7 0.4     Most Recent INR's and Anticoagulation Dosing History:  Anticoagulation Dose History     Recent Dosing and Labs Latest Ref Rng & Units 5/21/2020 5/22/2020 5/26/2020 6/2/2020 6/9/2020 6/30/2020 7/26/2020    Warfarin 4 mg - 4 mg - - - - - -    INR 0.86 - 1.14 2.82(H) 2.11(H) 2.50(H) 4.20(H) 2.40(H) 2.60(H) 2.30(H)    INR 0.86 - 1.14 - - - - - - -        Most Recent 3 Troponin's:  Recent Labs   Lab Test 02/08/19  1236   TROPI <0.015     Most Recent Cholesterol Panel:  Recent Labs   Lab Test 05/03/17  1059   CHOL 175   LDL 73   HDL 82   TRIG 102     Most Recent 6 Bacteria Isolates From Any Culture (See EPIC Reports for Culture Details):  Recent Labs   Lab Test 07/26/20  0323 07/26/20  0236 05/18/20  1204 01/04/20  2347 08/14/18  1334   CULT No growth after 1 day No growth after 1 day No growth  No growth Heavy growth  Mixed fecal kade    Light growth  Methicillin resistant Staphylococcus aureus (MRSA)  * Single colony  Coagulase negative Staphylococcus  Susceptibility testing not routinely done  *  Moderate growth  Gram positive bacilli resembling diphtheroids  No further identification  *     Most Recent TSH, T4 and A1c Labs:  Recent Labs   Lab Test 02/25/20  1209   A1C 5.1     Results for orders placed or performed during the hospital encounter of 07/26/20   Foot XR, G/E 3 views, left     Narrative    EXAM: XR FOOT LT G/E 3 VW  LOCATION: [facility]  DATE/TIME: 7/26/2020 2:36 AM    INDICATION: Chronic nonhealing wound on dorsal aspect of left foot  COMPARISON: 08/18/2020      Impression    IMPRESSION: Chronic fractures of the left fourth and fifth metatarsals. Soft tissue swelling of the forefoot and lateral foot. No subcutaneous emphysema. No radiopaque foreign body. No radiographic evidence of osteomyelitis at this time. Atherosclerotic   vascular calcifications.

## 2020-07-26 NOTE — H&P
[facility]    History and Physical - Hospitalist Service       Date of Admission:  7/26/2020    Assessment & Plan   Petey Archibald is a 59 year old male admitted on 7/26/2020. He presents to the emergency department with complaints of left midfoot pain, recommended for emergency department evaluation by his podiatry team given history of chronic foot wound.  Found to have some increased swelling of his left foot, possible mild cellulitis based on exam.    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 De La Torre of podiatry, and recently he has noted pain in his midfoot while sleeping, possible increased erythema per telephone encounters, though patient denies this.  Was recommended for evaluation in the emergency department by his podiatry team.  In comparison of images from 5/18/2020 hospitalization and current encounter, wound bed appears similar versus improved.  Very minimal increase in erythema as well as minimal increase in swelling.  -Podiatry consulted, I do not anticipate procedural management, though possible  -No evidence of osteomyelitis or abscess on May 19 MRI.  No plan to repeat MR imaging at this time as long as he clinically improves.  -Initiating IV clindamycin and Keflex.  History of MRSA buttock wound in the past, though current cellulitis appears nonpurulent.  Improved after a similar regimen at last hospitalization.  History of MRSA sensitive to clindamycin  -Patient is hesitant to come into the hospital, will likely desire an early discharge.  He is not toxic, and if podiatry is in agreement, could potentially even leave later today after their assessment on oral anti-infective regimen.  If this is the case, however, would need close follow-up for reevaluation to ensure clinical improvement of cellulitis.  -Furosemide 40 mg IV twice daily; follow daily weights and intake and output  -Elevate right lower  extremity, Ace wrap following podiatry evaluation  -Dr. 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  -Continue warfarin, pharmacy to dose  -Patient reports he is out of Coumadin and will need a refill at discharge    Tobacco use disorder: Smokes 1 pack/day  -Discussed nicotine replacement therapy, patient declines at this time   -encouraged cessation, patient in pre-contemplative stage     Suspect tinea corporis:  -Ketoconazole twice daily to right buttock rash    Morbid obesity: Increased risk of all cause mortality.  BMI greater than 41.  Likely contributes to lower extremity edema and poor wound healing    Obstructive sleep apnea:  -CPAP as per home regimen       Diet: Regular adult diet as tolerated  DVT Prophylaxis: Continue prior to admission warfarin for DVT treatment  Tapia Catheter: not present  Code Status: Full code.  Confirmed with patient on admission         Disposition Plan   Expected discharge: Today versus tomorrow, recommended to prior living arrangement once antibiotic plan established and Cleared by podiatry for discharge with plan for follow-up.  Entered: Miguel Jung MD 07/26/2020, 4:17 AM     The patient's care was discussed with the Patient and Dr Worrell in the emergency department.    Miguel Jung MD  [facility]    ______________________________________________________________________    Chief Complaint   Left midfoot pain    History is obtained from patient, discussion with Dr. Worrell in the emergency department, chart review    History of Present Illness   Petey THOMPSON  Dragan is a 59 year old male who presents to the emergency department at the urging of his podiatry team for possible increased redness of his left foot as well as some mid left foot pain.     Patient has some borderline or mild mental retardation, schizoaffective disorder.  His history sounds to be somewhat variable based on telephone conversations documented in the chart.  Patient tells me he presents to the emergency department because he he was told to by podiatry, actually presented later than encouraged as he wanted to go to work today.  His complaint is foot pain in his left foot.  He tells me that he currently has no pain in his foot, though at night he has noted some aching pain in his left dorsal midfoot.  He tells me that this occasionally keeps him from sleeping.  He does not believe that there has been any change in redness or swelling of his foot.  He has had no fevers, no chills.    Patient has a chronic left foot wound with history of necrotic muscle for which he follows closely with Dr. De La Torre of podiatry.  Most recently seen 7/13/2020 with mechanical debridement via gauze at that time by Dr. Flaherty.    On evaluation in the emergency department, patient with reassuring laboratory studies.  White count of 4.5, BMP within normal limits.  CRP is mildly elevated at 12.4, though this is an improvement from most recent hospitalization with cellulitis mid-May with CRP of 152 at that time.  On my examination, patient does have some redness as well as some warmth involving the left great toe, though he has no tenderness with palpation.  No abscess appreciated.    5/18/20 photo of L foot wound from prior admission noted.  Patient's chronic wound actually appears to have had progression of healing since that time.  The wound overall does not appear worrisome.  Erythema is similar to that noted 5/18 during admission for cellulitis and at which time he had no evidence of osteomyelitis on MR imaging.  I do  note, however, that patient's lower extremity swelling has progressed somewhat since 5/18 based on photographic evidence.  Note prior volume contraction wrinkles of toe present in May which are not currently seen.    Patient does not feel unwell.  Based on warmth and erythema with increased edema will treat as cellulitis.  Patient is eager to leave the hospital, and is actually hesitant to be admitted to the hospital.  Discussed that I could potentially discharge him with oral medications for his cellulitis, though remaining in the hospital to have podiatry evaluation and for assistance with follow-up plan may benefit him.  Following this discussion, patient is amenable to hospitalization.  Note, however, with patient's schizoaffective disorder  and mild cognitive impairment he appears to have a tendency to become angry quickly by his last admission note.  We will try to minimize duration of hospitalization while still treating cellulitis and chronic wound appropriately.    Review of Systems    The 10 point Review of Systems is negative other than noted in the HPI or here.  Patient tells me he has a chronic cough which is unchanged.  No shortness of breath  No shaking chills    Past Medical History    I have reviewed this patient's medical history and updated it with pertinent information if needed.   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     COPD (chronic obstructive pulmonary disease) (H)      History of DVT of lower extremity      History of pulmonary embolism      Hyperlipidemia      Mild intellectual disabilities      Morbid obesity (H)      Peripheral edema      Peripheral vascular disease (H)      Sleep apnea      Tobacco dependence      Venous stasis dermatitis        Past Surgical History   I have reviewed this patient's surgical history and updated it with pertinent information if needed.  Past Surgical History:   Procedure Laterality Date     COLONOSCOPY N/A  "4/15/2019    Procedure: COMBINED COLONOSCOPY, SINGLE OR MULTIPLE BIOPSY/POLYPECTOMY BY BIOPSY;  Surgeon: Jovana Ohara MD;  Location:  GI     EXCISE MALIGNANT LESIONS, TRUNK/ARMS/LEGS 0.5CM OR LESS Left 2017    Procedure: EXCISE MALIGNANT LESIONS, TRUNK/ARMS/LEGS 0.5CM OR LESS;  EXCISION LEFT ELBOW MASS;  Surgeon: Jose Azevedo MD;  Location:  GI     RECTAL SURGERY      perianal abscess?       Social History   I have reviewed this patient's social history and updated it with pertinent information if needed.  1 pack/day smoker  Resides in a group home    Family History   I have reviewed this patient's family history and updated it with pertinent information if needed.  Family History   Problem Relation Age of Onset     Diabetes Brother       Autoimmune disease and phlebitis Mother     Myocardial infarction,  at age 57  Father    Brother with stroke      Prior to Admission Medications   Prior to Admission Medications   Prescriptions Last Dose Informant Patient Reported? Taking?   ARIPiprazole (ABILIFY) 5 MG tablet  Pharmacy Yes No   Sig: Take 5 mg by mouth daily   Adhesive Tape ([facility] 1\"X10YD) TAPE   No No   Si Application daily for 30 doses   Cadexomer Iodine, topical, 0.9% (IODOSORB) 0.9 % GEL gel   No No   Sig: Apply to wound left foot every other day with bandage   Cadexomer Iodine, topical, 0.9% (IODOSORB) 0.9 % GEL gel   No No   Sig: Apply topically daily Apply to left foot wound daily after cleansing the wound and blotting it dry.   DESENEX 2 % external powder   Yes No   DEXILANT 60 MG CPDR CR capsule  Pharmacy No No   Sig: TAKE 1 CAPSULE BY MOUTH ONCE DAILY (FOR HEARTBURN)   EPINEPHrine (EPIPEN 2-BRE) 0.3 MG/0.3ML injection 2-pack  Pharmacy No No   Sig: INJECT 0.3MG INTRAMUSCULAR ONE TIME FOR ONE DOSE AS NEEDED FOR ANAPHYLAXIS (CALL 911 IF YOU HAVE TO GIVE) (FOR BEE STINGS) **LABEL EACH PEN*   Gauze Pads & Dressings (GAUZE PADS 4\"X4\") 4\"X4\" PADS   No No   Si " each 3 times daily as needed   PULMICORT FLEXHALER 180 MCG/ACT inhaler  Pharmacy No No   Sig: INHALE 2 PUFFS INTO THE LUNGS TWICE DAILY   SPIRIVA HANDIHALER 18 MCG inhaled capsule  Pharmacy No No   Sig: INHALE CONTENTS OF 1 CAPSULE INTO THE LUNGS ONCE DAILY. FOR BRONCHITIS AND EMPHYSEMA.   Patient taking differently: Inhale into the lungs At Bedtime    Warfarin Therapy Reminder   No No   Si each continuous prn   acetaminophen (TYLENOL) 325 MG tablet  Pharmacy No No   Sig: Take 1-2 tablets (325-650 mg) by mouth every 6 hours as needed   albuterol (ALBUTEROL) 108 (90 BASE) MCG/ACT inhaler  Pharmacy No No   Sig: Inhale 2 puffs into the lungs every 6 hours as needed   ammonium lactate (LAC-HYDRIN) 12 % external lotion   Yes No   Sig: Apply topically as needed for dry skin feet   bacitracin 500 UNIT/GM external ointment   No No   Sig: Apply topically daily to wound, dress with bandage   buPROPion (WELLBUTRIN SR) 150 MG 12 hr tablet  Pharmacy Yes No   Sig: Take 300 mg by mouth daily (2 x 150 mg tablet)   clotrimazole (LOTRIMIN) 1 % external cream   No No   Sig: Apply topically 2 times daily   diphenhydrAMINE (BENADRYL) 50 MG capsule   Yes No   Si mg every 4 hours as needed for itching    ferrous gluconate (FERGON) 324 (38 Fe) MG tablet  Pharmacy No No   Sig: TAKE 1 TABLET BY MOUTH TWICE DAILY (IRON SUPPLEMENT)   furosemide (LASIX) 40 MG tablet  Pharmacy Yes No   Sig: Take 40 mg by mouth every morning   furosemide (LASIX) 80 MG tablet  Pharmacy Yes No   Sig: Take 80 mg by mouth every evening At 1500 daily   gabapentin (NEURONTIN) 100 MG capsule   Yes No   Si mg 3 times daily    hydrocortisone, Perianal, (ANUSOL-HC) 2.5 % cream   Yes No   Sig: Place rectally 2 times daily as needed for hemorrhoids   ketoconazole (NIZORAL) 2 % external cream   No No   Sig: Apply topically twice daily to rash for two weeks then decrease to once daily until rash has resolved   lactobacillus rhamnosus, GG, (CULTURELL) capsule    No No   Sig: Take 1 capsule by mouth 2 times daily   montelukast (SINGULAIR) 10 MG tablet  Pharmacy No No   Sig: TAKE 1 TABLET BY MOUTH EVERY MORNING. (ASTHMA)   multivitamin, therapeutic (THERA-VIT) TABS tablet  Pharmacy Yes No   Sig: Take 1 tablet by mouth daily   naltrexone (DEPADE/REVIA) 50 MG tablet  Pharmacy Yes No   Sig: Take 100 mg by mouth daily ( 2 x 50 mg tablet)   order for DME   No No   Sig: Equipment being ordered: support compression hose BK  2 pair black 30mm HG   To be applied on arising & removed while lying down to go to sleep   order for DME   No No   Sig: Equipment being ordered: CPAP with mask and tubing   order for DME   No No   Sig: Oxygen 2 Li/min  at night and bled into BiPAP   order for DME   No No   Sig: [employer] [facility] Phone [phone] Fax [phone]  Primary Dressing Hydrofera Blue Ready   Qty 5 sheets  Secondary Dressing 4' roll gauze Qty 30  Secondary Dressing 2' medipore tape Qty 1  Secondary Dressing 4x4 gauze loaf Qty  1  Length of Need: 1 month  Frequency of dressing change: daily   order for DME   No No   Sig: [employer] [facility] Phone [phone] Fax [phone]  EdemaWear Size Medium/Yellow qty 4 sleeves  Length of Need: 1 month  Frequency of dressing change daily   order for DME   No No   Sig: [employer]  Phone #[phone]  Fax #[phone]  Coolflex Velcro wraps    Length of Need: Life Time  # of Pairs 1 set   order for DME   No No   Sig: Equipment being ordered: 1 surgical shoe   order for DME   No No   Sig: Equipment being ordered: roll of micropore tape to dress foot wound bid   potassium chloride SA (K-DUR/KLOR-CON M) 20 MEQ CR tablet  Pharmacy No No   Sig: TAKE 1 TABLET BY MOUTH TWICE DAILY. (LOW POTASSIUM)   senna (SENOKOT) 8.6 MG tablet   No No   Sig: Take 1 tablet by mouth daily   sertraline (ZOLOFT) 100 MG tablet  Pharmacy Yes No   Sig: Take 150 mg by mouth daily    silver sulfADIAZINE (SILVADENE) 1 % external cream   No No   Sig:  Apply a small amount to wound on toe daily with dressing changes.   simvastatin (ZOCOR) 40 MG tablet  Pharmacy No No   Sig: TAKE 1 TABLET BY MOUTH EVERY MORNING.  (CHOLESTEROL)   spironolactone (ALDACTONE) 25 MG tablet  Pharmacy No No   Sig: TAKE 1 TABLET BY MOUTH ONCE DAILY. (HIGH BLOOD PRESSURE)   temazepam (RESTORIL) 15 MG capsule   Yes No   Sig: Take 15 mg by mouth nightly as needed for sleep   trolamine salicylate (ASPERCREME) 10 % external cream   Yes No   Sig: Apply topically as needed for moderate pain knee   vitamin D3 (CHOLECALCIFEROL) 2000 units (50 mcg) tablet   Yes No   Sig: Take 1 tablet by mouth daily   warfarin ANTICOAGULANT (COUMADIN) 4 MG tablet   No No   Sig: Take 8 mg QD   warfarin ANTICOAGULANT (COUMADIN) 4 MG tablet   No No   Si mg QD      Facility-Administered Medications: None     Allergies   Allergies   Allergen Reactions     Bees      Augmentin Rash     Penicillins Rash       Physical Exam   Vital Signs: Temp: 97.9  F (36.6  C) Temp src: Oral BP: 119/56 Pulse: 70   Resp: 16 SpO2: 98 %      Weight: 320 lbs 0 oz    General Appearance: Morbidly obese 59-year-old male resting comfortably in bed.  Eyes: No scleral icterus or injection.  HEENT: Normocephalic and atraumatic  Respiratory: Patient with occasional dry cough which he reports is chronic and unchanged.  Breath sounds are clear bilaterally to auscultation, though note on his history list he has a history of bilateral pulmonary rhonchi.  Note also history of bronchitis in the past  Cardiovascular: Regular rate and rhythm.  Heart rate of 70.  No murmur appreciated, though exam limited by habitus  GI: Obese, nontender to palpation.  No palpable mass.  Bowel sounds present  Lymph/Hematologic: Chronic bilateral lower extremity edema, though is more pronounced, especially with pedal edema, of his left foot.  Genitourinary: No inguinal Candida.  Right buttock with what appears to be tinea corporis  Skin: Tinea corporis of right buttock.   Venous stasis hyperpigmentation of left ankle which is likely chronic.  Similar in May as well.  Erythema involving the entirety of his left great toe as well as second toe.  Some tracking of erythema over the dorsal aspect of his foot.  No pain with palpation of foot or toe, though erythema is slightly warm as compared to surrounding skin at base of left great toe.  Chronic wound currently hearing with over lying dry tissue between his first and second toes on the left.  This appears to have progressed in healing since May 18 picture.  Musculoskeletal: Muscular tone intact in all extremities.  Patient with  Neurologic: Alert, conversant, appropriate in conversation.  Mental status grossly intact.  Psychiatric: Somewhat odd affect, very pleasant.    Data   Data reviewed today: I reviewed all medications, new labs and imaging results over the last 24 hours. I personally reviewed no images or EKG's today.    Recent Labs   Lab 07/26/20  0235   WBC 4.5   HGB 11.6*   MCV 93      INR 2.30*      POTASSIUM 3.6   CHLORIDE 108   CO2 27   BUN 26   CR 1.04   ANIONGAP 4   JESSICA 8.8   *

## 2020-07-26 NOTE — ED NOTES
Bed: ED04  Expected date:   Expected time:   Means of arrival:   Comments:  447  59m Foot infection/Cellulitis  0215

## 2020-07-26 NOTE — ED NOTES
"Report received. Assumed care of pt. Pt put on call light and was angry about having to wait for his bed. Apologized for the wait and informed pt a bed was assigned and we needed to determine the time he could go upstairs. Pt stated \"I don't care. I make my own medical decision and I fucking want to go home\". Pt started to pull off hospital gown and this RN asked pt to wait until the MD could talk to him. Pt agreed and Dr. Mendoza informed. Cleocin drip completed and infused but never stopped in MAR by previous RN.  "

## 2020-07-26 NOTE — ED TRIAGE NOTES
Pt has hx of cellulitis. Left foot. Report yesterday was having foot pain and has been seen at [facility] ED in the past. Patient comes from group home. Denies fevers or feeling ill. Comes to ED by EMS tonight.

## 2020-07-26 NOTE — CONSULTS
"[facility] SURG[facility]  CONSULTATION NOTE    ASSESSMENT:    59 year-old male with chronic dorsal right foot wound. I evaluated him 7/13/20 and he has improved from prior \"baseline.\"  The wound appears to be healing and his edema is under better control. I suspect the dull erythema is edema-related, rather than infection.      He is reporting the prior pain resolved today.     PLAN:   I personally reviewed the XR images. No bone changes to suggest osteomyelitis in the region of the ulcer. No pain on palpatory exam.     I explained that he is to continue with the current wound cares we have discussed in the past. He verbalized these to me. He has a protective shoe at home.     He is asked to follow up in the podiatry clinic in the next 2-3 weeks.    I think, from a podiatry standpoint, he can discharge to home. He is requesting this. There is no indication for procedural intervention and I am not convinced there is infection. However, I did not see his foot at time of admission and he has received IV antibiotics.    Thank you for the consultation request and the opportunity to participate in this patient's care.       Joseph Flaherty DPM, FACFAS, MS    [facility] of Podiatry/Foot & Ankle Surgery    Pager:  [phone]      _______________________________________________________________________    CHIEF COMPLAINT:      I was asked by Dr. Jung to evaluate this patient for a chronic wound, left foot, with cellulitis..    PATIENT HISTORY:  Petey Archibald is a 59 year old male who was admitted through the ED for for IV antibiotics due to concern for infection related to a chronic wound of the left foot. Petey said he had new pain in the foot that interefered with his sleep and prompted him to call EMS.  He says the pain has now resolved.  He denies any changes in the wound. No nausea, vomiting, fever, chills.     I examined him in clinic as recent as 7/13.  His foot was stable at that time. " There was more LLE edema. I reviewed the telephone correspondence between the patient's care giver and my office that occurred 7/24.  I was not in the office last week, and his was handled by my partner.  There was no one available at the time to see the patient in clinic, and due to the concern for infection, it looks like it was recommended he be seen in the ED.     Review of Systems:  A 10 point review of systems was performed and is positive for that noted above in the patient history.  All other areas are negative.     PAST MEDICAL HISTORY:   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     COPD (chronic obstructive pulmonary disease) (H)      History of DVT of lower extremity      History of pulmonary embolism      Hyperlipidemia      Mild intellectual disabilities      Morbid obesity (H)      Peripheral edema      Peripheral vascular disease (H)      Sleep apnea      Tobacco dependence      Venous stasis dermatitis         PAST SURGICAL HISTORY:   Past Surgical History:   Procedure Laterality Date     COLONOSCOPY N/A 4/15/2019    Procedure: COMBINED COLONOSCOPY, SINGLE OR MULTIPLE BIOPSY/POLYPECTOMY BY BIOPSY;  Surgeon: Jovana Ohara MD;  Location:  GI     EXCISE MALIGNANT LESIONS, TRUNK/ARMS/LEGS 0.5CM OR LESS Left 5/26/2017    Procedure: EXCISE MALIGNANT LESIONS, TRUNK/ARMS/LEGS 0.5CM OR LESS;  EXCISION LEFT ELBOW MASS;  Surgeon: Jose Azevedo MD;  Location:  GI     RECTAL SURGERY      perianal abscess?        MEDICATIONS:  Reviewed in Epic. Current IV cefazolin.     ALLERGIES:    Allergies   Allergen Reactions     Bees      Augmentin Rash     Penicillins Rash        SOCIAL HISTORY:   Social History     Socioeconomic History     Marital status: Single     Spouse name: Not on file     Number of children: Not on file     Years of education: Not on file     Highest education level: Not on file   Occupational History     Not on file   Social Needs      "Financial resource strain: Not on file     Food insecurity     Worry: Not on file     Inability: Not on file     Transportation needs     Medical: Not on file     Non-medical: Not on file   Tobacco Use     Smoking status: Current Every Day Smoker     Packs/day: 1.00     Years: 30.00     Pack years: 30.00     Types: Cigarettes     Smokeless tobacco: Current User     Tobacco comment: started at age 18    Substance and Sexual Activity     Alcohol use: No     Alcohol/week: 0.0 standard drinks     Drug use: No     Sexual activity: Not Currently     Partners: Female   Lifestyle     Physical activity     Days per week: Not on file     Minutes per session: Not on file     Stress: Not on file   Relationships     Social connections     Talks on phone: Not on file     Gets together: Not on file     Attends [employer] service: Not on file     Active member of club or organization: Not on file     Attends meetings of clubs or organizations: Not on file     Relationship status: Not on file     Intimate partner violence     Fear of current or ex partner: Not on file     Emotionally abused: Not on file     Physically abused: Not on file     Forced sexual activity: Not on file   Other Topics Concern     Parent/sibling w/ CABG, MI or angioplasty before 65F 55M? No   Social History Narrative     Not on file        FAMILY HISTORY:   Family History   Problem Relation Age of Onset     Diabetes Brother      Unknown/Adopted Mother      Unknown/Adopted Father         EXAM:Vitals: /74 (BP Location: Left arm)   Pulse 59   Temp 97.8  F (36.6  C) (Oral)   Resp 16   Ht 1.88 m (6' 2\")   Wt 148.1 kg (326 lb 9.6 oz)   SpO2 95%   BMI 41.93 kg/m    BMI= Body mass index is 41.93 kg/m .    LABS:       Lab Results   Component Value Date    WBC 4.5 07/26/2020     Lab Results   Component Value Date    RBC 3.95 07/26/2020     Lab Results   Component Value Date    HGB 11.6 07/26/2020     Lab Results   Component Value Date    HCT 36.6 07/26/2020 "     No components found for: MCT  Lab Results   Component Value Date    MCV 93 07/26/2020     Lab Results   Component Value Date    MCH 29.4 07/26/2020     Lab Results   Component Value Date    MCHC 31.7 07/26/2020     Lab Results   Component Value Date    RDW 15.8 07/26/2020     Lab Results   Component Value Date     07/26/2020       Recent Labs   Lab Test 07/26/20  0235 05/19/20  0702    138   POTASSIUM 3.6 4.0   CHLORIDE 108 106   CO2 27 27   ANIONGAP 4 5   * 122*   BUN 26 14   CR 1.04 0.90   JESSICA 8.8 8.9       General appearance: Patient is alert and fully cooperative with history & exam.  No sign of distress is noted during the visit.      Psychiatric: Affect is pleasant & appropriate.  Patient appears motivated to improve health.       Respiratory: Breathing is regular & unlabored while sitting.      HEENT: Hearing is intact to spoken word.  Speech is clear.  No gross evidence of visual impairment that would impact ambulation.       Vascular:  Pedal pulses are palpable bilaterally for both the DP and PT arteries. Interval reduction in generalized edema, left lower extremity. skin lines are visualized.     Neuro:  Alert and oriented x 3. Coordinated gait.  Light touch sensation is intact to the L4, L5, S1 distributions. No obvious deficits.  No evidence of neurological-based weakness, spasticity, or contracture in the lower extremities.      Derm: 1cm x 2.0cm ulceration at the dorsal aspect of the left first web space. It is dry. There appears to be interval healing, epithelialization. No erythema beyond baseline to suggest cellulitis.       Musculoskeletal:    Lower extremity muscle strength is normal.  Decrease in medial longitudinal arch with weight bearing.     IMAGING:   EXAM: XR FOOT LT G/E 3 VW  LOCATION: [facility]  DATE/TIME: 7/26/2020 2:36 AM     INDICATION: Chronic nonhealing wound on dorsal aspect of left foot  COMPARISON: 08/18/2020                                                                       IMPRESSION: Chronic fractures of the left fourth and fifth metatarsals. Soft tissue swelling of the forefoot and lateral foot. No subcutaneous emphysema. No radiopaque foreign body. No radiographic evidence of osteomyelitis at this time. Atherosclerotic   vascular calcifications.

## 2020-07-26 NOTE — ED NOTES
Pt apologized for getting angry earlier. Spoke with [address] RN and updated on pt status. Informed pt he would be going upstairs soon.

## 2020-07-26 NOTE — PROGRESS NOTES
Care Coordination:    Following for discharge planning.   Pt is from a group home and can return there.  Spoke with ALEXA Lala ([phone]) who states Pt can return today.    Not sure [facility] can find transport, may need a ride arranged.  Fax orders to [phone] Attn Dai (Done)  Meds sent to [facility] (e-script- done)  1 new med.   Send AVS/Orders with patient as well.    Other numbers to try for a ride include:  House Number [phone]  [place],  [phone]    [facility] Stretcher ride (Due to BMI) arranged for 1730. PCS on chart and faxed    Bedside nurse/Pt updated.         Saba Turner RN BSN  Inpatient Care Coordination  [facility]  [phone]

## 2020-07-26 NOTE — PROGRESS NOTES
hospitalist brief note: pt admitted early this AM with cellulitis in setting of chronic left great toe ulceration. He has been started on IV antibiotics, presently awaiting podiatry consultation if workup for possible osteomyelitis is warranted. Note patient is anxious to discharge; if further workup recommended and patient declines, he is not holdable at this time and would need to leave Against Medical Advice.    Will follow podiatry recommendations, continue on current antibiotic therapy.    Herbert Lisa PA-C  7/26/2020, 3:16 PM  Pager: [phone]

## 2020-07-26 NOTE — ED NOTES
Per Dr. Mendoza, the pt agreed to wait and stay in the hospital if he could have water and jay crackers. Both given to pt.

## 2020-07-26 NOTE — PLAN OF CARE
Observation goals  PRIOR TO DISCHARGE      Comments: -diagnostic tests and consults completed and resulted:  met  -vital signs normal or at patient baseline: met   -adequate pain control on oral analgesics : met  -tolerating oral antibiotics or has plans for home infusion setup : met   Follow-up plan established for repeat evaluation of cellulitis: met   Nurse to notify provider when observation goals have been met and patient is ready for discharge.          Discharge    Patient discharged to home via [facility] stretch with belongings. Denied pain, Up SBA/IND. LLE elevated, +3 edema, katharine. Intermittent IV abx, going home on PO.

## 2020-07-27 ENCOUNTER — PATIENT OUTREACH (OUTPATIENT)
Dept: NURSING | Facility: CLINIC | Age: 60
End: 2020-07-27
Payer: MEDICARE

## 2020-07-27 ENCOUNTER — DOCUMENTATION ONLY (OUTPATIENT)
Dept: FAMILY MEDICINE | Facility: CLINIC | Age: 60
End: 2020-07-27

## 2020-07-27 DIAGNOSIS — Z71.89 OTHER SPECIFIED COUNSELING: ICD-10-CM

## 2020-07-27 LAB
SARS-COV-2 RNA SPEC QL NAA+PROBE: NOT DETECTED
SPECIMEN SOURCE: NORMAL

## 2020-07-27 NOTE — PROGRESS NOTES
ANTICOAGULATION  MANAGEMENT: Discharge Review    Petey Archibald chart reviewed for anticoagulation continuity of care    Hospital Admission on 7/26 for cellulitis.    Discharge disposition: Home    Results:    Recent labs: (last 7 days)     07/26/20  0235   INR 2.30*     Anticoagulation inpatient management:     not applicable     Anticoagulation discharge instructions:     Warfarin dosing: home regimen continued   Bridging: No   INR goal change: No      Medication changes affecting anticoagulation: Yes: started on Keflex    Additional factors affecting anticoagulation: Yes: possible cellulitis infection    Plan     No adjustment to anticoagulation plan needed  Agree with discharge plan for follow up on 7/28    Patient not contacted    No adjustment to Anticoagulation Calendar was required    Ysabel Tena RN

## 2020-07-27 NOTE — PROGRESS NOTES
Clinic Care Coordination Contact  Community Health Worker Initial Outreach    CHW Initial Information Gathering:  Referral Source: IP Report  Preferred Hospital: [facility]  [phone]  No PCP office visit in Past Year: No  Patient did not have time to answer initial questions.       Patient accepts CC: No, Not at this time. Patient will be sent Care Coordination introduction letter for future reference.  Patient requested letter to be mailed and not sent via My Chart.     No further outreaches will be made.    HUMBLE Corado  Clinic Care Coordination  [facility]: [place], and [facility]  Phone: [phone]

## 2020-07-27 NOTE — LETTER
[facility]  [address] [place]  [address]      July 27, 2020      Petey Archibald  [address]  [facility] [address]      Dear Petey,    I am a clinic community health worker who works with Hortensia Peck MD at [facility]. I wanted to thank you for spending the time to talk with me.  Below is a description of clinic care coordination and how I can further assist you.      The clinic care coordination team is made up of a registered nurse,  and community health worker who understand the health care system. The goal of clinic care coordination is to help you manage your health and improve access to the health care system in the most efficient manner. The team can assist you in meeting your health care goals by providing education, coordinating services, strengthening the communication among your providers and supporting you with any resource needs.    Please feel free to contact me at [phone] with any questions or concerns. We are focused on providing you with the highest-quality healthcare experience possible and that all starts with you.     Sincerely,     HUMBLE Corado  Clinic Care Coordination  [facility]: [facility]  Phone: [phone]

## 2020-07-28 ENCOUNTER — ANTICOAGULATION THERAPY VISIT (OUTPATIENT)
Dept: FAMILY MEDICINE | Facility: CLINIC | Age: 60
End: 2020-07-28

## 2020-07-28 ENCOUNTER — TELEPHONE (OUTPATIENT)
Dept: FAMILY MEDICINE | Facility: CLINIC | Age: 60
End: 2020-07-28

## 2020-07-28 DIAGNOSIS — Z86.711 HISTORY OF PULMONARY EMBOLISM: ICD-10-CM

## 2020-07-28 DIAGNOSIS — E78.00 HYPERCHOLESTEROLEMIA: ICD-10-CM

## 2020-07-28 DIAGNOSIS — Z79.01 LONG TERM CURRENT USE OF ANTICOAGULANT THERAPY: ICD-10-CM

## 2020-07-28 DIAGNOSIS — I82.442 ACUTE DEEP VEIN THROMBOSIS (DVT) OF TIBIAL VEIN OF LEFT LOWER EXTREMITY (H): ICD-10-CM

## 2020-07-28 DIAGNOSIS — D50.8 OTHER IRON DEFICIENCY ANEMIA: ICD-10-CM

## 2020-07-28 DIAGNOSIS — I80.03 THROMBOPHLEBITIS OF SUPERFICIAL VEINS OF BOTH LOWER EXTREMITIES: ICD-10-CM

## 2020-07-28 DIAGNOSIS — R73.02 GLUCOSE INTOLERANCE (IMPAIRED GLUCOSE TOLERANCE): ICD-10-CM

## 2020-07-28 LAB
ALBUMIN SERPL-MCNC: 3.3 G/DL (ref 3.4–5)
ALP SERPL-CCNC: 112 U/L (ref 40–150)
ALT SERPL W P-5'-P-CCNC: 39 U/L (ref 0–70)
ANION GAP SERPL CALCULATED.3IONS-SCNC: 6 MMOL/L (ref 3–14)
AST SERPL W P-5'-P-CCNC: 27 U/L (ref 0–45)
BILIRUB SERPL-MCNC: 0.6 MG/DL (ref 0.2–1.3)
BUN SERPL-MCNC: 19 MG/DL (ref 7–30)
CALCIUM SERPL-MCNC: 9 MG/DL (ref 8.5–10.1)
CAPILLARY BLOOD COLLECTION: NORMAL
CHLORIDE SERPL-SCNC: 106 MMOL/L (ref 94–109)
CHOLEST SERPL-MCNC: 183 MG/DL
CO2 SERPL-SCNC: 26 MMOL/L (ref 20–32)
CREAT SERPL-MCNC: 0.98 MG/DL (ref 0.66–1.25)
ERYTHROCYTE [DISTWIDTH] IN BLOOD BY AUTOMATED COUNT: 15.9 % (ref 10–15)
GFR SERPL CREATININE-BSD FRML MDRD: 82 ML/MIN/{1.73_M2}
GLUCOSE SERPL-MCNC: 102 MG/DL (ref 70–99)
HBA1C MFR BLD: 5.7 % (ref 0–5.6)
HCT VFR BLD AUTO: 39.7 % (ref 40–53)
HDLC SERPL-MCNC: 78 MG/DL
HGB BLD-MCNC: 12.4 G/DL (ref 13.3–17.7)
INR PPP: 1.4 (ref 0.86–1.14)
LDLC SERPL CALC-MCNC: 80 MG/DL
MCH RBC QN AUTO: 29.3 PG (ref 26.5–33)
MCHC RBC AUTO-ENTMCNC: 31.2 G/DL (ref 31.5–36.5)
MCV RBC AUTO: 94 FL (ref 78–100)
NONHDLC SERPL-MCNC: 105 MG/DL
PLATELET # BLD AUTO: 256 10E9/L (ref 150–450)
POTASSIUM SERPL-SCNC: 3.9 MMOL/L (ref 3.4–5.3)
PROT SERPL-MCNC: 8.4 G/DL (ref 6.8–8.8)
RBC # BLD AUTO: 4.23 10E12/L (ref 4.4–5.9)
SODIUM SERPL-SCNC: 138 MMOL/L (ref 133–144)
TRIGL SERPL-MCNC: 125 MG/DL
WBC # BLD AUTO: 6.9 10E9/L (ref 4–11)

## 2020-07-28 PROCEDURE — 99207 ZZC NO CHARGE NURSE ONLY: CPT | Performed by: FAMILY MEDICINE

## 2020-07-28 PROCEDURE — 85610 PROTHROMBIN TIME: CPT | Performed by: FAMILY MEDICINE

## 2020-07-28 PROCEDURE — 85027 COMPLETE CBC AUTOMATED: CPT | Performed by: FAMILY MEDICINE

## 2020-07-28 PROCEDURE — 83036 HEMOGLOBIN GLYCOSYLATED A1C: CPT | Performed by: FAMILY MEDICINE

## 2020-07-28 PROCEDURE — 80053 COMPREHEN METABOLIC PANEL: CPT | Performed by: FAMILY MEDICINE

## 2020-07-28 PROCEDURE — 36415 COLL VENOUS BLD VENIPUNCTURE: CPT | Performed by: FAMILY MEDICINE

## 2020-07-28 PROCEDURE — 80061 LIPID PANEL: CPT | Performed by: FAMILY MEDICINE

## 2020-07-28 RX ORDER — WARFARIN SODIUM 4 MG/1
TABLET ORAL
Qty: 16 TABLET | Refills: 0 | Status: SHIPPED | OUTPATIENT
Start: 2020-07-28 | End: 2020-08-31

## 2020-07-28 NOTE — PROGRESS NOTES
ANTICOAGULATION FOLLOW-UP CLINIC VISIT    Patient Name:  Petey Archibald  Date:  2020  Contact Type:  Telephone-ALEXA Harrison    SUBJECTIVE:  Patient Findings     Positives:   Change in health, Change in medications, Hospital admission    Comments:   Discharged from the hospital on . Denies missed dose. Hunter, the nurse working today was not in on the weekend but doesn't believe he missed his dose. Was therapeutic 2 days ago and dropped by almost a full point. Suspect likely missed dose.   Currently taking keflex for cellulitis, last dose . Unable to come in on Friday to recheck, scheduled for Monday, soonest he could return.  Will monitor for s/s of bleeding/clots.   Will give loaded dose today and resume maintenance. Recheck in 6 days.        Clinical Outcomes     Negatives:   Major bleeding event, Thromboembolic event, Anticoagulation-related hospital admission, Anticoagulation-related ED visit, Anticoagulation-related fatality    Comments:   Discharged from the hospital on . Denies missed dose. Hunter, the nurse working today was not in on the weekend but doesn't believe he missed his dose. Was therapeutic 2 days ago and dropped by almost a full point. Suspect likely missed dose.   Currently taking keflex for cellulitis, last dose . Unable to come in on Friday to recheck, scheduled for Monday, soonest he could return.  Will monitor for s/s of bleeding/clots.   Will give loaded dose today and resume maintenance. Recheck in 6 days.           OBJECTIVE    Recent labs: (last 7 days)     20  0942   INR 1.40*       ASSESSMENT / PLAN  INR assessment SUB    Recheck INR In: 6 DAYS    INR Location Clinic      Anticoagulation Summary  As of 2020    INR goal:   2.0-3.0   TTR:   75.5 % (11.6 mo)   INR used for dosin.40! (2020)   Warfarin maintenance plan:   8 mg (4 mg x 2) every day   Full warfarin instructions:   : 16 mg; Otherwise 8 mg every day   Weekly warfarin total:   56 mg    Plan last modified:   Ysabel Tena RN (12/20/2019)   Next INR check:   8/3/2020   Target end date:   Indefinite    Indications    History of pulmonary embolism [Z86.711]  Long term current use of anticoagulant therapy [Z79.01]             Anticoagulation Episode Summary     INR check location:   Anticoagulation Clinic    Preferred lab:       [facility]    Comments:   [facility]; A new Coumadin Prescription will need to sent to [facility] with current dose and next INR check.      Anticoagulation Care Providers     Provider Role Specialty Phone number    Silvino Baker MD [facility] [phone]            See the Encounter Report to view Anticoagulation Flowsheet and Dosing Calendar (Go to Encounters tab in chart review, and find the Anticoagulation Therapy Visit)        Gissell Mcnamara RN

## 2020-08-03 ENCOUNTER — ANTICOAGULATION THERAPY VISIT (OUTPATIENT)
Dept: ANTICOAGULATION | Facility: CLINIC | Age: 60
End: 2020-08-03
Payer: MEDICARE

## 2020-08-03 DIAGNOSIS — Z86.711 HISTORY OF PULMONARY EMBOLISM: ICD-10-CM

## 2020-08-03 DIAGNOSIS — I80.03 THROMBOPHLEBITIS OF SUPERFICIAL VEINS OF BOTH LOWER EXTREMITIES: ICD-10-CM

## 2020-08-03 DIAGNOSIS — I82.442 ACUTE DEEP VEIN THROMBOSIS (DVT) OF TIBIAL VEIN OF LEFT LOWER EXTREMITY (H): ICD-10-CM

## 2020-08-03 DIAGNOSIS — Z79.01 LONG TERM CURRENT USE OF ANTICOAGULANT THERAPY: ICD-10-CM

## 2020-08-03 LAB
CAPILLARY BLOOD COLLECTION: NORMAL
INR PPP: 2.5 (ref 0.86–1.14)

## 2020-08-03 PROCEDURE — 85610 PROTHROMBIN TIME: CPT | Performed by: FAMILY MEDICINE

## 2020-08-03 PROCEDURE — 99207 ZZC NO CHARGE NURSE ONLY: CPT

## 2020-08-03 PROCEDURE — 36416 COLLJ CAPILLARY BLOOD SPEC: CPT | Performed by: FAMILY MEDICINE

## 2020-08-03 RX ORDER — WARFARIN SODIUM 4 MG/1
TABLET ORAL
Qty: 28 TABLET | Refills: 0
Start: 2020-08-03 | End: 2020-08-31

## 2020-08-03 NOTE — PROGRESS NOTES
ANTICOAGULATION FOLLOW-UP CLINIC VISIT    Patient Name:  Petey Archibald  Date:  8/3/2020  Contact Type:  Telephone    SUBJECTIVE:  Patient Findings     Comments:   The patient was assessed for diet, medication, and activity level changes, missed or extra doses, bruising or bleeding, with no problem findings.          Clinical Outcomes     Comments:   The patient was assessed for diet, medication, and activity level changes, missed or extra doses, bruising or bleeding, with no problem findings.             OBJECTIVE    Recent labs: (last 7 days)     20  0911   INR 2.50*       ASSESSMENT / PLAN  INR assessment THER    Recheck INR In: 2 WEEKS    INR Location Clinic      Anticoagulation Summary  As of 8/3/2020    INR goal:   2.0-3.0   TTR:   74.7 % (11.6 mo)   INR used for dosin.50 (8/3/2020)   Warfarin maintenance plan:   8 mg (4 mg x 2) every day   Full warfarin instructions:   8 mg every day   Weekly warfarin total:   56 mg   No change documented:   Lisa Ponce RN   Plan last modified:   Ysabel Tena RN (2019)   Next INR check:   2020   Priority:   Maintenance   Target end date:   Indefinite    Indications    History of pulmonary embolism [Z86.711]  Long term current use of anticoagulant therapy [Z79.01]             Anticoagulation Episode Summary     INR check location:   Anticoagulation Clinic    Preferred lab:       Send INR reminders to:   [facility] SUGGS    Comments:   [facility]; A new Coumadin Prescription will need to sent to [facility] with current dose and next INR check.      Anticoagulation Care Providers     Provider Role Specialty Phone number    Silvino Baker MD [facility] [phone]            See the Encounter Report to view Anticoagulation Flowsheet and Dosing Calendar (Go to Encounters tab in chart review, and find the Anticoagulation Therapy Visit)        Lisa Ponce, RN

## 2020-08-06 ENCOUNTER — OFFICE VISIT (OUTPATIENT)
Dept: FAMILY MEDICINE | Facility: CLINIC | Age: 60
End: 2020-08-06
Payer: MEDICARE

## 2020-08-06 VITALS
HEIGHT: 71 IN | RESPIRATION RATE: 16 BRPM | DIASTOLIC BLOOD PRESSURE: 78 MMHG | BODY MASS INDEX: 44.1 KG/M2 | SYSTOLIC BLOOD PRESSURE: 132 MMHG | HEART RATE: 64 BPM | WEIGHT: 315 LBS | OXYGEN SATURATION: 97 % | TEMPERATURE: 96.6 F

## 2020-08-06 DIAGNOSIS — Z12.11 SCREEN FOR COLON CANCER: ICD-10-CM

## 2020-08-06 DIAGNOSIS — F25.0 SCHIZOAFFECTIVE DISORDER, BIPOLAR TYPE (H): ICD-10-CM

## 2020-08-06 DIAGNOSIS — Z86.0100 HISTORY OF COLONIC POLYPS: ICD-10-CM

## 2020-08-06 DIAGNOSIS — Z00.00 ROUTINE GENERAL MEDICAL EXAMINATION AT A HEALTH CARE FACILITY: Primary | ICD-10-CM

## 2020-08-06 DIAGNOSIS — E66.01 MORBID OBESITY DUE TO EXCESS CALORIES (H): ICD-10-CM

## 2020-08-06 DIAGNOSIS — L03.116 LEFT LEG CELLULITIS: ICD-10-CM

## 2020-08-06 DIAGNOSIS — L03.116 CELLULITIS OF LEFT LOWER EXTREMITY: ICD-10-CM

## 2020-08-06 DIAGNOSIS — I89.0 LYMPHEDEMA OF LEFT LEG: ICD-10-CM

## 2020-08-06 DIAGNOSIS — R41.83 BORDERLINE INTELLECTUAL DISABILITY: ICD-10-CM

## 2020-08-06 PROCEDURE — 99213 OFFICE O/P EST LOW 20 MIN: CPT | Mod: 25 | Performed by: FAMILY MEDICINE

## 2020-08-06 PROCEDURE — G0439 PPPS, SUBSEQ VISIT: HCPCS | Performed by: FAMILY MEDICINE

## 2020-08-06 RX ORDER — FUROSEMIDE 80 MG
80 TABLET ORAL
Qty: 90 TABLET | Refills: 1 | Status: SHIPPED | OUTPATIENT
Start: 2020-08-06 | End: 2020-09-25

## 2020-08-06 RX ORDER — FUROSEMIDE 40 MG
40 TABLET ORAL DAILY
Qty: 90 TABLET | Refills: 1 | Status: SHIPPED | OUTPATIENT
Start: 2020-08-06 | End: 2021-02-22

## 2020-08-06 RX ORDER — CEPHALEXIN 500 MG/1
500 CAPSULE ORAL 4 TIMES DAILY
Qty: 28 CAPSULE | Refills: 0 | Status: SHIPPED | OUTPATIENT
Start: 2020-08-06 | End: 2020-09-25

## 2020-08-06 ASSESSMENT — MIFFLIN-ST. JEOR: SCORE: 2274.1

## 2020-08-06 NOTE — PATIENT INSTRUCTIONS
Preventive Health Recommendations  Male Ages 50 - 64    Yearly exam:             See your health care provider every year in order to  o   Review health changes.   o   Discuss preventive care.    o   Review your medicines if your doctor has prescribed any.     Have a cholesterol test every 5 years, or more frequently if you are at risk for high cholesterol/heart disease.     Have a diabetes test (fasting glucose) every three years. If you are at risk for diabetes, you should have this test more often.     Have a colonoscopy at age 50, or have a yearly FIT test (stool test). These exams will check for colon cancer.      Talk with your health care provider about whether or not a prostate cancer screening test (PSA) is right for you.    You should be tested each year for STDs (sexually transmitted diseases), if you re at risk.     Shots: Get a flu shot each year. Get a tetanus shot every 10 years.     Nutrition:    Eat at least 5 servings of fruits and vegetables daily.     Eat whole-grain bread, whole-wheat pasta and brown rice instead of white grains and rice.     Get adequate Calcium and Vitamin D.     Lifestyle    Exercise for at least 150 minutes a week (30 minutes a day, 5 days a week). This will help you control your weight and prevent disease.     Limit alcohol to one drink per day.     No smoking.     Wear sunscreen to prevent skin cancer.     See your dentist every six months for an exam and cleaning.     See your eye doctor every 1 to 2 years.    Need to schedule appt with podiatry with Dr Flaherty

## 2020-08-06 NOTE — PROGRESS NOTES
3  SUBJECTIVE:   CC: Petey Archibald is an 59 year old male who presents for preventive health visit.     Pt here with aide from group home    Healthy Habits:    Do you get at least three servings of calcium containing foods daily (dairy, green leafy vegetables, etc.)? unsure    Amount of exercise or daily activities, outside of work: none but sometimes walking    Problems taking medications regularly No    Medication side effects: No    Have you had an eye exam in the past two years? no    Do you see a dentist twice per year? no    Do you have sleep apnea, excessive snoring or daytime drowsiness?sleep apnea, wears CPAP      -left foot, ongoing infection, painful, was taking abx previously but this keeps flaring up, sees podiatry already-need to see again?  -possible hernia, belly button lump, last week    Today's PHQ-2 Score:   PHQ-2 ( 1999 [employer]) 3/11/2020 11/15/2019   Q1: Little interest or pleasure in doing things 0 0   Q2: Feeling down, depressed or hopeless 0 0   PHQ-2 Score 0 0   Q1: Little interest or pleasure in doing things - -   Q2: Feeling down, depressed or hopeless - -   PHQ-2 Score - -       Abuse: Current or Past(Physical, Sexual or Emotional)- No  Do you feel safe in your environment? Yes    Have you ever done Advance Care Planning? (For example, a Health Directive, POLST, or a discussion with a medical provider or your loved ones about your wishes): No, advance care planning information given to patient to review.  Patient declined advance care planning discussion at this time.    Social History     Tobacco Use     Smoking status: Current Every Day Smoker     Packs/day: 1.00     Years: 30.00     Pack years: 30.00     Types: Cigarettes     Smokeless tobacco: Current User     Tobacco comment: started at age 18    Substance Use Topics     Alcohol use: No     Alcohol/week: 0.0 standard drinks     If you drink alcohol do you typically have >3 drinks per day or >7 drinks per week? No                  "     Last PSA:   PSA   Date Value Ref Range Status   02/28/2014 0.55 0 - 4 ug/L Final       Reviewed orders with patient. Reviewed health maintenance and updated orders accordingly - Yes  Labs reviewed in EPIC    Reviewed and updated as needed this visit by clinical staff  Tobacco  Allergies  Meds  Med Hx  Surg Hx  Fam Hx  Soc Hx        Reviewed and updated as needed this visit by Provider            ROS:  CONSTITUTIONAL: NEGATIVE for fever, chills, change in weight  INTEGUMENTARY/SKIN: POSITIVE for rash foot left  EYES: NEGATIVE for vision changes or irritation  ENT: NEGATIVE for ear, mouth and throat problems  RESP: NEGATIVE for significant cough or SOB  CV: POSITIVE for lower extremity edema and NEGATIVE for chest pain/chest pressure  GI: NEGATIVE for nausea, abdominal pain, heartburn, or change in bowel habits   male: negative for dysuria, hematuria, decreased urinary stream, erectile dysfunction, urethral discharge  MUSCULOSKELETAL: NEGATIVE for significant arthralgias or myalgia  NEURO: NEGATIVE for weakness, dizziness or paresthesias  PSYCHIATRIC: NEGATIVE for changes in mood or affect    OBJECTIVE:   /78   Pulse 64   Temp 96.6  F (35.9  C) (Tympanic)   Resp 16   Ht 1.803 m (5' 11\")   Wt 144.7 kg (319 lb)   SpO2 97%   BMI 44.49 kg/m    EXAM:  GENERAL: healthy, alert and no distress  EYES: Eyes grossly normal to inspection, PERRL and conjunctivae and sclerae normal  HENT: ear canals and TM's normal, nose and mouth without ulcers or lesions  NECK: no adenopathy, no asymmetry, masses, or scars and thyroid normal to palpation  RESP: lungs clear to auscultation - no rales, rhonchi or wheezes  CV: regular rates and rhythm, normal S1 S2, no S3 or S4, no murmur, click or rub, peripheral pulses strong and 3+ bilateral lower extremity pitting edema to knees    ABDOMEN: soft, nontender, without hepatosplenomegaly or masses, bowel sounds normal and hernia umbilical herna Lt side  MS: no gross " "musculoskeletal defects noted, no edema  SKIN: erythema - foot left  NEURO: Normal strength and tone, mentation intact and speech normal  PSYCH: concentration poor, affect flat and anxious    Diagnostic Test Results:  Labs reviewed in Epic  none     ASSESSMENT/PLAN:   Petey was seen today for physical.    Diagnoses and all orders for this visit:    Routine general medical examination at a health care facility    Morbid obesity due to excess calories (H)  BMI 40-50    Lymphedema of left leg  -     furosemide (LASIX) 80 MG tablet; Take 1 tablet (80 mg) by mouth daily (with breakfast) At 0800 daily  -     furosemide (LASIX) 40 MG tablet; Take 1 tablet (40 mg) by mouth daily Take mid day, no later than 1400    Left leg cellulitis    Schizoaffective disorder, bipolar type (H)    Borderline mental retardation    History of colonic polyps  -     GASTROENTEROLOGY ADULT REF PROCEDURE ONLY; Future    Screen for colon cancer  -     GASTROENTEROLOGY ADULT REF PROCEDURE ONLY; Future    Cellulitis of left lower extremity  -     cephALEXin (KEFLEX) 500 MG capsule; Take 1 capsule (500 mg) by mouth 4 times daily        COUNSELING:  Reviewed preventive health counseling, as reflected in patient instructions       Regular exercise       Healthy diet/nutrition       Colon cancer screening    Estimated body mass index is 44.49 kg/m  as calculated from the following:    Height as of this encounter: 1.803 m (5' 11\").    Weight as of this encounter: 144.7 kg (319 lb).    Weight management plan: Discussed healthy diet and exercise guidelines     reports that he has been smoking cigarettes. He has a 30.00 pack-year smoking history. He uses smokeless tobacco.  Tobacco Cessation Action Plan: Information offered: Patient not interested at this time    Counseling Resources:  ATP IV Guidelines  Pooled Cohorts Equation Calculator  FRAX Risk Assessment  ICSI Preventive Guidelines  Dietary Guidelines for Americans, 2010  USDA's MyPlate  ASA " Prophylaxis  Lung CA Screening    Raúl Riddle MD  [facility]

## 2020-08-17 ENCOUNTER — ANTICOAGULATION THERAPY VISIT (OUTPATIENT)
Dept: ANTICOAGULATION | Facility: CLINIC | Age: 60
End: 2020-08-17
Payer: MEDICARE

## 2020-08-17 DIAGNOSIS — Z86.711 HISTORY OF PULMONARY EMBOLISM: ICD-10-CM

## 2020-08-17 DIAGNOSIS — I82.442 ACUTE DEEP VEIN THROMBOSIS (DVT) OF TIBIAL VEIN OF LEFT LOWER EXTREMITY (H): ICD-10-CM

## 2020-08-17 DIAGNOSIS — I80.03 THROMBOPHLEBITIS OF SUPERFICIAL VEINS OF BOTH LOWER EXTREMITIES: ICD-10-CM

## 2020-08-17 DIAGNOSIS — Z79.01 LONG TERM CURRENT USE OF ANTICOAGULANT THERAPY: ICD-10-CM

## 2020-08-17 LAB
CAPILLARY BLOOD COLLECTION: NORMAL
INR PPP: 3.3 (ref 0.86–1.14)

## 2020-08-17 PROCEDURE — 85610 PROTHROMBIN TIME: CPT | Performed by: FAMILY MEDICINE

## 2020-08-17 PROCEDURE — 99207 ZZC NO CHARGE NURSE ONLY: CPT

## 2020-08-17 PROCEDURE — 36416 COLLJ CAPILLARY BLOOD SPEC: CPT | Performed by: FAMILY MEDICINE

## 2020-08-17 RX ORDER — WARFARIN SODIUM 4 MG/1
TABLET ORAL
Qty: 28 TABLET | Refills: 0
Start: 2020-08-17 | End: 2020-08-31

## 2020-08-17 NOTE — PROGRESS NOTES
ANTICOAGULATION FOLLOW-UP CLINIC VISIT    Patient Name:  Petey Archibald  Date:  8/17/2020  Contact Type:  Telephone    SUBJECTIVE:  Patient Findings     Comments:   The patient was assessed for diet, medication, and activity level changes, missed or extra doses, bruising or bleeding, with no problem findings.          Clinical Outcomes     Comments:   The patient was assessed for diet, medication, and activity level changes, missed or extra doses, bruising or bleeding, with no problem findings.             OBJECTIVE    Recent labs: (last 7 days)     08/17/20  0908   INR 3.30*       ASSESSMENT / PLAN  INR assessment SUPRA    Recheck INR In: 2 WEEKS    INR Location Clinic      Anticoagulation Summary  As of 8/17/2020    INR goal:   2.0-3.0   TTR:   77.2 % (11.6 mo)   INR used for dosing:   3.30! (8/17/2020)   Warfarin maintenance plan:   8 mg (4 mg x 2) every day   Full warfarin instructions:   8/17: 6 mg; Otherwise 8 mg every day   Weekly warfarin total:   56 mg   Plan last modified:   Ysabel Tena RN (12/20/2019)   Next INR check:   8/31/2020   Priority:   Maintenance   Target end date:   Indefinite    Indications    History of pulmonary embolism [Z86.711]  Long term current use of anticoagulant therapy [Z79.01]             Anticoagulation Episode Summary     INR check location:   Anticoagulation Clinic    Preferred lab:       Send INR reminders to:   [facility] CALLIE USGGS    Comments:   REM alf; A new Coumadin Prescription will need to sent to [facility] with current dose and next INR check.      Anticoagulation Care Providers     Provider Role Specialty Phone number    Silvino Baker MD [facility] [phone]            See the Encounter Report to view Anticoagulation Flowsheet and Dosing Calendar (Go to Encounters tab in chart review, and find the Anticoagulation Therapy Visit)        Lisa Ponce RN

## 2020-08-19 ENCOUNTER — TELEPHONE (OUTPATIENT)
Dept: PODIATRY | Facility: CLINIC | Age: 60
End: 2020-08-19

## 2020-08-19 NOTE — TELEPHONE ENCOUNTER
Reason for Call:  Other WOUND SUPPLIES CHART NOTES    Detailed comments: May with [facility] calling in to request chart notes for Wound supplies. Insurance is requesting information.    Please call [phone] ([phone])    Phone Number Patient can be reached at: Cell number on file:    Telephone Information:   Mobile [phone]       Best Time: any    Can we leave a detailed message on this number? YES    Call taken on 8/19/2020 at 1:55 PM by Parag So

## 2020-08-21 ENCOUNTER — TELEPHONE (OUTPATIENT)
Dept: PODIATRY | Facility: CLINIC | Age: 60
End: 2020-08-21

## 2020-08-21 ENCOUNTER — OFFICE VISIT (OUTPATIENT)
Dept: PODIATRY | Facility: CLINIC | Age: 60
End: 2020-08-21
Payer: MEDICARE

## 2020-08-21 VITALS
SYSTOLIC BLOOD PRESSURE: 128 MMHG | HEIGHT: 71 IN | BODY MASS INDEX: 44.1 KG/M2 | WEIGHT: 315 LBS | DIASTOLIC BLOOD PRESSURE: 80 MMHG

## 2020-08-21 DIAGNOSIS — L97.522 SKIN ULCER OF LEFT FOOT WITH FAT LAYER EXPOSED (H): Primary | ICD-10-CM

## 2020-08-21 DIAGNOSIS — R60.0 LEG EDEMA, LEFT: ICD-10-CM

## 2020-08-21 DIAGNOSIS — I87.2 VENOUS INSUFFICIENCY OF BOTH LOWER EXTREMITIES: ICD-10-CM

## 2020-08-21 PROCEDURE — 99213 OFFICE O/P EST LOW 20 MIN: CPT | Performed by: PODIATRIST

## 2020-08-21 ASSESSMENT — MIFFLIN-ST. JEOR: SCORE: 2274.1

## 2020-08-21 NOTE — TELEPHONE ENCOUNTER
"12:58 pm on 8/21/2020.   Braden Martinez called for Petey Archibald and has questions about pt. Medications that he is supposed to take or use.   He wonders if \"santyl\" is a topical ointment or a medication that he is supposed to take orally.   Please call Braden at this number to answer his questions regarding the medications.   Braden's phone number is [phone].   "

## 2020-08-21 NOTE — TELEPHONE ENCOUNTER
"1:30 pm on 8/21/2020.  Shayna chirinos needs a phone call back from some medical company regarding billing for \"santyl\" ointment and she needs to know the depth, width, and length of the wound it is being used for.  Please call her back at [phone].    "

## 2020-08-21 NOTE — LETTER
8/21/2020         RE: Petey Archibald  [address]  [place] [address]        Dear Colleague,    Thank you for referring your patient, Petey Archibald, to the [facility]. Please see a copy of my visit note below.    ASSESSMENT/PLAN:  Encounter Diagnoses   Name Primary?     Skin ulcer of left foot with fat layer exposed (H) Yes     Leg edema, left      Venous insufficiency of both lower extremities      He commented that the wound never heals.  I agree, and I think it is due to his ongoing edema.  I did not think the wound needed excisional debridement.  I did wipe away some of the exudate to better visualize the wound and this was painful.    I explained that we can try to use an enzymatic debriding agent/Santyl to help promote a healthier wound bed.    I reviewed daily wound cares: Cleansing, drying, Santyl application and bandage.    We have been treating this wound since at least May.  So far we are have not been successful.  I am referring him to the [facility] for another opinion and recommendations on treatment.      (L97.692) Skin ulcer of left foot with fat layer exposed (H)  (primary encounter diagnosis)  Plan: Miscellaneous Order for DME - ONLY FOR DME,         collagenase (SANTYL) 250 UNIT/GM external         ointment, WOUND CARE REFERRAL    (R60.0) Leg edema, left  Plan: Miscellaneous Order for DME - ONLY FOR DME,   ACE; [place]    Body mass index is 44.49 kg/m .    Weight management plan: Patient was referred to their PCP to discuss a diet and exercise plan.      Joseph Flaherty, TRU, FACFAS, MS    [facility]    Disclaimer: This note consists of symbols derived from keyboarding, dictation and/or voice recognition software. As a result, there may be errors in the script that have gone undetected. Please consider this when interpreting information found in this  chart.    ____________________________________________________________________    HPI:          Chief Complaint: chronic wound, dorsal right foot  Onset of problem: months  Pain/ discomfort is described as:  I last evaluated him when he was in the hospital for right lower extremity cellulitis in July.   With the assistance of his care giver, he is cleansing and dressing the wound daily.  It is painful at times.    Patient Active Problem List   Diagnosis     Ankle pain     Morbid obesity     GERD (gastroesophageal reflux disease)     Peripheral vascular disease (H)     History of pulmonary embolism     Tobacco dependence:40-50 pk yr hx     Borderline mental retardation     History of DVT of lower extremity     Right knee pain     Pilonidal cyst     Asymptomatic varicose veins, bilateral     Callus of foot on Rt lat dist  since 8-15      Morbid obesity due to excess calories (H)  BMI 40-50     Hypercholesterolemia     Mixed simple and mucopurulent chronic bronchitis (HCC) CT 4-06 wnl and neg bronch for hemoptesis spirometry 7-26-16  FVC=59% & w mod restriction  and lung age of 82 in 54 y/o      Major depression in complete remission (HCC) on meds      Long term current use of anticoagulant therapy     Glucose intolerance (impaired glucose tolerance)     Other iron deficiency anemia     Localized edema L>R ankles      Erectile dysfunction, unspecified erectile dysfunction type     Stasis dermatitis of both legs     Arch pain of left foot 2ndary to edema and tendonitis      NEL (obstructive sleep apnea)     Hematemesis without nausea after smoking      Anxiety     Thrombophlebitis of superficial veins of both lower extremities: Greater Saphenous VV      Acute deep vein thrombosis (DVT) of tibial vein of left lower extremity (H)     History of colonic polyps     Allergic to bees     Abnormal lung sounds-bibasilar rhonchi     Hypoxia     Abnormal chest x-ray-3-19 prominent bibasilar interstitial      Ulcer of left lower  extremity with fat layer exposed (H)     Lymphedema of left leg     Left leg cellulitis     Cellulitis of left lower extremity     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)     Fungal skin infection     Schizoaffective disorder, bipolar type (H)     Cellulitis     Skin infection     Past Surgical History:   Procedure Laterality Date     COLONOSCOPY N/A 4/15/2019    Procedure: COMBINED COLONOSCOPY, SINGLE OR MULTIPLE BIOPSY/POLYPECTOMY BY BIOPSY;  Surgeon: Jovana Ohara MD;  Location:  GI     EXCISE MALIGNANT LESIONS, TRUNK/ARMS/LEGS 0.5CM OR LESS Left 5/26/2017    Procedure: EXCISE MALIGNANT LESIONS, TRUNK/ARMS/LEGS 0.5CM OR LESS;  EXCISION LEFT ELBOW MASS;  Surgeon: Jose Azevedo MD;  Location:  GI     RECTAL SURGERY      perianal abscess?     Current Outpatient Medications   Medication Sig Dispense Refill     collagenase (SANTYL) 250 UNIT/GM external ointment Apply topically daily After cleansing and blotting dry the left foot wound, apply a thin layer of the ointment and cover wound with bandage. 30 g 1     acetaminophen (TYLENOL) 325 MG tablet Take 1-2 tablets (325-650 mg) by mouth every 6 hours as needed 100 tablet 3     albuterol (ALBUTEROL) 108 (90 BASE) MCG/ACT inhaler Inhale 2 puffs into the lungs every 6 hours as needed 1 Inhaler 0     ammonium lactate (LAC-HYDRIN) 12 % external lotion Apply topically as needed for dry skin feet       ARIPiprazole (ABILIFY) 5 MG tablet Take 5 mg by mouth daily       bacitracin 500 UNIT/GM external ointment Apply topically daily to wound, dress with bandage 14 g 3     buPROPion (WELLBUTRIN SR) 150 MG 12 hr tablet Take 300 mg by mouth daily (2 x 150 mg tablet)       Cadexomer Iodine, topical, 0.9% (IODOSORB) 0.9 % GEL gel Apply topically daily Apply to left foot wound daily after cleansing the wound and blotting it dry. 1 Tube 3     carboxymethylcellulose PF (REFRESH PLUS) 0.5 % ophthalmic solution Place 1 drop into both eyes 4 times daily  "as needed for dry eyes       cephALEXin (KEFLEX) 500 MG capsule Take 1 capsule (500 mg) by mouth 4 times daily 28 capsule 0     clotrimazole (LOTRIMIN) 1 % external cream Apply topically 2 times daily 60 g 0     DEXILANT 60 MG CPDR CR capsule TAKE 1 CAPSULE BY MOUTH ONCE DAILY (FOR HEARTBURN) 90 capsule 2     diphenhydrAMINE (BENADRYL) 50 MG capsule 50 mg every 4 hours as needed for itching        EPINEPHrine (EPIPEN 2-RBE) 0.3 MG/0.3ML injection 2-pack INJECT 0.3MG INTRAMUSCULAR ONE TIME FOR ONE DOSE AS NEEDED FOR ANAPHYLAXIS (CALL 911 IF YOU HAVE TO GIVE) (FOR BEE STINGS) **LABEL EACH PEN* 2 mL 3     ferrous gluconate (FERGON) 324 (38 Fe) MG tablet TAKE 1 TABLET BY MOUTH TWICE DAILY (IRON SUPPLEMENT) 200 tablet 3     furosemide (LASIX) 40 MG tablet Take 1 tablet (40 mg) by mouth daily Take mid day, no later than 1400 90 tablet 1     furosemide (LASIX) 80 MG tablet Take 1 tablet (80 mg) by mouth daily (with breakfast) At 0800 daily 90 tablet 1     gabapentin (NEURONTIN) 100 MG capsule Take 200 mg by mouth 3 times daily At 0900, 1200, and 2000       Gauze Pads & Dressings (GAUZE PADS 4\"X4\") 4\"X4\" PADS 1 each 3 times daily as needed 25 each 1     hydrocortisone, Perianal, (ANUSOL-HC) 2.5 % cream Place rectally 2 times daily as needed for hemorrhoids       Lactobacillus-Inulin ([facility] DIGESTIVE [facility]) CAPS TAKE 1 CAPSULE BY MOUTH TWICE DAILY. **NON-COVERED MED** (NO REFILLS REMAINING) 60 capsule 11     montelukast (SINGULAIR) 10 MG tablet TAKE 1 TABLET BY MOUTH EVERY MORNING. (ASTHMA) 30 tablet 11     multivitamin, therapeutic (THERA-VIT) TABS tablet Take 1 tablet by mouth daily       naltrexone (DEPADE/REVIA) 50 MG tablet Take 100 mg by mouth daily ( 2 x 50 mg tablet)       nicotine (NICORETTE) 2 MG gum Take 2 mg by mouth as needed for smoking cessation       nystatin-triamcinolone (MYCOLOG II) 206907-7.1 UNIT/GM-% external cream Apply topically 2 times daily For 1-2 weeks.       order for DME Equipment being " ordered: roll of micropore tape to dress foot wound bid 1 each 0     order for DME Equipment being ordered: 1 surgical shoe 1 Device 0     order for DME [employer] Order Phone [phone] Fax [phone]  EdemaWear Size Medium/Yellow qty 4 sleeves  Length of Need: 1 month  Frequency of dressing change daily 1 Device 0     order for DME [employer] Stockings  Phone #[phone]  Fax #[phone]  Coolflex Velcro wraps    Length of Need: Life Time  # of Pairs 1 set 30 days 0     order for DME [employer] Order Phone [phone] Fax [phone]  Primary Dressing Hydrofera Blue Ready   Qty 5 sheets  Secondary Dressing 4' roll gauze Qty 30  Secondary Dressing 2' medipore tape Qty 1  Secondary Dressing 4x4 gauze loaf Qty  1  Length of Need: 1 month  Frequency of dressing change: daily 30 days 0     order for DME Oxygen 2 Li/min  at night and bled into BiPAP 1 Device 0     order for DME Equipment being ordered: CPAP with mask and tubing 1 Device 0     order for DME Equipment being ordered: support compression hose BK  2 pair black 30mm HG   To be applied on arising & removed while lying down to go to sleep 1 each 0     potassium chloride SA (K-DUR/KLOR-CON M) 20 MEQ CR tablet TAKE 1 TABLET BY MOUTH TWICE DAILY. (LOW POTASSIUM) 62 tablet 11     PULMICORT FLEXHALER 180 MCG/ACT inhaler INHALE 2 PUFFS INTO THE LUNGS TWICE DAILY 1 each 10     sertraline (ZOLOFT) 100 MG tablet Take 100 mg by mouth daily Take with 50 mg tablet for a total dose of 150 mg daily.       sertraline (ZOLOFT) 50 MG tablet Take 50 mg by mouth daily Take with 100 mg tablet for a total dose of 150 mg daily.       simvastatin (ZOCOR) 40 MG tablet TAKE 1 TABLET BY MOUTH EVERY MORNING.  (CHOLESTEROL) 31 tablet 11     Sodium Phosphates (PHOSPHATE ENEMA RE) Use one enema rectally every 1-3 hours as needed       spironolactone (ALDACTONE) 25 MG tablet TAKE 1 TABLET BY MOUTH ONCE DAILY. (HIGH BLOOD PRESSURE) 31 tablet 11     temazepam  "(RESTORIL) 15 MG capsule Take 15 mg by mouth nightly as needed for sleep       tiotropium (SPIRIVA) 18 MCG inhaled capsule Inhale 18 mcg into the lungs daily       trolamine salicylate (ASPERCREME) 10 % external cream Apply topically as needed for moderate pain knee       vitamin D3 (CHOLECALCIFEROL) 2000 units (50 mcg) tablet Take 1 tablet by mouth daily       warfarin ANTICOAGULANT (COUMADIN) 4 MG tablet 6 mg today 8 Mg daily  Recheck INR 8/31 28 tablet 0     warfarin ANTICOAGULANT (COUMADIN) 4 MG tablet Take 8 mg QD 28 tablet 0     warfarin ANTICOAGULANT (COUMADIN) 4 MG tablet Take 4 tablets (16 mg) today (7/28/20) and 2 tablets (8 mg) all other days. Recheck 8/3/20. 16 tablet 0     Warfarin Therapy Reminder 1 each continuous prn 1 each 0     White Petrolatum ointment Apply topically nightly as needed for dry skin         EXAM:    Vitals: /80   Ht 1.803 m (5' 11\")   Wt 144.7 kg (319 lb)   BMI 44.49 kg/m    BMI: Body mass index is 44.49 kg/m .  Height: 5' 11\"    Constitutional/ general:  Pt is in no apparent distress, appears well-nourished.  Cooperative with history and physical exam.      Vascular:  Pedal pulses are palpable bilaterally for both the DP and PT arteries.  CFT < 3 sec.  No edema.  Pedal hair growth noted.      Neuro:  Alert and oriented x 3. Coordinated gait.  Light touch sensation is intact to the L4, L5, S1 distributions. No obvious deficits.  No evidence of neurological-based weakness, spasticity, or contracture in the lower extremities.      Derm: Right foot ulcer in the dorsal left first web space. This is triangular shaped 1.5x 2cm x 0.2 cm deep.  The base is 50% granular and 50% fibrous. Erythema of the foot and hyperpigmentation of the leg consistent with changes from chronic edema.      Musculoskeletal:    Lower extremity muscle strength is normal.  Patient is ambulatory without an assistive device or brace .  No gross deformities.  No pain on palpation.             Again, thank " you for allowing me to participate in the care of your patient.        Sincerely,        Joseph Flaherty DPM

## 2020-08-21 NOTE — TELEPHONE ENCOUNTER
Return call to Hunter to discuss his questions. EHSANM requesting call back. Also requested that he let us know if we can leave detailed message on number provided.    Patient saw Dr. Flaherty today 8/21/20 and was referred to wound clinic and was given Rx for Santyl.    Santyl 250 unit/gm  Sig: Apply topically daily After cleansing and blotting dry the left foot wound, apply a thin layer of the ointment and cover wound with bandage. - Topical     Joanne Carroll, ATC

## 2020-08-21 NOTE — PROGRESS NOTES
ASSESSMENT/PLAN:  Encounter Diagnoses   Name Primary?     Skin ulcer of left foot with fat layer exposed (H) Yes     Leg edema, left      Venous insufficiency of both lower extremities      He commented that the wound never heals.  I agree, and I think it is due to his ongoing edema.  I did not think the wound needed excisional debridement.  I did wipe away some of the exudate to better visualize the wound and this was painful.    I explained that we can try to use an enzymatic debriding agent/Santyl to help promote a healthier wound bed.    I reviewed daily wound cares: Cleansing, drying, Santyl application and bandage.    We have been treating this wound since at least May.  So far we are have not been successful.  I am referring him to the [facility] for another opinion and recommendations on treatment.      (L97.672) Skin ulcer of left foot with fat layer exposed (H)  (primary encounter diagnosis)  Plan: Miscellaneous Order for DME - ONLY FOR DME,         collagenase (SANTYL) 250 UNIT/GM external         ointment, WOUND CARE REFERRAL    (R60.0) Leg edema, left  Plan: Miscellaneous Order for DME - ONLY FOR DME,   ACE; [place]    Body mass index is 44.49 kg/m .    Weight management plan: Patient was referred to their PCP to discuss a diet and exercise plan.      Joseph Flaherty DPM, FACFAS, MS    [facility]    Disclaimer: This note consists of symbols derived from keyboarding, dictation and/or voice recognition software. As a result, there may be errors in the script that have gone undetected. Please consider this when interpreting information found in this chart.    ____________________________________________________________________    HPI:          Chief Complaint: chronic wound, dorsal right foot  Onset of problem: months  Pain/ discomfort is described as:  I last evaluated him when he was in the hospital for right lower extremity cellulitis in July.    With the assistance of his care giver, he is cleansing and dressing the wound daily.  It is painful at times.    Patient Active Problem List   Diagnosis     Ankle pain     Morbid obesity     GERD (gastroesophageal reflux disease)     Peripheral vascular disease (H)     History of pulmonary embolism     Tobacco dependence:40-50 pk yr hx     Borderline mental retardation     History of DVT of lower extremity     Right knee pain     Pilonidal cyst     Asymptomatic varicose veins, bilateral     Callus of foot on Rt lat dist  since 8-15      Morbid obesity due to excess calories (H)  BMI 40-50     Hypercholesterolemia     Mixed simple and mucopurulent chronic bronchitis (HCC) CT 4-06 wnl and neg bronch for hemoptesis spirometry 7-26-16  FVC=59% & w mod restriction  and lung age of 82 in 56 y/o      Major depression in complete remission (HCC) on meds      Long term current use of anticoagulant therapy     Glucose intolerance (impaired glucose tolerance)     Other iron deficiency anemia     Localized edema L>R ankles      Erectile dysfunction, unspecified erectile dysfunction type     Stasis dermatitis of both legs     Arch pain of left foot 2ndary to edema and tendonitis      NEL (obstructive sleep apnea)     Hematemesis without nausea after smoking      Anxiety     Thrombophlebitis of superficial veins of both lower extremities: Greater Saphenous VV      Acute deep vein thrombosis (DVT) of tibial vein of left lower extremity (H)     History of colonic polyps     Allergic to bees     Abnormal lung sounds-bibasilar rhonchi     Hypoxia     Abnormal chest x-ray-3-19 prominent bibasilar interstitial      Ulcer of left lower extremity with fat layer exposed (H)     Lymphedema of left leg     Left leg cellulitis     Cellulitis of left lower extremity     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)     Fungal skin infection     Schizoaffective disorder, bipolar type (H)     Cellulitis     Skin infection     Past  Surgical History:   Procedure Laterality Date     COLONOSCOPY N/A 4/15/2019    Procedure: COMBINED COLONOSCOPY, SINGLE OR MULTIPLE BIOPSY/POLYPECTOMY BY BIOPSY;  Surgeon: Jovana Ohara MD;  Location:  GI     EXCISE MALIGNANT LESIONS, TRUNK/ARMS/LEGS 0.5CM OR LESS Left 5/26/2017    Procedure: EXCISE MALIGNANT LESIONS, TRUNK/ARMS/LEGS 0.5CM OR LESS;  EXCISION LEFT ELBOW MASS;  Surgeon: Jose Azevedo MD;  Location:  GI     RECTAL SURGERY      perianal abscess?     Current Outpatient Medications   Medication Sig Dispense Refill     collagenase (SANTYL) 250 UNIT/GM external ointment Apply topically daily After cleansing and blotting dry the left foot wound, apply a thin layer of the ointment and cover wound with bandage. 30 g 1     acetaminophen (TYLENOL) 325 MG tablet Take 1-2 tablets (325-650 mg) by mouth every 6 hours as needed 100 tablet 3     albuterol (ALBUTEROL) 108 (90 BASE) MCG/ACT inhaler Inhale 2 puffs into the lungs every 6 hours as needed 1 Inhaler 0     ammonium lactate (LAC-HYDRIN) 12 % external lotion Apply topically as needed for dry skin feet       ARIPiprazole (ABILIFY) 5 MG tablet Take 5 mg by mouth daily       bacitracin 500 UNIT/GM external ointment Apply topically daily to wound, dress with bandage 14 g 3     buPROPion (WELLBUTRIN SR) 150 MG 12 hr tablet Take 300 mg by mouth daily (2 x 150 mg tablet)       Cadexomer Iodine, topical, 0.9% (IODOSORB) 0.9 % GEL gel Apply topically daily Apply to left foot wound daily after cleansing the wound and blotting it dry. 1 Tube 3     carboxymethylcellulose PF (REFRESH PLUS) 0.5 % ophthalmic solution Place 1 drop into both eyes 4 times daily as needed for dry eyes       cephALEXin (KEFLEX) 500 MG capsule Take 1 capsule (500 mg) by mouth 4 times daily 28 capsule 0     clotrimazole (LOTRIMIN) 1 % external cream Apply topically 2 times daily 60 g 0     DEXILANT 60 MG CPDR CR capsule TAKE 1 CAPSULE BY MOUTH ONCE DAILY (FOR HEARTBURN) 90  "capsule 2     diphenhydrAMINE (BENADRYL) 50 MG capsule 50 mg every 4 hours as needed for itching        EPINEPHrine (EPIPEN 2-BRE) 0.3 MG/0.3ML injection 2-pack INJECT 0.3MG INTRAMUSCULAR ONE TIME FOR ONE DOSE AS NEEDED FOR ANAPHYLAXIS (CALL 911 IF YOU HAVE TO GIVE) (FOR BEE STINGS) **LABEL EACH PEN* 2 mL 3     ferrous gluconate (FERGON) 324 (38 Fe) MG tablet TAKE 1 TABLET BY MOUTH TWICE DAILY (IRON SUPPLEMENT) 200 tablet 3     furosemide (LASIX) 40 MG tablet Take 1 tablet (40 mg) by mouth daily Take mid day, no later than 1400 90 tablet 1     furosemide (LASIX) 80 MG tablet Take 1 tablet (80 mg) by mouth daily (with breakfast) At 0800 daily 90 tablet 1     gabapentin (NEURONTIN) 100 MG capsule Take 200 mg by mouth 3 times daily At 0900, 1200, and 2000       Gauze Pads & Dressings (GAUZE PADS 4\"X4\") 4\"X4\" PADS 1 each 3 times daily as needed 25 each 1     hydrocortisone, Perianal, (ANUSOL-HC) 2.5 % cream Place rectally 2 times daily as needed for hemorrhoids       Lactobacillus-Inulin ([facility]) CAPS TAKE 1 CAPSULE BY MOUTH TWICE DAILY. **NON-COVERED MED** (NO REFILLS REMAINING) 60 capsule 11     montelukast (SINGULAIR) 10 MG tablet TAKE 1 TABLET BY MOUTH EVERY MORNING. (ASTHMA) 30 tablet 11     multivitamin, therapeutic (THERA-VIT) TABS tablet Take 1 tablet by mouth daily       naltrexone (DEPADE/REVIA) 50 MG tablet Take 100 mg by mouth daily ( 2 x 50 mg tablet)       nicotine (NICORETTE) 2 MG gum Take 2 mg by mouth as needed for smoking cessation       nystatin-triamcinolone (MYCOLOG II) 019814-1.1 UNIT/GM-% external cream Apply topically 2 times daily For 1-2 weeks.       order for DME Equipment being ordered: roll of micropore tape to dress foot wound bid 1 each 0     order for DME Equipment being ordered: 1 surgical shoe 1 Device 0     order for DME [employer] [facility] Phone [phone] Fax [phone]  EdemaWear Size Medium/Yellow qty 4 sleeves  Length of Need: 1 month  Frequency of " dressing change daily 1 Device 0     order for DME [employer]  Phone #[phone]  Fax #[phone]  Coolflex Velcro wraps    Length of Need: Life Time  # of Pairs 1 set 30 days 0     order for DME [employer] [facility] Phone [phone] Fax [phone]  Primary Dressing Hydrofera Blue Ready   Qty 5 sheets  Secondary Dressing 4' roll gauze Qty 30  Secondary Dressing 2' medipore tape Qty 1  Secondary Dressing 4x4 gauze loaf Qty  1  Length of Need: 1 month  Frequency of dressing change: daily 30 days 0     order for DME Oxygen 2 Li/min  at night and bled into BiPAP 1 Device 0     order for DME Equipment being ordered: CPAP with mask and tubing 1 Device 0     order for DME Equipment being ordered: support compression hose BK  2 pair black 30mm HG   To be applied on arising & removed while lying down to go to sleep 1 each 0     potassium chloride SA (K-DUR/KLOR-CON M) 20 MEQ CR tablet TAKE 1 TABLET BY MOUTH TWICE DAILY. (LOW POTASSIUM) 62 tablet 11     PULMICORT FLEXHALER 180 MCG/ACT inhaler INHALE 2 PUFFS INTO THE LUNGS TWICE DAILY 1 each 10     sertraline (ZOLOFT) 100 MG tablet Take 100 mg by mouth daily Take with 50 mg tablet for a total dose of 150 mg daily.       sertraline (ZOLOFT) 50 MG tablet Take 50 mg by mouth daily Take with 100 mg tablet for a total dose of 150 mg daily.       simvastatin (ZOCOR) 40 MG tablet TAKE 1 TABLET BY MOUTH EVERY MORNING.  (CHOLESTEROL) 31 tablet 11     Sodium Phosphates (PHOSPHATE ENEMA RE) Use one enema rectally every 1-3 hours as needed       spironolactone (ALDACTONE) 25 MG tablet TAKE 1 TABLET BY MOUTH ONCE DAILY. (HIGH BLOOD PRESSURE) 31 tablet 11     temazepam (RESTORIL) 15 MG capsule Take 15 mg by mouth nightly as needed for sleep       tiotropium (SPIRIVA) 18 MCG inhaled capsule Inhale 18 mcg into the lungs daily       trolamine salicylate (ASPERCREME) 10 % external cream Apply topically as needed for moderate pain knee       vitamin D3  "(CHOLECALCIFEROL) 2000 units (50 mcg) tablet Take 1 tablet by mouth daily       warfarin ANTICOAGULANT (COUMADIN) 4 MG tablet 6 mg today 8 Mg daily  Recheck INR 8/31 28 tablet 0     warfarin ANTICOAGULANT (COUMADIN) 4 MG tablet Take 8 mg QD 28 tablet 0     warfarin ANTICOAGULANT (COUMADIN) 4 MG tablet Take 4 tablets (16 mg) today (7/28/20) and 2 tablets (8 mg) all other days. Recheck 8/3/20. 16 tablet 0     Warfarin Therapy Reminder 1 each continuous prn 1 each 0     White Petrolatum ointment Apply topically nightly as needed for dry skin         EXAM:    Vitals: /80   Ht 1.803 m (5' 11\")   Wt 144.7 kg (319 lb)   BMI 44.49 kg/m    BMI: Body mass index is 44.49 kg/m .  Height: 5' 11\"    Constitutional/ general:  Pt is in no apparent distress, appears well-nourished.  Cooperative with history and physical exam.      Vascular:  Pedal pulses are palpable bilaterally for both the DP and PT arteries.  CFT < 3 sec.  No edema.  Pedal hair growth noted.      Neuro:  Alert and oriented x 3. Coordinated gait.  Light touch sensation is intact to the L4, L5, S1 distributions. No obvious deficits.  No evidence of neurological-based weakness, spasticity, or contracture in the lower extremities.      Derm: Right foot ulcer in the dorsal left first web space. This is triangular shaped 1.5x 2cm x 0.2 cm deep.  The base is 50% granular and 50% fibrous. Erythema of the foot and hyperpigmentation of the leg consistent with changes from chronic edema.      Musculoskeletal:    Lower extremity muscle strength is normal.  Patient is ambulatory without an assistive device or brace .  No gross deformities.  No pain on palpation.           "

## 2020-08-21 NOTE — TELEPHONE ENCOUNTER
Return call to [facility] and spoke with Ashlyn.    She states that the group home / home medical staff already faxed over orders to them that listed the measurements of the wound. No further information needed at this time.    Joanne Carroll ATC

## 2020-08-21 NOTE — PATIENT INSTRUCTIONS
Thank you for choosing [facility] / [facility]le [facility]!    [facility] [place] SCHEDULE SURGERY: [phone]   [address] BILLING QUESTIONS: [phone]   [place] [address] AFTER HOURS: [phone]   PH: [phone] [employer] LINE:[phone]   FAX: [phone] APPOINTMENTS: [phone]     SIGNS OF INFECTION    expanding redness around the wound     yellow or greenish-colored pus or cloudy wound drainage     red streaking spreading from the wound     increased swelling, tenderness, or pain around the wound     fever  *If you notice any of these signs of infection, call us right away!

## 2020-08-24 ENCOUNTER — TRANSFERRED RECORDS (OUTPATIENT)
Dept: HEALTH INFORMATION MANAGEMENT | Facility: CLINIC | Age: 60
End: 2020-08-24

## 2020-08-24 NOTE — TELEPHONE ENCOUNTER
Phone call to [facility].   She is calling regarding and old claim for wound supplies that they received a verbal order for on 5/22/20 from Dr. De La Torre.   In looking further, she was able to find the documentation needed and does not need anything further.     KIM Montero, RN

## 2020-08-24 NOTE — TELEPHONE ENCOUNTER
Left 2nd voicemail for Hunter, asking for a return call.   Consent to communicate on file to speak to [facility], but unsure if Hunter is staff for them or not.     KIM Montero RN

## 2020-08-25 NOTE — TELEPHONE ENCOUNTER
3rd attempt to reach Hunter Martinez, caregiver. Left voicemail on his cell to return call.     Phone call to home number, [facility]. Left voicemail asking for a return call regarding patient or Hunter Martinez. Voicemail message states no one available to take a call. If urgent, provided main office number of: [phone].   Attempted to call main office number and line was busy x 3.     Phone call to cell number listed and spoke to SHUBHAM Lala. She is the nurse for patient's [facility] but is not there now. Explained that we were trying to return Hunter's call. She confirmed Hunter is a [facility] employee and caretaker.   Asked that she pass on information regarding Santyl or have Braden call back if still has questions. She verbalized understanding.     KIM Montero RN

## 2020-08-25 NOTE — TELEPHONE ENCOUNTER
Braden Martinez left voicemail returning call.     Phone call to Braden. He did not know why we were calling and asked if patient missed an appointment. Informed we had been trying to return his call regarding his question regarding the Santyl. He apologized for not getting back with us.   He received information from the pharmacy regarding the Santyl.   Reviewed upcoming appointments and address given for [facility]. He verbalized understanding.     KIM Montero RN

## 2020-08-31 ENCOUNTER — ANTICOAGULATION THERAPY VISIT (OUTPATIENT)
Dept: NURSING | Facility: CLINIC | Age: 60
End: 2020-08-31

## 2020-08-31 ENCOUNTER — TELEPHONE (OUTPATIENT)
Dept: FAMILY MEDICINE | Facility: CLINIC | Age: 60
End: 2020-08-31

## 2020-08-31 DIAGNOSIS — Z79.01 LONG TERM CURRENT USE OF ANTICOAGULANT THERAPY: Primary | ICD-10-CM

## 2020-08-31 DIAGNOSIS — I82.442 ACUTE DEEP VEIN THROMBOSIS (DVT) OF TIBIAL VEIN OF LEFT LOWER EXTREMITY (H): ICD-10-CM

## 2020-08-31 DIAGNOSIS — Z79.01 LONG TERM CURRENT USE OF ANTICOAGULANT THERAPY: ICD-10-CM

## 2020-08-31 DIAGNOSIS — Z86.711 HISTORY OF PULMONARY EMBOLISM: ICD-10-CM

## 2020-08-31 DIAGNOSIS — I80.03 THROMBOPHLEBITIS OF SUPERFICIAL VEINS OF BOTH LOWER EXTREMITIES: ICD-10-CM

## 2020-08-31 LAB
CAPILLARY BLOOD COLLECTION: NORMAL
INR PPP: 3.4 (ref 0.86–1.14)

## 2020-08-31 PROCEDURE — 99207 ZZC NO CHARGE NURSE ONLY: CPT

## 2020-08-31 PROCEDURE — 36416 COLLJ CAPILLARY BLOOD SPEC: CPT | Performed by: FAMILY MEDICINE

## 2020-08-31 PROCEDURE — 85610 PROTHROMBIN TIME: CPT | Performed by: FAMILY MEDICINE

## 2020-08-31 RX ORDER — WARFARIN SODIUM 4 MG/1
TABLET ORAL
Qty: 30 TABLET | Refills: 0 | Status: SHIPPED | OUTPATIENT
Start: 2020-08-31 | End: 2020-09-14

## 2020-08-31 NOTE — TELEPHONE ENCOUNTER
ANTICOAGULATION MANAGEMENT      Petey Archibald due for annual renewal of referral to anticoagulation monitoring. Order pended for your review and signature.      ANTICOAGULATION SUMMARY      Warfarin indication(s)     PE    Heart valve present?  NO       Current goal range   INR: 2.0-3.0     Goal appropriate for indication? Yes, INR 2-3 appropriate for hx of DVT, PE, hypercoagulable state, Afib, LVAD, or bileaflet AVR without risk factors     Current duration of therapy Indefinite/long term therapy   Time in Therapeutic Range (TTR)  (Goal > 60%) 77.2% %       Office visit with referring provider's group within last year yes on 8/6/20 Dr.Hinck Nereida Grant, RN

## 2020-08-31 NOTE — PROGRESS NOTES
ANTICOAGULATION FOLLOW-UP CLINIC VISIT    Patient Name:  Petey Archibald  Date:  8/31/2020  Contact Type:  Telephone/ [facility] at Group home    SUBJECTIVE:  Patient Findings     Comments:   Spoke with Hunter at the group home. Pt's INR was elevated 2 weeks ago as well. Maintenance dose reduced by 3.6%        Clinical Outcomes     Comments:   Spoke with Hunter at the group home. Pt's INR was elevated 2 weeks ago as well. Maintenance dose reduced by 3.6%           OBJECTIVE    Recent labs: (last 7 days)     08/31/20  0923   INR 3.40*       ASSESSMENT / PLAN  INR assessment SUPRA    Recheck INR In: 2 WEEKS    INR Location Outside lab      Anticoagulation Summary  As of 8/31/2020    INR goal:   2.0-3.0   TTR:   77.2 % (11.6 mo)   INR used for dosing:   3.40! (8/31/2020)   Warfarin maintenance plan:   6 mg (4 mg x 1.5) every Mon; 8 mg (4 mg x 2) all other days   Full warfarin instructions:   8/31: 4 mg; Otherwise 6 mg every Mon; 8 mg all other days   Weekly warfarin total:   54 mg   Plan last modified:   Nereida Grant RN (8/31/2020)   Next INR check:   9/14/2020   Priority:   Maintenance   Target end date:   Indefinite    Indications    History of pulmonary embolism [Z86.711]  Long term current use of anticoagulant therapy [Z79.01]             Anticoagulation Episode Summary     INR check location:   Anticoagulation Clinic    Preferred lab:       Send INR reminders to:   [facility]    Comments:   [facility]; A new Coumadin Prescription will need to sent to [facility] with current dose and next INR check.      Anticoagulation Care Providers     Provider Role Specialty Phone number    Silvino Baker MD [facility] [phone]            See the Encounter Report to view Anticoagulation Flowsheet and Dosing Calendar (Go to Encounters tab in chart review, and find the Anticoagulation Therapy Visit)    Pt INR is 3.4 today. Braden advised to have pt take 4 mg today 8/31/20.  Maintenance dose reduced by 3.6% since pt's INR was elevated 2 weeks ago as well. Braden advised to have pt take 6 mg on Mondays and 8 mg all the other days for now. Rx sent to [facility] pharmacy. Recheck INR in 2 weeks scheduled on 9/14/20 at 9:45 am at [facility]. Group home staff aware if signs of clotting (pain, tenderness, swelling, color change in leg or arm, SOB) and bleeding occur (blood in stool, urine, large bruising, bleeding gums, nosebleeds) to have INR check sooner. If sx severe report to ER or concerned for stroke call 911. If general questions or concerns arise, call clinic.         Nereida Grant RN

## 2020-09-14 ENCOUNTER — ANTICOAGULATION THERAPY VISIT (OUTPATIENT)
Dept: LAB | Facility: CLINIC | Age: 60
End: 2020-09-14

## 2020-09-14 DIAGNOSIS — I80.03 THROMBOPHLEBITIS OF SUPERFICIAL VEINS OF BOTH LOWER EXTREMITIES: ICD-10-CM

## 2020-09-14 DIAGNOSIS — Z86.711 HISTORY OF PULMONARY EMBOLISM: ICD-10-CM

## 2020-09-14 DIAGNOSIS — I82.442 ACUTE DEEP VEIN THROMBOSIS (DVT) OF TIBIAL VEIN OF LEFT LOWER EXTREMITY (H): ICD-10-CM

## 2020-09-14 DIAGNOSIS — Z79.01 LONG TERM CURRENT USE OF ANTICOAGULANT THERAPY: ICD-10-CM

## 2020-09-14 LAB
CAPILLARY BLOOD COLLECTION: NORMAL
INR PPP: 3.7 (ref 0.86–1.14)

## 2020-09-14 PROCEDURE — 85610 PROTHROMBIN TIME: CPT | Performed by: FAMILY MEDICINE

## 2020-09-14 PROCEDURE — 36416 COLLJ CAPILLARY BLOOD SPEC: CPT | Performed by: FAMILY MEDICINE

## 2020-09-14 RX ORDER — WARFARIN SODIUM 4 MG/1
TABLET ORAL
Qty: 30 TABLET | Refills: 0 | Status: SHIPPED | OUTPATIENT
Start: 2020-09-14 | End: 2020-10-12

## 2020-09-14 NOTE — PROGRESS NOTES
Anticoagulation Management    Unable to reach Petey( with caregiver Hunter) today.    Today's INR result of 3.7 is supratherapeutic (goal INR of 2.0-3.0).  Result received from: Clinic Lab    Follow up required to confirm warfarin dose taken and assess for changes    left message for Hunter to call back       Anticoagulation clinic to follow up    Ysabel Tena RN

## 2020-09-14 NOTE — PROGRESS NOTES
ANTICOAGULATION MANAGEMENT     Patient Name:  Petey Archibald  Date:  9/14/2020    ASSESSMENT /SUBJECTIVE:    Today's INR result of 3.7 is supratherapeutic. Goal INR of 2.0-3.0      Warfarin dose taken: Warfarin taken as previously instructed    Diet: No new diet changes affecting INR    Medication changes/ interactions: No new medications/supplements affecting INR    Previous INR: Supratherapeutic     S/S of bleeding or thromboembolism: No    New injury or illness: No    Upcoming surgery, procedure or cardioversion: No    Additional findings: None      PLAN:    Spoke with group  regarding INR result and instructed:     Warfarin Dosing Instructions: 4mg then change your warfarin dose to 6 mg MF and 8 mg AOD    Instructed patient to follow up no later than: 2 weeks  Lab visit scheduled    Education provided: Target INR goal and significance of current INR result      Robin verbalizes understanding and agrees to warfarin dosing plan.    Instructed to call the Anticoagulation Clinic for any changes, questions or concerns. (#[phone])        Ysabel Tena RN      OBJECTIVE:  Recent labs: (last 7 days)     09/14/20  0941   INR 3.70*         No question data found.  Anticoagulation Summary  As of 9/14/2020    INR goal:   2.0-3.0   TTR:   75.6 % (11.6 mo)   INR used for dosing:   No new INR was available at the time of this encounter.   Warfarin maintenance plan:   6 mg (4 mg x 1.5) every Mon, Fri; 8 mg (4 mg x 2) all other days   Full warfarin instructions:   9/14: 4 mg; Otherwise 6 mg every Mon, Fri; 8 mg all other days   Weekly warfarin total:   52 mg   Plan last modified:   Ysabel Tena RN (9/14/2020)   Next INR check:   9/28/2020   Priority:   Maintenance   Target end date:   Indefinite    Indications    History of pulmonary embolism [Z86.711]  Long term current use of anticoagulant therapy [Z79.01]             Anticoagulation Episode Summary     INR check location:   Anticoagulation  Clinic    Preferred lab:       Send INR reminders to:   [facility]    Comments:   [facility]; A new Coumadin Prescription will need to sent to [facility] with current dose and next INR check.      Anticoagulation Care Providers     Provider Role Specialty Phone number    Yolande Lacy PA-C Referring Physician Assistant [phone]    Silvino Baker MD [facility] [phone]

## 2020-09-21 ENCOUNTER — HOSPITAL ENCOUNTER (EMERGENCY)
Facility: CLINIC | Age: 60
Discharge: HOME | End: 2020-09-21
Attending: EMERGENCY MEDICINE | Admitting: EMERGENCY MEDICINE
Payer: MEDICARE

## 2020-09-21 ENCOUNTER — TELEPHONE (OUTPATIENT)
Dept: PODIATRY | Facility: CLINIC | Age: 60
End: 2020-09-21

## 2020-09-21 VITALS
WEIGHT: 315 LBS | BODY MASS INDEX: 44.1 KG/M2 | TEMPERATURE: 97.7 F | SYSTOLIC BLOOD PRESSURE: 133 MMHG | HEART RATE: 71 BPM | HEIGHT: 71 IN | OXYGEN SATURATION: 99 % | RESPIRATION RATE: 16 BRPM | DIASTOLIC BLOOD PRESSURE: 79 MMHG

## 2020-09-21 DIAGNOSIS — L03.116 CELLULITIS OF LEFT LOWER EXTREMITY: ICD-10-CM

## 2020-09-21 LAB
ANION GAP SERPL CALCULATED.3IONS-SCNC: 1 MMOL/L (ref 3–14)
BASOPHILS # BLD AUTO: 0 10E9/L (ref 0–0.2)
BASOPHILS NFR BLD AUTO: 0.2 %
BUN SERPL-MCNC: 25 MG/DL (ref 7–30)
CALCIUM SERPL-MCNC: 9.2 MG/DL (ref 8.5–10.1)
CHLORIDE SERPL-SCNC: 104 MMOL/L (ref 94–109)
CO2 SERPL-SCNC: 33 MMOL/L (ref 20–32)
CREAT SERPL-MCNC: 1.06 MG/DL (ref 0.66–1.25)
DIFFERENTIAL METHOD BLD: ABNORMAL
EOSINOPHIL # BLD AUTO: 0.1 10E9/L (ref 0–0.7)
EOSINOPHIL NFR BLD AUTO: 1.9 %
ERYTHROCYTE [DISTWIDTH] IN BLOOD BY AUTOMATED COUNT: 15.8 % (ref 10–15)
GFR SERPL CREATININE-BSD FRML MDRD: 75 ML/MIN/{1.73_M2}
GLUCOSE SERPL-MCNC: 78 MG/DL (ref 70–99)
HCT VFR BLD AUTO: 40.4 % (ref 40–53)
HGB BLD-MCNC: 12.4 G/DL (ref 13.3–17.7)
IMM GRANULOCYTES # BLD: 0 10E9/L (ref 0–0.4)
IMM GRANULOCYTES NFR BLD: 0.2 %
LACTATE BLD-SCNC: 1.1 MMOL/L (ref 0.7–2)
LYMPHOCYTES # BLD AUTO: 1.5 10E9/L (ref 0.8–5.3)
LYMPHOCYTES NFR BLD AUTO: 28.7 %
MCH RBC QN AUTO: 28.5 PG (ref 26.5–33)
MCHC RBC AUTO-ENTMCNC: 30.7 G/DL (ref 31.5–36.5)
MCV RBC AUTO: 93 FL (ref 78–100)
MONOCYTES # BLD AUTO: 0.4 10E9/L (ref 0–1.3)
MONOCYTES NFR BLD AUTO: 8.5 %
NEUTROPHILS # BLD AUTO: 3.1 10E9/L (ref 1.6–8.3)
NEUTROPHILS NFR BLD AUTO: 60.5 %
NRBC # BLD AUTO: 0 10*3/UL
NRBC BLD AUTO-RTO: 0 /100
PLATELET # BLD AUTO: 292 10E9/L (ref 150–450)
POTASSIUM SERPL-SCNC: 3.9 MMOL/L (ref 3.4–5.3)
RBC # BLD AUTO: 4.35 10E12/L (ref 4.4–5.9)
SODIUM SERPL-SCNC: 138 MMOL/L (ref 133–144)
WBC # BLD AUTO: 5.2 10E9/L (ref 4–11)

## 2020-09-21 PROCEDURE — 25800030 ZZH RX IP 258 OP 636: Performed by: EMERGENCY MEDICINE

## 2020-09-21 PROCEDURE — 80048 BASIC METABOLIC PNL TOTAL CA: CPT | Performed by: EMERGENCY MEDICINE

## 2020-09-21 PROCEDURE — 99284 EMERGENCY DEPT VISIT MOD MDM: CPT | Mod: 25

## 2020-09-21 PROCEDURE — 96365 THER/PROPH/DIAG IV INF INIT: CPT

## 2020-09-21 PROCEDURE — 85025 COMPLETE CBC W/AUTO DIFF WBC: CPT | Performed by: EMERGENCY MEDICINE

## 2020-09-21 PROCEDURE — 83605 ASSAY OF LACTIC ACID: CPT | Performed by: EMERGENCY MEDICINE

## 2020-09-21 PROCEDURE — 25000128 H RX IP 250 OP 636: Performed by: EMERGENCY MEDICINE

## 2020-09-21 RX ORDER — CEFTRIAXONE 2 G/1
2 INJECTION, POWDER, FOR SOLUTION INTRAMUSCULAR; INTRAVENOUS ONCE
Status: COMPLETED | OUTPATIENT
Start: 2020-09-21 | End: 2020-09-21

## 2020-09-21 RX ORDER — SODIUM CHLORIDE 9 MG/ML
INJECTION, SOLUTION INTRAVENOUS CONTINUOUS
Status: DISCONTINUED | OUTPATIENT
Start: 2020-09-21 | End: 2020-09-21 | Stop reason: HOSPADM

## 2020-09-21 RX ORDER — CEPHALEXIN 500 MG/1
500 CAPSULE ORAL 4 TIMES DAILY
Qty: 40 CAPSULE | Refills: 0 | Status: SHIPPED | OUTPATIENT
Start: 2020-09-21 | End: 2021-03-23

## 2020-09-21 RX ORDER — CEPHALEXIN 500 MG/1
500 CAPSULE ORAL 4 TIMES DAILY
Qty: 40 CAPSULE | Refills: 0 | Status: SHIPPED | OUTPATIENT
Start: 2020-09-21 | End: 2020-09-21

## 2020-09-21 RX ADMIN — SODIUM CHLORIDE 1000 ML: 9 INJECTION, SOLUTION INTRAVENOUS at 18:07

## 2020-09-21 RX ADMIN — CEFTRIAXONE SODIUM 2 G: 2 INJECTION, POWDER, FOR SOLUTION INTRAMUSCULAR; INTRAVENOUS at 18:12

## 2020-09-21 ASSESSMENT — ENCOUNTER SYMPTOMS
FEVER: 0
WOUND: 1

## 2020-09-21 ASSESSMENT — MIFFLIN-ST. JEOR: SCORE: 2278.64

## 2020-09-21 NOTE — ED PROVIDER NOTES
History     Chief Complaint:  Wound Infection       HPI   Petey Archibald is a 59 year old male on warfarin with a history of DVT, hyperlipidemia, PE, and peripheral vascular disease who presents with a wound infection. The patient developed an open wound to the top of his left foot 2 days ago. The patient also has redness of the foot but he states that the redness is chronic and he is currently following with a foot doctor. He reports having multiple infections in the left foot in the past and has been on multiple rounds of antibiotics. The patient tried to make an appointment with his foot doctor today but he is unable to get an appointment until Thursday. He denies having any pain from the wound currently. He denies any fevers or other concerns.     Allergies:  Bees  Augmentin  Penicillins     Medications:    abilify   wellbutrin   Dexilant   furosemide   gabapentin  potassium chloride SA   sertraline  simvastatin  spironolactone   temazepam   warfarin     Past Medical History:    Borderline mental retardation   COPD (chronic obstructive pulmonary disease) (H)   History of DVT of lower extremity   History of pulmonary embolism   Hyperlipidemia   Mild intellectual disabilities   Morbid obesity (H)   Peripheral edema   Peripheral vascular disease (H)   Sleep apnea   Tobacco dependence   Venous stasis dermatitis     Past Surgical History:    Excise malignant lesions   Rectal surgery     Family History:    Diabetes     Social History:  Smoking Status: current  Smokeless Tobacco: current  Alcohol Use: No  Drug Use: No  PCP: Hortensia Peck     Review of Systems   Constitutional: Negative for fever.   Skin: Positive for wound (left foot).        Chronic redness of left foot   All other systems reviewed and are negative.        Physical Exam     Patient Vitals for the past 24 hrs:   BP Temp Temp src Pulse Resp SpO2 Height Weight   09/21/20 1900 135/77 -- -- 65 -- 99 % -- --   09/21/20 1830 (!) 144/84 --  "-- 66 -- 100 % -- --   09/21/20 1815 -- -- -- -- -- 99 % -- --   09/21/20 1800 (!) 141/88 -- -- 63 -- 98 % -- --   09/21/20 1750 (!) 159/90 -- -- 65 -- 100 % -- --   09/21/20 1400 118/66 97.7  F (36.5  C) Temporal 79 16 96 % 1.803 m (5' 11\") 145.2 kg (320 lb)        Physical Exam  Nursing note and vitals reviewed.  Constitutional:  Oriented to person, place, and time. Cooperative.   HENT:   Nose:    Nose normal.   Mouth/Throat:   Mucous membranes are normal.   Eyes:    Conjunctivae normal and EOM are normal.      Pupils are equal, round, and reactive to light.   Neck:    Trachea normal.   Cardiovascular:  Normal rate, regular rhythm, normal heart sounds and normal pulses. No murmur heard.  Pulmonary/Chest:  Effort normal and breath sounds normal.   Abdominal:   Soft. Normal appearance and bowel sounds are normal.      There is no tenderness.      There is no rebound and no CVA tenderness.   Musculoskeletal:  Extremities atraumatic x 4.   Lymphadenopathy:  No cervical adenopathy.   Neurological:   Alert and oriented to person, place, and time. Normal strength.      No cranial nerve deficit or sensory deficit. GCS eye subscore is 4. GCS verbal subscore is 5. GCS motor subscore is 6.   Skin:    Open wound to the dorsal aspect of the left foot mainly over the first and second toes and the area between them with some crusty discharge present.  Some surrounding erythema and warmth but no tenderness to palpation.   Psychiatric:   Normal mood and affect.     Emergency Department Course     Laboratory:  Laboratory findings were communicated with the patient who voiced understanding of the findings.    CBC:  WBC 5.2, HGB 12.4 (L), , o/w WNL     BMP: CO2 33 (H), anion gap 1 (L), o/w WNL (Creatinine: 1.06)     Lactic Acid whole blood (1748): 1.1      Interventions:  1807 0.9% sodium chloride BOLUS 1000 mL IV   1812 Rocephin 2 g IV    Emergency Department Course:  Past medical records, nursing notes, and vitals " reviewed.    1743 I performed an exam of the patient as documented above.    IV was inserted and blood was drawn for laboratory testing, results above.      1932 Patient rechecked and updated.       Findings and plan explained to the Patient. Patient discharged home with instructions regarding supportive care, medications, and reasons to return. The importance of close follow-up was reviewed. The patient was prescribed keflex.       Impression & Plan     Medical Decision Making:  Petey Archibald is a 59 year old male who presents to the emergency department today with concerns for a recurrent cellulitis to his left foot.  He also does have some skin breakdown in that area.  I felt it was reasonable to check the above blood work and provide him IV fluids and an IV dose of antibiotics.  I also discussed staying in the hospital for IV antibiotics as well as further evaluation and management.  It is reassuring that his blood work is unremarkable including his lactic acid and WBC, however I still felt it was best that he be admitted due to how his foot looks.  The patient was quite adamant about not staying, and in fact he refused to stay.  Therefore I indicated that he will need to be seen in the clinic in the next couple of days for recheck.  I will send him home with a prescription for Keflex and instructions to return with any concerns or worsening symptoms.  He understands there is risk in going home and specifically that this could significantly worsen.  He states that he is his own decision maker though, and therefore I cannot make him stay.      Discharge Diagnosis:    ICD-10-CM    1. Cellulitis of left lower extremity  L03.116        Disposition:  Discharged to home.    Discharge Medications:  New Prescriptions    CEPHALEXIN (KEFLEX) 500 MG CAPSULE    Take 1 capsule (500 mg) by mouth 4 times daily for 10 days       Scribe Disclosure:  Azar TA, am serving as a scribe at 5:43 PM on 9/21/2020 to document  services personally performed by Carlos Sandoval MD based on my observations and the provider's statements to me.      9/21/2020   Carlos Sandoval MD Lashkowitz, Seth H, MD  09/21/20 5732

## 2020-09-21 NOTE — TELEPHONE ENCOUNTER
----- Message from Breonna Zacarias ATC sent at 9/21/2020  1:27 PM CDT -----    ----- Message -----  From: Mayi Aggarwal  Sent: 9/21/2020  12:43 PM CDT  To: [facility]    Hi all!    This patient's care giver was advised to bring the patient in today or tomorrow. I am unsure what the patient is being seen for. The patient has appointments on Friday (9/25 @ 9:15) to see Dr. Knutson and Monday (9/28 @ 9:00) to see Dr. Flaherty. Please give care giver a call back at [phone].     Thank you,    Mayi

## 2020-09-21 NOTE — TELEPHONE ENCOUNTER
Called [facility] and left a voicemail to call back.  Petey has 2 podiatry appts on the books:  Thurs 9/24 [place] with Wil  Fri 9/25 [facility] with Zenia  Mon 9/28 [facility]..    I think the only one needed is the wound center with Dr Knutson this Friday.    Onelia King CMA ([facility])  Podiatry/Foot & Ankle Surgery  [facility]

## 2020-09-21 NOTE — ED AVS SNAPSHOT
Emergency Department  [address] [place] [address]  Phone:  [phone]  Fax:  [phone]                                    Petey Archibald   MRN: [id number]    Department:   Emergency Department   Date of Visit:  9/21/2020           After Visit Summary Signature Page    I have received my discharge instructions, and my questions have been answered. I have discussed any challenges I see with this plan with the nurse or doctor.    ..........................................................................................................................................  Patient/Patient Representative Signature      ..........................................................................................................................................  Patient Representative Print Name and Relationship to Patient    ..................................................               ................................................  Date                                   Time    ..........................................................................................................................................  Reviewed by Signature/Title    ...................................................              ..............................................  Date                                               Time          22EPIC Rev 08/18

## 2020-09-22 ENCOUNTER — TELEPHONE (OUTPATIENT)
Dept: FAMILY MEDICINE | Facility: CLINIC | Age: 60
End: 2020-09-22

## 2020-09-22 NOTE — TELEPHONE ENCOUNTER
ANTICOAGULATION  MANAGEMENT: Discharge Review    Petey Archibald chart reviewed for anticoagulation continuity of care    Emergency room visit on 9/21 for    Cellulitis of left lower extremity   Clinical impression   Wound Infection   Chief complaint      .    Discharge disposition: Home    Results:    No results for input(s): INR, [id number], AXA in the last 168 hours.  Anticoagulation inpatient management:     home regimen continued    Anticoagulation discharge instructions:     Warfarin dosing: home regimen continued   Bridging: No   INR goal change: No      Medication changes affecting anticoagulation: Yes: Keflex 500 mg QId X 10 days & rocephin 2 Gram IV  And infection    Additional factors affecting anticoagulation: No    Plan     Recommend to check INR on 9/28  Lowered dosing tomorrow and called RX to [facility] and directions given to Braden. With change made by Ysabel it would lower the dosing for the week by 6 mg. Asked him to encourage Petey to increase his green intake until seen.      [facility]       Anticoagulation Calendar updated    Lisa Ponce RN

## 2020-09-25 ENCOUNTER — HOSPITAL ENCOUNTER (OUTPATIENT)
Dept: WOUND CARE | Facility: CLINIC | Age: 60
Discharge: HOME | End: 2020-09-25
Attending: PODIATRIST | Admitting: PODIATRIST
Payer: MEDICARE

## 2020-09-25 VITALS
HEIGHT: 71 IN | RESPIRATION RATE: 18 BRPM | BODY MASS INDEX: 44.1 KG/M2 | TEMPERATURE: 97.6 F | DIASTOLIC BLOOD PRESSURE: 73 MMHG | HEART RATE: 61 BPM | WEIGHT: 315 LBS | SYSTOLIC BLOOD PRESSURE: 123 MMHG

## 2020-09-25 DIAGNOSIS — I89.0 LYMPHEDEMA OF LEFT LEG: Primary | ICD-10-CM

## 2020-09-25 DIAGNOSIS — R60.0 LEG EDEMA, LEFT: ICD-10-CM

## 2020-09-25 DIAGNOSIS — F17.200 TOBACCO DEPENDENCE: Chronic | ICD-10-CM

## 2020-09-25 DIAGNOSIS — L97.523 CHRONIC ULCER OF LEFT FOOT WITH NECROSIS OF MUSCLE (H): ICD-10-CM

## 2020-09-25 DIAGNOSIS — L97.522 SKIN ULCER OF LEFT FOOT WITH FAT LAYER EXPOSED (H): ICD-10-CM

## 2020-09-25 DIAGNOSIS — I73.9 PERIPHERAL VASCULAR DISEASE (H): ICD-10-CM

## 2020-09-25 DIAGNOSIS — E66.01 MORBID OBESITY (H): Chronic | ICD-10-CM

## 2020-09-25 DIAGNOSIS — L97.922 ULCER OF LEFT LOWER EXTREMITY WITH FAT LAYER EXPOSED (H): ICD-10-CM

## 2020-09-25 PROCEDURE — 11042 DBRDMT SUBQ TIS 1ST 20SQCM/<: CPT

## 2020-09-25 PROCEDURE — 11042 DBRDMT SUBQ TIS 1ST 20SQCM/<: CPT | Performed by: PODIATRIST

## 2020-09-25 PROCEDURE — 99213 OFFICE O/P EST LOW 20 MIN: CPT | Mod: 25 | Performed by: PODIATRIST

## 2020-09-25 PROCEDURE — 97597 DBRDMT OPN WND 1ST 20 CM/<: CPT

## 2020-09-25 RX ORDER — GINSENG 100 MG
CAPSULE ORAL
COMMUNITY
Start: 2020-06-24 | End: 2023-02-14

## 2020-09-25 RX ORDER — NICOTINE 21 MG/24HR
PATCH, TRANSDERMAL 24 HOURS TRANSDERMAL
COMMUNITY
Start: 2019-09-27 | End: 2023-02-14

## 2020-09-25 RX ORDER — LACTOBACILLUS RHAMNOSUS GG 15B CELL
1 CAPSULE, SPRINKLE ORAL
COMMUNITY
End: 2021-06-02

## 2020-09-25 ASSESSMENT — MIFFLIN-ST. JEOR: SCORE: 2269.57

## 2020-09-25 NOTE — PROGRESS NOTES
[facility] [place] Progress Note    Subject: Patient was seen at wound center.  Patient presents to the wound clinic for swollen left foot and leg with ulceration to the top of the foot and toes.  He was recently hospitalized and had an MRI which was negative for deeper infection.  Patient denies fever, nausea, vomiting.  He does note some pain to the left second toe.  He is here with a caregiver.  They have been doing Iodosorb dressing changes at home.    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     COPD (chronic obstructive pulmonary disease) (H)      History of DVT of lower extremity      History of pulmonary embolism      Hyperlipidemia      Mild intellectual disabilities      Morbid obesity (H)      Peripheral edema      Peripheral vascular disease (H)      Sleep apnea      Tobacco dependence      Venous stasis dermatitis        Social Hx:   Social History     Socioeconomic History     Marital status: Single     Spouse name: Not on file     Number of children: Not on file     Years of education: Not on file     Highest education level: Not on file   Occupational History     Not on file   Social Needs     Financial resource strain: Not on file     Food insecurity     Worry: Not on file     Inability: Not on file     Transportation needs     Medical: Not on file     Non-medical: Not on file   Tobacco Use     Smoking status: Current Every Day Smoker     Packs/day: 1.00     Years: 30.00     Pack years: 30.00     Types: Cigarettes     Smokeless tobacco: Current User     Tobacco comment: started at age 18    Substance and Sexual Activity     Alcohol use: No     Alcohol/week: 0.0 standard drinks     Drug use: No     Sexual activity: Not Currently     Partners: Female   Lifestyle     Physical activity     Days per week: Not on file     Minutes per session: Not on file     Stress: Not on file   Relationships     Social connections     Talks on phone: Not on  file     Gets together: Not on file     Attends Hinduism service: Not on file     Active member of club or organization: Not on file     Attends meetings of clubs or organizations: Not on file     Relationship status: Not on file     Intimate partner violence     Fear of current or ex partner: Not on file     Emotionally abused: Not on file     Physically abused: Not on file     Forced sexual activity: Not on file   Other Topics Concern     Parent/sibling w/ CABG, MI or angioplasty before 65F 55M? No   Social History Narrative     Not on file       Surgical Hx:   Past Surgical History:   Procedure Laterality Date     COLONOSCOPY N/A 4/15/2019    Procedure: COMBINED COLONOSCOPY, SINGLE OR MULTIPLE BIOPSY/POLYPECTOMY BY BIOPSY;  Surgeon: Jovana Ohara MD;  Location:  GI     EXCISE MALIGNANT LESIONS, TRUNK/ARMS/LEGS 0.5CM OR LESS Left 5/26/2017    Procedure: EXCISE MALIGNANT LESIONS, TRUNK/ARMS/LEGS 0.5CM OR LESS;  EXCISION LEFT ELBOW MASS;  Surgeon: Jose Azevedo MD;  Location:  GI     RECTAL SURGERY      perianal abscess?       Allergies:    Allergies   Allergen Reactions     Bees      Augmentin Rash     Penicillins Rash       Medications:   Current Outpatient Medications   Medication     acetaminophen (TYLENOL) 325 MG tablet     albuterol (ALBUTEROL) 108 (90 BASE) MCG/ACT inhaler     ammonium lactate (LAC-HYDRIN) 12 % external lotion     ARIPiprazole (ABILIFY) 5 MG tablet     bacitracin 500 UNIT/GM external ointment     buPROPion (WELLBUTRIN SR) 150 MG 12 hr tablet     Cadexomer Iodine, topical, 0.9% (IODOSORB) 0.9 % GEL gel     carboxymethylcellulose PF (REFRESH PLUS) 0.5 % ophthalmic solution     cephALEXin (KEFLEX) 500 MG capsule     cephALEXin (KEFLEX) 500 MG capsule     clotrimazole (LOTRIMIN) 1 % external cream     collagenase (SANTYL) 250 UNIT/GM external ointment     DEXILANT 60 MG CPDR CR capsule     diphenhydrAMINE (BENADRYL) 50 MG capsule     EPINEPHrine (EPIPEN 2-BRE) 0.3 MG/0.3ML  "injection 2-pack     ferrous gluconate (FERGON) 324 (38 Fe) MG tablet     furosemide (LASIX) 40 MG tablet     furosemide (LASIX) 80 MG tablet     gabapentin (NEURONTIN) 100 MG capsule     Gauze Pads & Dressings (GAUZE PADS 4\"X4\") 4\"X4\" PADS     hydrocortisone, Perianal, (ANUSOL-HC) 2.5 % cream     Lactobacillus-Inulin (CULTURELLE DIGESTIVE HEALTH) CAPS     montelukast (SINGULAIR) 10 MG tablet     multivitamin, therapeutic (THERA-VIT) TABS tablet     naltrexone (DEPADE/REVIA) 50 MG tablet     nicotine (NICORETTE) 2 MG gum     nystatin-triamcinolone (MYCOLOG II) 260941-0.1 UNIT/GM-% external cream     order for DME     order for DME     order for DME     order for DME     order for DME     order for DME     order for DME     order for DME     potassium chloride SA (K-DUR/KLOR-CON M) 20 MEQ CR tablet     PULMICORT FLEXHALER 180 MCG/ACT inhaler     sertraline (ZOLOFT) 100 MG tablet     sertraline (ZOLOFT) 50 MG tablet     simvastatin (ZOCOR) 40 MG tablet     Sodium Phosphates (PHOSPHATE ENEMA RE)     spironolactone (ALDACTONE) 25 MG tablet     temazepam (RESTORIL) 15 MG capsule     tiotropium (SPIRIVA) 18 MCG inhaled capsule     trolamine salicylate (ASPERCREME) 10 % external cream     vitamin D3 (CHOLECALCIFEROL) 2000 units (50 mcg) tablet     warfarin ANTICOAGULANT (COUMADIN) 4 MG tablet     Warfarin Therapy Reminder     White Petrolatum ointment     No current facility-administered medications for this encounter.      Objective:  Vitals:  There were no vitals taken for this visit.    A1C: 5.7 (7/2020)    General:  Patient is alert and orientated.  NAD     Dermatologic: Dorsal left foot ulceration measurements below.  Base of the wound is fibrous.  Heavy serous clear drainage is noted with maceration around the wound.  Fat layer is exposed but no exposure of tendon or bone.  Localized reactive redness but no signs of acute infection noted.    Wound (used by OP [facility] only) 09/25/20 0911 Left dorsal foot (Active)   Length " (cm) 3 09/25/20 0900   Width (cm) 4.5 09/25/20 0900   Depth (cm) 0.3 09/25/20 0900   Wound (cm^2) 13.5 cm^2 09/25/20 0900   Wound Volume (cm^3) 4.05 cm^3 09/25/20 0900   Dressing Appearance moist drainage 09/25/20 0900   Drainage Characteristics/Odor serous;yellow 09/25/20 0900   Drainage Amount moderate 09/25/20 0900     Vascular: DP & PT pulses are intact & regular bilaterally.   significant edema and varicosities noted to the left foot and leg.  CFT and skin temperature is normal to both lower extremities.     Neurologic: Lower extremity sensation is diminished to feet.     Musculoskeletal: Patient is ambulatory without assistive device or brace.  No gross ankle deformity noted.  No foot or ankle joint effusion is noted.    Imaging:  MrI (5/2020) - Subcutaneous edema consistent with cellulitis. There is no evidence of an abscess or osteomyelitis.    Assessment:    Skin ulcer of left foot with fat layer exposed (H)  Leg edema, left  Ulcer of left lower extremity with fat layer exposed (H)  Morbid obesity (H)  Tobacco dependence  Peripheral vascular disease (H)    Plan: At this time the wound was debrided.  We discussed that we need to get the swelling and edema under control in his left foot and leg.  We will do Acticoat 7 with 2 layer wrap.  We will have him come in weekly to get that changed.  This will try to help get the swelling under control.  He currently has a postop shoe.  We will also do a referral to [facility] for venous duplex testing to see if there is anything they can do to help in the future to prevent or control recurrence of swelling.  We will have him follow-up with me in 1 month.  All questions were answered to patient and patient's caregiver satisfaction and he will call with further questions or concerns.    Procedure:  After verbal consent, per protocol lidocaine was applied to the wound. Excisional debridement was performed on ulcer.   #15 blade was used to debride ulcer down to and  including subcutaneous tissue. Bleeding controlled with light pressure.  No drainage noted.   < 20sq cm debrided.  Dry dressing applied to foot.  Patient tolerated procedure well.    Maggy Kuntson DPM, Podiatry/Foot and Ankle Surgery

## 2020-09-25 NOTE — ADDENDUM NOTE
Encounter addended by: Imelda Huitron RN on: 9/25/2020 10:51 AM   Actions taken: Flowsheet accepted, Charge Capture section accepted

## 2020-09-25 NOTE — DISCHARGE INSTRUCTIONS
[facility]  [address] [facility] [address] [address]    Call us at [phone] if you have any questions about your wounds, have redness or swelling around your wound, have a fever of 101 or greater or if you have any other problems or concerns. We answer the phone Monday through Friday 8 am to 4 pm, please leave a message as we check the voicemail frequently throughout the day.     Petey Archibald      1960    Supplements to aid in healin. 1 packet of Murali Supplement into your favorite beverage TWICE a day.You may purchase at [facility] in [address]  2. Vitamin D3 5,000 iu per day.    Left Plantar foot  Cleanse wound on foot with saline  Apply Acticoat 7  Apply kerramax 4x4  Apply spandage  Apply 2 layer wrap    Compression:   You have a compression wrap 2 layer coflex . Please remove compression dressing if toes turn blue and/or tingle and can not be relieved by raising the leg for one hour.      ELIAS WebsterPChrystalM.. 2020      [facility] [phone]

## 2020-09-28 ENCOUNTER — ANTICOAGULATION THERAPY VISIT (OUTPATIENT)
Dept: ANTICOAGULATION | Facility: CLINIC | Age: 60
End: 2020-09-28

## 2020-09-28 DIAGNOSIS — Z79.01 LONG TERM CURRENT USE OF ANTICOAGULANT THERAPY: ICD-10-CM

## 2020-09-28 DIAGNOSIS — I82.442 ACUTE DEEP VEIN THROMBOSIS (DVT) OF TIBIAL VEIN OF LEFT LOWER EXTREMITY (H): ICD-10-CM

## 2020-09-28 DIAGNOSIS — Z86.711 HISTORY OF PULMONARY EMBOLISM: ICD-10-CM

## 2020-09-28 DIAGNOSIS — I80.03 THROMBOPHLEBITIS OF SUPERFICIAL VEINS OF BOTH LOWER EXTREMITIES: ICD-10-CM

## 2020-09-28 LAB
CAPILLARY BLOOD COLLECTION: NORMAL
INR PPP: 2.4 (ref 0.86–1.14)

## 2020-09-28 PROCEDURE — 85610 PROTHROMBIN TIME: CPT | Performed by: FAMILY MEDICINE

## 2020-09-28 PROCEDURE — 99207 ZZC NO CHARGE NURSE ONLY: CPT

## 2020-09-28 PROCEDURE — 36416 COLLJ CAPILLARY BLOOD SPEC: CPT | Performed by: FAMILY MEDICINE

## 2020-09-28 NOTE — PROGRESS NOTES
ANTICOAGULATION FOLLOW-UP CLINIC VISIT    Patient Name:  Petey Archibald  Date:  2020  Contact Type:  Telephone    SUBJECTIVE:  Patient Findings     Comments:   The patient was assessed for diet, medication, and activity level changes, missed or extra doses, bruising or bleeding, with no problem findings.  Seeing foot specialist every Friday .           Clinical Outcomes     Comments:   The patient was assessed for diet, medication, and activity level changes, missed or extra doses, bruising or bleeding, with no problem findings.  Seeing foot specialist every Friday .              OBJECTIVE    Recent labs: (last 7 days)     20  1025   INR 2.40*       ASSESSMENT / PLAN  INR assessment THER    Recheck INR In: 2 WEEKS    INR Location Clinic      Anticoagulation Summary  As of 2020    INR goal:   2.0-3.0   TTR:   74.9 % (11.9 mo)   INR used for dosin.40 (2020)   Warfarin maintenance plan:   6 mg (4 mg x 1.5) every Mon, Fri; 8 mg (4 mg x 2) all other days   Full warfarin instructions:   6 mg every Mon, Fri; 8 mg all other days   Weekly warfarin total:   52 mg   No change documented:   Lisa Ponce RN   Plan last modified:   Lisa Ponce RN (2020)   Next INR check:   10/12/2020   Priority:   Maintenance   Target end date:   Indefinite    Indications    History of pulmonary embolism [Z86.711]  Long term current use of anticoagulant therapy [Z79.01]             Anticoagulation Episode Summary     INR check location:   Anticoagulation Clinic    Preferred lab:       [facility]:   [facility]    Comments:   REM USP; A new Coumadin Prescription will need to sent to [facility] with current dose and next INR check.      Anticoagulation Care Providers     Provider Role Specialty Phone number    Yolande Lacy PA-C Referring Physician Assistant [phone]    Silvino Baker MD [facility] [phone]            See  the Encounter Report to view Anticoagulation Flowsheet and Dosing Calendar (Go to Encounters tab in chart review, and find the Anticoagulation Therapy Visit)        Lisa Ponce RN

## 2020-10-02 ENCOUNTER — HOSPITAL ENCOUNTER (OUTPATIENT)
Dept: WOUND CARE | Facility: CLINIC | Age: 60
Discharge: HOME | End: 2020-10-02
Attending: PODIATRIST | Admitting: PODIATRIST
Payer: MEDICARE

## 2020-10-02 VITALS — DIASTOLIC BLOOD PRESSURE: 68 MMHG | HEART RATE: 79 BPM | TEMPERATURE: 97.4 F | SYSTOLIC BLOOD PRESSURE: 117 MMHG

## 2020-10-02 DIAGNOSIS — I83.892 VARICOSE VEINS OF LEFT LOWER EXTREMITY WITH OTHER COMPLICATIONS: ICD-10-CM

## 2020-10-02 DIAGNOSIS — I89.0 LYMPHEDEMA OF LEFT LEG: Primary | ICD-10-CM

## 2020-10-02 DIAGNOSIS — L97.922 ULCER OF LEFT LOWER EXTREMITY WITH FAT LAYER EXPOSED (H): ICD-10-CM

## 2020-10-02 PROCEDURE — 11042 DBRDMT SUBQ TIS 1ST 20SQCM/<: CPT

## 2020-10-02 PROCEDURE — 97602 WOUND(S) CARE NON-SELECTIVE: CPT

## 2020-10-02 PROCEDURE — 97597 DBRDMT OPN WND 1ST 20 CM/<: CPT

## 2020-10-02 PROCEDURE — 29581 APPL MULTLAYER CMPRN SYS LEG: CPT

## 2020-10-02 NOTE — PROGRESS NOTES
[facility] [place] Nurse Note    Subject: Petey Archibald presents for nurse only visit for wound check and dressing change to left dorsal foot.      Exam:  /68   Pulse 79   Temp 97.4  F (36.3  C) (Tympanic)   Wound (used by OP WHI only) 09/25/20 0911 Left dorsal foot (Active)   Thickness/Stage full thickness 10/02/20 1100   Dressing Appearance moist drainage 10/02/20 1100   Base pink;slough 10/02/20 1100   Red (%), Wound Tissue Color 10 10/02/20 1100   Yellow (%), Wound Tissue Color 90 10/02/20 1100   Length (cm) 3 10/02/20 1100   Width (cm) 3 10/02/20 1100   Depth (cm) 0.4 10/02/20 1100   Wound (cm^2) 9 cm^2 10/02/20 1100   Wound Volume (cm^3) 3.6 cm^3 10/02/20 1100   Wound healing % 33.33 10/02/20 1100   Drainage Characteristics/Odor serous;tan 10/02/20 1100   Drainage Amount moderate 10/02/20 1100   Care, Wound debrided 10/02/20 1100   Dressing Care dressing applied 10/02/20 1100   Periwound Care cleansed with pH balanced cleanser 10/02/20 1100     Procedure:  Topical anesthetic of 4% lidocaine was applied,non-selective debridement was performed, no bleeding occurred. Patient tolerated procedure well.  Procedure :Wound redressed with Aquacel Ag, spandage kerramax,  Application of 2 layer coflex wrap was applied.  Plan: Patient will return to the clinic in 1 weeks time  October 2, 2020

## 2020-10-02 NOTE — ADDENDUM NOTE
Encounter addended by: Imelda Huitron RN on: 10/2/2020 1:38 PM   Actions taken: Charge Capture section accepted

## 2020-10-02 NOTE — DISCHARGE INSTRUCTIONS
[facility]  [address] [place] [address] [address]    Call us at [phone] if you have any questions about your wounds, have redness or swelling around your wound, have a fever of 101 or greater or if you have any other problems or concerns. We answer the phone Monday through Friday 8 am to 4 pm, please leave a message as we check the voicemail frequently throughout the day.     Petey Archibald      1960    Supplements to aid in healin. 1 packet of Murali Supplement into your favorite beverage TWICE a day.You may purchase at [facility] in [address]  2. Vitamin D3 5,000 iu per day.    Left Plantar foot  Cleanse wound on foot with saline  Apply Aquacel ag to the wound bed  Apply adhesive foam  Apply kerramax 4x4  Apply spandage  Apply 2 layer wrap    Compression:  You have a compression wrap 2 layer coflex . Please remove compression dressing if toes  turn blue and/or tingle and can not be relieved by raising the leg for one hour.    Please raise your legs above your heart for 30 mins 3 times a day to promote wound healing.    Maggy Knutson D.P.M.. 2020     Maggy Knutson D.P.M.. 2020

## 2020-10-05 NOTE — ADDENDUM NOTE
Encounter addended by: Crystal Spencer RN on: 10/5/2020 9:47 AM   Actions taken: Visit diagnoses modified, Diagnosis association updated, Order list changed

## 2020-10-06 ENCOUNTER — OFFICE VISIT (OUTPATIENT)
Dept: VASCULAR SURGERY | Facility: CLINIC | Age: 60
End: 2020-10-06
Attending: PODIATRIST
Payer: MEDICARE

## 2020-10-06 ENCOUNTER — ANCILLARY PROCEDURE (OUTPATIENT)
Dept: ULTRASOUND IMAGING | Facility: CLINIC | Age: 60
End: 2020-10-06
Attending: PODIATRIST
Payer: MEDICARE

## 2020-10-06 ENCOUNTER — TELEPHONE (OUTPATIENT)
Dept: PODIATRY | Facility: CLINIC | Age: 60
End: 2020-10-06

## 2020-10-06 ENCOUNTER — MEDICAL CORRESPONDENCE (OUTPATIENT)
Dept: HEALTH INFORMATION MANAGEMENT | Facility: CLINIC | Age: 60
End: 2020-10-06

## 2020-10-06 DIAGNOSIS — I89.0 LYMPHEDEMA OF LEFT LEG: ICD-10-CM

## 2020-10-06 DIAGNOSIS — L97.922 ULCER OF LEFT LOWER EXTREMITY WITH FAT LAYER EXPOSED (H): ICD-10-CM

## 2020-10-06 DIAGNOSIS — I83.892 VARICOSE VEINS OF LEFT LOWER EXTREMITY WITH OTHER COMPLICATIONS: ICD-10-CM

## 2020-10-06 DIAGNOSIS — I87.2 DEEP VENOUS INSUFFICIENCY: Primary | ICD-10-CM

## 2020-10-06 DIAGNOSIS — L97.523 CHRONIC ULCER OF LEFT FOOT WITH NECROSIS OF MUSCLE (H): ICD-10-CM

## 2020-10-06 DIAGNOSIS — I87.2 VENOUS (PERIPHERAL) INSUFFICIENCY: ICD-10-CM

## 2020-10-06 DIAGNOSIS — Z72.0 TOBACCO ABUSE: ICD-10-CM

## 2020-10-06 PROCEDURE — 99203 OFFICE O/P NEW LOW 30 MIN: CPT | Performed by: SURGERY

## 2020-10-06 PROCEDURE — 93971 EXTREMITY STUDY: CPT | Mod: LT | Performed by: SURGERY

## 2020-10-06 ASSESSMENT — ENCOUNTER SYMPTOMS
ENDOCRINE NEGATIVE: 1
CONSTITUTIONAL NEGATIVE: 1
EYES NEGATIVE: 1
BRUISES/BLEEDS EASILY: 1
COLOR CHANGE: 1
GASTROINTESTINAL NEGATIVE: 1
ALLERGIC/IMMUNOLOGIC NEGATIVE: 1
SLEEP DISTURBANCES DUE TO BREATHING: 1
POOR WOUND HEALING: 1

## 2020-10-06 NOTE — TELEPHONE ENCOUNTER
Contacted by vascular office. They had to cut his dressing off. Asked if they would be able to come over and I can give him dressings.     POC until his visit on 10/9/2020    Cleanse wound with wound cleanser, saline or soap and water.   Cover with hydrofera blue adhesive dressing  Change every other day.   Apply Spandigrip D - take off at night and reapply in the am.     Elevate your legs 3 x for 30 minutes at a time.

## 2020-10-06 NOTE — PROGRESS NOTES
[facility] CONSULT    Impression:   1.  Skin ulcer on the dorsal aspect of the left foot between the first and second toes.    2.  Left leg deep venous insufficiency.    3.  Incompetence of the left greater saphenous vein from the saphenofemoral junction to the mid calf.  The vein is rather superficial from the mid thigh to the proximal calf.  It is also rather tortuous at the knee such that at that level he likely is not an anatomic candidate for catheter-based treatment.    4.  History of left posterior tibial vein DVT diagnosed in 2017.  For reasons that are unclear to me he remains on chronic Coumadin.    5.  Ongoing tobacco abuse.    Plan:   I had a lengthy discussion with Petey and a representative from his group home reviewing all the above.  I also performed my own bedside interrogation of his left greater saphenous vein along with my partner, Dr. Marcial Muhammad.  We suspect that venous hypertension is playing a role in his inability to heal the ulceration of the distal left foot.  He would benefit from ablation of the left greater saphenous vein.  He will be a candidate for radiofrequency ablation of the left greater saphenous vein at least in the upper thigh.  Below that point, the vein becomes superficial and is rather tortuous.  He would likely benefit from significant ultrasound-guided sclerotherapy for the remainder of that vein as well as injection of incompetent varicose branches in the calf.  Dr. Muhammad has expertise in ultrasound-guided sclerotherapy and he will therefore perform all of the operative intervention on Petey's left leg.  All of their questions were answered and they verbalized full understanding to the above.      HPI:   Petey Archibald is a 59-year-old gentleman with mild mental retardation and schizoaffective disorder who has been evaluated for the past several months at the [facility] for a nonhealing ulceration of the distal left foot at the dorsal  surface between the first and second toes.  Despite maximal local care the wound is not improving.  He has lymphedematous changes of this foot as well as severe lipodermatosclerosis and hyperpigmentation of both lower legs.  He is therefore referred to [facility] for a left leg venous competency study.    Much of this history was obtained by my review of the electronic medical record.  He has history of a left posterior tibial vein DVT diagnosed on ultrasound in February 2017.  He has been on chronic Coumadin since that diagnosis.  He was previously evaluated by Dr. Davis at the [facility] in the fall 2019 for venous stasis changes of the left pretibial region.  Those changes responded to compression and local care.  The ulceration of the distal left foot reportedly developed in early 2020.  He has been followed by podiatry for the past several months.    CURRENT MEDICATIONS       acetaminophen (TYLENOL) 325 MG tablet, Take 1-2 tablets (325-650 mg) by mouth every 6 hours as needed       albuterol (ALBUTEROL) 108 (90 BASE) MCG/ACT inhaler, Inhale 2 puffs into the lungs every 6 hours as needed       ammonium lactate (LAC-HYDRIN) 12 % external lotion, Apply topically as needed for dry skin feet       ARIPiprazole (ABILIFY) 5 MG tablet, Take 5 mg by mouth daily       bacitracin 500 UNIT/GM external ointment, Apply topically daily to wound, dress with bandage       bacitracin 500 UNIT/GM OINT,        buPROPion (WELLBUTRIN SR) 150 MG 12 hr tablet, Take 300 mg by mouth daily (2 x 150 mg tablet)       Cadexomer Iodine, topical, 0.9% (IODOSORB) 0.9 % GEL gel, Apply topically daily Apply to left foot wound daily after cleansing the wound and blotting it dry.       carboxymethylcellulose PF (REFRESH PLUS) 0.5 % ophthalmic solution, Place 1 drop into both eyes 4 times daily as needed for dry eyes       cephALEXin (KEFLEX) 500 MG capsule, Take 1 capsule (500 mg) by mouth 4 times daily       clotrimazole (LOTRIMIN) 1 % external  "cream, Apply topically 2 times daily       collagenase (SANTYL) 250 UNIT/GM external ointment, Apply topically daily After cleansing and blotting dry the left foot wound, apply a thin layer of the ointment and cover wound with bandage.       DEXILANT 60 MG CPDR CR capsule, TAKE 1 CAPSULE BY MOUTH ONCE DAILY (FOR HEARTBURN)       diphenhydrAMINE (BENADRYL) 50 MG capsule, 50 mg every 4 hours as needed for itching        EPINEPHrine (EPIPEN 2-BRE) 0.3 MG/0.3ML injection 2-pack, INJECT 0.3MG INTRAMUSCULAR ONE TIME FOR ONE DOSE AS NEEDED FOR ANAPHYLAXIS (CALL 911 IF YOU HAVE TO GIVE) (FOR BEE STINGS) **LABEL EACH PEN*       ferrous gluconate (FERGON) 324 (38 Fe) MG tablet, TAKE 1 TABLET BY MOUTH TWICE DAILY (IRON SUPPLEMENT)       furosemide (LASIX) 40 MG tablet, Take 1 tablet (40 mg) by mouth daily Take mid day, no later than 1400       gabapentin (NEURONTIN) 100 MG capsule, Take 200 mg by mouth 3 times daily At 0900, 1200, and 2000       Gauze Pads & Dressings (GAUZE PADS 4\"X4\") 4\"X4\" PADS, 1 each 3 times daily as needed       hydrocortisone, Perianal, (ANUSOL-HC) 2.5 % cream, Place rectally 2 times daily as needed for hemorrhoids       Lactobacillus Rhamnosus, GG, ([facility]) capsule, Take 1 capsule by mouth       Lactobacillus-Inulin ([facility] DIGESTIVE HEALTH) CAPS, TAKE 1 CAPSULE BY MOUTH TWICE DAILY. **NON-COVERED MED** (NO REFILLS REMAINING)       montelukast (SINGULAIR) 10 MG tablet, TAKE 1 TABLET BY MOUTH EVERY MORNING. (ASTHMA)       multivitamin, therapeutic (THERA-VIT) TABS tablet, Take 1 tablet by mouth daily       naltrexone (DEPADE/REVIA) 50 MG tablet, Take 100 mg by mouth daily ( 2 x 50 mg tablet)       nicotine (NICODERM CQ) 21 MG/24HR 24 hr patch,        nicotine (NICORETTE) 2 MG gum, Take 2 mg by mouth as needed for smoking cessation       nystatin-triamcinolone (MYCOLOG II) 959967-3.1 UNIT/GM-% external cream, Apply topically 2 times daily For 1-2 weeks.       order for DME, " Equipment being ordered: roll of micropore tape to dress foot wound bid       order for DME, Equipment being ordered: 1 surgical shoe       order for DME, [facility] Phone [phone] Fax [phone]  EdemaWear Size Medium/Yellow qty 4 sleeves  Length of Need: 1 month  Frequency of dressing change daily       order for DME, [employer]  Phone #[phone]  Fax #[phone]  Coolflex Velcro wraps    Length of Need: Life Time  # of Pairs 1 set       order for DME, [facility] Phone [phone] Fax [phone]  Primary Dressing Hydrofera Blue Ready   Qty 5 sheets  Secondary Dressing 4' roll gauze Qty 30  Secondary Dressing 2' medipore tape Qty 1  Secondary Dressing 4x4 gauze loaf Qty  1  Length of Need: 1 month  Frequency of dressing change: daily       order for DME, Oxygen 2 Li/min  at night and bled into BiPAP       order for DME, Equipment being ordered: CPAP with mask and tubing       order for DME, Equipment being ordered: support compression hose BK  2 pair black 30mm HG   To be applied on arising & removed while lying down to go to sleep       potassium chloride SA (K-DUR/KLOR-CON M) 20 MEQ CR tablet, TAKE 1 TABLET BY MOUTH TWICE DAILY. (LOW POTASSIUM)       PULMICORT FLEXHALER 180 MCG/ACT inhaler, INHALE 2 PUFFS INTO THE LUNGS TWICE DAILY       sertraline (ZOLOFT) 100 MG tablet, Take 100 mg by mouth daily Take with 50 mg tablet for a total dose of 150 mg daily.       sertraline (ZOLOFT) 50 MG tablet, Take 50 mg by mouth daily Take with 100 mg tablet for a total dose of 150 mg daily.       simvastatin (ZOCOR) 40 MG tablet, TAKE 1 TABLET BY MOUTH EVERY MORNING.  (CHOLESTEROL)       Sodium Phosphates (PHOSPHATE ENEMA RE), Use one enema rectally every 1-3 hours as needed       spironolactone (ALDACTONE) 25 MG tablet, TAKE 1 TABLET BY MOUTH ONCE DAILY. (HIGH BLOOD PRESSURE)       temazepam (RESTORIL) 15 MG capsule, Take 15 mg by mouth nightly as needed for sleep        tiotropium (SPIRIVA) 18 MCG inhaled capsule, Inhale 18 mcg into the lungs daily       trolamine salicylate (ASPERCREME) 10 % external cream, Apply topically as needed for moderate pain knee       vitamin D3 (CHOLECALCIFEROL) 2000 units (50 mcg) tablet, Take 1 tablet by mouth daily       warfarin ANTICOAGULANT (COUMADIN) 4 MG tablet, Take one tablet (4mg today) 9/14 and then start 6mg on Mon/Fri and 8 mg all other days until recheck on 9/28/20 as directed by the INR clinic       Warfarin Therapy Reminder, 1 each continuous prn       White Petrolatum ointment, Apply topically nightly as needed for dry skin    No current facility-administered medications on file prior to visit.         PAST MEDICAL HISTORY  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     COPD (chronic obstructive pulmonary disease) (H)      History of DVT of lower extremity      History of pulmonary embolism      Hyperlipidemia      Mild intellectual disabilities      Morbid obesity (H)      Peripheral edema      Peripheral vascular disease (H)      Sleep apnea      Tobacco dependence      Venous stasis dermatitis          PAST SURGICAL HISTORY:  Past Surgical History:   Procedure Laterality Date     COLONOSCOPY N/A 4/15/2019    Procedure: COMBINED COLONOSCOPY, SINGLE OR MULTIPLE BIOPSY/POLYPECTOMY BY BIOPSY;  Surgeon: Jovana Ohara MD;  Location:  GI     EXCISE MALIGNANT LESIONS, TRUNK/ARMS/LEGS 0.5CM OR LESS Left 5/26/2017    Procedure: EXCISE MALIGNANT LESIONS, TRUNK/ARMS/LEGS 0.5CM OR LESS;  EXCISION LEFT ELBOW MASS;  Surgeon: Jose Azevedo MD;  Location:  GI     RECTAL SURGERY      perianal abscess?       ALLERGIES     Allergies   Allergen Reactions     Bees      Augmentin Rash     Penicillins Rash       FAMILY HISTORY  Family History   Problem Relation Age of Onset     Diabetes Brother      Unknown/Adopted Mother      Unknown/Adopted Father        SOCIAL HISTORY  Social History      Tobacco Use     Smoking status: Current Every Day Smoker     Packs/day: 1.00     Years: 30.00     Pack years: 30.00     Types: Cigarettes     Smokeless tobacco: Current User     Tobacco comment: started at age 18    Substance Use Topics     Alcohol use: No     Alcohol/week: 0.0 standard drinks     Drug use: No       ROS:   Review of Systems   Constitution: Negative.   HENT: Negative.    Eyes: Negative.    Cardiovascular: Positive for leg swelling.   Respiratory: Positive for sleep disturbances due to breathing.    Endocrine: Negative.    Hematologic/Lymphatic: Positive for bleeding problem. Bruises/bleeds easily.   Skin: Positive for color change, dry skin and poor wound healing.   Musculoskeletal: Positive for joint pain.   Gastrointestinal: Negative.    Genitourinary: Negative.    Neurological:        See HPI   Psychiatric/Behavioral:        See HPI   Allergic/Immunologic: Negative.          EXAM:  There were no vitals taken for this visit.  Physical Exam  Constitutional:       Appearance: He is obese.   HENT:      Head: Normocephalic and atraumatic.   Eyes:      General: No scleral icterus.     Extraocular Movements: Extraocular movements intact.      Pupils: Pupils are equal, round, and reactive to light.   Cardiovascular:      Pulses: Normal pulses.      Comments: Severe lipodermatosclerosis changes with prominent hyperpigmentation (left greater than right).  Musculoskeletal:         General: Swelling present.      Right lower leg: Edema present.      Left lower leg: Edema present.   Skin:     Comments: See photo in today's notes   Neurological:      General: No focal deficit present.      Mental Status: He is alert and oriented to person, place, and time. Mental status is at baseline.   Psychiatric:         Mood and Affect: Mood normal.         Behavior: Behavior normal.         Thought Content: Thought content normal.         Judgment: Judgment normal.       Labs:  LIPID RESULTS:  Lab Results   Component  Value Date    CHOL 183 2020    HDL 78 2020    LDL 80 2020    TRIG 125 2020    CHOLHDLRATIO 2.4 10/01/2015       CBC RESULTS:  Lab Results   Component Value Date    WBC 5.2 2020    RBC 4.35 (L) 2020    HGB 12.4 (L) 2020    HCT 40.4 2020    MCV 93 2020    MCH 28.5 2020    MCHC 30.7 (L) 2020    RDW 15.8 (H) 2020     2020       BMP RESULTS:  Lab Results   Component Value Date     2020    POTASSIUM 3.9 2020    CHLORIDE 104 2020    CO2 33 (H) 2020    ANIONGAP 1 (L) 2020    GLC 78 2020    BUN 25 2020    CR 1.06 2020    GFRESTIMATED 75 2020    GFRESTBLACK 87 2020    JESSICA 9.2 2020        A1C RESULTS:  Lab Results   Component Value Date    A1C 5.7 (H) 2020         Imaging:  Jenniffer Rocha on 10/6/2020 11:16 AM    Name:  Petey Archibald                                                      Patient ID: [id number]  Date: 2020                                                            : 1960  Sex: male                                                                                Examined by: MARIA ESTHER  Age:  59 year old                                                                     Reading MD: LILY     INDICATION:  Varicose veins of left lower extremity with ulcer/fat layer exposed.     EXAM TYPE  LEFT LOWER EXTREMITY VENOUS DUPLEX FOR VENOUS INSUFFICIENCY TECHNICAL SUMMARY     A duplex ultrasound study using color flow was performed to evaluate the left lower extremity veins for valvular incompetence with the patient in a steep reversed trendelenberg.      LEFT:     The deep veins demonstrate phasic flow, compress, and respond to augmentations.  There is no evidence of DVT.  The common femoral, femoral, and popliteal veins are incompetent and free of thrombus.      The GSV demonstrates phasic flow, compresses, and responds to augmentations from the  saphenofemoral junction to the ankle with no evidence of thrombus. The greater saphenous vein measures 9.3 mm at the saphenofemoral junction and 8.1 mm distally. The GSV is incompetent from the SFJ to the mid calf with the greatest reflux time of 5659.  The GSV courses out of the fascia superficially in the proximal thigh and continues to the mid calf while being tortuous from the distal thigh to the distal calf. The GSV gives rise to multiple incompetent varicose veins, the largest measures 7.2 mm off the distal thigh that courses posteromedially with a reflux time of 6203 milliseconds.      The AASV is incompetent  6.1 mm) draining into the saphenofemoral junction and competent in the proximal thigh.     The Giacomini vein is competent (1.3 mm) communicating with the small saphenous vein at the knee level.      The SSV demonstrates phasic flow, compresses, and responds to augmentations from the popliteal space to the ankle.  No thrombus is seen. The saphenopopliteal junction is competent (4.8 mm). The SSV is incompetent from the in the mid calf with a reflux time of 2425 milliseconds. The SSV gives rise to multiple incompetent varicose veins, the largest measuring 6.2 mm off the proximal calf that courses medially to join GSV tributaries in the distal thigh with a reflux time of 2293 milliseconds.     Perforators: There is an incompetent  vein (1.8 mm) at 13 cm from medial malleolus that communicates with a varicose vein measuring 3.5 mm. A second incompetent  vein (4.5 mm) is found 18 cm from the medial malleolus that communicates with a varicose vein measuring 4.5 mm. A third incompetent  vein (3.8 mm) is found 22 cm from the medial malleolus that communicates with a the GSV and incompetent varicose tributaries. A fourth incompetent  vein (3.4 mm) is found in the mid calf 23 cm form the medial malleolus that communicates with the SSV and incompetent  tributaries.     RIGHT:     The CFV demonstrates phasic flow, compresses, and responds to augmentations.  There is no DVT.       FINAL SUMMARY:  1.         No evidence of left lower extremity deep vein thrombus.  2.         Left common femoral, femoral, and popliteal vein incompetence.  3.         Left greater saphenous vein incompetence.\  4.         Left anterior accessory saphenous vein at the junction only.  5.         Left small saphenous vein incompetence.  6.         Left multiple incompetent  veins.  7.         Left varicose vein incompetence.  8.         The time of incompetence is greater than 500 milliseconds in length.            Total face-to-face time was 30 minutes, greater than 50% spent providing counseling and education.        Marquis Loo MD

## 2020-10-06 NOTE — LETTER
10/6/2020         RE: Petey Archibald  [address]  [facility] [address]        Dear Colleague,    Thank you for referring your patient, Petey Archibald, to the [facility] [place]. Please see a copy of my visit note below.    [facility] CONSULT    Impression:   1.  Skin ulcer on the dorsal aspect of the left foot between the first and second toes.    2.  Left leg deep venous insufficiency.    3.  Incompetence of the left greater saphenous vein from the saphenofemoral junction to the mid calf.  The vein is rather superficial from the mid thigh to the proximal calf.  It is also rather tortuous at the knee such that at that level he likely is not an anatomic candidate for catheter-based treatment.    4.  History of left posterior tibial vein DVT diagnosed in 2017.  For reasons that are unclear to me he remains on chronic Coumadin.    5.  Ongoing tobacco abuse.    Plan:   I had a lengthy discussion with Petey and a representative from his group home reviewing all the above.  I also performed my own bedside interrogation of his left greater saphenous vein along with my partner, Dr. Marcial Muhammad.  We suspect that venous hypertension is playing a role in his inability to heal the ulceration of the distal left foot.  He would benefit from ablation of the left greater saphenous vein.  He will be a candidate for radiofrequency ablation of the left greater saphenous vein at least in the upper thigh.  Below that point, the vein becomes superficial and is rather tortuous.  He would likely benefit from significant ultrasound-guided sclerotherapy for the remainder of that vein as well as injection of incompetent varicose branches in the calf.  Dr. Muhammad has expertise in ultrasound-guided sclerotherapy and he will therefore perform all of the operative intervention on Petey's left leg.  All of their questions were answered and they verbalized full understanding to the  above.      HPI:   Petey Archibald is a 59-year-old gentleman with mild mental retardation and schizoaffective disorder who has been evaluated for the past several months at the [facility] for a nonhealing ulceration of the distal left foot at the dorsal surface between the first and second toes.  Despite maximal local care the wound is not improving.  He has lymphedematous changes of this foot as well as severe lipodermatosclerosis and hyperpigmentation of both lower legs.  He is therefore referred to [facility] for a left leg venous competency study.    Much of this history was obtained by my review of the electronic medical record.  He has history of a left posterior tibial vein DVT diagnosed on ultrasound in February 2017.  He has been on chronic Coumadin since that diagnosis.  He was previously evaluated by Dr. Davis at the [facility] in the fall 2019 for venous stasis changes of the left pretibial region.  Those changes responded to compression and local care.  The ulceration of the distal left foot reportedly developed in early 2020.  He has been followed by podiatry for the past several months.    CURRENT MEDICATIONS       acetaminophen (TYLENOL) 325 MG tablet, Take 1-2 tablets (325-650 mg) by mouth every 6 hours as needed       albuterol (ALBUTEROL) 108 (90 BASE) MCG/ACT inhaler, Inhale 2 puffs into the lungs every 6 hours as needed       ammonium lactate (LAC-HYDRIN) 12 % external lotion, Apply topically as needed for dry skin feet       ARIPiprazole (ABILIFY) 5 MG tablet, Take 5 mg by mouth daily       bacitracin 500 UNIT/GM external ointment, Apply topically daily to wound, dress with bandage       bacitracin 500 UNIT/GM OINT,        buPROPion (WELLBUTRIN SR) 150 MG 12 hr tablet, Take 300 mg by mouth daily (2 x 150 mg tablet)       Cadexomer Iodine, topical, 0.9% (IODOSORB) 0.9 % GEL gel, Apply topically daily Apply to left foot wound daily after cleansing the wound and blotting it dry.        "carboxymethylcellulose PF (REFRESH PLUS) 0.5 % ophthalmic solution, Place 1 drop into both eyes 4 times daily as needed for dry eyes       cephALEXin (KEFLEX) 500 MG capsule, Take 1 capsule (500 mg) by mouth 4 times daily       clotrimazole (LOTRIMIN) 1 % external cream, Apply topically 2 times daily       collagenase (SANTYL) 250 UNIT/GM external ointment, Apply topically daily After cleansing and blotting dry the left foot wound, apply a thin layer of the ointment and cover wound with bandage.       DEXILANT 60 MG CPDR CR capsule, TAKE 1 CAPSULE BY MOUTH ONCE DAILY (FOR HEARTBURN)       diphenhydrAMINE (BENADRYL) 50 MG capsule, 50 mg every 4 hours as needed for itching        EPINEPHrine (EPIPEN 2-BRE) 0.3 MG/0.3ML injection 2-pack, INJECT 0.3MG INTRAMUSCULAR ONE TIME FOR ONE DOSE AS NEEDED FOR ANAPHYLAXIS (CALL 911 IF YOU HAVE TO GIVE) (FOR BEE STINGS) **LABEL EACH PEN*       ferrous gluconate (FERGON) 324 (38 Fe) MG tablet, TAKE 1 TABLET BY MOUTH TWICE DAILY (IRON SUPPLEMENT)       furosemide (LASIX) 40 MG tablet, Take 1 tablet (40 mg) by mouth daily Take mid day, no later than 1400       gabapentin (NEURONTIN) 100 MG capsule, Take 200 mg by mouth 3 times daily At 0900, 1200, and 2000       Gauze Pads & Dressings (GAUZE PADS 4\"X4\") 4\"X4\" PADS, 1 each 3 times daily as needed       hydrocortisone, Perianal, (ANUSOL-HC) 2.5 % cream, Place rectally 2 times daily as needed for hemorrhoids       Lactobacillus Rhamnosus, GG, (CULTUREE HEALTH & WELLNESS) capsule, Take 1 capsule by mouth       Lactobacillus-Inulin ([employer]aling[employer] DIGESTIVE HEALTH) CAPS, TAKE 1 CAPSULE BY MOUTH TWICE DAILY. **NON-COVERED MED** (NO REFILLS REMAINING)       montelukast (SINGULAIR) 10 MG tablet, TAKE 1 TABLET BY MOUTH EVERY MORNING. (ASTHMA)       multivitamin, therapeutic (THERA-VIT) TABS tablet, Take 1 tablet by mouth daily       naltrexone (DEPADE/REVIA) 50 MG tablet, Take 100 mg by mouth daily ( 2 x 50 mg tablet)       nicotine (NICODERM " CQ) 21 MG/24HR 24 hr patch,        nicotine (NICORETTE) 2 MG gum, Take 2 mg by mouth as needed for smoking cessation       nystatin-triamcinolone (MYCOLOG II) 909585-1.1 UNIT/GM-% external cream, Apply topically 2 times daily For 1-2 weeks.       order for DME, Equipment being ordered: roll of micropore tape to dress foot wound bid       order for DME, Equipment being ordered: 1 surgical shoe       order for DME, [employer] [facility] Phone [phone] Fax [phone]  EdemaWear Size Medium/Yellow qty 4 sleeves  Length of Need: 1 month  Frequency of dressing change daily       order for DME, [employer]  Phone #[phone]  Fax #[phone]  Coolflex Velcro wraps    Length of Need: Life Time  # of Pairs 1 set       order for DME, [facility] [facility] Phone [phone] Fax [phone]  Primary Dressing Hydrofera Blue Ready   Qty 5 sheets  Secondary Dressing 4' roll gauze Qty 30  Secondary Dressing 2' medipore tape Qty 1  Secondary Dressing 4x4 gauze loaf Qty  1  Length of Need: 1 month  Frequency of dressing change: daily       order for DME, Oxygen 2 Li/min  at night and bled into BiPAP       order for DME, Equipment being ordered: CPAP with mask and tubing       order for DME, Equipment being ordered: support compression hose BK  2 pair black 30mm HG   To be applied on arising & removed while lying down to go to sleep       potassium chloride SA (K-DUR/KLOR-CON M) 20 MEQ CR tablet, TAKE 1 TABLET BY MOUTH TWICE DAILY. (LOW POTASSIUM)       PULMICORT FLEXHALER 180 MCG/ACT inhaler, INHALE 2 PUFFS INTO THE LUNGS TWICE DAILY       sertraline (ZOLOFT) 100 MG tablet, Take 100 mg by mouth daily Take with 50 mg tablet for a total dose of 150 mg daily.       sertraline (ZOLOFT) 50 MG tablet, Take 50 mg by mouth daily Take with 100 mg tablet for a total dose of 150 mg daily.       simvastatin (ZOCOR) 40 MG tablet, TAKE 1 TABLET BY MOUTH EVERY MORNING.  (CHOLESTEROL)       Sodium Phosphates (PHOSPHATE  ENEMA RE), Use one enema rectally every 1-3 hours as needed       spironolactone (ALDACTONE) 25 MG tablet, TAKE 1 TABLET BY MOUTH ONCE DAILY. (HIGH BLOOD PRESSURE)       temazepam (RESTORIL) 15 MG capsule, Take 15 mg by mouth nightly as needed for sleep       tiotropium (SPIRIVA) 18 MCG inhaled capsule, Inhale 18 mcg into the lungs daily       trolamine salicylate (ASPERCREME) 10 % external cream, Apply topically as needed for moderate pain knee       vitamin D3 (CHOLECALCIFEROL) 2000 units (50 mcg) tablet, Take 1 tablet by mouth daily       warfarin ANTICOAGULANT (COUMADIN) 4 MG tablet, Take one tablet (4mg today) 9/14 and then start 6mg on Mon/Fri and 8 mg all other days until recheck on 9/28/20 as directed by the INR clinic       Warfarin Therapy Reminder, 1 each continuous prn       White Petrolatum ointment, Apply topically nightly as needed for dry skin    No current facility-administered medications on file prior to visit.         PAST MEDICAL HISTORY  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     COPD (chronic obstructive pulmonary disease) (H)      History of DVT of lower extremity      History of pulmonary embolism      Hyperlipidemia      Mild intellectual disabilities      Morbid obesity (H)      Peripheral edema      Peripheral vascular disease (H)      Sleep apnea      Tobacco dependence      Venous stasis dermatitis          PAST SURGICAL HISTORY:  Past Surgical History:   Procedure Laterality Date     COLONOSCOPY N/A 4/15/2019    Procedure: COMBINED COLONOSCOPY, SINGLE OR MULTIPLE BIOPSY/POLYPECTOMY BY BIOPSY;  Surgeon: Jovana Ohara MD;  Location:  GI     EXCISE MALIGNANT LESIONS, TRUNK/ARMS/LEGS 0.5CM OR LESS Left 5/26/2017    Procedure: EXCISE MALIGNANT LESIONS, TRUNK/ARMS/LEGS 0.5CM OR LESS;  EXCISION LEFT ELBOW MASS;  Surgeon: Jose Azevedo MD;  Location:  GI     RECTAL SURGERY      perianal abscess?       ALLERGIES     Allergies    Allergen Reactions     Bees      Augmentin Rash     Penicillins Rash       FAMILY HISTORY  Family History   Problem Relation Age of Onset     Diabetes Brother      Unknown/Adopted Mother      Unknown/Adopted Father        SOCIAL HISTORY  Social History     Tobacco Use     Smoking status: Current Every Day Smoker     Packs/day: 1.00     Years: 30.00     Pack years: 30.00     Types: Cigarettes     Smokeless tobacco: Current User     Tobacco comment: started at age 18    Substance Use Topics     Alcohol use: No     Alcohol/week: 0.0 standard drinks     Drug use: No       ROS:   Review of Systems   Constitution: Negative.   HENT: Negative.    Eyes: Negative.    Cardiovascular: Positive for leg swelling.   Respiratory: Positive for sleep disturbances due to breathing.    Endocrine: Negative.    Hematologic/Lymphatic: Positive for bleeding problem. Bruises/bleeds easily.   Skin: Positive for color change, dry skin and poor wound healing.   Musculoskeletal: Positive for joint pain.   Gastrointestinal: Negative.    Genitourinary: Negative.    Neurological:        See HPI   Psychiatric/Behavioral:        See HPI   Allergic/Immunologic: Negative.          EXAM:  There were no vitals taken for this visit.  Physical Exam  Constitutional:       Appearance: He is obese.   HENT:      Head: Normocephalic and atraumatic.   Eyes:      General: No scleral icterus.     Extraocular Movements: Extraocular movements intact.      Pupils: Pupils are equal, round, and reactive to light.   Cardiovascular:      Pulses: Normal pulses.      Comments: Severe lipodermatosclerosis changes with prominent hyperpigmentation (left greater than right).  Musculoskeletal:         General: Swelling present.      Right lower leg: Edema present.      Left lower leg: Edema present.   Skin:     Comments: See photo in today's notes   Neurological:      General: No focal deficit present.      Mental Status: He is alert and oriented to person, place, and time.  Mental status is at baseline.   Psychiatric:         Mood and Affect: Mood normal.         Behavior: Behavior normal.         Thought Content: Thought content normal.         Judgment: Judgment normal.       Labs:  LIPID RESULTS:  Lab Results   Component Value Date    CHOL 183 2020    HDL 78 2020    LDL 80 2020    TRIG 125 2020    CHOLHDLRATIO 2.4 10/01/2015       CBC RESULTS:  Lab Results   Component Value Date    WBC 5.2 2020    RBC 4.35 (L) 2020    HGB 12.4 (L) 2020    HCT 40.4 2020    MCV 93 2020    MCH 28.5 2020    MCHC 30.7 (L) 2020    RDW 15.8 (H) 2020     2020       BMP RESULTS:  Lab Results   Component Value Date     2020    POTASSIUM 3.9 2020    CHLORIDE 104 2020    CO2 33 (H) 2020    ANIONGAP 1 (L) 2020    GLC 78 2020    BUN 25 2020    CR 1.06 2020    GFRESTIMATED 75 2020    GFRESTBLACK 87 2020    JESSICA 9.2 2020        A1C RESULTS:  Lab Results   Component Value Date    A1C 5.7 (H) 2020         Imaging:  Jenniffer Rocha on 10/6/2020 11:16 AM    Name:  Petey Archibald                                                      Patient ID: [id number]  Date: 2020                                                            : 1960  Sex: male                                                                                Examined by: MARIA ESTHER  Age:  59 year old                                                                     Reading MD: LILY     INDICATION:  Varicose veins of left lower extremity with ulcer/fat layer exposed.     EXAM TYPE  LEFT LOWER EXTREMITY VENOUS DUPLEX FOR VENOUS INSUFFICIENCY TECHNICAL SUMMARY     A duplex ultrasound study using color flow was performed to evaluate the left lower extremity veins for valvular incompetence with the patient in a steep reversed trendelenberg.      LEFT:     The deep veins demonstrate  phasic flow, compress, and respond to augmentations.  There is no evidence of DVT.  The common femoral, femoral, and popliteal veins are incompetent and free of thrombus.      The GSV demonstrates phasic flow, compresses, and responds to augmentations from the saphenofemoral junction to the ankle with no evidence of thrombus. The greater saphenous vein measures 9.3 mm at the saphenofemoral junction and 8.1 mm distally. The GSV is incompetent from the SFJ to the mid calf with the greatest reflux time of 5659.  The GSV courses out of the fascia superficially in the proximal thigh and continues to the mid calf while being tortuous from the distal thigh to the distal calf. The GSV gives rise to multiple incompetent varicose veins, the largest measures 7.2 mm off the distal thigh that courses posteromedially with a reflux time of 6203 milliseconds.      The AASV is incompetent  6.1 mm) draining into the saphenofemoral junction and competent in the proximal thigh.     The Giacomini vein is competent (1.3 mm) communicating with the small saphenous vein at the knee level.      The SSV demonstrates phasic flow, compresses, and responds to augmentations from the popliteal space to the ankle.  No thrombus is seen. The saphenopopliteal junction is competent (4.8 mm). The SSV is incompetent from the in the mid calf with a reflux time of 2425 milliseconds. The SSV gives rise to multiple incompetent varicose veins, the largest measuring 6.2 mm off the proximal calf that courses medially to join GSV tributaries in the distal thigh with a reflux time of 2293 milliseconds.     Perforators: There is an incompetent  vein (1.8 mm) at 13 cm from medial malleolus that communicates with a varicose vein measuring 3.5 mm. A second incompetent  vein (4.5 mm) is found 18 cm from the medial malleolus that communicates with a varicose vein measuring 4.5 mm. A third incompetent  vein (3.8 mm) is found 22 cm from the  medial malleolus that communicates with a the GSV and incompetent varicose tributaries. A fourth incompetent  vein (3.4 mm) is found in the mid calf 23 cm form the medial malleolus that communicates with the SSV and incompetent tributaries.     RIGHT:     The CFV demonstrates phasic flow, compresses, and responds to augmentations.  There is no DVT.       FINAL SUMMARY:  1.         No evidence of left lower extremity deep vein thrombus.  2.         Left common femoral, femoral, and popliteal vein incompetence.  3.         Left greater saphenous vein incompetence.\  4.         Left anterior accessory saphenous vein at the junction only.  5.         Left small saphenous vein incompetence.  6.         Left multiple incompetent  veins.  7.         Left varicose vein incompetence.  8.         The time of incompetence is greater than 500 milliseconds in length.            Total face-to-face time was 30 minutes, greater than 50% spent providing counseling and education.        Marquis Loo MD          Again, thank you for allowing me to participate in the care of your patient.        Sincerely,        Marquis Loo MD

## 2020-10-09 ENCOUNTER — HOSPITAL ENCOUNTER (OUTPATIENT)
Dept: WOUND CARE | Facility: CLINIC | Age: 60
End: 2020-10-09
Attending: PODIATRIST
Payer: MEDICARE

## 2020-10-09 VITALS — TEMPERATURE: 96.6 F | SYSTOLIC BLOOD PRESSURE: 116 MMHG | HEART RATE: 82 BPM | DIASTOLIC BLOOD PRESSURE: 69 MMHG

## 2020-10-09 DIAGNOSIS — L97.922 ULCER OF LEFT LOWER EXTREMITY WITH FAT LAYER EXPOSED (H): ICD-10-CM

## 2020-10-09 DIAGNOSIS — L97.523 CHRONIC ULCER OF LEFT FOOT WITH NECROSIS OF MUSCLE (H): Primary | ICD-10-CM

## 2020-10-09 NOTE — PROGRESS NOTES
[facility] [place] Nurse Note    Subject: Petey Archibald presents for nurse only visit for wound check and dressing change.     Exam:  /69   Pulse 82   Temp 96.6  F (35.9  C) (Tympanic)   Wound (used by OP [facility] only) 09/25/20 0911 Left dorsal foot (Active)   Thickness/Stage full thickness 10/09/20 1100   Dressing Appearance moist drainage 10/09/20 1100   Base slough;moist;pink 10/09/20 1100   Red (%), Wound Tissue Color 30 10/09/20 1100   Yellow (%), Wound Tissue Color 70 10/09/20 1100   Periwound macerated;excoriated 10/09/20 1100   Periwound Temperature warm 10/09/20 1100   Periwound Skin Turgor firm 10/09/20 1100   Edges open 10/09/20 1100   Length (cm) 3 10/09/20 1100   Width (cm) 4.5 10/09/20 1100   Depth (cm) 0.3 10/09/20 1100   Wound (cm^2) 13.5 cm^2 10/09/20 1100   Wound Volume (cm^3) 4.05 cm^3 10/09/20 1100   Wound healing % 0 10/09/20 1100   Drainage Characteristics/Odor serosanguineous 10/09/20 1100   Drainage Amount moderate 10/09/20 1100   Care, Wound non-select wound debridement performed 10/09/20 1100   Dressing Care dressing applied 10/09/20 1100   Periwound Care cleansed with pH balanced cleanser;dry periwound area maintained;absorptive dressing applied 10/09/20 1100     Procedure:  Topical anesthetic of 4% lidocaine was applied,non-selective debridement was performed, no bleeding occurred. Patient tolerated procedure well.  Procedure :   Wound redressed with AquaCel AG/ Kerra Max and Application of 2 layer coflex wrap was applied.  Plan: Patient will return to the clinic in one week time  October 9, 2020

## 2020-10-09 NOTE — DISCHARGE INSTRUCTIONS
[facility]  [address] [facility] [address] [address]    Call us at [phone] if you have any questions about your wounds, have redness or  swelling around your wound, have a fever of 101 or greater or if you have any other  problems or concerns. We answer the phone Monday through Friday 8 am to 4 pm,  please leave a message as we check the voicemail frequently throughout the day.    Petey Archibald 1960  Supplements to aid in healin packet of Murali Supplement into your favorite beverage TWICE a day.You may  purchase at [facility] in [address]  Vitamin D3 5,000 iu per day.    Left Plantar foot weekly  Cleanse wound on foot with saline  Apply Aquacel ag to the wound bed  Apply adhesive foam  Apply kerramax 4x4  Apply spandage  Apply 2 layer wrap    Compression:  You have a compression wrap 2 layer coflex . Please remove compression dressing if toes  turn blue and/or tingle and can not be relieved by raising the leg for one hour.  Please raise your legs above your heart for 30 mins 3 times a day to promote  wound healing.    NERISSA BrownN, RN, CWON  10/9/20

## 2020-10-09 NOTE — ADDENDUM NOTE
Encounter addended by: Crystal Spencer RN on: 10/9/2020 12:56 PM   Actions taken: Visit diagnoses modified, Order list changed, Diagnosis association updated

## 2020-10-12 ENCOUNTER — ANTICOAGULATION THERAPY VISIT (OUTPATIENT)
Dept: NURSING | Facility: CLINIC | Age: 60
End: 2020-10-12
Payer: MEDICARE

## 2020-10-12 DIAGNOSIS — I80.03 THROMBOPHLEBITIS OF SUPERFICIAL VEINS OF BOTH LOWER EXTREMITIES: ICD-10-CM

## 2020-10-12 DIAGNOSIS — I82.442 ACUTE DEEP VEIN THROMBOSIS (DVT) OF TIBIAL VEIN OF LEFT LOWER EXTREMITY (H): ICD-10-CM

## 2020-10-12 DIAGNOSIS — Z79.01 LONG TERM CURRENT USE OF ANTICOAGULANT THERAPY: ICD-10-CM

## 2020-10-12 DIAGNOSIS — Z86.711 HISTORY OF PULMONARY EMBOLISM: ICD-10-CM

## 2020-10-12 LAB
CAPILLARY BLOOD COLLECTION: NORMAL
INR PPP: 2.5 (ref 0.86–1.14)

## 2020-10-12 PROCEDURE — 36416 COLLJ CAPILLARY BLOOD SPEC: CPT | Performed by: FAMILY MEDICINE

## 2020-10-12 PROCEDURE — 99207 PR NO CHARGE NURSE ONLY: CPT

## 2020-10-12 PROCEDURE — 85610 PROTHROMBIN TIME: CPT | Performed by: FAMILY MEDICINE

## 2020-10-12 RX ORDER — WARFARIN SODIUM 4 MG/1
TABLET ORAL
Qty: 60 TABLET | Refills: 0 | Status: SHIPPED | OUTPATIENT
Start: 2020-10-12 | End: 2020-11-02

## 2020-10-12 NOTE — PROGRESS NOTES
ANTICOAGULATION FOLLOW-UP CLINIC VISIT    Patient Name:  Petey Archibald  Date:  10/12/2020  Contact Type:  Telephone/ Group Home staff    SUBJECTIVE:  Patient Findings     Comments:  Braden not available today. Called and spoke with Veronica at the Group Home.Warfarin dosing instructions given. The AVS/Dosing calendar mailed to the Group Home and a refill of warfarin sent to [facility].        Clinical Outcomes     Negatives:  Major bleeding event, Thromboembolic event, Anticoagulation-related hospital admission, Anticoagulation-related ED visit, Anticoagulation-related fatality    Comments:  Braden not available today. Called and spoke with Veronica at the Group Home.Warfarin dosing instructions given. The AVS/Dosing calendar mailed to the Group Home and a refill of warfarin sent to [facility].           OBJECTIVE    Recent labs: (last 7 days)     10/12/20  1149   INR 2.50*       ASSESSMENT / PLAN  INR assessment THER    Recheck INR In: 3 WEEKS    INR Location Outside lab      Anticoagulation Summary  As of 10/12/2020    INR goal:  2.0-3.0   TTR:  76.3 % (11.9 mo)   INR used for dosin.50 (10/12/2020)   Warfarin maintenance plan:  6 mg (4 mg x 1.5) every Mon, Fri; 8 mg (4 mg x 2) all other days   Full warfarin instructions:  6 mg every Mon, Fri; 8 mg all other days   Weekly warfarin total:  52 mg   No change documented:  Nereida Grant RN   Plan last modified:  Lisa Ponce RN (2020)   Next INR check:  2020   Priority:  Maintenance   Target end date:  Indefinite    Indications    History of pulmonary embolism [Z86.711]  Long term current use of anticoagulant therapy [Z79.01]             Anticoagulation Episode Summary     INR check location:  Anticoagulation Clinic    Preferred lab:      Send INR reminders to:  EDU SUGGS    Comments:  REM half-way; A new Coumadin Prescription will need to sent to [facility] with current dose and next INR check.      Anticoagulation Care Providers      Provider Role Specialty Phone number    Yolande Lacy PA-C Referring Physician Assistant [phone]    Silvino Baker MD Responsible Family Practice [phone]            See the Encounter Report to view Anticoagulation Flowsheet and Dosing Calendar (Go to Encounters tab in chart review, and find the Anticoagulation Therapy Visit)    Pt INR is 2.5 today. Staff advised to continue giving pt 6 mg on Mondays, Fridays and 8 mg all the other days. See findings. Recheck INR in 3 weeks scheduled on 11/2/20 at 10:45 am at [facility]. Staff aware if signs of clotting (pain, tenderness, swelling, color change in leg or arm, SOB) and bleeding occur (blood in stool, urine, large bruising, bleeding gums, nosebleeds) to have INR check sooner. If sx severe report to ER or concerned for stroke call 911. If general questions or concerns arise, call clinic.         Nereida Grant RN

## 2020-10-16 ENCOUNTER — HOSPITAL ENCOUNTER (OUTPATIENT)
Dept: WOUND CARE | Facility: CLINIC | Age: 60
Discharge: HOME | End: 2020-10-16
Attending: PHYSICIAN ASSISTANT | Admitting: PHYSICIAN ASSISTANT
Payer: MEDICARE

## 2020-10-16 VITALS
HEART RATE: 83 BPM | TEMPERATURE: 99.1 F | DIASTOLIC BLOOD PRESSURE: 67 MMHG | RESPIRATION RATE: 18 BRPM | SYSTOLIC BLOOD PRESSURE: 121 MMHG

## 2020-10-16 DIAGNOSIS — L03.116 CELLULITIS OF LEFT LOWER EXTREMITY: Primary | ICD-10-CM

## 2020-10-16 DIAGNOSIS — L97.922 ULCER OF LEFT LOWER EXTREMITY WITH FAT LAYER EXPOSED (H): ICD-10-CM

## 2020-10-16 LAB
GRAM STN SPEC: NORMAL
GRAM STN SPEC: NORMAL
Lab: NORMAL
SPECIMEN SOURCE: NORMAL

## 2020-10-16 PROCEDURE — 87186 SC STD MICRODIL/AGAR DIL: CPT | Performed by: PHYSICIAN ASSISTANT

## 2020-10-16 PROCEDURE — 87070 CULTURE OTHR SPECIMN AEROBIC: CPT | Performed by: PHYSICIAN ASSISTANT

## 2020-10-16 PROCEDURE — 87077 CULTURE AEROBIC IDENTIFY: CPT | Performed by: PHYSICIAN ASSISTANT

## 2020-10-16 PROCEDURE — 87205 SMEAR GRAM STAIN: CPT | Performed by: PHYSICIAN ASSISTANT

## 2020-10-16 PROCEDURE — 97597 DBRDMT OPN WND 1ST 20 CM/<: CPT

## 2020-10-16 RX ORDER — CEPHALEXIN 500 MG/1
500 CAPSULE ORAL 3 TIMES DAILY
Qty: 30 CAPSULE | Refills: 0 | Status: SHIPPED | OUTPATIENT
Start: 2020-10-16 | End: 2020-10-26

## 2020-10-16 NOTE — PROGRESS NOTES
[facility] Wound Healing [place] Nurse Note    Subject: Petey Archibald presents for nurse only visit for dressing change left dorsal foot. Increased drainage, increased warm and pain. Lizette Marinelli was consulted and took a wound culture and put him on antibiotics.       Exam:  /67   Pulse 83   Temp 99.1  F (37.3  C) (Temporal)   Resp 18   Wound (used by OP [facility] only) 09/25/20 0911 Left dorsal foot (Active)   Thickness/Stage full thickness 10/16/20 1100   Dressing Appearance moist drainage 10/16/20 1100   Base slough 10/16/20 1100   Yellow (%), Wound Tissue Color 100 10/16/20 1100   Periwound edematous;excoriated;macerated 10/16/20 1100   Periwound Temperature hot 10/16/20 1100   Edges callused;rolled/closed 10/16/20 1100   Length (cm) 2.5 10/16/20 1100   Width (cm) 3.5 10/16/20 1100   Depth (cm) 0.4 10/16/20 1100   Wound (cm^2) 8.75 cm^2 10/16/20 1100   Wound Volume (cm^3) 3.5 cm^3 10/16/20 1100   Wound healing % 35.19 10/16/20 1100   Drainage Characteristics/Odor brown;malodorous;serosanguineous 10/16/20 1100   Drainage Amount large 10/16/20 1100   Care, Wound debrided 10/16/20 1100     Procedure:  Time out was called, informed consent obtained, and topical anesthetic of 4% lidocaine was applied,selective debridement was performed using a currette to remove non-viable tissue less than 20 sq cm, no bleeding occurred. Patient tolerated procedure well.  Procedure :Wound redressed with ioplex,spandage, kerramax Application of 2 layer coflex wrap was applied.     Plan: Patient will return to the clinic in 1 weeks time  October 16, 2020

## 2020-10-16 NOTE — LETTER
October 16, 2020      To Whom it May Concern:      Mr. Petey Archibald was seen in my office today for a non-healing foot ulcer. He will need to be off of work until Monday, October 19th in order to allow him to stay off of his feet. Please contact our office with any further questions or clarifications.      Sincerely,          Lizette Brewer PA-C

## 2020-10-16 NOTE — DISCHARGE INSTRUCTIONS
[facility]  [address] [place] [id number] [address]    Call us at [phone] if you have any questions about your wounds, have redness or swelling around your wound, have a fever of 101 or greater or if you have any other problems or concerns. We answer the phone Monday through Friday 8 am to 4 pm, please leave a message as we check the voicemail frequently throughout the day.     Petey Archibald      1960    Supplements to aid in healin. 1 packet of Murali Supplement into your favorite beverage TWICE a day.You may purchase at [facility] in [address]  2. Vitamin D3 5,000 iu per day.  3. We will call you with your culture results likely next week     Top of foot   ioplex and glenn colbydaapolinar  Weekly changes    Compression:   You have a compression wrap 2 layer is supposed to be removed ionce  Weekly in clinic.   Please remove compression dressing if toes turn blue and/or tingle and can not be relieved by raising the leg for one hour.        MARCIO Webster.P.M.. 2020

## 2020-10-16 NOTE — LETTER
October 16, 2020      To Whom it May Concern:      Mr. Petey Archibald was seen in my office today for a non-healing foot ulcer. He will need to be off of work until Monday, October 12th in order to allow him to stay off of his feet. Please contact our office with any further questions or clarifications.      Sincerely,          Lizette Brewer PA-C

## 2020-10-23 ENCOUNTER — HOSPITAL ENCOUNTER (OUTPATIENT)
Dept: WOUND CARE | Facility: CLINIC | Age: 60
Discharge: HOME | End: 2020-10-23
Attending: PODIATRIST | Admitting: PODIATRIST
Payer: MEDICARE

## 2020-10-23 VITALS
TEMPERATURE: 98.2 F | SYSTOLIC BLOOD PRESSURE: 120 MMHG | DIASTOLIC BLOOD PRESSURE: 72 MMHG | HEART RATE: 67 BPM | RESPIRATION RATE: 16 BRPM

## 2020-10-23 DIAGNOSIS — F17.200 TOBACCO DEPENDENCE: Chronic | ICD-10-CM

## 2020-10-23 DIAGNOSIS — F25.0 SCHIZOAFFECTIVE DISORDER, BIPOLAR TYPE (H): ICD-10-CM

## 2020-10-23 DIAGNOSIS — E66.01 MORBID OBESITY (H): Chronic | ICD-10-CM

## 2020-10-23 DIAGNOSIS — I87.2 STASIS DERMATITIS OF BOTH LEGS: ICD-10-CM

## 2020-10-23 DIAGNOSIS — L97.922 ULCER OF LEFT LOWER EXTREMITY WITH FAT LAYER EXPOSED (H): ICD-10-CM

## 2020-10-23 DIAGNOSIS — I73.9 PERIPHERAL VASCULAR DISEASE (H): ICD-10-CM

## 2020-10-23 PROCEDURE — 11042 DBRDMT SUBQ TIS 1ST 20SQCM/<: CPT | Performed by: PODIATRIST

## 2020-10-23 PROCEDURE — 11042 DBRDMT SUBQ TIS 1ST 20SQCM/<: CPT

## 2020-10-23 RX ORDER — INFLUENZA A VIRUS A/GUANGDONG-MAONAN/SWL1536/2019 CNIC-1909 (H1N1) ANTIGEN (FORMALDEHYDE INACTIVATED), INFLUENZA A VIRUS A/HONG KONG/2671/2019 (H3N2) ANTIGEN (FORMALDEHYDE INACTIVATED), INFLUENZA B VIRUS B/PHUKET/3073/2013 ANTIGEN (FORMALDEHYDE INACTIVATED), AND INFLUENZA B VIRUS B/WASHINGTON/02/2019 ANTIGEN (FORMALDEHYDE INACTIVATED) 15; 15; 15; 15 UG/.5ML; UG/.5ML; UG/.5ML; UG/.5ML
INJECTION, SUSPENSION INTRAMUSCULAR
COMMUNITY
Start: 2020-10-16 | End: 2021-03-23

## 2020-10-23 RX ORDER — MULTIVITAMIN WITH FOLIC ACID 400 MCG
TABLET ORAL
COMMUNITY
Start: 2020-10-09 | End: 2021-03-23

## 2020-10-23 NOTE — PROGRESS NOTES
[facility] Wound Healing [place] Progress Note    Subject: Patient was seen at wound center.  Patient presents for follow-up on left foot ulceration.  He has been getting 2 layer wraps.  He is here with his caregiver.  Patient denies fever, nausea, vomiting.  Notes the pain has improved to his left foot.  Did see vascular and they are going to schedule scleropathy his veins hopefully next week.    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     COPD (chronic obstructive pulmonary disease) (H)      History of DVT of lower extremity      History of pulmonary embolism      Hyperlipidemia      Mild intellectual disabilities      Morbid obesity (H)      Peripheral edema      Peripheral vascular disease (H)      Sleep apnea      Tobacco dependence      Venous stasis dermatitis        Social Hx:   Social History     Socioeconomic History     Marital status: Single     Spouse name: Not on file     Number of children: Not on file     Years of education: Not on file     Highest education level: Not on file   Occupational History     Not on file   Social Needs     Financial resource strain: Not on file     Food insecurity     Worry: Not on file     Inability: Not on file     Transportation needs     Medical: Not on file     Non-medical: Not on file   Tobacco Use     Smoking status: Current Every Day Smoker     Packs/day: 1.00     Years: 30.00     Pack years: 30.00     Types: Cigarettes     Smokeless tobacco: Current User     Tobacco comment: started at age 18    Substance and Sexual Activity     Alcohol use: No     Alcohol/week: 0.0 standard drinks     Drug use: No     Sexual activity: Not Currently     Partners: Female   Lifestyle     Physical activity     Days per week: Not on file     Minutes per session: Not on file     Stress: Not on file   Relationships     Social connections     Talks on phone: Not on file     Gets together: Not on file     Attends Caodaism service: Not on  file     Active member of club or organization: Not on file     Attends meetings of clubs or organizations: Not on file     Relationship status: Not on file     Intimate partner violence     Fear of current or ex partner: Not on file     Emotionally abused: Not on file     Physically abused: Not on file     Forced sexual activity: Not on file   Other Topics Concern     Parent/sibling w/ CABG, MI or angioplasty before 65F 55M? No   Social History Narrative     Not on file       Surgical Hx:   Past Surgical History:   Procedure Laterality Date     COLONOSCOPY N/A 4/15/2019    Procedure: COMBINED COLONOSCOPY, SINGLE OR MULTIPLE BIOPSY/POLYPECTOMY BY BIOPSY;  Surgeon: Jovana Ohara MD;  Location:  GI     EXCISE MALIGNANT LESIONS, TRUNK/ARMS/LEGS 0.5CM OR LESS Left 5/26/2017    Procedure: EXCISE MALIGNANT LESIONS, TRUNK/ARMS/LEGS 0.5CM OR LESS;  EXCISION LEFT ELBOW MASS;  Surgeon: Jose Azevedo MD;  Location:  GI     RECTAL SURGERY      perianal abscess?       Allergies:    Allergies   Allergen Reactions     Bees      Augmentin Rash     Penicillins Rash       Medications:   Current Outpatient Medications   Medication     acetaminophen (TYLENOL) 325 MG tablet     albuterol (ALBUTEROL) 108 (90 BASE) MCG/ACT inhaler     ammonium lactate (LAC-HYDRIN) 12 % external lotion     ARIPiprazole (ABILIFY) 5 MG tablet     bacitracin 500 UNIT/GM external ointment     bacitracin 500 UNIT/GM OINT     buPROPion (WELLBUTRIN SR) 150 MG 12 hr tablet     Cadexomer Iodine, topical, 0.9% (IODOSORB) 0.9 % GEL gel     carboxymethylcellulose PF (REFRESH PLUS) 0.5 % ophthalmic solution     cephALEXin (KEFLEX) 500 MG capsule     cephALEXin (KEFLEX) 500 MG capsule     clotrimazole (LOTRIMIN) 1 % external cream     collagenase (SANTYL) 250 UNIT/GM external ointment     DEXILANT 60 MG CPDR CR capsule     diphenhydrAMINE (BENADRYL) 50 MG capsule     EPINEPHrine (EPIPEN 2-BRE) 0.3 MG/0.3ML injection 2-pack     ferrous gluconate  "(FERGON) 324 (38 Fe) MG tablet     FLUZONE QUADRIVALENT 0.5 ML injection     furosemide (LASIX) 40 MG tablet     gabapentin (NEURONTIN) 100 MG capsule     Gauze Pads & Dressings (GAUZE PADS 4\"X4\") 4\"X4\" PADS     hydrocortisone, Perianal, (ANUSOL-HC) 2.5 % cream     Lactobacillus Rhamnosus, GG, ([facility] HEALTH & WELLNESS) capsule     Lactobacillus-Inulin ([facility] DIGESTIVE HEALTH) CAPS     montelukast (SINGULAIR) 10 MG tablet     Multiple Vitamins-Minerals (TAB-A-JOVAN) TABS     multivitamin, therapeutic (THERA-VIT) TABS tablet     naltrexone (DEPADE/REVIA) 50 MG tablet     nicotine (NICODERM CQ) 21 MG/24HR 24 hr patch     nicotine (NICORETTE) 2 MG gum     nystatin-triamcinolone (MYCOLOG II) 712207-9.1 UNIT/GM-% external cream     order for DME     order for DME     order for DME     order for DME     order for DME     order for DME     order for DME     order for DME     potassium chloride SA (K-DUR/KLOR-CON M) 20 MEQ CR tablet     PULMICORT FLEXHALER 180 MCG/ACT inhaler     sertraline (ZOLOFT) 100 MG tablet     sertraline (ZOLOFT) 50 MG tablet     simvastatin (ZOCOR) 40 MG tablet     Sodium Phosphates (PHOSPHATE ENEMA RE)     spironolactone (ALDACTONE) 25 MG tablet     temazepam (RESTORIL) 15 MG capsule     tiotropium (SPIRIVA) 18 MCG inhaled capsule     trolamine salicylate (ASPERCREME) 10 % external cream     vitamin D3 (CHOLECALCIFEROL) 2000 units (50 mcg) tablet     warfarin ANTICOAGULANT (COUMADIN) 4 MG tablet     Warfarin Therapy Reminder     White Petrolatum ointment     No current facility-administered medications for this encounter.          Objective:  Vitals:  /72 (BP Location: Right arm)   Pulse 67   Temp 98.2  F (36.8  C) (Temporal)   Resp 16      A1C: 5.7 (7/2020)     General:  Patient is alert and orientated.  NAD      Dermatologic: Dorsal left foot ulceration measurements below.  Base of the wound is fibrous.  Heavy serous clear drainage is noted with maceration around the wound.  Fat " layer is exposed but no exposure of tendon or bone.  Localized reactive redness but no signs of acute infection noted.  .   Wound (used by OP [facility] only) 09/25/20 0911 Left dorsal foot (Active)   Dressing Appearance moist drainage 10/23/20 1108   Length (cm) 2 10/23/20 1108   Width (cm) 3.5 10/23/20 1108   Depth (cm) 0.3 10/23/20 1108   Wound (cm^2) 7 cm^2 10/23/20 1108   Wound Volume (cm^3) 2.1 cm^3 10/23/20 1108   Wound healing % 48.15 10/23/20 1108   Drainage Characteristics/Odor serosanguineous 10/23/20 1108   Drainage Amount moderate 10/23/20 1108     Vascular: DP & PT pulses are intact & regular bilaterally.   significant edema and varicosities noted to the left foot and leg.  CFT and skin temperature is normal to both lower extremities.     Neurologic: Lower extremity sensation is diminished to feet.     Musculoskeletal: Patient is ambulatory without assistive device or brace.  No gross ankle deformity noted.  No foot or ankle joint effusion is noted.     Assessment:    Skin ulcer of left foot with fat layer exposed (H)  Leg edema, left  Ulcer of left lower extremity with fat layer exposed (H)  Morbid obesity (H)  Tobacco dependence  Peripheral vascular disease (H)     Plan: At this time the wound was debrided.   We will continue to do Acticoat 7 with 2 layer wrap.  We will have him come in weekly to get that changed.  This will try to help get the swelling under control.  He currently has a postop shoe.   We will have him follow-up with me in 1 month. He will get weekly wrap changes at the wound center. All questions were answered to patient and patient's caregiver satisfaction and he will call with further questions or concerns.     Procedure:  After verbal consent, per protocol lidocaine was applied to the wound. Excisional debridement was performed on ulcer.   #15 blade was used to debride ulcer down to and including subcutaneous tissue. Bleeding controlled with light pressure.  No drainage noted.   < 20sq  cm debrided.  Dry dressing applied to foot.  Patient tolerated procedure well.    Maggy Knutson DPM, Podiatry/Foot and Ankle Surgery

## 2020-10-23 NOTE — DISCHARGE INSTRUCTIONS
[facility]  [address] [facility] [address], [facility] [address]  Appointment Phone [phone]     Petey Archibald      1960  Keep leg dry with use of a cast protector (available at [facility])  Wound Dressing Change: Left Foot  Cleanse wound with vashe  Skin Care: Apply moisturizing cream to skin surrounding the wound (but not in the wound) skin prep  Cover wound with ioplex cut to fit the size of the wound   Apply Edemawear followed by mariajose bravo dressing  Change dressing weekly.   Compression:   Your compression wrap is a 2 layer coflex wrap and should stay on until next week.     Please remove compression dressing if toes turn blue and/or tingle and can not be relieved by raising the leg for one hour.     Please raise your legs above your heart for 30 mins 3 times a day to promote wound healing.     MARCIO Webster.P.M.. October 23, 2020  Call us at [phone] if you have any questions about your wounds, have redness or swelling around your wound, have a fever of 101 or greater or if you have any other problems or concerns. We answer the phone Monday through Friday 8 am to 4 pm, please leave a message as we check the voicemail frequently throughout the day.

## 2020-10-26 ENCOUNTER — TELEPHONE (OUTPATIENT)
Dept: WOUND CARE | Facility: CLINIC | Age: 60
End: 2020-10-26

## 2020-10-26 DIAGNOSIS — L97.922 ULCER OF LEFT LOWER EXTREMITY WITH FAT LAYER EXPOSED (H): Primary | ICD-10-CM

## 2020-10-26 RX ORDER — MULTIVIT-MIN/IRON/FOLIC ACID/K 18-600-40
5000 CAPSULE ORAL DAILY
Qty: 150 TABLET | Refills: 0 | Status: SHIPPED | OUTPATIENT
Start: 2020-10-26 | End: 2020-11-25

## 2020-10-26 RX ORDER — NUTRITIONAL SUPPLEMENT
1 POWDER (GRAM) ORAL 2 TIMES DAILY
Qty: 60 EACH | Refills: 0 | Status: SHIPPED | OUTPATIENT
Start: 2020-10-26 | End: 2020-11-25

## 2020-10-26 NOTE — TELEPHONE ENCOUNTER
Spoke with Dai from home care, she is requesting the orders for Murali and Vitamin D3 be sent to [facility] pharmacy fax # [phone].  Will send fax today.

## 2020-10-27 DIAGNOSIS — Z86.718 HISTORY OF DVT OF LOWER EXTREMITY: Primary | ICD-10-CM

## 2020-10-27 DIAGNOSIS — I87.2 DEEP VENOUS INSUFFICIENCY: ICD-10-CM

## 2020-10-27 DIAGNOSIS — I87.2 VENOUS (PERIPHERAL) INSUFFICIENCY: ICD-10-CM

## 2020-10-27 DIAGNOSIS — L97.523 CHRONIC ULCER OF LEFT FOOT WITH NECROSIS OF MUSCLE (H): ICD-10-CM

## 2020-10-27 DIAGNOSIS — I89.0 LYMPHEDEMA OF LEFT LEG: ICD-10-CM

## 2020-10-28 LAB
BACTERIA SPEC CULT: ABNORMAL
Lab: ABNORMAL
SPECIMEN SOURCE: ABNORMAL

## 2020-10-30 ENCOUNTER — HOSPITAL ENCOUNTER (OUTPATIENT)
Dept: WOUND CARE | Facility: CLINIC | Age: 60
Discharge: HOME | End: 2020-10-30
Attending: SURGERY | Admitting: SURGERY
Payer: MEDICARE

## 2020-10-30 ENCOUNTER — OFFICE VISIT (OUTPATIENT)
Dept: VASCULAR SURGERY | Facility: CLINIC | Age: 60
End: 2020-10-30
Payer: MEDICARE

## 2020-10-30 VITALS
TEMPERATURE: 97.4 F | RESPIRATION RATE: 16 BRPM | SYSTOLIC BLOOD PRESSURE: 124 MMHG | DIASTOLIC BLOOD PRESSURE: 67 MMHG | HEART RATE: 70 BPM

## 2020-10-30 DIAGNOSIS — L97.922 ULCER OF LEFT LOWER EXTREMITY WITH FAT LAYER EXPOSED (H): ICD-10-CM

## 2020-10-30 DIAGNOSIS — I87.2 VENOUS (PERIPHERAL) INSUFFICIENCY: Primary | ICD-10-CM

## 2020-10-30 DIAGNOSIS — I89.0 LYMPHEDEMA OF LEFT LEG: ICD-10-CM

## 2020-10-30 PROCEDURE — 97597 DBRDMT OPN WND 1ST 20 CM/<: CPT

## 2020-10-30 PROCEDURE — 99207 PR NO CHARGE NURSE ONLY: CPT

## 2020-10-30 NOTE — DISCHARGE INSTRUCTIONS
[facility]  [address] [address]    Call us at [phone] if you have any questions about your wounds, have redness or swelling around your wound, have a fever of 101 or greater or if you have any other problems or concerns. We answer the phone Monday through Friday 8 am to 4 pm, please leave a message as we check the voicemail frequently throughout the day.     Petey Archibald      1960    Keep leg dry with use of a cast protector (available at [facility] or [employer])  Wound Dressing Change: Left Foot  Cleanse wound with vashe  Skin Care: Apply moisturizing cream to skin surrounding the wound (but not in the  wound) skin prep  Cover wound with ioplex cut to fit the size of the wound  Apply Edemawear followed by mariajose bravo dressing  Change dressing weekly.  Compression:  Your compression wrap is a 2 layer coflex wrap and should stay on until next week.  Please remove compression dressing if toes turn blue and/or tingle and can not be  relieved by raising the leg for one hour.  Please raise your legs above your heart for 30 mins 3 times a day to promote wound  healing.     MARCIO Webster.P.M.. October 30, 2020

## 2020-10-30 NOTE — PROGRESS NOTES
[facility] Wound [facility] [place] Nurse Note    Subject: Petey Archibald presents for nurse only visit for dressing change       Exam:  /67 (BP Location: Left arm)   Pulse 70   Temp 97.4  F (36.3  C) (Temporal)   Resp 16   Wound (used by OP [facility] only) 09/25/20 0911 Left dorsal foot (Active)   Thickness/Stage full thickness 10/30/20 0700   Dressing Appearance moist drainage 10/30/20 0700   Base slough 10/30/20 0700   Periwound redness;swelling 10/30/20 0700   Edges rolled/closed;callused 10/30/20 0700   Length (cm) 1.5 10/30/20 0700   Width (cm) 2.5 10/30/20 0700   Depth (cm) 0.3 10/30/20 0700   Wound (cm^2) 3.75 cm^2 10/30/20 0700   Wound Volume (cm^3) 1.12 cm^3 10/30/20 0700   Wound healing % 72.22 10/30/20 0700   Drainage Characteristics/Odor serosanguineous 10/30/20 0700   Drainage Amount moderate 10/30/20 0700   Care, Wound debrided 10/30/20 0700     Procedure:  Time out was called, informed consent obtained, and topical anesthetic of 4% lidocaine was applied,selective debridement was performed using a curette to remove non-viable tissue less than 20 sq cm, no bleeding occurred. Patient tolerated procedure well.  Procedure :Wound redressed with aquacel ag advantage, spandage over toes and leg,  Application of 2 layer coflex wrap was applied including toes    Plan: Patient will return to the clinic in 1 weeks time.  Crystal Spencer, NERISSAN, RN, CWOCN  October 30, 2020

## 2020-10-30 NOTE — PROGRESS NOTES
"Patient and  Hunter (personal care attendant) stated they are not purchasing compression hose today due to unsure who or how to pay for this due to insurance does not cover. Hunter stated he will discuss with his supervisor regarding payment. Pt informed that we have compression hose in stock at clinic Size V open toe beige for purchase, and to notify clinic if he plans to purchase day of surgery.   DME compression order placed.Enrique Mccurdy RN      Date measured: 10/30/2020  Measured by: TS    Measurements:  Right Ankle: 26cm  Right Calf: 45.5cm  Right Thigh: 67.5cm    Left lower extremity had edema wear placed by Dr Huggins patient stated and unable to remove for measurements. Measurement taken over edema wear.  Left Ankle: 30cm  Left Calf: 45cm  Left Thigh: 68cm    Leg Length: 71cm  Calf Length: 45cm  Height: 5' 11\"  Shoe Size: 13-14    Prescription Details  Style:Thigh High -      Patient Preferences:  Toe: Open Color: Beige    Length of Need: Life Time    Enrique Mccurdy RN  "

## 2020-11-02 ENCOUNTER — ANTICOAGULATION THERAPY VISIT (OUTPATIENT)
Dept: NURSING | Facility: CLINIC | Age: 60
End: 2020-11-02

## 2020-11-02 DIAGNOSIS — I80.03 THROMBOPHLEBITIS OF SUPERFICIAL VEINS OF BOTH LOWER EXTREMITIES: ICD-10-CM

## 2020-11-02 DIAGNOSIS — Z86.711 HISTORY OF PULMONARY EMBOLISM: ICD-10-CM

## 2020-11-02 DIAGNOSIS — I82.442 ACUTE DEEP VEIN THROMBOSIS (DVT) OF TIBIAL VEIN OF LEFT LOWER EXTREMITY (H): ICD-10-CM

## 2020-11-02 DIAGNOSIS — Z79.01 LONG TERM CURRENT USE OF ANTICOAGULANT THERAPY: ICD-10-CM

## 2020-11-02 LAB — INR PPP: 2.5 (ref 0.86–1.14)

## 2020-11-02 PROCEDURE — 85610 PROTHROMBIN TIME: CPT | Performed by: FAMILY MEDICINE

## 2020-11-02 PROCEDURE — 36415 COLL VENOUS BLD VENIPUNCTURE: CPT | Performed by: FAMILY MEDICINE

## 2020-11-02 RX ORDER — WARFARIN SODIUM 4 MG/1
TABLET ORAL
Qty: 60 TABLET | Refills: 0 | Status: SHIPPED | OUTPATIENT
Start: 2020-11-02 | End: 2020-11-23

## 2020-11-02 NOTE — PROGRESS NOTES
ANTICOAGULATION MANAGEMENT     Patient Name:  Petey Archibald  Date:  11/2/2020    ASSESSMENT /SUBJECTIVE:    Today's INR result of 2.5 is therapeutic. Goal INR of 2.0-3.0      Warfarin dose taken: Warfarin taken as instructed    Diet: No new diet changes affecting INR    Medication changes/ interactions: No new medications/supplements affecting INR    Previous INR: Therapeutic     S/S of bleeding or thromboembolism: No    New injury or illness: No    Upcoming surgery, procedure or cardioversion: No    Additional findings: None      PLAN:    Telephone call with  group [facility] home contact regarding INR result and instructed:     Warfarin Dosing Instructions: Continue your current warfarin dose 6mg MF and 8 mg AOD    Instructed patient to follow up no later than: 3 weeks  Lab visit scheduled    Education provided: None required      [facility] verbalizes understanding and agrees to warfarin dosing plan. RX sent to [facility] pharmacy.    Instructed to call the Anticoagulation Clinic for any changes, questions or concerns. (#[phone])        Ysabel Tena RN      OBJECTIVE:  Recent labs: (last 7 days)     11/02/20  1044   INR 2.50*         No question data found.  Anticoagulation Summary  As of 11/2/2020    INR goal:  2.0-3.0   TTR:  76.3 % (11.9 mo)   INR used for dosing:  No new INR was available at the time of this encounter.   Warfarin maintenance plan:  6 mg (4 mg x 1.5) every Mon, Fri; 8 mg (4 mg x 2) all other days   Full warfarin instructions:  6 mg every Mon, Fri; 8 mg all other days   Weekly warfarin total:  52 mg   No change documented:  Ysabel Tena RN   Plan last modified:  Lisa Ponce RN (9/14/2020)   Next INR check:  11/23/2020   Priority:  Maintenance   Target end date:  Indefinite    Indications    History of pulmonary embolism [Z86.711]  Long term current use of anticoagulant therapy [Z79.01]             Anticoagulation Episode Summary     INR check location:  Anticoagulation  Clinic    Preferred lab:      Send INR reminders to:  [facility]    Comments:  REM custodial; A new Coumadin Prescription will need to sent to [facility] with current dose and next INR check.      Anticoagulation Care Providers     Provider Role Specialty Phone number    Yolande Lacy PA-C Referring Family Medicine [phone]    Silvino Baker MD Responsible Family Medicine [phone]

## 2020-11-03 DIAGNOSIS — Z79.01 LONG TERM CURRENT USE OF ANTICOAGULANT THERAPY: ICD-10-CM

## 2020-11-03 DIAGNOSIS — Z86.711 HISTORY OF PULMONARY EMBOLISM: ICD-10-CM

## 2020-11-04 RX ORDER — TEMAZEPAM 15 MG/1
CAPSULE ORAL
Qty: 30 CAPSULE | Refills: 5 | OUTPATIENT
Start: 2020-11-04

## 2020-11-04 RX ORDER — WARFARIN SODIUM 6 MG/1
TABLET ORAL
Qty: 1 TABLET | Refills: 4 | OUTPATIENT
Start: 2020-11-04

## 2020-11-06 ENCOUNTER — HOSPITAL ENCOUNTER (OUTPATIENT)
Dept: WOUND CARE | Facility: CLINIC | Age: 60
Discharge: HOME | End: 2020-11-06
Admitting: PODIATRIST
Payer: MEDICARE

## 2020-11-06 VITALS — SYSTOLIC BLOOD PRESSURE: 130 MMHG | DIASTOLIC BLOOD PRESSURE: 70 MMHG | HEART RATE: 62 BPM | TEMPERATURE: 97.8 F

## 2020-11-06 DIAGNOSIS — L97.922 ULCER OF LEFT LOWER EXTREMITY WITH FAT LAYER EXPOSED (H): ICD-10-CM

## 2020-11-06 PROCEDURE — 97597 DBRDMT OPN WND 1ST 20 CM/<: CPT

## 2020-11-06 NOTE — PROGRESS NOTES
[facility] Wound Healing [place] Nurse Note    Subject: Petey Archibald presents for nurse only visit for dressing change       Exam:  /70   Pulse 62   Temp 97.8  F (36.6  C)   Wound (used by OP [facility] only) 09/25/20 0911 Left dorsal foot (Active)   Thickness/Stage full thickness 11/06/20 0700   Dressing Appearance moist drainage 11/06/20 0700   Base granulating 11/06/20 0700   Periwound intact 11/06/20 0700   Periwound Temperature warm 11/06/20 0700   Periwound Skin Turgor soft 11/06/20 0700   Edges open 11/06/20 0700   Length (cm) 1.6 11/06/20 0700   Width (cm) 3 11/06/20 0700   Depth (cm) 0.3 11/06/20 0700   Wound (cm^2) 4.8 cm^2 11/06/20 0700   Wound Volume (cm^3) 1.44 cm^3 11/06/20 0700   Wound healing % 64.44 11/06/20 0700   Drainage Characteristics/Odor serosanguineous 11/06/20 0700   Drainage Amount moderate 11/06/20 0700   Care, Wound debrided 11/06/20 0700     Procedure:  Time out was called, informed consent obtained, and topical anesthetic of 4% lidocaine was applied,selective debridement was performed using a curette to remove non-viable tissue less than 20 sq cm, no bleeding occurred. Patient tolerated procedure well.  Procedure :Wound redressed with skin prep, ioplex, spandage, modified wrap over the toes with coban, Application of 2 layer coflex wrap was applied.     Plan: Patient will return to the clinic in 1 weeks time  Crystal Spencer, NERISSAN, RN, CWOCN  November 6, 2020

## 2020-11-10 ENCOUNTER — TELEPHONE (OUTPATIENT)
Dept: VASCULAR SURGERY | Facility: CLINIC | Age: 60
End: 2020-11-10

## 2020-11-10 DIAGNOSIS — I87.2 VENOUS (PERIPHERAL) INSUFFICIENCY: ICD-10-CM

## 2020-11-10 DIAGNOSIS — L97.523 CHRONIC ULCER OF LEFT FOOT WITH NECROSIS OF MUSCLE (H): Primary | ICD-10-CM

## 2020-11-10 NOTE — TELEPHONE ENCOUNTER
[facility]    Preoperative Nurse Call    Procedure: Left leg VNUS closure GSV (med nec), u/s sclero (med nec) 2 sessions   Date: Wed. 11/18  Time: 2:30pm  Check in time: 1:30pm    Spoke to pt's caretaker Braden. Informed patient: when to check in (1:30pm) to sign consent, to bring their preop medications in their original bottle with them (3mg ativan, 0.1mg clonidine, 600mg clindamycin). Patient will take the medications after signing the consent to the procedure. Instructed patient to wear a mask, wear loose-fitting comfortable clothing, and bring their compression hose. Ensured patient has a /someone that will be responsible for them the rest of the day. No visitors are allowed in the clinic, so  can either stay in the parking lot or leave. If  does leave, IF POSSIBLE, we ask that they do not go more than 15-20 mins from our clinic. Once procedure is completed, we will keep patient in recovery for 30-45 mins, and call  with aftercare instructions. Informed patient, that if possible, they should sit in the backseat to elevate their leg on the ride home.    Pt needs thigh-high compression hose for procedure. Status of the hose: Will plan on using an ACE wrap for patients leg as compression stockings may not be feasible for pt. Otherwise, they can purchase a pair on the day of his procedure. (see nurse note from 10/30).    Special instructions: Pt may continue taking his warfarin.     Special COVID-19 instructions: Patient aware they need to wear a mask to our clinic and during their procedure. Patient aware that their  cannot come into the clinic and must wait in car. Nurse will call pt's  when procedure is over.    Patient understands that if they have any of the following symptoms (fever, cough, shortness of breath, rash), they need to notify us immediately to cancel their procedure and will have to reschedule for a later date.    Pt's caretaker in agreement with  instructions and had no further questions at this time.    Informed him that pt's preop medications won't be sent over to their pharmacy until Thursday or Friday this week.    Blossom Tiwari RN

## 2020-11-12 RX ORDER — CLINDAMYCIN HCL 300 MG
CAPSULE ORAL
Qty: 2 CAPSULE | Refills: 0 | Status: SHIPPED | OUTPATIENT
Start: 2020-11-12 | End: 2020-12-18

## 2020-11-12 RX ORDER — CLONIDINE HYDROCHLORIDE 0.1 MG/1
TABLET ORAL
Qty: 1 TABLET | Refills: 0 | Status: SHIPPED | OUTPATIENT
Start: 2020-11-12 | End: 2020-12-18

## 2020-11-12 RX ORDER — LORAZEPAM 1 MG/1
TABLET ORAL
Qty: 3 TABLET | Refills: 0 | Status: SHIPPED | OUTPATIENT
Start: 2020-11-12 | End: 2020-12-18

## 2020-11-13 ENCOUNTER — HOSPITAL ENCOUNTER (OUTPATIENT)
Dept: WOUND CARE | Facility: CLINIC | Age: 60
Discharge: HOME | End: 2020-11-13
Attending: SURGERY | Admitting: SURGERY
Payer: MEDICARE

## 2020-11-13 VITALS
RESPIRATION RATE: 16 BRPM | SYSTOLIC BLOOD PRESSURE: 120 MMHG | TEMPERATURE: 97.3 F | HEART RATE: 76 BPM | DIASTOLIC BLOOD PRESSURE: 72 MMHG

## 2020-11-13 DIAGNOSIS — L97.922 ULCER OF LEFT LOWER EXTREMITY WITH FAT LAYER EXPOSED (H): ICD-10-CM

## 2020-11-13 PROCEDURE — 97597 DBRDMT OPN WND 1ST 20 CM/<: CPT

## 2020-11-13 NOTE — PROGRESS NOTES
[facility] [place] Nurse Note    Subject: Petey Archibald presents for nurse only visit for dressing change. His wound has slowly improved with modified 2 layer wrap where the foam and wrap extend over the toes. He notes he is going to see Dr. Muhammad next week.    Exam:  /72 (BP Location: Left arm)   Pulse 76   Temp 97.3  F (36.3  C) (Temporal)   Resp 16   Wound (used by OP [facility] only) 09/25/20 0911 Left dorsal foot (Active)   Thickness/Stage full thickness 11/13/20 1240   Dressing Appearance moist drainage 11/13/20 1240   Base granulating 11/13/20 1240   Periwound intact 11/13/20 1240   Periwound Temperature warm 11/13/20 1240   Periwound Skin Turgor soft 11/13/20 1240   Edges open 11/13/20 1240   Length (cm) 1.5 11/13/20 1240   Width (cm) 2.4 11/13/20 1240   Depth (cm) 0.3 11/13/20 1240   Wound (cm^2) 3.6 cm^2 11/13/20 1240   Wound Volume (cm^3) 1.08 cm^3 11/13/20 1240   Wound healing % 73.33 11/13/20 1240   Drainage Characteristics/Odor serosanguineous 11/13/20 1240   Drainage Amount moderate 11/13/20 1240   Care, Wound debrided 11/13/20 1240     Procedure:  Time out was called, informed consent obtained, and topical anesthetic of 4% lidocaine was applied,selective debridement was performed using a curette to remove non-viable tissue less than 20 sq cm, no bleeding occurred. Patient tolerated procedure well.  Procedure :Wound redressed with endoform antimicrobial than spandage on toes and lower leg, than silvercel dressing Application of 2 layer coflex wrap was applied.     Plan: Patient will return to the clinic following venous treatment.   Crystal Spencer, NERISSAN, RN, CWOCN  November 13, 2020

## 2020-11-16 ENCOUNTER — OFFICE VISIT (OUTPATIENT)
Dept: FAMILY MEDICINE | Facility: CLINIC | Age: 60
End: 2020-11-16
Payer: MEDICARE

## 2020-11-16 VITALS
SYSTOLIC BLOOD PRESSURE: 122 MMHG | DIASTOLIC BLOOD PRESSURE: 70 MMHG | RESPIRATION RATE: 14 BRPM | WEIGHT: 315 LBS | BODY MASS INDEX: 44.14 KG/M2 | HEART RATE: 72 BPM | OXYGEN SATURATION: 98 % | TEMPERATURE: 97.7 F

## 2020-11-16 DIAGNOSIS — L97.922 ULCER OF LEFT LOWER EXTREMITY WITH FAT LAYER EXPOSED (H): ICD-10-CM

## 2020-11-16 DIAGNOSIS — D12.2 ADENOMATOUS POLYP OF ASCENDING COLON: ICD-10-CM

## 2020-11-16 DIAGNOSIS — R10.32 GROIN PAIN, LEFT: Primary | ICD-10-CM

## 2020-11-16 PROCEDURE — 99213 OFFICE O/P EST LOW 20 MIN: CPT | Performed by: FAMILY MEDICINE

## 2020-11-16 NOTE — PROGRESS NOTES
Subjective     Petey Archibald is a 59 year old male who presents to clinic today for the following health issues:    Pt here with aide from group home    HPI           Concern - Groin Pain  Onset: 2 to 3 days ago  Description: sharp pain, intermittent  Intensity: moderate  Progression of Symptoms:  same  Accompanying Signs & Symptoms: Denies swelling or difficulty with urnination  Previous history of similar problem: No  Precipitating factors:        Worsened by: na  Alleviating factors:        Improved by: na  Therapies tried and outcome: None    Pt has appt scheduled tomorrow for follow up of Lt foot ulcer    He has Hx of previous colon polyps, last colonoscopy was April 2019, 2 polyps removed then      Review of Systems   CONSTITUTIONAL: NEGATIVE for fever, chills, change in weight  INTEGUMENTARY/SKIN: POSITIVE for rash upper legs left  ENT/MOUTH: NEGATIVE for ear, mouth and throat problems  RESP: NEGATIVE for significant cough or SOB  CV: NEGATIVE for chest pain, palpitations or peripheral edema  MUSCULOSKELETAL: POSITIVE  for myalgia      Objective    /70 (BP Location: Right arm, Patient Position: Sitting, Cuff Size: Adult Regular)   Pulse 72   Temp 97.7  F (36.5  C) (Tympanic)   Resp 14   Wt 143.6 kg (316 lb 8 oz)   SpO2 98%   BMI 44.14 kg/m    Body mass index is 44.14 kg/m .  Physical Exam   GENERAL: healthy, alert and no distress  NECK: no adenopathy, no asymmetry, masses, or scars and thyroid normal to palpation  RESP: lungs clear to auscultation - no rales, rhonchi or wheezes  CV: regular rate and rhythm, normal S1 S2, no S3 or S4, no murmur, click or rub, no peripheral edema and peripheral pulses strong  ABDOMEN: soft, nontender, no hepatosplenomegaly, no masses and bowel sounds normal  MS: LLE exam shows no deformities and no tenderness in Lt groin at this time.  No hernia present  SKIN: erythema - upper legs lateral Lt thigh, superficial mild erythema    No diagnostic tests done         Assessment & Plan     Petey was seen today for groin pain.    Diagnoses and all orders for this visit:    Groin pain, left    Ulcer of left lower extremity with fat layer exposed (H)    Adenomatous polyp of ascending colon            FUTURE APPOINTMENTS:       - Follow-up visit in 2 mo or as needed  Patient Instructions   Hot pack to Lt groin as needed  Will need colonoscopy in April 2021      Return in about 2 months (around 1/16/2021) for groin pain.    Raúl Riddle MD  [facility]

## 2020-11-20 NOTE — TELEPHONE ENCOUNTER
Pt's procedure has been rescheduled for 12/16/2020 with Dr. Muhammad.    Blossom Tiwari, BSN, RN  [facility]  [employer]

## 2020-11-23 ENCOUNTER — ANTICOAGULATION THERAPY VISIT (OUTPATIENT)
Dept: ANTICOAGULATION | Facility: CLINIC | Age: 60
End: 2020-11-23

## 2020-11-23 ENCOUNTER — HOSPITAL ENCOUNTER (OUTPATIENT)
Dept: WOUND CARE | Facility: CLINIC | Age: 60
Discharge: HOME | End: 2020-11-23
Attending: PODIATRIST | Admitting: PODIATRIST
Payer: MEDICARE

## 2020-11-23 DIAGNOSIS — Z79.01 LONG TERM CURRENT USE OF ANTICOAGULANT THERAPY: ICD-10-CM

## 2020-11-23 DIAGNOSIS — Z86.711 HISTORY OF PULMONARY EMBOLISM: ICD-10-CM

## 2020-11-23 DIAGNOSIS — I80.03 THROMBOPHLEBITIS OF SUPERFICIAL VEINS OF BOTH LOWER EXTREMITIES: ICD-10-CM

## 2020-11-23 DIAGNOSIS — I82.442 ACUTE DEEP VEIN THROMBOSIS (DVT) OF TIBIAL VEIN OF LEFT LOWER EXTREMITY (H): ICD-10-CM

## 2020-11-23 DIAGNOSIS — L97.922 ULCER OF LEFT LOWER EXTREMITY WITH FAT LAYER EXPOSED (H): ICD-10-CM

## 2020-11-23 LAB
CAPILLARY BLOOD COLLECTION: NORMAL
INR PPP: 2.5 (ref 0.86–1.14)

## 2020-11-23 PROCEDURE — 85610 PROTHROMBIN TIME: CPT | Performed by: FAMILY MEDICINE

## 2020-11-23 PROCEDURE — 99207 PR NO CHARGE NURSE ONLY: CPT

## 2020-11-23 PROCEDURE — 97597 DBRDMT OPN WND 1ST 20 CM/<: CPT

## 2020-11-23 RX ORDER — WARFARIN SODIUM 4 MG/1
TABLET ORAL
Qty: 60 TABLET | Refills: 0
Start: 2020-11-23 | End: 2020-12-29

## 2020-11-23 NOTE — PROGRESS NOTES
ANTICOAGULATION FOLLOW-UP CLINIC VISIT    Patient Name:  Petey Archibald  Date:  2020  Contact Type:  Telephone    SUBJECTIVE:  Patient Findings     Comments:  The patient was assessed for diet, medication, and activity level changes, missed or extra doses, bruising or bleeding, with no problem findings.          Clinical Outcomes     Comments:  The patient was assessed for diet, medication, and activity level changes, missed or extra doses, bruising or bleeding, with no problem findings.             OBJECTIVE    Recent labs: (last 7 days)     20  1022   INR 2.50*       ASSESSMENT / PLAN  INR assessment THER    Recheck INR In: 6 WEEKS    INR Location Clinic      Anticoagulation Summary  As of 2020    INR goal:  2.0-3.0   TTR:  76.3 % (11.9 mo)   INR used for dosin.50 (2020)   Warfarin maintenance plan:  6 mg (4 mg x 1.5) every Mon, Fri; 8 mg (4 mg x 2) all other days   Full warfarin instructions:  6 mg every Mon, Fri; 8 mg all other days   Weekly warfarin total:  52 mg   Plan last modified:  Lisa Ponce RN (2020)   Next INR check:  2020   Priority:  Maintenance   Target end date:  Indefinite    Indications    History of pulmonary embolism [Z86.711]  Long term current use of anticoagulant therapy [Z79.01]             Anticoagulation Episode Summary     INR check location:  Anticoagulation Clinic    Preferred lab:      Send INR reminders to:  EDU SUGGS    Comments:  REM intermediate; A new Coumadin Prescription will need to sent to [facility] with current dose and next INR check.      Anticoagulation Care Providers     Provider Role Specialty Phone number    Yolande Lacy PA-C Referring Family Medicine [phone]    Silvino Baker MD Responsible Family Medicine [phone]            See the Encounter Report to view Anticoagulation Flowsheet and Dosing Calendar (Go to Encounters tab in chart review, and find the Anticoagulation  Therapy Visit)        Lisa Ponce RN

## 2020-11-23 NOTE — PROGRESS NOTES
[facility] [place] Nurse Note    Subject: Petey Archibald presents for nurse only visit for dressing change to the dorsal aspect of foot, distal aspect of 1st and 2nd toes.      Exam:  There were no vitals taken for this visit.  Wound (used by OP [facility] only) 09/25/20 0911 Left dorsal foot (Active)   Thickness/Stage full thickness 11/23/20 1411   Dressing Appearance moist drainage 11/23/20 1411   Base granulating;slough 11/23/20 1411   Periwound intact 11/23/20 1411   Periwound Temperature warm 11/23/20 1411   Length (cm) 1 11/23/20 1343   Width (cm) 1.5 11/23/20 1343   Depth (cm) 0.4 11/23/20 1343   Wound (cm^2) 1.5 cm^2 11/23/20 1343   Wound Volume (cm^3) 0.6 cm^3 11/23/20 1343   Wound healing % 88.89 11/23/20 1343   Drainage Characteristics/Odor serosanguineous 11/23/20 1343   Drainage Amount moderate 11/23/20 1343   Care, Wound debrided 11/23/20 1411     Procedure:  Time out was called, informed consent obtained, and topical anesthetic of 4% lidocaine was applied,selective debridement was performed using a curette to remove non-viable tissue less than 20 sq cm, no bleeding occurred. Patient tolerated procedure well.  Procedure :Wound redressed with Endoform Antimicrobial followed by antifungal powder to periwound skin, #4 Spandage to 1st and 2nd toes, #6 Spandage from base of toes to base of knee, KerraMax on top of Endoform followed by Coflex wrap.      Plan: Patient will return to the clinic in 1 weeks time.  November 23, 2020

## 2020-11-30 ENCOUNTER — TELEPHONE (OUTPATIENT)
Dept: WOUND CARE | Facility: CLINIC | Age: 60
End: 2020-11-30

## 2020-11-30 ENCOUNTER — HOSPITAL ENCOUNTER (OUTPATIENT)
Dept: WOUND CARE | Facility: CLINIC | Age: 60
Discharge: HOME | End: 2020-11-30
Attending: PODIATRIST | Admitting: PODIATRIST
Payer: MEDICARE

## 2020-11-30 ENCOUNTER — TELEPHONE (OUTPATIENT)
Dept: FAMILY MEDICINE | Facility: CLINIC | Age: 60
End: 2020-11-30

## 2020-11-30 VITALS
DIASTOLIC BLOOD PRESSURE: 66 MMHG | SYSTOLIC BLOOD PRESSURE: 120 MMHG | TEMPERATURE: 97.2 F | RESPIRATION RATE: 16 BRPM | HEART RATE: 72 BPM

## 2020-11-30 DIAGNOSIS — L03.90 CELLULITIS: ICD-10-CM

## 2020-11-30 DIAGNOSIS — L97.922 ULCER OF LEFT LOWER EXTREMITY WITH FAT LAYER EXPOSED (H): Primary | ICD-10-CM

## 2020-11-30 DIAGNOSIS — L97.523 CHRONIC ULCER OF LEFT FOOT WITH NECROSIS OF MUSCLE (H): ICD-10-CM

## 2020-11-30 PROCEDURE — 87186 SC STD MICRODIL/AGAR DIL: CPT | Performed by: SURGERY

## 2020-11-30 PROCEDURE — 87075 CULTR BACTERIA EXCEPT BLOOD: CPT | Performed by: SURGERY

## 2020-11-30 PROCEDURE — 99213 OFFICE O/P EST LOW 20 MIN: CPT | Mod: 25 | Performed by: SURGERY

## 2020-11-30 PROCEDURE — 11042 DBRDMT SUBQ TIS 1ST 20SQCM/<: CPT | Performed by: SURGERY

## 2020-11-30 PROCEDURE — 11044 DBRDMT BONE 1ST 20 SQ CM/<: CPT | Performed by: SURGERY

## 2020-11-30 PROCEDURE — 87070 CULTURE OTHR SPECIMN AEROBIC: CPT | Performed by: SURGERY

## 2020-11-30 PROCEDURE — 87077 CULTURE AEROBIC IDENTIFY: CPT | Performed by: SURGERY

## 2020-11-30 RX ORDER — DOXYCYCLINE HYCLATE 100 MG
100 TABLET ORAL 2 TIMES DAILY
Qty: 28 TABLET | Refills: 0 | Status: SHIPPED | OUTPATIENT
Start: 2020-11-30 | End: 2020-12-14

## 2020-11-30 NOTE — PROGRESS NOTES
[facility] [place] Progress Note    Subject: Petey Archibald patient was originally a nursing visit, was asked by the nursing staff to see the patient for concerns of cellulitis on the left foot.  Medical record reviewed.  I had reviewed and seen the patient in the past.  Resides in a group home, he does work on a daily basis, he cannot economically sustain being off of work.  Denies fever chills sweats, no history of Covid positivity, history of tobacco use.  Some mild discomfort in the left foot wound.  Currently a 2 layer wrap.  This is not resulting in adequate control of the lymphedema, lymphatic dysfunction of the left foot and toe swelling.  This is directly contributing to nonresolution of the wound at the base of the toes.  Patient Active Problem List   Diagnosis     Ankle pain     Morbid obesity     GERD (gastroesophageal reflux disease)     Peripheral vascular disease (H)     History of pulmonary embolism     Tobacco dependence:40-50 pk yr hx     Borderline mental retardation     History of DVT of lower extremity     Right knee pain     Pilonidal cyst     Asymptomatic varicose veins, bilateral     Callus of foot on Rt lat dist  since 8-15      Morbid obesity due to excess calories (H)  BMI 40-50     Hypercholesterolemia     Mixed simple and mucopurulent chronic bronchitis (HCC) CT 4-06 wnl and neg bronch for hemoptesis spirometry 7-26-16  FVC=59% & w mod restriction  and lung age of 82 in 56 y/o      Major depression in complete remission (HCC) on meds      Long term current use of anticoagulant therapy     Glucose intolerance (impaired glucose tolerance)     Other iron deficiency anemia     Localized edema L>R ankles      Erectile dysfunction, unspecified erectile dysfunction type     Stasis dermatitis of both legs     Arch pain of left foot 2ndary to edema and tendonitis      NEL (obstructive sleep apnea)     Hematemesis without nausea after smoking      Anxiety     Thrombophlebitis of  superficial veins of both lower extremities: Greater Saphenous VV      Acute deep vein thrombosis (DVT) of tibial vein of left lower extremity (H)     History of colonic polyps     Allergic to bees     Abnormal lung sounds-bibasilar rhonchi     Hypoxia     Abnormal chest x-ray-3-19 prominent bibasilar interstitial      Ulcer of left lower extremity with fat layer exposed (H)     Lymphedema of left leg     Left leg cellulitis     Cellulitis of left lower extremity     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)     Fungal skin infection     Schizoaffective disorder, bipolar type (H)     Cellulitis     Skin infection     Adenomatous polyp of ascending colon     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     COPD (chronic obstructive pulmonary disease) (H)      History of DVT of lower extremity      History of pulmonary embolism      Hyperlipidemia      Mild intellectual disabilities      Morbid obesity (H)      Peripheral edema      Peripheral vascular disease (H)      Sleep apnea      Tobacco dependence      Venous stasis dermatitis      Exam:  /66 (BP Location: Right arm)   Pulse 72   Temp 97.2  F (36.2  C) (Temporal)   Resp 16   Wound (used by OP [facility] only) 09/25/20 0911 Left dorsal foot (Active)   Dressing Appearance moist drainage 11/30/20 1200   Length (cm) 1.5 11/30/20 1200   Width (cm) 4.2 11/30/20 1200   Depth (cm) 0.3 11/30/20 1200   Wound (cm^2) 6.3 cm^2 11/30/20 1200   Wound Volume (cm^3) 1.89 cm^3 11/30/20 1200   Wound healing % 53.33 11/30/20 1200   Drainage Characteristics/Odor serosanguineous 11/30/20 1200   Drainage Amount moderate 11/30/20 1200     59-year-old male, palpable left dorsalis pedis pulse, positive stemmer toe, mild buffalo hump, fibrosis of toes, ulceration at the base of the great toe extending to the first webspace, no bone or tendon exposure, odor from tissue between the toes.  Colbert culture was obtained of the wound on the  dorsum of the left foot.  There is blanching erythema on the dorsum of the left foot with mild sensitivity consistent with cellulitis, this does not extend above the level of the malleolus or on the plantar surface.    Procedure:   Patient was determined to be capable of making their own medical decisions and informed consent was obtained. Topical anesthetic of 4% lidocaine was applied, debridement was performed of the left foot using a knife curette down to and including subcutaneous tissue and biofilm bleeding controlled with light pressure. Patient tolerated procedure well.    Impression: Chronic lymphatic dysfunction, lymphedema, left lower extremity with ulceration base of left toe, inadequate edema control with utilization of 2 layer wrap due to wound location on distal dorsum of foot, venous insufficiency, history of tobacco utilization, current cellulitis left foot    Plan: We will dress the wounds with moist moist hypochlorous acid, will be culture was obtained today, prophylactically initiated doxycycline 100 mg twice daily for 2 weeks, does have a history of MRSA, did not placed on Bactrim double strength due to utilization of chronic anticoagulation with warfarin.  Where toe compression that he can buy over-the-counter, edema wear from the base the toes, blue stripe, covered with a yellow stripe double layering, elevate ankle above hip is much as possible during the day including while at work..  I do not see evidence of MTHFR testing, will treat as if he is MTHFR positive, add B12, folate in addition to vitamin D3, ideally would benefit from a micronized purified flavonoid fraction given that he lives in a group home setting, may not be able to afford.  Would ultimately ideally benefit from inelastic compression in the form of Coflex or Velcro wrap.  Patient will return to the clinic in 2-3 weeks time    Rustam Davis MD on 11/30/2020 at 2:01 PM

## 2020-11-30 NOTE — DISCHARGE INSTRUCTIONS
[facility]  [address] [place] [id number] [id number]    Call us at [phone] if you have any questions about your wounds, have redness or swelling around your wound, have a fever of 101 or greater or if you have any other problems or concerns. We answer the phone Monday through Friday 8 am to 4 pm, please leave a message as we check the voicemail frequently throughout the day.     Petey Archibald      1960    Supplements to aid in healin. 1 packet of Murali Supplement into your favorite beverage TWICE a day.You may purchase at [facility] store in [address]  2. Vitamin D3 5,000 iu per day.  3. Vitamin B12 1000 mcg per day    After cleansing with saline or wound cleanser, apply small amount of VASHE on gauze, lay into wound bed  Apply small edemawear (blue) to foot and then apply yellow stripe (you are applying this as a double layer from base of toe to base of knee  Apply ABD pad and wrap with gauze and tape.  Change daily       FERNANDO Davis M.D.. 2020

## 2020-11-30 NOTE — TELEPHONE ENCOUNTER
The pharmacist explained his concerns that the doxycycline will adversely impact his clotting time and it could potentially cause uncontrolled bleeding -. Please advise.

## 2020-11-30 NOTE — TELEPHONE ENCOUNTER
Dr. Davis called and spoke with pharmacist Mynor at [facility].   Due to his MRSA hx, allergy to to PCN and Augementin,  Doxy is most appropriate he will need INR check sooner than normal due to medication.

## 2020-11-30 NOTE — TELEPHONE ENCOUNTER
The assistant Meena called but wants you to call back and ask for a Pharmacist regarding the interaction between Warvrin and doxyclycline.

## 2020-11-30 NOTE — TELEPHONE ENCOUNTER
Pharmacy calling with I for provider  There was a new rx sent in from outside provider for  doxycycline hyclate (VIBRA-TABS) 100 MG tablet  Which interferes with patient warfarin prescription would like to make sure that provider is aware and is suggesting a sooner INR check  If any question please call Shaquille [phone]

## 2020-12-03 LAB
BACTERIA SPEC CULT: ABNORMAL
Lab: ABNORMAL
SPECIMEN SOURCE: ABNORMAL

## 2020-12-04 LAB
BACTERIA SPEC CULT: ABNORMAL
BACTERIA SPEC CULT: ABNORMAL
Lab: ABNORMAL
SPECIMEN SOURCE: ABNORMAL

## 2020-12-07 ENCOUNTER — ANTICOAGULATION THERAPY VISIT (OUTPATIENT)
Dept: ANTICOAGULATION | Facility: CLINIC | Age: 60
End: 2020-12-07

## 2020-12-07 DIAGNOSIS — I80.03 THROMBOPHLEBITIS OF SUPERFICIAL VEINS OF BOTH LOWER EXTREMITIES: ICD-10-CM

## 2020-12-07 DIAGNOSIS — Z86.711 HISTORY OF PULMONARY EMBOLISM: ICD-10-CM

## 2020-12-07 DIAGNOSIS — Z79.01 LONG TERM CURRENT USE OF ANTICOAGULANT THERAPY: ICD-10-CM

## 2020-12-07 DIAGNOSIS — I82.442 ACUTE DEEP VEIN THROMBOSIS (DVT) OF TIBIAL VEIN OF LEFT LOWER EXTREMITY (H): ICD-10-CM

## 2020-12-07 LAB
CAPILLARY BLOOD COLLECTION: NORMAL
INR PPP: 3 (ref 0.86–1.14)

## 2020-12-07 PROCEDURE — 99207 PR NO CHARGE NURSE ONLY: CPT

## 2020-12-07 PROCEDURE — 36416 COLLJ CAPILLARY BLOOD SPEC: CPT | Performed by: FAMILY MEDICINE

## 2020-12-07 PROCEDURE — 85610 PROTHROMBIN TIME: CPT | Performed by: FAMILY MEDICINE

## 2020-12-07 NOTE — PROGRESS NOTES
ANTICOAGULATION FOLLOW-UP CLINIC VISIT    Patient Name:  Petey Archibald  Date:  12/7/2020  Contact Type:  Telephone    SUBJECTIVE:  Patient Findings     Comments:  The patient was assessed for diet, medication, and activity level changes, missed or extra doses, bruising or bleeding, with no problem findings.          Clinical Outcomes     Comments:  The patient was assessed for diet, medication, and activity level changes, missed or extra doses, bruising or bleeding, with no problem findings.             OBJECTIVE    Recent labs: (last 7 days)     12/07/20  1529   INR 3.00*       ASSESSMENT / PLAN  INR assessment THER    Recheck INR In: 3 WEEKS    INR Location Clinic      Anticoagulation Summary  As of 12/7/2020    INR goal:  2.0-3.0   TTR:  78.9 % (11.9 mo)   INR used for dosing:  3.00 (12/7/2020)   Warfarin maintenance plan:  6 mg (4 mg x 1.5) every Mon, Fri; 8 mg (4 mg x 2) all other days   Full warfarin instructions:  6 mg every Mon, Fri; 8 mg all other days   Weekly warfarin total:  52 mg   Plan last modified:  Lisa Ponce RN (9/14/2020)   Next INR check:  12/28/2020   Priority:  Maintenance   Target end date:  Indefinite    Indications    History of pulmonary embolism [Z86.711]  Long term current use of anticoagulant therapy [Z79.01]             Anticoagulation Episode Summary     INR check location:  Anticoagulation Clinic    Preferred lab:      Send INR reminders to:  EDU SUGGS    Comments:  REM long term; A new Coumadin Prescription will need to sent to [facility] with current dose and next INR check.      Anticoagulation Care Providers     Provider Role Specialty Phone number    Yolande Lacy PA-C Referring Family Medicine [phone]    Silvino Baker MD Responsible Family Medicine [phone]            See the Encounter Report to view Anticoagulation Flowsheet and Dosing Calendar (Go to Encounters tab in chart review, and find the Anticoagulation  Therapy Visit)        Lisa Ponce RN                  DISPLAY PLAN FREE TEXT

## 2020-12-08 NOTE — ED PROVIDER NOTES
History   Chief Complaint:  Wound Check    HPI   Petey Archibald is a 59 year old male, anticoagulated on Coumadin with a history of a chronic left lower extremity ulcer with necrosis of muscle, peripheral vascular disease, lymphedema of left leg, DVT, pulmonary embolism, COPD, and mild intellectual disabilities, who presents to the ED for evaluation of a lower extremity wound check. The patient reports he was trying to sleep tonight, but was having increasing pain at the base of his left big toe where his chronic wound sits, prompting to him to call EMS. The patient notes he has not had any more redness or drainage out of the wound. He does mention there is some more swelling as he has been going to work and standing on his feet. When he is able to cleans and dress the wound, the wound does do better. He conveys he came into the emergency department today as he was instructed to two days ago at his last podiatry appointment, but did not want to as he wanted to go to work. However, because of the increased pain, he came in tonight. He denies any fever, chills, myalgias, or any other acute symptoms. He is not currently on antibiotics. He has been taking his Coumadin as prescribed, but did run out last night.    Allergies:  Augmentin  Penicillins     Medications:    Albuterol  Wellbutrin  Lasix  Gabapentin  Singulair  Senna  Zoloft  Zocor  Restoril  Coumadin    Past Medical History:    Borderline mental retardation  COPD  DVT  Pulmonary embolism  Hyperlipidemia  Morbid obesity  Peripheral edema  Peripheral vascular disease  Sleep apnea  Venous stasis dermatitis  GERD  Schizoaffective disorder  Anxiety  Erectile dysfunction  Lymphedema of left leg    Past Surgical History:    Left leg malignant lesion excision  Perianal abscess repair    Family History:    Diabetes    Social History:  Smoking status: Yes  Alcohol use: No  Drug use: No  PCP: Hortensia Peck  Marital Status:  Single [1]    Review of Systems  "  Constitutional: Positive for diaphoresis. Negative for chills and fever.   Musculoskeletal: Negative for myalgias.   Skin: Positive for wound.   All other systems reviewed and are negative.    Physical Exam     Patient Vitals for the past 24 hrs:   BP Temp Temp src Pulse Resp SpO2 Height Weight   07/26/20 0234 -- -- -- -- -- -- 1.88 m (6' 2\") 145.2 kg (320 lb)   07/26/20 0225 119/56 97.9  F (36.6  C) Oral 70 16 98 % -- --     Physical Exam  SKIN: Erythematous distal dorsal left foot.  Associated cutaneous ulceration at the first webspace.  No foul odor.  No soft tissue crepitus.  No drainage from the ulceration.  HEMATOLOGIC/IMMUNOLOGIC/LYMPHATIC: Edematous lower extremities left worse than right.   EYES:  Conjunctivae normal.  CARDIOVASCULAR:  Regular rate and rhythm.  RESPIRATORY:  No respiratory distress, breath sounds equal and normal.  GASTROINTESTINAL:  Soft, nontender abdomen.  MUSCULOSKELETAL: Passive range of motion of the left great toe causes pain at the base of this toe.  NEUROLOGIC:  Alert, conversant.  No gross motor or sensory deficit to the left lower extremity.  PSYCHIATRIC:  Normal mood.    Emergency Department Course   Imaging:  Radiology findings were communicated with the patient who voiced understanding of the findings.    XR Foot, 3 views, Left:  Chronic fractures of the left fourth and fifth metatarsals. Soft tissue swelling of the forefoot and lateral foot. No subcutaneous emphysema. No radiopaque foreign body. No radiographic evidence of osteomyelitis at this time. Atherosclerotic   vascular calcifications.     Imaging independently reviewed and agree with radiologist interpretation.       Laboratory:  Laboratory findings were communicated with the patient who voiced understanding of the findings.    CBC: HGB 11.6 (L), o/w WNL (WBC 4.5, )    BMP:  (H), o/w WNL (Creatinine 1.04)    INR: 2.30 (H)    CRP Inflammation: 12.4 (H)    Lactic Acid (0249): 0.7    Blood Culture x2: " Obese with low BUN, low K.     Pending     COVID-19 Virus PCR: Pending    Interventions:  0500: Ancef 1g IV Infusion   0515: Clindamycin 600 mg IV Infusion    Emergency Department Course:  Past medical records, nursing notes, and vitals reviewed.    0240 I performed an exam of the patient as documented above.     IV was inserted and blood was drawn for laboratory testing, results above.  The patient was sent for a foot x-ray while in the emergency department, results above.     0410 I rechecked the patient and discussed the results of his workup thus far.     Findings and plan explained to the Patient who consents to admission. Discussed the patient with Dr. Jung, who will admit the patient to an observation bed for further monitoring, evaluation, and treatment.    I personally reviewed the laboratory and imaging results with the Patient and answered all related questions prior to admission.     Impression & Plan   Covid-19  Petey Archibald was evaluated during a global COVID-19 pandemic, which necessitated consideration that the patient might be at risk for infection with the SARS-CoV-2 virus that causes COVID-19.   Applicable protocols for evaluation were followed during the patient's care.   COVID-19 was considered as part of the patient's evaluation. The plan for testing is:  a test was obtained during this visit.    Medical Decision Making:  Petey Archibald is a 59 year old male who presents with new pain about the base of the left great toe in the context of a foot ulceration in the first web space of this foot and associated erythema and swelling. Of course, cellulitis and or osteomyelitis are possibilities. Given the patient's developmental delay, it is unclear exactly what the baseline of the condition of the foot is. The patient will be admitted for IV antibiotics, close monitoring, and possible orthopedic consult if need be.      Diagnosis:    ICD-10-CM    1. Cellulitis of left lower extremity  L03.116 Asymptomatic COVID-19 Virus  (Coronavirus) by PCR   2. Skin ulcer of left foot, limited to breakdown of skin (H)  L97.524        Disposition:  Admitted to an observation bed.    Scribe Disclosure:  CELY, Rickie Finley, am serving as a scribe at 2:40 AM on 7/26/2020 to document services personally performed by Blayne Worrell MD based on my observations and the provider's statements to me.        Blayne Worrell MD  07/26/20 0662

## 2020-12-10 RX ORDER — TEMAZEPAM 15 MG/1
CAPSULE ORAL
Qty: 30 CAPSULE | Refills: 5 | OUTPATIENT
Start: 2020-12-10

## 2020-12-10 NOTE — TELEPHONE ENCOUNTER
Dr Brantley is not provider for this patient.Not sen @ [facility]

## 2020-12-16 ENCOUNTER — OFFICE VISIT (OUTPATIENT)
Dept: VASCULAR SURGERY | Facility: CLINIC | Age: 60
End: 2020-12-16
Payer: MEDICARE

## 2020-12-16 VITALS — SYSTOLIC BLOOD PRESSURE: 102 MMHG | HEART RATE: 67 BPM | OXYGEN SATURATION: 95 % | DIASTOLIC BLOOD PRESSURE: 64 MMHG

## 2020-12-16 DIAGNOSIS — L97.322 VARICOSE VEINS OF LEFT LOWER EXTREMITY WITH ULCER OF ANKLE WITH FAT LAYER EXPOSED (H): ICD-10-CM

## 2020-12-16 DIAGNOSIS — L97.523 CHRONIC ULCER OF LEFT FOOT WITH NECROSIS OF MUSCLE (H): ICD-10-CM

## 2020-12-16 DIAGNOSIS — I83.023 VARICOSE VEINS OF LEFT LOWER EXTREMITY WITH ULCER OF ANKLE WITH FAT LAYER EXPOSED (H): ICD-10-CM

## 2020-12-16 DIAGNOSIS — I83.892 VARICOSE VEINS OF LEG WITH SWELLING, LEFT: ICD-10-CM

## 2020-12-16 DIAGNOSIS — I87.2 VENOUS (PERIPHERAL) INSUFFICIENCY: Primary | ICD-10-CM

## 2020-12-16 NOTE — PROGRESS NOTES
Pre-procedure Nursing Note    Petey THOMPSON Archibald presents to clinic for Vein Procedure  .   /Person Responsible for Patient: Hunter  Phone Number: [phone]    Prophylactic Medication:Antibiotics, Clindamycin 300 mg   Time Taken: 1330   Sedation Medication: Ativan, 2mg ,   Time Taken: 1330 and Clonidine, 0.1mg,   Time Taken: 1330  Compression Stockings: Never got them/ ace wrap  The procedure is being performed on LLE.  Patient understanding of procedure matches consent? YES    Patient's pre-procedure medications verified by RN, Enrique Mccurdy RN  .    Enrique Mccurdy RN on 12/16/2020 at 1:27 PM

## 2020-12-16 NOTE — LETTER
12/16/2020         RE: Petey Archibald  [address]  [facility] [place] [address]        Dear Colleague,    Thank you for referring your patient, Petey Archibald, to the [facility] [place]. Please see a copy of my visit note below.    Pre-procedure Nursing Note    Petey Archibald presents to clinic for Vein Procedure  .   /Person Responsible for Patient: Hunter  Phone Number: [phone]    Prophylactic Medication:Antibiotics, Clindamycin 300 mg   Time Taken: 1330   Sedation Medication: Ativan, 2mg ,   Time Taken: 1330 and Clonidine, 0.1mg,   Time Taken: 1330  Compression Stockings: Never got them/ ace wrap  The procedure is being performed on LLE.  Patient understanding of procedure matches consent? YES    Patient's pre-procedure medications verified by RN, Enrique Mccurdy RN  .    Enrique Mccurdy RN on 12/16/2020 at 1:27 PM        [facility] Procedure Note    Indications: Preoperative nonhealing left foot venous stasis ulcer secondary to chronic venous insufficiency with    Procedure:  1. Radiofrequency ablation left great saphenous vein   2. Ultrasound-guided, medically necessary sclerotherapy left great saphenous vein tributaries    Procedure Description  Details of the procedure including risks of bleeding, infection, nerve injury, scarring, hyperpigmentation, deep vein thrombosis, recanalization of the great saphenous vein and recurrent varicose veins all discussed.  The patient and his personal care attendant the voiced understanding and wished to proceed.  Informed consent was obtained.     I initialed the patient's left lower extremity marking the operative site with indelible marker.  We then proceeded the operating room, had the patient lie supine.  On the operating table, then prepped and draped the left lower extremity sterilely.    We took a timeout to confirm the appropriate operative site and procedure: Radiofrequency ablation of the left great saphenous  vein with medically necessary ultrasound-guided sclerotherapy.    VNUS Closure  I imaged the left lower extremity and the left great saphenous vein.  Easily identifying the great saphenous vein and lidocaine in the subcutaneous just cephalad of the mid thigh and became a tortuous varicosity coursing down the leg to the foot.  I infiltrated the skin overlying the vein just cephalad of the mid thigh, placed a micropuncture needle into the vein followed by a micropuncture guidewire and a 7 Georgian sheath.    We passed the closure fast device through the sheath, positioning of the tip of the device 2.2 centimeters from the saphenofemoral junction under ultrasound guidance with the operating table in Trendelenburg position.  Tumescent anesthetic was injected along the course of the great saphenous vein under ultrasound guidance with care to confirm catheter tip position prior to infiltrating the tissues in this location.    After allowing the block to take effect and after confirming appropriate position of the catheter near the saphenofemoral junction, I applied compression to the proximal great saphenous vein with the ultrasound probe and additional width 2 fingerbreadths treating the first two 7 cm presents with 2 RF sessions each.  The remaining vein was treated with 1 RF session to each 7cm increment down to the mid thigh.  We treated down to the mid thigh level.  The patient was comfortable throughout.    After completimg the pullback, I reimaged the vein and noted that the vein was noncompressible and closed.  The saphenofemoral junction and common femoral veins were fully compressible and free of thrombus.  This was documented.  The sheath and the catheter were removed and hemostasis secured with pressure.    Ultrasound Sclerotherapy  With the operating table in a neutral position, I imaged the medial left thigh and noted a large tributary coursing from the great saphenous vein and then anteriorly and down the  thigh I traced this distally and noted that it coursed medial to the knee, down the medial calf and then a bit anteriorly to the ankle.    I accessed the vein near the mid thigh level with a 27-gauge needle under ultrasound guidance and injected 1% polidocanol foam into the vein.  I marched distally, injecting near the knee level, at the mid calf and then near the ankle.  Used a total of 9 mL of foam.  The vein was quite large and the thigh.    The afternoon sclerotherapy, the leg was cleaned with saline solution and a small gauze and Tegaderm dressing applied to the access site.  A compression hose was then applied.  We observed the patient for 30 minutes to ensure excellent hemostasis then took him to car in a wheelchair.  Post procedure instructions were given to the patient and his personal care attendant in verbal and written form and their questions answered.    The patient tolerated the procedure well without evidence of allergic reaction or other complications and will return in 72 hours for a left lower venous ultrasound.    [place] Closure    Date/Time: 12/17/2020 7:24 AM  Performed by: Ludwig Muhammad MD  Authorized by: Ludwig Muhammad MD     Time out: Immediately prior to the procedure a time out was called    Preparation: Patient was prepped and draped in usual sterile fashion    1st Assist:  Angeles Ramos, CST/CSFA  Procedure:  VNUS  Procedure side:  Left  One Vein    Vein Treated:  GSV  Patient tolerance:  Patient tolerated the procedure well with no immediate complications  Sclerotherapy    Date/Time: 12/17/2020 7:25 AM  Performed by: Ludwig Muhammad MD  Authorized by: Ludwig Muhammad MD     Type:  Medically Necessary  Vein:  Multiple Veins  Yes    Procedure side:  Left  Solution/Amount:  1% POLIDOCANOL  Syringes::  2 (9ml foam)  Patient tolerance:  Patient tolerated the procedure well with no immediate complications  Wrap/Hose:  Hose        Flowsheet Data  12/16/2020   Procedure Start Time:  2:21 PM   Prep: Other - Use Comments;Chloraprep   Side: Left   Tx Length (cm): LEFT GSV: 19   Junction (cm): LEFT GSV: 2.20   RF Cycles: LEFT GSV: 5   RF TX Time (Minutes): LEFT GSV: 1:40   Sedation taken: Yes   Pre Pt. Physical / Cognitive Limitations: WNL   TOTAL Local anesthesia Injected (ml): 3.5   Max Volume Local Anesthesia (ml): 11   TOTAL Tumescent Injected volume (ml): 300   Max Volume Tumescent (ml): 572   Post Pt. Physical / Cognitive Limitations: WNL   Procedure End Time:  2:56 PM   D/C Instructions given, states readiness to leave and escorted to car: Yes       RONALD Muhammad MD    Dictated using Dragon voice recognition software which may result in transcription errors      Again, thank you for allowing me to participate in the care of your patient.        Sincerely,        Ludwig Muhammad MD

## 2020-12-17 PROCEDURE — 36475 ENDOVENOUS RF 1ST VEIN: CPT | Mod: LT | Performed by: SURGERY

## 2020-12-17 PROCEDURE — 36471 NJX SCLRSNT MLT INCMPTNT VN: CPT | Mod: LT | Performed by: SURGERY

## 2020-12-17 NOTE — PROGRESS NOTES
[facility] Procedure Note    Indications: Preoperative nonhealing left foot venous stasis ulcer secondary to chronic venous insufficiency with    Procedure:  1. Radiofrequency ablation left great saphenous vein   2. Ultrasound-guided, medically necessary sclerotherapy left great saphenous vein tributaries    Procedure Description  Details of the procedure including risks of bleeding, infection, nerve injury, scarring, hyperpigmentation, deep vein thrombosis, recanalization of the great saphenous vein and recurrent varicose veins all discussed.  The patient and his personal care attendant the voiced understanding and wished to proceed.  Informed consent was obtained.     I initialed the patient's left lower extremity marking the operative site with indelible marker.  We then proceeded the operating room, had the patient lie supine.  On the operating table, then prepped and draped the left lower extremity sterilely.    We took a timeout to confirm the appropriate operative site and procedure: Radiofrequency ablation of the left great saphenous vein with medically necessary ultrasound-guided sclerotherapy.    VNUS Closure  I imaged the left lower extremity and the left great saphenous vein.  Easily identifying the great saphenous vein and lidocaine in the subcutaneous just cephalad of the mid thigh and became a tortuous varicosity coursing down the leg to the foot.  I infiltrated the skin overlying the vein just cephalad of the mid thigh, placed a micropuncture needle into the vein followed by a micropuncture guidewire and a 7 Tajik sheath.    We passed the closure fast device through the sheath, positioning of the tip of the device 2.2 centimeters from the saphenofemoral junction under ultrasound guidance with the operating table in Trendelenburg position.  Tumescent anesthetic was injected along the course of the great saphenous vein under ultrasound guidance with care to confirm catheter tip position prior  to infiltrating the tissues in this location.    After allowing the block to take effect and after confirming appropriate position of the catheter near the saphenofemoral junction, I applied compression to the proximal great saphenous vein with the ultrasound probe and additional width 2 fingerbreadths treating the first two 7 cm presents with 2 RF sessions each.  The remaining vein was treated with 1 RF session to each 7cm increment down to the mid thigh.  We treated down to the mid thigh level.  The patient was comfortable throughout.    After completimg the pullback, I reimaged the vein and noted that the vein was noncompressible and closed.  The saphenofemoral junction and common femoral veins were fully compressible and free of thrombus.  This was documented.  The sheath and the catheter were removed and hemostasis secured with pressure.    Ultrasound Sclerotherapy  With the operating table in a neutral position, I imaged the medial left thigh and noted a large tributary coursing from the great saphenous vein and then anteriorly and down the thigh I traced this distally and noted that it coursed medial to the knee, down the medial calf and then a bit anteriorly to the ankle.    I accessed the vein near the mid thigh level with a 27-gauge needle under ultrasound guidance and injected 1% polidocanol foam into the vein.  I marched distally, injecting near the knee level, at the mid calf and then near the ankle.  Used a total of 9 mL of foam.  The vein was quite large and the thigh.    The afternoon sclerotherapy, the leg was cleaned with saline solution and a small gauze and Tegaderm dressing applied to the access site.  A compression hose was then applied.  We observed the patient for 30 minutes to ensure excellent hemostasis then took him to car in a wheelchair.  Post procedure instructions were given to the patient and his personal care attendant in verbal and written form and their questions answered.    The  patient tolerated the procedure well without evidence of allergic reaction or other complications and will return in 72 hours for a left lower venous ultrasound.    [place] Closure    Date/Time: 12/17/2020 7:24 AM  Performed by: Ludwig Muhammad MD  Authorized by: Ludwig Muhammad MD     Time out: Immediately prior to the procedure a time out was called    Preparation: Patient was prepped and draped in usual sterile fashion    1st Assist:  Angeles Ramos, CST/CSFA  Procedure:  VNUS  Procedure side:  Left  One Vein    Vein Treated:  GSV  Patient tolerance:  Patient tolerated the procedure well with no immediate complications  Sclerotherapy    Date/Time: 12/17/2020 7:25 AM  Performed by: Ludwig Muhammad MD  Authorized by: Ludwig Muhammad MD     Type:  Medically Necessary  Vein:  Multiple Veins  Yes    Procedure side:  Left  Solution/Amount:  1% POLIDOCANOL  Syringes::  2 (9ml foam)  Patient tolerance:  Patient tolerated the procedure well with no immediate complications  Wrap/Hose:  Hose        Flowsheet Data 12/16/2020   Procedure Start Time:  2:21 PM   Prep: Other - Use Comments;Chloraprep   Side: Left   Tx Length (cm): LEFT GSV: 19   Junction (cm): LEFT GSV: 2.20   RF Cycles: LEFT GSV: 5   RF TX Time (Minutes): LEFT GSV: 1:40   Sedation taken: Yes   Pre Pt. Physical / Cognitive Limitations: WNL   TOTAL Local anesthesia Injected (ml): 3.5   Max Volume Local Anesthesia (ml): 11   TOTAL Tumescent Injected volume (ml): 300   Max Volume Tumescent (ml): 572   Post Pt. Physical / Cognitive Limitations: WNL   Procedure End Time:  2:56 PM   D/C Instructions given, states readiness to leave and escorted to car: Yes       RONALD Muhammad MD    Dictated using Dragon voice recognition software which may result in transcription errors

## 2020-12-18 ENCOUNTER — MEDICAL CORRESPONDENCE (OUTPATIENT)
Dept: HEALTH INFORMATION MANAGEMENT | Facility: CLINIC | Age: 60
End: 2020-12-18

## 2020-12-18 ENCOUNTER — ANCILLARY PROCEDURE (OUTPATIENT)
Dept: ULTRASOUND IMAGING | Facility: CLINIC | Age: 60
End: 2020-12-18
Attending: SURGERY
Payer: MEDICARE

## 2020-12-18 ENCOUNTER — OFFICE VISIT (OUTPATIENT)
Dept: VASCULAR SURGERY | Facility: CLINIC | Age: 60
End: 2020-12-18
Attending: SURGERY
Payer: MEDICARE

## 2020-12-18 DIAGNOSIS — Z09 POSTOP CHECK: Primary | ICD-10-CM

## 2020-12-18 DIAGNOSIS — Z86.718 HISTORY OF DVT OF LOWER EXTREMITY: ICD-10-CM

## 2020-12-18 PROCEDURE — 99207 PR NO CHARGE NURSE ONLY: CPT

## 2020-12-18 PROCEDURE — 93971 EXTREMITY STUDY: CPT | Mod: LT | Performed by: SURGERY

## 2020-12-18 NOTE — LETTER
12/18/2020         RE: Petey Archibald  [address]  [place] [address]        Dear Colleague,    Thank you for referring your patient, Petey Archibald, to the [facility] [place]. Please see a copy of my visit note below.        Postoperative Nurse Note    Patient is here for their 72 hour postoperative visit with his caretaker Braden.    Procedure: Left leg VNUS closure GSV (med nec), u/s sclero (med nec) 1/2 sessions  Procedure Date: 12/16/2020  Surgeon: Dr. Muhammad    Ultrasound Result: The left GSV is closed 21.6mm from the SFJ to the mid thigh with evidence of thrombus seen throughout. No evidence of LLE DVT.    Physical Exam: Incisions are approximated without signs of infection.  Ecchymosis: moderate in medial proximal thigh  Swelling: minimal  Paresthesia: pt denies numbness    Patient Questions or Concerns: Pt is doing well and has no concerns. Denies pain.    Instructed pt and caretaker Braden to wash/clean his foot wound either in the shower or with Vashe daily, apply Solosite wound gel to keep the area moist and then to wrap with gauze in between his big toe and second toe. Then to wrap his leg from foot to groin with an ACE wrap. The ACE wrap only needs to be worn during the day and only needs to be worn until Wed 12/23 (7 days postop) to confirm compression is being placed over the ablation area (groin/medial thigh) and over the ultrasound-guided sclerotherapy areas. Then pt has a follow up wound clinic appt with Ruth Ann Brewer PA-C that Wed 12/23.      Pt and pt's caretaker in agreement with plan and had no further questions at this time.    Reviewed postoperative instructions with patient and provided them with written material of common things to expect from their procedure.     Patient's Next [facility] Appointment: 6 week post op and possible 2nd session of U/S sclero (med nec) with Dr. Muhammad (1/27/2021).    Blossom Tiwari RN      Again, thank  you for allowing me to participate in the care of your patient.        Sincerely,        Angelique Vein Nurse

## 2020-12-18 NOTE — PROGRESS NOTES
Postoperative Nurse Note    Patient is here for their 72 hour postoperative visit with his caretaker Braden.    Procedure: Left leg VNUS closure GSV (med nec), u/s sclero (med nec) 1/2 sessions  Procedure Date: 12/16/2020  Surgeon: Dr. Muhammad    Ultrasound Result: The left GSV is closed 21.6mm from the SFJ to the mid thigh with evidence of thrombus seen throughout. No evidence of LLE DVT.    Physical Exam: Incisions are approximated without signs of infection.  Ecchymosis: moderate in medial proximal thigh  Swelling: minimal  Paresthesia: pt denies numbness    Patient Questions or Concerns: Pt is doing well and has no concerns. Denies pain.    Instructed pt and caretaker Braden to wash/clean his foot wound either in the shower or with Vashe daily, apply Solosite wound gel to keep the area moist and then to wrap with gauze in between his big toe and second toe. Then to wrap his leg from foot to groin with an ACE wrap. The ACE wrap only needs to be worn during the day and only needs to be worn until Wed 12/23 (7 days postop) to confirm compression is being placed over the ablation area (groin/medial thigh) and over the ultrasound-guided sclerotherapy areas. Then pt has a follow up wound clinic appt with Ruth Ann Brewer PA-C that Wed 12/23.      Pt and pt's caretaker in agreement with plan and had no further questions at this time.    Reviewed postoperative instructions with patient and provided them with written material of common things to expect from their procedure.     Patient's Next [facility] Appointment: 6 week post op and possible 2nd session of U/S sclero (med nec) with Dr. Muahmmad (1/27/2021).    Blossom Tiwari, RN

## 2020-12-23 ENCOUNTER — HOSPITAL ENCOUNTER (OUTPATIENT)
Dept: WOUND CARE | Facility: CLINIC | Age: 60
Discharge: HOME | End: 2020-12-23
Attending: PHYSICIAN ASSISTANT | Admitting: PHYSICIAN ASSISTANT
Payer: MEDICARE

## 2020-12-23 VITALS
TEMPERATURE: 99.3 F | DIASTOLIC BLOOD PRESSURE: 77 MMHG | HEART RATE: 88 BPM | RESPIRATION RATE: 16 BRPM | SYSTOLIC BLOOD PRESSURE: 129 MMHG

## 2020-12-23 DIAGNOSIS — L97.922 ULCER OF LEFT LOWER EXTREMITY WITH FAT LAYER EXPOSED (H): ICD-10-CM

## 2020-12-23 DIAGNOSIS — I89.0 LYMPHEDEMA OF LEFT LEG: Primary | ICD-10-CM

## 2020-12-23 PROCEDURE — 11042 DBRDMT SUBQ TIS 1ST 20SQCM/<: CPT | Performed by: PHYSICIAN ASSISTANT

## 2020-12-23 NOTE — DISCHARGE INSTRUCTIONS
[facility]  [address] [facility] [address] [address]    Call us at [phone] if you have any questions about your wounds, have redness or swelling around your wound, have a fever of 101 or greater or if you have any other problems or concerns. We answer the phone Monday through Friday 8 am to 4 pm, please leave a message as we check the voicemail frequently throughout the day.    Petey Archibald 1960    Supplements to aid in healin packet of Murali Supplement into your favorite beverage TWICE a day.You may  purchase at [facility] [employer] in [address]  Vitamin D3 5,000 iu per day.  Vitamin B12 1000 mcg per day    Wound Care:  Daily  After cleansing with saline or wound cleanser, apply small amount of VASHE on gauze, lay into wound bed  Apply small edemawear (blue) to foot and then apply yellow stripe (you are applying this as a double layer from base of toe to base of knee  Apply ABD pad and wrap with gauze and tape on outside and then apply Spandigrip F  Compression:   You have a compression wrap Spandigrip F is supposed to be removed at night and put back on first thing in the morning.   Please remove compression dressing if toes turn blue and/or tingle and can not be relieved by raising the leg for one hour.     Lizette Brewer PA-C  2020    Return appointment as scheduled

## 2020-12-28 ENCOUNTER — ANTICOAGULATION THERAPY VISIT (OUTPATIENT)
Dept: ANTICOAGULATION | Facility: CLINIC | Age: 60
End: 2020-12-28

## 2020-12-28 DIAGNOSIS — Z79.01 LONG TERM CURRENT USE OF ANTICOAGULANT THERAPY: ICD-10-CM

## 2020-12-28 DIAGNOSIS — Z86.711 HISTORY OF PULMONARY EMBOLISM: ICD-10-CM

## 2020-12-28 LAB
CAPILLARY BLOOD COLLECTION: NORMAL
INR PPP: 2.5 (ref 0.86–1.14)

## 2020-12-28 PROCEDURE — 36416 COLLJ CAPILLARY BLOOD SPEC: CPT | Performed by: FAMILY MEDICINE

## 2020-12-28 PROCEDURE — 99207 PR NO CHARGE NURSE ONLY: CPT

## 2020-12-28 PROCEDURE — 85610 PROTHROMBIN TIME: CPT | Performed by: FAMILY MEDICINE

## 2020-12-28 RX ORDER — WARFARIN SODIUM 4 MG/1
4 TABLET ORAL DAILY
Qty: 44 TABLET | Refills: 0
Start: 2020-12-28 | End: 2022-01-14

## 2020-12-28 NOTE — PROGRESS NOTES
ANTICOAGULATION FOLLOW-UP CLINIC VISIT    Patient Name:  Petey Archibald  Date:  2020  Contact Type:  Telephone    SUBJECTIVE:         OBJECTIVE    Recent labs: (last 7 days)     20  1035   INR 2.50*       ASSESSMENT / PLAN  INR assessment THER    Recheck INR In: 4 WEEKS    INR Location Clinic      Anticoagulation Summary  As of 2020    INR goal:  2.0-3.0   TTR:  84.6 % (11.9 mo)   INR used for dosin.50 (2020)   Warfarin maintenance plan:  6 mg (4 mg x 1.5) every Mon, Fri; 8 mg (4 mg x 2) all other days   Full warfarin instructions:  6 mg every Mon, Fri; 8 mg all other days   Weekly warfarin total:  52 mg   No change documented:  Lisa Ponce RN   Plan last modified:  Lisa Ponce RN (2020)   Next INR check:  2021   Priority:  Maintenance   Target end date:  Indefinite    Indications    History of pulmonary embolism [Z86.711]  Long term current use of anticoagulant therapy [Z79.01]             Anticoagulation Episode Summary     INR check location:  Anticoagulation Clinic    Preferred lab:      Send INR reminders to:  [facility]    Comments:  REM intermediate; A new Coumadin Prescription will need to sent to [facility] with current dose and next INR check.      Anticoagulation Care Providers     Provider Role Specialty Phone number    Yolande Lacy PA-C Referring Family Medicine [phone]    Silvino Baker MD Responsible Family Medicine [phone]            See the Encounter Report to view Anticoagulation Flowsheet and Dosing Calendar (Go to Encounters tab in chart review, and find the Anticoagulation Therapy Visit)        Lisa Ponce, RN

## 2020-12-29 ENCOUNTER — ANCILLARY PROCEDURE (OUTPATIENT)
Dept: GENERAL RADIOLOGY | Facility: CLINIC | Age: 60
End: 2020-12-29
Attending: INTERNAL MEDICINE
Payer: MEDICARE

## 2020-12-29 ENCOUNTER — OFFICE VISIT (OUTPATIENT)
Dept: INTERNAL MEDICINE | Facility: CLINIC | Age: 60
End: 2020-12-29
Payer: MEDICARE

## 2020-12-29 VITALS
TEMPERATURE: 98 F | BODY MASS INDEX: 44.38 KG/M2 | WEIGHT: 315 LBS | DIASTOLIC BLOOD PRESSURE: 74 MMHG | OXYGEN SATURATION: 95 % | SYSTOLIC BLOOD PRESSURE: 118 MMHG | HEART RATE: 68 BPM | RESPIRATION RATE: 18 BRPM

## 2020-12-29 DIAGNOSIS — R10.32 LEFT GROIN PAIN: ICD-10-CM

## 2020-12-29 DIAGNOSIS — R10.32 LEFT GROIN PAIN: Primary | ICD-10-CM

## 2020-12-29 DIAGNOSIS — N48.9 LESION OF PENIS: ICD-10-CM

## 2020-12-29 PROBLEM — I50.9 CONGESTIVE HEART FAILURE (H): Status: RESOLVED | Noted: 2020-09-19 | Resolved: 2020-12-29

## 2020-12-29 PROBLEM — Z86.711 HISTORY OF PULMONARY EMBOLISM: Status: ACTIVE | Noted: 2020-09-19

## 2020-12-29 PROBLEM — I50.9 CONGESTIVE HEART FAILURE (H): Status: ACTIVE | Noted: 2020-09-19

## 2020-12-29 PROCEDURE — 99214 OFFICE O/P EST MOD 30 MIN: CPT | Performed by: INTERNAL MEDICINE

## 2020-12-29 PROCEDURE — 73502 X-RAY EXAM HIP UNI 2-3 VIEWS: CPT | Mod: LT | Performed by: INTERNAL MEDICINE

## 2020-12-29 NOTE — PROGRESS NOTES
Subjective     Petey Archibald is a 60 year old male who presents to clinic today for the following health issues:    HPI   New Patient/Transfer of Care    Concern - Groin problem   Onset: 3 weeks   Description: Pain in the groin area, no trauma   Intensity: moderate  Progression of Symptoms:  same  Accompanying Signs & Symptoms: none  Previous history of similar problem: none  Precipitating factors:        Worsened by:   Alleviating factors:        Improved by:   Therapies tried and outcome: renettaenol      Petey presents with a worker from his group home. He has two complaints today. He noticed some groin pain a few weeks ago. He recently had varicose vein ablations done in the area of his L groin, and this is where he has noticed the pain. He thinks he had that pain before his recent ablation. It has persisted. No numbness or weakness. No back pain. He's on warfarin. Able to bear weight. Hurts the most when he sits on it for awhile and then stands up. He also has noticed some lesions on his penis during the last week. He states he has never had sex. He is wondering if he is masturbating too much.    Review of Systems   Constitutional, HEENT, cardiovascular, pulmonary, gi and gu systems are negative, except as otherwise noted.      Objective    /74   Pulse 68   Temp 98  F (36.7  C) (Tympanic)   Resp 18   Wt 144.3 kg (318 lb 3.2 oz)   SpO2 95%   BMI 44.38 kg/m    Body mass index is 44.38 kg/m .  Physical Exam   GENERAL: alert and in no distress.  EYES: conjunctivae/corneas clear. EOMs grossly intact  HENT: NC/AT, facies symmetric.  RESP: No iWOB.  CV: RRR to DP.  GI: NT, ND, without guarding  : Three lesions ~3mm in size each on R side of penis with associated chafing. No purulence or signs of infection.  MSK: No cyanosis or clubbing noted bilaterally in upper and/or lower extremities. TTP over L groin area. Strength good in L leg. No hematoma noted.  SKIN: Chronic venous stasis changes in LLE below  shins.  NEURO: Alert. Oriented. Sensation grossly WNL.      Assessment & Plan     Left groin pain  DDX hip joint (OA?) vs venous (given recent venous procedure in that area) vs DVT (INR therapeutic during this time). Will check Xray of hip and encouraged him to reach out to his venous specialist about any possible complications in that area. If all negative, will plan to check U/S of area.    Lesion of penis  Given Petey's stated history of never having sex, low suspicion for STD. I do think they may be related to frequency of masturbation, and recommended he take a break from 7-10 days to see if these chafing lesions will heal. He'll monitor them.      Return in about 9 months (around 9/29/2021) for Physical Exam.    Demetri Holder MD  [facility]

## 2021-01-04 ENCOUNTER — TELEPHONE (OUTPATIENT)
Dept: WOUND CARE | Facility: CLINIC | Age: 61
End: 2021-01-04

## 2021-01-04 DIAGNOSIS — G47.33 OBSTRUCTIVE SLEEP APNEA (ADULT) (PEDIATRIC): Primary | ICD-10-CM

## 2021-01-04 NOTE — TELEPHONE ENCOUNTER
Braden the caregiver for Petey received new dressing supplies and do not know how to use them.    He was wondering if he could come in for a visit sooner than scheduled or a nurse could help them.

## 2021-01-06 ENCOUNTER — TELEPHONE (OUTPATIENT)
Dept: INTERNAL MEDICINE | Facility: CLINIC | Age: 61
End: 2021-01-06

## 2021-01-06 NOTE — TELEPHONE ENCOUNTER
Pt group home called regarding Vit D. Insurance does not cover it and it is too expensive, they are looking for an alternative medication or cheaper opinion       Call back number:  [phone]- group home caregiver Hunter

## 2021-01-06 NOTE — TELEPHONE ENCOUNTER
I don't see any measured Vit D level in our system. His last level I can see was done at an outside facility 4 years ago and was normal. He may not need Vit D supplementation. Okay to discontinue and we can check his Vit D level at his next physical.    Demetri Holder MD, MPH  [facility]  Internal Medicine

## 2021-01-06 NOTE — PROGRESS NOTES
[place] [facility]    ASSESSMENT:   1. Full-thickness lymphedema ulcer of left dorsal foot with fat-layer exposed    PLAN/DISCUSSION:   1. Wound care plan: dress with VAshe damp gauze, EdemaWear, ABD pad and Spandigrip   2. We will order EdemaWear for the patient, staff has agreed to help him purchase this  3. Nutrition: Murali 2pkts/day, vit D 5kIU/day, high protein diet    HISTORY OF PRESENT ILLNESS:   Petey Archibald is a 60 year old male with history of venous insufficiency, PE, DVT, cognitive impairment, morbid obesity, coumadin use, impaired glucose tolerance, iron deficiency anemia, NEL, and schizoaffective disorder who returns today for a left foot ulcer. Etiology presumed to be related to venous insufficiency and edema. He is primarily followed by Lamont Davis and Zenia, this is the first time I am visiting with this patient.  Appears his progress waxes and wanes. There is some difficulty with following our care plan, he still does not have the EdemaWear we have requested he utilize.     VITALS: /77 (BP Location: Left arm)   Pulse 88   Temp 99.3  F (37.4  C) (Temporal)   Resp 16      PHYSICAL EXAM:  GENERAL: Patient is alert and oriented and in no acute distress  CV: palpable pedal pulses  INTEGUMENTARY:   See wound care flowsheet for detailed exam and wound measurements.         PROCEDURE: 4% topical lidocaine was applied to the wound by nursing staff. Patient was determined to be capable of making their own medical decisions and informed consent was obtained. Using a curette a surgical debridement was performed down to and including subcutaneous tissue of <20 cm2. Hemostasis was achieved with pressure. The patient tolerated the procedure well.      Follow-up: as scheduled   DREA SOLORZANO PA-C

## 2021-01-07 ENCOUNTER — HOSPITAL ENCOUNTER (OUTPATIENT)
Dept: WOUND CARE | Facility: CLINIC | Age: 61
Discharge: HOME | End: 2021-01-07
Admitting: PODIATRIST
Payer: MEDICARE

## 2021-01-07 VITALS — SYSTOLIC BLOOD PRESSURE: 123 MMHG | DIASTOLIC BLOOD PRESSURE: 73 MMHG | HEART RATE: 61 BPM | TEMPERATURE: 98 F

## 2021-01-07 DIAGNOSIS — L97.922 ULCER OF LEFT LOWER EXTREMITY WITH FAT LAYER EXPOSED (H): ICD-10-CM

## 2021-01-07 PROCEDURE — 97602 WOUND(S) CARE NON-SELECTIVE: CPT

## 2021-01-07 NOTE — DISCHARGE INSTRUCTIONS
[facility]  [address] [facility] [address] [address]    Call us at [phone] if you have any questions about your wounds, have redness or swelling around your wound, have a fever of 101 or greater or if you have any other problems or concerns. We answer the phone Monday through Friday 8 am to 4 pm, please leave a message as we check the voicemail frequently throughout the day.     Petey Archibald      1960    Supplements to aid in healin packet of Murali Supplement into your favorite beverage TWICE a day.You may  purchase at [facility] [employer] in [address]  Vitamin D3 5,000 iu per day.  Vitamin B12 1000 mcg per day  Wound Care: Daily  After cleansing with soap and water in shower, than apply small amount of VASHE on gauze, lay into wound bed  Apply small edemawear (blue) to foot and then apply medium (yellow) stripe you are applying this as a double layer from base of toe to base of knee       FERNANDO Davis M.D.. 2021

## 2021-01-22 DIAGNOSIS — K21.9 GASTROESOPHAGEAL REFLUX DISEASE, UNSPECIFIED WHETHER ESOPHAGITIS PRESENT: Primary | ICD-10-CM

## 2021-01-25 ENCOUNTER — ANTICOAGULATION THERAPY VISIT (OUTPATIENT)
Dept: ANTICOAGULATION | Facility: CLINIC | Age: 61
End: 2021-01-25

## 2021-01-25 DIAGNOSIS — Z86.711 HISTORY OF PULMONARY EMBOLISM: ICD-10-CM

## 2021-01-25 DIAGNOSIS — Z79.01 LONG TERM CURRENT USE OF ANTICOAGULANT THERAPY: ICD-10-CM

## 2021-01-25 LAB
CAPILLARY BLOOD COLLECTION: NORMAL
INR PPP: 2.2 (ref 0.86–1.14)

## 2021-01-25 PROCEDURE — 36416 COLLJ CAPILLARY BLOOD SPEC: CPT | Performed by: INTERNAL MEDICINE

## 2021-01-25 PROCEDURE — 85610 PROTHROMBIN TIME: CPT | Performed by: INTERNAL MEDICINE

## 2021-01-25 PROCEDURE — 99207 PR NO CHARGE NURSE ONLY: CPT

## 2021-01-25 RX ORDER — WARFARIN SODIUM 4 MG/1
TABLET ORAL
Qty: 54 TABLET | Refills: 0
Start: 2021-01-25 | End: 2022-01-14

## 2021-01-25 NOTE — PROGRESS NOTES
ANTICOAGULATION FOLLOW-UP CLINIC VISIT    Patient Name:  Petey Archibald  Date:  2021  Contact Type:  Telephone    SUBJECTIVE:  Patient Findings     Comments:  The patient was assessed for diet, medication, and activity level changes, missed or extra doses, bruising or bleeding,  Seeing wound clinic for left leg ulcer- it is improving          Clinical Outcomes     Comments:  The patient was assessed for diet, medication, and activity level changes, missed or extra doses, bruising or bleeding,  Seeing wound clinic for left leg ulcer- it is improving             OBJECTIVE    Recent labs: (last 7 days)     21  1009   INR 2.20*       ASSESSMENT / PLAN  INR assessment THER    Recheck INR In: 4 WEEKS    INR Location Clinic      Anticoagulation Summary  As of 2021    INR goal:  2.0-3.0   TTR:  84.6 % (11.9 mo)   INR used for dosin.20 (2021)   Warfarin maintenance plan:  6 mg (4 mg x 1.5) every Mon, Fri; 8 mg (4 mg x 2) all other days   Full warfarin instructions:  6 mg every Mon, Fri; 8 mg all other days   Weekly warfarin total:  52 mg   No change documented:  Lisa Ponce, RN   Plan last modified:  Lisa Ponce, RN (2020)   Next INR check:  2021   Priority:  Maintenance   Target end date:  Indefinite    Indications    History of pulmonary embolism [Z86.711]  Long term current use of anticoagulant therapy [Z79.01]             Anticoagulation Episode Summary     INR check location:  Anticoagulation Clinic    Preferred lab:      Send INR reminders to:  [facility]    Comments:  REM correction; A new Coumadin Prescription will need to sent to [facility] with current dose and next INR check.      Anticoagulation Care Providers     Provider Role Specialty Phone number    Yolande Lacy PA-C Referring Family Medicine [phone]    Silvino Baker MD Responsible Family Medicine [phone]            See the Encounter Report to view  Anticoagulation Flowsheet and Dosing Calendar (Go to Encounters tab in chart review, and find the Anticoagulation Therapy Visit)        Lisa Ponce RN

## 2021-01-26 RX ORDER — DEXLANSOPRAZOLE 60 MG/1
CAPSULE, DELAYED RELEASE ORAL
Qty: 90 CAPSULE | Refills: 2 | Status: SHIPPED | OUTPATIENT
Start: 2021-01-26 | End: 2021-10-11

## 2021-01-27 ENCOUNTER — TELEPHONE (OUTPATIENT)
Dept: INTERNAL MEDICINE | Facility: CLINIC | Age: 61
End: 2021-01-27

## 2021-01-27 NOTE — TELEPHONE ENCOUNTER
Prior Authorization Retail Medication Request    Medication/Dose: Dexlansoprazole (DEXILANT) 60 MG CPDR CR capsule  ICD code (if different than what is on RX):  k21.9  Previously Tried and Failed:    Rationale:      Insurance Name:  Medicare  Insurance ID:  [id number]      Pharmacy Information (if different than what is on RX)  Name:  [facility]   Phone:  [phone]

## 2021-01-28 NOTE — TELEPHONE ENCOUNTER
Prior Authorization Approval    Authorization Effective Date: 1/28/2021  Authorization Expiration Date: 12/31/2021  Medication: nsoprazole (DEXILANT) 60 MG CPDR CR capsule- APPROVED   Approved Dose/Quantity:   Reference #:     Insurance Company: [employer] ([facility]) - Phone [phone] Fax [phone]  Expected CoPay:       CoPay Card Available:      Foundation Assistance Needed:    Which Pharmacy is filling the prescription (Not needed for infusion/clinic administered): [facility] - [place], [place] - [id number] [place] [address]. S.  Pharmacy Notified: Yes  Patient Notified:  **Instructed pharmacy to notify patient when script is ready to /ship.**

## 2021-01-28 NOTE — TELEPHONE ENCOUNTER
Central Prior Authorization Team  Phone: [phone]    PA Initiation    Medication: nsoprazole (DEXILANT) 60 MG CPDR CR capsule  Insurance Company: [employer] ([facility]) - Phone [phone] Fax [phone]  Pharmacy Filling the Rx: [facility] [place] - [place], [place] [facility] [address]  Filling Pharmacy Phone: [phone]  Filling Pharmacy Fax:    Start Date: 1/28/2021

## 2021-02-03 ENCOUNTER — HOSPITAL ENCOUNTER (OUTPATIENT)
Dept: WOUND CARE | Facility: CLINIC | Age: 61
Discharge: HOME | End: 2021-02-03
Attending: SURGERY | Admitting: SURGERY
Payer: MEDICARE

## 2021-02-03 VITALS
HEART RATE: 66 BPM | SYSTOLIC BLOOD PRESSURE: 128 MMHG | DIASTOLIC BLOOD PRESSURE: 70 MMHG | RESPIRATION RATE: 18 BRPM | TEMPERATURE: 97.9 F

## 2021-02-03 DIAGNOSIS — L97.922 ULCER OF LEFT LOWER EXTREMITY WITH FAT LAYER EXPOSED (H): ICD-10-CM

## 2021-02-03 PROCEDURE — 99213 OFFICE O/P EST LOW 20 MIN: CPT | Performed by: SURGERY

## 2021-02-03 PROCEDURE — 97602 WOUND(S) CARE NON-SELECTIVE: CPT

## 2021-02-03 NOTE — DISCHARGE INSTRUCTIONS
[facility]  [address] [facility] [address] [address]    Call us at [phone] if you have any questions about your wounds, have redness or swelling around your wound, have a fever of 101 or greater or if you have any other problems or concerns. We answer the phone Monday through Friday 8 am to 4 pm, please leave a message as we check the voicemail frequently throughout the day.     Petey Archibald      1960    Supplements to aid in healin packet of Murali Supplement into your favorite beverage TWICE a day.You may  purchase at [facility] [employer] in [address]  Vitamin D3 5,000 iu per day.  Vitamin B12 1000 mcg per day    Wound Care: Daily  After cleansing with soap and water in shower, than apply small amount of VASHE on  gauze, lay into wound bed, let soak for 10 minutes than remove and apply wound gel and bandaid. Change dressing daily.  Apply small edemawear (blue) to foot and then apply medium (yellow) stripe you are  applying this as a double layer from base of toe to base of knee       FERNANDO Davis M.D.. February 3, 2021      If you had a positive experience please indicate on your patient satisfaction survey form that [facility] will be sending you.

## 2021-02-04 NOTE — PROGRESS NOTES
[facility] [place] Progress Note    Subject: Petey Archibald near complete resolution of lower extremity ulcerations, participate in his care is present for today's evaluation.  He continues to work at a restaurant.  He is trying to improve his overall dietary intake and management and increase activities and weight modified.    Patient Active Problem List   Diagnosis     Ankle pain     GERD (gastroesophageal reflux disease)     Peripheral vascular disease (H)     History of pulmonary embolism     Tobacco dependence:40-50 pk yr hx     Borderline mental retardation     History of deep vein thrombosis of lower extremity     Right knee pain     Pilonidal cyst     Asymptomatic varicose veins, bilateral     Callus of foot on Rt lat dist  since 8-15      Morbid obesity due to excess calories (H)  BMI 40-50     Hypercholesterolemia     Mixed simple and mucopurulent chronic bronchitis (HCC) CT 4-06 wnl and neg bronch for hemoptesis spirometry 7-26-16  FVC=59% & w mod restriction  and lung age of 82 in 54 y/o      Major depression in complete remission (HCC) on meds      Long term current use of anticoagulant therapy     Glucose intolerance (impaired glucose tolerance)     Other iron deficiency anemia     Localized edema L>R ankles      Erectile dysfunction, unspecified erectile dysfunction type     Stasis dermatitis of both legs     Arch pain of left foot 2ndary to edema and tendonitis      NEL (obstructive sleep apnea)     Hematemesis without nausea after smoking      Anxiety     Thrombophlebitis of superficial veins of both lower extremities: Greater Saphenous VV      Acute deep vein thrombosis (DVT) of tibial vein of left lower extremity (H)     History of colonic polyps     Allergic to bees     Abnormal lung sounds-bibasilar rhonchi     Hypoxia     Abnormal chest x-ray-3-19 prominent bibasilar interstitial      Ulcer of left lower extremity with fat layer exposed (H)     Lymphedema of left leg     Left  leg cellulitis     Cellulitis of left lower extremity     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)     Fungal skin infection     Schizoaffective disorder, bipolar type (H)     Cellulitis     Skin infection     Adenomatous polyp of ascending colon     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     COPD (chronic obstructive pulmonary disease) (H)      History of DVT of lower extremity      History of pulmonary embolism      Hyperlipidemia      Mild intellectual disabilities      Morbid obesity (H)      Peripheral edema      Peripheral vascular disease (H)      Sleep apnea      Tobacco dependence      Venous stasis dermatitis      Exam:  /70 (BP Location: Left arm)   Pulse 66   Temp 97.9  F (36.6  C) (Temporal)   Resp 18   Wound (used by OP [facility] only) 09/25/20 0911 Left dorsal foot (Active)   Thickness/Stage full thickness 02/03/21 0700   Dressing Appearance moist drainage 02/03/21 0700   Base granulating 02/03/21 0700   Periwound intact 02/03/21 0700   Periwound Temperature warm 02/03/21 0700   Periwound Skin Turgor soft 02/03/21 0700   Edges open 02/03/21 0700   Length (cm) 0.3 02/03/21 0700   Width (cm) 0.3 02/03/21 0700   Depth (cm) 0.1 02/03/21 0700   Wound (cm^2) 0.09 cm^2 02/03/21 0700   Wound Volume (cm^3) 0.01 cm^3 02/03/21 0700   Wound healing % 99.33 02/03/21 0700   Drainage Characteristics/Odor serosanguineous 02/03/21 0700   Drainage Amount moderate 02/03/21 0700   Care, Wound non-select wound debridement performed 02/03/21 0700     60-year-old male, see photodocumentation, no cellulitis, wound is 99% improved compared to initial consultation.        Impression: Near complete closure lower extremity ulceration    Plan: We will dress the wounds with hypochlorous acid application then skin integrity wound gel on a daily basis.  Utilize blue stripe edema wear and overlap with yellow stripe edema wear and inelastic overlay compression.  Continue  adjunct of micronutrients.  Patient will return to the clinic in 6 weeks time    Rustam Davis MD on 2/3/2021 at 6:23 PM

## 2021-02-09 ENCOUNTER — MEDICAL CORRESPONDENCE (OUTPATIENT)
Dept: HEALTH INFORMATION MANAGEMENT | Facility: CLINIC | Age: 61
End: 2021-02-09

## 2021-02-09 ENCOUNTER — OFFICE VISIT (OUTPATIENT)
Dept: VASCULAR SURGERY | Facility: CLINIC | Age: 61
End: 2021-02-09
Payer: MEDICARE

## 2021-02-09 DIAGNOSIS — I87.2 VENOUS (PERIPHERAL) INSUFFICIENCY: ICD-10-CM

## 2021-02-09 DIAGNOSIS — L97.523 CHRONIC ULCER OF LEFT FOOT WITH NECROSIS OF MUSCLE (H): Primary | ICD-10-CM

## 2021-02-09 PROCEDURE — 99207 PR VEINSOLUTIONS POST OPERATIVE VISIT: CPT | Performed by: SURGERY

## 2021-02-09 NOTE — LETTER
2/9/2021         RE: Petey Archibald  [address]  [facility] [address]        Dear Colleague,    Thank you for referring your patient, Petey Archibald, to the [facility] [place]. Please see a copy of my visit note below.    [facility]  Petey Archibald returns today for follow-up of radiofrequency ablation of his left great saphenous vein and medically necessary, ultrasound-guided sclerotherapy of a left great saphenous vein tributary coursing down his leg.  The patient and his personal care attendant state that the wound on his left foot has nearly healed.  They continue to dress the wound with a small amount of ointment and cover it with dry gauze.    Physical exam  General: Pleasant gentleman in no acute distress  Left lower extremity: The wound on the distal forefoot at the base of his dorsal second toe is simply a small, superficial crack with no erythema.  There is no villa open wound.  There is much dry skin emanating from this area.  There are no visible varicose veins on his left lower extremity.    There is edema of his left ankle and foot.    Venous ultrasound reveals a noncompressible left great saphenous vein from the saphenofemoral junction down to the proximal third of the thigh.  The great saphenous vein then breaks through the fascia and becomes a varicosity coursing through the subcutaneous tissues down to the distal left leg.  This tortuous tributary is compressible and patent.    Impression  The proximal left great saphenous vein is closed and the varicosity coursing down the thigh and leg are depressurized.  The wound on the distal dorsal left forefoot has essentially healed.    The tortuous tributary coursing down the thigh and leg did not respond well to ultrasound-guided sclerotherapy.  Since the wound on the dorsum of the foot has essentially healed, I do not feel it necessary to treat this tributary further at this time.  The patient and  his personal care attendant were not aware that the treatment may occur.    Chrissie Angelo will follow up with the wound healing [place] regarding further care of his nearly healed wound.  I could address the tributary coursing down his left lower extremity at some point in the future if there is any problem with healing of the wound.  This proved satisfactory with the patient and his personal care attendant.    RONALD Muhammad MD    Dictated using Dragon voice recognition software which may result in transcription errors        Again, thank you for allowing me to participate in the care of your patient.        Sincerely,        Ludwig Muhammad MD

## 2021-02-10 NOTE — PROGRESS NOTES
[facility] [place]  Petey Archibald returns today for follow-up of radiofrequency ablation of his left great saphenous vein and medically necessary, ultrasound-guided sclerotherapy of a left great saphenous vein tributary coursing down his leg.  The patient and his personal care attendant state that the wound on his left foot has nearly healed.  They continue to dress the wound with a small amount of ointment and cover it with dry gauze.    Physical exam  General: Pleasant gentleman in no acute distress  Left lower extremity: The wound on the distal forefoot at the base of his dorsal second toe is simply a small, superficial crack with no erythema.  There is no villa open wound.  There is much dry skin emanating from this area.  There are no visible varicose veins on his left lower extremity.    There is edema of his left ankle and foot.    Venous ultrasound reveals a noncompressible left great saphenous vein from the saphenofemoral junction down to the proximal third of the thigh.  The great saphenous vein then breaks through the fascia and becomes a varicosity coursing through the subcutaneous tissues down to the distal left leg.  This tortuous tributary is compressible and patent.    Impression  The proximal left great saphenous vein is closed and the varicosity coursing down the thigh and leg are depressurized.  The wound on the distal dorsal left forefoot has essentially healed.    The tortuous tributary coursing down the thigh and leg did not respond well to ultrasound-guided sclerotherapy.  Since the wound on the dorsum of the foot has essentially healed, I do not feel it necessary to treat this tributary further at this time.  The patient and his personal care attendant were not aware that the treatment may occur.    Chrissie Angelo will follow up with the wound healing [place] regarding further care of his nearly healed wound.  I could address the tributary coursing down his left lower extremity at  some point in the future if there is any problem with healing of the wound.  This proved satisfactory with the patient and his personal care attendant.    RONALD Muhammad MD    Dictated using Dragon voice recognition software which may result in transcription errors

## 2021-02-11 DIAGNOSIS — I89.0 LYMPHEDEMA OF LEFT LEG: ICD-10-CM

## 2021-02-15 RX ORDER — FUROSEMIDE 80 MG
TABLET ORAL
Qty: 28 TABLET | Refills: 5 | OUTPATIENT
Start: 2021-02-15

## 2021-02-15 RX ORDER — FUROSEMIDE 40 MG
TABLET ORAL
Qty: 28 TABLET | Refills: 5 | OUTPATIENT
Start: 2021-02-15

## 2021-02-15 NOTE — TELEPHONE ENCOUNTER
PCP please verify that this dosing is still appropriate.  It appears it was completed at some point per pharmacy.    Thank you,    Katelynn Castro RN  [facility]

## 2021-02-16 DIAGNOSIS — I89.0 LYMPHEDEMA OF LEFT LEG: ICD-10-CM

## 2021-02-17 RX ORDER — FUROSEMIDE 40 MG
TABLET ORAL
Qty: 31 TABLET | Refills: 11 | OUTPATIENT
Start: 2021-02-17

## 2021-02-17 RX ORDER — TEMAZEPAM 15 MG/1
CAPSULE ORAL
Qty: 30 CAPSULE | Refills: 5 | OUTPATIENT
Start: 2021-02-17

## 2021-02-17 RX ORDER — FUROSEMIDE 80 MG
TABLET ORAL
Qty: 31 TABLET | Refills: 11 | OUTPATIENT
Start: 2021-02-17

## 2021-02-17 NOTE — TELEPHONE ENCOUNTER
NOT SEEN BY Fercho SINGER  @ [facility]  * MULT DENIALS?

## 2021-02-17 NOTE — TELEPHONE ENCOUNTER
Sending to PCP to advise, It looks like Furosemide 80 mg was discontinued 9/2020 but is being requested for refill along with Furosemide 40 mg. Please clarify correct dose you would like patient to take.

## 2021-02-18 RX ORDER — FUROSEMIDE 80 MG
80 TABLET ORAL DAILY
Qty: 90 TABLET | Refills: 0 | Status: CANCELLED | OUTPATIENT
Start: 2021-02-18

## 2021-02-18 RX ORDER — FUROSEMIDE 40 MG
40 TABLET ORAL DAILY
Qty: 90 TABLET | Refills: 0 | Status: CANCELLED | OUTPATIENT
Start: 2021-02-18

## 2021-02-18 NOTE — TELEPHONE ENCOUNTER
[facility] pharmacy calling again.  Wondering why Lasix Rx refused.Patient is out of medications.    Patient has been receiving 80 mg and 40 mg tablets.   Last refill ((6 months) in August 2020 by .  ----------  Copied from physical on 8/6/20 with :  Petey was seen today for physical.     Diagnoses and all orders for this visit:     Routine general medical examination at a health care facility     Morbid obesity due to excess calories (H)  BMI 40-50     Lymphedema of left leg  -     furosemide (LASIX) 80 MG tablet; Take 1 tablet (80 mg) by mouth daily (with breakfast) At 0800 daily  -     furosemide (LASIX) 40 MG tablet; Take 1 tablet (40 mg) by mouth daily Take mid day, no later than 400  -----------------  IT APPEARS THE 80 MG WAS TAKEN OFF MED LIST ON 9/25/20 AT A PODIATRY APPT.  Possibly because TWO strengths were listed (?)     Writer called group [facility] (Hunter Stearns caregiver )   and asked that he call [facility] to clarify the correct Lasix dosing as soon as possible.  Kaila Carlos RN on 2/18/2021 at 8:39 AM

## 2021-02-18 NOTE — TELEPHONE ENCOUNTER
Triage reviewed providers last note.     Provider will need to know why patient is taking furosemide or if he has been taking this 'chronically'.    Triage spoke with Hunter, with group home, he will call back with this information.     Camila LANGSTON, RN, PHN

## 2021-02-19 ENCOUNTER — TELEPHONE (OUTPATIENT)
Dept: INTERNAL MEDICINE | Facility: CLINIC | Age: 61
End: 2021-02-19

## 2021-02-19 NOTE — TELEPHONE ENCOUNTER
I have not seen patient since 10/2020.  I do not prescribe nor would I have discontinued his furosemide.    He sees Dr. Davis at the wound center.     Will forward on to his nurses.  He should likely follow up with his primary care doctor for this.     Maggy Knutson DPM

## 2021-02-19 NOTE — TELEPHONE ENCOUNTER
furosemide  80mg was discontinued by Podiatry on 09/25/20- I will forward this message to them asking for reasoning.  I am unable to find any chart notes that states this med should be discontinued.    Nicole MALINRN BSN          Hunter Toussaint- care giver at [facility] called back to get more information- he is not sure what he is asking or who he should be getting this information from regarding the discontinued medication.    [facility] [phone]    Called [facility]-  They have been sending this medication until the refills ran out and they asked for a refill    DX Lymphedema of left leg  -     furosemide (LASIX) 80 MG tablet; Take 1 tablet (80 mg) by mouth daily (with breakfast) At 0800 daily  -     furosemide (LASIX) 40 MG tablet; Take 1 tablet (40 mg) by mouth daily Take mid day, no later than 400

## 2021-02-19 NOTE — TELEPHONE ENCOUNTER
I do see your notes that he was given this for lymphedema. He is seen by lymphedema specialists, and I would ask them to weigh in on the appropriateness of this medication. It is a high dose of furosemide and his last echocardiogram shows no signs of heart failure. These medications, which appear to have been started without much documentation as to why, may not be needed.

## 2021-02-22 ENCOUNTER — ANTICOAGULATION THERAPY VISIT (OUTPATIENT)
Dept: NURSING | Facility: CLINIC | Age: 61
End: 2021-02-22

## 2021-02-22 DIAGNOSIS — Z79.01 LONG TERM CURRENT USE OF ANTICOAGULANT THERAPY: ICD-10-CM

## 2021-02-22 DIAGNOSIS — Z86.711 HISTORY OF PULMONARY EMBOLISM: ICD-10-CM

## 2021-02-22 DIAGNOSIS — I89.0 LYMPHEDEMA OF LEFT LEG: ICD-10-CM

## 2021-02-22 LAB
CAPILLARY BLOOD COLLECTION: NORMAL
INR PPP: 1.9 (ref 0.86–1.14)

## 2021-02-22 PROCEDURE — 85610 PROTHROMBIN TIME: CPT | Performed by: INTERNAL MEDICINE

## 2021-02-22 PROCEDURE — 36416 COLLJ CAPILLARY BLOOD SPEC: CPT | Performed by: INTERNAL MEDICINE

## 2021-02-22 RX ORDER — SENNA 8.6 MG/1
1 TABLET, FILM COATED ORAL DAILY
COMMUNITY
Start: 2021-02-11

## 2021-02-22 NOTE — PROGRESS NOTES
ANTICOAGULATION MANAGEMENT     Patient Name:  Petey Archibald  Date:  2021    ASSESSMENT /SUBJECTIVE:    Today's INR result of 1.90 is subtherapeutic. Goal INR of 2.0-3.0      Warfarin dose taken: Warfarin taken as instructed    Diet: No new diet changes affecting INR    Medication changes/ interactions: No new medications/supplements affecting INR    Previous INR: Therapeutic     S/S of bleeding or thromboembolism: No    New injury or illness: No    Upcoming surgery, procedure or cardioversion: No    Additional findings: None      PLAN:    Telephone call with  Hunter regarding INR result and instructed:     Warfarin Dosing Instructions: 8 mg today  then continue your current warfarin dose of 6 mg Mon,Fri; 8 mg all other days    Called dosing into [facility] till the next check on 3/8/2021.      Instructed patient to follow up no later than: 2 weeks  Lab visit scheduled    Education provided: None required      Hunter verbalizes understanding and agrees to warfarin dosing plan.    Instructed to call the Anticoagulation Clinic for any changes, questions or concerns. (#[phone])        Ashley Quiroz RN      OBJECTIVE:  Recent labs: (last 7 days)     21  1012   INR 1.90*         INR assessment SUB    Recheck INR In: 2 WEEKS    INR Location Clinic      Anticoagulation Summary  As of 2021    INR goal:  2.0-3.0   TTR:  81.9 % (11.9 mo)   INR used for dosin.90 (2021)   Warfarin maintenance plan:  6 mg (4 mg x 1.5) every Mon, Fri; 8 mg (4 mg x 2) all other days   Full warfarin instructions:  : 8 mg; Otherwise 6 mg every Mon, Fri; 8 mg all other days   Weekly warfarin total:  52 mg   Plan last modified:  Lisa Ponce RN (2020)   Next INR check:  3/8/2021   Priority:  Maintenance   Target end date:  Indefinite    Indications    History of pulmonary embolism [Z86.711]  Long term current use of anticoagulant therapy [Z79.01]             Anticoagulation Episode Summary     INR  check location:  Anticoagulation Clinic    Preferred lab:      Send INR reminders to:  [facility]    Comments:  [facility]; A new Coumadin Prescription will need to sent to [facility] with current dose and next INR check.      Anticoagulation Care Providers     Provider Role Specialty Phone number    Yolande Lacy PA-C Referring Family Medicine [phone]    Silvino Baker MD Responsible Family Medicine [phone]

## 2021-02-22 NOTE — PROGRESS NOTES
Anticoagulation Management    Unable to reach Hunter today.    Today's INR result of 1.90 is subtherapeutic (goal INR of 2.0-3.0).  Result received from: Clinic Lab    Follow up required to confirm warfarin dose taken and assess for changes    left generic message at the number for VINICIO Harrison to call [facility]      Anticoagulation clinic to follow up    Ashley Quiroz, RN  [phone]

## 2021-02-22 NOTE — PROGRESS NOTES
[facility] Nurse Note    Subject: Petey Archibald presents for nurse only visit for wound check and dressing change.He and his group home care giver present to learn how to do dressings.      Exam:  /73   Pulse 61   Temp 98  F (36.7  C)      01/07/21 0700   Wound (used by OP [facility] only) 09/25/20 0911 Left dorsal foot   Placement Date/Time: 09/25/20 0911   Side: Left  Orientation: dorsal  Location: foot   Thickness/Stage full thickness   Dressing Appearance moist drainage   Base granulating   Periwound intact   Periwound Temperature warm   Periwound Skin Turgor soft   Edges open   Length (cm) 0.5   Width (cm) 0.7   Depth (cm) 0.1   Wound (cm^2) 0.35 cm^2   Wound Volume (cm^3) 0.04 cm^3   Wound healing % 97.41   Drainage Characteristics/Odor serosanguineous   Drainage Amount moderate   Care, Wound non-select wound debridement performed     Procedure:  Topical anesthetic of 4% lidocaine was applied,non-selective debridement was performed, no bleeding occurred. Patient tolerated procedure well.  Wound was redressed with After cleansing with soap and water in shower, than apply small amount of VASHE on gauze, lay into wound bed  Apply small edemawear (blue) to foot and then apply medium (yellow) stripe you are applying this as a double layer from base of toe to base of knee      Plan: Patient will return to the clinic 2/3/20 for follow up.  Crystal Spencer, NERISSAN, RN, CWOCN

## 2021-02-22 NOTE — ADDENDUM NOTE
Encounter addended by: Crystal Spencer RN on: 2/22/2021 3:37 PM   Actions taken: Clinical Note Signed

## 2021-02-22 NOTE — TELEPHONE ENCOUNTER
Patients swelling under much better control with current compression regimen. We would not recommend continued lasix for swelling control only. If discontinued on said visit, was from Patient report and medication reconciliation.

## 2021-02-22 NOTE — TELEPHONE ENCOUNTER
[facility] calling     Checking status of message. If medication is to be discontinued, can we call pharmacy to give verbal, ok for this?

## 2021-02-25 RX ORDER — FUROSEMIDE 80 MG
TABLET ORAL
Qty: 31 TABLET | Refills: 11 | OUTPATIENT
Start: 2021-02-25

## 2021-02-25 RX ORDER — FUROSEMIDE 40 MG
TABLET ORAL
Qty: 31 TABLET | Refills: 11 | OUTPATIENT
Start: 2021-02-25

## 2021-03-01 ENCOUNTER — TELEPHONE (OUTPATIENT)
Dept: INTERNAL MEDICINE | Facility: CLINIC | Age: 61
End: 2021-03-01

## 2021-03-01 NOTE — TELEPHONE ENCOUNTER
Informed group home of last visit notes and provider recommendations.     Camila MULTANIN, RN, PHN

## 2021-03-01 NOTE — TELEPHONE ENCOUNTER
called to see why pt's furosemide was not refilled. Informed that it was discontinued. He was not aware and stated reasons for pt to remain on it.    Pt had PE several years ago and has edema now. Morbidly obese.  Bertrand, , called to refill the furosemide and was informed that it was discontinued. Please call him at [phone] to discuss. Reason for discontinuation.     Fax not working, email is [email or web address]

## 2021-03-05 ENCOUNTER — TRANSFERRED RECORDS (OUTPATIENT)
Dept: HEALTH INFORMATION MANAGEMENT | Facility: CLINIC | Age: 61
End: 2021-03-05

## 2021-03-08 ENCOUNTER — ANTICOAGULATION THERAPY VISIT (OUTPATIENT)
Dept: ANTICOAGULATION | Facility: CLINIC | Age: 61
End: 2021-03-08

## 2021-03-08 DIAGNOSIS — Z79.01 LONG TERM CURRENT USE OF ANTICOAGULANT THERAPY: ICD-10-CM

## 2021-03-08 DIAGNOSIS — Z86.711 HISTORY OF PULMONARY EMBOLISM: ICD-10-CM

## 2021-03-08 LAB
CAPILLARY BLOOD COLLECTION: NORMAL
INR PPP: 2 (ref 0.86–1.14)

## 2021-03-08 PROCEDURE — 36416 COLLJ CAPILLARY BLOOD SPEC: CPT | Performed by: FAMILY MEDICINE

## 2021-03-08 PROCEDURE — 85610 PROTHROMBIN TIME: CPT | Performed by: FAMILY MEDICINE

## 2021-03-08 NOTE — PROGRESS NOTES
ANTICOAGULATION FOLLOW-UP CLINIC VISIT    Patient Name:  Petey Archibald  Date:  3/8/2021  Contact Type:  Telephone    SUBJECTIVE:  Patient Findings     Comments:  The patient was assessed for diet, medication, and activity level changes, missed or extra doses, bruising or bleeding, with no problem findings.          Clinical Outcomes     Comments:  The patient was assessed for diet, medication, and activity level changes, missed or extra doses, bruising or bleeding, with no problem findings.             OBJECTIVE    Recent labs: (last 7 days)     21  1148   INR 2.00*       ASSESSMENT / PLAN  INR assessment THER    Recheck INR In: 3 WEEKS    INR Location Clinic      Anticoagulation Summary  As of 3/8/2021    INR goal:  2.0-3.0   TTR:  78.0 % (11.9 mo)   INR used for dosin.00 (3/8/2021)   Warfarin maintenance plan:  6 mg (4 mg x 1.5) every Mon, Fri; 8 mg (4 mg x 2) all other days   Full warfarin instructions:  6 mg every Mon, Fri; 8 mg all other days   Weekly warfarin total:  52 mg   No change documented:  Lisa Ponce RN   Plan last modified:  Lisa Ponce RN (2020)   Next INR check:  3/29/2021   Priority:  Maintenance   Target end date:  Indefinite    Indications    History of pulmonary embolism [Z86.711]  Long term current use of anticoagulant therapy [Z79.01]             Anticoagulation Episode Summary     INR check location:  Anticoagulation Clinic    Preferred lab:      Send INR reminders to:  [facility]    Comments:  REM longterm; A new Coumadin Prescription will need to sent to [facility] with current dose and next INR check.      Anticoagulation Care Providers     Provider Role Specialty Phone number    Yolande Lacy PA-C Referring Family Medicine [phone]    Silvino Baker MD Responsible Family Medicine [phone]            See the Encounter Report to view Anticoagulation Flowsheet and Dosing Calendar (Go to Encounters tab in  chart review, and find the Anticoagulation Therapy Visit)        Lisa Ponce RN

## 2021-03-16 RX ORDER — FERROUS GLUCONATE 324(38)MG
TABLET ORAL
Qty: 200 TABLET | Refills: 3 | OUTPATIENT
Start: 2021-03-16

## 2021-03-16 NOTE — TELEPHONE ENCOUNTER
Routing refill request to provider for review/approval because:  A break in medication? Last approved 2 years ago

## 2021-03-16 NOTE — TELEPHONE ENCOUNTER
I wonder why he is on this. I cannot find a reason documented in the chart nor do his labs suggest any iron deficiency. Since I have taken over Petey's care, I have found some medications that seem to have just been carried over from prior scripts that may not be necessary anymore. I would recommend stopping the iron supplementation and re-checking iron levels during next appt if he's concerned about low iron.

## 2021-03-17 ENCOUNTER — HOSPITAL ENCOUNTER (OUTPATIENT)
Dept: ULTRASOUND IMAGING | Facility: CLINIC | Age: 61
End: 2021-03-17
Attending: SURGERY
Payer: MEDICARE

## 2021-03-17 ENCOUNTER — HOSPITAL ENCOUNTER (OUTPATIENT)
Dept: WOUND CARE | Facility: CLINIC | Age: 61
End: 2021-03-17
Attending: SURGERY
Payer: MEDICARE

## 2021-03-17 DIAGNOSIS — M79.662 PAIN IN LEFT LOWER LEG: ICD-10-CM

## 2021-03-17 DIAGNOSIS — L97.922 ULCER OF LEFT LOWER EXTREMITY WITH FAT LAYER EXPOSED (H): ICD-10-CM

## 2021-03-17 DIAGNOSIS — M79.662 PAIN IN LEFT LOWER LEG: Primary | ICD-10-CM

## 2021-03-17 PROCEDURE — 99441 PR PHYSICIAN TELEPHONE EVALUATION 5-10 MIN: CPT | Performed by: SURGERY

## 2021-03-17 PROCEDURE — 999N001193 HC VIDEO/TELEPHONE VISIT; NO CHARGE

## 2021-03-17 PROCEDURE — 93971 EXTREMITY STUDY: CPT | Mod: LT

## 2021-03-17 RX ORDER — FUROSEMIDE 40 MG
40 TABLET ORAL
COMMUNITY
Start: 2020-08-06 | End: 2021-03-23

## 2021-03-17 NOTE — PROGRESS NOTES
Video-Visit Details    Type of service:  Video Visit    Video Start Time (time video started): 8:01    Video End Time (time video stopped): 8:06    Originating Location (pt. Location): Home    Distant Location (provider location):  [facility] [place]     Mode of Communication:  Video Conference via [employer]    Physician has received verbal consent for a Video Visit from the patient? Yes    Patient states he is having new pain within the past 24 hours, left proximal leg, medical record reviewed.  Will obtain duplex ultrasound left lower extremity for evaluation of devious thrombosis today, discussed with Hunter.  Photograph reviewed, will continue same wound cares.        Rustam Davis MD

## 2021-03-17 NOTE — PROGRESS NOTES
Received results that ultrasound for DVT was negative from Dr. Davis. Follow up primary care doctor. Spoke with Hunter who helps patient and assist patient with follow up. He reports understanding.

## 2021-03-17 NOTE — ADDENDUM NOTE
Encounter addended by: Crystal Spencer RN on: 3/17/2021 2:25 PM   Actions taken: Clinical Note Signed

## 2021-03-23 ENCOUNTER — OFFICE VISIT (OUTPATIENT)
Dept: INTERNAL MEDICINE | Facility: CLINIC | Age: 61
End: 2021-03-23
Payer: MEDICARE

## 2021-03-23 VITALS
WEIGHT: 315 LBS | OXYGEN SATURATION: 95 % | TEMPERATURE: 97.7 F | HEART RATE: 78 BPM | SYSTOLIC BLOOD PRESSURE: 100 MMHG | DIASTOLIC BLOOD PRESSURE: 54 MMHG | BODY MASS INDEX: 44.78 KG/M2

## 2021-03-23 DIAGNOSIS — I89.0 LYMPHEDEMA OF LEFT LEG: ICD-10-CM

## 2021-03-23 DIAGNOSIS — E61.1 IRON DEFICIENCY: ICD-10-CM

## 2021-03-23 DIAGNOSIS — E55.9 VITAMIN D DEFICIENCY: ICD-10-CM

## 2021-03-23 DIAGNOSIS — R73.03 PREDIABETES: ICD-10-CM

## 2021-03-23 DIAGNOSIS — R10.32 LEFT INGUINAL PAIN: Primary | ICD-10-CM

## 2021-03-23 LAB
ANION GAP SERPL CALCULATED.3IONS-SCNC: 1 MMOL/L (ref 3–14)
BUN SERPL-MCNC: 18 MG/DL (ref 7–30)
CALCIUM SERPL-MCNC: 8.6 MG/DL (ref 8.5–10.1)
CHLORIDE SERPL-SCNC: 111 MMOL/L (ref 94–109)
CO2 SERPL-SCNC: 28 MMOL/L (ref 20–32)
CREAT SERPL-MCNC: 0.88 MG/DL (ref 0.66–1.25)
DEPRECATED CALCIDIOL+CALCIFEROL SERPL-MC: 55 UG/L (ref 20–75)
FERRITIN SERPL-MCNC: 61 NG/ML (ref 26–388)
GFR SERPL CREATININE-BSD FRML MDRD: >90 ML/MIN/{1.73_M2}
GLUCOSE SERPL-MCNC: 115 MG/DL (ref 70–99)
HBA1C MFR BLD: 5.6 % (ref 0–5.6)
POTASSIUM SERPL-SCNC: 4.1 MMOL/L (ref 3.4–5.3)
SODIUM SERPL-SCNC: 140 MMOL/L (ref 133–144)

## 2021-03-23 PROCEDURE — 99214 OFFICE O/P EST MOD 30 MIN: CPT | Performed by: INTERNAL MEDICINE

## 2021-03-23 PROCEDURE — 36415 COLL VENOUS BLD VENIPUNCTURE: CPT | Performed by: INTERNAL MEDICINE

## 2021-03-23 PROCEDURE — 80048 BASIC METABOLIC PNL TOTAL CA: CPT | Performed by: INTERNAL MEDICINE

## 2021-03-23 PROCEDURE — 83036 HEMOGLOBIN GLYCOSYLATED A1C: CPT | Performed by: INTERNAL MEDICINE

## 2021-03-23 PROCEDURE — 82728 ASSAY OF FERRITIN: CPT | Performed by: INTERNAL MEDICINE

## 2021-03-23 PROCEDURE — 82306 VITAMIN D 25 HYDROXY: CPT | Performed by: INTERNAL MEDICINE

## 2021-03-23 NOTE — PROGRESS NOTES
Assessment & Plan     Left inguinal pain  Unclear etiology. XR pelvis from Dec 2020 did not show arthritis. U/S from last week did not show DVT. He does have lymphedema in the leg which may be contributing. Also could be pulled muscle. Will try Voltaren gel and PT referral.  - PHYSICAL THERAPY REFERRAL; Future  - diclofenac (VOLTAREN) 1 % topical gel; Apply 4 g topically 2 times daily as needed for moderate pain    Lymphedema of left leg  Previously on Lasix. I was requested to refill it but we could not identify why he was on it. I stopped the medication a few weeks ago along with his potassium. Will check BMP today.  - Basic metabolic panel    Vitamin D deficiency  Previously on Vit D. I was requested to refill it but I could not find a reason documented in the chart. I did see any measured Vit D level in our system. His last level I can see was done at an outside facility 4 years ago and was normal. I recommended stopping it. Will re-check Vit D level today.  - Vitamin D Deficiency    Iron deficiency  Previously on iron pills but we could not identify why. I was requested to refill it but I could not find a reason documented in the chart nor do his labs suggest any iron deficiency. I recommended stopping it. Will check ferritin today.  - Ferritin    Prediabetes  A1c in pre-diabetic range when last checked >6 months ago. Recheck today with blood draw.  - Hemoglobin A1c    Return in about 5 months (around 8/23/2021) for Physical Exam.    Demetri Archer MD  [facility]    Meagan Angelo is a 60 year old who presents for the following health issues:    Concern - Groin pain follow up   Onset: 3 months   Description: L sided groin pain   Intensity: moderate  Progression of Symptoms:  intermittent  Accompanying Signs & Symptoms: sweats   Previous history of similar problem: yes  Precipitating factors:        Worsened by: stairs, sitting for a long time   Alleviating factors:         Improved by:  Ice helps a little  Therapies tried and outcome: tylenol, ibuprofen, ice    Petey presents alone today. He tells me he's been dealing with L groin pain. Feels like it's in the muscles inside is leg. He had recent U/S which ruled out DVT. No weakness. No numbness/tingling. Had an episode where he sweat a lot yesterday at work. No other symptoms. No fever. No sore throat. No cough. No n/v/d.    Review of Systems   Constitutional, HEENT, cardiovascular, pulmonary, gi, MSK systems are negative, except as otherwise noted.      Objective    /54   Pulse 78   Temp 97.7  F (36.5  C) (Tympanic)   Wt 145.7 kg (321 lb 1.6 oz)   SpO2 95%   BMI 44.78 kg/m    Body mass index is 44.78 kg/m .     Physical Exam   GENERAL: alert and in no distress.  EYES: conjunctivae/corneas clear. EOMs grossly intact  HENT: NC/AT, facies symmetric.  RESP: No iWOB.  CV: RRR to DP.  MSK: No cyanosis or clubbing noted bilaterally in upper and/or lower extremities. Pain with palpation over adductor area of L medial leg. Strength 5/5.  SKIN: No significant ulcers, lesions, or rashes on the visualized portions of the skin  NEURO: Alert. Oriented. Sensation grossly WNL.

## 2021-03-26 DIAGNOSIS — Z11.59 ENCOUNTER FOR SCREENING FOR OTHER VIRAL DISEASES: ICD-10-CM

## 2021-03-28 DIAGNOSIS — Z12.11 COLON CANCER SCREENING: Primary | ICD-10-CM

## 2021-03-30 ENCOUNTER — TELEPHONE (OUTPATIENT)
Dept: INTERNAL MEDICINE | Facility: CLINIC | Age: 61
End: 2021-03-30

## 2021-03-30 NOTE — TELEPHONE ENCOUNTER
HEATHER-PROCEDURAL ANTICOAGULATION  MANAGEMENT    Assessment     Warfarin interruption plan for colonoscopy on 2021.    DVT,  PE 2003, PVD      Risk stratification for thromboembolism: moderate (2012 Chest guidelines)      varicose veins in leg with venous statis    VTE: 2018 American Society of Hematology recommends against bridging in low and moderate risk VTE patients holding warfarin    Plan       Pre-Procedure:    Hold warfarin until after procedure startin2021     BMI 44kg/m2    Lovenox 105 mg subq Q 12 hrs (0.75 mg/kg Q 12 hr for CrCl 127ml/min & BMI >40kg/m2)     Start Lovenox: 2021    Last dose of Lovenox prior to procedure: 2021       Post-Procedure:    Resume home warfarin dose if okay with provider doing procedure on night of procedure, 16mg PM: 2021    Resume Lovenox ~ 24-48 hrs post procedure when okay with provider doing procedure. Continue until INR >= 2.3    Recheck INR 5-7 days after resuming warfarin   ?   Alyce Thurston [facility]    Subjective/Objective:      Petey Archibald, a 60 year old male    Reason for Anticoagulation: DVT and PE    Goal INR Range: 2.0-3.0    Patient bridged in past: Yes:     Pertinent History: NA    Wt Readings from Last 3 Encounters:   21 145.7 kg (321 lb 1.6 oz)   20 144.3 kg (318 lb 3.2 oz)   20 143.6 kg (316 lb 8 oz)        Ideal body weight: 75.3 kg (166 lb 0.1 oz)  Adjusted ideal body weight: 103.4 kg (228 lb 0.7 oz)     Lab Results   Component Value Date    INR 2.00 (H) 2021    INR 1.90 (H) 2021    INR 2.20 (H) 2021     Lab Results   Component Value Date    HGB 12.4 2020    HCT 40.4 2020     2020     Lab Results   Component Value Date    CR 0.88 2021    CR 1.06 2020    CR 0.98 2020     Estimated Creatinine Clearance: 127.4 mL/min (based on SCr of 0.88 mg/dL).

## 2021-03-30 NOTE — TELEPHONE ENCOUNTER
Petey is has a colonoscopy scheduled for 4/9.  He needs hold orders. Also missed his INR appt yesterday so needs regular dosing also up to the hold.

## 2021-03-31 ENCOUNTER — ANTICOAGULATION THERAPY VISIT (OUTPATIENT)
Dept: INTERNAL MEDICINE | Facility: CLINIC | Age: 61
End: 2021-03-31

## 2021-03-31 DIAGNOSIS — Z79.01 LONG TERM CURRENT USE OF ANTICOAGULANT THERAPY: ICD-10-CM

## 2021-03-31 DIAGNOSIS — Z86.711 HISTORY OF PULMONARY EMBOLISM: ICD-10-CM

## 2021-03-31 LAB
CAPILLARY BLOOD COLLECTION: NORMAL
INR PPP: 2.3 (ref 0.86–1.14)

## 2021-03-31 PROCEDURE — 36416 COLLJ CAPILLARY BLOOD SPEC: CPT | Performed by: INTERNAL MEDICINE

## 2021-03-31 PROCEDURE — 85610 PROTHROMBIN TIME: CPT | Performed by: INTERNAL MEDICINE

## 2021-03-31 PROCEDURE — 99207 PR NO CHARGE NURSE ONLY: CPT | Performed by: INTERNAL MEDICINE

## 2021-03-31 NOTE — TELEPHONE ENCOUNTER
I agree with the stated plan, though if Dr. Muhammad feels differently I am happy to defer to his expertise!    Demetri Holder MD, MPH  [facility]  Internal [facility]

## 2021-03-31 NOTE — PROGRESS NOTES
ANTICOAGULATION FOLLOW-UP CLINIC VISIT    Patient Name:  Petey Archibald  Date:  3/31/2021  Contact Type:  Left detailed message for Hunter    SUBJECTIVE:  Patient Findings     Comments:  Unable to assess        Clinical Outcomes     Comments:  Unable to assess           OBJECTIVE    Recent labs: (last 7 days)     21  1431   INR 2.30*       ASSESSMENT / PLAN  INR assessment THER    Recheck INR In: 4 WEEKS    INR Location Clinic      Anticoagulation Summary  As of 3/31/2021    INR goal:  2.0-3.0   TTR:  78.0 % (11.9 mo)   INR used for dosin.30 (3/31/2021)   Warfarin maintenance plan:  6 mg (4 mg x 1.5) every Mon, Fri; 8 mg (4 mg x 2) all other days   Full warfarin instructions:  6 mg every Mon, Fri; 8 mg all other days   Weekly warfarin total:  52 mg   No change documented:  Cameron Blackmon RN   Plan last modified:  Lisa Ponce RN (2020)   Next INR check:  2021   Priority:  Maintenance   Target end date:  Indefinite    Indications    History of pulmonary embolism [Z86.711]  Long term current use of anticoagulant therapy [Z79.01]             Anticoagulation Episode Summary     INR check location:  Anticoagulation Clinic    Preferred lab:      Send INR reminders to:  [facility]    Comments:  REM long-term; A new Coumadin Prescription will need to sent to [facility] with current dose and next INR check.      Anticoagulation Care Providers     Provider Role Specialty Phone number    Yolande Lacy PA-C Referring Family Medicine [phone]    Silvino Baker MD Responsible Family Medicine [phone]            See the Encounter Report to view Anticoagulation Flowsheet and Dosing Calendar (Go to Encounters tab in chart review, and find the Anticoagulation Therapy Visit)    Patient INR is therapeutic.  Patient will continue to take 52 mg weekly dosing and follow up in 4 weeks or sooner for any problems or concerns.    Called [facility] and verbal  given.     Cameron Blackmon RN

## 2021-04-13 DIAGNOSIS — J41.8 MIXED SIMPLE AND MUCOPURULENT CHRONIC BRONCHITIS (H): Primary | ICD-10-CM

## 2021-04-14 RX ORDER — TIOTROPIUM BROMIDE 18 UG/1
CAPSULE ORAL; RESPIRATORY (INHALATION)
Qty: 30 CAPSULE | Refills: 11 | Status: SHIPPED | OUTPATIENT
Start: 2021-04-14

## 2021-04-14 NOTE — TELEPHONE ENCOUNTER
Routing refill request to provider for review/approval because:  Medication is reported/historical

## 2021-04-19 DIAGNOSIS — J41.8 MIXED SIMPLE AND MUCOPURULENT CHRONIC BRONCHITIS (H): ICD-10-CM

## 2021-04-20 NOTE — TELEPHONE ENCOUNTER
I'm happy to take over Petey's medications (I refilled many of them recently). However, has he really been off this inhaler for 2 years? If so, why the urgency to re-start it today? Would need a bit more info on his history with his inhaler prior to potentially re-starting.

## 2021-04-20 NOTE — TELEPHONE ENCOUNTER
Pt's group home [facility] [phone], is calling,   Refill request for: PULMICORT FLEXHALER 180 MCG/ACT inhaler    temazepam (RESTORIL) 15 MG capsule--takes this at night to help with sleep and anxiety.     javier--

## 2021-04-21 RX ORDER — BUDESONIDE 180 UG/1
AEROSOL, POWDER RESPIRATORY (INHALATION)
Qty: 1 EACH | Refills: 11 | Status: SHIPPED | OUTPATIENT
Start: 2021-04-21

## 2021-04-21 NOTE — TELEPHONE ENCOUNTER
Pt has been on this since living at the group home and has not been off this also takes Spiriva..Sara Still RN

## 2021-04-23 DIAGNOSIS — Z11.59 ENCOUNTER FOR SCREENING FOR OTHER VIRAL DISEASES: ICD-10-CM

## 2021-04-23 LAB
SARS-COV-2 RNA RESP QL NAA+PROBE: NORMAL
SPECIMEN SOURCE: NORMAL

## 2021-04-23 PROCEDURE — U0003 INFECTIOUS AGENT DETECTION BY NUCLEIC ACID (DNA OR RNA); SEVERE ACUTE RESPIRATORY SYNDROME CORONAVIRUS 2 (SARS-COV-2) (CORONAVIRUS DISEASE [COVID-19]), AMPLIFIED PROBE TECHNIQUE, MAKING USE OF HIGH THROUGHPUT TECHNOLOGIES AS DESCRIBED BY CMS-2020-01-R: HCPCS | Performed by: SURGERY

## 2021-04-23 PROCEDURE — U0005 INFEC AGEN DETEC AMPLI PROBE: HCPCS | Performed by: SURGERY

## 2021-04-24 LAB
LABORATORY COMMENT REPORT: NORMAL
SARS-COV-2 RNA RESP QL NAA+PROBE: NEGATIVE
SPECIMEN SOURCE: NORMAL

## 2021-04-26 DIAGNOSIS — Z11.59 ENCOUNTER FOR SCREENING FOR OTHER VIRAL DISEASES: ICD-10-CM

## 2021-04-28 ENCOUNTER — HOSPITAL ENCOUNTER (OUTPATIENT)
Dept: WOUND CARE | Facility: CLINIC | Age: 61
Discharge: HOME | End: 2021-04-28
Attending: SURGERY | Admitting: SURGERY
Payer: MEDICARE

## 2021-04-28 VITALS — TEMPERATURE: 97.6 F | SYSTOLIC BLOOD PRESSURE: 120 MMHG | DIASTOLIC BLOOD PRESSURE: 65 MMHG | HEART RATE: 79 BPM

## 2021-04-28 DIAGNOSIS — L97.922 ULCER OF LEFT LOWER EXTREMITY WITH FAT LAYER EXPOSED (H): ICD-10-CM

## 2021-04-28 DIAGNOSIS — L97.522 SKIN ULCER OF LEFT GREAT TOE WITH FAT LAYER EXPOSED (H): ICD-10-CM

## 2021-04-28 DIAGNOSIS — R10.32 LEFT INGUINAL PAIN: Primary | ICD-10-CM

## 2021-04-28 PROCEDURE — 99213 OFFICE O/P EST LOW 20 MIN: CPT | Performed by: SURGERY

## 2021-04-28 PROCEDURE — 97602 WOUND(S) CARE NON-SELECTIVE: CPT

## 2021-04-28 NOTE — PROGRESS NOTES
[facility] [place] Progress Note    Subject: Petey Archibald returns for chronic right foot wound, chronic edema, interstitial edema, lymphatic dysfunction, lymphedema, not utilizing edema wear, discussed importance of wearing edema wear day and night, 24 hours a day, 7 days a week.  Discussed also with the person who helps facilitate his cares.  He is having a multiple month history of progressive left groin pain, duplex ultrasound demonstrated no evidence of deep venous thromboses, he has not had previous abdominal or pelvic surgery, no known history of hernia, it is worse with coughing or movement.    Patient Active Problem List   Diagnosis     GERD (gastroesophageal reflux disease)     Peripheral vascular disease (H)     History of pulmonary embolism     Tobacco dependence:40-50 pk yr hx     Borderline mental retardation     History of deep vein thrombosis of lower extremity     Pilonidal cyst     Asymptomatic varicose veins, bilateral     Callus of foot on Rt lat dist  since 8-15      Morbid obesity due to excess calories (H)  BMI 40-50     Hypercholesterolemia     Mixed simple and mucopurulent chronic bronchitis (HCC) CT 4-06 wnl and neg bronch for hemoptesis spirometry 7-26-16  FVC=59% & w mod restriction  and lung age of 82 in 54 y/o      Major depression in complete remission (HCC) on meds      Long term current use of anticoagulant therapy     Glucose intolerance (impaired glucose tolerance)     Erectile dysfunction, unspecified erectile dysfunction type     NEL (obstructive sleep apnea)     Anxiety     Thrombophlebitis of superficial veins of both lower extremities: Greater Saphenous VV      History of colonic polyps     Ulcer of left lower extremity with fat layer exposed (H)     Lymphedema of left leg     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)     Schizoaffective disorder, bipolar type (H)     Adenomatous polyp of ascending colon     Colon cancer screening     Past  Medical History:   Diagnosis Date     Borderline mental retardation 2/20/2013     COPD (chronic obstructive pulmonary disease) (H)      History of DVT of lower extremity      History of pulmonary embolism      Hyperlipidemia      Mild intellectual disabilities      Morbid obesity (H)      Peripheral edema      Peripheral vascular disease (H)      Sleep apnea      Tobacco dependence      Venous stasis dermatitis      Exam:  /65 (BP Location: Left arm)   Pulse 79   Temp 97.6  F (36.4  C) (Temporal)   Wound (used by OP [facility] only) 09/25/20 0911 Left dorsal foot (Active)   Thickness/Stage full thickness 04/28/21 1200   Dressing Appearance moist drainage 04/28/21 1200   Base granulating 04/28/21 1200   Periwound intact 04/28/21 1200   Periwound Temperature warm 04/28/21 1200   Periwound Skin Turgor soft 04/28/21 1200   Edges open 04/28/21 1200   Length (cm) 0.7 04/28/21 1200   Width (cm) 0.4 04/28/21 1200   Depth (cm) 0.1 04/28/21 1200   Wound (cm^2) 0.28 cm^2 04/28/21 1200   Wound Volume (cm^3) 0.03 cm^3 04/28/21 1200   Wound healing % 97.93 04/28/21 1200   Drainage Characteristics/Odor serosanguineous 04/28/21 1200   Drainage Amount moderate 04/28/21 1200   Care, Wound non-select wound debridement performed 04/28/21 1200     Uncontrolled interstitial edema right lower extremity, wound, see wound measurements and photodocumentation.  No cellulitis.  Physical examination of left groin reveals slight protuberance with coughing, potential hernia present.      Impression: Chronic wound right lower extremity with lymphedema, potential hernia left groin    Plan: We will dress the wounds with hydrogel, edema wear, 24 hours a day, 7 days/week, he will obtain new shoe wear.  Will perform telehealth visit follow-up in 4 weeks.  He is also concerned about progressive left groin pain over the course of several months, is becoming significant, will obtain CT scan of the abdomen and pelvis and upper thigh for evaluation.   Duplex ultrasound demonstrated no evidence of deep venous thromboses..      Rustam Davis MD on 4/28/2021 at 2:16 PM    And I think he is part of the sounds like will take

## 2021-04-28 NOTE — DISCHARGE INSTRUCTIONS
[facility]  [address] [address]    Call us at [phone] if you have any questions about your wounds, have redness or swelling around your wound, have a fever of 101 or greater or if you have any other problems or concerns. We answer the phone Monday through Friday 8 am to 4 pm, please leave a message as we check the voicemail frequently throughout the day.     Petey Archibald      1960    Supplements to aid in healing:  Vitamin D3 5,000 iu per day.  Vitamin B12 1000 mcg per day  Wound Care: Daily  After cleansing with soap and water in shower, than apply small amount of VASHE on gauze, lay into wound bed, let soak for 10 minutes than remove and apply wound gel and medipore + pad. Change dressing daily.  Apply small edemawear (blue) to foot and then apply medium (yellow) stripe you are  applying this as a double layer from base of toe to base of knee       FERNANDO Davis M.D.. April 28, 2021    Please call [facility] [phone] ([address] [facility]. [place], [place]) to schedule your CT appointment if you have not heard from them in two business days.      If you had a positive experience please indicate on your patient satisfaction survey form that [facility] will be sending you.

## 2021-04-30 ENCOUNTER — TELEPHONE (OUTPATIENT)
Dept: INTERNAL MEDICINE | Facility: CLINIC | Age: 61
End: 2021-04-30

## 2021-04-30 DIAGNOSIS — M25.561 KNEE PAIN, RIGHT: ICD-10-CM

## 2021-04-30 DIAGNOSIS — R52 PAIN: Primary | ICD-10-CM

## 2021-04-30 RX ORDER — ACETAMINOPHEN 325 MG/1
325-650 TABLET ORAL EVERY 6 HOURS PRN
Qty: 100 TABLET | Refills: 3 | Status: SHIPPED | OUTPATIENT
Start: 2021-04-30 | End: 2021-09-03

## 2021-04-30 NOTE — TELEPHONE ENCOUNTER
Pt's group home caregiver calling. Has been trying to get refill calling of acetaminophen for patient. Says patient takes it ifor groin pain and has told a doctor about it before. On med list from 2014, he is not sure who has been filling it, maybe was part of standing orders. Also notes getting colonoscopy 5/6 and has preop Monday with different provider. Pharmacy had no record of acetaminophen for patient per group home caregiver. Taking 325 mg tabs 1-2 tabs every 6 hours as needed.  Please advise or does it need to be addressed at upcoming appt?

## 2021-05-03 ENCOUNTER — TELEPHONE (OUTPATIENT)
Dept: INTERNAL MEDICINE | Facility: CLINIC | Age: 61
End: 2021-05-03

## 2021-05-03 ENCOUNTER — OFFICE VISIT (OUTPATIENT)
Dept: FAMILY MEDICINE | Facility: CLINIC | Age: 61
End: 2021-05-03
Payer: MEDICARE

## 2021-05-03 ENCOUNTER — TELEPHONE (OUTPATIENT)
Dept: FAMILY MEDICINE | Facility: CLINIC | Age: 61
End: 2021-05-03

## 2021-05-03 ENCOUNTER — ANTICOAGULATION THERAPY VISIT (OUTPATIENT)
Dept: INTERNAL MEDICINE | Facility: CLINIC | Age: 61
End: 2021-05-03
Payer: MEDICARE

## 2021-05-03 VITALS
TEMPERATURE: 97 F | SYSTOLIC BLOOD PRESSURE: 110 MMHG | DIASTOLIC BLOOD PRESSURE: 72 MMHG | BODY MASS INDEX: 44.1 KG/M2 | HEART RATE: 60 BPM | WEIGHT: 315 LBS | OXYGEN SATURATION: 96 % | HEIGHT: 71 IN

## 2021-05-03 DIAGNOSIS — Z86.718 HISTORY OF DEEP VEIN THROMBOSIS OF LOWER EXTREMITY: ICD-10-CM

## 2021-05-03 DIAGNOSIS — Z79.01 LONG TERM CURRENT USE OF ANTICOAGULANT THERAPY: ICD-10-CM

## 2021-05-03 DIAGNOSIS — Z86.711 HISTORY OF PULMONARY EMBOLISM: ICD-10-CM

## 2021-05-03 DIAGNOSIS — E66.01 MORBID OBESITY DUE TO EXCESS CALORIES (H): ICD-10-CM

## 2021-05-03 DIAGNOSIS — Z01.818 PRE-OP EXAM: Primary | ICD-10-CM

## 2021-05-03 DIAGNOSIS — F25.0 SCHIZOAFFECTIVE DISORDER, BIPOLAR TYPE (H): ICD-10-CM

## 2021-05-03 DIAGNOSIS — Z11.59 ENCOUNTER FOR SCREENING FOR OTHER VIRAL DISEASES: ICD-10-CM

## 2021-05-03 DIAGNOSIS — Z86.718 HISTORY OF DEEP VEIN THROMBOSIS OF LOWER EXTREMITY: Primary | ICD-10-CM

## 2021-05-03 LAB
CAPILLARY BLOOD COLLECTION: NORMAL
INR PPP: 1.7 (ref 0.86–1.14)
LABORATORY COMMENT REPORT: NORMAL
SARS-COV-2 RNA RESP QL NAA+PROBE: NEGATIVE
SARS-COV-2 RNA RESP QL NAA+PROBE: NORMAL
SPECIMEN SOURCE: NORMAL
SPECIMEN SOURCE: NORMAL

## 2021-05-03 PROCEDURE — U0003 INFECTIOUS AGENT DETECTION BY NUCLEIC ACID (DNA OR RNA); SEVERE ACUTE RESPIRATORY SYNDROME CORONAVIRUS 2 (SARS-COV-2) (CORONAVIRUS DISEASE [COVID-19]), AMPLIFIED PROBE TECHNIQUE, MAKING USE OF HIGH THROUGHPUT TECHNOLOGIES AS DESCRIBED BY CMS-2020-01-R: HCPCS | Performed by: INTERNAL MEDICINE

## 2021-05-03 PROCEDURE — U0005 INFEC AGEN DETEC AMPLI PROBE: HCPCS | Performed by: INTERNAL MEDICINE

## 2021-05-03 PROCEDURE — 99214 OFFICE O/P EST MOD 30 MIN: CPT | Performed by: INTERNAL MEDICINE

## 2021-05-03 PROCEDURE — 36416 COLLJ CAPILLARY BLOOD SPEC: CPT | Performed by: INTERNAL MEDICINE

## 2021-05-03 PROCEDURE — 85610 PROTHROMBIN TIME: CPT | Performed by: INTERNAL MEDICINE

## 2021-05-03 PROCEDURE — 99207 PR NO CHARGE NURSE ONLY: CPT | Performed by: INTERNAL MEDICINE

## 2021-05-03 RX ORDER — LORATADINE 10 MG/1
10 TABLET ORAL DAILY
COMMUNITY

## 2021-05-03 RX ORDER — NALTREXONE HYDROCHLORIDE 50 MG/1
100 TABLET, FILM COATED ORAL DAILY
COMMUNITY
End: 2024-03-05

## 2021-05-03 ASSESSMENT — MIFFLIN-ST. JEOR: SCORE: 2305.39

## 2021-05-03 NOTE — TELEPHONE ENCOUNTER
Pt's caregiver, [phone], calling wondering what to do for colonoscopy prep.  Pt was prescribed enoxaparin ANTICOAGULANT (LOVENOX) 100 MG/ML syringe at today's appt, but says that colonscopy has been moved to 6/7, so wants to confirm what he is to do with the meds.

## 2021-05-03 NOTE — TELEPHONE ENCOUNTER
I spoke with the [facility] clinic, they are going to call with instructions.  (see other phone encounter today)     Also, the naltrexone that Petey takes should be held the morning of the procedure since this can counteract pain medications if needed.      Thank you.     Margarita Villafana MD

## 2021-05-03 NOTE — TELEPHONE ENCOUNTER
It looks like procedure was originally scheduled for 4/9/21 and rescheduled to 5/5/21. Original hold/bridge orders were approved by provider but never routed to [facility]. At patient's last INR on 3/31, [facility] RN was unable to reach patient/caregiver, Hunter for assessment.     Patient's colonoscopy now rescheduled to June 7, 2021, per caregiver, Braden. New warfarin hold

## 2021-05-03 NOTE — TELEPHONE ENCOUNTER
I saw Petey today for pre-op for colonoscopy.      His colonoscopy is scheduled for 5/5.  In reviewing the note from 3/30, he is supposed to hold warfarin for 5 days prior to procedure and start lovenox but he was not aware of these recommendations.   It sounds like he didn't take warfarin last night, but wasn't aware to stop 5 days prior to procedure and doesn't recall being on lovenox in the past.  His INR today is 1.7, endoscopy nurse said INR needs to be closer to 1.0 to do procedure.      I can send in the lovenox prescription.  Please review instructions with Petey and his care team regarding lovenox instructions and dosing and make sure he is still okay to get the colonoscopy in two days.  The colonoscopy  number is [phone].  Thank you!    Margarita Villafana MD

## 2021-05-03 NOTE — TELEPHONE ENCOUNTER
Patient Contact    Attempt # 2    Was call answered?  No.  Left message on voicemail with information to call triage back.    On call back:     - Relay provider message below

## 2021-05-03 NOTE — TELEPHONE ENCOUNTER
Returned call to Petey's caregiver to provide a clarification regarding lovenox. Unable to speak with Hunter, but will try again on 5/4.    May Saldaña [facility]

## 2021-05-03 NOTE — PROGRESS NOTES
[address] [address]  Phone: [phone]  Primary Provider: Demetri Archer  Pre-op Performing Provider: OLGA ADEN        PREOPERATIVE EVALUATION:  Today's date: 5/3/2021    Petey Archibald is a 60 year old male who presents for a preoperative evaluation.    Surgical Information:  Surgery/Procedure: Colonoscopy  Surgery Location: [facility]  Surgeon: Kobe  Surgery Date: 5/5/21  Time of Surgery:   Where patient plans to recover: At home with family  Fax number for surgical facility: Note does not need to be faxed, will be available electronically in Epic.        Lives at group home, primary caregiver is Hunter, who is present for visit.     Type of Anesthesia Anticipated: Local with MAC    Assessment & Plan     The proposed surgical procedure is considered LOW risk.    Pre-op exam    History of deep vein thrombosis of lower extremity  - INR  - Capillary Blood Collection    Morbid obesity due to excess calories (H)  BMI 40-50    Schizoaffective disorder, bipolar type (H)           Risks and Recommendations:  The patient has the following additional risks and recommendations for perioperative complications:  Obstructive Sleep Apnea:       Medication Instructions:   - warfarin: patient only held last night, due to lower risk of VTE and low risk procedure, patient will hold warfarin until procedure, can start night of if okay per GI. See telephone encounter 5/3/2021    - hold naltrexone morning of surgery     RECOMMENDATION:  APPROVAL GIVEN to proceed with proposed procedure, without further diagnostic evaluation.        Subjective     HPI related to upcoming procedure: Petey has a history of colon polyps.  He is undergoing colonoscopy.     Preop Questions 5/3/2021   1. Have you ever had a heart attack or stroke? No   2. Have you ever had surgery on your heart or blood vessels, such as a stent placement, a coronary  artery bypass, or surgery on an artery in your head, neck, heart, or legs? No - (but does have venous insufficiency)    3. Do you have chest pain with activity? No   4. Do you have a history of  heart failure? No   5. Do you currently have a cold, bronchitis or symptoms of other infection? No   6. Do you have a cough, shortness of breath, or wheezing? No   7. Do you or anyone in your family have previous history of blood clots? YES - on Warfarin   8. Do you or does anyone in your family have a serious bleeding problem such as prolonged bleeding following surgeries or cuts? No   9. Have you ever had problems with anemia or been told to take iron pills? No   10. Have you had any abnormal blood loss such as black, tarry or bloody stools? No   11. Have you ever had a blood transfusion? No   12. Are you willing to have a blood transfusion if it is medically needed before, during, or after your surgery? Yes   13. Have you or any of your relatives ever had problems with anesthesia? No   14. Do you have sleep apnea, excessive snoring or daytime drowsiness? YES - uses CPAP    14a. Do you have a CPAP machine? No   15. Do you have any artifical heart valves or other implanted medical devices like a pacemaker, defibrillator, or continuous glucose monitor? No   16. Do you have artificial joints? No   17. Are you allergic to latex? No       Health Care Directive:  Patient does not have a Health Care Directive or Living Will:     Preoperative Review of :   reviewed - controlled substances prescribed by other outside provider(s).        Status of Chronic Conditions:  See problem list for active medical problems.  Problems all longstanding and stable, except as noted/documented.  See ROS for pertinent symptoms related to these conditions.      Review of Systems  Const, heent, cv, pulm, gi, heme reviewed,  otherwise negative unless noted above.      Patient Active Problem List    Diagnosis Date Noted     Colon cancer screening  03/26/2021     Priority: Medium     Added automatically from request for surgery [id number]       Adenomatous polyp of ascending colon 11/16/2020     Priority: Medium     History of pulmonary embolism 09/19/2020     Priority: Medium     Schizoaffective disorder, bipolar type (H) 05/18/2020     Priority: Medium     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) 03/11/2020     Priority: Medium     Lymphedema of left leg 08/29/2019     Priority: Medium     Ulcer of left lower extremity with fat layer exposed (H) 08/22/2019     Priority: Medium     History of colonic polyps 02/02/2018     Priority: Medium     Thrombophlebitis of superficial veins of both lower extremities: Greater Saphenous VV  02/28/2017     Priority: Medium     Anxiety 12/23/2016     Priority: Medium     NEL (obstructive sleep apnea) 12/05/2016     Priority: Medium     Erectile dysfunction, unspecified erectile dysfunction type 08/11/2016     Priority: Medium     Glucose intolerance (impaired glucose tolerance) 07/25/2016     Priority: Medium     Long term current use of anticoagulant therapy 03/16/2016     Priority: Medium     Morbid obesity due to excess calories (H)  BMI 40-50 01/04/2016     Priority: Medium     Hypercholesterolemia 01/04/2016     Priority: Medium     Major depression in complete remission (HCC) on meds  01/04/2016     Priority: Medium     Callus of foot on Rt lat dist  since 8-15  10/01/2015     Priority: Medium     Pilonidal cyst 08/20/2015     Priority: Medium     Asymptomatic varicose veins, bilateral 08/20/2015     Priority: Medium     Mixed simple and mucopurulent chronic bronchitis (HCC) CT 4-06 wnl and neg bronch for hemoptesis spirometry 7-26-16  FVC=59% & w mod restriction  and lung age of 82 in 56 y/o  08/22/2014     Priority: Medium     History of deep vein thrombosis of lower extremity 03/13/2013     Priority: Medium     Borderline mental retardation 02/20/2013     Priority: Medium     Peripheral vascular disease  (H)      Priority: Medium     Tobacco dependence:40-50 pk yr hx      Priority: Medium     GERD (gastroesophageal reflux disease) 10/19/2012     Priority: Medium      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     COPD (chronic obstructive pulmonary disease) (H)      History of DVT of lower extremity      History of pulmonary embolism      Hyperlipidemia      Mild intellectual disabilities      Morbid obesity (H)      Peripheral edema      Peripheral vascular disease (H)      Sleep apnea      Tobacco dependence      Venous stasis dermatitis      Past Surgical History:   Procedure Laterality Date     COLONOSCOPY N/A 4/15/2019    Procedure: COMBINED COLONOSCOPY, SINGLE OR MULTIPLE BIOPSY/POLYPECTOMY BY BIOPSY;  Surgeon: Jovana Ohara MD;  Location:  GI     EXCISE MALIGNANT LESIONS, TRUNK/ARMS/LEGS 0.5CM OR LESS Left 5/26/2017    Procedure: EXCISE MALIGNANT LESIONS, TRUNK/ARMS/LEGS 0.5CM OR LESS;  EXCISION LEFT ELBOW MASS;  Surgeon: Jose Azevedo MD;  Location:  GI     RECTAL SURGERY      perianal abscess?     Current Outpatient Medications   Medication Sig Dispense Refill     acetaminophen (TYLENOL) 325 MG tablet Take 1-2 tablets (325-650 mg) by mouth every 6 hours as needed for mild pain, fever or headaches 100 tablet 3     albuterol (ALBUTEROL) 108 (90 BASE) MCG/ACT inhaler Inhale 2 puffs into the lungs every 6 hours as needed 1 Inhaler 0     ammonium lactate (LAC-HYDRIN) 12 % external lotion Apply topically as needed for dry skin feet       ARIPiprazole (ABILIFY) 5 MG tablet Take 5 mg by mouth daily       bacitracin 500 UNIT/GM OINT        buPROPion (WELLBUTRIN SR) 150 MG 12 hr tablet Take 150 mg by mouth daily (2 x 150 mg tablet)        Cadexomer Iodine, topical, 0.9% (IODOSORB) 0.9 % GEL gel Apply topically daily Apply to left foot wound daily after cleansing the wound and blotting it dry. 1 Tube 3     carboxymethylcellulose PF (REFRESH PLUS) 0.5 %  "ophthalmic solution Place 1 drop into both eyes 4 times daily as needed for dry eyes       clotrimazole (LOTRIMIN) 1 % external cream Apply topically 2 times daily 60 g 0     collagenase (SANTYL) 250 UNIT/GM external ointment Apply topically daily After cleansing and blotting dry the left foot wound, apply a thin layer of the ointment and cover wound with bandage. 30 g 1     dexlansoprazole (DEXILANT) 60 MG CPDR CR capsule TAKE 1 CAPSULE BY MOUTH ONCE DAILY (FOR HEARTBURN) 90 capsule 2     diclofenac (VOLTAREN) 1 % topical gel Apply 4 g topically 2 times daily as needed for moderate pain 100 g 1     diphenhydrAMINE (BENADRYL) 50 MG capsule 50 mg every 4 hours as needed for itching        EPINEPHrine (EPIPEN 2-BRE) 0.3 MG/0.3ML injection 2-pack INJECT 0.3MG INTRAMUSCULAR ONE TIME FOR ONE DOSE AS NEEDED FOR ANAPHYLAXIS (CALL 911 IF YOU HAVE TO GIVE) (FOR BEE STINGS) **LABEL EACH PEN* 2 mL 3     gabapentin (NEURONTIN) 100 MG capsule Take 400 mg by mouth 3 times daily At 0900, 1200, and 2000       Gauze Pads & Dressings (GAUZE PADS 4\"X4\") 4\"X4\" PADS 1 each 3 times daily as needed 25 each 1     hydrocortisone, Perianal, (ANUSOL-HC) 2.5 % cream Place rectally 2 times daily as needed for hemorrhoids       Lactobacillus Rhamnosus, GG, ([facility]) capsule Take 1 capsule by mouth       loratadine (CLARITIN) 10 MG tablet Take 10 mg by mouth daily       montelukast (SINGULAIR) 10 MG tablet TAKE 1 TABLET BY MOUTH EVERY MORNING (ASTHMA) 30 tablet 11     Multiple Vitamin (DAILY-JOVAN) TABS TAKE 1 TABLET BY MOUTH EVERY MORNING (VITAMINS) 30 tablet 11     naltrexone (DEPADE/REVIA) 50 MG tablet Take 50 mg by mouth daily       nystatin-triamcinolone (MYCOLOG II) 948418-3.1 UNIT/GM-% external cream Apply topically 2 times daily For 1-2 weeks.       order for DME Equipment being ordered: roll of micropore tape to dress foot wound bid 1 each 0     order for DME Equipment being ordered: 1 surgical shoe 1 Device 0     " order for DME [employer] Phone [phone] Fax [phone]  EdemaWear Size Medium/Yellow qty 4 sleeves  Length of Need: 1 month  Frequency of dressing change daily 1 Device 0     order for DME [employer] Stock[employer]  Phone #[phone]  Fax #[phone]  Coolflex Velcro wraps    Length of Need: Life Time  # of Pairs 1 set 30 days 0     order for DME [employer] [facility] Phone [phone] Fax [phone]  Primary Dressing Hydrofera Blue Ready   Qty 5 sheets  Secondary Dressing 4' roll gauze Qty 30  Secondary Dressing 2' medipore tape Qty 1  Secondary Dressing 4x4 gauze loaf Qty  1  Length of Need: 1 month  Frequency of dressing change: daily 30 days 0     order for DME Oxygen 2 Li/min  at night and bled into BiPAP 1 Device 0     order for DME Equipment being ordered: CPAP with mask and tubing 1 Device 0     order for DME Equipment being ordered: support compression hose BK  2 pair black 30mm HG   To be applied on arising & removed while lying down to go to sleep 1 each 0     PULMICORT FLEXHALER 180 MCG/ACT inhaler INHALE 2 PUFFS INTO THE LUNGS TWICE DAILY. 1 each 11     SENNA-TABS 8.6 MG tablet        sertraline (ZOLOFT) 100 MG tablet Take 100 mg by mouth daily Take with 50 mg tablet for a total dose of 150 mg daily.       sertraline (ZOLOFT) 50 MG tablet Take 50 mg by mouth daily Take with 100 mg tablet for a total dose of 150 mg daily.       simvastatin (ZOCOR) 40 MG tablet TAKE 1 TABLET BY MOUTH EVERY MORNING.  (CHOLESTEROL) 30 tablet 11     Sodium Phosphates (PHOSPHATE ENEMA RE) Use one enema rectally every 1-3 hours as needed       SPIRIVA HANDIHALER 18 MCG inhaled capsule INHALE CONTENTS OF 1 CAPSULE INTO THE LUNGS ONCE DAILY 30 capsule 11     spironolactone (ALDACTONE) 25 MG tablet TAKE 1 TABLET BY MOUTH ONCE DAILY. (HIGH BLOOD PRESSURE) 30 tablet 11     temazepam (RESTORIL) 15 MG capsule Take 15 mg by mouth nightly as needed for sleep       trolamine salicylate (ASPERCREME) 10 %  "external cream Apply topically as needed for moderate pain knee       warfarin ANTICOAGULANT (COUMADIN) 4 MG tablet 6 mg mon,fri and 8 mg ROW  Recheck INR 2/22/21 54 tablet 0     warfarin ANTICOAGULANT (COUMADIN) 4 MG tablet Take 1 tablet (4 mg) by mouth daily 6 mg mon,fri and 8 mg row 44 tablet 0     Warfarin Therapy Reminder 1 each continuous prn 1 each 0     White Petrolatum ointment Apply topically nightly as needed for dry skin       enoxaparin ANTICOAGULANT (LOVENOX) 100 MG/ML syringe Inject 1 mL (100 mg) Subcutaneous every 12 hours for 3 days 6 mL 0     nicotine (NICODERM CQ) 21 MG/24HR 24 hr patch        nicotine (NICORETTE) 2 MG gum Take 2 mg by mouth as needed for smoking cessation         Allergies   Allergen Reactions     Bees      Augmentin Rash     Penicillins Rash        Social History     Tobacco Use     Smoking status: Current Every Day Smoker     Packs/day: 1.00     Years: 30.00     Pack years: 30.00     Types: Cigarettes     Smokeless tobacco: Current User     Tobacco comment: started at age 18    Substance Use Topics     Alcohol use: No     Alcohol/week: 0.0 standard drinks       History   Drug Use No         Objective     /72   Pulse 60   Temp 97  F (36.1  C) (Tympanic)   Ht 1.803 m (5' 11\")   Wt 148.3 kg (327 lb)   SpO2 96%   BMI 45.61 kg/m      Physical Exam  Gen: well appearing, pleasant, obese man, no distress  HEENT: PERRL, sclera nonicteric, MMM, crowded oropharynx   Neck: supple, no LAD  Pulm: breathing comfortably, CTAB, no wheezes or rales  CV: RRR, normal S1 and S2, no murmurs  Ext: + b/l LE edema      Recent Labs   Lab Test 03/31/21  1431 03/23/21  1042 03/08/21  1148 09/21/20  1748 09/21/20  1748 07/28/20  0929 07/28/20  0929   HGB  --   --   --   --  12.4*  --  12.4*   PLT  --   --   --   --  292  --  256   INR 2.30*  --  2.00*   < >  --    < >  --    NA  --  140  --   --  138  --  138   POTASSIUM  --  4.1  --   --  3.9  --  3.9   CR  --  0.88  --   --  1.06  --  0.98 "   A1C  --  5.6  --   --   --   --  5.7*    < > = values in this interval not displayed.        Diagnostics:  Recent Results (from the past 24 hour(s))   INR    Collection Time: 05/03/21  9:04 AM   Result Value Ref Range    INR 1.70 (H) 0.86 - 1.14   Capillary Blood Collection    Collection Time: 05/03/21  9:04 AM   Result Value Ref Range    Capillary Blood Collection Fingerstick specimen    Asymptomatic COVID-19 Virus (Coronavirus) by PCR    Collection Time: 05/03/21 10:13 AM    Specimen: Nasopharyngeal   Result Value Ref Range    COVID-19 Virus PCR to [facility] - Source Nasopharyngeal     COVID-19 Virus PCR to [facility] - Result       Test received-See reflex to IDDL test SARS CoV2 (COVID-19) Virus RT-PCR      No EKG required for low risk surgery (cataract, skin procedure, breast biopsy, etc).    Revised Cardiac Risk Index (RCRI):  The patient has the following serious cardiovascular risks for perioperative complications:   - No serious cardiac risks = 0 points     RCRI Interpretation: 0 points: Class I (very low risk - 0.4% complication rate)           Signed Electronically by: Margarita Villafana MD  Copy of this evaluation report is provided to requesting physician.

## 2021-05-03 NOTE — TELEPHONE ENCOUNTER
Caregiver Braden calling states pt's INR was 1.7 today.  Wondering if Dr. Villafana was going to send warfarin rx to pharmacy?    Braden can be reached at [phone]    Pharmacy pended.    Suzanne TALLEY RN  EP Triage

## 2021-05-03 NOTE — TELEPHONE ENCOUNTER
S/w Braden and gave Dr. Villafana's reply below.  Braden states pt is not taking naltrexone 50 mg.    States understanding.  Wondering if Dr. Villafana is going to approve the warfarin?  Advised needs to contact warfarin clinic at [phone] to discuss INR and warfarin refill    Suzanne TALLEY RN  EP Triage

## 2021-05-03 NOTE — TELEPHONE ENCOUNTER
So-procedural Anticoagulation   Left lower extremity (3/17/21) shows no DVT. INR today = 1.7. Warfarin held 5/2.    Given maximum of a 3-day hold that will occur to achieve INR closer to 1.0 for 5/5 procedure, CAYDEN would not recommend bridge for moderate risk and short interruption.     Updated plan:  5/3 and 5/4 - Hold warfarin   5/5 - resume warfarin if okay with provider doing procedure on night of procedure. Take 16 mg x 1, then resume home dose  Recheck INR 5-7 days after resuming warfarin    May Saldaña [facility]

## 2021-05-03 NOTE — TELEPHONE ENCOUNTER
See Anticoag encounter for warfarin dosing instructions. [facility] in [facility].     Routing back to May Saldaña [facility], for new hold instructions for rescheduled colonoscopy, June 7, 2021. Braden states they did not receive the colonoscopy prep instructions and that's why they had to move it back yet again. When hold instructions are finalized (per May, most likely a 4-day hold, no bridge) we need to print and mail them to Braden at group home.     Lauryn CONTRERAS RN  Anticoagulation Team

## 2021-05-04 NOTE — TELEPHONE ENCOUNTER
Left message for Hunter this am. Instructed him to call back to discuss any questions he has regarding Petey's warfarin plan for his upcoming colonoscopy.     May Saldaña, [facility]

## 2021-05-04 NOTE — PROGRESS NOTES
"ANTICOAGULATION FOLLOW-UP CLINIC VISIT    Patient Name:  Petey Archibald  Date:  5/4/2021  Contact Type:  Telephone    SUBJECTIVE:  Patient Findings     Positives:  Missed doses (8 mg yesterday)    Comments:  Called patient to discuss today's INR results: Spoke with caregiver, Hunter. Hunter states he \"dropped the ball\" on Petey's warfarin dosing last night, so it was purely coincidence that the missed dose coincided with when he was supposed to be holding his warfarin for the 5/5/21 colonoscopy. Hunter also adds that they did not receive the colonoscopy prep instructions so they had to move the colonoscopy back yet again to June 7th, 2021. In light of this information, the missed dose of warfarin will be replaced today in addition to his regular dose and then patient will continue with maintenance dosing until next INR in 2 weeks. Prescription called in to [facility].    Also discussed with May Saldaña JUAQUIN future hold/bridge procedure for upcoming colonoscopy. She advises a 4-day hold without bridging should be appropriate for this patient. See separate telephone encounter for discussion/advice. Hunter would then like written instructions for warfarin hold sent to patient's group home, attn: Hunter.     Lauryn CONTRERAS RN  Anticoagulation Team          Clinical Outcomes     Negatives:  Major bleeding event, Thromboembolic event, Anticoagulation-related hospital admission, Anticoagulation-related ED visit, Anticoagulation-related fatality    Comments:  Called patient to discuss today's INR results: Spoke with caregiver, Hunter. Hunter states he \"dropped the ball\" on Petey's warfarin dosing last night, so it was purely coincidence that the missed dose coincided with when he was supposed to be holding his warfarin for the 5/5/21 colonoscopy. Hunter also adds that they did not receive the colonoscopy prep instructions so they had to move the colonoscopy back yet again to June 7th, 2021. In light of this information, " the missed dose of warfarin will be replaced today in addition to his regular dose and then patient will continue with maintenance dosing until next INR in 2 weeks. [facility]    Also discussed with May Saldaña RPH future hold/bridge procedure for upcoming colonoscopy. She advises a 4-day hold without bridging should be appropriate for this patient. See separate telephone encounter for discussion/advice. Hunter would then like written instructions for warfarin hold sent to patient's group home, attn: Hunter.     Lauryn CONTRERAS RN  Anticoagulation Team             OBJECTIVE    Recent labs: (last 7 days)     05/03/21  0904   INR 1.70*       ASSESSMENT / PLAN  INR assessment SUB    Recheck INR In: 2 WEEKS    INR Location Clinic      Anticoagulation Summary  As of 5/3/2021    INR goal:  2.0-3.0   TTR:  73.4 % (11.9 mo)   INR used for dosing:  No new INR was available at the time of this encounter.   Warfarin maintenance plan:  6 mg (4 mg x 1.5) every Mon, Fri; 8 mg (4 mg x 2) all other days   Full warfarin instructions:  5/3: 14 mg; 6/3: Hold; 6/4: Hold; 6/5: Hold; 6/6: Hold; 6/7: 16 mg; Otherwise 6 mg every Mon, Fri; 8 mg all other days   Weekly warfarin total:  52 mg   Plan last modified:  Lisa Ponce RN (9/14/2020)   Next INR check:  5/17/2021   Priority:  Maintenance   Target end date:  Indefinite    Indications    History of pulmonary embolism [Z86.711]  Long term current use of anticoagulant therapy [Z79.01]             Anticoagulation Episode Summary     INR check location:  Anticoagulation Clinic    Preferred lab:      Send INR reminders to:  [facility]    Comments:  REM [facility]; A new Coumadin Prescription will need to sent to [facility] with current dose and next INR check.      Anticoagulation Care Providers     Provider Role Specialty Phone number    Yolande Lacy PA-C Referring Family Medicine [phone]    Silvino Baker MD  [facility] [phone]            See the Encounter Report to view Anticoagulation Flowsheet and Dosing Calendar (Go to Encounters tab in chart review, and find the Anticoagulation Therapy Visit)        Lisa Ponce RN

## 2021-05-04 NOTE — PROGRESS NOTES
Braden left  requesting a call back regarding INR results    Darline Masterson RN - [facility] Anticoagulation Clinic

## 2021-05-07 ENCOUNTER — HOSPITAL ENCOUNTER (OUTPATIENT)
Dept: CT IMAGING | Facility: CLINIC | Age: 61
Discharge: HOME | End: 2021-05-07
Attending: SURGERY | Admitting: SURGERY
Payer: MEDICARE

## 2021-05-07 DIAGNOSIS — R10.32 LEFT INGUINAL PAIN: ICD-10-CM

## 2021-05-07 PROCEDURE — G1004 CDSM NDSC: HCPCS

## 2021-05-07 PROCEDURE — 250N000009 HC RX 250: Performed by: SURGERY

## 2021-05-07 PROCEDURE — 250N000011 HC RX IP 250 OP 636: Performed by: SURGERY

## 2021-05-07 RX ORDER — IOPAMIDOL 755 MG/ML
135 INJECTION, SOLUTION INTRAVASCULAR ONCE
Status: COMPLETED | OUTPATIENT
Start: 2021-05-07 | End: 2021-05-07

## 2021-05-07 RX ADMIN — SODIUM CHLORIDE 79 ML: 9 INJECTION, SOLUTION INTRAVENOUS at 09:29

## 2021-05-07 RX ADMIN — IOPAMIDOL 135 ML: 755 INJECTION, SOLUTION INTRAVENOUS at 09:29

## 2021-05-17 ENCOUNTER — ANTICOAGULATION THERAPY VISIT (OUTPATIENT)
Dept: INTERNAL MEDICINE | Facility: CLINIC | Age: 61
End: 2021-05-17
Payer: MEDICARE

## 2021-05-17 ENCOUNTER — OFFICE VISIT (OUTPATIENT)
Dept: INTERNAL MEDICINE | Facility: CLINIC | Age: 61
End: 2021-05-17
Payer: MEDICARE

## 2021-05-17 VITALS
TEMPERATURE: 98.1 F | HEART RATE: 61 BPM | OXYGEN SATURATION: 95 % | BODY MASS INDEX: 45.69 KG/M2 | SYSTOLIC BLOOD PRESSURE: 114 MMHG | DIASTOLIC BLOOD PRESSURE: 70 MMHG | RESPIRATION RATE: 20 BRPM | WEIGHT: 315 LBS

## 2021-05-17 DIAGNOSIS — Z86.711 HISTORY OF PULMONARY EMBOLISM: ICD-10-CM

## 2021-05-17 DIAGNOSIS — Z86.718 HISTORY OF DEEP VEIN THROMBOSIS OF LOWER EXTREMITY: ICD-10-CM

## 2021-05-17 DIAGNOSIS — Z79.01 LONG TERM CURRENT USE OF ANTICOAGULANT THERAPY: ICD-10-CM

## 2021-05-17 DIAGNOSIS — L08.9 LOCAL INFECTION OF SKIN AND SUBCUTANEOUS TISSUE: Primary | ICD-10-CM

## 2021-05-17 LAB
CAPILLARY BLOOD COLLECTION: NORMAL
INR PPP: 2.1 (ref 0.86–1.14)

## 2021-05-17 PROCEDURE — 85610 PROTHROMBIN TIME: CPT | Performed by: FAMILY MEDICINE

## 2021-05-17 PROCEDURE — 99207 PR NO CHARGE NURSE ONLY: CPT | Performed by: INTERNAL MEDICINE

## 2021-05-17 PROCEDURE — 99213 OFFICE O/P EST LOW 20 MIN: CPT | Performed by: PHYSICIAN ASSISTANT

## 2021-05-17 PROCEDURE — 36416 COLLJ CAPILLARY BLOOD SPEC: CPT | Performed by: FAMILY MEDICINE

## 2021-05-17 RX ORDER — CLINDAMYCIN HCL 300 MG
300 CAPSULE ORAL 3 TIMES DAILY
Qty: 30 CAPSULE | Refills: 0 | Status: SHIPPED | OUTPATIENT
Start: 2021-05-17 | End: 2022-01-19

## 2021-05-17 NOTE — PROGRESS NOTES
"    Assessment & Plan     Local infection of skin and subcutaneous tissue    - clindamycin (CLEOCIN) 300 MG capsule; Take 1 capsule (300 mg) by mouth 3 times daily    Hx of DVT/PE on warfarin.              BMI:   Estimated body mass index is 45.69 kg/m  as calculated from the following:    Height as of 5/3/21: 1.803 m (5' 11\").    Weight as of this encounter: 148.6 kg (327 lb 9.6 oz).   Weight management plan: Patient was referred to their PCP to discuss a diet and exercise plan.    Patient Instructions   Warm compress at least twice a day - 15 minutes while on antibiotics     If not improving at the end of the antibiotic recheck with Dr Archer    Will need to do INR probably sooner - will let Anticoagulation Nurse know about start of antibiotics.       Return in about 2 weeks (around 5/31/2021) for recheck if not improving, regular primary provider.    AZIZA Benz [facility] Angelo is a 60 year old who presents for the following health issues     HPI     Concern - Wound on left lower abdomen   Onset: 2 days  Description: Has a wound that is red around area  Intensity: mild, moderate  Progression of Symptoms:  worsening  Accompanying Signs & Symptoms:   Previous history of similar problem:   Precipitating factors:        Worsened by:   Alleviating factors:        Improved by:   Therapies tried and outcome:         Review of Systems   Constitutional, HEENT, cardiovascular, pulmonary, gi and gu systems are negative, except as otherwise noted.      Objective    /70   Pulse 61   Temp 98.1  F (36.7  C) (Tympanic)   Resp 20   Wt 148.6 kg (327 lb 9.6 oz)   SpO2 95%   BMI 45.69 kg/m    Body mass index is 45.69 kg/m .  Physical Exam   GENERAL: , alert and no distress  SKIN: left lower abdominal wall, under skin fold with small open area - serous drainage noted. No fluctuance  Warm red and tenderness around the area about 4 cm in size             "

## 2021-05-17 NOTE — PATIENT INSTRUCTIONS
Warm compress at least twice a day - 15 minutes while on antibiotics     If not improving at the end of the antibiotic recheck with Dr Archer    Will need to do INR probably sooner - will let Anticoagulation Nurse know about start of antibiotics.

## 2021-05-17 NOTE — PROGRESS NOTES
Anticoagulation Management    Unable to reach Hunter today.    Today's INR result of 2.1 is therapeutic (goal INR of 2.0-3.0).  Result received from: Clinic Lab    Follow up required to confirm warfarin dose taken and assess for changes    LMTCB   If no problem findings recheck INR in 2 weeks, 1 week before colonoscopy. Plan 4 day hold w/o bridging. See previous encounter.      Anticoagulation clinic to follow up    Gissell Mcnamara RN

## 2021-05-17 NOTE — PROGRESS NOTES
ANTICOAGULATION FOLLOW-UP CLINIC VISIT    Patient Name:  Petey Archibald  Date:  2021  Contact Type:  Telephone    SUBJECTIVE:  Patient Findings     Comments:  Pt started on antibiotic for derm infection clindamyacin        Clinical Outcomes     Comments:  Pt started on antibiotic for derm infection clindamyacin           OBJECTIVE    Recent labs: (last 7 days)     21  1302   INR 2.10*       ASSESSMENT / PLAN  INR assessment THER    Recheck INR In: 4 DAYS    INR Location Clinic      Anticoagulation Summary  As of 2021    INR goal:  2.0-3.0   TTR:  70.5 % (11.9 mo)   INR used for dosin.10 (2021)   Warfarin maintenance plan:  6 mg (4 mg x 1.5) every Mon, Fri; 8 mg (4 mg x 2) all other days   Full warfarin instructions:  6/3: Hold; 6/4: Hold; 6/5: Hold; 6/6: Hold; 6/7: 16 mg; Otherwise 6 mg every Mon, Fri; 8 mg all other days   Weekly warfarin total:  52 mg   No change documented:  Gissell Mcnamara RN   Plan last modified:  Lisa Ponce RN (2020)   Next INR check:  2021   Priority:  Maintenance   Target end date:  Indefinite    Indications    History of pulmonary embolism [Z86.711]  Long term current use of anticoagulant therapy [Z79.01]             Anticoagulation Episode Summary     INR check location:  Anticoagulation Clinic    Preferred lab:      Send INR reminders to:  [facility]    Comments:  REM long-term; A new Coumadin Prescription will need to sent to [facility] with current dose and next INR check.      Anticoagulation Care Providers     Provider Role Specialty Phone number    Yolande Lacy PA-C Referring Family Medicine [phone]    Silvino Baker MD Responsible Family Medicine [phone]            See the Encounter Report to view Anticoagulation Flowsheet and Dosing Calendar (Go to Encounters tab in chart review, and find the Anticoagulation Therapy Visit)        Lisa Ponce, RN

## 2021-05-20 DIAGNOSIS — Z11.59 ENCOUNTER FOR SCREENING FOR OTHER VIRAL DISEASES: ICD-10-CM

## 2021-05-21 ENCOUNTER — ANTICOAGULATION THERAPY VISIT (OUTPATIENT)
Dept: ANTICOAGULATION | Facility: CLINIC | Age: 61
End: 2021-05-21

## 2021-05-21 DIAGNOSIS — Z79.01 LONG TERM CURRENT USE OF ANTICOAGULANT THERAPY: ICD-10-CM

## 2021-05-21 DIAGNOSIS — Z86.711 HISTORY OF PULMONARY EMBOLISM: ICD-10-CM

## 2021-05-21 LAB
CAPILLARY BLOOD COLLECTION: NORMAL
INR PPP: 2.6 (ref 0.86–1.14)

## 2021-05-21 PROCEDURE — 85610 PROTHROMBIN TIME: CPT | Performed by: FAMILY MEDICINE

## 2021-05-21 PROCEDURE — 36416 COLLJ CAPILLARY BLOOD SPEC: CPT | Performed by: FAMILY MEDICINE

## 2021-05-21 NOTE — PROGRESS NOTES
Date/Time: 05/21/21 3:09 PM     : Hunter    Call Back Phone Number: [phone]     Reason for Call: INR results and next appointment. Hunter will be off shift soon and Ricardo will be coming on. Please call back.    Rosie Meyer RN, BSN, PHN  Anticoagulation Clinic   [phone]

## 2021-05-21 NOTE — PROGRESS NOTES
ANTICOAGULATION FOLLOW-UP CLINIC VISIT    Patient Name:  Petey Archibald  Date:  2021  Contact Type:  Telephone    SUBJECTIVE:  Patient Findings     Comments:  The patient was assessed for diet, medication, and activity level changes, missed or extra doses, bruising or bleeding, with no problem findings.          Clinical Outcomes     Comments:  The patient was assessed for diet, medication, and activity level changes, missed or extra doses, bruising or bleeding, with no problem findings.             OBJECTIVE    Recent labs: (last 7 days)     21  1309   INR 2.60*       ASSESSMENT / PLAN  INR assessment THER    Recheck INR In: 3 WEEKS    INR Location Clinic      Anticoagulation Summary  As of 2021    INR goal:  2.0-3.0   TTR:  70.7 % (1 y)   INR used for dosin.60 (2021)   Warfarin maintenance plan:  6 mg (4 mg x 1.5) every Mon, Fri; 8 mg (4 mg x 2) all other days   Full warfarin instructions:  6/3: Hold; 6/4: Hold; 6/5: Hold; 6/6: Hold; 6/7: 16 mg; Otherwise 6 mg every Mon, Fri; 8 mg all other days   Weekly warfarin total:  52 mg   Plan last modified:  Lisa Ponce RN (2020)   Next INR check:  2021   Priority:  Maintenance   Target end date:  Indefinite    Indications    History of pulmonary embolism [Z86.711]  Long term current use of anticoagulant therapy [Z79.01]             Anticoagulation Episode Summary     INR check location:  Anticoagulation Clinic    Preferred lab:      Send INR reminders to:  [facility]    Comments:  REM retirement; A new Coumadin Prescription will need to sent to [facility] with current dose and next INR check.      Anticoagulation Care Providers     Provider Role Specialty Phone number    Yolande Lacy PA-C Referring Family Medicine [phone]    Silvino Baker MD Responsible Family Medicine [phone]            See the Encounter Report to view Anticoagulation Flowsheet and Dosing Calendar (Go to  Encounters tab in chart review, and find the Anticoagulation Therapy Visit)        Lisa Ponce RN

## 2021-05-27 ENCOUNTER — TELEPHONE (OUTPATIENT)
Dept: PHYSICAL MEDICINE AND REHAB | Facility: CLINIC | Age: 61
End: 2021-05-27

## 2021-05-27 NOTE — TELEPHONE ENCOUNTER
[facility] Call Center    Phone Message    May a detailed message be left on voicemail: yes     Reason for Call: Appointment Intake    Referring Provider Name: Rustam Davis MD in  [facility]  Diagnosis and/or Symptoms: Pelvic pain in male     Diagnosis is not listed in our protocols. Please review and contact the patient to schedule.    Action Taken: Other: PMR    Travel Screening: Not Applicable

## 2021-05-28 DIAGNOSIS — Z86.711 HISTORY OF PULMONARY EMBOLISM: ICD-10-CM

## 2021-05-28 DIAGNOSIS — Z79.01 LONG TERM CURRENT USE OF ANTICOAGULANT THERAPY: ICD-10-CM

## 2021-05-28 NOTE — TELEPHONE ENCOUNTER
Would defer to Urology if not already done. Let me know if that is not helping.   Yinka Hagen MD        Left detailed voice message with Hunter patient's caregiver asking if he has seen urology or not yet and asked him to call back with to answer this question to see where patient should be seen.       Lillie Steele RN, BSN

## 2021-06-01 ENCOUNTER — HOSPITAL ENCOUNTER (OUTPATIENT)
Dept: WOUND CARE | Facility: CLINIC | Age: 61
Discharge: HOME | End: 2021-06-01
Attending: SURGERY | Admitting: SURGERY
Payer: MEDICARE

## 2021-06-01 VITALS
SYSTOLIC BLOOD PRESSURE: 139 MMHG | RESPIRATION RATE: 20 BRPM | HEART RATE: 68 BPM | TEMPERATURE: 96.5 F | DIASTOLIC BLOOD PRESSURE: 76 MMHG

## 2021-06-01 DIAGNOSIS — L97.922 ULCER OF LEFT LOWER EXTREMITY WITH FAT LAYER EXPOSED (H): ICD-10-CM

## 2021-06-01 PROCEDURE — 97597 DBRDMT OPN WND 1ST 20 CM/<: CPT

## 2021-06-01 NOTE — DISCHARGE INSTRUCTIONS
[facility]  [address] [address]    Call us at [phone] if you have any questions about your wounds, have redness or swelling around your wound, have a fever of 101 or greater or if you have any other problems or concerns. We answer the phone Monday through Friday 8 am to 4 pm, please leave a message as we check the voicemail frequently throughout the day.     Petey Archibald      1960    Supplements to aid in healing:  Vitamin D3 5,000 iu per day.  Vitamin B12 1000 mcg per day  Wound Care: Daily  After cleansing with soap and water in shower, than apply small amount of VASHE on gauze, lay into wound bed, let soak for 10 minutes than remove and apply wound gel.   Apply small edemawear (navy) to foot fold over toes and secure with tape and then apply medium (yellow) stripe you are  applying this as a double layer from base of toe to base of knee. Change daily.     FERNANDO Davis M.D.. June 1, 2021      If you had a positive experience please indicate on your patient satisfaction survey form that [facility] will be sending you.    If you have any billing related questions please call the [facility] Business office at [phone]. The clinic staff does not handle billing related matters.     [id number] Detailed

## 2021-06-01 NOTE — PROGRESS NOTES
[facility] [place] Nurse Note    Subject: Petey Archibald presents for nurse only visit for wound check and dressing change.       Exam:  /76   Pulse 68   Temp 96.5  F (35.8  C) (Temporal)   Resp 20   Wound (used by OP [facility] only) 09/25/20 0911 Left dorsal foot (Active)   Thickness/Stage full thickness 06/01/21 0800   Dressing Appearance moist drainage 06/01/21 0800   Base granulating 06/01/21 0800   Periwound intact 06/01/21 0800   Periwound Temperature warm 06/01/21 0800   Periwound Skin Turgor soft 06/01/21 0800   Edges callused 06/01/21 0800   Length (cm) 0.7 06/01/21 0800   Width (cm) 0.3 06/01/21 0800   Depth (cm) 0.1 06/01/21 0800   Wound (cm^2) 0.21 cm^2 06/01/21 0800   Wound Volume (cm^3) 0.02 cm^3 06/01/21 0800   Wound healing % 98.44 06/01/21 0800   Drainage Characteristics/Odor serosanguineous 06/01/21 0800   Drainage Amount small 06/01/21 0800   Care, Wound debrided 06/01/21 0800     Procedure:  Topical anesthetic of 4% lidocaine was applied,selective debridement was performed using a curettet to remove non-viable tissue less than 20  sq cm, no bleeding occurred. Patient tolerated procedure well.    Procedure:Topical anesthetic of 4% lidocaine was applied, chemical cautery applied with silver nitrate stick, no bleeding occurred. Patient tolerated procedure well.      Plan: Patient will dress the wound with medline gel and edemawear navy folded over the foot, taped and yellow stripe edemawear. He will follow up with telephone visit in 8 weeks.  Crystal Spencer, NERISSAN, RN, CWOCN  June 1, 2021

## 2021-06-03 ENCOUNTER — OFFICE VISIT (OUTPATIENT)
Dept: INTERNAL MEDICINE | Facility: CLINIC | Age: 61
End: 2021-06-03
Payer: MEDICARE

## 2021-06-03 VITALS
OXYGEN SATURATION: 98 % | TEMPERATURE: 98.2 F | BODY MASS INDEX: 45.47 KG/M2 | DIASTOLIC BLOOD PRESSURE: 64 MMHG | WEIGHT: 315 LBS | SYSTOLIC BLOOD PRESSURE: 122 MMHG | HEART RATE: 75 BPM

## 2021-06-03 DIAGNOSIS — I89.0 LYMPHEDEMA OF LEFT LEG: ICD-10-CM

## 2021-06-03 DIAGNOSIS — F41.9 ANXIETY: ICD-10-CM

## 2021-06-03 DIAGNOSIS — Z12.11 COLON CANCER SCREENING: ICD-10-CM

## 2021-06-03 DIAGNOSIS — F17.200 TOBACCO DEPENDENCE: ICD-10-CM

## 2021-06-03 DIAGNOSIS — F25.0 SCHIZOAFFECTIVE DISORDER, BIPOLAR TYPE (H): ICD-10-CM

## 2021-06-03 DIAGNOSIS — R73.02 GLUCOSE INTOLERANCE (IMPAIRED GLUCOSE TOLERANCE): ICD-10-CM

## 2021-06-03 DIAGNOSIS — G47.33 OSA (OBSTRUCTIVE SLEEP APNEA): ICD-10-CM

## 2021-06-03 DIAGNOSIS — E66.01 MORBID OBESITY DUE TO EXCESS CALORIES (H): ICD-10-CM

## 2021-06-03 DIAGNOSIS — Z01.818 PREOP GENERAL PHYSICAL EXAM: Primary | ICD-10-CM

## 2021-06-03 DIAGNOSIS — F32.5 MAJOR DEPRESSION IN COMPLETE REMISSION (H): ICD-10-CM

## 2021-06-03 DIAGNOSIS — Z86.718 HISTORY OF DEEP VEIN THROMBOSIS OF LOWER EXTREMITY: ICD-10-CM

## 2021-06-03 DIAGNOSIS — Z86.0100 HISTORY OF COLONIC POLYPS: ICD-10-CM

## 2021-06-03 DIAGNOSIS — R41.83 BORDERLINE INTELLECTUAL DISABILITY: ICD-10-CM

## 2021-06-03 LAB
ALBUMIN UR-MCNC: NEGATIVE MG/DL
ANION GAP SERPL CALCULATED.3IONS-SCNC: <1 MMOL/L (ref 3–14)
APPEARANCE UR: CLEAR
BASOPHILS # BLD AUTO: 0 10E9/L (ref 0–0.2)
BASOPHILS NFR BLD AUTO: 0.2 %
BILIRUB UR QL STRIP: NEGATIVE
BUN SERPL-MCNC: 20 MG/DL (ref 7–30)
CALCIUM SERPL-MCNC: 8.9 MG/DL (ref 8.5–10.1)
CHLORIDE SERPL-SCNC: 107 MMOL/L (ref 94–109)
CO2 SERPL-SCNC: 31 MMOL/L (ref 20–32)
COLOR UR AUTO: YELLOW
CREAT SERPL-MCNC: 0.85 MG/DL (ref 0.66–1.25)
DIFFERENTIAL METHOD BLD: ABNORMAL
EOSINOPHIL # BLD AUTO: 0.2 10E9/L (ref 0–0.7)
EOSINOPHIL NFR BLD AUTO: 3.3 %
ERYTHROCYTE [DISTWIDTH] IN BLOOD BY AUTOMATED COUNT: 15.9 % (ref 10–15)
GFR SERPL CREATININE-BSD FRML MDRD: >90 ML/MIN/{1.73_M2}
GLUCOSE SERPL-MCNC: 109 MG/DL (ref 70–99)
GLUCOSE UR STRIP-MCNC: NEGATIVE MG/DL
HCT VFR BLD AUTO: 38.9 % (ref 40–53)
HGB BLD-MCNC: 11.9 G/DL (ref 13.3–17.7)
HGB UR QL STRIP: NEGATIVE
KETONES UR STRIP-MCNC: NEGATIVE MG/DL
LEUKOCYTE ESTERASE UR QL STRIP: NEGATIVE
LYMPHOCYTES # BLD AUTO: 1.1 10E9/L (ref 0.8–5.3)
LYMPHOCYTES NFR BLD AUTO: 23.3 %
MCH RBC QN AUTO: 28.5 PG (ref 26.5–33)
MCHC RBC AUTO-ENTMCNC: 30.6 G/DL (ref 31.5–36.5)
MCV RBC AUTO: 93 FL (ref 78–100)
MONOCYTES # BLD AUTO: 0.5 10E9/L (ref 0–1.3)
MONOCYTES NFR BLD AUTO: 10.6 %
NEUTROPHILS # BLD AUTO: 3 10E9/L (ref 1.6–8.3)
NEUTROPHILS NFR BLD AUTO: 62.6 %
NITRATE UR QL: NEGATIVE
PH UR STRIP: 6.5 PH (ref 5–7)
PLATELET # BLD AUTO: 242 10E9/L (ref 150–450)
POTASSIUM SERPL-SCNC: 4.2 MMOL/L (ref 3.4–5.3)
RBC # BLD AUTO: 4.18 10E12/L (ref 4.4–5.9)
RBC #/AREA URNS AUTO: NORMAL /HPF
SODIUM SERPL-SCNC: 137 MMOL/L (ref 133–144)
SOURCE: NORMAL
SP GR UR STRIP: 1.01 (ref 1–1.03)
UROBILINOGEN UR STRIP-ACNC: 0.2 EU/DL (ref 0.2–1)
WBC # BLD AUTO: 4.8 10E9/L (ref 4–11)
WBC #/AREA URNS AUTO: NORMAL /HPF

## 2021-06-03 PROCEDURE — 99213 OFFICE O/P EST LOW 20 MIN: CPT | Performed by: FAMILY MEDICINE

## 2021-06-03 PROCEDURE — 81001 URINALYSIS AUTO W/SCOPE: CPT | Performed by: FAMILY MEDICINE

## 2021-06-03 PROCEDURE — 83036 HEMOGLOBIN GLYCOSYLATED A1C: CPT | Performed by: FAMILY MEDICINE

## 2021-06-03 PROCEDURE — 36415 COLL VENOUS BLD VENIPUNCTURE: CPT | Performed by: FAMILY MEDICINE

## 2021-06-03 PROCEDURE — 80048 BASIC METABOLIC PNL TOTAL CA: CPT | Performed by: FAMILY MEDICINE

## 2021-06-03 PROCEDURE — 85025 COMPLETE CBC W/AUTO DIFF WBC: CPT | Performed by: FAMILY MEDICINE

## 2021-06-03 NOTE — PROGRESS NOTES
[address] [address]  Phone: [phone]  Primary Provider: Demetri Archer  Pre-op Performing Provider: VENUS YA      PREOPERATIVE EVALUATION:  Today's date: 6/3/2021    Petey Archibald is a 60 year old male who presents for a preoperative evaluation.    Surgical Information:  Surgery/Procedure: colonoscopy  Surgery Location: [facility]  Surgeon: Blayne Bullock MD  Surgery Date: 6-7-21  Time of Surgery: 12 noon  Where patient plans to recover: Other: home at group home  Fax number for surgical facility: Note does not need to be faxed, will be available electronically in Epic.    Type of Anesthesia Anticipated: General    Assessment & Plan     The proposed surgical procedure is considered LOW risk.    Preop general physical exam    - UA with Microscopic  - CBC with platelets differential  - Basic metabolic panel    Colon cancer screening      History of colonic polyps      Glucose intolerance (impaired glucose tolerance)    - Hemoglobin A1c    Schizoaffective disorder, bipolar type (H)      Lymphedema of left leg      Anxiety      Major depression in complete remission (HCC) on meds       Borderline mental retardation      Tobacco dependence:40-50 pk yr hx      NEL (obstructive sleep apnea)      History of deep vein thrombosis of lower extremity      Morbid obesity due to excess calories (H)  BMI 40-50                     RECOMMENDATION:  APPROVAL GIVEN to proceed with proposed procedure, without further diagnostic evaluation.                      Subjective     HPI related to upcoming procedure: patient who needs general anesthesia to get colonoscopy accomplished.    Preop Questions 6/3/2021   1. Have you ever had a heart attack or stroke? No   2. Have you ever had surgery on your heart or blood vessels, such as a stent placement, a coronary artery bypass, or surgery on an artery in your head, neck, heart, or legs? No    3. Do you have chest pain with activity? No   4. Do you have a history of  heart failure? No   5. Do you currently have a cold, bronchitis or symptoms of other infection? No   6. Do you have a cough, shortness of breath, or wheezing? No   7. Do you or anyone in your family have previous history of blood clots? YES - patient with clotting disorder   8. Do you or does anyone in your family have a serious bleeding problem such as prolonged bleeding following surgeries or cuts? No   9. Have you ever had problems with anemia or been told to take iron pills? No   10. Have you had any abnormal blood loss such as black, tarry or bloody stools? No   11. Have you ever had a blood transfusion? No   12. Are you willing to have a blood transfusion if it is medically needed before, during, or after your surgery? Yes   13. Have you or any of your relatives ever had problems with anesthesia? No   14. Do you have sleep apnea, excessive snoring or daytime drowsiness? YES - NEL on CPAP   14a. Do you have a CPAP machine? Yes   15. Do you have any artifical heart valves or other implanted medical devices like a pacemaker, defibrillator, or continuous glucose monitor? No   16. Do you have artificial joints? No   17. Are you allergic to latex? No       Health Care Directive:  Patient does not have a Health Care Directive or Living Will: Discussed advance care planning with patient; however, patient declined at this time.    Preoperative Review of :   reviewed - controlled substances reflected in medication list.          Review of Systems  Constitutional, neuro, ENT, endocrine, pulmonary, cardiac, gastrointestinal, genitourinary, musculoskeletal, integument and psychiatric systems are negative, except as otherwise noted.    Patient Active Problem List    Diagnosis Date Noted     Colon cancer screening 03/26/2021     Priority: Medium     Added automatically from request for surgery [id number]       Adenomatous polyp of ascending colon  11/16/2020     Priority: Medium     History of pulmonary embolism 09/19/2020     Priority: Medium     Schizoaffective disorder, bipolar type (H) 05/18/2020     Priority: Medium     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) 03/11/2020     Priority: Medium     Lymphedema of left leg 08/29/2019     Priority: Medium     Ulcer of left lower extremity with fat layer exposed (H) 08/22/2019     Priority: Medium     History of colonic polyps 02/02/2018     Priority: Medium     Thrombophlebitis of superficial veins of both lower extremities: Greater Saphenous VV  02/28/2017     Priority: Medium     Anxiety 12/23/2016     Priority: Medium     NEL (obstructive sleep apnea) 12/05/2016     Priority: Medium     Erectile dysfunction, unspecified erectile dysfunction type 08/11/2016     Priority: Medium     Glucose intolerance (impaired glucose tolerance) 07/25/2016     Priority: Medium     Long term current use of anticoagulant therapy 03/16/2016     Priority: Medium     Morbid obesity due to excess calories (H)  BMI 40-50 01/04/2016     Priority: Medium     Hypercholesterolemia 01/04/2016     Priority: Medium     Major depression in complete remission (HCC) on meds  01/04/2016     Priority: Medium     Callus of foot on Rt lat dist  since 8-15  10/01/2015     Priority: Medium     Pilonidal cyst 08/20/2015     Priority: Medium     Asymptomatic varicose veins, bilateral 08/20/2015     Priority: Medium     Mixed simple and mucopurulent chronic bronchitis (HCC) CT 4-06 wnl and neg bronch for hemoptesis spirometry 7-26-16  FVC=59% & w mod restriction  and lung age of 82 in 54 y/o  08/22/2014     Priority: Medium     History of deep vein thrombosis of lower extremity 03/13/2013     Priority: Medium     Borderline mental retardation 02/20/2013     Priority: Medium     Peripheral vascular disease (H)      Priority: Medium     Tobacco dependence:40-50 pk yr hx      Priority: Medium     GERD (gastroesophageal reflux disease)  10/19/2012     Priority: Medium      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     Chronic infection     MRSA     Coagulation disorder (H)     on coumadin     COPD (chronic obstructive pulmonary disease) (H)      History of DVT of lower extremity      History of pulmonary embolism      Hyperlipidemia      Mild intellectual disabilities      Morbid obesity (H)      Peripheral edema      Peripheral vascular disease (H)      Sleep apnea     uses CPAP     Thrombosis      Tobacco dependence      Uncomplicated asthma      Venous stasis dermatitis      Past Surgical History:   Procedure Laterality Date     COLONOSCOPY N/A 4/15/2019    Procedure: COMBINED COLONOSCOPY, SINGLE OR MULTIPLE BIOPSY/POLYPECTOMY BY BIOPSY;  Surgeon: Jovana Ohara MD;  Location:  GI     EXCISE MALIGNANT LESIONS, TRUNK/ARMS/LEGS 0.5CM OR LESS Left 5/26/2017    Procedure: EXCISE MALIGNANT LESIONS, TRUNK/ARMS/LEGS 0.5CM OR LESS;  EXCISION LEFT ELBOW MASS;  Surgeon: Jose Azevedo MD;  Location:  GI     RECTAL SURGERY      perianal abscess?     Current Outpatient Medications   Medication Sig Dispense Refill     acetaminophen (TYLENOL) 325 MG tablet Take 1-2 tablets (325-650 mg) by mouth every 6 hours as needed for mild pain, fever or headaches 100 tablet 3     albuterol (ALBUTEROL) 108 (90 BASE) MCG/ACT inhaler Inhale 2 puffs into the lungs every 6 hours as needed 1 Inhaler 0     ARIPiprazole (ABILIFY) 5 MG tablet Take 5 mg by mouth daily       bacitracin 500 UNIT/GM OINT        Cadexomer Iodine, topical, 0.9% (IODOSORB) 0.9 % GEL gel Apply topically daily Apply to left foot wound daily after cleansing the wound and blotting it dry. 1 Tube 3     clindamycin (CLEOCIN) 300 MG capsule Take 1 capsule (300 mg) by mouth 3 times daily 30 capsule 0     clotrimazole (LOTRIMIN) 1 % external cream Apply topically 2 times daily 60 g 0     dexlansoprazole (DEXILANT) 60 MG CPDR CR capsule TAKE 1 CAPSULE  "BY MOUTH ONCE DAILY (FOR HEARTBURN) 90 capsule 2     diclofenac (VOLTAREN) 1 % topical gel Apply 4 g topically 2 times daily as needed for moderate pain 100 g 1     EPINEPHrine (EPIPEN 2-BRE) 0.3 MG/0.3ML injection 2-pack INJECT 0.3MG INTRAMUSCULAR ONE TIME FOR ONE DOSE AS NEEDED FOR ANAPHYLAXIS (CALL 911 IF YOU HAVE TO GIVE) (FOR BEE STINGS) **LABEL EACH PEN* 2 mL 3     gabapentin (NEURONTIN) 100 MG capsule Take 400 mg by mouth 3 times daily At 0900, 1200, and 2000       Gauze Pads & Dressings (GAUZE PADS 4\"X4\") 4\"X4\" PADS 1 each 3 times daily as needed 25 each 1     loratadine (CLARITIN) 10 MG tablet Take 10 mg by mouth daily       montelukast (SINGULAIR) 10 MG tablet TAKE 1 TABLET BY MOUTH EVERY MORNING (ASTHMA) 30 tablet 11     Multiple Vitamin (DAILY-JOVAN) TABS TAKE 1 TABLET BY MOUTH EVERY MORNING (VITAMINS) 30 tablet 11     naltrexone (DEPADE/REVIA) 50 MG tablet Take 50 mg by mouth daily       nicotine (NICODERM CQ) 21 MG/24HR 24 hr patch        nicotine (NICORETTE) 2 MG gum Take 2 mg by mouth as needed for smoking cessation       nystatin-triamcinolone (MYCOLOG II) 343442-8.1 UNIT/GM-% external cream Apply topically 2 times daily For 1-2 weeks.       order for DME Equipment being ordered: roll of micropore tape to dress foot wound bid 1 each 0     order for DME Equipment being ordered: 1 surgical shoe 1 Device 0     order for DME [employer] [facility] Phone [phone] Fax [phone]  EdemaWear Size Medium/Yellow qty 4 sleeves  Length of Need: 1 month  Frequency of dressing change daily 1 Device 0     order for DME [employer] Stock[employer]  Phone #[phone]  Fax #[phone]  Coolflex Velcro wraps    Length of Need: Life Time  # of Pairs 1 set 30 days 0     order for DME [employer] [facility] Phone [phone] Fax [phone]  Primary Dressing Hydrofera Blue Ready   Qty 5 sheets  Secondary Dressing 4' roll gauze Qty 30  Secondary Dressing 2' medipore tape Qty 1  Secondary Dressing 4x4 gauze loaf " Qty  1  Length of Need: 1 month  Frequency of dressing change: daily 30 days 0     order for DME Oxygen 2 Li/min  at night and bled into BiPAP 1 Device 0     order for DME Equipment being ordered: CPAP with mask and tubing 1 Device 0     order for DME Equipment being ordered: support compression hose BK  2 pair black 30mm HG   To be applied on arising & removed while lying down to go to sleep 1 each 0     PULMICORT FLEXHALER 180 MCG/ACT inhaler INHALE 2 PUFFS INTO THE LUNGS TWICE DAILY. 1 each 11     SENNA-TABS 8.6 MG tablet        sertraline (ZOLOFT) 100 MG tablet Take 100 mg by mouth daily Take with 50 mg tablet for a total dose of 150 mg daily.       sertraline (ZOLOFT) 50 MG tablet        simvastatin (ZOCOR) 40 MG tablet TAKE 1 TABLET BY MOUTH EVERY MORNING.  (CHOLESTEROL) 30 tablet 11     SPIRIVA HANDIHALER 18 MCG inhaled capsule INHALE CONTENTS OF 1 CAPSULE INTO THE LUNGS ONCE DAILY 30 capsule 11     spironolactone (ALDACTONE) 25 MG tablet TAKE 1 TABLET BY MOUTH ONCE DAILY. (HIGH BLOOD PRESSURE) 30 tablet 11     temazepam (RESTORIL) 15 MG capsule Take 15 mg by mouth nightly as needed for sleep       trolamine salicylate (ASPERCREME) 10 % external cream Apply topically as needed for moderate pain knee       warfarin ANTICOAGULANT (COUMADIN) 4 MG tablet 6 mg mon,fri and 8 mg ROW  Recheck INR 2/22/21 54 tablet 0     warfarin ANTICOAGULANT (COUMADIN) 4 MG tablet Take 1 tablet (4 mg) by mouth daily 6 mg mon,fri and 8 mg row 44 tablet 0     Warfarin Therapy Reminder 1 each continuous prn 1 each 0     White Petrolatum ointment Apply topically nightly as needed for dry skin         Allergies   Allergen Reactions     Bees      Augmentin Rash     Penicillins Rash        Social History     Tobacco Use     Smoking status: Current Every Day Smoker     Packs/day: 1.00     Years: 30.00     Pack years: 30.00     Types: Cigarettes     Smokeless tobacco: Never Used     Tobacco comment: started at age 18    Substance Use Topics      Alcohol use: No     Alcohol/week: 0.0 standard drinks     Family History   Problem Relation Age of Onset     Unknown/Adopted Mother      Unknown/Adopted Father      Diabetes Brother      History   Drug Use No         Objective     /64   Pulse 75   Temp 98.2  F (36.8  C) (Tympanic)   Wt 147.9 kg (326 lb)   SpO2 98%   BMI 45.47 kg/m      Physical Exam    GENERAL APPEARANCE: healthy, no distress and obese     EYES: EOMI,  PERRL     HENT: ear canals and TM's normal and nose and mouth without ulcers or lesions     NECK: no adenopathy, no asymmetry, masses, or scars and thyroid normal to palpation     RESP: lungs clear to auscultation - no rales, rhonchi or wheezes     CV: regular rates and rhythm, normal S1 S2, no S3 or S4 and no murmur, click or rub     ABDOMEN:  soft, nontender, no HSM or masses and bowel sounds normal     MS: extremities normal- no gross deformities noted, no evidence of inflammation in joints, FROM in all extremities.     SKIN: no suspicious lesions or rashes     NEURO: Normal strength and tone, sensory exam grossly normal, mentation intact and speech normal     PSYCH: mentation appears normal. and affect normal/bright     LYMPHATICS: No cervical adenopathy    Recent Labs   Lab Test 05/21/21  1309 05/17/21  1302 03/23/21  1042 03/23/21  1042 09/21/20  1748 09/21/20  1748 07/28/20  0929 07/28/20  0929   HGB  --   --   --   --   --  12.4*  --  12.4*   PLT  --   --   --   --   --  292  --  256   INR 2.60* 2.10*   < >  --    < >  --    < >  --    NA  --   --   --  140  --  138  --  138   POTASSIUM  --   --   --  4.1  --  3.9  --  3.9   CR  --   --   --  0.88  --  1.06  --  0.98   A1C  --   --   --  5.6  --   --   --  5.7*    < > = values in this interval not displayed.        Diagnostics:  Labs pending at this time.  Results will be reviewed when available.   No EKG required for low risk surgery (cataract, skin procedure, breast biopsy, etc).    Revised Cardiac Risk Index (RCRI):  The  patient has the following serious cardiovascular risks for perioperative complications:   - No serious cardiac risks = 0 points     RCRI Interpretation: 0 points: Class I (very low risk - 0.4% complication rate)           Signed Electronically by: Silvino Baker MD  Copy of this evaluation report is provided to requesting physician.

## 2021-06-03 NOTE — H&P (VIEW-ONLY)
[address] [address]  Phone: [phone]  Primary Provider: Demetri Archer  Pre-op Performing Provider: VENUS YA      PREOPERATIVE EVALUATION:  Today's date: 6/3/2021    Petey Archibald is a 60 year old male who presents for a preoperative evaluation.    Surgical Information:  Surgery/Procedure: colonoscopy  Surgery Location: [facility]  Surgeon: Blayne Bullock MD  Surgery Date: 6-7-21  Time of Surgery: 12 noon  Where patient plans to recover: Other: home at group home  Fax number for surgical facility: Note does not need to be faxed, will be available electronically in Epic.    Type of Anesthesia Anticipated: General    Assessment & Plan     The proposed surgical procedure is considered LOW risk.    Preop general physical exam    - UA with Microscopic  - CBC with platelets differential  - Basic metabolic panel    Colon cancer screening      History of colonic polyps      Glucose intolerance (impaired glucose tolerance)    - Hemoglobin A1c    Schizoaffective disorder, bipolar type (H)      Lymphedema of left leg      Anxiety      Major depression in complete remission (HCC) on meds       Borderline mental retardation      Tobacco dependence:40-50 pk yr hx      NEL (obstructive sleep apnea)      History of deep vein thrombosis of lower extremity      Morbid obesity due to excess calories (H)  BMI 40-50                     RECOMMENDATION:  APPROVAL GIVEN to proceed with proposed procedure, without further diagnostic evaluation.                      Subjective     HPI related to upcoming procedure: patient who needs general anesthesia to get colonoscopy accomplished.    Preop Questions 6/3/2021   1. Have you ever had a heart attack or stroke? No   2. Have you ever had surgery on your heart or blood vessels, such as a stent placement, a coronary artery bypass, or surgery on an artery in your head, neck, heart, or legs? No    3. Do you have chest pain with activity? No   4. Do you have a history of  heart failure? No   5. Do you currently have a cold, bronchitis or symptoms of other infection? No   6. Do you have a cough, shortness of breath, or wheezing? No   7. Do you or anyone in your family have previous history of blood clots? YES - patient with clotting disorder   8. Do you or does anyone in your family have a serious bleeding problem such as prolonged bleeding following surgeries or cuts? No   9. Have you ever had problems with anemia or been told to take iron pills? No   10. Have you had any abnormal blood loss such as black, tarry or bloody stools? No   11. Have you ever had a blood transfusion? No   12. Are you willing to have a blood transfusion if it is medically needed before, during, or after your surgery? Yes   13. Have you or any of your relatives ever had problems with anesthesia? No   14. Do you have sleep apnea, excessive snoring or daytime drowsiness? YES - NEL on CPAP   14a. Do you have a CPAP machine? Yes   15. Do you have any artifical heart valves or other implanted medical devices like a pacemaker, defibrillator, or continuous glucose monitor? No   16. Do you have artificial joints? No   17. Are you allergic to latex? No       Health Care Directive:  Patient does not have a Health Care Directive or Living Will: Discussed advance care planning with patient; however, patient declined at this time.    Preoperative Review of :   reviewed - controlled substances reflected in medication list.          Review of Systems  Constitutional, neuro, ENT, endocrine, pulmonary, cardiac, gastrointestinal, genitourinary, musculoskeletal, integument and psychiatric systems are negative, except as otherwise noted.    Patient Active Problem List    Diagnosis Date Noted     Colon cancer screening 03/26/2021     Priority: Medium     Added automatically from request for surgery [id number]       Adenomatous polyp of ascending colon  11/16/2020     Priority: Medium     History of pulmonary embolism 09/19/2020     Priority: Medium     Schizoaffective disorder, bipolar type (H) 05/18/2020     Priority: Medium     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) 03/11/2020     Priority: Medium     Lymphedema of left leg 08/29/2019     Priority: Medium     Ulcer of left lower extremity with fat layer exposed (H) 08/22/2019     Priority: Medium     History of colonic polyps 02/02/2018     Priority: Medium     Thrombophlebitis of superficial veins of both lower extremities: Greater Saphenous VV  02/28/2017     Priority: Medium     Anxiety 12/23/2016     Priority: Medium     NEL (obstructive sleep apnea) 12/05/2016     Priority: Medium     Erectile dysfunction, unspecified erectile dysfunction type 08/11/2016     Priority: Medium     Glucose intolerance (impaired glucose tolerance) 07/25/2016     Priority: Medium     Long term current use of anticoagulant therapy 03/16/2016     Priority: Medium     Morbid obesity due to excess calories (H)  BMI 40-50 01/04/2016     Priority: Medium     Hypercholesterolemia 01/04/2016     Priority: Medium     Major depression in complete remission (HCC) on meds  01/04/2016     Priority: Medium     Callus of foot on Rt lat dist  since 8-15  10/01/2015     Priority: Medium     Pilonidal cyst 08/20/2015     Priority: Medium     Asymptomatic varicose veins, bilateral 08/20/2015     Priority: Medium     Mixed simple and mucopurulent chronic bronchitis (HCC) CT 4-06 wnl and neg bronch for hemoptesis spirometry 7-26-16  FVC=59% & w mod restriction  and lung age of 82 in 54 y/o  08/22/2014     Priority: Medium     History of deep vein thrombosis of lower extremity 03/13/2013     Priority: Medium     Borderline mental retardation 02/20/2013     Priority: Medium     Peripheral vascular disease (H)      Priority: Medium     Tobacco dependence:40-50 pk yr hx      Priority: Medium     GERD (gastroesophageal reflux disease)  10/19/2012     Priority: Medium      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     Chronic infection     MRSA     Coagulation disorder (H)     on coumadin     COPD (chronic obstructive pulmonary disease) (H)      History of DVT of lower extremity      History of pulmonary embolism      Hyperlipidemia      Mild intellectual disabilities      Morbid obesity (H)      Peripheral edema      Peripheral vascular disease (H)      Sleep apnea     uses CPAP     Thrombosis      Tobacco dependence      Uncomplicated asthma      Venous stasis dermatitis      Past Surgical History:   Procedure Laterality Date     COLONOSCOPY N/A 4/15/2019    Procedure: COMBINED COLONOSCOPY, SINGLE OR MULTIPLE BIOPSY/POLYPECTOMY BY BIOPSY;  Surgeon: Jovana Ohara MD;  Location:  GI     EXCISE MALIGNANT LESIONS, TRUNK/ARMS/LEGS 0.5CM OR LESS Left 5/26/2017    Procedure: EXCISE MALIGNANT LESIONS, TRUNK/ARMS/LEGS 0.5CM OR LESS;  EXCISION LEFT ELBOW MASS;  Surgeon: Jose Azevedo MD;  Location:  GI     RECTAL SURGERY      perianal abscess?     Current Outpatient Medications   Medication Sig Dispense Refill     acetaminophen (TYLENOL) 325 MG tablet Take 1-2 tablets (325-650 mg) by mouth every 6 hours as needed for mild pain, fever or headaches 100 tablet 3     albuterol (ALBUTEROL) 108 (90 BASE) MCG/ACT inhaler Inhale 2 puffs into the lungs every 6 hours as needed 1 Inhaler 0     ARIPiprazole (ABILIFY) 5 MG tablet Take 5 mg by mouth daily       bacitracin 500 UNIT/GM OINT        Cadexomer Iodine, topical, 0.9% (IODOSORB) 0.9 % GEL gel Apply topically daily Apply to left foot wound daily after cleansing the wound and blotting it dry. 1 Tube 3     clindamycin (CLEOCIN) 300 MG capsule Take 1 capsule (300 mg) by mouth 3 times daily 30 capsule 0     clotrimazole (LOTRIMIN) 1 % external cream Apply topically 2 times daily 60 g 0     dexlansoprazole (DEXILANT) 60 MG CPDR CR capsule TAKE 1 CAPSULE  "BY MOUTH ONCE DAILY (FOR HEARTBURN) 90 capsule 2     diclofenac (VOLTAREN) 1 % topical gel Apply 4 g topically 2 times daily as needed for moderate pain 100 g 1     EPINEPHrine (EPIPEN 2-BRE) 0.3 MG/0.3ML injection 2-pack INJECT 0.3MG INTRAMUSCULAR ONE TIME FOR ONE DOSE AS NEEDED FOR ANAPHYLAXIS (CALL 911 IF YOU HAVE TO GIVE) (FOR BEE STINGS) **LABEL EACH PEN* 2 mL 3     gabapentin (NEURONTIN) 100 MG capsule Take 400 mg by mouth 3 times daily At 0900, 1200, and 2000       Gauze Pads & Dressings (GAUZE PADS 4\"X4\") 4\"X4\" PADS 1 each 3 times daily as needed 25 each 1     loratadine (CLARITIN) 10 MG tablet Take 10 mg by mouth daily       montelukast (SINGULAIR) 10 MG tablet TAKE 1 TABLET BY MOUTH EVERY MORNING (ASTHMA) 30 tablet 11     Multiple Vitamin (DAILY-JOVAN) TABS TAKE 1 TABLET BY MOUTH EVERY MORNING (VITAMINS) 30 tablet 11     naltrexone (DEPADE/REVIA) 50 MG tablet Take 50 mg by mouth daily       nicotine (NICODERM CQ) 21 MG/24HR 24 hr patch        nicotine (NICORETTE) 2 MG gum Take 2 mg by mouth as needed for smoking cessation       nystatin-triamcinolone (MYCOLOG II) 693110-8.1 UNIT/GM-% external cream Apply topically 2 times daily For 1-2 weeks.       order for DME Equipment being ordered: roll of micropore tape to dress foot wound bid 1 each 0     order for DME Equipment being ordered: 1 surgical shoe 1 Device 0     order for DME [employer] Phone [phone] Fax [phone]  EdemaWear Size Medium/Yellow qty 4 sleeves  Length of Need: 1 month  Frequency of dressing change daily 1 Device 0     order for DME [employer] Stock[employer]  Phone #[phone]  Fax #[phone]  Coolflex Velcro wraps    Length of Need: Life Time  # of Pairs 1 set 30 days 0     order for DME [employer] [facility] Phone [phone] Fax [phone]  Primary Dressing Hydrofera Blue Ready   Qty 5 sheets  Secondary Dressing 4' roll gauze Qty 30  Secondary Dressing 2' medipore tape Qty 1  Secondary Dressing 4x4 gauze loaf " Qty  1  Length of Need: 1 month  Frequency of dressing change: daily 30 days 0     order for DME Oxygen 2 Li/min  at night and bled into BiPAP 1 Device 0     order for DME Equipment being ordered: CPAP with mask and tubing 1 Device 0     order for DME Equipment being ordered: support compression hose BK  2 pair black 30mm HG   To be applied on arising & removed while lying down to go to sleep 1 each 0     PULMICORT FLEXHALER 180 MCG/ACT inhaler INHALE 2 PUFFS INTO THE LUNGS TWICE DAILY. 1 each 11     SENNA-TABS 8.6 MG tablet        sertraline (ZOLOFT) 100 MG tablet Take 100 mg by mouth daily Take with 50 mg tablet for a total dose of 150 mg daily.       sertraline (ZOLOFT) 50 MG tablet        simvastatin (ZOCOR) 40 MG tablet TAKE 1 TABLET BY MOUTH EVERY MORNING.  (CHOLESTEROL) 30 tablet 11     SPIRIVA HANDIHALER 18 MCG inhaled capsule INHALE CONTENTS OF 1 CAPSULE INTO THE LUNGS ONCE DAILY 30 capsule 11     spironolactone (ALDACTONE) 25 MG tablet TAKE 1 TABLET BY MOUTH ONCE DAILY. (HIGH BLOOD PRESSURE) 30 tablet 11     temazepam (RESTORIL) 15 MG capsule Take 15 mg by mouth nightly as needed for sleep       trolamine salicylate (ASPERCREME) 10 % external cream Apply topically as needed for moderate pain knee       warfarin ANTICOAGULANT (COUMADIN) 4 MG tablet 6 mg mon,fri and 8 mg ROW  Recheck INR 2/22/21 54 tablet 0     warfarin ANTICOAGULANT (COUMADIN) 4 MG tablet Take 1 tablet (4 mg) by mouth daily 6 mg mon,fri and 8 mg row 44 tablet 0     Warfarin Therapy Reminder 1 each continuous prn 1 each 0     White Petrolatum ointment Apply topically nightly as needed for dry skin         Allergies   Allergen Reactions     Bees      Augmentin Rash     Penicillins Rash        Social History     Tobacco Use     Smoking status: Current Every Day Smoker     Packs/day: 1.00     Years: 30.00     Pack years: 30.00     Types: Cigarettes     Smokeless tobacco: Never Used     Tobacco comment: started at age 18    Substance Use Topics      Alcohol use: No     Alcohol/week: 0.0 standard drinks     Family History   Problem Relation Age of Onset     Unknown/Adopted Mother      Unknown/Adopted Father      Diabetes Brother      History   Drug Use No         Objective     /64   Pulse 75   Temp 98.2  F (36.8  C) (Tympanic)   Wt 147.9 kg (326 lb)   SpO2 98%   BMI 45.47 kg/m      Physical Exam    GENERAL APPEARANCE: healthy, no distress and obese     EYES: EOMI,  PERRL     HENT: ear canals and TM's normal and nose and mouth without ulcers or lesions     NECK: no adenopathy, no asymmetry, masses, or scars and thyroid normal to palpation     RESP: lungs clear to auscultation - no rales, rhonchi or wheezes     CV: regular rates and rhythm, normal S1 S2, no S3 or S4 and no murmur, click or rub     ABDOMEN:  soft, nontender, no HSM or masses and bowel sounds normal     MS: extremities normal- no gross deformities noted, no evidence of inflammation in joints, FROM in all extremities.     SKIN: no suspicious lesions or rashes     NEURO: Normal strength and tone, sensory exam grossly normal, mentation intact and speech normal     PSYCH: mentation appears normal. and affect normal/bright     LYMPHATICS: No cervical adenopathy    Recent Labs   Lab Test 05/21/21  1309 05/17/21  1302 03/23/21  1042 03/23/21  1042 09/21/20  1748 09/21/20  1748 07/28/20  0929 07/28/20  0929   HGB  --   --   --   --   --  12.4*  --  12.4*   PLT  --   --   --   --   --  292  --  256   INR 2.60* 2.10*   < >  --    < >  --    < >  --    NA  --   --   --  140  --  138  --  138   POTASSIUM  --   --   --  4.1  --  3.9  --  3.9   CR  --   --   --  0.88  --  1.06  --  0.98   A1C  --   --   --  5.6  --   --   --  5.7*    < > = values in this interval not displayed.        Diagnostics:  Labs pending at this time.  Results will be reviewed when available.   No EKG required for low risk surgery (cataract, skin procedure, breast biopsy, etc).    Revised Cardiac Risk Index (RCRI):  The  patient has the following serious cardiovascular risks for perioperative complications:   - No serious cardiac risks = 0 points     RCRI Interpretation: 0 points: Class I (very low risk - 0.4% complication rate)           Signed Electronically by: Silvino Baker MD  Copy of this evaluation report is provided to requesting physician.

## 2021-06-04 ENCOUNTER — TELEPHONE (OUTPATIENT)
Dept: INTERNAL MEDICINE | Facility: CLINIC | Age: 61
End: 2021-06-04

## 2021-06-04 DIAGNOSIS — Z11.59 ENCOUNTER FOR SCREENING FOR OTHER VIRAL DISEASES: ICD-10-CM

## 2021-06-04 LAB
HBA1C MFR BLD: 5.7 % (ref 0–5.6)
LABORATORY COMMENT REPORT: NORMAL
SARS-COV-2 RNA RESP QL NAA+PROBE: NEGATIVE
SARS-COV-2 RNA RESP QL NAA+PROBE: NORMAL
SPECIMEN SOURCE: NORMAL
SPECIMEN SOURCE: NORMAL

## 2021-06-04 PROCEDURE — U0003 INFECTIOUS AGENT DETECTION BY NUCLEIC ACID (DNA OR RNA); SEVERE ACUTE RESPIRATORY SYNDROME CORONAVIRUS 2 (SARS-COV-2) (CORONAVIRUS DISEASE [COVID-19]), AMPLIFIED PROBE TECHNIQUE, MAKING USE OF HIGH THROUGHPUT TECHNOLOGIES AS DESCRIBED BY CMS-2020-01-R: HCPCS | Performed by: INTERNAL MEDICINE

## 2021-06-04 PROCEDURE — U0005 INFEC AGEN DETEC AMPLI PROBE: HCPCS | Performed by: INTERNAL MEDICINE

## 2021-06-04 NOTE — TELEPHONE ENCOUNTER
Camila nurse with [facility] left a VM with question on upcoming colonoscopy on Monday.  Is patient Bridging?? Group home think so but notes say otherwise.  Please call back to discuss.  Dorothy Gilman RN  Anticoagulation Nurse - [facility]

## 2021-06-04 NOTE — TELEPHONE ENCOUNTER
On 5/3 the order was placed that Petey would not need bridging per [facility].  He is to hold 6/3, 6/4, 6/5 & 6/6 and restart warfarin with warfarin 16 mg of OK with Coplonoscopy.  Hunter notified and said he understood and Camila at [facility] notified. Shaquille notified of dosing and they have the post colonoscopy dosing.  [facility] added a note to the chart for the nurse to make sure that Dr Belcher is OK with the 16 mg of warfarin on 6/7 and to let Hunter know.

## 2021-06-04 NOTE — OR NURSING
Prescriptions for Dulcolax tabs, miralax, gatorade and magnesium citrate called to [facility] pharmacy for patient per request of group home nurse.  Spoke with pharmacist Silvino, he stated he would have items delivered to group home for procedure on Monday 6/ 7.

## 2021-06-04 NOTE — TELEPHONE ENCOUNTER
Caregiver Hunter called [facility] and is requesting directions for using lovenox. Hunter states that Petey began warfarin hold last night. Please call back Hunter to clarify hold/bridge plan.

## 2021-06-07 ENCOUNTER — HOSPITAL ENCOUNTER (OUTPATIENT)
Facility: CLINIC | Age: 61
Discharge: HOME | End: 2021-06-07
Attending: INTERNAL MEDICINE | Admitting: INTERNAL MEDICINE
Payer: MEDICARE

## 2021-06-07 ENCOUNTER — ANESTHESIA EVENT (OUTPATIENT)
Dept: SURGERY | Facility: CLINIC | Age: 61
End: 2021-06-07
Payer: MEDICARE

## 2021-06-07 ENCOUNTER — TELEPHONE (OUTPATIENT)
Dept: INTERNAL MEDICINE | Facility: CLINIC | Age: 61
End: 2021-06-07

## 2021-06-07 ENCOUNTER — ANESTHESIA (OUTPATIENT)
Dept: SURGERY | Facility: CLINIC | Age: 61
End: 2021-06-07
Payer: MEDICARE

## 2021-06-07 VITALS
OXYGEN SATURATION: 96 % | SYSTOLIC BLOOD PRESSURE: 103 MMHG | DIASTOLIC BLOOD PRESSURE: 74 MMHG | HEIGHT: 72 IN | TEMPERATURE: 97.8 F | BODY MASS INDEX: 42.37 KG/M2 | WEIGHT: 312.8 LBS | RESPIRATION RATE: 15 BRPM | HEART RATE: 79 BPM

## 2021-06-07 DIAGNOSIS — Z12.11 COLON CANCER SCREENING: ICD-10-CM

## 2021-06-07 LAB
COLONOSCOPY: NORMAL
INR PPP: 1.08 (ref 0.86–1.14)
INTERPRETATION ECG - MUSE: NORMAL

## 2021-06-07 PROCEDURE — 370N000017 HC ANESTHESIA TECHNICAL FEE, PER MIN: Performed by: INTERNAL MEDICINE

## 2021-06-07 PROCEDURE — 250N000009 HC RX 250

## 2021-06-07 PROCEDURE — 710N000012 HC RECOVERY PHASE 2, PER MINUTE: Performed by: INTERNAL MEDICINE

## 2021-06-07 PROCEDURE — 258N000003 HC RX IP 258 OP 636

## 2021-06-07 PROCEDURE — 88305 TISSUE EXAM BY PATHOLOGIST: CPT | Mod: TC | Performed by: INTERNAL MEDICINE

## 2021-06-07 PROCEDURE — 272N000001 HC OR GENERAL SUPPLY STERILE: Performed by: INTERNAL MEDICINE

## 2021-06-07 PROCEDURE — 360N000075 HC SURGERY LEVEL 2, PER MIN: Performed by: INTERNAL MEDICINE

## 2021-06-07 PROCEDURE — 36415 COLL VENOUS BLD VENIPUNCTURE: CPT | Performed by: ANESTHESIOLOGY

## 2021-06-07 PROCEDURE — 250N000011 HC RX IP 250 OP 636

## 2021-06-07 PROCEDURE — 999N000036 HC STATISTIC COLONOSCOPY (OR PROCEDURE): Performed by: INTERNAL MEDICINE

## 2021-06-07 PROCEDURE — 999N000141 HC STATISTIC PRE-PROCEDURE NURSING ASSESSMENT: Performed by: INTERNAL MEDICINE

## 2021-06-07 PROCEDURE — 85610 PROTHROMBIN TIME: CPT | Performed by: ANESTHESIOLOGY

## 2021-06-07 PROCEDURE — 88305 TISSUE EXAM BY PATHOLOGIST: CPT | Mod: 26 | Performed by: PATHOLOGY

## 2021-06-07 RX ORDER — ONDANSETRON 2 MG/ML
4 INJECTION INTRAMUSCULAR; INTRAVENOUS EVERY 30 MIN PRN
Status: DISCONTINUED | OUTPATIENT
Start: 2021-06-07 | End: 2021-06-07 | Stop reason: HOSPADM

## 2021-06-07 RX ORDER — NALOXONE HYDROCHLORIDE 0.4 MG/ML
0.2 INJECTION, SOLUTION INTRAMUSCULAR; INTRAVENOUS; SUBCUTANEOUS
Status: DISCONTINUED | OUTPATIENT
Start: 2021-06-07 | End: 2021-06-07 | Stop reason: HOSPADM

## 2021-06-07 RX ORDER — MEPERIDINE HYDROCHLORIDE 25 MG/ML
12.5 INJECTION INTRAMUSCULAR; INTRAVENOUS; SUBCUTANEOUS
Status: DISCONTINUED | OUTPATIENT
Start: 2021-06-07 | End: 2021-06-07 | Stop reason: HOSPADM

## 2021-06-07 RX ORDER — FENTANYL CITRATE 50 UG/ML
25-50 INJECTION, SOLUTION INTRAMUSCULAR; INTRAVENOUS
Status: DISCONTINUED | OUTPATIENT
Start: 2021-06-07 | End: 2021-06-07 | Stop reason: HOSPADM

## 2021-06-07 RX ORDER — PROCHLORPERAZINE MALEATE 10 MG
10 TABLET ORAL EVERY 6 HOURS PRN
Status: CANCELLED | OUTPATIENT
Start: 2021-06-07

## 2021-06-07 RX ORDER — ACETAMINOPHEN 325 MG/1
975 TABLET ORAL ONCE
Status: DISCONTINUED | OUTPATIENT
Start: 2021-06-07 | End: 2021-06-07 | Stop reason: HOSPADM

## 2021-06-07 RX ORDER — PROPOFOL 10 MG/ML
INJECTION, EMULSION INTRAVENOUS PRN
Status: DISCONTINUED | OUTPATIENT
Start: 2021-06-07 | End: 2021-06-07

## 2021-06-07 RX ORDER — ONDANSETRON 4 MG/1
4 TABLET, ORALLY DISINTEGRATING ORAL EVERY 6 HOURS PRN
Status: CANCELLED | OUTPATIENT
Start: 2021-06-07

## 2021-06-07 RX ORDER — ONDANSETRON 2 MG/ML
4 INJECTION INTRAMUSCULAR; INTRAVENOUS EVERY 6 HOURS PRN
Status: CANCELLED | OUTPATIENT
Start: 2021-06-07

## 2021-06-07 RX ORDER — NALOXONE HYDROCHLORIDE 0.4 MG/ML
0.4 INJECTION, SOLUTION INTRAMUSCULAR; INTRAVENOUS; SUBCUTANEOUS
Status: DISCONTINUED | OUTPATIENT
Start: 2021-06-07 | End: 2021-06-07 | Stop reason: HOSPADM

## 2021-06-07 RX ORDER — ONDANSETRON 4 MG/1
4 TABLET, ORALLY DISINTEGRATING ORAL EVERY 30 MIN PRN
Status: DISCONTINUED | OUTPATIENT
Start: 2021-06-07 | End: 2021-06-07 | Stop reason: HOSPADM

## 2021-06-07 RX ORDER — SODIUM CHLORIDE, SODIUM LACTATE, POTASSIUM CHLORIDE, CALCIUM CHLORIDE 600; 310; 30; 20 MG/100ML; MG/100ML; MG/100ML; MG/100ML
INJECTION, SOLUTION INTRAVENOUS CONTINUOUS
Status: DISCONTINUED | OUTPATIENT
Start: 2021-06-07 | End: 2021-06-07 | Stop reason: HOSPADM

## 2021-06-07 RX ORDER — SODIUM CHLORIDE, SODIUM LACTATE, POTASSIUM CHLORIDE, CALCIUM CHLORIDE 600; 310; 30; 20 MG/100ML; MG/100ML; MG/100ML; MG/100ML
INJECTION, SOLUTION INTRAVENOUS CONTINUOUS PRN
Status: DISCONTINUED | OUTPATIENT
Start: 2021-06-07 | End: 2021-06-07

## 2021-06-07 RX ORDER — LIDOCAINE HYDROCHLORIDE 20 MG/ML
INJECTION, SOLUTION INFILTRATION; PERINEURAL PRN
Status: DISCONTINUED | OUTPATIENT
Start: 2021-06-07 | End: 2021-06-07

## 2021-06-07 RX ORDER — LABETALOL HYDROCHLORIDE 5 MG/ML
10 INJECTION, SOLUTION INTRAVENOUS
Status: DISCONTINUED | OUTPATIENT
Start: 2021-06-07 | End: 2021-06-07 | Stop reason: HOSPADM

## 2021-06-07 RX ORDER — HYDROMORPHONE HYDROCHLORIDE 1 MG/ML
.3-.5 INJECTION, SOLUTION INTRAMUSCULAR; INTRAVENOUS; SUBCUTANEOUS EVERY 10 MIN PRN
Status: DISCONTINUED | OUTPATIENT
Start: 2021-06-07 | End: 2021-06-07 | Stop reason: HOSPADM

## 2021-06-07 RX ORDER — FLUMAZENIL 0.1 MG/ML
0.2 INJECTION, SOLUTION INTRAVENOUS
Status: CANCELLED | OUTPATIENT
Start: 2021-06-07 | End: 2021-06-07

## 2021-06-07 RX ORDER — ONDANSETRON 2 MG/ML
4 INJECTION INTRAMUSCULAR; INTRAVENOUS
Status: DISCONTINUED | OUTPATIENT
Start: 2021-06-07 | End: 2021-06-07 | Stop reason: HOSPADM

## 2021-06-07 RX ORDER — LIDOCAINE 40 MG/G
CREAM TOPICAL
Status: DISCONTINUED | OUTPATIENT
Start: 2021-06-07 | End: 2021-06-07 | Stop reason: HOSPADM

## 2021-06-07 RX ORDER — GLYCOPYRROLATE 0.2 MG/ML
INJECTION, SOLUTION INTRAMUSCULAR; INTRAVENOUS PRN
Status: DISCONTINUED | OUTPATIENT
Start: 2021-06-07 | End: 2021-06-07

## 2021-06-07 RX ORDER — PROPOFOL 10 MG/ML
INJECTION, EMULSION INTRAVENOUS CONTINUOUS PRN
Status: DISCONTINUED | OUTPATIENT
Start: 2021-06-07 | End: 2021-06-07

## 2021-06-07 RX ORDER — ONDANSETRON 2 MG/ML
INJECTION INTRAMUSCULAR; INTRAVENOUS PRN
Status: DISCONTINUED | OUTPATIENT
Start: 2021-06-07 | End: 2021-06-07

## 2021-06-07 RX ADMIN — SODIUM CHLORIDE, POTASSIUM CHLORIDE, SODIUM LACTATE AND CALCIUM CHLORIDE: 600; 310; 30; 20 INJECTION, SOLUTION INTRAVENOUS at 07:30

## 2021-06-07 RX ADMIN — LIDOCAINE HYDROCHLORIDE 40 MG: 20 INJECTION, SOLUTION INFILTRATION; PERINEURAL at 07:58

## 2021-06-07 RX ADMIN — PROPOFOL 40 MG: 10 INJECTION, EMULSION INTRAVENOUS at 07:57

## 2021-06-07 RX ADMIN — ONDANSETRON HYDROCHLORIDE 4 MG: 2 INJECTION, SOLUTION INTRAVENOUS at 08:04

## 2021-06-07 RX ADMIN — GLYCOPYRROLATE 0.2 MG: 0.2 INJECTION, SOLUTION INTRAMUSCULAR; INTRAVENOUS at 07:58

## 2021-06-07 RX ADMIN — PROPOFOL 140 MCG/KG/MIN: 10 INJECTION, EMULSION INTRAVENOUS at 07:57

## 2021-06-07 ASSESSMENT — MIFFLIN-ST. JEOR: SCORE: 2256.85

## 2021-06-07 ASSESSMENT — COPD QUESTIONNAIRES: COPD: 1

## 2021-06-07 NOTE — LETTER
March 26, 2021      Petey Archibald  [address]  [facility] [address]         Thank you for choosing [facility]. You are scheduled for the following service(s).   Please be aware that coverage of these services is subject to the terms and limitations of your health insurance plan.  Call member services at your health plan with any benefit or coverage questions.    You will need to have a History and Physical Exam done within 30 days of this scheduled procedure. Please arrange this with your primary care physician.    Date:  Friday April 9, 2021    Procedure:   COLONOSCOPY    Doctor:  Dr Oseguera                      Arrival Time:  0930   *check in at the Surgery Center desk*     Procedure Time: 1100      Location:   [facility]      Surgery Center (on the south side of the [facility])       [address] [place], [place] [address]  MIRALAX -GATORADE  PREP  Colonoscopy is the most accurate test to detect colon polyps and colon cancer; and the only test where polyps can be removed. During this procedure, a doctor examines the lining of your large intestine and rectum through a flexible tube.     Transportation  You must arrange for a ride for the day of your procedure with a responsible adult. A taxi , Uber, etc, is not an option unless you are accompanied by a responsible adult. If you fail to arrange transportation with a responsible adult, your procedure will be cancelled and rescheduled.      Purchase the  following supplies at your local pharmacy:  - 2 (two) bisacodyl tablets: each tablet contains 5 mg.  (Dulcolax  laxative NOT Dulcolax  stool softener)   - 1 (one) 8.3 oz bottle of Polyethylene Glycol (PEG) 3350 Powder   (MiraLAX , Smooth LAX , ClearLAX  or equivalent)  - 64 oz Gatorade    Regular Gatorade, Gatorade G2 , Powerade , Powerade Zero  or Pedialyte  is acceptable. Red colored flavors are not allowed; all other colors (yellow, green,  orange, purple and blue) are okay. It is also okay to buy two 2.12 oz packets of powdered Gatorade that can be mixed with water to a total volume of 64 oz of liquid.  - 1 (one) 10 oz bottle of Magnesium Citrate (Red colored flavors are not allowed)  It is also okay for you to use a 0.5 oz package of powdered magnesium citrate (17 g) mixed with 10 oz of water.      PREPARATION FOR COLONOSCOPY    7 days before:    Discontinue fiber supplements and medications containing iron. This includes Metamucil  and Fibercon ; and multivitamins with iron.  3 days before:    Begin a low-fiber diet. A low-fiber diet helps making the cleanout more effective.     Examples of a low-fiber diet include (but are not limited to): white bread, white rice, pasta, crackers, fish, chicken, eggs, ground beef, creamy peanut butter, cooked/steamed/boiled vegetables, canned fruit, bananas, melons, milk, plain yogurt cheese, salad dressing and other condiments.     The following are not allowed on a low-fiber diet: seeds, nuts, popcorn, bran, whole wheat, corn, quinoa, raw fruits and vegetables, berries and dried fruit, beans and lentils.    For additional details on low-fiber diet, please refer to the table on the last page.  2 days before:    Continue the low-fiber diet.     Drink at least 8 glasses of water throughout the day.     Stop eating solid foods at 11:45 pm.  1 day before:    In the morning: begin a clear liquid diet (liquids you can see through).     Examples of a clear liquid diet include: water, clear broth or bouillon, Gatorade, Pedialyte or Powerade, carbonated and non-carbonated soft drinks (Sprite , 7-Up , ginger ale), strained fruit juices without pulp (apple, white grape, white cranberry), Jell-O  and popsicles.     The following are not allowed on a clear liquid diet: red liquids, alcoholic beverages,  dairy products (milk, creamer, and yogurt), protein shakes,  juice with pulp and chewing tobacco.    At noon: take 2 (two)  bisacodyl tablets     At 4 (and no later than 6pm): start drinking the Miralax-Gatorade preparation (8.3 oz of Miralax mixed with 64 oz of Gatorade in a large pitcher). Drink 1(one) 8 oz glass every 15 minutes thereafter, until the mixture is gone.    COLON CLEANSING TIPS: drink adequate amounts of fluids before and after your colon cleansing to prevent dehydration. Stay near a toilet because you will have diarrhea. Even if you are sitting on the toilet, continue to drink the cleansing solution every 15 minutes. If you feel nauseous or vomit, rinse your mouth with water, take a 15 to 30-minute-break and then continue drinking the solution. You will be uncomfortable until the stool has flushed from your colon (in about 2 to 4 hours). You may feel chilled.    Day of your procedure  You may take all of your morning medications including blood pressure medications, blood thinners (if you have not been instructed to stop these by our office), methadone, anti-seizure medications with sips of water 3 hours prior to your procedure or earlier. Do not take insulin or vitamins prior to your procedure. Continue the clear liquid diet.   4 hours prior: drink 10 oz of magnesium citrate. It may be easier to drink it with a straw.    STOP consuming all liquids after that.     Do not take anything by mouth during this time.     Allow extra time to travel to your procedure as you may need to stop and use a restroom along the way.  You are ready for the procedure, if you followed all instructions and your stool is no longer formed, but clear or yellow liquid. If you are unsure whether your colon is clean, please call our office at [phone] before you leave for your appointment.    Bring the following to your procedure:  - Insurance Card/Photo ID.   - List of current medications including over-the-counter medications and supplements.   - Your rescue inhaler if you currently use one to control asthma.    Canceling or rescheduling  your appointment:   If you must cancel or reschedule your appointment, please call [phone] as soon as possible.    COLONOSCOPY PRE-PROCEDURE CHECKLIST  If you have diabetes, ask your regular doctor for diet and medication restrictions.  If you take an anticoagulant or anti-platelet medication (such as Coumadin , Lovenox , Pradaxa , Xarelto , Eliquis , etc.), please call your primary doctor for advice on holding this medication.  If you take aspirin you may continue to do so.  If you are or may be pregnant, please discuss the risks and benefits of this procedure with your doctor.    What happens during a colonoscopy?  Plan to spend up to two hours, starting at registration time, at the endoscopy center the day of your procedure. The colonoscopy takes an average of 15 to 30 minutes. Recovery time is about 30 minutes.    Before the exam:    You will change into a gown.    Your medical history and medication list will be reviewed with you, unless that has been done over the phone prior to the procedure.     A nurse will insert an intravenous (IV) line into your hand or arm.    The doctor will meet with you and will give you a consent form to sign.    During the exam:     Medicine will be given through the IV line to help you relax.     Your heart rate and oxygen levels will be monitored. If your blood pressure is low, you may be given fluids through the IV line.     The doctor will insert a flexible hollow tube, called a colonoscope, into your rectum. The scope will be advanced slowly through the large intestine (colon).    You may have a feeling of fullness or pressure.     If an abnormal tissue or a polyp is found, the doctor may remove it through the endoscope for closer examination, or biopsy. Tissue removal is painless    After the exam:           Any tissue samples removed during the exam will be sent to a lab for evaluation. It may take 5-7 working days for you to be notified of the results.     A nurse will  provide you with complete discharge instructions before you leave the endoscopy center. Be sure to ask the nurse for specific instructions if you take blood thinners such as Aspirin, Coumadin or Plavix.     The doctor will prepare a full report for you and for the physician who referred you for the procedure.     Your doctor will talk with you about the initial results of your exam.      Medication given during the exam will prohibit you from driving for the rest of the day.     Following the exam, you may resume your normal diet. Your first meal should be light, no greasy foods. Avoid alcohol until the next day.     You may resume your regular activities the day after the procedure.     LOW-FIBER DIET  Foods RECOMMENDED Foods to AVOID   Breads, Cereal, Rice and Pasta:   White bread, rolls, biscuits, croissant and suellen toast.   Waffles, Spanish toast and pancakes.   White rice, noodles, pasta, macaroni and peeled cooked potatoes.   Plain crackers and saltines.   Cooked cereals: farina, cream of rice.   Cold cereals: Puffed Rice , Rice Krispies , Corn Flakes  and Special K    Breads, Cereal, Rice and Pasta:   Breads or rolls with nuts, seeds or fruit.   Whole wheat, pumpernickel, rye breads and cornbread.   Potatoes with skin, brown or wild rice, and kasha (buckwheat).     Vegetables:   Tender cooked and canned vegetables without seeds: carrots, asparagus tips, green or wax beans, pumpkin, spinach, lima beans. Vegetables:   Raw or steamed vegetables.   Vegetables with seeds.   Sauerkraut.   Winter squash, peas, broccoli, Brussel sprouts, cabbage, onions, cauliflower, baked beans, peas and corn.   Fruits:   Strained fruit juice.   Canned fruit, except pineapple.   Ripe bananas and melon. Fruits:   Prunes and prune juice.   Raw fruits.   Dried fruits: figs, dates and raisins.   Milk/Dairy:   Milk: plain or flavored.   Yogurt, custard and ice cream.   Cheese and cottage cheese Milk/Dairy:     Meat and other  proteins:   ground, well-cooked tender beef, lamb, ham, veal, pork, fish, poultry and organ meats.   Eggs.   Peanut butter without nuts. Meat and other proteins:   Tough, fibrous meats with gristle.   Dry beans, peas and lentils.   Peanut butter with nuts.   Tofu.   Fats, Snack, Sweets, Condiments and Beverages:   Margarine, butter, oils, mayonnaise, sour cream and salad dressing, plain gravy.   Sugar, hard candy, clear jelly, honey and syrup.   Spices, cooked herbs, bouillon, broth and soups made with allowed vegetable, ketchup and mustard.   Coffee, tea and carbonated drinks.   Plain cakes, cookies and pretzels.   Gelatin, plain puddings, custard, ice cream, sherbet and popsicles. Fats, Snack, Sweets, Condiments and Beverages:   Nuts, seeds and coconut.   Jam, marmalade and preserves.   Pickles, olives, relish and horseradish.   All desserts containing nuts, seeds, dried fruit and coconut; or made from whole grains or bran.   Candy made with nuts or seeds.   Popcorn.         DIRECTIONS TO THE SURGERY CENTER    From the north ([facility])  Take [address] south, exit on [address] [address]. Get into the left hand yoav, turn left (east), go one-half mile to [address] and turn left. Go north to the first stoplight, take a right on [address] and follow it to the Surgery Center entrance.    From the south ([facility])  Take [address]N to the [address] split and exit on [address] [address]. On [address] [address], turn left (west) to [address]. Turn right (north) on [address]. Go north to the first stoplight, take a right on [address] and follow it to the Surgery Center entrance.    From the east via 35E ([facility])  Take [address] south to [address] [address] exit. Turn right on [address] [address]. Go west to [address]. Turn right (north) on [address]. Go to the first stoplight, take a right and follow on [address] to the Surgery Center entrance.    From the east via [facility]  ([place])  Take [address] west to [address]. Turn left (south) on [address] to [address]. Turn left (east) on [address] and follow it to the Surgery Center entrance.    From the west via [address] ([place], [place])  Take [address] east to [address]. Turn right (south) on [address] to [address]. Turn left (east) on [address] and follow it to the Surgery Center entrance.

## 2021-06-07 NOTE — LETTER
June 7, 2021      To Whom It May Concern:      Petey Archibald was seen in our medical facility today, 06/07/21.  He can return to work on 6/8/2021 without restrictions. If there are questions, please contact Dr Link at [phone].    Sincerely,    Merced Hewitt RN

## 2021-06-07 NOTE — LETTER
May 3, 2021      Petey Archibald  [address] [facility] [address]        Dear Petey,     Thank you for choosing [facility]. You are scheduled for the following service(s).   Please be aware that coverage of these services is subject to the terms and limitations of your health insurance plan.  Call member services at your health plan with any benefit or coverage questions.    You will need to have a History and Physical Exam done within 30 days of this scheduled procedure. Please arrange this with your primary care physician.    Date:  6/7/2021    Procedure:   COLONOSCOPY    Doctor:  Dr. Blayne Bullock                      Arrival Time:  12:00pm   *check in at the Surgery Center desk*     Procedure Time: 12:30pm      Location:   [facility]      Surgery Center (on the south side of the [facility]")       [address] [place], [place] [address]              MIRALAX -GATORADE  PREP  Colonoscopy is the most accurate test to detect colon polyps and colon cancer; and the only test where polyps can be removed. During this procedure, a doctor examines the lining of your large intestine and rectum through a flexible tube.     Transportation  You must arrange for a ride for the day of your procedure with a responsible adult. A taxi , Uber, etc, is not an option unless you are accompanied by a responsible adult. If you fail to arrange transportation with a responsible adult, your procedure will be cancelled and rescheduled.      Purchase the  following supplies at your local pharmacy:  - 2 (two) bisacodyl tablets: each tablet contains 5 mg.  (Dulcolax  laxative NOT Dulcolax  stool softener)   - 1 (one) 8.3 oz bottle of Polyethylene Glycol (PEG) 3350 Powder   (MiraLAX , Smooth LAX , ClearLAX  or equivalent)  - 64 oz Gatorade    Regular Gatorade, Gatorade G2 , Powerade , Powerade Zero  or Pedialyte  is acceptable. Red colored flavors are not allowed; all  other colors (yellow, green, orange, purple and blue) are okay. It is also okay to buy two 2.12 oz packets of powdered Gatorade that can be mixed with water to a total volume of 64 oz of liquid.  - 1 (one) 10 oz bottle of Magnesium Citrate (Red colored flavors are not allowed)  It is also okay for you to use a 0.5 oz package of powdered magnesium citrate (17 g) mixed with 10 oz of water.      PREPARATION FOR COLONOSCOPY    7 days before:    Discontinue fiber supplements and medications containing iron. This includes Metamucil  and Fibercon ; and multivitamins with iron.    3 days before:    Begin a low-fiber diet. A low-fiber diet helps making the cleanout more effective.     Examples of a low-fiber diet include (but are not limited to): white bread, white rice, pasta, crackers, fish, chicken, eggs, ground beef, creamy peanut butter, cooked/steamed/boiled vegetables, canned fruit, bananas, melons, milk, plain yogurt cheese, salad dressing and other condiments.     The following are not allowed on a low-fiber diet: seeds, nuts, popcorn, bran, whole wheat, corn, quinoa, raw fruits and vegetables, berries and dried fruit, beans and lentils.    For additional details on low-fiber diet, please refer to the table on the last page.  2 days before:    Continue the low-fiber diet.     Drink at least 8 glasses of water throughout the day.     Stop eating solid foods at 11:45 pm.  1 day before:    In the morning: begin a clear liquid diet (liquids you can see through).     Examples of a clear liquid diet include: water, clear broth or bouillon, Gatorade, Pedialyte or Powerade, carbonated and non-carbonated soft drinks (Sprite , 7-Up , ginger ale), strained fruit juices without pulp (apple, white grape, white cranberry), Jell-O  and popsicles.     The following are not allowed on a clear liquid diet: red liquids, alcoholic beverages,  dairy products (milk, creamer, and yogurt), protein shakes,  juice with pulp and chewing  tobacco.    At noon: take 2 (two) bisacodyl tablets     At 4 (and no later than 6pm): start drinking the Miralax-Gatorade preparation (8.3 oz of Miralax mixed with 64 oz of Gatorade in a large pitcher). Drink 1(one) 8 oz glass every 15 minutes thereafter, until the mixture is gone.    COLON CLEANSING TIPS: drink adequate amounts of fluids before and after your colon cleansing to prevent dehydration. Stay near a toilet because you will have diarrhea. Even if you are sitting on the toilet, continue to drink the cleansing solution every 15 minutes. If you feel nauseous or vomit, rinse your mouth with water, take a 15 to 30-minute-break and then continue drinking the solution. You will be uncomfortable until the stool has flushed from your colon (in about 2 to 4 hours). You may feel chilled.    Day of your procedure  You may take all of your morning medications including blood pressure medications, blood thinners (if you have not been instructed to stop these by our office), methadone, anti-seizure medications with sips of water 3 hours prior to your procedure or earlier. Do not take insulin or vitamins prior to your procedure. Continue the clear liquid diet.   4 hours prior: drink 10 oz of magnesium citrate. It may be easier to drink it with a straw.    STOP consuming all liquids after that.     Do not take anything by mouth during this time.     Allow extra time to travel to your procedure as you may need to stop and use a restroom along the way.  You are ready for the procedure, if you followed all instructions and your stool is no longer formed, but clear or yellow liquid. If you are unsure whether your colon is clean, please call our office at [phone] before you leave for your appointment.    Bring the following to your procedure:  - Insurance Card/Photo ID.   - List of current medications including over-the-counter medications and supplements.   - Your rescue inhaler if you currently use one to control  asthma.    Canceling or rescheduling your appointment:   If you must cancel or reschedule your appointment, please call [phone] as soon as possible.    COLONOSCOPY PRE-PROCEDURE CHECKLIST  If you have diabetes, ask your regular doctor for diet and medication restrictions.  If you take an anticoagulant or anti-platelet medication (such as Coumadin , Lovenox , Pradaxa , Xarelto , Eliquis , etc.), please call your primary doctor for advice on holding this medication.  If you take aspirin you may continue to do so.  If you are or may be pregnant, please discuss the risks and benefits of this procedure with your doctor.    What happens during a colonoscopy?  Plan to spend up to two hours, starting at registration time, at the endoscopy center the day of your procedure. The colonoscopy takes an average of 15 to 30 minutes. Recovery time is about 30 minutes.    Before the exam:    You will change into a gown.    Your medical history and medication list will be reviewed with you, unless that has been done over the phone prior to the procedure.     A nurse will insert an intravenous (IV) line into your hand or arm.    The doctor will meet with you and will give you a consent form to sign.    During the exam:     Medicine will be given through the IV line to help you relax.     Your heart rate and oxygen levels will be monitored. If your blood pressure is low, you may be given fluids through the IV line.     The doctor will insert a flexible hollow tube, called a colonoscope, into your rectum. The scope will be advanced slowly through the large intestine (colon).    You may have a feeling of fullness or pressure.     If an abnormal tissue or a polyp is found, the doctor may remove it through the endoscope for closer examination, or biopsy. Tissue removal is painless    After the exam:           Any tissue samples removed during the exam will be sent to a lab for evaluation. It may take 5-7 working days for you to be  notified of the results.     A nurse will provide you with complete discharge instructions before you leave the endoscopy center. Be sure to ask the nurse for specific instructions if you take blood thinners such as Aspirin, Coumadin or Plavix.     The doctor will prepare a full report for you and for the physician who referred you for the procedure.     Your doctor will talk with you about the initial results of your exam.      Medication given during the exam will prohibit you from driving for the rest of the day.     Following the exam, you may resume your normal diet. Your first meal should be light, no greasy foods. Avoid alcohol until the next day.     You may resume your regular activities the day after the procedure.         LOW-FIBER DIET  Foods RECOMMENDED Foods to AVOID   Breads, Cereal, Rice and Pasta:   White bread, rolls, biscuits, croissant and suellen toast.   Waffles, B[place] toast and pancakes.   White rice, noodles, pasta, macaroni and peeled cooked potatoes.   Plain crackers and saltines.   Cooked cereals: farina, cream of rice.   Cold cereals: Puffed Rice , Rice Krispies , Corn Flakes  and Special K    Breads, Cereal, Rice and Pasta:   Breads or rolls with nuts, seeds or fruit.   Whole wheat, pumpernickel, rye breads and cornbread.   Potatoes with skin, brown or wild rice, and kasha (buckwheat).     Vegetables:   Tender cooked and canned vegetables without seeds: carrots, asparagus tips, green or wax beans, pumpkin, spinach, lima beans. Vegetables:   Raw or steamed vegetables.   Vegetables with seeds.   Sauerkraut.   Winter squash, peas, broccoli, Brussel sprouts, cabbage, onions, cauliflower, baked beans, peas and corn.   Fruits:   Strained fruit juice.   Canned fruit, except pineapple.   Ripe bananas and melon. Fruits:   Prunes and prune juice.   Raw fruits.   Dried fruits: figs, dates and raisins.   Milk/Dairy:   Milk: plain or flavored.   Yogurt, custard and ice cream.   Cheese and cottage  cheese Milk/Dairy:     Meat and other proteins:   ground, well-cooked tender beef, lamb, ham, veal, pork, fish, poultry and organ meats.   Eggs.   Peanut butter without nuts. Meat and other proteins:   Tough, fibrous meats with gristle.   Dry beans, peas and lentils.   Peanut butter with nuts.   Tofu.   Fats, Snack, Sweets, Condiments and Beverages:   Margarine, butter, oils, mayonnaise, sour cream and salad dressing, plain gravy.   Sugar, hard candy, clear jelly, honey and syrup.   Spices, cooked herbs, bouillon, broth and soups made with allowed vegetable, ketchup and mustard.   Coffee, tea and carbonated drinks.   Plain cakes, cookies and pretzels.   Gelatin, plain puddings, custard, ice cream, sherbet and popsicles. Fats, Snack, Sweets, Condiments and Beverages:   Nuts, seeds and coconut.   Jam, marmalade and preserves.   Pickles, olives, relish and horseradish.   All desserts containing nuts, seeds, dried fruit and coconut; or made from whole grains or bran.   Candy made with nuts or seeds.   Popcorn.                                                                                           DIRECTIONS TO THE SURGERY CENTER    From the north ([facility])  Take [address] south, exit on [address] [address]. Get into the left hand yoav, turn left (east), go one-half mile to [address] and turn left. Go north to the first stoplight, take a right on [address] and follow it to the Surgery Center entrance.    From the south ([facility])  Take 35N to the 35E split and exit on [address] [address]. On [address] , turn left (west) to [address]. Turn right (north) on [address]. Go north to the first stoplight, take a right on [address] and follow it to the Surgery Center entrance.    From the east via 35E ([facility])  Take [address]E south to [address] [address] exit. Turn right on [address] [address]. Go west to [address]. Turn right (north) on [address]. Go to the first  stoplight, take a right and follow on [address] to the Surgery Center entrance.    From the east via [address] ([place])  Take [address] west to [address]. Turn left (south) on [address] to [address]. Turn left (east) on [address] and follow it to the Surgery Center entrance.    From the west via [address] ([place], [place])  Take [address] east to [address]. Turn right (south) on [address] to [address]. Turn left (east) on [address] and follow it to the Surgery Center entrance.

## 2021-06-07 NOTE — ANESTHESIA CARE TRANSFER NOTE
Patient: Petey Archibald    Procedure(s):  COLONOSCOPY with polypectomies    Diagnosis: Hx of colonic polyps [Z86.010]  Screen for colon cancer [Z12.11]  Diagnosis Additional Information: No value filed.    Anesthesia Type:   MAC     Note:    Oropharynx: spontaneously breathing  Level of Consciousness: awake  Oxygen Supplementation: face mask  Level of Supplemental Oxygen (L/min / FiO2): 6l  Independent Airway: airway patency satisfactory and stable  Dentition: dentition unchanged  Vital Signs Stable: post-procedure vital signs reviewed and stable  Report to RN Given: handoff report given  Patient transferred to: Phase II  Comments: Pt to phase 2, VS, report to RN  Handoff Report: Identifed the Patient, Identified the Reponsible Provider, Reviewed the pertinent medical history, Discussed the surgical course, Reviewed Intra-OP anesthesia mangement and issues during anesthesia, Set expectations for post-procedure period and Allowed opportunity for questions and acknowledgement of understanding      Vitals: (Last set prior to Anesthesia Care Transfer)  CRNA VITALS  6/7/2021 0758 - 6/7/2021 0836      6/7/2021             Pulse:  79    SpO2:  97 %        Electronically Signed By: MARINO Wallace CRNA  June 7, 2021  8:36 AM

## 2021-06-07 NOTE — DISCHARGE INSTRUCTIONS
[facility] ADULT DISCHARGE INSTRUCTIONS   SPECIAL PRECAUTIONS FOR 24 HOURS AFTER SURGERY    IT IS NOT UNUSUAL TO FEEL LIGHT-HEADED OR FAINT, UP TO 24 HOURS AFTER SURGERY OR WHILE TAKING PAIN MEDICATION.  IF YOU HAVE THESE SYMPTOMS; SIT FOR A FEW MINUTES BEFORE STANDING AND HAVE SOMEONE ASSIST YOU WHEN YOU GET UP TO WALK OR USE THE BATHROOM.    YOU SHOULD REST AND RELAX FOR THE NEXT 24 HOURS AND YOU MUST MAKE ARRANGEMENTS TO HAVE SOMEONE STAY WITH YOU FOR AT LEAST 24 HOURS AFTER YOUR DISCHARGE.  AVOID HAZARDOUS AND STRENUOUS ACTIVITIES.  DO NOT MAKE IMPORTANT DECISIONS FOR 24 HOURS.    DO NOT DRIVE ANY VEHICLE OR OPERATE MECHANICAL EQUIPMENT FOR 24 HOURS FOLLOWING THE END OF YOUR SURGERY.  EVEN THOUGH YOU MAY FEEL NORMAL, YOUR REACTIONS MAY BE AFFECTED BY THE MEDICATION YOU HAVE RECEIVED.    DO NOT DRINK ALCOHOLIC BEVERAGES FOR 24 HOURS FOLLOWING YOUR SURGERY.    DRINK CLEAR LIQUIDS (APPLE JUICE, GINGER ALE, 7-UP, BROTH, ETC.).  PROGRESS TO YOUR REGULAR DIET AS YOU FEEL ABLE.    YOU MAY HAVE A DRY MOUTH, A SORE THROAT, MUSCLES ACHES OR TROUBLE SLEEPING.  THESE SHOULD GO AWAY AFTER 24 HOURS.    CALL YOUR DOCTOR FOR ANY OF THE FOLLOWING:  SIGNS OF INFECTION (FEVER, GROWING TENDERNESS AT THE SURGERY SITE, A LARGE AMOUNT OF DRAINAGE OR BLEEDING, SEVERE PAIN, FOUL-SMELLING DRAINAGE, REDNESS OR SWELLING.    IT HAS BEEN OVER 8 TO 10 HOURS SINCE SURGERY AND YOU ARE STILL NOT ABLE TO URINATE (PASS WATER).     COLONOSCOPY DISCHARGE INSTRUCTIONS    You may not drive, use heavy equipment or consume alcohol for 24 hours because the drugs you were given may cause dizziness, drowsiness, forgetfulness and slower reaction time.    You may resume your regular diet and medications.    If you had a biopsy or polypectomy done, do not take aspirin, aleve (naproxen) or ibuprofen products for the next 10 days.  Tylenol (acetaminophen) is safe to take.    What to watch for:    Problems rarely occur after the exam.  It is important for you to  be aware of the early signs of a possible complication.  Call your doctor immediately if you notice any of the followin.  Unusual or persistent abdominal pain (pain that does not move around like a gas pain).    2.  Passing bright red blood from your rectum.    3.  Black or bloody stools.    4.  Temperature above 100.6 degrees F (37.5 degrees C)    If you feel this has become a medical emergency please call 911.    DR. ANUSHKA RICKS M.D.   CLINIC PHONE NUMBER:  [phone]

## 2021-06-07 NOTE — ANESTHESIA PREPROCEDURE EVALUATION
Anesthesia Pre-Procedure Evaluation    Patient: Petey Archibald   MRN: [id number] : 1960        Preoperative Diagnosis: Hx of colonic polyps [Z86.010]  Screen for colon cancer [Z12.11]   Procedure : Procedure(s):  COLONOSCOPY (FV)     Past Medical History:   Diagnosis Date     Borderline mental retardation 2013     Chronic infection     MRSA     Coagulation disorder (H)     on coumadin     COPD (chronic obstructive pulmonary disease) (H)      History of DVT of lower extremity      History of pulmonary embolism      Hyperlipidemia      Mild intellectual disabilities      Morbid obesity (H)      Peripheral edema      Peripheral vascular disease (H)      Sleep apnea     uses CPAP     Thrombosis      Tobacco dependence      Uncomplicated asthma      Venous stasis dermatitis       Past Surgical History:   Procedure Laterality Date     COLONOSCOPY N/A 4/15/2019    Procedure: COMBINED COLONOSCOPY, SINGLE OR MULTIPLE BIOPSY/POLYPECTOMY BY BIOPSY;  Surgeon: Jovana Ohara MD;  Location:  GI     EXCISE MALIGNANT LESIONS, TRUNK/ARMS/LEGS 0.5CM OR LESS Left 2017    Procedure: EXCISE MALIGNANT LESIONS, TRUNK/ARMS/LEGS 0.5CM OR LESS;  EXCISION LEFT ELBOW MASS;  Surgeon: Jose Azevedo MD;  Location:  GI     RECTAL SURGERY      perianal abscess?      Allergies   Allergen Reactions     Bees      Augmentin Rash     Penicillins Rash      Social History     Tobacco Use     Smoking status: Current Every Day Smoker     Packs/day: 1.00     Years: 30.00     Pack years: 30.00     Types: Cigarettes     Smokeless tobacco: Never Used     Tobacco comment: started at age 18    Substance Use Topics     Alcohol use: No     Alcohol/week: 0.0 standard drinks      Wt Readings from Last 1 Encounters:   21 141.9 kg (312 lb 12.8 oz)        Anesthesia Evaluation   Pt has had prior anesthetic.     No history of anesthetic complications       ROS/MED HX  ENT/Pulmonary:     (+) sleep apnea, uses CPAP, COPD,      Neurologic:  - neg neurologic ROS     Cardiovascular:  - neg cardiovascular ROS     METS/Exercise Tolerance:     Hematologic:     (+) History of blood clots, pt is anticoagulated,     Musculoskeletal:   (+) arthritis,     GI/Hepatic:     (+) GERD, Asymptomatic on medication,     Renal/Genitourinary:  - neg Renal ROS     Endo:     (+) Obesity,     Psychiatric/Substance Use:  - neg psychiatric ROS     Infectious Disease:  - neg infectious disease ROS     Malignancy:       Other:            Physical Exam    Airway        Mallampati: III   TM distance: > 3 FB   Neck ROM: full   Mouth opening: > 3 cm    Respiratory Devices and Support         Dental       (+) missing      Cardiovascular   cardiovascular exam normal          Pulmonary           (+) decreased breath sounds           OUTSIDE LABS:  CBC:   Lab Results   Component Value Date    WBC 4.8 06/03/2021    WBC 5.2 09/21/2020    HGB 11.9 (L) 06/03/2021    HGB 12.4 (L) 09/21/2020    HCT 38.9 (L) 06/03/2021    HCT 40.4 09/21/2020     06/03/2021     09/21/2020     BMP:   Lab Results   Component Value Date     06/03/2021     03/23/2021    POTASSIUM 4.2 06/03/2021    POTASSIUM 4.1 03/23/2021    CHLORIDE 107 06/03/2021    CHLORIDE 111 (H) 03/23/2021    CO2 31 06/03/2021    CO2 28 03/23/2021    BUN 20 06/03/2021    BUN 18 03/23/2021    CR 0.85 06/03/2021    CR 0.88 03/23/2021     (H) 06/03/2021     (H) 03/23/2021     COAGS:   Lab Results   Component Value Date    PTT 63 (H) 01/05/2020    INR 2.60 (H) 05/21/2021    FIBR 0.0 02/11/2011     POC:   Lab Results   Component Value Date     (H) 06/04/2010     HEPATIC:   Lab Results   Component Value Date    ALBUMIN 3.3 (L) 07/28/2020    PROTTOTAL 8.4 07/28/2020    ALT 39 07/28/2020    AST 27 07/28/2020    ALKPHOS 112 07/28/2020    BILITOTAL 0.6 07/28/2020     OTHER:   Lab Results   Component Value Date    LACT 1.1 09/21/2020    A1C 5.7 (H) 06/03/2021    JESSICA 8.9 06/03/2021    PHOS 3.5  03/12/2013    CRP 12.4 (H) 07/26/2020    SED 70 (H) 05/18/2020       Anesthesia Plan    ASA Status:  3      Anesthesia Type: MAC.     - Reason for MAC: straight local not clinically adequate   Induction: Intravenous.           Consents    Anesthesia Plan(s) and associated risks, benefits, and realistic alternatives discussed. Questions answered and patient/representative(s) expressed understanding.     - Discussed with:  Patient      - Extended Intubation/Ventilatory Support Discussed: No.      - Patient is DNR/DNI Status: No    Use of blood products discussed: No .     Postoperative Care    Pain management: Oral pain medications, IV analgesics.   PONV prophylaxis: Ondansetron (or other 5HT-3), Dexamethasone or Solumedrol     Comments:                Rustam Meredith MD

## 2021-06-07 NOTE — TELEPHONE ENCOUNTER
ANTICOAGULATION  MANAGEMENT: Discharge Review    Petey Archibald chart reviewed for anticoagulation continuity of care    Outpatient surgery/procedure on 6/7 for  colonoscopy.    Discharge disposition: to group home    Results:    Recent labs: (last 7 days)     06/07/21  0707   INR 1.08     Anticoagulation inpatient management:     home regimen continued    Anticoagulation discharge instructions:     Warfarin dosing: home regimen continued   Bridging: No   INR goal change: No      Medication changes affecting anticoagulation: No    Additional factors affecting anticoagulation: No    Plan     No adjustment to anticoagulation plan needed    Spoke with Hunter at the group home.  Warfarin had been sent earlier by [facility]    No adjustment to Anticoagulation Calendar was required    Lisa Ponce RN

## 2021-06-07 NOTE — ANESTHESIA POSTPROCEDURE EVALUATION
Patient: Petey Archibald    Procedure(s):  COLONOSCOPY with polypectomies    Diagnosis:Hx of colonic polyps [Z86.010]  Screen for colon cancer [Z12.11]  Diagnosis Additional Information: No value filed.    Anesthesia Type:  MAC    Note:  Disposition: Outpatient   Postop Pain Control: Uneventful            Sign Out: Well controlled pain   PONV: No   Neuro/Psych: Uneventful            Sign Out: Acceptable/Baseline neuro status   Airway/Respiratory: Uneventful            Sign Out: Acceptable/Baseline resp. status   CV/Hemodynamics: Uneventful            Sign Out: Acceptable CV status; No obvious hypovolemia; No obvious fluid overload   Other NRE: NONE   DID A NON-ROUTINE EVENT OCCUR? No           Last vitals:  Vitals:    06/07/21 0621 06/07/21 0830 06/07/21 0850   BP: 115/74 111/59 103/74   Pulse: 82  79   Resp: 14 16 15   Temp: 97.1  F (36.2  C) 97.1  F (36.2  C) 97.8  F (36.6  C)   SpO2: 95% 98% 96%       Last vitals prior to Anesthesia Care Transfer:  CRNA VITALS  6/7/2021 0758 - 6/7/2021 0858      6/7/2021             Pulse:  79    SpO2:  97 %          Electronically Signed By: Rustam Meredith MD  June 7, 2021  10:48 AM

## 2021-06-08 LAB — COPATH REPORT: NORMAL

## 2021-06-14 ENCOUNTER — ANTICOAGULATION THERAPY VISIT (OUTPATIENT)
Dept: ANTICOAGULATION | Facility: CLINIC | Age: 61
End: 2021-06-14

## 2021-06-14 DIAGNOSIS — Z86.711 HISTORY OF PULMONARY EMBOLISM: ICD-10-CM

## 2021-06-14 DIAGNOSIS — Z79.01 LONG TERM CURRENT USE OF ANTICOAGULANT THERAPY: ICD-10-CM

## 2021-06-14 LAB
CAPILLARY BLOOD COLLECTION: NORMAL
INR PPP: 2.2 (ref 0.86–1.14)

## 2021-06-14 PROCEDURE — 36416 COLLJ CAPILLARY BLOOD SPEC: CPT | Performed by: FAMILY MEDICINE

## 2021-06-14 PROCEDURE — 85610 PROTHROMBIN TIME: CPT | Performed by: FAMILY MEDICINE

## 2021-06-14 NOTE — PROGRESS NOTES
ANTICOAGULATION FOLLOW-UP CLINIC VISIT    Patient Name:  Petey Archibald  Date:  2021  Contact Type:  Telephone    SUBJECTIVE:  Patient Findings     Comments:  Left voice mail for Hunter          Clinical Outcomes     Comments:  Left voice mail for Hunter             OBJECTIVE    Recent labs: (last 7 days)     21  1137   INR 2.20*       ASSESSMENT / PLAN  INR assessment THER    Recheck INR In: 2 WEEKS    INR Location Clinic      Anticoagulation Summary  As of 2021    INR goal:  2.0-3.0   TTR:  69.3 % (1 y)   INR used for dosin.20 (2021)   Warfarin maintenance plan:  6 mg (4 mg x 1.5) every Mon, Fri; 8 mg (4 mg x 2) all other days   Full warfarin instructions:  6 mg every Mon, Fri; 8 mg all other days   Weekly warfarin total:  52 mg   No change documented:  Lisa Ponce RN   Plan last modified:  Lisa Ponce RN (2020)   Next INR check:  2021   Priority:  Maintenance   Target end date:  Indefinite    Indications    History of pulmonary embolism [Z86.711]  Long term current use of anticoagulant therapy [Z79.01]             Anticoagulation Episode Summary     INR check location:  Anticoagulation Clinic    Preferred lab:      [facility]    Comments:  [facility]; A new Coumadin Prescription will need to sent to [facility] with current dose and next INR check.      Anticoagulation Care Providers     Provider Role Specialty Phone number    Yolande Lacy PA-C Referring Family Medicine [phone]    Silvnio Baker MD Responsible Family Medicine [phone]            See the Encounter Report to view Anticoagulation Flowsheet and Dosing Calendar (Go to Encounters tab in chart review, and find the Anticoagulation Therapy Visit)        Lisa Ponce, RN

## 2021-06-28 ENCOUNTER — ANTICOAGULATION THERAPY VISIT (OUTPATIENT)
Dept: ANTICOAGULATION | Facility: CLINIC | Age: 61
End: 2021-06-28

## 2021-06-28 DIAGNOSIS — Z86.711 HISTORY OF PULMONARY EMBOLISM: ICD-10-CM

## 2021-06-28 DIAGNOSIS — Z79.01 LONG TERM CURRENT USE OF ANTICOAGULANT THERAPY: ICD-10-CM

## 2021-06-28 LAB
CAPILLARY BLOOD COLLECTION: NORMAL
INR PPP: 2.1 (ref 0.86–1.14)

## 2021-06-28 PROCEDURE — 85610 PROTHROMBIN TIME: CPT | Performed by: FAMILY MEDICINE

## 2021-06-28 PROCEDURE — 36416 COLLJ CAPILLARY BLOOD SPEC: CPT | Performed by: FAMILY MEDICINE

## 2021-06-28 RX ORDER — WARFARIN SODIUM 4 MG/1
TABLET ORAL
Qty: 39 TABLET | Refills: 0 | Status: SHIPPED | OUTPATIENT
Start: 2021-06-28 | End: 2021-09-07

## 2021-06-28 RX ORDER — WARFARIN SODIUM 4 MG/1
4 TABLET ORAL DAILY
Qty: 39 TABLET | Refills: 0
Start: 2021-06-28 | End: 2021-06-28

## 2021-06-28 NOTE — PROGRESS NOTES
ANTICOAGULATION MANAGEMENT     Petey Archibald 60 year old male is on warfarin with therapeutic INR result. (Goal INR 2.0-3.0)    Recent labs: (last 7 days)     06/28/21  1126   INR 2.10*       ASSESSMENT     Source(s): Patient/Caregiver Call and Home Care/Facility Nurse       Warfarin doses taken: Warfarin taken as instructed    Diet: No new diet changes identified    New illness, injury, or hospitalization: No    Medication/supplement changes: None noted    Signs or symptoms of bleeding or clotting: No    Previous INR: Therapeutic last 2(+) visits    Additional findings: None     PLAN     Recommended plan for no diet, medication or health factor changes affecting INR     Dosing Instructions: Continue your current warfarin dose with next INR in 3 weeks       Summary  As of 6/28/2021    Full warfarin instructions:  6 mg every Mon, Fri; 8 mg all other days   Next INR check:  7/19/2021             Telephone call with  keith whom verbalizes understanding and agrees to plan    Lab visit scheduled    Education provided: Written instructions provided    Plan made per [facility] anticoagulation protocol    Lisa Ponce RN  Anticoagulation Clinic  6/28/2021    _______________________________________________________________________     Anticoagulation Episode Summary     Current INR goal:  2.0-3.0   TTR:  69.3 % (1 y)   Target end date:  Indefinite   Send INR reminders to:  [facility]    Indications    History of pulmonary embolism [Z86.711]  Long term current use of anticoagulant therapy [Z79.01]           Comments:  REM residential; A new Coumadin Prescription will need to sent to [facility] with current dose and next INR check.         Anticoagulation Care Providers     Provider Role Specialty Phone number    Yolande Lacy PA-C Referring Family Medicine [phone]    Silvino Baker MD Responsible Family Medicine [phone]        ANTICOAGULATION FOLLOW-UP CLINIC VISIT    Patient  Name:  Petey Archibald  Date:  2021  Contact Type:  Telephone    SUBJECTIVE:         OBJECTIVE    Recent labs: (last 7 days)     21  1126   INR 2.10*       ASSESSMENT / PLAN  INR assessment THER    Recheck INR In: 3 WEEKS    INR Location Clinic      Anticoagulation Summary  As of 2021    INR goal:  2.0-3.0   TTR:  69.3 % (1 y)   INR used for dosin.10 (2021)   Warfarin maintenance plan:  6 mg (4 mg x 1.5) every Mon, Fri; 8 mg (4 mg x 2) all other days   Full warfarin instructions:  6 mg every Mon, Fri; 8 mg all other days   Weekly warfarin total:  52 mg   No change documented:  Lisa Ponce RN   Plan last modified:  Lisa Ponce RN (2020)   Next INR check:  2021   Priority:  Maintenance   Target end date:  Indefinite    Indications    History of pulmonary embolism [Z86.711]  Long term current use of anticoagulant therapy [Z79.01]             Anticoagulation Episode Summary     INR check location:  Anticoagulation Clinic    Preferred lab:      Send INR reminders to:  [facility]    Comments:  [facility]; A new Coumadin Prescription will need to sent to [facility] with current dose and next INR check.      Anticoagulation Care Providers     Provider Role Specialty Phone number    Yolande Lacy PA-C Referring Family Medicine [phone]    Silvino Baker MD Responsible Family Medicine [phone]            See the Encounter Report to view Anticoagulation Flowsheet and Dosing Calendar (Go to Encounters tab in chart review, and find the Anticoagulation Therapy Visit)        Lisa Ponce, RN

## 2021-06-28 NOTE — PROGRESS NOTES
Dai, nurse with [facility] called back to clarify dosing. [facility] sent different dosing then what was ordered. Confirmed dosing and revised script sent to [facility] and Dai to confirm with [facility].  Dorothy Gilman, RN  Anticoagulation Nurse - [facility] [place]

## 2021-07-01 NOTE — PROGRESS NOTES
ANTICOAGULATION FOLLOW-UP CLINIC VISIT    Patient Name:  Petey Archibald  Date:  2019  Contact Type:  Face to Face    SUBJECTIVE:     Patient Findings     Positives:   No Problem Findings           OBJECTIVE    INR Protime   Date Value Ref Range Status   2019 2.1 (A) 0.86 - 1.14 Final       ASSESSMENT / PLAN  INR assessment THER    Recheck INR In: 4 WEEKS    INR Location Clinic      Anticoagulation Summary  As of 2019    INR goal:   2.0-3.0   TTR:   72.2 % (2.8 y)   INR used for dosin.1 (2019)   Warfarin maintenance plan:   10 mg (4 mg x 2.5) every Mon; 8 mg (4 mg x 2) all other days   Full warfarin instructions:   10 mg every Mon; 8 mg all other days   Weekly warfarin total:   58 mg   No change documented:   Owen Beckett RN   Plan last modified:   Prudence Machuca RN (2018)   Next INR check:   2019   Target end date:   Indefinite    Indications    History of pulmonary embolism [Z86.711]  Long-term (current) use of anticoagulants [Z79.01] [Z79.01]             Anticoagulation Episode Summary     INR check location:   Coumadin Clinic    Preferred lab:       Send INR reminders to:   [facility]    Comments:         Anticoagulation Care Providers     Provider Role Specialty Phone number    Silvino Baker MD [facility] [phone]            See the Encounter Report to view Anticoagulation Flowsheet and Dosing Calendar (Go to Encounters tab in chart review, and find the Anticoagulation Therapy Visit)    Dosage adjustment made based on physician directed care plan. Coumadin dosing faxed and refill sent to [facility].     Owen Beckett RN                  Doxepin Counseling:  Patient advised that the medication is sedating and not to drive a car after taking this medication. Patient informed of potential adverse effects including but not limited to dry mouth, urinary retention, and blurry vision.  The patient verbalized understanding of the proper use and possible adverse effects of doxepin.  All of the patient's questions and concerns were addressed.

## 2021-07-06 ENCOUNTER — OFFICE VISIT (OUTPATIENT)
Dept: UROLOGY | Facility: CLINIC | Age: 61
End: 2021-07-06
Payer: MEDICARE

## 2021-07-06 ENCOUNTER — TELEPHONE (OUTPATIENT)
Dept: PHYSICAL MEDICINE AND REHAB | Facility: CLINIC | Age: 61
End: 2021-07-06

## 2021-07-06 VITALS — SYSTOLIC BLOOD PRESSURE: 130 MMHG | HEART RATE: 70 BPM | DIASTOLIC BLOOD PRESSURE: 69 MMHG

## 2021-07-06 DIAGNOSIS — R10.32 LEFT INGUINAL PAIN: Primary | ICD-10-CM

## 2021-07-06 PROCEDURE — 99203 OFFICE O/P NEW LOW 30 MIN: CPT | Performed by: UROLOGY

## 2021-07-06 NOTE — PROGRESS NOTES
"S: Patient is a 60-year-old male who was seen the office today for a consultation with regard to patient's left inguinal pain. This pain has been going for some times now. It is worse with activities and better with rest. Pain can be quite severe at times. Patient has had CT scan done that showed sclerosis of the left pubic bone adjacent to the symphysis pubis. He has no urinary problems.  Current Outpatient Medications   Medication Sig Dispense Refill     acetaminophen (TYLENOL) 325 MG tablet Take 1-2 tablets (325-650 mg) by mouth every 6 hours as needed for mild pain, fever or headaches 100 tablet 3     albuterol (ALBUTEROL) 108 (90 BASE) MCG/ACT inhaler Inhale 2 puffs into the lungs every 6 hours as needed 1 Inhaler 0     ARIPiprazole (ABILIFY) 5 MG tablet Take 5 mg by mouth daily       bacitracin 500 UNIT/GM OINT        Cadexomer Iodine, topical, 0.9% (IODOSORB) 0.9 % GEL gel Apply topically daily Apply to left foot wound daily after cleansing the wound and blotting it dry. 1 Tube 3     clindamycin (CLEOCIN) 300 MG capsule Take 1 capsule (300 mg) by mouth 3 times daily 30 capsule 0     clotrimazole (LOTRIMIN) 1 % external cream Apply topically 2 times daily 60 g 0     dexlansoprazole (DEXILANT) 60 MG CPDR CR capsule TAKE 1 CAPSULE BY MOUTH ONCE DAILY (FOR HEARTBURN) 90 capsule 2     diclofenac (VOLTAREN) 1 % topical gel Apply 4 g topically 2 times daily as needed for moderate pain 100 g 1     EPINEPHrine (EPIPEN 2-BRE) 0.3 MG/0.3ML injection 2-pack INJECT 0.3MG INTRAMUSCULAR ONE TIME FOR ONE DOSE AS NEEDED FOR ANAPHYLAXIS (CALL 911 IF YOU HAVE TO GIVE) (FOR BEE STINGS) **LABEL EACH PEN* 2 mL 3     gabapentin (NEURONTIN) 100 MG capsule Take 400 mg by mouth 3 times daily At 0900, 1200, and 2000       Gauze Pads & Dressings (GAUZE PADS 4\"X4\") 4\"X4\" PADS 1 each 3 times daily as needed 25 each 1     loratadine (CLARITIN) 10 MG tablet Take 10 mg by mouth daily       montelukast (SINGULAIR) 10 MG tablet TAKE 1 TABLET " BY MOUTH EVERY MORNING (ASTHMA) 30 tablet 11     Multiple Vitamin (DAILY-JOVAN) TABS TAKE 1 TABLET BY MOUTH EVERY MORNING (VITAMINS) 30 tablet 11     naltrexone (DEPADE/REVIA) 50 MG tablet Take 50 mg by mouth daily       nicotine (NICODERM CQ) 21 MG/24HR 24 hr patch        nicotine (NICORETTE) 2 MG gum Take 2 mg by mouth as needed for smoking cessation       nystatin-triamcinolone (MYCOLOG II) 926039-9.1 UNIT/GM-% external cream Apply topically 2 times daily For 1-2 weeks.       order for DME Equipment being ordered: roll of micropore tape to dress foot wound bid 1 each 0     order for DME Equipment being ordered: 1 surgical shoe 1 Device 0     order for DME [employer] Phone [phone] Fax [phone]  EdemaWear Size Medium/Yellow qty 4 sleeves  Length of Need: 1 month  Frequency of dressing change daily 1 Device 0     order for DME [employer] Stockings  Phone #[phone]  Fax #[phone]  Coolflex Velcro wraps    Length of Need: Life Time  # of Pairs 1 set 30 days 0     order for DME [employer] [facility] Phone [phone] Fax [phone]  Primary Dressing Hydrofera Blue Ready   Qty 5 sheets  Secondary Dressing 4' roll gauze Qty 30  Secondary Dressing 2' medipore tape Qty 1  Secondary Dressing 4x4 gauze loaf Qty  1  Length of Need: 1 month  Frequency of dressing change: daily 30 days 0     order for DME Oxygen 2 Li/min  at night and bled into BiPAP 1 Device 0     order for DME Equipment being ordered: CPAP with mask and tubing 1 Device 0     order for DME Equipment being ordered: support compression hose BK  2 pair black 30mm HG   To be applied on arising & removed while lying down to go to sleep 1 each 0     PULMICORT FLEXHALER 180 MCG/ACT inhaler INHALE 2 PUFFS INTO THE LUNGS TWICE DAILY. 1 each 11     SENNA-TABS 8.6 MG tablet        sertraline (ZOLOFT) 100 MG tablet Take 100 mg by mouth daily Take with 50 mg tablet for a total dose of 150 mg daily.       sertraline (ZOLOFT) 50 MG tablet         simvastatin (ZOCOR) 40 MG tablet TAKE 1 TABLET BY MOUTH EVERY MORNING.  (CHOLESTEROL) 30 tablet 11     SPIRIVA HANDIHALER 18 MCG inhaled capsule INHALE CONTENTS OF 1 CAPSULE INTO THE LUNGS ONCE DAILY 30 capsule 11     spironolactone (ALDACTONE) 25 MG tablet TAKE 1 TABLET BY MOUTH ONCE DAILY. (HIGH BLOOD PRESSURE) 30 tablet 11     temazepam (RESTORIL) 15 MG capsule Take 15 mg by mouth nightly as needed for sleep       trolamine salicylate (ASPERCREME) 10 % external cream Apply topically as needed for moderate pain knee       warfarin ANTICOAGULANT (COUMADIN) 4 MG tablet Take 1-1/2 tablets (6 mg) every Monday, Friday; and take 2 tablets (8 mg) all other days of the week. Next INR date is 74/19/21. 39 tablet 0     warfarin ANTICOAGULANT (COUMADIN) 4 MG tablet 6 mg mon,fri and 8 mg ROW  Recheck INR 2/22/21 54 tablet 0     warfarin ANTICOAGULANT (COUMADIN) 4 MG tablet Take 1 tablet (4 mg) by mouth daily 6 mg mon,fri and 8 mg row 44 tablet 0     Warfarin Therapy Reminder 1 each continuous prn 1 each 0     White Petrolatum ointment Apply topically nightly as needed for dry skin       Allergies   Allergen Reactions     Bees      Augmentin Rash     Penicillins Rash     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     Chronic infection     MRSA     Coagulation disorder (H)     on coumadin     COPD (chronic obstructive pulmonary disease) (H)      History of DVT of lower extremity      History of pulmonary embolism      Hyperlipidemia      Mild intellectual disabilities      Morbid obesity (H)      Peripheral edema      Peripheral vascular disease (H)      Sleep apnea     uses CPAP     Thrombosis      Tobacco dependence      Uncomplicated asthma      Venous stasis dermatitis      Past Surgical History:   Procedure Laterality Date     COLONOSCOPY N/A 4/15/2019    Procedure: COMBINED COLONOSCOPY, SINGLE OR MULTIPLE BIOPSY/POLYPECTOMY BY BIOPSY;  Surgeon: Jovana Ohara MD;   Location:  GI     COLONOSCOPY N/A 6/7/2021    Procedure: COLONOSCOPY with polypectomies;  Surgeon: Sahil Cid MD;  Location: [facility] OR     EXCISE MALIGNANT LESIONS, TRUNK/ARMS/LEGS 0.5CM OR LESS Left 5/26/2017    Procedure: EXCISE MALIGNANT LESIONS, TRUNK/ARMS/LEGS 0.5CM OR LESS;  EXCISION LEFT ELBOW MASS;  Surgeon: Jose Azevedo MD;  Location:  GI     RECTAL SURGERY      perianal abscess?      Family History   Problem Relation Age of Onset     Unknown/Adopted Mother      Unknown/Adopted Father      Diabetes Brother      Social History     Socioeconomic History     Marital status: Single     Spouse name: None     Number of children: None     Years of education: None     Highest education level: None   Occupational History     None   Social Needs     Financial resource strain: None     Food insecurity     Worry: None     Inability: None     Transportation needs     Medical: None     Non-medical: None   Tobacco Use     Smoking status: Current Every Day Smoker     Packs/day: 1.00     Years: 30.00     Pack years: 30.00     Types: Cigarettes     Smokeless tobacco: Never Used     Tobacco comment: started at age 18    Substance and Sexual Activity     Alcohol use: No     Alcohol/week: 0.0 standard drinks     Drug use: No     Sexual activity: Not Currently     Partners: Female   Lifestyle     Physical activity     Days per week: None     Minutes per session: None     Stress: None   Relationships     Social connections     Talks on phone: None     Gets together: None     Attends [employer]ism service: None     Active member of club or organization: None     Attends meetings of clubs or organizations: None     Relationship status: None     Intimate partner violence     Fear of current or ex partner: None     Emotionally abused: None     Physically abused: None     Forced sexual activity: None   Other Topics Concern     Parent/sibling w/ CABG, MI or angioplasty before 65F 55M? No   Social History Narrative     None        REVIEW OF SYSTEMS  =================  C: NEGATIVE for fever, chills, change in weight  I: NEGATIVE for worrisome rashes, moles or lesions  E/M: NEGATIVE for ear, mouth and throat problems  R: NEGATIVE for significant cough or SHORTNESS OF BREATH  CV:  NEGATIVE for chest pain, palpitations or peripheral edema  GI: NEGATIVE for nausea, abdominal pain, heartburn, or change in bowel habits  NEURO: NEGATIVE numbness/weakness  : see HPI  PSYCH: NEGATIVE depression/anxiety  LYmph: no new enlarged lymph nodes  Ortho: no new trauma/movements      Physical Exam:  /69 (BP Location: Left arm, Patient Position: Chair, Cuff Size: Adult Large)   Pulse 70    Patient is pleasant, in no acute distress, good general condition.  Heart:  negative, PMI normal  Lung: no evidence of respiratory distress    Abdomen: Soft, nondistended, non tender. No masses. No rebound or guarding.   Exam: Penis no discharge. Testes without any masses but no scrotal skin lesion. No inguinal hernia.  Skin: Warm and dry.  No redness.  Neuro: grossly normal  Musculaskeletal: moving all extremities  Psych normal mood and affect  Musculoskeletal  moving all extremities  Hematologic/Lymphatic/Immunologic: normal ant/post cervical, axillary, supraclavicular and inguinal nodes    Assessment/Plan:   60-year-old male with left groin pain worse with activities. I do not see any obvious urological problems that can cause this. I agree with physical therapy consultation next.

## 2021-07-06 NOTE — TELEPHONE ENCOUNTER
"[facility] Call [facility]    Phone Message    May a detailed message be left on voicemail: yes     Reason for Call: Appointment Intake    Referring Provider Name: Amarjit patients caregiver is calling to schedule patient with PM&R- Patient saw Dr. Krishnamurthy in urology for \"Pelvic pain in male [R10.2]\" and Dr. Krishnamurthy would like him seen in PM&R  Diagnosis and/or Symptoms: Pelvic pain in male [R10.2]  Diagnosis requires clinic review-       Action Taken: Message routed to:  [facility] PM&R    Travel Screening: Not Applicable                                                                        "

## 2021-07-06 NOTE — TELEPHONE ENCOUNTER
VM left asking for a return call. The patient can be scheduled with Dr. Hagen as a new patient in person appointment

## 2021-07-07 NOTE — TELEPHONE ENCOUNTER
Spoke with Hunter in regards to scheduling. He was not working yet. He will return clinic call for scheduling.     Informed him there would be documentation of who to schedule with.     Please schedule with Dr. Hagen as a new patient in person appointment.

## 2021-07-12 DIAGNOSIS — Z86.718 HISTORY OF DEEP VEIN THROMBOSIS OF LOWER EXTREMITY: Primary | ICD-10-CM

## 2021-07-19 ENCOUNTER — LAB (OUTPATIENT)
Dept: LAB | Facility: CLINIC | Age: 61
End: 2021-07-19
Payer: MEDICARE

## 2021-07-19 ENCOUNTER — ANTICOAGULATION THERAPY VISIT (OUTPATIENT)
Dept: ANTICOAGULATION | Facility: CLINIC | Age: 61
End: 2021-07-19

## 2021-07-19 DIAGNOSIS — Z79.01 LONG TERM CURRENT USE OF ANTICOAGULANT THERAPY: ICD-10-CM

## 2021-07-19 DIAGNOSIS — Z86.711 HISTORY OF PULMONARY EMBOLISM: Primary | ICD-10-CM

## 2021-07-19 DIAGNOSIS — Z86.718 HISTORY OF DEEP VEIN THROMBOSIS OF LOWER EXTREMITY: ICD-10-CM

## 2021-07-19 LAB — INR BLD: 2.8 (ref 0.9–1.1)

## 2021-07-19 PROCEDURE — 85610 PROTHROMBIN TIME: CPT

## 2021-07-19 PROCEDURE — 36416 COLLJ CAPILLARY BLOOD SPEC: CPT

## 2021-07-19 NOTE — PROGRESS NOTES
Patient discharged at 5:00 PM to group home.  IV was discontinued. Pain at time of discharge was 0/10. Belongings returned to patient.  Discharge instructions and medications reviewed with patient and personal care assistant.  Patient verbalized understanding and all questions were answered.  At time of discharge, patient condition was stable and left the unit via wheelchair escorted by RN.     Clear bilaterally, pupils equal, round and reactive to light.

## 2021-07-19 NOTE — PROGRESS NOTES
ANTICOAGULATION MANAGEMENT     Petey Archibald 60 year old male is on warfarin with therapeutic INR result. (Goal INR 2.0-3.0)    Recent labs: (last 7 days)     07/19/21  1120   INR 2.8*       ASSESSMENT     Source(s): Home Care/Facility Nurse       Warfarin doses taken: Warfarin taken as instructed    Diet: No new diet changes identified    New illness, injury, or hospitalization: No    Medication/supplement changes: None noted    Signs or symptoms of bleeding or clotting: No    Previous INR: Therapeutic last 2(+) visits    Additional findings: None     PLAN     Recommended plan for no diet, medication or health factor changes affecting INR     Dosing Instructions: Continue your current warfarin dose with next INR in 4 weeks.  Called and [facility] and spoke with pharmacistDalton for dosing.      Summary  As of 7/19/2021    Full warfarin instructions:  6 mg every Mon, Fri; 8 mg all other days   Next INR check:  8/16/2021             Telephone call with Hunter nurse at [facility] who verbalizes understanding and agrees to plan.  AVS mailed to Hunter per request    Lab visit scheduled    Education provided: Target INR goal and significance of current INR result, Importance of therapeutic range, Importance of following up for INR monitoring at instructed interval and Importance of taking warfarin as instructed    Plan made per [facility] anticoagulation protocol    Radha Hughes, RN  Anticoagulation Clinic  7/19/2021    _______________________________________________________________________     Anticoagulation Episode Summary     Current INR goal:  2.0-3.0   TTR:  69.3 % (1 y)   Target end date:  Indefinite   Send INR reminders to:  [facility]    Indications    History of pulmonary embolism [Z86.711]  Long term current use of anticoagulant therapy [Z79.01]           Comments:  [facility] long term; A new Coumadin Prescription will need to sent to [facility] with current dose and next INR check.          Anticoagulation Care Providers     Provider Role Specialty Phone number    Yolande Lacy PA-C Referring Family Medicine [phone]    Silvino Baker MD [facility] [phone]

## 2021-07-28 ENCOUNTER — HOSPITAL ENCOUNTER (OUTPATIENT)
Dept: WOUND CARE | Facility: CLINIC | Age: 61
End: 2021-07-28
Attending: SURGERY
Payer: MEDICARE

## 2021-07-28 DIAGNOSIS — L97.922 ULCER OF LEFT LOWER EXTREMITY WITH FAT LAYER EXPOSED (H): ICD-10-CM

## 2021-07-28 PROBLEM — K08.404: Status: ACTIVE | Noted: 2018-11-08

## 2021-07-28 PROBLEM — M27.0 TORUS PALATINUS: Status: ACTIVE | Noted: 2021-04-26

## 2021-07-28 PROCEDURE — 99441 PR PHYSICIAN TELEPHONE EVALUATION 5-10 MIN: CPT | Performed by: SURGERY

## 2021-07-28 NOTE — PROGRESS NOTES
[facility] Progress Note  Telehealth visit, start 1240, and time 1245, total time 5 minutes    Subject: Petey Archibald telehealth telephone visit, new ulceration medial malleolus, picture downloaded till we lost the picture within our clinic phone, discussed with elevated, have not started utilizing hydrogel due to discomfort, we will initiate PluroGel, no signs of infection per report.    Patient Active Problem List   Diagnosis     GERD (gastroesophageal reflux disease)     Peripheral vascular disease (H)     History of pulmonary embolism     Tobacco dependence:40-50 pk yr hx     Borderline mental retardation     History of deep vein thrombosis of lower extremity     Pilonidal cyst     Asymptomatic varicose veins, bilateral     Callus of foot on Rt lat dist  since 8-15      Morbid obesity due to excess calories (H)  BMI 40-50     Hypercholesterolemia     Mixed simple and mucopurulent chronic bronchitis (HCC) CT 4-06 wnl and neg bronch for hemoptesis spirometry 7-26-16  FVC=59% & w mod restriction  and lung age of 82 in 56 y/o      Major depression in complete remission (HCC) on meds      Long term current use of anticoagulant therapy     Glucose intolerance (impaired glucose tolerance)     Erectile dysfunction, unspecified erectile dysfunction type     NEL (obstructive sleep apnea)     Anxiety     Thrombophlebitis of superficial veins of both lower extremities: Greater Saphenous VV      History of colonic polyps     Ulcer of left lower extremity with fat layer exposed (H)     Lymphedema of left leg     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)     Schizoaffective disorder, bipolar type (H)     Adenomatous polyp of ascending colon     Colon cancer screening     Edentulism, partial, class IV     Torus palatinus     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     Chronic infection     MRSA     Coagulation disorder (H)     on coumadin      COPD (chronic obstructive pulmonary disease) (H)      History of DVT of lower extremity      History of pulmonary embolism      Hyperlipidemia      Mild intellectual disabilities      Morbid obesity (H)      Peripheral edema      Peripheral vascular disease (H)      Sleep apnea     uses CPAP     Thrombosis      Tobacco dependence      Uncomplicated asthma      Venous stasis dermatitis      Exam:  There were no vitals taken for this visit.     Televisit, wound healed        Impression: Closed toe wound, new ankle     Plan: We will dress the wounds with primary dressing PluroGel, edema wear, micronized purified 5 injection follow-up telehealth visit in 8 weeks  Rustam Davis MD on 7/28/2021 at 12:51 PM

## 2021-07-28 NOTE — DISCHARGE INSTRUCTIONS
[facility]  [address] [address]    Call us at [phone] if you have any questions about your wounds, have redness or swelling around your wound, have a fever of 101 or greater or if you have any other problems or concerns. We answer the phone Monday through Friday 8 am to 4 pm, please leave a message as we check the voicemail frequently throughout the day.     Petey Archibald      1960    tamin D3 5,000 iu per day.  Vitamin B12 1000 mcg per day  Wound Care: Daily  After cleansing with soap and water in shower, than apply small amount of VASHE on gauze, lay into wound bed, let soak for 10 minutes than remove and apply plurogel.  Apply small edemawear (navy) to foot fold over toes and secure with tape and then  apply medium (yellow) stripe you are  applying this as a double layer from base of toe to base of knee. Change daily.       FERNANDO Davis M.D.. July 28, 2021      If you had a positive experience please indicate on your patient satisfaction survey form that [facility] will be sending you.    If you have any billing related questions please call the [facility] Business office at [phone]. The clinic staff does not handle billing related matters.

## 2021-08-13 ENCOUNTER — HOSPITAL ENCOUNTER (EMERGENCY)
Facility: CLINIC | Age: 61
Discharge: HOME | End: 2021-08-14
Attending: EMERGENCY MEDICINE | Admitting: EMERGENCY MEDICINE
Payer: MEDICARE

## 2021-08-13 ENCOUNTER — APPOINTMENT (OUTPATIENT)
Dept: GENERAL RADIOLOGY | Facility: CLINIC | Age: 61
End: 2021-08-13
Attending: EMERGENCY MEDICINE
Payer: MEDICARE

## 2021-08-13 DIAGNOSIS — R10.32 LEFT GROIN PAIN: ICD-10-CM
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
## 2021-08-13 PROCEDURE — 99283 EMERGENCY DEPT VISIT LOW MDM: CPT

## 2021-08-13 PROCEDURE — 73502 X-RAY EXAM HIP UNI 2-3 VIEWS: CPT

## 2021-08-13 ASSESSMENT — MIFFLIN-ST. JEOR: SCORE: 2337.14

## 2021-08-14 VITALS
WEIGHT: 315 LBS | HEIGHT: 73 IN | OXYGEN SATURATION: 96 % | BODY MASS INDEX: 41.75 KG/M2 | SYSTOLIC BLOOD PRESSURE: 111 MMHG | TEMPERATURE: 99.3 F | DIASTOLIC BLOOD PRESSURE: 54 MMHG | RESPIRATION RATE: 20 BRPM | HEART RATE: 63 BPM

## 2021-08-14 PROCEDURE — 250N000013 HC RX MED GY IP 250 OP 250 PS 637: Performed by: EMERGENCY MEDICINE

## 2021-08-14 RX ADMIN — ACETAMINOPHEN AND CODEINE PHOSPHATE 1 TABLET: 300; 30 TABLET ORAL at 00:07

## 2021-08-14 ASSESSMENT — ENCOUNTER SYMPTOMS
ABDOMINAL PAIN: 1
WOUND: 0

## 2021-08-14 NOTE — ED PROVIDER NOTES
"  History   Chief Complaint:  Fall and Groin Pain       The history is provided by the patient.      Petey Archibald is a 60 year old male on Coumadin who presents with chronic left groin pain that has worsened since he rolled off his bed today onto the left side.  Denies hitting his head.  No extremity pain.  No abdominal pain or radiation of groin pain.  He is scheduled to have surgery on his groin on 8/26.  He is not sure exactly what type of procedure but states it is to help his pain.  Here from group home where unwitnessed fall was reported.  On chart review previously saw urology who did not find a urologic source of his symptoms.  Has had XR as well.    Review of Systems   Gastrointestinal: Positive for abdominal pain (groin pain).   Skin: Negative for wound.   All other systems reviewed and are negative.      Allergies:  Bees  Augmentin  Penicillins    Medications:  Albuterol  Dexlansoprazole  Epinephrine   Singulair   Simvastatin  Spironolactone  Warfarin   Abilify  Gabapentin  Naltrexone  Senna  Wellbutrin   Sertraline  Restoril     Past Medical History:    COPD  DVT and PE  Hyperlipidemia  Mild intellectual disabilities  Morbid obesity  Peripheral vascular disease  Sleep apnea  Tobacco dependence  Asthma   Schizoaffective disorder, bipolar type  Chronic ulcer of left foot  Edentulism  Anxiety   Erectile dysfunction   Hypercholesterolemia  Depression  GERD    Past Surgical History:    Excise malignant lesions, left elbow  Rectal surgery      Family History:    Brother - diabetes, MI    Social History:  Presented to the ED alone via EMS.  Lives in a group home.    Physical Exam     Patient Vitals for the past 24 hrs:   BP Temp Temp src Pulse Resp SpO2 Height Weight   08/14/21 0143 111/54 -- -- 63 20 96 % -- --   08/13/21 2308 112/64 99.3  F (37.4  C) Oral 67 20 97 % 1.854 m (6' 1\") 148.3 kg (327 lb)       Physical Exam  Eyes:               Sclera white; Pupils are equal and round  ENT:                " External ears and nares normal  CV:                  Rate as above with regular rhythm, strong femoral pulse  Resp:               Non-labored, CTAB  GI:                   Abdomen is soft, non-tender, non-distended                          No rebound tenderness or peritoneal features  MS:                  Moves all extremities, tenderness L inguinal region.  No bruising, swelling, or mass.  No hernia.   Skin:                Warm and dry, no open wounds, no apparent head trauma  Neuro:             Speech is normal and fluent.  Making needs known.  Uses urinal.    Emergency Department Course     Imaging:  XR Pelvis w Hip Left 1 View  Osteopenia. Technically compromised crosstable lateral images of the left hip due to soft tissue attenuation. No displaced hip fracture or dislocation is visualized. There is advanced degenerative joint space loss of the left hip. The pelvic   ring is intact. If patient's pain persists or patient is newly unable to bear weight, consider CT correlation.  As read by Radiology.    Emergency Department Course:    Reviewed:  I reviewed nursing notes, vitals, past medical history and care everywhere    Assessments:  2318 I obtained history and examined the patient as noted above.   0100 I rechecked the patient and explained findings. I answered all questions prior to discharge.    Interventions:  0007 Tylenol #3 1 tab PO    Disposition:  The patient was discharged to home.       Impression & Plan     CMS Diagnoses: None    Medical Decision Making:  Petey Archibald's location of pain after his fall was suspicious for the possibility of a pubic rami fracture. There was no evidence for wound or hernia on exam. He is on blood thinners but states he did not hit his head. X rays were obtained with no leon abnormalities found. I do not suspect hip fracture and therefore CT scan will not be obtained. Pain has resolved with medications here. He is appropriate for transport back to his group  home.      Diagnosis:    ICD-10-CM    1. Left groin pain  R10.32    2. Fall from bed, initial encounter  W06.XXXA        Scribe Disclosure:  I, Antonella Osei (trainee) and Marga Potter (), am serving as a scribe at 11:18 PM on 8/13/2021 to document services personally performed by Radha Graves MD based on my observations and the provider's statements to me.        Radha Graves MD  08/14/21 0238

## 2021-08-14 NOTE — ED TRIAGE NOTES
Pt had an unwitnessed fall at his group home [facility]. Pt. Has had chronic left groin pain which became worse after the fall. Surgery is scheduled for groin on 8/26. Pt denies LOC, pain is absent at this time. Pt. States he is on coumadin for blood clots and is his own guardian.

## 2021-08-14 NOTE — ED NOTES
Bed: ED24  Expected date: 8/13/21  Expected time: 10:43 PM  Means of arrival: Ambulance  Comments:  HEMS 443 60F fall, leg pain

## 2021-08-16 ENCOUNTER — ANTICOAGULATION THERAPY VISIT (OUTPATIENT)
Dept: ANTICOAGULATION | Facility: CLINIC | Age: 61
End: 2021-08-16
Payer: MEDICARE

## 2021-08-16 ENCOUNTER — TELEPHONE (OUTPATIENT)
Dept: WOUND CARE | Facility: CLINIC | Age: 61
End: 2021-08-16

## 2021-08-16 ENCOUNTER — LAB (OUTPATIENT)
Dept: LAB | Facility: CLINIC | Age: 61
End: 2021-08-16
Payer: MEDICARE

## 2021-08-16 ENCOUNTER — TELEPHONE (OUTPATIENT)
Dept: INTERNAL MEDICINE | Facility: CLINIC | Age: 61
End: 2021-08-16

## 2021-08-16 DIAGNOSIS — Z86.718 HISTORY OF DEEP VEIN THROMBOSIS OF LOWER EXTREMITY: ICD-10-CM

## 2021-08-16 DIAGNOSIS — Z86.711 HISTORY OF PULMONARY EMBOLISM: Primary | ICD-10-CM

## 2021-08-16 DIAGNOSIS — Z79.01 LONG TERM CURRENT USE OF ANTICOAGULANT THERAPY: ICD-10-CM

## 2021-08-16 DIAGNOSIS — L97.922 ULCER OF LEFT LOWER EXTREMITY WITH FAT LAYER EXPOSED (H): Primary | ICD-10-CM

## 2021-08-16 LAB — INR BLD: 2.3 (ref 0.9–1.1)

## 2021-08-16 PROCEDURE — 85610 PROTHROMBIN TIME: CPT

## 2021-08-16 PROCEDURE — 36416 COLLJ CAPILLARY BLOOD SPEC: CPT

## 2021-08-16 PROCEDURE — 99207 PR NO CHARGE NURSE ONLY: CPT

## 2021-08-16 NOTE — PROGRESS NOTES
ANTICOAGULATION FOLLOW-UP CLINIC VISIT    Patient Name:  Petey Archibald  Date:  2021  Contact Type:  Telephone    SUBJECTIVE:         OBJECTIVE    Recent labs: (last 7 days)     21  1131   INR 2.3*       ASSESSMENT / PLAN  INR assessment THER    Recheck INR In: 3 WEEKS    INR Location Clinic      Anticoagulation Summary  As of 2021    INR goal:  2.0-3.0   TTR:  71.7 % (1 y)   INR used for dosin.3 (2021)   Warfarin maintenance plan:  6 mg (4 mg x 1.5) every Mon, Fri; 8 mg (4 mg x 2) all other days   Full warfarin instructions:  6 mg every Mon, Fri; 8 mg all other days   Weekly warfarin total:  52 mg   Plan last modified:  Lisa Ponce RN (2020)   Next INR check:  2021   Priority:  Maintenance   Target end date:  Indefinite    Indications    History of pulmonary embolism [Z86.711]  Long term current use of anticoagulant therapy [Z79.01]             Anticoagulation Episode Summary     INR check location:  Anticoagulation Clinic    Preferred lab:      Send INR reminders to:  [facility]    Comments:  REM detention; A new Coumadin Prescription will need to sent to [facility] with current dose and next INR check.      Anticoagulation Care Providers     Provider Role Specialty Phone number    Yolande Lacy PA-C Referring Family Medicine [phone]    Silvino Baker MD Responsible Family Medicine [phone]            See the Encounter Report to view Anticoagulation Flowsheet and Dosing Calendar (Go to Encounters tab in chart review, and find the Anticoagulation Therapy Visit)        Lisa Ponce, RN

## 2021-08-16 NOTE — TELEPHONE ENCOUNTER
Hunter returned call to clinic. Discussed with him that the order is to use Plurogel and not skintengrity. Hunter verbalized understanding. DME order placed with [facility] for 1 in tape. No further questions or concerns.

## 2021-08-16 NOTE — TELEPHONE ENCOUNTER
[facility]    Who is the name of the provider?:  Susan      What is the location you see this provider at?: [place]    Reason for call:  Needs to order Skintegity Gel, medical tape-1\". Next appt is 9/22.      Can we leave a detailed message on this number?  YES       "

## 2021-08-16 NOTE — TELEPHONE ENCOUNTER
Reason for Call:  Other     Detailed comments: Hunter called anti-coag team back regarding patient's upcoming procedure related to his Warfarin.      Hunter states he has placed a call to the physical medical rehab group who is doing the procedure for the patient on 08/26/21. He is currently waiting for a call back from them with more details about if pt will need to adjust his blood thinner meds. Also needing to confirm who is the managing/prescibing provider of the warfarin.      Physical medical rehab contact tele# [phone]    Phone Number Patient can be reached at: Other phone number:  [phone]    Best Time: any    Can we leave a detailed message on this number? YES    Call taken on 8/16/2021 at 1:35 PM by Fernanda Wu

## 2021-08-16 NOTE — PROGRESS NOTES
ANTICOAGULATION MANAGEMENT     Petey Archibald 60 year old male is on warfarin with therapeutic INR result. (Goal INR 2.0-3.0)    Recent labs: (last 7 days)     08/16/21  1131   INR 2.3*       ASSESSMENT     Source(s): Chart Review and Home Care/Facility Nurse       Warfarin doses taken: Warfarin taken as instructed    Diet: No new diet changes identified    New illness, injury, or hospitalization: No    Medication/supplement changes: None noted    Signs or symptoms of bleeding or clotting: No    Previous INR: Therapeutic last 2(+) visits    Additional findings: None     PLAN     Recommended plan for no diet, medication or health factor changes affecting INR     Dosing Instructions: Continue your current warfarin dose with next INR in 3 weeks       Summary  As of 8/16/2021    Full warfarin instructions:  6 mg every Mon, Fri; 8 mg all other days   Next INR check:  9/7/2021             Telephone call with  Hunter at [facility] who verbalizes understanding and agrees to plan    Lab visit scheduled    Education provided: None required    Plan made per [facility] anticoagulation protocol    Lisa Ponce RN  Anticoagulation Clinic  8/16/2021    _______________________________________________________________________     Anticoagulation Episode Summary     Current INR goal:  2.0-3.0   TTR:  71.7 % (1 y)   Target end date:  Indefinite   Send INR reminders to:  [facility]    Indications    History of pulmonary embolism [Z86.711]  Long term current use of anticoagulant therapy [Z79.01]           Comments:  REM [facility]; A new Coumadin Prescription will need to sent to [facility] with current dose and next INR check.         Anticoagulation Care Providers     Provider Role Specialty Phone number    Yolande Lacy PA-C Referring Family Medicine [phone]    Silvino Baker MD Responsible Family Medicine [phone]

## 2021-08-19 NOTE — TELEPHONE ENCOUNTER
Hunter left a VM on the Home care line asking for Sloane to give him a call back.  Dorothy Gilman, RN  Anticoagulation Nurse - [facility], [place]

## 2021-08-19 NOTE — TELEPHONE ENCOUNTER
Tried calling Hunter at Group Home to see if he had found out if Petey need to hold his INr for his upcoming treatment. He was off today.  I will call tomorrow. Procedure is scheduled for 9/21

## 2021-08-19 NOTE — TELEPHONE ENCOUNTER
Talked with Hunter. He still has not heard back from [facility] to know if Petey's warfarin needs to be held.  He will contact them tomorrow and let me know

## 2021-08-20 ENCOUNTER — TELEPHONE (OUTPATIENT)
Dept: INTERNAL MEDICINE | Facility: CLINIC | Age: 61
End: 2021-08-20

## 2021-08-20 ENCOUNTER — TELEPHONE (OUTPATIENT)
Dept: PHYSICAL MEDICINE AND REHAB | Facility: CLINIC | Age: 61
End: 2021-08-20

## 2021-08-20 NOTE — TELEPHONE ENCOUNTER
Caregiver from [facility] called to say 'Do not need to hold warfarin.'  Please return call to caregiver to verify message was recvd.    [phone] Hunter Martinez care giver at [facility]    Zena Weaver RN

## 2021-08-20 NOTE — TELEPHONE ENCOUNTER
This is for a consult, so no procedure that would potentially be done in clinic would require Petey to hold his warfarin. Notified Hunter of this and he thanked me for the call.       Lillie Steele RN, BSN, PHN

## 2021-08-20 NOTE — TELEPHONE ENCOUNTER
[facility] Call Center    Phone Message    May a detailed message be left on voicemail: yes     Reason for Call: Other: Braden patients care giver calling in regards to patients upcoming appointment on 8/26. States that the INR nurse is wondering if they need to hold warfarin medication for appointment. States that they are needing to know ASAP.     Please advise and call Braden back at your earliest convenience to discuss further     Action Taken: Other: [facility]    Travel Screening: Not Applicable

## 2021-08-26 ENCOUNTER — OFFICE VISIT (OUTPATIENT)
Dept: PHYSICAL MEDICINE AND REHAB | Facility: CLINIC | Age: 61
End: 2021-08-26
Payer: MEDICARE

## 2021-08-26 VITALS
OXYGEN SATURATION: 95 % | HEART RATE: 85 BPM | BODY MASS INDEX: 44.36 KG/M2 | RESPIRATION RATE: 16 BRPM | SYSTOLIC BLOOD PRESSURE: 112 MMHG | WEIGHT: 315 LBS | DIASTOLIC BLOOD PRESSURE: 70 MMHG

## 2021-08-26 DIAGNOSIS — M79.18 MYALGIA, OTHER SITE: ICD-10-CM

## 2021-08-26 DIAGNOSIS — G89.29 OTHER CHRONIC PAIN: Primary | ICD-10-CM

## 2021-08-26 DIAGNOSIS — R26.9 ABNORMAL GAIT: ICD-10-CM

## 2021-08-26 PROCEDURE — 20552 NJX 1/MLT TRIGGER POINT 1/2: CPT | Performed by: PHYSICAL MEDICINE & REHABILITATION

## 2021-08-26 PROCEDURE — 99205 OFFICE O/P NEW HI 60 MIN: CPT | Mod: 25 | Performed by: PHYSICAL MEDICINE & REHABILITATION

## 2021-08-26 RX ORDER — BUPROPION HYDROCHLORIDE 150 MG/1
150 TABLET, EXTENDED RELEASE ORAL 2 TIMES DAILY
COMMUNITY
End: 2024-03-05

## 2021-08-26 RX ORDER — TRIAMCINOLONE ACETONIDE 40 MG/ML
40 INJECTION, SUSPENSION INTRA-ARTICULAR; INTRAMUSCULAR ONCE
Status: COMPLETED | OUTPATIENT
Start: 2021-08-26 | End: 2021-08-26

## 2021-08-26 RX ORDER — CLONIDINE HYDROCHLORIDE 0.1 MG/1
0.1 TABLET ORAL DAILY PRN
COMMUNITY

## 2021-08-26 RX ORDER — BUPIVACAINE HYDROCHLORIDE 5 MG/ML
4 INJECTION, SOLUTION PERINEURAL ONCE
Status: COMPLETED | OUTPATIENT
Start: 2021-08-26 | End: 2021-08-26

## 2021-08-26 RX ORDER — LORAZEPAM 1 MG/1
1 TABLET ORAL DAILY PRN
COMMUNITY

## 2021-08-26 RX ORDER — DIPHENHYDRAMINE HCL 25 MG
50 CAPSULE ORAL EVERY 6 HOURS PRN
COMMUNITY

## 2021-08-26 RX ORDER — METHOCARBAMOL 750 MG/1
750 TABLET, FILM COATED ORAL 4 TIMES DAILY PRN
Qty: 60 TABLET | Refills: 0 | Status: SHIPPED | OUTPATIENT
Start: 2021-08-26 | End: 2023-02-14

## 2021-08-26 RX ADMIN — TRIAMCINOLONE ACETONIDE 40 MG: 40 INJECTION, SUSPENSION INTRA-ARTICULAR; INTRAMUSCULAR at 17:20

## 2021-08-26 RX ADMIN — BUPIVACAINE HYDROCHLORIDE 20 MG: 5 INJECTION, SOLUTION PERINEURAL at 17:20

## 2021-08-26 ASSESSMENT — PAIN SCALES - GENERAL: PAINLEVEL: SEVERE PAIN (7)

## 2021-08-26 NOTE — PROGRESS NOTES
CC: I am seeing this patient for evaluation and treatment of chronic left thigh and groin pain.      HPI: Patient is a 60-year-old male, accompanied by his staff from the group home.  He has been having significant pain in his left thigh for the past 6 months.  He works as a  in a restaurant which involves prolonged standing.  He is allowed to sit periodically.  He finds when he sits in a chair he does not have the option of stretching.  Over the last several weeks he has been having significant pain.  He has evaluation including CAT scan which showed some sclerosis of the left pubic bone.  He does not have any urinary symptoms and was evaluated by a urologist.  He denies any numbness or tingling.  On a scale of 0-10 he rates his pain at its worst and 9, when he is sitting he does not have any.  Transferring and standing makes it significant.  He has difficulty transferring.    He has significant obesity.  He also smokes 1 pack/day and is considering quitting if it will help with pain reduction. He is not currently drinking as he has no access to a bar nearby.    He would like to get a return to work note.  He returned home from work unable to stand for any length of time.  He has a narrow based quad cane that he uses on the right hand.    He denies any back issues and does not report any radicular symptoms.  He does not drive.      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     Chronic infection     MRSA     Coagulation disorder (H)     on coumadin     COPD (chronic obstructive pulmonary disease) (H)      History of DVT of lower extremity      History of pulmonary embolism      Hyperlipidemia      Mild intellectual disabilities      Morbid obesity (H)      Peripheral edema      Peripheral vascular disease (H)      Sleep apnea     uses CPAP     Thrombosis      Tobacco dependence      Uncomplicated asthma      Venous stasis dermatitis      Past Surgical History:    Procedure Laterality Date     COLONOSCOPY N/A 4/15/2019    Procedure: COMBINED COLONOSCOPY, SINGLE OR MULTIPLE BIOPSY/POLYPECTOMY BY BIOPSY;  Surgeon: Jovana Ohara MD;  Location:  GI     COLONOSCOPY N/A 6/7/2021    Procedure: COLONOSCOPY with polypectomies;  Surgeon: Sahil Cid MD;  Location: [facility] OR     EXCISE MALIGNANT LESIONS, TRUNK/ARMS/LEGS 0.5CM OR LESS Left 5/26/2017    Procedure: EXCISE MALIGNANT LESIONS, TRUNK/ARMS/LEGS 0.5CM OR LESS;  EXCISION LEFT ELBOW MASS;  Surgeon: Jose Azevedo MD;  Location:  GI     RECTAL SURGERY      perianal abscess?     Social History     Social History Narrative     Not on file     Family History     Problem (# of Occurrences) Relation (Name,Age of Onset)    Diabetes (1) Brother    Unknown/Adopted (2) Mother, Father          Current Outpatient Medications   Medication Sig Dispense Refill     acetaminophen (TYLENOL) 325 MG tablet Take 1-2 tablets (325-650 mg) by mouth every 6 hours as needed for mild pain, fever or headaches 100 tablet 3     ARIPiprazole (ABILIFY) 5 MG tablet Take 5 mg by mouth daily       bacitracin 500 UNIT/GM OINT        buPROPion (WELLBUTRIN SR) 150 MG 12 hr tablet Take 150 mg by mouth 2 times daily       cloNIDine (CATAPRES) 0.1 MG tablet Take 0.1 mg by mouth 2 times daily       clotrimazole (LOTRIMIN) 1 % external cream Apply topically 2 times daily 60 g 0     dexlansoprazole (DEXILANT) 60 MG CPDR CR capsule TAKE 1 CAPSULE BY MOUTH ONCE DAILY (FOR HEARTBURN) 90 capsule 2     diclofenac (VOLTAREN) 1 % topical gel Apply 4 g topically 2 times daily as needed for moderate pain 100 g 1     diphenhydrAMINE (BENADRYL) 25 MG capsule Take 25 mg by mouth every 6 hours as needed for itching or allergies       EPINEPHrine (EPIPEN 2-BRE) 0.3 MG/0.3ML injection 2-pack INJECT 0.3MG INTRAMUSCULAR ONE TIME FOR ONE DOSE AS NEEDED FOR ANAPHYLAXIS (CALL 911 IF YOU HAVE TO GIVE) (FOR BEE STINGS) **LABEL EACH PEN* 2 mL 3     gabapentin (NEURONTIN) 100  "MG capsule Take 400 mg by mouth 3 times daily At 0900, 1200, and 2000       Gauze Pads & Dressings (GAUZE PADS 4\"X4\") 4\"X4\" PADS 1 each 3 times daily as needed 25 each 1     loratadine (CLARITIN) 10 MG tablet Take 10 mg by mouth daily       LORazepam (ATIVAN) 1 MG tablet Take 1 mg by mouth every 6 hours as needed for anxiety       montelukast (SINGULAIR) 10 MG tablet TAKE 1 TABLET BY MOUTH EVERY MORNING (ASTHMA) 30 tablet 11     Multiple Vitamin (DAILY-JOVAN) TABS TAKE 1 TABLET BY MOUTH EVERY MORNING (VITAMINS) 30 tablet 11     naltrexone (DEPADE/REVIA) 50 MG tablet Take 50 mg by mouth daily       nicotine (NICODERM CQ) 21 MG/24HR 24 hr patch        nicotine (NICORETTE) 2 MG gum Take 2 mg by mouth as needed for smoking cessation       nystatin-triamcinolone (MYCOLOG II) 815174-1.1 UNIT/GM-% external cream Apply topically 2 times daily For 1-2 weeks.       order for DME Equipment being ordered: roll of micropore tape to dress foot wound bid 1 each 0     order for DME Equipment being ordered: 1 surgical shoe 1 Device 0     order for DME [employer] [facility] Phone [phone] Fax [phone]  EdemaWear Size Medium/Yellow qty 4 sleeves  Length of Need: 1 month  Frequency of dressing change daily 1 Device 0     order for DME [employer] Compression Stockings  Phone #[phone]  Fax #[phone]  Coolflex Velcro wraps    Length of Need: Life Time  # of Pairs 1 set 30 days 0     order for DME [employer] [facility] Phone [phone] Fax [phone]  Primary Dressing Hydrofera Blue Ready   Qty 5 sheets  Secondary Dressing 4' roll gauze Qty 30  Secondary Dressing 2' medipore tape Qty 1  Secondary Dressing 4x4 gauze loaf Qty  1  Length of Need: 1 month  Frequency of dressing change: daily 30 days 0     order for DME Oxygen 2 Li/min  at night and bled into BiPAP 1 Device 0     order for DME Equipment being ordered: CPAP with mask and tubing 1 Device 0     order for DME Equipment being ordered: support compression hose BK  2 " pair black 30mm HG   To be applied on arising & removed while lying down to go to sleep 1 each 0     PULMICORT FLEXHALER 180 MCG/ACT inhaler INHALE 2 PUFFS INTO THE LUNGS TWICE DAILY. 1 each 11     SENNA-TABS 8.6 MG tablet        sertraline (ZOLOFT) 100 MG tablet Take 100 mg by mouth daily Take with 50 mg tablet for a total dose of 150 mg daily.       sertraline (ZOLOFT) 50 MG tablet        simvastatin (ZOCOR) 40 MG tablet TAKE 1 TABLET BY MOUTH EVERY MORNING.  (CHOLESTEROL) 30 tablet 11     SPIRIVA HANDIHALER 18 MCG inhaled capsule INHALE CONTENTS OF 1 CAPSULE INTO THE LUNGS ONCE DAILY 30 capsule 11     spironolactone (ALDACTONE) 25 MG tablet TAKE 1 TABLET BY MOUTH ONCE DAILY. (HIGH BLOOD PRESSURE) 30 tablet 11     temazepam (RESTORIL) 15 MG capsule Take 15 mg by mouth nightly as needed for sleep       trolamine salicylate (ASPERCREME) 10 % external cream Apply topically as needed for moderate pain knee       warfarin ANTICOAGULANT (COUMADIN) 4 MG tablet Take 1 tablet (4 mg) by mouth daily 6 mg mon,fri and 8 mg row (Patient taking differently: Take 2 mg by mouth daily 2+6mg tues, wed, thurs, sat, sun) 44 tablet 0     White Petrolatum ointment Apply topically nightly as needed for dry skin       albuterol (ALBUTEROL) 108 (90 BASE) MCG/ACT inhaler Inhale 2 puffs into the lungs every 6 hours as needed (Patient not taking: Reported on 8/26/2021) 1 Inhaler 0     Cadexomer Iodine, topical, 0.9% (IODOSORB) 0.9 % GEL gel Apply topically daily Apply to left foot wound daily after cleansing the wound and blotting it dry. (Patient not taking: Reported on 8/26/2021) 1 Tube 3     clindamycin (CLEOCIN) 300 MG capsule Take 1 capsule (300 mg) by mouth 3 times daily (Patient not taking: Reported on 8/26/2021) 30 capsule 0     warfarin ANTICOAGULANT (COUMADIN) 4 MG tablet Take 1-1/2 tablets (6 mg) every Monday, Friday; and take 2 tablets (8 mg) all other days of the week. Next INR date is 74/19/21. (Patient not taking: Reported on  8/26/2021) 39 tablet 0     warfarin ANTICOAGULANT (COUMADIN) 4 MG tablet 6 mg mon,fri and 8 mg ROW  Recheck INR 2/22/21 (Patient not taking: Reported on 8/26/2021) 54 tablet 0     Warfarin Therapy Reminder 1 each continuous prn (Patient not taking: Reported on 8/26/2021) 1 each 0       /70   Pulse 85   Resp 16   Wt (!) 152.5 kg (336 lb 3.2 oz)   SpO2 95%   BMI 44.36 kg/m        On examination, the patient is alert and cooperative.  He walks with antalgic gait on the left side.  He could hardly take a few steps.  On the examination table, he was able to move his neck, upper extremities without difficulty.  He was able to move his right lower extremity without difficulty.  There is no tenderness in the abdominal region or the groin region on the left side.  He is tender over the left adductor and the left anterior thigh medially.    Focal areas of tenderness was isolated in the affected muscles.    Impression: At this point, this 60-year-old male with myalgia affecting the muscles of the left thigh, likely a result of his posture and his left ankle chronic ulceration which is also giving him some discomfort.  He follows in the wound clinic and reports that it is getting better.  He did not have his compression stockings today but does wear it regularly.    In terms of treatment, we talked about trigger point injections and he was willing to get it done today.    60 minutes spent for this visit, greater than 50% was for counseling on above-mentioned issues.    Procedure note: With his informed consent, after explaining the benefits and risks of the procedure, using alcohol for skin prep, 40 mg of triamcinolone with 4 cc of 0.5% bupivacaine, triggers in the medial thigh including the adductor region were injected with relief.  3 different areas were injected.  He tolerated the procedure well.  He was able to get up and walk around better and noticed more than 50% improvement in his pain.  When sitting his  pain was 0.    I have suggested that he can use ice, continue Tylenol, use Robaxin-750 milligrams 4 times a day as needed.  He can return to work as a  on Sunday and continue as per his usual routine.  I would like to see him in follow-up in about 4 weeks time.    Yinka Hagen MD

## 2021-08-26 NOTE — LETTER
8/26/2021       RE: Petey Archibald  [address] [facility] [address] [address]     Dear Colleague,    Thank you for referring your patient, Petey Archibald, to the [facility] [place] at [facility]. Please see a copy of my visit note below.    CC: I am seeing this patient for evaluation and treatment of chronic left thigh and groin pain.    HPI: Patient is a 60-year-old male, accompanied by his staff from the group home.  He has been having significant pain in his left thigh for the past 6 months.  He works as a  in a restaurant which involves prolonged standing.  He is allowed to sit periodically.  He finds when he sits in a chair he does not have the option of stretching.  Over the last several weeks he has been having significant pain.  He has evaluation including CAT scan which showed some sclerosis of the left pubic bone.  He does not have any urinary symptoms and was evaluated by a urologist.  He denies any numbness or tingling.  On a scale of 0-10 he rates his pain at its worst and 9, when he is sitting he does not have any.  Transferring and standing makes it significant.  He has difficulty transferring.    He has significant obesity.  He also smokes 1 pack/day and is considering quitting if it will help with pain reduction. He is not currently drinking as he has no access to a bar nearby.    He would like to get a return to work note.  He returned home from work unable to stand for any length of time.  He has a narrow based quad cane that he uses on the right hand.    He denies any back issues and does not report any radicular symptoms.  He does not drive.      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     Chronic infection     MRSA     Coagulation disorder (H)     on coumadin     COPD (chronic obstructive pulmonary disease) (H)      History of  DVT of lower extremity      History of pulmonary embolism      Hyperlipidemia      Mild intellectual disabilities      Morbid obesity (H)      Peripheral edema      Peripheral vascular disease (H)      Sleep apnea     uses CPAP     Thrombosis      Tobacco dependence      Uncomplicated asthma      Venous stasis dermatitis      Past Surgical History:   Procedure Laterality Date     COLONOSCOPY N/A 4/15/2019    Procedure: COMBINED COLONOSCOPY, SINGLE OR MULTIPLE BIOPSY/POLYPECTOMY BY BIOPSY;  Surgeon: Jovana Ohara MD;  Location:  GI     COLONOSCOPY N/A 6/7/2021    Procedure: COLONOSCOPY with polypectomies;  Surgeon: Sahil Cid MD;  Location: [facility] OR     EXCISE MALIGNANT LESIONS, TRUNK/ARMS/LEGS 0.5CM OR LESS Left 5/26/2017    Procedure: EXCISE MALIGNANT LESIONS, TRUNK/ARMS/LEGS 0.5CM OR LESS;  EXCISION LEFT ELBOW MASS;  Surgeon: Jose Azevedo MD;  Location:  GI     RECTAL SURGERY      perianal abscess?     Social History     Social History Narrative     Not on file     Family History     Problem (# of Occurrences) Relation (Name,Age of Onset)    Diabetes (1) Brother    Unknown/Adopted (2) Mother, Father          Current Outpatient Medications   Medication Sig Dispense Refill     acetaminophen (TYLENOL) 325 MG tablet Take 1-2 tablets (325-650 mg) by mouth every 6 hours as needed for mild pain, fever or headaches 100 tablet 3     ARIPiprazole (ABILIFY) 5 MG tablet Take 5 mg by mouth daily       bacitracin 500 UNIT/GM OINT        buPROPion (WELLBUTRIN SR) 150 MG 12 hr tablet Take 150 mg by mouth 2 times daily       cloNIDine (CATAPRES) 0.1 MG tablet Take 0.1 mg by mouth 2 times daily       clotrimazole (LOTRIMIN) 1 % external cream Apply topically 2 times daily 60 g 0     dexlansoprazole (DEXILANT) 60 MG CPDR CR capsule TAKE 1 CAPSULE BY MOUTH ONCE DAILY (FOR HEARTBURN) 90 capsule 2     diclofenac (VOLTAREN) 1 % topical gel Apply 4 g topically 2 times daily as needed for moderate pain 100 g 1      "diphenhydrAMINE (BENADRYL) 25 MG capsule Take 25 mg by mouth every 6 hours as needed for itching or allergies       EPINEPHrine (EPIPEN 2-BRE) 0.3 MG/0.3ML injection 2-pack INJECT 0.3MG INTRAMUSCULAR ONE TIME FOR ONE DOSE AS NEEDED FOR ANAPHYLAXIS (CALL 911 IF YOU HAVE TO GIVE) (FOR BEE STINGS) **LABEL EACH PEN* 2 mL 3     gabapentin (NEURONTIN) 100 MG capsule Take 400 mg by mouth 3 times daily At 0900, 1200, and 2000       Gauze Pads & Dressings (GAUZE PADS 4\"X4\") 4\"X4\" PADS 1 each 3 times daily as needed 25 each 1     loratadine (CLARITIN) 10 MG tablet Take 10 mg by mouth daily       LORazepam (ATIVAN) 1 MG tablet Take 1 mg by mouth every 6 hours as needed for anxiety       montelukast (SINGULAIR) 10 MG tablet TAKE 1 TABLET BY MOUTH EVERY MORNING (ASTHMA) 30 tablet 11     Multiple Vitamin (DAILY-JOVAN) TABS TAKE 1 TABLET BY MOUTH EVERY MORNING (VITAMINS) 30 tablet 11     naltrexone (DEPADE/REVIA) 50 MG tablet Take 50 mg by mouth daily       nicotine (NICODERM CQ) 21 MG/24HR 24 hr patch        nicotine (NICORETTE) 2 MG gum Take 2 mg by mouth as needed for smoking cessation       nystatin-triamcinolone (MYCOLOG II) 945768-6.1 UNIT/GM-% external cream Apply topically 2 times daily For 1-2 weeks.       order for DME Equipment being ordered: roll of micropore tape to dress foot wound bid 1 each 0     order for DME Equipment being ordered: 1 surgical shoe 1 Device 0     order for DME [employer] [facility] Phone [phone] Fax [phone]  EdemaWear Size Medium/Yellow qty 4 sleeves  Length of Need: 1 month  Frequency of dressing change daily 1 Device 0     order for DME [employer] Compression Stockings  Phone #[phone]  Fax #[phone]  Coolflex Velcro wraps    Length of Need: Life Time  # of Pairs 1 set 30 days 0     order for DME [employer] Order Phone [phone] Fax [phone]  Primary Dressing Hydrofera Blue Ready   Qty 5 sheets  Secondary Dressing 4' roll gauze Qty 30  Secondary Dressing 2' medipore tape " Qty 1  Secondary Dressing 4x4 gauze loaf Qty  1  Length of Need: 1 month  Frequency of dressing change: daily 30 days 0     order for DME Oxygen 2 Li/min  at night and bled into BiPAP 1 Device 0     order for DME Equipment being ordered: CPAP with mask and tubing 1 Device 0     order for DME Equipment being ordered: support compression hose BK  2 pair black 30mm HG   To be applied on arising & removed while lying down to go to sleep 1 each 0     PULMICORT FLEXHALER 180 MCG/ACT inhaler INHALE 2 PUFFS INTO THE LUNGS TWICE DAILY. 1 each 11     SENNA-TABS 8.6 MG tablet        sertraline (ZOLOFT) 100 MG tablet Take 100 mg by mouth daily Take with 50 mg tablet for a total dose of 150 mg daily.       sertraline (ZOLOFT) 50 MG tablet        simvastatin (ZOCOR) 40 MG tablet TAKE 1 TABLET BY MOUTH EVERY MORNING.  (CHOLESTEROL) 30 tablet 11     SPIRIVA HANDIHALER 18 MCG inhaled capsule INHALE CONTENTS OF 1 CAPSULE INTO THE LUNGS ONCE DAILY 30 capsule 11     spironolactone (ALDACTONE) 25 MG tablet TAKE 1 TABLET BY MOUTH ONCE DAILY. (HIGH BLOOD PRESSURE) 30 tablet 11     temazepam (RESTORIL) 15 MG capsule Take 15 mg by mouth nightly as needed for sleep       trolamine salicylate (ASPERCREME) 10 % external cream Apply topically as needed for moderate pain knee       warfarin ANTICOAGULANT (COUMADIN) 4 MG tablet Take 1 tablet (4 mg) by mouth daily 6 mg mon,fri and 8 mg row (Patient taking differently: Take 2 mg by mouth daily 2+6mg tues, wed, thurs, sat, sun) 44 tablet 0     White Petrolatum ointment Apply topically nightly as needed for dry skin       albuterol (ALBUTEROL) 108 (90 BASE) MCG/ACT inhaler Inhale 2 puffs into the lungs every 6 hours as needed (Patient not taking: Reported on 8/26/2021) 1 Inhaler 0     Cadexomer Iodine, topical, 0.9% (IODOSORB) 0.9 % GEL gel Apply topically daily Apply to left foot wound daily after cleansing the wound and blotting it dry. (Patient not taking: Reported on 8/26/2021) 1 Tube 3      clindamycin (CLEOCIN) 300 MG capsule Take 1 capsule (300 mg) by mouth 3 times daily (Patient not taking: Reported on 8/26/2021) 30 capsule 0     warfarin ANTICOAGULANT (COUMADIN) 4 MG tablet Take 1-1/2 tablets (6 mg) every Monday, Friday; and take 2 tablets (8 mg) all other days of the week. Next INR date is 74/19/21. (Patient not taking: Reported on 8/26/2021) 39 tablet 0     warfarin ANTICOAGULANT (COUMADIN) 4 MG tablet 6 mg mon,fri and 8 mg ROW  Recheck INR 2/22/21 (Patient not taking: Reported on 8/26/2021) 54 tablet 0     Warfarin Therapy Reminder 1 each continuous prn (Patient not taking: Reported on 8/26/2021) 1 each 0       /70   Pulse 85   Resp 16   Wt (!) 152.5 kg (336 lb 3.2 oz)   SpO2 95%   BMI 44.36 kg/m        On examination, the patient is alert and cooperative.  He walks with antalgic gait on the left side.  He could hardly take a few steps.  On the examination table, he was able to move his neck, upper extremities without difficulty.  He was able to move his right lower extremity without difficulty.  There is no tenderness in the abdominal region or the groin region on the left side.  He is tender over the left adductor and the left anterior thigh medially.    Focal areas of tenderness was isolated in the affected muscles.    Impression: At this point, this 60-year-old male with myalgia affecting the muscles of the left thigh, likely a result of his posture and his left ankle chronic ulceration which is also giving him some discomfort.  He follows in the wound clinic and reports that it is getting better.  He did not have his compression stockings today but does wear it regularly.    In terms of treatment, we talked about trigger point injections and he was willing to get it done today.    60 minutes spent for this visit, greater than 50% was for counseling on above-mentioned issues.    Procedure note: With his informed consent, after explaining the benefits and risks of the procedure,  using alcohol for skin prep, 40 mg of triamcinolone with 4 cc of 0.5% bupivacaine, triggers in the medial thigh including the adductor region were injected with relief.  3 different areas were injected.  He tolerated the procedure well.  He was able to get up and walk around better and noticed more than 50% improvement in his pain.  When sitting his pain was 0.    I have suggested that he can use ice, continue Tylenol, use Robaxin-750 milligrams 4 times a day as needed.  He can return to work as a  on Sunday and continue as per his usual routine.  I would like to see him in follow-up in about 4 weeks time.    Yinka Hagen MD

## 2021-09-02 ENCOUNTER — DOCUMENTATION ONLY (OUTPATIENT)
Dept: INTERNAL MEDICINE | Facility: CLINIC | Age: 61
End: 2021-09-02

## 2021-09-02 DIAGNOSIS — Z79.01 LONG TERM CURRENT USE OF ANTICOAGULANT THERAPY: Primary | ICD-10-CM

## 2021-09-02 NOTE — PROGRESS NOTES
ANTICOAGULATION MANAGEMENT      Petey Archibald due for annual renewal of referral to anticoagulation monitoring. Order pended for your review and signature.      ANTICOAGULATION SUMMARY      Warfarin indication(s)     PE    Heart valve present?  NO       Current goal range   INR: 2.0-3.0     Goal appropriate for indication? Yes, INR 2-3 appropriate for hx of DVT, PE, hypercoagulable state, Afib, LVAD, or bileaflet AVR without risk factors     Current duration of therapy Indefinite/long term therapy   Time in Therapeutic Range (TTR)  (Goal > 60%) 71%       Office visit with referring provider's group within last year yes on 6/3/21       Ysabel Tena RN

## 2021-09-03 DIAGNOSIS — R52 PAIN: ICD-10-CM

## 2021-09-03 RX ORDER — ACETAMINOPHEN 325 MG/1
325-650 TABLET ORAL EVERY 6 HOURS PRN
Qty: 100 TABLET | Refills: 3 | Status: SHIPPED | OUTPATIENT
Start: 2021-09-03 | End: 2021-11-23

## 2021-09-03 NOTE — TELEPHONE ENCOUNTER
Prescription approved per [facility] Refill Protocol.  Kashmir Michael RN  [facility] Triage Nurse

## 2021-09-07 ENCOUNTER — ANTICOAGULATION THERAPY VISIT (OUTPATIENT)
Dept: ANTICOAGULATION | Facility: CLINIC | Age: 61
End: 2021-09-07

## 2021-09-07 ENCOUNTER — LAB (OUTPATIENT)
Dept: LAB | Facility: CLINIC | Age: 61
End: 2021-09-07
Payer: MEDICARE

## 2021-09-07 DIAGNOSIS — Z79.01 LONG TERM CURRENT USE OF ANTICOAGULANT THERAPY: ICD-10-CM

## 2021-09-07 DIAGNOSIS — Z86.711 HISTORY OF PULMONARY EMBOLISM: Primary | ICD-10-CM

## 2021-09-07 DIAGNOSIS — Z86.718 HISTORY OF DEEP VEIN THROMBOSIS OF LOWER EXTREMITY: ICD-10-CM

## 2021-09-07 LAB — INR BLD: 2.7 (ref 0.9–1.1)

## 2021-09-07 PROCEDURE — 36416 COLLJ CAPILLARY BLOOD SPEC: CPT

## 2021-09-07 PROCEDURE — 85610 PROTHROMBIN TIME: CPT

## 2021-09-07 RX ORDER — WARFARIN SODIUM 4 MG/1
TABLET ORAL
Qty: 45 TABLET | Refills: 0 | Status: SHIPPED | OUTPATIENT
Start: 2021-09-07 | End: 2021-09-30

## 2021-09-07 NOTE — PROGRESS NOTES
Anticoagulation Management    Unable to reach Hunter/Petey today.    Today's INR result of 2.7 is therapeutic (goal INR of 2.0-3.0).  Result received from: Clinic Lab    Follow up required to confirm warfarin dose taken and assess for changes    Left message to continue current dose of warfarin 8 mg tonight.  RX also sent to [facility].       Anticoagulation clinic to follow up    Ysabel Tena RN

## 2021-09-08 NOTE — PROGRESS NOTES
ANTICOAGULATION MANAGEMENT     Petey Archibald 60 year old male is on warfarin with therapeutic INR result. (Goal INR 2.0-3.0)    Recent labs: (last 7 days)     09/07/21  1125   INR 2.7*       ASSESSMENT     Source(s): Patient/Caregiver Call       Warfarin doses taken: Warfarin taken as instructed    Diet: No new diet changes identified    New illness, injury, or hospitalization: No    Medication/supplement changes: None noted    Signs or symptoms of bleeding or clotting: No    Previous INR: Therapeutic last 2(+) visits    Additional findings: None     PLAN     Recommended plan for no diet, medication or health factor changes affecting INR     Dosing Instructions: Continue your current warfarin dose with next INR in 4 weeks       Summary  As of 9/7/2021    Full warfarin instructions:  6 mg every Mon, Fri; 8 mg all other days   Next INR check:  10/5/2021             Telephone call with Hunter luu at [facility] who verbalizes understanding and agrees to plan    Lab visit scheduled    Education provided: None required    Plan made per [facility] anticoagulation protocol    Ysabel Tena, RN  Anticoagulation Clinic  9/8/2021    _______________________________________________________________________     Anticoagulation Episode Summary     Current INR goal:  2.0-3.0   TTR:  77.9 % (1 y)   Target end date:  Indefinite   Send INR reminders to:  [facility]    Indications    History of pulmonary embolism [Z86.711]  Long term current use of anticoagulant therapy [Z79.01]           Comments:  [facility]; A new Coumadin Prescription will need to sent to [facility] with current dose and next INR check.         Anticoagulation Care Providers     Provider Role Specialty Phone number    Yolande Lacy PA-C Referring Family Medicine [phone]    Demetri Archer MD Referring Internal Medicine [phone]    Silvino Baker MD Responsible Family Medicine [phone]

## 2021-09-22 ENCOUNTER — HOSPITAL ENCOUNTER (OUTPATIENT)
Dept: WOUND CARE | Facility: CLINIC | Age: 61
End: 2021-09-22
Attending: SURGERY
Payer: MEDICARE

## 2021-09-22 DIAGNOSIS — L97.922 ULCER OF LEFT LOWER EXTREMITY WITH FAT LAYER EXPOSED (H): ICD-10-CM

## 2021-09-22 PROCEDURE — 99441 PR PHYSICIAN TELEPHONE EVALUATION 5-10 MIN: CPT | Performed by: SURGERY

## 2021-09-22 NOTE — DISCHARGE INSTRUCTIONS
[facility]  [address] [address]    Call us at [phone] if you have any questions about your wounds, have redness or swelling around your wound, have a fever of 101 or greater or if you have any other problems or concerns. We answer the phone Monday through Friday 8 am to 4 pm, please leave a message as we check the voicemail frequently throughout the day.     Petey Archibald      1960    Wound Dressing Change: Left Ankle  Cleanse wound and surrounding skin with: soap and water  Cover wound with cerave lotion and yellow stripe edemawear   Change dressing daily       FERNANDO Davis M.D.. September 22, 2021      If you had a positive experience please indicate on your patient satisfaction survey form that [facility] will be sending you.    If you have any billing related questions please call the [facility] Business office at [phone]. The clinic staff does not handle billing related matters.

## 2021-09-22 NOTE — ADDENDUM NOTE
Encounter addended by: Crystal Spencer RN on: 9/22/2021 1:46 PM   Actions taken: Pharmacy for encounter modified, Order list changed, Diagnosis association updated

## 2021-09-22 NOTE — PROGRESS NOTES
[facility] [place] Progress Note  Telehealth visist, call start 12:54, end 1:05    Extensive tobacco cessation counseling, staff asking about decreaseing recidivism rate, tobacco cessation critical.  They will work with patient    Subject: Petey Archibald telehealth, photo reviewed, resolving blister, using edemawear and inelastic compression    Patient Active Problem List   Diagnosis     GERD (gastroesophageal reflux disease)     Peripheral vascular disease (H)     History of pulmonary embolism     Tobacco dependence:40-50 pk yr hx     Borderline mental retardation     History of deep vein thrombosis of lower extremity     Pilonidal cyst     Asymptomatic varicose veins, bilateral     Callus of foot on Rt lat dist  since 8-15      Morbid obesity due to excess calories (H)  BMI 40-50     Hypercholesterolemia     Mixed simple and mucopurulent chronic bronchitis (HCC) CT 4-06 wnl and neg bronch for hemoptesis spirometry 7-26-16  FVC=59% & w mod restriction  and lung age of 82 in 54 y/o      Major depression in complete remission (HCC) on meds      Long term current use of anticoagulant therapy     Glucose intolerance (impaired glucose tolerance)     Erectile dysfunction, unspecified erectile dysfunction type     NEL (obstructive sleep apnea)     Anxiety     Thrombophlebitis of superficial veins of both lower extremities: Greater Saphenous VV      History of colonic polyps     Ulcer of left lower extremity with fat layer exposed (H)     Lymphedema of left leg     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)     Schizoaffective disorder, bipolar type (H)     Adenomatous polyp of ascending colon     Colon cancer screening     Edentulism, partial, class IV     Torus palatinus     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     telehealth visit      Impression: chronic lymphaedema    Plan: Ceramide-based lotion on a daily basis, utilization of EdemaWear and inelastic overlying compression, telehealth visit 6 weeks, tobacco cessation     Rustam Davis MD on 9/22/2021 at 12:54 PM

## 2021-09-27 ENCOUNTER — TELEPHONE (OUTPATIENT)
Dept: INTERNAL MEDICINE | Facility: CLINIC | Age: 61
End: 2021-09-27

## 2021-09-27 NOTE — TELEPHONE ENCOUNTER
Call from Joon Steve, . Questioning if patient has had a more recent physical since 8/6/20. None on file, RN scheduled physical for 10/8/20.

## 2021-09-30 DIAGNOSIS — Z86.711 HISTORY OF PULMONARY EMBOLISM: ICD-10-CM

## 2021-09-30 DIAGNOSIS — Z79.01 LONG TERM CURRENT USE OF ANTICOAGULANT THERAPY: ICD-10-CM

## 2021-09-30 RX ORDER — WARFARIN SODIUM 4 MG/1
TABLET ORAL
Qty: 11 TABLET | Refills: 0 | Status: SHIPPED | OUTPATIENT
Start: 2021-09-30 | End: 2022-01-14

## 2021-09-30 NOTE — TELEPHONE ENCOUNTER
Dr. Archer/INR RN    Patient is completely out of Coumadin, there must of been a miss communication.     He has an appointment 10/5/21 for next INR check- he needs refill and needs to know what to do for for his next set of dosage change.     Please call back staff- [place]- [phone]    Tarah Westbrook RN

## 2021-10-05 ENCOUNTER — LAB (OUTPATIENT)
Dept: LAB | Facility: CLINIC | Age: 61
End: 2021-10-05
Payer: MEDICARE

## 2021-10-05 ENCOUNTER — ANTICOAGULATION THERAPY VISIT (OUTPATIENT)
Dept: ANTICOAGULATION | Facility: CLINIC | Age: 61
End: 2021-10-05

## 2021-10-05 DIAGNOSIS — Z86.711 HISTORY OF PULMONARY EMBOLISM: Primary | ICD-10-CM

## 2021-10-05 DIAGNOSIS — Z86.718 HISTORY OF DEEP VEIN THROMBOSIS OF LOWER EXTREMITY: ICD-10-CM

## 2021-10-05 DIAGNOSIS — Z79.01 LONG TERM CURRENT USE OF ANTICOAGULANT THERAPY: ICD-10-CM

## 2021-10-05 LAB — INR BLD: 3.9 (ref 0.9–1.1)

## 2021-10-05 PROCEDURE — 36416 COLLJ CAPILLARY BLOOD SPEC: CPT

## 2021-10-05 PROCEDURE — 85610 PROTHROMBIN TIME: CPT

## 2021-10-05 NOTE — PROGRESS NOTES
ANTICOAGULATION MANAGEMENT     Petey Archibald 60 year old male is on warfarin with supratherapeutic INR result. (Goal INR 2.0-3.0)    Recent labs: (last 7 days)     10/05/21  1109   INR 3.9*       ASSESSMENT     Source(s): Chart Review and Patient/Caregiver Call       Warfarin doses taken: Warfarin taken as instructed    Diet: No new diet changes identified.  Refusing dental care to get dentures.      New illness, injury, or hospitalization: Yes: patient has had ongoing groin pain, but this has worsened and patient has not been able to walk well due to the pain.  Also has healing would on his foot (cellulitis is cleared up)    Medication/supplement changes: Tylenol for groin pain (<2000 mg daily)    Signs or symptoms of bleeding or clotting: No    Previous INR: Therapeutic last 2(+) visits    Additional findings: None     PLAN     Recommended plan for ongoing change(s) affecting INR     Dosing Instructions: Hold dose then Decrease your warfarin dose (4% change) with next INR in 1 week       Summary  As of 10/5/2021    Full warfarin instructions:  10/5: Hold; Otherwise 6 mg every Mon, Wed, Fri; 8 mg all other days   Next INR check:  10/14/2021             Telephone call with  patient's caregivers Hunter and Vic.  Also called orders to [facility] who agrees to plan and repeated back plan correctly    Lab visit scheduled    Education provided: Please call back if any changes to your diet, medications or how you've been taking warfarin    Plan made per [facility] anticoagulation protocol    Kayleen Ott RN  Anticoagulation Clinic  10/5/2021    _______________________________________________________________________     Anticoagulation Episode Summary     Current INR goal:  2.0-3.0   TTR:  76.1 % (1 y)   Target end date:  Indefinite   Send INR reminders to:  [facility]    Indications    History of pulmonary embolism [Z86.711]  Long term current use of anticoagulant therapy [Z79.01]           Comments:   [facility]; A new Coumadin Prescription will need to sent to [facility] with current dose and next INR check.         Anticoagulation Care Providers     Provider Role Specialty Phone number    Yolande Lacy PA-C Referring Family Medicine [phone]    Demetri Archer MD Referring Internal Medicine [phone]    Silvino Baker MD Responsible Family Medicine [phone]

## 2021-10-06 ENCOUNTER — TELEPHONE (OUTPATIENT)
Dept: WOUND CARE | Facility: CLINIC | Age: 61
End: 2021-10-06

## 2021-10-06 NOTE — TELEPHONE ENCOUNTER
Braden Martinez is calling for Petey Archibald as he needs more Pluragel.    You can call Braden at  with any questions.

## 2021-10-06 NOTE — TELEPHONE ENCOUNTER
Call returned to Hunter. Information given of how to order more Plurogel and discount code given. No further questions or concerns.

## 2021-10-08 DIAGNOSIS — K21.9 GASTROESOPHAGEAL REFLUX DISEASE, UNSPECIFIED WHETHER ESOPHAGITIS PRESENT: ICD-10-CM

## 2021-10-11 RX ORDER — DEXLANSOPRAZOLE 60 MG/1
CAPSULE, DELAYED RELEASE ORAL
Qty: 31 CAPSULE | Refills: 11 | Status: CANCELLED | OUTPATIENT
Start: 2021-10-11

## 2021-10-11 RX ORDER — OMEPRAZOLE 40 MG/1
40 CAPSULE, DELAYED RELEASE ORAL DAILY
Qty: 90 CAPSULE | Refills: 2 | Status: SHIPPED | OUTPATIENT
Start: 2021-10-11

## 2021-10-11 NOTE — TELEPHONE ENCOUNTER
Routing refill request to provider for review/approval because:  Next appt is with Gianni on 10/14. Med was last signed by [facility] on 1/22/2021, last in person visit was with Olivia 6/3/2021.  Katy Carbajal RN

## 2021-10-11 NOTE — TELEPHONE ENCOUNTER
Epic notification that Dexilant is not covered by this patient's insurance. Switched rx to omeprazole 40mg daily as I did not see anything in Petey's chart indicating why he was on a specific PPI over any other PPI.

## 2021-10-15 ENCOUNTER — ANTICOAGULATION THERAPY VISIT (OUTPATIENT)
Dept: ANTICOAGULATION | Facility: CLINIC | Age: 61
End: 2021-10-15
Payer: MEDICARE

## 2021-10-15 ENCOUNTER — LAB (OUTPATIENT)
Dept: LAB | Facility: CLINIC | Age: 61
End: 2021-10-15
Payer: MEDICARE

## 2021-10-15 DIAGNOSIS — Z86.718 HISTORY OF DEEP VEIN THROMBOSIS OF LOWER EXTREMITY: ICD-10-CM

## 2021-10-15 DIAGNOSIS — Z79.01 LONG TERM CURRENT USE OF ANTICOAGULANT THERAPY: ICD-10-CM

## 2021-10-15 DIAGNOSIS — Z86.711 HISTORY OF PULMONARY EMBOLISM: Primary | ICD-10-CM

## 2021-10-15 LAB — INR BLD: 2.9 (ref 0.9–1.1)

## 2021-10-15 PROCEDURE — 36416 COLLJ CAPILLARY BLOOD SPEC: CPT

## 2021-10-15 PROCEDURE — 85610 PROTHROMBIN TIME: CPT

## 2021-10-15 PROCEDURE — 99207 PR NO CHARGE NURSE ONLY: CPT

## 2021-10-15 NOTE — PROGRESS NOTES
ANTICOAGULATION MANAGEMENT     Petey Archibald 60 year old male is on warfarin with therapeutic INR result. (Goal INR 2.0-3.0)    Recent labs: (last 7 days)     10/15/21  1506   INR 2.9*       ASSESSMENT     Source(s): Chart Review and Patient/Caregiver Call       Warfarin doses taken: Warfarin taken as instructed    Diet: No new diet changes identified    New illness, injury, or hospitalization: No    Medication/supplement changes: None noted    Signs or symptoms of bleeding or clotting: No    Previous INR: Supratherapeutic    Additional findings: None     PLAN     Recommended plan for no diet, medication or health factor changes affecting INR     Dosing Instructions: Continue your current warfarin dose with next INR in 2 weeks       Summary  As of 10/15/2021    Full warfarin instructions:  6 mg every Mon, Wed, Fri; 8 mg all other days   Next INR check:  10/29/2021             Telephone call with  keith who verbalizes understanding and agrees to plan    Lab visit scheduled    Education provided: None required    Plan made per [facility] anticoagulation protocol    Lisa Ponce RN  Anticoagulation Clinic  10/15/2021    _______________________________________________________________________     Anticoagulation Episode Summary     Current INR goal:  2.0-3.0   TTR:  73.6 % (1 y)   Target end date:  Indefinite   Send INR reminders to:  [facility]    Indications    History of pulmonary embolism [Z86.711]  Long term current use of anticoagulant therapy [Z79.01]           Comments:  REM intermediate; A new Coumadin Prescription will need to sent to [facility] with current dose and next INR check.         Anticoagulation Care Providers     Provider Role Specialty Phone number    Yolande Lacy PA-C Referring Family Medicine [phone]    Demetri Archer MD Referring Internal Medicine [phone]    Silvino Baker MD Responsible Family Medicine [phone]         ANTICOAGULATION FOLLOW-UP CLINIC VISIT    Patient Name:  Petey Archibald  Date:  10/15/2021  Contact Type:  Telephone    SUBJECTIVE:         OBJECTIVE    Recent labs: (last 7 days)     10/15/21  1506   INR 2.9*       ASSESSMENT / PLAN  INR assessment THER    Recheck INR In: 2 WEEKS    INR Location Clinic      Anticoagulation Summary  As of 10/15/2021    INR goal:  2.0-3.0   TTR:  73.6 % (1 y)   INR used for dosin.9 (10/15/2021)   Warfarin maintenance plan:  6 mg (4 mg x 1.5) every Mon, Wed, Fri; 8 mg (4 mg x 2) all other days   Full warfarin instructions:  6 mg every Mon, Wed, Fri; 8 mg all other days   Weekly warfarin total:  50 mg   Plan last modified:  Kayleen Ott RN (10/5/2021)   Next INR check:  10/29/2021   Priority:  Maintenance   Target end date:  Indefinite    Indications    History of pulmonary embolism [Z86.711]  Long term current use of anticoagulant therapy [Z79.01]             Anticoagulation Episode Summary     INR check location:  Anticoagulation Clinic    Preferred lab:      Send INR reminders to:  [facility]    Comments:  REM correction; A new Coumadin Prescription will need to sent to [facility] with current dose and next INR check.      Anticoagulation Care Providers     Provider Role Specialty Phone number    Yolande Lacy PA-C Referring Family Medicine [phone]    Demetri Archer MD Referring Internal Medicine [phone]    Silvino Baker MD Responsible Family Medicine [phone]            See the Encounter Report to view Anticoagulation Flowsheet and Dosing Calendar (Go to Encounters tab in chart review, and find the Anticoagulation Therapy Visit)        Lisa Ponce, RN

## 2021-10-21 ENCOUNTER — OFFICE VISIT (OUTPATIENT)
Dept: PHYSICAL MEDICINE AND REHAB | Facility: CLINIC | Age: 61
End: 2021-10-21
Payer: MEDICARE

## 2021-10-21 VITALS
TEMPERATURE: 98.1 F | SYSTOLIC BLOOD PRESSURE: 115 MMHG | HEART RATE: 81 BPM | RESPIRATION RATE: 16 BRPM | DIASTOLIC BLOOD PRESSURE: 72 MMHG | OXYGEN SATURATION: 95 %

## 2021-10-21 DIAGNOSIS — G24.8 FOCAL DYSTONIA: Primary | ICD-10-CM

## 2021-10-21 DIAGNOSIS — R26.9 ABNORMAL GAIT: ICD-10-CM

## 2021-10-21 DIAGNOSIS — M62.838 SPASM OF MUSCLE: ICD-10-CM

## 2021-10-21 DIAGNOSIS — M79.18 MYALGIA, OTHER SITE: ICD-10-CM

## 2021-10-21 PROCEDURE — 99213 OFFICE O/P EST LOW 20 MIN: CPT | Mod: 25 | Performed by: PHYSICAL MEDICINE & REHABILITATION

## 2021-10-21 PROCEDURE — 20553 NJX 1/MLT TRIGGER POINTS 3/>: CPT | Performed by: PHYSICAL MEDICINE & REHABILITATION

## 2021-10-21 RX ORDER — TRIAMCINOLONE ACETONIDE 40 MG/ML
40 INJECTION, SUSPENSION INTRA-ARTICULAR; INTRAMUSCULAR ONCE
Status: COMPLETED | OUTPATIENT
Start: 2021-10-21 | End: 2021-10-21

## 2021-10-21 RX ADMIN — TRIAMCINOLONE ACETONIDE 40 MG: 40 INJECTION, SUSPENSION INTRA-ARTICULAR; INTRAMUSCULAR at 13:57

## 2021-10-21 NOTE — LETTER
10/21/2021       RE: Petey Archibald  [address] [address]     Dear Colleague,    Thank you for referring your patient, Petey Archibald, to the [facility] PHYSICAL MEDICINE AND REHABILITATION CLINIC [place] at [facility]. Please see a copy of my visit note below.    60-year-old male, accompanied by his staff from the group home.  He has been having significant pain in his left thigh for the past 6 months.  He works as a  in a restaurant which involves prolonged standing.  He is allowed to sit periodically.  He finds when he sits in a chair he does not have the option of stretching.  Over the last several weeks he has been having significant pain.  He has evaluation including CAT scan which showed some sclerosis of the left pubic bone.  He does not have any urinary symptoms and was evaluated by a urologist.  He denies any numbness or tingling.  On a scale of 0-10 he rates his pain at its worst, when he is sitting he does not have any.  Transferring and standing makes it significant.  He has difficulty transferring.     He has significant obesity.  He also smokes 1 pack/day and is considering quitting if it will help with pain reduction. He is not currently drinking as he has no access to a bar nearby.    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     Chronic infection     MRSA     Coagulation disorder (H)     on coumadin     COPD (chronic obstructive pulmonary disease) (H)      History of DVT of lower extremity      History of pulmonary embolism      Hyperlipidemia      Mild intellectual disabilities      Morbid obesity (H)      Peripheral edema      Peripheral vascular disease (H)      Sleep apnea     uses CPAP     Thrombosis      Tobacco dependence      Uncomplicated asthma      Venous stasis dermatitis      Past Surgical History:   Procedure Laterality Date     COLONOSCOPY  N/A 4/15/2019    Procedure: COMBINED COLONOSCOPY, SINGLE OR MULTIPLE BIOPSY/POLYPECTOMY BY BIOPSY;  Surgeon: Jovana Ohara MD;  Location:  GI     COLONOSCOPY N/A 6/7/2021    Procedure: COLONOSCOPY with polypectomies;  Surgeon: Sahil Cid MD;  Location: [facility] OR     EXCISE MALIGNANT LESIONS, TRUNK/ARMS/LEGS 0.5CM OR LESS Left 5/26/2017    Procedure: EXCISE MALIGNANT LESIONS, TRUNK/ARMS/LEGS 0.5CM OR LESS;  EXCISION LEFT ELBOW MASS;  Surgeon: Jose Azevedo MD;  Location:  GI     RECTAL SURGERY      perianal abscess?     Social History     Social History Narrative     Not on file       Current Outpatient Medications   Medication Sig Dispense Refill     acetaminophen (TYLENOL) 325 MG tablet Take 1-2 tablets (325-650 mg) by mouth every 6 hours as needed for mild pain, fever or headaches 100 tablet 3     albuterol (ALBUTEROL) 108 (90 BASE) MCG/ACT inhaler Inhale 2 puffs into the lungs every 6 hours as needed 1 Inhaler 0     ARIPiprazole (ABILIFY) 5 MG tablet Take 5 mg by mouth daily       bacitracin 500 UNIT/GM OINT        buPROPion (WELLBUTRIN SR) 150 MG 12 hr tablet Take 150 mg by mouth 2 times daily       Cadexomer Iodine, topical, 0.9% (IODOSORB) 0.9 % GEL gel Apply topically daily Apply to left foot wound daily after cleansing the wound and blotting it dry. 1 Tube 3     clindamycin (CLEOCIN) 300 MG capsule Take 1 capsule (300 mg) by mouth 3 times daily 30 capsule 0     cloNIDine (CATAPRES) 0.1 MG tablet Take 0.1 mg by mouth 2 times daily       clotrimazole (LOTRIMIN) 1 % external cream Apply topically 2 times daily 60 g 0     diclofenac (VOLTAREN) 1 % topical gel Apply 4 g topically 2 times daily as needed for moderate pain 100 g 1     diphenhydrAMINE (BENADRYL) 25 MG capsule Take 25 mg by mouth every 6 hours as needed for itching or allergies       Emollient (CERAVE) CREA Externally apply topically daily 453 g 11     EPINEPHrine (EPIPEN 2-BRE) 0.3 MG/0.3ML injection 2-pack INJECT 0.3MG  "INTRAMUSCULAR ONE TIME FOR ONE DOSE AS NEEDED FOR ANAPHYLAXIS (CALL 911 IF YOU HAVE TO GIVE) (FOR BEE STINGS) **LABEL EACH PEN* 2 mL 3     gabapentin (NEURONTIN) 100 MG capsule Take 400 mg by mouth 3 times daily At 0900, 1200, and 2000       Gauze Pads & Dressings (GAUZE PADS 4\"X4\") 4\"X4\" PADS 1 each 3 times daily as needed 25 each 1     loratadine (CLARITIN) 10 MG tablet Take 10 mg by mouth daily       LORazepam (ATIVAN) 1 MG tablet Take 1 mg by mouth every 6 hours as needed for anxiety       methocarbamol (ROBAXIN) 750 MG tablet Take 1 tablet (750 mg) by mouth 4 times daily as needed for muscle spasms 60 tablet 0     montelukast (SINGULAIR) 10 MG tablet TAKE 1 TABLET BY MOUTH EVERY MORNING (ASTHMA) 30 tablet 11     Multiple Vitamin (DAILY-JOVAN) TABS TAKE 1 TABLET BY MOUTH EVERY MORNING (VITAMINS) 30 tablet 11     naltrexone (DEPADE/REVIA) 50 MG tablet Take 50 mg by mouth daily       nicotine (COMMIT) 2 MG lozenge        nicotine (NICODERM CQ) 21 MG/24HR 24 hr patch        nicotine (NICORETTE) 2 MG gum Take 2 mg by mouth as needed for smoking cessation       nystatin-triamcinolone (MYCOLOG II) 617816-9.1 UNIT/GM-% external cream Apply topically 2 times daily For 1-2 weeks.       omeprazole (PRILOSEC) 40 MG DR capsule Take 1 capsule (40 mg) by mouth daily 90 capsule 2     order for DME Equipment being ordered: roll of micropore tape to dress foot wound bid 1 each 0     order for DME Equipment being ordered: 1 surgical shoe 1 Device 0     order for DME [employer] Phone [phone] Fax [phone]  EdemaWear Size Medium/Yellow qty 4 sleeves  Length of Need: 1 month  Frequency of dressing change daily 1 Device 0     order for DME [employer] Stockings  Phone #[phone]  Fax #[phone]  Coolflex Velcro wraps    Length of Need: Life Time  # of Pairs 1 set 30 days 0     order for DME [employer] Phone [phone] Fax [phone]  Primary Dressing Hydrofera Blue Ready   Qty 5 " sheets  Secondary Dressing 4' roll gauze Qty 30  Secondary Dressing 2' medipore tape Qty 1  Secondary Dressing 4x4 gauze loaf Qty  1  Length of Need: 1 month  Frequency of dressing change: daily 30 days 0     order for DME Oxygen 2 Li/min  at night and bled into BiPAP 1 Device 0     order for DME Equipment being ordered: CPAP with mask and tubing 1 Device 0     order for DME Equipment being ordered: support compression hose BK  2 pair black 30mm HG   To be applied on arising & removed while lying down to go to sleep 1 each 0     PULMICORT FLEXHALER 180 MCG/ACT inhaler INHALE 2 PUFFS INTO THE LUNGS TWICE DAILY. 1 each 11     SENNA-TABS 8.6 MG tablet        sertraline (ZOLOFT) 100 MG tablet Take 100 mg by mouth daily Take with 50 mg tablet for a total dose of 150 mg daily.       sertraline (ZOLOFT) 50 MG tablet        simvastatin (ZOCOR) 40 MG tablet TAKE 1 TABLET BY MOUTH EVERY MORNING.  (CHOLESTEROL) 30 tablet 11     SPIRIVA HANDIHALER 18 MCG inhaled capsule INHALE CONTENTS OF 1 CAPSULE INTO THE LUNGS ONCE DAILY 30 capsule 11     spironolactone (ALDACTONE) 25 MG tablet TAKE 1 TABLET BY MOUTH ONCE DAILY. (HIGH BLOOD PRESSURE) 30 tablet 11     temazepam (RESTORIL) 15 MG capsule Take 15 mg by mouth nightly as needed for sleep       trolamine salicylate (ASPERCREME) 10 % external cream Apply topically as needed for moderate pain knee       warfarin ANTICOAGULANT (COUMADIN) 4 MG tablet Take 1-1/2 tablets (6 mg) every Monday, Friday; and take 2 tablets (8 mg) all other days of the week. Next INR date is 9/28/21. 11 tablet 0     warfarin ANTICOAGULANT (COUMADIN) 4 MG tablet 6 mg mon,fri and 8 mg ROW  Recheck INR 2/22/21 54 tablet 0     warfarin ANTICOAGULANT (COUMADIN) 4 MG tablet Take 1 tablet (4 mg) by mouth daily 6 mg mon,fri and 8 mg row (Patient taking differently: Take 2 mg by mouth daily 2+6mg tues, wed, thurs, sat, sun) 44 tablet 0     Warfarin Therapy Reminder 1 each continuous prn 1 each 0     White Petrolatum  ointment Apply topically nightly as needed for dry skin          On examination, the patient is alert and cooperative.  He walks with antalgic gait on the left side.  He could hardly take a few steps.  On the examination table, he was able to move his neck, upper extremities without difficulty.  He was able to move his right lower extremity without difficulty.  There is no tenderness in the abdominal region or the groin region on the left side.  He is tender over the left adductor and the left anterior thigh medially.     Focal areas of tenderness was isolated in the affected muscles.     Impression: At this point, this 60-year-old male with myalgia affecting the muscles of the left thigh, likely a result of his posture and his left ankle chronic ulceration which is also giving him some discomfort. 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 He follows in the wound clinic and reports that it is getting better.  He did not have his compression stockings today but does wear it regularly.     In terms of treatment, we talked about trigger point injections and he was willing to get it done today. We will order Botox for longer term relief as he has features of focal dystonia.     20 minutes spent for this visit, greater than 50% was for counseling on above-mentioned issues.     Procedure note: With his informed consent, after explaining the benefits and risks of the procedure, using alcohol for skin prep, 40 mg of triamcinolone with 4 cc of 0.5% bupivacaine, triggers in the medial thigh including the adductor region were injected with relief.  3 different areas were injected.  He tolerated the procedure well.  He was able to get up and walk around better and noticed more than 80% improvement in his pain.  When sitting his pain was 1.     I have suggested that he can use ice, continue Tylenol, use Robaxin-750 milligrams 4 times a day as needed.  He can return to work as a  and continue as per his usual  routine.  I would like to see him in follow-up for Botox.     Yinka Hagen MD       Again, thank you for allowing me to participate in the care of your patient.      Sincerely,    Yinka Hagen MD

## 2021-10-21 NOTE — PROGRESS NOTES
60-year-old male, accompanied by his staff from the group [facility].  He has been having significant pain in his left thigh for the past 6 months.  He works as a  in a restaurant which involves prolonged standing.  He is allowed to sit periodically.  He finds when he sits in a chair he does not have the option of stretching.  Over the last several weeks he has been having significant pain.  He has evaluation including CAT scan which showed some sclerosis of the left pubic bone.  He does not have any urinary symptoms and was evaluated by a urologist.  He denies any numbness or tingling.  On a scale of 0-10 he rates his pain at its worst, when he is sitting he does not have any.  Transferring and standing makes it significant.  He has difficulty transferring.     He has significant obesity.  He also smokes 1 pack/day and is considering quitting if it will help with pain reduction. He is not currently drinking as he has no access to a bar nearby.    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     Chronic infection     MRSA     Coagulation disorder (H)     on coumadin     COPD (chronic obstructive pulmonary disease) (H)      History of DVT of lower extremity      History of pulmonary embolism      Hyperlipidemia      Mild intellectual disabilities      Morbid obesity (H)      Peripheral edema      Peripheral vascular disease (H)      Sleep apnea     uses CPAP     Thrombosis      Tobacco dependence      Uncomplicated asthma      Venous stasis dermatitis      Past Surgical History:   Procedure Laterality Date     COLONOSCOPY N/A 4/15/2019    Procedure: COMBINED COLONOSCOPY, SINGLE OR MULTIPLE BIOPSY/POLYPECTOMY BY BIOPSY;  Surgeon: Jovana Ohara MD;  Location:  GI     COLONOSCOPY N/A 6/7/2021    Procedure: COLONOSCOPY with polypectomies;  Surgeon: Sahil Cid MD;  Location: [facility] OR     EXCISE MALIGNANT LESIONS, TRUNK/ARMS/LEGS 0.5CM OR LESS Left 5/26/2017     "Procedure: EXCISE MALIGNANT LESIONS, TRUNK/ARMS/LEGS 0.5CM OR LESS;  EXCISION LEFT ELBOW MASS;  Surgeon: Jose Azevedo MD;  Location: [facility] GI     RECTAL SURGERY      perianal abscess?     Social History     Social History Narrative     Not on file       Current Outpatient Medications   Medication Sig Dispense Refill     acetaminophen (TYLENOL) 325 MG tablet Take 1-2 tablets (325-650 mg) by mouth every 6 hours as needed for mild pain, fever or headaches 100 tablet 3     albuterol (ALBUTEROL) 108 (90 BASE) MCG/ACT inhaler Inhale 2 puffs into the lungs every 6 hours as needed 1 Inhaler 0     ARIPiprazole (ABILIFY) 5 MG tablet Take 5 mg by mouth daily       bacitracin 500 UNIT/GM OINT        buPROPion (WELLBUTRIN SR) 150 MG 12 hr tablet Take 150 mg by mouth 2 times daily       Cadexomer Iodine, topical, 0.9% (IODOSORB) 0.9 % GEL gel Apply topically daily Apply to left foot wound daily after cleansing the wound and blotting it dry. 1 Tube 3     clindamycin (CLEOCIN) 300 MG capsule Take 1 capsule (300 mg) by mouth 3 times daily 30 capsule 0     cloNIDine (CATAPRES) 0.1 MG tablet Take 0.1 mg by mouth 2 times daily       clotrimazole (LOTRIMIN) 1 % external cream Apply topically 2 times daily 60 g 0     diclofenac (VOLTAREN) 1 % topical gel Apply 4 g topically 2 times daily as needed for moderate pain 100 g 1     diphenhydrAMINE (BENADRYL) 25 MG capsule Take 25 mg by mouth every 6 hours as needed for itching or allergies       Emollient (CERAVE) CREA Externally apply topically daily 453 g 11     EPINEPHrine (EPIPEN 2-BRE) 0.3 MG/0.3ML injection 2-pack INJECT 0.3MG INTRAMUSCULAR ONE TIME FOR ONE DOSE AS NEEDED FOR ANAPHYLAXIS (CALL 911 IF YOU HAVE TO GIVE) (FOR BEE STINGS) **LABEL EACH PEN* 2 mL 3     gabapentin (NEURONTIN) 100 MG capsule Take 400 mg by mouth 3 times daily At 0900, 1200, and 2000       Gauze Pads & Dressings (GAUZE PADS 4\"X4\") 4\"X4\" PADS 1 each 3 times daily as needed 25 each 1     loratadine " (CLARITIN) 10 MG tablet Take 10 mg by mouth daily       LORazepam (ATIVAN) 1 MG tablet Take 1 mg by mouth every 6 hours as needed for anxiety       methocarbamol (ROBAXIN) 750 MG tablet Take 1 tablet (750 mg) by mouth 4 times daily as needed for muscle spasms 60 tablet 0     montelukast (SINGULAIR) 10 MG tablet TAKE 1 TABLET BY MOUTH EVERY MORNING (ASTHMA) 30 tablet 11     Multiple Vitamin (DAILY-JOVAN) TABS TAKE 1 TABLET BY MOUTH EVERY MORNING (VITAMINS) 30 tablet 11     naltrexone (DEPADE/REVIA) 50 MG tablet Take 50 mg by mouth daily       nicotine (COMMIT) 2 MG lozenge        nicotine (NICODERM CQ) 21 MG/24HR 24 hr patch        nicotine (NICORETTE) 2 MG gum Take 2 mg by mouth as needed for smoking cessation       nystatin-triamcinolone (MYCOLOG II) 867747-6.1 UNIT/GM-% external cream Apply topically 2 times daily For 1-2 weeks.       omeprazole (PRILOSEC) 40 MG DR capsule Take 1 capsule (40 mg) by mouth daily 90 capsule 2     order for DME Equipment being ordered: roll of micropore tape to dress foot wound bid 1 each 0     order for DME Equipment being ordered: 1 surgical shoe 1 Device 0     order for DME [employer] Phone [phone] Fax [phone]  EdemaWear Size Medium/Yellow qty 4 sleeves  Length of Need: 1 month  Frequency of dressing change daily 1 Device 0     order for DME [employer] Stock[employer]  Phone #[phone]  Fax #[phone]  Coolflex Velcro wraps    Length of Need: Life Time  # of Pairs 1 set 30 days 0     order for DME [employer] Phone [phone] Fax [phone]  Primary Dressing Hydrofera Blue Ready   Qty 5 sheets  Secondary Dressing 4' roll gauze Qty 30  Secondary Dressing 2' medipore tape Qty 1  Secondary Dressing 4x4 gauze loaf Qty  1  Length of Need: 1 month  Frequency of dressing change: daily 30 days 0     order for DME Oxygen 2 Li/min  at night and bled into BiPAP 1 Device 0     order for DME Equipment being ordered: CPAP with mask and tubing 1 Device 0      order for DME Equipment being ordered: support compression hose BK  2 pair black 30mm HG   To be applied on arising & removed while lying down to go to sleep 1 each 0     PULMICORT FLEXHALER 180 MCG/ACT inhaler INHALE 2 PUFFS INTO THE LUNGS TWICE DAILY. 1 each 11     SENNA-TABS 8.6 MG tablet        sertraline (ZOLOFT) 100 MG tablet Take 100 mg by mouth daily Take with 50 mg tablet for a total dose of 150 mg daily.       sertraline (ZOLOFT) 50 MG tablet        simvastatin (ZOCOR) 40 MG tablet TAKE 1 TABLET BY MOUTH EVERY MORNING.  (CHOLESTEROL) 30 tablet 11     SPIRIVA HANDIHALER 18 MCG inhaled capsule INHALE CONTENTS OF 1 CAPSULE INTO THE LUNGS ONCE DAILY 30 capsule 11     spironolactone (ALDACTONE) 25 MG tablet TAKE 1 TABLET BY MOUTH ONCE DAILY. (HIGH BLOOD PRESSURE) 30 tablet 11     temazepam (RESTORIL) 15 MG capsule Take 15 mg by mouth nightly as needed for sleep       trolamine salicylate (ASPERCREME) 10 % external cream Apply topically as needed for moderate pain knee       warfarin ANTICOAGULANT (COUMADIN) 4 MG tablet Take 1-1/2 tablets (6 mg) every Monday, Friday; and take 2 tablets (8 mg) all other days of the week. Next INR date is 9/28/21. 11 tablet 0     warfarin ANTICOAGULANT (COUMADIN) 4 MG tablet 6 mg mon,fri and 8 mg ROW  Recheck INR 2/22/21 54 tablet 0     warfarin ANTICOAGULANT (COUMADIN) 4 MG tablet Take 1 tablet (4 mg) by mouth daily 6 mg mon,fri and 8 mg row (Patient taking differently: Take 2 mg by mouth daily 2+6mg tues, wed, thurs, sat, sun) 44 tablet 0     Warfarin Therapy Reminder 1 each continuous prn 1 each 0     White Petrolatum ointment Apply topically nightly as needed for dry skin          On examination, the patient is alert and cooperative.  He walks with antalgic gait on the left side.  He could hardly take a few steps.  On the examination table, he was able to move his neck, upper extremities without difficulty.  He was able to move his right lower extremity without difficulty.   There is no tenderness in the abdominal region or the groin region on the left side.  He is tender over the left adductor and the left anterior thigh medially.     Focal areas of tenderness was isolated in the affected muscles.     Impression: At this point, this 60-year-old male with myalgia affecting the muscles of the left thigh, likely a result of his posture and his left ankle chronic ulceration which is also giving him some discomfort. 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 He follows in the wound clinic and reports that it is getting better.  He did not have his compression stockings today but does wear it regularly.     In terms of treatment, we talked about trigger point injections and he was willing to get it done today. We will order Botox for longer term relief as he has features of focal dystonia.     20 minutes spent for this visit, greater than 50% was for counseling on above-mentioned issues.     Procedure note: With his informed consent, after explaining the benefits and risks of the procedure, using alcohol for skin prep, 40 mg of triamcinolone with 4 cc of 0.5% bupivacaine, triggers in the medial thigh including the adductor region were injected with relief.  3 different areas were injected.  He tolerated the procedure well.  He was able to get up and walk around better and noticed more than 80% improvement in his pain.  When sitting his pain was 1.     I have suggested that he can use ice, continue Tylenol, use Robaxin-750 milligrams 4 times a day as needed.  He can return to work as a  and continue as per his usual routine.  I would like to see him in follow-up for Botox.     Yinka Hagen MD

## 2021-10-21 NOTE — NURSING NOTE
Chief Complaint   Patient presents with     Pelvic Pain     [facility] - f/u for pelvic pain         Xavier Prieto

## 2021-10-22 ENCOUNTER — TELEPHONE (OUTPATIENT)
Dept: PHYSICAL MEDICINE AND REHAB | Facility: CLINIC | Age: 61
End: 2021-10-22

## 2021-10-22 NOTE — TELEPHONE ENCOUNTER
Spoke with [facility]. They stated that the Robaxin that was prescribed by Dr Hagen on 8/26/21 is not covered by patient's insurance and will need a prior authorization. [facility] will fax forms for physician to complete.  Hunter llanos [facility] was informed of above and will call him when this is complete.

## 2021-10-22 NOTE — TELEPHONE ENCOUNTER
[facility] Call Center    Phone Message    May a detailed message be left on voicemail: yes     Reason for Call: Other: Hunter from [facility] called uziel that he was under impression that Dr. Ashley prescribed a muscle relaxant for Pt.    Please call Hunter back to advise     If needs to be ordered, please submit to [employer].    Action Taken: Message routed to:  [facility] ([facility]): PMR    Travel Screening: Not Applicable

## 2021-10-29 ENCOUNTER — OFFICE VISIT (OUTPATIENT)
Dept: INTERNAL MEDICINE | Facility: CLINIC | Age: 61
End: 2021-10-29
Payer: MEDICARE

## 2021-10-29 ENCOUNTER — ANTICOAGULATION THERAPY VISIT (OUTPATIENT)
Dept: ANTICOAGULATION | Facility: CLINIC | Age: 61
End: 2021-10-29

## 2021-10-29 VITALS
DIASTOLIC BLOOD PRESSURE: 70 MMHG | OXYGEN SATURATION: 97 % | WEIGHT: 315 LBS | SYSTOLIC BLOOD PRESSURE: 116 MMHG | BODY MASS INDEX: 43.8 KG/M2 | HEART RATE: 59 BPM | TEMPERATURE: 98.3 F

## 2021-10-29 DIAGNOSIS — R21 RASH: ICD-10-CM

## 2021-10-29 DIAGNOSIS — Z79.01 LONG TERM CURRENT USE OF ANTICOAGULANT THERAPY: ICD-10-CM

## 2021-10-29 DIAGNOSIS — I89.0 LYMPHEDEMA OF LEFT LEG: ICD-10-CM

## 2021-10-29 DIAGNOSIS — K59.00 CONSTIPATION, UNSPECIFIED CONSTIPATION TYPE: ICD-10-CM

## 2021-10-29 DIAGNOSIS — Z00.00 MEDICARE ANNUAL WELLNESS VISIT, SUBSEQUENT: Primary | ICD-10-CM

## 2021-10-29 DIAGNOSIS — Z12.5 SCREENING FOR PROSTATE CANCER: ICD-10-CM

## 2021-10-29 DIAGNOSIS — Z86.711 HISTORY OF PULMONARY EMBOLISM: Primary | ICD-10-CM

## 2021-10-29 DIAGNOSIS — Z13.220 ENCOUNTER FOR SCREENING FOR LIPID DISORDER: ICD-10-CM

## 2021-10-29 DIAGNOSIS — Z23 HIGH PRIORITY FOR 2019-NCOV VACCINE: ICD-10-CM

## 2021-10-29 DIAGNOSIS — Z23 ENCOUNTER FOR IMMUNIZATION: ICD-10-CM

## 2021-10-29 DIAGNOSIS — M25.561 CHRONIC PAIN OF RIGHT KNEE: ICD-10-CM

## 2021-10-29 DIAGNOSIS — Z86.718 HISTORY OF DEEP VEIN THROMBOSIS OF LOWER EXTREMITY: ICD-10-CM

## 2021-10-29 DIAGNOSIS — G89.29 CHRONIC PAIN OF RIGHT KNEE: ICD-10-CM

## 2021-10-29 DIAGNOSIS — Z71.6 ENCOUNTER FOR TOBACCO USE CESSATION COUNSELING: ICD-10-CM

## 2021-10-29 PROBLEM — K57.30 DIVERTICULAR DISEASE OF LARGE INTESTINE: Status: ACTIVE | Noted: 2018-02-21

## 2021-10-29 PROBLEM — K64.9 HEMORRHOIDS: Status: ACTIVE | Noted: 2017-02-07

## 2021-10-29 LAB
ANION GAP SERPL CALCULATED.3IONS-SCNC: 5 MMOL/L (ref 3–14)
BUN SERPL-MCNC: 18 MG/DL (ref 7–30)
CALCIUM SERPL-MCNC: 9.4 MG/DL (ref 8.5–10.1)
CHLORIDE BLD-SCNC: 107 MMOL/L (ref 94–109)
CHOLEST SERPL-MCNC: 181 MG/DL
CO2 SERPL-SCNC: 27 MMOL/L (ref 20–32)
CREAT SERPL-MCNC: 0.9 MG/DL (ref 0.66–1.25)
FASTING STATUS PATIENT QL REPORTED: YES
GFR SERPL CREATININE-BSD FRML MDRD: >90 ML/MIN/1.73M2
GLUCOSE BLD-MCNC: 90 MG/DL (ref 70–99)
HDLC SERPL-MCNC: 80 MG/DL
INR BLD: 2 (ref 0.9–1.1)
LDLC SERPL CALC-MCNC: 85 MG/DL
NONHDLC SERPL-MCNC: 101 MG/DL
POTASSIUM BLD-SCNC: 4.4 MMOL/L (ref 3.4–5.3)
PSA SERPL-MCNC: 0.27 UG/L (ref 0–4)
SODIUM SERPL-SCNC: 139 MMOL/L (ref 133–144)
TRIGL SERPL-MCNC: 80 MG/DL

## 2021-10-29 PROCEDURE — 99214 OFFICE O/P EST MOD 30 MIN: CPT | Mod: 25 | Performed by: INTERNAL MEDICINE

## 2021-10-29 PROCEDURE — 80048 BASIC METABOLIC PNL TOTAL CA: CPT | Performed by: INTERNAL MEDICINE

## 2021-10-29 PROCEDURE — 99207 PR NO CHARGE NURSE ONLY: CPT

## 2021-10-29 PROCEDURE — 99396 PREV VISIT EST AGE 40-64: CPT | Mod: 25 | Performed by: INTERNAL MEDICINE

## 2021-10-29 PROCEDURE — G0103 PSA SCREENING: HCPCS | Performed by: INTERNAL MEDICINE

## 2021-10-29 PROCEDURE — 36415 COLL VENOUS BLD VENIPUNCTURE: CPT | Performed by: INTERNAL MEDICINE

## 2021-10-29 PROCEDURE — 80061 LIPID PANEL: CPT | Performed by: INTERNAL MEDICINE

## 2021-10-29 PROCEDURE — 90682 RIV4 VACC RECOMBINANT DNA IM: CPT | Performed by: INTERNAL MEDICINE

## 2021-10-29 PROCEDURE — 0064A COVID-19,PF,MODERNA (18+ YRS BOOSTER .25ML): CPT | Performed by: INTERNAL MEDICINE

## 2021-10-29 PROCEDURE — G0008 ADMIN INFLUENZA VIRUS VAC: HCPCS | Performed by: INTERNAL MEDICINE

## 2021-10-29 PROCEDURE — 85610 PROTHROMBIN TIME: CPT | Performed by: INTERNAL MEDICINE

## 2021-10-29 RX ORDER — POLYETHYLENE GLYCOL 3350 17 G/17G
1 POWDER, FOR SOLUTION ORAL DAILY PRN
Qty: 578 G | Refills: 0 | Status: SHIPPED | OUTPATIENT
Start: 2021-10-29

## 2021-10-29 ASSESSMENT — ENCOUNTER SYMPTOMS
COUGH: 0
HEARTBURN: 0
ARTHRALGIAS: 1
HEMATURIA: 0
DIARRHEA: 0
NERVOUS/ANXIOUS: 1
NAUSEA: 0
PARESTHESIAS: 0
FREQUENCY: 0
HEADACHES: 0
EYE PAIN: 0
CHILLS: 0
HEMATOCHEZIA: 0
FEVER: 0
MYALGIAS: 1
DYSURIA: 0
DIZZINESS: 1
PALPITATIONS: 0
ABDOMINAL PAIN: 0
WEAKNESS: 0
JOINT SWELLING: 0
CONSTIPATION: 0
SHORTNESS OF BREATH: 0
SORE THROAT: 0

## 2021-10-29 ASSESSMENT — ACTIVITIES OF DAILY LIVING (ADL)
CURRENT_FUNCTION: TELEPHONE REQUIRES ASSISTANCE
CURRENT_FUNCTION: SHOPPING REQUIRES ASSISTANCE
CURRENT_FUNCTION: PREPARING MEALS REQUIRES ASSISTANCE
CURRENT_FUNCTION: TRANSPORTATION REQUIRES ASSISTANCE

## 2021-10-29 NOTE — PATIENT INSTRUCTIONS
- Our team will contact you via telephone call with the results of today's lab tests once I have a chance to review them

## 2021-10-29 NOTE — PROGRESS NOTES
ANTICOAGULATION FOLLOW-UP CLINIC VISIT    Patient Name:  Petey Archibald  Date:  10/29/2021  Contact Type:  Telephone    SUBJECTIVE:         OBJECTIVE    Recent labs: (last 7 days)     10/29/21  1010   INR 2.0*       ASSESSMENT / PLAN  INR assessment THER    Recheck INR In: 3 WEEKS    INR Location Clinic      Anticoagulation Summary  As of 10/29/2021    INR goal:  2.0-3.0   TTR:  73.6 % (1 y)   INR used for dosin.0 (10/29/2021)   Warfarin maintenance plan:  6 mg (4 mg x 1.5) every Mon, Wed, Fri; 8 mg (4 mg x 2) all other days   Full warfarin instructions:  6 mg every Mon, Wed, Fri; 8 mg all other days   Weekly warfarin total:  50 mg   Plan last modified:  Kayleen Ott RN (10/5/2021)   Next INR check:  2021   Priority:  Maintenance   Target end date:  Indefinite    Indications    History of pulmonary embolism [Z86.711]  Long term current use of anticoagulant therapy [Z79.01]             Anticoagulation Episode Summary     INR check location:  Anticoagulation Clinic    Preferred lab:      Send INR reminders to:  [facility]    Comments:  REM long-term; A new Coumadin Prescription will need to sent to [facility] with current dose and next INR check.      Anticoagulation Care Providers     Provider Role Specialty Phone number    Yolande Lacy PA-C Referring Family Medicine [phone]    Demetri Archer MD Referring Internal Medicine [phone]    Silvino Baker MD Responsible Family Medicine [phone]            See the Encounter Report to view Anticoagulation Flowsheet and Dosing Calendar (Go to Encounters tab in chart review, and find the Anticoagulation Therapy Visit)        Lisa Ponce, RN

## 2021-10-29 NOTE — CONFIDENTIAL NOTE
ANTICOAGULATION MANAGEMENT     Petey Archibald 60 year old male is on warfarin with therapeutic INR result. (Goal INR 2.0-3.0)    Recent labs: (last 7 days)     10/29/21  1010   INR 2.0*       ASSESSMENT     Source(s): Chart Review and Home Care/Facility Nurse       Warfarin doses taken: Warfarin taken as instructed    Diet: No new diet changes identified    New illness, injury, or hospitalization: No has rash on leg and will be seeing dermatology    Medication/supplement changes: started on Chantex    Signs or symptoms of bleeding or clotting: No    Previous INR: Therapeutic last visit; previously outside of goal range    Additional findings: None     PLAN     Recommended plan for no diet, medication or health factor changes affecting INR     Dosing Instructions: Continue your current warfarin dose with next INR in 3 weeks       Summary  As of 10/29/2021    Full warfarin instructions:  6 mg every Mon, Wed, Fri; 8 mg all other days   Next INR check:  11/19/2021             Telephone call with  Hunter from [facility] who verbalizes understanding and agrees to plan    Lab visit scheduled    Education provided: None required    Plan made per [facility] anticoagulation protocol    Lisa Ponce RN  Anticoagulation Clinic  10/29/2021    _______________________________________________________________________     Anticoagulation Episode Summary     Current INR goal:  2.0-3.0   TTR:  73.6 % (1 y)   Target end date:  Indefinite   Send INR reminders to:  [facility]    Indications    History of pulmonary embolism [Z86.711]  Long term current use of anticoagulant therapy [Z79.01]           Comments:  [facility] USP; A new Coumadin Prescription will need to sent to [facility] with current dose and next INR check.         Anticoagulation Care Providers     Provider Role Specialty Phone number    Yolande Lacy PA-C Referring Family Medicine [phone]    Demetri Archer MD Referring Internal  Medicine [phone]    Silvino Baker MD [facility] [phone]

## 2021-10-29 NOTE — PROGRESS NOTES
"SUBJECTIVE:   CC: Petey Archibald is an 60 year old male who presents for preventative health visit.     Patient has been advised of split billing requirements and indicates understanding: Yes     Healthy Habits:     In general, how would you rate your overall health?  Fair    Frequency of exercise:  None    Do you usually eat at least 4 servings of fruit and vegetables a day, include whole grains    & fiber and avoid regularly eating high fat or \"junk\" foods?  No    Taking medications regularly:  Yes    Medication side effects:  None    Ability to successfully perform activities of daily living:  Telephone requires assistance, transportation requires assistance, shopping requires assistance and preparing meals requires assistance    Home Safety:  No safety concerns identified    Hearing Impairment:  No hearing concerns    In the past 6 months, have you been bothered by leaking of urine?  No    In general, how would you rate your overall mental or emotional health?  Fair      PHQ-2 Total Score: 2    Additional concerns today:  No    Petey presents today for a physical exam. We also discussed he's having some issues with constipation. Hard stool, hard to push out. He still has a rash on his back R leg. Ketoconazole cream is helping.    Today's PHQ-2 Score:   PHQ-2 ( 1999 Pfizer) 10/29/2021   Q1: Little interest or pleasure in doing things 1   Q2: Feeling down, depressed or hopeless 1   PHQ-2 Score 2   Q1: Little interest or pleasure in doing things Several days   Q2: Feeling down, depressed or hopeless Several days   PHQ-2 Score 2     Abuse: Current or Past(Physical, Sexual or Emotional)- No  Do you feel safe in your environment? Yes    Social History     Tobacco Use     Smoking status: Current Every Day Smoker     Packs/day: 1.00     Years: 30.00     Pack years: 30.00     Types: Cigarettes     Smokeless tobacco: Never Used     Tobacco comment: started at age 18    Substance Use Topics     Alcohol use: No     " Alcohol/week: 0.0 standard drinks     If you drink alcohol do you typically have >3 drinks per day or >7 drinks per week? No    Alcohol Use 10/29/2021   Prescreen: >3 drinks/day or >7 drinks/week? Not Applicable   Prescreen: >3 drinks/day or >7 drinks/week? -     Last PSA:   PSA   Date Value Ref Range Status   02/28/2014 0.55 0 - 4 ug/L Final     Reviewed orders with patient. Reviewed health maintenance and updated orders accordingly - Yes    Labs reviewed in EPIC    Reviewed and updated as needed this visit by clinical staff  Tobacco  Allergies  Meds  Problems  Med Hx  Surg Hx  Fam Hx        Reviewed and updated as needed this visit by Provider  Tobacco  Allergies  Meds  Problems  Med Hx  Surg Hx  Fam Hx           Review of Systems   Constitutional: Negative for chills and fever.   HENT: Positive for ear pain. Negative for congestion, hearing loss and sore throat.    Eyes: Negative for pain and visual disturbance.   Respiratory: Negative for cough and shortness of breath.    Cardiovascular: Negative for chest pain, palpitations and peripheral edema.   Gastrointestinal: Negative for abdominal pain, constipation, diarrhea, heartburn, hematochezia and nausea.   Genitourinary: Negative for discharge, dysuria, frequency, genital sores, hematuria, impotence and urgency.   Musculoskeletal: Positive for arthralgias and myalgias. Negative for joint swelling.   Skin: Negative for rash.   Neurological: Positive for dizziness. Negative for weakness, headaches and paresthesias.   Psychiatric/Behavioral: Positive for mood changes. The patient is nervous/anxious.      OBJECTIVE:   /70   Pulse 59   Temp 98.3  F (36.8  C) (Oral)   Wt (!) 150.6 kg (332 lb)   SpO2 97%   BMI 43.80 kg/m      Physical Exam  GENERAL: In no distress.  EYES: Conjunctivae/corneas clear. EOMs grossly intact.  HENT: NC/AT, facies symmetric.  RESP: CTAB. No w/r/r.  CV: RRR, no m/r/g.  GI: NT, ND, without rebound or guarding, no CVA  tenderness. Obese.  MSK: Moves all four extremities freely.  SKIN: Maculopapular rash on R buttock and R posterior thigh.  NEURO: Alert. Oriented.  PSYCH: Linear thought process. Speech normal rate and volume. No tangential thoughts, hallucinations, or delusions.    Diagnostic Test Results: Labs reviewed in [facility]    ASSESSMENT/PLAN:   (Z00.00) Medicare annual wellness visit, subsequent  (primary encounter diagnosis)  Reviewed PMH. Discussed healthcare maintenance issues, including cancer screenings (colonoscopies, PSA), relevant immunizations, and cardiac risk factor screenings such as for cholesterol, HTN, and DM.    (K59.00) Constipation, unspecified constipation type  Intermittent. Has not tried OTC laxatives. Will trial Miralax. BMP today to ensure no electrolyte abnormality causing this.  - Basic metabolic panel  - polyethylene glycol (MIRALAX) 17 GM/Dose powder    (R21) Rash  Persistent despite ketoconazole and steroid cream therapies. Will involved derm.  - Adult Dermatology Referral    (Z12.5) Screening for prostate cancer  - Prostate Specific Antigen Screen    (Z13.220) Encounter for screening for lipid disorder  Fasting today. Tolerating statin.  - Lipid Profile    (Z71.6) Encounter for tobacco use cessation counseling  Discussed benefits of quitting smoking. He is interested in trying Chantix. Discussed nationwide back order, he'd still like to see if he can obtain it. Discussed side effects such as vivid nightmares, worsening mood symptoms, etc. He'll monitor and let me know if these happen.  - varenicline (CHANTIX BRE) 0.5 MG X 11 & 1 MG X 42 tablet    (Z23) Encounter for immunization  - INFLUENZA QUAD, RECOMBINANT, P-FREE (RIV4) (FLUBLOK), ADMIN INFLUENZA (For MEDICARE Patients ONLY) []    (Z23) High priority for 2019-nCoV vaccine  Third dose booster.  - COVID-19,PF,MODERNA (18+ Yrs BOOSTER .25mL)    Patient has been advised of split billing requirements and indicates understanding: Yes  "    COUNSELING:   Special attention given to:        Healthy diet/nutrition       Immunizations    Vaccinated for: Influenza and COVID       Colon cancer screening       Prostate cancer screening    Estimated body mass index is 43.8 kg/m  as calculated from the following:    Height as of 8/13/21: 1.854 m (6' 1\").    Weight as of this encounter: 150.6 kg (332 lb).     He reports that he has been smoking cigarettes. He has a 30.00 pack-year smoking history. He has never used smokeless tobacco.   Tobacco Cessation Action Plan:   Pharmacotherapies : Deng Holder MD  [facility]  "

## 2021-11-03 NOTE — PROGRESS NOTES
Patient arrived for wound care visit. Certified Wound Care Nurse time spent evaluating patient record, completed a full evaluation and documented wound(s) & pablito-wound skin; provided recommendation based on treatment plan. Applied dressing, reviewed discharge instructions, patient education, and discussed plan of care with appropriate medical team staff members and patient and/or family members.        0 = independent

## 2021-11-05 ENCOUNTER — OFFICE VISIT (OUTPATIENT)
Dept: DERMATOLOGY | Facility: CLINIC | Age: 61
End: 2021-11-05
Attending: INTERNAL MEDICINE
Payer: MEDICARE

## 2021-11-05 VITALS — DIASTOLIC BLOOD PRESSURE: 74 MMHG | OXYGEN SATURATION: 99 % | SYSTOLIC BLOOD PRESSURE: 112 MMHG | HEART RATE: 75 BPM

## 2021-11-05 DIAGNOSIS — L30.9 DERMATITIS: Primary | ICD-10-CM

## 2021-11-05 PROCEDURE — 99203 OFFICE O/P NEW LOW 30 MIN: CPT | Performed by: PHYSICIAN ASSISTANT

## 2021-11-05 RX ORDER — TRIAMCINOLONE ACETONIDE 1 MG/G
CREAM TOPICAL
Qty: 80 G | Refills: 2 | Status: SHIPPED | OUTPATIENT
Start: 2021-11-05

## 2021-11-05 NOTE — LETTER
11/5/2021         RE: Petey Archibald  [address] [address]        Dear Colleague,    Thank you for referring your patient, Petey Archibald, to the [facility]. Please see a copy of my visit note below.    HPI:   Chief complaints: Petey Archibald is a pleasant 60 year old male who presents for evaluation of a an itchy rash on the buttocks. It has been present for about 1 week. He has used ketoconazole cream which did help a little.       PHYSICAL EXAM:    /74   Pulse 75   SpO2 99%   Skin exam performed as follows: Type 2 skin. Mood appropriate  Alert and Oriented X 3. Well developed, well nourished in no distress.  General appearance: Normal  Head including face: Normal  Eyes: conjunctiva and lids: Normal  Mouth: Lips, teeth, gums: Normal  Neck: Normal  Cardiovascular: Exam of peripheral vascular system by observation for swelling, varicosities, edema: Normal  Right upper extremity: Normal  Left upper extremity: Normal  Right lower extremity: Normal  Left lower extremity: Normal  Skin: Scalp and body hair: See below    Dermatitis on the buttocks; right > left    ASSESSMENT/PLAN:     1. Dermatitis on the buttocks - discussed gentle soaps and daily emollients  --Start TAC BID x 1-2 weeks PRN flares          Follow-up: PRN  CC:   Scribed By: Margarita Jenkins, MS, PAKASEY          Again, thank you for allowing me to participate in the care of your patient.        Sincerely,        Margarita Jenkins PA-C

## 2021-11-05 NOTE — PROGRESS NOTES
HPI:   Chief complaints: Petey Archibald is a pleasant 60 year old male who presents for evaluation of a an itchy rash on the buttocks. It has been present for about 1 week. He has used ketoconazole cream which did help a little.       PHYSICAL EXAM:    /74   Pulse 75   SpO2 99%   Skin exam performed as follows: Type 2 skin. Mood appropriate  Alert and Oriented X 3. Well developed, well nourished in no distress.  General appearance: Normal  Head including face: Normal  Eyes: conjunctiva and lids: Normal  Mouth: Lips, teeth, gums: Normal  Neck: Normal  Cardiovascular: Exam of peripheral vascular system by observation for swelling, varicosities, edema: Normal  Right upper extremity: Normal  Left upper extremity: Normal  Right lower extremity: Normal  Left lower extremity: Normal  Skin: Scalp and body hair: See below    Dermatitis on the buttocks; right > left    ASSESSMENT/PLAN:     1. Dermatitis on the buttocks - discussed gentle soaps and daily emollients  --Start TAC BID x 1-2 weeks PRN flares          Follow-up: PRN  CC:   Scribed By: Margarita Jenkins, MS, PA-C

## 2021-11-05 NOTE — PATIENT INSTRUCTIONS
This looks like eczema which is very dry, itchy/rashy skin    Start using triamcinolone cream twice per day for 1-2 weeks at a time when needed.     Use moisturizers very day (these go over the triamcinolone) - Cetaphil cream, CeraVe cream, Vanicream, Eucerin cream, Gold bond

## 2021-11-08 ENCOUNTER — HOSPITAL ENCOUNTER (OUTPATIENT)
Dept: WOUND CARE | Facility: CLINIC | Age: 61
End: 2021-11-08
Attending: SURGERY
Payer: MEDICARE

## 2021-11-08 DIAGNOSIS — L97.922 ULCER OF LEFT LOWER EXTREMITY WITH FAT LAYER EXPOSED (H): ICD-10-CM

## 2021-11-08 DIAGNOSIS — L97.523 CHRONIC ULCER OF LEFT FOOT WITH NECROSIS OF MUSCLE (H): Primary | ICD-10-CM

## 2021-11-08 PROCEDURE — 99441 PR PHYSICIAN TELEPHONE EVALUATION 5-10 MIN: CPT | Performed by: SURGERY

## 2021-11-08 NOTE — PROGRESS NOTES
[facility] [place] Progress Note  Telehealth phone call start 14:52, end time  15:00  Subject: Petey Archbiald using plurogel, ceravae, 2 compression stockings, changing daily  Not on MPFF/diosmin/Vein formula, on D3 2000 IU daily, multivitamin, will add in a B complex,   Off and on smoking,  encourgaed smoking cessation    Patient Active Problem List   Diagnosis     GERD (gastroesophageal reflux disease)     Peripheral vascular disease (H)     History of pulmonary embolism     Tobacco dependence:40-50 pk yr hx     Borderline mental retardation     History of deep vein thrombosis of lower extremity     Pilonidal cyst     Asymptomatic varicose veins, bilateral     Callus of foot on Rt lat dist  since 8-15      Morbid obesity due to excess calories (H)  BMI 40-50     Hypercholesterolemia     Mixed simple and mucopurulent chronic bronchitis (HCC) CT 4-06 wnl and neg bronch for hemoptesis spirometry 7-26-16  FVC=59% & w mod restriction  and lung age of 82 in 54 y/o      Major depression in complete remission (HCC) on meds      Long term current use of anticoagulant therapy     Glucose intolerance (impaired glucose tolerance)     Erectile dysfunction, unspecified erectile dysfunction type     NEL (obstructive sleep apnea)     Anxiety     Thrombophlebitis of superficial veins of both lower extremities: Greater Saphenous VV      History of colonic polyps     Ulcer of left lower extremity with fat layer exposed (H)     Lymphedema of left leg     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)     Schizoaffective disorder, bipolar type (H)     Adenomatous polyp of ascending colon     Colon cancer screening     Edentulism, partial, class IV     Torus palatinus     Diverticular disease of large intestine     Hemorrhoids     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     Chronic infection     MRSA     Coagulation disorder (H)     on coumadin     COPD (chronic  obstructive pulmonary disease) (H)      History of DVT of lower extremity      History of pulmonary embolism      Hyperlipidemia      Mild intellectual disabilities      Morbid obesity (H)      NEL (obstructive sleep apnea)      Peripheral edema      Peripheral vascular disease (H)      Sleep apnea     uses CPAP     Thrombosis      Tobacco dependence      Uncomplicated asthma      Venous stasis dermatitis      Exam:  There were no vitals taken for this visit.     telehealth visit        Impression: VLU, lymphedema    Plan: We will dress the wounds with primary dressing plurogel, edemawear, compression, add B complex, ideally no smoking, geeting new bigger shoe wear to compensate for compression deepa, working/stands. Patient will return to the clinic in 6 weeks time, telehealth.    Rustam Davis MD on 11/8/2021 at 2:52 PM

## 2021-11-08 NOTE — DISCHARGE INSTRUCTIONS
[facility]  [address] [address]    Call us at [phone] if you have any questions about your wounds, have redness or swelling around your wound, have a fever of 101 or greater or if you have any other problems or concerns. We answer the phone Monday through Friday 8 am to 4 pm, please leave a message as we check the voicemail frequently throughout the day.     Petey Archibald      1960    Medication recommendation:  B complex with vitamin C (sent prescription to [facility])    Wound Dressing Change: Left Ankle  Cleanse wound and surrounding skin with: soap and water  Apply plurogel to wound then apply Cere-Ve lotion and yellow stripe edemawear  Change dressing daily     FERNANDO Davis M.D. November 8, 2021    Wound Clinic follow up in 6 weeks via telephone visit    If you had a positive experience please indicate that on your patient satisfaction survey form that [facility] will be sending you.    It was a pleasure meeting with you today.  Thank you for allowing me and my team the privilege of caring for you today.  YOU are the reason we are here, and I truly hope we provided you with the excellent service you deserve.  Please let us know if there is anything else we can do for you so that we can be sure you are leaving completely satisfied with your care experience.      If you have any billing related questions please call the [facility] Business office at [phone]. The clinic staff does not handle billing related matters.

## 2021-11-08 NOTE — PROGRESS NOTES
Patient called for a telephone visit. Certified Wound Care Nurse time spent evaluating patient record, completed a full evaluation and documented wound(s) & pablito-wound skin; provided recommendation based on treatment plan. Reviewed discharge instructions, patient education, and discussed plan of care with appropriate medical team staff members and patient and/or family members.

## 2021-11-19 ENCOUNTER — ANTICOAGULATION THERAPY VISIT (OUTPATIENT)
Dept: ANTICOAGULATION | Facility: CLINIC | Age: 61
End: 2021-11-19
Payer: MEDICARE

## 2021-11-19 ENCOUNTER — LAB (OUTPATIENT)
Dept: LAB | Facility: CLINIC | Age: 61
End: 2021-11-19
Payer: MEDICARE

## 2021-11-19 DIAGNOSIS — Z86.718 HISTORY OF DEEP VEIN THROMBOSIS OF LOWER EXTREMITY: ICD-10-CM

## 2021-11-19 DIAGNOSIS — Z86.711 HISTORY OF PULMONARY EMBOLISM: Primary | ICD-10-CM

## 2021-11-19 DIAGNOSIS — Z79.01 LONG TERM CURRENT USE OF ANTICOAGULANT THERAPY: ICD-10-CM

## 2021-11-19 LAB — INR BLD: 2.4 (ref 0.9–1.1)

## 2021-11-19 PROCEDURE — 36416 COLLJ CAPILLARY BLOOD SPEC: CPT

## 2021-11-19 PROCEDURE — 99207 PR NO CHARGE NURSE ONLY: CPT

## 2021-11-19 PROCEDURE — 85610 PROTHROMBIN TIME: CPT

## 2021-11-19 NOTE — PROGRESS NOTES
ANTICOAGULATION FOLLOW-UP CLINIC VISIT    Patient Name:  Petey Archibald  Date:  2021  Contact Type:  Telephone    SUBJECTIVE:         OBJECTIVE    Recent labs: (last 7 days)     21  1401   INR 2.4*       ASSESSMENT / PLAN  INR assessment THER    Recheck INR In: 4 WEEKS    INR Location Clinic      Anticoagulation Summary  As of 2021    INR goal:  2.0-3.0   TTR:  73.6 % (1 y)   INR used for dosin.4 (2021)   Warfarin maintenance plan:  6 mg (4 mg x 1.5) every Mon, Wed, Fri; 8 mg (4 mg x 2) all other days   Full warfarin instructions:  6 mg every Mon, Wed, Fri; 8 mg all other days   Weekly warfarin total:  50 mg   No change documented:  Lisa Ponce RN   Plan last modified:  Kayleen Ott RN (10/5/2021)   Next INR check:  2021   Priority:  Maintenance   Target end date:  Indefinite    Indications    History of pulmonary embolism [Z86.711]  Long term current use of anticoagulant therapy [Z79.01]             Anticoagulation Episode Summary     INR check location:  Anticoagulation Clinic    Preferred lab:      Send INR reminders to:  [facility]    Comments:  REM assisted; A new Coumadin Prescription will need to sent to [facility] with current dose and next INR check.      Anticoagulation Care Providers     Provider Role Specialty Phone number    Yolande Lacy PA-C Referring Family Medicine [phone]    Demetri Archer MD Referring Internal Medicine [phone]    Silvino Baker MD Responsible Family Medicine [phone]            See the Encounter Report to view Anticoagulation Flowsheet and Dosing Calendar (Go to Encounters tab in chart review, and find the Anticoagulation Therapy Visit)        Lisa Ponce, ALEXA               ANTICOAGULATION MANAGEMENT     Petey Archibald 60 year old male is on warfarin with therapeutic INR result. (Goal INR 2.0-3.0)    Recent labs: (last 7 days)     21  1401   INR 2.4*        ASSESSMENT     Source(s): Chart Review and Home Care/Facility Nurse       Warfarin doses taken: Warfarin taken as instructed    Diet: No new diet changes identified    New illness, injury, or hospitalization: No    Medication/supplement changes: None noted    Signs or symptoms of bleeding or clotting: No    Previous INR: Therapeutic last 2(+) visits    Additional findings: None     PLAN     Recommended plan for no diet, medication or health factor changes affecting INR     Dosing Instructions: Continue your current warfarin dose with next INR in 4 weeks       Summary  As of 11/19/2021    Full warfarin instructions:  6 mg every Mon, Wed, Fri; 8 mg all other days   Next INR check:  12/17/2021             Telephone call with  keith who verbalizes understanding and agrees to plan    pt scheduled    Education provided: None required    Plan made per [facility] anticoagulation protocol    Lisa Ponce RN  Anticoagulation Clinic  11/19/2021    _______________________________________________________________________     Anticoagulation Episode Summary     Current INR goal:  2.0-3.0   TTR:  73.6 % (1 y)   Target end date:  Indefinite   Send INR reminders to:  [facility]    Indications    History of pulmonary embolism [Z86.711]  Long term current use of anticoagulant therapy [Z79.01]           Comments:  REM halfway; A new Coumadin Prescription will need to sent to [facility] with current dose and next INR check.         Anticoagulation Care Providers     Provider Role Specialty Phone number    Yolande Lacy PA-C Referring Family Medicine [phone]    Demetri Archer MD Referring Internal Medicine [phone]    Silvino Baker MD Responsible Family Medicine [phone]

## 2021-11-23 DIAGNOSIS — R52 PAIN: ICD-10-CM

## 2021-11-23 RX ORDER — ACETAMINOPHEN 325 MG/1
325-650 TABLET ORAL EVERY 6 HOURS PRN
Qty: 100 TABLET | Refills: 3 | Status: SHIPPED | OUTPATIENT
Start: 2021-11-23 | End: 2024-08-22

## 2021-11-23 NOTE — TELEPHONE ENCOUNTER
"Bertrand, a staff member from pt's home called stating that [facility] called pt and stated that \"Dr Archer claims not to know pt and they are unable to refill his medication\". Pt and his home staff would like to get this cleared up and pt needs his medication.     Bertrand may be reached at [phone].     Pharmacy may be reached at [phone].     "

## 2021-11-24 NOTE — TELEPHONE ENCOUNTER
Not sure why that was communicated as I have seen Petey multiple times in the last year. Happy to continue to follow him. Script signed.

## 2021-12-17 ENCOUNTER — ANTICOAGULATION THERAPY VISIT (OUTPATIENT)
Dept: ANTICOAGULATION | Facility: CLINIC | Age: 61
End: 2021-12-17
Payer: MEDICARE

## 2021-12-17 ENCOUNTER — LAB (OUTPATIENT)
Dept: LAB | Facility: CLINIC | Age: 61
End: 2021-12-17
Payer: MEDICARE

## 2021-12-17 DIAGNOSIS — Z86.718 HISTORY OF DEEP VEIN THROMBOSIS OF LOWER EXTREMITY: ICD-10-CM

## 2021-12-17 DIAGNOSIS — Z79.01 LONG TERM CURRENT USE OF ANTICOAGULANT THERAPY: ICD-10-CM

## 2021-12-17 DIAGNOSIS — Z86.711 HISTORY OF PULMONARY EMBOLISM: Primary | ICD-10-CM

## 2021-12-17 LAB — INR BLD: 2.4 (ref 0.9–1.1)

## 2021-12-17 PROCEDURE — 36416 COLLJ CAPILLARY BLOOD SPEC: CPT

## 2021-12-17 PROCEDURE — 85610 PROTHROMBIN TIME: CPT

## 2021-12-17 PROCEDURE — 99207 PR NO CHARGE NURSE ONLY: CPT

## 2021-12-17 NOTE — PROGRESS NOTES
ANTICOAGULATION FOLLOW-UP CLINIC VISIT    Patient Name:  Petey Archibald  Date:  2021  Contact Type:  Telephone    SUBJECTIVE:         OBJECTIVE    Recent labs: (last 7 days)     21  0906   INR 2.4*       ASSESSMENT / PLAN  INR assessment THER    Recheck INR In: 4 WEEKS    INR Location Clinic      Anticoagulation Summary  As of 2021    INR goal:  2.0-3.0   TTR:  73.6 % (1 y)   INR used for dosin.4 (2021)   Warfarin maintenance plan:  6 mg (4 mg x 1.5) every Mon, Wed, Fri; 8 mg (4 mg x 2) all other days   Full warfarin instructions:  6 mg every Mon, Wed, Fri; 8 mg all other days   Weekly warfarin total:  50 mg   No change documented:  Lias Ponce RN   Plan last modified:  Kayleen Ott RN (10/5/2021)   Next INR check:  2022   Priority:  Maintenance   Target end date:  Indefinite    Indications    History of pulmonary embolism [Z86.711]  Long term current use of anticoagulant therapy [Z79.01]             Anticoagulation Episode Summary     INR check location:  Anticoagulation Clinic    Preferred lab:      Send INR reminders to:  [facility]    Comments:  REM custodial; A new Coumadin Prescription will need to sent to [facility] with current dose and next INR check.      Anticoagulation Care Providers     Provider Role Specialty Phone number    Yolande Lacy PA-C Referring Family Medicine [phone]    Demetri Archer MD Referring Internal Medicine [phone]    Silvino Baker MD Responsible Family Medicine [phone]            See the Encounter Report to view Anticoagulation Flowsheet and Dosing Calendar (Go to Encounters tab in chart review, and find the Anticoagulation Therapy Visit)        Lisa Ponce, ALEXA          ANTICOAGULATION MANAGEMENT     Petey Archibald 61 year old male is on warfarin with therapeutic INR result. (Goal INR 2.0-3.0)    Recent labs: (last 7 days)     21  0906   INR 2.4*        ASSESSMENT     Source(s): Chart Review and Patient/Caregiver Call       Warfarin doses taken: Warfarin taken as instructed    Diet: No new diet changes identified    New illness, injury, or hospitalization: No    Medication/supplement changes: None noted    Signs or symptoms of bleeding or clotting: No    Previous INR: Supratherapeutic    Additional findings: None     PLAN     Recommended plan for no diet, medication or health factor changes affecting INR     Dosing Instructions: Continue your current warfarin dose with next INR in 4 weeks       Summary  As of 12/17/2021    Full warfarin instructions:  6 mg every Mon, Wed, Fri; 8 mg all other days   Next INR check:  1/14/2022             Telephone call with Petey who verbalizes understanding and agrees to plan    Lab visit scheduled    Education provided: Please call back if any changes to your diet, medications or how you've been taking warfarin    Plan made per [facility] anticoagulation protocol    Lisa Ponce RN  Anticoagulation Clinic  12/17/2021    _______________________________________________________________________     Anticoagulation Episode Summary     Current INR goal:  2.0-3.0   TTR:  73.6 % (1 y)   Target end date:  Indefinite   Send INR reminders to:  [facility]    Indications    History of pulmonary embolism [Z86.711]  Long term current use of anticoagulant therapy [Z79.01]           Comments:  REM assisted; A new Coumadin Prescription will need to sent to [facility] with current dose and next INR check.         Anticoagulation Care Providers     Provider Role Specialty Phone number    Yolande Lacy PA-C Referring Family Medicine [phone]    Demetri Archer MD Referring Internal Medicine [phone]    Silvino Baker MD Responsible Family Medicine [phone]

## 2021-12-20 ENCOUNTER — TELEPHONE (OUTPATIENT)
Dept: WOUND CARE | Facility: CLINIC | Age: 61
End: 2021-12-20
Payer: MEDICARE

## 2021-12-20 NOTE — TELEPHONE ENCOUNTER
Spoke with Braden and informed that a visit is required within 30 days of ordering supplies.  Patient has tele-visit in 2 days, so supplies will be ordered at that time.  Braden agreed with plan and no further questions.

## 2021-12-20 NOTE — TELEPHONE ENCOUNTER
Braden Martinez the caregiver at Petey's group home called because they need more tape for Petey,    Please call him at .

## 2021-12-22 ENCOUNTER — HOSPITAL ENCOUNTER (OUTPATIENT)
Dept: WOUND CARE | Facility: CLINIC | Age: 61
End: 2021-12-22
Attending: SURGERY
Payer: MEDICARE

## 2021-12-22 DIAGNOSIS — L97.922 ULCER OF LEFT LOWER EXTREMITY WITH FAT LAYER EXPOSED (H): ICD-10-CM

## 2021-12-22 PROCEDURE — 99441 PR PHYSICIAN TELEPHONE EVALUATION 5-10 MIN: CPT | Performed by: SURGERY

## 2021-12-22 NOTE — DISCHARGE INSTRUCTIONS
Petey Archibald      1960    Medication recommendation:  Continue B complex with vitamin C (sent prescription to [facility])    Wound Dressing Change: Left Ankle  Cleanse wound and surrounding skin with: soap and water  Apply Cera-Ve lotion daily & as needed  FERNANDO Davis M.D. November 8, 2021  Wound Clinic follow up in 6 weeks via telephone visit    Compression:  Apply clean Sigvaris compression stockings first thing in the morning and remove at night before going to bed.   Remove compression dressing if toes turn blue and/or start to feel numb and is not relieved by elevating the leg for one hour.   Walk as much as you can. When you sit raise your ankle above your hips to promote wound healing.      FERNANDO Davis M.D.. December 22, 2021      Call us at [phone] if you have any questions about your wounds, have redness or swelling around your wound, have a fever of 101 or greater or if you have any other problems or concerns. We answer the phone Monday through Friday 8 am to 4 pm, please leave a message as we check the voicemail frequently throughout the day.     If you had a positive experience please indicate that on your patient satisfaction survey form that [facility] will be sending you.    It was a pleasure meeting with you today.  Thank you for allowing me and my team the privilege of caring for you today.  YOU are the reason we are here, and I truly hope we provided you with the excellent service you deserve.  Please let us know if there is anything else we can do for you so that we can be sure you are leaving completely satisfied with your care experience.      If you have any billing related questions please call the [facility] Business office at [phone]. The clinic staff does not handle billing related matters.

## 2021-12-22 NOTE — PROGRESS NOTES
[facility] [place] Progress Note  Telehealth call start 14:59  End time 3:03 PM  Subject: Petey Archibald closed lower extremity venous hypertensive ulceration, chronic lymphedema, ongoing tobacco utilization, resides in a group facility.    Patient Active Problem List   Diagnosis     GERD (gastroesophageal reflux disease)     Peripheral vascular disease (H)     History of pulmonary embolism     Tobacco dependence:40-50 pk yr hx     Borderline mental retardation     History of deep vein thrombosis of lower extremity     Pilonidal cyst     Asymptomatic varicose veins, bilateral     Callus of foot on Rt lat dist  since 8-15      Morbid obesity due to excess calories (H)  BMI 40-50     Hypercholesterolemia     Mixed simple and mucopurulent chronic bronchitis (HCC) CT 4-06 wnl and neg bronch for hemoptesis spirometry 7-26-16  FVC=59% & w mod restriction  and lung age of 82 in 56 y/o      Major depression in complete remission (HCC) on meds      Long term current use of anticoagulant therapy     Glucose intolerance (impaired glucose tolerance)     Erectile dysfunction, unspecified erectile dysfunction type     NEL (obstructive sleep apnea)     Anxiety     Thrombophlebitis of superficial veins of both lower extremities: Greater Saphenous VV      History of colonic polyps     Ulcer of left lower extremity with fat layer exposed (H)     Lymphedema of left leg     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)     Schizoaffective disorder, bipolar type (H)     Adenomatous polyp of ascending colon     Colon cancer screening     Edentulism, partial, class IV     Torus palatinus     Diverticular disease of large intestine     Hemorrhoids     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     Chronic infection     MRSA     Coagulation disorder (H)     on coumadin     COPD (chronic obstructive pulmonary disease) (H)      History of DVT of lower extremity      History of  pulmonary embolism      Hyperlipidemia      Mild intellectual disabilities      Morbid obesity (H)      NEL (obstructive sleep apnea)      Peripheral edema      Peripheral vascular disease (H)      Sleep apnea     uses CPAP     Thrombosis      Tobacco dependence      Uncomplicated asthma      Venous stasis dermatitis      Exam:  There were no vitals taken for this visit.     Photo image reviewed, wound closed, chronic lymphedema        Impression: lymphedema of venous etiology, closed wound    Plan: Ceramide lotion daily, compression, cease tobacco, evaluation with [facility] for compression stockings to be fitted, obtain the maximum allowable number of variables number based on insurance, obtain a new stocking for 6 months to maintain  reduction of swelling  Will f/u prn     Rustam Davis MD on 12/22/2021 at 2:59 PM    Dictated using Dragon voice recognition software which may result in transcription errors

## 2021-12-30 ENCOUNTER — OFFICE VISIT (OUTPATIENT)
Dept: PHYSICAL MEDICINE AND REHAB | Facility: CLINIC | Age: 61
End: 2021-12-30
Payer: MEDICARE

## 2021-12-30 VITALS
DIASTOLIC BLOOD PRESSURE: 79 MMHG | RESPIRATION RATE: 16 BRPM | OXYGEN SATURATION: 97 % | SYSTOLIC BLOOD PRESSURE: 125 MMHG | HEART RATE: 71 BPM

## 2021-12-30 DIAGNOSIS — G24.8 FOCAL DYSTONIA: Primary | ICD-10-CM

## 2021-12-30 DIAGNOSIS — M79.18 MYALGIA, OTHER SITE: ICD-10-CM

## 2021-12-30 DIAGNOSIS — G89.29 OTHER CHRONIC PAIN: ICD-10-CM

## 2021-12-30 DIAGNOSIS — M62.838 SPASM OF MUSCLE: ICD-10-CM

## 2021-12-30 PROCEDURE — 99213 OFFICE O/P EST LOW 20 MIN: CPT | Mod: 25 | Performed by: PHYSICAL MEDICINE & REHABILITATION

## 2021-12-30 PROCEDURE — 95874 GUIDE NERV DESTR NEEDLE EMG: CPT | Performed by: PHYSICAL MEDICINE & REHABILITATION

## 2021-12-30 PROCEDURE — 96372 THER/PROPH/DIAG INJ SC/IM: CPT | Performed by: PHYSICAL MEDICINE & REHABILITATION

## 2021-12-30 PROCEDURE — 64642 CHEMODENERV 1 EXTREMITY 1-4: CPT | Mod: 59 | Performed by: PHYSICAL MEDICINE & REHABILITATION

## 2021-12-30 ASSESSMENT — PAIN SCALES - GENERAL: PAINLEVEL: SEVERE PAIN (7)

## 2021-12-30 NOTE — LETTER
12/30/2021       RE: Petey Archibald  [address] [id number]     Dear Colleague,    Thank you for referring your patient, Petey Archibald, to the [facility] PHYSICAL [facility]ICINE AND [facility]HABILITATION CLINIC [place] [facility]. Please see a copy of my visit note below.    61-year-old male, accompanied by his staff from the group home.      He has been having significant pain in his left thigh for the past 6+ months.  He works as a  in a restaurant which involves prolonged standing.  He is allowed to sit periodically.  He finds when he sits in a chair he does not have the option of stretching.  Over the last several weeks he was having significant pain.  TPI held for few days.    He has evaluation including CAT scan which showed some sclerosis of the left pubic bone.  He does not have any urinary symptoms and was evaluated by a urologist.  He denies any numbness or tingling.  On a scale of 0-10 he rates his pain at its worst, when he is sitting he does not have any.  Transferring and standing makes it significant.  He has difficulty transferring.     He has significant obesity.  He also smokes 1 pack/day and is considering quitting if it will help with pain reduction. He is not currently drinking as he has no access to a bar nearby.     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     Chronic infection     MRSA     Coagulation disorder (H)     on coumadin     COPD (chronic obstructive pulmonary disease) (H)      History of DVT of lower extremity      History of pulmonary embolism      Hyperlipidemia      Mild intellectual disabilities      Morbid obesity (H)      NEL (obstructive sleep apnea)      Peripheral edema      Peripheral vascular disease (H)      Sleep apnea     uses CPAP     Thrombosis      Tobacco dependence      Uncomplicated asthma      Venous stasis dermatitis   "    Current Outpatient Medications   Medication Sig Dispense Refill     acetaminophen (TYLENOL) 325 MG tablet Take 1-2 tablets (325-650 mg) by mouth every 6 hours as needed for mild pain, fever or headaches 100 tablet 3     albuterol (ALBUTEROL) 108 (90 BASE) MCG/ACT inhaler Inhale 2 puffs into the lungs every 6 hours as needed 1 Inhaler 0     ARIPiprazole (ABILIFY) 5 MG tablet Take 5 mg by mouth daily       bacitracin 500 UNIT/GM OINT        buPROPion (WELLBUTRIN SR) 150 MG 12 hr tablet Take 150 mg by mouth 2 times daily       Cadexomer Iodine, topical, 0.9% (IODOSORB) 0.9 % GEL gel Apply topically daily Apply to left foot wound daily after cleansing the wound and blotting it dry. 1 Tube 3     cholecalciferol 50 MCG (2000 UT) tablet Take 1 tablet by mouth daily       clindamycin (CLEOCIN) 300 MG capsule Take 1 capsule (300 mg) by mouth 3 times daily 30 capsule 0     cloNIDine (CATAPRES) 0.1 MG tablet Take 0.1 mg by mouth 2 times daily       clotrimazole (LOTRIMIN) 1 % external cream Apply topically 2 times daily 60 g 0     diclofenac (VOLTAREN) 1 % topical gel Apply 4 g topically 2 times daily as needed for moderate pain 100 g 1     diphenhydrAMINE (BENADRYL) 25 MG capsule Take 25 mg by mouth every 6 hours as needed for itching or allergies       Emollient (CERAVE) CREA Externally apply topically daily 453 g 11     EPINEPHrine (EPIPEN 2-BRE) 0.3 MG/0.3ML injection 2-pack INJECT 0.3MG INTRAMUSCULAR ONE TIME FOR ONE DOSE AS NEEDED FOR ANAPHYLAXIS (CALL 911 IF YOU HAVE TO GIVE) (FOR BEE STINGS) **LABEL EACH PEN* 2 mL 3     gabapentin (NEURONTIN) 100 MG capsule Take 400 mg by mouth 3 times daily At 0900, 1200, and 2000       Gauze Pads & Dressings (GAUZE PADS 4\"X4\") 4\"X4\" PADS 1 each 3 times daily as needed 25 each 1     loratadine (CLARITIN) 10 MG tablet Take 10 mg by mouth daily       LORazepam (ATIVAN) 1 MG tablet Take 1 mg by mouth every 6 hours as needed for anxiety       methocarbamol (ROBAXIN) 750 MG tablet Take " 1 tablet (750 mg) by mouth 4 times daily as needed for muscle spasms 60 tablet 0     montelukast (SINGULAIR) 10 MG tablet TAKE 1 TABLET BY MOUTH EVERY MORNING (ASTHMA) 30 tablet 11     Multiple Vitamin (DAILY-JOVAN) TABS TAKE 1 TABLET BY MOUTH EVERY MORNING (VITAMINS) 30 tablet 11     naltrexone (DEPADE/REVIA) 50 MG tablet Take 50 mg by mouth daily       nicotine (COMMIT) 2 MG lozenge        nicotine (NICODERM CQ) 21 MG/24HR 24 hr patch        nicotine (NICORETTE) 2 MG gum Take 2 mg by mouth as needed for smoking cessation       nystatin-triamcinolone (MYCOLOG II) 539206-3.1 UNIT/GM-% external cream Apply topically 2 times daily For 1-2 weeks.       omeprazole (PRILOSEC) 40 MG DR capsule Take 1 capsule (40 mg) by mouth daily 90 capsule 2     order for DME Equipment being ordered: roll of micropore tape to dress foot wound bid 1 each 0     order for DME Equipment being ordered: 1 surgical shoe 1 Device 0     order for DME [employer] Phone [phone] Fax [phone]  EdemaWear Size Medium/Yellow qty 4 sleeves  Length of Need: 1 month  Frequency of dressing change daily 1 Device 0     order for DME [employer] Compression Stockings  Phone #[phone]  Fax #[phone]  Coolflex Velcro wraps    Length of Need: Life Time  # of Pairs 1 set 30 days 0     order for DME [employer] Order Phone [phone] Fax [phone]  Primary Dressing Hydrofera Blue Ready   Qty 5 sheets  Secondary Dressing 4' roll gauze Qty 30  Secondary Dressing 2' medipore tape Qty 1  Secondary Dressing 4x4 gauze loaf Qty  1  Length of Need: 1 month  Frequency of dressing change: daily 30 days 0     order for DME Oxygen 2 Li/min  at night and bled into BiPAP 1 Device 0     order for DME Equipment being ordered: CPAP with mask and tubing 1 Device 0     order for DME Equipment being ordered: support compression hose BK  2 pair black 30mm HG   To be applied on arising & removed while lying down to go to sleep 1 each 0     polyethylene  glycol (MIRALAX) 17 GM/Dose powder Take 17 g (1 capful) by mouth daily as needed for constipation 578 g 0     PULMICORT FLEXHALER 180 MCG/ACT inhaler INHALE 2 PUFFS INTO THE LUNGS TWICE DAILY. 1 each 11     SENNA-TABS 8.6 MG tablet        sertraline (ZOLOFT) 100 MG tablet Take 100 mg by mouth daily Take with 50 mg tablet for a total dose of 150 mg daily.       sertraline (ZOLOFT) 50 MG tablet        simvastatin (ZOCOR) 40 MG tablet TAKE 1 TABLET BY MOUTH EVERY MORNING.  (CHOLESTEROL) 30 tablet 11     SPIRIVA HANDIHALER 18 MCG inhaled capsule INHALE CONTENTS OF 1 CAPSULE INTO THE LUNGS ONCE DAILY 30 capsule 11     spironolactone (ALDACTONE) 25 MG tablet TAKE 1 TABLET BY MOUTH ONCE DAILY. (HIGH BLOOD PRESSURE) 30 tablet 11     temazepam (RESTORIL) 15 MG capsule Take 15 mg by mouth nightly as needed for sleep       tiZANidine (ZANAFLEX) 2 MG tablet Take 1 tablet (2 mg) by mouth 3 times daily 90 tablet 1     triamcinolone (KENALOG) 0.1 % external cream Apply to AA BID x 1-2 week then PRN only 80 g 2     trolamine salicylate (ASPERCREME) 10 % external cream Apply topically as needed for moderate pain knee       varenicline (CHANTIX BRE) 0.5 MG X 11 & 1 MG X 42 tablet Take 0.5 mg tab daily for 3 days, THEN 0.5 mg tab twice daily for 4 days, THEN 1 mg twice daily. 53 tablet 0     vitamin B complex with vitamin C (STRESS TAB) tablet Take 1 tablet by mouth daily 90 tablet 3     warfarin ANTICOAGULANT (COUMADIN) 4 MG tablet Take 1-1/2 tablets (6 mg) every Monday, Friday; and take 2 tablets (8 mg) all other days of the week. Next INR date is 9/28/21. 11 tablet 0     warfarin ANTICOAGULANT (COUMADIN) 4 MG tablet 6 mg mon,fri and 8 mg ROW  Recheck INR 2/22/21 54 tablet 0     warfarin ANTICOAGULANT (COUMADIN) 4 MG tablet Take 1 tablet (4 mg) by mouth daily 6 mg mon,fri and 8 mg row (Patient taking differently: Take 2 mg by mouth daily 2+6mg tues, wed, thurs, sat, sun) 44 tablet 0     Warfarin Therapy Reminder 1 each continuous  prn 1 each 0     White Petrolatum ointment Apply topically nightly as needed for dry skin       /79   Pulse 71   Resp 16   SpO2 97%          On examination, the patient is alert and cooperative.  He walks with antalgic gait on the left side.  He could hardly take a few steps.  On the examination table, he was able to move his neck, upper extremities without difficulty.  He was able to move his right lower extremity without difficulty.  There is no tenderness in the abdominal region or the groin region on the left side.  He is tender over the left adductor and the left anterior thigh medially.     Focal areas of tenderness was isolated in the affected muscles.     Impression: At this point, this 61-year-old male with myalgia and dystonia affecting the muscles of the left thigh, likely a result of his posture and his left ankle chronic ulceration which is also giving him some discomfort. 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     In terms of treatment, he has had trigger point injections. We will go with Botox for longer term relief as he has features of focal dystonia.     20 minutes spent for this visit, greater than 50% was for counseling on above-mentioned issues.     Procedure note: With his informed consent, after explaining the benefits and risks of the procedure, using alcohol for skin prep, 100 units of Botox in 2 cc of preservative-free normal saline were injected with EMG guidance 33 units into the left adductor,67 units to left medial hamstrings in 3 different areas, and he tolerated the procedure well. He was able to get up and walk around better and noticed more he was comfortable and able to move better.     I have suggested that he can use ice, continue Tylenol. He can return to work as a  and continue as per his usual routine.  I would like him to update my clinic in about 2 weeks as to how he is doing.  I cautioned him to avoid overdoing activities that would bring on his  discomfort.  I would like to see him in follow-up for repeat Botox in 12 weeks time.     Yinka Hagen MD

## 2022-01-10 ENCOUNTER — TELEPHONE (OUTPATIENT)
Dept: WOUND CARE | Facility: CLINIC | Age: 62
End: 2022-01-10
Payer: MEDICARE

## 2022-01-10 DIAGNOSIS — I89.0 LYMPHEDEMA OF LEFT LEG: Primary | ICD-10-CM

## 2022-01-10 NOTE — TELEPHONE ENCOUNTER
Petey's Group Home caretaker Hunter Martinez called as Petey Acevedo and he called [facility] in [place] and they have some, so he was wondering if you could send an order.   Any questions please call Hunter at .

## 2022-01-11 ENCOUNTER — TELEPHONE (OUTPATIENT)
Dept: INTERNAL MEDICINE | Facility: CLINIC | Age: 62
End: 2022-01-11
Payer: MEDICARE

## 2022-01-11 ENCOUNTER — TELEPHONE (OUTPATIENT)
Dept: WOUND CARE | Facility: CLINIC | Age: 62
End: 2022-01-11
Payer: MEDICARE

## 2022-01-11 DIAGNOSIS — G47.33 OSA (OBSTRUCTIVE SLEEP APNEA): Primary | ICD-10-CM

## 2022-01-11 DIAGNOSIS — I89.0 LYMPHEDEMA OF LEFT LEG: Primary | ICD-10-CM

## 2022-01-11 DIAGNOSIS — Z71.6 ENCOUNTER FOR TOBACCO USE CESSATION COUNSELING: ICD-10-CM

## 2022-01-11 DIAGNOSIS — R52 PAIN: ICD-10-CM

## 2022-01-11 RX ORDER — VARENICLINE TARTRATE 1 MG/1
1 TABLET, FILM COATED ORAL 2 TIMES DAILY
Qty: 60 TABLET | Refills: 4 | Status: SHIPPED | OUTPATIENT
Start: 2022-01-11 | End: 2022-11-01

## 2022-01-11 NOTE — TELEPHONE ENCOUNTER
Returned call to Hunter. Discussed with him that the plurogel is not able to be ordered by [facility] as plurogel is not part of the plan of care at this time. The patient does not have a follow up visit at this time. Hunter not able to make appointment at this time and will call back tomorrow to schedule a telephone visit.

## 2022-01-11 NOTE — TELEPHONE ENCOUNTER
Braden the caregiver for Petey called back as he is still waiting for the authorization for Pluragel from [employer].    Please call him at .

## 2022-01-11 NOTE — TELEPHONE ENCOUNTER
"Patient's group home nurse called, requests a prescription for a walker. Patient is a \"big gina\" 350 pounds and 5'10 tall and would need a walker with four poles due to an ankle wound. States that walking for the patient has been more and more difficult and painful.   "

## 2022-01-12 NOTE — TELEPHONE ENCOUNTER
Recommend formal OT evaluation for walker fitting. Referral placed. They can call [phone] to schedule appt.

## 2022-01-14 ENCOUNTER — TELEPHONE (OUTPATIENT)
Dept: INTERNAL MEDICINE | Facility: CLINIC | Age: 62
End: 2022-01-14

## 2022-01-14 ENCOUNTER — ANTICOAGULATION THERAPY VISIT (OUTPATIENT)
Dept: ANTICOAGULATION | Facility: CLINIC | Age: 62
End: 2022-01-14

## 2022-01-14 ENCOUNTER — LAB (OUTPATIENT)
Dept: LAB | Facility: CLINIC | Age: 62
End: 2022-01-14
Payer: MEDICARE

## 2022-01-14 DIAGNOSIS — Z86.718 HISTORY OF DEEP VEIN THROMBOSIS OF LOWER EXTREMITY: ICD-10-CM

## 2022-01-14 DIAGNOSIS — Z86.711 HISTORY OF PULMONARY EMBOLISM: ICD-10-CM

## 2022-01-14 DIAGNOSIS — Z79.01 LONG TERM CURRENT USE OF ANTICOAGULANT THERAPY: ICD-10-CM

## 2022-01-14 DIAGNOSIS — Z86.711 HISTORY OF PULMONARY EMBOLISM: Primary | ICD-10-CM

## 2022-01-14 LAB — INR BLD: 1.8 (ref 0.9–1.1)

## 2022-01-14 PROCEDURE — 85610 PROTHROMBIN TIME: CPT

## 2022-01-14 PROCEDURE — 36416 COLLJ CAPILLARY BLOOD SPEC: CPT

## 2022-01-14 RX ORDER — WARFARIN SODIUM 4 MG/1
TABLET ORAL
Qty: 11 TABLET | Refills: 0 | Status: SHIPPED | OUTPATIENT
Start: 2022-01-14 | End: 2022-01-14

## 2022-01-14 RX ORDER — WARFARIN SODIUM 4 MG/1
TABLET ORAL
Qty: 30 TABLET | Refills: 0 | Status: SHIPPED | OUTPATIENT
Start: 2022-01-14 | End: 2022-03-04

## 2022-01-14 NOTE — TELEPHONE ENCOUNTER
Rx updated  Maggie Blackmon RN   [facility]  [address], [place], [place], [facility]

## 2022-01-14 NOTE — TELEPHONE ENCOUNTER
Calling to clarify the 4 mg warfarin sent in. Is noting that it should be about 26.5 tablets to last till next noted check date.    Please clarify.    Ashley Quiroz RN    [facility] Anticoagulation [facility]

## 2022-01-14 NOTE — PROGRESS NOTES
ANTICOAGULATION MANAGEMENT     Petey Archibald 61 year old male is on warfarin with subtherapeutic INR result. (Goal INR 2.0-3.0)    Recent labs: (last 7 days)     01/14/22  1107   INR 1.8*       ASSESSMENT     Source(s): Chart Review and Patient/Caregiver Call       Warfarin doses taken: Warfarin taken as instructed    Diet: No new diet changes identified    New illness, injury, or hospitalization: No    Medication/supplement changes: None noted    Signs or symptoms of bleeding or clotting: No    Previous INR: Therapeutic last 2(+) visits    Additional findings: None     PLAN     Recommended plan for no diet, medication or health factor changes affecting INR     Dosing Instructions: Continue your current warfarin dose with next INR in 2 weeks       Summary  As of 1/14/2022    Full warfarin instructions:  6 mg every Mon, Wed, Fri; 8 mg all other days   Next INR check:               Telephone call with Hunter nurse at senior care facility who verbalizes understanding and agrees to plan. Rx sent to [facility]    Lab visit scheduled    Education provided: Please call back if any changes to your diet, medications or how you've been taking warfarin    Plan made per [facility] anticoagulation protocol    Cameron Blackmon RN  Anticoagulation Clinic  1/14/2022    _______________________________________________________________________     Anticoagulation Episode Summary     Current INR goal:  2.0-3.0   TTR:  71.0 % (1 y)   Target end date:  Indefinite   Send INR reminders to:  [facility]    Indications    History of pulmonary embolism [Z86.711]  Long term current use of anticoagulant therapy [Z79.01]           Comments:  REM [facility] [employer]; A new Coumadin Prescription will need to sent to [facility] with current dose and next INR check.         Anticoagulation Care Providers     Provider Role Specialty Phone number    Yolande Lacy PA-C Referring Family Medicine [phone]    Demetri Archer,  MD [facility] [place] Internal Medicine [phone]    Silvino Baker MD [facility] Medicine [phone]

## 2022-01-17 ENCOUNTER — TELEPHONE (OUTPATIENT)
Dept: INTERNAL MEDICINE | Facility: CLINIC | Age: 62
End: 2022-01-17
Payer: MEDICARE

## 2022-01-17 DIAGNOSIS — R52 PAIN: ICD-10-CM

## 2022-01-17 DIAGNOSIS — I89.0 LYMPHEDEMA OF LEFT LEG: Primary | ICD-10-CM

## 2022-01-17 NOTE — TELEPHONE ENCOUNTER
Group home states that they did not receive a call from Occupational Therapy for wheel chair for the patient.  Gave number for OT so that patient could be scheduled. Serina Fam RN'

## 2022-01-19 ENCOUNTER — HOSPITAL ENCOUNTER (OUTPATIENT)
Dept: WOUND CARE | Facility: CLINIC | Age: 62
Discharge: HOME | End: 2022-01-19
Attending: SURGERY | Admitting: SURGERY
Payer: MEDICARE

## 2022-01-19 VITALS — DIASTOLIC BLOOD PRESSURE: 69 MMHG | SYSTOLIC BLOOD PRESSURE: 126 MMHG | TEMPERATURE: 97.2 F | HEART RATE: 69 BPM

## 2022-01-19 DIAGNOSIS — L97.922 ULCER OF LEFT LOWER EXTREMITY WITH FAT LAYER EXPOSED (H): ICD-10-CM

## 2022-01-19 DIAGNOSIS — R22.42 LOCALIZED SWELLING, MASS AND LUMP, LEFT LOWER LIMB: ICD-10-CM

## 2022-01-19 DIAGNOSIS — J41.8 MIXED SIMPLE AND MUCOPURULENT CHRONIC BRONCHITIS (H): Primary | ICD-10-CM

## 2022-01-19 DIAGNOSIS — I73.9 PERIPHERAL VASCULAR DISEASE (H): ICD-10-CM

## 2022-01-19 DIAGNOSIS — I89.0 LYMPHEDEMA OF LEFT LEG: ICD-10-CM

## 2022-01-19 PROCEDURE — 11042 DBRDMT SUBQ TIS 1ST 20SQCM/<: CPT | Performed by: SURGERY

## 2022-01-19 PROCEDURE — 11042 DBRDMT SUBQ TIS 1ST 20SQCM/<: CPT

## 2022-01-19 PROCEDURE — 17250 CHEM CAUT OF GRANLTJ TISSUE: CPT | Performed by: SURGERY

## 2022-01-19 NOTE — DISCHARGE INSTRUCTIONS
Petey Archibald      1960   Wound Dressing Change: Left Ankle  Cleanse wound with epsom salt soak for 20 minutes daily, then apply cera-ve lotion to entire ankle and leg.  Apply Navy Stripe edemawear followed by yellow stripe edemawear.  Change dressing daily  Compression:   You have a compression wrap Velcro Wraps is supposed to be removed at night and put back on first thing in the morning.   Please remove compression dressing if toes turn blue and/or tingle and can not be relieved by raising the leg for one hour.     [facility] office for a venous ultrasound of your veins. Call [phone] to schedule an appointment.        FERNANDO Davis M.D.. January 19, 2022    Call us at [phone] if you have any questions about your wounds, have redness or swelling around your wound, have a fever of 101 or greater or if you have any other problems or concerns. We answer the phone Monday through Friday 8 am to 4 pm, please leave a message as we check the voicemail frequently throughout the day.     If you had a positive experience please indicate on your patient satisfaction survey form that [facility] will be sending you.    If you have any billing related questions please call the  [employer] [facility] office at [phone]. The clinic staff does not handle billing related matters.

## 2022-01-19 NOTE — PROGRESS NOTES
[facility]ound Healing [place] Progress Note    Subject: Petey Archibald ongoing tobacco use of approximately 5 cigarettes on a daily basis, chronic left lower extremity venous hypertensive ulceration with palpable pedal pulses, has undergone previous left great saphenous vein radiofrequency ablation, venous hypertensive ulceration recurrent.    Patient Active Problem List   Diagnosis     GERD (gastroesophageal reflux disease)     Peripheral vascular disease (H)     History of pulmonary embolism     Tobacco dependence:40-50 pk yr hx     Borderline mental retardation     History of deep vein thrombosis of lower extremity     Pilonidal cyst     Asymptomatic varicose veins, bilateral     Callus of foot on Rt lat dist  since 8-15      Morbid obesity due to excess calories (H)  BMI 40-50     Hypercholesterolemia     Mixed simple and mucopurulent chronic bronchitis (HCC) CT 4-06 wnl and neg bronch for hemoptesis spirometry 7-26-16  FVC=59% & w mod restriction  and lung age of 82 in 54 y/o      Major depression in complete remission (HCC) on meds      Long term current use of anticoagulant therapy     Glucose intolerance (impaired glucose tolerance)     Erectile dysfunction, unspecified erectile dysfunction type     NEL (obstructive sleep apnea)     Anxiety     Thrombophlebitis of superficial veins of both lower extremities: Greater Saphenous VV      History of colonic polyps     Ulcer of left lower extremity with fat layer exposed (H)     Lymphedema of left leg     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)     Schizoaffective disorder, bipolar type (H)     Adenomatous polyp of ascending colon     Colon cancer screening     Edentulism, partial, class IV     Torus palatinus     Diverticular disease of large intestine     Hemorrhoids     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     Chronic infection     MRSA     Coagulation disorder (H)     on coumadin     COPD  (chronic obstructive pulmonary disease) (H)      History of DVT of lower extremity      History of pulmonary embolism      Hyperlipidemia      Mild intellectual disabilities      Morbid obesity (H)      NEL (obstructive sleep apnea)      Peripheral edema      Peripheral vascular disease (H)      Sleep apnea     uses CPAP     Thrombosis      Tobacco dependence      Uncomplicated asthma      Venous stasis dermatitis      Exam:  /69 (BP Location: Left arm, Patient Position: Sitting)   Pulse 69   Temp 97.2  F (36.2  C) (Temporal)   Wound (used by OP [facility] only) 01/19/22 0801 Left (Active)   Thickness/Stage full thickness 01/19/22 0800   Dressing Appearance moist drainage 01/19/22 0800   Base slough 01/19/22 0800   Periwound redness 01/19/22 0800   Periwound Temperature warm 01/19/22 0800   Periwound Skin Turgor firm 01/19/22 0800   Edges callused 01/19/22 0800   Length (cm) 1.1 01/19/22 0800   Width (cm) 0.8 01/19/22 0800   Depth (cm) 0.2 01/19/22 0800   Wound (cm^2) 0.88 cm^2 01/19/22 0800   Wound Volume (cm^3) 0.18 cm^3 01/19/22 0800   Drainage Characteristics/Odor serosanguineous 01/19/22 0800   Drainage Amount moderate 01/19/22 0800     Palpable left posterior tibial and dorsalis pedis pulses, venous hypertensive ulceration, debrided with a 15 blade of eschar, subcutaneous tissues, treated with silver nitrate    Procedure:   Patient was determined to be capable of making their own medical decisions and informed consent was obtained. Topical anesthetic of 4% lidocaine was applied, debridement was performed using a #15 blade down to and including subcutaneous tissue, eschar, biofilm bleeding controlled with light pressure application of silver nitrate. Patient tolerated procedure well.    Impression: Venous hypertensive ulceration left lower extremity, recurrent, history of left great saphenous vein reflux ablation, ongoing tobacco utilization    Plan: Tobacco cessation advised.  We will dress the wounds with  ceramide-based lotion, repeat duplex ultrasound, follow-up with [facility].  Obtain new EdemaWear, fitting at [facility] for inelastic Velcro compression, foot base and calf piece, left lower extremity.  Patient will return to the clinic in 6 weeks time     Rustam Davis MD on 1/19/2022 at 8:14 AM    Dictated using Dragon voice recognition software which may result in transcription errors

## 2022-01-28 ENCOUNTER — LAB (OUTPATIENT)
Dept: LAB | Facility: CLINIC | Age: 62
End: 2022-01-28

## 2022-01-28 ENCOUNTER — ANCILLARY PROCEDURE (OUTPATIENT)
Dept: ULTRASOUND IMAGING | Facility: CLINIC | Age: 62
End: 2022-01-28
Attending: SURGERY
Payer: MEDICARE

## 2022-01-28 ENCOUNTER — ANTICOAGULATION THERAPY VISIT (OUTPATIENT)
Dept: ANTICOAGULATION | Facility: CLINIC | Age: 62
End: 2022-01-28

## 2022-01-28 DIAGNOSIS — R22.42 LOCALIZED SWELLING, MASS AND LUMP, LEFT LOWER LIMB: ICD-10-CM

## 2022-01-28 DIAGNOSIS — L97.922 ULCER OF LEFT LOWER EXTREMITY WITH FAT LAYER EXPOSED (H): ICD-10-CM

## 2022-01-28 DIAGNOSIS — Z79.01 LONG TERM CURRENT USE OF ANTICOAGULANT THERAPY: ICD-10-CM

## 2022-01-28 DIAGNOSIS — Z86.711 HISTORY OF PULMONARY EMBOLISM: Primary | ICD-10-CM

## 2022-01-28 DIAGNOSIS — Z86.718 HISTORY OF DEEP VEIN THROMBOSIS OF LOWER EXTREMITY: ICD-10-CM

## 2022-01-28 DIAGNOSIS — I89.0 LYMPHEDEMA OF LEFT LEG: ICD-10-CM

## 2022-01-28 LAB — INR BLD: 1.8 (ref 0.9–1.1)

## 2022-01-28 PROCEDURE — 93971 EXTREMITY STUDY: CPT | Mod: LT | Performed by: SURGERY

## 2022-01-28 PROCEDURE — 36416 COLLJ CAPILLARY BLOOD SPEC: CPT

## 2022-01-28 PROCEDURE — 85610 PROTHROMBIN TIME: CPT

## 2022-01-28 NOTE — PROGRESS NOTES
ANTICOAGULATION MANAGEMENT     Petey Archibald 61 year old male is on warfarin with subtherapeutic INR result. (Goal INR 2.0-3.0)    Recent labs: (last 7 days)     01/28/22  1057   INR 1.8*       ASSESSMENT     Source(s): Chart Review and Home Care/Facility Nurse       Warfarin doses taken: Warfarin taken as instructed    Diet: No new diet changes identified    New illness, injury, or hospitalization: No    Medication/supplement changes: None noted    Signs or symptoms of bleeding or clotting: No    Previous INR: Subtherapeutic    Additional findings: None     PLAN     Recommended plan for no diet, medication or health factor changes affecting INR     Dosing Instructions: Booster dose then continue your current warfarin dose with next INR in 2 weeks       Summary  As of 1/28/2022    Full warfarin instructions:  1/28: 8 mg; Otherwise 6 mg every Mon, Wed, Fri; 8 mg all other days   Next INR check:  2/11/2022             Telephone call with Petey who verbalizes understanding and agrees to plan    Lab visit scheduled    Education provided: Please call back if any changes to your diet, medications or how you've been taking warfarin    Plan made per [facility] anticoagulation protocol    Lisa Ponce RN  Anticoagulation Clinic  1/28/2022    _______________________________________________________________________     Anticoagulation Episode Summary     Current INR goal:  2.0-3.0   TTR:  67.2 % (1 y)   Target end date:  Indefinite   Send INR reminders to:  [facility]    Indications    History of pulmonary embolism [Z86.711]  Long term current use of anticoagulant therapy [Z79.01]           Comments:  [facility]; A new Coumadin Prescription will need to sent to [facility] with current dose and next INR check.         Anticoagulation Care Providers     Provider Role Specialty Phone number    Yolande Lacy PA-C Referring Family Medicine [phone]    Demetri Archer MD Referring  Internal Medicine [phone]    Silvino Baker MD [facility] [phone]

## 2022-01-28 NOTE — PROGRESS NOTES
Anticoagulation Management     Attempted to reach caregiver Braden to discuss patient's INR result, no answer at this time.      Left VM requesting callback at Braden's earliest convenience.      Anticoagulation clinic to follow up     Ronda Shane RN

## 2022-02-11 ENCOUNTER — HOSPITAL ENCOUNTER (OUTPATIENT)
Dept: PHYSICAL THERAPY | Facility: CLINIC | Age: 62
End: 2022-02-11
Payer: MEDICARE

## 2022-02-11 ENCOUNTER — LAB (OUTPATIENT)
Dept: LAB | Facility: CLINIC | Age: 62
End: 2022-02-11

## 2022-02-11 ENCOUNTER — ANTICOAGULATION THERAPY VISIT (OUTPATIENT)
Dept: ANTICOAGULATION | Facility: CLINIC | Age: 62
End: 2022-02-11

## 2022-02-11 DIAGNOSIS — Z79.01 LONG TERM CURRENT USE OF ANTICOAGULANT THERAPY: ICD-10-CM

## 2022-02-11 DIAGNOSIS — Z86.711 HISTORY OF PULMONARY EMBOLISM: Primary | ICD-10-CM

## 2022-02-11 DIAGNOSIS — R52 PAIN: ICD-10-CM

## 2022-02-11 DIAGNOSIS — R53.81 PHYSICAL DECONDITIONING: Primary | ICD-10-CM

## 2022-02-11 DIAGNOSIS — R53.1 DECREASED STRENGTH: ICD-10-CM

## 2022-02-11 DIAGNOSIS — I89.0 LYMPHEDEMA OF LEFT LEG: ICD-10-CM

## 2022-02-11 DIAGNOSIS — Z86.718 HISTORY OF DEEP VEIN THROMBOSIS OF LOWER EXTREMITY: ICD-10-CM

## 2022-02-11 LAB — INR BLD: 2.2 (ref 0.9–1.1)

## 2022-02-11 PROCEDURE — 36416 COLLJ CAPILLARY BLOOD SPEC: CPT

## 2022-02-11 PROCEDURE — 97162 PT EVAL MOD COMPLEX 30 MIN: CPT | Mod: GP | Performed by: PHYSICAL THERAPIST

## 2022-02-11 PROCEDURE — 85610 PROTHROMBIN TIME: CPT

## 2022-02-11 NOTE — PROGRESS NOTES
02/11/22 1200   Quick Adds   Quick Adds Certification   Type of Visit Initial OP PT Evaluation   General Information   Start of Care Date 02/11/22   Referring Physician Demetri Archer MD   Orders Evaluate and Treat as Indicated   Order Date 01/18/22   Medical Diagnosis Lymphedema of left leg I89.0, Pain R52    Onset of illness/injury or Date of Surgery   (ongoing mobility decline for 6 months)   Precautions/Limitations no known precautions/limitations   Surgical/Medical history reviewed Yes   Pertinent history of current problem (include personal factors and/or comorbidities that impact the POC) Pt presents with history of L ankle wound/cellulitis and lymphedema that he is getting treated for over 6 months, getting addressed by other providers at this time. Over the last 6 months or so he has declined in terms of mobility, strength and gait quality/quantity; uses % of the time to get around with increased time needed d/t slow pace. Pt also consistently requiring physical assist for transfers d/t pain and weakness. Pt is interested in a new walker as well as ongoing therapy to build strength and activity tolerance. Presents today with caregiver Hunter who is on staff at [facility].    Prior level of function comment 6 months ago pt was IND and much more mobile, slow decline to present state. Now needing assist for transfers (inconsistently), ADL's assist.    Previous/Current Treatment   (wound care and lymphadema ongoing for L LE)   Current Community Support Other/Comments  (group home staff)   Patient role/Employment history Employed  ( for 1-2 days a week)   Living environment Group home   Home/Community Accessibility Comments Able to live on one level. Getting assistance with wound care for cellulitis, assisting with mobility.    Current Assistive Devices Front Wheeled Walker   Patient/Family Goals Statement New walker and increase strength and mobility to improve independence  "  General Information Comments Had Botox injections for tightness in L leg, but feels that it only helps for a few days. Is going back in March for a \"longer lasting\" injection.    Fall Risk Screen   Fall screen completed by PT   Have you fallen 2 or more times in the past year? No   Have you fallen and had an injury in the past year? No   Timed Up and Go score (seconds) 66.02 seconds   Is patient a fall risk? Yes   Fall screen comments High falls risk based on TUG score   Pain   Patient currently in pain No   Pain comments Pt does endorse pain in L groin/hip area with some movements, but states it is a \"trravelling\" pain and changes based on the day   Cognitive Status Examination   Orientation orientation to person, place and time   Cognitive Comment WFL w/ assist from caregiver at times for memory assist   Observation   Observation Pt presents with FWW, caregiver from group home.    Integumentary   Integumentary Comments Cellulits/wound care and lymphadema being managed by other providers   Posture   Posture Forward head position;Protracted shoulders;Kyphosis   Posture Comments Heavy reliance on walker during gait leading to forward lean   Range of Motion (ROM)   ROM Comment ROM is functional for pt's needs, limited by body habitus.    Strength   Strength Comments Functionally, pt demonstrates weakness globally; unable to stand from standard height chair w/out assist this date   Bed Mobility   Bed Mobility Comments Reports difficulty with bed mobility but IND.    Transfer Skills   Transfer Comments Requires B UE assist, min A of 1-2 to stand from standard height chair (mod IND from elevated mat) SBA for pivots   Gait   Gait Comments Ambulates with FWW; forward lean, minimal clearance/shuffling gait pattern, wide PRATIBHA, very slow speed.    Balance Special Tests   Balance Special Tests Timed up and go   Balance Special Tests Timed Up and Go   Seconds 66.02 Seconds   Comments FWW used, requires assist to stand, did not " complete test by sitting down as pt refused based on low chair height.    Coordination   Coordination no deficits were identified   Muscle Tone   Muscle Tone no deficits were identified   Planned Therapy Interventions   Planned Therapy Interventions balance training;bed mobility training;gait training;strengthening;transfer training   Clinical Impression   Criteria for Skilled Therapeutic Interventions Met yes, treatment indicated   PT Diagnosis Impaired functional mobility    Influenced by the following impairments Weakness, elevated pain levels, decreased activity tolerance, impaired cognition/motivation, decreased balance.    Functional limitations due to impairments Impaired quality of gait, decreased abiltiy to finish shifts at work, decreased partipation in preferred leisure activities.    Clinical Presentation Evolving/Changing   Clinical Presentation Rationale Clinical judgement   Clinical Decision Making (Complexity) Moderate complexity   Risk & Benefits of therapy have been explained Yes   Patient, Family & other staff in agreement with plan of care Yes   Clinical Impression Comments Based on evaluation today, pt demonstrates deficits in above listed areas and as such would benefit from ongoing skilled 1:1 PT; however, based on pt's current mobility (requiring assist for transfers, high falls risk on TUG and poor quality of gait requiring supervision) it would be appropriate for these impairments to be addressed with home therapy as opposed to outpatient; pt and caregiver both agree. This author will contact pt's PCP and request home PT evaluation, as well as bariatric FWW as pt's current walker is not his, is ill-fitting and he is currently exceeding the weight limit for safe use. Pt and caregiver in agreement with all discussed plans, all questions answered and recommendations explained. Recommended pt perform HEP including walking program and assisted transfers, demonstration and education provided for  appropriate carry over.    Total Evaluation Time   PT Eval, Moderate Complexity Minutes (54138) 45   Therapy Certification   Certification date from 02/11/22   Certification date to 05/12/22   Medical Diagnosis Lymphedema of left leg I89.0, Pain R52    Certification I certify the need for these services furnished under this plan of treatment and while under my care.  (Physician co-signature of this document indicates review and certification of the therapy plan).     4/8/22: Petey did not qualify for home care. Updated POC to include in clinic visits to address the above deficits. Cert sent to MD.

## 2022-02-11 NOTE — PROGRESS NOTES
ANTICOAGULATION MANAGEMENT     Petey Archibald 61 year old male is on warfarin with therapeutic INR result. (Goal INR 2.0-3.0)    Recent labs: (last 7 days)     02/11/22  1058   INR 2.2*       ASSESSMENT     Source(s): Chart and group home      Warfarin doses taken: Warfarin taken as instructed    Diet: No new diet changes identified    New illness, injury, or hospitalization: No    Medication/supplement changes: None noted    Signs or symptoms of bleeding or clotting: No    Previous INR: Subtherapeutic    Additional findings: None     PLAN     Recommended plan for no diet, medication or health factor changes affecting INR     Dosing Instructions: Continue your current warfarin dose with next INR in 3 weeks       Summary  As of 2/11/2022    Full warfarin instructions:  6 mg every Mon, Wed, Fri; 8 mg all other days   Next INR check:  3/4/2022             Telephone call with Hunter from group [place] who verbalizes understanding and agrees to plan    Lab visit scheduled    Education provided: Please call back if any changes to your diet, medications or how you've been taking warfarin    Plan made per [facility] anticoagulation protocol    Lisa Ponce RN  Anticoagulation Clinic  2/11/2022    _______________________________________________________________________     Anticoagulation Episode Summary     Current INR goal:  2.0-3.0   TTR:  65.3 % (1 y)   Target end date:  Indefinite   Send INR reminders to:  [facility]    Indications    History of pulmonary embolism [Z86.711]  Long term current use of anticoagulant therapy [Z79.01]           Comments:  [facility] shelter; A new Coumadin Prescription will need to sent to [facility] with current dose and next INR check.         Anticoagulation Care Providers     Provider Role Specialty Phone number    Yolande Lacy PA-C Referring Family Medicine [phone]    Demetri Archer MD Referring Internal Medicine [phone]    Silvino Baker  MD [facility] [place] [phone]

## 2022-02-14 ENCOUNTER — TELEPHONE (OUTPATIENT)
Dept: INTERNAL MEDICINE | Facility: CLINIC | Age: 62
End: 2022-02-14
Payer: MEDICARE

## 2022-02-14 NOTE — PROGRESS NOTES
DME (Durable Medical Equipment) Orders and Documentation  Orders Placed This Encounter   Procedures     Walker Order      The patient was assessed and it was determined the patient is in need of the following listed DME Supplies/Equipment. Please complete supporting documentation below to demonstrate medical necessity.      Assess by PT who felt patient would benefit from bariatric front wheeled walker due to chronic debility which I did order today. Also placed HH PT order per PT request.    Demetri Holder MD, MPH  [facility]  Internal Medicine

## 2022-02-14 NOTE — TELEPHONE ENCOUNTER
Vidya with [facility] calling in to notify PCP - [facility] is needing to decline patient due to capacity.

## 2022-02-14 NOTE — TELEPHONE ENCOUNTER
"ADDENDUM FROM 02/14/2022  I received the following message: \"Kizzy Holder, this is Giovanni Kendrick, physical therapist at [facility]. I evaluated Petey today and he was requesting a new walker (the one he is using is someone else's and does not fit him correctly). Could you please place a DME order for a bariatric front wheeled walker for Petey? He has not gotten another assistive device in the last five years, so this should be covered by insurance with an order from you. Thank you!     Also, during the evaluation he was needing physical assistance to stand up from chairs and was a high falls risk based on our assessment tools in clinic; based on these factors, the patient is interested in home physical therapy instead of outpatient. He would like to get stronger and better at walking before he would feel comfortable leaving his home to come to outpatient therapy, and is currently requiring assist from his group home staff to come to sessions. Could you please place an order for home PT evaluation? Thank you, and let me know if you need anything else.     Giovanni Kendrick, PT\"    I did place these orders today as requested.    Demetri Holder MD, MPH  [facility]  Internal Medicine  "

## 2022-02-16 ENCOUNTER — TELEPHONE (OUTPATIENT)
Dept: INTERNAL MEDICINE | Facility: CLINIC | Age: 62
End: 2022-02-16

## 2022-02-16 ENCOUNTER — HOSPITAL ENCOUNTER (OUTPATIENT)
Dept: WOUND CARE | Facility: CLINIC | Age: 62
End: 2022-02-16
Attending: SURGERY
Payer: MEDICARE

## 2022-02-16 VITALS
DIASTOLIC BLOOD PRESSURE: 73 MMHG | HEART RATE: 63 BPM | SYSTOLIC BLOOD PRESSURE: 132 MMHG | TEMPERATURE: 97.3 F | RESPIRATION RATE: 18 BRPM

## 2022-02-16 DIAGNOSIS — L97.922 ULCER OF LEFT LOWER EXTREMITY WITH FAT LAYER EXPOSED (H): ICD-10-CM

## 2022-02-16 PROCEDURE — 99212 OFFICE O/P EST SF 10 MIN: CPT | Performed by: SURGERY

## 2022-02-16 RX ORDER — TRAZODONE HYDROCHLORIDE 50 MG/1
1 TABLET, FILM COATED ORAL AT BEDTIME
COMMUNITY
Start: 2022-02-09 | End: 2023-02-14

## 2022-02-16 RX ORDER — CLONAZEPAM 0.5 MG/1
0.25 TABLET ORAL
COMMUNITY
Start: 2022-02-09 | End: 2023-02-14

## 2022-02-16 NOTE — DISCHARGE INSTRUCTIONS
Petey Archibald      1960    Wound Dressing Change: Left Ankle  Cleanse wound with epsom salt soak for 20 minutes daily, then apply cera-ve lotion to  entire ankle and leg.  Apply Navy Stripe edemawear toes to knee followed by yellow stripe edemawear calf to mid thigh.  Change dressing daily    Compression:  You have a compression wrap Velcro Wraps is supposed to be removed at night and  put back on first thing in the morning.  Please remove compression dressing if toes turn blue and/or tingle and can not be  relieved by raising the leg for one hour.    Wound Clinic follow up in the future if there are any wound concerns     FERNANDO Davis M.D. February 16, 2022      Call us at [phone] if you have any questions about your wounds, have redness or swelling around your wound, have a fever of 101 or greater or if you have any other problems or concerns. We answer the phone Monday through Friday 8 am to 4 pm, please leave a message as we check the voicemail frequently throughout the day.     If you had a positive experience please indicate that on your patient satisfaction survey form that [facility] will be sending you.    It was a pleasure meeting with you today.  Thank you for allowing me and my team the privilege of caring for you today.  YOU are the reason we are here, and I truly hope we provided you with the excellent service you deserve.  Please let us know if there is anything else we can do for you so that we can be sure you are leaving completely satisfied with your care experience.      If you have any billing related questions please call the [facility] Business office at [phone]. The clinic staff does not handle billing related matters.

## 2022-02-16 NOTE — PROGRESS NOTES
Patient arrived for wound care visit. Certified Wound Care Nurse time spent evaluating patient record, and completed a full evaluation; provided recommendation based on treatment plan. Reviewed discharge instructions, patient education, and discussed plan of care with appropriate medical team staff members and patient and/or family members.

## 2022-02-16 NOTE — TELEPHONE ENCOUNTER
Hunter, care giver at group home called, wanted to know which labs provider would like for patient to have.     Hunter may be reached at [phone].

## 2022-02-18 ENCOUNTER — HOSPITAL ENCOUNTER (OUTPATIENT)
Dept: GENERAL RADIOLOGY | Facility: CLINIC | Age: 62
Discharge: HOME | End: 2022-02-18
Attending: SURGERY | Admitting: SURGERY
Payer: MEDICARE

## 2022-02-18 DIAGNOSIS — L97.922 ULCER OF LEFT LOWER EXTREMITY WITH FAT LAYER EXPOSED (H): ICD-10-CM

## 2022-02-18 PROCEDURE — 71046 X-RAY EXAM CHEST 2 VIEWS: CPT

## 2022-02-21 ENCOUNTER — PATIENT OUTREACH (OUTPATIENT)
Dept: NURSING | Facility: CLINIC | Age: 62
End: 2022-02-21
Payer: MEDICARE

## 2022-02-21 ENCOUNTER — TELEPHONE (OUTPATIENT)
Facility: CLINIC | Age: 62
End: 2022-02-21
Payer: MEDICARE

## 2022-02-21 ENCOUNTER — OFFICE VISIT (OUTPATIENT)
Dept: URGENT CARE | Facility: URGENT CARE | Age: 62
End: 2022-02-21
Payer: MEDICARE

## 2022-02-21 ENCOUNTER — ANCILLARY PROCEDURE (OUTPATIENT)
Dept: GENERAL RADIOLOGY | Facility: CLINIC | Age: 62
End: 2022-02-21
Attending: FAMILY MEDICINE
Payer: MEDICARE

## 2022-02-21 VITALS
OXYGEN SATURATION: 97 % | RESPIRATION RATE: 17 BRPM | HEART RATE: 75 BPM | SYSTOLIC BLOOD PRESSURE: 130 MMHG | TEMPERATURE: 97.5 F | DIASTOLIC BLOOD PRESSURE: 78 MMHG

## 2022-02-21 DIAGNOSIS — L03.116 CELLULITIS OF LEFT LOWER EXTREMITY: ICD-10-CM

## 2022-02-21 DIAGNOSIS — S99.922A FOOT INJURY, LEFT, INITIAL ENCOUNTER: Primary | ICD-10-CM

## 2022-02-21 PROCEDURE — 73630 X-RAY EXAM OF FOOT: CPT | Mod: LT | Performed by: RADIOLOGY

## 2022-02-21 PROCEDURE — 99214 OFFICE O/P EST MOD 30 MIN: CPT | Performed by: FAMILY MEDICINE

## 2022-02-21 RX ORDER — CEPHALEXIN 500 MG/1
500 CAPSULE ORAL 3 TIMES DAILY
Qty: 30 CAPSULE | Refills: 0 | Status: SHIPPED | OUTPATIENT
Start: 2022-02-21 | End: 2022-03-03

## 2022-02-21 NOTE — TELEPHONE ENCOUNTER
Voicemail left with Hunter. Direction given for patient to go to urgent care to be assessed for injury.

## 2022-02-21 NOTE — TELEPHONE ENCOUNTER
Photo submitted 02/18/2022    Caregiver states that a walker fell on the patient's foot causing his foot to swell. Would like to know if Edema wear would help with the swelling or if patient should go to urgent care.

## 2022-02-21 NOTE — LETTER
[facility]  [address] [id number] [phone]      February 21, 2022    RE:  Petey Archibald                                                                                                                                                       [address] [facility] [address]            To whom it may concern:    Petey Archibald is under my professional care for Medical.   He  may return to work with the following: No working or lifting restrictions on or about after recheck with Podiatry.          Sincerely,        Ruslan Machuca DO    [facility]

## 2022-02-21 NOTE — PROGRESS NOTES
Clinic Care Coordination Contact  [facility]    Referral Source: PCP  Clinical Data: Care Coordinator Outreach  Outreach attempted x 1.  DEBI FOREMAN spoke to patient briefly. DEBI FOREMAN asked how the swelling in his foot was. He said it's a little better, there is a picture in the chart of his edema. DEBI FOREMAN asked about Homecare, patient gave me the number to Hunter, staff at [facility]. Hunter's number is [phone].  Plan: . Care Coordinator will try to reach patient again in 3-5 business days.  DEBI FOREMAN LVM for Hunter and left DEBI FOREMAN contact information for call back.       KELVIN Salazar  Clinic Care Coordinator  [facility]  [phone]  [email or web address]

## 2022-02-22 NOTE — PROGRESS NOTES
SUBJECTIVE:  Chief Complaint   Patient presents with     Foot Pain     62 yo M presents with the following left foot pain has hx of foot issues  visible swelling discoloration seen by wound specialist,  top portion of foot causing pain  onset of injury Friday   .ident presents with a chief complaint of left foot.  The injury occurred days ago.   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     Chronic infection     MRSA     Coagulation disorder (H)     on coumadin     COPD (chronic obstructive pulmonary disease) (H)      History of DVT of lower extremity      History of pulmonary embolism      Hyperlipidemia      Mild intellectual disabilities      Morbid obesity (H)      NEL (obstructive sleep apnea)      Peripheral edema      Peripheral vascular disease (H)      Sleep apnea     uses CPAP     Thrombosis      Tobacco dependence      Uncomplicated asthma      Venous stasis dermatitis      Allergies   Allergen Reactions     Bees      Clavulanic Acid      Augmentin Rash     Penicillins Rash     Social History     Tobacco Use     Smoking status: Current Every Day Smoker     Packs/day: 1.00     Years: 30.00     Pack years: 30.00     Types: Cigarettes     Smokeless tobacco: Never Used     Tobacco comment: started at age 18    Substance Use Topics     Alcohol use: No     Alcohol/week: 0.0 standard drinks     ROSINTEGUMENTARY/SKIN: NEGATIVE for open wound/bleeding and NEGATIVE for bruising    EXAM: /78   Pulse 75   Temp 97.5  F (36.4  C)   Resp 17   SpO2 97%    Gen: healthy,alert,no distress  Extremity: foot has pain with palpation and rom.   There is not compromise to the distal circulation.  Pulses are +2 and CRT is brisk.  EXTREMITIES: peripheral pulses normal  SKIN: slight redness  NEURO: Normal strength and tone, sensory exam grossly normal, mentation intact and speech normal    Xray without acute findings, no fx read by Ruslan Machuca D.O.      ICD-10-CM    1. Foot injury,  left, initial encounter  S99.922A XR Foot Left G/E 3 Views   2. Cellulitis of left lower extremity  L03.116 cephALEXin (KEFLEX) 500 MG capsule     Pt has a Podiatrist that he can f/u with  [facility]

## 2022-02-28 ENCOUNTER — PATIENT OUTREACH (OUTPATIENT)
Dept: NURSING | Facility: CLINIC | Age: 62
End: 2022-02-28
Payer: MEDICARE

## 2022-02-28 NOTE — PROGRESS NOTES
"Clinic Care Coordination Contact  Care Team Conversations  2/28/2022  DEBI FOREMAN spoke to patient's caregiver, Hunter. We discussed the patient's denial for home care. Patient's order was for PT  treatment and assessment. With patient's denial, he has not been receiving PT. Hunter states that he believes patient can get what he needs at outpatient therapy and transportation wont be a problem. Hunter feels that patient has the capability to attend outpatient PT.        2/28/2022  DEBI FOREMAN spoke with patient's provider who shared that patient's PT sent a message ,\" Also, during the evaluation he was needing physical assistance to stand up from chairs and was a high falls risk based on our assessment tools in clinic; based on these factors, the patient is interested in home physical therapy instead of outpatient. He would like to get stronger and better at walking before he would feel comfortable leaving his home to come to outpatient therapy, and is currently requiring assist from his group home staff to come to sessions. Could you please place an order for home PT evaluation?\"    -Patient was denied home care and PCP asked DEBI FOREMAN to get involved.  -PCP stated to DEBI FOREMAN that If they're willing/able to continue to bring him to outpatient PT that would be appropriate for patient due to limitations with home care at this time.       2/28/2022  DEBI FOREMAN spoke to Hunter who will call patient's PT and restart outpatient PT, Hunter will also inquire to when an order will be sent for patient's new walker as he previously asked the PT about getting patient a new walker. Hunter will call clinic with questions or concerns if they arise.       KELVIN Salazar  Clinic Care Coordinator  [facility]  [phone]  [email or web address]  "

## 2022-03-04 ENCOUNTER — ANTICOAGULATION THERAPY VISIT (OUTPATIENT)
Dept: ANTICOAGULATION | Facility: CLINIC | Age: 62
End: 2022-03-04

## 2022-03-04 ENCOUNTER — LAB (OUTPATIENT)
Dept: LAB | Facility: CLINIC | Age: 62
End: 2022-03-04

## 2022-03-04 DIAGNOSIS — Z86.711 HISTORY OF PULMONARY EMBOLISM: Primary | ICD-10-CM

## 2022-03-04 DIAGNOSIS — Z79.01 LONG TERM CURRENT USE OF ANTICOAGULANT THERAPY: ICD-10-CM

## 2022-03-04 DIAGNOSIS — Z86.718 HISTORY OF DEEP VEIN THROMBOSIS OF LOWER EXTREMITY: ICD-10-CM

## 2022-03-04 LAB — INR BLD: 2.1 (ref 0.9–1.1)

## 2022-03-04 PROCEDURE — 36416 COLLJ CAPILLARY BLOOD SPEC: CPT

## 2022-03-04 PROCEDURE — 85610 PROTHROMBIN TIME: CPT

## 2022-03-04 RX ORDER — WARFARIN SODIUM 4 MG/1
TABLET ORAL
Qty: 30 TABLET | Refills: 0
Start: 2022-03-04 | End: 2022-04-01

## 2022-03-04 NOTE — ADDENDUM NOTE
Encounter addended by: July Mistry, PT on: 3/4/2022 9:23 AM   Actions taken: Flowsheet accepted, Clinical Note Signed, Document created, Document edited

## 2022-03-04 NOTE — PROGRESS NOTES
ANTICOAGULATION MANAGEMENT     Petey Archibald 61 year old male is on warfarin with therapeutic INR result. (Goal INR 2.0-3.0)    Recent labs: (last 7 days)     03/04/22  1106   INR 2.1*       ASSESSMENT       Source(s): Chart Review, Patient/Caregiver Call and Home Care/Facility Nurse Group Home      Warfarin doses taken: Warfarin taken as instructed    Diet: No new diet changes identified    New illness, injury, or hospitalization: No    Medication/supplement changes: None noted    Signs or symptoms of bleeding or clotting: No    Previous INR: Therapeutic last visit; previously outside of goal range    Additional findings: None       PLAN     Recommended plan for no diet, medication or health factor changes affecting INR     Dosing Instructions: Continue your current warfarin dose with next INR in 4 weeks       Summary  As of 3/4/2022    Full warfarin instructions:  6 mg every Mon, Wed, Fri; 8 mg all other days   Next INR check:  4/1/2022             Telephone call with Hunter luu at [facility] who verbalizes understanding and agrees to plan    Lab visit scheduled    Education provided: Please call back if any changes to your diet, medications or how you've been taking warfarin    Plan made per [facility] anticoagulation protocol    Lisa Ponce RN  Anticoagulation Clinic  3/4/2022    _______________________________________________________________________     Anticoagulation Episode Summary     Current INR goal:  2.0-3.0   TTR:  70.4 % (1 y)   Target end date:  Indefinite   Send INR reminders to:  [facility][1]    Indications    History of pulmonary embolism [Z86.711]  Long term current use of anticoagulant therapy [Z79.01]           Comments:  [facility] assisted; A new Coumadin Prescription will need to sent to [facility] with current dose and next INR check.         Anticoagulation Care Providers     Provider Role Specialty Phone number    Yolande Lacy PA-C Referring Family  Medicine [phone]    Demetri Archer MD Referring Internal Medicine [phone]    Silvino Baker MD Responsible Family Medicine [phone]

## 2022-03-04 NOTE — PROGRESS NOTES
[facility]                                                                                   OUTPATIENT PHYSICAL THERAPY FUNCTIONAL EVALUATION  PLAN OF TREATMENT FOR OUTPATIENT REHABILITATION  (COMPLETE FOR INITIAL CLAIMS ONLY)  Patient's Last Name, First Name, M.I.  YOB: 1960  ArchibaldPetey mendoza     Provider's Name   [facility]   Medical Record No.  [id number]     Start of Care Date:  02/11/22   Onset Date:   (ongoing mobility decline for 6 months)   Type:     _X__PT   ____OT  ____SLP Medical Diagnosis:  Lymphedema of left leg I89.0, Pain R52      PT Diagnosis:  Impaired functional mobility  Visits from SOC:  1                              __________________________________________________________________________________  Plan of Treatment/Functional Goals:  balance training, bed mobility training, gait training, strengthening, transfer training           GOALS  TUG  The pt will complete TUG in <50 seconds with CGA in order to improve household mobility  05/11/22    HEP  The pt with assistance from staff will be independent with a home program in order to improve self management of symptoms.  05/11/22                           Therapy Frequency:      Predicted Duration of Therapy Intervention:       Daniel Kendrick, PT                                    I CERTIFY THE NEED FOR THESE SERVICES FURNISHED UNDER        THIS PLAN OF TREATMENT AND WHILE UNDER MY CARE     (Physician co-signature of this document indicates review and certification of the therapy plan).                Certification Date From:  02/11/22   Certification Date To:  05/11/22    Referring Provider:  Demetri Archer MD    Initial Assessment  See Epic Evaluation- Start of Care Date: 02/11/22

## 2022-03-22 ENCOUNTER — MEDICAL CORRESPONDENCE (OUTPATIENT)
Dept: HEALTH INFORMATION MANAGEMENT | Facility: CLINIC | Age: 62
End: 2022-03-22
Payer: MEDICARE

## 2022-03-24 ENCOUNTER — OFFICE VISIT (OUTPATIENT)
Dept: PHYSICAL MEDICINE AND REHAB | Facility: CLINIC | Age: 62
End: 2022-03-24
Payer: MEDICARE

## 2022-03-24 VITALS
OXYGEN SATURATION: 100 % | DIASTOLIC BLOOD PRESSURE: 61 MMHG | TEMPERATURE: 98.2 F | RESPIRATION RATE: 16 BRPM | SYSTOLIC BLOOD PRESSURE: 92 MMHG | HEART RATE: 84 BPM

## 2022-03-24 DIAGNOSIS — M62.838 SPASM OF MUSCLE: ICD-10-CM

## 2022-03-24 DIAGNOSIS — R26.9 ABNORMAL GAIT: ICD-10-CM

## 2022-03-24 DIAGNOSIS — G89.29 OTHER CHRONIC PAIN: ICD-10-CM

## 2022-03-24 DIAGNOSIS — M79.18 MYALGIA, OTHER SITE: ICD-10-CM

## 2022-03-24 DIAGNOSIS — G24.8 FOCAL DYSTONIA: Primary | ICD-10-CM

## 2022-03-24 PROCEDURE — 99213 OFFICE O/P EST LOW 20 MIN: CPT | Mod: 25 | Performed by: PHYSICAL MEDICINE & REHABILITATION

## 2022-03-24 PROCEDURE — 64642 CHEMODENERV 1 EXTREMITY 1-4: CPT | Performed by: PHYSICAL MEDICINE & REHABILITATION

## 2022-03-24 PROCEDURE — 95874 GUIDE NERV DESTR NEEDLE EMG: CPT | Performed by: PHYSICAL MEDICINE & REHABILITATION

## 2022-03-24 RX ORDER — GABAPENTIN 400 MG/1
CAPSULE ORAL
COMMUNITY
Start: 2022-02-08 | End: 2023-02-14

## 2022-03-24 RX ORDER — MUPIROCIN 20 MG/G
OINTMENT TOPICAL
COMMUNITY
Start: 2022-02-23 | End: 2023-02-14

## 2022-03-24 RX ORDER — DIPHENHYDRAMINE HCL 50 MG/1
CAPSULE ORAL
COMMUNITY
Start: 2022-03-02 | End: 2023-02-14

## 2022-03-24 ASSESSMENT — PAIN SCALES - GENERAL: PAINLEVEL: EXTREME PAIN (8)

## 2022-03-24 NOTE — LETTER
3/24/2022       RE: Petey Archibald  [address] [address]     Dear Colleague,    Thank you for referring your patient, Petey Archibald, to the [facility] [place] at [facility]. Please see a copy of my visit note below.    61-year-old male, accompanied by his staff from the group home.       He has been having significant pain in his left thigh for the past 6+ months.  He works as a  in a restaurant which involves prolonged standing.  He is allowed to sit periodically.  He finds when he sits in a chair he does not have the option of stretching.  Over the last several weeks he was having significant pain.  TPI held for few days.     He has evaluation including CAT scan which showed some sclerosis of the left pubic bone.  He does not have any urinary symptoms and was evaluated by a urologist.      He denies any numbness or tingling.  On a scale of 0-10 he rates his pain at its worst, when he is sitting he does not have any.  Transferring and standing makes it significant, up to 9/10..  He has difficulty transferring.     He has significant obesity.  He also smokes 1 pack/day and is considering quitting if it will help with pain reduction. He is not currently drinking as he has no access to a bar nearby.     Past Medical History        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     Chronic infection       MRSA     Coagulation disorder (H)       on coumadin     COPD (chronic obstructive pulmonary disease) (H)       History of DVT of lower extremity       History of pulmonary embolism       Hyperlipidemia       Mild intellectual disabilities       Morbid obesity (H)       NEL (obstructive sleep apnea)       Peripheral edema       Peripheral vascular disease (H)       Sleep apnea       uses CPAP     Thrombosis       Tobacco dependence    "    Uncomplicated asthma       Venous stasis dermatitis           Current Outpatient Prescriptions          Current Outpatient Medications   Medication Sig Dispense Refill     acetaminophen (TYLENOL) 325 MG tablet Take 1-2 tablets (325-650 mg) by mouth every 6 hours as needed for mild pain, fever or headaches 100 tablet 3     albuterol (ALBUTEROL) 108 (90 BASE) MCG/ACT inhaler Inhale 2 puffs into the lungs every 6 hours as needed 1 Inhaler 0     ARIPiprazole (ABILIFY) 5 MG tablet Take 5 mg by mouth daily         bacitracin 500 UNIT/GM OINT           buPROPion (WELLBUTRIN SR) 150 MG 12 hr tablet Take 150 mg by mouth 2 times daily         Cadexomer Iodine, topical, 0.9% (IODOSORB) 0.9 % GEL gel Apply topically daily Apply to left foot wound daily after cleansing the wound and blotting it dry. 1 Tube 3     cholecalciferol 50 MCG (2000 UT) tablet Take 1 tablet by mouth daily         clindamycin (CLEOCIN) 300 MG capsule Take 1 capsule (300 mg) by mouth 3 times daily 30 capsule 0     cloNIDine (CATAPRES) 0.1 MG tablet Take 0.1 mg by mouth 2 times daily         clotrimazole (LOTRIMIN) 1 % external cream Apply topically 2 times daily 60 g 0     diclofenac (VOLTAREN) 1 % topical gel Apply 4 g topically 2 times daily as needed for moderate pain 100 g 1     diphenhydrAMINE (BENADRYL) 25 MG capsule Take 25 mg by mouth every 6 hours as needed for itching or allergies         Emollient (CERAVE) CREA Externally apply topically daily 453 g 11     EPINEPHrine (EPIPEN 2-BRE) 0.3 MG/0.3ML injection 2-pack INJECT 0.3MG INTRAMUSCULAR ONE TIME FOR ONE DOSE AS NEEDED FOR ANAPHYLAXIS (CALL 911 IF YOU HAVE TO GIVE) (FOR BEE STINGS) **LABEL EACH PEN* 2 mL 3     gabapentin (NEURONTIN) 100 MG capsule Take 400 mg by mouth 3 times daily At 0900, 1200, and 2000         Gauze Pads & Dressings (GAUZE PADS 4\"X4\") 4\"X4\" PADS 1 each 3 times daily as needed 25 each 1     loratadine (CLARITIN) 10 MG tablet Take 10 mg by mouth daily         LORazepam " (ATIVAN) 1 MG tablet Take 1 mg by mouth every 6 hours as needed for anxiety         methocarbamol (ROBAXIN) 750 MG tablet Take 1 tablet (750 mg) by mouth 4 times daily as needed for muscle spasms 60 tablet 0     montelukast (SINGULAIR) 10 MG tablet TAKE 1 TABLET BY MOUTH EVERY MORNING (ASTHMA) 30 tablet 11     Multiple Vitamin (DAILY-JOVAN) TABS TAKE 1 TABLET BY MOUTH EVERY MORNING (VITAMINS) 30 tablet 11     naltrexone (DEPADE/REVIA) 50 MG tablet Take 50 mg by mouth daily         nicotine (COMMIT) 2 MG lozenge           nicotine (NICODERM CQ) 21 MG/24HR 24 hr patch           nicotine (NICORETTE) 2 MG gum Take 2 mg by mouth as needed for smoking cessation         nystatin-triamcinolone (MYCOLOG II) 409149-6.1 UNIT/GM-% external cream Apply topically 2 times daily For 1-2 weeks.         omeprazole (PRILOSEC) 40 MG DR capsule Take 1 capsule (40 mg) by mouth daily 90 capsule 2     order for DME Equipment being ordered: roll of micropore tape to dress foot wound bid 1 each 0     order for DME Equipment being ordered: 1 surgical shoe 1 Device 0     order for DME [employer] Phone [phone] Fax [phone]  EdemaWear Size Medium/Yellow qty 4 sleeves  Length of Need: 1 month  Frequency of dressing change daily 1 Device 0     order for DME [employer] Stockings  Phone #[phone]  Fax #[phone]  Coolflex Velcro wraps     Length of Need: Life Time  # of Pairs 1 set 30 days 0     order for DME [employer] Order Phone [phone] Fax [phone]  Primary Dressing Hydrofera Blue Ready   Qty 5 sheets  Secondary Dressing 4' roll gauze Qty 30  Secondary Dressing 2' medipore tape Qty 1  Secondary Dressing 4x4 gauze loaf Qty  1  Length of Need: 1 month  Frequency of dressing change: daily 30 days 0     order for DME Oxygen 2 Li/min  at night and bled into BiPAP 1 Device 0     order for DME Equipment being ordered: CPAP with mask and tubing 1 Device 0     order for DME Equipment being ordered: support  compression hose BK  2 pair black 30mm HG   To be applied on arising & removed while lying down to go to sleep 1 each 0     polyethylene glycol (MIRALAX) 17 GM/Dose powder Take 17 g (1 capful) by mouth daily as needed for constipation 578 g 0     PULMICORT FLEXHALER 180 MCG/ACT inhaler INHALE 2 PUFFS INTO THE LUNGS TWICE DAILY. 1 each 11     SENNA-TABS 8.6 MG tablet           sertraline (ZOLOFT) 100 MG tablet Take 100 mg by mouth daily Take with 50 mg tablet for a total dose of 150 mg daily.         sertraline (ZOLOFT) 50 MG tablet           simvastatin (ZOCOR) 40 MG tablet TAKE 1 TABLET BY MOUTH EVERY MORNING.  (CHOLESTEROL) 30 tablet 11     SPIRIVA HANDIHALER 18 MCG inhaled capsule INHALE CONTENTS OF 1 CAPSULE INTO THE LUNGS ONCE DAILY 30 capsule 11     spironolactone (ALDACTONE) 25 MG tablet TAKE 1 TABLET BY MOUTH ONCE DAILY. (HIGH BLOOD PRESSURE) 30 tablet 11     temazepam (RESTORIL) 15 MG capsule Take 15 mg by mouth nightly as needed for sleep         tiZANidine (ZANAFLEX) 2 MG tablet Take 1 tablet (2 mg) by mouth 3 times daily 90 tablet 1     triamcinolone (KENALOG) 0.1 % external cream Apply to AA BID x 1-2 week then PRN only 80 g 2     trolamine salicylate (ASPERCREME) 10 % external cream Apply topically as needed for moderate pain knee         varenicline (CHANTIX BRE) 0.5 MG X 11 & 1 MG X 42 tablet Take 0.5 mg tab daily for 3 days, THEN 0.5 mg tab twice daily for 4 days, THEN 1 mg twice daily. 53 tablet 0     vitamin B complex with vitamin C (STRESS TAB) tablet Take 1 tablet by mouth daily 90 tablet 3     warfarin ANTICOAGULANT (COUMADIN) 4 MG tablet Take 1-1/2 tablets (6 mg) every Monday, Friday; and take 2 tablets (8 mg) all other days of the week. Next INR date is 9/28/21. 11 tablet 0     warfarin ANTICOAGULANT (COUMADIN) 4 MG tablet 6 mg mon,fri and 8 mg ROW  Recheck INR 2/22/21 54 tablet 0     warfarin ANTICOAGULANT (COUMADIN) 4 MG tablet Take 1 tablet (4 mg) by mouth daily 6 mg mon,fri and 8 mg row  (Patient taking differently: Take 2 mg by mouth daily 2+6mg tues, wed, thurs, sat, sun) 44 tablet 0     Warfarin Therapy Reminder 1 each continuous prn 1 each 0     White Petrolatum ointment Apply topically nightly as needed for dry skin             BP 92/61   Pulse 84   Temp 98.2  F (36.8  C)   Resp 16   SpO2 100%          On examination, the patient is alert and cooperative.  He walks with antalgic gait on the left side.  He could hardly take a few steps.  On the examination table, he was able to move his neck, upper extremities without difficulty.  He was able to move his right lower extremity without difficulty.  There is no tenderness in the abdominal region or the groin region on the left side.  He is tender over the left adductor hosea, left iliopsoas, left tensor fascia david, and quadriceps at three sites.     Focal areas of tenderness was isolated in the affected muscles.     Impression: At this point, this 61-year-old male with myalgia and dystonia affecting the muscles of the left thigh, likely a result of his posture and his left ankle chronic ulceration which is also giving him some discomfort. 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     In terms of treatment, he has had trigger point injections. We will continue with 200 units of Botox for longer term relief as he has features of focal dystonia.     20 minutes spent for this visit, greater than 50% was for counseling on above-mentioned issues.     Procedure note: With his informed consent, after explaining the benefits and risks of the procedure, using alcohol for skin prep, 200 units of Botox in 6 cc of preservative-free normal saline were injected with EMG guidance 33 units into the left adductor, 33 units into the left tensor fascia david, 33 units into the left iliopsoas muscle and reminder into the left quadriceps at 3 different locations.  200 units were utilized in all.  He tolerated the procedure well. He was able to get up and  walk around better and noticed more he was comfortable and able to move better.     I have suggested that he can use ice, continue Tylenol. He can return to work as a  and continue as per his usual routine.  I cautioned him to avoid overdoing activities that would bring on his discomfort.  I would like to see him in follow-up for repeat Botox in 12 weeks time.      Yinka Hagen MD

## 2022-03-24 NOTE — PROGRESS NOTES
61-year-old male, accompanied by his staff from the group [place].       He has been having significant pain in his left thigh for the past 6+ months.  He works as a  in a restaurant which involves prolonged standing.  He is allowed to sit periodically.  He finds when he sits in a chair he does not have the option of stretching.  Over the last several weeks he was having significant pain.  TPI held for few days.     He has evaluation including CAT scan which showed some sclerosis of the left pubic bone.  He does not have any urinary symptoms and was evaluated by a urologist.      He denies any numbness or tingling.  On a scale of 0-10 he rates his pain at its worst, when he is sitting he does not have any.  Transferring and standing makes it significant, up to 9/10..  He has difficulty transferring.     He has significant obesity.  He also smokes 1 pack/day and is considering quitting if it will help with pain reduction. He is not currently drinking as he has no access to a bar nearby.     Past Medical History        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     Chronic infection       MRSA     Coagulation disorder (H)       on coumadin     COPD (chronic obstructive pulmonary disease) (H)       History of DVT of lower extremity       History of pulmonary embolism       Hyperlipidemia       Mild intellectual disabilities       Morbid obesity (H)       NEL (obstructive sleep apnea)       Peripheral edema       Peripheral vascular disease (H)       Sleep apnea       uses CPAP     Thrombosis       Tobacco dependence       Uncomplicated asthma       Venous stasis dermatitis           Current Outpatient Prescriptions          Current Outpatient Medications   Medication Sig Dispense Refill     acetaminophen (TYLENOL) 325 MG tablet Take 1-2 tablets (325-650 mg) by mouth every 6 hours as needed for mild pain, fever or headaches 100 tablet 3     albuterol (ALBUTEROL) 108 (90  "BASE) MCG/ACT inhaler Inhale 2 puffs into the lungs every 6 hours as needed 1 Inhaler 0     ARIPiprazole (ABILIFY) 5 MG tablet Take 5 mg by mouth daily         bacitracin 500 UNIT/GM OINT           buPROPion (WELLBUTRIN SR) 150 MG 12 hr tablet Take 150 mg by mouth 2 times daily         Cadexomer Iodine, topical, 0.9% (IODOSORB) 0.9 % GEL gel Apply topically daily Apply to left foot wound daily after cleansing the wound and blotting it dry. 1 Tube 3     cholecalciferol 50 MCG (2000 UT) tablet Take 1 tablet by mouth daily         clindamycin (CLEOCIN) 300 MG capsule Take 1 capsule (300 mg) by mouth 3 times daily 30 capsule 0     cloNIDine (CATAPRES) 0.1 MG tablet Take 0.1 mg by mouth 2 times daily         clotrimazole (LOTRIMIN) 1 % external cream Apply topically 2 times daily 60 g 0     diclofenac (VOLTAREN) 1 % topical gel Apply 4 g topically 2 times daily as needed for moderate pain 100 g 1     diphenhydrAMINE (BENADRYL) 25 MG capsule Take 25 mg by mouth every 6 hours as needed for itching or allergies         Emollient (CERAVE) CREA Externally apply topically daily 453 g 11     EPINEPHrine (EPIPEN 2-BRE) 0.3 MG/0.3ML injection 2-pack INJECT 0.3MG INTRAMUSCULAR ONE TIME FOR ONE DOSE AS NEEDED FOR ANAPHYLAXIS (CALL 911 IF YOU HAVE TO GIVE) (FOR BEE STINGS) **LABEL EACH PEN* 2 mL 3     gabapentin (NEURONTIN) 100 MG capsule Take 400 mg by mouth 3 times daily At 0900, 1200, and 2000         Gauze Pads & Dressings (GAUZE PADS 4\"X4\") 4\"X4\" PADS 1 each 3 times daily as needed 25 each 1     loratadine (CLARITIN) 10 MG tablet Take 10 mg by mouth daily         LORazepam (ATIVAN) 1 MG tablet Take 1 mg by mouth every 6 hours as needed for anxiety         methocarbamol (ROBAXIN) 750 MG tablet Take 1 tablet (750 mg) by mouth 4 times daily as needed for muscle spasms 60 tablet 0     montelukast (SINGULAIR) 10 MG tablet TAKE 1 TABLET BY MOUTH EVERY MORNING (ASTHMA) 30 tablet 11     Multiple Vitamin (DAILY-JOVAN) TABS TAKE 1 TABLET " BY MOUTH EVERY MORNING (VITAMINS) 30 tablet 11     naltrexone (DEPADE/REVIA) 50 MG tablet Take 50 mg by mouth daily         nicotine (COMMIT) 2 MG lozenge           nicotine (NICODERM CQ) 21 MG/24HR 24 hr patch           nicotine (NICORETTE) 2 MG gum Take 2 mg by mouth as needed for smoking cessation         nystatin-triamcinolone (MYCOLOG II) 658301-0.1 UNIT/GM-% external cream Apply topically 2 times daily For 1-2 weeks.         omeprazole (PRILOSEC) 40 MG DR capsule Take 1 capsule (40 mg) by mouth daily 90 capsule 2     order for DME Equipment being ordered: roll of micropore tape to dress foot wound bid 1 each 0     order for DME Equipment being ordered: 1 surgical shoe 1 Device 0     order for DME [employer] [facility] Phone [phone] Fax [phone]  EdemaWear Size Medium/Yellow qty 4 sleeves  Length of Need: 1 month  Frequency of dressing change daily 1 Device 0     order for DME [employer] Stock[employer]  Phone #[phone]  Fax #[phone]  Coolflex Velcro wraps     Length of Need: Life Time  # of Pairs 1 set 30 days 0     order for DME [employer] [facility] Phone [phone] Fax [phone]  Primary Dressing Hydrofera Blue Ready   Qty 5 sheets  Secondary Dressing 4' roll gauze Qty 30  Secondary Dressing 2' medipore tape Qty 1  Secondary Dressing 4x4 gauze loaf Qty  1  Length of Need: 1 month  Frequency of dressing change: daily 30 days 0     order for DME Oxygen 2 Li/min  at night and bled into BiPAP 1 Device 0     order for DME Equipment being ordered: CPAP with mask and tubing 1 Device 0     order for DME Equipment being ordered: support compression hose BK  2 pair black 30mm HG   To be applied on arising & removed while lying down to go to sleep 1 each 0     polyethylene glycol (MIRALAX) 17 GM/Dose powder Take 17 g (1 capful) by mouth daily as needed for constipation 578 g 0     PULMICORT FLEXHALER 180 MCG/ACT inhaler INHALE 2 PUFFS INTO THE LUNGS TWICE DAILY. 1 each 11     SENNA-TABS 8.6 MG  tablet           sertraline (ZOLOFT) 100 MG tablet Take 100 mg by mouth daily Take with 50 mg tablet for a total dose of 150 mg daily.         sertraline (ZOLOFT) 50 MG tablet           simvastatin (ZOCOR) 40 MG tablet TAKE 1 TABLET BY MOUTH EVERY MORNING.  (CHOLESTEROL) 30 tablet 11     SPIRIVA HANDIHALER 18 MCG inhaled capsule INHALE CONTENTS OF 1 CAPSULE INTO THE LUNGS ONCE DAILY 30 capsule 11     spironolactone (ALDACTONE) 25 MG tablet TAKE 1 TABLET BY MOUTH ONCE DAILY. (HIGH BLOOD PRESSURE) 30 tablet 11     temazepam (RESTORIL) 15 MG capsule Take 15 mg by mouth nightly as needed for sleep         tiZANidine (ZANAFLEX) 2 MG tablet Take 1 tablet (2 mg) by mouth 3 times daily 90 tablet 1     triamcinolone (KENALOG) 0.1 % external cream Apply to AA BID x 1-2 week then PRN only 80 g 2     trolamine salicylate (ASPERCREME) 10 % external cream Apply topically as needed for moderate pain knee         varenicline (CHANTIX BRE) 0.5 MG X 11 & 1 MG X 42 tablet Take 0.5 mg tab daily for 3 days, THEN 0.5 mg tab twice daily for 4 days, THEN 1 mg twice daily. 53 tablet 0     vitamin B complex with vitamin C (STRESS TAB) tablet Take 1 tablet by mouth daily 90 tablet 3     warfarin ANTICOAGULANT (COUMADIN) 4 MG tablet Take 1-1/2 tablets (6 mg) every Monday, Friday; and take 2 tablets (8 mg) all other days of the week. Next INR date is 9/28/21. 11 tablet 0     warfarin ANTICOAGULANT (COUMADIN) 4 MG tablet 6 mg mon,fri and 8 mg ROW  Recheck INR 2/22/21 54 tablet 0     warfarin ANTICOAGULANT (COUMADIN) 4 MG tablet Take 1 tablet (4 mg) by mouth daily 6 mg mon,fri and 8 mg row (Patient taking differently: Take 2 mg by mouth daily 2+6mg tues, wed, thurs, sat, sun) 44 tablet 0     Warfarin Therapy Reminder 1 each continuous prn 1 each 0     White Petrolatum ointment Apply topically nightly as needed for dry skin             BP 92/61   Pulse 84   Temp 98.2  F (36.8  C)   Resp 16   SpO2 100%          On examination, the patient is  alert and cooperative.  He walks with antalgic gait on the left side.  He could hardly take a few steps.  On the examination table, he was able to move his neck, upper extremities without difficulty.  He was able to move his right lower extremity without difficulty.  There is no tenderness in the abdominal region or the groin region on the left side.  He is tender over the left adductor hosea, left iliopsoas, left tensor fascia david, and quadriceps at three sites.     Focal areas of tenderness was isolated in the affected muscles.     Impression: At this point, this 61-year-old male with myalgia and dystonia affecting the muscles of the left thigh, likely a result of his posture and his left ankle chronic ulceration which is also giving him some discomfort. 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     In terms of treatment, he has had trigger point injections. We will continue with 200 units of Botox for longer term relief as he has features of focal dystonia.     20 minutes spent for this visit, greater than 50% was for counseling on above-mentioned issues.     Procedure note: With his informed consent, after explaining the benefits and risks of the procedure, using alcohol for skin prep, 200 units of Botox in 6 cc of preservative-free normal saline were injected with EMG guidance 33 units into the left adductor, 33 units into the left tensor fascia david, 33 units into the left iliopsoas muscle and reminder into the left quadriceps at 3 different locations.  200 units were utilized in all.  He tolerated the procedure well. He was able to get up and walk around better and noticed more he was comfortable and able to move better.     I have suggested that he can use ice, continue Tylenol. He can return to work as a  and continue as per his usual routine.  I cautioned him to avoid overdoing activities that would bring on his discomfort.  I would like to see him in follow-up for repeat Botox in 12  weeks time.      Yinka Hagen MD

## 2022-04-01 ENCOUNTER — LAB (OUTPATIENT)
Dept: LAB | Facility: CLINIC | Age: 62
End: 2022-04-01
Payer: MEDICARE

## 2022-04-01 ENCOUNTER — ANTICOAGULATION THERAPY VISIT (OUTPATIENT)
Dept: ANTICOAGULATION | Facility: CLINIC | Age: 62
End: 2022-04-01

## 2022-04-01 DIAGNOSIS — Z86.711 HISTORY OF PULMONARY EMBOLISM: Primary | ICD-10-CM

## 2022-04-01 DIAGNOSIS — Z86.718 HISTORY OF DEEP VEIN THROMBOSIS OF LOWER EXTREMITY: ICD-10-CM

## 2022-04-01 DIAGNOSIS — Z79.01 LONG TERM CURRENT USE OF ANTICOAGULANT THERAPY: ICD-10-CM

## 2022-04-01 LAB — INR BLD: 2.1 (ref 0.9–1.1)

## 2022-04-01 PROCEDURE — 85610 PROTHROMBIN TIME: CPT

## 2022-04-01 PROCEDURE — 36416 COLLJ CAPILLARY BLOOD SPEC: CPT

## 2022-04-01 RX ORDER — WARFARIN SODIUM 4 MG/1
TABLET ORAL
Qty: 30 TABLET | Refills: 0
Start: 2022-04-01 | End: 2022-06-09

## 2022-04-01 NOTE — PROGRESS NOTES
ANTICOAGULATION MANAGEMENT     Petey Archibald 61 year old male is on warfarin with therapeutic INR result. (Goal INR 2.0-3.0)    Recent labs: (last 7 days)     04/01/22  1103   INR 2.1*       ASSESSMENT       Source(s): Chart Review and Patient/Caregiver Call       Warfarin doses taken: Warfarin taken as instructed    Diet: No new diet changes identified    New illness, injury, or hospitalization: No    Medication/supplement changes: None noted    Signs or symptoms of bleeding or clotting: No    Previous INR: Therapeutic last 2(+) visits    Additional findings: None       PLAN     Recommended plan for no diet, medication or health factor changes affecting INR     Dosing Instructions: continue your current warfarin dose with next INR in 4 weeks       Summary  As of 4/1/2022    Full warfarin instructions:  6 mg every Mon, Wed, Fri; 8 mg all other days   Next INR check:  4/29/2022             Telephone call with  [facility] who verbalizes understanding and agrees to plan    Lab visit scheduled    Education provided: AVS mailed and [facility] called    Plan made per [facility] anticoagulation protocol    Lisa Ponce RN  Anticoagulation Clinic  4/1/2022    _______________________________________________________________________     Anticoagulation Episode Summary     Current INR goal:  2.0-3.0   TTR:  71.7 % (1 y)   Target end date:  Indefinite   Send INR reminders to:  [facility]    Indications    History of pulmonary embolism [Z86.711]  Long term current use of anticoagulant therapy [Z79.01]           Comments:  REM [facility]; A new Coumadin Prescription will need to sent to [facility] with current dose and next INR check.         Anticoagulation Care Providers     Provider Role Specialty Phone number    Yolande Lacy PA-C Referring Family Medicine [phone]    Demetri Archer MD Referring Internal Medicine [phone]    Silvino Baker MD Responsible Family  Medicine [phone]

## 2022-04-08 ENCOUNTER — HOSPITAL ENCOUNTER (OUTPATIENT)
Dept: PHYSICAL THERAPY | Facility: CLINIC | Age: 62
Discharge: HOME | End: 2022-04-08
Payer: MEDICARE

## 2022-04-08 DIAGNOSIS — I89.0 LYMPHEDEMA OF LEFT LEG: Primary | ICD-10-CM

## 2022-04-08 DIAGNOSIS — R53.81 PHYSICAL DECONDITIONING: ICD-10-CM

## 2022-04-08 PROCEDURE — 97110 THERAPEUTIC EXERCISES: CPT | Mod: GP | Performed by: PHYSICAL THERAPIST

## 2022-04-08 NOTE — ADDENDUM NOTE
Encounter addended by: July Mistry, PT on: 4/8/2022 5:15 PM   Actions taken: Episode edited, Clinical Note Signed

## 2022-04-29 ENCOUNTER — TELEPHONE (OUTPATIENT)
Dept: INTERNAL MEDICINE | Facility: CLINIC | Age: 62
End: 2022-04-29

## 2022-04-29 ENCOUNTER — LAB (OUTPATIENT)
Dept: LAB | Facility: CLINIC | Age: 62
End: 2022-04-29
Payer: MEDICARE

## 2022-04-29 ENCOUNTER — ANTICOAGULATION THERAPY VISIT (OUTPATIENT)
Dept: ANTICOAGULATION | Facility: CLINIC | Age: 62
End: 2022-04-29

## 2022-04-29 DIAGNOSIS — Z86.718 HISTORY OF DEEP VEIN THROMBOSIS OF LOWER EXTREMITY: ICD-10-CM

## 2022-04-29 DIAGNOSIS — Z79.01 LONG TERM CURRENT USE OF ANTICOAGULANT THERAPY: ICD-10-CM

## 2022-04-29 DIAGNOSIS — Z86.711 HISTORY OF PULMONARY EMBOLISM: Primary | ICD-10-CM

## 2022-04-29 LAB — INR BLD: 2.6 (ref 0.9–1.1)

## 2022-04-29 PROCEDURE — 36416 COLLJ CAPILLARY BLOOD SPEC: CPT

## 2022-04-29 PROCEDURE — 85610 PROTHROMBIN TIME: CPT

## 2022-04-29 NOTE — TELEPHONE ENCOUNTER
Reason for Call:  Other call back and returning call    Detailed comments: ALISHA WAS NOT ABLE TO HEAR FULL MESSAGE LEFT BY INR, HE IS WONDERING WHEN THEY WANT TO INR SCHEDULED     Phone Number Patient can be reached at: Other phone number:  [phone]       Best Time: ANYTIME    Can we leave a detailed message on this number? YES    Call taken on 4/29/2022 at 2:10 PM by Jesusita Muhammad

## 2022-04-29 NOTE — PROGRESS NOTES
ANTICOAGULATION MANAGEMENT     Petey Archibald 61 year old male is on warfarin with subtherapeutic INR result. (Goal INR 2.0-3.0)    Recent labs: (last 7 days)     04/29/22  1155   INR 2.6*       ASSESSMENT       Source(s): Chart Review and Home Care/Facility Nurse       Warfarin doses taken: Warfarin taken as instructed    Diet: No new diet changes identified    New illness, injury, or hospitalization: No    Medication/supplement changes: None noted    Signs or symptoms of bleeding or clotting: No    Previous INR: Therapeutic last 2(+) visits    Additional findings: None       PLAN     Recommended plan for no diet, medication or health factor changes affecting INR     Dosing Instructions: continue your current warfarin dose with next INR in 4 weeks       Summary  As of 4/29/2022    Full warfarin instructions:  6 mg every Mon, Wed, Fri; 8 mg all other days   Next INR check:  5/27/2022             Telephone call with [facility] home care/facility nurse who verbalizes understanding and agrees to plan    Lab visit scheduled   Warfarin dosing called into [facility]    Education provided: Please call back if any changes to your diet, medications or how you've been taking warfarin    Plan made per [facility] anticoagulation protocol    Lisa Ponce RN  Anticoagulation Clinic  4/29/2022    _______________________________________________________________________     Anticoagulation Episode Summary     Current INR goal:  2.0-3.0   TTR:  75.0 % (1 y)   Target end date:  Indefinite   Send INR reminders to:  [facility]    Indications    History of pulmonary embolism [Z86.711]  Long term current use of anticoagulant therapy [Z79.01]           Comments:  REM nursing home; A new Coumadin Prescription will need to sent to [facility] with current dose and next INR check.         Anticoagulation Care Providers     Provider Role Specialty Phone number    Yolande Lacy PA-C Referring Family Medicine  [phone]    Demetri Archer MD Referring Internal Medicine [phone]    Silvino Baker MD Responsible Family Medicine [phone]

## 2022-05-27 ENCOUNTER — ANTICOAGULATION THERAPY VISIT (OUTPATIENT)
Dept: ANTICOAGULATION | Facility: CLINIC | Age: 62
End: 2022-05-27

## 2022-05-27 ENCOUNTER — LAB (OUTPATIENT)
Dept: LAB | Facility: CLINIC | Age: 62
End: 2022-05-27
Payer: MEDICARE

## 2022-05-27 DIAGNOSIS — Z86.718 HISTORY OF DEEP VEIN THROMBOSIS OF LOWER EXTREMITY: ICD-10-CM

## 2022-05-27 DIAGNOSIS — Z79.01 LONG TERM CURRENT USE OF ANTICOAGULANT THERAPY: ICD-10-CM

## 2022-05-27 DIAGNOSIS — Z86.711 HISTORY OF PULMONARY EMBOLISM: Primary | ICD-10-CM

## 2022-05-27 LAB — INR BLD: 2.4 (ref 0.9–1.1)

## 2022-05-27 PROCEDURE — 36416 COLLJ CAPILLARY BLOOD SPEC: CPT

## 2022-05-27 PROCEDURE — 85610 PROTHROMBIN TIME: CPT

## 2022-05-27 NOTE — PROGRESS NOTES
ANTICOAGULATION MANAGEMENT     Petey Archibald 61 year old male is on warfarin with therapeutic INR result. (Goal INR 2.0-3.0)    Recent labs: (last 7 days)     05/27/22  1143   INR 2.4*       ASSESSMENT       Source(s): Chart Review and Patient/Caregiver Call       Warfarin doses taken: Warfarin taken as instructed    Diet: No new diet changes identified    New illness, injury, or hospitalization: No    Medication/supplement changes: None noted    Signs or symptoms of bleeding or clotting: No    Previous INR: Therapeutic last 2(+) visits    Additional findings: None       PLAN     Recommended plan for no diet, medication or health factor changes affecting INR     Dosing Instructions: continue your current warfarin dose with next INR in 4 weeks       Summary  As of 5/27/2022    Full warfarin instructions:  6 mg every Mon, Wed, Fri; 8 mg all other days   Next INR check:  6/24/2022             Telephone call with Petey who verbalizes understanding and agrees to plan    Lab visit scheduled    Education provided: Please call back if any changes to your diet, medications or how you've been taking warfarin    Plan made per [facility] anticoagulation protocol    Lisa Ponce RN  Anticoagulation Clinic  5/27/2022    _______________________________________________________________________     Anticoagulation Episode Summary     Current INR goal:  2.0-3.0   TTR:  79.1 % (1 y)   Target end date:  Indefinite   Send INR reminders to:  [facility]    Indications    History of pulmonary embolism [Z86.711]  Long term current use of anticoagulant therapy [Z79.01]           Comments:  REM detention; A new Coumadin Prescription will need to sent to [facility] with current dose and next INR check.         Anticoagulation Care Providers     Provider Role Specialty Phone number    Yolaned Lacy PA-C Referring Family Medicine [phone]    Demetri Archer MD Referring Internal Medicine [phone]     Silvino Baker MD [facility] [phone]

## 2022-06-08 ENCOUNTER — HOSPITAL ENCOUNTER (EMERGENCY)
Facility: CLINIC | Age: 62
Discharge: HOME | End: 2022-06-09
Attending: EMERGENCY MEDICINE | Admitting: EMERGENCY MEDICINE
Payer: MEDICARE

## 2022-06-08 ENCOUNTER — OFFICE VISIT (OUTPATIENT)
Dept: URGENT CARE | Facility: URGENT CARE | Age: 62
End: 2022-06-08
Payer: MEDICARE

## 2022-06-08 ENCOUNTER — APPOINTMENT (OUTPATIENT)
Dept: ULTRASOUND IMAGING | Facility: CLINIC | Age: 62
End: 2022-06-08
Attending: EMERGENCY MEDICINE
Payer: MEDICARE

## 2022-06-08 ENCOUNTER — ANTICOAGULATION THERAPY VISIT (OUTPATIENT)
Dept: ANTICOAGULATION | Facility: CLINIC | Age: 62
End: 2022-06-08

## 2022-06-08 VITALS
OXYGEN SATURATION: 93 % | DIASTOLIC BLOOD PRESSURE: 41 MMHG | TEMPERATURE: 98.6 F | WEIGHT: 315 LBS | BODY MASS INDEX: 42.66 KG/M2 | SYSTOLIC BLOOD PRESSURE: 121 MMHG | HEIGHT: 72 IN | HEART RATE: 84 BPM | RESPIRATION RATE: 18 BRPM

## 2022-06-08 VITALS
SYSTOLIC BLOOD PRESSURE: 103 MMHG | HEART RATE: 77 BPM | RESPIRATION RATE: 19 BRPM | TEMPERATURE: 98.2 F | DIASTOLIC BLOOD PRESSURE: 71 MMHG | OXYGEN SATURATION: 97 %

## 2022-06-08 DIAGNOSIS — M79.89 LEFT ARM SWELLING: Primary | ICD-10-CM

## 2022-06-08 DIAGNOSIS — L02.413 ABSCESS OF RIGHT ELBOW: ICD-10-CM

## 2022-06-08 DIAGNOSIS — Z86.711 HISTORY OF PULMONARY EMBOLISM: Primary | ICD-10-CM

## 2022-06-08 DIAGNOSIS — Z79.01 LONG TERM CURRENT USE OF ANTICOAGULANT THERAPY: ICD-10-CM

## 2022-06-08 LAB
ALBUMIN SERPL-MCNC: 3.2 G/DL (ref 3.4–5)
ALP SERPL-CCNC: 89 U/L (ref 40–150)
ALT SERPL W P-5'-P-CCNC: 20 U/L (ref 0–70)
ANION GAP SERPL CALCULATED.3IONS-SCNC: 7 MMOL/L (ref 3–14)
AST SERPL W P-5'-P-CCNC: 18 U/L (ref 0–45)
BASOPHILS # BLD AUTO: 0 10E3/UL (ref 0–0.2)
BASOPHILS # BLD AUTO: 0 10E3/UL (ref 0–0.2)
BASOPHILS NFR BLD AUTO: 0 %
BASOPHILS NFR BLD AUTO: 0 %
BILIRUB SERPL-MCNC: 0.3 MG/DL (ref 0.2–1.3)
BUN SERPL-MCNC: 16 MG/DL (ref 7–30)
CALCIUM SERPL-MCNC: 8.9 MG/DL (ref 8.5–10.1)
CHLORIDE BLD-SCNC: 106 MMOL/L (ref 94–109)
CO2 SERPL-SCNC: 28 MMOL/L (ref 20–32)
CREAT SERPL-MCNC: 0.96 MG/DL (ref 0.66–1.25)
CRP SERPL-MCNC: 40 MG/L (ref 0–8)
D DIMER PPP FEU-MCNC: 1 UG/ML FEU (ref 0–0.5)
EOSINOPHIL # BLD AUTO: 0.1 10E3/UL (ref 0–0.7)
EOSINOPHIL # BLD AUTO: 0.2 10E3/UL (ref 0–0.7)
EOSINOPHIL NFR BLD AUTO: 2 %
EOSINOPHIL NFR BLD AUTO: 3 %
ERYTHROCYTE [DISTWIDTH] IN BLOOD BY AUTOMATED COUNT: 15.7 % (ref 10–15)
ERYTHROCYTE [DISTWIDTH] IN BLOOD BY AUTOMATED COUNT: 16.1 % (ref 10–15)
ERYTHROCYTE [SEDIMENTATION RATE] IN BLOOD BY WESTERGREN METHOD: 71 MM/HR (ref 0–20)
GFR SERPL CREATININE-BSD FRML MDRD: 89 ML/MIN/1.73M2
GLUCOSE BLD-MCNC: 96 MG/DL (ref 70–99)
HCT VFR BLD AUTO: 35.2 % (ref 40–53)
HCT VFR BLD AUTO: 35.9 % (ref 40–53)
HGB BLD-MCNC: 10.7 G/DL (ref 13.3–17.7)
HGB BLD-MCNC: 10.8 G/DL (ref 13.3–17.7)
IMM GRANULOCYTES # BLD: 0 10E3/UL
IMM GRANULOCYTES NFR BLD: 0 %
INR PPP: 2.55 (ref 0.85–1.15)
LYMPHOCYTES # BLD AUTO: 1.1 10E3/UL (ref 0.8–5.3)
LYMPHOCYTES # BLD AUTO: 1.2 10E3/UL (ref 0.8–5.3)
LYMPHOCYTES NFR BLD AUTO: 20 %
LYMPHOCYTES NFR BLD AUTO: 24 %
MCH RBC QN AUTO: 27.3 PG (ref 26.5–33)
MCH RBC QN AUTO: 28 PG (ref 26.5–33)
MCHC RBC AUTO-ENTMCNC: 30.1 G/DL (ref 31.5–36.5)
MCHC RBC AUTO-ENTMCNC: 30.4 G/DL (ref 31.5–36.5)
MCV RBC AUTO: 91 FL (ref 78–100)
MCV RBC AUTO: 92 FL (ref 78–100)
MONOCYTES # BLD AUTO: 0.4 10E3/UL (ref 0–1.3)
MONOCYTES # BLD AUTO: 0.4 10E3/UL (ref 0–1.3)
MONOCYTES NFR BLD AUTO: 8 %
MONOCYTES NFR BLD AUTO: 8 %
NEUTROPHILS # BLD AUTO: 3.3 10E3/UL (ref 1.6–8.3)
NEUTROPHILS # BLD AUTO: 3.6 10E3/UL (ref 1.6–8.3)
NEUTROPHILS NFR BLD AUTO: 66 %
NEUTROPHILS NFR BLD AUTO: 69 %
NRBC # BLD AUTO: 0 10E3/UL
NRBC BLD AUTO-RTO: 0 /100
PLATELET # BLD AUTO: 256 10E3/UL (ref 150–450)
PLATELET # BLD AUTO: 260 10E3/UL (ref 150–450)
POTASSIUM BLD-SCNC: 4 MMOL/L (ref 3.4–5.3)
PROT SERPL-MCNC: 7.9 G/DL (ref 6.8–8.8)
RBC # BLD AUTO: 3.82 10E6/UL (ref 4.4–5.9)
RBC # BLD AUTO: 3.96 10E6/UL (ref 4.4–5.9)
SODIUM SERPL-SCNC: 141 MMOL/L (ref 133–144)
WBC # BLD AUTO: 5.1 10E3/UL (ref 4–11)
WBC # BLD AUTO: 5.3 10E3/UL (ref 4–11)

## 2022-06-08 PROCEDURE — 250N000013 HC RX MED GY IP 250 OP 250 PS 637: Performed by: EMERGENCY MEDICINE

## 2022-06-08 PROCEDURE — 80053 COMPREHEN METABOLIC PANEL: CPT | Performed by: EMERGENCY MEDICINE

## 2022-06-08 PROCEDURE — 76882 US LMTD JT/FCL EVL NVASC XTR: CPT

## 2022-06-08 PROCEDURE — 36415 COLL VENOUS BLD VENIPUNCTURE: CPT | Performed by: PREVENTIVE MEDICINE

## 2022-06-08 PROCEDURE — 93971 EXTREMITY STUDY: CPT | Mod: RT

## 2022-06-08 PROCEDURE — 85025 COMPLETE CBC W/AUTO DIFF WBC: CPT | Performed by: PREVENTIVE MEDICINE

## 2022-06-08 PROCEDURE — 10060 I&D ABSCESS SIMPLE/SINGLE: CPT

## 2022-06-08 PROCEDURE — 85379 FIBRIN DEGRADATION QUANT: CPT | Performed by: PREVENTIVE MEDICINE

## 2022-06-08 PROCEDURE — 99215 OFFICE O/P EST HI 40 MIN: CPT | Performed by: PREVENTIVE MEDICINE

## 2022-06-08 PROCEDURE — 85652 RBC SED RATE AUTOMATED: CPT | Performed by: PREVENTIVE MEDICINE

## 2022-06-08 PROCEDURE — 36415 COLL VENOUS BLD VENIPUNCTURE: CPT | Performed by: EMERGENCY MEDICINE

## 2022-06-08 PROCEDURE — 85025 COMPLETE CBC W/AUTO DIFF WBC: CPT | Performed by: EMERGENCY MEDICINE

## 2022-06-08 PROCEDURE — 85610 PROTHROMBIN TIME: CPT | Performed by: PREVENTIVE MEDICINE

## 2022-06-08 PROCEDURE — 87070 CULTURE OTHR SPECIMN AEROBIC: CPT | Performed by: EMERGENCY MEDICINE

## 2022-06-08 PROCEDURE — 99284 EMERGENCY DEPT VISIT MOD MDM: CPT | Mod: 25

## 2022-06-08 PROCEDURE — 86140 C-REACTIVE PROTEIN: CPT | Performed by: PREVENTIVE MEDICINE

## 2022-06-08 RX ORDER — DOXYCYCLINE 100 MG/1
100 CAPSULE ORAL ONCE
Status: COMPLETED | OUTPATIENT
Start: 2022-06-08 | End: 2022-06-08

## 2022-06-08 RX ORDER — DOXYCYCLINE HYCLATE 100 MG
100 TABLET ORAL 2 TIMES DAILY
Qty: 14 TABLET | Refills: 0 | Status: SHIPPED | OUTPATIENT
Start: 2022-06-08 | End: 2022-06-09

## 2022-06-08 RX ADMIN — DOXYCYCLINE HYCLATE 100 MG: 100 CAPSULE ORAL at 23:42

## 2022-06-08 NOTE — ED TRIAGE NOTES
Triage Assessment     Row Name 06/08/22 1823       Triage Assessment (Adult)    Airway WDL WDL    Additional Documentation Breath Sounds (Group)       Respiratory WDL    Respiratory WDL WDL       Skin Circulation/Temperature WDL    Skin Circulation/Temperature WDL X  right arm swollen and reddened right elbow to wrist       Cardiac WDL    Cardiac WDL WDL       Peripheral/Neurovascular WDL    Peripheral Neurovascular WDL WDL

## 2022-06-08 NOTE — PROGRESS NOTES
Patient to UC today and currently in ED, no notes from either location in Epic as of now. INR therapeutic at 2.55. Sed rate elevated at 71 mm/hr. D-Dimer elevated at 1.00 micrograms/mL FEU, other labs in process.  Discussed with Alyce [facility], ok to address in the morning when more info charted in Epic. Patient could possibly be admitted depending on what is going on.  Lauryn CONTRERAS RN  Anticoagulation Team

## 2022-06-09 ENCOUNTER — PATIENT OUTREACH (OUTPATIENT)
Dept: INTERNAL MEDICINE | Facility: CLINIC | Age: 62
End: 2022-06-09
Payer: MEDICARE

## 2022-06-09 RX ORDER — WARFARIN SODIUM 4 MG/1
TABLET ORAL
Qty: 30 TABLET | Refills: 0 | Status: SHIPPED | OUTPATIENT
Start: 2022-06-09 | End: 2022-06-20

## 2022-06-09 RX ORDER — DOXYCYCLINE HYCLATE 100 MG
100 TABLET ORAL 2 TIMES DAILY
Qty: 14 TABLET | Refills: 0 | Status: SHIPPED | OUTPATIENT
Start: 2022-06-09 | End: 2022-06-16

## 2022-06-09 NOTE — ED PROVIDER NOTES
History   Chief Complaint:  arm swelling and pain     The history is provided by the patient.   History supplemented by electronic chart review    Petey Archibald is a 61 year old male with history of DVT and PE on warfarin who presents with right elbow arm swelling and redness.  For the past two days the patient has been experiencing right arm redness, swelling, and pain. No fevers. He has not been on any recent antibiotics.  No prior similar problems.  He works as a .  Right-hand-dominant.  He went to urgent care where his INR was found to be 2.5, Dimer 1.0, with elevated inflammatory markers, he was sent to the emergency department for further evaluation.    Review of Systems   All other systems reviewed and are negative.      Allergies:  Bees  Clavulanic Acid  Augmentin  Penicillins    Medications:  Aripiprazole    Banophen  Bupropion    Cholecalciferol   Clonazepam    Clonidine    Diphenhydramine    Emollient    Epinephrine    Gabapentin   Loratadine   Lorazepam    Methocarbamol   Montelukast   Multiple Vitamin   Naltrexone   Nicotine lozenge and gum  Omeprazole   Polyethylene glycol   Senna  Sertraline   Simvastatin   Spironolactone   Temazepam   Tizanidine    Trazodone    Triamcinolone   Trolamine salicylate   Varenicline    Vitamin B complex with vitamin C    Warfarin     Past Medical History:     Borderline mental retardation  Chronic infection  Coagulation disorder  COPD  History of DVT of lower extremity  Hyperlipidemia  Mild intellectual disabilities  Morbid obesity  NEL  Peripheral edema  Sleep apnea  Thrombosis   Tobacco dependence  Uncomplicated asthma  Venous stasis dermatitis   Torus palatinus  Schizoaffective disorder  Hemorrhoids  Diverticular disease of large intestine  GERD  Tobacco dependence  PE    Past Surgical History:    Colonoscopy  Excise malignant lesions  Rectal surgery    Family History:    Diabetes--brother    Social History:  Patient unaccompanied  PCP: Demetri Archer  [place]     Physical Exam     Patient Vitals for the past 24 hrs:   BP Temp Temp src Pulse Resp SpO2 Height Weight   06/08/22 1822 121/41 98.6  F (37  C) Oral 84 18 93 % 1.829 m (6') (!) 163.7 kg (361 lb)     Physical Exam  General: Nontoxic-appearing male sitting upright in room 10  HENT: wearing mask, no major facial deformity noted  CV:  regular rhythm, soft compartments in RUE, cap refill normal in R fingers, normal R radial pulse  Resp: normal effort, speaks in full phrases, no stridor, no cough observed  GI: abdomen soft, nontender  MSK:  Spine: nontender  Extremities: Mild tenderness to right posterior elbow, no palpable elbow effusion, no pain with axial loading of right elbow which has good range of motion  Skin:   Moderate erythema and mild swelling to the right posterior elbow with small area of localized induration and 2 superficial pustules, no vesicles, no open draining wound   neuro: awake, alert, GCS 15, responds appropriately to commands, SILT all extremities  Psych: cooperative, no evidence of hallucinations     Emergency Department Course   Imaging:  US Upper Extremity Venous Duplex Right   Final Result   IMPRESSION:   1.  No deep venous thrombosis in the right upper extremity.   2.  Complex fluid collection at the site of patient's wound may represent an abscess measuring 1.5 x 2.1 x 0.9 cm.      NOTE: ABNORMAL REPORT      1.  THE DICTATION ABOVE DESCRIBES AN ABNORMALITY FOR WHICH FOLLOW-UP IS NEEDED.         Report per radiology    Laboratory:  Labs Ordered and Resulted from Time of ED Arrival to Time of ED Departure   COMPREHENSIVE METABOLIC PANEL - Abnormal       Result Value    Sodium 141      Potassium 4.0      Chloride 106      Carbon Dioxide (CO2) 28      Anion Gap 7      Urea Nitrogen 16      Creatinine 0.96      Calcium 8.9      Glucose 96      Alkaline Phosphatase 89      AST 18      ALT 20      Protein Total 7.9      Albumin 3.2 (*)     Bilirubin Total 0.3      GFR Estimate 89     CBC  WITH PLATELETS AND DIFFERENTIAL - Abnormal    WBC Count 5.1      RBC Count 3.96 (*)     Hemoglobin 10.8 (*)     Hematocrit 35.9 (*)     MCV 91      MCH 27.3      MCHC 30.1 (*)     RDW 15.7 (*)     Platelet Count 260      % Neutrophils 66      % Lymphocytes 24      % Monocytes 8      % Eosinophils 2      % Basophils 0      % Immature Granulocytes 0      NRBCs per 100 WBC 0      Absolute Neutrophils 3.3      Absolute Lymphocytes 1.2      Absolute Monocytes 0.4      Absolute Eosinophils 0.1      Absolute Basophils 0.0      Absolute Immature Granulocytes 0.0      Absolute NRBCs 0.0     AEROBIC BACTERIAL CULTURE ROUTINE        Procedures  POC US SOFT TISSUE   Final Result   ED Bedside Limited Ultrasound   Body area scanned: R posterior elbow   Performed by: Clif Stinson MD   Indication: pain/swelling, eval for abscess   Findings/Interpretation: small superficial fluid collection seen in R posterior elbow   Key images were digitally archived in the [facility] radiology system.       Procedure: Incision and Drainage   Performed by: Clif Stinson MD    LOCATION:  R posterior elbow      ANESTHESIA:  Local field block using Marcaine 0.5% with epinephrine, total of 2 mLs    PREPARATION:  Cleansed with Betadine    PROCEDURE:  Area was incised with # 11 Blade ([place]) with a cruciate incision.  Wound treatment included Purulent Drainage and Expression of Material.  Packing consisted of No Packing.  Appropriate dressing was applied to cover the area.    Patient Status:  Patient tolerated the procedure moderately well. There were no complications evident      Emergency Department Course:         Reviewed:  I reviewed nursing notes, vitals, past medical history and Care Everywhere    Assessments/Consults:  ED Course as of 06/09/22 0413 Wed Jun 08, 2022 2300 Obtained history and examined the patient as noted above.    2307 Anesthesia applied       Interventions:  2342 Vibramycin 100 mg, Oral    Disposition:  The patient  was discharged to home.     Impression & Plan   Medical Decision Making:  Ultrasound done prior to my evaluation of the patient did not reveal any evidence of acute venous thrombosis, and examination does not suggest acute arterial compromise.  He is neurologically at his baseline.  He does have evidence of some localized soft tissue infection including cellulitis around his right elbow, as well as a small pocket of fluid that was amenable to incision and drainage, with purulence obtained.  This was sent for culture.  No signs of streaking erythema more proximally, fever, or other systemic symptoms, for this reason I did not feel that further emergent work-up is indicated.  The rationale for antibiotics was discussed.  No evidence of septic elbow.  Dressing placed.  Antibiotics may impact his INR level and for this reason he will need to be followed closely not only for wound recheck but also serial INR levels.  Return here for sudden worsening at any hour.  He was discharged in improved condition.    Diagnosis:    ICD-10-CM    1. Abscess of right elbow  L02.413    2. Long term current use of anticoagulant therapy  Z79.01        Discharge Medications:  Discharge Medication List as of 6/8/2022 11:38 PM      START taking these medications    Details   doxycycline hyclate (VIBRA-TABS) 100 MG tablet Take 1 tablet (100 mg) by mouth 2 times daily for 7 days, Disp-14 tablet, R-0, Local Print             Scribe Disclosure:  I, Jacky Davidsno, am serving as a scribe at 11:01 PM on 6/8/2022 to document services personally performed by Clif Stinson MD based on my observations and the provider's statements to me.           Clif Stinson MD  06/09/22 0416

## 2022-06-09 NOTE — ED NOTES
Patient was advised that diagnostic testing started in ED triage is only preliminary, does not preclude a full evaluation, and results need to be reviewed by a provider to determine the need for further testing.

## 2022-06-09 NOTE — PROGRESS NOTES
Was able to reach Hunter. He will continue with maintenance dose, watch for any unusual bruising/bleeding and has already scheduled next INR 6/13/22.  Lauryn CONTRERAS RN  Anticoagulation Team

## 2022-06-09 NOTE — TELEPHONE ENCOUNTER
Group home calling back.. RX was prescribed for his abcess.     Medications as prescribed.     Qian Alarcon RN  [facility]

## 2022-06-09 NOTE — PROGRESS NOTES
ANTICOAGULATION MANAGEMENT     Petey Archibald 61 year old male is on warfarin with therapeutic INR result. (Goal INR 2.0-3.0)    Recent labs: (last 7 days)     06/08/22  1645   INR 2.55*       ASSESSMENT       Source(s): Chart Review       Warfarin doses taken: Reviewed in chart    Diet: No new diet changes identified    New illness, injury, or hospitalization: Yes: Patient to ED from  yesterday for right elbow swelling pain and redness. No fever. D-Dimer 1.0 with elevated inflammatory markers. US showed no DVT; however there is a complex fluid collection which may represent an abscess measuring 1.5 x 2.1 x 0.9 cm. I&D performed.    Medication/supplement changes: Vibramycin x7 days. Tetracyclines may enhance the anticoagulant effect of Vitamin K Antagonists.    Signs or symptoms of bleeding or clotting: No    Previous INR: Therapeutic last 2(+) visits    Additional findings: None       PLAN     Recommended plan for temporary change(s) affecting INR     Dosing Instructions: continue your current warfarin dose with next INR in 5 days       Summary  As of 6/8/2022    Full warfarin instructions:  6 mg every Mon, Wed, Fri; 8 mg all other days   Next INR check:  6/13/2022             Detailed voice message left for  Hunter  with dosing instructions and follow up date.  Advised he return call today to discuss.     Contact [phone]  to schedule and with any changes, questions or concerns.     Education provided: Please call back if any changes to your diet, medications or how you've been taking warfarin, Goal range and significance of current result, Importance of following up at instructed interval, Potential interaction between warfarin and Vibramycin, Monitoring for bleeding signs and symptoms, Monitoring for clotting signs and symptoms, When to seek medical attention/emergency care and Contact [phone]  with any changes, questions or concerns.     Plan made with [facility] Pharmacist Alyce  Bertarnd Chatman, RN  Anticoagulation Clinic  6/9/2022    _______________________________________________________________________     Anticoagulation Episode Summary     Current INR goal:  2.0-3.0   TTR:  82.4 % (1 y)   Target end date:  Indefinite   Send INR reminders to:  [facility]    Indications    History of pulmonary embolism [Z86.711]  Long term current use of anticoagulant therapy [Z79.01]           Comments:  REM nursing home; A new Coumadin Prescription will need to sent to [facility] with current dose and next INR check.         Anticoagulation Care Providers     Provider Role Specialty Phone number    Yolande Lacy PA-C Referring Family Medicine [phone]    Demetri Archer MD Referring Internal Medicine [phone]    Silvino Baker MD Responsible Family Medicine [phone]

## 2022-06-09 NOTE — TELEPHONE ENCOUNTER
What type of discharge? Emergency Department  Risk of Hospital admission or ED visit: 86%  Is a TCM episode required? Yes  When should the patient follow up with PCP? 14 days of discharge.    Kashmir Michael RN  [facility]

## 2022-06-10 NOTE — PROGRESS NOTES
Assessment & Plan     1. Left arm swelling  - CBC with platelets and differential; Future  - D dimer, quantitative; Future  - CRP, inflammation; Future  - ESR: Erythrocyte sedimentation rate; Future  - INR; Future  DVT vs cellulitis vs abscess  Referred to ED for RUE US to evaluate for DVT as ddimer positive     41 minutes spent on the date of the encounter doing chart review, review of test results, interpretation of tests, patient visit and documentation         No follow-ups on file.    Peter Thurston MD  [facility] URGENT [facility]ARE    Subjective     Petey Archibald is a 61 year old year old male who presents to clinic today for the following health issues:    Patient presents with:  Urgent Care: Redness with swelling and pain of right arm for 2 days.     This is a 62 yo man with rue swelling, warmth and pain.  He also has a sore near his right elbow.  On warfarin for hx of vte. Most recent inr therapeutic.  No fever or chills.      Patient Active Problem List   Diagnosis     GERD (gastroesophageal reflux disease)     Peripheral vascular disease (H)     History of pulmonary embolism     Tobacco dependence:40-50 pk yr hx     Borderline mental retardation     History of deep vein thrombosis of lower extremity     Pilonidal cyst     Asymptomatic varicose veins, bilateral     Callus of foot on Rt lat dist  since 8-15      Morbid obesity due to excess calories (H)  BMI 40-50     Hypercholesterolemia     Mixed simple and mucopurulent chronic bronchitis (HCC) CT 4-06 wnl and neg bronch for hemoptesis spirometry 7-26-16  FVC=59% & w mod restriction  and lung age of 82 in 56 y/o      Major depression in complete remission (HCC) on meds      Long term current use of anticoagulant therapy     Glucose intolerance (impaired glucose tolerance)     Erectile dysfunction, unspecified erectile dysfunction type     NEL (obstructive sleep apnea)     Anxiety     Thrombophlebitis of superficial veins of both  "lower extremities: Greater Saphenous VV      History of colonic polyps     Ulcer of left lower extremity with fat layer exposed (H)     Lymphedema of left leg     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)     Schizoaffective disorder, bipolar type (H)     Adenomatous polyp of ascending colon     Colon cancer screening     Edentulism, partial, class IV     Torus palatinus     Diverticular disease of large intestine     Hemorrhoids       Current Outpatient Medications   Medication     albuterol (ALBUTEROL) 108 (90 BASE) MCG/ACT inhaler     APO-VARENICLINE 0.5 MG tablet     ARIPiprazole (ABILIFY) 5 MG tablet     bacitracin 500 UNIT/GM OINT     BANOPHEN 50 MG capsule     buPROPion (WELLBUTRIN SR) 150 MG 12 hr tablet     Cadexomer Iodine, topical, 0.9% (IODOSORB) 0.9 % GEL gel     cholecalciferol 50 MCG (2000 UT) tablet     clonazePAM (KLONOPIN) 0.5 MG tablet     cloNIDine (CATAPRES) 0.1 MG tablet     clotrimazole (LOTRIMIN) 1 % external cream     diclofenac (VOLTAREN) 1 % topical gel     diphenhydrAMINE (BENADRYL) 25 MG capsule     Emollient (CERAVE) CREA     EPINEPHrine (EPIPEN 2-BRE) 0.3 MG/0.3ML injection 2-pack     gabapentin (NEURONTIN) 100 MG capsule     gabapentin (NEURONTIN) 400 MG capsule     Gauze Pads & Dressings (GAUZE PADS 4\"X4\") 4\"X4\" PADS     loratadine (CLARITIN) 10 MG tablet     LORazepam (ATIVAN) 1 MG tablet     methocarbamol (ROBAXIN) 750 MG tablet     montelukast (SINGULAIR) 10 MG tablet     Multiple Vitamin (DAILY-JOVAN) TABS     mupirocin (BACTROBAN) 2 % external ointment     naltrexone (DEPADE/REVIA) 50 MG tablet     nicotine (COMMIT) 2 MG lozenge     nicotine (NICODERM CQ) 21 MG/24HR 24 hr patch     nicotine (NICORETTE) 2 MG gum     nystatin-triamcinolone (MYCOLOG II) 052626-5.1 UNIT/GM-% external cream     omeprazole (PRILOSEC) 40 MG DR capsule     order for DME     order for DME     order for DME     order for DME     order for DME     order for DME     order for DME     order for " DME     polyethylene glycol (MIRALAX) 17 GM/Dose powder     PULMICORT FLEXHALER 180 MCG/ACT inhaler     SENNA-TABS 8.6 MG tablet     sertraline (ZOLOFT) 100 MG tablet     sertraline (ZOLOFT) 50 MG tablet     simvastatin (ZOCOR) 40 MG tablet     SPIRIVA HANDIHALER 18 MCG inhaled capsule     spironolactone (ALDACTONE) 25 MG tablet     temazepam (RESTORIL) 15 MG capsule     tiZANidine (ZANAFLEX) 2 MG tablet     traZODone (DESYREL) 50 MG tablet     triamcinolone (KENALOG) 0.1 % external cream     trolamine salicylate (ASPERCREME) 10 % external cream     varenicline (CHANTIX) 1 MG tablet     vitamin B complex with vitamin C (STRESS TAB) tablet     Warfarin Therapy Reminder     White Petrolatum ointment     acetaminophen (TYLENOL) 325 MG tablet     doxycycline hyclate (VIBRA-TABS) 100 MG tablet     warfarin ANTICOAGULANT (COUMADIN) 4 MG tablet     Current Facility-Administered Medications   Medication     botulinum toxin type A (BOTOX) 100 units injection 100 Units       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     Chronic infection     MRSA     Coagulation disorder (H)     on coumadin     COPD (chronic obstructive pulmonary disease) (H)      History of DVT of lower extremity      History of pulmonary embolism      Hyperlipidemia      Mild intellectual disabilities      Morbid obesity (H)      NEL (obstructive sleep apnea)      Peripheral edema      Peripheral vascular disease (H)      Sleep apnea     uses CPAP     Thrombosis      Tobacco dependence      Uncomplicated asthma      Venous stasis dermatitis        Social History   reports that he has been smoking cigarettes. He has a 30.00 pack-year smoking history. He has never used smokeless tobacco. He reports that he does not drink alcohol and does not use drugs.    Family History   Problem Relation Age of Onset     Unknown/Adopted Mother      Unknown/Adopted Father      Diabetes Brother        Review of Systems  Constitutional,  HEENT, cardiovascular, pulmonary, GI, , musculoskeletal, neuro, skin, endocrine and psych systems are negative, except as otherwise noted.      Objective    /71   Pulse 77   Temp 98.2  F (36.8  C) (Tympanic)   Resp 19   SpO2 97%   Physical Exam   GENERAL: healthy, alert and no distress  EYES: Eyes grossly normal to inspection, PERRL and conjunctivae and sclerae normal  HENT: ear canals and TM's normal, nose and mouth without ulcers or lesions  NECK: no adenopathy, no asymmetry, masses, or scars and thyroid normal to palpation  RESP: lungs clear to auscultation - no rales, rhonchi or wheezes  CV: regular rate and rhythm, normal S1 S2, no S3 or S4, no murmur, click or rub, no peripheral edema and peripheral pulses strong  ABDOMEN: soft, nontender, no hepatosplenomegaly, no masses and bowel sounds normal  MS: no gross musculoskeletal defects noted, no edema  SKIN: no suspicious lesions or rashes  NEURO: Normal strength and tone, mentation intact and speech normal  PSYCH: mentation appears normal, affect normal/bright  RUE - warm, swollen from mid upper extremity distally to hand.  Nl cms.  Red around elbow and forearm and warm.  Induration about dorsal elbow.      Results for orders placed or performed during the hospital encounter of 06/08/22   US Upper Extremity Venous Duplex Right     Status: None    Narrative    EXAM: US UPPER EXTREMITY VENOUS DUPLEX RIGHT  LOCATION: [facility]  DATE/TIME: 6/8/2022 7:49 PM    INDICATION: Right arm swelling. Wound at the elbow posteriorly.  COMPARISON: None.  TECHNIQUE: Venous Duplex ultrasound of the right upper extremity with (when possible) and without compression, augmentation, and duplex. Color flow and spectral Doppler with waveform analysis performed.    FINDINGS: Ultrasound includes evaluation of the internal jugular vein, innominate vein, subclavian vein, axillary vein, and brachial vein. The superficial cephalic and basilic veins were  also evaluated where seen.     RIGHT: No deep venous thrombosis. No superficial thrombophlebitis. At the site of redness of the patient's wound, there is a complex fluid collection measuring 1.5 x 2.1 x 0.9 cm with no internal color flow, this may represent a small abscess.      Impression    IMPRESSION:  1.  No deep venous thrombosis in the right upper extremity.  2.  Complex fluid collection at the site of patient's wound may represent an abscess measuring 1.5 x 2.1 x 0.9 cm.    NOTE: ABNORMAL REPORT    1.  THE DICTATION ABOVE DESCRIBES AN ABNORMALITY FOR WHICH FOLLOW-UP IS NEEDED.    POC US SOFT TISSUE     Status: None    Impression    ED Bedside Limited Ultrasound  Body area scanned: R posterior elbow  Performed by: Clif Stinson MD  Indication: pain/swelling, eval for abscess  Findings/Interpretation: small superficial fluid collection seen in R posterior elbow  Key images were digitally archived in the [employer] radiology system.   Comprehensive metabolic panel     Status: Abnormal   Result Value Ref Range    Sodium 141 133 - 144 mmol/L    Potassium 4.0 3.4 - 5.3 mmol/L    Chloride 106 94 - 109 mmol/L    Carbon Dioxide (CO2) 28 20 - 32 mmol/L    Anion Gap 7 3 - 14 mmol/L    Urea Nitrogen 16 7 - 30 mg/dL    Creatinine 0.96 0.66 - 1.25 mg/dL    Calcium 8.9 8.5 - 10.1 mg/dL    Glucose 96 70 - 99 mg/dL    Alkaline Phosphatase 89 40 - 150 U/L    AST 18 0 - 45 U/L    ALT 20 0 - 70 U/L    Protein Total 7.9 6.8 - 8.8 g/dL    Albumin 3.2 (L) 3.4 - 5.0 g/dL    Bilirubin Total 0.3 0.2 - 1.3 mg/dL    GFR Estimate 89 >60 mL/min/1.73m2   CBC with platelets and differential     Status: Abnormal   Result Value Ref Range    WBC Count 5.1 4.0 - 11.0 10e3/uL    RBC Count 3.96 (L) 4.40 - 5.90 10e6/uL    Hemoglobin 10.8 (L) 13.3 - 17.7 g/dL    Hematocrit 35.9 (L) 40.0 - 53.0 %    MCV 91 78 - 100 fL    MCH 27.3 26.5 - 33.0 pg    MCHC 30.1 (L) 31.5 - 36.5 g/dL    RDW 15.7 (H) 10.0 - 15.0 %    Platelet Count 260 150 - 450 10e3/uL     % Neutrophils 66 %    % Lymphocytes 24 %    % Monocytes 8 %    % Eosinophils 2 %    % Basophils 0 %    % Immature Granulocytes 0 %    NRBCs per 100 WBC 0 <1 /100    Absolute Neutrophils 3.3 1.6 - 8.3 10e3/uL    Absolute Lymphocytes 1.2 0.8 - 5.3 10e3/uL    Absolute Monocytes 0.4 0.0 - 1.3 10e3/uL    Absolute Eosinophils 0.1 0.0 - 0.7 10e3/uL    Absolute Basophils 0.0 0.0 - 0.2 10e3/uL    Absolute Immature Granulocytes 0.0 <=0.4 10e3/uL    Absolute NRBCs 0.0 10e3/uL   Abscess Aerobic Bacterial Culture Routine     Status: None (Preliminary result)    Specimen: Elbow, Right; Abscess   Result Value Ref Range    Culture Culture in progress    CBC with platelets + differential     Status: Abnormal    Narrative    The following orders were created for panel order CBC with platelets + differential.  Procedure                               Abnormality         Status                     ---------                               -----------         ------                     CBC with platelets and d...[[id number]]  Abnormal            Final result                 Please view results for these tests on the individual orders.   Results for orders placed or performed in visit on 06/08/22   D dimer, quantitative     Status: Abnormal   Result Value Ref Range    D-Dimer Quantitative 1.00 (H) 0.00 - 0.50 ug/mL FEU    Narrative    This D-dimer assay is intended for use in conjunction with a clinical pretest probability assessment model to exclude pulmonary embolism (PE) and deep venous thrombosis (DVT) in outpatients suspected of PE or DVT. The cut-off value is 0.50 ug/mL FEU.   CRP, inflammation     Status: Abnormal   Result Value Ref Range    CRP Inflammation 40.0 (H) 0.0 - 8.0 mg/L   ESR: Erythrocyte sedimentation rate     Status: Abnormal   Result Value Ref Range    Erythrocyte Sedimentation Rate 71 (H) 0 - 20 mm/hr   INR     Status: Abnormal   Result Value Ref Range    INR 2.55 (H) 0.85 - 1.15   CBC with platelets and differential      Status: Abnormal   Result Value Ref Range    WBC Count 5.3 4.0 - 11.0 10e3/uL    RBC Count 3.82 (L) 4.40 - 5.90 10e6/uL    Hemoglobin 10.7 (L) 13.3 - 17.7 g/dL    Hematocrit 35.2 (L) 40.0 - 53.0 %    MCV 92 78 - 100 fL    MCH 28.0 26.5 - 33.0 pg    MCHC 30.4 (L) 31.5 - 36.5 g/dL    RDW 16.1 (H) 10.0 - 15.0 %    Platelet Count 256 150 - 450 10e3/uL    % Neutrophils 69 %    % Lymphocytes 20 %    % Monocytes 8 %    % Eosinophils 3 %    % Basophils 0 %    Absolute Neutrophils 3.6 1.6 - 8.3 10e3/uL    Absolute Lymphocytes 1.1 0.8 - 5.3 10e3/uL    Absolute Monocytes 0.4 0.0 - 1.3 10e3/uL    Absolute Eosinophils 0.2 0.0 - 0.7 10e3/uL    Absolute Basophils 0.0 0.0 - 0.2 10e3/uL   CBC with platelets and differential     Status: Abnormal    Narrative    The following orders were created for panel order CBC with platelets and differential.  Procedure                               Abnormality         Status                     ---------                               -----------         ------                     CBC with platelets and d...[[id number]]  Abnormal            Final result                 Please view results for these tests on the individual orders.

## 2022-06-12 ENCOUNTER — TELEPHONE (OUTPATIENT)
Dept: EMERGENCY MEDICINE | Facility: CLINIC | Age: 62
End: 2022-06-12
Payer: MEDICARE

## 2022-06-12 LAB
BACTERIA ABSC ANAEROBE+AEROBE CULT: ABNORMAL
BACTERIA ABSC ANAEROBE+AEROBE CULT: ABNORMAL

## 2022-06-12 NOTE — TELEPHONE ENCOUNTER
[facility] Emergency Department/Urgent Care Lab result notification:    [facility] ED lab result protocol used  Culture     Reason for call  Notify of lab results, assess symptoms,  review ED providers recommendations/discharge instructions (if necessary) and advise per ED lab result f/u protocol    Lab Result   Final Abscess Aerobic Bacterial Culture (specimen - Elbow) Report on 6/12/22  [facility] Emergency Dept discharge antibiotic prescribed: Doxycycline 100 mg PO tablet, 1 tablet (100 mg) by mouth 2 times daily for 7 days  Bacteria #1: 2+ Staphylococcus aureus MRSA is [SUSCEPTIBLE] to antibiotic   Bacteria #2: 2+ Staphylococcus aureus is [SUSCEPTIBLE] to antibiotic   Incision and Drainage performed in [facility] Emergency Dept [Yes or No]: Yes  Recommendations in treatment per [facility] ED Lab Result culture protocol  Information table from Emergency Dept Provider visit on 6/8-6/9/22  Symptoms reported at ED visit (Chief complaint, HPI) arm swelling and pain      The history is provided by the patient.   History supplemented by electronic chart review     Petey Archibald is a 61 year old male with history of DVT and PE on warfarin who presents with right elbow arm swelling and redness.  For the past two days the patient has been experiencing right arm redness, swelling, and pain. No fevers. He has not been on any recent antibiotics.  No prior similar problems.  He works as a .  Right-hand-dominant.  He went to urgent care where his INR was found to be 2.5, Dimer 1.0, with elevated inflammatory markers, he was sent to the emergency department for further evaluation.      ED providers Impression and Plan (applicable information) Ultrasound done prior to my evaluation of the patient did not reveal any evidence of acute venous thrombosis, and examination does not suggest acute arterial compromise.  He is neurologically at his baseline.  He does have evidence of some  localized soft tissue infection including cellulitis around his right elbow, as well as a small pocket of fluid that was amenable to incision and drainage, with purulence obtained.  This was sent for culture.  No signs of streaking erythema more proximally, fever, or other systemic symptoms, for this reason I did not feel that further emergent work-up is indicated.  The rationale for antibiotics was discussed.  No evidence of septic elbow.  Dressing placed.  Antibiotics may impact his INR level and for this reason he will need to be followed closely not only for wound recheck but also serial INR levels.  Return here for sudden worsening at any hour.  He was discharged in improved condition   Miscellaneous information na     RN Assessment (Patient s current Symptoms), include time called.  [Insert Left message here if message left]  12:59PM: Left voicemail message requesting a call back to [facility] ED Lab Result RN at [phone].  RN is available every day between 9 a.m. and 5:30 p.m.    Yuliana Brothers RN  [facility]  Emergency Dept Lab Result RN  Ph# [phone]     Copy of Lab result   Abscess Aerobic Bacterial Culture Routine  Order: [id number]   Collected 6/8/2022 11:43 PM     Status: Final result     Visible to patient: Yes (not seen)    Specimen Information: Elbow, Right; Abscess         4 Result Notes    Culture 2+ Staphylococcus aureus MRSA Abnormal        2+ Staphylococcus aureus Abnormal             Resulting Agency: [facility]       Susceptibility     Staphylococcus aureus MRSA Staphylococcus aureus     ELLIE ELLIE     Clindamycin <=0.25 ug/mL Susceptible 1 <=0.25 ug/mL Susceptible     Erythromycin >=8.0 ug/mL Resistant <=0.25 ug/mL Susceptible     Gentamicin <=0.5 ug/mL Susceptible <=0.5 ug/mL Susceptible     Linezolid 2.0 ug/mL Susceptible       Oxacillin >=4.0 ug/mL Resistant 2 0.5 ug/mL Susceptible 2     Tetracycline <=1.0 ug/mL Susceptible <=1.0 ug/mL Susceptible      Trimethoprim/Sulfamethoxazole <=0.5/9.5 u... Susceptible 8/152 ug/mL Resistant     Vancomycin 1.0 ug/mL Susceptible 1.0 ug/mL Susceptible                1 This isolate DOES NOT demonstrate inducible clindamycin resistance in vitro. Clindamycin is susceptible and could be used when indicated, however, erythromycin is resistant and should not be used.   2 Oxacillin susceptible isolates are susceptible to cephalosporins (example: cefazolin and cephalexin) and beta lactam combination agents. Oxacillin resistant isolates are resistant to these agents.        Susceptibility Comments    Staphylococcus aureus MRSA   MRSA requires contact precautions.         Specimen Collected: 06/08/22 11:43 PM Last Resulted: 06/12/22  8:21 AM

## 2022-06-13 ENCOUNTER — LAB (OUTPATIENT)
Dept: LAB | Facility: CLINIC | Age: 62
End: 2022-06-13
Payer: MEDICARE

## 2022-06-13 ENCOUNTER — TELEPHONE (OUTPATIENT)
Dept: INTERNAL MEDICINE | Facility: CLINIC | Age: 62
End: 2022-06-13

## 2022-06-13 ENCOUNTER — ANTICOAGULATION THERAPY VISIT (OUTPATIENT)
Dept: ANTICOAGULATION | Facility: CLINIC | Age: 62
End: 2022-06-13

## 2022-06-13 DIAGNOSIS — Z86.718 HISTORY OF DEEP VEIN THROMBOSIS OF LOWER EXTREMITY: ICD-10-CM

## 2022-06-13 DIAGNOSIS — Z79.01 LONG TERM CURRENT USE OF ANTICOAGULANT THERAPY: ICD-10-CM

## 2022-06-13 DIAGNOSIS — Z86.711 HISTORY OF PULMONARY EMBOLISM: Primary | ICD-10-CM

## 2022-06-13 LAB — INR BLD: 3 (ref 0.9–1.1)

## 2022-06-13 PROCEDURE — 85610 PROTHROMBIN TIME: CPT

## 2022-06-13 PROCEDURE — 36416 COLLJ CAPILLARY BLOOD SPEC: CPT

## 2022-06-13 NOTE — TELEPHONE ENCOUNTER
"[facility] Emergency Department/Urgent Care Lab result notification:     [place] ED lab result protocol used  Culture      Reason for call  Notify of lab results, assess symptoms,  review ED providers recommendations/discharge instructions (if necessary) and advise per ED lab result f/u protocol     Lab Result   Final Abscess Aerobic Bacterial Culture (specimen - Elbow) Report on 6/12/22  [facility] Emergency Dept discharge antibiotic prescribed: Doxycycline 100 mg PO tablet, 1 tablet (100 mg) by mouth 2 times daily for 7 days  Bacteria #1: 2+ Staphylococcus aureus MRSA is [SUSCEPTIBLE] to antibiotic   Bacteria #2: 2+ Staphylococcus aureus is [SUSCEPTIBLE] to antibiotic   Incision and Drainage performed in [facility] Emergency Dept [Yes or No]: Yes  Recommendations in treatment per [facility] ED Lab Result culture protocol    RN Assessment (Patient s current Symptoms), include time called.  [Insert Left message here if message left]  1:54PM: Spoke with patient's group home caregiver Hunter. Hunter states that the elbow is doing \"ok\". \"The antibiotic seems to be kicking in.\" No fever.     RN Recommendations/Instructions per [place] ED lab result protocol  Patient's group home caregiver notified of lab result and treatment recommendations.    RN reviewed information about MRSA from protocol patient education.  Advised to wash hands frequently. Take the antibiotic as directed.   Do not share any personal items such as towels, washcloths, razors or clothing.  Wash sheets, bedding, towels, clothes etc in hot water with laundry detergent with liquid bleach and use a hot dryer.   Wipe down commonly touched surfaces with a bleach solution.  Advised to follow up with the PCP as directed by the ED provider.  The patient's caregiver is comfortable with the information given. He has a call out to the patient's clinic to discuss the INR that was done today and he will discuss the patient's " "abscess culture with them as well to determine when he should be seen.  The group home caregiver was emailed information regarding MRSA from [facility] \"Patient education; MRSA the Basics\" He will also look at the [facility] site for information regarding MRSA. (Hunter's email [email or web address])     Please Contact your PCP clinic or return to the Emergency department if your:    Symptoms worsen or other concerning symptom's.    PCP follow-up Questions asked: YES       Yuliana Brothers RN  [facility]  Emergency Dept Lab Result RN  Ph# [phone]         "

## 2022-06-13 NOTE — PROGRESS NOTES
ANTICOAGULATION MANAGEMENT     Petey Archibald 61 year old male is on warfarin with therapeutic INR result. (Goal INR 2.0-3.0)    Recent labs: (last 7 days)     06/13/22  1318   INR 3.0*       ASSESSMENT       Source(s): Chart Review and Patient/Caregiver Call       Warfarin doses taken: Warfarin taken as instructed    Diet: No new diet changes identified    New illness, injury, or hospitalization: No    Medication/supplement changes: On doxycycline for 7 days    Signs or symptoms of bleeding or clotting: No    Previous INR: Therapeutic last 2(+) visits    Additional findings: ED 6/8 for Abcess and pain rt arm       PLAN     Recommended plan for temporary change(s) affecting INR     Dosing Instructions: partial hold then continue your current warfarin dose with next INR in 1 week       Summary  As of 6/13/2022    Full warfarin instructions:  6/14: 4 mg; Otherwise 6 mg every Mon, Wed, Fri; 8 mg all other days   Next INR check:  6/20/2022             Telephone call with  Hunter at senior living who verbalizes understanding and agrees to plan    Lab visit scheduled    Education provided: Please call back if any changes to your diet, medications or how you've been taking warfarin    Plan made per [facility] anticoagulation protocol    Lisa Ponce RN  Anticoagulation Clinic  6/13/2022    _______________________________________________________________________     Anticoagulation Episode Summary     Current INR goal:  2.0-3.0   TTR:  83.5 % (1 y)   Target end date:  Indefinite   Send INR reminders to:  [facility]    Indications    History of pulmonary embolism [Z86.711]  Long term current use of anticoagulant therapy [Z79.01]           Comments:  REM halfway; A new Coumadin Prescription will need to sent to [facility] with current dose and next INR check.         Anticoagulation Care Providers     Provider Role Specialty Phone number    Yolande Lacy PA-C Referring Family Medicine  [phone]    Demetri Archer MD Referring Internal Medicine [phone]    Silvino Baker MD Responsible Family Medicine [phone]

## 2022-06-13 NOTE — TELEPHONE ENCOUNTER
Reason for Call:  Other call back    Detailed comments: Hunter/Patient's caregiver returned INR nurse call.    Phone Number Patient can be reached at: [phone]    Best Time: Any    Can we leave a detailed message on this number? YES    Call taken on 6/13/2022 at 3:22 PM by Inez Weldon

## 2022-06-16 ENCOUNTER — OFFICE VISIT (OUTPATIENT)
Dept: INTERNAL MEDICINE | Facility: CLINIC | Age: 62
End: 2022-06-16
Payer: MEDICARE

## 2022-06-16 VITALS
SYSTOLIC BLOOD PRESSURE: 100 MMHG | OXYGEN SATURATION: 95 % | RESPIRATION RATE: 16 BRPM | TEMPERATURE: 98.8 F | DIASTOLIC BLOOD PRESSURE: 65 MMHG | BODY MASS INDEX: 46.71 KG/M2 | HEART RATE: 82 BPM | WEIGHT: 315 LBS

## 2022-06-16 DIAGNOSIS — L02.413 ABSCESS OF RIGHT ELBOW: ICD-10-CM

## 2022-06-16 DIAGNOSIS — G24.8 FOCAL DYSTONIA: ICD-10-CM

## 2022-06-16 DIAGNOSIS — Z09 ENCOUNTER FOR EXAMINATION FOLLOWING TREATMENT AT HOSPITAL: Primary | ICD-10-CM

## 2022-06-16 PROCEDURE — 99213 OFFICE O/P EST LOW 20 MIN: CPT | Performed by: INTERNAL MEDICINE

## 2022-06-16 ASSESSMENT — PATIENT HEALTH QUESTIONNAIRE - PHQ9
10. IF YOU CHECKED OFF ANY PROBLEMS, HOW DIFFICULT HAVE THESE PROBLEMS MADE IT FOR YOU TO DO YOUR WORK, TAKE CARE OF THINGS AT HOME, OR GET ALONG WITH OTHER PEOPLE: EXTREMELY DIFFICULT
SUM OF ALL RESPONSES TO PHQ QUESTIONS 1-9: 15
SUM OF ALL RESPONSES TO PHQ QUESTIONS 1-9: 15

## 2022-06-16 NOTE — PROGRESS NOTES
Assessment & Plan     Encounter for examination following treatment at hospital  Abscess of right elbow  Healing well. Finished antibiotics. Reviewed wound cares.    Focal dystonia  Encouraged regular follow-up with PM&R team and he has appt next week.    Return in about 5 months (around 11/16/2022) for Annual Wellness Exam.    Demetri Holder MD  [facility] MICHAEL Angelo is a 61 year old who presents for ER follow-up. He was seen on 6/8/22 for cellulitis/abscess of elbow. I&D performed successfully. Wound culture grew MRSA and MSSA, both susceptible to the doxycycline that he was placed on. Tolerated antibiotic well. Feels wound is healing well. Still has chronic groin pain.    Review of Systems   Constitutional, derm, MSK systems are negative, except as otherwise noted.      Objective    /65 (BP Location: Left arm, Patient Position: Chair, Cuff Size: Adult Large)   Pulse 82   Temp 98.8  F (37.1  C) (Oral)   Resp 16   Wt (!) 156.2 kg (344 lb 6.4 oz)   SpO2 95%   BMI 46.71 kg/m    Body mass index is 46.71 kg/m .     Physical Exam   GENERAL: alert and in no distress.  EYES: conjunctivae/corneas clear. EOMs grossly intact  HENT: Facies symmetric.  RESP: No iWOB.  MSK: Moves all four extremities freely  SKIN: R elbow wound with drainage tract, without surrounding erythema, not TTP, appears to be healing appropriately. No other significant ulcers, lesions, or rashes on the visualized portions of the skin  NEURO: CN II-XII grossly intact.

## 2022-06-20 ENCOUNTER — LAB (OUTPATIENT)
Dept: LAB | Facility: CLINIC | Age: 62
End: 2022-06-20
Payer: MEDICARE

## 2022-06-20 ENCOUNTER — ANTICOAGULATION THERAPY VISIT (OUTPATIENT)
Dept: ANTICOAGULATION | Facility: CLINIC | Age: 62
End: 2022-06-20

## 2022-06-20 DIAGNOSIS — Z86.711 HISTORY OF PULMONARY EMBOLISM: Primary | ICD-10-CM

## 2022-06-20 DIAGNOSIS — Z79.01 LONG TERM CURRENT USE OF ANTICOAGULANT THERAPY: ICD-10-CM

## 2022-06-20 DIAGNOSIS — Z86.718 HISTORY OF DEEP VEIN THROMBOSIS OF LOWER EXTREMITY: ICD-10-CM

## 2022-06-20 LAB — INR BLD: 2.9 (ref 0.9–1.1)

## 2022-06-20 PROCEDURE — 36416 COLLJ CAPILLARY BLOOD SPEC: CPT

## 2022-06-20 PROCEDURE — 85610 PROTHROMBIN TIME: CPT

## 2022-06-20 RX ORDER — WARFARIN SODIUM 4 MG/1
TABLET ORAL
Qty: 30 TABLET | Refills: 0 | Status: SHIPPED | OUTPATIENT
Start: 2022-06-20 | End: 2022-07-01

## 2022-06-20 NOTE — PROGRESS NOTES
ANTICOAGULATION MANAGEMENT     Petey Archibald 61 year old male is on warfarin with therapeutic INR result. (Goal INR 2.0-3.0)    Recent labs: (last 7 days)     06/20/22  1311   INR 2.9*       ASSESSMENT       Source(s): Chart Review and Home Care/Facility Nurse       Warfarin doses taken: Warfarin taken as instructed    Diet: No new diet changes identified    New illness, injury, or hospitalization: No    Medication/supplement changes: None noted    Signs or symptoms of bleeding or clotting: No    Previous INR: Therapeutic last 2(+) visits    Additional findings: None       PLAN     Recommended plan for no diet, medication or health factor changes affecting INR     Dosing Instructions: continue your current warfarin dose with next INR in 2 weeks       Summary  As of 6/20/2022    Full warfarin instructions:  6 mg every Mon, Wed, Fri; 8 mg all other days   Next INR check:  7/5/2022             Telephone call with home care/facility nurse who verbalizes understanding and agrees to plan    Education provided: Contact [phone]  with any changes, questions or concerns.     Plan made per [facility] anticoagulation protocol    Antonella Smith RN  Anticoagulation Clinic  6/20/2022    _______________________________________________________________________     Anticoagulation Episode Summary     Current INR goal:  2.0-3.0   TTR:  83.4 % (1 y)   Target end date:  Indefinite   Send INR reminders to:  [facility]    Indications    History of pulmonary embolism [Z86.711]  Long term current use of anticoagulant therapy [Z79.01]           Comments[facility]  [facility]; A new Coumadin Prescription will need to sent to [facility] with current dose and next INR check.         Anticoagulation Care Providers     Provider Role Specialty Phone number    Yolande Lacy PA-C Referring Family Medicine [phone]    Demetri Archer MD Referring Internal Medicine [phone]    Silvino Baker MD  [facility] [phone]

## 2022-06-23 ENCOUNTER — OFFICE VISIT (OUTPATIENT)
Dept: PHYSICAL MEDICINE AND REHAB | Facility: CLINIC | Age: 62
End: 2022-06-23
Payer: MEDICARE

## 2022-06-23 VITALS
TEMPERATURE: 98.5 F | OXYGEN SATURATION: 95 % | SYSTOLIC BLOOD PRESSURE: 123 MMHG | HEART RATE: 74 BPM | DIASTOLIC BLOOD PRESSURE: 74 MMHG

## 2022-06-23 DIAGNOSIS — G89.29 OTHER CHRONIC PAIN: ICD-10-CM

## 2022-06-23 DIAGNOSIS — G24.8 FOCAL DYSTONIA: Primary | ICD-10-CM

## 2022-06-23 DIAGNOSIS — R26.9 ABNORMAL GAIT: ICD-10-CM

## 2022-06-23 DIAGNOSIS — M62.838 SPASM OF MUSCLE: ICD-10-CM

## 2022-06-23 PROCEDURE — 96372 THER/PROPH/DIAG INJ SC/IM: CPT | Performed by: PHYSICAL MEDICINE & REHABILITATION

## 2022-06-23 PROCEDURE — 64642 CHEMODENERV 1 EXTREMITY 1-4: CPT | Mod: 59 | Performed by: PHYSICAL MEDICINE & REHABILITATION

## 2022-06-23 PROCEDURE — 99213 OFFICE O/P EST LOW 20 MIN: CPT | Mod: 25 | Performed by: PHYSICAL MEDICINE & REHABILITATION

## 2022-06-23 PROCEDURE — 95874 GUIDE NERV DESTR NEEDLE EMG: CPT | Performed by: PHYSICAL MEDICINE & REHABILITATION

## 2022-06-23 ASSESSMENT — PAIN SCALES - GENERAL: PAINLEVEL: WORST PAIN (10)

## 2022-06-23 NOTE — PROGRESS NOTES
61-year-old male, accompanied by his staff from the group [facility].       He has been having significant pain in his left thigh for the past 6+ months.  He works as a  in a restaurant which involves prolonged standing.  He is allowed to sit periodically.  He finds when he sits in a chair he does not have the option of stretching.  Over the last several weeks he was having significant pain.  His previous TPI held for few days.     He has evaluation including CAT scan which showed some sclerosis of the left pubic bone.  He does not have any urinary symptoms and was evaluated by a urologist.       He denies any numbness or tingling.  On a scale of 0-10 he rates his pain at its worst, when he is sitting he does not have any.  Transferring and standing makes it significant, up to 9/10..  He has difficulty transferring and needs assist.     He has significant obesity.  He also smokes 1 pack/day and is considering quitting if it will help with pain reduction. He is not currently drinking as he has no access to a bar nearby.     Past Medical History           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     Chronic infection       MRSA     Coagulation disorder (H)       on coumadin     COPD (chronic obstructive pulmonary disease) (H)       History of DVT of lower extremity       History of pulmonary embolism       Hyperlipidemia       Mild intellectual disabilities       Morbid obesity (H)       NEL (obstructive sleep apnea)       Peripheral edema       Peripheral vascular disease (H)       Sleep apnea       uses CPAP     Thrombosis       Tobacco dependence       Uncomplicated asthma       Venous stasis dermatitis           Current Outpatient Prescriptions               Current Outpatient Medications   Medication Sig Dispense Refill     acetaminophen (TYLENOL) 325 MG tablet Take 1-2 tablets (325-650 mg) by mouth every 6 hours as needed for mild pain, fever or headaches 100 tablet  "3     albuterol (ALBUTEROL) 108 (90 BASE) MCG/ACT inhaler Inhale 2 puffs into the lungs every 6 hours as needed 1 Inhaler 0     ARIPiprazole (ABILIFY) 5 MG tablet Take 5 mg by mouth daily         bacitracin 500 UNIT/GM OINT           buPROPion (WELLBUTRIN SR) 150 MG 12 hr tablet Take 150 mg by mouth 2 times daily         Cadexomer Iodine, topical, 0.9% (IODOSORB) 0.9 % GEL gel Apply topically daily Apply to left foot wound daily after cleansing the wound and blotting it dry. 1 Tube 3     cholecalciferol 50 MCG (2000 UT) tablet Take 1 tablet by mouth daily         clindamycin (CLEOCIN) 300 MG capsule Take 1 capsule (300 mg) by mouth 3 times daily 30 capsule 0     cloNIDine (CATAPRES) 0.1 MG tablet Take 0.1 mg by mouth 2 times daily         clotrimazole (LOTRIMIN) 1 % external cream Apply topically 2 times daily 60 g 0     diclofenac (VOLTAREN) 1 % topical gel Apply 4 g topically 2 times daily as needed for moderate pain 100 g 1     diphenhydrAMINE (BENADRYL) 25 MG capsule Take 25 mg by mouth every 6 hours as needed for itching or allergies         Emollient (CERAVE) CREA Externally apply topically daily 453 g 11     EPINEPHrine (EPIPEN 2-BRE) 0.3 MG/0.3ML injection 2-pack INJECT 0.3MG INTRAMUSCULAR ONE TIME FOR ONE DOSE AS NEEDED FOR ANAPHYLAXIS (CALL 911 IF YOU HAVE TO GIVE) (FOR BEE STINGS) **LABEL EACH PEN* 2 mL 3     gabapentin (NEURONTIN) 100 MG capsule Take 400 mg by mouth 3 times daily At 0900, 1200, and 2000         Gauze Pads & Dressings (GAUZE PADS 4\"X4\") 4\"X4\" PADS 1 each 3 times daily as needed 25 each 1     loratadine (CLARITIN) 10 MG tablet Take 10 mg by mouth daily         LORazepam (ATIVAN) 1 MG tablet Take 1 mg by mouth every 6 hours as needed for anxiety         methocarbamol (ROBAXIN) 750 MG tablet Take 1 tablet (750 mg) by mouth 4 times daily as needed for muscle spasms 60 tablet 0     montelukast (SINGULAIR) 10 MG tablet TAKE 1 TABLET BY MOUTH EVERY MORNING (ASTHMA) 30 tablet 11     Multiple " Vitamin (DAILY-JOVAN) TABS TAKE 1 TABLET BY MOUTH EVERY MORNING (VITAMINS) 30 tablet 11     naltrexone (DEPADE/REVIA) 50 MG tablet Take 50 mg by mouth daily         nicotine (COMMIT) 2 MG lozenge           nicotine (NICODERM CQ) 21 MG/24HR 24 hr patch           nicotine (NICORETTE) 2 MG gum Take 2 mg by mouth as needed for smoking cessation         nystatin-triamcinolone (MYCOLOG II) 338173-3.1 UNIT/GM-% external cream Apply topically 2 times daily For 1-2 weeks.         omeprazole (PRILOSEC) 40 MG DR capsule Take 1 capsule (40 mg) by mouth daily 90 capsule 2     order for DME Equipment being ordered: roll of micropore tape to dress foot wound bid 1 each 0     order for DME Equipment being ordered: 1 surgical shoe 1 Device 0     order for DME [employer] Phone [phone] Fax [phone]  EdemaWear Size Medium/Yellow qty 4 sleeves  Length of Need: 1 month  Frequency of dressing change daily 1 Device 0     order for DME [employer] Stock[employer]  Phone #[phone]  Fax #[phone]  Coolflex Velcro wraps     Length of Need: Life Time  # of Pairs 1 set 30 days 0     order for DME [employer] Phone [phone] Fax [phone]  Primary Dressing Hydrofera Blue Ready   Qty 5 sheets  Secondary Dressing 4' roll gauze Qty 30  Secondary Dressing 2' medipore tape Qty 1  Secondary Dressing 4x4 gauze loaf Qty  1  Length of Need: 1 month  Frequency of dressing change: daily 30 days 0     order for DME Oxygen 2 Li/min  at night and bled into BiPAP 1 Device 0     order for DME Equipment being ordered: CPAP with mask and tubing 1 Device 0     order for DME Equipment being ordered: support compression hose BK  2 pair black 30mm HG   To be applied on arising & removed while lying down to go to sleep 1 each 0     polyethylene glycol (MIRALAX) 17 GM/Dose powder Take 17 g (1 capful) by mouth daily as needed for constipation 578 g 0     PULMICORT FLEXHALER 180 MCG/ACT inhaler INHALE 2 PUFFS INTO THE LUNGS TWICE  DAILY. 1 each 11     SENNA-TABS 8.6 MG tablet           sertraline (ZOLOFT) 100 MG tablet Take 100 mg by mouth daily Take with 50 mg tablet for a total dose of 150 mg daily.         sertraline (ZOLOFT) 50 MG tablet           simvastatin (ZOCOR) 40 MG tablet TAKE 1 TABLET BY MOUTH EVERY MORNING.  (CHOLESTEROL) 30 tablet 11     SPIRIVA HANDIHALER 18 MCG inhaled capsule INHALE CONTENTS OF 1 CAPSULE INTO THE LUNGS ONCE DAILY 30 capsule 11     spironolactone (ALDACTONE) 25 MG tablet TAKE 1 TABLET BY MOUTH ONCE DAILY. (HIGH BLOOD PRESSURE) 30 tablet 11     temazepam (RESTORIL) 15 MG capsule Take 15 mg by mouth nightly as needed for sleep         tiZANidine (ZANAFLEX) 2 MG tablet Take 1 tablet (2 mg) by mouth 3 times daily 90 tablet 1     triamcinolone (KENALOG) 0.1 % external cream Apply to AA BID x 1-2 week then PRN only 80 g 2     trolamine salicylate (ASPERCREME) 10 % external cream Apply topically as needed for moderate pain knee         varenicline (CHANTIX BRE) 0.5 MG X 11 & 1 MG X 42 tablet Take 0.5 mg tab daily for 3 days, THEN 0.5 mg tab twice daily for 4 days, THEN 1 mg twice daily. 53 tablet 0     vitamin B complex with vitamin C (STRESS TAB) tablet Take 1 tablet by mouth daily 90 tablet 3     warfarin ANTICOAGULANT (COUMADIN) 4 MG tablet Take 1-1/2 tablets (6 mg) every Monday, Friday; and take 2 tablets (8 mg) all other days of the week. Next INR date is 9/28/21. 11 tablet 0     warfarin ANTICOAGULANT (COUMADIN) 4 MG tablet 6 mg mon,fri and 8 mg ROW  Recheck INR 2/22/21 54 tablet 0     warfarin ANTICOAGULANT (COUMADIN) 4 MG tablet Take 1 tablet (4 mg) by mouth daily 6 mg mon,fri and 8 mg row (Patient taking differently: Take 2 mg by mouth daily 2+6mg tues, wed, thurs, sat, sun) 44 tablet 0     Warfarin Therapy Reminder 1 each continuous prn 1 each 0     White Petrolatum ointment Apply topically nightly as needed for dry skin             /74   Pulse 74   Temp 98.5  F (36.9  C)   SpO2 95%          On  examination, the patient is alert and cooperative.  He walks with antalgic gait on the left side.  He could hardly take a few steps.  On the examination table, he was able to move his neck, upper extremities without difficulty.  He was able to move his right lower extremity without difficulty.  There is no tenderness in the abdominal region or the groin region on the left side.  He is tender over the left adductor hosea, left iliopsoas, left tensor fascia david, and quadriceps at three sites.     Focal areas of tenderness was isolated in the affected muscles.     Impression: At this point, this 61-year-old male with myalgia and dystonia affecting the muscles of the left thigh, likely a result of his posture and his left ankle chronic ulceration which is also giving him some discomfort. 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     In terms of treatment, he has had trigger point injections. We will continue with 200 units of Botox for longer term relief as he has features of focal dystonia.     20 minutes spent for this visit, greater than 50% was for counseling on above-mentioned issues.     Procedure note: With his informed consent, after explaining the benefits and risks of the procedure, using alcohol for skin prep, 200 units of Botox in 6 cc of preservative-free normal saline were injected with EMG guidance 33 units into the left adductor, 33 units into the left tensor fascia david, 33 units into the left iliopsoas muscle and reminder into the left quadriceps at 3 different locations.  200 units were utilized in all.  He tolerated the procedure well. He was able to get up and walk around better and noticed more he was comfortable and able to move better.     I have suggested that he can use ice, continue Tylenol. He can return to work as a  and continue as per his usual routine.  I cautioned him to avoid overdoing activities that would bring on his discomfort.  I would like to see him in  follow-up for repeat Botox in 12 weeks time.      Yinka Hagen MD

## 2022-06-23 NOTE — ED NOTES
"[facility]  ED Nurse Handoff Report    ED Chief complaint: Wound Check (left leg has reddeness  from knee to foot per pt)      ED Diagnosis:   Final diagnoses:   Cellulitis of left lower extremity   Personal history of DVT (deep vein thrombosis)       Code Status: Full Code    Allergies:   Allergies   Allergen Reactions     Bees      Augmentin Rash     Penicillins Rash       Patient Story: Pt comes from group home to have left leg redness evaluated. Pt has wound at base of toes of left foot with redness up up his shin to knee  Focused Assessment:  Assessment WNL w/ exception to redness and wound to left leg.     Treatments and/or interventions provided: antibiotics  Patient's response to treatments and/or interventions:     To be done/followed up on inpatient unit:      Does this patient have any cognitive concerns?: none    Activity level - Baseline/Home:  Independent  Activity Level - Current:   Independent    Patient's Preferred language: English   Needed?: No    Isolation: Contact   Infection: MRSA  Bariatric?: No    Vital Signs:   Vitals:    05/18/20 1147   BP: 129/73   Resp: 16   Temp: 98.4  F (36.9  C)   TempSrc: Oral   SpO2: 94%   Weight: 145.2 kg (320 lb)   Height: 1.778 m (5' 10\")       Cardiac Rhythm:     Was the PSS-3 completed:   Yes  What interventions are required if any?               Family Comments:   OBS brochure/video discussed/provided to patient/family: N/A              Name of person given brochure if not patient:               Relationship to patient:     For the majority of the shift this patient's behavior was Green.   Behavioral interventions performed were .    ED NURSE PHONE NUMBER:          " GYN.   Please call and advise

## 2022-06-23 NOTE — LETTER
6/23/2022       RE: Petey Archibald  [address] [facility] [address] [address]     Dear Colleague,    Thank you for referring your patient, Petey Archibald, to the [facility] PHYSICAL MEDICINE AND REHABILITATION CLINIC [facility]. Please see a copy of my visit note below.    61-year-old male, accompanied by his staff from the group home.       He has been having significant pain in his left thigh for the past 6+ months.  He works as a  in a restaurant which involves prolonged standing.  He is allowed to sit periodically.  He finds when he sits in a chair he does not have the option of stretching.  Over the last several weeks he was having significant pain.  His previous TPI held for few days.     He has evaluation including CAT scan which showed some sclerosis of the left pubic bone.  He does not have any urinary symptoms and was evaluated by a urologist.       He denies any numbness or tingling.  On a scale of 0-10 he rates his pain at its worst, when he is sitting he does not have any.  Transferring and standing makes it significant, up to 9/10..  He has difficulty transferring and needs assist.     He has significant obesity.  He also smokes 1 pack/day and is considering quitting if it will help with pain reduction. He is not currently drinking as he has no access to a bar nearby.     Past Medical History           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     Chronic infection       MRSA     Coagulation disorder (H)       on coumadin     COPD (chronic obstructive pulmonary disease) (H)       History of DVT of lower extremity       History of pulmonary embolism       Hyperlipidemia       Mild intellectual disabilities       Morbid obesity (H)       NEL (obstructive sleep apnea)       Peripheral edema       Peripheral vascular disease (H)       Sleep apnea       uses CPAP      "Thrombosis       Tobacco dependence       Uncomplicated asthma       Venous stasis dermatitis           Current Outpatient Prescriptions               Current Outpatient Medications   Medication Sig Dispense Refill     acetaminophen (TYLENOL) 325 MG tablet Take 1-2 tablets (325-650 mg) by mouth every 6 hours as needed for mild pain, fever or headaches 100 tablet 3     albuterol (ALBUTEROL) 108 (90 BASE) MCG/ACT inhaler Inhale 2 puffs into the lungs every 6 hours as needed 1 Inhaler 0     ARIPiprazole (ABILIFY) 5 MG tablet Take 5 mg by mouth daily         bacitracin 500 UNIT/GM OINT           buPROPion (WELLBUTRIN SR) 150 MG 12 hr tablet Take 150 mg by mouth 2 times daily         Cadexomer Iodine, topical, 0.9% (IODOSORB) 0.9 % GEL gel Apply topically daily Apply to left foot wound daily after cleansing the wound and blotting it dry. 1 Tube 3     cholecalciferol 50 MCG (2000 UT) tablet Take 1 tablet by mouth daily         clindamycin (CLEOCIN) 300 MG capsule Take 1 capsule (300 mg) by mouth 3 times daily 30 capsule 0     cloNIDine (CATAPRES) 0.1 MG tablet Take 0.1 mg by mouth 2 times daily         clotrimazole (LOTRIMIN) 1 % external cream Apply topically 2 times daily 60 g 0     diclofenac (VOLTAREN) 1 % topical gel Apply 4 g topically 2 times daily as needed for moderate pain 100 g 1     diphenhydrAMINE (BENADRYL) 25 MG capsule Take 25 mg by mouth every 6 hours as needed for itching or allergies         Emollient (CERAVE) CREA Externally apply topically daily 453 g 11     EPINEPHrine (EPIPEN 2-BRE) 0.3 MG/0.3ML injection 2-pack INJECT 0.3MG INTRAMUSCULAR ONE TIME FOR ONE DOSE AS NEEDED FOR ANAPHYLAXIS (CALL 911 IF YOU HAVE TO GIVE) (FOR BEE STINGS) **LABEL EACH PEN* 2 mL 3     gabapentin (NEURONTIN) 100 MG capsule Take 400 mg by mouth 3 times daily At 0900, 1200, and 2000         Gauze Pads & Dressings (GAUZE PADS 4\"X4\") 4\"X4\" PADS 1 each 3 times daily as needed 25 each 1     loratadine (CLARITIN) 10 MG tablet Take " 10 mg by mouth daily         LORazepam (ATIVAN) 1 MG tablet Take 1 mg by mouth every 6 hours as needed for anxiety         methocarbamol (ROBAXIN) 750 MG tablet Take 1 tablet (750 mg) by mouth 4 times daily as needed for muscle spasms 60 tablet 0     montelukast (SINGULAIR) 10 MG tablet TAKE 1 TABLET BY MOUTH EVERY MORNING (ASTHMA) 30 tablet 11     Multiple Vitamin (DAILY-JOVAN) TABS TAKE 1 TABLET BY MOUTH EVERY MORNING (VITAMINS) 30 tablet 11     naltrexone (DEPADE/REVIA) 50 MG tablet Take 50 mg by mouth daily         nicotine (COMMIT) 2 MG lozenge           nicotine (NICODERM CQ) 21 MG/24HR 24 hr patch           nicotine (NICORETTE) 2 MG gum Take 2 mg by mouth as needed for smoking cessation         nystatin-triamcinolone (MYCOLOG II) 371217-0.1 UNIT/GM-% external cream Apply topically 2 times daily For 1-2 weeks.         omeprazole (PRILOSEC) 40 MG DR capsule Take 1 capsule (40 mg) by mouth daily 90 capsule 2     order for DME Equipment being ordered: roll of micropore tape to dress foot wound bid 1 each 0     order for DME Equipment being ordered: 1 surgical shoe 1 Device 0     order for DME [employer] Phone [phone] Fax [phone]  EdemaWear Size Medium/Yellow qty 4 sleeves  Length of Need: 1 month  Frequency of dressing change daily 1 Device 0     order for DME [employer] Stockings  Phone #[phone]  Fax #[phone]  Coolflex Velcro wraps     Length of Need: Life Time  # of Pairs 1 set 30 days 0     order for DME [employer] Phone [phone] Fax [phone]  Primary Dressing Hydrofera Blue Ready   Qty 5 sheets  Secondary Dressing 4' roll gauze Qty 30  Secondary Dressing 2' medipore tape Qty 1  Secondary Dressing 4x4 gauze loaf Qty  1  Length of Need: 1 month  Frequency of dressing change: daily 30 days 0     order for DME Oxygen 2 Li/min  at night and bled into BiPAP 1 Device 0     order for DME Equipment being ordered: CPAP with mask and tubing 1 Device 0     order for  DME Equipment being ordered: support compression hose BK  2 pair black 30mm HG   To be applied on arising & removed while lying down to go to sleep 1 each 0     polyethylene glycol (MIRALAX) 17 GM/Dose powder Take 17 g (1 capful) by mouth daily as needed for constipation 578 g 0     PULMICORT FLEXHALER 180 MCG/ACT inhaler INHALE 2 PUFFS INTO THE LUNGS TWICE DAILY. 1 each 11     SENNA-TABS 8.6 MG tablet           sertraline (ZOLOFT) 100 MG tablet Take 100 mg by mouth daily Take with 50 mg tablet for a total dose of 150 mg daily.         sertraline (ZOLOFT) 50 MG tablet           simvastatin (ZOCOR) 40 MG tablet TAKE 1 TABLET BY MOUTH EVERY MORNING.  (CHOLESTEROL) 30 tablet 11     SPIRIVA HANDIHALER 18 MCG inhaled capsule INHALE CONTENTS OF 1 CAPSULE INTO THE LUNGS ONCE DAILY 30 capsule 11     spironolactone (ALDACTONE) 25 MG tablet TAKE 1 TABLET BY MOUTH ONCE DAILY. (HIGH BLOOD PRESSURE) 30 tablet 11     temazepam (RESTORIL) 15 MG capsule Take 15 mg by mouth nightly as needed for sleep         tiZANidine (ZANAFLEX) 2 MG tablet Take 1 tablet (2 mg) by mouth 3 times daily 90 tablet 1     triamcinolone (KENALOG) 0.1 % external cream Apply to AA BID x 1-2 week then PRN only 80 g 2     trolamine salicylate (ASPERCREME) 10 % external cream Apply topically as needed for moderate pain knee         varenicline (CHANTIX BRE) 0.5 MG X 11 & 1 MG X 42 tablet Take 0.5 mg tab daily for 3 days, THEN 0.5 mg tab twice daily for 4 days, THEN 1 mg twice daily. 53 tablet 0     vitamin B complex with vitamin C (STRESS TAB) tablet Take 1 tablet by mouth daily 90 tablet 3     warfarin ANTICOAGULANT (COUMADIN) 4 MG tablet Take 1-1/2 tablets (6 mg) every Monday, Friday; and take 2 tablets (8 mg) all other days of the week. Next INR date is 9/28/21. 11 tablet 0     warfarin ANTICOAGULANT (COUMADIN) 4 MG tablet 6 mg mon,fri and 8 mg ROW  Recheck INR 2/22/21 54 tablet 0     warfarin ANTICOAGULANT (COUMADIN) 4 MG tablet Take 1 tablet (4 mg) by  mouth daily 6 mg mon,fri and 8 mg row (Patient taking differently: Take 2 mg by mouth daily 2+6mg tues, wed, thurs, sat, sun) 44 tablet 0     Warfarin Therapy Reminder 1 each continuous prn 1 each 0     White Petrolatum ointment Apply topically nightly as needed for dry skin             /74   Pulse 74   Temp 98.5  F (36.9  C)   SpO2 95%          On examination, the patient is alert and cooperative.  He walks with antalgic gait on the left side.  He could hardly take a few steps.  On the examination table, he was able to move his neck, upper extremities without difficulty.  He was able to move his right lower extremity without difficulty.  There is no tenderness in the abdominal region or the groin region on the left side.  He is tender over the left adductor hosea, left iliopsoas, left tensor fascia david, and quadriceps at three sites.     Focal areas of tenderness was isolated in the affected muscles.     Impression: At this point, this 61-year-old male with myalgia and dystonia affecting the muscles of the left thigh, likely a result of his posture and his left ankle chronic ulceration which is also giving him some discomfort. 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     In terms of treatment, he has had trigger point injections. We will continue with 200 units of Botox for longer term relief as he has features of focal dystonia.     20 minutes spent for this visit, greater than 50% was for counseling on above-mentioned issues.     Procedure note: With his informed consent, after explaining the benefits and risks of the procedure, using alcohol for skin prep, 200 units of Botox in 6 cc of preservative-free normal saline were injected with EMG guidance 33 units into the left adductor, 33 units into the left tensor fascia david, 33 units into the left iliopsoas muscle and reminder into the left quadriceps at 3 different locations.  200 units were utilized in all.  He tolerated the procedure well. He  was able to get up and walk around better and noticed more he was comfortable and able to move better.     I have suggested that he can use ice, continue Tylenol. He can return to work as a  and continue as per his usual routine.  I cautioned him to avoid overdoing activities that would bring on his discomfort.  I would like to see him in follow-up for repeat Botox in 12 weeks time.        Yinka Hagen MD

## 2022-07-01 ENCOUNTER — TELEPHONE (OUTPATIENT)
Dept: INTERNAL MEDICINE | Facility: CLINIC | Age: 62
End: 2022-07-01

## 2022-07-01 ENCOUNTER — ANTICOAGULATION THERAPY VISIT (OUTPATIENT)
Dept: ANTICOAGULATION | Facility: CLINIC | Age: 62
End: 2022-07-01

## 2022-07-01 ENCOUNTER — LAB (OUTPATIENT)
Dept: LAB | Facility: CLINIC | Age: 62
End: 2022-07-01
Payer: MEDICARE

## 2022-07-01 DIAGNOSIS — Z79.01 LONG TERM CURRENT USE OF ANTICOAGULANT THERAPY: ICD-10-CM

## 2022-07-01 DIAGNOSIS — Z86.718 HISTORY OF DEEP VEIN THROMBOSIS OF LOWER EXTREMITY: ICD-10-CM

## 2022-07-01 DIAGNOSIS — Z86.711 HISTORY OF PULMONARY EMBOLISM: Primary | ICD-10-CM

## 2022-07-01 LAB — INR BLD: 1.9 (ref 0.9–1.1)

## 2022-07-01 PROCEDURE — 36416 COLLJ CAPILLARY BLOOD SPEC: CPT

## 2022-07-01 PROCEDURE — 85610 PROTHROMBIN TIME: CPT

## 2022-07-01 RX ORDER — WARFARIN SODIUM 4 MG/1
TABLET ORAL
Qty: 30 TABLET | Refills: 0 | Status: SHIPPED | OUTPATIENT
Start: 2022-07-01 | End: 2022-08-12

## 2022-07-01 NOTE — PROGRESS NOTES
ANTICOAGULATION MANAGEMENT     Petey Archibald 61 year old male is on warfarin with subtherapeutic INR result. (Goal INR 2.0-3.0)    Recent labs: (last 7 days)     07/01/22  1111   INR 1.9*       ASSESSMENT       Source(s): Chart Review and Patient/Caregiver Call       Warfarin doses taken: Warfarin taken as instructed    Diet: No new diet changes identified    New illness, injury, or hospitalization: No    Medication/supplement changes: None noted    Signs or symptoms of bleeding or clotting: No    Previous INR: Therapeutic last 2(+) visits    Additional findings: refill for warfarin sent to [facility].       PLAN     Recommended plan for no diet, medication or health factor changes affecting INR     Dosing Instructions: continue your current warfarin dose with next INR in 2 weeks       Summary  As of 7/1/2022    Full warfarin instructions:  6 mg every Mon, Wed, Fri; 8 mg all other days   Next INR check:  7/15/2022             Telephone call with  Hunter who verbalizes understanding and agrees to plan and who agrees to plan and repeated back plan correctly    Lab visit scheduled    Education provided: Please call back if any changes to your diet, medications or how you've been taking warfarin, Goal range and significance of current result and Importance of therapeutic range    Plan made per [facility] anticoagulation protocol    Kenya Peralta RN  Anticoagulation Clinic  7/1/2022    _______________________________________________________________________     Anticoagulation Episode Summary     Current INR goal:  2.0-3.0   TTR:  83.1 % (1 y)   Target end date:  Indefinite   Send INR reminders to:  [facility]    Indications    History of pulmonary embolism [Z86.711]  Long term current use of anticoagulant therapy [Z79.01]           Comments:  REM [facility]; A new Coumadin Prescription will need to sent to [facility] with current dose and next INR check.         Anticoagulation Care Providers     Provider  Role Specialty Phone number    Yolande Lacy PA-C Referring Family Medicine [phone]    Demetri Archre MD Referring Internal Medicine [phone]    Silvino Baker MD Responsible Family Medicine [phone]

## 2022-07-01 NOTE — TELEPHONE ENCOUNTER
Patient: Keegan Elizondo Date: 2019   : 1949 Attending: Lonnie Givens MD   69 year old male      Chief Complaint: abd pain    Subjective: Patient does not complain of any pain today, no nausea or vomiting. Discussed with POA on the phone today, who consented for colonoscopy. Plan for discharge on Friday once procedure is done in the hospital, as POA states that unable to do procedure as outpatient with inability to prep and ongoing alcohol use    Problem List:   Patient Active Problem List   Diagnosis   • Other secondary thrombocytopenia   • Insomnia   • Redundant prepuce and phimosis   • Pre-operative cardiovascular examination   • Patient has active power of  for health care   • Dementia associated with alcoholism without behavioral disturbance (CMS/HCC)   • Malignant neoplasm of colon (CMS/HCC)   • ETOH abuse       Allergies: ALLERGIES:  No Known Allergies    Medications/Infusions: Reviewed    Review of Systems: All systems reviewed and negative except for those mentioned in the history of present illness                Vital Last Value 24 Hour Range   Temperature 98.6 °F (37 °C) (19) Temp  Min: 97.9 °F (36.6 °C)  Max: 98.6 °F (37 °C)   Pulse 68 (19) Pulse  Min: 61  Max: 72   Respiratory 14 (19) Resp  Min: 14  Max: 17   Non-Invasive  Blood Pressure 141/79 (19) BP  Min: 140/74  Max: 163/91   Pulse Oximetry 100 % (19) SpO2  Min: 99 %  Max: 100 %     Vital Today Admitted   Weight 71.8 kg (04/15/19 0655) Weight: 72 kg (19)   Height N/A Height: 5' 11\" (180.3 cm) (19)   BMI N/A BMI (Calculated): 22.14 (19)       Intake/Output:      Intake/Output Summary (Last 24 hours) at 2019 1054  Last data filed at 2019 0927  Gross per 24 hour   Intake 640 ml   Output 900 ml   Net -260 ml       Physical Exam:   GENERAL: ,not in distress or pain.   HEENT: atraumatic, normocephalic head.   Pink  MADELYN INR Dept:     Pharmacy called:     Instead of taking 1.5 tablets Monday/Wednesday/Friday of the 4mg warfarin tablets, he is taking one 6mg tablets those days     All other days is getting 2 x4 mg tablets     Mary Kate Coulter RN  [facility]      conjunctivae,anicteric sclerae. No oral thrush   LUNGS: good respiratory effort. Clear to auscultation bilaterally. No wheezes. No crackles.   HEART: Regular rate and rhythm. S1, S2 well heard. No murmur,no rubs or gallops.   ABDOMEN: Flat, not distended, soft and tender. No guarding, no rigidity.   NEUROLOGIC: no acute focal deficits  SKIN:no Bruise hematoma or rash noted   PSYCHIATRY: normal affect, mood. Speech, memory, concentration      Laboratory Results:   Recent Labs   Lab 04/23/19  0507 04/22/19  0505   WBC 5.6 6.3   HCT 24.6* 24.3*   HGB 8.1* 7.9*   PLT 78* 76*   SODIUM  --  145   POTASSIUM  --  3.7   CHLORIDE  --  115*   CO2  --  24   CALCIUM  --  8.1*   GLUCOSE  --  219*   BUN  --  13   CREATININE  --  0.98   GFRNA  --  77       Imaging: No results found.      DVT/VTE Prophylaxis:  VTE Pharmacologic Prophylaxis: No pharmacologic Venous Thromboembolism prophylaxis due to Platelet count less than 50,000  VTE Mechanical Prophylaxis: Yes    Assessment & Plans/Recommendations:   70 yo M with h/o diabetes mellitus, hypertension, hyperlipidemia, alcohol abuse, colon cancer s/p laparoscopic subtotal colectomy, admitted from PCPs office  1. Alcohol abuse with intoxication: CIWA procotol  2.Transaminitis: h/o alcohol abuse, hepatitis C.   3. Colon cancer s/p partial colectomy: recent elevated CEA, unable to obtain consent from POA, colonoscopy cancelled  4. Diabetes mellitus: Continue metformin, start sliding scale insulin, monitor blood sugars.   5. Hyperlipidemia: Hold statin due to transaminitis.  6. Cognitive impairment: POA activated in Feb, neuropsych tresting and plan to keep activated at this time. Unable to get in touch with POA after multiple attempts.   7. Chronic thrombocytopenia: Hold aspirin, due to alcoholic liver disease, and hypersplenism.  8. Hypertension: on metoprolol  9. Abdominal pain: CT abdomen with ileus, chronic pancreatitis.     No DVT prophylaxis due to thrombocytopenia    Discussed  with Shani HEBERT on the phone, who has consented for colonoscopy and would rather have it done in the hospital as patient has been noncooperative at home with colonoscopy preps. She is aware of the risks and benefits of the procedure as he has had them in the past. Discussed with RN, will contact GI and start prep tonight in anticipation of procedure  Plan for discharge on Friday morning with POA will be with him        Discharge    DONELL Expected Discharge Date: 04/17/19  Barriers: needs colonoscopy  Destination: TBD        Contact the Hospitalist caring for the patient until 7:00pm. From 7:00pm to 7:00 am contact the Hospitalist on call

## 2022-07-14 ENCOUNTER — DOCUMENTATION ONLY (OUTPATIENT)
Dept: ANTICOAGULATION | Facility: CLINIC | Age: 62
End: 2022-07-14

## 2022-07-14 DIAGNOSIS — Z79.01 LONG TERM CURRENT USE OF ANTICOAGULANT THERAPY: ICD-10-CM

## 2022-07-14 DIAGNOSIS — Z86.711 HISTORY OF PULMONARY EMBOLISM: Primary | ICD-10-CM

## 2022-07-15 ENCOUNTER — ANTICOAGULATION THERAPY VISIT (OUTPATIENT)
Dept: ANTICOAGULATION | Facility: CLINIC | Age: 62
End: 2022-07-15

## 2022-07-15 ENCOUNTER — LAB (OUTPATIENT)
Dept: LAB | Facility: CLINIC | Age: 62
End: 2022-07-15
Payer: MEDICARE

## 2022-07-15 DIAGNOSIS — Z79.01 LONG TERM CURRENT USE OF ANTICOAGULANT THERAPY: ICD-10-CM

## 2022-07-15 DIAGNOSIS — Z86.711 HISTORY OF PULMONARY EMBOLISM: Primary | ICD-10-CM

## 2022-07-15 DIAGNOSIS — Z86.711 HISTORY OF PULMONARY EMBOLISM: ICD-10-CM

## 2022-07-15 LAB — INR BLD: 1.9 (ref 0.9–1.1)

## 2022-07-15 PROCEDURE — 85610 PROTHROMBIN TIME: CPT

## 2022-07-15 PROCEDURE — 36416 COLLJ CAPILLARY BLOOD SPEC: CPT

## 2022-07-15 RX ORDER — WARFARIN SODIUM 4 MG/1
TABLET ORAL
Qty: 26 TABLET | Refills: 0
Start: 2022-07-15 | End: 2022-09-01

## 2022-07-15 NOTE — PROGRESS NOTES
ANTICOAGULATION MANAGEMENT     Petey Archibald 61 year old male is on warfarin with subtherapeutic INR result. (Goal INR 2.0-3.0)    Recent labs: (last 7 days)     07/15/22  1507   INR 1.9*       ASSESSMENT       Source(s): Chart Review and Patient/Caregiver Call       Warfarin doses taken: Warfarin taken as instructed    Diet: No new diet changes identified    New illness, injury, or hospitalization: No    Medication/supplement changes: None noted    Signs or symptoms of bleeding or clotting: No    Previous INR: Subtherapeutic    Additional findings: None       PLAN     Recommended plan for no diet, medication or health factor changes affecting INR     Dosing Instructions: Increase your warfarin dose (4% change) with next INR in 2 weeks       Summary  As of 7/15/2022    Full warfarin instructions:  6 mg every Mon, Fri; 8 mg all other days   Next INR check:  7/29/2022             Telephone call with  Hunter from group [place] who verbalizes understanding and agrees to plan    Lab visit scheduled    Education provided: Please call back if any changes to your diet, medications or how you've been taking warfarin    Plan made per [facility] anticoagulation protocol    Lisa Ponce RN  Anticoagulation Clinic  7/15/2022    _______________________________________________________________________     Anticoagulation Episode Summary     Current INR goal:  2.0-3.0   TTR:  79.3 % (1 y)   Target end date:  Indefinite   Send INR reminders to:  [facility]    Indications    History of pulmonary embolism [Z86.711]  Long term current use of anticoagulant therapy [Z79.01]           Comments:  REM [facility]; A new Coumadin Prescription will need to sent to [facility] with current dose and next INR check.         Anticoagulation Care Providers     Provider Role Specialty Phone number    Yolande Lacy PA-C Referring Family Medicine [phone]    Demetri Archer MD Referring Internal Medicine  [phone]    Silvino Baker MD [facility] [phone]

## 2022-07-29 ENCOUNTER — LAB (OUTPATIENT)
Dept: LAB | Facility: CLINIC | Age: 62
End: 2022-07-29
Payer: MEDICARE

## 2022-07-29 ENCOUNTER — ANTICOAGULATION THERAPY VISIT (OUTPATIENT)
Dept: ANTICOAGULATION | Facility: CLINIC | Age: 62
End: 2022-07-29

## 2022-07-29 DIAGNOSIS — Z79.01 LONG TERM CURRENT USE OF ANTICOAGULANT THERAPY: ICD-10-CM

## 2022-07-29 DIAGNOSIS — Z86.711 HISTORY OF PULMONARY EMBOLISM: ICD-10-CM

## 2022-07-29 DIAGNOSIS — Z86.711 HISTORY OF PULMONARY EMBOLISM: Primary | ICD-10-CM

## 2022-07-29 LAB — INR BLD: 1.7 (ref 0.9–1.1)

## 2022-07-29 PROCEDURE — 36416 COLLJ CAPILLARY BLOOD SPEC: CPT

## 2022-07-29 PROCEDURE — 85610 PROTHROMBIN TIME: CPT

## 2022-07-29 NOTE — PROGRESS NOTES
ANTICOAGULATION MANAGEMENT     Petey Archibald 61 year old male is on warfarin with subtherapeutic INR result. (Goal INR 2.0-3.0)    Recent labs: (last 7 days)     07/29/22  1005   INR 1.7*       ASSESSMENT       Source(s): Chart Review and Patient/Caregiver Call       Warfarin doses taken: Warfarin taken as instructed    Diet: No new diet changes identified    New illness, injury, or hospitalization: No  Not walking as well and has a walker  Less active    Medication/supplement changes: None noted    Signs or symptoms of bleeding or clotting: No    Previous INR: Subtherapeutic    Additional findings: None       PLAN     Recommended plan for ongoing change(s) affecting INR     Dosing Instructions: Increase your warfarin dose (3.8% change) with next INR in 2 weeks       Summary  As of 7/29/2022    Full warfarin instructions:  6 mg every Mon; 8 mg all other days   Next INR check:  8/12/2022             Telephone call with  keith  who verbalizes understanding and agrees to plan  Warfarin called into [facility]    Lab visit scheduled    Education provided: None required    Plan made per [facility] anticoagulation protocol    Lisa Ponce RN  Anticoagulation Clinic  7/29/2022    _______________________________________________________________________     Anticoagulation Episode Summary     Current INR goal:  2.0-3.0   TTR:  75.5 % (1 y)   Target end date:  Indefinite   Send INR reminders to:  [facility]    Indications    History of pulmonary embolism [Z86.711]  Long term current use of anticoagulant therapy [Z79.01]           Comments:  [facility]; A new Coumadin Prescription will need to sent to [facility] with current dose and next INR check.         Anticoagulation Care Providers     Provider Role Specialty Phone number    Yolande Lacy PA-C Referring Family Medicine [phone]    Demetri Archer MD Referring Internal Medicine [phone]    Olivia  Silvino Guzman MD [facility] [phone]

## 2022-08-12 ENCOUNTER — MEDICAL CORRESPONDENCE (OUTPATIENT)
Dept: HEALTH INFORMATION MANAGEMENT | Facility: CLINIC | Age: 62
End: 2022-08-12

## 2022-08-12 ENCOUNTER — ANTICOAGULATION THERAPY VISIT (OUTPATIENT)
Dept: ANTICOAGULATION | Facility: CLINIC | Age: 62
End: 2022-08-12

## 2022-08-12 ENCOUNTER — LAB (OUTPATIENT)
Dept: LAB | Facility: CLINIC | Age: 62
End: 2022-08-12
Payer: MEDICARE

## 2022-08-12 DIAGNOSIS — Z79.01 LONG TERM CURRENT USE OF ANTICOAGULANT THERAPY: ICD-10-CM

## 2022-08-12 DIAGNOSIS — Z86.711 HISTORY OF PULMONARY EMBOLISM: Primary | ICD-10-CM

## 2022-08-12 DIAGNOSIS — Z86.711 HISTORY OF PULMONARY EMBOLISM: ICD-10-CM

## 2022-08-12 LAB — INR BLD: 2.8 (ref 0.9–1.1)

## 2022-08-12 PROCEDURE — 36416 COLLJ CAPILLARY BLOOD SPEC: CPT

## 2022-08-12 PROCEDURE — 85610 PROTHROMBIN TIME: CPT

## 2022-08-12 RX ORDER — WARFARIN SODIUM 4 MG/1
TABLET ORAL
Qty: 42 TABLET | Refills: 0
Start: 2022-08-12 | End: 2022-11-02

## 2022-08-12 NOTE — PROGRESS NOTES
ANTICOAGULATION MANAGEMENT     Petey Archibald 61 year old male is on warfarin with therapeutic INR result. (Goal INR 2.0-3.0)    Recent labs: (last 7 days)     08/12/22  1046   INR 2.8*       ASSESSMENT       Source(s): Chart Review and Patient/Caregiver Call   Hunter supervisor at [employer]      Warfarin doses taken: Warfarin taken as instructed    Diet: No new diet changes identified    New illness, injury, or hospitalization: No    Medication/supplement changes: None noted    Signs or symptoms of bleeding or clotting: No    Previous INR: Subtherapeutic    Additional findings: None       PLAN     Recommended plan for no diet, medication or health factor changes affecting INR     Dosing Instructions: Continue your current warfarin dose with next INR in 3 weeks       Summary  As of 8/12/2022    Full warfarin instructions:  6 mg every Mon; 8 mg all other days   Next INR check:  9/2/2022             Telephone call with  hunter  who verbalizes understanding and agrees to plan    Lab visit scheduled    Education provided: Please call back if any changes to your diet, medications or how you've been taking warfarin    Plan made per [facility] anticoagulation protocol     Lisa Ponce RN  Anticoagulation Clinic  8/12/2022    _______________________________________________________________________     Anticoagulation Episode Summary     Current INR goal:  2.0-3.0   TTR:  74.4 % (1 y)   Target end date:  Indefinite   Send INR reminders to:  [facility]    Indications    History of pulmonary embolism [Z86.711]  Long term current use of anticoagulant therapy [Z79.01]           Comments:  REM [facility]; A new Coumadin Prescription will need to sent to [facility] with current dose and next INR check.         Anticoagulation Care Providers     Provider Role Specialty Phone number    Yolande Lacy PA-C Referring Family Medicine [phone]    Demetri Archer MD Referring  Internal Medicine [phone]    Silvino Baker MD [facility] [phone]

## 2022-08-31 ENCOUNTER — HOSPITAL ENCOUNTER (EMERGENCY)
Facility: CLINIC | Age: 62
Discharge: HOME | End: 2022-08-31
Attending: EMERGENCY MEDICINE | Admitting: EMERGENCY MEDICINE
Payer: MEDICARE

## 2022-08-31 ENCOUNTER — APPOINTMENT (OUTPATIENT)
Dept: GENERAL RADIOLOGY | Facility: CLINIC | Age: 62
End: 2022-08-31
Attending: EMERGENCY MEDICINE
Payer: MEDICARE

## 2022-08-31 VITALS
SYSTOLIC BLOOD PRESSURE: 117 MMHG | RESPIRATION RATE: 16 BRPM | DIASTOLIC BLOOD PRESSURE: 89 MMHG | BODY MASS INDEX: 47.74 KG/M2 | WEIGHT: 315 LBS | HEIGHT: 68 IN | OXYGEN SATURATION: 96 % | TEMPERATURE: 98.7 F | HEART RATE: 73 BPM

## 2022-08-31 DIAGNOSIS — R07.9 CHEST PAIN, UNSPECIFIED TYPE: ICD-10-CM

## 2022-08-31 LAB
ANION GAP SERPL CALCULATED.3IONS-SCNC: 5 MMOL/L (ref 3–14)
ATRIAL RATE - MUSE: 75 BPM
BASOPHILS # BLD AUTO: 0 10E3/UL (ref 0–0.2)
BASOPHILS NFR BLD AUTO: 1 %
BUN SERPL-MCNC: 20 MG/DL (ref 7–30)
CALCIUM SERPL-MCNC: 8.9 MG/DL (ref 8.5–10.1)
CHLORIDE BLD-SCNC: 108 MMOL/L (ref 94–109)
CO2 SERPL-SCNC: 27 MMOL/L (ref 20–32)
CREAT SERPL-MCNC: 0.97 MG/DL (ref 0.66–1.25)
D DIMER PPP FEU-MCNC: 0.44 UG/ML FEU (ref 0–0.5)
DIASTOLIC BLOOD PRESSURE - MUSE: NORMAL MMHG
EOSINOPHIL # BLD AUTO: 0.1 10E3/UL (ref 0–0.7)
EOSINOPHIL NFR BLD AUTO: 3 %
ERYTHROCYTE [DISTWIDTH] IN BLOOD BY AUTOMATED COUNT: 16.3 % (ref 10–15)
GFR SERPL CREATININE-BSD FRML MDRD: 88 ML/MIN/1.73M2
GLUCOSE BLD-MCNC: 96 MG/DL (ref 70–99)
HCT VFR BLD AUTO: 33.7 % (ref 40–53)
HGB BLD-MCNC: 10.2 G/DL (ref 13.3–17.7)
HOLD SPECIMEN: NORMAL
IMM GRANULOCYTES # BLD: 0 10E3/UL
IMM GRANULOCYTES NFR BLD: 0 %
INR PPP: 2.25 (ref 0.85–1.15)
INTERPRETATION ECG - MUSE: NORMAL
LYMPHOCYTES # BLD AUTO: 1 10E3/UL (ref 0.8–5.3)
LYMPHOCYTES NFR BLD AUTO: 24 %
MCH RBC QN AUTO: 27.7 PG (ref 26.5–33)
MCHC RBC AUTO-ENTMCNC: 30.3 G/DL (ref 31.5–36.5)
MCV RBC AUTO: 92 FL (ref 78–100)
MONOCYTES # BLD AUTO: 0.3 10E3/UL (ref 0–1.3)
MONOCYTES NFR BLD AUTO: 7 %
NEUTROPHILS # BLD AUTO: 2.9 10E3/UL (ref 1.6–8.3)
NEUTROPHILS NFR BLD AUTO: 65 %
NRBC # BLD AUTO: 0 10E3/UL
NRBC BLD AUTO-RTO: 0 /100
P AXIS - MUSE: 51 DEGREES
PLATELET # BLD AUTO: 213 10E3/UL (ref 150–450)
POTASSIUM BLD-SCNC: 3.9 MMOL/L (ref 3.4–5.3)
PR INTERVAL - MUSE: 158 MS
QRS DURATION - MUSE: 96 MS
QT - MUSE: 364 MS
QTC - MUSE: 406 MS
R AXIS - MUSE: -7 DEGREES
RBC # BLD AUTO: 3.68 10E6/UL (ref 4.4–5.9)
SODIUM SERPL-SCNC: 140 MMOL/L (ref 133–144)
SYSTOLIC BLOOD PRESSURE - MUSE: NORMAL MMHG
T AXIS - MUSE: 25 DEGREES
TROPONIN I SERPL HS-MCNC: 4 NG/L
TROPONIN I SERPL HS-MCNC: 4 NG/L
VENTRICULAR RATE- MUSE: 75 BPM
WBC # BLD AUTO: 4.4 10E3/UL (ref 4–11)

## 2022-08-31 PROCEDURE — 82310 ASSAY OF CALCIUM: CPT | Performed by: PHYSICIAN ASSISTANT

## 2022-08-31 PROCEDURE — 82374 ASSAY BLOOD CARBON DIOXIDE: CPT | Performed by: PHYSICIAN ASSISTANT

## 2022-08-31 PROCEDURE — 85610 PROTHROMBIN TIME: CPT | Performed by: EMERGENCY MEDICINE

## 2022-08-31 PROCEDURE — 85379 FIBRIN DEGRADATION QUANT: CPT | Performed by: EMERGENCY MEDICINE

## 2022-08-31 PROCEDURE — 93005 ELECTROCARDIOGRAM TRACING: CPT

## 2022-08-31 PROCEDURE — 84484 ASSAY OF TROPONIN QUANT: CPT | Performed by: PHYSICIAN ASSISTANT

## 2022-08-31 PROCEDURE — 84484 ASSAY OF TROPONIN QUANT: CPT | Performed by: EMERGENCY MEDICINE

## 2022-08-31 PROCEDURE — 85025 COMPLETE CBC W/AUTO DIFF WBC: CPT | Performed by: PHYSICIAN ASSISTANT

## 2022-08-31 PROCEDURE — 36415 COLL VENOUS BLD VENIPUNCTURE: CPT | Performed by: PHYSICIAN ASSISTANT

## 2022-08-31 PROCEDURE — 71046 X-RAY EXAM CHEST 2 VIEWS: CPT

## 2022-08-31 PROCEDURE — 99285 EMERGENCY DEPT VISIT HI MDM: CPT | Mod: 25

## 2022-08-31 PROCEDURE — 36415 COLL VENOUS BLD VENIPUNCTURE: CPT | Performed by: EMERGENCY MEDICINE

## 2022-08-31 RX ORDER — ASPIRIN 81 MG/1
324 TABLET, CHEWABLE ORAL ONCE
Status: DISCONTINUED | OUTPATIENT
Start: 2022-08-31 | End: 2022-09-01 | Stop reason: HOSPADM

## 2022-08-31 ASSESSMENT — ENCOUNTER SYMPTOMS
SHORTNESS OF BREATH: 0
CHEST TIGHTNESS: 1
FEVER: 0
COUGH: 0

## 2022-08-31 ASSESSMENT — ACTIVITIES OF DAILY LIVING (ADL)
ADLS_ACUITY_SCORE: 35

## 2022-08-31 NOTE — ED PROVIDER NOTES
History   Chief Complaint:  Chest Pain     HPI   Petey Archibald is a 61 year old male with history of COPD, PE, hyperlipidemia and GERD who presents with chest pain. Today at 1500, the patient developed chest tightness when getting out of the shower. He notes accompanying centralized pleuritic chest pain. He had a similar episode to this a week ago. Due to this, EMS was called. With EMS he was given 325 Asprin and a dose of nasal nitroglycerin. Here, he states his symptoms are mostly resolved. He denies any shortness of breath, cough or fever. Of note, the patient is a current smoker.    Review of Systems   Constitutional: Negative for fever.   Respiratory: Positive for chest tightness. Negative for cough and shortness of breath.    Cardiovascular: Positive for chest pain.   All other systems reviewed and are negative.    Allergies:  Bees  Clavulanic Acid  Augmentin  Penicillins    Medications:  Albuterol  Abilify  Banophen  Wellbutrin  Klonopin  Clonidine  Benadryl  Emollient  Epinephrine  Neurontin  Claritin  Ativan  Robaxin  Singulair  Naltrexone  Nicotine lozenge  Prilosec   Miralax  Senna  Zoloft  Zocor  Spiriva HandiHaler  Chantix  Warfarin    Past Medical History:    Borderline mental retardation  Chronic infection  Coagulation disorder  COPD  DVT  PE  Hyperlipidemia  Morbid obesity  NEL  Peripheral edema  Peripheral vascular disease  Sleep apnea  Tobacco dependence   Asthma  Venous stasis dermatitis   Adenomatous polyp of ascending colon  Torus palatinus   GERD   Chronic bronchitis  Pilonidal cyst  Callus of food on Rt lat dist  Major depression  ED  Hemorrhoids  Edentulism, partial, class IV  Ulcer of left lower extremity with fat layer exposed  Lymphedema of left leg  Schizoaffective disorder    Past Surgical History:    Colonoscopy  Excise malignant lesions  Rectal surgery     Family History:    Brother: Diabetes    Social History:  The patient was accompanied to the ED by EMS.    Physical Exam  "    Patient Vitals for the past 24 hrs:   BP Temp Temp src Pulse Resp SpO2 Height Weight   08/31/22 1930 112/60 -- -- 71 17 96 % -- --   08/31/22 1800 -- -- -- 73 11 95 % -- --   08/31/22 1745 -- -- -- 72 13 95 % -- --   08/31/22 1721 106/59 98.7  F (37.1  C) Oral 78 14 95 % 1.727 m (5' 8\") (!) 163.7 kg (361 lb)       Physical Exam  General/Appearance: appears stated age, well-groomed, appears comfortable, elevated BMI  Eyes: EOMI, no scleral injection, no icterus  ENT: MMM  Neck: supple, nl ROM, no stiffness  Cardiovascular: RRR, nl S1S2, no m/r/g, 2+ pulses in all 4 extremities, cap refill <2sec  Respiratory: CTAB, good air movement throughout, no wheezes/rhonchi/rales, no increased WOB, no retractions  GI: abd soft, non-distended, nttp,  no HSM, no rebound, no guarding, nl BS  MSK: BREAUX, good tone, no bony abnormality  Skin: warm and well-perfused, no rash, no edema, no ecchymosis, nl turgor  Neuro: GCS 15, alert and oriented, no gross focal neuro deficits  Heme: no petechia, no purpura, no active bleeding    Emergency Department Course   ECG:  ECG taken at 1729, ECG read at 1801  Normal sinus rhythm  Low voltage QRS  Borderline ECG  Rate 75 bpm. MD interval 158 ms. QRS duration 96 ms. QT/QTc 364/406 ms. P-R-T axes 51 -7 25.    Imaging:  XR Chest 2 Views   Final Result   IMPRESSION: Generalized cardiac enlargement. Chest otherwise negative. Lungs remain clear and expanded.          Laboratory:  Labs Ordered and Resulted from Time of ED Arrival to Time of ED Departure   CBC WITH PLATELETS AND DIFFERENTIAL - Abnormal       Result Value    WBC Count 4.4      RBC Count 3.68 (*)     Hemoglobin 10.2 (*)     Hematocrit 33.7 (*)     MCV 92      MCH 27.7      MCHC 30.3 (*)     RDW 16.3 (*)     Platelet Count 213      % Neutrophils 65      % Lymphocytes 24      % Monocytes 7      % Eosinophils 3      % Basophils 1      % Immature Granulocytes 0      NRBCs per 100 WBC 0      Absolute Neutrophils 2.9      Absolute Lymphocytes " 1.0      Absolute Monocytes 0.3      Absolute Eosinophils 0.1      Absolute Basophils 0.0      Absolute Immature Granulocytes 0.0      Absolute NRBCs 0.0     INR - Abnormal    INR 2.25 (*)    BASIC METABOLIC PANEL - Normal    Sodium 140      Potassium 3.9      Chloride 108      Carbon Dioxide (CO2) 27      Anion Gap 5      Urea Nitrogen 20      Creatinine 0.97      Calcium 8.9      Glucose 96      GFR Estimate 88     TROPONIN I - Normal    Troponin I High Sensitivity 4     D DIMER QUANTITATIVE - Normal    D-Dimer Quantitative 0.44     TROPONIN I - Normal    Troponin I High Sensitivity 4        Emergency Department Course:     Reviewed:  I reviewed nursing notes, vitals, past medical history and care everywhere    Assessments:  1728 I obtained history and examined the patient as noted above.     2014 I rechecked and updated the patient regarding the imaging results, laboratory results and the plan for care.    Interventions:  1735 Aspirin 324mg PO    Disposition:  The patient was discharged to home.     Impression & Plan   Medical Decision Making:  This pt presents with chest pain of unclear etiology.  At this time I do not suspect that there is an acute/dangerous pathology for the chest pain. Their EKG and CXR were unremarkable.  Their high-sensitivity delta trop was neg and I feel their risk is low-enough to not warrant emergent provocative testing.  I have also considered PE but they are PERC neg/have a neg DDimer. There are no focal infiltrates, effusions, pulm edema, or evidence of PTX on CXR. The pt has not had recent fevers or viral infections to suggest pericarditis.  They are at low risk for aortic pathology and have nl aortic contour on CXR.  They have not had recent chest trauma.  All of this was discussed with the pt and they were reassurred.  I have advised them to follow-up with their PCP in the next 3 days to get further evaluation, and to return to the ED sooner if their CP continues/worsens, they  develop severe SOB/fevers/lightheadedness, or they develop any other new and concerning sxs. At this point the pt is HD stable and appropriate for discharge.      Diagnosis:    ICD-10-CM    1. Chest pain, unspecified type  R07.9        Discharge Medications:  New Prescriptions    No medications on file       Scribe Disclosure:  I, Kong Elizondo, am serving as a scribe at 6:03 PM on 8/31/2022 to document services personally performed by July Freeman MD based on my observations and the provider's statements to me.      July Freeman MD  08/31/22 2054

## 2022-08-31 NOTE — ED TRIAGE NOTES
Pt presents via EMS from his group home due to CP that began at about 1500 today when he was getting out of the shower. Pt had similar episode last week but by the time EMS arrived, it resolved. Pt describes pain as pressure and rates it as 6/10. Denies cardiac hx. Denies SOB.   Pt is on coumadin. EMS gave 325 asa and x1 spray of nitroglycerin. EMS established 18g IV to R forearm .      Detail Level: Detailed

## 2022-09-01 ENCOUNTER — TELEPHONE (OUTPATIENT)
Dept: ANTICOAGULATION | Facility: CLINIC | Age: 62
End: 2022-09-01

## 2022-09-01 DIAGNOSIS — Z86.711 HISTORY OF PULMONARY EMBOLISM: Primary | ICD-10-CM

## 2022-09-01 DIAGNOSIS — Z79.01 LONG TERM CURRENT USE OF ANTICOAGULANT THERAPY: ICD-10-CM

## 2022-09-01 RX ORDER — WARFARIN SODIUM 4 MG/1
TABLET ORAL
Qty: 56 TABLET | Refills: 0 | Status: SHIPPED | OUTPATIENT
Start: 2022-09-01 | End: 2022-11-02

## 2022-09-01 NOTE — ED NOTES
Writer spoke with group home staff also named Petey. Updated regarding plan to discharge patient back to the group home.

## 2022-09-01 NOTE — ED NOTES
Attempted to call patient's group home and caregiver, neither with an answer. Group home number is unable to leave a voicemail, left one with caregiver.   Attempted to call patient's brother, but the phone number is out of service.

## 2022-09-01 NOTE — TELEPHONE ENCOUNTER
ANTICOAGULATION  MANAGEMENT: Discharge Review    Petey Archibald chart reviewed for anticoagulation continuity of care    Emergency room visit on 8/31/22 for chest pain.    Discharge disposition: back to his group [facility]    Results:    Recent labs: (last 7 days)     08/31/22  1730   INR 2.25*     Anticoagulation inpatient management:     not applicable     Anticoagulation discharge instructions:     Warfarin dosing: home regimen continued   Bridging: No   INR goal change: No      Medication changes affecting anticoagulation: No    Additional factors affecting anticoagulation: No     PLAN     Recommend to check INR on in 4 weeks. Rescheduled tomorrow's lab to 9/28/22.    Spoke with Hunter     Anticoagulation Calendar updated    ANTICOAGULATION MANAGEMENT     Petey Archibald 61 year old male is on warfarin with therapeutic INR result. (Goal INR )    Recent labs: (last 7 days)     08/31/22  1730   INR 2.25*       ASSESSMENT       Source(s): Chart Review and Home Care/Facility Nurse       Warfarin doses taken: Warfarin taken as instructed    Diet: No new diet changes identified    New illness, injury, or hospitalization: Yes: Seen in ED yesterday for chest pain. EKG, CXR and labs unremarkable. Pain relieved with Aspirin and nasal nitroglycerin. Advised to follow up with PCP in the next few days.    Medication/supplement changes: None noted    Signs or symptoms of bleeding or clotting: No    Previous INR: Therapeutic last visit; previously outside of goal range    Additional findings: None       PLAN     Recommended plan for no diet, medication or health factor changes affecting INR     Dosing Instructions: Continue your current warfarin dose with next INR in 4 weeks       Summary  As of 9/1/2022    Full warfarin instructions:  6 mg every Mon; 8 mg all other days   Next INR check:  9/28/2022             Telephone call with  group [facility], who verbalizes understanding and agrees to  plan    Lab visit scheduled. Sent prescription to [facility]. Mailing AVS.     Education provided: Please call back if any changes to your diet, medications or how you've been taking warfarin and Contact [phone]  with any changes, questions or concerns.     Plan made per [facility] anticoagulation protocol    Lauryn Chatman RN  Anticoagulation Clinic  9/1/2022    _______________________________________________________________________     Anticoagulation Episode Summary     Current INR goal:  2.0-3.0   TTR:  74.4 % (1 y)   Target end date:  Indefinite   [facility]    Indications    History of pulmonary embolism [Z86.711]  Long term current use of anticoagulant therapy [Z79.01]           Comments:  REM alf; A new Coumadin Prescription will need to sent to [facility] with current dose and next INR check.         Anticoagulation Care Providers     Provider Role Specialty Phone number    Yolande Lacy PA-C Referring Family Medicine [phone]    Demetri Archer MD Referring Internal Medicine [phone]    Silvino Baker MD Responsible Family Medicine [phone]

## 2022-09-13 DIAGNOSIS — Z86.711 HISTORY OF PULMONARY EMBOLISM: Primary | ICD-10-CM

## 2022-09-13 DIAGNOSIS — Z79.01 LONG TERM CURRENT USE OF ANTICOAGULANT THERAPY: ICD-10-CM

## 2022-09-13 NOTE — PROGRESS NOTES
ANTICOAGULATION CLINIC REFERRAL RENEWAL REQUEST       An annual renewal order is required for all patients referred to [facility] Anticoagulation [facility].?  Please review and sign the pended referral order for Petey Archibald.       ANTICOAGULATION SUMMARY      Warfarin indication(s)   PE    Mechanical heart valve present?  NO       Current goal range   INR: 2.0-3.0     Goal appropriate for indication? Goal INR 2-3, standard for indication(s) above     Time in Therapeutic Range (TTR)  (Goal > 60%) 74.4%       Office visit with referring provider's group within last year yes on 6/16/22       Lisa Ponce RN  [facility] Anticoagulation [facility]

## 2022-09-28 ENCOUNTER — TELEPHONE (OUTPATIENT)
Dept: INTERNAL MEDICINE | Facility: CLINIC | Age: 62
End: 2022-09-28

## 2022-09-28 ENCOUNTER — LAB (OUTPATIENT)
Dept: LAB | Facility: CLINIC | Age: 62
End: 2022-09-28
Payer: MEDICARE

## 2022-09-28 ENCOUNTER — ANTICOAGULATION THERAPY VISIT (OUTPATIENT)
Dept: ANTICOAGULATION | Facility: CLINIC | Age: 62
End: 2022-09-28

## 2022-09-28 DIAGNOSIS — Z86.711 HISTORY OF PULMONARY EMBOLISM: Primary | ICD-10-CM

## 2022-09-28 DIAGNOSIS — Z79.01 LONG TERM CURRENT USE OF ANTICOAGULANT THERAPY: ICD-10-CM

## 2022-09-28 DIAGNOSIS — Z86.711 HISTORY OF PULMONARY EMBOLISM: ICD-10-CM

## 2022-09-28 LAB — INR BLD: 2.7 (ref 0.9–1.1)

## 2022-09-28 PROCEDURE — 85610 PROTHROMBIN TIME: CPT

## 2022-09-28 PROCEDURE — 36416 COLLJ CAPILLARY BLOOD SPEC: CPT

## 2022-09-28 NOTE — TELEPHONE ENCOUNTER
Reason for Call:  Mumtaz Martinez returning a call for patient/please return call for next route of care/I did not see Hunter on consent to communicate    Detailed comments: Return call for next route care    Phone Number Patient can be reached at: Other phone number:  [phone]    Best Time: any    Can we leave a detailed message on this number? NO    Call taken on 9/28/2022 at 2:22 PM by Alyce Smith

## 2022-09-28 NOTE — PROGRESS NOTES
AVS placed in outgoing mail to address listed in demographics.  Debbie Thurston RN, BSN  Anticoagulation Clinic

## 2022-09-28 NOTE — PROGRESS NOTES
ANTICOAGULATION MANAGEMENT     Petey Archibald 61 year old male is on warfarin with therapeutic INR result. (Goal INR 2.0-3.0)    Recent labs: (last 7 days)     09/28/22  1045   INR 2.7*       ASSESSMENT       Source(s): Chart Review and Patient/Caregiver Call       Warfarin doses taken: Warfarin taken as instructed    Diet: No new diet changes identified    New illness, injury, or hospitalization: No    Medication/supplement changes: None noted    Signs or symptoms of bleeding or clotting: No    Previous INR: Therapeutic last 2(+) visits    Additional findings: None       PLAN     Recommended plan for no diet, medication or health factor changes affecting INR     Dosing Instructions: Continue your current warfarin dose with next INR in 5 weeks       Summary  As of 9/28/2022    Full warfarin instructions:  6 mg every Mon; 8 mg all other days   Next INR check:  11/2/2022             Telephone call with  PharmD at [facility] who verbalizes understanding and agrees to plan. Spoke with Hunter and reviewed.     Lab visit scheduled    Education provided: Please call back if any changes to your diet, medications or how you've been taking warfarin    Plan made per [facility] anticoagulation protocol    Cameron Blackmon RN  Anticoagulation Clinic  9/28/2022    _______________________________________________________________________     Anticoagulation Episode Summary     Current INR goal:  2.0-3.0   TTR:  78.2 % (1 y)   Target end date:  Indefinite   Send INR reminders to:  [facility]    Indications    History of pulmonary embolism [Z86.711]  Long term current use of anticoagulant therapy [Z79.01]           Comments:  REM alf; A new Coumadin Prescription will need to sent to [facility] with current dose and next INR check.         Anticoagulation Care Providers     Provider Role Specialty Phone number    Demetri Archer MD Referring Internal Medicine [phone]

## 2022-09-28 NOTE — TELEPHONE ENCOUNTER
See ACC encounter  Maggie Blackmon RN   [facility] Anticoagulation [facility]

## 2022-10-03 RX ORDER — CHOLECALCIFEROL (VITAMIN D3) 50 MCG
TABLET ORAL
Qty: 100 TABLET | Refills: 11 | OUTPATIENT
Start: 2022-10-03

## 2022-11-01 ENCOUNTER — OFFICE VISIT (OUTPATIENT)
Dept: INTERNAL MEDICINE | Facility: CLINIC | Age: 62
End: 2022-11-01
Payer: MEDICARE

## 2022-11-01 ENCOUNTER — TELEPHONE (OUTPATIENT)
Dept: INTERNAL MEDICINE | Facility: CLINIC | Age: 62
End: 2022-11-01

## 2022-11-01 ENCOUNTER — ANTICOAGULATION THERAPY VISIT (OUTPATIENT)
Dept: ANTICOAGULATION | Facility: CLINIC | Age: 62
End: 2022-11-01

## 2022-11-01 VITALS
OXYGEN SATURATION: 95 % | SYSTOLIC BLOOD PRESSURE: 112 MMHG | WEIGHT: 315 LBS | HEART RATE: 89 BPM | BODY MASS INDEX: 47.74 KG/M2 | DIASTOLIC BLOOD PRESSURE: 64 MMHG | TEMPERATURE: 98.1 F | HEIGHT: 68 IN

## 2022-11-01 DIAGNOSIS — Z86.711 HISTORY OF PULMONARY EMBOLISM: ICD-10-CM

## 2022-11-01 DIAGNOSIS — Z79.01 LONG TERM CURRENT USE OF ANTICOAGULANT THERAPY: ICD-10-CM

## 2022-11-01 DIAGNOSIS — Z00.00 MEDICARE ANNUAL WELLNESS VISIT, SUBSEQUENT: Primary | ICD-10-CM

## 2022-11-01 DIAGNOSIS — Z87.891 PERSONAL HISTORY OF TOBACCO USE: Primary | ICD-10-CM

## 2022-11-01 DIAGNOSIS — Z86.711 HISTORY OF PULMONARY EMBOLISM: Primary | ICD-10-CM

## 2022-11-01 DIAGNOSIS — Z87.891 PERSONAL HISTORY OF TOBACCO USE: ICD-10-CM

## 2022-11-01 LAB — INR BLD: 3.8 (ref 0.9–1.1)

## 2022-11-01 PROCEDURE — G0439 PPPS, SUBSEQ VISIT: HCPCS | Performed by: INTERNAL MEDICINE

## 2022-11-01 PROCEDURE — G0296 VISIT TO DETERM LDCT ELIG: HCPCS | Performed by: INTERNAL MEDICINE

## 2022-11-01 PROCEDURE — 36416 COLLJ CAPILLARY BLOOD SPEC: CPT | Performed by: INTERNAL MEDICINE

## 2022-11-01 PROCEDURE — 85610 PROTHROMBIN TIME: CPT | Performed by: INTERNAL MEDICINE

## 2022-11-01 RX ORDER — CLOBAZAM 10 MG/1
TABLET ORAL
Qty: 15 TABLET | Refills: 5 | OUTPATIENT
Start: 2022-11-01

## 2022-11-01 ASSESSMENT — ACTIVITIES OF DAILY LIVING (ADL)
CURRENT_FUNCTION: HOUSEWORK REQUIRES ASSISTANCE
CURRENT_FUNCTION: TRANSPORTATION REQUIRES ASSISTANCE
CURRENT_FUNCTION: MEDICATION ADMINISTRATION REQUIRES ASSISTANCE
CURRENT_FUNCTION: LAUNDRY REQUIRES ASSISTANCE
CURRENT_FUNCTION: PREPARING MEALS REQUIRES ASSISTANCE

## 2022-11-01 NOTE — PROGRESS NOTES
"SUBJECTIVE:   Petey is a 61 year old who presents for Preventive Visit.    Patient has been advised of split billing requirements and indicates understanding: Yes     Are you in the first 12 months of your Medicare coverage?  No    Healthy Habits:    In general, how would you rate your overall health?  Fair    Frequency of exercise:  None    Duration of exercise:  Other    Do you usually eat at least 4 servings of fruit and vegetables a day, include whole grains    & fiber and avoid regularly eating high fat or \"junk\" foods?  No    Taking medications regularly:  Yes    Barriers to taking medications:  None    Medication side effects:  None    Ability to successfully perform activities of daily living:  Transportation requires assistance, preparing meals requires assistance, housework requires assistance, laundry requires assistance and medication administration requires assistance    Home Safety:  No safety concerns identified    Hearing Impairment:  No hearing concerns    In the past 6 months, have you been bothered by leaking of urine? Yes (bed wetting)    In general, how would you rate your overall mental or emotional health?  Fair      PHQ-2 Total Score:    Additional concerns today:  No    Do you feel safe in your environment? Yes    Have you ever done Advance Care Planning? (For example, a Health Directive, POLST, or a discussion with a medical provider or your loved ones about your wishes): Yes, patient states has an Advance Care Planning document and will bring a copy to the clinic.    Fall risk  Fallen 2 or more times in the past year?: No  Any fall with injury in the past year?: No    Cognitive Screening Not appropriate due to mental handicap    Do you have sleep apnea, excessive snoring or daytime drowsiness?: yes    Reviewed and updated as needed this visit by clinical staff   Tobacco  Allergies  Meds  Problems  Med Hx  Surg Hx  Fam Hx        Reviewed and updated as needed this visit by " Provider   Tobacco  Allergies  Meds  Problems  Med Hx  Surg Hx  Fam Hx         Social History     Tobacco Use     Smoking status: Every Day     Packs/day: 1.00     Years: 30.00     Pack years: 30.00     Types: Cigarettes     Smokeless tobacco: Never     Tobacco comments:     started at age 18    Substance Use Topics     Alcohol use: No     Alcohol/week: 0.0 standard drinks     If you drink alcohol do you typically have >3 drinks per day or >7 drinks per week? No    Alcohol Use 11/1/2022   Prescreen: >3 drinks/day or >7 drinks/week? -   Prescreen: >3 drinks/day or >7 drinks/week? No     Current providers sharing in care for this patient include:   Patient Care Team:  Demetri Archer MD as PCP - General (Internal Medicine)  Joseph Krishnamurthy MD as MD (Urology)  Margarita Jenkins PA-C as Physician Assistant (Dermatology)  Margarita Jenkins PA-C as Assigned Surgical Provider  Yinka Hagen MD as Assigned PCP    The following health maintenance items are reviewed in Epic and correct as of today:  Health Maintenance   Topic Date Due     ANNUAL REVIEW OF HM ORDERS  Never done     HEPATITIS B IMMUNIZATION (1 of 3 - 3-dose series) Never done     ZOSTER IMMUNIZATION (1 of 2) Never done     LUNG CANCER SCREENING  05/02/2020     MEDICARE ANNUAL WELLNESS VISIT  10/29/2022     PHQ-9  12/16/2022     DTAP/TDAP/TD IMMUNIZATION (2 - Td or Tdap) 02/18/2023     NICOTINE/TOBACCO CESSATION COUNSELING Q 1 YR  11/01/2023     Pneumococcal Vaccine: Pediatrics (0 to 5 Years) and At-Risk Patients (6 to 64 Years) (3 - PPSV23 if available, else PCV20) 12/06/2023     COLORECTAL CANCER SCREENING  06/07/2026     LIPID  10/29/2026     ADVANCE CARE PLANNING  11/01/2027     SPIROMETRY  Completed     HEPATITIS C SCREENING  Completed     HIV SCREENING  Completed     COPD ACTION PLAN  Completed     DEPRESSION ACTION PLAN  Completed     INFLUENZA VACCINE  Completed     COVID-19 Vaccine  Completed     IPV IMMUNIZATION  Aged Out  "    MENINGITIS IMMUNIZATION  Aged Out     Petey presents today for an AWV. We also discussed his chronic hip pain. Missed recent PM&R appointment. He notes     Review of Systems  10pt ROS reviewed and all other systems negative unless otherwise stated above.    OBJECTIVE:   /64   Pulse 89   Temp 98.1  F (36.7  C) (Tympanic)   Ht 1.727 m (5' 8\")   Wt (!) 161.3 kg (355 lb 11.2 oz)   SpO2 95%   BMI 54.08 kg/m   Estimated body mass index is 54.08 kg/m  as calculated from the following:    Height as of this encounter: 1.727 m (5' 8\").    Weight as of this encounter: 161.3 kg (355 lb 11.2 oz).     Physical Exam  GENERAL: alert and in no distress.  EYES: conjunctivae/corneas clear. EOMs grossly intact  HENT: Facies symmetric.  RESP: CTAB, no w/r/r.  CV: RRR, no m/r/g.  GI: NT, ND, without rebound tenderness or guarding  MSK: Uses walker to ambulate.  SKIN: No significant ulcers, lesions, or rashes on the visualized portions of the skin  NEURO: CN II-XII grossly intact.    Diagnostic Test Results: Labs reviewed in [facility]    ASSESSMENT / PLAN:   Medicare annual wellness visit, subsequent  Reviewed PMH. Discussed healthcare maintenance issues, including cancer screenings (UTD), relevant immunizations (rec Shingrix through pharmacy), and cardiac risk factor screenings such as for cholesterol, HTN, and DM.    Personal history of tobacco use  Spent most of today discussing Petey's continued smoking. It seems like the habit of having something in his mouth or hands is the biggest thing. Recommend trying non-nicotine options such as cough drops. He was open to trying Chantix again to see if that can help with cravings. He'd like to pursue lung cancer screening again, ordered.  - Prof fee: Shared Decision Making for Lung Cancer Screening  - CT Chest Lung Cancer Scrn Low Dose wo; Future  - varenicline (CHANTIX BRE) 0.5 MG X 11 & 1 MG X 42 tablet; Take 0.5 mg tab daily for 3 days, THEN 0.5 mg tab twice daily for 4 days, " "THEN 1 mg twice daily.    COUNSELING:  Reviewed preventive health counseling, as reflected in patient instructions       Consider lung cancer screening for ages 55-80 years (77 for Medicare) and 20 pack-year smoking history    Estimated body mass index is 54.08 kg/m  as calculated from the following:    Height as of this encounter: 1.727 m (5' 8\").    Weight as of this encounter: 161.3 kg (355 lb 11.2 oz).    He reports that he has been smoking cigarettes. He has a 30.00 pack-year smoking history. He has never used smokeless tobacco.     Nicotine/Tobacco Cessation Plan:   Information offered: Patient not interested at this time    Appropriate preventive services were discussed with this patient, including applicable screening as appropriate for cardiovascular disease, diabetes, osteopenia/osteoporosis, and glaucoma.  As appropriate for age/gender, discussed screening for colorectal cancer, prostate cancer, breast cancer, and cervical cancer. Checklist reviewing preventive services available has been given to the patient.    Reviewed patients plan of care and provided an AVS. The Intermediate Care Plan ( asthma action plan, low back pain action plan, and migraine action plan) for Petey meets the Care Plan requirement. This Care Plan has been established and reviewed with the Patient.    Demetri Holder MD  [facility]    Identified Health Risks:  Lung Cancer Screening Shared Decision Making Visit   Petey Archibald, a 61 year old male, is eligible for lung cancer screening    History   Smoking Status     Every Day     Packs/day: 1.00     Years: 30.00     Types: Cigarettes   Smokeless Tobacco     Never     I have discussed with patient the risks and benefits of screening for lung cancer with low-dose CT.     The risks include:    radiation exposure: one low dose chest CT has as much ionizing radiation as about 15 chest x-rays, or 6 months of background radiation living in [place]  "     false positives: most findings/nodules are NOT cancer, but some might still require additional diagnostic evaluation, including biopsy    over-diagnosis: some slow growing cancers that might never have been clinically significant will be detected and treated unnecessarily     The benefit of early detection of lung cancer is contingent upon adherence to annual screening or more frequent follow up if indicated.     Furthermore, to benefit from screening, Petey must be willing and able to undergo diagnostic procedures, if indicated. Although no specific guide is available for determining severity of comorbidities, it is reasonable to withhold screening in patients who have greater mortality risk from other diseases.     We did discuss that the best way to prevent lung cancer is to not smoke.    Some patients may value a numeric estimation of lung cancer risk when evaluating if lung cancer screening is right for them, here is one calculator:    ShouldIScreen

## 2022-11-01 NOTE — PROGRESS NOTES
ANTICOAGULATION MANAGEMENT     Petey Archibald 61 year old male is on warfarin with supratherapeutic INR result. (Goal INR 2.0-3.0)    Recent labs: (last 7 days)     11/01/22  1603   INR 3.8*       ASSESSMENT       Source(s): Chart Review       Warfarin doses taken: Reviewed in chart    Diet: unable to assess    New illness, injury, or hospitalization: No    Medication/supplement changes: Chantix started on today (11/1/22) No interaction anticipated    Signs or symptoms of bleeding or clotting: No    Previous INR: Therapeutic last 2(+) visits    Additional findings: None       PLAN     Unable to reach group sudheer Harrison today.    Left message to hold warfarin tonight. Request call back for assessment.   Called group [facility] and spoke with Jarred, advised he hold warfarin dose tonight for supratherapeutic INR and will try calling [facility] again in the morning.     Follow up required to discuss out of range result     Lauryn Chatman RN  Anticoagulation Clinic  11/1/2022

## 2022-11-01 NOTE — TELEPHONE ENCOUNTER
Received a call from [facility], wanting clarification on the new script sent for Chantix starter pack. Per [facility], after speaking with the head supervisor at the patient's group home, the patient has been taking Chantix 1 mg 2 times daily and has not missed any doses. Pharmacy wants to verify why the starter pack was sent to the pharmacy versus continuing with the 1 mg tablets 2 times daily?    Will route to PCP for review.     [facility] requesting a call back with provider's response:  Call Back Number: [phone], ask to speak to a pharmacist    Sadie Monzon RN

## 2022-11-01 NOTE — PATIENT INSTRUCTIONS
- Call PM&R injection physician (Dr. Yinka Hagen) to set up a follow-up appointment to discuss repeat injections  - STOP drinking water 4 hours before bedtime to see if this helps with urinary incontinence  - Call [phone] to schedule your CT scan with our centralized imaging schedulers  - Try Chantix to help quit smoking

## 2022-11-02 RX ORDER — WARFARIN SODIUM 4 MG/1
TABLET ORAL
Qty: 26 TABLET | Refills: 0 | Status: SHIPPED | OUTPATIENT
Start: 2022-11-02 | End: 2022-11-18

## 2022-11-02 RX ORDER — VARENICLINE TARTRATE 1 MG/1
1 TABLET, FILM COATED ORAL 2 TIMES DAILY
Qty: 180 TABLET | Refills: 1 | Status: SHIPPED | OUTPATIENT
Start: 2022-11-02 | End: 2024-01-03

## 2022-11-02 NOTE — PROGRESS NOTES
ANTICOAGULATION MANAGEMENT     Petey Archibald 61 year old male is on warfarin with supratherapeutic INR result. (Goal INR 2.0-3.0)    Recent labs: (last 7 days)     11/01/22  1603   INR 3.8*       ASSESSMENT       Source(s): Chart Review and        Warfarin doses taken: Warfarin taken as instructed    Diet: No new diet changes identified    New illness, injury, or hospitalization: No, though issues with urinary incontinence noted. At this time, no problems with skin breakdown.    Medication/supplement changes: Chantix starter damian prescribed yesterday, but patient continues to smoke in addition to using Chantix. No interaction with warfarin noted in Up-to-date.    Signs or symptoms of bleeding or clotting: No    Previous INR: Therapeutic last 2(+) visits    Additional findings: None       PLAN     Recommended plan for temporary change(s) affecting INR     Dosing Instructions: hold dose then continue your current warfarin dose with next INR in 2 weeks       Summary  As of 11/1/2022    Full warfarin instructions:  11/1: Hold; Otherwise 6 mg every Mon; 8 mg all other days; Starting 11/1/2022   Next INR check:  11/15/2022             Telephone call with  group [facility], who agrees to plan and repeated back plan correctly    Lab visit scheduled    Education provided:     Please call back if any changes to your diet, medications or how you've been taking warfarin    Symptom monitoring: monitoring for bleeding signs and symptoms, when to seek medical attention/emergency care and if you hit your head or have a bad fall seek emergency care    Contact [phone]  with any changes, questions or concerns.     encourage patient to continue with smoking cessation    Plan made per [facility] anticoagulation protocol    Lauryn Chatman RN  Anticoagulation Clinic  11/2/2022    _______________________________________________________________________     Anticoagulation Episode Summary     Current  INR goal:  2.0-3.0   TTR:  75.8 % (1 y)   Target end date:  Indefinite   Send INR reminders to:  [facility]    Indications    History of pulmonary embolism [Z86.711]  Long term current use of anticoagulant therapy [Z79.01]           Comments:  REM USP; A new Coumadin Prescription will need to sent to [facility] with current dose and next INR check.         Anticoagulation Care Providers     Provider Role Specialty Phone number    Demetri Arhcer MD Referring Internal Medicine [phone]

## 2022-11-02 NOTE — TELEPHONE ENCOUNTER
[place] History & Physical    Gender: female BW: 7 lb 7.2 oz (3380 g)   Age: 2 hours OB:    Gestational Age at Birth: Gestational Age: 39w1d Pediatrician: Primary Provider: Hal     Maternal Information:     Mother's Name: Ericka Rob    Age: 24 y.o.         Maternal Prenatal Labs -- transcribed from office records:   ABO Type   Date Value Ref Range Status   2018 B  Final     RH type   Date Value Ref Range Status   2018 Positive  Final     Antibody Screen   Date Value Ref Range Status   2018 Negative  Final     External RPR   Date Value Ref Range Status   2018 Non-Reactive  Final     External Hepatitis B Surface Ag   Date Value Ref Range Status   2018 Negative  Final     External HIV Antibody   Date Value Ref Range Status   2018 Non-Reactive  Final     External Hepatitis C Ab   Date Value Ref Range Status   2018 non reactive  Final     External Strep Group B Ag   Date Value Ref Range Status   2018 NEG  Final     No results found for: AMPHETSCREEN, BARBITSCNUR, LABBENZSCN, LABMETHSCN, PCPUR, LABOPIASCN, THCURSCR, COCSCRUR, PROPOXSCN, BUPRENORSCNU, OXYCODONESCN, TRICYCLICSCN, UDS       Information for the patient's mother:  Ericka Rob [[id number]]     Patient Active Problem List   Diagnosis   • Gestational diabetes mellitus in childbirth, diet controlled   • Anemia   • Pregnancy   • Term pregnancy        Mother's Past Medical and Social History:      Maternal /Para:    Maternal PMH:    Past Medical History:   Diagnosis Date   • Anemia    • Labial varicosities      Maternal Social History:    Social History     Social History   • Marital status:      Spouse name: N/A   • Number of children: N/A   • Years of education: N/A     Occupational History   • Not on file.     Social History Main Topics   • Smoking status: Never Smoker   • Smokeless tobacco: Never Used   • Alcohol use No   • Drug use: No   • Sexual activity: Yes     Partners:  Patient was unaware on if he was taking Chantix. Group home staff also did not know. Patient did not think he was, hence starter pack. Okay to continue Chantix BID.   "Male     Birth control/ protection: None     Other Topics Concern   • Not on file     Social History Narrative   • No narrative on file       Mother's Current Medications     Information for the patient's mother:  Ericka Rob [[id number]]   erythromycin      phytonadione          Labor Information:      Labor Events      labor: No Induction:       Steroids?  None Reason for Induction:      Rupture date:  2018 Complications:    Labor complications:     Additional complications:     Rupture time:  12:38 PM    Rupture type:       Fluid Color:  Clear    Antibiotics during Labor?  No           Anesthesia     Method: Spinal     Analgesics:          Delivery Information for Geovanna Rob     YOB: 2020 Delivery Clinician:     Time of birth:  12:39 PM Delivery type:  , Low Transverse   Forceps:     Vacuum:     Breech:      Presentation/position:          Observed Anomalies:  Panda OR 3 Delivery Complications:          APGAR SCORES             APGARS  One minute Five minutes Ten minutes Fifteen minutes Twenty minutes   Skin color: 0   1             Heart rate: 2   2             Grimace: 2   2              Muscle tone: 2   2              Breathin   2              Totals: 8   9                Resuscitation     Suction: catheter  bulb syringe   Catheter size:     Suction below cords:     Intensive:       Objective      Information     Vital Signs Temp:  [98 °F (36.7 °C)-99.1 °F (37.3 °C)] 98.1 °F (36.7 °C)  Heart Rate:  [138-170] 140  Resp:  [38-52] 40   Admission Vital Signs: Vitals  Temp: 98.2 °F (36.8 °C)  Temp src: Axillary  Heart Rate: 170  Heart Rate Source: Apical  Resp: 44  Resp Rate Source: Stethoscope   Birth Weight: 3380 g (7 lb 7.2 oz)   Birth Length: 19   Birth Head circumference: Head Circumference: 36 cm (14.17\")   Current Weight: Weight: 3380 g (7 lb 7.2 oz) (Filed from Delivery Summary)   Change in weight since birth: 0%         Physical Exam "     General appearance Normal Term female   Skin  No rashes.  No jaundice   Head AFSF.  No caput. No cephalohematoma. No nuchal folds   Eyes  + RR bilaterally   Ears, Nose, Throat  Normal ears.  No ear pits. No ear tags.  Palate intact.   Thorax  Normal   Lungs BSBE - CTA. No distress.   Heart  Normal rate and rhythm.  No murmurs, no gallops. Peripheral pulses strong and equal in all 4 extremities.   Abdomen + BS.  Soft. NT. ND.  No mass/HSM   Genitalia  normal female exam   Anus Anus patent   Trunk and Spine Spine intact.  No sacral dimples.   Extremities  Clavicles intact.  No hip clicks/clunks.   Neuro + [place], grasp, suck.  Normal Tone       Intake and Output     Feeding: breastfeed    Urine: 0  Stool: 0      Labs and Radiology     Prenatal labs:  reviewed    Baby's Blood type: No results found for: ABO, LABABO, RH, LABRH     Labs:   No results found for this or any previous visit (from the past 96 hour(s)).    TCI:       Xrays:  No orders to display         Assessment/Plan     Discharge planning     Congenital Heart Disease Screen:  Blood Pressure/O2 Saturation/Weights   Vitals (last 7 days)     Date/Time   BP   BP Location   SpO2   Weight    10/09/18 1239  --  --  --  3380 g (7 lb 7.2 oz)    Weight: Filed from Delivery Summary at 10/09/18 1239               [place] [facility]  [facility]     Car Seat Challenge Test     Hearing Screen       Screen         There is no immunization history for the selected administration types on file for this patient.    Assessment and Plan     Principal Problem:     Term  delivered by  section, current hospitalization  Assessment: born via repeat c/s at 39w1d. Pregnancy complicated by maternal anemia. Mother with GDM in prior pregnancy-normal GTT this pregnancy. Prenatal labs negative, including GBS. MBT: B+. ROM at delivery with clear fluid. Baby had large amts of fluid  Nasally and orally suctioned at delivery. Mother plans to breastfeed. Follow up ped:   Hal  Plan:   -routine  care      Francia Nicholas, APRN  2018  2:58 PM

## 2022-11-02 NOTE — PROGRESS NOTES
PLAN     Unable to reach [facility] today.    LM to call [facility]    Follow up required to discuss out of range result     Cameron Blackmon RN  Anticoagulation Clinic  11/2/2022

## 2022-11-18 ENCOUNTER — LAB (OUTPATIENT)
Dept: LAB | Facility: CLINIC | Age: 62
End: 2022-11-18
Payer: MEDICARE

## 2022-11-18 ENCOUNTER — ANTICOAGULATION THERAPY VISIT (OUTPATIENT)
Dept: ANTICOAGULATION | Facility: CLINIC | Age: 62
End: 2022-11-18

## 2022-11-18 DIAGNOSIS — Z79.01 LONG TERM CURRENT USE OF ANTICOAGULANT THERAPY: ICD-10-CM

## 2022-11-18 DIAGNOSIS — Z86.711 HISTORY OF PULMONARY EMBOLISM: Primary | ICD-10-CM

## 2022-11-18 DIAGNOSIS — Z86.711 HISTORY OF PULMONARY EMBOLISM: ICD-10-CM

## 2022-11-18 LAB — INR BLD: 1.4 (ref 0.9–1.1)

## 2022-11-18 PROCEDURE — 85610 PROTHROMBIN TIME: CPT

## 2022-11-18 PROCEDURE — 36416 COLLJ CAPILLARY BLOOD SPEC: CPT

## 2022-11-18 RX ORDER — WARFARIN SODIUM 4 MG/1
TABLET ORAL
Qty: 15 TABLET | Refills: 0 | Status: SHIPPED | OUTPATIENT
Start: 2022-11-18 | End: 2022-11-28

## 2022-11-18 NOTE — PROGRESS NOTES
ANTICOAGULATION MANAGEMENT     Petey Archibald 61 year old male is on warfarin with subtherapeutic INR result. (Goal INR 2.0-3.0)    Recent labs: (last 7 days)     11/18/22  1544   INR 1.4*       ASSESSMENT       Source(s): Chart Review and Patient/Caregiver Call       Warfarin doses taken: Warfarin taken as instructed    Diet: No new diet changes identified    New illness, injury, or hospitalization: No    Medication/supplement changes: None noted    Signs or symptoms of bleeding or clotting: No    Previous INR: Supratherapeutic    Additional findings: Patient had been smoking a lot and caregiver reports he cannot afford cigarettes right now       PLAN     Recommended plan for temporary change(s) affecting INR     Dosing Instructions: booster dose then continue your current warfarin dose with next INR in 1 week       Summary  As of 11/18/2022    Full warfarin instructions:  11/18: 12 mg; Otherwise 6 mg every Mon; 8 mg all other days; Starting 11/18/2022   Next INR check:  11/25/2022             Telephone call with [facility] nurse who verbalizes understanding and agrees to plan. Order faxed to [facility]    Lab visit scheduled    Education provided:     Please call back if any changes to your diet, medications or how you've been taking warfarin    Plan made per [facility] anticoagulation protocol    Cameron Blackmon RN  Anticoagulation Clinic  11/18/2022    _______________________________________________________________________     Anticoagulation Episode Summary     Current INR goal:  2.0-3.0   TTR:  73.2 % (1 y)   Target end date:  Indefinite   Send INR reminders to:  [facility]    Indications    History of pulmonary embolism [Z86.711]  Long term current use of anticoagulant therapy [Z79.01]           Comments:  REM snf; A new Coumadin Prescription will need to sent to [facility] with current dose and next INR check.         Anticoagulation Care Providers     Provider Role Specialty Phone  number    Demetri Archer MD [facility] Internal Medicine [phone]

## 2022-11-28 ENCOUNTER — LAB (OUTPATIENT)
Dept: LAB | Facility: CLINIC | Age: 62
End: 2022-11-28
Payer: MEDICARE

## 2022-11-28 ENCOUNTER — ANTICOAGULATION THERAPY VISIT (OUTPATIENT)
Dept: ANTICOAGULATION | Facility: CLINIC | Age: 62
End: 2022-11-28

## 2022-11-28 DIAGNOSIS — Z79.01 LONG TERM CURRENT USE OF ANTICOAGULANT THERAPY: ICD-10-CM

## 2022-11-28 DIAGNOSIS — Z86.711 HISTORY OF PULMONARY EMBOLISM: Primary | ICD-10-CM

## 2022-11-28 DIAGNOSIS — Z86.711 HISTORY OF PULMONARY EMBOLISM: ICD-10-CM

## 2022-11-28 LAB — INR BLD: 1.9 (ref 0.9–1.1)

## 2022-11-28 PROCEDURE — 36416 COLLJ CAPILLARY BLOOD SPEC: CPT

## 2022-11-28 PROCEDURE — 85610 PROTHROMBIN TIME: CPT

## 2022-11-28 RX ORDER — WARFARIN SODIUM 4 MG/1
TABLET ORAL
Qty: 30 TABLET | Refills: 0 | Status: SHIPPED | OUTPATIENT
Start: 2022-11-28 | End: 2022-12-12

## 2022-11-28 NOTE — PROGRESS NOTES
ANTICOAGULATION MANAGEMENT     Petey Archibald 61 year old male is on warfarin with subtherapeutic INR result. (Goal INR 2.0-3.0)    Recent labs: (last 7 days)     11/28/22  1330   INR 1.9*       ASSESSMENT       Source(s): Chart Review and Patient/Caregiver Call       Warfarin doses taken: Warfarin taken as instructed    Diet: No new diet changes identified    New illness, injury, or hospitalization: No    Medication/supplement changes: None noted    Signs or symptoms of bleeding or clotting: No    Previous INR: Subtherapeutic    Additional findings: None       PLAN     Recommended plan for no diet, medication or health factor changes affecting INR     Dosing Instructions: Increase your warfarin dose (7.4% change) with next INR in 2 weeks       Summary  As of 11/28/2022    Full warfarin instructions:  10 mg every Mon; 8 mg all other days; Starting 11/28/2022   Next INR check:  12/12/2022             Telephone call with  Bertrand who verbalizes understanding and agrees to plan   Mail AVS    Lab visit scheduled    Education provided:     Please call back if any changes to your diet, medications or how you've been taking warfarin    Symptom monitoring: monitoring for clotting signs and symptoms, monitoring for stroke signs and symptoms and when to seek medical attention/emergency care    Contact [phone]  with any changes, questions or concerns.     Plan made per [facility] anticoagulation protocol    Rosie BUCKLEY RN  Anticoagulation Clinic  11/28/2022    _______________________________________________________________________     Anticoagulation Episode Summary     Current INR goal:  2.0-3.0   TTR:  70.5 % (1 y)   Target end date:  Indefinite   Send INR reminders to:  [facility]    Indications    History of pulmonary embolism [Z86.711]  Long term current use of anticoagulant therapy [Z79.01]           Comments:  REM snf; A new Coumadin Prescription will need to sent to [facility] with current dose and  next INR check.         Anticoagulation Care Providers     Provider Role Specialty Phone number    Demetri Archer MD Referring Internal Medicine [phone]        ANTICOAGULATION MANAGEMENT     Petey Stoddardtz 61 year old male is on warfarin with subtherapeutic INR result. (Goal INR 2.0-3.0)    Recent labs: (last 7 days)     11/28/22  1330   INR 1.9*       ASSESSMENT       Source(s): Chart Review    Previous INR was Subtherapeutic    Medication, diet, health changes since last INR chart reviewed; none identified           PLAN     Unable to reach Bertrand today.    No instructions provided. Unable to leave voicemail.    Follow up required to confirm warfarin dose taken and assess for changes    Rosie Meyer RN  Anticoagulation Clinic  11/28/2022

## 2022-12-12 ENCOUNTER — LAB (OUTPATIENT)
Dept: LAB | Facility: CLINIC | Age: 62
End: 2022-12-12
Payer: MEDICARE

## 2022-12-12 ENCOUNTER — ANTICOAGULATION THERAPY VISIT (OUTPATIENT)
Dept: ANTICOAGULATION | Facility: CLINIC | Age: 62
End: 2022-12-12

## 2022-12-12 DIAGNOSIS — Z86.711 HISTORY OF PULMONARY EMBOLISM: ICD-10-CM

## 2022-12-12 DIAGNOSIS — Z79.01 LONG TERM CURRENT USE OF ANTICOAGULANT THERAPY: ICD-10-CM

## 2022-12-12 DIAGNOSIS — Z86.711 HISTORY OF PULMONARY EMBOLISM: Primary | ICD-10-CM

## 2022-12-12 LAB — INR BLD: 2.4 (ref 0.9–1.1)

## 2022-12-12 PROCEDURE — 85610 PROTHROMBIN TIME: CPT

## 2022-12-12 PROCEDURE — 36416 COLLJ CAPILLARY BLOOD SPEC: CPT

## 2022-12-12 RX ORDER — WARFARIN SODIUM 4 MG/1
TABLET ORAL
Qty: 58 TABLET | Refills: 0
Start: 2022-12-12 | End: 2023-01-09

## 2022-12-12 NOTE — PROGRESS NOTES
ANTICOAGULATION MANAGEMENT     Petey Archibald 62 year old male is on warfarin with therapeutic INR result. (Goal INR 2.0-3.0)    Recent labs: (last 7 days)     12/12/22  1028   INR 2.4*       ASSESSMENT       Source(s): Chart Review and Patient/Caregiver Call       Warfarin doses taken: Warfarin taken as instructed    Diet: Increase in sweets and carbs    New illness, injury, or hospitalization: No    Medication/supplement changes: None noted    Signs or symptoms of bleeding or clotting: No    Previous INR: Subtherapeutic    Additional findings: None       PLAN     Recommended plan for no diet, medication or health factor changes affecting INR     Dosing Instructions: Continue your current warfarin dose with next INR in 4 weeks       Summary  As of 12/12/2022    Full warfarin instructions:  10 mg every Mon; 8 mg all other days; Starting 12/12/2022   Next INR check:  1/9/2023             Telephone call with  jef at [facility] who verbalizes understanding and agrees to plan    Lab visit scheduled    Education provided:     Please call back if any changes to your diet, medications or how you've been taking warfarin    Plan made per [facility] anticoagulation protocol    Lisa Ponce RN  Anticoagulation Clinic  12/12/2022    _______________________________________________________________________     Anticoagulation Episode Summary     Current INR goal:  2.0-3.0   TTR:  69.7 % (1 y)   Target end date:  Indefinite   Send INR reminders to:  [facility]    Indications    History of pulmonary embolism [Z86.711]  Long term current use of anticoagulant therapy [Z79.01]           Comments:  REM senior care; A new Coumadin Prescription will need to sent to [facility] with current dose and next INR check.         Anticoagulation Care Providers     Provider Role Specialty Phone number    Demetri Archer MD Referring Internal Medicine [phone]

## 2022-12-20 ENCOUNTER — TELEPHONE (OUTPATIENT)
Dept: INTERNAL MEDICINE | Facility: CLINIC | Age: 62
End: 2022-12-20

## 2022-12-20 NOTE — TELEPHONE ENCOUNTER
Bertrand,  calling ([phone], okay to leave detailed message)    Bertrand reports Mucinex is not longer covered by patient's insurance and insurance needs to know why patient is taking this.     Per chart review, Guaifenesin (Mucinex) was last prescribed on 3/29/2019 and discontinued on 5/18/2020. Bertrand denies patient having any issues, particularly with decongestion. Bertrand reports they will take this medication off the patient's list as this medication was discontinued.         Naz Palmer, RN BSN MSN  [facility]

## 2023-01-09 ENCOUNTER — ANTICOAGULATION THERAPY VISIT (OUTPATIENT)
Dept: ANTICOAGULATION | Facility: CLINIC | Age: 63
End: 2023-01-09

## 2023-01-09 ENCOUNTER — TELEPHONE (OUTPATIENT)
Dept: INTERNAL MEDICINE | Facility: CLINIC | Age: 63
End: 2023-01-09

## 2023-01-09 ENCOUNTER — LAB (OUTPATIENT)
Dept: LAB | Facility: CLINIC | Age: 63
End: 2023-01-09
Payer: MEDICARE

## 2023-01-09 DIAGNOSIS — Z79.01 LONG TERM CURRENT USE OF ANTICOAGULANT THERAPY: ICD-10-CM

## 2023-01-09 DIAGNOSIS — Z86.711 HISTORY OF PULMONARY EMBOLISM: ICD-10-CM

## 2023-01-09 DIAGNOSIS — Z86.711 HISTORY OF PULMONARY EMBOLISM: Primary | ICD-10-CM

## 2023-01-09 LAB — INR BLD: 1.8 (ref 0.9–1.1)

## 2023-01-09 PROCEDURE — 85610 PROTHROMBIN TIME: CPT

## 2023-01-09 PROCEDURE — 36416 COLLJ CAPILLARY BLOOD SPEC: CPT

## 2023-01-09 RX ORDER — WARFARIN SODIUM 4 MG/1
TABLET ORAL
Qty: 32 TABLET | Refills: 0 | Status: SHIPPED | OUTPATIENT
Start: 2023-01-09 | End: 2023-01-23

## 2023-01-09 NOTE — PROGRESS NOTES
ANTICOAGULATION MANAGEMENT     Petey Archibald 62 year old male is on warfarin with subtherapeutic INR result. (Goal INR 2.0-3.0)    Recent labs: (last 7 days)     01/09/23  1033   INR 1.8*       ASSESSMENT       Source(s): Chart Review and Patient/Caregiver Call       Warfarin doses taken: Warfarin taken as instructed    Diet: No new diet changes identified    New illness, injury, or hospitalization: No change - ongoing ambulation issues d/t obesity     Medication/supplement changes: None noted    Signs or symptoms of bleeding or clotting: No    Previous INR: Therapeutic last visit; previously outside of goal range    Additional findings: Increase in smoking - whenever patient gets money from family he spends excessively on cigarettes       PLAN     Recommended plan for temporary change(s) affecting INR     Dosing Instructions: booster dose then continue your current warfarin dose with next INR in 2 weeks       Summary  As of 1/9/2023    Full warfarin instructions:  1/9: 12 mg; Otherwise 10 mg every Mon; 8 mg all other days   Next INR check:  1/23/2023             Telephone call with [facility] who verbalizes understanding and agrees to plan. Routing encounter to onsite RN to mail AVS. New rx sent to [facility] pharmacy.    Lab visit scheduled    Education provided:     Please call back if any changes to your diet, medications or how you've been taking warfarin    Symptom monitoring: monitoring for bleeding signs and symptoms and monitoring for clotting signs and symptoms    Plan made per [facility] anticoagulation protocol    Ronda Shane RN  Anticoagulation Clinic  1/9/2023    _______________________________________________________________________     Anticoagulation Episode Summary     Current INR goal:  2.0-3.0   TTR:  68.3 % (1 y)   Target end date:  Indefinite   Send INR reminders to:  [facility]    Indications    History of pulmonary embolism [Z86.711]  Long term current  use of anticoagulant therapy [Z79.01]           Comments:  REM assisted; A new Coumadin Prescription will need to sent to [facility] with current dose and next INR check.         Anticoagulation Care Providers     Provider Role Specialty Phone number    Demetri Archer MD Referring Internal Medicine [phone]

## 2023-01-23 ENCOUNTER — ANTICOAGULATION THERAPY VISIT (OUTPATIENT)
Dept: ANTICOAGULATION | Facility: CLINIC | Age: 63
End: 2023-01-23

## 2023-01-23 ENCOUNTER — LAB (OUTPATIENT)
Dept: LAB | Facility: CLINIC | Age: 63
End: 2023-01-23
Payer: MEDICARE

## 2023-01-23 DIAGNOSIS — Z79.01 LONG TERM CURRENT USE OF ANTICOAGULANT THERAPY: ICD-10-CM

## 2023-01-23 DIAGNOSIS — Z86.711 HISTORY OF PULMONARY EMBOLISM: ICD-10-CM

## 2023-01-23 DIAGNOSIS — Z86.711 HISTORY OF PULMONARY EMBOLISM: Primary | ICD-10-CM

## 2023-01-23 LAB — INR BLD: 3.1 (ref 0.9–1.1)

## 2023-01-23 PROCEDURE — 85610 PROTHROMBIN TIME: CPT

## 2023-01-23 PROCEDURE — 36415 COLL VENOUS BLD VENIPUNCTURE: CPT

## 2023-01-23 RX ORDER — WARFARIN SODIUM 4 MG/1
TABLET ORAL
Qty: 32 TABLET | Refills: 0 | Status: SHIPPED | OUTPATIENT
Start: 2023-01-23 | End: 2023-02-06

## 2023-01-23 NOTE — PROGRESS NOTES
ANTICOAGULATION MANAGEMENT     Petey Archibald 62 year old male is on warfarin with supratherapeutic INR result. (Goal INR 2.0-3.0)    Recent labs: (last 7 days)     01/23/23  1008   INR 3.1*       ASSESSMENT       Source(s): Chart Review and Patient/Caregiver Call       Warfarin doses taken: Warfarin taken as instructed    Diet: Decreased greens/vitamin K in diet; plans to resume previous intake    New illness, injury, or hospitalization: No    Medication/supplement changes: None noted    Signs or symptoms of bleeding or clotting: No    Previous INR: Subtherapeutic    Additional findings: Increase in smoking as well       PLAN     Recommended plan for temporary change(s) affecting INR     Dosing Instructions: Continue your current warfarin dose with next INR in 2 weeks       Summary  As of 1/23/2023    Full warfarin instructions:  10 mg every Mon; 8 mg all other days   Next INR check:  2/6/2023             Telephone call with  [facility] who agrees to plan and repeated back plan correctly. AVS mailed to [facility] [place] and new rx sent to [facility].     Lab visit scheduled    Education provided:     Please call back if any changes to your diet, medications or how you've been taking warfarin    Dietary considerations: importance of consistent vitamin K intake    Symptom monitoring: monitoring for bleeding signs and symptoms and monitoring for clotting signs and symptoms    Plan made per [facility] anticoagulation protocol    Ronda Shane, RN  Anticoagulation Clinic  1/23/2023    _______________________________________________________________________     Anticoagulation Episode Summary     Current INR goal:  2.0-3.0   TTR:  71.2 % (1 y)   Target end date:  Indefinite   Send INR reminders to:  [facility]    Indications    History of pulmonary embolism [Z86.711]  Long term current use of anticoagulant therapy [Z79.01]           Comments:  [facility]; A new Coumadin  Pres[facility] with current dose and next INR check.         Anticoagulation Care Providers     Provider Role Specialty Phone number    Demetri Archer MD Referring Internal Medicine [phone]

## 2023-01-26 ENCOUNTER — HOSPITAL ENCOUNTER (EMERGENCY)
Facility: CLINIC | Age: 63
Discharge: HOME | End: 2023-01-26
Attending: EMERGENCY MEDICINE | Admitting: EMERGENCY MEDICINE
Payer: MEDICARE

## 2023-01-26 ENCOUNTER — TELEPHONE (OUTPATIENT)
Dept: INTERNAL MEDICINE | Facility: CLINIC | Age: 63
End: 2023-01-26

## 2023-01-26 VITALS
HEIGHT: 68 IN | WEIGHT: 315 LBS | SYSTOLIC BLOOD PRESSURE: 147 MMHG | TEMPERATURE: 98.3 F | DIASTOLIC BLOOD PRESSURE: 76 MMHG | BODY MASS INDEX: 47.74 KG/M2 | RESPIRATION RATE: 16 BRPM | HEART RATE: 64 BPM | OXYGEN SATURATION: 97 %

## 2023-01-26 DIAGNOSIS — R04.0 EPISTAXIS: ICD-10-CM

## 2023-01-26 DIAGNOSIS — R79.1 SUPRATHERAPEUTIC INR: ICD-10-CM

## 2023-01-26 DIAGNOSIS — Z79.01 LONG TERM CURRENT USE OF ANTICOAGULANT THERAPY: ICD-10-CM

## 2023-01-26 DIAGNOSIS — Z86.711 HISTORY OF PULMONARY EMBOLISM: Primary | ICD-10-CM

## 2023-01-26 LAB
BASOPHILS # BLD AUTO: 0 10E3/UL (ref 0–0.2)
BASOPHILS NFR BLD AUTO: 1 %
EOSINOPHIL # BLD AUTO: 0.1 10E3/UL (ref 0–0.7)
EOSINOPHIL NFR BLD AUTO: 3 %
ERYTHROCYTE [DISTWIDTH] IN BLOOD BY AUTOMATED COUNT: 15.8 % (ref 10–15)
HCT VFR BLD AUTO: 35.2 % (ref 40–53)
HGB BLD-MCNC: 10.6 G/DL (ref 13.3–17.7)
IMM GRANULOCYTES # BLD: 0 10E3/UL
IMM GRANULOCYTES NFR BLD: 0 %
INR PPP: 3.17 (ref 0.85–1.15)
LYMPHOCYTES # BLD AUTO: 0.7 10E3/UL (ref 0.8–5.3)
LYMPHOCYTES NFR BLD AUTO: 19 %
MCH RBC QN AUTO: 28.4 PG (ref 26.5–33)
MCHC RBC AUTO-ENTMCNC: 30.1 G/DL (ref 31.5–36.5)
MCV RBC AUTO: 94 FL (ref 78–100)
MONOCYTES # BLD AUTO: 0.3 10E3/UL (ref 0–1.3)
MONOCYTES NFR BLD AUTO: 7 %
NEUTROPHILS # BLD AUTO: 2.7 10E3/UL (ref 1.6–8.3)
NEUTROPHILS NFR BLD AUTO: 70 %
NRBC # BLD AUTO: 0 10E3/UL
NRBC BLD AUTO-RTO: 0 /100
PLATELET # BLD AUTO: 226 10E3/UL (ref 150–450)
RBC # BLD AUTO: 3.73 10E6/UL (ref 4.4–5.9)
WBC # BLD AUTO: 3.8 10E3/UL (ref 4–11)

## 2023-01-26 PROCEDURE — 85610 PROTHROMBIN TIME: CPT | Performed by: EMERGENCY MEDICINE

## 2023-01-26 PROCEDURE — 85025 COMPLETE CBC W/AUTO DIFF WBC: CPT | Performed by: EMERGENCY MEDICINE

## 2023-01-26 PROCEDURE — 250N000009 HC RX 250: Performed by: EMERGENCY MEDICINE

## 2023-01-26 PROCEDURE — 36415 COLL VENOUS BLD VENIPUNCTURE: CPT | Performed by: EMERGENCY MEDICINE

## 2023-01-26 PROCEDURE — 99283 EMERGENCY DEPT VISIT LOW MDM: CPT

## 2023-01-26 PROCEDURE — 30901 CONTROL OF NOSEBLEED: CPT

## 2023-01-26 RX ORDER — TRANEXAMIC ACID 100 MG/ML
500 INJECTION, SOLUTION INTRAVENOUS ONCE
Status: COMPLETED | OUTPATIENT
Start: 2023-01-26 | End: 2023-01-26

## 2023-01-26 RX ADMIN — TRANEXAMIC ACID 500 MG: 100 INJECTION INTRAVENOUS at 08:43

## 2023-01-26 ASSESSMENT — ACTIVITIES OF DAILY LIVING (ADL): ADLS_ACUITY_SCORE: 35

## 2023-01-26 NOTE — ED TRIAGE NOTES
BIBA from home. Has a nosebleed that started this morning. On coumadin. Last INR 3.1. Hx of blood clots.     Triage Assessment     Row Name 01/26/23 0836       Triage Assessment (Adult)    Airway WDL WDL       Respiratory WDL    Respiratory WDL WDL       Skin Circulation/Temperature WDL    Skin Circulation/Temperature WDL WDL       Cardiac WDL    Cardiac WDL WDL       Peripheral/Neurovascular WDL    Peripheral Neurovascular WDL WDL       Cognitive/Neuro/Behavioral WDL    Cognitive/Neuro/Behavioral WDL WDL

## 2023-01-26 NOTE — TELEPHONE ENCOUNTER
ANTICOAGULATION  MANAGEMENT: Discharge Review    Petey Archibald chart reviewed for anticoagulation continuity of care    Emergency room visit on 1/26/23 for epistaxis.    Discharge disposition: [facility]    Results:    Recent labs: (last 7 days)     01/23/23  1008 01/26/23  0845   INR 3.1* 3.17*     Anticoagulation inpatient management:     not applicable     Anticoagulation discharge instructions:     Warfarin dosing: ED provider advised holding todays warfarin dose   Bridging: No   INR goal change: No      Medication changes affecting anticoagulation: No    Additional factors affecting anticoagulation: No     PLAN     Agree with dosing adjustment on discharge    Spoke with Bertrand at [facility], reviewed plan from ED doctor. Advised to then resume maintenance dose as previously advised and follow-up per last [facility] visit.     Anticoagulation Calendar updated    Cameron Blackmon RN

## 2023-01-26 NOTE — DISCHARGE INSTRUCTIONS
"*Avoid picking your nose or blowing it hard. You can put vaseline at the base of our nostril to help humidify the air and prevent dry skin in your nose.  The silver nitrate that was used to cauterize the nosebleed can cause black discharge and discoloration surrounding the skin.  This is temporary and will resolve.  *No new medications. Recommend that you do not take your coumadin tonight and call your INR nurse for further dosing instructions.  *Follow-up with ENT in the next 1-2 weeks for recurrent nosebleeds.  *Return if you develop recurrence, faint or feel like you will faint or become worse in any way.      Discharge Instructions  Epistaxis    Today you were seen for a nosebleed.   Nosebleeds (the medical term is \"epistaxis\") are very common. Almost every person has had at least one in their lifetime.  Although the amount of blood loss can appear dramatic, nosebleeds rarely cause serious problems.  The most common causes are dry air or nose picking, but they also are common in people who have allergies, high blood pressure or are on blood thinners, such as Coumadin, Aspirin or Plavix. If you or your child gets a nosebleed, the important thing is to know how to take care of it. With the right self-care, most nosebleeds will stop on their own.    Return to the Emergency Department if:  Your nose is bleeding a very large amount of blood.  You get very pale, faint, or tired.  You cannot get the bleeding to stop after following these instructions.  A nosebleed happens after recent nasal surgery or if you have a known nasal tumor.  You have new, serious symptoms, such as chest pain.  You get a nosebleed after an injury, such as being hit in the face, and you are concerned that you could have other injuries (like a broken bone).    Treatment:  Your doctor may tell you to use a decongestant nose spray, like Afrin  (oxymetazoline), in both nostrils in the morning and at night for the next three days. Do not use this " medicine for more than three days at a time.  If you do it will cause nasal congestion.   You should apply a small amount of Vaseline  to the inside of your nostrils for moisture before bed.  Using a humidifier in your bedroom will help as well.  For the next three days, do not blow your nose or put anything in your nose. You may sniffle, or dab the outside of your nose.  Do not bend with your head below your waist for the next three days. Do not lift anything so heavy that you have to strain.   If you received nasal packing, please do not remove the packing until seen by an Ear Nose and Throat specialist.  If antibiotics have been given with the packing please take as directed.    If your nose starts to bleed again:  Blow your nose to get rid of some of the clots that have formed inside your nostrils. This may increase the bleeding temporarily, but that's OK.  Spray decongestant nose spray (like Afrin ) into both nostrils to constrict the blood vessels.  Sit or stand while bending forward slightly at the waist. Do not lie down or tilt your head back. This may cause you to swallow blood and can lead to vomiting.   the soft part of BOTH nostrils at the bottom of your nose and squeeze your nose closed for at least 5 minutes (for children) or 10 to 15 minutes (for adults). You can use your fingers, or the clip we gave you here. Use a clock to time yourself. Do not release the pressure every so often to check whether the bleeding has stopped. Many people hurt their chances of stopping the bleeding by releasing the pressure too soon or too often.    If you follow the steps outlined above, and your nose continues to bleed, repeat all the steps once more. Apply pressure for a total of at least 30 minutes. If you continue to bleed even then, return to the Emergency Department or go to an urgent care clinic.    If you keep having nose bleeds, schedule an appointment with your regular doctor, or with an Ear, Nose, and  Throat Specialist.  If you were given a prescription for medicine here today, be sure to read all of the information (including the package insert) that comes with your prescription.  This will include important information about the medicine, its side effects, and any warnings that you need to know about.  The pharmacist who fills the prescription can provide more information and answer questions you may have about the medicine.  If you have questions or concerns that the pharmacist cannot address, please call or return to the Emergency Department.   Opioid Medication Information    Pain medications are among the most commonly prescribed medicines, so we are including this information for all our patients. If you did not receive pain medication or get a prescription for pain medicine, you can ignore it.     You may have been given a prescription for an opioid (narcotic) pain medicine and/or have received a pain medicine while here in the Emergency Department. These medicines can make you drowsy or impaired. You must not drive, operate dangerous equipment, or engage in any other dangerous activities while taking these medications. If you drive while taking these medications, you could be arrested for DUI, or driving under the influence. Do not drink any alcohol while you are taking these medications.     Opioid pain medications can cause addiction. If you have a history of chemical dependency of any type, you are at a higher risk of becoming addicted to pain medications.  Only take these prescribed medications to treat your pain when all other options have been tried. Take it for as short a time and as few doses as possible. Store your pain pills in a secure place, as they are frequently stolen and provide a dangerous opportunity for children or visitors in your house to start abusing these powerful medications. We will not replace any lost or stolen medicine.  As soon as your pain is better, you should flush all  your remaining medication.     Many prescription pain medications contain Tylenol  (acetaminophen), including Vicodin , Tylenol #3 , Norco , Lortab , and Percocet .  You should not take any extra pills of Tylenol  if you are using these prescription medications or you can get very sick.  Do not ever take more than 3000 mg of acetaminophen in any 24 hour period.    All opioids tend to cause constipation. Drink plenty of water and eat foods that have a lot of fiber, such as fruits, vegetables, prune juice, apple juice and high fiber cereal.  Take a laxative if you don t move your bowels at least every other day. Miralax , Milk of Magnesia, Colace , or Senna  can be used to keep you regular.      Remember that you can always come back to the Emergency Department if you are not able to see your regular doctor in the amount of time listed above, if you get any new symptoms, or if there is anything that worries you.

## 2023-01-26 NOTE — ED NOTES
Called group home to update on status. They are able to come to bring him home. Patient given discharge instructions. EDT to transport to lobby door 4 to await ride home

## 2023-01-26 NOTE — ED PROVIDER NOTES
"  History     Chief Complaint:  Nose Bleeds       The history is provided by the patient.      Petey Archibald is a 62 year old male, anticoagulated on Coumadin, with a history of coagulation disorder and PE/DVT who presents for a nosebleed. He reports bleeding from the right side of his nose, starting this morning. He confirms taking Coumadin for history of blood clots. He states his most recent INR levels were 1.8 and 3.1. Petey reports history of nosebleeds and has seen ENT, but he has never needed nasal packing.     Independent Historian:    EMS supplemented history     Review of External Notes: outpatient notes/labs (INR levels)    ROS:  Review of Systems   HENT: Positive for nosebleeds.    All other systems reviewed and are negative.      Allergies:  Bees  Clavulanic Acid  Augmentin  Penicillins     Medications:    Albuterol  Abilify  Wellbutrin  Clonazepam  Cerave  Neurontin  Ativan  Singulair  Naltrexone  Omeprazole  Sertraline  Simvastatin  Restoril  Spironolactone  Varenicline   Coumadin    Past Medical History:    Chronic infection  Coagulation disorder  COPD  DVT  PE  Hyperlipidemia  NEL  Intellectual disabilities  Peripheral vascular disease  Asthma  Tobacco dependence  Venous stasis dermatitis  GERD  Anxiety  Glucose intolerance  Schizoaffective disorder, bipolar type  Lymphedema  Diverticular disease of large intestine    Past Surgical History:    Colonoscopies  Rectal abscess surgery  Excise malignant lesions, trunk/arms/leg     Family History:    Diabetes mellitus     Social History:  The patient presents via EMS.  Reports past ENT established, unsure who.    PCP: Demetri Archer     Physical Exam     Patient Vitals for the past 24 hrs:   BP Temp Temp src Pulse Resp SpO2 Height Weight   01/26/23 0841 (!) 147/76 98.3  F (36.8  C) Temporal 64 16 97 % 1.727 m (5' 8\") (!) 161 kg (355 lb)        Physical Exam  General: Well-nourished  Eyes: PERRL, conjunctivae pink no scleral icterus or " conjunctival injection  ENT:  Moist mucus membranes, posterior oropharynx clear without erythema or exudates.  No blood in the posterior oropharynx.  Patient has nose clamp in place.  After packing removed, tiny focus of bleeding on the right side near the the entrance of the nostril, just proximal to the transition between the mucosal surface and the skin.  Respiratory:  Lungs clear to auscultation bilaterally, no crackles/rubs/wheezes.  Good air movement  CV: Normal rate and rhythm, no murmurs/rubs/gallops  GI:  Abdomen soft and non-distended.  Normoactive BS.  No tenderness, guarding or rebound  Skin: Warm, dry.  No rashes or petechiae  Musculoskeletal: No peripheral edema or calf tenderness  Neuro: Alert and oriented to person/place/time  Psychiatric: Normal affect      Emergency Department Course     Laboratory:  Labs Ordered and Resulted from Time of ED Arrival to Time of ED Departure   INR - Abnormal       Result Value    INR 3.17 (*)    CBC WITH PLATELETS AND DIFFERENTIAL - Abnormal    WBC Count 3.8 (*)     RBC Count 3.73 (*)     Hemoglobin 10.6 (*)     Hematocrit 35.2 (*)     MCV 94      MCH 28.4      MCHC 30.1 (*)     RDW 15.8 (*)     Platelet Count 226      % Neutrophils 70      % Lymphocytes 19      % Monocytes 7      % Eosinophils 3      % Basophils 1      % Immature Granulocytes 0      NRBCs per 100 WBC 0      Absolute Neutrophils 2.7      Absolute Lymphocytes 0.7 (*)     Absolute Monocytes 0.3      Absolute Eosinophils 0.1      Absolute Basophils 0.0      Absolute Immature Granulocytes 0.0      Absolute NRBCs 0.0          Procedures     Epistaxis Care     Procedure: Epistaxis Care    Indication: Epistaxis    Consent: Verbal    Medication: Patient was topically medicated with Tranexamic acid    Procedure detail: Clots were expelled. An area along the right anterior nasal septum was identified as the likely source of bleeding. This area was cauterized with silver nitrate.   Patient was closely monitored  and did not have evidence of recurrent bleeding.     Patient Status: The patient tolerated the procedure well: Yes. There were no complications.      Emergency Department Course & Assessments:       Interventions:  Medications   tranexamic acid (CYKLOKAPRON) spray 500 mg (500 mg Left nostril Given by Other 1/26/23 0843)   4% lidocaine to the right nostril.  Afrin spray x2 to the right nostril.     Assessments:  0837 I obtained history and examined the patient as noted above.   0850 I rechecked the patient and applied tranexamic acid at this time.   0928 I rechecked and updated the patient. Source of bleeding identified and cauterized.   1000 At this point I feel that the patient is safe for discharge, and the patient agrees.     Disposition:  The patient was discharged to home.     Impression & Plan      Medical Decision Making:  Petey Archibald is a 62 year old male who presents for evaluation of nasal bleeding and epistaxis.  After packing with Afrin, tranexamic acid and 4% lidocaine, I was able to identify the source of bleeding which is just near the entrance of the right nostril.  Silver nitrate was used to cauterize the bleeding source, see above procedure note.  The bleeding stopped and did not restart here in ED, therefore no nasal packing is indicated.  Discussed with patient to use humidity and vaseline or bacitracin in nares bid for the next week.  INR is slightly supratherapeutic and so I advised him to hold his dose of Coumadin tonight and discussed with the INR nurse about the next dose and monitoring.  At this time, with reasonable clinical confidence, I do believe he safe for discharge home.    Diagnosis:    ICD-10-CM    1. Epistaxis  R04.0       2. Supratherapeutic INR  R79.1          Discharge Medications:  New Prescriptions    No medications on file        Scribe Disclosure:  Radha TA, am serving as a scribe at 8:41 AM on 1/26/2023 to document services personally performed by May Arriaza  MD based on my observations and the provider's statements to me.     1/26/2023   May Arriaza MD Cho, Amy C, MD  01/26/23 1024

## 2023-01-26 NOTE — ED NOTES
Bed: ED05  Expected date:   Expected time:   Means of arrival:   Comments:  [facility] - 624- 62 M nosebleed eta 0840

## 2023-02-05 DIAGNOSIS — M62.838 SPASM OF MUSCLE: ICD-10-CM

## 2023-02-05 DIAGNOSIS — G24.8 FOCAL DYSTONIA: Primary | ICD-10-CM

## 2023-02-05 RX ORDER — TIZANIDINE 2 MG/1
2 TABLET ORAL 3 TIMES DAILY
Qty: 270 TABLET | Refills: 1 | Status: SHIPPED | OUTPATIENT
Start: 2023-02-05 | End: 2023-02-14

## 2023-02-05 NOTE — TELEPHONE ENCOUNTER
Received a page/call from the  [facility] that Petey ran out of his tizanidine last week and the pharmacy told them they couldn't get hold of our team / Dr. Hagen. He has been on the same dose for a long time (2mg tid) and is doing well.    Sent 3 months supply with 1 refill to the pharmacy and sent a message to our team to schedule a follow up for him. He was last seen 6/2022 for botox injections but lost follow up after that.       Marlen Nroiega MD  Physical Medicine & Rehabilitation

## 2023-02-06 ENCOUNTER — ANTICOAGULATION THERAPY VISIT (OUTPATIENT)
Dept: ANTICOAGULATION | Facility: CLINIC | Age: 63
End: 2023-02-06

## 2023-02-06 ENCOUNTER — TELEPHONE (OUTPATIENT)
Dept: PHYSICAL MEDICINE AND REHAB | Facility: CLINIC | Age: 63
End: 2023-02-06

## 2023-02-06 ENCOUNTER — LAB (OUTPATIENT)
Dept: LAB | Facility: CLINIC | Age: 63
End: 2023-02-06
Payer: MEDICARE

## 2023-02-06 DIAGNOSIS — Z79.01 LONG TERM CURRENT USE OF ANTICOAGULANT THERAPY: ICD-10-CM

## 2023-02-06 DIAGNOSIS — Z86.711 HISTORY OF PULMONARY EMBOLISM: ICD-10-CM

## 2023-02-06 DIAGNOSIS — Z86.711 HISTORY OF PULMONARY EMBOLISM: Primary | ICD-10-CM

## 2023-02-06 LAB — INR BLD: 2.4 (ref 0.9–1.1)

## 2023-02-06 PROCEDURE — 85610 PROTHROMBIN TIME: CPT

## 2023-02-06 PROCEDURE — 36416 COLLJ CAPILLARY BLOOD SPEC: CPT

## 2023-02-06 RX ORDER — WARFARIN SODIUM 4 MG/1
TABLET ORAL
Qty: 32 TABLET | Refills: 0 | Status: SHIPPED | OUTPATIENT
Start: 2023-02-06 | End: 2023-02-20

## 2023-02-06 NOTE — PROGRESS NOTES
ANTICOAGULATION MANAGEMENT     Petey Archibald 62 year old male is on warfarin with therapeutic INR result. (Goal INR 2.0-3.0)    Recent labs: (last 7 days)     02/06/23  0948   INR 2.4*       ASSESSMENT       Source(s): Chart Review and Patient/Caregiver Call       Warfarin doses taken: Held for 1 day  recently which may be affecting INR    Diet: No new diet changes identified    New illness, injury, or hospitalization: Yes: ER visit on 1/26/23 for epistaxis    Medication/supplement changes: None noted    Signs or symptoms of bleeding or clotting: Yes: one epistaxis episode - none since     Previous INR: Supratherapeutic    Additional findings: None       PLAN     Recommended plan for temporary change(s) affecting INR     Dosing Instructions: Continue your current warfarin dose with next INR in 2 weeks       Summary  As of 2/6/2023    Full warfarin instructions:  10 mg every Mon; 8 mg all other days   Next INR check:  2/20/2023             Telephone call with [facility] nurse who verbalizes understanding and agrees to plan. New rx sent to [facility] and routing encounter to onsite RN to mail AVS.    Lab visit scheduled    Education provided:     Please call back if any changes to your diet, medications or how you've been taking warfarin    Symptom monitoring: monitoring for bleeding signs and symptoms and monitoring for clotting signs and symptoms    Plan made per [facility] anticoagulation protocol    Ronda Shane, RN  Anticoagulation Clinic  2/6/2023    _______________________________________________________________________     Anticoagulation Episode Summary     Current INR goal:  2.0-3.0   TTR:  73.1 % (1 y)   Target end date:  Indefinite   Send INR reminders to:  [facility]    Indications    History of pulmonary embolism [Z86.711]  Long term current use of anticoagulant therapy [Z79.01]           Comments:  [facility]; A new Coumadin Prescription will need to sent to [facility] with  current dose and next INR check.         Anticoagulation Care Providers     Provider Role Specialty Phone number    Demetri Archer MD Referring Internal Medicine [phone]

## 2023-02-06 NOTE — TELEPHONE ENCOUNTER
Left detailed message for Braden to call back and scheduled a follow up appointment for patient with Dr Hagen

## 2023-02-14 ENCOUNTER — HOSPITAL ENCOUNTER (INPATIENT)
Facility: CLINIC | Age: 63
LOS: 1 days | Discharge: NURSING, REHAB OR LONG-TERM CARE | DRG: 389 | End: 2023-02-16
Attending: EMERGENCY MEDICINE | Admitting: INTERNAL MEDICINE
Payer: MEDICARE

## 2023-02-14 ENCOUNTER — APPOINTMENT (OUTPATIENT)
Dept: GENERAL RADIOLOGY | Facility: CLINIC | Age: 63
DRG: 389 | End: 2023-02-14
Payer: MEDICARE

## 2023-02-14 ENCOUNTER — APPOINTMENT (OUTPATIENT)
Dept: CT IMAGING | Facility: CLINIC | Age: 63
DRG: 389 | End: 2023-02-14
Attending: EMERGENCY MEDICINE
Payer: MEDICARE

## 2023-02-14 DIAGNOSIS — K56.609 SBO (SMALL BOWEL OBSTRUCTION) (H): ICD-10-CM

## 2023-02-14 DIAGNOSIS — R19.7 VOMITING AND DIARRHEA: ICD-10-CM

## 2023-02-14 DIAGNOSIS — R11.10 VOMITING AND DIARRHEA: ICD-10-CM

## 2023-02-14 LAB
ALBUMIN SERPL BCG-MCNC: 3.9 G/DL (ref 3.5–5.2)
ALP SERPL-CCNC: 98 U/L (ref 40–129)
ALT SERPL W P-5'-P-CCNC: 19 U/L (ref 10–50)
ANION GAP SERPL CALCULATED.3IONS-SCNC: 11 MMOL/L (ref 7–15)
AST SERPL W P-5'-P-CCNC: 21 U/L (ref 10–50)
BASOPHILS # BLD AUTO: 0 10E3/UL (ref 0–0.2)
BASOPHILS NFR BLD AUTO: 0 %
BILIRUB SERPL-MCNC: 0.2 MG/DL
BUN SERPL-MCNC: 15.5 MG/DL (ref 8–23)
C COLI+JEJUNI+LARI FUSA STL QL NAA+PROBE: NOT DETECTED
C DIFF TOX B STL QL: NEGATIVE
CALCIUM SERPL-MCNC: 9.4 MG/DL (ref 8.8–10.2)
CHLORIDE SERPL-SCNC: 104 MMOL/L (ref 98–107)
CREAT SERPL-MCNC: 1.22 MG/DL (ref 0.67–1.17)
DEPRECATED HCO3 PLAS-SCNC: 24 MMOL/L (ref 22–29)
EC STX1 GENE STL QL NAA+PROBE: NOT DETECTED
EC STX2 GENE STL QL NAA+PROBE: NOT DETECTED
EOSINOPHIL # BLD AUTO: 0.1 10E3/UL (ref 0–0.7)
EOSINOPHIL NFR BLD AUTO: 3 %
ERYTHROCYTE [DISTWIDTH] IN BLOOD BY AUTOMATED COUNT: 15.2 % (ref 10–15)
ERYTHROCYTE [DISTWIDTH] IN BLOOD BY AUTOMATED COUNT: 15.3 % (ref 10–15)
GFR SERPL CREATININE-BSD FRML MDRD: 66 ML/MIN/1.73M2
GLUCOSE SERPL-MCNC: 127 MG/DL (ref 70–99)
HCT VFR BLD AUTO: 36.8 % (ref 40–53)
HCT VFR BLD AUTO: 38.3 % (ref 40–53)
HGB BLD-MCNC: 11.5 G/DL (ref 13.3–17.7)
HGB BLD-MCNC: 11.5 G/DL (ref 13.3–17.7)
IMM GRANULOCYTES # BLD: 0 10E3/UL
IMM GRANULOCYTES NFR BLD: 0 %
INR PPP: 2.94 (ref 0.85–1.15)
LYMPHOCYTES # BLD AUTO: 0.8 10E3/UL (ref 0.8–5.3)
LYMPHOCYTES NFR BLD AUTO: 14 %
MCH RBC QN AUTO: 28 PG (ref 26.5–33)
MCH RBC QN AUTO: 28.3 PG (ref 26.5–33)
MCHC RBC AUTO-ENTMCNC: 30 G/DL (ref 31.5–36.5)
MCHC RBC AUTO-ENTMCNC: 31.3 G/DL (ref 31.5–36.5)
MCV RBC AUTO: 90 FL (ref 78–100)
MCV RBC AUTO: 93 FL (ref 78–100)
MONOCYTES # BLD AUTO: 0.4 10E3/UL (ref 0–1.3)
MONOCYTES NFR BLD AUTO: 7 %
NEUTROPHILS # BLD AUTO: 4.2 10E3/UL (ref 1.6–8.3)
NEUTROPHILS NFR BLD AUTO: 76 %
NOROV GI+II ORF1-ORF2 JNC STL QL NAA+PR: NOT DETECTED
NRBC # BLD AUTO: 0 10E3/UL
NRBC BLD AUTO-RTO: 0 /100
PLATELET # BLD AUTO: 242 10E3/UL (ref 150–450)
PLATELET # BLD AUTO: 254 10E3/UL (ref 150–450)
POTASSIUM SERPL-SCNC: 3.7 MMOL/L (ref 3.4–5.3)
PROT SERPL-MCNC: 7.8 G/DL (ref 6.4–8.3)
RBC # BLD AUTO: 4.07 10E6/UL (ref 4.4–5.9)
RBC # BLD AUTO: 4.11 10E6/UL (ref 4.4–5.9)
RVA NSP5 STL QL NAA+PROBE: NOT DETECTED
SALMONELLA SP RPOD STL QL NAA+PROBE: NOT DETECTED
SHIGELLA SP+EIEC IPAH STL QL NAA+PROBE: NOT DETECTED
SODIUM SERPL-SCNC: 139 MMOL/L (ref 136–145)
V CHOL+PARA RFBL+TRKH+TNAA STL QL NAA+PR: NOT DETECTED
WBC # BLD AUTO: 5.4 10E3/UL (ref 4–11)
WBC # BLD AUTO: 5.6 10E3/UL (ref 4–11)
Y ENTERO RECN STL QL NAA+PROBE: NOT DETECTED

## 2023-02-14 PROCEDURE — G1010 CDSM STANSON: HCPCS

## 2023-02-14 PROCEDURE — 87493 C DIFF AMPLIFIED PROBE: CPT | Performed by: INTERNAL MEDICINE

## 2023-02-14 PROCEDURE — 258N000003 HC RX IP 258 OP 636: Performed by: INTERNAL MEDICINE

## 2023-02-14 PROCEDURE — 999N000157 HC STATISTIC RCP TIME EA 10 MIN

## 2023-02-14 PROCEDURE — 96376 TX/PRO/DX INJ SAME DRUG ADON: CPT

## 2023-02-14 PROCEDURE — C9113 INJ PANTOPRAZOLE SODIUM, VIA: HCPCS | Performed by: PHYSICIAN ASSISTANT

## 2023-02-14 PROCEDURE — 99221 1ST HOSP IP/OBS SF/LOW 40: CPT | Mod: FS | Performed by: PHYSICIAN ASSISTANT

## 2023-02-14 PROCEDURE — 36415 COLL VENOUS BLD VENIPUNCTURE: CPT | Performed by: EMERGENCY MEDICINE

## 2023-02-14 PROCEDURE — 250N000011 HC RX IP 250 OP 636: Performed by: EMERGENCY MEDICINE

## 2023-02-14 PROCEDURE — G0378 HOSPITAL OBSERVATION PER HR: HCPCS

## 2023-02-14 PROCEDURE — 250N000011 HC RX IP 250 OP 636: Performed by: INTERNAL MEDICINE

## 2023-02-14 PROCEDURE — 85025 COMPLETE CBC W/AUTO DIFF WBC: CPT | Performed by: EMERGENCY MEDICINE

## 2023-02-14 PROCEDURE — 74018 RADEX ABDOMEN 1 VIEW: CPT

## 2023-02-14 PROCEDURE — 87506 IADNA-DNA/RNA PROBE TQ 6-11: CPT | Performed by: INTERNAL MEDICINE

## 2023-02-14 PROCEDURE — 80053 COMPREHEN METABOLIC PANEL: CPT | Performed by: EMERGENCY MEDICINE

## 2023-02-14 PROCEDURE — 250N000009 HC RX 250: Performed by: EMERGENCY MEDICINE

## 2023-02-14 PROCEDURE — 99285 EMERGENCY DEPT VISIT HI MDM: CPT | Mod: 25

## 2023-02-14 PROCEDURE — 96374 THER/PROPH/DIAG INJ IV PUSH: CPT

## 2023-02-14 PROCEDURE — 96361 HYDRATE IV INFUSION ADD-ON: CPT

## 2023-02-14 PROCEDURE — 999N000065 XR ABDOMEN 1 VIEW

## 2023-02-14 PROCEDURE — 85027 COMPLETE CBC AUTOMATED: CPT | Performed by: HOSPITALIST

## 2023-02-14 PROCEDURE — 99207 PR APP CREDIT; MD BILLING SHARED VISIT: CPT | Mod: AI | Performed by: INTERNAL MEDICINE

## 2023-02-14 PROCEDURE — 74177 CT ABD & PELVIS W/CONTRAST: CPT | Mod: MG

## 2023-02-14 PROCEDURE — 250N000011 HC RX IP 250 OP 636: Performed by: PHYSICIAN ASSISTANT

## 2023-02-14 PROCEDURE — 99207 PR NO CHARGE LOS: CPT | Performed by: HOSPITALIST

## 2023-02-14 PROCEDURE — 258N000003 HC RX IP 258 OP 636: Performed by: EMERGENCY MEDICINE

## 2023-02-14 PROCEDURE — 96375 TX/PRO/DX INJ NEW DRUG ADDON: CPT

## 2023-02-14 PROCEDURE — 85610 PROTHROMBIN TIME: CPT | Performed by: INTERNAL MEDICINE

## 2023-02-14 PROCEDURE — 36415 COLL VENOUS BLD VENIPUNCTURE: CPT | Performed by: HOSPITALIST

## 2023-02-14 PROCEDURE — 96360 HYDRATION IV INFUSION INIT: CPT

## 2023-02-14 PROCEDURE — 94660 CPAP INITIATION&MGMT: CPT

## 2023-02-14 RX ORDER — ONDANSETRON 2 MG/ML
4 INJECTION INTRAMUSCULAR; INTRAVENOUS EVERY 6 HOURS PRN
Status: DISCONTINUED | OUTPATIENT
Start: 2023-02-14 | End: 2023-02-16 | Stop reason: HOSPADM

## 2023-02-14 RX ORDER — AMOXICILLIN 250 MG
2 CAPSULE ORAL 2 TIMES DAILY PRN
Status: CANCELLED | OUTPATIENT
Start: 2023-02-14

## 2023-02-14 RX ORDER — ACETAMINOPHEN 650 MG/1
650 SUPPOSITORY RECTAL EVERY 6 HOURS PRN
Status: DISCONTINUED | OUTPATIENT
Start: 2023-02-14 | End: 2023-02-16 | Stop reason: HOSPADM

## 2023-02-14 RX ORDER — HEPARIN SODIUM 10000 [USP'U]/100ML
0-5000 INJECTION, SOLUTION INTRAVENOUS CONTINUOUS
Status: DISCONTINUED | OUTPATIENT
Start: 2023-02-14 | End: 2023-02-14

## 2023-02-14 RX ORDER — ACETAMINOPHEN 325 MG/1
650 TABLET ORAL EVERY 6 HOURS PRN
Status: DISCONTINUED | OUTPATIENT
Start: 2023-02-14 | End: 2023-02-16 | Stop reason: HOSPADM

## 2023-02-14 RX ORDER — ONDANSETRON 2 MG/ML
4 INJECTION INTRAMUSCULAR; INTRAVENOUS EVERY 6 HOURS PRN
Status: DISCONTINUED | OUTPATIENT
Start: 2023-02-14 | End: 2023-02-14

## 2023-02-14 RX ORDER — TRAZODONE HYDROCHLORIDE 100 MG/1
100 TABLET ORAL AT BEDTIME
COMMUNITY
End: 2024-03-05

## 2023-02-14 RX ORDER — LIDOCAINE HYDROCHLORIDE 20 MG/ML
10 JELLY TOPICAL ONCE
Status: COMPLETED | OUTPATIENT
Start: 2023-02-14 | End: 2023-02-14

## 2023-02-14 RX ORDER — WARFARIN SODIUM 5 MG/1
5 TABLET ORAL
Status: DISCONTINUED | OUTPATIENT
Start: 2023-02-14 | End: 2023-02-14

## 2023-02-14 RX ORDER — ONDANSETRON 4 MG/1
4 TABLET, ORALLY DISINTEGRATING ORAL EVERY 6 HOURS PRN
Status: DISCONTINUED | OUTPATIENT
Start: 2023-02-14 | End: 2023-02-14

## 2023-02-14 RX ORDER — NALOXONE HYDROCHLORIDE 0.4 MG/ML
0.2 INJECTION, SOLUTION INTRAMUSCULAR; INTRAVENOUS; SUBCUTANEOUS
Status: DISCONTINUED | OUTPATIENT
Start: 2023-02-14 | End: 2023-02-16 | Stop reason: HOSPADM

## 2023-02-14 RX ORDER — SODIUM CHLORIDE 9 MG/ML
INJECTION, SOLUTION INTRAVENOUS CONTINUOUS
Status: DISCONTINUED | OUTPATIENT
Start: 2023-02-14 | End: 2023-02-16

## 2023-02-14 RX ORDER — NALOXONE HYDROCHLORIDE 0.4 MG/ML
0.4 INJECTION, SOLUTION INTRAMUSCULAR; INTRAVENOUS; SUBCUTANEOUS
Status: DISCONTINUED | OUTPATIENT
Start: 2023-02-14 | End: 2023-02-16 | Stop reason: HOSPADM

## 2023-02-14 RX ORDER — POLYETHYLENE GLYCOL 3350 17 G/17G
17 POWDER, FOR SOLUTION ORAL DAILY PRN
Status: CANCELLED | OUTPATIENT
Start: 2023-02-14

## 2023-02-14 RX ORDER — HYDROMORPHONE HYDROCHLORIDE 1 MG/ML
0.3 INJECTION, SOLUTION INTRAMUSCULAR; INTRAVENOUS; SUBCUTANEOUS
Status: DISCONTINUED | OUTPATIENT
Start: 2023-02-14 | End: 2023-02-16 | Stop reason: HOSPADM

## 2023-02-14 RX ORDER — IPRATROPIUM BROMIDE AND ALBUTEROL SULFATE 2.5; .5 MG/3ML; MG/3ML
3 SOLUTION RESPIRATORY (INHALATION) EVERY 4 HOURS PRN
Status: DISCONTINUED | OUTPATIENT
Start: 2023-02-14 | End: 2023-02-16 | Stop reason: HOSPADM

## 2023-02-14 RX ORDER — IOPAMIDOL 755 MG/ML
135 INJECTION, SOLUTION INTRAVASCULAR ONCE
Status: COMPLETED | OUTPATIENT
Start: 2023-02-14 | End: 2023-02-14

## 2023-02-14 RX ORDER — ONDANSETRON 4 MG/1
4 TABLET, ORALLY DISINTEGRATING ORAL EVERY 6 HOURS PRN
Status: DISCONTINUED | OUTPATIENT
Start: 2023-02-14 | End: 2023-02-16 | Stop reason: HOSPADM

## 2023-02-14 RX ORDER — HYDRALAZINE HYDROCHLORIDE 20 MG/ML
10 INJECTION INTRAMUSCULAR; INTRAVENOUS EVERY 4 HOURS PRN
Status: DISCONTINUED | OUTPATIENT
Start: 2023-02-14 | End: 2023-02-16 | Stop reason: HOSPADM

## 2023-02-14 RX ORDER — AMOXICILLIN 250 MG
1 CAPSULE ORAL 2 TIMES DAILY PRN
Status: CANCELLED | OUTPATIENT
Start: 2023-02-14

## 2023-02-14 RX ADMIN — SODIUM CHLORIDE 1000 ML: 9 INJECTION, SOLUTION INTRAVENOUS at 05:12

## 2023-02-14 RX ADMIN — ONDANSETRON 4 MG: 2 INJECTION INTRAMUSCULAR; INTRAVENOUS at 11:46

## 2023-02-14 RX ADMIN — PANTOPRAZOLE SODIUM 40 MG: 40 INJECTION, POWDER, FOR SOLUTION INTRAVENOUS at 11:42

## 2023-02-14 RX ADMIN — SODIUM CHLORIDE 79 ML: 900 INJECTION INTRAVENOUS at 07:02

## 2023-02-14 RX ADMIN — HYDROMORPHONE HYDROCHLORIDE 0.3 MG: 1 INJECTION, SOLUTION INTRAMUSCULAR; INTRAVENOUS; SUBCUTANEOUS at 16:02

## 2023-02-14 RX ADMIN — HYDROMORPHONE HYDROCHLORIDE 0.3 MG: 1 INJECTION, SOLUTION INTRAMUSCULAR; INTRAVENOUS; SUBCUTANEOUS at 10:39

## 2023-02-14 RX ADMIN — LIDOCAINE HYDROCHLORIDE 10 ML: 20 JELLY TOPICAL at 09:26

## 2023-02-14 RX ADMIN — SODIUM CHLORIDE: 9 INJECTION, SOLUTION INTRAVENOUS at 17:33

## 2023-02-14 RX ADMIN — HYDROMORPHONE HYDROCHLORIDE 0.3 MG: 1 INJECTION, SOLUTION INTRAMUSCULAR; INTRAVENOUS; SUBCUTANEOUS at 20:48

## 2023-02-14 RX ADMIN — IOPAMIDOL 135 ML: 755 INJECTION, SOLUTION INTRAVENOUS at 07:02

## 2023-02-14 RX ADMIN — SODIUM CHLORIDE: 9 INJECTION, SOLUTION INTRAVENOUS at 07:53

## 2023-02-14 RX ADMIN — HYDROMORPHONE HYDROCHLORIDE 0.3 MG: 1 INJECTION, SOLUTION INTRAMUSCULAR; INTRAVENOUS; SUBCUTANEOUS at 08:15

## 2023-02-14 RX ADMIN — TOPICAL ANESTHETIC 2.5 ML: 200 SPRAY DENTAL; PERIODONTAL at 09:26

## 2023-02-14 ASSESSMENT — ENCOUNTER SYMPTOMS
ABDOMINAL PAIN: 1
CHILLS: 0
VOMITING: 1
HEMATURIA: 0
FEVER: 0
CONSTIPATION: 0
BLOOD IN STOOL: 0
COUGH: 0
DIARRHEA: 1
NAUSEA: 1
ANAL BLEEDING: 0

## 2023-02-14 ASSESSMENT — ACTIVITIES OF DAILY LIVING (ADL)
ADLS_ACUITY_SCORE: 35
ADLS_ACUITY_SCORE: 33
ADLS_ACUITY_SCORE: 35
ADLS_ACUITY_SCORE: 39
ADLS_ACUITY_SCORE: 35

## 2023-02-14 NOTE — ED TRIAGE NOTES
BIBA from . Pt woke up with lower abd pain at 2345. Incontinent of diarrhea. 4 zofran ODT given by EMS. . VSS.     Triage Assessment     Row Name 02/14/23 0056       Triage Assessment (Adult)    Airway WDL WDL       Respiratory WDL    Respiratory WDL WDL       Skin Circulation/Temperature WDL    Skin Circulation/Temperature WDL WDL       Cardiac WDL    Cardiac WDL WDL       Peripheral/Neurovascular WDL    Peripheral Neurovascular WDL WDL       Cognitive/Neuro/Behavioral WDL    Cognitive/Neuro/Behavioral WDL WDL

## 2023-02-14 NOTE — ED PROVIDER NOTES
History   Chief Complaint:  Abdominal Pain     HPI   Petey Archibald is a 62 year old male who lives in a group home who is anticoagulated on Coumadin with a history of coagulation disorder, hyperlipidemia, hypercholesterolemia, COPD, obstructive sleep apnea, DVT/PE who presents via EMS with abdominal pain, vomiting and diarrhea.  The patient reports that at 1115 last night he started with pain across the lower abdomen with associated diarrhea and nausea.  He has had 8 diarrhea stools over the night and was incontinent of stool because he did not feel able to get up to get to the bathroom because he was feeling unwell.  He arrives via EMS and was brought to triage where he was incontinent of stool again and vomited twice.  He complains of generalized abdominal pain across the lower abdomen.  No fevers or chills, no chest pain or shortness of breath.  No blood in his stool or emesis.  He denies any recent antibiotics.  He denies any recent travel.  He has never had C. difficile infection in the past.  He denies any history of abdominal surgeries.    Independent Historian:   None - Patient Only     ROS:  Review of Systems   Constitutional: Negative for chills and fever.   Respiratory: Negative for cough.    Cardiovascular: Negative for chest pain and leg swelling.   Gastrointestinal: Positive for abdominal pain, diarrhea, nausea and vomiting. Negative for anal bleeding, blood in stool and constipation.   Genitourinary: Negative for hematuria.   All other systems reviewed and are negative.      Allergies:  Clavulanic Acid  Augmentin  Penicillins     Medications:    Albuterol inhaler  Abilify   Wellbutrin  Klonopin   Catapres  Epinephrine pen  Neurontin   Claritin  Ativan  Robaxin   Singulair   Depade   Prilosec  Zoloft  Aldactone  Restoril   Zanaflex  Chantix   Coumadin     Past Medical History:    Borderline mental retardation  Chronic infection  Coagulation disorder  COPD  GERD  DVT of lower extremity   Pulmonary  embolism  Hyperlipidemia   Morbid obesity   Obstructive sleep apnea  Peripheral edema  Peripheral vascular disease  Uncomplicated asthma  Venous stasis dermatitis   Hypercholesterolemia   Major depression   Long term current use of anticoagulant therapy   Impaired glucose intolerance  Erectile dysfunction  Anxiety disorder   Colonic polyps  Lymphedema of left lower extremity   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    Social History:  The patient arrived to the emergency department via EMS.   reports that he has been smoking cigarettes. He has a 30.00 pack-year smoking history. He has never used smokeless tobacco. He reports that he does not drink alcohol and does not use drugs.  PCP: Demetri Archer     Physical Exam     Patient Vitals for the past 24 hrs:   BP Temp Temp src Pulse Resp SpO2   02/14/23 0450 (!) 141/85 -- -- 89 -- --   02/14/23 0057 117/73 99  F (37.2  C) Temporal 95 18 97 %      Physical Exam  Nursing note and vitals reviewed.  Constitutional:  Appears well-developed and well-nourished.   HENT:   Head:    Atraumatic.   Mouth/Throat:   Oropharynx is clear and moist. No oropharyngeal exudate.   Eyes:    Pupils are equal, round, and reactive to light.   Neck:    Normal range of motion. Neck supple.      No tracheal deviation present. No thyromegaly present.   Cardiovascular:  Normal rate, regular rhythm, no murmur   Pulmonary/Chest: Breath sounds are clear and equal without wheezes or crackles.  Abdominal:   Soft. Bowel sounds are normal. Exhibits no distension and      no mass. There is no mild tenderness across the  lower abdomen.     There is no rebound and no guarding.   Musculoskeletal:  Exhibits no edema.   Lymphadenopathy:  No cervical adenopathy.   Neurological:   Alert and oriented to person, place, and time.   Skin:    Skin is warm and dry. Red rash noted in the left groin. No pallor.       Emergency Department Course   [unfilled]    Imaging:  CT Abdomen Pelvis w Contrast   Preliminary Result   IMPRESSION:    1.  Findings are suspicious for a mid/distal small bowel obstruction,   with a gradual zone of transition noted in the mid/distal small bowel.   2.  Irregularity of the left femoral head and advanced degenerative   change in the left hip are new since the previous exam.   3.  Small fat-containing paraumbilical and supraumbilical ventral   hernias, not significantly changed. These do not appear to be involved   with suspected small bowel obstruction.      Report per radiology    Laboratory:  Labs Ordered and Resulted from Time of ED Arrival to Time of ED Departure   COMPREHENSIVE METABOLIC PANEL - Abnormal       Result Value    Sodium 139      Potassium 3.7      Chloride 104      Carbon Dioxide (CO2) 24      Anion Gap 11      Urea Nitrogen 15.5      Creatinine 1.22 (*)     Calcium 9.4      Glucose 127 (*)     Alkaline Phosphatase 98      AST 21      ALT 19      Protein Total 7.8      Albumin 3.9      Bilirubin Total 0.2      GFR Estimate 66     CBC WITH PLATELETS AND DIFFERENTIAL - Abnormal    WBC Count 5.6      RBC Count 4.11 (*)     Hemoglobin 11.5 (*)     Hematocrit 38.3 (*)     MCV 93      MCH 28.0      MCHC 30.0 (*)     RDW 15.2 (*)     Platelet Count 254      % Neutrophils 76      % Lymphocytes 14      % Monocytes 7      % Eosinophils 3      % Basophils 0      % Immature Granulocytes 0      NRBCs per 100 WBC 0      Absolute Neutrophils 4.2      Absolute Lymphocytes 0.8      Absolute Monocytes 0.4      Absolute Eosinophils 0.1      Absolute Basophils 0.0      Absolute Immature Granulocytes 0.0      Absolute NRBCs  0.0     ENTERIC BACTERIA AND VIRUS PANEL BY MAXIMILIANO STOOL   C. DIFFICILE TOXIN B PCR WITH REFLEX TO C. DIFFICILE ANTIGEN AND TOXINS A/B EIA      Procedures       Emergency Department Course & Assessments:     Interventions:  Medications   0.9% sodium chloride BOLUS (has no administration in time range)   0.9% sodium chloride BOLUS (1,000 mLs Intravenous New Bag 2/14/23 0512)      Consultations/Discussion of Management or Tests:  I spoke with Dr. Grover of the hospitalist service who accepts the patient for admission.     Social Determinants of Health affecting care:   Social Connections/Isolation and lives in a group home.    Assessments:  I obtained history and performed an exam of the patient as documented above.    Disposition:  The patient was admitted to the hospital under the care of Dr. Grover.     Impression & Plan      Medical Decision Making:  This patient's symptoms of vomiting, multiple bouts of diarrhea and abdominal pain are most concerning for viral enteritis, C. difficile infection or food poisoning.  However CT abdomen pelvis was performed and shows signs concerning for small bowel obstruction.  C. difficile testing was ordered and he was given IV fluid hydration and antiemetics with improvement of his abdominal pain and vomiting.  He was admitted to the care of the hospitalist for further evaluation and treatment.    Diagnosis:    ICD-10-CM    1. SBO (small bowel obstruction) (H)  K56.609       2. Vomiting and diarrhea  R11.10     R19.7           Scribe Disclosure:  I, Deisy Grande, am serving as a scribe at 6:16 AM on 2/14/2023 to document services personally performed by Vicki Lowe MD based on my observations and the provider's statements to me.       Vicki Lowe MD  02/14/23 0060

## 2023-02-14 NOTE — ED NOTES
[facility]  ED Nurse Handoff Report    ED Chief complaint: Abdominal Pain      ED Diagnosis:   Final diagnoses:   None       Code Status: Full Code    Allergies:   Allergies   Allergen Reactions    Bees     Clavulanic Acid     Augmentin Rash    Penicillins Rash       Patient Story: Pt presents from Group Home after abd pain,N/V/D.  Focused Assessment:  Pt endorses generalized abd pain with vomiting and incontinent of diarrhea. Pt is his own guardian. Given 1L NS.     Treatments and/or interventions provided: see above  Patient's response to treatments and/or interventions: see above    To be done/followed up on inpatient unit:  none pending    Does this patient have any cognitive concerns?: cognitive delay    Activity level - Baseline/Home:  Walker  Activity Level - Current:   Walker    Patient's Preferred language: English   Needed?: No    Isolation: None and Enteric  Infection: Not Applicable  C-Diff Pending  Patient tested for COVID 19 prior to admission: NO  Bariatric?: Yes    Vital Signs:   Vitals:    02/14/23 0057 02/14/23 0450   BP: 117/73 (!) 141/85   Pulse: 95 89   Resp: 18    Temp: 99  F (37.2  C)    TempSrc: Temporal    SpO2: 97%        Cardiac Rhythm:     Was the PSS-3 completed:   Yes  What interventions are required if any?               Family Comments: Resides at Group Home. Makes hs own decisions.  OBS brochure/video discussed/provided to patient/family: N/A              Name of person given brochure if not patient: N/A              Relationship to patient: N/A    For the majority of the shift this patient's behavior was Yellow.   Behavioral interventions performed were information  and reassurance.    ED NURSE PHONE NUMBER: [phone]

## 2023-02-14 NOTE — CONSULTS
General Surgery Consultation    Petey Archibald MRN#: [id number]   Age: 62 year old YOB: 1960     Date of Admission:  2/14/2023  Reason for consult: Possible Small Bowel Obstruction       Requesting physician: Ramirez Gar MD       Surgeon:        Jose Carr MD        Chief Complaint:   Abdominal pain, epigastric     History is obtained from the patient.  Guardian not present during my exam.         History of Present Illness:   General Surgery was asked to evaluate this patient at the request of Dr. Gar for abdominal pain and CT findings of small bowel obstruction.    This patient is a 62 year old  male with a significant past medical history (see below) who presents with abdominal pain, N/V and diarrhea.  He reports he felt in his usual state of health yesterday, but around 11pm started having abdominal pain.  This pain woke him from sleep and was located in epigastric area and midline abdomen.  It extends down to umbilical area as well.  No flank pain.  He has associated diarrhea and had at least 8 BMs overnight.  Due to these symptoms he was brought via EMS to [facility] ED.  Additionally, he has had nausea and vomiting since admission.      He denies fevers or chills.  No bloody or mucus stools.  No known sick contacts at his group home.    In the ED, he was found to have a relatively normal CMP and CBC.  No leukocytosis.  Stool sample is pending for enteric bacteria/viruses.  A CT of his abdomen showed findings suspicious for a mid/distal small bowel obstruction with gradual zone of transition, two small fat containing ventral hernias that are noncontributory to SBO, and left femoral head degenerative changes.    Due to possible SBO, General Surgery was consulted.  He has had an NGT placed but this is currently clamped.  AXR results pending for placement check.    Currently, he rates pain 8-9/10 and it is centered in epigastric area.  Trying to sleep in left lateral decub  position but this seems to worsen pain.  He requests heat pad to help with his pain.          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          Past Surgical History:     Past Surgical History:   Procedure Laterality Date     COLONOSCOPY N/A 4/15/2019    Procedure: COMBINED COLONOSCOPY, SINGLE OR MULTIPLE BIOPSY/POLYPECTOMY BY BIOPSY;  Surgeon: Jovana Ohara MD;  Location:  GI     COLONOSCOPY N/A 6/7/2021    Procedure: COLONOSCOPY with polypectomies;  Surgeon: Sahil Cid MD;  Location: [facility] OR     EXCISE MALIGNANT LESIONS, TRUNK/ARMS/LEGS 0.5CM OR LESS Left 5/26/2017    Procedure: EXCISE MALIGNANT LESIONS, TRUNK/ARMS/LEGS 0.5CM OR LESS;  EXCISION LEFT ELBOW MASS;  Surgeon: Jose Azevedo MD;  Location:  GI     RECTAL SURGERY      perianal abscess?             Social History:     Social History     Tobacco Use     Smoking status: Every Day     Packs/day: 1.00     Years: 30.00     Pack years: 30.00     Types: Cigarettes     Smokeless tobacco: Never     Tobacco comments:     started at age 18    Substance Use Topics     Alcohol use: No     Alcohol/week: 0.0 standard drinks             Family History:   This patient has no significant family history          Allergies:     Allergies   Allergen Reactions     Bees      Clavulanic Acid      Augmentin Rash     Penicillins Rash             Medications:     Prior to Admission medications    Medication Sig Start Date End Date Taking? Authorizing Provider   acetaminophen (TYLENOL)  325 MG tablet Take 1-2 tablets (325-650 mg) by mouth every 6 hours as needed for mild pain, fever or headaches 11/23/21   Demetri Archer MD   albuterol (ALBUTEROL) 108 (90 BASE) MCG/ACT inhaler Inhale 2 puffs into the lungs every 6 hours as needed 11/5/15   Wilman Peck MD   ARIPiprazole (ABILIFY) 5 MG tablet Take 5 mg by mouth daily    Unknown, Entered By History   bacitracin 500 UNIT/GM OINT  6/24/20   Reported, Patient   BANOPHEN 50 MG capsule  3/2/22   Reported, Patient   buPROPion (WELLBUTRIN SR) 150 MG 12 hr tablet Take 150 mg by mouth 2 times daily    Reported, Patient   Cadexomer Iodine, topical, 0.9% (IODOSORB) 0.9 % GEL gel Apply topically daily Apply to left foot wound daily after cleansing the wound and blotting it dry. 6/19/20   Joseph Flaherty DPM   cholecalciferol 50 MCG (2000 UT) tablet Take 1 tablet by mouth daily 5/26/21   Reported, Patient   clonazePAM (KLONOPIN) 0.5 MG tablet Take 0.25 mg by mouth 2/9/22   Reported, Patient   cloNIDine (CATAPRES) 0.1 MG tablet Take 0.1 mg by mouth 2 times daily    Reported, Patient   clotrimazole (LOTRIMIN) 1 % external cream Apply topically 2 times daily 9/20/19   Nadira Westfall MD   diclofenac (VOLTAREN) 1 % topical gel Apply 4 g topically 2 times daily as needed for moderate pain 3/23/21   Demetri Archer MD   diphenhydrAMINE (BENADRYL) 25 MG capsule Take 25 mg by mouth every 6 hours as needed for itching or allergies    Reported, Patient   Emollient (CERAVE) CREA Externally apply topically daily 9/22/21   Rustam Davis MD   EPINEPHrine (EPIPEN 2-BRE) 0.3 MG/0.3ML injection 2-pack INJECT 0.3MG INTRAMUSCULAR ONE TIME FOR ONE DOSE AS NEEDED FOR ANAPHYLAXIS (CALL 911 IF YOU HAVE TO GIVE) (FOR BEE STINGS) **LABEL EACH PEN* 12/10/18   Raúl Riddle MD   gabapentin (NEURONTIN) 100 MG capsule Take 400 mg by mouth 3 times daily At 0900, 1200, and 2000 12/19/19   Reported, Patient   gabapentin (NEURONTIN) 400 MG capsule  2/8/22    "Reported, Patient   Gauze Pads & Dressings (GAUZE PADS 4\"X4\") 4\"X4\" PADS 1 each 3 times daily as needed 5/7/19   Wilman Peck MD   loratadine (CLARITIN) 10 MG tablet Take 10 mg by mouth daily    Reported, Patient   LORazepam (ATIVAN) 1 MG tablet Take 1 mg by mouth every 6 hours as needed for anxiety    Reported, Patient   methocarbamol (ROBAXIN) 750 MG tablet Take 1 tablet (750 mg) by mouth 4 times daily as needed for muscle spasms 8/26/21   Yinka Hagen MD   montelukast (SINGULAIR) 10 MG tablet TAKE 1 TABLET BY MOUTH EVERY MORNING (ASTHMA) 4/13/21   Demetri Archer MD   Multiple Vitamin (DAILY-JOVAN) TABS TAKE 1 TABLET BY MOUTH EVERY MORNING (VITAMINS) 4/13/21   Demetri Archer MD   mupirocin (BACTROBAN) 2 % external ointment  2/23/22   Reported, Patient   naltrexone (DEPADE/REVIA) 50 MG tablet Take 50 mg by mouth daily    Reported, Patient   nicotine (COMMIT) 2 MG lozenge  10/13/21   Reported, Patient   nicotine (NICODERM CQ) 21 MG/24HR 24 hr patch  9/27/19   Reported, Patient   nicotine (NICORETTE) 2 MG gum Take 2 mg by mouth as needed for smoking cessation    Unknown, Entered By History   nystatin-triamcinolone (MYCOLOG II) 289818-2.1 UNIT/GM-% external cream Apply topically 2 times daily For 1-2 weeks.    Unknown, Entered By History   omeprazole (PRILOSEC) 40 MG DR capsule Take 1 capsule (40 mg) by mouth daily 10/11/21   Demetri Archer MD   order for DME Equipment being ordered: roll of micropore tape to dress foot wound bid 6/5/20   Hortensia Peck MD   order for DME Equipment being ordered: 1 surgical shoe 3/12/20   Domingo De La Torre DPM   order for DME [employer] [facility] Phone [phone] Fax [phone]  EdemaWear Size Medium/Yellow qty 4 sleeves  Length of Need: 1 month  Frequency of dressing change daily 10/9/19   Rustam Davis MD   order for [employer] Compression Stockings  Phone #[phone]  Fax #[phone]  Coolflex Velcro " wraps    Length of Need: Life Time  # of Pairs 1 set 10/9/19   Rustam Davis MD   order for DME [facility] [facility] Phone [phone] Fax [phone]  Primary Dressing Hydrofera Blue Ready   Qty 5 sheets  Secondary Dressing 4' roll gauze Qty 30  Secondary Dressing 2' medipore tape Qty 1  Secondary Dressing 4x4 gauze loaf Qty  1  Length of Need: 1 month  Frequency of dressing change: daily 9/24/19   Rustam Davis MD   order for DME Oxygen 2 Li/min  at night and bled into BiPAP 5/26/17   Goltz, Bennett Ezra, PA-C   order for DME Equipment being ordered: CPAP with mask and tubing 12/5/16   Raúl Riddle MD   order for DME Equipment being ordered: support compression hose BK  2 pair black 30mm HG   To be applied on arising & removed while lying down to go to sleep 8/11/16   Hortensia Peck MD   polyethylene glycol (MIRALAX) 17 GM/Dose powder Take 17 g (1 capful) by mouth daily as needed for constipation 10/29/21   Demetri Archer MD   PULMICORT FLEXHALER 180 MCG/ACT inhaler INHALE 2 PUFFS INTO THE LUNGS TWICE DAILY. 4/21/21   Silvino Baker MD   SENNA-TABS 8.6 MG tablet  2/11/21   Reported, Patient   sertraline (ZOLOFT) 100 MG tablet Take 100 mg by mouth daily Take with 50 mg tablet for a total dose of 150 mg daily.    Reported, Patient   sertraline (ZOLOFT) 50 MG tablet  10/9/20   Reported, Patient   simvastatin (ZOCOR) 40 MG tablet TAKE 1 TABLET BY MOUTH EVERY MORNING.  (CHOLESTEROL) 4/13/21   Demetri Archer MD   SPIRIVA HANDIHALER 18 MCG inhaled capsule INHALE CONTENTS OF 1 CAPSULE INTO THE LUNGS ONCE DAILY 4/14/21   Demetri Archer MD   spironolactone (ALDACTONE) 25 MG tablet TAKE 1 TABLET BY MOUTH ONCE DAILY. (HIGH BLOOD PRESSURE) 4/13/21   Demetri Archer MD   temazepam (RESTORIL) 15 MG capsule Take 15 mg by mouth nightly as needed for sleep    Unknown, Entered By History   tiZANidine (ZANAFLEX) 2 MG tablet Take 1 tablet (2 mg) by mouth 3 times  daily for 180 days 2/5/23 8/4/23  Marlen Noriega MD   tiZANidine (ZANAFLEX) 2 MG tablet Take 1 tablet (2 mg) by mouth 3 times daily 10/29/21   Yinka Hagen MD   traZODone (DESYREL) 50 MG tablet Take 1 tablet by mouth At Bedtime 2/9/22   Reported, Patient   triamcinolone (KENALOG) 0.1 % external cream Apply to AA BID x 1-2 week then PRN only 11/5/21   Margarita Jenkins, AZIZA   trolamine salicylate (ASPERCREME) 10 % external cream Apply topically as needed for moderate pain knee    Unknown, Entered By History   varenicline (CHANTIX) 1 MG tablet Take 1 tablet (1 mg) by mouth 2 times daily 11/2/22   Demetri Archer MD   vitamin B complex with vitamin C (STRESS TAB) tablet Take 1 tablet by mouth daily 11/8/21   Rustam Davis MD   warfarin ANTICOAGULANT (COUMADIN) 4 MG tablet Current dose: 10 mg every Monday + 8 mg all other days with next INR on 2/20/23 2/6/23   Demetri Archer MD   White Petrolatum ointment Apply topically nightly as needed for dry skin    Unknown, Entered By History             Review of Systems:   The Review of Systems is negative other than noted in the HPI          Physical Exam:   Blood pressure 119/58, pulse 76, temperature 98  F (36.7  C), temperature source Oral, resp. rate 18, SpO2 97 %.    General - This is a well developed, well nourished male in no apparent distress, though looks uncomfortable.  HEENT - Normocephalic. Atraumatic. Moist mucous membranes. Pupils equal.  No scleral icterus. NGT to Left nare is clamped.  Neck - Supple without masses.  Trachea midline.  Lungs - Clear to ascultation bilaterally.    Heart - Regular rate & rhythm without murmur.  Abdomen - Obeses, round, tender to epigastric and supraumbilical area.  Nontender to sides.  No rebound or guarding.  Extremities - Moves all extremities. Warm without edema.  LE skin dark.  Neurologic - Nonfocal. Appropriate responses.            Data:   Labs:  WBC -   WBC   Date Value Ref Range Status    06/03/2021 4.8 4.0 - 11.0 10e9/L Final     WBC Count   Date Value Ref Range Status   02/14/2023 5.6 4.0 - 11.0 10e3/uL Final     Hgb -   Hemoglobin   Date Value Ref Range Status   02/14/2023 11.5 (L) 13.3 - 17.7 g/dL Final   06/03/2021 11.9 (L) 13.3 - 17.7 g/dL Final       Liver Function Studies -   Recent Labs   Lab Test 02/14/23  0059   PROTTOTAL 7.8   ALBUMIN 3.9   BILITOTAL 0.2   ALKPHOS 98   AST 21   ALT 19       CT scan of the abdomen:   IMPRESSION:   1.  Findings are suspicious for a mid/distal small bowel obstruction,  with a gradual zone of transition noted in the mid/distal small bowel.  2.  Irregularity of the left femoral head and advanced degenerative  change in the left hip are new since the previous exam.  3.  Small fat-containing paraumbilical and supraumbilical ventral  hernias, not significantly changed. These do not appear to be involved  with suspected small bowel obstruction.    As read by Radiology       Xray of the abdomen:  NG tube tip and sidehole in the stomach. Small bowel  Obstruction.    As read by Radiology           Assessment:   Abdominal Pain with Possible Small Bowel Obstruction         Plan:   Discussed CT findings with patient.  These are concerning for SBO, but he is having multiple BMs.    Enteric stool sample results pending.  Agree with NGT placement and bowel rest.  NPO except ice chips/meds.  IV pain meds PRN.  Heat pad or warm towels PRN.  Antiemetics PRN.  Hopeful for conservative management.    Would add PPI IV.  No Imodium recommended.  PCDs to LE.  Surgery will follow along.    I have discussed with Acute Care surgeon, Dr. Jose Carr, who agrees with plan and will assess pt as well and attest this note with changes.    Total time spent reviewing chart/labs/imaging, evaluating and educating patient, developing, documenting, and implementing treatment plan:    62 min        Walter Nunez PA-C, physician assistant for Dr. Jose Carr  [facility],  [phone]  Pager [phone]

## 2023-02-14 NOTE — PHARMACY-ADMISSION MEDICATION HISTORY
Pharmacy Medication History  Admission medication history interview status for the 2/14/2023  admission is complete. See EPIC admission navigator for prior to admission medications     Location of Interview: Phone  Medication history sources: [facility] staff, [facility]bi    Significant changes made to the medication list:    In the past week, patient estimated taking medication this percent of the time: greater than 90%      Additional medication history information:     Medication reconciliation completed by provider prior to medication history? No    Time spent in this activity: 20 min    Prior to Admission medications    Medication Sig Last Dose Taking? Auth Provider Long Term End Date   acetaminophen (TYLENOL) 325 MG tablet Take 1-2 tablets (325-650 mg) by mouth every 6 hours as needed for mild pain, fever or headaches prn Yes Demetri Archer MD     albuterol (ALBUTEROL) 108 (90 BASE) MCG/ACT inhaler Inhale 2 puffs into the lungs every 6 hours as needed prn Yes Wilman Peck MD Yes    ARIPiprazole (ABILIFY) 5 MG tablet Take 5 mg by mouth daily 2/13/2023 Yes Unknown, Entered By History No    buPROPion (WELLBUTRIN SR) 150 MG 12 hr tablet Take 150 mg by mouth 2 times daily 2/13/2023 Yes Reported, Patient No    cloNIDine (CATAPRES) 0.1 MG tablet Take 0.1 mg by mouth daily as needed  Yes Reported, Patient No    diphenhydrAMINE (BENADRYL) 25 MG capsule Take 50 mg by mouth every 6 hours as needed for itching or allergies prn Yes Reported, Patient     Emollient (CERAVE) CREA Externally apply topically daily  Yes Rustam Davis MD     EPINEPHrine (EPIPEN 2-BRE) 0.3 MG/0.3ML injection 2-pack INJECT 0.3MG INTRAMUSCULAR ONE TIME FOR ONE DOSE AS NEEDED FOR ANAPHYLAXIS (CALL 911 IF YOU HAVE TO GIVE) (FOR BEE STINGS) **LABEL EACH PEN*  Yes Raúl Riddle MD     gabapentin (NEURONTIN) 100 MG capsule Take 400 mg by mouth 3 times daily At 0900, 1200, and 2000 2/13/2023 Yes Reported, Patient Yes     loratadine (CLARITIN) 10 MG tablet Take 10 mg by mouth daily 2/13/2023 Yes Reported, Patient     LORazepam (ATIVAN) 1 MG tablet Take 1 mg by mouth daily as needed for anxiety  Yes Reported, Patient     montelukast (SINGULAIR) 10 MG tablet TAKE 1 TABLET BY MOUTH EVERY MORNING (ASTHMA) 2/13/2023 Yes Demetri Archer MD Yes    Multiple Vitamin (DAILY-JOVAN) TABS TAKE 1 TABLET BY MOUTH EVERY MORNING (VITAMINS) 2/13/2023 Yes Demetri Archer MD     naltrexone (DEPADE/REVIA) 50 MG tablet Take 100 mg by mouth daily 2/13/2023 Yes Reported, Patient No    nicotine (COMMIT) 2 MG lozenge Place 2 mg inside cheek daily as needed  Yes Reported, Patient     omeprazole (PRILOSEC) 40 MG DR capsule Take 1 capsule (40 mg) by mouth daily 2/13/2023 Yes Demetri Archer MD     polyethylene glycol (MIRALAX) 17 GM/Dose powder Take 17 g (1 capful) by mouth daily as needed for constipation  Yes Demetri Archer MD     PULMICORT FLEXHALER 180 MCG/ACT inhaler INHALE 2 PUFFS INTO THE LUNGS TWICE DAILY. 2/13/2023 Yes Silvino Baker MD Yes    SENNA-TABS 8.6 MG tablet Take 1 tablet by mouth daily 2/13/2023 Yes Reported, Patient     sertraline (ZOLOFT) 100 MG tablet Take 200 mg by mouth daily Take with 50 mg tablet for a total dose of 150 mg daily. 2/13/2023 Yes Reported, Patient     simvastatin (ZOCOR) 40 MG tablet TAKE 1 TABLET BY MOUTH EVERY MORNING.  (CHOLESTEROL) 2/13/2023 Yes Demetri Archer MD Yes    SPIRIVA HANDIHALER 18 MCG inhaled capsule INHALE CONTENTS OF 1 CAPSULE INTO THE LUNGS ONCE DAILY 2/13/2023 Yes Demetri Archer MD Yes    spironolactone (ALDACTONE) 25 MG tablet TAKE 1 TABLET BY MOUTH ONCE DAILY. (HIGH BLOOD PRESSURE) 2/13/2023 Yes Demetri Archer MD Yes    tiZANidine (ZANAFLEX) 2 MG tablet Take 1 tablet (2 mg) by mouth 3 times daily 2/13/2023 Yes Yinka Hagen MD     traZODone (DESYREL) 100 MG tablet Take 100 mg by mouth At Bedtime 2/13/2023 Yes Unknown, Entered By  "History     varenicline (CHANTIX) 1 MG tablet Take 1 tablet (1 mg) by mouth 2 times daily 2/13/2023 Yes Demetri Archer MD     warfarin ANTICOAGULANT (COUMADIN) 4 MG tablet Current dose: 10 mg every Monday + 8 mg all other days with next INR on 2/20/23 2/13/2023 Yes Demetri Archer MD     Cadexomer Iodine, topical, 0.9% (IODOSORB) 0.9 % GEL gel Apply topically daily Apply to left foot wound daily after cleansing the wound and blotting it dry.   Joseph Flaherty DPM Yes    clotrimazole (LOTRIMIN) 1 % external cream Apply topically 2 times daily  Patient not taking: Reported on 2/14/2023 Not Taking  Nadira Westfall MD     diclofenac (VOLTAREN) 1 % topical gel Apply 4 g topically 2 times daily as needed for moderate pain  Patient not taking: Reported on 2/14/2023 Not Taking  Demetri Archer MD     Gauze Pads & Dressings (GAUZE PADS 4\"X4\") 4\"X4\" PADS 1 each 3 times daily as needed   Wilman Peck MD     nystatin-triamcinolone (MYCOLOG II) 188365-6.1 UNIT/GM-% external cream Apply topically 2 times daily For 1-2 weeks.  Patient not taking: Reported on 2/14/2023 Not Taking  Unknown, Entered By History     order for DME Equipment being ordered: roll of micropore tape to dress foot wound bid   Hortensia Peck MD     order for DME Equipment being ordered: 1 surgical shoe   Domingo De La Torre DPM     order for DME [employer] [facility] Phone [phone] Fax [phone]  EdemaWear Size Medium/Yellow qty 4 sleeves  Length of Need: 1 month  Frequency of dressing change daily   Rustam [facility] MD     order for DME [employer]  Phone #[phone]  Fax #[phone]  Coolflex Velcro wraps    Length of Need: Life Time  # of Pairs 1 set   Rustam Davis MD     order for DME [facility] [facility] Phone [phone] Fax [phone]  Primary Dressing Hydrofera Blue Ready   Qty 5 sheets  Secondary Dressing 4' roll gauze Qty 30  Secondary Dressing 2' medipore tape " Qty 1  Secondary Dressing 4x4 gauze loaf Qty  1  Length of Need: 1 month  Frequency of dressing change: daily   Rustam Davis MD     order for DME Oxygen 2 Li/min  at night and bled into BiPAP   Goltz, Bennett Ezra, PA-C     order for DME Equipment being ordered: CPAP with mask and tubing   Raúl Riddle MD     order for DME Equipment being ordered: support compression hose BK  2 pair black 30mm HG   To be applied on arising & removed while lying down to go to sleep   Hortensia Peck MD     triamcinolone (KENALOG) 0.1 % external cream Apply to AA BID x 1-2 week then PRN only  Patient not taking: Reported on 2/14/2023 Not Taking  Margarita Jenkins PA-C     vitamin B complex with vitamin C (STRESS TAB) tablet Take 1 tablet by mouth daily  Patient not taking: Reported on 2/14/2023 Not Taking  Rustam Davis MD         The information provided in this note is only as accurate as the sources available at the time of update(s)

## 2023-02-14 NOTE — H&P
[facility]    History and Physical - Hospitalist Service       Date of Admission:  2/14/2023    Assessment & Plan      Petey Archibald is a 62 year old male admitted on 2/14/2023. He presents with abdominal pain and diarrhea.     Diarrhea  Pt woke last evening with lower abdominal pain. Has had multiple episodes of incontinence of stool. Also with nausea/ vomiting. At time of my visit feeling better, abdominal pain improved, no nausea. In ED AFVSS. Labs generally normal, WBC 5.6.   - IV fluids  - prn analgesics, antiemetics  - c diff, MAXIMILIANO pending  - CT abdomen/ pelvis pending  - consider imodium once c diff negative if diarrhea persists    WON, mild  Baseline creatinine 0.8-0.9. creatinine on presentation at 1.22.   - IV fluids  - monitor    Schizoaffective disorder, bipolar type  [needs rec- abilify 5 mg daily, wellbutrin 150 mg BID, clonazepam, gabapentin 400 mg TID, prn lorazepam, trazodone 50 mg at HS, temazepam 15 mg at HS prn, sertraline 150 mg daily]  - resume meds with rec    Hypertension  HLD  [needs rec- spironolactone 25 mg daily, sImvastatin 40 mg daily, clonidine 0.1 mg BID]  - resume meds with rec  - prn hydralazine SBP>180 available    Asthma/ COPD  [needs rec- albuterol prn, spiriva daily, pulmicort BID, montelukast 10 mg daily]  - resume meds with rec  - prn albuterol available    Tobacco use disorder- trying to quit. Declines NRT.   Hx DVT/PE- pharmacy consult for warfarin  NEL- resume home CPAP  Morbid obesity- Weight 355 lbs. Encourage weight loss and healthy lifestyle  GERD- resume omeprazole with rec  PVD- noted     Diet:   regular  DVT Prophylaxis: Pneumatic Compression Devices and Ambulate every shift  Tapia Catheter: Not present  Lines: None     Cardiac Monitoring: None  Code Status:   full    Clinically Significant Risk Factors Present on Admission               # Drug Induced Coagulation Defect: home medication list includes an anticoagulant medication    #  "Hypertension: home medication list includes antihypertensive(s)      # Severe Obesity: Estimated body mass index is 53.98 kg/m  as calculated from the following:    Height as of 1/26/23: 1.727 m (5' 8\").    Weight as of 1/26/23: 161 kg (355 lb).           Disposition Plan           Chavo Grover MD  Hospitalist Service  [facility]  Securely message with [employer] (more info)  Text page via [employer] Paging/Directory     ______________________________________________________________________    Chief Complaint   Abdominal pain, diarrhea    History is obtained from the patient, electronic health record and emergency department physician    History of Present Illness   Petey Archibald is a 62 year old male who presents with abdominal pain.  Patient states last evening at about 11 PM he woke with abdominal pain.  He also states he started to develop diarrhea when he went to bed last night.  He states the abdominal pain in his mid abdomen and then in his lower abdominal quadrant.  Denies fevers or chills.  He has never had this before.  Denies bloody stools.  No sick contacts at his group home.  He states the pain had persisted by the time my visit he states the pain appears to be improving.  He did have an episode of nausea vomiting while in triage.  Denies chest pain or shortness of breath.      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     Chronic infection     MRSA     Coagulation disorder (H)     on coumadin     COPD (chronic obstructive pulmonary disease) (H)      History of DVT of lower extremity      History of pulmonary embolism      Hyperlipidemia      Mild intellectual disabilities      Morbid obesity (H)      NEL (obstructive sleep apnea)      Peripheral edema      Peripheral vascular disease (H)      Sleep apnea     uses CPAP     Thrombosis      Tobacco dependence      Uncomplicated asthma      Venous stasis dermatitis  " "      Past Surgical History   Past Surgical History:   Procedure Laterality Date     COLONOSCOPY N/A 4/15/2019    Procedure: COMBINED COLONOSCOPY, SINGLE OR MULTIPLE BIOPSY/POLYPECTOMY BY BIOPSY;  Surgeon: Jovana hOara MD;  Location:  GI     COLONOSCOPY N/A 2021    Procedure: COLONOSCOPY with polypectomies;  Surgeon: Sahil Cid MD;  Location: [facility] OR     EXCISE MALIGNANT LESIONS, TRUNK/ARMS/LEGS 0.5CM OR LESS Left 2017    Procedure: EXCISE MALIGNANT LESIONS, TRUNK/ARMS/LEGS 0.5CM OR LESS;  EXCISION LEFT ELBOW MASS;  Surgeon: Jose Azevedo MD;  Location:  GI     RECTAL SURGERY      perianal abscess?       Prior to Admission Medications   Prior to Admission Medications   Prescriptions Last Dose Informant Patient Reported? Taking?   ARIPiprazole (ABILIFY) 5 MG tablet  Nursing Home Yes No   Sig: Take 5 mg by mouth daily   BANOPHEN 50 MG capsule   Yes No   Cadexomer Iodine, topical, 0.9% (IODOSORB) 0.9 % GEL gel  Nursing Home No No   Sig: Apply topically daily Apply to left foot wound daily after cleansing the wound and blotting it dry.   EPINEPHrine (EPIPEN 2-BRE) 0.3 MG/0.3ML injection 2-pack  Nursing Home No No   Sig: INJECT 0.3MG INTRAMUSCULAR ONE TIME FOR ONE DOSE AS NEEDED FOR ANAPHYLAXIS (CALL 911 IF YOU HAVE TO GIVE) (FOR BEE STINGS) **LABEL EACH PEN*   Emollient (CERAVE) CREA   No No   Sig: Externally apply topically daily   Gauze Pads & Dressings (GAUZE PADS 4\"X4\") 4\"X4\" PADS  Nursing Home No No   Si each 3 times daily as needed   LORazepam (ATIVAN) 1 MG tablet   Yes No   Sig: Take 1 mg by mouth every 6 hours as needed for anxiety   Multiple Vitamin (DAILY-JOVAN) TABS   No No   Sig: TAKE 1 TABLET BY MOUTH EVERY MORNING (VITAMINS)   PULMICORT FLEXHALER 180 MCG/ACT inhaler   No No   Sig: INHALE 2 PUFFS INTO THE LUNGS TWICE DAILY.   SENNA-TABS 8.6 MG tablet   Yes No   SPIRIVA HANDIHALER 18 MCG inhaled capsule   No No   Sig: INHALE CONTENTS OF 1 CAPSULE INTO THE LUNGS ONCE DAILY "   White Petrolatum ointment  Nursing Home Yes No   Sig: Apply topically nightly as needed for dry skin   acetaminophen (TYLENOL) 325 MG tablet   No No   Sig: Take 1-2 tablets (325-650 mg) by mouth every 6 hours as needed for mild pain, fever or headaches   albuterol (ALBUTEROL) 108 (90 BASE) MCG/ACT inhaler  Nursing Home No No   Sig: Inhale 2 puffs into the lungs every 6 hours as needed   bacitracin 500 UNIT/GM OINT   Yes No   buPROPion (WELLBUTRIN SR) 150 MG 12 hr tablet   Yes No   Sig: Take 150 mg by mouth 2 times daily   cholecalciferol 50 MCG (2000 UT) tablet   Yes No   Sig: Take 1 tablet by mouth daily   cloNIDine (CATAPRES) 0.1 MG tablet   Yes No   Sig: Take 0.1 mg by mouth 2 times daily   clonazePAM (KLONOPIN) 0.5 MG tablet   Yes No   Sig: Take 0.25 mg by mouth   clotrimazole (LOTRIMIN) 1 % external cream  Nursing Home No No   Sig: Apply topically 2 times daily   diclofenac (VOLTAREN) 1 % topical gel   No No   Sig: Apply 4 g topically 2 times daily as needed for moderate pain   diphenhydrAMINE (BENADRYL) 25 MG capsule   Yes No   Sig: Take 25 mg by mouth every 6 hours as needed for itching or allergies   gabapentin (NEURONTIN) 100 MG capsule  [facility] Yes No   Sig: Take 400 mg by mouth 3 times daily At 0900, 1200, and 2000   gabapentin (NEURONTIN) 400 MG capsule   Yes No   loratadine (CLARITIN) 10 MG tablet   Yes No   Sig: Take 10 mg by mouth daily   methocarbamol (ROBAXIN) 750 MG tablet   No No   Sig: Take 1 tablet (750 mg) by mouth 4 times daily as needed for muscle spasms   montelukast (SINGULAIR) 10 MG tablet   No No   Sig: TAKE 1 TABLET BY MOUTH EVERY MORNING (ASTHMA)   mupirocin (BACTROBAN) 2 % external ointment   Yes No   naltrexone (DEPADE/REVIA) 50 MG tablet   Yes No   Sig: Take 50 mg by mouth daily   nicotine (COMMIT) 2 MG lozenge   Yes No   nicotine (NICODERM CQ) 21 MG/24HR 24 hr patch   Yes No   nicotine (NICORETTE) 2 MG gum  Nursing Home Yes No   Sig: Take 2 mg by mouth as needed for smoking  cessation   nystatin-triamcinolone (MYCOLOG II) 488397-1.1 UNIT/GM-% external cream  Nursing Home Yes No   Sig: Apply topically 2 times daily For 1-2 weeks.   omeprazole (PRILOSEC) 40 MG DR capsule   No No   Sig: Take 1 capsule (40 mg) by mouth daily   order for DME   No No   Sig: Equipment being ordered: support compression hose BK  2 pair black 30mm HG   To be applied on arising & removed while lying down to go to sleep   order for DME   No No   Sig: Equipment being ordered: CPAP with mask and tubing   order for DME   No No   Sig: Oxygen 2 Li/min  at night and bled into BiPAP   order for DME   No No   Sig: [facility] Phone [phone] Fax [phone]  Primary Dressing Hydrofera Blue Ready   Qty 5 sheets  Secondary Dressing 4' roll gauze Qty 30  Secondary Dressing 2' medipore tape Qty 1  Secondary Dressing 4x4 gauze loaf Qty  1  Length of Need: 1 month  Frequency of dressing change: daily   order for DME   No No   Sig: [facility] Phone [phone] Fax [phone]  EdemaWear Size Medium/Yellow qty 4 sleeves  Length of Need: 1 month  Frequency of dressing change daily   order for DME   No No   Sig: [employer]  Phone #[phone]  Fax #[phone]  Coolflex Velcro wraps    Length of Need: Life Time  # of Pairs 1 set   order for DME   No No   Sig: Equipment being ordered: 1 surgical shoe   order for DME   No No   Sig: Equipment being ordered: roll of micropore tape to dress foot wound bid   polyethylene glycol (MIRALAX) 17 GM/Dose powder   No No   Sig: Take 17 g (1 capful) by mouth daily as needed for constipation   sertraline (ZOLOFT) 100 MG tablet  Nursing Home Yes No   Sig: Take 100 mg by mouth daily Take with 50 mg tablet for a total dose of 150 mg daily.   sertraline (ZOLOFT) 50 MG tablet   Yes No   simvastatin (ZOCOR) 40 MG tablet   No No   Sig: TAKE 1 TABLET BY MOUTH EVERY MORNING.  (CHOLESTEROL)   spironolactone (ALDACTONE) 25 MG tablet   No No   Sig: TAKE 1 TABLET  BY MOUTH ONCE DAILY. (HIGH BLOOD PRESSURE)   temazepam (RESTORIL) 15 MG capsule  Nursing Home Yes No   Sig: Take 15 mg by mouth nightly as needed for sleep   tiZANidine (ZANAFLEX) 2 MG tablet   No No   Sig: Take 1 tablet (2 mg) by mouth 3 times daily   tiZANidine (ZANAFLEX) 2 MG tablet   No No   Sig: Take 1 tablet (2 mg) by mouth 3 times daily for 180 days   traZODone (DESYREL) 50 MG tablet   Yes No   Sig: Take 1 tablet by mouth At Bedtime   triamcinolone (KENALOG) 0.1 % external cream   No No   Sig: Apply to AA BID x 1-2 week then PRN only   trolamine salicylate (ASPERCREME) 10 % external cream  Nursing Home Yes No   Sig: Apply topically as needed for moderate pain knee   varenicline (CHANTIX) 1 MG tablet   No No   Sig: Take 1 tablet (1 mg) by mouth 2 times daily   vitamin B complex with vitamin C (STRESS TAB) tablet   No No   Sig: Take 1 tablet by mouth daily   warfarin ANTICOAGULANT (COUMADIN) 4 MG tablet   No No   Sig: Current dose: 10 mg every Monday + 8 mg all other days with next INR on 2/20/23      Facility-Administered Medications Last Administration Doses Remaining   botulinum toxin type A (BOTOX) 100 units injection 360 Units None recorded 4           Review of Systems    The 10 point Review of Systems is negative other than noted in the HPI or here.      Physical Exam   Vital Signs: Temp: 99  F (37.2  C) Temp src: Temporal BP: (!) 141/85 Pulse: 89   Resp: 18 SpO2: 97 %      Weight: 0 lbs 0 oz    General Appearance: Alert, pleasant, no distress  Respiratory: CTA B  Cardiovascular: RRR. No murmur. Trace edema  GI: obese, distended. Distant vs hypoactive BS. nontender  Skin: woody LE skin changes  Other: CN grossly intact. BREAUX.      Medical Decision Making       60 MINUTES SPENT BY ME on the date of service doing chart review, history, exam, documentation & further activities per the note.      Data     I have personally reviewed the following data over the past 24 hrs:    5.6  \   11.5 (L)   / 254     139  104 15.5 /  127 (H)   3.7 24 1.22 (H) \       ALT: 19 AST: 21 AP: 98 TBILI: 0.2   ALB: 3.9 TOT PROTEIN: 7.8 LIPASE: N/A       Imaging results reviewed over the past 24 hrs:   No results found for this or any previous visit (from the past 24 hour(s)).

## 2023-02-14 NOTE — PROGRESS NOTES
Brief, no charge note. Patient admitted by my colleague Dr. Grover this morning.    Petey Archibald is a 62 year old male with PMH of morbid obesity, schizoaffective disorder, bipolar type, hypertension, dyslipidemia, h/o DVT/PE, NEL, GERD, PVD admitted on 2/14/2023. He presents with abdominal pain and diarrhea.    Patient seen and examined while still boarding in ED  -reports feeling slightly better with pain  -still having some loose stools; was having episodes of vomiting at home, no vomiting here    O/E:  -morbidly obese with markedly protuberant abdomen; bowel sounds are present    - remains afebrile, no leukocytosis  - C diff and stool studies pending  - CT abdomen pelvis 2/14 reviewed:  IMPRESSION:   1.  Findings are suspicious for a mid/distal small bowel obstruction,  with a gradual zone of transition noted in the mid/distal small bowel.  2.  Irregularity of the left femoral head and advanced degenerative  change in the left hip are new since the previous exam.  3.  Small fat-containing paraumbilical and supraumbilical ventral  hernias, not significantly changed. These do not appear to be involved  with suspected small bowel obstruction.    Assessment and plan:  - With findings suggestive of small bowel obstruction on CT, will make him NPO, consult general surgery; continue IV fluids with normal saline at 100 ml/hr  - PRN antiemetics  - will consider NG tube placement if episodes of vomiting  - pharmacy to dose warfarin; can consider Heparin bridging when INR<2 if unable to resume PO due to small bowel obstruction   - monitor labs    Rest care plan per H&P by Dr. Grover

## 2023-02-15 PROBLEM — R19.7 VOMITING AND DIARRHEA: Status: ACTIVE | Noted: 2023-02-15

## 2023-02-15 PROBLEM — R11.10 VOMITING AND DIARRHEA: Status: ACTIVE | Noted: 2023-02-15

## 2023-02-15 PROBLEM — K56.609 SBO (SMALL BOWEL OBSTRUCTION) (H): Status: ACTIVE | Noted: 2023-02-15

## 2023-02-15 LAB
ANION GAP SERPL CALCULATED.3IONS-SCNC: 8 MMOL/L (ref 7–15)
BASOPHILS # BLD AUTO: 0 10E3/UL (ref 0–0.2)
BASOPHILS NFR BLD AUTO: 0 %
BUN SERPL-MCNC: 13.1 MG/DL (ref 8–23)
CALCIUM SERPL-MCNC: 9 MG/DL (ref 8.8–10.2)
CHLORIDE SERPL-SCNC: 110 MMOL/L (ref 98–107)
CREAT SERPL-MCNC: 0.9 MG/DL (ref 0.67–1.17)
DEPRECATED HCO3 PLAS-SCNC: 26 MMOL/L (ref 22–29)
EOSINOPHIL # BLD AUTO: 0.1 10E3/UL (ref 0–0.7)
EOSINOPHIL NFR BLD AUTO: 3 %
ERYTHROCYTE [DISTWIDTH] IN BLOOD BY AUTOMATED COUNT: 15.5 % (ref 10–15)
GFR SERPL CREATININE-BSD FRML MDRD: >90 ML/MIN/1.73M2
GLUCOSE SERPL-MCNC: 107 MG/DL (ref 70–99)
HCT VFR BLD AUTO: 36.3 % (ref 40–53)
HGB BLD-MCNC: 11.3 G/DL (ref 13.3–17.7)
IMM GRANULOCYTES # BLD: 0 10E3/UL
IMM GRANULOCYTES NFR BLD: 0 %
INR PPP: 3.01 (ref 0.85–1.15)
LYMPHOCYTES # BLD AUTO: 0.8 10E3/UL (ref 0.8–5.3)
LYMPHOCYTES NFR BLD AUTO: 16 %
MCH RBC QN AUTO: 28.1 PG (ref 26.5–33)
MCHC RBC AUTO-ENTMCNC: 31.1 G/DL (ref 31.5–36.5)
MCV RBC AUTO: 90 FL (ref 78–100)
MONOCYTES # BLD AUTO: 0.3 10E3/UL (ref 0–1.3)
MONOCYTES NFR BLD AUTO: 7 %
NEUTROPHILS # BLD AUTO: 3.6 10E3/UL (ref 1.6–8.3)
NEUTROPHILS NFR BLD AUTO: 74 %
NRBC # BLD AUTO: 0 10E3/UL
NRBC BLD AUTO-RTO: 0 /100
PLATELET # BLD AUTO: 228 10E3/UL (ref 150–450)
POTASSIUM SERPL-SCNC: 3.7 MMOL/L (ref 3.4–5.3)
RBC # BLD AUTO: 4.02 10E6/UL (ref 4.4–5.9)
SODIUM SERPL-SCNC: 144 MMOL/L (ref 136–145)
WBC # BLD AUTO: 4.8 10E3/UL (ref 4–11)

## 2023-02-15 PROCEDURE — 85610 PROTHROMBIN TIME: CPT | Performed by: INTERNAL MEDICINE

## 2023-02-15 PROCEDURE — 250N000013 HC RX MED GY IP 250 OP 250 PS 637: Performed by: INTERNAL MEDICINE

## 2023-02-15 PROCEDURE — 120N000001 HC R&B MED SURG/OB

## 2023-02-15 PROCEDURE — 250N000011 HC RX IP 250 OP 636: Performed by: PHYSICIAN ASSISTANT

## 2023-02-15 PROCEDURE — 82310 ASSAY OF CALCIUM: CPT | Performed by: HOSPITALIST

## 2023-02-15 PROCEDURE — 99232 SBSQ HOSP IP/OBS MODERATE 35: CPT | Performed by: HOSPITALIST

## 2023-02-15 PROCEDURE — 250N000013 HC RX MED GY IP 250 OP 250 PS 637: Performed by: HOSPITALIST

## 2023-02-15 PROCEDURE — C9113 INJ PANTOPRAZOLE SODIUM, VIA: HCPCS | Performed by: PHYSICIAN ASSISTANT

## 2023-02-15 PROCEDURE — 258N000003 HC RX IP 258 OP 636: Performed by: INTERNAL MEDICINE

## 2023-02-15 PROCEDURE — 96376 TX/PRO/DX INJ SAME DRUG ADON: CPT

## 2023-02-15 PROCEDURE — 85025 COMPLETE CBC W/AUTO DIFF WBC: CPT | Performed by: HOSPITALIST

## 2023-02-15 PROCEDURE — 99231 SBSQ HOSP IP/OBS SF/LOW 25: CPT | Mod: FS | Performed by: PHYSICIAN ASSISTANT

## 2023-02-15 PROCEDURE — G0378 HOSPITAL OBSERVATION PER HR: HCPCS

## 2023-02-15 PROCEDURE — 36415 COLL VENOUS BLD VENIPUNCTURE: CPT | Performed by: HOSPITALIST

## 2023-02-15 RX ORDER — DIPHENHYDRAMINE HCL 50 MG
50 CAPSULE ORAL EVERY 6 HOURS PRN
Status: DISCONTINUED | OUTPATIENT
Start: 2023-02-15 | End: 2023-02-16 | Stop reason: HOSPADM

## 2023-02-15 RX ORDER — SERTRALINE HYDROCHLORIDE 100 MG/1
200 TABLET, FILM COATED ORAL DAILY
Status: DISCONTINUED | OUTPATIENT
Start: 2023-02-15 | End: 2023-02-16 | Stop reason: HOSPADM

## 2023-02-15 RX ORDER — MONTELUKAST SODIUM 10 MG/1
10 TABLET ORAL AT BEDTIME
Status: DISCONTINUED | OUTPATIENT
Start: 2023-02-15 | End: 2023-02-16 | Stop reason: HOSPADM

## 2023-02-15 RX ORDER — ARIPIPRAZOLE 5 MG/1
5 TABLET ORAL DAILY
Status: DISCONTINUED | OUTPATIENT
Start: 2023-02-15 | End: 2023-02-16 | Stop reason: HOSPADM

## 2023-02-15 RX ORDER — ALBUTEROL SULFATE 90 UG/1
2 AEROSOL, METERED RESPIRATORY (INHALATION) EVERY 6 HOURS PRN
Status: DISCONTINUED | OUTPATIENT
Start: 2023-02-15 | End: 2023-02-16 | Stop reason: HOSPADM

## 2023-02-15 RX ORDER — WARFARIN SODIUM 4 MG/1
4 TABLET ORAL
Status: COMPLETED | OUTPATIENT
Start: 2023-02-15 | End: 2023-02-15

## 2023-02-15 RX ORDER — LORAZEPAM 1 MG/1
1 TABLET ORAL DAILY PRN
Status: DISCONTINUED | OUTPATIENT
Start: 2023-02-15 | End: 2023-02-16 | Stop reason: HOSPADM

## 2023-02-15 RX ORDER — NALTREXONE HYDROCHLORIDE 50 MG/1
100 TABLET, FILM COATED ORAL DAILY
Status: DISCONTINUED | OUTPATIENT
Start: 2023-02-15 | End: 2023-02-16 | Stop reason: HOSPADM

## 2023-02-15 RX ORDER — BUPROPION HYDROCHLORIDE 150 MG/1
150 TABLET, EXTENDED RELEASE ORAL 2 TIMES DAILY
Status: DISCONTINUED | OUTPATIENT
Start: 2023-02-15 | End: 2023-02-16 | Stop reason: HOSPADM

## 2023-02-15 RX ORDER — TIZANIDINE 2 MG/1
2 TABLET ORAL 3 TIMES DAILY
Status: DISCONTINUED | OUTPATIENT
Start: 2023-02-15 | End: 2023-02-16 | Stop reason: HOSPADM

## 2023-02-15 RX ORDER — FLUTICASONE PROPIONATE 110 UG/1
2 AEROSOL, METERED RESPIRATORY (INHALATION) 2 TIMES DAILY
Status: DISCONTINUED | OUTPATIENT
Start: 2023-02-15 | End: 2023-02-16 | Stop reason: HOSPADM

## 2023-02-15 RX ORDER — TRAZODONE HYDROCHLORIDE 100 MG/1
100 TABLET ORAL AT BEDTIME
Status: DISCONTINUED | OUTPATIENT
Start: 2023-02-15 | End: 2023-02-16 | Stop reason: HOSPADM

## 2023-02-15 RX ORDER — VARENICLINE TARTRATE 1 MG/1
1 TABLET, FILM COATED ORAL 2 TIMES DAILY
Status: DISCONTINUED | OUTPATIENT
Start: 2023-02-15 | End: 2023-02-16 | Stop reason: HOSPADM

## 2023-02-15 RX ADMIN — GABAPENTIN 400 MG: 300 CAPSULE ORAL at 09:37

## 2023-02-15 RX ADMIN — MONTELUKAST 10 MG: 10 TABLET, FILM COATED ORAL at 21:37

## 2023-02-15 RX ADMIN — ARIPIPRAZOLE 5 MG: 5 TABLET ORAL at 10:43

## 2023-02-15 RX ADMIN — TIZANIDINE 2 MG: 2 TABLET ORAL at 13:12

## 2023-02-15 RX ADMIN — VARENICLINE 1 MG: 1 TABLET, FILM COATED ORAL at 10:43

## 2023-02-15 RX ADMIN — VARENICLINE 1 MG: 1 TABLET, FILM COATED ORAL at 21:21

## 2023-02-15 RX ADMIN — TIZANIDINE 2 MG: 2 TABLET ORAL at 09:37

## 2023-02-15 RX ADMIN — TIZANIDINE 2 MG: 2 TABLET ORAL at 21:22

## 2023-02-15 RX ADMIN — UMECLIDINIUM 1 PUFF: 62.5 AEROSOL, POWDER ORAL at 10:43

## 2023-02-15 RX ADMIN — FLUTICASONE PROPIONATE 2 PUFF: 110 AEROSOL, METERED RESPIRATORY (INHALATION) at 21:28

## 2023-02-15 RX ADMIN — ACETAMINOPHEN 650 MG: 325 TABLET, FILM COATED ORAL at 09:37

## 2023-02-15 RX ADMIN — SERTRALINE HYDROCHLORIDE 200 MG: 100 TABLET, FILM COATED ORAL at 09:37

## 2023-02-15 RX ADMIN — BUPROPION HYDROCHLORIDE 150 MG: 150 TABLET, EXTENDED RELEASE ORAL at 21:21

## 2023-02-15 RX ADMIN — GABAPENTIN 400 MG: 300 CAPSULE ORAL at 21:21

## 2023-02-15 RX ADMIN — SODIUM CHLORIDE: 9 INJECTION, SOLUTION INTRAVENOUS at 04:38

## 2023-02-15 RX ADMIN — GABAPENTIN 400 MG: 300 CAPSULE ORAL at 13:12

## 2023-02-15 RX ADMIN — TRAZODONE HYDROCHLORIDE 100 MG: 100 TABLET ORAL at 21:37

## 2023-02-15 RX ADMIN — SODIUM CHLORIDE: 9 INJECTION, SOLUTION INTRAVENOUS at 14:18

## 2023-02-15 RX ADMIN — WARFARIN SODIUM 4 MG: 4 TABLET ORAL at 18:09

## 2023-02-15 RX ADMIN — BUPROPION HYDROCHLORIDE 150 MG: 150 TABLET, EXTENDED RELEASE ORAL at 09:37

## 2023-02-15 RX ADMIN — PANTOPRAZOLE SODIUM 40 MG: 40 INJECTION, POWDER, FOR SOLUTION INTRAVENOUS at 04:44

## 2023-02-15 RX ADMIN — FLUTICASONE PROPIONATE 2 PUFF: 110 AEROSOL, METERED RESPIRATORY (INHALATION) at 10:43

## 2023-02-15 RX ADMIN — NALTREXONE HYDROCHLORIDE 100 MG: 50 TABLET, FILM COATED ORAL at 10:42

## 2023-02-15 ASSESSMENT — ACTIVITIES OF DAILY LIVING (ADL)
ADLS_ACUITY_SCORE: 35
ADLS_ACUITY_SCORE: 35
ADLS_ACUITY_SCORE: 39
ADLS_ACUITY_SCORE: 39
ADLS_ACUITY_SCORE: 35
ADLS_ACUITY_SCORE: 39
ADLS_ACUITY_SCORE: 35
ADLS_ACUITY_SCORE: 39
ADLS_ACUITY_SCORE: 35
ADLS_ACUITY_SCORE: 39

## 2023-02-15 NOTE — PROGRESS NOTES
Observation goals  PRIOR TO DISCHARGE       Comments:   -diagnostic tests and consults completed and resulted: not met     -vital signs normal or at patient baseline: met     -tolerating oral intake to maintain hydration: not met, NPO      -adequate pain control on oral analgesics: met      -infection is improving: n/a      Nurse to notify provider when observation goals have been met and patient is ready for discharge.

## 2023-02-15 NOTE — PROGRESS NOTES
General Surgery Progress Note    Admission Date: 2/14/2023  Today's Date: 2/15/2023         Assessment:      Petey Archibald is a 62 year old male with CT findings of SBO         Plan:   Clamp NGT.  Start clears.  If tolerates, likely remove today and ADAT.  Hopeful for home tomorrow.    Pain:  Minimize narcotics.  Tyl for pain.  Bowel: Senokot.  Stools have slowed.    Antibiotics: none  Diet:  Try clears  IVFs: NaCl 0.9% at 100mL/hr.  DVT prophylaxis: PCDs, ambulate.  Dispo: possibly home tomorrow if tolerating diet.          Interval History:   He feels much better.  He wants to go home.  Still having bowel function.  No nausea since NGT in.  Pain in middle of abdomen is gone.          Physical Exam:   /65 (BP Location: Left arm, Patient Position: Semi-Haynes's, Cuff Size: Adult Regular)   Pulse 83   Temp 98.6  F (37  C) (Oral)   Resp 18   SpO2 92%   I/O last 3 completed shifts:  In: -   Out: 1600 [Urine:400; Emesis/NG output:1200]  General: NAD, pleasant, alert and oriented x3  Abdomen: soft, obese, non tender, non distended, + bowel sounds.  NGT: bilious but output decreasing.    LABS:  Results for orders placed or performed during the hospital encounter of 02/14/23 (from the past 24 hour(s))   Abdomen XR 1 vw    Narrative    XR ABDOMEN 1 VIEW   2/14/2023 9:59 AM     HISTORY: G placement    COMPARISON: Earlier today at 7:00 AM      Impression    IMPRESSION: NG tube tip and sidehole in the stomach. Small bowel  obstruction.    LORA FARR MD         SYSTEM ID:  [id number]   INR   Result Value Ref Range    INR 2.94 (H) 0.85 - 1.15   CBC with platelets   Result Value Ref Range    WBC Count 5.4 4.0 - 11.0 10e3/uL    RBC Count 4.07 (L) 4.40 - 5.90 10e6/uL    Hemoglobin 11.5 (L) 13.3 - 17.7 g/dL    Hematocrit 36.8 (L) 40.0 - 53.0 %    MCV 90 78 - 100 fL    MCH 28.3 26.5 - 33.0 pg    MCHC 31.3 (L) 31.5 - 36.5 g/dL    RDW 15.3 (H) 10.0 - 15.0 %    Platelet Count 242 150 - 450 10e3/uL   CBC with Platelets &  Differential    Narrative    The following orders were created for panel order CBC with Platelets & Differential.  Procedure                               Abnormality         Status                     ---------                               -----------         ------                     CBC with platelets and d...[[id number]]  Abnormal            Final result                 Please view results for these tests on the individual orders.   INR   Result Value Ref Range    INR 3.01 (H) 0.85 - 1.15   Basic metabolic panel   Result Value Ref Range    Sodium 144 136 - 145 mmol/L    Potassium 3.7 3.4 - 5.3 mmol/L    Chloride 110 (H) 98 - 107 mmol/L    Carbon Dioxide (CO2) 26 22 - 29 mmol/L    Anion Gap 8 7 - 15 mmol/L    Urea Nitrogen 13.1 8.0 - 23.0 mg/dL    Creatinine 0.90 0.67 - 1.17 mg/dL    Calcium 9.0 8.8 - 10.2 mg/dL    Glucose 107 (H) 70 - 99 mg/dL    GFR Estimate >90 >60 mL/min/1.73m2   CBC with platelets and differential   Result Value Ref Range    WBC Count 4.8 4.0 - 11.0 10e3/uL    RBC Count 4.02 (L) 4.40 - 5.90 10e6/uL    Hemoglobin 11.3 (L) 13.3 - 17.7 g/dL    Hematocrit 36.3 (L) 40.0 - 53.0 %    MCV 90 78 - 100 fL    MCH 28.1 26.5 - 33.0 pg    MCHC 31.1 (L) 31.5 - 36.5 g/dL    RDW 15.5 (H) 10.0 - 15.0 %    Platelet Count 228 150 - 450 10e3/uL    % Neutrophils 74 %    % Lymphocytes 16 %    % Monocytes 7 %    % Eosinophils 3 %    % Basophils 0 %    % Immature Granulocytes 0 %    NRBCs per 100 WBC 0 <1 /100    Absolute Neutrophils 3.6 1.6 - 8.3 10e3/uL    Absolute Lymphocytes 0.8 0.8 - 5.3 10e3/uL    Absolute Monocytes 0.3 0.0 - 1.3 10e3/uL    Absolute Eosinophils 0.1 0.0 - 0.7 10e3/uL    Absolute Basophils 0.0 0.0 - 0.2 10e3/uL    Absolute Immature Granulocytes 0.0 <=0.4 10e3/uL    Absolute NRBCs 0.0 10e3/uL     *Note: Due to a large number of results and/or encounters for the requested time period, some results have not been displayed. A complete set of results can be found in Results Review.           Walter  Enrique ERVIN  Pager: [phone]  [facility]: [phone]

## 2023-02-15 NOTE — PLAN OF CARE
Goal Outcome Evaluation:  Orientation/Cognitive: A/O x4    Observation Goals (Met/ Not Met): Not met   Mobility Level/Assist Equipment: Ax1/GB/W   Fall Risk (Y/N): Y   Behavior Concerns: Green   Pain Management: Prn IV Dilaudid   Tele/VS/O2: VSS on RA, Cpap @ HS  ABNL Lab/BG: Abnormal CT Abd, INR 2.94   Diet: NPO   Bowel/Bladder: Continent, Int. NG suction, passing gas, no BM this shift   Skin Concerns: BLE katharine    Drains/Devices: NG, IVF   Tests/Procedures for next shift: Gen surg     Anticipated DC date & active delays: TBD    Patient Stated Goal for Today: Rest        Goal Outcome Evaluation:  Observation goals  PRIOR TO DISCHARGE        Comments:   -diagnostic tests and consults completed and resulted: not met   -vital signs normal or at patient baseline: met   -tolerating oral intake to maintain hydration: not met, NPO    -adequate pain control on oral analgesics: met    -infection is improving: n/a    Nurse to notify provider when observation goals have been met and patient is ready for discharge.

## 2023-02-15 NOTE — PLAN OF CARE
Goal Outcome Evaluation:  Observation goals  PRIOR TO DISCHARGE        Comments:   -diagnostic tests and consults completed and resulted: not met   -vital signs normal or at patient baseline: met   -tolerating oral intake to maintain hydration: not met, NPO    -adequate pain control on oral analgesics: met    -infection is improving: n/a    Nurse to notify provider when observation goals have been met and patient is ready for discharge.

## 2023-02-15 NOTE — PROGRESS NOTES
[facility]  Hospitalist Progress Note        Ramirez Gar MD   02/15/2023        Interval History:        - Evaluated by general surgery, to continue conservative management  - Stool enteric and virus panel including C diff 2/14 unremarkable  - NG tube clamped this morning  - reports feeling better with improvement in pain abdomen, no nausea, had 2 loose bowel movements this morning  - plan to start clears and ADAT         Assessment and Plan:        Petey Archibald is a 62 year old male with PMH of morbid obesity, schizoaffective disorder, bipolar type, hypertension, dyslipidemia, h/o DVT/PE, NEL, GERD, PVD admitted on 2/14/2023. He presents with abdominal pain and diarrhea.     Pain abdomen, diarrhea sec to small bowel obstruction  - CT abdomen pelvis 2/14 noted findings suspicious for a mid/distal small bowel obstruction with a gradual zone of transition noted in the mid/distal small bowel; small fat-containing paraumbilical and supraumbilical ventral hernias, not significantly changed and does not appear to be involved with suspected small bowel obstruction    - Evaluated by general surgery, to continue conservative management  - Stool enteric and virus panel including C diff 2/14 unremarkable  - NG tube clamped 2/15 morning; clinically improving  - plan to start clears 2/15 and ADAT    Mild acute renal failure   - Baseline creatinine 0.8-0.9  - on admission creatinine 1.22, improving with IV hydration--0.9   - will continue IV fluids until taking adequate PO     Schizoaffective disorder, bipolar type  [needs rec- abilify 5 mg daily, wellbutrin 150 mg BID, clonazepam, gabapentin 400 mg TID, prn lorazepam, trazodone 50 mg at HS, temazepam 15 mg at HS prn, sertraline 150 mg daily]  - will resume PTA meds      Hypertension  HLD  [needs rec- spironolactone 25 mg daily, sImvastatin 40 mg daily, clonidine 0.1 mg BID]  - resume PTA meds ; hold off PTA Aldactone  - prn hydralazine SBP>180  "available     Asthma/ COPD  [needs rec- albuterol prn, spiriva daily, pulmicort BID, montelukast 10 mg daily]  - resume PTA inhalers  - prn albuterol available     Tobacco use disorder- trying to quit. Declines NRT.   Hx DVT/PE- pharmacy to dose warfarin; INR therapeutic; might need to start heparin drip if unable to tolerate PO and stay prolonged    NEL- continue home CPAP  Morbid obesity- Weight 355 lbs. Encourage weight loss and healthy lifestyle  GERD- continue IV Protonix for now while holding PO Prilosec    Diet: Clear Liquid Diet      DVT Prophylaxis: on chronic anticoagulation     Code Status:   full    Clinically Significant Risk Factors Present on Admission               # Drug Induced Coagulation Defect: home medication list includes an anticoagulant medication    # Hypertension: home medication list includes antihypertensive(s)      # Severe Obesity: Estimated body mass index is 53.98 kg/m  as calculated from the following:    Height as of 1/26/23: 1.727 m (5' 8\").    Weight as of 1/26/23: 161 kg (355 lb).            Disposition: likely 1-2 days pending improvement in bowel obstruction and PO tolerance    Page Me (7 am to 6 pm)    Care plan discussed with patient and nursing    Admit status switched to inpatient per utilization review              Physical Exam:      Blood pressure 122/65, pulse 83, temperature 98.6  F (37  C), temperature source Oral, resp. rate 18, SpO2 92 %.  There were no vitals filed for this visit.  Vital Signs with Ranges  Temp:  [98.5  F (36.9  C)-99.6  F (37.6  C)] 98.6  F (37  C)  Pulse:  [76-83] 83  Resp:  [18-22] 18  BP: (115-139)/(55-67) 122/65  SpO2:  [92 %-97 %] 92 %  I/O's Last 24 hours  I/O last 3 completed shifts:  In: -   Out: 1600 [Urine:400; Emesis/NG output:1200]    Constitutional: Alert, awake and oriented X 3; resting comfortably in no apparent distress   HEENT: Pupils equal and reactive to light and accomodation, neck supple ; NG in place (clamped)   Oral cavity: " Moist mucosa   Cardiovascular: Normal s1 s2, regular rate and rhythm, no murmur   Lungs: B/l clear to auscultation, no wheezes or crepitations   Abdomen: Soft, morbidly obese with markedly protuberant abdomen; bowel sounds are present   LE : No edema   Musculoskeletal: Power 5/5 in all extremities   Neuro: No focal neurological deficits noted   Psychiatry: normal mood and affect                Medications:          pantoprazole  40 mg Intravenous QAM AC     PRN Meds: acetaminophen **OR** acetaminophen, hydrALAZINE, HYDROmorphone, ipratropium - albuterol 0.5 mg/2.5 mg/3 mL, melatonin, naloxone **OR** naloxone **OR** naloxone **OR** naloxone, ondansetron **OR** ondansetron, Warfarin Therapy Reminder         Data:      All new lab and imaging data was reviewed.   Recent Labs   Lab Test 02/15/23  0619 02/14/23  1135 02/14/23  0059 02/06/23  0948   WBC 4.8 5.4 5.6  --    HGB 11.3* 11.5* 11.5*  --    MCV 90 90 93  --     242 254  --    INR 3.01* 2.94*  --  2.4*      Recent Labs   Lab Test 02/15/23  0619 02/14/23  0059 08/31/22  1729    139 140   POTASSIUM 3.7 3.7 3.9   CHLORIDE 110* 104 108   CO2 26 24 27   BUN 13.1 15.5 20   CR 0.90 1.22* 0.97   ANIONGAP 8 11 5   JESSICA 9.0 9.4 8.9   * 127* 96     Recent Labs   Lab Test 02/08/19  1236   TROPI <0.015

## 2023-02-15 NOTE — UTILIZATION REVIEW
"  Admission Status; Secondary Review Determination         Under the authority of the Utilization Management Committee, the utilization review process indicated a secondary review on the above patient.  The review outcome is based on review of the medical records, discussions with staff, and applying clinical experience noted on the date of the review.        (xxx)      Inpatient Status Appropriate - This patient's medical care is consistent with medical management for inpatient care and reasonable inpatient medical practice.      () Observation Status Appropriate - This patient does not meet hospital inpatient criteria and is placed in observation status. If this patient's primary payer is Medicare and was admitted as an inpatient, Condition Code 44 should be used and patient status changed to \"observation\".   () Admission Status NOT Appropriate - This patient's medical care is not consistent with medical management for Inpatient or Observation Status.          RATIONALE FOR DETERMINATION     Petey Archibald is a 62 year old male with PMH of morbid obesity, schizoaffective disorder, bipolar type, hypertension, dyslipidemia, h/o DVT/PE, NEL, GERD, and PVD who was admitted on 2/14/2023 with abdominal pain, N/V, and diarrhea.  CT scan with findings suggestive of small bowel obstruction.  He was made NPO.  He has required placement of an NG tube.  General surgery consulted who recommend continued conservative management.  He is requiring IVF, IV medications, close clinical monitoirng and further evaluation.  He is not medically stable for discharge.  I spoke with Dr. Gar who anticipates he will require at least 1-2 days further hospitalization.  IP status is appropriate.       The severity of illness, intensity of service provided, expected LOS and risk for adverse outcome make the care complex, high risk and appropriate for hospital admission.        The information on this document is developed by the " utilization review team in order for the business office to ensure compliance.  This only denotes the appropriateness of proper admission status and does not reflect the quality of care rendered.         The definitions of Inpatient Status and Observation Status used in making the determination above are those provided in the CMS Coverage Manual, Chapter 1 and Chapter 6, section 70.4.      Sincerely,     Eloina St MD  Physician Advisor   Utilization Review/ Case Management  [facility].

## 2023-02-15 NOTE — PHARMACY-ANTICOAGULATION SERVICE
Clinical Pharmacy - Warfarin Dosing Consult     Pharmacy has been consulted to manage this patient s warfarin therapy.  Indication: DVT/ PE Treatment  Therapy Goal: INR 2-3  Warfarin Prior to Admission: Yes  Warfarin PTA Regimen: 10 mg on Monday & 8 mg rest of the week.  Last took warfarin 2/13/2023.    INR   Date Value Ref Range Status   02/14/2023 2.94 (H) 0.85 - 1.15 Final   02/06/2023 2.4 (H) 0.9 - 1.1 Final       Recommend warfarin 0 mg today.  Pharmacy will monitor Petey Archibald daily and order warfarin doses to achieve specified goal.      Please contact pharmacy as soon as possible if the warfarin needs to be held for a procedure or if the warfarin goals change.

## 2023-02-16 VITALS
HEART RATE: 78 BPM | RESPIRATION RATE: 18 BRPM | SYSTOLIC BLOOD PRESSURE: 126 MMHG | DIASTOLIC BLOOD PRESSURE: 72 MMHG | OXYGEN SATURATION: 97 % | TEMPERATURE: 98.4 F

## 2023-02-16 LAB
ANION GAP SERPL CALCULATED.3IONS-SCNC: 9 MMOL/L (ref 7–15)
BUN SERPL-MCNC: 11.6 MG/DL (ref 8–23)
CALCIUM SERPL-MCNC: 8.8 MG/DL (ref 8.8–10.2)
CHLORIDE SERPL-SCNC: 107 MMOL/L (ref 98–107)
CREAT SERPL-MCNC: 0.99 MG/DL (ref 0.67–1.17)
DEPRECATED HCO3 PLAS-SCNC: 26 MMOL/L (ref 22–29)
GFR SERPL CREATININE-BSD FRML MDRD: 85 ML/MIN/1.73M2
GLUCOSE SERPL-MCNC: 147 MG/DL (ref 70–99)
INR PPP: 3.17 (ref 0.85–1.15)
POTASSIUM SERPL-SCNC: 3.6 MMOL/L (ref 3.4–5.3)
SODIUM SERPL-SCNC: 142 MMOL/L (ref 136–145)

## 2023-02-16 PROCEDURE — 250N000013 HC RX MED GY IP 250 OP 250 PS 637: Performed by: HOSPITALIST

## 2023-02-16 PROCEDURE — 80048 BASIC METABOLIC PNL TOTAL CA: CPT | Performed by: HOSPITALIST

## 2023-02-16 PROCEDURE — 99238 HOSP IP/OBS DSCHRG MGMT 30/<: CPT | Performed by: HOSPITALIST

## 2023-02-16 PROCEDURE — C9113 INJ PANTOPRAZOLE SODIUM, VIA: HCPCS | Performed by: PHYSICIAN ASSISTANT

## 2023-02-16 PROCEDURE — 258N000003 HC RX IP 258 OP 636: Performed by: INTERNAL MEDICINE

## 2023-02-16 PROCEDURE — 36415 COLL VENOUS BLD VENIPUNCTURE: CPT | Performed by: INTERNAL MEDICINE

## 2023-02-16 PROCEDURE — 85610 PROTHROMBIN TIME: CPT | Performed by: INTERNAL MEDICINE

## 2023-02-16 PROCEDURE — 250N000011 HC RX IP 250 OP 636: Performed by: PHYSICIAN ASSISTANT

## 2023-02-16 PROCEDURE — 99231 SBSQ HOSP IP/OBS SF/LOW 25: CPT | Performed by: PHYSICIAN ASSISTANT

## 2023-02-16 PROCEDURE — 99231 SBSQ HOSP IP/OBS SF/LOW 25: CPT | Performed by: SURGERY

## 2023-02-16 RX ORDER — PANTOPRAZOLE SODIUM 40 MG/1
40 TABLET, DELAYED RELEASE ORAL
Status: DISCONTINUED | OUTPATIENT
Start: 2023-02-17 | End: 2023-02-16 | Stop reason: HOSPADM

## 2023-02-16 RX ADMIN — FLUTICASONE PROPIONATE 2 PUFF: 110 AEROSOL, METERED RESPIRATORY (INHALATION) at 08:39

## 2023-02-16 RX ADMIN — SODIUM CHLORIDE: 9 INJECTION, SOLUTION INTRAVENOUS at 00:31

## 2023-02-16 RX ADMIN — NALTREXONE HYDROCHLORIDE 100 MG: 50 TABLET, FILM COATED ORAL at 08:21

## 2023-02-16 RX ADMIN — GABAPENTIN 400 MG: 300 CAPSULE ORAL at 13:31

## 2023-02-16 RX ADMIN — PANTOPRAZOLE SODIUM 40 MG: 40 INJECTION, POWDER, FOR SOLUTION INTRAVENOUS at 06:46

## 2023-02-16 RX ADMIN — TIZANIDINE 2 MG: 2 TABLET ORAL at 08:22

## 2023-02-16 RX ADMIN — SERTRALINE HYDROCHLORIDE 200 MG: 100 TABLET, FILM COATED ORAL at 08:22

## 2023-02-16 RX ADMIN — UMECLIDINIUM 1 PUFF: 62.5 AEROSOL, POWDER ORAL at 08:39

## 2023-02-16 RX ADMIN — VARENICLINE 1 MG: 1 TABLET, FILM COATED ORAL at 08:21

## 2023-02-16 RX ADMIN — TIZANIDINE 2 MG: 2 TABLET ORAL at 13:31

## 2023-02-16 RX ADMIN — ARIPIPRAZOLE 5 MG: 5 TABLET ORAL at 08:21

## 2023-02-16 RX ADMIN — GABAPENTIN 400 MG: 300 CAPSULE ORAL at 08:22

## 2023-02-16 RX ADMIN — SODIUM CHLORIDE: 9 INJECTION, SOLUTION INTRAVENOUS at 10:31

## 2023-02-16 RX ADMIN — BUPROPION HYDROCHLORIDE 150 MG: 150 TABLET, EXTENDED RELEASE ORAL at 08:22

## 2023-02-16 ASSESSMENT — ACTIVITIES OF DAILY LIVING (ADL)
ADLS_ACUITY_SCORE: 39
ADLS_ACUITY_SCORE: 43
ADLS_ACUITY_SCORE: 44
ADLS_ACUITY_SCORE: 43
ADLS_ACUITY_SCORE: 43

## 2023-02-16 NOTE — DISCHARGE SUMMARY
[facility]  Hospitalist Discharge Summary      Date of Admission:  2/14/2023  Date of Discharge:  2/16/2023  Discharging Provider: Josemanuel Mauro MD  Discharge Service: Hospitalist Service    Discharge Diagnoses   Small bowel obstruction  Mild WON  Schizoaffective disorder, bipolar type   HTN  HLD  Asthma  COPD  Tobacco use disorder  Hx DVT/PE  NEL  Morbid obesity  GERD    Follow-ups Needed After Discharge   Follow-up Appointments     Follow-up and recommended labs and tests       Follow up with primary care provider, Demetri Archer, within 7 days for   hospital follow- up.  No follow up labs or test are needed.  Follow up for routine INR monitoring.             Discharge Disposition   Discharged to group home   Condition at discharge: Stable    Hospital Course   Petey Archibald is a 62 year old male with PMH of morbid obesity, schizoaffective disorder, bipolar type, hypertension, dyslipidemia, h/o DVT/PE, NEL, GERD, PVD admitted on 2/14/2023. He presents with abdominal pain and diarrhea.     Small bowel obstruction, resolved  Presented with abdominal pain and diarrhea, enteric panel and C.diff negative.  CT abdomen pelvis 2/14 noted findings suspicious for a mid/distal small bowel obstruction with a gradual zone of transition noted in the mid/distal small bowel.  General Surgery was consulted, he was managed conservatively with NG tube, bowel rest and IVF with resolution.  Diet was advanced without incident and NG removed.  Will continue low fiber diet at discharge and follow up with PCP.      Mild WON, resolved   Admission creatinine 1.22, improved to baseline of 0.9 with IVF.      Chronic and stable medical conditions:   Schizoaffective disorder, bipolar type  Hypertension  HLD  Asthma/ COPD  Tobacco use disorder  Hx DVT/PE   NEL  Morbid obesity  GERD      Consultations This Hospital Stay   PHARMACY TO DOSE WARFARIN  SURGERY GENERAL IP CONSULT  PHARMACY IP CONSULT  PHARMACY IP  CONSULT  CARE MANAGEMENT / SOCIAL WORK IP CONSULT    Code Status   Full Code    Time Spent on this Encounter   I, Josemanuel Mauro MD, personally saw the patient today and spent less than or equal to 30 minutes discharging this patient.       Josemanuel Mauro MD  [facility]  [address] [address]  Phone: [phone]  ______________________________________________________________________    Physical Exam   Vital Signs: Temp: 98.6  F (37  C) Temp src: Oral BP: 114/62 Pulse: 66   Resp: 18 SpO2: 94 % O2 Device: None (Room air)    Weight: 0 lbs 0 oz  General Appearance: Obese male in NAD  Respiratory: lungs CTAB, no wheezes or crackles, no tachypnea   Cardiovascular: RRR, normal s1/s2 without murmur  GI: abdomen soft, normal bowel sounds, nontender, nondistended  Other: Alert and appropriate, cranial nerves grossly intact        Primary Care Physician   Demetri Archer    Discharge Orders      Reason for your hospital stay    You were admitted for small bowel obstruction which resolved with supportive measures.     Follow-up and recommended labs and tests     Follow up with primary care provider, Demetri Archer, within 7 days for hospital follow- up.  No follow up labs or test are needed.  Follow up for routine INR monitoring.     Activity    Your activity upon discharge: activity as tolerated     Diet    Follow this diet upon discharge:       Low Fiber Diet x2 weeks, then you may resume regular diet       Significant Results and Procedures   Most Recent 3 CBC's:Recent Labs   Lab Test 02/15/23  0619 02/14/23  1135 02/14/23  0059   WBC 4.8 5.4 5.6   HGB 11.3* 11.5* 11.5*   MCV 90 90 93    242 254     Most Recent 3 BMP's:Recent Labs   Lab Test 02/16/23  0702 02/15/23  0619 02/14/23  0059    144 139   POTASSIUM 3.6 3.7 3.7   CHLORIDE 107 110* 104   CO2 26 26 24   BUN 11.6 13.1 15.5   CR 0.99 0.90 1.22*   ANIONGAP 9 8 11   JESSICA 8.8 9.0 9.4   *  107* 127*     Most Recent 2 LFT's:Recent Labs   Lab Test 02/14/23  0059 06/08/22  1835   AST 21 18   ALT 19 20   ALKPHOS 98 89   BILITOTAL 0.2 0.3     Most Recent 3 INR's:Recent Labs   Lab Test 02/16/23  0701 02/15/23  0619 02/14/23  1135   INR 3.17* 3.01* 2.94*   ,   Results for orders placed or performed during the hospital encounter of 02/14/23   CT Abdomen Pelvis w Contrast    Narrative    CT ABDOMEN/PELVIS WITH CONTRAST February 14, 2023 7:11 AM    CLINICAL HISTORY: Abdominal pain. Nausea and vomiting.    TECHNIQUE: CT scan of the abdomen and pelvis was performed following  injection of IV contrast. Multiplanar reformats were obtained. Dose  reduction techniques were used.  CONTRAST: 135mL Isovue-370.    COMPARISON: CT of the abdomen and pelvis performed 5/7/2021.    FINDINGS:   LOWER CHEST: The visualized lung bases are clear.    HEPATOBILIARY: Unremarkable. No hepatic masses are seen.    PANCREAS: Normal.    SPLEEN: Normal.    ADRENAL GLANDS: Normal.    KIDNEYS/BLADDER: Few tiny hypodensities in the kidneys are too small  to characterize, but are unchanged, and could represent cysts. No  hydronephrosis.    BOWEL: There are multiple dilated fluid-filled loops of mid and  proximal small bowel, suspicious for a small bowel obstruction. There  is no specific transition point identified, but rather a gradual zone  of transition in the right lower quadrant. Multiple loops of  decompressed distal small bowel are noted in the right lower quadrant.  No evidence for colitis or diverticulitis. The appendix is  unremarkable.    PELVIC ORGANS: Unremarkable.    LYMPH NODES: No enlarged lymph nodes are identified in the abdomen or  pelvis.    VASCULATURE: Unremarkable.    ADDITIONAL FINDINGS: Small fat-containing paraumbilical and  supraumbilical ventral hernias, similar to the previous exam.    MUSCULOSKELETAL: There is irregularity of the left femoral head and  advanced degenerative changes in the left hip. Mild  degenerative  changes are also noted in the visualized thoracolumbar spine.      Impression    IMPRESSION:   1.  Findings are suspicious for a mid/distal small bowel obstruction,  with a gradual zone of transition noted in the mid/distal small bowel.  2.  Irregularity of the left femoral head and advanced degenerative  change in the left hip are new since the previous exam.  3.  Small fat-containing paraumbilical and supraumbilical ventral  hernias, not significantly changed. These do not appear to be involved  with suspected small bowel obstruction.    MELA HIDALGO MD         SYSTEM ID:  [id number]   Abdomen XR 1 vw    Narrative    XR ABDOMEN 1 VIEW   2/14/2023 9:59 AM     HISTORY: G placement    COMPARISON: Earlier today at 7:00 AM      Impression    IMPRESSION: NG tube tip and sidehole in the stomach. Small bowel  obstruction.    LORA FARR MD         SYSTEM ID:  [id number]     *Note: Due to a large number of results and/or encounters for the requested time period, some results have not been displayed. A complete set of results can be found in Results Review.       Discharge Medications   Current Discharge Medication List      CONTINUE these medications which have NOT CHANGED    Details   acetaminophen (TYLENOL) 325 MG tablet Take 1-2 tablets (325-650 mg) by mouth every 6 hours as needed for mild pain, fever or headaches  Qty: 100 tablet, Refills: 3    Associated Diagnoses: Pain      albuterol (ALBUTEROL) 108 (90 BASE) MCG/ACT inhaler Inhale 2 puffs into the lungs every 6 hours as needed  Qty: 1 Inhaler, Refills: 0    Associated Diagnoses: Pulmonary emphysema, unspecified emphysema type (H)      ARIPiprazole (ABILIFY) 5 MG tablet Take 5 mg by mouth daily      buPROPion (WELLBUTRIN SR) 150 MG 12 hr tablet Take 150 mg by mouth 2 times daily      cloNIDine (CATAPRES) 0.1 MG tablet Take 0.1 mg by mouth daily as needed      diphenhydrAMINE (BENADRYL) 25 MG capsule Take 50 mg by mouth every 6 hours as needed for  itching or allergies      Emollient (CERAVE) CREA Externally apply topically daily  Qty: 453 g, Refills: 11    Associated Diagnoses: Ulcer of left lower extremity with fat layer exposed (H)      EPINEPHrine (EPIPEN 2-BRE) 0.3 MG/0.3ML injection 2-pack INJECT 0.3MG INTRAMUSCULAR ONE TIME FOR ONE DOSE AS NEEDED FOR ANAPHYLAXIS (CALL 911 IF YOU HAVE TO GIVE) (FOR BEE STINGS) **LABEL EACH PEN*  Qty: 2 mL, Refills: 3    Associated Diagnoses: Allergic to bees      gabapentin (NEURONTIN) 100 MG capsule Take 400 mg by mouth 3 times daily At 0900, 1200, and 2000      loratadine (CLARITIN) 10 MG tablet Take 10 mg by mouth daily      LORazepam (ATIVAN) 1 MG tablet Take 1 mg by mouth daily as needed for anxiety      montelukast (SINGULAIR) 10 MG tablet TAKE 1 TABLET BY MOUTH EVERY MORNING (ASTHMA)  Qty: 30 tablet, Refills: 11    Comments: MMO/  Associated Diagnoses: Mixed simple and mucopurulent chronic bronchitis (H)      !! Multiple Vitamin (DAILY-JOVAN) TABS TAKE 1 TABLET BY MOUTH EVERY MORNING (VITAMINS)  Qty: 30 tablet, Refills: 11    Comments: MMO/1ST  Associated Diagnoses: Schizoaffective disorder, bipolar type (H)      naltrexone (DEPADE/REVIA) 50 MG tablet Take 100 mg by mouth daily      nicotine (COMMIT) 2 MG lozenge Place 2 mg inside cheek daily as needed      omeprazole (PRILOSEC) 40 MG DR capsule Take 1 capsule (40 mg) by mouth daily  Qty: 90 capsule, Refills: 2    Associated Diagnoses: Gastroesophageal reflux disease, unspecified whether esophagitis present      polyethylene glycol (MIRALAX) 17 GM/Dose powder Take 17 g (1 capful) by mouth daily as needed for constipation  Qty: 578 g, Refills: 0    Associated Diagnoses: Constipation, unspecified constipation type      PULMICORT FLEXHALER 180 MCG/ACT inhaler INHALE 2 PUFFS INTO THE LUNGS TWICE DAILY.  Qty: 1 each, Refills: 11    Associated Diagnoses: Mixed simple and mucopurulent chronic bronchitis (H)      SENNA-TABS 8.6 MG tablet Take 1 tablet by mouth daily       sertraline (ZOLOFT) 100 MG tablet Take 200 mg by mouth daily      simvastatin (ZOCOR) 40 MG tablet TAKE 1 TABLET BY MOUTH EVERY MORNING.  (CHOLESTEROL)  Qty: 30 tablet, Refills: 11    Comments: MMO/1ST  Associated Diagnoses: Hypercholesterolemia      SPIRIVA HANDIHALER 18 MCG inhaled capsule INHALE CONTENTS OF 1 CAPSULE INTO THE LUNGS ONCE DAILY  Qty: 30 capsule, Refills: 11    Associated Diagnoses: Mixed simple and mucopurulent chronic bronchitis (H)      spironolactone (ALDACTONE) 25 MG tablet TAKE 1 TABLET BY MOUTH ONCE DAILY. (HIGH BLOOD PRESSURE)  Qty: 30 tablet, Refills: 11    Comments: MMO/1ST  Associated Diagnoses: Localized edema      tiZANidine (ZANAFLEX) 2 MG tablet Take 1 tablet (2 mg) by mouth 3 times daily  Qty: 90 tablet, Refills: 1    Associated Diagnoses: Focal dystonia; Spasm of muscle      traZODone (DESYREL) 100 MG tablet Take 100 mg by mouth At Bedtime      varenicline (CHANTIX) 1 MG tablet Take 1 tablet (1 mg) by mouth 2 times daily  Qty: 180 tablet, Refills: 1    Associated Diagnoses: Personal history of tobacco use      warfarin ANTICOAGULANT (COUMADIN) 4 MG tablet Current dose: 10 mg every Monday + 8 mg all other days with next INR on 2/20/23  Qty: 32 tablet, Refills: 0    Associated Diagnoses: History of pulmonary embolism; Long term current use of anticoagulant therapy      Cadexomer Iodine, topical, 0.9% (IODOSORB) 0.9 % GEL gel Apply topically daily Apply to left foot wound daily after cleansing the wound and blotting it dry.  Qty: 1 Tube, Refills: 3    Comments: 40 g tube  Associated Diagnoses: Skin ulcer of left foot with fat layer exposed (H)      clotrimazole (LOTRIMIN) 1 % external cream Apply topically 2 times daily  Qty: 60 g, Refills: 0    Associated Diagnoses: Left leg cellulitis      diclofenac (VOLTAREN) 1 % topical gel Apply 4 g topically 2 times daily as needed for moderate pain  Qty: 100 g, Refills: 1    Comments: Any size tube okay  Associated Diagnoses: Left inguinal  "pain      Gauze Pads & Dressings (GAUZE PADS 4\"X4\") 4\"X4\" PADS 1 each 3 times daily as needed  Qty: 25 each, Refills: 1    Associated Diagnoses: Perirectal abscess      nystatin-triamcinolone (MYCOLOG II) 524834-9.1 UNIT/GM-% external cream Apply topically 2 times daily For 1-2 weeks.      !! order for DME Equipment being ordered: roll of micropore tape to dress foot wound bid  Qty: 1 each, Refills: 0    Associated Diagnoses: Ulcer of left lower extremity with fat layer exposed (H)      !! order for DME Equipment being ordered: 1 surgical shoe  Qty: 1 Device, Refills: 0    Associated Diagnoses: Open wound of left foot, initial encounter      !! order for DME [employer] Phone [phone] Fax [phone]  EdemaWear Size Medium/Yellow qty 4 sleeves  Length of Need: 1 month  Frequency of dressing change daily  Qty: 1 Device, Refills: 0    Associated Diagnoses: Ulcer of left lower extremity with fat layer exposed (H); Lymphedema of left leg      !! order for DME [employer]  Phone #[phone]  Fax #[phone]  Coolflex Velcro wraps    Length of Need: Life Time  # of Pairs 1 set  Qty: 30 days, Refills: 0    Associated Diagnoses: Ulcer of left lower extremity with fat layer exposed (H); Lymphedema of left leg      !! order for DME [employer] Phone [phone] Fax [phone]  Primary Dressing Hydrofera Blue Ready   Qty 5 sheets  Secondary Dressing 4' roll gauze Qty 30  Secondary Dressing 2' medipore tape Qty 1  Secondary Dressing 4x4 gauze loaf Qty  1  Length of Need: 1 month  Frequency of dressing change: daily  Qty: 30 days, Refills: 0    Associated Diagnoses: Ulcer of left lower extremity with fat layer exposed (H)      !! order for DME Oxygen 2 Li/min  at night and bled into BiPAP  Qty: 1 Device, Refills: 0    Associated Diagnoses: Nocturnal hypoxemia      !! order for DME Equipment being ordered: CPAP with mask and tubing  Qty: 1 Device, Refills: 0    Associated Diagnoses: " NEL (obstructive sleep apnea)      !! order for DME Equipment being ordered: support compression hose BK  2 pair black 30mm HG   To be applied on arising & removed while lying down to go to sleep  Qty: 1 each, Refills: 0    Associated Diagnoses: Localized edema      triamcinolone (KENALOG) 0.1 % external cream Apply to AA BID x 1-2 week then PRN only  Qty: 80 g, Refills: 2    Associated Diagnoses: Dermatitis      !! 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 - Potential duplicate medications found. Please discuss with provider.        Allergies   Allergies   Allergen Reactions     Bees      Clavulanic Acid      Augmentin Rash     Penicillins Rash

## 2023-02-16 NOTE — PROGRESS NOTES
General Surgery Progress Note    Admission Date: 2/14/2023  Today's Date: 2/15/2023         Assessment:      Petey Archibald is a 62 year old male with CT findings of SBO which has resolved with conservative measures.         Plan:   Ok to discharge from surgical perspective.    Pain:  Minimize narcotics.  Tyl for pain.  Diet:  ADAT    Dispo: Discharge per Hospitalist.  No follow up necessary with Surgery        Interval History:   He continues to feel better.  He wants to go home.  Continued bowel function which is starting to become more formed.  No nausea since NGT removed yesterday and tolerating diet.  Pain in middle of abdomen is gone.          Physical Exam:   /62   Pulse 66   Temp 98.6  F (37  C) (Oral)   Resp 18   SpO2 94%   I/O last 3 completed shifts:  In: 1490 [P.O.:1490]  Out: 850 [Urine:850]  General: NAD, pleasant, alert and oriented x3  Abdomen: soft, protuberant, obese, non tender, non distended, + bowel sounds.      LABS:  Results for orders placed or performed during the hospital encounter of 02/14/23 (from the past 24 hour(s))   INR   Result Value Ref Range    INR 3.17 (H) 0.85 - 1.15   Basic metabolic panel   Result Value Ref Range    Sodium 142 136 - 145 mmol/L    Potassium 3.6 3.4 - 5.3 mmol/L    Chloride 107 98 - 107 mmol/L    Carbon Dioxide (CO2) 26 22 - 29 mmol/L    Anion Gap 9 7 - 15 mmol/L    Urea Nitrogen 11.6 8.0 - 23.0 mg/dL    Creatinine 0.99 0.67 - 1.17 mg/dL    Calcium 8.8 8.8 - 10.2 mg/dL    Glucose 147 (H) 70 - 99 mg/dL    GFR Estimate 85 >60 mL/min/1.73m2     *Note: Due to a large number of results and/or encounters for the requested time period, some results have not been displayed. A complete set of results can be found in Results Review.           Walter Nunez PA-C  Pager: [phone]  [facility]: [phone]

## 2023-02-16 NOTE — PLAN OF CARE
Goal Outcome Evaluation:      Orientation/Cognitive: A&Ox4  Observation Goals (Met/ Not Met): inpatient  Mobility Level/Assist Equipment: Ax1 GB/Walker  Fall Risk (Y/N): yes  Behavior Concerns: none  Pain Management: denies pain - denies nausea  Tele/VS/O2: VSS on RA, afebrile  ABNL Lab/BG: Hgb 11.3, INR 3.01  Diet: tolerating full liquid diet. ADAT  Bowel/Bladder: incontinent of urine and stool, 2x loose watery stool  Skin Concerns: BLE katharine, left>right groin red/katharine - using interdry   Drains/Devices: PIV infusing NS  Tests/Procedures for next shift: morning labs  Anticipated DC date & active delays: possible today, pending on tolerance to diet  Patient Stated Goal for Today: getting some rest and is hoping to discharge today.    Information: NG tube removed 02/15, pt called group home for clean clothes

## 2023-02-16 NOTE — PLAN OF CARE
Goal Outcome Evaluation:        Orientation/Cognitive: A&Ox4  Observation Goals (Met/ Not Met): inpatient  Mobility Level/Assist Equipment: Ax1 GB/Walker  Fall Risk (Y/N): yes  Behavior Concerns: none  Pain Management: denies pain or nausea  Tele/VS/O2: VSS on RA  ABNL Lab/BG: Hgb 11.3, INR 3.17  Diet: tolerating full liquid diet  Bowel/Bladder: Continent; walked to BR and very large stool  Skin Concerns: BLE katharine, left>right groin red/katharine - using interdry   Drains/Devices: PIV SL  Tests/Procedures for next shift: Pt to discharge to  Home  Anticipated DC date & active delays: home today  Patient Stated Goal for Today:  hoping to discharge today.   Information:  pt called group home for clean clothes; they cannot bring so we will give him XL scrub pants and gown -he has coat and slippers

## 2023-02-16 NOTE — PROGRESS NOTES
Patients [facility] W/C ride arrived at scheduled time, discharge packets given to transport personnel, copy of AVS and all patient's belongings including his walker and inhalers was sent with patient.

## 2023-02-16 NOTE — CONSULTS
Care Management Initial Consult    General Information  Assessment completed with: Patient, Care Team Member, Shae  Type of CM/SW Visit: Initial Assessment    Primary Care Provider verified and updated as needed: Yes   Readmission within the last 30 days: no previous admission in last 30 days      Reason for Consult: discharge planning  Advance Care Planning:            Communication Assessment  Patient's communication style: spoken language (English or Bilingual)    Hearing Difficulty or Deaf: no        Cognitive  Cognitive/Neuro/Behavioral: WDL                      Living Environment:   People in home: facility resident     Current living Arrangements: group home, [facility] group home in [place] # [phone].      Able to return to prior arrangements: yes       Family/Social Support:  Care provided by: self  Provides care for: no one  Marital Status: Single             Description of Support System:           Current Resources:   Patient receiving home care services: No     Community Resources: Group Home  Equipment currently used at home: none  Supplies currently used at home: None    Employment/Financial:  Employment Status: disabled        Financial Concerns: No concerns identified           Lifestyle & Psychosocial Needs:  Social Determinants of Health     Tobacco Use: High Risk     Smoking Tobacco Use: Every Day     Smokeless Tobacco Use: Never     Passive Exposure: Not on file   Alcohol Use: Not on file   Financial Resource Strain: Not on file   Food Insecurity: Not on file   Transportation Needs: Not on file   Physical Activity: Not on file   Stress: Not on file   Social Connections: Not on file   Intimate Partner Violence: Not on file   Depression: At risk     PHQ-2 Score: 4   Housing Stability: Not on file       Functional Status:  Prior to admission patient needed assistance:   Dependent ADLs:: Independent  Dependent IADLs:: Medication Management, Transportation, Money Management, Cooking        Mental Health Status:  Mental Health Status: No Current Concerns       Chemical Dependency Status:      None noted          Values/Beliefs:  Spiritual, Cultural Beliefs, Shinto Practices, Values that affect care: no               Additional Information:  Met with patient. He is disguised writer was not the doctor. Writer explained role and patient appeared to be happier. Patient called [facility] and both patient and writer spoke to Figueroa. He told writer that the hospital would have to set up a ride for patient if patient returns. Patient would like to go home and would like to speak to the MD. Will follow for discharge planning.  Writer set up [employer] wheelchair ride for 5:15pm(soonest available)  Writer spoke to Dai, Nurse who over seees patient's group home. Went over discharge orders and time of ride.  She would like a copy sent with patient. She is aware of the appointment for lab work on Monday.        Rabia Cochran RN

## 2023-02-16 NOTE — PROGRESS NOTES
Orientation/Cognitive: A/O x4    Observation Goals (Met/ Not Met): Not met   Mobility Level/Assist Equipment: Ax1/GB/W   Fall Risk (Y/N): Y   Behavior Concerns: Green   Pain Management: Denies  Tele/VS/O2: VSS on RA  ABNL Lab/BG: Refer to labs  Diet: Full liquid, tolerating  well.   Bowel/Bladder: Incontinent of bowel and bladder. Several watery stools today.   Skin Concerns: BLE katharine    Drains/Devices: PIV infusing IVF as ordered.   Tests/Procedures for next shift: None  Anticipated DC date & active delays: Likely tomorrow.   Patient Stated Goal for Today: Rest    NGT removed today.

## 2023-02-17 ENCOUNTER — DOCUMENTATION ONLY (OUTPATIENT)
Dept: ANTICOAGULATION | Facility: CLINIC | Age: 63
End: 2023-02-17
Payer: MEDICARE

## 2023-02-17 DIAGNOSIS — Z86.711 HISTORY OF PULMONARY EMBOLISM: Primary | ICD-10-CM

## 2023-02-17 DIAGNOSIS — Z79.01 LONG TERM CURRENT USE OF ANTICOAGULANT THERAPY: ICD-10-CM

## 2023-02-17 NOTE — PROGRESS NOTES
ANTICOAGULATION  MANAGEMENT: Discharge Review    Petey Archibald chart reviewed for anticoagulation continuity of care    Hospital Admission on 2/14/23 - 2/16/23 for abdominal pain and diarrhea (small bowel obstruction).    Discharge disposition: back to his group home    Results:    Recent labs: (last 7 days)     02/14/23  1135 02/15/23  0619 02/16/23  0701   INR 2.94* 3.01* 3.17*     Anticoagulation inpatient management:     less warfarin administered than maintenance regimen    Anticoagulation discharge instructions:     Warfarin dosing: home regimen continued   Bridging: No   INR goal change: No      Medication changes affecting anticoagulation: No    Additional factors affecting anticoagulation: Yes: advised a low-fiber diet x2 weeks     PLAN     No adjustment to anticoagulation plan needed. Patient is scheduled for next INR 2/20/23.    Patient not contacted    Anticoagulation Calendar updated    Lauryn Chatman RN

## 2023-02-20 ENCOUNTER — LAB (OUTPATIENT)
Dept: LAB | Facility: CLINIC | Age: 63
End: 2023-02-20
Payer: MEDICARE

## 2023-02-20 ENCOUNTER — ANTICOAGULATION THERAPY VISIT (OUTPATIENT)
Dept: ANTICOAGULATION | Facility: CLINIC | Age: 63
End: 2023-02-20

## 2023-02-20 DIAGNOSIS — Z86.711 HISTORY OF PULMONARY EMBOLISM: ICD-10-CM

## 2023-02-20 DIAGNOSIS — Z86.711 HISTORY OF PULMONARY EMBOLISM: Primary | ICD-10-CM

## 2023-02-20 DIAGNOSIS — Z79.01 LONG TERM CURRENT USE OF ANTICOAGULANT THERAPY: ICD-10-CM

## 2023-02-20 LAB — INR BLD: 4 (ref 0.9–1.1)

## 2023-02-20 PROCEDURE — 36416 COLLJ CAPILLARY BLOOD SPEC: CPT

## 2023-02-20 PROCEDURE — 85610 PROTHROMBIN TIME: CPT

## 2023-02-20 RX ORDER — WARFARIN SODIUM 4 MG/1
TABLET ORAL
Qty: 14 TABLET | Refills: 0 | Status: SHIPPED | OUTPATIENT
Start: 2023-02-20 | End: 2023-02-27

## 2023-02-20 NOTE — PROGRESS NOTES
ANTICOAGULATION MANAGEMENT     Petey Archibald 62 year old male is on warfarin with supratherapeutic INR result. (Goal INR 2.0-3.0)    Recent labs: (last 7 days)     02/20/23  0956   INR 4.0*       ASSESSMENT       Source(s): Chart Review and Patient/Caregiver Call - [facility] group home       Warfarin doses taken: Warfarin taken as instructed    Diet: No new diet changes identified    New illness, injury, or hospitalization: No    Medication/supplement changes: None noted    Signs or symptoms of bleeding or clotting: No    Previous INR: Supratherapeutic    Additional findings: RX sent to [facility]. AVS to be mailed to [facility] -        PLAN     Recommended plan for no diet, medication or health factor changes affecting INR     Dosing Instructions: hold dose then decrease your warfarin dose (3% change) with next INR in 1 week       Summary  As of 2/20/2023    Full warfarin instructions:  2/20: Hold; Otherwise 8 mg every day   Next INR check:  2/27/2023             Telephone call with staff home care nurse who verbalizes understanding and agrees to plan    Lab visit scheduled    Education provided:     Please call back if any changes to your diet, medications or how you've been taking warfarin    Goal range and lab monitoring: goal range and significance of current result, Importance of therapeutic range and Importance of following up at instructed interval    Symptom monitoring: monitoring for bleeding signs and symptoms    Plan made per [facility] anticoagulation protocol    Blossom Levy, RN  Anticoagulation Clinic  2/20/2023    _______________________________________________________________________     Anticoagulation Episode Summary     Current INR goal:  2.0-3.0   TTR:  72.7 % (1 y)   Target end date:  Indefinite   Send INR reminders to:  [facility]    Indications    History of pulmonary embolism [Z86.711]  Long term current use of anticoagulant therapy [Z79.01]           Comments:  [facility] Group  Home; A new Coumadin Prescription will need to sent to [facility] with current dose and next INR check.         Anticoagulation Care Providers     Provider Role Specialty Phone number    Demetri Archer MD Referring Internal Medicine [phone]

## 2023-02-27 ENCOUNTER — LAB (OUTPATIENT)
Dept: LAB | Facility: CLINIC | Age: 63
End: 2023-02-27
Payer: MEDICARE

## 2023-02-27 ENCOUNTER — TELEPHONE (OUTPATIENT)
Dept: INTERNAL MEDICINE | Facility: CLINIC | Age: 63
End: 2023-02-27

## 2023-02-27 ENCOUNTER — ANTICOAGULATION THERAPY VISIT (OUTPATIENT)
Dept: ANTICOAGULATION | Facility: CLINIC | Age: 63
End: 2023-02-27

## 2023-02-27 DIAGNOSIS — Z86.711 HISTORY OF PULMONARY EMBOLISM: ICD-10-CM

## 2023-02-27 DIAGNOSIS — Z79.01 LONG TERM CURRENT USE OF ANTICOAGULANT THERAPY: ICD-10-CM

## 2023-02-27 DIAGNOSIS — Z86.711 HISTORY OF PULMONARY EMBOLISM: Primary | ICD-10-CM

## 2023-02-27 LAB — INR BLD: 3.5 (ref 0.9–1.1)

## 2023-02-27 PROCEDURE — 85610 PROTHROMBIN TIME: CPT

## 2023-02-27 PROCEDURE — 36416 COLLJ CAPILLARY BLOOD SPEC: CPT

## 2023-02-27 RX ORDER — WARFARIN SODIUM 4 MG/1
TABLET ORAL
Qty: 14 TABLET | Refills: 0 | Status: SHIPPED | OUTPATIENT
Start: 2023-02-27 | End: 2023-03-13

## 2023-02-27 NOTE — PROGRESS NOTES
ANTICOAGULATION MANAGEMENT     Petey Archibald 62 year old male is on warfarin with supratherapeutic INR result. (Goal INR 2.0-3.0)    Recent labs: (last 7 days)     02/27/23  0950   INR 3.5*       ASSESSMENT       Source(s): Chart Review, Patient/Caregiver Call and Home Care/Facility Nurse       Warfarin doses taken: Warfarin taken as instructed, maintenance dose lowered 3%, could have been lowered up 10%, per protocol    Diet: No new diet changes identified    New illness, injury, or hospitalization: No    Medication/supplement changes: None noted    Signs or symptoms of bleeding or clotting: No    Previous INR: Supratherapeutic    Additional findings: None         PLAN     Recommended plan for no diet, medication or health factor changes affecting INR     Dosing Instructions: decrease your warfarin dose (7% change) with next INR in 2 weeks       Summary  As of 2/27/2023    Full warfarin instructions:  2/27: 4 mg; Otherwise 6 mg every Mon, Fri; 8 mg all other days   Next INR check:  3/13/2023             Telephone call with  group [facility], who agrees to plan and repeated back plan correctly    Lab visit scheduled, prescription e-prescribed and AVS mailed    Education provided:     Please call back if any changes to your diet, medications or how you've been taking warfarin    Contact [phone]  with any changes, questions or concerns.     Plan made per [facility] anticoagulation protocol    Lauryn Chatman RN  Anticoagulation Clinic  2/27/2023    _______________________________________________________________________     Anticoagulation Episode Summary     Current INR goal:  2.0-3.0   TTR:  70.7 % (1 y)   Target end date:  Indefinite   Send INR reminders to:  [facility]    Indications    History of pulmonary embolism [Z86.711]  Long term current use of anticoagulant therapy [Z79.01]           Comments:  [facility]; A new Coumadin Prescription will need to sent to [facility] with  current dose and next INR check.         Anticoagulation Care Providers     Provider Role Specialty Phone number    Demetri Archer MD Referring Internal Medicine [phone]

## 2023-02-27 NOTE — PROGRESS NOTES
Pharmacy called later in the day to let us know they are dispensing 6 mg tablets for the Monday and Friday maintenance dosing. Bertrand stated understanding.  Lauryn CONTRERAS RN  Anticoagulation Team

## 2023-02-27 NOTE — TELEPHONE ENCOUNTER
Dyana from Pharm called stating they are dispensing 6mg tablet for warfin versus 1 and 1 half tablet . Any questions you can contact Pharm.     [phone]   (Dyana)            [place]

## 2023-02-27 NOTE — TELEPHONE ENCOUNTER
Noted on dosing calendar and called  who stated understanding.  Lauryn CONTRERAS RN  Anticoagulation Team

## 2023-03-13 ENCOUNTER — ANTICOAGULATION THERAPY VISIT (OUTPATIENT)
Dept: ANTICOAGULATION | Facility: CLINIC | Age: 63
End: 2023-03-13

## 2023-03-13 ENCOUNTER — LAB (OUTPATIENT)
Dept: LAB | Facility: CLINIC | Age: 63
End: 2023-03-13
Payer: MEDICARE

## 2023-03-13 DIAGNOSIS — Z86.711 HISTORY OF PULMONARY EMBOLISM: ICD-10-CM

## 2023-03-13 DIAGNOSIS — Z79.01 LONG TERM CURRENT USE OF ANTICOAGULANT THERAPY: ICD-10-CM

## 2023-03-13 DIAGNOSIS — Z86.711 HISTORY OF PULMONARY EMBOLISM: Primary | ICD-10-CM

## 2023-03-13 LAB — INR BLD: 2 (ref 0.9–1.1)

## 2023-03-13 PROCEDURE — 36415 COLL VENOUS BLD VENIPUNCTURE: CPT

## 2023-03-13 PROCEDURE — 85610 PROTHROMBIN TIME: CPT

## 2023-03-13 RX ORDER — WARFARIN SODIUM 4 MG/1
TABLET ORAL
Qty: 21 TABLET | Refills: 0 | Status: SHIPPED | OUTPATIENT
Start: 2023-03-13 | End: 2023-04-03

## 2023-03-13 NOTE — PROGRESS NOTES
AVS mailed to group home at the address listed in demographics.    Ashley Quiroz RN    [facility] Anticoagulation [facility]

## 2023-03-13 NOTE — PROGRESS NOTES
ANTICOAGULATION MANAGEMENT     Petey Archibald 62 year old male is on warfarin with therapeutic INR result. (Goal INR 2.0-3.0)    Recent labs: (last 7 days)     03/13/23  1000   INR 2.0*       ASSESSMENT       Source(s): Chart Review and Patient/Caregiver Call       Warfarin doses taken: Warfarin taken as instructed    Diet: No new diet changes identified    New illness, injury, or hospitalization: No    Medication/supplement changes: None noted    Signs or symptoms of bleeding or clotting: No    Previous INR: Supratherapeutic    Additional findings: None         PLAN     Recommended plan for no diet, medication or health factor changes affecting INR     Dosing Instructions: Continue your current warfarin dose with next INR in 3 weeks       Summary  As of 3/13/2023    Full warfarin instructions:  6 mg every Mon, Fri; 8 mg all other days   Next INR check:  4/3/2023             Telephone call with group [facility] [place] facility nurse who verbalizes understanding and agrees to plan. Rx sent to [facility].     Lab visit scheduled    Education provided:     Please call back if any changes to your diet, medications or how you've been taking warfarin    Plan made per [facility] anticoagulation protocol    Cameron Blackmon RN  Anticoagulation Clinic  3/13/2023    _______________________________________________________________________     Anticoagulation Episode Summary     Current INR goal:  2.0-3.0   TTR:  69.5 % (1 y)   Target end date:  Indefinite   Send INR reminders to:  [facility]    Indications    History of pulmonary embolism [Z86.711]  Long term current use of anticoagulant therapy [Z79.01]           Comments:  [facility] detention; A new Coumadin Prescription will need to sent to [facility] with current dose and next INR check.         Anticoagulation Care Providers     Provider Role Specialty Phone number    Demetri Archer MD Referring Internal Medicine [phone]

## 2023-03-23 ENCOUNTER — OFFICE VISIT (OUTPATIENT)
Dept: INTERNAL MEDICINE | Facility: CLINIC | Age: 63
End: 2023-03-23
Payer: MEDICARE

## 2023-03-23 VITALS
TEMPERATURE: 97.9 F | SYSTOLIC BLOOD PRESSURE: 145 MMHG | OXYGEN SATURATION: 98 % | WEIGHT: 315 LBS | HEART RATE: 68 BPM | RESPIRATION RATE: 20 BRPM | BODY MASS INDEX: 52.85 KG/M2 | DIASTOLIC BLOOD PRESSURE: 80 MMHG

## 2023-03-23 DIAGNOSIS — M75.21 BICEPS TENDONITIS, RIGHT: ICD-10-CM

## 2023-03-23 DIAGNOSIS — J41.8 MIXED SIMPLE AND MUCOPURULENT CHRONIC BRONCHITIS (H): ICD-10-CM

## 2023-03-23 DIAGNOSIS — F25.0 SCHIZOAFFECTIVE DISORDER, BIPOLAR TYPE (H): ICD-10-CM

## 2023-03-23 DIAGNOSIS — Z09 ENCOUNTER FOR EXAMINATION FOLLOWING TREATMENT AT HOSPITAL: Primary | ICD-10-CM

## 2023-03-23 DIAGNOSIS — K56.609 SBO (SMALL BOWEL OBSTRUCTION) (H): ICD-10-CM

## 2023-03-23 DIAGNOSIS — E66.01 MORBID OBESITY DUE TO EXCESS CALORIES (H): ICD-10-CM

## 2023-03-23 PROBLEM — G47.00 INSOMNIA: Status: ACTIVE | Noted: 2022-10-04

## 2023-03-23 PROBLEM — L97.922 ULCER OF LEFT LOWER EXTREMITY WITH FAT LAYER EXPOSED (H): Status: RESOLVED | Noted: 2019-08-22 | Resolved: 2023-03-23

## 2023-03-23 PROBLEM — L97.523: Status: RESOLVED | Noted: 2020-03-11 | Resolved: 2023-03-23

## 2023-03-23 PROCEDURE — 99214 OFFICE O/P EST MOD 30 MIN: CPT | Performed by: INTERNAL MEDICINE

## 2023-03-23 ASSESSMENT — PATIENT HEALTH QUESTIONNAIRE - PHQ9
SUM OF ALL RESPONSES TO PHQ QUESTIONS 1-9: 2
10. IF YOU CHECKED OFF ANY PROBLEMS, HOW DIFFICULT HAVE THESE PROBLEMS MADE IT FOR YOU TO DO YOUR WORK, TAKE CARE OF THINGS AT HOME, OR GET ALONG WITH OTHER PEOPLE: NOT DIFFICULT AT ALL
SUM OF ALL RESPONSES TO PHQ QUESTIONS 1-9: 2

## 2023-03-23 ASSESSMENT — PAIN SCALES - GENERAL: PAINLEVEL: MILD PAIN (3)

## 2023-03-23 NOTE — PROGRESS NOTES
Assessment & Plan   Encounter for examination following treatment at hospital  SBO (small bowel obstruction) (H)  Resolved. Encouraged him to have regular BMs, something he tells me he has been doing.    Biceps tendonitis, right  Discussed anatomy and pathophysiology of this condition. UpToDate patient education handout regarding this condition printed off and given to patient.    Schizoaffective disorder, bipolar type (H)  He reports his mood has been worsening. He is seeing a new psychiatrist next week and I encouraged him to discuss this with them.    Mixed simple and mucopurulent chronic bronchitis (H)  Known issue that I take into account for their medical decisions, no current exacerbations or new concerns. Continue current medications.    Morbid obesity due to excess calories (H)  BMI 52. Weight loss would help with hyperlipidemia.    Demetri Holder MD  [facility]    Meagan Angelo is a 62 year old who presents today for follow-up. He was hospitalized ~5 weeks ago for SBO. No recurrence of symptoms since then. He tells me his R shoulder has been hurting since he hurt it at work last week. Getting better but pain still present with certain movements. He tells me his depression is worsening.    Review of Systems   Constitutional, gi, MSK systems are negative, except as otherwise noted.      Objective    BP (!) 145/80 (BP Location: Left arm, Patient Position: Sitting, Cuff Size: Adult Large)   Pulse 68   Temp 97.9  F (36.6  C) (Temporal)   Resp 20   Wt (!) 157.7 kg (347 lb 9.6 oz)   SpO2 98%   BMI 52.85 kg/m    Body mass index is 52.85 kg/m .     Physical Exam   GENERAL: alert and in no distress.  EYES: conjunctivae/corneas clear. EOMs grossly intact  HENT: Facies symmetric.  RESP: No iWOB.  GI: ND, NT, no rebound or guarding  MSK: R shoulder WNL to gross examination. No pain with palpation over the posterior joint area, mild TTP over R biceps tendon area. No gross  sensory deficits. Empty can test negative.  SKIN: No significant ulcers, lesions, or rashes on the visualized portions of the skin  NEURO: CN II-XII grossly intact.

## 2023-04-03 ENCOUNTER — LAB (OUTPATIENT)
Dept: LAB | Facility: CLINIC | Age: 63
End: 2023-04-03
Payer: MEDICARE

## 2023-04-03 ENCOUNTER — ANTICOAGULATION THERAPY VISIT (OUTPATIENT)
Dept: ANTICOAGULATION | Facility: CLINIC | Age: 63
End: 2023-04-03

## 2023-04-03 DIAGNOSIS — Z86.711 HISTORY OF PULMONARY EMBOLISM: Primary | ICD-10-CM

## 2023-04-03 DIAGNOSIS — Z79.01 LONG TERM CURRENT USE OF ANTICOAGULANT THERAPY: ICD-10-CM

## 2023-04-03 DIAGNOSIS — Z86.711 HISTORY OF PULMONARY EMBOLISM: ICD-10-CM

## 2023-04-03 LAB — INR BLD: 2.1 (ref 0.9–1.1)

## 2023-04-03 PROCEDURE — 36416 COLLJ CAPILLARY BLOOD SPEC: CPT

## 2023-04-03 PROCEDURE — 85610 PROTHROMBIN TIME: CPT

## 2023-04-03 RX ORDER — WARFARIN SODIUM 4 MG/1
TABLET ORAL
Qty: 60 TABLET | Refills: 0 | Status: SHIPPED | OUTPATIENT
Start: 2023-04-03 | End: 2023-05-01

## 2023-04-03 NOTE — PROGRESS NOTES
ANTICOAGULATION MANAGEMENT     Petey Archibald 62 year old male is on warfarin with therapeutic INR result. (Goal INR 2.0-3.0)    Recent labs: (last 7 days)     04/03/23  0941   INR 2.1*       ASSESSMENT       Source(s): Chart Review and Home Care/Facility Nurse       Warfarin doses taken: Warfarin taken as instructed    Diet: Group home is encouraging Petey to eat less junk food    New illness, injury, or hospitalization: No    Medication/supplement changes: None noted    Signs or symptoms of bleeding or clotting: No    Previous INR: Therapeutic last visit; previously outside of goal range    Additional findings: None         PLAN     Recommended plan for no diet, medication or health factor changes affecting INR     Dosing Instructions: Continue your current warfarin dose with next INR in 4 weeks       Summary  As of 4/3/2023    Full warfarin instructions:  6 mg every Mon, Fri; 8 mg all other days   Next INR check:  5/1/2023             Telephone call with  group home staff Bertrand who verbalizes understanding and agrees to plan  RX e-scribed to [facility] mailed to group [place].    Lab visit scheduled    Education provided:     Goal range and lab monitoring: goal range and significance of current result    Dietary considerations: impact of vitamin K foods on INR and importance of notifying [facility] to changes in diet    Contact [phone]  with any changes, questions or concerns.     Plan made per [facility] anticoagulation protocol    Fabiola Hutton RN  Anticoagulation Clinic  4/3/2023    _______________________________________________________________________     Anticoagulation Episode Summary     Current INR goal:  2.0-3.0   TTR:  69.5 % (1 y)   Target end date:  Indefinite   Send INR reminders to:  [facility]    Indications    History of pulmonary embolism [Z86.711]  Long term current use of anticoagulant therapy [Z79.01]           Comments:  [facility]; A new Coumadin Prescription will need  to sent to [facility] with current dose and next INR check.         Anticoagulation Care Providers     Provider Role Specialty Phone number    Demetri Archer MD Referring Internal Medicine [phone]

## 2023-04-11 NOTE — PROGRESS NOTES
ANTICOAGULATION FOLLOW-UP CLINIC VISIT    Patient Name:  Petey Archibald  Date:  3/14/2018  Contact Type:  Face to Face    SUBJECTIVE:     Patient Findings     Positives Bruising (bruising on right knew due to a fall on sunday)           OBJECTIVE    INR Protime   Date Value Ref Range Status   03/14/2018 2.9 (A) 0.86 - 1.14 Final       ASSESSMENT / PLAN  INR assessment THER    Recheck INR In: 3 WEEKS    INR Location Clinic      Anticoagulation Summary as of 3/14/2018     INR goal 2.0-3.0   Today's INR 2.9   Maintenance plan 10 mg (4 mg x 2.5) on Mon, Fri; 8 mg (4 mg x 2) all other days   Full instructions 10 mg on Mon, Fri; 8 mg all other days   Weekly total 60 mg   No change documented Owen Beckett RN   Plan last modified Lisa Ponce RN (1/2/2018)   Next INR check 4/4/2018   Target end date Indefinite    Indications   History of pulmonary embolism [Z86.711]  Long-term (current) use of anticoagulants [Z79.01] [Z79.01]         Anticoagulation Episode Summary     INR check location [facility]    Preferred lab     Send INR reminders to [facility] INR/PROTIME    Comments       Anticoagulation Care Providers     Provider Role Specialty Phone number    Silvino Baker MD [facility] [phone]            See the Encounter Report to view Anticoagulation Flowsheet and Dosing Calendar (Go to Encounters tab in chart review, and find the Anticoagulation Therapy Visit)    Dosage adjustment made based on physician directed care plan. No changes made.      Owen Beckett RN                1.  I would increase your MiraLAX to every day until your stools are more softer in consistency and then you can slowly back off.  If he has a really hard bowel movement or is complaining of some pain in his bottom, you can do a glycerin suppository also.  2.  I am super suspicious about strep, the culture was sent off, we have a lab nurse that we will call you if it does come back positive in the next 1 to 2 days.  3.  If patient still has a low-grade fever and symptoms in 2 days, please follow-up with your doctor in clinic.

## 2023-05-01 ENCOUNTER — LAB (OUTPATIENT)
Dept: LAB | Facility: CLINIC | Age: 63
End: 2023-05-01
Payer: MEDICARE

## 2023-05-01 ENCOUNTER — ANTICOAGULATION THERAPY VISIT (OUTPATIENT)
Dept: ANTICOAGULATION | Facility: CLINIC | Age: 63
End: 2023-05-01

## 2023-05-01 DIAGNOSIS — Z86.711 HISTORY OF PULMONARY EMBOLISM: Primary | ICD-10-CM

## 2023-05-01 DIAGNOSIS — Z79.01 LONG TERM CURRENT USE OF ANTICOAGULANT THERAPY: ICD-10-CM

## 2023-05-01 DIAGNOSIS — Z86.711 HISTORY OF PULMONARY EMBOLISM: ICD-10-CM

## 2023-05-01 LAB — INR BLD: 2.4 (ref 0.9–1.1)

## 2023-05-01 PROCEDURE — 36416 COLLJ CAPILLARY BLOOD SPEC: CPT

## 2023-05-01 PROCEDURE — 85610 PROTHROMBIN TIME: CPT

## 2023-05-01 RX ORDER — WARFARIN SODIUM 4 MG/1
TABLET ORAL
Qty: 67 TABLET | Refills: 0 | Status: SHIPPED | OUTPATIENT
Start: 2023-05-01 | End: 2023-06-05

## 2023-05-01 NOTE — PROGRESS NOTES
ANTICOAGULATION MANAGEMENT     Petey Archibald 62 year old male is on warfarin with therapeutic INR result. (Goal INR 2.0-3.0)    Recent labs: (last 7 days)     05/01/23  0958   INR 2.4*       ASSESSMENT       Source(s): Chart Review and Home Care/Facility Nurse       Warfarin doses taken: Warfarin taken as instructed    Diet: No new diet changes identified    Medication/supplement changes: None noted    New illness, injury, or hospitalization: No    Signs or symptoms of bleeding or clotting: No    Previous result: Therapeutic last 2(+) visits    Additional findings: None         PLAN     Recommended plan for no diet, medication or health factor changes affecting INR     Dosing Instructions: Continue your current warfarin dose with next INR in 5 weeks       Summary  As of 5/1/2023    Full warfarin instructions:  6 mg every Mon, Fri; 8 mg all other days   Next INR check:  6/5/2023             Telephone call with  Group [facility], who verbalizes understanding and agrees to plan    Lab visit scheduled, prescription escribed to [facility] mailed to group [place]    Education provided:     Please call back if any changes to your diet, medications or how you've been taking warfarin    Written instructions provided    Contact [phone]  with any changes, questions or concerns.     Plan made per [facility] anticoagulation protocol    Lauryn Chatman, RN  Anticoagulation Clinic  5/1/2023    _______________________________________________________________________     Anticoagulation Episode Summary     Current INR goal:  2.0-3.0   TTR:  69.4 % (1 y)   Target end date:  Indefinite   Send INR reminders to:  [facility]    Indications    History of pulmonary embolism [Z86.711]  Long term current use of anticoagulant therapy [Z79.01]           Comments:  [facility]; A new Coumadin Prescription will need to sent to [facility] with current dose and next INR check.         Anticoagulation Care Providers      Provider Role Specialty Phone number    Demetri Archer MD Referring Internal Medicine [phone]

## 2023-05-05 RX ORDER — CHOLECALCIFEROL (VITAMIN D3) 50 MCG
TABLET ORAL
Qty: 31 TABLET | Refills: 11 | OUTPATIENT
Start: 2023-05-05

## 2023-05-10 ENCOUNTER — ANCILLARY PROCEDURE (OUTPATIENT)
Dept: GENERAL RADIOLOGY | Facility: CLINIC | Age: 63
End: 2023-05-10
Attending: INTERNAL MEDICINE
Payer: MEDICARE

## 2023-05-10 ENCOUNTER — OFFICE VISIT (OUTPATIENT)
Dept: PEDIATRICS | Facility: CLINIC | Age: 63
End: 2023-05-10
Payer: MEDICARE

## 2023-05-10 VITALS
OXYGEN SATURATION: 97 % | HEART RATE: 62 BPM | RESPIRATION RATE: 20 BRPM | SYSTOLIC BLOOD PRESSURE: 117 MMHG | BODY MASS INDEX: 50.37 KG/M2 | DIASTOLIC BLOOD PRESSURE: 74 MMHG | TEMPERATURE: 98.2 F | WEIGHT: 315 LBS

## 2023-05-10 DIAGNOSIS — M25.532 LEFT WRIST PAIN: ICD-10-CM

## 2023-05-10 DIAGNOSIS — M25.532 LEFT WRIST PAIN: Primary | ICD-10-CM

## 2023-05-10 PROCEDURE — 73090 X-RAY EXAM OF FOREARM: CPT | Mod: TC | Performed by: RADIOLOGY

## 2023-05-10 PROCEDURE — 73110 X-RAY EXAM OF WRIST: CPT | Mod: TC | Performed by: RADIOLOGY

## 2023-05-10 PROCEDURE — 99214 OFFICE O/P EST MOD 30 MIN: CPT | Mod: GC | Performed by: STUDENT IN AN ORGANIZED HEALTH CARE EDUCATION/TRAINING PROGRAM

## 2023-05-10 ASSESSMENT — PAIN SCALES - GENERAL: PAINLEVEL: EXTREME PAIN (8)

## 2023-05-10 NOTE — LETTER
May 10, 2023      Petey THOMPSON Dragan  [address] [place] [address]        To Whom It May Concern:    Petey Archibald was seen in our clinic. He may return to work without restrictions on 5/21/23.      Sincerely,        Sudeep Campbell MD

## 2023-05-10 NOTE — PATIENT INSTRUCTIONS
Great seeing you in clinic. Your X-ray looks good, we will wait for the final read from the radiologist. We gave you a wrist brace and a referral to Orthopedics for further evaluation in the next 1-2 weeks.

## 2023-06-05 ENCOUNTER — LAB (OUTPATIENT)
Dept: LAB | Facility: CLINIC | Age: 63
End: 2023-06-05
Payer: MEDICARE

## 2023-06-05 ENCOUNTER — ANTICOAGULATION THERAPY VISIT (OUTPATIENT)
Dept: ANTICOAGULATION | Facility: CLINIC | Age: 63
End: 2023-06-05

## 2023-06-05 DIAGNOSIS — Z79.01 LONG TERM CURRENT USE OF ANTICOAGULANT THERAPY: ICD-10-CM

## 2023-06-05 DIAGNOSIS — Z86.711 HISTORY OF PULMONARY EMBOLISM: ICD-10-CM

## 2023-06-05 DIAGNOSIS — Z86.711 HISTORY OF PULMONARY EMBOLISM: Primary | ICD-10-CM

## 2023-06-05 LAB — INR BLD: 2.5 (ref 0.9–1.1)

## 2023-06-05 PROCEDURE — 36416 COLLJ CAPILLARY BLOOD SPEC: CPT

## 2023-06-05 PROCEDURE — 85610 PROTHROMBIN TIME: CPT

## 2023-06-05 RX ORDER — WARFARIN SODIUM 4 MG/1
TABLET ORAL
Qty: 90 TABLET | Refills: 0 | Status: SHIPPED | OUTPATIENT
Start: 2023-06-05 | End: 2023-07-17

## 2023-06-05 NOTE — PROGRESS NOTES
ANTICOAGULATION MANAGEMENT     Petey Archibald 62 year old male is on warfarin with therapeutic INR result. (Goal INR 2.0-3.0)    Recent labs: (last 7 days)     06/05/23  0950   INR 2.5*       ASSESSMENT       Source(s): Chart Review and Patient/Caregiver Call       Warfarin doses taken: Warfarin taken as instructed    Diet: No new diet changes identified    Medication/supplement changes: None noted    New illness, injury, or hospitalization: No    Signs or symptoms of bleeding or clotting: No    Previous result: Therapeutic last 2(+) visits    Additional findings: None         PLAN     Recommended plan for no diet, medication or health factor changes affecting INR     Dosing Instructions: Continue your current warfarin dose with next INR in 6 weeks       Summary  As of 6/5/2023    Full warfarin instructions:  6 mg every Mon, Fri; 8 mg all other days   Next INR check:  7/17/2023             Telephone call with [facility] nurse who verbalizes understanding and agrees to plan. AVS mailed to group home and rx sent to [facility].     Lab visit scheduled    Education provided:     Please call back if any changes to your diet, medications or how you've been taking warfarin    Plan made per [facility] anticoagulation protocol    Cameron Blackmon RN  Anticoagulation Clinic  6/5/2023    _______________________________________________________________________     Anticoagulation Episode Summary     Current INR goal:  2.0-3.0   TTR:  69.4 % (1 y)   Target end date:  Indefinite   Send INR reminders to:  [facility]    Indications    History of pulmonary embolism [Z86.711]  Long term current use of anticoagulant therapy [Z79.01]           Comments:  [facility]; A new Coumadin Prescription will need to sent to [facility] with current dose and next INR check.         Anticoagulation Care Providers     Provider Role Specialty Phone number    Demetri Archer MD Referring Internal Medicine [phone]

## 2023-06-25 NOTE — ED TRIAGE NOTES
Left foot wound infected with surrounding erythema/edema. 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 facility staff found patient, called ems, and had patient bibems to [facility] er for further evaluation.  in er, found to have head laceration and right wrist pain, as well as c/f adhf; admit to medicine for further mgmt  He was evaluated by neurosurgery who recommended no acute surgery intervention. Also evaluated by wound care and podiatry. Hospital course c/b fever 6/24 AM.    fever, fall, SDH  CT head- Minimal left posterolateral convexity subdural hematoma  evaluated by podiatry & wound care  no evidence of SSTI on exam  MRSA PCR negative  procal .51  pt at high risk for aspiration  afebrile since starting zosyn    Recommendations  f/u blood cultures- NGTD  f/u urine culture  c/w zosyn for now  f/u official CXR read  US of upper ext pending  f/u neurology recs, consider EEG  trend temps, wbc    Ganesh Olivo M.D.  [facility], Division of Infectious Diseases  [phone]  After 5pm on weekdays and all day on weekends - please call [phone]

## 2023-07-05 ENCOUNTER — DOCUMENTATION ONLY (OUTPATIENT)
Dept: PHYSICAL MEDICINE AND REHAB | Facility: CLINIC | Age: 63
End: 2023-07-05
Payer: MEDICARE

## 2023-07-05 ENCOUNTER — NURSE TRIAGE (OUTPATIENT)
Dept: NURSING | Facility: CLINIC | Age: 63
End: 2023-07-05
Payer: MEDICARE

## 2023-07-05 NOTE — PROGRESS NOTES
Writer contacted patient's group home, [facility].  First tried cell# listed, ([phone]), which was former employee, but no longer works with group [place].  Then tried home ph# listed and spoke with Casper to discuss refill request that we received.  He stated that we should contact nurse Dai at cell# [phone].  Writer then tried this number, but reached a generic voicemail message.  Left message that I am calling regarding a refill request we received, but did not leave patient name until this ph# is confirmed.  Left message that they can contact myself or other RN in PM&R to receive additional information.  Will also try to call again later.      Need to inform RN that this patient received a temporary refill of tizanidine from Dr. Noriega (as the on call PMR MD) on 2/5/23 for #270 tablets with 1 Refill.  Patient was seeing Dr. Hagen for Botox, but last visit was on 6/23/22.  Was to schedule a follow up visit, but has not yet done so.  Will need to schedule follow up visit with Dr. Hagen or if he does not intend on returning, he may request to receive refills of the tizanidine from his PCP.    Serina Santillan RN on 7/5/2023 at 3:37 PM

## 2023-07-06 NOTE — TELEPHONE ENCOUNTER
Dai LIU called from [facility].  States there is a few warfarin orders on the patients MAR and wanted to get them cleaned up.    Gave the nurse the following information:    Summary  As of 2023     Full warfarin instructions:  6 mg every Mon, Fri; 8 mg all other days   Next INR check:  2023        I explained it looks like he was ordered 4mg tablets    Patient Si mg (1.5 tabs) by mouth every Monday & Friday; and 8 mg (2 tabs) all other days of the week until next INR lab on 23    No further questions.    Radha Porter RN   23 7:57 PM  [facility] Nurse Advisor    Reason for Disposition    Caller has medicine question only, adult not sick, AND triager answers question    Additional Information    Negative: [1] Intentional drug overdose AND [2] suicidal thoughts or ideas    Negative: Drug overdose and triager unable to answer question    Negative: Caller requesting a renewal or refill of a medicine patient is currently taking    Negative: Caller requesting information unrelated to medicine    Negative: Caller requesting information about COVID-19 Vaccine    Negative: Caller requesting information about Emergency Contraception    Negative: Caller requesting information about Combined Birth Control Pills    Negative: Caller requesting information about Progestin Birth Control Pills    Negative: Caller requesting information about Post-Op pain or medicines    Negative: Caller requesting a prescription antibiotic (such as Penicillin) for Strep throat and has a positive culture result    Negative: Caller requesting a prescription anti-viral med (such as Tamiflu) and has influenza (flu) symptoms    Negative: Immunization reaction suspected    Negative: Rash while taking a medicine or within 3 days of stopping it    Negative: [1] Asthma and [2] having symptoms of asthma (cough, wheezing, etc.)    Negative: [1] Symptom of illness (e.g., headache, abdominal pain, earache, vomiting) AND [2]  more than mild    Negative: Breastfeeding questions about mother's medicines and diet    Negative: MORE THAN A DOUBLE DOSE of a prescription or over-the-counter (OTC) drug    Negative: [1] DOUBLE DOSE (an extra dose or lesser amount) of prescription drug AND [2] any symptoms (e.g., dizziness, nausea, pain, sleepiness)    Negative: [1] DOUBLE DOSE (an extra dose or lesser amount) of over-the-counter (OTC) drug AND [2] any symptoms (e.g., dizziness, nausea, pain, sleepiness)    Negative: Took another person's prescription drug    Negative: [1] DOUBLE DOSE (an extra dose or lesser amount) of prescription drug AND [2] NO symptoms (Exception: a double dose of antibiotics)    Negative: Diabetes drug error or overdose (e.g., took wrong type of insulin or took extra dose)    Negative: [1] Prescription not at pharmacy AND [2] was prescribed by PCP recently (Exception: triager has access to EMR and prescription is recorded there. Go to Home Care and confirm for pharmacy.)    Negative: [1] Pharmacy calling with prescription question AND [2] triager unable to answer question    Negative: [1] Caller has URGENT medicine question about med that PCP or specialist prescribed AND [2] triager unable to answer question    Negative: Medicine patch causing local rash or itching    Negative: [1] Caller has medicine question about med NOT prescribed by PCP AND [2] triager unable to answer question (e.g., compatibility with other med, storage)    Negative: Prescription request for new medicine (not a refill)    Negative: [1] Caller has NON-URGENT medicine question about med that PCP prescribed AND [2] triager unable to answer question    Negative: Caller wants to use a complementary or alternative medicine    Negative: [1] Prescription prescribed recently is not at pharmacy AND [2] triager has access to patient's EMR AND [3] prescription is recorded in the EMR    Negative: [1] DOUBLE DOSE (an extra dose or lesser amount) of over-the-counter  (OTC) drug AND [2] NO symptoms    Negative: [1] DOUBLE DOSE (an extra dose or lesser amount) of antibiotic drug AND [2] NO symptoms    Protocols used: MEDICATION QUESTION CALL-A-AH

## 2023-07-12 NOTE — PROGRESS NOTES
Sent the original request from [facility] back to them as DENIED for refill of Tizanidine.  Manually faxed back to [phone]    1) Dr Noriega is not following this patient, she had granted the refill when she was on call 2/5/23 and requested that pt make an appointment.    2) last clinic visit here was 6/23/22 with Dr Hagen, and no future appointments have been made since.    3) we were unable to get a response from group home staff or RN number [facility] gave to us.    PLAN:  Pharmacy to work with group home & PCP to get renewed medication.    If appropriate, the group home staff may call to set up new appointment to see PM R provider, the next available times are 2 - 3 months out.    Marie Álvarez RN on 7/12/2023 at 1:59 PM

## 2023-07-17 ENCOUNTER — LAB (OUTPATIENT)
Dept: LAB | Facility: CLINIC | Age: 63
End: 2023-07-17
Payer: MEDICARE

## 2023-07-17 ENCOUNTER — ANTICOAGULATION THERAPY VISIT (OUTPATIENT)
Dept: ANTICOAGULATION | Facility: CLINIC | Age: 63
End: 2023-07-17

## 2023-07-17 DIAGNOSIS — Z79.01 LONG TERM CURRENT USE OF ANTICOAGULANT THERAPY: ICD-10-CM

## 2023-07-17 DIAGNOSIS — Z86.711 HISTORY OF PULMONARY EMBOLISM: Primary | ICD-10-CM

## 2023-07-17 DIAGNOSIS — Z86.711 HISTORY OF PULMONARY EMBOLISM: ICD-10-CM

## 2023-07-17 LAB — INR BLD: 2.3 (ref 0.9–1.1)

## 2023-07-17 PROCEDURE — 85610 PROTHROMBIN TIME: CPT

## 2023-07-17 PROCEDURE — 36416 COLLJ CAPILLARY BLOOD SPEC: CPT

## 2023-07-17 RX ORDER — WARFARIN SODIUM 4 MG/1
TABLET ORAL
Qty: 90 TABLET | Refills: 0 | Status: SHIPPED | OUTPATIENT
Start: 2023-07-17 | End: 2023-08-08

## 2023-07-17 NOTE — PROGRESS NOTES
ANTICOAGULATION MANAGEMENT     Petey Archibald 62 year old male is on warfarin with therapeutic INR result. (Goal INR 2.0-3.0)    Recent labs: (last 7 days)     07/17/23  1020   INR 2.3*       ASSESSMENT       Source(s): Chart Review and Patient/Caregiver Call       Warfarin doses taken: Warfarin taken as instructed    Diet: No new diet changes identified    Medication/supplement changes: None noted    New illness, injury, or hospitalization: No    Signs or symptoms of bleeding or clotting: No    Previous result: Therapeutic last 2(+) visits    Additional findings: None         PLAN     Recommended plan for no diet, medication or health factor changes affecting INR     Dosing Instructions: Continue your current warfarin dose with next INR in 6 weeks       Summary  As of 7/17/2023    Full warfarin instructions:  6 mg every Mon, Fri; 8 mg all other days   Next INR check:  8/28/2023             Telephone call with group sudheer Thurston, who verbalizes understanding and agrees to plan    Lab visit scheduled, prescription sent to [facility], Onsite [facility] RN to mail AVS.    Education provided:     Please call back if any changes to your diet, medications or how you've been taking warfarin    Contact [phone]  with any changes, questions or concerns.     Plan made per [facility] anticoagulation protocol    Lauryn Chatman, RN  Anticoagulation Clinic  7/17/2023    _______________________________________________________________________     Anticoagulation Episode Summary     Current INR goal:  2.0-3.0   TTR:  74.1 % (1 y)   Target end date:  Indefinite   Send INR reminders to:  [facility]    Indications    History of pulmonary embolism [Z86.711]  Long term current use of anticoagulant therapy [Z79.01]           Comments:  REM USP; A new Coumadin Prescription will need to sent to [facility] with current dose and next INR check.         Anticoagulation Care Providers     Provider Role Specialty Phone  number    Demetri Archer MD [facility] Internal Medicine [phone]

## 2023-07-18 ENCOUNTER — HOSPITAL ENCOUNTER (EMERGENCY)
Facility: CLINIC | Age: 63
Discharge: HOME | End: 2023-07-18
Attending: EMERGENCY MEDICINE | Admitting: EMERGENCY MEDICINE
Payer: MEDICARE

## 2023-07-18 VITALS
TEMPERATURE: 98.2 F | OXYGEN SATURATION: 98 % | DIASTOLIC BLOOD PRESSURE: 66 MMHG | SYSTOLIC BLOOD PRESSURE: 122 MMHG | RESPIRATION RATE: 22 BRPM | HEART RATE: 75 BPM | BODY MASS INDEX: 54.74 KG/M2 | WEIGHT: 315 LBS

## 2023-07-18 DIAGNOSIS — T63.441A BEE STING, ACCIDENTAL OR UNINTENTIONAL, INITIAL ENCOUNTER: ICD-10-CM

## 2023-07-18 PROCEDURE — 250N000012 HC RX MED GY IP 250 OP 636 PS 637: Performed by: EMERGENCY MEDICINE

## 2023-07-18 PROCEDURE — 250N000013 HC RX MED GY IP 250 OP 250 PS 637: Performed by: EMERGENCY MEDICINE

## 2023-07-18 PROCEDURE — 99284 EMERGENCY DEPT VISIT MOD MDM: CPT

## 2023-07-18 RX ORDER — EPINEPHRINE 0.3 MG/.3ML
0.3 INJECTION SUBCUTANEOUS
Qty: 2 EACH | Refills: 0 | Status: SHIPPED | OUTPATIENT
Start: 2023-07-18 | End: 2023-08-23

## 2023-07-18 RX ORDER — FAMOTIDINE 20 MG/1
40 TABLET, FILM COATED ORAL ONCE
Status: COMPLETED | OUTPATIENT
Start: 2023-07-18 | End: 2023-07-18

## 2023-07-18 RX ORDER — PREDNISONE 20 MG/1
40 TABLET ORAL ONCE
Status: COMPLETED | OUTPATIENT
Start: 2023-07-18 | End: 2023-07-18

## 2023-07-18 RX ORDER — PREDNISONE 20 MG/1
TABLET ORAL
Qty: 10 TABLET | Refills: 0 | Status: SHIPPED | OUTPATIENT
Start: 2023-07-18 | End: 2024-01-03

## 2023-07-18 RX ADMIN — FAMOTIDINE 40 MG: 20 TABLET ORAL at 21:26

## 2023-07-18 RX ADMIN — PREDNISONE 40 MG: 20 TABLET ORAL at 21:26

## 2023-07-18 ASSESSMENT — ACTIVITIES OF DAILY LIVING (ADL): ADLS_ACUITY_SCORE: 33

## 2023-07-19 ENCOUNTER — PATIENT OUTREACH (OUTPATIENT)
Dept: INTERNAL MEDICINE | Facility: CLINIC | Age: 63
End: 2023-07-19
Payer: MEDICARE

## 2023-07-19 ENCOUNTER — TELEPHONE (OUTPATIENT)
Dept: ANTICOAGULATION | Facility: CLINIC | Age: 63
End: 2023-07-19
Payer: MEDICARE

## 2023-07-19 DIAGNOSIS — Z79.01 LONG TERM CURRENT USE OF ANTICOAGULANT THERAPY: ICD-10-CM

## 2023-07-19 DIAGNOSIS — Z86.711 HISTORY OF PULMONARY EMBOLISM: Primary | ICD-10-CM

## 2023-07-19 NOTE — ED TRIAGE NOTES
Patient brought in by EMS from [facility]. Patient was stung by 3 wasps today. Patient has an allergy to bee stings and used his epi pen. Patient inadvertently had pen upside down and injected into his right middle finger.    EMS gave patient 50 mg of benadryl IV. Airway is intact. Patient's respirations are even and non labored. Patient is anxious.

## 2023-07-19 NOTE — TELEPHONE ENCOUNTER
ANTICOAGULATION  MANAGEMENT: Discharge Review    Petey Archibald chart reviewed for anticoagulation continuity of care    Emergency room visit on 7/19/23 for multiple bee stings and allergic reaction.    Discharge disposition: Home    Results:    Recent labs: (last 7 days)     07/17/23  1020   INR 2.3*     Anticoagulation inpatient management:     not applicable     Anticoagulation discharge instructions:     Warfarin dosing: home regimen continued   Bridging: No   INR goal change: No      Medication changes affecting anticoagulation: Yes: Prednisone 5 day course (7/19/23- 7/23/23)     Additional factors affecting anticoagulation: No     PLAN     Recommend to check INR on 7/21/23, patient agreed to 7/24/23 instead     Spoke with Bertrand    Anticoagulation Calendar updated    Ronda Shane RN

## 2023-07-19 NOTE — ED TRIAGE NOTES
Pt reports he was stung by 3 wasps at 1700, Pt used epipen shortly after that. Pt denies SOB, denies CP. Airway patent. Pt has 3 large red raised spots (L lower stomach, Upper L chest, L hand) that Pt reports were where the wasps stung Pt. Pt reports pain to sting sites but denies any other symptoms.        

## 2023-07-19 NOTE — TELEPHONE ENCOUNTER
"  ED for acute condition Discharge Protocol    \"Hi, my name is Katy Carbajal RN, a registered nurse, and I am calling from [facility].  I am calling to follow up and see how things are going for you after your recent emergency visit.\"    Tell me how you are doing now that you are home?\" I'm doing fine. Itching is gone and swelling is going down on my stomach/hip.      Discharge Instructions    \"Let's review your discharge instructions.  What is/are the follow-up recommendations?  Pt. Response: See PCP    \"Has an appointment with your primary care provider been scheduled?\"  No. Staff scheduled while discussing with Triage.   Future Appointments 7/19/2023 - 1/15/2024      Date Visit Type Length Department Provider     8/8/2023  1:30 PM ED/HOSP FOLLOW UP 30 min  INTERNAL MEDICINE Demetri Archer MD    Location Instructions:     [facility] is in the [facility] at [address] in [place]. This just east of the [address] exit off of [address]. Free parking is available; access the lot from [address] [place] [address] or [facility].               8/28/2023 10:30 AM LAB INR 15 min  LABORATORY [facility] LAB    Location Instructions:     The clinic is located at [address] in the [facility] in [place].&nbsp; We offer free parking in our on-site lot.                     Medications    \"Tell me what changed about your medicines when you discharged?\"    Taper Prednisone for 5 days  Staff has not started this, will start right now.     \"What questions do you have about your medications?\"   None    On warfarin: \"Were you given any recommendations for follow-up with the anticoagulation clinic?\" No - (Can recommendation be found in discharge instructions/discharge summary?)  No -  Continuing on warfarin and NO medication changes or warfarin dose changes - schedule within one week for Anticoagulation clinic    Call Summary    \"What " "questions or concerns do you have about your recent visit and your follow-up care?\"     none    \"If you have questions or things don't continue to improve, we encourage you contact us through the main clinic number (give number).  Even if the clinic is not open, triage nurses are available 24/7 to help you.     We would like you to know that our clinic has extended hours (provide information).  We also have urgent care (provide details on closest location and hours/contact info)\"    \"Thank you for your time and take care!\"                "

## 2023-07-19 NOTE — TELEPHONE ENCOUNTER
What type of discharge? Emergency Department  Risk of Hospital admission or ED visit: 93%  Is a TCM episode required? No  When should the patient follow up with PCP? N/A    Katy Carbajal RN  [facility]

## 2023-07-19 NOTE — ED PROVIDER NOTES
"  History     Chief Complaint:  Insect Bite and Allergic Reaction       HPI   Petey Archibald is a 62 year old male who is allergic to bees, he was stung by multiple bees approximately 2 hours prior to arrival.  He administered his EpiPen and took a Benadryl.  He has a localized reaction but never had shortness of breath, nausea, lip or tongue swelling.  10 no further aggravating or alleviating factors no further associated symptoms.      Independent Historian:   None - Patient Only    Review of External Notes:   Previous office visits and anticoagulation clinic visits were reviewed      Medications:    acetaminophen (TYLENOL) 325 MG tablet  albuterol (ALBUTEROL) 108 (90 BASE) MCG/ACT inhaler  ARIPiprazole (ABILIFY) 5 MG tablet  buPROPion (WELLBUTRIN SR) 150 MG 12 hr tablet  Cadexomer Iodine, topical, 0.9% (IODOSORB) 0.9 % GEL gel  cloNIDine (CATAPRES) 0.1 MG tablet  clotrimazole (LOTRIMIN) 1 % external cream  diclofenac (VOLTAREN) 1 % topical gel  diphenhydrAMINE (BENADRYL) 25 MG capsule  Emollient (CERAVE) CREA  EPINEPHrine (EPIPEN 2-BRE) 0.3 MG/0.3ML injection 2-pack  gabapentin (NEURONTIN) 100 MG capsule  Gauze Pads & Dressings (GAUZE PADS 4\"X4\") 4\"X4\" PADS  loratadine (CLARITIN) 10 MG tablet  LORazepam (ATIVAN) 1 MG tablet  montelukast (SINGULAIR) 10 MG tablet  Multiple Vitamin (DAILY-JOVAN) TABS  naltrexone (DEPADE/REVIA) 50 MG tablet  nicotine (COMMIT) 2 MG lozenge  nystatin-triamcinolone (MYCOLOG II) 854806-0.1 UNIT/GM-% external cream  omeprazole (PRILOSEC) 40 MG DR capsule  order for DME  order for DME  order for DME  order for DME  order for DME  order for DME  order for DME  order for DME  polyethylene glycol (MIRALAX) 17 GM/Dose powder  PULMICORT FLEXHALER 180 MCG/ACT inhaler  SENNA-TABS 8.6 MG tablet  sertraline (ZOLOFT) 100 MG tablet  simvastatin (ZOCOR) 40 MG tablet  SPIRIVA HANDIHALER 18 MCG inhaled capsule  spironolactone (ALDACTONE) 25 MG tablet  tiZANidine (ZANAFLEX) 2 MG tablet  traZODone " (DESYREL) 100 MG tablet  triamcinolone (KENALOG) 0.1 % external cream  varenicline (CHANTIX) 1 MG tablet  vitamin B complex with vitamin C (STRESS TAB) tablet  warfarin ANTICOAGULANT (COUMADIN) 4 MG tablet        Past Medical History:    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     Chronic infection      Coagulation disorder (H)      COPD (chronic obstructive pulmonary disease) (H)      History of DVT of lower extremity      History of pulmonary embolism      Hyperlipidemia      Mild intellectual disabilities      Morbid obesity (H)      NEL (obstructive sleep apnea)      Peripheral edema      Peripheral vascular disease (H)      Sleep apnea      Thrombosis      Tobacco dependence      Uncomplicated asthma      Venous stasis dermatitis        Past Surgical History:    Past Surgical History:   Procedure Laterality Date     COLONOSCOPY N/A 4/15/2019    Procedure: COMBINED COLONOSCOPY, SINGLE OR MULTIPLE BIOPSY/POLYPECTOMY BY BIOPSY;  Surgeon: Jovana Ohara MD;  Location:  GI     COLONOSCOPY N/A 6/7/2021    Procedure: COLONOSCOPY with polypectomies;  Surgeon: Sahil Cid MD;  Location: [facility] OR     EXCISE MALIGNANT LESIONS, TRUNK/ARMS/LEGS 0.5CM OR LESS Left 5/26/2017    Procedure: EXCISE MALIGNANT LESIONS, TRUNK/ARMS/LEGS 0.5CM OR LESS;  EXCISION LEFT ELBOW MASS;  Surgeon: Jose Azevedo MD;  Location:  GI     RECTAL SURGERY      perianal abscess?        Physical Exam     Patient Vitals for the past 24 hrs:   BP Temp Temp src Pulse Resp SpO2 Weight   07/18/23 2012 -- 98.2  F (36.8  C) Temporal -- 22 -- --   07/18/23 2003 113/64 -- -- 75 -- 95 % (!) 163.3 kg (360 lb)        Physical Exam  General: Alert, interactive   Head:  Scalp is atraumatic  Eyes:  The pupils are equal, round, and reactive to light    EOM's intact    No scleral icterus  ENT:      Nose:  The external nose is normal  Ears:  External ears are normal  Mouth/Throat: The oropharynx is  normal    Mucus membranes are moist     No angioedema  Neck:  Normal range of motion.      There is no rigidity.    Trachea is in the midline         CV:  Regular rate and rhythm    No murmur   Resp:  Breath sounds are clear bilaterally    Non-labored, no retractions or accessory muscle use      GI:  Abdomen is soft, no distension, no tenderness.       MS:  Normal strength in all 4 extremities  Skin:  Warm and dry, erythematous hive on the left abdomen left chest and left wrist  Neuro: Strength 5/5 x4.      GCS: 15  Psych:  Awake. Alert.  Normal affect.      Appropriate interactions.    Emergency Department Course     Emergency Department Course & Assessments:  Interventions:  Medications   predniSONE (DELTASONE) tablet 40 mg (40 mg Oral $Given 7/18/23 2126)   famotidine (PEPCID) tablet 40 mg (40 mg Oral $Given 7/18/23 2126)      Independent Interpretation (X-rays, CTs, rhythm strip):  None    Consultations/Discussion of Management or Tests:  None        Social Determinants of Health affecting care:   None    Disposition:  The patient was discharged to home.     Impression & Plan      Medical Decision Making:  Following presentation history and physical examination were performed the above work-up was undertaken.  He had administered his EpiPen several hours prior to arrival, he was observed for a total of 4 hours after administration of the EpiPen and there is no signs of rebound effect no signs of angioedema anaphylaxis or more concerning illness.  There is no signs of secondary infection at the sting sites.  Stingers did not appear to be in place.  I will discharge him with medications below and recommended follow-up with his primary care provider and return if new symptoms develop.      Diagnosis:    ICD-10-CM    1. Bee sting, accidental or unintentional, initial encounter  T63.441A            Discharge Medications:  Discharge Medication List as of 7/18/2023  9:41 PM      START taking these medications     Details   !! EPINEPHrine (ANY BX GENERIC EQUIV) 0.3 MG/0.3ML injection 2-pack Inject 0.3 mLs (0.3 mg) into the muscle once as needed for anaphylaxis May repeat one time in 5-15 minutes if response to initial dose is inadequate., Disp-2 each, R-0, E-Prescribe      predniSONE (DELTASONE) 20 MG tablet Take two tablets (= 40mg) each day for 5 (five) days, Disp-10 tablet, R-0, E-Prescribe       !! - Potential duplicate medications found. Please discuss with provider.             Adalid Woods MD  7/18/2023   Chuck, Adalid Grover,*      Chuck, Adalid Grover MD  07/18/23 9719

## 2023-07-24 ENCOUNTER — LAB (OUTPATIENT)
Dept: LAB | Facility: CLINIC | Age: 63
End: 2023-07-24
Payer: MEDICARE

## 2023-07-24 ENCOUNTER — ANTICOAGULATION THERAPY VISIT (OUTPATIENT)
Dept: ANTICOAGULATION | Facility: CLINIC | Age: 63
End: 2023-07-24

## 2023-07-24 DIAGNOSIS — Z79.01 LONG TERM CURRENT USE OF ANTICOAGULANT THERAPY: ICD-10-CM

## 2023-07-24 DIAGNOSIS — Z86.711 HISTORY OF PULMONARY EMBOLISM: ICD-10-CM

## 2023-07-24 DIAGNOSIS — Z86.711 HISTORY OF PULMONARY EMBOLISM: Primary | ICD-10-CM

## 2023-07-24 LAB — INR BLD: 2.4 (ref 0.9–1.1)

## 2023-07-24 PROCEDURE — 36416 COLLJ CAPILLARY BLOOD SPEC: CPT

## 2023-07-24 PROCEDURE — 85610 PROTHROMBIN TIME: CPT

## 2023-07-24 NOTE — PROGRESS NOTES
ANTICOAGULATION MANAGEMENT     Petey Archibald 62 year old male is on warfarin with therapeutic INR result. (Goal INR 2.0-3.0)    Recent labs: (last 7 days)     07/24/23  1135   INR 2.4*       ASSESSMENT     Source(s): Chart Review and Patient/Caregiver Call     Warfarin doses taken: Warfarin taken as instructed  Diet: No new diet changes identified  Medication/supplement changes:  finishing up 5 days of prednisone for a bee sting he had on 7/19/23  New illness, injury, or hospitalization: Yes: see above  Signs or symptoms of bleeding or clotting: No  Previous result: Therapeutic last 2(+) visits  Additional findings: None       PLAN     Recommended plan for no diet, medication or health factor changes affecting INR     Dosing Instructions: Continue your current warfarin dose with next INR in 5 weeks       Summary  As of 7/24/2023      Full warfarin instructions:  6 mg every Mon, Fri; 8 mg all other days   Next INR check:  8/28/2023               Telephone call with Bertrand Lewis  who agrees to plan and repeated back plan correctly    Lab previously scheduled in another 5 weeks , no need to send new prescription. Will mail AVS for continued dosing calendar.    Education provided:   Please call back if any changes to your diet, medications or how you've been taking warfarin  Contact [phone]  with any changes, questions or concerns.     Plan made per [facility] anticoagulation protocol    Lauryn Chatman RN  Anticoagulation Clinic  7/24/2023    _______________________________________________________________________     Anticoagulation Episode Summary       Current INR goal:  2.0-3.0   TTR:  76.0 % (1 y)   Target end date:  Indefinite   Send INR reminders to:  [facility]    Indications    History of pulmonary embolism [Z86.711]  Long term current use of anticoagulant therapy [Z79.01]             Comments:  [facility] assisted; A new Coumadin Prescription will need to sent to  [facility] with current dose and next INR check.             Anticoagulation Care Providers       Provider Role Specialty Phone number    Demetri Archer MD Referring Internal Medicine [phone]

## 2023-07-27 ENCOUNTER — MEDICAL CORRESPONDENCE (OUTPATIENT)
Dept: HEALTH INFORMATION MANAGEMENT | Facility: CLINIC | Age: 63
End: 2023-07-27

## 2023-07-27 DIAGNOSIS — M62.838 SPASM OF MUSCLE: ICD-10-CM

## 2023-07-27 DIAGNOSIS — G24.8 FOCAL DYSTONIA: ICD-10-CM

## 2023-07-27 RX ORDER — TIZANIDINE 2 MG/1
2 TABLET ORAL 3 TIMES DAILY
Qty: 270 TABLET | Refills: 1 | Status: SHIPPED | OUTPATIENT
Start: 2023-07-27 | End: 2024-01-03

## 2023-07-27 RX ORDER — TIZANIDINE 2 MG/1
TABLET ORAL
Qty: 90 TABLET | Refills: 11 | OUTPATIENT
Start: 2023-07-27

## 2023-07-27 NOTE — TELEPHONE ENCOUNTER
Bertrand,  Manager, requesting PCP to take over management of Tizanidine and send urgent refill to pharmacy as patient is not planning to follow-up with PMR.     Patient took last tablet this morning, no medication at group home for 2 pm dosing and evening dosing.       Per 7/5/23 Documentation enc.:  Need to inform RN that this patient received a temporary refill of tizanidine from Dr. Noriega (as the on call PMR MD) on 2/5/23 for #270 tablets with 1 Refill. Patient was seeing Dr. Hagen for Botox, but last visit was on 6/23/22. Was to schedule a follow up visit, but has not yet done so. Will need to schedule follow up visit with Dr. Hagen or if he does not intend on returning, he may request to receive refills of the tizanidine from his PCP.       Please return call to Bertrand at [phone]  OK to leave detailed message

## 2023-08-08 ENCOUNTER — OFFICE VISIT (OUTPATIENT)
Dept: INTERNAL MEDICINE | Facility: CLINIC | Age: 63
End: 2023-08-08
Payer: MEDICARE

## 2023-08-08 ENCOUNTER — TELEPHONE (OUTPATIENT)
Dept: ANTICOAGULATION | Facility: CLINIC | Age: 63
End: 2023-08-08

## 2023-08-08 ENCOUNTER — ANTICOAGULATION THERAPY VISIT (OUTPATIENT)
Dept: ANTICOAGULATION | Facility: CLINIC | Age: 63
End: 2023-08-08

## 2023-08-08 ENCOUNTER — TELEPHONE (OUTPATIENT)
Dept: WOUND CARE | Facility: CLINIC | Age: 63
End: 2023-08-08

## 2023-08-08 VITALS
TEMPERATURE: 96.9 F | BODY MASS INDEX: 50.21 KG/M2 | DIASTOLIC BLOOD PRESSURE: 76 MMHG | OXYGEN SATURATION: 95 % | WEIGHT: 315 LBS | HEART RATE: 82 BPM | SYSTOLIC BLOOD PRESSURE: 126 MMHG

## 2023-08-08 DIAGNOSIS — Z09 ENCOUNTER FOR EXAMINATION FOLLOWING TREATMENT AT HOSPITAL: Primary | ICD-10-CM

## 2023-08-08 DIAGNOSIS — Z79.01 LONG TERM CURRENT USE OF ANTICOAGULANT THERAPY: ICD-10-CM

## 2023-08-08 DIAGNOSIS — Z86.711 HISTORY OF PULMONARY EMBOLISM: ICD-10-CM

## 2023-08-08 DIAGNOSIS — Z86.711 HISTORY OF PULMONARY EMBOLISM: Primary | ICD-10-CM

## 2023-08-08 DIAGNOSIS — T63.441D BEE STING REACTION, ACCIDENTAL OR UNINTENTIONAL, SUBSEQUENT ENCOUNTER: ICD-10-CM

## 2023-08-08 DIAGNOSIS — L97.522 CHRONIC ULCER OF LEFT FOOT WITH FAT LAYER EXPOSED (H): ICD-10-CM

## 2023-08-08 LAB — INR BLD: 1.7 (ref 0.9–1.1)

## 2023-08-08 PROCEDURE — 36416 COLLJ CAPILLARY BLOOD SPEC: CPT | Performed by: INTERNAL MEDICINE

## 2023-08-08 PROCEDURE — 85610 PROTHROMBIN TIME: CPT | Performed by: INTERNAL MEDICINE

## 2023-08-08 PROCEDURE — 99214 OFFICE O/P EST MOD 30 MIN: CPT | Performed by: INTERNAL MEDICINE

## 2023-08-08 RX ORDER — SULFAMETHOXAZOLE/TRIMETHOPRIM 800-160 MG
1 TABLET ORAL 2 TIMES DAILY
Qty: 14 TABLET | Refills: 0 | Status: SHIPPED | OUTPATIENT
Start: 2023-08-08 | End: 2023-08-15

## 2023-08-08 RX ORDER — WARFARIN SODIUM 4 MG/1
TABLET ORAL
Qty: 90 TABLET | Refills: 0 | Status: SHIPPED | OUTPATIENT
Start: 2023-08-08 | End: 2023-08-11

## 2023-08-08 RX ORDER — MOXIFLOXACIN HYDROCHLORIDE 400 MG/1
400 TABLET ORAL DAILY
Qty: 7 TABLET | Refills: 0 | Status: SHIPPED | OUTPATIENT
Start: 2023-08-08 | End: 2023-08-18

## 2023-08-08 NOTE — PROGRESS NOTES
Assessment & Plan   Encounter for examination following treatment at hospital  Bee sting reaction, accidental or unintentional, subsequent encounter  No recurrence of symptoms. CTM.    Chronic ulcer of left foot with fat layer exposed (H)  Present on exam. Discussed importance of podiatry follow-up. After some discussion, he was in agreement. Will start Bactrim and Moxifloxacin for foot ulceration. He will reach out to podiatry to schedule ASAP. Updated referral placed.  - sulfamethoxazole-trimethoprim (BACTRIM DS) 800-160 MG tablet; Take 1 tablet by mouth 2 times daily for 7 days  - moxifloxacin (AVELOX) 400 MG tablet; Take 1 tablet (400 mg) by mouth daily    Long term current use of anticoagulant therapy  Discussed above meds will interact with warfarin. Will check INR today.    Demetri Holder MD  [facility]    Meagan Angelo is a 62 year old who presents for ER f/u after a bee sting. As always, he is accompanied by a staff member from his group home as well. He was stung 7/18. He administered his EpiPen and went to the ER. They observed him for 4 hours without complication. He was given 5 day course of prednisone which he did take. No recurrence of symptoms since then. He has since developed open foot sore on L foot. Denies f/c.    Review of Systems   Constitutional, derm systems are negative, except as otherwise noted.      Objective    /76   Pulse 82   Temp 96.9  F (36.1  C)   Wt 149.8 kg (330 lb 3.2 oz)   SpO2 95%   BMI 50.21 kg/m    Body mass index is 50.21 kg/m .    Physical Exam   GENERAL: alert and in no distress.  EYES: conjunctivae/corneas clear. EOMs grossly intact  HENT: Facies symmetric.  RESP: No iWOB.  MSK: Moves all four extremities freely  SKIN: Stage 2/2.5 open ulceration on dorsal aspect of L foot. Mild purulence noted.  NEURO: CN II-XII grossly intact.

## 2023-08-08 NOTE — PROGRESS NOTES
ANTICOAGULATION MANAGEMENT     Petey Archibald 62 year old male is on warfarin with subtherapeutic INR result. (Goal INR 2.0-3.0)    Recent labs: (last 7 days)     08/08/23  1352   INR 1.7*       ASSESSMENT     Source(s): Chart Review and Patient/Caregiver Call     Warfarin doses taken: Warfarin taken as instructed  Diet: No new diet changes identified  Medication/supplement changes:  prescribed Bactrim at todays OV x 7 days.  Given for L foot ulcer  New illness, injury, or hospitalization: No  Signs or symptoms of bleeding or clotting: No  Previous result: Therapeutic last 2(+) visits  Additional findings:  due to todays sub INR and concurrent bactrim use, no boost given or  dose adjustments per protocol         PLAN     Recommended plan for temporary change(s) affecting INR     Dosing Instructions: Continue your current warfarin dose with next INR in 3 days       Summary  As of 8/8/2023      Full warfarin instructions:  6 mg every Mon, Fri; 8 mg all other days   Next INR check:  8/11/2023               Telephone call with [facility] nurse who verbalizes understanding and agrees to plan    Lab visit scheduled    Education provided:   Please call back if any changes to your diet, medications or how you've been taking warfarin  Interaction IS anticipated between warfarin and bactrim    Plan made per [facility] anticoagulation protocol    Cameron Blackmon RN  Anticoagulation Clinic  8/8/2023    _______________________________________________________________________     Anticoagulation Episode Summary       Current INR goal:  2.0-3.0   TTR:  76.8 % (1 y)   Target end date:  Indefinite   Send INR reminders to:  [facility]    Indications    History of pulmonary embolism [Z86.711]  Long term current use of anticoagulant therapy [Z79.01]             Comments:  REM alf; A new Coumadin Prescription will need to sent to [facility] with current dose and next INR check.             Anticoagulation  Care Providers       Provider Role Specialty Phone number    Demetri Archer MD Referring Internal Medicine [phone]

## 2023-08-08 NOTE — TELEPHONE ENCOUNTER
Patient's facility is requesting an appointment. Patient was seen by Susan for the same wound in February of 2022    For scheduling call Sunday (pronounced like You-en) [phone]

## 2023-08-09 NOTE — TELEPHONE ENCOUNTER
Please schedule with Dr. Lowe, Dr. Hampton, Ruth Ann Brewer PA-C, or Kaylee Dominguez NP at [facility] [place] for next available appointment.    **For all providers, Ruth Ann Prasad PA-C, Dr. Conley, Kaylee Dominguez NP or Dr. Hampton, please schedule a follow up 2-3 weeks after initial appointment.**    Is the patient able to make their own medical decisions? Yes    Is patient a DOMINGA lift? PLEASE INQUIRE WHEN MAKING THE APPOINTMENT AND PUT IN APPOINTMENT NOTES    Routing to  Wound Healing Scheduling.      Anesthesia Volume In Cc (Will Not Render If 0): 1

## 2023-08-11 ENCOUNTER — OFFICE VISIT (OUTPATIENT)
Dept: PODIATRY | Facility: CLINIC | Age: 63
End: 2023-08-11
Payer: MEDICARE

## 2023-08-11 ENCOUNTER — LAB (OUTPATIENT)
Dept: LAB | Facility: CLINIC | Age: 63
End: 2023-08-11
Payer: MEDICARE

## 2023-08-11 ENCOUNTER — TELEPHONE (OUTPATIENT)
Dept: INTERNAL MEDICINE | Facility: CLINIC | Age: 63
End: 2023-08-11

## 2023-08-11 ENCOUNTER — ANTICOAGULATION THERAPY VISIT (OUTPATIENT)
Dept: ANTICOAGULATION | Facility: CLINIC | Age: 63
End: 2023-08-11

## 2023-08-11 VITALS — SYSTOLIC BLOOD PRESSURE: 122 MMHG | BODY MASS INDEX: 50.18 KG/M2 | DIASTOLIC BLOOD PRESSURE: 68 MMHG | WEIGHT: 315 LBS

## 2023-08-11 DIAGNOSIS — I89.0 LYMPHEDEMA OF LEFT LEG: Primary | ICD-10-CM

## 2023-08-11 DIAGNOSIS — Z86.711 HISTORY OF PULMONARY EMBOLISM: ICD-10-CM

## 2023-08-11 DIAGNOSIS — Z79.01 LONG TERM CURRENT USE OF ANTICOAGULANT THERAPY: ICD-10-CM

## 2023-08-11 DIAGNOSIS — L97.522 CHRONIC ULCER OF LEFT FOOT WITH FAT LAYER EXPOSED (H): ICD-10-CM

## 2023-08-11 DIAGNOSIS — Z86.711 HISTORY OF PULMONARY EMBOLISM: Primary | ICD-10-CM

## 2023-08-11 LAB — INR BLD: 1.8 (ref 0.9–1.1)

## 2023-08-11 PROCEDURE — 11042 DBRDMT SUBQ TIS 1ST 20SQCM/<: CPT | Performed by: PODIATRIST

## 2023-08-11 PROCEDURE — 99213 OFFICE O/P EST LOW 20 MIN: CPT | Mod: 25 | Performed by: PODIATRIST

## 2023-08-11 PROCEDURE — 36416 COLLJ CAPILLARY BLOOD SPEC: CPT

## 2023-08-11 PROCEDURE — 85610 PROTHROMBIN TIME: CPT

## 2023-08-11 RX ORDER — WARFARIN SODIUM 4 MG/1
TABLET ORAL
Qty: 15 TABLET | Refills: 0 | Status: SHIPPED | OUTPATIENT
Start: 2023-08-11 | End: 2023-08-18

## 2023-08-11 NOTE — PROGRESS NOTES
"ASSESSMENT:  Encounter Diagnoses   Name Primary?    Chronic ulcer of left foot with fat layer exposed (H)     Lymphedema of left leg Yes        Media Information    Document Information    MEDICAL DECISION MAKING:  The wound is very similar to the wound I treated in 2020.  Again, this is likely related to chronic edema possible venous insufficiency.    His foot appears stable.  I agree with the current antibiotics, moxifloxacin and Bactrim DS.  Although chronic edema can cause erythema, localized cellulitis cannot be ruled out.  The wound and the chronic edema put him at greater risk for infection.    Excisional debridement was performed.  Wound cares were discussed: Daily cleansing, topical antibiotic and light dressing  He will continue with compression    For now he will continue working as a .  He was provided a short Aircast for protection.  Unsure if he can wear this at work however.    Follow-up as scheduled with the [facility] wound clinic on 8/22/2023.    Excisional Debridement    The excisional debridement procedure was discussed.  This included the goals of removing non-viable tissue, evaluating the full extent of wound, and promoting wound healing.  Petey Archibald  provided verba consent.  The \"Time Out\" was called, confirming procedure and location.     Using a sterile skin curette, excisional debridment of the left foot ulcer was performed. The hyperkeratotic eschar surrounding the wound was removed, by excising skin edges back to healthy, bleeding tissue. Non-viable tissue was excised.  Debridement was carried out to the depth of the fat layer. The ulcer base was scraped to remove bioburden and promote healing.  The area debrided was less than 20 square cm.   There was moderate bleeding with the procedure. No anesthesia was needed due to peripheral neuropathy.   A sterile dressing was applied.      Disclaimer: This note consists of symbols derived from keyboarding, dictation and/or voice " recognition software. As a result, there may be errors in the script that have gone undetected. Please consider this when interpreting information found in this chart.    Joseph Flaherty DPM, FACFAS, MS    [place] Department of Podiatry/Foot & Ankle Surgery      ____________________________________________________________________    HPI:       I was asked by Dr. Dalton Archer to evaluate Petey Archibald in consultation for a chronic left foot ulcer.       Petey presents with a wound on the dorsal aspect of his left foot.  I have treated Petey and this wound in the past.  He was hospitalized in May 2020 with related cellulitis.  I, and other members of my team, treated him thereafter.  Due to nonhealing, he was ultimately referred to the [facility] wound clinic.  He reports having compression stockings.  He has soaked the foot in salt water.  He works as a .    The wound causes him some pain.  He has chronic edema.    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    Chronic infection     MRSA    Coagulation disorder (H)     on coumadin    COPD (chronic obstructive pulmonary disease) (H)     History of DVT of lower extremity     History of pulmonary embolism     Hyperlipidemia     Mild intellectual disabilities     Morbid obesity (H)     NEL (obstructive sleep apnea)     Peripheral edema     Peripheral vascular disease (H)     Sleep apnea     uses CPAP    Thrombosis     Tobacco dependence     Uncomplicated asthma     Venous stasis dermatitis    *  *  Past Surgical History:   Procedure Laterality Date    COLONOSCOPY N/A 4/15/2019    Procedure: COMBINED COLONOSCOPY, SINGLE OR MULTIPLE BIOPSY/POLYPECTOMY BY BIOPSY;  Surgeon: Jovana Ohara MD;  Location:  GI    COLONOSCOPY N/A 6/7/2021    Procedure: COLONOSCOPY with polypectomies;  Surgeon: Sahil Cid MD;  Location: [facility] OR    EXCISE MALIGNANT LESIONS, TRUNK/ARMS/LEGS 0.5CM OR LESS Left 5/26/2017    Procedure:  EXCISE MALIGNANT LESIONS, TRUNK/ARMS/LEGS 0.5CM OR LESS;  EXCISION LEFT ELBOW MASS;  Surgeon: Jose Azevedo MD;  Location: [facility] GI    RECTAL SURGERY      perianal abscess?   *  EXAM:    Vitals: /68   Wt 149.7 kg (330 lb)   BMI 50.18 kg/m    BMI: Body mass index is 50.18 kg/m .    Constitutional:  Petey Archibald is in no apparent distress, appears well-nourished.  Cooperative with history and physical exam.    Vascular:  Pedal pulses are palpable for both the DP and PT arteries.  CFT < 3 sec.  N  Chronic lower extremity edema.    Neuro: Light touch sensation is intact to the L4, L5, S1 distributions  No evidence of weakness, spasticity, or contracture in the lower extremities.     Derm: Chronic ulceration dorsal left foot near the first webspace.  Possible infection yet erythema likely related to chronic edema.  This wound, after debridement, appears to have a vascular base.  No malodor.  No purulence.

## 2023-08-11 NOTE — LETTER
"    8/11/2023         RE: Petey Archibald  [address]  [place] [address]        Dear Colleague,    Thank you for referring your patient, Petey Archibald, to the [facility]. Please see a copy of my visit note below.    ASSESSMENT:  Encounter Diagnoses   Name Primary?     Chronic ulcer of left foot with fat layer exposed (H)      Lymphedema of left leg Yes        Media Information    Document Information    MEDICAL DECISION MAKING:  The wound is very similar to the wound I treated in 2020.  Again, this is likely related to chronic edema possible venous insufficiency.    His foot appears stable.  I agree with the current antibiotics, moxifloxacin and Bactrim DS.  Although chronic edema can cause erythema, localized cellulitis cannot be ruled out.  The wound and the chronic edema put him at greater risk for infection.    Excisional debridement was performed.  Wound cares were discussed: Daily cleansing, topical antibiotic and light dressing  He will continue with compression    For now he will continue working as a .  He was provided a short Aircast for protection.  Unsure if he can wear this at work however.    Follow-up as scheduled with the [facility] wound clinic on 8/22/2023.    Excisional Debridement    The excisional debridement procedure was discussed.  This included the goals of removing non-viable tissue, evaluating the full extent of wound, and promoting wound healing.  Petey Archibald  provided verba consent.  The \"Time Out\" was called, confirming procedure and location.     Using a sterile skin curette, excisional debridment of the left foot ulcer was performed. The hyperkeratotic eschar surrounding the wound was removed, by excising skin edges back to healthy, bleeding tissue. Non-viable tissue was excised.  Debridement was carried out to the depth of the fat layer. The ulcer base was scraped to remove bioburden and promote healing.  The area " debrided was less than 20 square cm.   There was moderate bleeding with the procedure. No anesthesia was needed due to peripheral neuropathy.   A sterile dressing was applied.      Disclaimer: This note consists of symbols derived from keyboarding, dictation and/or voice recognition software. As a result, there may be errors in the script that have gone undetected. Please consider this when interpreting information found in this chart.    Joseph Flaherty DPM, FACFAS, MS    [place] Department of Podiatry/Foot & Ankle Surgery      ____________________________________________________________________    HPI:       I was asked by Dr. Dalton Archer to evaluate Petey Archibald in consultation for a chronic left foot ulcer.       Petey presents with a wound on the dorsal aspect of his left foot.  I have treated Petey and this wound in the past.  He was hospitalized in May 2020 with related cellulitis.  I, and other members of my team, treated him thereafter.  Due to nonhealing, he was ultimately referred to the [facility] wound clinic.  He reports having compression stockings.  He has soaked the foot in salt water.  He works as a .    The wound causes him some pain.  He has chronic edema.    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     Chronic infection     MRSA     Coagulation disorder (H)     on coumadin     COPD (chronic obstructive pulmonary disease) (H)      History of DVT of lower extremity      History of pulmonary embolism      Hyperlipidemia      Mild intellectual disabilities      Morbid obesity (H)      NEL (obstructive sleep apnea)      Peripheral edema      Peripheral vascular disease (H)      Sleep apnea     uses CPAP     Thrombosis      Tobacco dependence      Uncomplicated asthma      Venous stasis dermatitis    *  *  Past Surgical History:   Procedure Laterality Date     COLONOSCOPY N/A 4/15/2019    Procedure: COMBINED COLONOSCOPY, SINGLE OR MULTIPLE  BIOPSY/POLYPECTOMY BY BIOPSY;  Surgeon: Jovana Ohara MD;  Location:  GI     COLONOSCOPY N/A 6/7/2021    Procedure: COLONOSCOPY with polypectomies;  Surgeon: Sahil Cid MD;  Location: [facility] OR     EXCISE MALIGNANT LESIONS, TRUNK/ARMS/LEGS 0.5CM OR LESS Left 5/26/2017    Procedure: EXCISE MALIGNANT LESIONS, TRUNK/ARMS/LEGS 0.5CM OR LESS;  EXCISION LEFT ELBOW MASS;  Surgeon: Jose Azevedo MD;  Location:  GI     RECTAL SURGERY      perianal abscess?   *  EXAM:    Vitals: /68   Wt 149.7 kg (330 lb)   BMI 50.18 kg/m    BMI: Body mass index is 50.18 kg/m .    Constitutional:  Petey Archibald is in no apparent distress, appears well-nourished.  Cooperative with history and physical exam.    Vascular:  Pedal pulses are palpable for both the DP and PT arteries.  CFT < 3 sec.  N  Chronic lower extremity edema.    Neuro: Light touch sensation is intact to the L4, L5, S1 distributions  No evidence of weakness, spasticity, or contracture in the lower extremities.     Derm: Chronic ulceration dorsal left foot near the first webspace.  Possible infection yet erythema likely related to chronic edema.  This wound, after debridement, appears to have a vascular base.  No malodor.  No purulence.          Again, thank you for allowing me to participate in the care of your patient.        Sincerely,        Joseph Flaherty DPM

## 2023-08-11 NOTE — PROGRESS NOTES
ANTICOAGULATION MANAGEMENT     Petey Archibald 62 year old male is on warfarin with subtherapeutic INR result. (Goal INR 2.0-3.0)    Recent labs: (last 7 days)     08/11/23  1101   INR 1.8*       ASSESSMENT     Source(s): Chart Review  Previous INR was Subtherapeutic  Medication, diet, health changes since last INR chart reviewed:  7 day (8/8/23- 8/15/23) course of Bactrim & Moxifloxacin for chronic left foot ulcer         PLAN     Spoke with Bertrand haley - requested callback in 30 minutes or so as he was in an appointment    No instructions provided. Unable to leave voicemail.    Follow up required to assess for changes  and discuss out of range result     Ronda Shane RN  Anticoagulation Clinic  8/11/2023       Dr. Santiago Webb

## 2023-08-11 NOTE — TELEPHONE ENCOUNTER
Reason for Call:  Other returning call    Detailed comments: return inr call - line busy    Phone Number Patient can be reached at: Home number on file [phone]     Best Time: any    Can we leave a detailed message on this number? Not Applicable    Call taken on 8/11/2023 at 12:22 PM by Alexia Kelly

## 2023-08-11 NOTE — PROGRESS NOTES
ANTICOAGULATION MANAGEMENT     Petey Archibald 62 year old male is on warfarin with subtherapeutic INR result. (Goal INR 2.0-3.0)    Recent labs: (last 7 days)     08/11/23  1101   INR 1.8*       ASSESSMENT     Source(s): Chart Review and Patient/Caregiver Call     Warfarin doses taken: Warfarin taken as instructed  Diet: No new diet changes identified  Medication/supplement changes:  Bactrim 7 day course (dates: 8/8/23- 8/15/23) subsequent INRs may be increased. With Sulfamethoxazole-Trimethoprim, [facility] protocol Appendix G recommends empiric reduction of warfarin dose in therapeutic/supratherapeutic patients.  Moxifloxacin 7 day course (dates: 8/8/23- 8/15/23) which may be increasing INR today  New illness, injury, or hospitalization: Yes: chronic left foot ulcer   Signs or symptoms of bleeding or clotting: No  Previous result: Subtherapeutic  Additional findings: None       PLAN     Recommended plan for temporary change(s) affecting INR     Dosing Instructions: Continue your current warfarin dose with next INR in 1 week       Summary  As of 8/11/2023      Full warfarin instructions:  6 mg every Mon, Fri; 8 mg all other days   Next INR check:  8/18/2023               Telephone call with Bertrand llanos  who verbalizes understanding and agrees to plan. New rx sent to [facility] and encounter routed to onsite staff to mail AVS.     Lab visit scheduled    Education provided:   Please call back if any changes to your diet, medications or how you've been taking warfarin  Interaction IS anticipated between warfarin and Bactrim & Moxifloxacin  Symptom monitoring: monitoring for bleeding signs and symptoms and monitoring for clotting signs and symptoms    Plan made per [facility] anticoagulation protocol    Ronda Shane RN  Anticoagulation Clinic  8/11/2023    _______________________________________________________________________     Anticoagulation Episode Summary       Current INR goal:  2.0-3.0   TTR:  76.0 % (1 y)   Target  end date:  Indefinite   Send INR reminders to:  [facility]    Indications    History of pulmonary embolism [Z86.711]  Long term current use of anticoagulant therapy [Z79.01]             Comments:  [facility] shelter; A new Coumadin Prescription will need to sent to [facility] with current dose and next INR check.             Anticoagulation Care Providers       Provider Role Specialty Phone number    Demetri Archer MD Referring Internal Medicine [phone]

## 2023-08-11 NOTE — PATIENT INSTRUCTIONS
Thank you for choosing [facility]le [facility]!    DR. MILES'S CLINIC LOCATIONS:     [facility] TRIAGE LINE: [phone]   [address] APPOINTMENTS: [phone]   [place] [address] RADIOLOGY: [phone]   (Every other Tues - Wed - Fri PM) SET UP SURGERY: [phone]    PHYSICAL THERAPY: [phone]   [place] SPECIALTY BILLING QUESTIONS: [phone] [address] FAX: [phone]   [place] [address]    (Thurs & Fri AM)      Wound Care Recommendations:    1)  Keep the wound covered by a bandage when bathing.    2)  Gently clean the wound with soap water, separate from bath/shower water.      3)  Each day, apply a topical antibiotic ointment to the wound (Neosporin, Triple antibiotic, Bacitracin).   Cover with large band-aid or gauze.      5)  Please seek immediate medical attention if any increasing redness, drainage, smell, or pain related to the wound.     6)  Please return to clinic in the period of time requested by Dr. Miles.

## 2023-08-14 NOTE — TELEPHONE ENCOUNTER
See acc note.Called Bertrand to see if there was something more that needed discussing. He stated no, everything was very clear when he spoke with Ronda on 8/11/23, next visit 8/18/23.  Lauryn CONTRERAS RN  Anticoagulation Team

## 2023-08-17 ENCOUNTER — DOCUMENTATION ONLY (OUTPATIENT)
Dept: ANTICOAGULATION | Facility: CLINIC | Age: 63
End: 2023-08-17
Payer: MEDICARE

## 2023-08-17 DIAGNOSIS — Z86.711 HISTORY OF PULMONARY EMBOLISM: Primary | ICD-10-CM

## 2023-08-17 DIAGNOSIS — Z79.01 LONG TERM CURRENT USE OF ANTICOAGULANT THERAPY: ICD-10-CM

## 2023-08-17 NOTE — PROGRESS NOTES
ANTICOAGULATION CLINIC REFERRAL RENEWAL REQUEST       An annual renewal order is required for all patients referred to [facility].?  Please review and sign the pended referral order for Petey Archibald.       ANTICOAGULATION SUMMARY      Warfarin indication(s)   PE    Mechanical heart valve present?  NO       Current goal range   INR: 2.0-3.0     Goal appropriate for indication? Goal INR 2-3, standard for indication(s) above     Time in Therapeutic Range (TTR)  (Goal > 60%) 76.0%       Office visit with referring provider's group within last year yes on 8/8/23       Ronda Shane RN  [facility]

## 2023-08-18 ENCOUNTER — ANTICOAGULATION THERAPY VISIT (OUTPATIENT)
Dept: ANTICOAGULATION | Facility: CLINIC | Age: 63
End: 2023-08-18

## 2023-08-18 ENCOUNTER — LAB (OUTPATIENT)
Dept: LAB | Facility: CLINIC | Age: 63
End: 2023-08-18
Payer: MEDICARE

## 2023-08-18 DIAGNOSIS — Z86.711 HISTORY OF PULMONARY EMBOLISM: Primary | ICD-10-CM

## 2023-08-18 DIAGNOSIS — Z79.01 LONG TERM CURRENT USE OF ANTICOAGULANT THERAPY: ICD-10-CM

## 2023-08-18 DIAGNOSIS — Z86.711 HISTORY OF PULMONARY EMBOLISM: ICD-10-CM

## 2023-08-18 LAB — INR BLD: 3.2 (ref 0.9–1.1)

## 2023-08-18 PROCEDURE — 36416 COLLJ CAPILLARY BLOOD SPEC: CPT

## 2023-08-18 PROCEDURE — 85610 PROTHROMBIN TIME: CPT

## 2023-08-18 RX ORDER — WARFARIN SODIUM 4 MG/1
TABLET ORAL
Qty: 25 TABLET | Refills: 0 | Status: SHIPPED | OUTPATIENT
Start: 2023-08-18 | End: 2023-08-28

## 2023-08-18 NOTE — PROGRESS NOTES
ANTICOAGULATION MANAGEMENT     Petey Archibald 62 year old male is on warfarin with supratherapeutic INR result. (Goal INR 2.0-3.0)    Recent labs: (last 7 days)     08/18/23  1115   INR 3.2*       ASSESSMENT     Source(s): Chart Review and Patient/Caregiver Call     Warfarin doses taken: Warfarin taken as instructed  Diet: No new diet changes identified  Medication/supplement changes:  Bactrim and Moxifloxacin 7 day courses (finished 8/16/23) - which may be increasing INR today  New illness, injury, or hospitalization: Yes: ongoing left foot wound - managed by Podiatry  Signs or symptoms of bleeding or clotting: No  Previous result: Subtherapeutic  Additional findings: None       PLAN     Recommended plan for temporary change(s) and ongoing change(s) affecting INR     Dosing Instructions: partial hold then continue your current warfarin dose with next INR in 10 days       Summary  As of 8/18/2023      Full warfarin instructions:  8/18: 3 mg; Otherwise 6 mg every Mon, Fri; 8 mg all other days   Next INR check:  8/28/2023               Telephone call with Bertrand at [facility] who verbalizes understanding and agrees to plan. AVS to be mailed by onsite team. New rx sent to [facility] pharmacy.     Lab visit scheduled    Education provided:   Please call back if any changes to your diet, medications or how you've been taking warfarin  Symptom monitoring: monitoring for bleeding signs and symptoms and monitoring for clotting signs and symptoms    Plan made per [facility] anticoagulation protocol    Ronda Shane RN  Anticoagulation Clinic  8/18/2023    _______________________________________________________________________     Anticoagulation Episode Summary       Current INR goal:  2.0-3.0   TTR:  75.4 % (1 y)   Target end date:  Indefinite   Send INR reminders to:  [facility]    Indications    History of pulmonary embolism [Z86.711]  Long term current use of anticoagulant therapy [Z79.01]              Comments:  [facility]; A new Coumadin Prescription will need to sent to [facility] with current dose and next INR check.             Anticoagulation Care Providers       Provider Role Specialty Phone number    Demetri Archer MD Referring Internal Medicine [phone]

## 2023-08-22 ENCOUNTER — HOSPITAL ENCOUNTER (OUTPATIENT)
Dept: WOUND CARE | Facility: CLINIC | Age: 63
Discharge: HOME | End: 2023-08-22
Attending: FAMILY MEDICINE | Admitting: FAMILY MEDICINE
Payer: MEDICARE

## 2023-08-22 VITALS — TEMPERATURE: 97.4 F | DIASTOLIC BLOOD PRESSURE: 56 MMHG | HEART RATE: 73 BPM | SYSTOLIC BLOOD PRESSURE: 102 MMHG

## 2023-08-22 DIAGNOSIS — S91.302A OPEN WOUND OF LEFT FOOT, INITIAL ENCOUNTER: Primary | ICD-10-CM

## 2023-08-22 DIAGNOSIS — L97.522 ULCER OF GREAT TOE, LEFT, WITH FAT LAYER EXPOSED (H): ICD-10-CM

## 2023-08-22 PROCEDURE — 11042 DBRDMT SUBQ TIS 1ST 20SQCM/<: CPT | Performed by: FAMILY MEDICINE

## 2023-08-22 PROCEDURE — 99215 OFFICE O/P EST HI 40 MIN: CPT | Mod: 25 | Performed by: FAMILY MEDICINE

## 2023-08-22 NOTE — DISCHARGE INSTRUCTIONS
"Petey JAY Archibald      1960    A DME order for supplies has been placed to [facility]. If there are any issues with your order including not receiving the order please call [facility] at [phone] option 1. They can also provide a tracking number for you if you had supplies shipped to you.  Lives at [facility] group home (3x weekly wound care)  Dressing changes outside of clinic are being performed by  group home staff    Plan:  -Please try to stop smoking, as smoking does delay wound healing  -Patient would like to hold off on vein study (venous competency) at this time    Wound Dressing Change: Left Dorsal 1st Toe  - Wash your hands with soap and water before you begin your dressing change and prepare a clean surface for dressings.  - Cleanse with mild unscented soap and water (such as Cetaphil, Cerave or Dove)    - Apply 1/4 of one 2x2\" Endoform to wound bed  - Cover with two  4x4\" dry gauze and secure with 2\" medipore tape  - Secure with Spandage size 7 (MT spandage size 4) from base of toes to the knee  Change 3 times weekly and as needed for drainage    Elevation:  It is recommended that you elevate your legs above the level of your heart for 30 minutes: approximately 2-3 times each day   Ways to do this:   - Lay on the couch or your bed and prop your legs up on pillows   - Recline back as far as you can go in your recliner and prop your legs on pillows.   Doing these things will help reduce the edema in your legs.     Protein:  A diet high in protein is important for wound healing, we recommend getting 90 grams of protein per day.   Taking protein shakes or bars are a good way to get extra protein in your diet.   Good sources of protein:  Pork 26g per 3 oz  Whey protein powder - 24g per scoop (on average)  Greek yogurt - 23g per 8oz   Chicken or Turkey - 23g per 3oz  Fish - 20-25g per 3oz  Beef - 18-23g per 3oz  Navy beans - 20g per cup  Cottage cheese - 14g per 1/2 cup   Lentils - 13g " per 1/4 cup  Beef jerky 13g per 1oz  2% milk - 8g per cup  Peanut butter - 8g per 2 tablespoons  Eggs - 6g per egg  Mixed nuts - 6g per 2oz       Miguel Hampton M.D. August 22, 2023    Call us at [phone] if you have any questions about your wounds, have redness or swelling around your wound, have a fever of 101 degrees Fahrenheit or greater or if you have any other problems or concerns. We answer the phone Monday through Friday 8 am to 4 pm, please leave a message as we check the voicemail frequently throughout the day.     If you had a positive experience please indicate that on your patient satisfaction survey form that [facility] will be sending you.    It was a pleasure meeting with you today.  Thank you for allowing me and my team the privilege of caring for you today.  YOU are the reason we are here, and I truly hope we provided you with the excellent service you deserve.  Please let us know if there is anything else we can do for you so that we can be sure you are leaving completely satisfied with your care experience.      If you have any billing related questions please call the [facility] Business office at [phone]. The clinic staff does not handle billing related matters.    If you are scheduled to have a follow up appointment, you will receive a reminder call the day before your visit. On the appointment day please arrive 15 minutes prior to your appointment time. If you are unable to keep that appointment, please call the clinic to cancel or reschedule. If you are more than 10 minutes late or greater for your scheduled appointment time, the clinic policy is that you may be asked to reschedule.

## 2023-08-22 NOTE — PROGRESS NOTES
Wound Clinic Note         Visit date: 08/22/2023       Cheif Complaint:     Petey Archibald is a 62 year old   male had concerns including WOUND CARE.  The patient has a left leg ulcer.          HISTORY OF PRESENT ILLNESS:    Petey Archibald reports the wound has been present since mid 2022.  The wound began without a clear cause.   He reports prior to this wound developing he has not had other difficult to heal wounds on the legs.  He lives in a group home and the staff at his group home changes bandages 3 times a week.  Although he comes into the wound clinic with no bandages on today.  He is not sure what kind of bandages they have been using previously.          The pateint denies fevers or chills.  They report the pain from the wound has been 0/10 and has remained about the same recently.      The patient reports they currently do not have any routine for elevating their legs.     Today the patient reports maintaining a regular diet without special attention to protein.        The patient denies a history of diabetes or chronic steroid use.  and The patient confirms they do smoke cigarettes and reports smoking 20 cigarettes a day         The patient has not had any symptoms of infection relating to the wound recently and is not currently on antibiotics.       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    Chronic infection     MRSA    Coagulation disorder (H)     on coumadin    COPD (chronic obstructive pulmonary disease) (H)     History of DVT of lower extremity     History of pulmonary embolism     Hyperlipidemia     Mild intellectual disabilities     Morbid obesity (H)     NEL (obstructive sleep apnea)     Peripheral edema     Peripheral vascular disease (H)     Sleep apnea     uses CPAP    Thrombosis     Tobacco dependence     Uncomplicated asthma     Venous stasis dermatitis              Family Hx: family history includes Diabetes in his  brother; Unknown/Adopted in his father and mother.       Surgical Hx:   Past Surgical History:   Procedure Laterality Date    COLONOSCOPY N/A 4/15/2019    Procedure: COMBINED COLONOSCOPY, SINGLE OR MULTIPLE BIOPSY/POLYPECTOMY BY BIOPSY;  Surgeon: Jovana Ohara MD;  Location:  GI    COLONOSCOPY N/A 6/7/2021    Procedure: COLONOSCOPY with polypectomies;  Surgeon: Sahil Cid MD;  Location: [facility] OR    EXCISE MALIGNANT LESIONS, TRUNK/ARMS/LEGS 0.5CM OR LESS Left 5/26/2017    Procedure: EXCISE MALIGNANT LESIONS, TRUNK/ARMS/LEGS 0.5CM OR LESS;  EXCISION LEFT ELBOW MASS;  Surgeon: Jose Azevedo MD;  Location:  GI    RECTAL SURGERY      perianal abscess?          Allergies:    Allergies   Allergen Reactions    Bees     Clavulanic Acid     Amoxicillin-Pot Clavulanate Rash    Penicillins Rash              Medication History:    Current Outpatient Medications   Medication Sig    acetaminophen (TYLENOL) 325 MG tablet Take 1-2 tablets (325-650 mg) by mouth every 6 hours as needed for mild pain, fever or headaches    albuterol (ALBUTEROL) 108 (90 BASE) MCG/ACT inhaler Inhale 2 puffs into the lungs every 6 hours as needed    ARIPiprazole (ABILIFY) 5 MG tablet Take 5 mg by mouth daily    buPROPion (WELLBUTRIN SR) 150 MG 12 hr tablet Take 150 mg by mouth 2 times daily    Cadexomer Iodine, topical, 0.9% (IODOSORB) 0.9 % GEL gel Apply topically daily Apply to left foot wound daily after cleansing the wound and blotting it dry. (Patient not taking: Reported on 8/8/2023)    cloNIDine (CATAPRES) 0.1 MG tablet Take 0.1 mg by mouth daily as needed    clotrimazole (LOTRIMIN) 1 % external cream Apply topically 2 times daily    diclofenac (VOLTAREN) 1 % topical gel Apply 4 g topically 2 times daily as needed for moderate pain    diphenhydrAMINE (BENADRYL) 25 MG capsule Take 50 mg by mouth every 6 hours as needed for itching or allergies    Emollient (CERAVE) CREA Externally apply topically daily    EPINEPHrine (ANY BX  "GENERIC EQUIV) 0.3 MG/0.3ML injection 2-pack Inject 0.3 mLs (0.3 mg) into the muscle once as needed for anaphylaxis May repeat one time in 5-15 minutes if response to initial dose is inadequate.    EPINEPHrine (EPIPEN 2-BRE) 0.3 MG/0.3ML injection 2-pack INJECT 0.3MG INTRAMUSCULAR ONE TIME FOR ONE DOSE AS NEEDED FOR ANAPHYLAXIS (CALL 911 IF YOU HAVE TO GIVE) (FOR BEE STINGS) **LABEL EACH PEN*    gabapentin (NEURONTIN) 100 MG capsule Take 400 mg by mouth 3 times daily At 0900, 1200, and 2000    Gauze Pads & Dressings (GAUZE PADS 4\"X4\") 4\"X4\" PADS 1 each 3 times daily as needed    loratadine (CLARITIN) 10 MG tablet Take 10 mg by mouth daily    LORazepam (ATIVAN) 1 MG tablet Take 1 mg by mouth daily as needed for anxiety    montelukast (SINGULAIR) 10 MG tablet TAKE 1 TABLET BY MOUTH EVERY MORNING (ASTHMA)    Multiple Vitamin (DAILY-JOVAN) TABS TAKE 1 TABLET BY MOUTH EVERY MORNING (VITAMINS)    naltrexone (DEPADE/REVIA) 50 MG tablet Take 100 mg by mouth daily    nicotine (COMMIT) 2 MG lozenge Place 2 mg inside cheek daily as needed    nystatin-triamcinolone (MYCOLOG II) 660241-6.1 UNIT/GM-% external cream Apply topically 2 times daily For 1-2 weeks.    omeprazole (PRILOSEC) 40 MG DR capsule Take 1 capsule (40 mg) by mouth daily    order for DME Equipment being ordered: roll of micropore tape to dress foot wound bid    order for DME Equipment being ordered: 1 surgical shoe    order for DME [employer] [facility] Phone [phone] Fax [phone]  EdemaWear Size Medium/Yellow qty 4 sleeves  Length of Need: 1 month  Frequency of dressing change daily    order for DME [employer] Stockings  Phone #[phone]  Fax #[phone]  Coolflex Velcro wraps    Length of Need: Life Time  # of Pairs 1 set    order for DME [employer] [facility] Phone [phone] Fax [phone]  Primary Dressing Hydrofera Blue Ready   Qty 5 sheets  Secondary Dressing 4' roll gauze Qty 30  Secondary Dressing 2' medipore tape Qty 1  Secondary " Dressing 4x4 gauze loaf Qty  1  Length of Need: 1 month  Frequency of dressing change: daily    order for DME Oxygen 2 Li/min  at night and bled into BiPAP    order for DME Equipment being ordered: CPAP with mask and tubing    order for DME Equipment being ordered: support compression hose BK  2 pair black 30mm HG   To be applied on arising & removed while lying down to go to sleep    polyethylene glycol (MIRALAX) 17 GM/Dose powder Take 17 g (1 capful) by mouth daily as needed for constipation    predniSONE (DELTASONE) 20 MG tablet Take two tablets (= 40mg) each day for 5 (five) days    PULMICORT FLEXHALER 180 MCG/ACT inhaler INHALE 2 PUFFS INTO THE LUNGS TWICE DAILY.    SENNA-TABS 8.6 MG tablet Take 1 tablet by mouth daily    sertraline (ZOLOFT) 100 MG tablet Take 200 mg by mouth daily    simvastatin (ZOCOR) 40 MG tablet TAKE 1 TABLET BY MOUTH EVERY MORNING.  (CHOLESTEROL)    SPIRIVA HANDIHALER 18 MCG inhaled capsule INHALE CONTENTS OF 1 CAPSULE INTO THE LUNGS ONCE DAILY    spironolactone (ALDACTONE) 25 MG tablet TAKE 1 TABLET BY MOUTH ONCE DAILY. (HIGH BLOOD PRESSURE)    tiZANidine (ZANAFLEX) 2 MG tablet Take 1 tablet (2 mg) by mouth 3 times daily    tiZANidine (ZANAFLEX) 2 MG tablet Take 1 tablet (2 mg) by mouth 3 times daily    traZODone (DESYREL) 100 MG tablet Take 100 mg by mouth At Bedtime    triamcinolone (KENALOG) 0.1 % external cream Apply to AA BID x 1-2 week then PRN only    varenicline (CHANTIX) 1 MG tablet Take 1 tablet (1 mg) by mouth 2 times daily (Patient not taking: Reported on 8/8/2023)    vitamin B complex with vitamin C (STRESS TAB) tablet Take 1 tablet by mouth daily    warfarin ANTICOAGULANT (COUMADIN) 4 MG tablet Take 3 mg (1/2 of 6 mg) today. Then resume 6 mg (1.5 tabs) by mouth every Monday & Friday; and 8 mg (2 tabs) all other days of the week until next INR lab on 8/28/23     No current facility-administered medications for this encounter.         Tobacco History:  reports that he has  been smoking cigarettes. He has a 30.00 pack-year smoking history. He has never used smokeless tobacco.       REVIEW OF SYMPTOMS:   The review of systems was negative except as noted in the HPI.           PHYSICAL EXAMINATION:     /56 (BP Location: Left arm, Patient Position: Sitting, Cuff Size: Adult Large)   Pulse 73   Temp 97.4  F (36.3  C) (Temporal)            GENERAL: The patient overall appears well and is no acute distress.   HEAD: normocephalic   EYES: Sclera and conjunctiva clear   NECK: no obvious masses   LUNGS: breathing is unlabored.   EXTREMITIES: No clubbing, cyanosis or edema   SKIN: No rashes or other abnormalities except as noted under the Wound section below.   NEUROLOGICAL: normal motor and sensory function   EDEMA: Moderate       WOUND: The wound appears healthy with no sign of infection.   Wound bed: necrotic material  Periwound: healthy intact skin  Fairly shallow wound over the dorsal aspect of the left foot at the base of the first toe.      Also see below for wound details:       Circumferential volume measures:             No data to display                Ulceration(s)/Wound(s):   Please see the media tab under the chart review for pictures of the wounds.  Nursing staff removed dressings and cleansed wound.    Wound (used by OP [facility] only) 08/22/23 1341 Left dorsal 1 toe ulceration, venous (Active)   Thickness/Stage full thickness 08/22/23 1341   Base pink;slough;red 08/22/23 1341   Periwound pink 08/22/23 1341   Periwound Temperature warm 08/22/23 1341   Periwound Skin Turgor soft 08/22/23 1341   Length (cm) 2.3 08/22/23 1341   Width (cm) 1.8 08/22/23 1341   Depth (cm) 0.3 08/22/23 1341   Wound (cm^2) 4.14 cm^2 08/22/23 1341   Wound Volume (cm^3) 1.24 cm^3 08/22/23 1341   Drainage Characteristics/Odor serosanguineous;malodorous 08/22/23 1341   Drainage Amount moderate 08/22/23 1341   Care, Wound debrided 08/22/23 1341             Recent Labs   Lab Test 06/03/21  5402  03/23/21  1042 07/28/20  0929   A1C 5.7* 5.6 5.7*          Recent Labs   Lab Test 02/14/23  0059 06/08/22  1835 07/28/20  0929   ALBUMIN 3.9 3.2* 3.3*              Procedure note: , Informed Consent:  Patient acknowledges that I have explained the patient's general medical condition to him/her.  Patient has been informed and acknowledges that I have explained the risks or complications of wound debridement including, but not limited to, scarring, damage to blood vessels or surrounding areas such as nerves and organs, allergic reactions to topical and injected anaesthetic and/or skin prep solutions.  Other risks include excessive bleeding, removal of healthy tissue, infection, pain and inflammation, and prolonged or failure to heal.  Patient acknowledges that bleeding after debridement and pain may worsen after debridement and that dead/necrotic tissue may cause bacteria and toxins to be released into the bloodstream and cause sepsis or shock.  Patient acknowledges that I have explained that the wound may be larger after debridement.  Patient acknowledges that they may need serial debridements while under care in the wound department.  Patient acknowledges that they were given an opportunity to ask questions about treatment and I have answered patient's questions.   , Anesthetized as needed with lidocaine. , Using a curette and/or a scalpel I performed an excisional debridement removing all necrotic material at the left foot wound down to the level of viable subcutaneous tissue.  I obtained hemostasis with direct pressure.  The patient tolerated the procedure well. , EBL: <5 ml , and Total debridement surface area: Less than 20 cm                  ASSESSMENT:   This is a 62 year old  male with a left foot wound.          PLAN:   We will bandage the area with endoform, dry dressing and spandagrip stocking change 3 times a week by the staff at his group home.  I discussed the option of getting a venous insufficiency  ultrasound to evaluate for areas of superficial venous insufficiency which may have led to the wound formation.  However the patient would rather hold off on ordering this diagnostic study at this time.  I explained to the patient today that controlling the edema is probably the most important thing we can do to help heal the wound.  I have specifically recommended that they lay down with their legs above the level of the heart for 30 minutes at least twice a day.   I have explained to the patient the importance of protein intake to wound healing.  I have explained that increasing protein intake will speed wound healing.  We discussed several types of food that are high in protein and the wound care nurse gave the patient a handout that summarizes this information.  In addition to further speed wound healing I have encouraged the patient to take a protein supplement.   The patient will return to the wound clinic in 2 weeks to see me again.        45 minutes spent on the date of the encounter doing chart review, history and exam, documentation and further activities per the note      Miguel Hampton MD  08/22/2023   2:15 PM   [facility]/Wound  [phone]    This note was electronically signed by Miguel Hampton MD        Further instructions from your care team         Petey Archibald      1960    A DME order for supplies has been placed to [facility]. If there are any issues with your order including not receiving the order please call [facility] at [phone] option 1. They can also provide a tracking number for you if you had supplies shipped to you.  Lives at [facility] group home (3x weekly wound care)  Dressing changes outside of clinic are being performed by  group home staff    Plan:  -Please try to stop smoking, as smoking does delay wound healing  -Patient would like to hold off on vein study (venous competency) at this time    Wound Dressing Change:  "Left Dorsal 1st Toe  - Wash your hands with soap and water before you begin your dressing change and prepare a clean surface for dressings.  - Cleanse with mild unscented soap and water (such as Cetaphil, Cerave or Dove)    - Apply 1/4 of one 2x2\" Endoform to wound bed  - Cover with two  4x4\" dry gauze and secure with 2\" medipore tape  - Secure with Spandage size 7 (MT spandage size 4) from base of toes to the knee  Change 3 times weekly and as needed for drainage    Elevation:  It is recommended that you elevate your legs above the level of your heart for 30 minutes: approximately 2-3 times each day   Ways to do this:   - Lay on the couch or your bed and prop your legs up on pillows   - Recline back as far as you can go in your recliner and prop your legs on pillows.   Doing these things will help reduce the edema in your legs.     Protein:  A diet high in protein is important for wound healing, we recommend getting 90 grams of protein per day.   Taking protein shakes or bars are a good way to get extra protein in your diet.   Good sources of protein:  Pork 26g per 3 oz  Whey protein powder - 24g per scoop (on average)  Greek yogurt - 23g per 8oz   Chicken or Turkey - 23g per 3oz  Fish - 20-25g per 3oz  Beef - 18-23g per 3oz  Navy beans - 20g per cup  Cottage cheese - 14g per 1/2 cup   Lentils - 13g per 1/4 cup  Beef jerky 13g per 1oz  2% milk - 8g per cup  Peanut butter - 8g per 2 tablespoons  Eggs - 6g per egg  Mixed nuts - 6g per 2oz       Miguel Hampton M.D. August 22, 2023    Call us at [phone] if you have any questions about your wounds, have redness or swelling around your wound, have a fever of 101 degrees Fahrenheit or greater or if you have any other problems or concerns. We answer the phone Monday through Friday 8 am to 4 pm, please leave a message as we check the voicemail frequently throughout the day.     If you had a positive experience please indicate that on your patient satisfaction " survey form that [facility] will be sending you.    It was a pleasure meeting with you today.  Thank you for allowing me and my team the privilege of caring for you today.  YOU are the reason we are here, and I truly hope we provided you with the excellent service you deserve.  Please let us know if there is anything else we can do for you so that we can be sure you are leaving completely satisfied with your care experience.      If you have any billing related questions please call the [facility] Business office at [phone]. The clinic staff does not handle billing related matters.    If you are scheduled to have a follow up appointment, you will receive a reminder call the day before your visit. On the appointment day please arrive 15 minutes prior to your appointment time. If you are unable to keep that appointment, please call the clinic to cancel or reschedule. If you are more than 10 minutes late or greater for your scheduled appointment time, the clinic policy is that you may be asked to reschedule.        ,

## 2023-08-23 DIAGNOSIS — T63.441D BEE STING REACTION, ACCIDENTAL OR UNINTENTIONAL, SUBSEQUENT ENCOUNTER: Primary | ICD-10-CM

## 2023-08-23 RX ORDER — EPINEPHRINE 0.3 MG/.3ML
INJECTION SUBCUTANEOUS
Qty: 2 EACH | Refills: 1 | Status: SHIPPED | OUTPATIENT
Start: 2023-08-23

## 2023-08-28 ENCOUNTER — LAB (OUTPATIENT)
Dept: LAB | Facility: CLINIC | Age: 63
End: 2023-08-28
Payer: MEDICARE

## 2023-08-28 ENCOUNTER — ANTICOAGULATION THERAPY VISIT (OUTPATIENT)
Dept: ANTICOAGULATION | Facility: CLINIC | Age: 63
End: 2023-08-28

## 2023-08-28 DIAGNOSIS — Z79.01 LONG TERM CURRENT USE OF ANTICOAGULANT THERAPY: ICD-10-CM

## 2023-08-28 DIAGNOSIS — Z86.711 HISTORY OF PULMONARY EMBOLISM: Primary | ICD-10-CM

## 2023-08-28 DIAGNOSIS — Z86.711 HISTORY OF PULMONARY EMBOLISM: ICD-10-CM

## 2023-08-28 LAB — INR BLD: 2.5 (ref 0.9–1.1)

## 2023-08-28 PROCEDURE — 85610 PROTHROMBIN TIME: CPT

## 2023-08-28 PROCEDURE — 36416 COLLJ CAPILLARY BLOOD SPEC: CPT

## 2023-08-28 RX ORDER — WARFARIN SODIUM 4 MG/1
TABLET ORAL
Qty: 42 TABLET | Refills: 0 | Status: SHIPPED | OUTPATIENT
Start: 2023-08-28 | End: 2023-09-18

## 2023-08-28 NOTE — PROGRESS NOTES
ANTICOAGULATION MANAGEMENT     Petey Archibald 62 year old male is on warfarin with therapeutic INR result. (Goal INR 2.0-3.0)    Recent labs: (last 7 days)     08/28/23  1033   INR 2.5*       ASSESSMENT     Source(s): Chart Review and Patient/Caregiver Call     Warfarin doses taken: Warfarin taken as instructed  Diet: No new diet changes identified  Medication/supplement changes: None noted  New illness, injury, or hospitalization: No  Signs or symptoms of bleeding or clotting: No  Previous result: Supratherapeutic  Additional findings: None       PLAN     Recommended plan for no diet, medication or health factor changes affecting INR     Dosing Instructions: Continue your current warfarin dose with next INR in 3 weeks       Summary  As of 8/28/2023      Full warfarin instructions:  6 mg every Mon, Fri; 8 mg all other days   Next INR check:  9/18/2023               Telephone call with [facility] nurse who verbalizes understanding and agrees to plan    Lab visit scheduled    Education provided:   Please call back if any changes to your diet, medications or how you've been taking warfarin    Plan made per [facility] anticoagulation protocol    Cameron Blackmon RN  Anticoagulation Clinic  8/28/2023    _______________________________________________________________________     Anticoagulation Episode Summary       Current INR goal:  2.0-3.0   TTR:  74.6 % (1 y)   Target end date:  Indefinite   Send INR reminders to:  [facility]    Indications    History of pulmonary embolism [Z86.711]  Long term current use of anticoagulant therapy [Z79.01]             Comments:  REM longterm; A new Coumadin Prescription will need to sent to [facility] with current dose and next INR check.             Anticoagulation Care Providers       Provider Role Specialty Phone number    Demetri Archer MD Referring Internal Medicine [phone]

## 2023-08-31 NOTE — CONSULTS
Care Transition Initial Assessment - RN        Met with: Patient and Caregiver.  DATA   Active Problems:    GERD (gastroesophageal reflux disease)    Peripheral vascular disease (H)    History of pulmonary embolism    Tobacco dependence:40-50 pk yr hx    History of DVT of lower extremity    Morbid obesity due to excess calories (H)  BMI 40-50    Hypercholesterolemia    Mixed simple and mucopurulent chronic bronchitis (HCC) CT 4-06 wnl and neg bronch for hemoptesis spirometry 7-26-16  FVC=59% & w mod restriction  and lung age of 82 in 56 y/o     NEL (obstructive sleep apnea)    Cellulitis of left lower extremity    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)    Schizoaffective disorder, bipolar type (H)    Cellulitis    Skin infection       Cognitive Status: alert and oriented.  Primary Care Clinic Name: [facility] [place]  Primary Care MD Name:  Hortensia Peck  Contact information and PCP information verified: Yes  Lives With: facility resident   Living Arrangements: group home                 Insurance concerns: No Insurance issues identified  ASSESSMENT  Patient currently receives the following services:  Lives at a group home with supervisory care only as the pt is independent with mobility.          Identified issues/concerns regarding health management: Cellulitis and non-healing Ulcer    PLAN  Financial costs for the patient include NA .  Patient given options and choices for discharge the pt wants to return to his group home .  Patient/family is agreeable to the plan?  Yes: The pt knows he will discharge on Friday.    Patient anticipates discharging to return to his group time .        Patient anticipates needs for home equipment: No  Transportation/person available to transport on day of discharge  is TBD and have they been notified/set up TBD  Plan/Disposition: Home   Appointments: TBD    Care  (CTS) will continue to follow as needed.    Mag  Mireya RN, BSN Care Coordinator  [facility]  Mobile: [phone]             [place] Pregnancy And Lactation Text: This medication is not safe during pregnancy. It is unknown if this medication is excreted in breast milk.

## 2023-09-06 ENCOUNTER — TELEPHONE (OUTPATIENT)
Dept: WOUND CARE | Facility: CLINIC | Age: 63
End: 2023-09-06

## 2023-09-06 ENCOUNTER — HOSPITAL ENCOUNTER (OUTPATIENT)
Dept: WOUND CARE | Facility: CLINIC | Age: 63
Discharge: HOME | End: 2023-09-06
Attending: FAMILY MEDICINE | Admitting: FAMILY MEDICINE
Payer: MEDICARE

## 2023-09-06 VITALS
DIASTOLIC BLOOD PRESSURE: 61 MMHG | TEMPERATURE: 96.5 F | SYSTOLIC BLOOD PRESSURE: 117 MMHG | RESPIRATION RATE: 17 BRPM | HEART RATE: 83 BPM

## 2023-09-06 DIAGNOSIS — L97.522 ULCER OF GREAT TOE, LEFT, WITH FAT LAYER EXPOSED (H): ICD-10-CM

## 2023-09-06 DIAGNOSIS — S91.302D OPEN WOUND OF LEFT FOOT, SUBSEQUENT ENCOUNTER: Primary | ICD-10-CM

## 2023-09-06 DIAGNOSIS — R60.0 LOWER EXTREMITY EDEMA: ICD-10-CM

## 2023-09-06 PROCEDURE — 11042 DBRDMT SUBQ TIS 1ST 20SQCM/<: CPT | Performed by: FAMILY MEDICINE

## 2023-09-06 PROCEDURE — 99214 OFFICE O/P EST MOD 30 MIN: CPT | Mod: 25 | Performed by: FAMILY MEDICINE

## 2023-09-06 NOTE — PROGRESS NOTES
Wound Clinic Note         Visit date: 09/06/2023       Cheif Complaint:     Petey Archibald is a 62 year old   male had concerns including WOUND CARE.  The patient has lower extremity edema and a left leg ulcer.          HISTORY OF PRESENT ILLNESS:    Petey Archibald reports the wound has been present since mid 2022.  The wound began without a clear cause.   He reports prior to this wound developing he has not had other difficult to heal wounds on the legs.    At his last clinic visit I recommended that he bandaged the wound with endoform, dry dressing and a spandagrip stocking change 3 times a week by the staff at his group home.  He reports today that he ended up taking the bandages off for shower and forgot to ask the staff to put the bandages back on.  He reports a few times since his last clinic visit with me they have replaced the bandages but most of the time he has forgotten to ask them to put new bandages on and so no bandages have been in place.  He has not been wearing the spandagrip stocking recently.      The pateint denies fevers or chills.  They report the pain from the wound has been 0/10 and has remained about the same recently.      The patient reports they currently do not have any routine for elevating their legs.  He does not seem to recall our discussion about leg elevation.    Today the patient reports maintaining a regular diet without special attention to protein.        The patient denies a history of diabetes or chronic steroid use.  He reports he has stopped smoking cigarettes but is now using a nicotine inhaler which she plans to taper down the use of over time.        The patient has not had any symptoms of infection relating to the wound recently and is not currently on antibiotics.       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    Chronic infection     MRSA    Coagulation disorder (H)     on coumadin    COPD (chronic  obstructive pulmonary disease) (H)     History of DVT of lower extremity     History of pulmonary embolism     Hyperlipidemia     Mild intellectual disabilities     Morbid obesity (H)     NEL (obstructive sleep apnea)     Peripheral edema     Peripheral vascular disease (H)     Sleep apnea     uses CPAP    Thrombosis     Tobacco dependence     Uncomplicated asthma     Venous stasis dermatitis              Family Hx: family history includes Diabetes in his brother; Unknown/Adopted in his father and mother.       Surgical Hx:   Past Surgical History:   Procedure Laterality Date    COLONOSCOPY N/A 4/15/2019    Procedure: COMBINED COLONOSCOPY, SINGLE OR MULTIPLE BIOPSY/POLYPECTOMY BY BIOPSY;  Surgeon: Jovana Ohara MD;  Location:  GI    COLONOSCOPY N/A 6/7/2021    Procedure: COLONOSCOPY with polypectomies;  Surgeon: Sahil Cid MD;  Location: [facility] OR    EXCISE MALIGNANT LESIONS, TRUNK/ARMS/LEGS 0.5CM OR LESS Left 5/26/2017    Procedure: EXCISE MALIGNANT LESIONS, TRUNK/ARMS/LEGS 0.5CM OR LESS;  EXCISION LEFT ELBOW MASS;  Surgeon: Jose Azevedo MD;  Location:  GI    RECTAL SURGERY      perianal abscess?          Allergies:    Allergies   Allergen Reactions    Bees     Clavulanic Acid     Amoxicillin-Pot Clavulanate Rash     Augmentin    Penicillins Rash              Medication History:    Current Outpatient Medications   Medication Sig    acetaminophen (TYLENOL) 325 MG tablet Take 1-2 tablets (325-650 mg) by mouth every 6 hours as needed for mild pain, fever or headaches    albuterol (ALBUTEROL) 108 (90 BASE) MCG/ACT inhaler Inhale 2 puffs into the lungs every 6 hours as needed    ARIPiprazole (ABILIFY) 5 MG tablet Take 5 mg by mouth daily    buPROPion (WELLBUTRIN SR) 150 MG 12 hr tablet Take 150 mg by mouth 2 times daily    Cadexomer Iodine, topical, 0.9% (IODOSORB) 0.9 % GEL gel Apply topically daily Apply to left foot wound daily after cleansing the wound and blotting it dry. (Patient not taking:  "Reported on 8/8/2023)    cloNIDine (CATAPRES) 0.1 MG tablet Take 0.1 mg by mouth daily as needed    clotrimazole (LOTRIMIN) 1 % external cream Apply topically 2 times daily    diclofenac (VOLTAREN) 1 % topical gel Apply 4 g topically 2 times daily as needed for moderate pain    diphenhydrAMINE (BENADRYL) 25 MG capsule Take 50 mg by mouth every 6 hours as needed for itching or allergies    Emollient (CERAVE) CREA Externally apply topically daily    EPINEPHrine (ANY BX GENERIC EQUIV) 0.3 MG/0.3ML injection 2-pack INJECT 0.3MG INTRAMUSCULARLY ONE TIME FOR ONE DOSE AS NEEDED FOR ANAPHYLAXIS. MAY REPEAT ONE TIME IN 5-15 MINUTES IF RESPONSE TO INITIAL DOSE IS INADEQUATE. *1 TOTAL FILL, ORIGINAL FROM EMERGENCY ROOM*    EPINEPHrine (EPIPEN 2-BRE) 0.3 MG/0.3ML injection 2-pack INJECT 0.3MG INTRAMUSCULAR ONE TIME FOR ONE DOSE AS NEEDED FOR ANAPHYLAXIS (CALL 911 IF YOU HAVE TO GIVE) (FOR BEE STINGS) **LABEL EACH PEN*    gabapentin (NEURONTIN) 100 MG capsule Take 400 mg by mouth 3 times daily At 0900, 1200, and 2000    Gauze Pads & Dressings (GAUZE PADS 4\"X4\") 4\"X4\" PADS 1 each 3 times daily as needed    loratadine (CLARITIN) 10 MG tablet Take 10 mg by mouth daily    LORazepam (ATIVAN) 1 MG tablet Take 1 mg by mouth daily as needed for anxiety    montelukast (SINGULAIR) 10 MG tablet TAKE 1 TABLET BY MOUTH EVERY MORNING (ASTHMA)    Multiple Vitamin (DAILY-JOVAN) TABS TAKE 1 TABLET BY MOUTH EVERY MORNING (VITAMINS)    naltrexone (DEPADE/REVIA) 50 MG tablet Take 100 mg by mouth daily    nicotine (COMMIT) 2 MG lozenge Place 2 mg inside cheek daily as needed    nystatin-triamcinolone (MYCOLOG II) 392448-3.1 UNIT/GM-% external cream Apply topically 2 times daily For 1-2 weeks.    omeprazole (PRILOSEC) 40 MG DR capsule Take 1 capsule (40 mg) by mouth daily    order for DME Equipment being ordered: roll of micropore tape to dress foot wound bid    order for DME Equipment being ordered: 1 surgical shoe    order for DME [employer] " Order Phone [phone] Fax [phone]  EdemaWear Size Medium/Yellow qty 4 sleeves  Length of Need: 1 month  Frequency of dressing change daily    order for DME [employer] Stockings  Phone #[phone]  Fax #[phone]  Coolflex Velcro wraps    Length of Need: Life Time  # of Pairs 1 set    order for DME [employer] Order Phone [phone] Fax [phone]  Primary Dressing Hydrofera Blue Ready   Qty 5 sheets  Secondary Dressing 4' roll gauze Qty 30  Secondary Dressing 2' medipore tape Qty 1  Secondary Dressing 4x4 gauze loaf Qty  1  Length of Need: 1 month  Frequency of dressing change: daily    order for DME Oxygen 2 Li/min  at night and bled into BiPAP    order for DME Equipment being ordered: CPAP with mask and tubing    order for DME Equipment being ordered: support compression hose BK  2 pair black 30mm HG   To be applied on arising & removed while lying down to go to sleep    polyethylene glycol (MIRALAX) 17 GM/Dose powder Take 17 g (1 capful) by mouth daily as needed for constipation    predniSONE (DELTASONE) 20 MG tablet Take two tablets (= 40mg) each day for 5 (five) days    PULMICORT FLEXHALER 180 MCG/ACT inhaler INHALE 2 PUFFS INTO THE LUNGS TWICE DAILY.    SENNA-TABS 8.6 MG tablet Take 1 tablet by mouth daily    sertraline (ZOLOFT) 100 MG tablet Take 200 mg by mouth daily    simvastatin (ZOCOR) 40 MG tablet TAKE 1 TABLET BY MOUTH EVERY MORNING.  (CHOLESTEROL)    SPIRIVA HANDIHALER 18 MCG inhaled capsule INHALE CONTENTS OF 1 CAPSULE INTO THE LUNGS ONCE DAILY    spironolactone (ALDACTONE) 25 MG tablet TAKE 1 TABLET BY MOUTH ONCE DAILY. (HIGH BLOOD PRESSURE)    tiZANidine (ZANAFLEX) 2 MG tablet Take 1 tablet (2 mg) by mouth 3 times daily    tiZANidine (ZANAFLEX) 2 MG tablet Take 1 tablet (2 mg) by mouth 3 times daily    traZODone (DESYREL) 100 MG tablet Take 100 mg by mouth At Bedtime    triamcinolone (KENALOG) 0.1 % external cream Apply to AA BID x 1-2 week then PRN only    varenicline  (CHANTIX) 1 MG tablet Take 1 tablet (1 mg) by mouth 2 times daily (Patient not taking: Reported on 8/8/2023)    vitamin B complex with vitamin C (STRESS TAB) tablet Take 1 tablet by mouth daily    warfarin ANTICOAGULANT (COUMADIN) 4 MG tablet 6 mg Mon, Fri; 8 mg all other days with next INR 9/18/23     No current facility-administered medications for this encounter.         Tobacco History:  reports that he has been smoking cigarettes. He has a 30.00 pack-year smoking history. He has never used smokeless tobacco.       REVIEW OF SYMPTOMS:   The review of systems was negative except as noted in the HPI.           PHYSICAL EXAMINATION:     /61 (BP Location: Right arm, Patient Position: Chair, Cuff Size: Adult Regular)   Pulse 83   Temp (!) 96.5  F (35.8  C) (Temporal)   Resp 17            GENERAL: The patient overall appears well and is no acute distress.   HEAD: normocephalic   EYES: Sclera and conjunctiva clear   NECK: no obvious masses   LUNGS: breathing is unlabored.   EXTREMITIES: No clubbing, cyanosis or edema   SKIN: No rashes or other abnormalities except as noted under the Wound section below.   NEUROLOGICAL: normal motor and sensory function   EDEMA: Moderate       WOUND: The wound appears healthy with no sign of infection.   Wound bed: necrotic material  Periwound: healthy intact skin  Fairly shallow wound over the dorsal aspect of the left foot at the base of the first toe.  The wound is almost exactly the same compared with his last clinic visit.      Also see below for wound details:       Circumferential volume measures:             No data to display                Ulceration(s)/Wound(s):   Please see the media tab under the chart review for pictures of the wounds.  Nursing staff removed dressings and cleansed wound.    Wound (used by OP [facility] only) 08/22/23 1341 Left dorsal 1 toe ulceration, venous (Active)   Thickness/Stage full thickness 09/06/23 1333   Base pink;slough;red 09/06/23 1333    Periwound macerated;redness;swelling 09/06/23 1333   Periwound Temperature warm 09/06/23 1333   Periwound Skin Turgor soft 09/06/23 1333   Edges open 09/06/23 1333   Length (cm) 1.8 09/06/23 1333   Width (cm) 2.4 09/06/23 1333   Depth (cm) 0.3 09/06/23 1333   Wound (cm^2) 4.32 cm^2 09/06/23 1333   Wound Volume (cm^3) 1.3 cm^3 09/06/23 1333   Wound healing % -4.35 09/06/23 1333   Drainage Characteristics/Odor serosanguineous;malodorous 09/06/23 1333   Drainage Amount moderate 09/06/23 1333   Care, Wound debrided 09/06/23 1333               Recent Labs   Lab Test 06/03/21  1448 03/23/21  1042 07/28/20  0929   A1C 5.7* 5.6 5.7*          Recent Labs   Lab Test 02/14/23  0059 06/08/22  1835 07/28/20  0929   ALBUMIN 3.9 3.2* 3.3*              Procedure note: , I had previous obtain informed consent from the patient to perform serial debridements, I confirmed this again with the patient today verbally. , Anesthetized as needed with lidocaine. , Using a curette and/or a scalpel I performed an excisional debridement removing all necrotic material at the left foot wound in to the level of viable subcutaneous tissue.  I obtained hemostasis with direct pressure.  The patient tolerated the procedure well. , EBL: <5 ml , and Total debridement surface area: Less than 20 cm                  ASSESSMENT:   This is a 62 year old  male with lower extremity edema and a left foot wound.          PLAN:   We will bandage the area with endoform, dry dressing and spandagrip stocking change 3 times a week by the staff at his group home.  I have strongly encouraged the patient to ask the group home staff to replace the bandages every time after he takes a shower.  I have also asked the patient to give the group home a copy of the after visit summary so that they know what needs to be done also.    I previously discussed the option of getting a venous insufficiency ultrasound to evaluate for areas of superficial venous insufficiency which may  have led to the wound formation.  However the patient would rather hold off on ordering this diagnostic study at this time.    Separate from his wound care instructions I then discussed the treatment of his lower extremity edema.  This primarily involves compression and elevation.  I have strongly encouraged him to wear his spandagrip stockings every day.  I have again explained to the patient today that controlling the edema is probably the most important thing we can do to help heal the wound.  I have specifically recommended that they lay down with their legs above the level of the heart for 30 minutes at least twice a day.  I emphasized that if we can not control the edema we will likely not be able to get this wound to heal.   I have explained to the patient the importance of protein intake to wound healing.  I have explained that increasing protein intake will speed wound healing.  We discussed several types of food that are high in protein and the wound care nurse gave the patient a handout that summarizes this information.  In addition to further speed wound healing I have encouraged the patient to take a protein supplement.   The patient will return to the wound clinic in 2-3 weeks to see me again.        30 minutes spent on the date of the encounter doing chart review, history and exam, documentation and further activities per the note, this time excludes any procedure time      Miguel Hampton MD  09/06/2023   2:18 PM   [facility]  [phone]    This note was electronically signed by Miguel Hampton MD        Further instructions from your care team         Petey Archibald      1960    A DME order for supplies has been placed to [facility]. If there are any issues with your order including not receiving the order please call [facility] at [phone] option 1. They can also provide a tracking number for you if you had supplies shipped to  "you.    Lives at [facility] group home (3x weekly wound care)  Dressing changes outside of clinic are being performed by group home staff     Plan:  - Good job on stopping smoking, Continue to try and wean off of the nicotine inhaler to aide in healing  - Patient would like to hold off on vein study (venous competency) at this time  - For your concerns of your Elbows - see an orthopedist (like [facility]) - regarding probable inflamed Bursa Sack     Wound Dressing Change: Left Dorsal 1st Toe  - Wash your hands with soap and water before you begin your dressing change and prepare a clean surface for dressings.  - Cleanse with mild unscented soap and water (such as Cetaphil, Cerave or Dove)    - Apply 1/4 of one 2x2\" Endoform nonfenestrated to wound bed  - Cover with one  2x2\" non-woven dry gauze and secure with 2\" medipore tape  - Secure with Spandage size 7 (MT spandage size 4) from base of toes to the knee  Change 3 times weekly and as needed for drainage     Elevation:  It is recommended that you elevate your legs above the level of your heart for 30 minutes: approximately 2-3 times each day   Ways to do this:   - Lay on the couch or your bed and prop your legs up on pillows   - Recline back as far as you can go in your recliner and prop your legs on pillows.   Doing these things will help reduce the edema in your legs.      Protein:  A diet high in protein is important for wound healing, we recommend getting 90 grams of protein per day.   Taking protein shakes or bars are a good way to get extra protein in your diet.   Good sources of protein:  Pork 26g per 3 oz  Whey protein powder - 24g per scoop (on average)  Greek yogurt - 23g per 8oz   Chicken or Turkey - 23g per 3oz  Fish - 20-25g per 3oz  Beef - 18-23g per 3oz  Navy beans - 20g per cup  Cottage cheese - 14g per 1/2 cup   Lentils - 13g per 1/4 cup  Beef jerky 13g per 1oz  2% milk - 8g per cup  Peanut butter - 8g per 2 tablespoons  Eggs - 6g per " egg  Mixed nuts - 6g per 2oz           Miguel Hampton M.D. September 6, 2023    Call us at [phone] if you have any questions about your wounds, have redness or swelling around your wound, have a fever of 101 degrees Fahrenheit or greater or if you have any other problems or concerns. We answer the phone Monday through Friday 8 am to 4 pm, please leave a message as we check the voicemail frequently throughout the day.     If you had a positive experience please indicate that on your patient satisfaction survey form that [facility] will be sending you.    It was a pleasure meeting with you today.  Thank you for allowing me and my team the privilege of caring for you today.  YOU are the reason we are here, and I truly hope we provided you with the excellent service you deserve.  Please let us know if there is anything else we can do for you so that we can be sure you are leaving completely satisfied with your care experience.      If you have any billing related questions please call the [facility] Business office at [phone]. The clinic staff does not handle billing related matters.    If you are scheduled to have a follow up appointment, you will receive a reminder call the day before your visit. On the appointment day please arrive 15 minutes prior to your appointment time. If you are unable to keep that appointment, please call the clinic to cancel or reschedule. If you are more than 10 minutes late or greater for your scheduled appointment time, the clinic policy is that you may be asked to reschedule.         ,

## 2023-09-06 NOTE — DISCHARGE INSTRUCTIONS
"Petey JAY Archibald      1960    A DME order for supplies has been placed to [facility]. If there are any issues with your order including not receiving the order please call [facility] at [phone] option 1. They can also provide a tracking number for you if you had supplies shipped to you.    Lives at [facility] group home (3x weekly wound care)  Dressing changes outside of clinic are being performed by group home staff     Plan:  - Good job on stopping smoking, Continue to try and wean off of the nicotine inhaler to aide in healing  - Patient would like to hold off on vein study (venous competency) at this time  - For your concerns of your Elbows - see an orthopedist (like [facility]) - regarding probable inflamed Bursa Sack     Wound Dressing Change: Left Dorsal 1st Toe  - Wash your hands with soap and water before you begin your dressing change and prepare a clean surface for dressings.  - Cleanse with mild unscented soap and water (such as Cetaphil, Cerave or Dove)    - Apply 1/4 of one 2x2\" Endoform nonfenestrated to wound bed  - Cover with one  2x2\" non-woven dry gauze and secure with 2\" medipore tape  - Secure with Spandage size 7 (MT spandage size 4) from base of toes to the knee  Change 3 times weekly and as needed for drainage     Elevation:  It is recommended that you elevate your legs above the level of your heart for 30 minutes: approximately 2-3 times each day   Ways to do this:   - Lay on the couch or your bed and prop your legs up on pillows   - Recline back as far as you can go in your recliner and prop your legs on pillows.   Doing these things will help reduce the edema in your legs.      Protein:  A diet high in protein is important for wound healing, we recommend getting 90 grams of protein per day.   Taking protein shakes or bars are a good way to get extra protein in your diet.   Good sources of protein:  Pork 26g per 3 oz  Whey protein powder - 24g per " scoop (on average)  Greek yogurt - 23g per 8oz   Chicken or Turkey - 23g per 3oz  Fish - 20-25g per 3oz  Beef - 18-23g per 3oz  Navy beans - 20g per cup  Cottage cheese - 14g per 1/2 cup   Lentils - 13g per 1/4 cup  Beef jerky 13g per 1oz  2% milk - 8g per cup  Peanut butter - 8g per 2 tablespoons  Eggs - 6g per egg  Mixed nuts - 6g per 2oz           Miguel Hampton M.D. September 6, 2023    Call us at [phone] if you have any questions about your wounds, have redness or swelling around your wound, have a fever of 101 degrees Fahrenheit or greater or if you have any other problems or concerns. We answer the phone Monday through Friday 8 am to 4 pm, please leave a message as we check the voicemail frequently throughout the day.     If you had a positive experience please indicate that on your patient satisfaction survey form that [facility] will be sending you.    It was a pleasure meeting with you today.  Thank you for allowing me and my team the privilege of caring for you today.  YOU are the reason we are here, and I truly hope we provided you with the excellent service you deserve.  Please let us know if there is anything else we can do for you so that we can be sure you are leaving completely satisfied with your care experience.      If you have any billing related questions please call the [facility] Business office at [phone]. The clinic staff does not handle billing related matters.    If you are scheduled to have a follow up appointment, you will receive a reminder call the day before your visit. On the appointment day please arrive 15 minutes prior to your appointment time. If you are unable to keep that appointment, please call the clinic to cancel or reschedule. If you are more than 10 minutes late or greater for your scheduled appointment time, the clinic policy is that you may be asked to reschedule.

## 2023-09-06 NOTE — TELEPHONE ENCOUNTER
Please call patient's group home staff to schedule 2 follow up appointments. Appointment should be 2-3 weeks from today's appointment (9/6/23) and then a second follow up appointment 2-3 weeks after that.

## 2023-09-18 ENCOUNTER — ANTICOAGULATION THERAPY VISIT (OUTPATIENT)
Dept: ANTICOAGULATION | Facility: CLINIC | Age: 63
End: 2023-09-18

## 2023-09-18 ENCOUNTER — LAB (OUTPATIENT)
Dept: LAB | Facility: CLINIC | Age: 63
End: 2023-09-18
Payer: MEDICARE

## 2023-09-18 DIAGNOSIS — Z79.01 LONG TERM CURRENT USE OF ANTICOAGULANT THERAPY: ICD-10-CM

## 2023-09-18 DIAGNOSIS — Z86.711 HISTORY OF PULMONARY EMBOLISM: Primary | ICD-10-CM

## 2023-09-18 DIAGNOSIS — Z86.711 HISTORY OF PULMONARY EMBOLISM: ICD-10-CM

## 2023-09-18 LAB — INR BLD: 4.3 (ref 0.9–1.1)

## 2023-09-18 PROCEDURE — 85610 PROTHROMBIN TIME: CPT

## 2023-09-18 PROCEDURE — 36416 COLLJ CAPILLARY BLOOD SPEC: CPT

## 2023-09-18 RX ORDER — WARFARIN SODIUM 4 MG/1
TABLET ORAL
Qty: 26 TABLET | Refills: 0 | Status: SHIPPED | OUTPATIENT
Start: 2023-09-18 | End: 2023-09-27

## 2023-09-18 NOTE — PROGRESS NOTES
ANTICOAGULATION MANAGEMENT     Petey Archibald 62 year old male is on warfarin with supratherapeutic INR result. (Goal INR 2.0-3.0)    Recent labs: (last 7 days)     09/18/23  0959   INR 4.3*       ASSESSMENT     Source(s): Chart Review and Patient/Caregiver Call     Warfarin doses taken: Warfarin taken as instructed  Diet: Decreased greens/vitamin K in diet; plans to resume previous intake. Meals are provided at the group home but Petey can elect to eat what he wants. Recently he has been having more junk food and less balanced meals. Group home staff will reinforce education with him that consistent diet and Vit K intake is important with his warfarin.   Medication/supplement changes: None noted  New illness, injury, or hospitalization: No  Signs or symptoms of bleeding or clotting: No  Previous result: Therapeutic last visit; previously outside of goal range  Additional findings: None       PLAN     Recommended plan for temporary change(s) affecting INR     Dosing Instructions: hold dose then continue your current warfarin dose with next INR in 7-10 days       Summary  As of 9/18/2023      Full warfarin instructions:  9/18: Hold; Otherwise 6 mg every Mon, Fri; 8 mg all other days   Next INR check:  9/27/2023               Telephone call with Bertrand at senior care who verbalizes understanding and agrees to plan    Lab visit scheduled    Education provided:   Dietary considerations: importance of consistent vitamin K intake and impact of vitamin K foods on INR  Contact [phone]  with any changes, questions or concerns.     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             Anticoagulation Care Providers       Provider Role Specialty Phone number    Demetri Archer MD Referring Internal Medicine [phone]

## 2023-09-20 ENCOUNTER — HOSPITAL ENCOUNTER (OUTPATIENT)
Dept: WOUND CARE | Facility: CLINIC | Age: 63
Discharge: HOME | End: 2023-09-20
Attending: FAMILY MEDICINE | Admitting: FAMILY MEDICINE
Payer: MEDICARE

## 2023-09-20 VITALS — SYSTOLIC BLOOD PRESSURE: 120 MMHG | DIASTOLIC BLOOD PRESSURE: 85 MMHG | TEMPERATURE: 97 F | HEART RATE: 85 BPM

## 2023-09-20 DIAGNOSIS — L97.522 ULCER OF GREAT TOE, LEFT, WITH FAT LAYER EXPOSED (H): Primary | ICD-10-CM

## 2023-09-20 DIAGNOSIS — R60.0 LOWER EXTREMITY EDEMA: ICD-10-CM

## 2023-09-20 DIAGNOSIS — L97.912 ULCER OF RIGHT LEG, WITH FAT LAYER EXPOSED (H): ICD-10-CM

## 2023-09-20 PROCEDURE — 11042 DBRDMT SUBQ TIS 1ST 20SQCM/<: CPT | Performed by: FAMILY MEDICINE

## 2023-09-20 PROCEDURE — 99214 OFFICE O/P EST MOD 30 MIN: CPT | Mod: 25 | Performed by: FAMILY MEDICINE

## 2023-09-20 NOTE — DISCHARGE INSTRUCTIONS
Petey Archibald      1960    A DME order for supplies has been placed to [facility].   If there are any issues with your order including not receiving the order please call [facility] at [phone] option 1.   They can also provide a tracking number for you if you had supplies shipped to you.    Lives at [facility] group home (3x weekly wound care)  Dressing changes outside of clinic are being performed by group home staff    Plan:  - Good job on stopping smoking, Continue to try and wean off of the nicotine inhaler to aide in healing  - Patient would like to hold off on vein study (venous competency) at this time  - For your concerns of your Elbows - see an your primary physician, regarding probable inflamed Bursa Sack    Dressings need to be changed immediately following showering;     Wound Dressing Change: Left Dorsal 1st Toe  - Wash your hands with soap and water before you begin your dressing change and prepare a clean surface for dressings.  - Cleanse with mild unscented soap and water (such as Cetaphil, Cerave or Dove)  - Apply zinc oxide barrier paste to any white macerated periwound skin  - Apply 1/2  piece of 4x4 Melgisorb alginate to wound bed  - Cover with 1/2 of 5x9 ABD  - Secure with 1/2 roll of conforming roll gauze and medical tape  - Secure with Spandage size 7 (MT spandage size 4) from base of toes to the knee  Change 3 times weekly and as needed for drainage    Wound Dressing Change: Right posterior lower leg  - Wash your hands with soap and water before you begin your dressing change and prepare a clean surface for dressings.  - Cleanse with mild unscented soap and water (such as Cetaphil, Cerave or Dove)  - Apply zinc oxide barrier paste to any white macerated periwound skin  - Apply 1/4 piece of 4x4 Melgisorb alginate to wound bed  - Cover with 1/2 of 5x9 ABD  - Secure with 1/2 roll of conforming roll gauze and medical tape  - Secure with Spandage size 7 (MT spandage  size 4) from base of toes to the knee  Change 3 times weekly and as needed for drainage    Elevation:  It is recommended that you elevate your legs above the level of your heart for 30 minutes: approximately 2-3 times each day  Ways to do this:  - Lay on the couch or your bed and prop your legs up on pillows  - Recline back as far as you can go in your recliner and prop your legs on pillows.  Doing these things will help reduce the edema in your legs.    Protein:  A diet high in protein is important for wound healing, we recommend getting 90 grams  of protein per day.  Taking protein shakes or bars are a good way to get extra protein in your diet.  Good sources of protein:  Pork 26g per 3 oz  Whey protein powder - 24g per scoop (on average)  Greek yogurt - 23g per 8oz  Chicken or Turkey - 23g per 3oz  Fish - 20-25g per 3oz  Beef - 18-23g per 3oz  Navy beans - 20g per cup  Cottage cheese - 14g per 1/2 cup  Lentils - 13g per 1/4 cup  Beef jerky 13g per 1oz  2% milk - 8g per cup  Peanut butter - 8g per 2 tablespoons  Eggs - 6g per egg  Mixed nuts - 6g per 2oz       Miguel Hampton M.D. September 20, 2023    Call us at [phone] if you have any questions about your wounds, have redness or swelling around your wound, have a fever of 101 degrees Fahrenheit or greater or if you have any other problems or concerns. We answer the phone Monday through Friday 8 am to 4 pm, please leave a message as we check the voicemail frequently throughout the day.     If you had a positive experience please indicate that on your patient satisfaction survey form that [facility] will be sending you.    It was a pleasure meeting with you today.  Thank you for allowing me and my team the privilege of caring for you today.  YOU are the reason we are here, and I truly hope we provided you with the excellent service you deserve.  Please let us know if there is anything else we can do for you so that we can be sure you are leaving  completely satisfied with your care experience.      If you have any billing related questions please call the [facility] Business office at [phone]. The clinic staff does not handle billing related matters.    If you are scheduled to have a follow up appointment, you will receive a reminder call the day before your visit. On the appointment day please arrive 15 minutes prior to your appointment time. If you are unable to keep that appointment, please call the clinic to cancel or reschedule. If you are more than 10 minutes late or greater for your scheduled appointment time, the clinic policy is that you may be asked to reschedule.

## 2023-09-20 NOTE — PROGRESS NOTES
Wound Clinic Note         Visit date: 09/20/2023       Cheif Complaint:     Petey Archibald is a 62 year old   male had concerns including WOUND CARE.  The patient has lower extremity edema and bilateral leg ulcers.          HISTORY OF PRESENT ILLNESS:    Petey Archibald reports the wound has been present since mid 2022.  The wound began without a clear cause.   He reports prior to this wound developing he has not had other difficult to heal wounds on the legs.    Today the patient is accompanied to the wound clinic by an employee at his group home and they both provide portions of the interval history.  At his last clinic visit the patient had reported that he was having difficulty remembering to have the staff replaced the bandages after he showered.  He reports today that he has been doing a much better job of remembering to have them replaced the bandages and bandages have been in place almost all the time.  Unfortunately a new wound has developed on the right posterior leg which was a blister which ruptured.  They have been bandaging the left foot wound with endoform, dry dressing and the spandagrip stocking change 3 times a week.  There is been moderate to heavy serous drainage from the wounds.        The pateint denies fevers or chills.  They report the pain from the wound has been 0/10 and has remained about the same recently.      The patient reports they currently do not have any routine for elevating their legs.  He still has not been elevating his legs as I recommended.    Today the patient reports maintaining a high protein diet, but has not been taking protein supplements lately.        The patient denies a history of diabetes or chronic steroid use.  He continues to use the nicotine inhaler about the same amount compared with his last clinic visit.        The patient has not had any symptoms of infection relating to the wound recently and is not currently on antibiotics.       Problem List:    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    Chronic infection     MRSA    Coagulation disorder (H)     on coumadin    COPD (chronic obstructive pulmonary disease) (H)     History of DVT of lower extremity     History of pulmonary embolism     Hyperlipidemia     Mild intellectual disabilities     Morbid obesity (H)     NEL (obstructive sleep apnea)     Peripheral edema     Peripheral vascular disease (H)     Sleep apnea     uses CPAP    Thrombosis     Tobacco dependence     Uncomplicated asthma     Venous stasis dermatitis              Family Hx: family history includes Diabetes in his brother; Unknown/Adopted in his father and mother.       Surgical Hx:   Past Surgical History:   Procedure Laterality Date    COLONOSCOPY N/A 4/15/2019    Procedure: COMBINED COLONOSCOPY, SINGLE OR MULTIPLE BIOPSY/POLYPECTOMY BY BIOPSY;  Surgeon: Jovana Ohara MD;  Location:  GI    COLONOSCOPY N/A 6/7/2021    Procedure: COLONOSCOPY with polypectomies;  Surgeon: Sahil Cid MD;  Location: [facility] OR    EXCISE MALIGNANT LESIONS, TRUNK/ARMS/LEGS 0.5CM OR LESS Left 5/26/2017    Procedure: EXCISE MALIGNANT LESIONS, TRUNK/ARMS/LEGS 0.5CM OR LESS;  EXCISION LEFT ELBOW MASS;  Surgeon: Jose Azevedo MD;  Location:  GI    RECTAL SURGERY      perianal abscess?          Allergies:    Allergies   Allergen Reactions    Bees     Clavulanic Acid     Amoxicillin-Pot Clavulanate Rash     Augmentin    Penicillins Rash              Medication History:    Current Outpatient Medications   Medication Sig    acetaminophen (TYLENOL) 325 MG tablet Take 1-2 tablets (325-650 mg) by mouth every 6 hours as needed for mild pain, fever or headaches    albuterol (ALBUTEROL) 108 (90 BASE) MCG/ACT inhaler Inhale 2 puffs into the lungs every 6 hours as needed    ARIPiprazole (ABILIFY) 5 MG tablet Take 5 mg by mouth daily    buPROPion (WELLBUTRIN SR) 150 MG 12 hr tablet Take 150 mg by mouth 2 times daily     "Cadexomer Iodine, topical, 0.9% (IODOSORB) 0.9 % GEL gel Apply topically daily Apply to left foot wound daily after cleansing the wound and blotting it dry. (Patient not taking: Reported on 8/8/2023)    cloNIDine (CATAPRES) 0.1 MG tablet Take 0.1 mg by mouth daily as needed    clotrimazole (LOTRIMIN) 1 % external cream Apply topically 2 times daily    diclofenac (VOLTAREN) 1 % topical gel Apply 4 g topically 2 times daily as needed for moderate pain    diphenhydrAMINE (BENADRYL) 25 MG capsule Take 50 mg by mouth every 6 hours as needed for itching or allergies    Emollient (CERAVE) CREA Externally apply topically daily    EPINEPHrine (ANY BX GENERIC EQUIV) 0.3 MG/0.3ML injection 2-pack INJECT 0.3MG INTRAMUSCULARLY ONE TIME FOR ONE DOSE AS NEEDED FOR ANAPHYLAXIS. MAY REPEAT ONE TIME IN 5-15 MINUTES IF RESPONSE TO INITIAL DOSE IS INADEQUATE. *1 TOTAL FILL, ORIGINAL FROM EMERGENCY ROOM*    EPINEPHrine (EPIPEN 2-BRE) 0.3 MG/0.3ML injection 2-pack INJECT 0.3MG INTRAMUSCULAR ONE TIME FOR ONE DOSE AS NEEDED FOR ANAPHYLAXIS (CALL 911 IF YOU HAVE TO GIVE) (FOR BEE STINGS) **LABEL EACH PEN*    gabapentin (NEURONTIN) 100 MG capsule Take 400 mg by mouth 3 times daily At 0900, 1200, and 2000    Gauze Pads & Dressings (GAUZE PADS 4\"X4\") 4\"X4\" PADS 1 each 3 times daily as needed    loratadine (CLARITIN) 10 MG tablet Take 10 mg by mouth daily    LORazepam (ATIVAN) 1 MG tablet Take 1 mg by mouth daily as needed for anxiety    montelukast (SINGULAIR) 10 MG tablet TAKE 1 TABLET BY MOUTH EVERY MORNING (ASTHMA)    Multiple Vitamin (DAILY-JOVAN) TABS TAKE 1 TABLET BY MOUTH EVERY MORNING (VITAMINS)    naltrexone (DEPADE/REVIA) 50 MG tablet Take 100 mg by mouth daily    nicotine (COMMIT) 2 MG lozenge Place 2 mg inside cheek daily as needed    nystatin-triamcinolone (MYCOLOG II) 140182-5.1 UNIT/GM-% external cream Apply topically 2 times daily For 1-2 weeks.    omeprazole (PRILOSEC) 40 MG DR capsule Take 1 capsule (40 mg) by mouth daily    " order for DME Equipment being ordered: roll of micropore tape to dress foot wound bid    order for DME Equipment being ordered: 1 surgical shoe    order for DME [employer] [facility] Phone [phone] Fax [phone]  EdemaWear Size Medium/Yellow qty 4 sleeves  Length of Need: 1 month  Frequency of dressing change daily    order for DME [employer] Stockings  Phone #[phone]  Fax #[phone]  Coolflex Velcro wraps    Length of Need: Life Time  # of Pairs 1 set    order for DME [employer] [facility] Phone [phone] Fax [phone]  Primary Dressing Hydrofera Blue Ready   Qty 5 sheets  Secondary Dressing 4' roll gauze Qty 30  Secondary Dressing 2' medipore tape Qty 1  Secondary Dressing 4x4 gauze loaf Qty  1  Length of Need: 1 month  Frequency of dressing change: daily    order for DME Oxygen 2 Li/min  at night and bled into BiPAP    order for DME Equipment being ordered: CPAP with mask and tubing    order for DME Equipment being ordered: support compression hose BK  2 pair black 30mm HG   To be applied on arising & removed while lying down to go to sleep    polyethylene glycol (MIRALAX) 17 GM/Dose powder Take 17 g (1 capful) by mouth daily as needed for constipation    predniSONE (DELTASONE) 20 MG tablet Take two tablets (= 40mg) each day for 5 (five) days    PULMICORT FLEXHALER 180 MCG/ACT inhaler INHALE 2 PUFFS INTO THE LUNGS TWICE DAILY.    SENNA-TABS 8.6 MG tablet Take 1 tablet by mouth daily    sertraline (ZOLOFT) 100 MG tablet Take 200 mg by mouth daily    simvastatin (ZOCOR) 40 MG tablet TAKE 1 TABLET BY MOUTH EVERY MORNING.  (CHOLESTEROL)    SPIRIVA HANDIHALER 18 MCG inhaled capsule INHALE CONTENTS OF 1 CAPSULE INTO THE LUNGS ONCE DAILY    spironolactone (ALDACTONE) 25 MG tablet TAKE 1 TABLET BY MOUTH ONCE DAILY. (HIGH BLOOD PRESSURE)    tiZANidine (ZANAFLEX) 2 MG tablet Take 1 tablet (2 mg) by mouth 3 times daily    tiZANidine (ZANAFLEX) 2 MG tablet Take 1 tablet (2 mg) by mouth 3 times  daily    traZODone (DESYREL) 100 MG tablet Take 100 mg by mouth At Bedtime    triamcinolone (KENALOG) 0.1 % external cream Apply to AA BID x 1-2 week then PRN only    varenicline (CHANTIX) 1 MG tablet Take 1 tablet (1 mg) by mouth 2 times daily (Patient not taking: Reported on 8/8/2023)    vitamin B complex with vitamin C (STRESS TAB) tablet Take 1 tablet by mouth daily    warfarin ANTICOAGULANT (COUMADIN) 4 MG tablet No warfarin on Monday 9/18/23. Then take 6 mg by mouth Mon, Fri; 8 mg all other days with next INR 9/27/23     No current facility-administered medications for this encounter.         Tobacco History:  reports that he has been smoking cigarettes. He has a 30.00 pack-year smoking history. He has never used smokeless tobacco.       REVIEW OF SYMPTOMS:   The review of systems was negative except as noted in the HPI.           PHYSICAL EXAMINATION:     /85 (BP Location: Left arm, Patient Position: Chair)   Pulse 85   Temp 97  F (36.1  C) (Temporal)            GENERAL: The patient overall appears well and is no acute distress.   HEAD: normocephalic   EYES: Sclera and conjunctiva clear   NECK: no obvious masses   LUNGS: breathing is unlabored.   EXTREMITIES: No clubbing, cyanosis or edema   SKIN: No rashes or other abnormalities except as noted under the Wound section below.   NEUROLOGICAL: normal motor and sensory function   EDEMA: Moderate       WOUND: The wound appears healthy with no sign of infection.   Wound bed: necrotic material  Periwound: macerated   The wound at the dorsal aspect of the left first toe is about the same size compared with his last clinic visit but there is periwound maceration here.  He has a new very superficial wound on the right posterior calf consistent with a ruptured water blister, there is periwound maceration here also.      Also see below for wound details:       Circumferential volume measures:             No data to display                Ulceration(s)/Wound(s):    Please see the media tab under the chart review for pictures of the wounds.  Nursing staff removed dressings and cleansed wound.    Wound (used by [facility] only) 08/22/23 1341 Left dorsal 1 toe ulceration, venous (Active)   Thickness/Stage full thickness 09/20/23 1243   Base slough 09/20/23 1243   Periwound macerated;redness;swelling;excoriated 09/20/23 1243   Periwound Temperature warm 09/20/23 1243   Periwound Skin Turgor soft 09/20/23 1243   Edges open 09/20/23 1243   Length (cm) 10 09/20/23 1243   Width (cm) 6.5 09/20/23 1243   Depth (cm) 0.5 09/20/23 1243   Wound (cm^2) 65 cm^2 09/20/23 1243   Wound Volume (cm^3) 32.5 cm^3 09/20/23 1243   Wound healing % -1470.05 09/20/23 1243   Drainage Characteristics/Odor serosanguineous;malodorous 09/20/23 1243   Drainage Amount moderate 09/20/23 1243   Care, Wound debrided 09/20/23 1243       Wound (used by [facility] only) 09/20/23 1249 Right posterior;lower leg (Active)   Thickness/Stage full thickness 09/20/23 1243   Base granulating;slough;red 09/20/23 1243   Periwound macerated;redness;excoriated 09/20/23 1243   Periwound Temperature warm 09/20/23 1243   Periwound Skin Turgor soft 09/20/23 1243   Edges open 09/20/23 1243   Length (cm) 5 09/20/23 1243   Width (cm) 5 09/20/23 1243   Depth (cm) 0.2 09/20/23 1243   Wound (cm^2) 25 cm^2 09/20/23 1243   Wound Volume (cm^3) 5 cm^3 09/20/23 1243   Drainage Characteristics/Odor serosanguineous 09/20/23 1243   Drainage Amount moderate 09/20/23 1243   Care, Wound non-select wound debridement performed 09/20/23 1243                 Recent Labs   Lab Test 06/03/21  1448 03/23/21  1042 07/28/20  0929   A1C 5.7* 5.6 5.7*          Recent Labs   Lab Test 02/14/23  0059 06/08/22  1835 07/28/20  0929   ALBUMIN 3.9 3.2* 3.3*              Procedure note: , I had previous obtain informed consent from the patient to perform serial debridements, I confirmed this again with the patient today verbally. , Anesthetized as needed with lidocaine. ,  Using a curette and/or a scalpel I performed an excisional debridement removing all necrotic material at the left foot wound in to the level of viable subcutaneous tissue.  I obtained hemostasis with direct pressure.  The patient tolerated the procedure well. , EBL: <5 ml , and Total debridement surface area: Less than 20 cm      There was no debridement required at the right posterior calf wound.          ASSESSMENT:   This is a 62 year old  male with lower extremity edema and bilateral lower extremity wounds.          PLAN:   We will bandage both of the wound areas beginning with a zinc oxide barrier cream applied to the periwound followed by alginate, an ABD pad and the spandagrip stocking change 3 times a week.    I previously discussed the option of getting a venous insufficiency ultrasound to evaluate for areas of superficial venous insufficiency which may have led to the wound formation.  However the patient would rather hold off on ordering this diagnostic study at this time.    Separate from his wound care instructions I then discussed the treatment of his lower extremity edema.  This primarily involves compression and elevation.  I have strongly encouraged him to wear his spandagrip stockings every day.  I have again explained to the patient today that controlling the edema is probably the most important thing we can do to help heal the wound.  I have specifically recommended that they lay down with their legs above the level of the heart for 30 minutes at least twice a day.  I emphasized that if we can not control the edema we will likely not be able to get this wound to heal.   I also discussed this with the group home employee to help the patient remember to elevate his legs during the day.  I have encouraged the patient to continue on their high protein diet to aid in wound healing.   The patient will return to the wound clinic in 2-3 weeks to see me again.        30 minutes spent on the date of the  encounter doing chart review, history and exam, documentation and further activities per the note, this time excludes any procedure time      Miguel Hampton MD  09/20/2023   1:16 PM   [facility] Vascular/Wound  [phone]    This note was electronically signed by Miguel Hampton MD        Further instructions from your care team         Petey THOMPSON Dragan      1960    A DME order for supplies has been placed to [facility].   If there are any issues with your order including not receiving the order please call [facility] at [phone] option 1.   They can also provide a tracking number for you if you had supplies shipped to you.    Lives at [facility] group home (3x weekly wound care)  Dressing changes outside of clinic are being performed by group home staff    Plan:  - Good job on stopping smoking, Continue to try and wean off of the nicotine inhaler to aide in healing  - Patient would like to hold off on vein study (venous competency) at this time  - For your concerns of your Elbows - see an your primary physician, regarding probable inflamed Bursa Sack    Dressings need to be changed immediately following showering;     Wound Dressing Change: Left Dorsal 1st Toe  - Wash your hands with soap and water before you begin your dressing change and prepare a clean surface for dressings.  - Cleanse with mild unscented soap and water (such as Cetaphil, Cerave or Dove)  - Apply zinc oxide barrier paste to any white macerated periwound skin  - Apply 1/2  piece of 4x4 Melgisorb alginate to wound bed  - Cover with 1/2 of 5x9 ABD  - Secure with 1/2 roll of conforming roll gauze and medical tape  - Secure with Spandage size 7 (MT spandage size 4) from base of toes to the knee  Change 3 times weekly and as needed for drainage    Wound Dressing Change: Right posterior lower leg  - Wash your hands with soap and water before you begin your dressing change and prepare a clean surface for  dressings.  - Cleanse with mild unscented soap and water (such as Cetaphil, Cerave or Dove)  - Apply zinc oxide barrier paste to any white macerated periwound skin  - Apply 1/4 piece of 4x4 Melgisorb alginate to wound bed  - Cover with 1/2 of 5x9 ABD  - Secure with 1/2 roll of conforming roll gauze and medical tape  - Secure with Spandage size 7 (MT spandage size 4) from base of toes to the knee  Change 3 times weekly and as needed for drainage    Elevation:  It is recommended that you elevate your legs above the level of your heart for 30 minutes: approximately 2-3 times each day  Ways to do this:  - Lay on the couch or your bed and prop your legs up on pillows  - Recline back as far as you can go in your recliner and prop your legs on pillows.  Doing these things will help reduce the edema in your legs.    Protein:  A diet high in protein is important for wound healing, we recommend getting 90 grams  of protein per day.  Taking protein shakes or bars are a good way to get extra protein in your diet.  Good sources of protein:  Pork 26g per 3 oz  Whey protein powder - 24g per scoop (on average)  Greek yogurt - 23g per 8oz  Chicken or Turkey - 23g per 3oz  Fish - 20-25g per 3oz  Beef - 18-23g per 3oz  Navy beans - 20g per cup  Cottage cheese - 14g per 1/2 cup  Lentils - 13g per 1/4 cup  Beef jerky 13g per 1oz  2% milk - 8g per cup  Peanut butter - 8g per 2 tablespoons  Eggs - 6g per egg  Mixed nuts - 6g per 2oz       Miguel Hampton M.D. September 20, 2023    Call us at [phone] if you have any questions about your wounds, have redness or swelling around your wound, have a fever of 101 degrees Fahrenheit or greater or if you have any other problems or concerns. We answer the phone Monday through Friday 8 am to 4 pm, please leave a message as we check the voicemail frequently throughout the day.     If you had a positive experience please indicate that on your patient satisfaction survey form that [facility] will be sending you.    It was a pleasure meeting with you today.  Thank you for allowing me and my team the privilege of caring for you today.  YOU are the reason we are here, and I truly hope we provided you with the excellent service you deserve.  Please let us know if there is anything else we can do for you so that we can be sure you are leaving completely satisfied with your care experience.      If you have any billing related questions please call the [facility] Business office at [phone]. The clinic staff does not handle billing related matters.    If you are scheduled to have a follow up appointment, you will receive a reminder call the day before your visit. On the appointment day please arrive 15 minutes prior to your appointment time. If you are unable to keep that appointment, please call the clinic to cancel or reschedule. If you are more than 10 minutes late or greater for your scheduled appointment time, the clinic policy is that you may be asked to reschedule.        ,

## 2023-09-20 NOTE — ADDENDUM NOTE
Encounter addended by: May Calderon RN on: 9/20/2023 2:11 PM   Actions taken: Order list changed, Diagnosis association updated

## 2023-09-27 ENCOUNTER — LAB (OUTPATIENT)
Dept: LAB | Facility: CLINIC | Age: 63
End: 2023-09-27
Payer: MEDICARE

## 2023-09-27 ENCOUNTER — ANTICOAGULATION THERAPY VISIT (OUTPATIENT)
Dept: ANTICOAGULATION | Facility: CLINIC | Age: 63
End: 2023-09-27

## 2023-09-27 DIAGNOSIS — Z79.01 LONG TERM CURRENT USE OF ANTICOAGULANT THERAPY: ICD-10-CM

## 2023-09-27 DIAGNOSIS — Z86.711 HISTORY OF PULMONARY EMBOLISM: Primary | ICD-10-CM

## 2023-09-27 DIAGNOSIS — Z86.711 HISTORY OF PULMONARY EMBOLISM: ICD-10-CM

## 2023-09-27 LAB — INR BLD: 3.9 (ref 0.9–1.1)

## 2023-09-27 PROCEDURE — 36415 COLL VENOUS BLD VENIPUNCTURE: CPT

## 2023-09-27 PROCEDURE — 85610 PROTHROMBIN TIME: CPT

## 2023-09-27 RX ORDER — WARFARIN SODIUM 4 MG/1
TABLET ORAL
Qty: 16 TABLET | Refills: 0 | Status: SHIPPED | OUTPATIENT
Start: 2023-09-27 | End: 2023-10-06

## 2023-09-27 NOTE — PROGRESS NOTES
ANTICOAGULATION MANAGEMENT     Petey Archibald 62 year old male is on warfarin with supratherapeutic INR result. (Goal INR 2.0-3.0)    Recent labs: (last 7 days)     09/27/23  1020   INR 3.9*       ASSESSMENT     Source(s): Chart Review and Patient/Caregiver Call     Warfarin doses taken: Warfarin taken as instructed  Diet: Decreased greens/vitamin K in diet; ongoing change. Still not eating his green veggies, this was noted last week as week as well but group home staff was going to encourage more balanced diet however, Petey is refusing to change. Seems to be an ongoing issue now.  Medication/supplement changes: None noted  New illness, injury, or hospitalization: No. However, looking at photographs taken by wound care provider, the ulcers of left dorsal toe are worsening. Per group rk Thurston, Petey is advised to elevate leg as much as possible, but he is refusing. He has also taken up e-cigarettes and is using them at a very high rate of consumption - Bertrand is very concerned.  Signs or symptoms of bleeding or clotting: No  Previous result: Supratherapeutic  Additional findings: None       PLAN     Recommended plan for ongoing change(s) affecting INR     Dosing Instructions: partial hold then decrease your warfarin dose (11.5% change) with next INR in 1-2 weeks       Summary  As of 9/27/2023      Full warfarin instructions:  9/27: 4 mg; Otherwise 8 mg every Mon, Fri; 6 mg all other days   Next INR check:  10/6/2023               Telephone call with Group Rk Thurston, who agrees to plan and repeated back plan correctly    Lab visit scheduled. Prescription sent electronically to [facility]. AVS mailed to [facility].     Education provided:   Please call back if any changes to your diet, medications or how you've been taking warfarin  Dietary considerations: importance of consistent vitamin K intake  Healthy lifestyle considerations: impact of smoking or tobacco on INR  Interaction IS  anticipated between warfarin and possible developing wound infection  Contact [phone]  with any changes, questions or concerns.     Plan made per [facility] anticoagulation protocol    Lauryn Chatman RN  Anticoagulation Clinic  9/27/2023    _______________________________________________________________________     Anticoagulation Episode Summary       Current INR goal:  2.0-3.0   TTR:  67.9 % (1 y)   Target end date:  Indefinite   Send INR reminders to:  [facility]    Indications    History of pulmonary embolism [Z86.711]  Long term current use of anticoagulant therapy [Z79.01]             Comments:  REM long-term; A new Coumadin Prescription will need to sent to [facility] with current dose and next INR check.             Anticoagulation Care Providers       Provider Role Specialty Phone number    Demetri Archer MD Referring Internal Medicine [phone]

## 2023-10-06 ENCOUNTER — ANTICOAGULATION THERAPY VISIT (OUTPATIENT)
Dept: ANTICOAGULATION | Facility: CLINIC | Age: 63
End: 2023-10-06

## 2023-10-06 ENCOUNTER — LAB (OUTPATIENT)
Dept: LAB | Facility: CLINIC | Age: 63
End: 2023-10-06
Payer: MEDICARE

## 2023-10-06 DIAGNOSIS — Z79.01 LONG TERM CURRENT USE OF ANTICOAGULANT THERAPY: ICD-10-CM

## 2023-10-06 DIAGNOSIS — Z86.711 HISTORY OF PULMONARY EMBOLISM: ICD-10-CM

## 2023-10-06 DIAGNOSIS — Z86.711 HISTORY OF PULMONARY EMBOLISM: Primary | ICD-10-CM

## 2023-10-06 LAB — INR BLD: 3.8 (ref 0.9–1.1)

## 2023-10-06 PROCEDURE — 36416 COLLJ CAPILLARY BLOOD SPEC: CPT

## 2023-10-06 PROCEDURE — 85610 PROTHROMBIN TIME: CPT

## 2023-10-06 RX ORDER — WARFARIN SODIUM 4 MG/1
TABLET ORAL
Qty: 21 TABLET | Refills: 0 | Status: SHIPPED | OUTPATIENT
Start: 2023-10-06 | End: 2023-10-20

## 2023-10-06 NOTE — PROGRESS NOTES
ANTICOAGULATION MANAGEMENT     Petey Archibald 62 year old male is on warfarin with supratherapeutic INR result. (Goal INR 2.0-3.0)    Recent labs: (last 7 days)     10/06/23  1008   INR 3.8*       ASSESSMENT     Source(s): Chart Review and Patient/Caregiver Call     Warfarin doses taken: Warfarin taken as instructed  Diet: No new diet changes identified  Medication/supplement changes: None noted  New illness, injury, or hospitalization: No  Signs or symptoms of bleeding or clotting: No  Previous result: Supratherapeutic  Additional findings: None       PLAN     Recommended plan for ongoing change(s) affecting INR     Dosing Instructions: decrease your warfarin dose (8.7% change) with next INR in 2 weeks       Summary  As of 10/6/2023      Full warfarin instructions:  6 mg every day   Next INR check:  10/20/2023               Telephone call with [facility] nurse who verbalizes understanding and agrees to plan    Lab visit scheduled    Education provided:   Please call back if any changes to your diet, medications or how you've been taking warfarin    Plan made per [facility] anticoagulation protocol    Cameron Blackmon RN  Anticoagulation Clinic  10/6/2023    _______________________________________________________________________     Anticoagulation Episode Summary       Current INR goal:  2.0-3.0   TTR:  65.4 % (1 y)   Target end date:  Indefinite   Send INR reminders to:  [facility]    Indications    History of pulmonary embolism [Z86.711]  Long term current use of anticoagulant therapy [Z79.01]             Comments:  REM [facility]; A new Coumadin Prescription will need to sent to [facility] with current dose and next INR check.             Anticoagulation Care Providers       Provider Role Specialty Phone number    Demetri Archer MD Referring Internal Medicine [phone]

## 2023-10-20 ENCOUNTER — ANTICOAGULATION THERAPY VISIT (OUTPATIENT)
Dept: ANTICOAGULATION | Facility: CLINIC | Age: 63
End: 2023-10-20

## 2023-10-20 ENCOUNTER — LAB (OUTPATIENT)
Dept: LAB | Facility: CLINIC | Age: 63
End: 2023-10-20
Payer: MEDICARE

## 2023-10-20 DIAGNOSIS — Z86.711 HISTORY OF PULMONARY EMBOLISM: Primary | ICD-10-CM

## 2023-10-20 DIAGNOSIS — Z86.711 HISTORY OF PULMONARY EMBOLISM: ICD-10-CM

## 2023-10-20 DIAGNOSIS — Z79.01 LONG TERM CURRENT USE OF ANTICOAGULANT THERAPY: ICD-10-CM

## 2023-10-20 LAB — INR BLD: 2 (ref 0.9–1.1)

## 2023-10-20 PROCEDURE — 85610 PROTHROMBIN TIME: CPT

## 2023-10-20 PROCEDURE — 36416 COLLJ CAPILLARY BLOOD SPEC: CPT

## 2023-10-20 RX ORDER — WARFARIN SODIUM 4 MG/1
TABLET ORAL
Qty: 32 TABLET | Refills: 0 | Status: SHIPPED | OUTPATIENT
Start: 2023-10-20 | End: 2023-11-10 | Stop reason: DRUGHIGH

## 2023-10-20 NOTE — PROGRESS NOTES
ANTICOAGULATION MANAGEMENT     Petey Archibald 62 year old male is on warfarin with therapeutic INR result. (Goal INR 2.0-3.0)    Recent labs: (last 7 days)     10/20/23  1019   INR 2.0*       ASSESSMENT     Source(s): Chart Review and Patient/Caregiver Call     Warfarin doses taken: Warfarin taken as instructed  Diet: No new diet changes identified  Medication/supplement changes: None noted  New illness, injury, or hospitalization: No  Signs or symptoms of bleeding or clotting: No  Previous result: Supratherapeutic  Additional findings: None       PLAN     Recommended plan for no diet, medication or health factor changes affecting INR     Dosing Instructions: Continue your current warfarin dose with next INR in 3 weeks       Summary  As of 10/20/2023      Full warfarin instructions:  6 mg every day   Next INR check:  11/10/2023               Telephone call with [facility] nurse who verbalizes understanding and agrees to plan    Lab visit scheduled    Education provided:   Please call back if any changes to your diet, medications or how you've been taking warfarin    Plan made per [facility] anticoagulation protocol    Cameron Blackmon RN  Anticoagulation Clinic  10/20/2023    _______________________________________________________________________     Anticoagulation Episode Summary       Current INR goal:  2.0-3.0   TTR:  67.1% (1 y)   Target end date:  Indefinite   Send INR reminders to:  [facility]    Indications    History of pulmonary embolism [Z86.711]  Long term current use of anticoagulant therapy [Z79.01]             Comments:  [facility]; A new Coumadin Prescription will need to sent to [facility] with current dose and next INR check.             Anticoagulation Care Providers       Provider Role Specialty Phone number    Demetri Archer MD Referring Internal Medicine [phone]

## 2023-10-20 NOTE — ADDENDUM NOTE
Encounter addended by: Crystal Spencer RN on: 1/8/2020 1:58 PM   Actions taken: Charge Capture section accepted Resp Therapy   10/20/23 1901   Respiratory Protocol   Protocol Initiated? Yes   Protocol Selection Respiratory   Language Barrier? Yes   Medical & Social History Reviewed? Yes   Diagnostic Studies Reviewed? Yes   Respiratory Plan Vent/NIV/HFNC   Respiratory Assessment   Resp Comments Pt placed on Bipap from HFNC for intubation,  and placed on vent, pt started on APRV. T high 5.5 tlow 0.5 p high 30 p low 0. Pressure Support 0. Quick Bronchscopy performed but only for a minute or two.

## 2023-10-23 NOTE — PROGRESS NOTES
AVS mailed to [facility] today.  Dorothy Gilman, RN  Anticoagulation Nurse - [facility] [place]

## 2023-10-30 ENCOUNTER — PATIENT OUTREACH (OUTPATIENT)
Dept: GASTROENTEROLOGY | Facility: CLINIC | Age: 63
End: 2023-10-30
Payer: MEDICARE

## 2023-11-10 ENCOUNTER — LAB (OUTPATIENT)
Dept: LAB | Facility: CLINIC | Age: 63
End: 2023-11-10
Payer: MEDICARE

## 2023-11-10 ENCOUNTER — ANTICOAGULATION THERAPY VISIT (OUTPATIENT)
Dept: ANTICOAGULATION | Facility: CLINIC | Age: 63
End: 2023-11-10

## 2023-11-10 DIAGNOSIS — Z79.01 LONG TERM CURRENT USE OF ANTICOAGULANT THERAPY: ICD-10-CM

## 2023-11-10 DIAGNOSIS — Z86.711 HISTORY OF PULMONARY EMBOLISM: Primary | ICD-10-CM

## 2023-11-10 DIAGNOSIS — Z86.711 HISTORY OF PULMONARY EMBOLISM: ICD-10-CM

## 2023-11-10 LAB — INR BLD: 2.5 (ref 0.9–1.1)

## 2023-11-10 PROCEDURE — 85610 PROTHROMBIN TIME: CPT

## 2023-11-10 PROCEDURE — 36416 COLLJ CAPILLARY BLOOD SPEC: CPT

## 2023-11-10 RX ORDER — WARFARIN SODIUM 6 MG/1
6 TABLET ORAL DAILY
Qty: 30 TABLET | Refills: 0 | Status: SHIPPED | OUTPATIENT
Start: 2023-11-10 | End: 2023-11-20

## 2023-11-10 NOTE — PROGRESS NOTES
ANTICOAGULATION MANAGEMENT     Petey Archibald 62 year old male is on warfarin with therapeutic INR result. (Goal INR 2.0-3.0)    Recent labs: (last 7 days)     11/10/23  1030   INR 2.5*       ASSESSMENT     Source(s): Chart Review and Patient/Caregiver Call     Warfarin doses taken: Warfarin taken as instructed  Diet: No new diet changes identified  Medication/supplement changes: None noted  New illness, injury, or hospitalization: No  Signs or symptoms of bleeding or clotting: No  Previous result: Therapeutic last visit; previously outside of goal range  Additional findings: None       PLAN     Recommended plan for no diet, medication or health factor changes affecting INR     Dosing Instructions: Continue your current warfarin dose, using the 6 mg tablet size, with next INR in 4 weeks       Summary  As of 11/10/2023      Full warfarin instructions:  6 mg every day   Next INR check:  12/8/2023               Telephone call with , Bertrand, who agrees to plan and repeated back plan correctly    Lab visit scheduled    Education provided:   Please call back if any changes to your diet, medications or how you've been taking warfarin  Taking warfarin: warfarin tablet strength change; remove and/or discard previous strength from medication supply  Contact [phone]  with any changes, questions or concerns.     Plan made per [facility] anticoagulation protocol    Lauryn Chatman RN  Anticoagulation Clinic  11/10/2023    _______________________________________________________________________     Anticoagulation Episode Summary       Current INR goal:  2.0-3.0   TTR:  72.2% (1 y)   Target end date:  Indefinite   Send INR reminders to:  [facility]    Indications    History of pulmonary embolism [Z86.711]  Long term current use of anticoagulant therapy [Z79.01]             Comments:  [facility]; A new Coumadin Prescription will need to sent to [facility] with current dose and next INR  check.             Anticoagulation Care Providers       Provider Role Specialty Phone number    Demteri Archer MD Referring Internal Medicine [phone]

## 2023-11-10 NOTE — PATIENT INSTRUCTIONS
Patient has been switched from using the 4 mg tablet (taking 1.5 tablets for a 6 mg dose) to a 6 mg tablet.

## 2023-11-15 ENCOUNTER — HOSPITAL ENCOUNTER (OUTPATIENT)
Dept: WOUND CARE | Facility: CLINIC | Age: 63
Discharge: HOME | End: 2023-11-15
Attending: FAMILY MEDICINE | Admitting: FAMILY MEDICINE
Payer: MEDICARE

## 2023-11-15 ENCOUNTER — TELEPHONE (OUTPATIENT)
Dept: ANTICOAGULATION | Facility: CLINIC | Age: 63
End: 2023-11-15

## 2023-11-15 VITALS
DIASTOLIC BLOOD PRESSURE: 79 MMHG | HEART RATE: 71 BPM | RESPIRATION RATE: 20 BRPM | TEMPERATURE: 98.4 F | SYSTOLIC BLOOD PRESSURE: 136 MMHG

## 2023-11-15 DIAGNOSIS — Z79.01 LONG TERM CURRENT USE OF ANTICOAGULANT THERAPY: ICD-10-CM

## 2023-11-15 DIAGNOSIS — L97.522 ULCER OF LEFT FOOT, WITH FAT LAYER EXPOSED (H): ICD-10-CM

## 2023-11-15 DIAGNOSIS — L97.522 ULCER OF GREAT TOE, LEFT, WITH FAT LAYER EXPOSED (H): ICD-10-CM

## 2023-11-15 DIAGNOSIS — Z86.711 HISTORY OF PULMONARY EMBOLISM: Primary | ICD-10-CM

## 2023-11-15 DIAGNOSIS — L97.512 ULCER OF RIGHT FOOT WITH FAT LAYER EXPOSED (H): ICD-10-CM

## 2023-11-15 DIAGNOSIS — L97.522 ULCER OF LEFT FOOT WITH FAT LAYER EXPOSED (H): ICD-10-CM

## 2023-11-15 DIAGNOSIS — S91.302D OPEN WOUND OF LEFT FOOT, SUBSEQUENT ENCOUNTER: Primary | ICD-10-CM

## 2023-11-15 DIAGNOSIS — R60.0 LOWER EXTREMITY EDEMA: ICD-10-CM

## 2023-11-15 DIAGNOSIS — E11.621 DIABETIC FOOT ULCER (H): ICD-10-CM

## 2023-11-15 DIAGNOSIS — L97.509 DIABETIC FOOT ULCER (H): ICD-10-CM

## 2023-11-15 PROBLEM — L97.412: Status: ACTIVE | Noted: 2023-11-15

## 2023-11-15 PROCEDURE — 97602 WOUND(S) CARE NON-SELECTIVE: CPT

## 2023-11-15 PROCEDURE — 99214 OFFICE O/P EST MOD 30 MIN: CPT | Performed by: FAMILY MEDICINE

## 2023-11-15 RX ORDER — SULFAMETHOXAZOLE/TRIMETHOPRIM 800-160 MG
1 TABLET ORAL 2 TIMES DAILY
Qty: 28 TABLET | Refills: 0 | Status: SHIPPED | OUTPATIENT
Start: 2023-11-15 | End: 2023-11-29

## 2023-11-15 RX ORDER — WARFARIN SODIUM 3 MG/1
TABLET ORAL
Qty: 10 TABLET | Refills: 0 | Status: SHIPPED | OUTPATIENT
Start: 2023-11-15 | End: 2023-11-20

## 2023-11-15 NOTE — PROGRESS NOTES
Wound Clinic Note         Visit date: 11/15/2023       Cheif Complaint:     Petey Archibald is a 62 year old   male had concerns including WOUND CARE.  The patient has lower extremity edema and bilateral leg ulcers.          HISTORY OF PRESENT ILLNESS:    Petey Archibald reports the wound has been present since mid 2022.  The wound began without a clear cause.   He reports prior to this wound developing he has not had other difficult to heal wounds on the legs.    Today the patient is again accompanied to the wound clinic by an employee at his group home and they both provide portions of the interval history.    Since his last clinic visit with me they report that they ran out of bandages and have just been covering the wounds with dry gauze.  He has not been wearing any form of compression recently.  There is been moderate to heavy serous drainage from the wounds.  The patient also notes that the pain from the wounds has increased recently.        The pateint denies fevers or chills.  They report the pain from the wound has been 6/10 and has increased recently.      The patient reports they currently do not have any routine for elevating their legs.  He still has not been elevating his legs as I recommended.    Today the patient reports maintaining a high protein diet, but has not been taking protein supplements lately.        The patient denies a history of diabetes or chronic steroid use.  He continues to use the nicotine inhaler about the same amount compared with his last clinic visit.        The patient has not had any symptoms of infection relating to the wound recently and is not currently on antibiotics.       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       Surgical Hx:   Past Surgical History:   Procedure Laterality Date    COLONOSCOPY N/A 4/15/2019    Procedure: COMBINED COLONOSCOPY, SINGLE OR MULTIPLE BIOPSY/POLYPECTOMY BY BIOPSY;  Surgeon: Jovana Ohara MD;  Location:  GI    COLONOSCOPY N/A 6/7/2021    Procedure: COLONOSCOPY with polypectomies;  Surgeon: Sahil Cid MD;  Location: [facility] OR    EXCISE MALIGNANT LESIONS, TRUNK/ARMS/LEGS 0.5CM OR LESS Left 5/26/2017    Procedure: EXCISE MALIGNANT LESIONS, TRUNK/ARMS/LEGS 0.5CM OR LESS;  EXCISION LEFT ELBOW MASS;  Surgeon: Jose Azevedo MD;  Location:  GI    RECTAL SURGERY      perianal abscess?          Allergies:    Allergies   Allergen Reactions    Bees     Clavulanic Acid     Amoxicillin-Pot Clavulanate Rash     Augmentin    Penicillins Rash              Medication History:    Current Outpatient Medications   Medication Sig    sulfamethoxazole-trimethoprim (BACTRIM DS) 800-160 MG tablet Take 1 tablet by mouth 2 times daily for 14 days    acetaminophen (TYLENOL) 325 MG tablet Take 1-2 tablets (325-650 mg) by mouth every 6 hours as needed for mild pain, fever or headaches    albuterol (ALBUTEROL) 108 (90 BASE) MCG/ACT inhaler Inhale 2 puffs into the lungs every 6 hours as needed    ARIPiprazole (ABILIFY) 5 MG tablet Take 5 mg by mouth daily    buPROPion (WELLBUTRIN SR) 150 MG 12 hr tablet Take 150 mg by mouth 2 times daily    Cadexomer Iodine, topical, 0.9% (IODOSORB) 0.9 % GEL gel Apply topically daily Apply to  "left foot wound daily after cleansing the wound and blotting it dry. (Patient not taking: Reported on 8/8/2023)    cloNIDine (CATAPRES) 0.1 MG tablet Take 0.1 mg by mouth daily as needed    clotrimazole (LOTRIMIN) 1 % external cream Apply topically 2 times daily    diclofenac (VOLTAREN) 1 % topical gel Apply 4 g topically 2 times daily as needed for moderate pain    diphenhydrAMINE (BENADRYL) 25 MG capsule Take 50 mg by mouth every 6 hours as needed for itching or allergies    Emollient (CERAVE) CREA Externally apply topically daily    EPINEPHrine (ANY BX GENERIC EQUIV) 0.3 MG/0.3ML injection 2-pack INJECT 0.3MG INTRAMUSCULARLY ONE TIME FOR ONE DOSE AS NEEDED FOR ANAPHYLAXIS. MAY REPEAT ONE TIME IN 5-15 MINUTES IF RESPONSE TO INITIAL DOSE IS INADEQUATE. *1 TOTAL FILL, ORIGINAL FROM EMERGENCY ROOM*    EPINEPHrine (EPIPEN 2-BRE) 0.3 MG/0.3ML injection 2-pack INJECT 0.3MG INTRAMUSCULAR ONE TIME FOR ONE DOSE AS NEEDED FOR ANAPHYLAXIS (CALL 911 IF YOU HAVE TO GIVE) (FOR BEE STINGS) **LABEL EACH PEN*    gabapentin (NEURONTIN) 100 MG capsule Take 400 mg by mouth 3 times daily At 0900, 1200, and 2000    Gauze Pads & Dressings (GAUZE PADS 4\"X4\") 4\"X4\" PADS 1 each 3 times daily as needed    loratadine (CLARITIN) 10 MG tablet Take 10 mg by mouth daily    LORazepam (ATIVAN) 1 MG tablet Take 1 mg by mouth daily as needed for anxiety    montelukast (SINGULAIR) 10 MG tablet TAKE 1 TABLET BY MOUTH EVERY MORNING (ASTHMA)    Multiple Vitamin (DAILY-JOVAN) TABS TAKE 1 TABLET BY MOUTH EVERY MORNING (VITAMINS)    naltrexone (DEPADE/REVIA) 50 MG tablet Take 100 mg by mouth daily    nicotine (COMMIT) 2 MG lozenge Place 2 mg inside cheek daily as needed    nystatin-triamcinolone (MYCOLOG II) 935343-2.1 UNIT/GM-% external cream Apply topically 2 times daily For 1-2 weeks.    omeprazole (PRILOSEC) 40 MG DR capsule Take 1 capsule (40 mg) by mouth daily    order for DME Equipment being ordered: roll of micropore tape to dress foot wound bid    " order for DME Equipment being ordered: 1 surgical shoe    order for DME [employer] [facility] Phone [phone] Fax [phone]  EdemaWear Size Medium/Yellow qty 4 sleeves  Length of Need: 1 month  Frequency of dressing change daily    order for DME [employer] Stockings  Phone #[phone]  Fax #[phone]  Coolflex Velcro wraps    Length of Need: Life Time  # of Pairs 1 set    order for DME [employer] Order Phone [phone] Fax [phone]  Primary Dressing Hydrofera Blue Ready   Qty 5 sheets  Secondary Dressing 4' roll gauze Qty 30  Secondary Dressing 2' medipore tape Qty 1  Secondary Dressing 4x4 gauze loaf Qty  1  Length of Need: 1 month  Frequency of dressing change: daily    order for DME Oxygen 2 Li/min  at night and bled into BiPAP    order for DME Equipment being ordered: CPAP with mask and tubing    order for DME Equipment being ordered: support compression hose BK  2 pair black 30mm HG   To be applied on arising & removed while lying down to go to sleep    polyethylene glycol (MIRALAX) 17 GM/Dose powder Take 17 g (1 capful) by mouth daily as needed for constipation    predniSONE (DELTASONE) 20 MG tablet Take two tablets (= 40mg) each day for 5 (five) days    PULMICORT FLEXHALER 180 MCG/ACT inhaler INHALE 2 PUFFS INTO THE LUNGS TWICE DAILY.    SENNA-TABS 8.6 MG tablet Take 1 tablet by mouth daily    sertraline (ZOLOFT) 100 MG tablet Take 200 mg by mouth daily    simvastatin (ZOCOR) 40 MG tablet TAKE 1 TABLET BY MOUTH EVERY MORNING.  (CHOLESTEROL)    SPIRIVA HANDIHALER 18 MCG inhaled capsule INHALE CONTENTS OF 1 CAPSULE INTO THE LUNGS ONCE DAILY    spironolactone (ALDACTONE) 25 MG tablet TAKE 1 TABLET BY MOUTH ONCE DAILY. (HIGH BLOOD PRESSURE)    tiZANidine (ZANAFLEX) 2 MG tablet Take 1 tablet (2 mg) by mouth 3 times daily    tiZANidine (ZANAFLEX) 2 MG tablet Take 1 tablet (2 mg) by mouth 3 times daily    traZODone (DESYREL) 100 MG tablet Take 100 mg by mouth At Bedtime    triamcinolone  (KENALOG) 0.1 % external cream Apply to AA BID x 1-2 week then PRN only    varenicline (CHANTIX) 1 MG tablet Take 1 tablet (1 mg) by mouth 2 times daily (Patient not taking: Reported on 8/8/2023)    vitamin B complex with vitamin C (STRESS TAB) tablet Take 1 tablet by mouth daily    warfarin ANTICOAGULANT (COUMADIN) 6 MG tablet Take 1 tablet (6 mg) by mouth daily Until next INR lab on 12/8/23     No current facility-administered medications for this encounter.         Tobacco History:  reports that he has been smoking cigarettes. He has a 30.00 pack-year smoking history. He has never used smokeless tobacco.       REVIEW OF SYMPTOMS:   The review of systems was negative except as noted in the HPI.           PHYSICAL EXAMINATION:     /79 (BP Location: Left arm, Patient Position: Chair, Cuff Size: Adult Regular)   Pulse 71   Temp 98.4  F (36.9  C) (Temporal)   Resp 20            GENERAL: The patient overall appears well and is no acute distress.   HEAD: normocephalic   EYES: Sclera and conjunctiva clear   NECK: no obvious masses   LUNGS: breathing is unlabored.   EXTREMITIES: No clubbing, cyanosis or edema   SKIN: No rashes or other abnormalities except as noted under the Wound section below.   NEUROLOGICAL: normal motor and sensory function   EDEMA: Moderate       WOUND: The wound appears healthy with no sign of infection.   Wound bed: necrotic material at the left lateral foot wound only.  Periwound: macerated   The wound on the right posterior calf that he had previously is actually healed however now he has a new wound on the right lateral foot which is mostly scabbed over.  The wound at the base of the left first toe is overall about the same size but there is quite a bit of new epithelium growing into the wound area.  He has a new wound on the left lateral foot which does have some necrotic material in it and is very painful for him.    The wound at the dorsal aspect of the left first toe is about the  same size compared with his last clinic visit but there is periwound maceration here.  He has a new very superficial wound on the right posterior calf consistent with a ruptured water blister, there is periwound maceration here also.      Also see below for wound details:       Circumferential volume measures:             No data to display                Ulceration(s)/Wound(s):   Please see the media tab under the chart review for pictures of the wounds.  Nursing staff removed dressings and cleansed wound.    Wound (used by [facility] only) 08/22/23 1341 Left dorsal 1 toe ulceration, venous (Active)   Thickness/Stage full thickness 11/15/23 0904   Base slough 11/15/23 0904   Periwound macerated;redness;swelling;excoriated 11/15/23 0904   Periwound Temperature warm 11/15/23 0904   Periwound Skin Turgor soft 11/15/23 0904   Edges open 11/15/23 0904   Length (cm) 2 11/15/23 0904   Width (cm) 2.5 11/15/23 0904   Depth (cm) 0.2 11/15/23 0904   Wound (cm^2) 5 cm^2 11/15/23 0904   Wound Volume (cm^3) 1 cm^3 11/15/23 0904   Wound healing % -20.77 11/15/23 0904   Drainage Characteristics/Odor creamy 11/15/23 0904   Drainage Amount moderate 11/15/23 0904   Care, Wound non-select wound debridement performed. 11/15/23 0904       Wound (used by [facility] only) 11/15/23 0906 Right lateral;dorsal foot unspecified (Active)   Thickness/Stage full thickness 11/15/23 0904   Base red;scab;dry 11/15/23 0904   Periwound excoriated;redness 11/15/23 0904   Periwound Temperature warm 11/15/23 0904   Periwound Skin Turgor soft 11/15/23 0904   Edges open 11/15/23 0904   Length (cm) 1.2 11/15/23 0904   Width (cm) 1.5 11/15/23 0904   Depth (cm) 0.2 11/15/23 0904   Wound (cm^2) 1.8 cm^2 11/15/23 0904   Wound Volume (cm^3) 0.36 cm^3 11/15/23 0904   Drainage Characteristics/Odor serosanguineous 11/15/23 0904   Drainage Amount moderate 11/15/23 0904   Care, Wound non-select wound debridement performed. 11/15/23 0904       Wound (used by [facility] only)  11/15/23 0911 Left dorsal foot unspecified (Active)   Thickness/Stage full thickness 11/15/23 0904   Base slough;granulating 11/15/23 0904   Periwound excoriated;edematous 11/15/23 0904   Periwound Temperature warm 11/15/23 0904   Periwound Skin Turgor soft 11/15/23 0904   Edges open 11/15/23 0904   Length (cm) 2.2 11/15/23 0904   Width (cm) 8 11/15/23 0904   Depth (cm) 0.3 11/15/23 0904   Wound (cm^2) 17.6 cm^2 11/15/23 0904   Wound Volume (cm^3) 5.28 cm^3 11/15/23 0904   Drainage Characteristics/Odor creamy 11/15/23 0904   Drainage Amount moderate 11/15/23 0904   Care, Wound non-select wound debridement performed. 11/15/23 0904                   Recent Labs   Lab Test 06/03/21  1448 03/23/21  1042 07/28/20  0929   A1C 5.7* 5.6 5.7*          Recent Labs   Lab Test 02/14/23  0059 06/08/22  1835 07/28/20  0929   ALBUMIN 3.9 3.2* 3.3*              No sharp debridement performed today.   He cannot tolerate any debridement today.               ASSESSMENT:   This is a 62 year old  male with lower extremity edema and bilateral lower extremity wounds.          PLAN:   We will bandage all of the wound areas beginning with a zinc oxide barrier cream applied to the periwound followed by alginate, an ABD pad and the spandagrip stocking change 3 times a week.  I have advised him to call us if they are starting to run out of any bandages.    Due to his increased pain recently I have prescribed him Bactrim for suspected bacterial overgrowth.  There are no physical exam findings to suggest cellulitis.    I previously discussed the option of getting a venous insufficiency ultrasound to evaluate for areas of superficial venous insufficiency which may have led to the wound formation.  However the patient would rather hold off on ordering this diagnostic study at this time.    Separate from his wound care instructions I then discussed the treatment of his lower extremity edema.  This primarily involves compression and elevation.  I  have strongly encouraged him to wear his spandagrip stockings every day.  I have again explained to the patient today that controlling the edema is probably the most important thing we can do to help heal the wound.  I have specifically recommended that they lay down with their legs above the level of the heart for 30 minutes at least twice a day.  I emphasized that if we can not control the edema we will likely not be able to get this wound to heal.   I also discussed this with the group home employee to help the patient remember to elevate his legs during the day.  I have encouraged the patient to continue on their high protein diet to aid in wound healing.   The patient will return to the wound clinic in 2-3 weeks to see me again.        30 minutes spent on the date of the encounter doing chart review, history and exam, documentation and further activities per the note, this time excludes any procedure time    Copied text has been reviewed and appropriate changes were made.     Miguel Hampton MD  11/15/2023   10:19 AM   [facility]/Wound  [phone]    This note was electronically signed by Miguel Hampton MD        Further instructions from your care team         Petey Archibald      1960    A DME order for supplies has been placed to [facility]. If there are any issues with your order including not receiving the order please call [facility] at [phone]. They can also provide a tracking number for you.    Dressing changes outside of clinic are being performed by  Group Home Staff    Lives at [facility] (3x weekly wound care)  FAX ORDERS ATTENTION JUAN DANIEL: [phone]     Plan:  Prescription for Antibiotic sent to [facility]  Good job on stopping smoking, Continue to try and wean off of the nicotine inhaler to aide in healing  Patient would like to hold off on vein study (venous competency) at this time  For your concerns of your Elbows - see an your primary  "physician, regarding probable inflamed Bursa Sack  NO SOAKING YOUR FEET! You can get them wet in the shower between bandage changes, but do NOT SOAK your feet  Dressings need to be changed immediately following showering;      Wound Dressing Change: Left Dorsal 1st Toe  - Wash your hands with soap and water before you begin your dressing change and prepare a clean surface for dressings.  - Cleanse with mild unscented soap and water (such as Cetaphil, Cerave or Dove)  - Apply zinc oxide barrier paste to any white macerated periwound skin or any red/irritated/excoriated skin  - Apply 1/2  piece of 4x4 Melgisorb alginate to wound bed  - Cover with 1/2 of 5x9 ABD  - Secure with 1/2 roll of 4\" conforming roll gauze and medical tape  - Secure with Spandage size 7 (MT spandage size 4) from base of toes to the knee  Change 3 times weekly and as needed for drainage     Wound Dressing Change: Right Dorsal Foot  - Wash your hands with soap and water before you begin your dressing change and prepare a clean surface for dressings.  - Cleanse with mild unscented soap and water (such as Cetaphil, Cerave or Dove)  - Apply zinc oxide barrier paste to any white macerated periwound skin or any red/irritated/excoriated skin  - Apply 1/4 piece of 4x4 Melgisorb alginate to wound bed  - Cover with 1/2 of 5x9 ABD  - Secure with 1/2 roll of 4\" conforming roll gauze and medical tape  - Secure with Spandage size 7 (MT spandage size 4) from base of toes to the knee  Change 3 times weekly and as needed for drainage     Wound Dressing Change: Left Dorsal Foot  - Wash your hands with soap and water before you begin your dressing change and prepare a clean surface for dressings.  - Cleanse with mild unscented soap and water (such as Cetaphil, Cerave or Dove)  - Apply zinc oxide barrier paste to any white macerated periwound skin or any red/irritated/excoriated skin  - Apply 1/2 piece of 4x4 Melgisorb alginate to wound bed  - Cover with 1/2 of 5x9 " "ABD  - Secure with 1/2 roll of 4\" conforming roll gauze and medical tape  - Secure with Spandage size 7 (MT spandage size 4) from base of toes to the knee  Change 3 times weekly and as needed for drainage    Wound Dressing Change: Right posterior lower leg - HEALED:)  - Wash your hands with soap and water before you begin your dressing change and prepare a clean surface for dressings.  - Cleanse with mild unscented soap and water (such as Cetaphil, Cerave or Dove)  -  Apply a ceramide based lotion (Cera-Ve, Vanicream, Cetaphil)  - Wear Spandage size 7 (MT spandage size 4) from base of toes to the knee  Change every other day and as needed for soilage    Elevation:  It is recommended that you elevate your legs above the level of your heart for 30 minutes: approximately 2-3 times each day  Ways to do this:  - Lay on the couch or your bed and prop your legs up on pillows  - Recline back as far as you can go in your recliner and prop your legs on pillows.  Doing these things will help reduce the edema in your legs.     Protein:  A diet high in protein is important for wound healing, we recommend getting 90 grams  of protein per day.  Taking protein shakes or bars are a good way to get extra protein in your diet.  Good sources of protein:  Pork 26g per 3 oz  Whey protein powder - 24g per scoop (on average)  Greek yogurt - 23g per 8oz  Chicken or Turkey - 23g per 3oz  Fish - 20-25g per 3oz  Beef - 18-23g per 3oz  Navy beans - 20g per cup  Cottage cheese - 14g per 1/2 cup  Lentils - 13g per 1/4 cup  Beef jerky 13g per 1oz  2% milk - 8g per cup  Peanut butter - 8g per 2 tablespoons  Eggs - 6g per egg  Mixed nuts - 6g per 2oz     Miguel Hampton M.D. November 15, 2023    Call us at [phone] if you have any questions about your wounds, have redness or swelling around your wound, have a fever of 101 degrees Fahrenheit or greater or if you have any other problems or concerns. We answer the phone Monday through Friday 8 " am to 4 pm, please leave a message as we check the voicemail frequently throughout the day.     If you had a positive experience please indicate that on your patient satisfaction survey form that [facility] will be sending you.    It was a pleasure meeting with you today.  Thank you for allowing me and my team the privilege of caring for you today.  YOU are the reason we are here, and I truly hope we provided you with the excellent service you deserve.  Please let us know if there is anything else we can do for you so that we can be sure you are leaving completely satisfied with your care experience.      If you have any billing related questions please call the [facility] Business office at [phone]. The clinic staff does not handle billing related matters.    If you are scheduled to have a follow up appointment, you will receive a reminder call the day before your visit. On the appointment day please arrive 15 minutes prior to your appointment time. If you are unable to keep that appointment, please call the clinic to cancel or reschedule. If you are more than 10 minutes late or greater for your scheduled appointment time, the clinic policy is that you may be asked to reschedule.        ,

## 2023-11-15 NOTE — TELEPHONE ENCOUNTER
ANTICOAGULATION  MANAGEMENT     Interacting Medication Review    Interacting medication(s): Sulfamethoxazole-Trimethoprim (Bactrim) with warfarin.    Duration: 14 days  (11/15/23 to 11/29/23)    Indication:  bacterial overgrowth, lower extremity wounds, acute on chronic    New medication?: Yes, interaction may increase INR and risk of bleeding. With Sulfamethoxazole-Trimethoprim, [facility] protocol Appendix G recommends empiric reduction of warfarin dose in therapeutic/supratherapeutic patients.        PLAN     Decrease your warfarin dose (14.3% change) with next INR in 5 days        Summary  As of 11/15/2023      Full warfarin instructions:  3 mg every Wed, Sat; 6 mg all other days   Next INR check:  11/20/2023               Telephone call with [facility] who agrees to plan and repeated back plan correctly. New prescription sent to [facility] since [facility] is not allowed to split tablets on site.     Maintenance dose modified on anticoagulation calendar for interaction > 7 days; maintenance dose to be readjusted post interaction    Plan made per [facility] anticoagulation protocol.     Lauryn Chatman RN  Anticoagulation Clinic

## 2023-11-15 NOTE — TELEPHONE ENCOUNTER
Lauryn, [facility] [phone], wants to be sure that pt will have his INR checked soon due to being on new antibiotic.   Please call her and let her know that this is taken care of. Thanks!  Blossom Levy RN

## 2023-11-15 NOTE — ADDENDUM NOTE
Encounter addended by: Rosy Priest RN on: 11/15/2023 10:31 AM   Actions taken: Clinical Note Signed, Edited Discharge Instructions

## 2023-11-15 NOTE — DISCHARGE INSTRUCTIONS
"Petey Archibald      1960    A DME order for supplies has been placed to [facility]. If there are any issues with your order including not receiving the order please call [facility] at [phone]. They can also provide a tracking number for you.    Dressing changes outside of clinic are being performed by  [facility] Staff    Lives at [facility] (3x weekly wound care)  FAX ORDERS ATTENTION JUAN DANIEL: [phone]     Plan:  Prescription for Antibiotic sent to [facility]  Good job on stopping smoking, Continue to try and wean off of the nicotine inhaler to aide in healing  Patient would like to hold off on vein study (venous competency) at this time  For your concerns of your Elbows - see an your primary physician, regarding probable inflamed Bursa Sack  NO SOAKING YOUR FEET! You can get them wet in the shower between bandage changes, but do NOT SOAK your feet  Dressings need to be changed immediately following showering;      Wound Dressing Change: Left Dorsal 1st Toe  - Wash your hands with soap and water before you begin your dressing change and prepare a clean surface for dressings.  - Cleanse with mild unscented soap and water (such as Cetaphil, Cerave or Dove)  - Apply zinc oxide barrier paste to any white macerated periwound skin or any red/irritated/excoriated skin  - Apply 1/2  piece of 4x4 Melgisorb alginate to wound bed  - Cover with 1/2 of 5x9 ABD  - Secure with 1/2 roll of 4\" conforming roll gauze and medical tape  - Secure with Spandage size 7 (MT spandage size 4) from base of toes to the knee  Change 3 times weekly and as needed for drainage     Wound Dressing Change: Right Dorsal Foot  - Wash your hands with soap and water before you begin your dressing change and prepare a clean surface for dressings.  - Cleanse with mild unscented soap and water (such as Cetaphil, Cerave or Dove)  - Apply zinc oxide barrier paste to any white macerated periwound skin or any " "red/irritated/excoriated skin  - Apply 1/4 piece of 4x4 Melgisorb alginate to wound bed  - Cover with 1/2 of 5x9 ABD  - Secure with 1/2 roll of 4\" conforming roll gauze and medical tape  - Secure with Spandage size 7 (MT spandage size 4) from base of toes to the knee  Change 3 times weekly and as needed for drainage     Wound Dressing Change: Left Dorsal Foot  - Wash your hands with soap and water before you begin your dressing change and prepare a clean surface for dressings.  - Cleanse with mild unscented soap and water (such as Cetaphil, Cerave or Dove)  - Apply zinc oxide barrier paste to any white macerated periwound skin or any red/irritated/excoriated skin  - Apply 1/2 piece of 4x4 Melgisorb alginate to wound bed  - Cover with 1/2 of 5x9 ABD  - Secure with 1/2 roll of 4\" conforming roll gauze and medical tape  - Secure with Spandage size 7 (MT spandage size 4) from base of toes to the knee  Change 3 times weekly and as needed for drainage    Wound Dressing Change: Right posterior lower leg - HEALED:)  - Wash your hands with soap and water before you begin your dressing change and prepare a clean surface for dressings.  - Cleanse with mild unscented soap and water (such as Cetaphil, Cerave or Dove)  -  Apply a ceramide based lotion (Cera-Ve, Vanicream, Cetaphil)  - Wear Spandage size 7 (MT spandage size 4) from base of toes to the knee  Change every other day and as needed for soilage    Elevation:  It is recommended that you elevate your legs above the level of your heart for 30 minutes: approximately 2-3 times each day  Ways to do this:  - Lay on the couch or your bed and prop your legs up on pillows  - Recline back as far as you can go in your recliner and prop your legs on pillows.  Doing these things will help reduce the edema in your legs.     Protein:  A diet high in protein is important for wound healing, we recommend getting 90 grams  of protein per day.  Taking protein shakes or bars are a good way " to get extra protein in your diet.  Good sources of protein:  Pork 26g per 3 oz  Whey protein powder - 24g per scoop (on average)  Greek yogurt - 23g per 8oz  Chicken or Turkey - 23g per 3oz  Fish - 20-25g per 3oz  Beef - 18-23g per 3oz  Navy beans - 20g per cup  Cottage cheese - 14g per 1/2 cup  Lentils - 13g per 1/4 cup  Beef jerky 13g per 1oz  2% milk - 8g per cup  Peanut butter - 8g per 2 tablespoons  Eggs - 6g per egg  Mixed nuts - 6g per 2oz     Miguel Hampton M.D. November 15, 2023    Call us at [phone] if you have any questions about your wounds, have redness or swelling around your wound, have a fever of 101 degrees Fahrenheit or greater or if you have any other problems or concerns. We answer the phone Monday through Friday 8 am to 4 pm, please leave a message as we check the voicemail frequently throughout the day.     If you had a positive experience please indicate that on your patient satisfaction survey form that [facility] will be sending you.    It was a pleasure meeting with you today.  Thank you for allowing me and my team the privilege of caring for you today.  YOU are the reason we are here, and I truly hope we provided you with the excellent service you deserve.  Please let us know if there is anything else we can do for you so that we can be sure you are leaving completely satisfied with your care experience.      If you have any billing related questions please call the [facility] Business office at [phone]. The clinic staff does not handle billing related matters.    If you are scheduled to have a follow up appointment, you will receive a reminder call the day before your visit. On the appointment day please arrive 15 minutes prior to your appointment time. If you are unable to keep that appointment, please call the clinic to cancel or reschedule. If you are more than 10 minutes late or greater for your scheduled appointment time, the clinic policy is that you may be  asked to reschedule.

## 2023-11-15 NOTE — PROGRESS NOTES
RN spoke with [facility] Nurse[facility]ai. Expressed concern that MD orders and wound care recommendations were not being followed. RN obtained Fax number to send physician orders to Dai to enable follow through of treatments and bandage regiments. [facility] FAX: Ricki Lala: [phone]

## 2023-11-15 NOTE — TELEPHONE ENCOUNTER
Returned Pharmacist's call: Discussed reduced dose of warfarin and next INR 11/20/23. No other questions or concerns.  Lauryn CONTRERAS RN  Anticoagulation Team

## 2023-11-15 NOTE — LETTER
[facility]  [address]  [facility] [address]  Phone: [phone]  Fax: [phone]   November 15, 2023        Petey Archibald  [address]  [facility] [address]            Dear Petey,    You have recently been prescribed Bactrim DS x14 days and as your prescriber noted, there is an interaction with warfarin which requires an empiric reduction in your warfarin dosing.     The Anticoagulation RN reviewed this over the phone with Bertrand, your , and a new warfarin prescription was sent to [facility] for 3 mg tablets; to take one tablet on Wednesdays and Saturdays, and two tablets on all other days until your next INR, scheduled Monday, 11/20/23 at 11:45 a.m.     If you have any other questions, please call the number below.    Sincerely,    Lauryn Chatman RN  [facility]  Anticoagulation Clinic  [phone]

## 2023-11-15 NOTE — ADDENDUM NOTE
Encounter addended by: Miguel Hampton MD on: 11/15/2023 10:33 AM   Actions taken: Clinical Note Signed

## 2023-11-19 ENCOUNTER — HEALTH MAINTENANCE LETTER (OUTPATIENT)
Age: 63
End: 2023-11-19

## 2023-11-20 ENCOUNTER — ANTICOAGULATION THERAPY VISIT (OUTPATIENT)
Dept: ANTICOAGULATION | Facility: CLINIC | Age: 63
End: 2023-11-20

## 2023-11-20 ENCOUNTER — LAB (OUTPATIENT)
Dept: LAB | Facility: CLINIC | Age: 63
End: 2023-11-20
Payer: MEDICARE

## 2023-11-20 DIAGNOSIS — Z86.711 HISTORY OF PULMONARY EMBOLISM: ICD-10-CM

## 2023-11-20 DIAGNOSIS — Z79.01 LONG TERM CURRENT USE OF ANTICOAGULANT THERAPY: ICD-10-CM

## 2023-11-20 DIAGNOSIS — Z86.711 HISTORY OF PULMONARY EMBOLISM: Primary | ICD-10-CM

## 2023-11-20 LAB — INR BLD: 2.6 (ref 0.9–1.1)

## 2023-11-20 PROCEDURE — 85610 PROTHROMBIN TIME: CPT

## 2023-11-20 PROCEDURE — 36416 COLLJ CAPILLARY BLOOD SPEC: CPT

## 2023-11-20 RX ORDER — WARFARIN SODIUM 6 MG/1
TABLET ORAL
Qty: 16 TABLET | Refills: 0 | Status: SHIPPED | OUTPATIENT
Start: 2023-11-20 | End: 2023-11-24

## 2023-11-20 RX ORDER — WARFARIN SODIUM 3 MG/1
TABLET ORAL
Qty: 3 TABLET | Refills: 0 | Status: SHIPPED | OUTPATIENT
Start: 2023-11-20 | End: 2023-12-08 | Stop reason: DRUGHIGH

## 2023-11-20 NOTE — PROGRESS NOTES
ANTICOAGULATION MANAGEMENT     Petey Archibald 62 year old male is on warfarin with therapeutic INR result. (Goal INR 2.0-3.0)    Recent labs: (last 7 days)     11/20/23  1130   INR 2.6*       ASSESSMENT     Source(s): Chart Review and Home Care/Facility Nurse     Warfarin doses taken: Warfarin taken as instructed  Diet: No new diet changes identified  Medication/supplement changes:  On bactrim until 11/29/23. Because was on a 14 day course, maintenance dose was  lowered and is to be resumed after finishing.   New illness, injury, or hospitalization: No  Signs or symptoms of bleeding or clotting: No  Previous result: Therapeutic last 2(+) visits  Additional findings: None       PLAN     Recommended plan for no diet, medication or health factor changes affecting INR     Dosing Instructions: Continue your current warfarin dose with next INR in 2 weeks       Summary  As of 11/20/2023      Full warfarin instructions:  11/22: 3 mg; 11/25: 3 mg; 11/29: 3 mg; Otherwise 6 mg every day   Next INR check:  12/8/2023               Telephone call with [facility] nurse who verbalizes understanding and agrees to plan    Lab visit scheduled    Education provided:   Please call back if any changes to your diet, medications or how you've been taking warfarin    Plan made per [facility] anticoagulation protocol    Cameron Blackmon RN  Anticoagulation Clinic  11/20/2023    _______________________________________________________________________     Anticoagulation Episode Summary       Current INR goal:  2.0-3.0   TTR:  73.7% (1 y)   Target end date:  Indefinite   Send INR reminders to:  [facility]    Indications    History of pulmonary embolism [Z86.711]  Long term current use of anticoagulant therapy [Z79.01]             Comments:  REM care home; A new Coumadin Prescription will need to sent to [facility] with current dose and next INR check.             Anticoagulation Care Providers       Provider Role  Specialty Phone number    Demetri Archer MD Referring Internal Medicine [phone]

## 2023-11-21 ENCOUNTER — MEDICAL CORRESPONDENCE (OUTPATIENT)
Dept: HEALTH INFORMATION MANAGEMENT | Facility: CLINIC | Age: 63
End: 2023-11-21
Payer: MEDICARE

## 2023-11-24 ENCOUNTER — ANTICOAGULATION THERAPY VISIT (OUTPATIENT)
Dept: ANTICOAGULATION | Facility: CLINIC | Age: 63
End: 2023-11-24

## 2023-11-24 ENCOUNTER — LAB (OUTPATIENT)
Dept: LAB | Facility: CLINIC | Age: 63
End: 2023-11-24
Payer: MEDICARE

## 2023-11-24 DIAGNOSIS — Z86.711 HISTORY OF PULMONARY EMBOLISM: Primary | ICD-10-CM

## 2023-11-24 DIAGNOSIS — Z86.711 HISTORY OF PULMONARY EMBOLISM: ICD-10-CM

## 2023-11-24 DIAGNOSIS — Z79.01 LONG TERM CURRENT USE OF ANTICOAGULANT THERAPY: ICD-10-CM

## 2023-11-24 LAB — INR BLD: 1.9 (ref 0.9–1.1)

## 2023-11-24 PROCEDURE — 85610 PROTHROMBIN TIME: CPT

## 2023-11-24 PROCEDURE — 36416 COLLJ CAPILLARY BLOOD SPEC: CPT

## 2023-11-24 RX ORDER — WARFARIN SODIUM 6 MG/1
TABLET ORAL
Qty: 29 TABLET | Refills: 0 | Status: SHIPPED | OUTPATIENT
Start: 2023-11-24 | End: 2023-12-08

## 2023-11-24 NOTE — PROGRESS NOTES
ANTICOAGULATION MANAGEMENT     Petey Archibald 62 year old male is on warfarin with subtherapeutic INR result. (Goal INR 2.0-3.0)    Recent labs: (last 7 days)     11/24/23  1038   INR 1.9*       ASSESSMENT     Source(s): Chart Review and Patient/Caregiver Call     Warfarin doses taken: Warfarin taken as instructed, maintenance dose reduced per Appendix G 11/15/23 x2 weeks  Diet: No new diet changes identified  Medication/supplement changes: None noted, patient continues with Bactrim DS for bacterial overgrowth of lower extremity wound through 11/29/23  New illness, injury, or hospitalization: No,  states wound is improving with course of antibiotics  Signs or symptoms of bleeding or clotting: No  Previous result: Therapeutic last 2(+) visits  Additional findings:  patient continues to smoke profusely which does not aide in his wound healing       PLAN     Recommended plan for ongoing change(s) affecting INR     Dosing Instructions:  Saturday's 3 mg dose should be changed back to 6 mg and next week's 3 mg dose should be moved from Wednesday to Monday, then continue your current warfarin dose with next INR in 2 weeks       Summary  As of 11/24/2023      Full warfarin instructions:  11/27: 3 mg; Otherwise 6 mg every day   Next INR check:  12/8/2023               Telephone call with , Bertrand, who agrees to plan and repeated back plan correctly    Lab visit was already scheduled.  Prescription e-prescribed to [facility]. On-site [facility] RN to mail [facility]S to [facility].    Education provided:   Please call back if any changes to your diet, medications or how you've been taking warfarin  Contact [phone]  with any changes, questions or concerns.     Plan made with [facility] Pharmacist Landy Chatman, RN  Anticoagulation Clinic  11/24/2023    _______________________________________________________________________     Anticoagulation Episode Summary       Current INR goal:   2.0-3.0   TTR:  74.7% (1 y)   Target end date:  Indefinite   Send INR reminders to:  [facility]    Indications    History of pulmonary embolism [Z86.711]  Long term current use of anticoagulant therapy [Z79.01]             Comments:  [facility] jail; A new Coumadin Prescription will need to sent to [facility] with current dose and next INR check.             Anticoagulation Care Providers       Provider Role Specialty Phone number    Demetri Archer MD Referring Internal Medicine [phone]

## 2023-12-06 ENCOUNTER — HOSPITAL ENCOUNTER (OUTPATIENT)
Dept: WOUND CARE | Facility: CLINIC | Age: 63
Discharge: HOME | End: 2023-12-06
Attending: FAMILY MEDICINE | Admitting: FAMILY MEDICINE
Payer: MEDICARE

## 2023-12-06 VITALS — HEART RATE: 67 BPM | SYSTOLIC BLOOD PRESSURE: 131 MMHG | DIASTOLIC BLOOD PRESSURE: 80 MMHG | TEMPERATURE: 96.8 F

## 2023-12-06 DIAGNOSIS — L97.522 ULCER OF GREAT TOE, LEFT, WITH FAT LAYER EXPOSED (H): ICD-10-CM

## 2023-12-06 DIAGNOSIS — R60.0 LOWER EXTREMITY EDEMA: ICD-10-CM

## 2023-12-06 DIAGNOSIS — L97.522 ULCER OF LEFT FOOT WITH FAT LAYER EXPOSED (H): ICD-10-CM

## 2023-12-06 DIAGNOSIS — L97.512 ULCER OF RIGHT FOOT WITH FAT LAYER EXPOSED (H): ICD-10-CM

## 2023-12-06 DIAGNOSIS — S91.302D OPEN WOUND OF LEFT FOOT, SUBSEQUENT ENCOUNTER: Primary | ICD-10-CM

## 2023-12-06 PROCEDURE — 97602 WOUND(S) CARE NON-SELECTIVE: CPT

## 2023-12-06 PROCEDURE — 99214 OFFICE O/P EST MOD 30 MIN: CPT | Performed by: FAMILY MEDICINE

## 2023-12-06 NOTE — ADDENDUM NOTE
Encounter addended by: May Calderon RN on: 12/6/2023 3:00 PM   Actions taken: Order list changed, Diagnosis association updated

## 2023-12-06 NOTE — DISCHARGE INSTRUCTIONS
"Petey Archibald      1960      A DME order for supplies has been placed to [facility]. If there are any issues with your order including not receiving the order please call [facility] at [phone]. They can also provide a tracking number for you.     Dressing changes outside of clinic are being performed by [facility] Staff     Lives at [facility] (3x weekly wound care)  FAX ORDERS ATTENTION JUAN DANIEL: [phone]     Plan:  Prescription for Antibiotic sent to [facility] (completed)  Good job on stopping smoking, Continue to try and wean off of the nicotine inhaler to aide in healing  Patient would like to hold off on vein study (venous competency) at this time  For your concerns of your Elbows and tremors - see an your primary physician, regarding probable inflamed Bursa Sack  NO SOAKING YOUR FEET! You can get them wet in the shower between bandage changes, but do NOT SOAK your feet  Dressings need to be changed immediately following showering;      Wound Dressing Change: Left Dorsal 1st Toe  - Wash your hands with soap and water before you begin your dressing change and prepare a clean surface for dressings.  - Cleanse with mild unscented soap and water (such as Cetaphil, Cerave or Dove)  - Apply zinc oxide barrier paste to any white macerated periwound skin or any red/irritated/excoriated skin  - Apply 1/6th piece of 4x4 Melgisorb alginate to wound bed  - Cover with 1/2 of 5x9 ABD  - Secure with 1/2 roll of 4\" conforming roll gauze and medical tape  - Secure with Spandagrip size F from base of toes to the knee  Change 3 times weekly and as needed for drainage     Wound Dressing Change: Right Dorsal Foot  - Wash your hands with soap and water before you begin your dressing change and prepare a clean surface for dressings.  - Cleanse with mild unscented soap and water (such as Cetaphil, Cerave or Dove)  - Apply zinc oxide barrier paste to any white macerated periwound skin or any " "red/irritated/excoriated skin (can buy Desitin over the counter)  - Apply 1/6th piece of 4x4 Melgisorb alginate to wound bed  - Cover with 1/2 of 5x9 ABD  - Secure with 1 roll of 4\" conforming roll gauze and medical tape  - Secure with Spandagrip Size F from base of toes to the knee  Change 3 times weekly and as needed for drainage     Wound Dressing Change: Left Dorsal Foot  - Wash your hands with soap and water before you begin your dressing change and prepare a clean surface for dressings.  - Cleanse with mild unscented soap and water (such as Cetaphil, Cerave or Dove)  - Apply zinc oxide barrier paste to any white macerated periwound skin or any red/irritated/excoriated skin  - Apply 1/4th piece of 4x4 Melgisorb alginate to wound bed  - Cover with 1/2 of 5x9 ABD  - Secure with 1/2 roll of 4\" conforming roll gauze and medical tape  - Secure with Spandagrip size F from base of toes to the knee  Change 3 times weekly and as needed for drainage     Wound Dressing Change: Right posterior lower leg - HEALED:)  - Wash your hands with soap and water before you begin your dressing change and prepare a clean surface for dressings.  - Cleanse with mild unscented soap and water (such as Cetaphil, Cerave or Dove)  -  Apply a ceramide based lotion (Cera-Ve, Vanicream, Cetaphil)  - Wear Spandage size 7 (MT spandage size 4) from base of toes to the knee  Change every other day and as needed for soilage     Elevation:  It is recommended that you elevate your legs above the level of your heart for 30 minutes: approximately 2-3 times each day  Ways to do this:  - Lay on the couch or your bed and prop your legs up on pillows  - Recline back as far as you can go in your recliner and prop your legs on pillows.  Doing these things will help reduce the edema in your legs.     Protein:  A diet high in protein is important for wound healing, we recommend getting 90 grams  of protein per day.  Taking protein shakes or bars are a good way " to get extra protein in your diet.  Good sources of protein:  Pork 26g per 3 oz  Whey protein powder - 24g per scoop (on average)  Greek yogurt - 23g per 8oz  Chicken or Turkey - 23g per 3oz  Fish - 20-25g per 3oz  Beef - 18-23g per 3oz  Navy beans - 20g per cup  Cottage cheese - 14g per 1/2 cup  Lentils - 13g per 1/4 cup  Beef jerky 13g per 1oz  2% milk - 8g per cup  Peanut butter - 8g per 2 tablespoons  Eggs - 6g per egg  Mixed nuts - 6g per 2oz       Miguel Hampton M.D. December 6, 2023    Call us at [phone] if you have any questions about your wounds, have redness or swelling around your wound, have a fever of 101 degrees Fahrenheit or greater or if you have any other problems or concerns. We answer the phone Monday through Friday 8 am to 4 pm, please leave a message as we check the voicemail frequently throughout the day.     If you had a positive experience please indicate that on your patient satisfaction survey form that [facility] will be sending you.    It was a pleasure meeting with you today.  Thank you for allowing me and my team the privilege of caring for you today.  YOU are the reason we are here, and I truly hope we provided you with the excellent service you deserve.  Please let us know if there is anything else we can do for you so that we can be sure you are leaving completely satisfied with your care experience.      If you have any billing related questions please call the [facility] Business office at [phone]. The clinic staff does not handle billing related matters.    If you are scheduled to have a follow up appointment, you will receive a reminder call the day before your visit. On the appointment day please arrive 15 minutes prior to your appointment time. If you are unable to keep that appointment, please call the clinic to cancel or reschedule. If you are more than 10 minutes late or greater for your scheduled appointment time, the clinic policy is that you may be  asked to reschedule.

## 2023-12-06 NOTE — ADDENDUM NOTE
Encounter addended by: Miguel Hampton MD on: 12/6/2023 1:47 PM   Actions taken: Clinical Note Signed

## 2023-12-06 NOTE — PROGRESS NOTES
Wound Clinic Note         Visit date: 12/06/2023       Cheif Complaint:     Petey Archibald is a 63 year old   male had concerns including WOUND CARE.  The patient has lower extremity edema and bilateral leg ulcers.          HISTORY OF PRESENT ILLNESS:    Petey Archibald reports the wound has been present since mid 2022.  The wound began without a clear cause.   He reports prior to this wound developing he has not had other difficult to heal wounds on the legs.    Today the patient is again accompanied to the wound clinic by an employee at his group home and they both provide portions of the interval history.    They have been bandaging the wounds beginning with a zinc oxide barrier cream applied to the periwound followed by alginate, ABD pad and a Spandage stocking change 3 times a week.  There has been light to moderate serous drainage from the wounds.  This been no difficulties with the dressing changes.    Since his last clinic visit with me he is completely taken the antibiotics that I prescribed with no symptoms of an adverse reaction.      The pateint denies fevers or chills.  They report the pain from the wound has been 3/10 and has remained about the same recently.      The patient reports propping up their legs occasionally during the day, but they do not elevate their legs above the level of their heart.  The patient confirms they do sleep in a bed.  He still has not been elevating his legs as I recommended.    Today the patient reports maintaining a high protein diet, but has not been taking protein supplements lately.        The patient denies a history of diabetes or chronic steroid use.  He continues to use the nicotine inhaler about the same amount compared with his last clinic visit.  He reports he plans to start using a vape inhaler that does not have nicotine.        The patient has not had any symptoms of infection relating to the wound recently and is not currently on antibiotics.        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       Surgical Hx:   Past Surgical History:   Procedure Laterality Date    COLONOSCOPY N/A 4/15/2019    Procedure: COMBINED COLONOSCOPY, SINGLE OR MULTIPLE BIOPSY/POLYPECTOMY BY BIOPSY;  Surgeon: Jovana Ohara MD;  Location:  GI    COLONOSCOPY N/A 6/7/2021    Procedure: COLONOSCOPY with polypectomies;  Surgeon: Sahil Cid MD;  Location: [facility] OR    EXCISE MALIGNANT LESIONS, TRUNK/ARMS/LEGS 0.5CM OR LESS Left 5/26/2017    Procedure: EXCISE MALIGNANT LESIONS, TRUNK/ARMS/LEGS 0.5CM OR LESS;  EXCISION LEFT ELBOW MASS;  Surgeon: Jose Azevedo MD;  Location:  GI    RECTAL SURGERY      perianal abscess?          Allergies:    Allergies   Allergen Reactions    Bees     Clavulanic Acid     Amoxicillin-Pot Clavulanate Rash     Augmentin    Penicillins Rash              Medication History:    Current Outpatient Medications   Medication Sig    acetaminophen (TYLENOL) 325 MG tablet Take 1-2 tablets (325-650 mg) by mouth every 6 hours as needed for mild pain, fever or headaches    albuterol (ALBUTEROL) 108 (90 BASE) MCG/ACT inhaler Inhale 2 puffs into the lungs every 6 hours as needed    ARIPiprazole (ABILIFY) 5 MG tablet Take 5 mg by mouth  "daily    buPROPion (WELLBUTRIN SR) 150 MG 12 hr tablet Take 150 mg by mouth 2 times daily    Cadexomer Iodine, topical, 0.9% (IODOSORB) 0.9 % GEL gel Apply topically daily Apply to left foot wound daily after cleansing the wound and blotting it dry. (Patient not taking: Reported on 8/8/2023)    cloNIDine (CATAPRES) 0.1 MG tablet Take 0.1 mg by mouth daily as needed    clotrimazole (LOTRIMIN) 1 % external cream Apply topically 2 times daily    diclofenac (VOLTAREN) 1 % topical gel Apply 4 g topically 2 times daily as needed for moderate pain    diphenhydrAMINE (BENADRYL) 25 MG capsule Take 50 mg by mouth every 6 hours as needed for itching or allergies    Emollient (CERAVE) CREA Externally apply topically daily    EPINEPHrine (ANY BX GENERIC EQUIV) 0.3 MG/0.3ML injection 2-pack INJECT 0.3MG INTRAMUSCULARLY ONE TIME FOR ONE DOSE AS NEEDED FOR ANAPHYLAXIS. MAY REPEAT ONE TIME IN 5-15 MINUTES IF RESPONSE TO INITIAL DOSE IS INADEQUATE. *1 TOTAL FILL, ORIGINAL FROM EMERGENCY ROOM*    EPINEPHrine (EPIPEN 2-BRE) 0.3 MG/0.3ML injection 2-pack INJECT 0.3MG INTRAMUSCULAR ONE TIME FOR ONE DOSE AS NEEDED FOR ANAPHYLAXIS (CALL 911 IF YOU HAVE TO GIVE) (FOR BEE STINGS) **LABEL EACH PEN*    gabapentin (NEURONTIN) 100 MG capsule Take 400 mg by mouth 3 times daily At 0900, 1200, and 2000    Gauze Pads & Dressings (GAUZE PADS 4\"X4\") 4\"X4\" PADS 1 each 3 times daily as needed    loratadine (CLARITIN) 10 MG tablet Take 10 mg by mouth daily    LORazepam (ATIVAN) 1 MG tablet Take 1 mg by mouth daily as needed for anxiety    montelukast (SINGULAIR) 10 MG tablet TAKE 1 TABLET BY MOUTH EVERY MORNING (ASTHMA)    Multiple Vitamin (DAILY-JOVAN) TABS TAKE 1 TABLET BY MOUTH EVERY MORNING (VITAMINS)    naltrexone (DEPADE/REVIA) 50 MG tablet Take 100 mg by mouth daily    nicotine (COMMIT) 2 MG lozenge Place 2 mg inside cheek daily as needed    nystatin-triamcinolone (MYCOLOG II) 815218-2.1 UNIT/GM-% external cream Apply topically 2 times daily For " 1-2 weeks.    omeprazole (PRILOSEC) 40 MG DR capsule Take 1 capsule (40 mg) by mouth daily    order for DME Equipment being ordered: roll of micropore tape to dress foot wound bid    order for DME Equipment being ordered: 1 surgical shoe    order for DME [employer] [facility] Phone [phone] Fax [phone]  EdemaWear Size Medium/Yellow qty 4 sleeves  Length of Need: 1 month  Frequency of dressing change daily    order for DME [employer] Stockings  Phone #[phone]  Fax #[phone]  Coolflex Velcro wraps    Length of Need: Life Time  # of Pairs 1 set    order for DME [employer] Order Phone [phone] Fax [phone]  Primary Dressing Hydrofera Blue Ready   Qty 5 sheets  Secondary Dressing 4' roll gauze Qty 30  Secondary Dressing 2' medipore tape Qty 1  Secondary Dressing 4x4 gauze loaf Qty  1  Length of Need: 1 month  Frequency of dressing change: daily    order for DME Oxygen 2 Li/min  at night and bled into BiPAP    order for DME Equipment being ordered: CPAP with mask and tubing    order for DME Equipment being ordered: support compression hose BK  2 pair black 30mm HG   To be applied on arising & removed while lying down to go to sleep    polyethylene glycol (MIRALAX) 17 GM/Dose powder Take 17 g (1 capful) by mouth daily as needed for constipation    predniSONE (DELTASONE) 20 MG tablet Take two tablets (= 40mg) each day for 5 (five) days    PULMICORT FLEXHALER 180 MCG/ACT inhaler INHALE 2 PUFFS INTO THE LUNGS TWICE DAILY.    SENNA-TABS 8.6 MG tablet Take 1 tablet by mouth daily    sertraline (ZOLOFT) 100 MG tablet Take 200 mg by mouth daily    simvastatin (ZOCOR) 40 MG tablet TAKE 1 TABLET BY MOUTH EVERY MORNING.  (CHOLESTEROL)    SPIRIVA HANDIHALER 18 MCG inhaled capsule INHALE CONTENTS OF 1 CAPSULE INTO THE LUNGS ONCE DAILY    spironolactone (ALDACTONE) 25 MG tablet TAKE 1 TABLET BY MOUTH ONCE DAILY. (HIGH BLOOD PRESSURE)    tiZANidine (ZANAFLEX) 2 MG tablet Take 1 tablet (2 mg) by  mouth 3 times daily    tiZANidine (ZANAFLEX) 2 MG tablet Take 1 tablet (2 mg) by mouth 3 times daily    traZODone (DESYREL) 100 MG tablet Take 100 mg by mouth At Bedtime    triamcinolone (KENALOG) 0.1 % external cream Apply to AA BID x 1-2 week then PRN only    varenicline (CHANTIX) 1 MG tablet Take 1 tablet (1 mg) by mouth 2 times daily (Patient not taking: Reported on 8/8/2023)    vitamin B complex with vitamin C (STRESS TAB) tablet Take 1 tablet by mouth daily    warfarin ANTICOAGULANT (COUMADIN) 3 MG tablet 3 mg on Wednesdays and Saturdays; 6 mg all other days; until finished Bactrim on 11/29/23 then resume 6 mg daily with next INR 12/8/23    warfarin ANTICOAGULANT (COUMADIN) 6 MG tablet Take two tablets (6 mg) every day until next INR on 12/8/23, except on 11/27/23 take only one tablet (3 mg).     No current facility-administered medications for this encounter.         Tobacco History:  reports that he has been smoking cigarettes. He has a 30.00 pack-year smoking history. He has never used smokeless tobacco.       REVIEW OF SYMPTOMS:   The review of systems was negative except as noted in the HPI.           PHYSICAL EXAMINATION:     Temp 96.8  F (36  C) (Temporal)            GENERAL: The patient overall appears well and is no acute distress.   HEAD: normocephalic   EYES: Sclera and conjunctiva clear   NECK: no obvious masses   LUNGS: breathing is unlabored.   EXTREMITIES: No clubbing, cyanosis or edema   SKIN: No rashes or other abnormalities except as noted under the Wound section below.   NEUROLOGICAL: normal motor and sensory function   EDEMA: Moderate       WOUND: The wound appears healthy with no sign of infection.   Wound bed: fibrinous material   Periwound: healthy intact skin  Both wounds are smaller today compared with his last clinic visit, there is no longer any periwound maceration.      Also see below for wound details:       Circumferential volume measures:             No data to display                 Ulceration(s)/Wound(s):   Please see the media tab under the chart review for pictures of the wounds.  Nursing staff removed dressings and cleansed wound.    Wound (used by [facility] only) 08/22/23 1341 Left dorsal 1 toe ulceration, venous (Active)   Thickness/Stage full thickness 12/06/23 1116   Base slough 12/06/23 1116   Periwound macerated;redness;swelling;excoriated 12/06/23 1116   Periwound Temperature warm 12/06/23 1116   Periwound Skin Turgor soft 12/06/23 1116   Edges open 12/06/23 1116   Length (cm) 1.4 12/06/23 1116   Width (cm) 2 12/06/23 1116   Depth (cm) 0.2 12/06/23 1116   Wound (cm^2) 2.8 cm^2 12/06/23 1116   Wound Volume (cm^3) 0.56 cm^3 12/06/23 1116   Wound healing % 32.37 12/06/23 1116   Drainage Characteristics/Odor creamy 12/06/23 1116   Drainage Amount moderate 12/06/23 1116   Care, Wound non-select wound debridement performed. 12/06/23 1116       Wound (used by [facility] only) 11/15/23 0906 Right lateral;dorsal foot unspecified (Active)   Thickness/Stage full thickness 12/06/23 1116   Base red;scab;dry 12/06/23 1116   Periwound excoriated;redness 12/06/23 1116   Periwound Temperature warm 12/06/23 1116   Periwound Skin Turgor soft 12/06/23 1116   Edges open 12/06/23 1116   Length (cm) 1 12/06/23 1116   Width (cm) 1 12/06/23 1116   Depth (cm) 0.1 12/06/23 1116   Wound (cm^2) 1 cm^2 12/06/23 1116   Wound Volume (cm^3) 0.1 cm^3 12/06/23 1116   Wound healing % 44.44 12/06/23 1116   Drainage Characteristics/Odor serosanguineous 12/06/23 1116   Drainage Amount moderate 12/06/23 1116   Care, Wound non-select wound debridement performed. 12/06/23 1116       Wound (used by [facility] only) 11/15/23 0911 Left dorsal foot unspecified (Active)   Thickness/Stage full thickness 12/06/23 1116   Base slough;granulating 12/06/23 1116   Periwound excoriated;edematous 12/06/23 1116   Periwound Temperature warm 12/06/23 1116   Periwound Skin Turgor soft 12/06/23 1116   Edges open 12/06/23 1116   Length (cm) 2.4  12/06/23 1116   Width (cm) 2.4 12/06/23 1116   Depth (cm) 0.2 12/06/23 1116   Wound (cm^2) 5.76 cm^2 12/06/23 1116   Wound Volume (cm^3) 1.15 cm^3 12/06/23 1116   Wound healing % 67.27 12/06/23 1116   Drainage Characteristics/Odor creamy 12/06/23 1116   Drainage Amount moderate 12/06/23 1116   Care, Wound non-select wound debridement performed. 12/06/23 1116                     Recent Labs   Lab Test 06/03/21  1448 03/23/21  1042 07/28/20  0929   A1C 5.7* 5.6 5.7*          Recent Labs   Lab Test 02/14/23  0059 06/08/22  1835 07/28/20  0929   ALBUMIN 3.9 3.2* 3.3*              No sharp debridement performed today.             ASSESSMENT:   This is a 63 year old  male with lower extremity edema and bilateral lower extremity wounds.          PLAN:   We will bandage all of the wound areas beginning with a zinc oxide barrier cream applied to the periwound followed by alginate, an ABD pad and the spandagrip stocking change 3 times a week.  I would like them to use a spandagrip stocking rather than a Spandage stocking for more compression.    I previously discussed the option of getting a venous insufficiency ultrasound to evaluate for areas of superficial venous insufficiency which may have led to the wound formation.  However the patient would rather hold off on ordering this diagnostic study at this time.    Separate from his wound care instructions I then discussed the treatment of his lower extremity edema.  This primarily involves compression and elevation.  I have strongly encouraged him to wear his spandagrip stockings every day.  I have again explained to the patient today that controlling the edema is probably the most important thing we can do to help heal the wound.  I have specifically recommended that they lay down with their legs above the level of the heart for 30 minutes at least twice a day.  I emphasized that if we can not control the edema we will likely not be able to get this wound to heal.   I also  discussed this with the group home employee to help the patient remember to elevate his legs during the day.  I have encouraged the patient to continue on their high protein diet to aid in wound healing.   The patient will return to the wound clinic in 2-3 weeks to see me again.        30 minutes spent on the date of the encounter doing chart review, history and exam, documentation and further activities per the note, this time excludes any procedure time    Copied text has been reviewed and changes were made as necessary.     Miguel Hampton MD  12/06/2023   12:23 PM   [facility]Wound  [phone]    This note was electronically signed by Miguel Hampton MD        Further instructions from your care team         Petey Archibald      1960      A DME order for supplies has been placed to [facility]. If there are any issues with your order including not receiving the order please call [facility] at [phone]. They can also provide a tracking number for you.     Dressing changes outside of clinic are being performed by Group Home Staff     Lives at [facility] (3x weekly wound care)  FAX ORDERS ATTENTION JUAN DANIEL: [phone]     Plan:  Prescription for Antibiotic sent to [facility] (completed)  Good job on stopping smoking, Continue to try and wean off of the nicotine inhaler to aide in healing  Patient would like to hold off on vein study (venous competency) at this time  For your concerns of your Elbows and tremors - see an your primary physician, regarding probable inflamed Bursa Sack  NO SOAKING YOUR FEET! You can get them wet in the shower between bandage changes, but do NOT SOAK your feet  Dressings need to be changed immediately following showering;      Wound Dressing Change: Left Dorsal 1st Toe  - Wash your hands with soap and water before you begin your dressing change and prepare a clean surface for dressings.  - Cleanse with mild unscented soap and water  "(such as Cetaphil, Cerave or Dove)  - Apply zinc oxide barrier paste to any white macerated periwound skin or any red/irritated/excoriated skin  - Apply 1/6th piece of 4x4 Melgisorb alginate to wound bed  - Cover with 1/2 of 5x9 ABD  - Secure with 1/2 roll of 4\" conforming roll gauze and medical tape  - Secure with Spandagrip size F from base of toes to the knee  Change 3 times weekly and as needed for drainage     Wound Dressing Change: Right Dorsal Foot  - Wash your hands with soap and water before you begin your dressing change and prepare a clean surface for dressings.  - Cleanse with mild unscented soap and water (such as Cetaphil, Cerave or Dove)  - Apply zinc oxide barrier paste to any white macerated periwound skin or any red/irritated/excoriated skin (can buy Desitin over the counter)  - Apply 1/6th piece of 4x4 Melgisorb alginate to wound bed  - Cover with 1/2 of 5x9 ABD  - Secure with 1 roll of 4\" conforming roll gauze and medical tape  - Secure with Spandagrip Size F from base of toes to the knee  Change 3 times weekly and as needed for drainage     Wound Dressing Change: Left Dorsal Foot  - Wash your hands with soap and water before you begin your dressing change and prepare a clean surface for dressings.  - Cleanse with mild unscented soap and water (such as Cetaphil, Cerave or Dove)  - Apply zinc oxide barrier paste to any white macerated periwound skin or any red/irritated/excoriated skin  - Apply 1/4th piece of 4x4 Melgisorb alginate to wound bed  - Cover with 1/2 of 5x9 ABD  - Secure with 1/2 roll of 4\" conforming roll gauze and medical tape  - Secure with Spandagrip size F from base of toes to the knee  Change 3 times weekly and as needed for drainage     Wound Dressing Change: Right posterior lower leg - HEALED:)  - Wash your hands with soap and water before you begin your dressing change and prepare a clean surface for dressings.  - Cleanse with mild unscented soap and water (such as Cetaphil, " Cerave or Dove)  -  Apply a ceramide based lotion (Cera-Ve, Vanicream, Cetaphil)  - Wear Spandage size 7 (MT spandage size 4) from base of toes to the knee  Change every other day and as needed for soilage     Elevation:  It is recommended that you elevate your legs above the level of your heart for 30 minutes: approximately 2-3 times each day  Ways to do this:  - Lay on the couch or your bed and prop your legs up on pillows  - Recline back as far as you can go in your recliner and prop your legs on pillows.  Doing these things will help reduce the edema in your legs.     Protein:  A diet high in protein is important for wound healing, we recommend getting 90 grams  of protein per day.  Taking protein shakes or bars are a good way to get extra protein in your diet.  Good sources of protein:  Pork 26g per 3 oz  Whey protein powder - 24g per scoop (on average)  Greek yogurt - 23g per 8oz  Chicken or Turkey - 23g per 3oz  Fish - 20-25g per 3oz  Beef - 18-23g per 3oz  Navy beans - 20g per cup  Cottage cheese - 14g per 1/2 cup  Lentils - 13g per 1/4 cup  Beef jerky 13g per 1oz  2% milk - 8g per cup  Peanut butter - 8g per 2 tablespoons  Eggs - 6g per egg  Mixed nuts - 6g per 2oz       Miguel Hampton M.D. December 6, 2023    Call us at [phone] if you have any questions about your wounds, have redness or swelling around your wound, have a fever of 101 degrees Fahrenheit or greater or if you have any other problems or concerns. We answer the phone Monday through Friday 8 am to 4 pm, please leave a message as we check the voicemail frequently throughout the day.     If you had a positive experience please indicate that on your patient satisfaction survey form that [facility] will be sending you.    It was a pleasure meeting with you today.  Thank you for allowing me and my team the privilege of caring for you today.  YOU are the reason we are here, and I truly hope we provided you with the excellent service  you deserve.  Please let us know if there is anything else we can do for you so that we can be sure you are leaving completely satisfied with your care experience.      If you have any billing related questions please call the [facility] Business office at [phone]. The clinic staff does not handle billing related matters.    If you are scheduled to have a follow up appointment, you will receive a reminder call the day before your visit. On the appointment day please arrive 15 minutes prior to your appointment time. If you are unable to keep that appointment, please call the clinic to cancel or reschedule. If you are more than 10 minutes late or greater for your scheduled appointment time, the clinic policy is that you may be asked to reschedule.         ,

## 2023-12-06 NOTE — ADDENDUM NOTE
Encounter addended by: Leah Bonilla RN on: 12/6/2023 12:57 PM   Actions taken: Flowsheet accepted, Edited Discharge Instructions

## 2023-12-08 ENCOUNTER — ANTICOAGULATION THERAPY VISIT (OUTPATIENT)
Dept: ANTICOAGULATION | Facility: CLINIC | Age: 63
End: 2023-12-08

## 2023-12-08 ENCOUNTER — LAB (OUTPATIENT)
Dept: LAB | Facility: CLINIC | Age: 63
End: 2023-12-08
Payer: MEDICARE

## 2023-12-08 DIAGNOSIS — Z79.01 LONG TERM CURRENT USE OF ANTICOAGULANT THERAPY: ICD-10-CM

## 2023-12-08 DIAGNOSIS — Z86.711 HISTORY OF PULMONARY EMBOLISM: Primary | ICD-10-CM

## 2023-12-08 DIAGNOSIS — Z86.711 HISTORY OF PULMONARY EMBOLISM: ICD-10-CM

## 2023-12-08 LAB — INR BLD: 1.1 (ref 0.9–1.1)

## 2023-12-08 PROCEDURE — 36416 COLLJ CAPILLARY BLOOD SPEC: CPT

## 2023-12-08 PROCEDURE — 85610 PROTHROMBIN TIME: CPT

## 2023-12-08 RX ORDER — WARFARIN SODIUM 2 MG/1
TABLET ORAL
Qty: 4 TABLET | Refills: 0 | Status: SHIPPED | OUTPATIENT
Start: 2023-12-08 | End: 2023-12-13

## 2023-12-08 RX ORDER — WARFARIN SODIUM 6 MG/1
TABLET ORAL
Qty: 10 TABLET | Refills: 0 | Status: SHIPPED | OUTPATIENT
Start: 2023-12-08 | End: 2023-12-13

## 2023-12-08 NOTE — PROGRESS NOTES
ANTICOAGULATION MANAGEMENT     Petey Archibald 63 year old male is on warfarin with subtherapeutic INR result. (Goal INR 2.0-3.0)    Recent labs: (last 7 days)     12/08/23  1035   INR 1.1       ASSESSMENT     Source(s): Chart Review and Home Care/Facility Nurse     Warfarin doses taken: Missed dose(s) may be affecting INR - Bertrand BENTLEY RN accidentally held Tuesday 12/6/23 dose instead of another medication  Diet: No significant increase in protein, but rather sweets and pop d/t recent birthday celebrations  Medication/supplement changes: Bactrim finished 11/29/23   New illness, injury, or hospitalization: Yes: ulcers are improving - dressing frequency changed to 3 x times/week + new swelling at right elbow (denies redness or discomfort)  Signs or symptoms of bleeding or clotting: No  Previous result: Subtherapeutic - several dose reductions since September - MD was much higher (52 mg)   Additional findings: No longer smoking cigarettes, but continues with E-cigarettes       PLAN     Recommended plan for temporary change(s) and ongoing change(s) affecting INR     Dosing Instructions:  Booster dose x 2 days, then Increase your warfarin dose (9.5% change) with next INR in 5 days       Summary  As of 12/8/2023      Full warfarin instructions:  12/8: 12 mg; 12/9: 12 mg; Otherwise 8 mg every Tue, Fri; 6 mg all other days   Next INR check:  12/13/2023               Telephone call with SHEREE Thurston supervisor who verbalizes understanding and agrees to plan. New rx sent to [facility]. Routing encounter to onsite staff to mail AVS.     Lab visit scheduled    Education provided:   Please call back if any changes to your diet, medications or how you've been taking warfarin  Dietary considerations: Impact of protein intake on INR  and importance of notifying [facility] to changes in diet  Symptom monitoring: monitoring for bleeding signs and symptoms, monitoring for clotting signs and symptoms, and when to seek medical  attention/emergency care    Plan made with [facility] Pharmacist May Saldaña + routed to PCP d/t critically low level    Ronda Shane, RN  Anticoagulation Clinic  12/8/2023    _______________________________________________________________________     Anticoagulation Episode Summary       Current INR goal:  2.0-3.0   TTR:  72.8% (1 y)   Target end date:  Indefinite   Send INR reminders to:  [facility]    Indications    History of pulmonary embolism [Z86.711]  Long term current use of anticoagulant therapy [Z79.01]             Comments:  REM nursing home; A new Coumadin Prescription will need to sent to [facility] with current dose and next INR check.             Anticoagulation Care Providers       Provider Role Specialty Phone number    Demetri Archer MD Referring Internal Medicine [phone]

## 2023-12-13 ENCOUNTER — ANTICOAGULATION THERAPY VISIT (OUTPATIENT)
Dept: ANTICOAGULATION | Facility: CLINIC | Age: 63
End: 2023-12-13

## 2023-12-13 ENCOUNTER — LAB (OUTPATIENT)
Dept: LAB | Facility: CLINIC | Age: 63
End: 2023-12-13
Payer: MEDICARE

## 2023-12-13 DIAGNOSIS — Z86.711 HISTORY OF PULMONARY EMBOLISM: Primary | ICD-10-CM

## 2023-12-13 DIAGNOSIS — Z79.01 LONG TERM CURRENT USE OF ANTICOAGULANT THERAPY: ICD-10-CM

## 2023-12-13 DIAGNOSIS — Z86.711 HISTORY OF PULMONARY EMBOLISM: ICD-10-CM

## 2023-12-13 LAB — INR BLD: 2.1 (ref 0.9–1.1)

## 2023-12-13 PROCEDURE — 36416 COLLJ CAPILLARY BLOOD SPEC: CPT

## 2023-12-13 PROCEDURE — 85610 PROTHROMBIN TIME: CPT

## 2023-12-13 RX ORDER — WARFARIN SODIUM 6 MG/1
TABLET ORAL
Qty: 10 TABLET | Refills: 0 | Status: SHIPPED | OUTPATIENT
Start: 2023-12-13 | End: 2023-12-22

## 2023-12-13 RX ORDER — WARFARIN SODIUM 2 MG/1
TABLET ORAL
Qty: 4 TABLET | Refills: 0 | Status: SHIPPED | OUTPATIENT
Start: 2023-12-13 | End: 2023-12-22

## 2023-12-13 NOTE — PROGRESS NOTES
ANTICOAGULATION MANAGEMENT     Petey Archibald 63 year old male is on warfarin with therapeutic INR result. (Goal INR 2.0-3.0)    Recent labs: (last 7 days)     12/13/23  1301   INR 2.1*       ASSESSMENT     Source(s): Chart Review and Patient/Caregiver Call     Warfarin doses taken: Warfarin taken as instructed  Diet: No new diet changes identified  Medication/supplement changes: None noted  New illness, injury, or hospitalization: No  Signs or symptoms of bleeding or clotting: No  Previous result: Subtherapeutic  Additional findings: None       PLAN     Recommended plan for no diet, medication or health factor changes affecting INR     Dosing Instructions: Continue your current warfarin dose with next INR in 9 days       Summary  As of 12/13/2023      Full warfarin instructions:  8 mg every Tue, Fri; 6 mg all other days   Next INR check:  12/22/2023               Telephone call with [facility] nurse who verbalizes understanding and agrees to plan    Lab visit scheduled    Education provided:   Please call back if any changes to your diet, medications or how you've been taking warfarin    Plan made per [facility] anticoagulation protocol    Cameron Blackmon RN  Anticoagulation Clinic  12/13/2023    _______________________________________________________________________     Anticoagulation Episode Summary       Current INR goal:  2.0-3.0   TTR:  71.6% (1 y)   Target end date:  Indefinite   Send INR reminders to:  [facility]    Indications    History of pulmonary embolism [Z86.711]  Long term current use of anticoagulant therapy [Z79.01]             Comments:  [facility] nursing; A new Coumadin Prescription will need to sent to [facility] with current dose and next INR check.             Anticoagulation Care Providers       Provider Role Specialty Phone number    Demetri Archer MD Referring Internal Medicine [phone]

## 2023-12-22 ENCOUNTER — ANTICOAGULATION THERAPY VISIT (OUTPATIENT)
Dept: ANTICOAGULATION | Facility: CLINIC | Age: 63
End: 2023-12-22

## 2023-12-22 ENCOUNTER — LAB (OUTPATIENT)
Dept: LAB | Facility: CLINIC | Age: 63
End: 2023-12-22
Payer: MEDICARE

## 2023-12-22 DIAGNOSIS — Z79.01 LONG TERM CURRENT USE OF ANTICOAGULANT THERAPY: ICD-10-CM

## 2023-12-22 DIAGNOSIS — Z86.711 HISTORY OF PULMONARY EMBOLISM: ICD-10-CM

## 2023-12-22 DIAGNOSIS — Z86.711 HISTORY OF PULMONARY EMBOLISM: Primary | ICD-10-CM

## 2023-12-22 LAB — INR BLD: 1.8 (ref 0.9–1.1)

## 2023-12-22 PROCEDURE — 85610 PROTHROMBIN TIME: CPT

## 2023-12-22 PROCEDURE — 36416 COLLJ CAPILLARY BLOOD SPEC: CPT

## 2023-12-22 RX ORDER — WARFARIN SODIUM 2 MG/1
TABLET ORAL
Qty: 8 TABLET | Refills: 0 | Status: SHIPPED | OUTPATIENT
Start: 2023-12-22 | End: 2024-01-03

## 2023-12-22 RX ORDER — WARFARIN SODIUM 6 MG/1
TABLET ORAL
Qty: 15 TABLET | Refills: 0 | Status: SHIPPED | OUTPATIENT
Start: 2023-12-22 | End: 2024-01-03

## 2023-12-22 NOTE — PROGRESS NOTES
ANTICOAGULATION MANAGEMENT     Petey Archibald 63 year old male is on warfarin with subtherapeutic INR result. (Goal INR 2.0-3.0)    Recent labs: (last 7 days)     12/22/23  1029   INR 1.8*       ASSESSMENT     Source(s): Chart Review and Patient/Caregiver Call     Warfarin doses taken: Warfarin taken as instructed  Diet: No new diet changes identified  Medication/supplement changes: None noted  New illness, injury, or hospitalization: Feet ulcers continue to improve slowly - dressing frequency continue at 3 x times/week   Signs or symptoms of bleeding or clotting: No  Previous result: Therapeutic last visit; previously outside of goal range  Additional findings:  Excessively smoking likely d/t grieving the loss of a close staff member (passed away recently)       PLAN     Recommended plan for temporary change(s) and ongoing change(s) affecting INR     Dosing Instructions: Increase your warfarin dose (4.3% change) with next INR in 12 days       Summary  As of 12/22/2023      Full warfarin instructions:  8 mg every Sun, Tue, Fri; 6 mg all other days   Next INR check:  1/3/2024               Telephone call with SHEREE Thurston supervisor, who verbalizes understanding and agrees to plan. [facility]    Check at provider office visit    Education provided:   Please call back if any changes to your diet, medications or how you've been taking warfarin  Symptom monitoring: monitoring for bleeding signs and symptoms and monitoring for clotting signs and symptoms    Plan made per [facility] anticoagulation protocol    Ronda Shane RN  Anticoagulation Clinic  12/22/2023    _______________________________________________________________________     Anticoagulation Episode Summary       Current INR goal:  2.0-3.0   TTR:  69.9% (1 y)   Target end date:  Indefinite   Send INR reminders to:  [facility]    Indications    History of pulmonary embolism [Z86.711]  Long term  current use of anticoagulant therapy [Z79.01]             Comments:  [facility] living; A new Coumadin Prescription will need to sent to [facility] with current dose and next INR check.             Anticoagulation Care Providers       Provider Role Specialty Phone number    Demetri Archer MD Referring Internal Medicine [phone]

## 2023-12-27 ENCOUNTER — MEDICAL CORRESPONDENCE (OUTPATIENT)
Dept: HEALTH INFORMATION MANAGEMENT | Facility: CLINIC | Age: 63
End: 2023-12-27
Payer: MEDICARE

## 2023-12-27 ENCOUNTER — HOSPITAL ENCOUNTER (OUTPATIENT)
Dept: WOUND CARE | Facility: CLINIC | Age: 63
Discharge: HOME | End: 2023-12-27
Attending: FAMILY MEDICINE | Admitting: FAMILY MEDICINE
Payer: MEDICARE

## 2023-12-27 VITALS — SYSTOLIC BLOOD PRESSURE: 138 MMHG | HEART RATE: 73 BPM | TEMPERATURE: 98.7 F | DIASTOLIC BLOOD PRESSURE: 67 MMHG

## 2023-12-27 DIAGNOSIS — L97.522 ULCER OF LEFT FOOT WITH FAT LAYER EXPOSED (H): ICD-10-CM

## 2023-12-27 DIAGNOSIS — L97.912 ULCER OF RIGHT LEG, WITH FAT LAYER EXPOSED (H): ICD-10-CM

## 2023-12-27 DIAGNOSIS — L97.522 ULCER OF GREAT TOE, LEFT, WITH FAT LAYER EXPOSED (H): ICD-10-CM

## 2023-12-27 DIAGNOSIS — R60.0 LOWER EXTREMITY EDEMA: ICD-10-CM

## 2023-12-27 DIAGNOSIS — S91.302D OPEN WOUND OF LEFT FOOT, SUBSEQUENT ENCOUNTER: Primary | ICD-10-CM

## 2023-12-27 DIAGNOSIS — L97.512 ULCER OF RIGHT FOOT WITH FAT LAYER EXPOSED (H): ICD-10-CM

## 2023-12-27 PROCEDURE — 99214 OFFICE O/P EST MOD 30 MIN: CPT | Performed by: FAMILY MEDICINE

## 2023-12-27 PROCEDURE — 97602 WOUND(S) CARE NON-SELECTIVE: CPT

## 2023-12-27 NOTE — DISCHARGE INSTRUCTIONS
"Petey Archibald      1960      A DME order for supplies has been placed to [facility]. If there are any issues with your order including not receiving the order please call [facility] at [phone]. They can also provide a tracking number for you.     Dressing changes outside of clinic are being performed by [facility]     Lives at [facility] (3x weekly wound care)  FAX ORDERS ATTENTION JUAN DANIEL: [phone]     Plan:  Good job on stopping smoking, Continue to try and wean off of the nicotine inhaler to aide in healing  Patient would like to hold off on vein study (venous competency) at this time    Dressings need to be changed immediately following showering  Wound Dressing Change: Left Dorsal 1st Toe  - Wash your hands with soap and water before you begin your dressing change and prepare a clean surface for dressings.  - Cleanse with mild unscented soap and water (such as Cetaphil, Cerave or Dove)  - Apply zinc oxide barrier paste to any white macerated periwound skin or any red/irritated/excoriated skin  - Apply 1/6th piece of 4x4 Melgisorb alginate to wound bed  - Cover with 1/2 of 5x9 ABD  - Secure with 1/2 roll of 4\" conforming roll gauze and medical tape  - Secure with Spandagrip size F from base of toes to the knee  Change 3 times weekly and as needed for drainage     Wound Dressing Change: Right Lateral Dorsal Foot  - Wash your hands with soap and water before you begin your dressing change and prepare a clean surface for dressings.  - Cleanse with mild unscented soap and water (such as Cetaphil, Cerave or Dove)  - Apply zinc oxide barrier paste to any white macerated periwound skin or any red/irritated/excoriated skin (can buy Desitin over the counter)  - Apply 1/6th piece of 4x4 Melgisorb alginate to wound bed  - Cover with 1/2 of 5x9 ABD  - Secure with 1 roll of 4\" conforming roll gauze and medical tape  - Secure with Spandagrip Size F from base of toes to the knee  Change 3 times " "weekly and as needed for drainage     Wound Dressing Change: Left Dorsal Foot  - Wash your hands with soap and water before you begin your dressing change and prepare a clean surface for dressings.  - Cleanse with mild unscented soap and water (such as Cetaphil, Cerave or Dove)  - Apply zinc oxide barrier paste to any white macerated periwound skin or any red/irritated/excoriated skin  - Apply 1/4th piece of 4x4 Melgisorb alginate to wound bed  - Cover with 1/2 of 5x9 ABD  - Secure with 1/2 roll of 4\" conforming roll gauze and medical tape  - Secure with Spandagrip size F from base of toes to the knee  Change 3 times weekly and as needed for drainage    Wound Dressing Change: Left Anterior Lower Leg  - Wash your hands with soap and water before you begin your dressing change and prepare a clean surface for dressings.  - Cleanse with mild unscented soap and water (such as Cetaphil, Cerave or Dove)  - Apply zinc oxide barrier paste to any white macerated periwound skin or any red/irritated/excoriated skin (can buy Desitin over the counter)  - Apply 1/6th piece of 4x4 Melgisorb alginate to wound bed  - Cover with 1/2 of 5x9 ABD  - Secure with 1 roll of 4\" conforming roll gauze and medical tape  - Secure with Spandagrip Size F from base of toes to the knee  Change 3 times weekly and as needed for drainage        Elevation:  It is recommended that you elevate your legs above the level of your heart for 30 minutes: approximately 2-3 times each day  Ways to do this:  - Lay on the couch or your bed and prop your legs up on pillows  - Recline back as far as you can go in your recliner and prop your legs on pillows.  Doing these things will help reduce the edema in your legs.     Protein:  A diet high in protein is important for wound healing, we recommend getting 90 grams  of protein per day.  Taking protein shakes or bars are a good way to get extra protein in your diet.  Good sources of protein:  Pork 26g per 3 oz  Whey " protein powder - 24g per scoop (on average)  Greek yogurt - 23g per 8oz  Chicken or Turkey - 23g per 3oz  Fish - 20-25g per 3oz  Beef - 18-23g per 3oz  Navy beans - 20g per cup  Cottage cheese - 14g per 1/2 cup  Lentils - 13g per 1/4 cup  Beef jerky 13g per 1oz  2% milk - 8g per cup  Peanut butter - 8g per 2 tablespoons  Eggs - 6g per egg  Mixed nuts - 6g per 2oz      Miguel Hampton M.D. December 27, 2023    Call us at [phone] if you have any questions about your wounds, have redness or swelling around your wound, have a fever of 101 degrees Fahrenheit or greater or if you have any other problems or concerns. We answer the phone Monday through Friday 8 am to 4 pm, please leave a message as we check the voicemail frequently throughout the day.     If you had a positive experience please indicate that on your patient satisfaction survey form that [facility] will be sending you.    It was a pleasure meeting with you today.  Thank you for allowing me and my team the privilege of caring for you today.  YOU are the reason we are here, and I truly hope we provided you with the excellent service you deserve.  Please let us know if there is anything else we can do for you so that we can be sure you are leaving completely satisfied with your care experience.      If you have any billing related questions please call the [facility] office at [phone]. The clinic staff does not handle billing related matters.    If you are scheduled to have a follow up appointment, you will receive a reminder call the day before your visit. On the appointment day please arrive 15 minutes prior to your appointment time. If you are unable to keep that appointment, please call the clinic to cancel or reschedule. If you are more than 10 minutes late or greater for your scheduled appointment time, the clinic policy is that you may be asked to reschedule.

## 2023-12-27 NOTE — PROGRESS NOTES
Wound Clinic Note         Visit date: 12/27/2023       Cheif Complaint:     Petey Archibald is a 63 year old   male had concerns including WOUND CARE.  The patient has lower extremity edema and bilateral leg ulcers.          HISTORY OF PRESENT ILLNESS:    Petey Archibald reports the wound has been present since mid 2022.  The wound began without a clear cause.   He reports prior to this wound developing he has not had other difficult to heal wounds on the legs.    Today the patient is again accompanied to the wound clinic by an employee at his group home and they both provide portions of the interval history.    They have been bandaging the wounds beginning with a zinc oxide barrier cream applied to the periwound followed by alginate and ABD pad changed 3 times a week.  They have not been using the spandagrip stocking recently because they had not been able to order more of these through their medical supply company.  There has been light to moderate serous drainage from the wounds.  This been no difficulties with the dressing changes.       The pateint denies fevers or chills.  They report the pain from the wound has been 0/10 and has remained about the same recently.      The patient reports they currently do not have any routine for elevating their legs.  The patient confirms they do sleep in a bed.  He still has not been elevating his legs as I recommended.    Today the patient reports maintaining a high protein diet, but has not been taking protein supplements lately.        The patient denies a history of diabetes or chronic steroid use.  She continues to use a nicotine inhaler but reports he has decreased the use since his last clinic visit with me.        The patient has not had any symptoms of infection relating to the wound recently and is not currently on antibiotics.       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       Surgical Hx:   Past Surgical History:   Procedure Laterality Date    COLONOSCOPY N/A 4/15/2019    Procedure: COMBINED COLONOSCOPY, SINGLE OR MULTIPLE BIOPSY/POLYPECTOMY BY BIOPSY;  Surgeon: Jovana Ohara MD;  Location:  GI    COLONOSCOPY N/A 6/7/2021    Procedure: COLONOSCOPY with polypectomies;  Surgeon: Sahil Cid MD;  Location: [facility] OR    EXCISE MALIGNANT LESIONS, TRUNK/ARMS/LEGS 0.5CM OR LESS Left 5/26/2017    Procedure: EXCISE MALIGNANT LESIONS, TRUNK/ARMS/LEGS 0.5CM OR LESS;  EXCISION LEFT ELBOW MASS;  Surgeon: Jose Azevedo MD;  Location:  GI    RECTAL SURGERY      perianal abscess?          Allergies:    Allergies   Allergen Reactions    Bees     Clavulanic Acid     Amoxicillin-Pot Clavulanate Rash     Augmentin    Penicillins Rash              Medication History:    Current Outpatient Medications   Medication Sig    acetaminophen (TYLENOL) 325 MG tablet Take 1-2 tablets (325-650 mg) by mouth every 6 hours as needed for mild pain, fever or headaches    albuterol (ALBUTEROL) 108 (90 BASE) MCG/ACT inhaler Inhale 2 puffs into the lungs every 6 hours as needed    ARIPiprazole (ABILIFY) 5 MG tablet Take 5 mg by mouth daily    buPROPion (WELLBUTRIN SR) 150 MG 12 hr tablet Take 150 mg by mouth 2 times daily    Cadexomer Iodine, topical, 0.9%  "(IODOSORB) 0.9 % GEL gel Apply topically daily Apply to left foot wound daily after cleansing the wound and blotting it dry. (Patient not taking: Reported on 8/8/2023)    cloNIDine (CATAPRES) 0.1 MG tablet Take 0.1 mg by mouth daily as needed    clotrimazole (LOTRIMIN) 1 % external cream Apply topically 2 times daily    diclofenac (VOLTAREN) 1 % topical gel Apply 4 g topically 2 times daily as needed for moderate pain    diphenhydrAMINE (BENADRYL) 25 MG capsule Take 50 mg by mouth every 6 hours as needed for itching or allergies    Emollient (CERAVE) CREA Externally apply topically daily    EPINEPHrine (ANY BX GENERIC EQUIV) 0.3 MG/0.3ML injection 2-pack INJECT 0.3MG INTRAMUSCULARLY ONE TIME FOR ONE DOSE AS NEEDED FOR ANAPHYLAXIS. MAY REPEAT ONE TIME IN 5-15 MINUTES IF RESPONSE TO INITIAL DOSE IS INADEQUATE. *1 TOTAL FILL, ORIGINAL FROM EMERGENCY ROOM*    EPINEPHrine (EPIPEN 2-BRE) 0.3 MG/0.3ML injection 2-pack INJECT 0.3MG INTRAMUSCULAR ONE TIME FOR ONE DOSE AS NEEDED FOR ANAPHYLAXIS (CALL 911 IF YOU HAVE TO GIVE) (FOR BEE STINGS) **LABEL EACH PEN*    gabapentin (NEURONTIN) 100 MG capsule Take 400 mg by mouth 3 times daily At 0900, 1200, and 2000    Gauze Pads & Dressings (GAUZE PADS 4\"X4\") 4\"X4\" PADS 1 each 3 times daily as needed    loratadine (CLARITIN) 10 MG tablet Take 10 mg by mouth daily    LORazepam (ATIVAN) 1 MG tablet Take 1 mg by mouth daily as needed for anxiety    montelukast (SINGULAIR) 10 MG tablet TAKE 1 TABLET BY MOUTH EVERY MORNING (ASTHMA)    Multiple Vitamin (DAILY-JOVAN) TABS TAKE 1 TABLET BY MOUTH EVERY MORNING (VITAMINS)    naltrexone (DEPADE/REVIA) 50 MG tablet Take 100 mg by mouth daily    nicotine (COMMIT) 2 MG lozenge Place 2 mg inside cheek daily as needed    nystatin-triamcinolone (MYCOLOG II) 752811-7.1 UNIT/GM-% external cream Apply topically 2 times daily For 1-2 weeks.    omeprazole (PRILOSEC) 40 MG DR capsule Take 1 capsule (40 mg) by mouth daily    order for DME Equipment being " ordered: roll of micropore tape to dress foot wound bid    order for DME Equipment being ordered: 1 surgical shoe    order for DME [employer] [facility] Phone [phone] Fax [phone]  EdemaWear Size Medium/Yellow qty 4 sleeves  Length of Need: 1 month  Frequency of dressing change daily    order for DME [employer] Stockings  Phone #[phone]  Fax #[phone]  Coolflex Velcro wraps    Length of Need: Life Time  # of Pairs 1 set    order for DME [employer] [facility] Order Phone [phone] Fax [phone]  Primary Dressing Hydrofera Blue Ready   Qty 5 sheets  Secondary Dressing 4' roll gauze Qty 30  Secondary Dressing 2' medipore tape Qty 1  Secondary Dressing 4x4 gauze loaf Qty  1  Length of Need: 1 month  Frequency of dressing change: daily    order for DME Oxygen 2 Li/min  at night and bled into BiPAP    order for DME Equipment being ordered: CPAP with mask and tubing    order for DME Equipment being ordered: support compression hose BK  2 pair black 30mm HG   To be applied on arising & removed while lying down to go to sleep    polyethylene glycol (MIRALAX) 17 GM/Dose powder Take 17 g (1 capful) by mouth daily as needed for constipation    predniSONE (DELTASONE) 20 MG tablet Take two tablets (= 40mg) each day for 5 (five) days    PULMICORT FLEXHALER 180 MCG/ACT inhaler INHALE 2 PUFFS INTO THE LUNGS TWICE DAILY.    SENNA-TABS 8.6 MG tablet Take 1 tablet by mouth daily    sertraline (ZOLOFT) 100 MG tablet Take 200 mg by mouth daily    simvastatin (ZOCOR) 40 MG tablet TAKE 1 TABLET BY MOUTH EVERY MORNING.  (CHOLESTEROL)    SPIRIVA HANDIHALER 18 MCG inhaled capsule INHALE CONTENTS OF 1 CAPSULE INTO THE LUNGS ONCE DAILY    spironolactone (ALDACTONE) 25 MG tablet TAKE 1 TABLET BY MOUTH ONCE DAILY. (HIGH BLOOD PRESSURE)    tiZANidine (ZANAFLEX) 2 MG tablet Take 1 tablet (2 mg) by mouth 3 times daily    tiZANidine (ZANAFLEX) 2 MG tablet Take 1 tablet (2 mg) by mouth 3 times daily    traZODone (DESYREL) 100  MG tablet Take 100 mg by mouth At Bedtime    triamcinolone (KENALOG) 0.1 % external cream Apply to AA BID x 1-2 week then PRN only    varenicline (CHANTIX) 1 MG tablet Take 1 tablet (1 mg) by mouth 2 times daily (Patient not taking: Reported on 8/8/2023)    vitamin B complex with vitamin C (STRESS TAB) tablet Take 1 tablet by mouth daily    warfarin ANTICOAGULANT (COUMADIN) 2 MG tablet 8 mg every Tue, Fri, and Sun + 6 mg all other days. Recheck INR on 1/3/24.    warfarin ANTICOAGULANT (COUMADIN) 6 MG tablet 8 mg every Tue, Fri, and Sun + 6 mg all other days. Recheck INR on 1/3/24.     No current facility-administered medications for this encounter.         Tobacco History:  reports that he has been smoking cigarettes. He has a 30 pack-year smoking history. He has never used smokeless tobacco.       REVIEW OF SYMPTOMS:   The review of systems was negative except as noted in the HPI.           PHYSICAL EXAMINATION:     /67   Pulse 73   Temp 98.7  F (37.1  C) (Temporal)            GENERAL: The patient overall appears well and is no acute distress.   HEAD: normocephalic   EYES: Sclera and conjunctiva clear   NECK: no obvious masses   LUNGS: breathing is unlabored.   EXTREMITIES: No clubbing, cyanosis or edema   SKIN: No rashes or other abnormalities except as noted under the Wound section below.   NEUROLOGICAL: normal motor and sensory function   EDEMA: Moderate       WOUND: The wound appears healthy with no sign of infection.   Wound bed: fibrinous material   Periwound: healthy intact skin  The wound measurements are smaller today.  I was able to clear away some fibrinous material from the wound beds by wiping with gauze.      Also see below for wound details:       Circumferential volume measures:             No data to display                Ulceration(s)/Wound(s):   Please see the media tab under the chart review for pictures of the wounds.  Nursing staff removed dressings and cleansed wound.    Wound (used  by [facility] only) 08/22/23 1341 Left dorsal 1 toe ulceration, venous (Active)   Thickness/Stage full thickness 12/27/23 1405   Base slough 12/27/23 1405   Periwound macerated;redness;swelling;edematous 12/27/23 1405   Periwound Temperature warm 12/27/23 1405   Periwound Skin Turgor soft 12/27/23 1405   Edges open 12/27/23 1405   Length (cm) 1.6 12/27/23 1405   Width (cm) 2.2 12/27/23 1405   Depth (cm) 0.2 12/27/23 1405   Wound (cm^2) 3.52 cm^2 12/27/23 1405   Wound Volume (cm^3) 0.7 cm^3 12/27/23 1405   Wound healing % 14.98 12/27/23 1405   Drainage Characteristics/Odor creamy 12/27/23 1405   Drainage Amount moderate 12/27/23 1405   Care, Wound non-select wound debridement performed. 12/27/23 1405       Wound (used by [facility] only) 11/15/23 0906 Right lateral;dorsal foot unspecified (Active)   Thickness/Stage full thickness 12/27/23 1405   Base red;scab;dry 12/27/23 1405   Periwound redness;dry;edematous 12/27/23 1405   Periwound Temperature warm 12/27/23 1405   Periwound Skin Turgor soft 12/27/23 1405   Edges open 12/27/23 1405   Length (cm) 0.5 12/27/23 1405   Width (cm) 1.2 12/27/23 1405   Depth (cm) 0.1 12/27/23 1405   Wound (cm^2) 0.6 cm^2 12/27/23 1405   Wound Volume (cm^3) 0.06 cm^3 12/27/23 1405   Wound healing % 66.67 12/27/23 1405   Drainage Characteristics/Odor serosanguineous 12/27/23 1405   Drainage Amount moderate 12/27/23 1405   Care, Wound non-select wound debridement performed. 12/27/23 1405       Wound (used by [facility] only) 11/15/23 0911 Left dorsal foot unspecified (Active)   Thickness/Stage full thickness 12/27/23 1405   Base slough;granulating 12/27/23 1405   Periwound edematous;dry 12/27/23 1405   Periwound Temperature warm 12/27/23 1405   Periwound Skin Turgor soft 12/27/23 1405   Edges open 12/27/23 1405   Length (cm) 2.2 12/27/23 1405   Width (cm) 2.6 12/27/23 1405   Depth (cm) 0.3 12/27/23 1405   Wound (cm^2) 5.72 cm^2 12/27/23 1405   Wound Volume (cm^3) 1.72 cm^3 12/27/23 1405   Wound healing  % 67.5 12/27/23 1405   Drainage Characteristics/Odor creamy 12/27/23 1405   Drainage Amount moderate 12/27/23 1405   Care, Wound non-select wound debridement performed. 12/27/23 1405       Wound (used by [facility] only) 12/27/23 1410 Left anterior;lower leg unspecified (Active)   Thickness/Stage full thickness 12/27/23 1405   Base pink 12/27/23 1405   Periwound edematous;dry 12/27/23 1405   Periwound Temperature warm 12/27/23 1405   Periwound Skin Turgor soft 12/27/23 1405   Length (cm) 1.3 12/27/23 1405   Width (cm) 0.5 12/27/23 1405   Depth (cm) 0.1 12/27/23 1405   Wound (cm^2) 0.65 cm^2 12/27/23 1405   Wound Volume (cm^3) 0.07 cm^3 12/27/23 1405   Drainage Characteristics/Odor serosanguineous 12/27/23 1405   Drainage Amount moderate 12/27/23 1405   Care, Wound non-select wound debridement performed. 12/27/23 1405                       Recent Labs   Lab Test 06/03/21  1448 03/23/21  1042 07/28/20  0929   A1C 5.7* 5.6 5.7*          Recent Labs   Lab Test 02/14/23  0059 06/08/22  1835 07/28/20  0929   ALBUMIN 3.9 3.2* 3.3*              No sharp debridement performed today.             ASSESSMENT:   This is a 63 year old  male with lower extremity edema and bilateral lower extremity wounds.          PLAN:   We will bandage all of the wound areas beginning with a zinc oxide barrier cream applied to the periwound followed by alginate, an ABD pad and the spandagrip stocking change 3 times a week.  Will give them a new set of spandagrip stockings today to use for compression.    I previously discussed the option of getting a venous insufficiency ultrasound to evaluate for areas of superficial venous insufficiency which may have led to the wound formation.  However the patient would rather hold off on ordering this diagnostic study at this time.    Separate from his wound care instructions I then discussed the treatment of his lower extremity edema.  This primarily involves compression and elevation.  I have strongly  encouraged him to wear his spandagrip stockings every day.  I have again explained to the patient today that controlling the edema is probably the most important thing we can do to help heal the wound.  I have specifically recommended that they lay down with their legs above the level of the heart for 30 minutes at least twice a day.  I emphasized that if we can not control the edema we will likely not be able to get this wound to heal.     I have encouraged the patient to continue on their high protein diet to aid in wound healing.   The patient will return to the wound clinic in 2-3 weeks to see me again.        30 minutes spent on the date of the encounter doing chart review, history and exam, documentation and further activities per the note, this time excludes any procedure time      Miguel Hampton MD  12/27/2023   2:41 PM   [facility]/Wound  [phone]    This note was electronically signed by Miguel Hampton MD        Further instructions from your care team         Petey Archibald      1960      A DME order for supplies has been placed to [facility]. If there are any issues with your order including not receiving the order please call [facility] at [phone]. They can also provide a tracking number for you.     Dressing changes outside of clinic are being performed by Group Home Staff     Lives at [facility] [place] (3x weekly wound care)  FAX ORDERS ATTENTION JUAN DANIEL: [phone]     Plan:  Good job on stopping smoking, Continue to try and wean off of the nicotine inhaler to aide in healing  Patient would like to hold off on vein study (venous competency) at this time    Dressings need to be changed immediately following showering  Wound Dressing Change: Left Dorsal 1st Toe  - Wash your hands with soap and water before you begin your dressing change and prepare a clean surface for dressings.  - Cleanse with mild unscented soap and water (such as Cetaphil, Cerave or  "Dove)  - Apply zinc oxide barrier paste to any white macerated periwound skin or any red/irritated/excoriated skin  - Apply 1/6th piece of 4x4 Melgisorb alginate to wound bed  - Cover with 1/2 of 5x9 ABD  - Secure with 1/2 roll of 4\" conforming roll gauze and medical tape  - Secure with Spandagrip size F from base of toes to the knee  Change 3 times weekly and as needed for drainage     Wound Dressing Change: Right Lateral Dorsal Foot  - Wash your hands with soap and water before you begin your dressing change and prepare a clean surface for dressings.  - Cleanse with mild unscented soap and water (such as Cetaphil, Cerave or Dove)  - Apply zinc oxide barrier paste to any white macerated periwound skin or any red/irritated/excoriated skin (can buy Desitin over the counter)  - Apply 1/6th piece of 4x4 Melgisorb alginate to wound bed  - Cover with 1/2 of 5x9 ABD  - Secure with 1 roll of 4\" conforming roll gauze and medical tape  - Secure with Spandagrip Size F from base of toes to the knee  Change 3 times weekly and as needed for drainage     Wound Dressing Change: Left Dorsal Foot  - Wash your hands with soap and water before you begin your dressing change and prepare a clean surface for dressings.  - Cleanse with mild unscented soap and water (such as Cetaphil, Cerave or Dove)  - Apply zinc oxide barrier paste to any white macerated periwound skin or any red/irritated/excoriated skin  - Apply 1/4th piece of 4x4 Melgisorb alginate to wound bed  - Cover with 1/2 of 5x9 ABD  - Secure with 1/2 roll of 4\" conforming roll gauze and medical tape  - Secure with Spandagrip size F from base of toes to the knee  Change 3 times weekly and as needed for drainage    Wound Dressing Change: Left Anterior Lower Leg  - Wash your hands with soap and water before you begin your dressing change and prepare a clean surface for dressings.  - Cleanse with mild unscented soap and water (such as Cetaphil, Cerave or Dove)  - Apply zinc oxide " "barrier paste to any white macerated periwound skin or any red/irritated/excoriated skin (can buy Desitin over the counter)  - Apply 1/6th piece of 4x4 Melgisorb alginate to wound bed  - Cover with 1/2 of 5x9 ABD  - Secure with 1 roll of 4\" conforming roll gauze and medical tape  - Secure with Spandagrip Size F from base of toes to the knee  Change 3 times weekly and as needed for drainage        Elevation:  It is recommended that you elevate your legs above the level of your heart for 30 minutes: approximately 2-3 times each day  Ways to do this:  - Lay on the couch or your bed and prop your legs up on pillows  - Recline back as far as you can go in your recliner and prop your legs on pillows.  Doing these things will help reduce the edema in your legs.     Protein:  A diet high in protein is important for wound healing, we recommend getting 90 grams  of protein per day.  Taking protein shakes or bars are a good way to get extra protein in your diet.  Good sources of protein:  Pork 26g per 3 oz  Whey protein powder - 24g per scoop (on average)  Greek yogurt - 23g per 8oz  Chicken or Turkey - 23g per 3oz  Fish - 20-25g per 3oz  Beef - 18-23g per 3oz  Navy beans - 20g per cup  Cottage cheese - 14g per 1/2 cup  Lentils - 13g per 1/4 cup  Beef jerky 13g per 1oz  2% milk - 8g per cup  Peanut butter - 8g per 2 tablespoons  Eggs - 6g per egg  Mixed nuts - 6g per 2oz      Miguel Hampton M.D. December 27, 2023    Call us at [phone] if you have any questions about your wounds, have redness or swelling around your wound, have a fever of 101 degrees Fahrenheit or greater or if you have any other problems or concerns. We answer the phone Monday through Friday 8 am to 4 pm, please leave a message as we check the voicemail frequently throughout the day.     If you had a positive experience please indicate that on your patient satisfaction survey form that [facility] will be sending you.    It was a pleasure " meeting with you today.  Thank you for allowing me and my team the privilege of caring for you today.  YOU are the reason we are here, and I truly hope we provided you with the excellent service you deserve.  Please let us know if there is anything else we can do for you so that we can be sure you are leaving completely satisfied with your care experience.      If you have any billing related questions please call the [facility] Business office at [phone]. The clinic staff does not handle billing related matters.    If you are scheduled to have a follow up appointment, you will receive a reminder call the day before your visit. On the appointment day please arrive 15 minutes prior to your appointment time. If you are unable to keep that appointment, please call the clinic to cancel or reschedule. If you are more than 10 minutes late or greater for your scheduled appointment time, the clinic policy is that you may be asked to reschedule.        ,

## 2023-12-28 ENCOUNTER — TELEPHONE (OUTPATIENT)
Dept: WOUND CARE | Facility: CLINIC | Age: 63
End: 2023-12-28
Payer: MEDICARE

## 2023-12-28 NOTE — ADDENDUM NOTE
Encounter addended by: Katy Cutler RN on: 12/28/2023 11:03 AM   Actions taken: Visit diagnoses modified, Order list changed, Diagnosis association updated

## 2023-12-28 NOTE — TELEPHONE ENCOUNTER
Call received from Kristal of [facility] indicating orders were sent for medications however scripts where not sent. Please review and fax to Kristal # [phone].  Joseph Werner LPN on 12/28/2023 at 9:34 AM

## 2023-12-28 NOTE — TELEPHONE ENCOUNTER
Returned call to Kristal. She states the note and flowsheets were sent but she did not receive the DME order. DME order faxed to [facility].

## 2024-01-02 ENCOUNTER — MEDICAL CORRESPONDENCE (OUTPATIENT)
Dept: HEALTH INFORMATION MANAGEMENT | Facility: CLINIC | Age: 64
End: 2024-01-02
Payer: MEDICARE

## 2024-01-02 DIAGNOSIS — G24.8 FOCAL DYSTONIA: ICD-10-CM

## 2024-01-02 DIAGNOSIS — M62.838 SPASM OF MUSCLE: ICD-10-CM

## 2024-01-03 ENCOUNTER — ANTICOAGULATION THERAPY VISIT (OUTPATIENT)
Dept: ANTICOAGULATION | Facility: CLINIC | Age: 64
End: 2024-01-03

## 2024-01-03 ENCOUNTER — OFFICE VISIT (OUTPATIENT)
Dept: INTERNAL MEDICINE | Facility: CLINIC | Age: 64
End: 2024-01-03
Attending: INTERNAL MEDICINE
Payer: MEDICARE

## 2024-01-03 VITALS
OXYGEN SATURATION: 99 % | BODY MASS INDEX: 47.74 KG/M2 | TEMPERATURE: 98.9 F | HEART RATE: 75 BPM | WEIGHT: 315 LBS | SYSTOLIC BLOOD PRESSURE: 138 MMHG | HEIGHT: 68 IN | DIASTOLIC BLOOD PRESSURE: 70 MMHG

## 2024-01-03 DIAGNOSIS — Z86.711 HISTORY OF PULMONARY EMBOLISM: ICD-10-CM

## 2024-01-03 DIAGNOSIS — L97.522 ULCER OF GREAT TOE, LEFT, WITH FAT LAYER EXPOSED (H): ICD-10-CM

## 2024-01-03 DIAGNOSIS — Z23 ENCOUNTER FOR IMMUNIZATION: ICD-10-CM

## 2024-01-03 DIAGNOSIS — Z79.01 LONG TERM CURRENT USE OF ANTICOAGULANT THERAPY: ICD-10-CM

## 2024-01-03 DIAGNOSIS — Z00.00 MEDICARE ANNUAL WELLNESS VISIT, SUBSEQUENT: Primary | ICD-10-CM

## 2024-01-03 DIAGNOSIS — Z12.5 SCREENING FOR PROSTATE CANCER: ICD-10-CM

## 2024-01-03 DIAGNOSIS — J41.8 MIXED SIMPLE AND MUCOPURULENT CHRONIC BRONCHITIS (H): ICD-10-CM

## 2024-01-03 DIAGNOSIS — E66.01 MORBID OBESITY DUE TO EXCESS CALORIES (H): ICD-10-CM

## 2024-01-03 DIAGNOSIS — H53.9 VISION CHANGES: ICD-10-CM

## 2024-01-03 DIAGNOSIS — Z86.711 HISTORY OF PULMONARY EMBOLISM: Primary | ICD-10-CM

## 2024-01-03 DIAGNOSIS — F33.1 MODERATE EPISODE OF RECURRENT MAJOR DEPRESSIVE DISORDER (H): ICD-10-CM

## 2024-01-03 DIAGNOSIS — Z87.891 PERSONAL HISTORY OF TOBACCO USE: ICD-10-CM

## 2024-01-03 DIAGNOSIS — F25.0 SCHIZOAFFECTIVE DISORDER, BIPOLAR TYPE (H): ICD-10-CM

## 2024-01-03 DIAGNOSIS — E78.00 HYPERCHOLESTEROLEMIA: ICD-10-CM

## 2024-01-03 DIAGNOSIS — R73.03 PREDIABETES: ICD-10-CM

## 2024-01-03 DIAGNOSIS — Z12.11 COLON CANCER SCREENING: ICD-10-CM

## 2024-01-03 LAB
ALT SERPL W P-5'-P-CCNC: 15 U/L (ref 0–70)
ANION GAP SERPL CALCULATED.3IONS-SCNC: 10 MMOL/L (ref 7–15)
BUN SERPL-MCNC: 16.9 MG/DL (ref 8–23)
CALCIUM SERPL-MCNC: 9.8 MG/DL (ref 8.8–10.2)
CHLORIDE SERPL-SCNC: 105 MMOL/L (ref 98–107)
CHOLEST SERPL-MCNC: 209 MG/DL
CREAT SERPL-MCNC: 0.91 MG/DL (ref 0.67–1.17)
DEPRECATED HCO3 PLAS-SCNC: 26 MMOL/L (ref 22–29)
EGFRCR SERPLBLD CKD-EPI 2021: >90 ML/MIN/1.73M2
FASTING STATUS PATIENT QL REPORTED: YES
GLUCOSE SERPL-MCNC: 98 MG/DL (ref 70–99)
HBA1C MFR BLD: 5.7 % (ref 0–5.6)
HDLC SERPL-MCNC: 81 MG/DL
INR BLD: 3.4 (ref 0.9–1.1)
LDLC SERPL CALC-MCNC: 106 MG/DL
NONHDLC SERPL-MCNC: 128 MG/DL
POTASSIUM SERPL-SCNC: 4.3 MMOL/L (ref 3.4–5.3)
PSA SERPL DL<=0.01 NG/ML-MCNC: 0.2 NG/ML (ref 0–4.5)
SODIUM SERPL-SCNC: 141 MMOL/L (ref 135–145)
TRIGL SERPL-MCNC: 109 MG/DL

## 2024-01-03 PROCEDURE — 85610 PROTHROMBIN TIME: CPT | Performed by: INTERNAL MEDICINE

## 2024-01-03 PROCEDURE — 91320 SARSCV2 VAC 30MCG TRS-SUC IM: CPT | Performed by: INTERNAL MEDICINE

## 2024-01-03 PROCEDURE — 80061 LIPID PANEL: CPT | Performed by: INTERNAL MEDICINE

## 2024-01-03 PROCEDURE — 84460 ALANINE AMINO (ALT) (SGPT): CPT | Performed by: INTERNAL MEDICINE

## 2024-01-03 PROCEDURE — 80048 BASIC METABOLIC PNL TOTAL CA: CPT | Performed by: INTERNAL MEDICINE

## 2024-01-03 PROCEDURE — 83036 HEMOGLOBIN GLYCOSYLATED A1C: CPT | Performed by: INTERNAL MEDICINE

## 2024-01-03 PROCEDURE — G0103 PSA SCREENING: HCPCS | Performed by: INTERNAL MEDICINE

## 2024-01-03 PROCEDURE — 36415 COLL VENOUS BLD VENIPUNCTURE: CPT | Performed by: INTERNAL MEDICINE

## 2024-01-03 PROCEDURE — 90480 ADMN SARSCOV2 VAC 1/ONLY CMP: CPT | Performed by: INTERNAL MEDICINE

## 2024-01-03 PROCEDURE — G0439 PPPS, SUBSEQ VISIT: HCPCS | Performed by: INTERNAL MEDICINE

## 2024-01-03 RX ORDER — TIZANIDINE 2 MG/1
2 TABLET ORAL 3 TIMES DAILY
Qty: 106 TABLET | Refills: 11 | Status: SHIPPED | OUTPATIENT
Start: 2024-01-03

## 2024-01-03 RX ORDER — WARFARIN SODIUM 2 MG/1
TABLET ORAL
Qty: 10 TABLET | Refills: 0 | Status: SHIPPED | OUTPATIENT
Start: 2024-01-03 | End: 2024-01-11

## 2024-01-03 RX ORDER — WARFARIN SODIUM 6 MG/1
TABLET ORAL
Qty: 10 TABLET | Refills: 0 | Status: SHIPPED | OUTPATIENT
Start: 2024-01-03 | End: 2024-01-11

## 2024-01-03 ASSESSMENT — ACTIVITIES OF DAILY LIVING (ADL)
CURRENT_FUNCTION: MEDICATION ADMINISTRATION REQUIRES ASSISTANCE
CURRENT_FUNCTION: HOUSEWORK REQUIRES ASSISTANCE
CURRENT_FUNCTION: TRANSPORTATION REQUIRES ASSISTANCE
CURRENT_FUNCTION: SHOPPING REQUIRES ASSISTANCE
CURRENT_FUNCTION: LAUNDRY REQUIRES ASSISTANCE

## 2024-01-03 ASSESSMENT — PATIENT HEALTH QUESTIONNAIRE - PHQ9
SUM OF ALL RESPONSES TO PHQ QUESTIONS 1-9: 17
10. IF YOU CHECKED OFF ANY PROBLEMS, HOW DIFFICULT HAVE THESE PROBLEMS MADE IT FOR YOU TO DO YOUR WORK, TAKE CARE OF THINGS AT HOME, OR GET ALONG WITH OTHER PEOPLE: EXTREMELY DIFFICULT
SUM OF ALL RESPONSES TO PHQ QUESTIONS 1-9: 17

## 2024-01-03 NOTE — PROGRESS NOTES
ANTICOAGULATION MANAGEMENT     Petey Archibald 63 year old male is on warfarin with supratherapeutic INR result. (Goal INR 2.0-3.0)    Recent labs: (last 7 days)     01/03/24  1049   INR 3.4*       ASSESSMENT     Source(s): Chart Review and Patient/Caregiver Call     Warfarin doses taken: Warfarin taken as instructed  Diet: Eating excessive chocolates the last few days  Medication/supplement changes: None noted  New illness, injury, or hospitalization: Yes: patient reports feeling very sick today, likely upset stomach from over-consumption & constipation   Signs or symptoms of bleeding or clotting: No  Previous result: Subtherapeutic  Additional findings: None       PLAN     Recommended plan for temporary change(s) affecting INR     Dosing Instructions: partial hold then continue your current warfarin dose with next INR in 1-2 weeks       Summary  As of 1/3/2024      Full warfarin instructions:  1/3: 2 mg; Otherwise 8 mg every Sun, Tue, Fri; 6 mg all other days   Next INR check:  1/11/2024               Telephone call with SHEREE Thurston Supervisor who verbalizes understanding and agrees to plan. AVS mailed and new rx sent to [facility] pharmacy.     Lab visit scheduled    Education provided:   Please call back if any changes to your diet, medications or how you've been taking warfarin  Symptom monitoring: monitoring for bleeding signs and symptoms and monitoring for clotting signs and symptoms    Plan made per [facility] anticoagulation protocol    Ronda Shane RN  Anticoagulation Clinic  1/3/2024    _______________________________________________________________________     Anticoagulation Episode Summary       Current INR goal:  2.0-3.0   TTR:  69.5% (1 y)   Target end date:  Indefinite   Send INR reminders to:  [facility]    Indications    History of pulmonary embolism [Z86.711]  Long term current use of anticoagulant therapy [Z79.01]             Comments:  REM residential; A new Coumadin  Pres[facility] with current dose and next INR check.             Anticoagulation Care Providers       Provider Role Specialty Phone number    Demetri Archer MD Referring Internal Medicine [phone]

## 2024-01-03 NOTE — PROGRESS NOTES
"SUBJECTIVE:   Petey is a 63 year old presenting for the following: Wellness Visit  Are you in the first 12 months of your Medicare coverage?  No  Healthy Habits:     In general, how would you rate your overall health?  Poor    Frequency of exercise:  1 day/week    Duration of exercise:  15-30 minutes    Do you usually eat at least 4 servings of fruit and vegetables a day, include whole grains    & fiber and avoid regularly eating high fat or \"junk\" foods?  Yes    Taking medications regularly:  Yes    Ability to successfully perform activities of daily living:  Transportation requires assistance, shopping requires assistance, housework requires assistance, laundry requires assistance and medication administration requires assistance    Home Safety:  No safety concerns identified    Hearing Impairment:  No hearing concerns    In the past 6 months, have you been bothered by leaking of urine? Yes    In general, how would you rate your overall mental or emotional health?  Fair    Additional concerns today:  No    Petey presents today for an AWV. We also discussed his foot ulcers which are apparently healing appropriately.    Today's PHQ-9 Score:       1/3/2024     9:38 AM   PHQ-9 SCORE   PHQ-9 Total Score MyChart 17 (Moderately severe depression)   PHQ-9 Total Score 17     Have you ever done Advance Care Planning? (For example, a Health Directive, POLST, or a discussion with a medical provider or your loved ones about your wishes): No, advance care planning information given to patient to review.  Patient declined advance care planning discussion at this time.    Fall risk - yes    Cognitive Screening Not appropriate due to mental handicap    Reviewed and updated as needed this visit by clinical staff   Tobacco  Allergies  Meds  Problems  Med Hx  Surg Hx  Fam Hx        Reviewed and updated as needed this visit by Provider   Tobacco  Allergies  Meds  Problems  Med Hx  Surg Hx  Fam Hx         Social " History     Tobacco Use    Smoking status: Former     Packs/day: 1.00     Years: 30.00     Additional pack years: 0.00     Total pack years: 30.00     Types: Cigarettes    Smokeless tobacco: Never    Tobacco comments:     started at age 18    Substance Use Topics    Alcohol use: No     Alcohol/week: 0.0 standard drinks of alcohol         1/3/2024     9:41 AM   Alcohol Use   Prescreen: >3 drinks/day or >7 drinks/week? No     Do you have a current opioid prescription? No  Do you use any other controlled substances or medications that are not prescribed by a provider? None    Current providers sharing in care for this patient include:  Patient Care Team:  Demetri Archer MD as PCP - General (Internal Medicine)  Joseph Krishnamurthy MD as MD (Urology)  Margarita Jenkins PA-C as Physician Assistant (Dermatology)  Demetri Archer MD as Assigned PCP  Joseph Flaherty DPM as Assigned Surgical Provider    The following health maintenance items are reviewed in Epic and correct as of today:  Health Maintenance   Topic Date Due    MICROALBUMIN  Never done    DIABETIC FOOT EXAM  Never done    ANNUAL REVIEW OF  ORDERS  Never done    EYE EXAM  Never done    ZOSTER IMMUNIZATION (1 of 2) Never done    LUNG CANCER SCREENING  05/02/2020    A1C  09/03/2021    LIPID  10/29/2022    DTAP/TDAP/TD IMMUNIZATION (2 - Td or Tdap) 02/18/2023    COVID-19 Vaccine (6 - 2023-24 season) 09/01/2023    FALL RISK ASSESSMENT  12/06/2023    Pneumococcal Vaccine: 65+ Years (3 of 3 - PPSV23 or PCV20) 12/06/2023    BMP  02/16/2024    PHQ-9  07/03/2024    NICOTINE/TOBACCO CESSATION COUNSELING Q 1 YR  01/03/2025    MEDICARE ANNUAL WELLNESS VISIT  01/03/2025    COLORECTAL CANCER SCREENING  06/07/2026    ADVANCE CARE PLANNING  01/03/2029    SPIROMETRY  Completed    HEPATITIS C SCREENING  Completed    HIV SCREENING  Completed    COPD ACTION PLAN  Completed    DEPRESSION ACTION PLAN  Completed    INFLUENZA VACCINE  Completed    RSV VACCINE  "(Pregnancy & 60+)  Completed    AORTIC ANEURYSM SCREENING (SYSTEM ASSIGNED)  Completed    IPV IMMUNIZATION  Aged Out    HPV IMMUNIZATION  Aged Out    MENINGITIS IMMUNIZATION  Aged Out    RSV MONOCLONAL ANTIBODY  Aged Out     Review of Systems  10pt ROS reviewed and all other systems negative unless otherwise stated above.    OBJECTIVE:   /70   Pulse 75   Temp 98.9  F (37.2  C)   Ht 1.727 m (5' 8\")   Wt 147.8 kg (325 lb 14.4 oz)   SpO2 99%   BMI 49.55 kg/m   Estimated body mass index is 49.55 kg/m  as calculated from the following:    Height as of this encounter: 1.727 m (5' 8\").    Weight as of this encounter: 147.8 kg (325 lb 14.4 oz).    Physical Exam  GENERAL: alert and in no distress.  EYES: conjunctivae/corneas clear. EOMs grossly intact  HENT: Facies symmetric.  RESP: CTAB, no w/r/r.  CV: RRR, no m/r/g.  GI: NT, ND, without rebound tenderness or guarding  MSK: Ambulates with walker  SKIN: No significant ulcers, lesions, or rashes on the visualized portions of the skin  NEURO: CN II-XII grossly intact.    Diagnostic Test Results: Labs reviewed in Epic    ASSESSMENT / PLAN:   Medicare annual wellness visit, subsequent  Reviewed PMH. Discussed healthcare maintenance issues, including cancer screenings, relevant immunizations (encouraged patient obtain Shingrix and Tdap booster through a pharmacy), and cardiac risk factor screenings such as for cholesterol, HTN, and DM.  - PRIMARY CARE FOLLOW-UP SCHEDULING; Future    Vision changes  Eye exam referral placed per group home request.  - Adult Eye  Referral; Future    Hypercholesterolemia  Lab check today.  - Lipid panel reflex to direct LDL Non-fasting; Future  - Basic metabolic panel; Future  - ALT; Future    Personal history of tobacco use  Patient now vapes. Encouraged cessation.    Prediabetes  Lab check today.  - HEMOGLOBIN A1C; Future    Ulcer of great toe, left, with fat layer exposed (H)  Following with wound clinic. Defer cares to that " "service.    Schizoaffective disorder, bipolar type (H)  Known issue that I take into account for their medical decisions, no current exacerbations or new concerns. Managed by psychiatry - defer to them. He presented with paperwork today asking me to certify that he would not be appropriate living on his own. I filled this paperwork out to the best of my ability but discussed with patient that ultimately this would be best discussed with his psychiatry team.    Mixed simple and mucopurulent chronic bronchitis (H)  Known issue that I take into account for their medical decisions, no current exacerbations or new concerns.    Moderate episode of recurrent major depressive disorder (H)  Known issue that I take into account for their medical decisions, no current exacerbations or new concerns. Managed by psychiatry - defer to them.    Morbid obesity due to excess calories (H)  BMI 49.    Colon cancer screening  Discussed his colonoscopy is next due in 2026.    Screening for prostate cancer  - Prostate Specific Antigen Screen; Future    Encounter for immunization  - COVID-19 12+ (2023-24) (PFIZER)    COUNSELING:  Reviewed preventive health counseling, as reflected in patient instructions    BMI:   Estimated body mass index is 49.55 kg/m  as calculated from the following:    Height as of this encounter: 1.727 m (5' 8\").    Weight as of this encounter: 147.8 kg (325 lb 14.4 oz).     He reports that he has quit smoking. His smoking use included cigarettes. He has a 30 pack-year smoking history. He has never used smokeless tobacco.    Nicotine/Tobacco Cessation Plan:   Information offered: Patient not interested at this time    Appropriate preventive services were discussed with this patient, including applicable screening as appropriate for fall prevention, nutrition, physical activity, Tobacco-use cessation, weight loss and cognition.  Checklist reviewing preventive services available has been given to the " patient.    Reviewed patients plan of care and provided an AVS. The Complex Care Plan (for patients with higher acuity and needing more deliberate coordination of services) for Petey meets the Care Plan requirement. This Care Plan has been established and reviewed with the Patient.    Demetri Holder MD  [facility]    Identified Health Risks:  I have reviewed Opioid Use Disorder and Substance Use Disorder risk factors and made any needed referrals.

## 2024-01-03 NOTE — PATIENT INSTRUCTIONS
- I will send you a message on [employer] when I am able to look at the results of your tests from today    - Stop by our pharmacy downstairs or your preferred pharmacy to discuss obtaining a tetanus booster shot and the Shingrix (shingles) shots

## 2024-01-11 ENCOUNTER — ANTICOAGULATION THERAPY VISIT (OUTPATIENT)
Dept: ANTICOAGULATION | Facility: CLINIC | Age: 64
End: 2024-01-11

## 2024-01-11 ENCOUNTER — LAB (OUTPATIENT)
Dept: LAB | Facility: CLINIC | Age: 64
End: 2024-01-11
Payer: MEDICARE

## 2024-01-11 DIAGNOSIS — Z86.711 HISTORY OF PULMONARY EMBOLISM: Primary | ICD-10-CM

## 2024-01-11 DIAGNOSIS — Z79.01 LONG TERM CURRENT USE OF ANTICOAGULANT THERAPY: ICD-10-CM

## 2024-01-11 DIAGNOSIS — Z86.711 HISTORY OF PULMONARY EMBOLISM: ICD-10-CM

## 2024-01-11 LAB — INR BLD: 2.8 (ref 0.9–1.1)

## 2024-01-11 PROCEDURE — 36416 COLLJ CAPILLARY BLOOD SPEC: CPT

## 2024-01-11 PROCEDURE — 85610 PROTHROMBIN TIME: CPT

## 2024-01-11 RX ORDER — WARFARIN SODIUM 2 MG/1
TABLET ORAL
Qty: 6 TABLET | Refills: 0 | Status: SHIPPED | OUTPATIENT
Start: 2024-01-11 | End: 2024-01-25

## 2024-01-11 RX ORDER — WARFARIN SODIUM 6 MG/1
TABLET ORAL
Qty: 14 TABLET | Refills: 0 | Status: SHIPPED | OUTPATIENT
Start: 2024-01-11 | End: 2024-01-25

## 2024-01-11 NOTE — PROGRESS NOTES
ANTICOAGULATION MANAGEMENT     Petey Archibald 63 year old male is on warfarin with therapeutic INR result. (Goal INR 2.0-3.0)    Recent labs: (last 7 days)     01/11/24  1104   INR 2.8*       ASSESSMENT     Source(s): Chart Review and Patient/Caregiver Call     Warfarin doses taken: Warfarin taken as instructed  Diet: No new diet changes identified  Medication/supplement changes: None noted  New illness, injury, or hospitalization: No  Signs or symptoms of bleeding or clotting: No  Previous result: Supratherapeutic  Additional findings: None       PLAN     Recommended plan for no diet, medication or health factor changes affecting INR     Dosing Instructions: Continue your current warfarin dose with next INR in 2 weeks       Summary  As of 1/11/2024      Full warfarin instructions:  8 mg every Sun, Tue, Fri; 6 mg all other days   Next INR check:  1/25/2024               Telephone call with [facility] nurse who verbalizes understanding and agrees to plan. Rx sent to [facility].     Lab visit scheduled    Education provided:   Please call back if any changes to your diet, medications or how you've been taking warfarin    Plan made per [facility] anticoagulation protocol    Cameron Blackmon RN  Anticoagulation Clinic  1/11/2024    _______________________________________________________________________     Anticoagulation Episode Summary       Current INR goal:  2.0-3.0   TTR:  70.2% (1 y)   Target end date:  Indefinite   Send INR reminders to:  EDU JIMENEZ    Indications    History of pulmonary embolism [Z86.711]  Long term current use of anticoagulant therapy [Z79.01]             Comments:  REM shelter; A new Coumadin Prescription will need to sent to [facility] with current dose and next INR check.             Anticoagulation Care Providers       Provider Role Specialty Phone number    Demetri Archer MD Referring Internal Medicine [phone]

## 2024-01-17 ENCOUNTER — MEDICAL CORRESPONDENCE (OUTPATIENT)
Dept: HEALTH INFORMATION MANAGEMENT | Facility: CLINIC | Age: 64
End: 2024-01-17

## 2024-01-17 ENCOUNTER — HOSPITAL ENCOUNTER (OUTPATIENT)
Dept: WOUND CARE | Facility: CLINIC | Age: 64
Discharge: HOME | End: 2024-01-17
Attending: FAMILY MEDICINE | Admitting: FAMILY MEDICINE
Payer: MEDICARE

## 2024-01-17 VITALS — SYSTOLIC BLOOD PRESSURE: 136 MMHG | TEMPERATURE: 98 F | HEART RATE: 71 BPM | DIASTOLIC BLOOD PRESSURE: 81 MMHG

## 2024-01-17 DIAGNOSIS — L97.922 CHRONIC ULCER OF LEFT LEG WITH FAT LAYER EXPOSED (H): ICD-10-CM

## 2024-01-17 DIAGNOSIS — L97.512 ULCER OF RIGHT FOOT WITH FAT LAYER EXPOSED (H): ICD-10-CM

## 2024-01-17 DIAGNOSIS — R60.0 LOWER EXTREMITY EDEMA: ICD-10-CM

## 2024-01-17 DIAGNOSIS — L97.522 ULCER OF GREAT TOE, LEFT, WITH FAT LAYER EXPOSED (H): ICD-10-CM

## 2024-01-17 DIAGNOSIS — S91.302D OPEN WOUND OF LEFT FOOT, SUBSEQUENT ENCOUNTER: Primary | ICD-10-CM

## 2024-01-17 DIAGNOSIS — L97.522 ULCER OF LEFT FOOT WITH FAT LAYER EXPOSED (H): ICD-10-CM

## 2024-01-17 PROCEDURE — 11042 DBRDMT SUBQ TIS 1ST 20SQCM/<: CPT | Performed by: FAMILY MEDICINE

## 2024-01-17 PROCEDURE — 99214 OFFICE O/P EST MOD 30 MIN: CPT | Mod: 25 | Performed by: FAMILY MEDICINE

## 2024-01-17 NOTE — PROGRESS NOTES
Wound Clinic Note         Visit date: 01/17/2024       Cheif Complaint:     Petey Archibald is a 63 year old   male had concerns including WOUND CARE.  The patient has lower extremity edema and bilateral leg ulcers.          HISTORY OF PRESENT ILLNESS:    Petey Archibald reports the wound has been present since mid 2022.  The wound began without a clear cause.   He reports prior to this wound developing he has not had other difficult to heal wounds on the legs.    Today the patient is again accompanied to the wound clinic by an employee at his group home and they both provide portions of the interval history.    The group home employee that is with the patient today reports that he was on vacation for a week and 70 also had been doing the dressing changes.  It sounds like things had not been bandaged as regularly or as well during that time.    They have been bandaging the wounds beginning with a zinc oxide barrier cream applied to the periwound followed by alginate and ABD pad and the spandagrip stocking changed 3 times a week.  There has been moderate serous drainage from the wounds.  The group home employee notes that some days the patient refuses to use the spandagrip stocking but most days he is wearing it now.       The pateint denies fevers or chills.  They report the pain from the wound has been 0/10 and has remained about the same recently.      The patient reports they currently do not have any routine for elevating their legs.  The patient confirms they do sleep in a bed.  He still has not been elevating his legs as I recommended.    Today the patient reports maintaining a high protein diet, but has not been taking protein supplements lately.        The patient denies a history of diabetes or chronic steroid use.  She continues to use a nicotine inhaler.        The patient has not had any symptoms of infection relating to the wound recently and is not currently on antibiotics.       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       Surgical Hx:   Past Surgical History:   Procedure Laterality Date    COLONOSCOPY N/A 4/15/2019    Procedure: COMBINED COLONOSCOPY, SINGLE OR MULTIPLE BIOPSY/POLYPECTOMY BY BIOPSY;  Surgeon: Jovana Ohara MD;  Location:  GI    COLONOSCOPY N/A 6/7/2021    Procedure: COLONOSCOPY with polypectomies;  Surgeon: Sahil Cid MD;  Location: [facility] OR    EXCISE MALIGNANT LESIONS, TRUNK/ARMS/LEGS 0.5CM OR LESS Left 5/26/2017    Procedure: EXCISE MALIGNANT LESIONS, TRUNK/ARMS/LEGS 0.5CM OR LESS;  EXCISION LEFT ELBOW MASS;  Surgeon: Jose Azevedo MD;  Location:  GI    RECTAL SURGERY      perianal abscess?          Allergies:    Allergies   Allergen Reactions    Bees     Clavulanic Acid     Amoxicillin-Pot Clavulanate Rash     Augmentin    Penicillins Rash              Medication History:    Current Outpatient Medications   Medication Sig    acetaminophen (TYLENOL) 325 MG tablet Take 1-2 tablets (325-650 mg) by mouth every 6 hours as needed for mild pain, fever or headaches    albuterol (ALBUTEROL) 108 (90 BASE) MCG/ACT inhaler Inhale 2 puffs into the lungs every 6 hours as needed    ARIPiprazole (ABILIFY) 5 MG tablet Take 5 mg by mouth daily     "buPROPion (WELLBUTRIN SR) 150 MG 12 hr tablet Take 150 mg by mouth 2 times daily    Cadexomer Iodine, topical, 0.9% (IODOSORB) 0.9 % GEL gel Apply topically daily Apply to left foot wound daily after cleansing the wound and blotting it dry.    cloNIDine (CATAPRES) 0.1 MG tablet Take 0.1 mg by mouth daily as needed    clotrimazole (LOTRIMIN) 1 % external cream Apply topically 2 times daily    diclofenac (VOLTAREN) 1 % topical gel Apply 4 g topically 2 times daily as needed for moderate pain    diphenhydrAMINE (BENADRYL) 25 MG capsule Take 50 mg by mouth every 6 hours as needed for itching or allergies    Emollient (CERAVE) CREA Externally apply topically daily    EPINEPHrine (ANY BX GENERIC EQUIV) 0.3 MG/0.3ML injection 2-pack INJECT 0.3MG INTRAMUSCULARLY ONE TIME FOR ONE DOSE AS NEEDED FOR ANAPHYLAXIS. MAY REPEAT ONE TIME IN 5-15 MINUTES IF RESPONSE TO INITIAL DOSE IS INADEQUATE. *1 TOTAL FILL, ORIGINAL FROM EMERGENCY ROOM*    EPINEPHrine (EPIPEN 2-BRE) 0.3 MG/0.3ML injection 2-pack INJECT 0.3MG INTRAMUSCULAR ONE TIME FOR ONE DOSE AS NEEDED FOR ANAPHYLAXIS (CALL 911 IF YOU HAVE TO GIVE) (FOR BEE STINGS) **LABEL EACH PEN*    gabapentin (NEURONTIN) 100 MG capsule Take 400 mg by mouth 3 times daily At 0900, 1200, and 2000    Gauze Pads & Dressings (GAUZE PADS 4\"X4\") 4\"X4\" PADS 1 each 3 times daily as needed    loratadine (CLARITIN) 10 MG tablet Take 10 mg by mouth daily    LORazepam (ATIVAN) 1 MG tablet Take 1 mg by mouth daily as needed for anxiety    montelukast (SINGULAIR) 10 MG tablet TAKE 1 TABLET BY MOUTH EVERY MORNING (ASTHMA)    Multiple Vitamin (DAILY-JOVAN) TABS TAKE 1 TABLET BY MOUTH EVERY MORNING (VITAMINS)    naltrexone (DEPADE/REVIA) 50 MG tablet Take 100 mg by mouth daily    nicotine (COMMIT) 2 MG lozenge Place 2 mg inside cheek daily as needed    nystatin-triamcinolone (MYCOLOG II) 623949-5.1 UNIT/GM-% external cream Apply topically 2 times daily For 1-2 weeks.    omeprazole (PRILOSEC) 40 MG DR capsule " Take 1 capsule (40 mg) by mouth daily    order for DME Equipment being ordered: roll of micropore tape to dress foot wound bid    order for DME Equipment being ordered: 1 surgical shoe    order for DME [employer] [facility] Phone [phone] Fax [phone]  EdemaWear Size Medium/Yellow qty 4 sleeves  Length of Need: 1 month  Frequency of dressing change daily    order for DME [employer] Stockings  Phone #[phone]  Fax #[phone]  Coolflex Velcro wraps    Length of Need: Life Time  # of Pairs 1 set    order for DME [employer] Order Phone [phone] Fax [phone]  Primary Dressing Hydrofera Blue Ready   Qty 5 sheets  Secondary Dressing 4' roll gauze Qty 30  Secondary Dressing 2' medipore tape Qty 1  Secondary Dressing 4x4 gauze loaf Qty  1  Length of Need: 1 month  Frequency of dressing change: daily    order for DME Oxygen 2 Li/min  at night and bled into BiPAP    order for DME Equipment being ordered: CPAP with mask and tubing    order for DME Equipment being ordered: support compression hose BK  2 pair black 30mm HG   To be applied on arising & removed while lying down to go to sleep    polyethylene glycol (MIRALAX) 17 GM/Dose powder Take 17 g (1 capful) by mouth daily as needed for constipation    PULMICORT FLEXHALER 180 MCG/ACT inhaler INHALE 2 PUFFS INTO THE LUNGS TWICE DAILY.    SENNA-TABS 8.6 MG tablet Take 1 tablet by mouth daily    sertraline (ZOLOFT) 100 MG tablet Take 200 mg by mouth daily    simvastatin (ZOCOR) 40 MG tablet TAKE 1 TABLET BY MOUTH EVERY MORNING.  (CHOLESTEROL)    SPIRIVA HANDIHALER 18 MCG inhaled capsule INHALE CONTENTS OF 1 CAPSULE INTO THE LUNGS ONCE DAILY    spironolactone (ALDACTONE) 25 MG tablet TAKE 1 TABLET BY MOUTH ONCE DAILY. (HIGH BLOOD PRESSURE)    tiZANidine (ZANAFLEX) 2 MG tablet TAKE 1 TABLET BY MOUTH THREE TIMES DAILY.    tiZANidine (ZANAFLEX) 2 MG tablet Take 1 tablet (2 mg) by mouth 3 times daily    traZODone (DESYREL) 100 MG tablet Take 100 mg  by mouth At Bedtime    triamcinolone (KENALOG) 0.1 % external cream Apply to AA BID x 1-2 week then PRN only    vitamin B complex with vitamin C (STRESS TAB) tablet Take 1 tablet by mouth daily    warfarin ANTICOAGULANT (COUMADIN) 2 MG tablet 8 mg every Tue, Fri, and Sun + 6 mg all other days. Recheck INR on 1/25/24.    warfarin ANTICOAGULANT (COUMADIN) 6 MG tablet 8 mg every Tue, Fri, and Sun + 6 mg all other days. Recheck INR on 1/25/24.     No current facility-administered medications for this encounter.         Tobacco History:  reports that he has quit smoking. His smoking use included cigarettes. He has a 30 pack-year smoking history. He has never used smokeless tobacco.       REVIEW OF SYMPTOMS:   The review of systems was negative except as noted in the HPI.           PHYSICAL EXAMINATION:     /81 (BP Location: Left arm, Patient Position: Sitting, Cuff Size: Adult Regular)   Pulse 71   Temp 98  F (36.7  C) (Temporal)            GENERAL: The patient overall appears well and is no acute distress.   HEAD: normocephalic   EYES: Sclera and conjunctiva clear   NECK: no obvious masses   LUNGS: breathing is unlabored.   EXTREMITIES: No clubbing, cyanosis or edema   SKIN: No rashes or other abnormalities except as noted under the Wound section below.   NEUROLOGICAL: normal motor and sensory function   EDEMA: Moderate       WOUND: The wound appears healthy with no sign of infection.   Wound bed: necrotic material at the left dorsal foot wound only.  Periwound: healthy intact skin  Today the patient has quite a bit more swelling in his legs, the left anterior shin wound is larger and none of the other wound measurements are any smaller.    Also see below for wound details:       Circumferential volume measures:             No data to display                Ulceration(s)/Wound(s):   Please see the media tab under the chart review for pictures of the wounds.  Nursing staff removed dressings and cleansed  wound.    Wound (used by [facility] only) 08/22/23 1341 Left dorsal 1 toe ulceration, venous (Active)   Thickness/Stage full thickness 01/17/24 1100   Base slough 01/17/24 1100   Periwound macerated;edematous 01/17/24 1100   Periwound Temperature warm 01/17/24 1100   Periwound Skin Turgor soft 01/17/24 1100   Edges open 01/17/24 1100   Length (cm) 1 01/17/24 1100   Width (cm) 1.7 01/17/24 1100   Depth (cm) 0.3 01/17/24 1100   Wound (cm^2) 1.7 cm^2 01/17/24 1100   Wound Volume (cm^3) 0.51 cm^3 01/17/24 1100   Wound healing % 58.94 01/17/24 1100   Drainage Characteristics/Odor serous 01/17/24 1100   Drainage Amount moderate 01/17/24 1100   Care, Wound non-select wound debridement performed. 01/17/24 1100       Wound (used by [facility] only) 11/15/23 0906 Right lateral;dorsal foot ulceration, venous (Active)   Thickness/Stage full thickness 01/17/24 1100   Base granulating;slough;scab 01/17/24 1100   Periwound redness;dry;edematous 01/17/24 1100   Periwound Temperature warm 01/17/24 1100   Periwound Skin Turgor soft 01/17/24 1100   Edges open 01/17/24 1100   Length (cm) 0.7 01/17/24 1100   Width (cm) 0.2 01/17/24 1100   Depth (cm) 0.3 01/17/24 1100   Wound (cm^2) 0.14 cm^2 01/17/24 1100   Wound Volume (cm^3) 0.04 cm^3 01/17/24 1100   Wound healing % 92.22 01/17/24 1100   Drainage Characteristics/Odor serous 01/17/24 1100   Drainage Amount moderate 01/17/24 1100   Care, Wound non-select wound debridement performed. 01/17/24 1100       Wound (used by [facility] only) 11/15/23 0911 Left dorsal foot ulceration, venous (Active)   Thickness/Stage full thickness 01/17/24 1100   Base slough;granulating 01/17/24 1100   Periwound edematous;dry 01/17/24 1100   Periwound Temperature warm 01/17/24 1100   Periwound Skin Turgor soft 01/17/24 1100   Edges open 01/17/24 1100   Length (cm) 2.4 01/17/24 1100   Width (cm) 2.5 01/17/24 1100   Depth (cm) 0.3 01/17/24 1100   Wound (cm^2) 6 cm^2 01/17/24 1100   Wound Volume (cm^3) 1.8 cm^3 01/17/24 1100    Wound healing % 65.91 01/17/24 1100   Drainage Characteristics/Odor serous 01/17/24 1100   Drainage Amount copious 01/17/24 1100   Care, Wound debrided 01/17/24 1100       Wound (used by [facility] only) 12/27/23 1410 Left anterior;lower leg ulceration, venous (Active)   Thickness/Stage full thickness 01/17/24 1100   Base granulating;slough;scab 01/17/24 1100   Periwound edematous;dry 01/17/24 1100   Periwound Temperature warm 01/17/24 1100   Periwound Skin Turgor soft 01/17/24 1100   Edges open 01/17/24 1100   Length (cm) 11 01/17/24 1100   Width (cm) 1.5 01/17/24 1100   Depth (cm) 0.2 01/17/24 1100   Wound (cm^2) 16.5 cm^2 01/17/24 1100   Wound Volume (cm^3) 3.3 cm^3 01/17/24 1100   Wound healing % -2438.46 01/17/24 1100   Drainage Characteristics/Odor serous 01/17/24 1100   Drainage Amount moderate 01/17/24 1100   Care, Wound non-select wound debridement performed. 01/17/24 1100                         Recent Labs   Lab Test 06/03/21  1448 03/23/21  1042 07/28/20  0929   A1C 5.7* 5.6 5.7*          Recent Labs   Lab Test 02/14/23  0059 06/08/22  1835 07/28/20  0929   ALBUMIN 3.9 3.2* 3.3*              Procedure note:  I had previous obtain informed consent from the patient to perform serial debridements, I confirmed this again with the patient today verbally.  Anesthetized as needed with lidocaine.  Using a curette and/or a scalpel I performed an excisional debridement removing all necrotic material at the left dorsal foot wound in to the level of viable subcutaneous tissue.  I obtained hemostasis with direct pressure.  The patient tolerated the procedure well.  EBL: <5 ml  Total debridement surface area: Less than 20 cm             ASSESSMENT:   This is a 63 year old  male with lower extremity edema and bilateral lower extremity wounds.          PLAN:   We will bandage all of the wound areas beginning with a zinc oxide barrier cream applied to the periwound followed by alginate, an ABD pad and the spandagrip  stocking change 3 times a week.    I have explained to the patient and the group home employee that we have got to control the swelling in his leg better in order to get these areas to heal.  If we do not control the swelling better the wounds will get larger.  We can control the swelling with compression and elevation.    I previously discussed the option of getting a venous insufficiency ultrasound to evaluate for areas of superficial venous insufficiency which may have led to the wound formation.  However the patient would rather hold off on ordering this diagnostic study at this time.    Separate from his wound care instructions I then discussed the treatment of his lower extremity edema.  This primarily involves compression and elevation.  I have strongly encouraged him to wear his spandagrip stockings every day.  I have again explained to the patient today that controlling the edema is probably the most important thing we can do to help heal the wound.  I have specifically recommended that they lay down with their legs above the level of the heart for 30 minutes at least twice a day.  I emphasized that if we can not control the edema we will likely not be able to get this wound to heal.     I have encouraged the patient to continue on their high protein diet to aid in wound healing.   The patient will return to the wound clinic in 2-3 weeks to see me again.        30 minutes spent on the date of the encounter doing chart review, history and exam, documentation and further activities per the note, this time excludes any procedure time      Miguel Hampton MD  01/17/2024   11:57 AM   [facility]  [phone]    This note was electronically signed by Miguel Hampton MD        Further instructions from your care team         Petey Archibald      1960  A DME order for supplies has been placed to [facility]. If there are any issues with your order including not  "receiving the order please call [employer] at [phone]. They can also provide a tracking number for you.  Dressing changes outside of clinic are being performed by Group Home Staff   Lives at [facility] group home (3x weekly wound care)   FAX ORDERS ATTENTION JUAN DANIEL: [phone]     Plan:  Good job on stopping smoking, Continue to try and wean off of the nicotine inhaler to aide in healing  Patient would like to hold off on vein study (venous competency) at this time  Change dressings immediately after showering    Wound Dressing Change: Right Lateral Dorsal Foot  - Prepare surface and wash your hands with soap and water  - Cleanse with mild unscented soap and water (such as Cetaphil, Cerave or Dove)  - apply light layer Desitin/ Zinc oxide barrier paste around wound edge  - Apply 1/6th piece 4x4 Melgisorb alginate to wound bed  - Cover with 1/2 of 5x9 ABD  - Secure with 1 roll of 4\" Kerlix roll gauze and 2'' Medipore tape  - Pull up Spandagrip Size F from base of toes to the knee  Change 3 times weekly and as needed for drainage     Wound Dressing Change: Left Dorsal 1st Toe  - Prepare surface and wash your hands with soap and water  - Cleanse with mild unscented soap and water (such as Cetaphil, Cerave or Dove)  - apply light layer Desitin/ Zinc oxide barrier paste around wound edge  - Apply 1/6th piece  4x4 Melgisorb alginate to wound bed  - Cover with 1/2 of 5x9 ABD  - Secure with 1 roll of 4\" Kerlix roll gauze and 2'' Medipore tape  - Pull up Spandagrip size F from base of toes to the knee  Change 3 times weekly and as needed for drainage     Wound Dressing Change: Left Dorsal Foot  - Prepare surface and wash your hands with soap and water  - Cleanse with mild unscented soap and water (such as Cetaphil, Cerave or Dove)  - apply light layer Desitin/ Zinc oxide barrier paste around wound edge  - Apply 1/4th piece  4x4 Melgisorb alginate to wound bed  - Cover and Secure as above  - Pull up Spandagrip size F from base " of toes to the knee  Change 3 times weekly and as needed for drainage     Wound Dressing Change: Left Anterior Lower Leg  - Prepare surface and wash your hands with soap and water  - Cleanse with mild unscented soap and water (such as Cetaphil, Cerave or Dove)  - apply light layer Desitin/ Zinc oxide barrier paste around wound edge  - Apply 1/2 piece 4x4 Melgisorb alginate to wound bed  - Cover with 1/2 of 5x9 ABD  - Secure as above  - Pull up Spandagrip Size F from base of toes to the knee  Change 3 times weekly and as needed for drainage     Elevation: elevate your legs above the level of your heart for 30 minutes 2-3 times each day  - Lay on the couch or your bed and prop your legs up on pillows  - Recline back as far as you can go in your recliner and prop your legs on pillows.     Protein: diet high in protein, shakes or barsBeef jerky 13g per 1oz     Main Provider: Miguel Hampton M.D. January 17, 2024    Call us at [phone] if you have any questions about your wounds, have redness or swelling around your wound, have a fever of 101 degrees Fahrenheit or greater or if you have any other problems or concerns. We answer the phone Monday through Friday 8 am to 4 pm, please leave a message as we check the voicemail frequently throughout the day.     If you had a positive experience please indicate that on your patient satisfaction survey form that [facility] will be sending you.  It was a pleasure meeting with you today.  Thank you for allowing me and my team the privilege of caring for you today.  YOU are the reason we are here, and I truly hope we provided you with the excellent service you deserve.  Please let us know if there is anything else we can do for you so that we can be sure you are leaving completely satisfied with your care experience.      If you have any billing related questions please call the [facility] office at [phone]. The clinic staff does not handle billing related  matters.  If you are scheduled to have a follow up appointment, you will receive a reminder call the day before your visit. On the appointment day please arrive 15 minutes prior to your appointment time. If you are unable to keep that appointment, please call the clinic to cancel or reschedule. If you are more than 10 minutes late or greater for your scheduled appointment time, the clinic policy is that you may be asked to reschedule.         ,

## 2024-01-25 ENCOUNTER — ANTICOAGULATION THERAPY VISIT (OUTPATIENT)
Dept: ANTICOAGULATION | Facility: CLINIC | Age: 64
End: 2024-01-25

## 2024-01-25 ENCOUNTER — LAB (OUTPATIENT)
Dept: LAB | Facility: CLINIC | Age: 64
End: 2024-01-25
Payer: MEDICARE

## 2024-01-25 DIAGNOSIS — Z86.711 HISTORY OF PULMONARY EMBOLISM: ICD-10-CM

## 2024-01-25 DIAGNOSIS — Z79.01 LONG TERM CURRENT USE OF ANTICOAGULANT THERAPY: ICD-10-CM

## 2024-01-25 DIAGNOSIS — Z86.711 HISTORY OF PULMONARY EMBOLISM: Primary | ICD-10-CM

## 2024-01-25 LAB — INR BLD: 2.5 (ref 0.9–1.1)

## 2024-01-25 PROCEDURE — 85610 PROTHROMBIN TIME: CPT

## 2024-01-25 PROCEDURE — 36416 COLLJ CAPILLARY BLOOD SPEC: CPT

## 2024-01-25 RX ORDER — WARFARIN SODIUM 6 MG/1
TABLET ORAL
Qty: 14 TABLET | Refills: 0 | Status: SHIPPED | OUTPATIENT
Start: 2024-01-25 | End: 2024-02-08

## 2024-01-25 RX ORDER — WARFARIN SODIUM 2 MG/1
TABLET ORAL
Qty: 6 TABLET | Refills: 0 | Status: SHIPPED | OUTPATIENT
Start: 2024-01-25 | End: 2024-02-16

## 2024-01-25 NOTE — PROGRESS NOTES
ANTICOAGULATION MANAGEMENT     Petey Archibald 63 year old male is on warfarin with therapeutic INR result. (Goal INR 2.0-3.0)    Recent labs: (last 7 days)     01/25/24  1028   INR 2.5*       ASSESSMENT     Source(s): Chart Review and Home Care/Facility Nurse     Warfarin doses taken: Warfarin taken as instructed  Diet: No new diet changes identified  Medication/supplement changes: None noted  New illness, injury, or hospitalization: No  Signs or symptoms of bleeding or clotting: No  Previous result: Therapeutic last visit; previously outside of goal range  Additional findings: Sent warfarin refill to [facility]       PLAN     Recommended plan for no diet, medication or health factor changes affecting INR     Dosing Instructions: Continue your current warfarin dose with next INR in 2 weeks       Summary  As of 1/25/2024      Full warfarin instructions:  8 mg every Sun, Tue, Fri; 6 mg all other days   Next INR check:  2/8/2024               Telephone call with [facility] nurse who verbalizes understanding and agrees to plan and who agrees to plan and repeated back plan correctly  Will have onsite staff mail AVS to  [facility]    Lab visit scheduled at [facility] on 2/8/24 10:30 AM    Education provided:   Contact [phone] with any changes, questions or concerns.     Plan made per [facility] anticoagulation protocol    Myranda Welsh RN  Anticoagulation Clinic  1/25/2024    _______________________________________________________________________     Anticoagulation Episode Summary       Current INR goal:  2.0-3.0   TTR:  71.1% (1 y)   Target end date:  Indefinite   Send INR reminders to:  EDU JIMENEZ    Indications    History of pulmonary embolism [Z86.711]  Long term current use of anticoagulant therapy [Z79.01]             Comments:  [facility]; A new Coumadin Prescription will need to sent to [facility] with current dose and next INR check.             Anticoagulation Care Providers        Provider Role Specialty Phone number    Demetri Archer MD Referring Internal Medicine [phone]

## 2024-01-30 ENCOUNTER — MEDICAL CORRESPONDENCE (OUTPATIENT)
Dept: HEALTH INFORMATION MANAGEMENT | Facility: CLINIC | Age: 64
End: 2024-01-30
Payer: MEDICARE

## 2024-02-06 ENCOUNTER — HOSPITAL ENCOUNTER (OUTPATIENT)
Dept: WOUND CARE | Facility: CLINIC | Age: 64
Discharge: HOME | End: 2024-02-06
Attending: FAMILY MEDICINE | Admitting: FAMILY MEDICINE
Payer: MEDICARE

## 2024-02-06 ENCOUNTER — TELEPHONE (OUTPATIENT)
Dept: WOUND CARE | Facility: CLINIC | Age: 64
End: 2024-02-06

## 2024-02-06 ENCOUNTER — MEDICAL CORRESPONDENCE (OUTPATIENT)
Dept: HEALTH INFORMATION MANAGEMENT | Facility: CLINIC | Age: 64
End: 2024-02-06

## 2024-02-06 VITALS — SYSTOLIC BLOOD PRESSURE: 141 MMHG | TEMPERATURE: 98.5 F | HEART RATE: 73 BPM | DIASTOLIC BLOOD PRESSURE: 67 MMHG

## 2024-02-06 DIAGNOSIS — L97.522 ULCER OF LEFT FOOT WITH FAT LAYER EXPOSED (H): ICD-10-CM

## 2024-02-06 DIAGNOSIS — L97.522 ULCER OF GREAT TOE, LEFT, WITH FAT LAYER EXPOSED (H): ICD-10-CM

## 2024-02-06 DIAGNOSIS — L97.512 ULCER OF RIGHT FOOT WITH FAT LAYER EXPOSED (H): ICD-10-CM

## 2024-02-06 DIAGNOSIS — L97.922 CHRONIC ULCER OF LEFT LEG WITH FAT LAYER EXPOSED (H): ICD-10-CM

## 2024-02-06 DIAGNOSIS — S91.302D OPEN WOUND OF LEFT FOOT, SUBSEQUENT ENCOUNTER: Primary | ICD-10-CM

## 2024-02-06 DIAGNOSIS — R60.0 LOWER EXTREMITY EDEMA: ICD-10-CM

## 2024-02-06 PROCEDURE — 97602 WOUND(S) CARE NON-SELECTIVE: CPT

## 2024-02-06 PROCEDURE — 99214 OFFICE O/P EST MOD 30 MIN: CPT | Performed by: FAMILY MEDICINE

## 2024-02-06 RX ORDER — SULFAMETHOXAZOLE/TRIMETHOPRIM 800-160 MG
1 TABLET ORAL 2 TIMES DAILY
Qty: 28 TABLET | Refills: 0 | Status: SHIPPED | OUTPATIENT
Start: 2024-02-06 | End: 2024-02-20

## 2024-02-06 NOTE — ADDENDUM NOTE
Encounter addended by: Katy Cutler RN on: 2/6/2024 4:25 PM   Actions taken: Order list changed, Diagnosis association updated

## 2024-02-06 NOTE — TELEPHONE ENCOUNTER
Please schedule patient for Bilateral  Venous competency      Mobility:    Does the patient use an assistive device? (i.e. walker, wheelchair) Walker    Does the patient need assistance getting on/off the ultrasound table? No    Does the patient need assistance undressing/redressing the wounds? Yes         Wound wraps/dressings:    Is a 2 layer wrap being used? No (If yes, vein staff to assist patient in cutting this off.)    Is Edem[place]ear being used? No (If yes, vein team not to cut, but able to remove dressing.)     Will the patient bring dressings to reapply after US? No    o   If not, can they be sent home with a chux to have home care or family member redress? Yes    o   Do they need a coordinated appointment for a nurse visit at wound to redress? No    If coordinated appointment required, wound nurse to alert charge nurse or  staff who will call Vein  at [phone] and/or Vascular schedulers [phone] to schedule the testing needed and coordinated wound clinic visit.    The dressing can always be removed if testing is ordered.     Please include in the order to be scheduled by [facility] or [facility], whichever is appropriate. Route to [facility] and/or [facility] scheduling pools. If the patient is a [facility] lift, route to clinic manager and charge nurse. They will help to get the patient scheduled at the appropriate place.    Routing to [facility] Scheduling Pool for Venous Competency     Miguel Hampton M.D.

## 2024-02-06 NOTE — DISCHARGE INSTRUCTIONS
"Petey Archibald      1960    A DME order for supplies has been placed to [facility]. If there are any issues with your order including not receiving the order please call [facility] at [phone]. They can also provide a tracking number for you.    Dressing changes outside of clinic are being performed by Group Home Staff    [facility] home (3x weekly wound care)   FAX ORDERS ATTENTION JUAN DANIEL: [phone]     Plan:  Antibiotics prescribed. Take with food. Complete the entire course--call if side effects or problems.  Monitor INR--contact INR Nurse for instructions  Good job on stopping smoking, Continue to try and wean off of the nicotine inhaler to aide in healing  You have been referred to [facility] in [place] [phone] or [place] [phone]   Needs 3 x weekly dressings. Remind patient that wounds have to stay covered.  Schedule showers so that patient can have wound care done right after  Needs new shoes/slippers (discussed with patient) - size wide, supportive, adjustable if possible     Wound Dressing Change: Right Lateral Dorsal Foot, left dorsal 1st toe, left dorsal foot, left posterior lower leg  - Prepare surface and wash your hands with soap and water  - Cleanse with mild unscented soap (such as Cetaphil, Cerave or Dove) and water  - If able, apply small amount of VASHE on gauze, lay into wound bed, let sit for 10 minutes, remove gauze (do not rinse)   - Apply light layer Desitin/ Zinc oxide barrier paste around wound edge  - Apply ~1/6 piece 4x4 Melgisorb alginate to wound bed (cut-to-fit size of wound bed)  - Cover with 1/2 of 5x9 ABD per wound   - Secure with 1 roll of 4\" conforming roll gauze and 2'' Medipore tape  - Pull up Spandagrip Size F from base of toes to the knee  Change 3 times weekly and as needed for drainage     Wound Dressing Change: Left Anterior Lower Leg  - Prepare surface and wash your hands with soap and water  - Cleanse with mild " "unscented soap and water (such as Cetaphil, Cerave or Dove)  - If able, apply small amount of VASHE on gauze, lay into wound bed, let sit for 10 minutes, remove gauze (do not rinse)   - apply light layer Desitin/ Zinc oxide barrier paste around wound edge  - Apply 1/2 piece 4x4 Melgisorb alginate to wound bed  - Cover with two kerramax non-border 4x4 (or similar superabsorbant)--needs two due to copious drainage  - Secure with 1 roll of 4\" conforming roll gauze and 2'' Medipore tape  - Pull up Spandagrip Size F from base of toes to the knee  Change 3 times weekly and as needed for drainage     Elevation: elevate your legs above the level of your heart for 30 minutes 2-3 times each day  - Lay on the couch or your bed and prop your legs up on pillows  - Recline back as far as you can go in your recliner and prop your legs on pillows.     Protein: diet high in protein, shakes or bars, Beef jerky 13g per 1oz       Main Provider: Miguel Hampton M.D. February 6, 2024    Call us at [phone] if you have any questions about your wounds, have redness or swelling around your wound, have a fever of 101 degrees Fahrenheit or greater or if you have any other problems or concerns. We answer the phone Monday through Friday 8 am to 4 pm, please leave a message as we check the voicemail frequently throughout the day.     If you had a positive experience please indicate that on your patient satisfaction survey form that [facility] will be sending you.    It was a pleasure meeting with you today.  Thank you for allowing me and my team the privilege of caring for you today.  YOU are the reason we are here, and I truly hope we provided you with the excellent service you deserve.  Please let us know if there is anything else we can do for you so that we can be sure you are leaving completely satisfied with your care experience.      If you have any billing related questions please call the [facility] Business office at " [phone]. The clinic staff does not handle billing related matters.    If you are scheduled to have a follow up appointment, you will receive a reminder call the day before your visit. On the appointment day please arrive 15 minutes prior to your appointment time. If you are unable to keep that appointment, please call the clinic to cancel or reschedule. If you are more than 10 minutes late or greater for your scheduled appointment time, the clinic policy is that you may be asked to reschedule.

## 2024-02-06 NOTE — PROGRESS NOTES
Wound Clinic Note         Visit date: 02/06/2024       Cheif Complaint:     Petey Archibald is a 63 year old   male had concerns including WOUND CARE.  The patient has lower extremity edema and bilateral leg ulcers.          HISTORY OF PRESENT ILLNESS:    Petey Archibald reports the wound has been present since mid 2022.  The wound began without a clear cause.   He reports prior to this wound developing he has not had other difficult to heal wounds on the legs.    Today the patient is again accompanied to the wound clinic by an employee at his group home and they both provide portions of the interval history.    The patient comes into the wound clinic today with no bandages on.  He reports that the home health nurses have not been coming out to do the dressing changes and there is been no bandage on for the last 3 to 4 days.  The employee from the group home reports that its actually been closer to 2 weeks that there is been no bandages on his wounds.  The group home employee reports that he offered to replace the bandages but the patient refused.  We also got an communication from the patient's group home that he is removing the bandages frequently.  The patient reports today that he only removes the bandages when he is going to take a shower.    The patient also notes just last week he fell and hit his left anterior leg causing a new wound to develop there.     The pateint denies fevers or chills.  They report the pain from the wound has been 0/10 and has remained about the same recently.      The patient confirms they do sleep in a bed.  The patient reports they only occasionally lay down to elevate her legs above the level of their heart, but not twice a day. He still has not been elevating his legs as I recommended.    Today the patient reports maintaining a high protein diet, but has not been taking protein supplements lately.        The patient denies a history of diabetes or chronic steroid use.   She continues to use a nicotine inhaler.        The patient has not had any symptoms of infection relating to the wound recently and is not currently on antibiotics.       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       Surgical Hx:   Past Surgical History:   Procedure Laterality Date    COLONOSCOPY N/A 4/15/2019    Procedure: COMBINED COLONOSCOPY, SINGLE OR MULTIPLE BIOPSY/POLYPECTOMY BY BIOPSY;  Surgeon: Jovana Ohara MD;  Location:  GI    COLONOSCOPY N/A 6/7/2021    Procedure: COLONOSCOPY with polypectomies;  Surgeon: Sahil Cid MD;  Location: [facility] OR    EXCISE MALIGNANT LESIONS, TRUNK/ARMS/LEGS 0.5CM OR LESS Left 5/26/2017    Procedure: EXCISE MALIGNANT LESIONS, TRUNK/ARMS/LEGS 0.5CM OR LESS;  EXCISION LEFT ELBOW MASS;  Surgeon: Jose Azevedo MD;  Location:  GI    RECTAL SURGERY      perianal abscess?          Allergies:    Allergies   Allergen Reactions    Bees     Clavulanic Acid     Amoxicillin-Pot Clavulanate Rash     Augmentin    Penicillins Rash              Medication History:    Current Outpatient Medications   Medication Sig    sulfamethoxazole-trimethoprim (BACTRIM DS) 800-160 MG tablet Take 1 tablet by mouth 2 times daily for 14 days    acetaminophen  "(TYLENOL) 325 MG tablet Take 1-2 tablets (325-650 mg) by mouth every 6 hours as needed for mild pain, fever or headaches    albuterol (ALBUTEROL) 108 (90 BASE) MCG/ACT inhaler Inhale 2 puffs into the lungs every 6 hours as needed    ARIPiprazole (ABILIFY) 5 MG tablet Take 5 mg by mouth daily    buPROPion (WELLBUTRIN SR) 150 MG 12 hr tablet Take 150 mg by mouth 2 times daily    Cadexomer Iodine, topical, 0.9% (IODOSORB) 0.9 % GEL gel Apply topically daily Apply to left foot wound daily after cleansing the wound and blotting it dry.    cloNIDine (CATAPRES) 0.1 MG tablet Take 0.1 mg by mouth daily as needed    clotrimazole (LOTRIMIN) 1 % external cream Apply topically 2 times daily    diclofenac (VOLTAREN) 1 % topical gel Apply 4 g topically 2 times daily as needed for moderate pain    diphenhydrAMINE (BENADRYL) 25 MG capsule Take 50 mg by mouth every 6 hours as needed for itching or allergies    Emollient (CERAVE) CREA Externally apply topically daily    EPINEPHrine (ANY BX GENERIC EQUIV) 0.3 MG/0.3ML injection 2-pack INJECT 0.3MG INTRAMUSCULARLY ONE TIME FOR ONE DOSE AS NEEDED FOR ANAPHYLAXIS. MAY REPEAT ONE TIME IN 5-15 MINUTES IF RESPONSE TO INITIAL DOSE IS INADEQUATE. *1 TOTAL FILL, ORIGINAL FROM EMERGENCY ROOM*    EPINEPHrine (EPIPEN 2-BRE) 0.3 MG/0.3ML injection 2-pack INJECT 0.3MG INTRAMUSCULAR ONE TIME FOR ONE DOSE AS NEEDED FOR ANAPHYLAXIS (CALL 911 IF YOU HAVE TO GIVE) (FOR BEE STINGS) **LABEL EACH PEN*    gabapentin (NEURONTIN) 100 MG capsule Take 400 mg by mouth 3 times daily At 0900, 1200, and 2000    Gauze Pads & Dressings (GAUZE PADS 4\"X4\") 4\"X4\" PADS 1 each 3 times daily as needed    loratadine (CLARITIN) 10 MG tablet Take 10 mg by mouth daily    LORazepam (ATIVAN) 1 MG tablet Take 1 mg by mouth daily as needed for anxiety    montelukast (SINGULAIR) 10 MG tablet TAKE 1 TABLET BY MOUTH EVERY MORNING (ASTHMA)    Multiple Vitamin (DAILY-JOVAN) TABS TAKE 1 TABLET BY MOUTH EVERY MORNING (VITAMINS)    " naltrexone (DEPADE/REVIA) 50 MG tablet Take 100 mg by mouth daily    nicotine (COMMIT) 2 MG lozenge Place 2 mg inside cheek daily as needed    nystatin-triamcinolone (MYCOLOG II) 632082-8.1 UNIT/GM-% external cream Apply topically 2 times daily For 1-2 weeks.    omeprazole (PRILOSEC) 40 MG DR capsule Take 1 capsule (40 mg) by mouth daily    order for DME Equipment being ordered: roll of micropore tape to dress foot wound bid    order for DME Equipment being ordered: 1 surgical shoe    order for DME [employer] [facility] Phone [phone] Fax [phone]  EdemaWear Size Medium/Yellow qty 4 sleeves  Length of Need: 1 month  Frequency of dressing change daily    order for DME [employer]ression Stockings  Phone #[phone]  Fax #[phone]  Coolflex Velcro wraps    Length of Need: Life Time  # of Pairs 1 set    order for DME [employer] Order Phone [phone] Fax [phone]  Primary Dressing Hydrofera Blue Ready   Qty 5 sheets  Secondary Dressing 4' roll gauze Qty 30  Secondary Dressing 2' medipore tape Qty 1  Secondary Dressing 4x4 gauze loaf Qty  1  Length of Need: 1 month  Frequency of dressing change: daily    order for DME Oxygen 2 Li/min  at night and bled into BiPAP    order for DME Equipment being ordered: CPAP with mask and tubing    order for DME Equipment being ordered: support compression hose BK  2 pair black 30mm HG   To be applied on arising & removed while lying down to go to sleep    polyethylene glycol (MIRALAX) 17 GM/Dose powder Take 17 g (1 capful) by mouth daily as needed for constipation    PULMICORT FLEXHALER 180 MCG/ACT inhaler INHALE 2 PUFFS INTO THE LUNGS TWICE DAILY.    SENNA-TABS 8.6 MG tablet Take 1 tablet by mouth daily    sertraline (ZOLOFT) 100 MG tablet Take 200 mg by mouth daily    simvastatin (ZOCOR) 40 MG tablet TAKE 1 TABLET BY MOUTH EVERY MORNING.  (CHOLESTEROL)    SPIRIVA HANDIHALER 18 MCG inhaled capsule INHALE CONTENTS OF 1 CAPSULE INTO THE LUNGS ONCE DAILY     spironolactone (ALDACTONE) 25 MG tablet TAKE 1 TABLET BY MOUTH ONCE DAILY. (HIGH BLOOD PRESSURE)    tiZANidine (ZANAFLEX) 2 MG tablet TAKE 1 TABLET BY MOUTH THREE TIMES DAILY.    tiZANidine (ZANAFLEX) 2 MG tablet Take 1 tablet (2 mg) by mouth 3 times daily    traZODone (DESYREL) 100 MG tablet Take 100 mg by mouth At Bedtime    triamcinolone (KENALOG) 0.1 % external cream Apply to AA BID x 1-2 week then PRN only    vitamin B complex with vitamin C (STRESS TAB) tablet Take 1 tablet by mouth daily    warfarin ANTICOAGULANT (COUMADIN) 2 MG tablet 8 mg every Tue, Fri, and Sun + 6 mg all other days. Recheck INR on 2/8/24    warfarin ANTICOAGULANT (COUMADIN) 6 MG tablet 8 mg every Tue, Fri, and Sun + 6 mg all other days. Recheck INR on 2/8/24     No current facility-administered medications for this encounter.         Tobacco History:  reports that he has quit smoking. His smoking use included cigarettes. He has a 30 pack-year smoking history. He has never used smokeless tobacco.       REVIEW OF SYMPTOMS:   The review of systems was negative except as noted in the HPI.           PHYSICAL EXAMINATION:     BP (!) 141/67 (BP Location: Left arm, Patient Position: Sitting)   Pulse 73   Temp 98.5  F (36.9  C) (Temporal)            GENERAL: The patient overall appears well and is no acute distress.   HEAD: normocephalic   EYES: Sclera and conjunctiva clear   NECK: no obvious masses   LUNGS: breathing is unlabored.   EXTREMITIES: No clubbing, cyanosis or edema   SKIN: No rashes or other abnormalities except as noted under the Wound section below.   NEUROLOGICAL: normal motor and sensory function   EDEMA: Moderate       WOUND: There is a periwound erythema present today with increased warmth of the skin in the area.  There is no subcutaneous emphysema or woody induration.     Wound bed: granulation tissue   Periwound: cellulitic without crepitus or woody induration   The wound on his right lateral foot has some surrounding  cellulitis.  There is no sign of infection around the other wounds.  There is a new trauma wound on the left anterior shin.  The left dorsal foot wounds actually look better compared with his last clinic visit.    Also see below for wound details:       Circumferential volume measures:             No data to display                Ulceration(s)/Wound(s):   Please see the media tab under the chart review for pictures of the wounds.  Nursing staff removed dressings and cleansed wound.    Wound (used by [facility] only) 08/22/23 1341 Left dorsal 1 toe ulceration, venous (Active)   Thickness/Stage full thickness 02/06/24 1300   Base slough 02/06/24 1300   Periwound edematous 02/06/24 1300   Periwound Temperature warm 02/06/24 1300   Periwound Skin Turgor soft 02/06/24 1300   Edges open 02/06/24 1300   Length (cm) 0.4 02/06/24 1300   Width (cm) 0.8 02/06/24 1300   Depth (cm) 0.2 02/06/24 1300   Wound (cm^2) 0.32 cm^2 02/06/24 1300   Wound Volume (cm^3) 0.06 cm^3 02/06/24 1300   Wound healing % 92.27 02/06/24 1300   Drainage Characteristics/Odor serous 02/06/24 1300   Drainage Amount moderate 02/06/24 1300   Care, Wound non-select wound debridement performed. 02/06/24 1300       Wound (used by [facility] only) 11/15/23 0906 Right lateral;dorsal foot ulceration, venous (Active)   Thickness/Stage full thickness 02/06/24 1300   Base granulating;scab 02/06/24 1300   Periwound redness;edematous 02/06/24 1300   Periwound Temperature warm 02/06/24 1300   Periwound Skin Turgor soft 02/06/24 1300   Edges open 02/06/24 1300   Length (cm) 0.7 02/06/24 1300   Width (cm) 0.9 02/06/24 1300   Depth (cm) 0.2 02/06/24 1300   Wound (cm^2) 0.63 cm^2 02/06/24 1300   Wound Volume (cm^3) 0.13 cm^3 02/06/24 1300   Wound healing % 65 02/06/24 1300   Drainage Characteristics/Odor serous 02/06/24 1300   Drainage Amount moderate 02/06/24 1300   Care, Wound non-select wound debridement performed. 02/06/24 1300       Wound (used by [facility] only) 11/15/23 0911  Left dorsal foot ulceration, venous (Active)   Thickness/Stage full thickness 02/06/24 1300   Base slough 02/06/24 1300   Periwound edematous;pink 02/06/24 1300   Periwound Temperature warm 02/06/24 1300   Periwound Skin Turgor soft 02/06/24 1300   Edges open 02/06/24 1300   Length (cm) 2.2 02/06/24 1300   Width (cm) 2.5 02/06/24 1300   Depth (cm) 0.2 02/06/24 1300   Wound (cm^2) 5.5 cm^2 02/06/24 1300   Wound Volume (cm^3) 1.1 cm^3 02/06/24 1300   Wound healing % 68.75 02/06/24 1300   Drainage Characteristics/Odor serous 02/06/24 1300   Drainage Amount copious 02/06/24 1300   Care, Wound non-select wound debridement performed. 02/06/24 1300       Wound (used by [facility] only) 12/27/23 1410 Left anterior;lower leg ulceration, venous (Active)   Thickness/Stage full thickness 02/06/24 1300   Base slough 02/06/24 1300   Periwound edematous;redness 02/06/24 1300   Periwound Temperature warm 02/06/24 1300   Periwound Skin Turgor soft 02/06/24 1300   Edges open 02/06/24 1300   Length (cm) 12 02/06/24 1300   Width (cm) 6.8 02/06/24 1300   Depth (cm) 0.3 02/06/24 1300   Wound (cm^2) 81.6 cm^2 02/06/24 1300   Wound Volume (cm^3) 24.48 cm^3 02/06/24 1300   Wound healing % -86787.85 02/06/24 1300   Drainage Characteristics/Odor serous 02/06/24 1300   Drainage Amount copious 02/06/24 1300   Care, Wound non-select wound debridement performed. 02/06/24 1300       Wound (used by [facility] only) 02/06/24 1301 Left posterior;lower leg ulceration, venous (Active)   Thickness/Stage full thickness 02/06/24 1300   Base pink 02/06/24 1300   Periwound pink;swelling 02/06/24 1300   Periwound Temperature warm 02/06/24 1300   Periwound Skin Turgor firm 02/06/24 1300   Edges open 02/06/24 1300   Length (cm) 1 02/06/24 1300   Width (cm) 1.2 02/06/24 1300   Depth (cm) 0.1 02/06/24 1300   Wound (cm^2) 1.2 cm^2 02/06/24 1300   Wound Volume (cm^3) 0.12 cm^3 02/06/24 1300   Drainage Characteristics/Odor serous 02/06/24 1300   Drainage Amount large  02/06/24 1300   Care, Wound non-select wound debridement performed. 02/06/24 1300                           Recent Labs   Lab Test 06/03/21  1448 03/23/21  1042 07/28/20  0929   A1C 5.7* 5.6 5.7*          Recent Labs   Lab Test 02/14/23  0059 06/08/22  1835 07/28/20  0929   ALBUMIN 3.9 3.2* 3.3*              No sharp debridement performed today.             ASSESSMENT:   This is a 63 year old  male with lower extremity edema and bilateral lower extremity wounds.          PLAN:   We will bandage all of the wound areas beginning with a zinc oxide barrier cream applied to the periwound followed by alginate, an ABD pad and the spandagrip stocking change 3 times a week.    I had a long discussion with the patient and the employee from his group home that we need to keep bandages on the wounds and the compression stocking in place in order to get these wounds to heal.  If he cannot keep bandages on them we will not be able to get these wounds to heal.  I have encouraged him to coordinate his showers with timing when the nurses will be there to change his bandages so that he just takes a shower before the nurses come so that they will be able to replace the bandages once he gets out of the shower.  I have also strongly encouraged the patient not to refuse when the staff at his group home offered to change the bandages for him.  I prescribed him Bactrim for his cellulitis which she is tolerated well in the past.  We will get a bilateral lower extremity venous insufficiency ultrasound to evaluate for superficial areas of venous insufficiency.      Separate from his wound care instructions I then discussed the treatment of his lower extremity edema.  This primarily involves compression and elevation.  I have strongly encouraged him to wear his spandagrip stockings every day.  I have again explained to the patient today that controlling the edema is probably the most important thing we can do to help heal the wound.  I have  specifically recommended that they lay down with their legs above the level of the heart for 30 minutes at least twice a day.  I emphasized that if we can not control the edema we will likely not be able to get this wound to heal.     I have encouraged the patient to continue on their high protein diet to aid in wound healing.   The patient will return to the wound clinic in 2-3 weeks to see me again.        30 minutes spent on the date of the encounter doing chart review, history and exam, documentation and further activities per the note, this time excludes any procedure time      Miguel Hampton MD  02/06/2024   2:55 PM   [facility]  [phone]    This note was electronically signed by Miguel Hampton MD        Further instructions from your care team         Petey Archibald      1960    A DME order for supplies has been placed to [facility]. If there are any issues with your order including not receiving the order please call [facility] at [phone]. They can also provide a tracking number for you.    Dressing changes outside of clinic are being performed by Group Home Staff    [facility] group [facility] (3x weekly wound care)   FAX ORDERS ATTENTION JUAN DANIEL: [phone]     Plan:  Antibiotics prescribed. Take with food. Complete the entire course--call if side effects or problems.  Monitor INR--contact INR Nurse for instructions  Good job on stopping smoking, Continue to try and wean off of the nicotine inhaler to aide in healing  You have been referred to [facility] in [place] [phone] or [place] [phone]   Needs 3 x weekly dressings. Remind patient that wounds have to stay covered.  Schedule showers so that patient can have wound care done right after  Needs new shoes/slippers (discussed with patient) - size wide, supportive, adjustable if possible     Wound Dressing Change: Right Lateral Dorsal Foot, left dorsal 1st toe, left dorsal  "foot, left posterior lower leg  - Prepare surface and wash your hands with soap and water  - Cleanse with mild unscented soap (such as Cetaphil, Cerave or Dove) and water  - If able, apply small amount of VASHE on gauze, lay into wound bed, let sit for 10 minutes, remove gauze (do not rinse)   - Apply light layer Desitin/ Zinc oxide barrier paste around wound edge  - Apply ~1/6 piece 4x4 Melgisorb alginate to wound bed (cut-to-fit size of wound bed)  - Cover with 1/2 of 5x9 ABD per wound   - Secure with 1 roll of 4\" conforming roll gauze and 2'' Medipore tape  - Pull up Spandagrip Size F from base of toes to the knee  Change 3 times weekly and as needed for drainage     Wound Dressing Change: Left Anterior Lower Leg  - Prepare surface and wash your hands with soap and water  - Cleanse with mild unscented soap and water (such as Cetaphil, Cerave or Dove)  - If able, apply small amount of VASHE on gauze, lay into wound bed, let sit for 10 minutes, remove gauze (do not rinse)   - apply light layer Desitin/ Zinc oxide barrier paste around wound edge  - Apply 1/2 piece 4x4 Melgisorb alginate to wound bed  - Cover with two kerramax non-border 4x4 (or similar superabsorbant)--needs two due to copious drainage  - Secure with 1 roll of 4\" conforming roll gauze and 2'' Medipore tape  - Pull up Spandagrip Size F from base of toes to the knee  Change 3 times weekly and as needed for drainage     Elevation: elevate your legs above the level of your heart for 30 minutes 2-3 times each day  - Lay on the couch or your bed and prop your legs up on pillows  - Recline back as far as you can go in your recliner and prop your legs on pillows.     Protein: diet high in protein, shakes or bars, Beef jerky 13g per 1oz       Main Provider: Miguel Hampton M.D. February 6, 2024    Call us at [phone] if you have any questions about your wounds, have redness or swelling around your wound, have a fever of 101 degrees Fahrenheit or " greater or if you have any other problems or concerns. We answer the phone Monday through Friday 8 am to 4 pm, please leave a message as we check the voicemail frequently throughout the day.     If you had a positive experience please indicate that on your patient satisfaction survey form that [facility] will be sending you.    It was a pleasure meeting with you today.  Thank you for allowing me and my team the privilege of caring for you today.  YOU are the reason we are here, and I truly hope we provided you with the excellent service you deserve.  Please let us know if there is anything else we can do for you so that we can be sure you are leaving completely satisfied with your care experience.      If you have any billing related questions please call the [facility] Business office at [phone]. The clinic staff does not handle billing related matters.    If you are scheduled to have a follow up appointment, you will receive a reminder call the day before your visit. On the appointment day please arrive 15 minutes prior to your appointment time. If you are unable to keep that appointment, please call the clinic to cancel or reschedule. If you are more than 10 minutes late or greater for your scheduled appointment time, the clinic policy is that you may be asked to reschedule.        ,

## 2024-02-07 ENCOUNTER — TELEPHONE (OUTPATIENT)
Dept: ANTICOAGULATION | Facility: CLINIC | Age: 64
End: 2024-02-07
Payer: MEDICARE

## 2024-02-07 ENCOUNTER — MEDICAL CORRESPONDENCE (OUTPATIENT)
Dept: HEALTH INFORMATION MANAGEMENT | Facility: CLINIC | Age: 64
End: 2024-02-07
Payer: MEDICARE

## 2024-02-07 DIAGNOSIS — Z86.711 HISTORY OF PULMONARY EMBOLISM: Primary | ICD-10-CM

## 2024-02-07 DIAGNOSIS — Z79.01 LONG TERM CURRENT USE OF ANTICOAGULANT THERAPY: ICD-10-CM

## 2024-02-07 NOTE — TELEPHONE ENCOUNTER
ANTICOAGULATION  MANAGEMENT     Interacting Medication Review    Interacting medication(s): Sulfamethoxazole-Trimethoprim (Bactrim) with warfarin.    Duration: 14 days  (2/6- to 2/20)    Indication:   Ulcer of great toe, left, with fat layer exposed       Ulcer of left foot with fat layer exposed       Ulcer of right foot with fat layer exposed       Chronic ulcer of left leg with fat layer exposed           New medication?: Yes, interaction may increase INR and risk of bleeding. With Sulfamethoxazole-Trimethoprim, [facility] protocol Appendix G recommends empiric reduction of warfarin dose in therapeutic/supratherapeutic patients. Patient is scheduled to have his INR check tomorrow in clinic. Will do emperic dose reduction based on INR result       PLAN     Continue your current warfarin dose with next INR in 1 days        Summary  As of 2/7/2024      Full warfarin instructions:  8 mg every Sun, Tue, Fri; 6 mg all other days   Next INR check:  2/8/2024                Telephone call with [facility] nurse he confirmed that the patient started Bactrim dose yesterday - advised to continue current dose of warfarin today.  Patient is scheduled to have his INR check tomorrow in clinic. Will do emperic dose reduction based on INR result~And will modify maintenance dose on anticoagulation calendar for interaction > 7 days; maintenance dose to be readjusted post interaction    Plan made per [facility] anticoagulation protocol    Myranda Welsh RN  Anticoagulation Clinic

## 2024-02-08 ENCOUNTER — LAB (OUTPATIENT)
Dept: LAB | Facility: CLINIC | Age: 64
End: 2024-02-08
Payer: MEDICARE

## 2024-02-08 ENCOUNTER — ANTICOAGULATION THERAPY VISIT (OUTPATIENT)
Dept: ANTICOAGULATION | Facility: CLINIC | Age: 64
End: 2024-02-08

## 2024-02-08 DIAGNOSIS — Z79.01 LONG TERM CURRENT USE OF ANTICOAGULANT THERAPY: ICD-10-CM

## 2024-02-08 DIAGNOSIS — Z86.711 HISTORY OF PULMONARY EMBOLISM: Primary | ICD-10-CM

## 2024-02-08 DIAGNOSIS — Z86.711 HISTORY OF PULMONARY EMBOLISM: ICD-10-CM

## 2024-02-08 LAB — INR BLD: 3.7 (ref 0.9–1.1)

## 2024-02-08 PROCEDURE — 85610 PROTHROMBIN TIME: CPT

## 2024-02-08 PROCEDURE — 36415 COLL VENOUS BLD VENIPUNCTURE: CPT

## 2024-02-08 RX ORDER — WARFARIN SODIUM 6 MG/1
TABLET ORAL
Qty: 5 TABLET | Refills: 0 | Status: SHIPPED | OUTPATIENT
Start: 2024-02-08 | End: 2024-02-12

## 2024-02-08 NOTE — PROGRESS NOTES
ANTICOAGULATION MANAGEMENT     Petey Archibald 63 year old male is on warfarin with supratherapeutic INR result. (Goal INR 2.0-3.0)    Recent labs: (last 7 days)     02/08/24  1048   INR 3.7*       ASSESSMENT     Source(s): Chart Review and Home Care/Facility Nurse     Warfarin doses taken: Warfarin taken as instructed  Diet: No new diet changes identified  Medication/supplement changes:  Bactrim 14 day course (dates: 2/6-2/20) subsequent INRs may be increased. With Sulfamethoxazole-Trimethoprim, [facility] protocol Appendix G recommends empiric reduction of warfarin dose in therapeutic/supratherapeutic patients.  New illness, injury, or hospitalization: Yes: Ulcer of great left toe,Ulcer of left foot, ulcer of right foot, chronic ulcer of left leg  Signs or symptoms of bleeding or clotting: No  Previous result: Therapeutic last 2(+) visits  Additional findings:  Sent warfarin refill to [facility]       PLAN     Recommended plan for temporary change(s) affecting INR     Dosing Instructions: Hold dose today then Adjust warfarin dose during interaction (12.5% change). Maintenance dose modified on anticoagulation calendar for interaction > 7 days; maintenance dose to be readjusted post interaction. with next INR in 4 days       Summary  As of 2/8/2024      Full warfarin instructions:  2/8: Hold; Otherwise 6 mg every day   Next INR check:  2/12/2024               Telephone call with [facility] nurse who verbalizes understanding and agrees to plan and who agrees to plan and repeated back plan correctly  Will have onsite staff mail AVS to  [facility]     Lab visit scheduled    Education provided:   Interaction IS anticipated between warfarin and Bactrim  Contact [phone] with any changes, questions or concerns.     Plan made with [facility] Pharmacist Alyce Welsh, [employer]  Anticoagulation Clinic  2/8/2024    _______________________________________________________________________      Anticoagulation Episode Summary       Current INR goal:  2.0-3.0   TTR:  69.9% (1 y)   Target end date:  Indefinite   Send INR reminders to:  EDU JIMENEZ    Indications    History of pulmonary embolism [Z86.711]  Long term current use of anticoagulant therapy [Z79.01]             Comments:  REM detention; A new Coumadin Prescription will need to sent to [facility] with current dose and next INR check.             Anticoagulation Care Providers       Provider Role Specialty Phone number    Demetri Archer MD Referring Internal Medicine [phone]

## 2024-02-12 ENCOUNTER — LAB (OUTPATIENT)
Dept: LAB | Facility: CLINIC | Age: 64
End: 2024-02-12
Payer: MEDICARE

## 2024-02-12 ENCOUNTER — ANTICOAGULATION THERAPY VISIT (OUTPATIENT)
Dept: ANTICOAGULATION | Facility: CLINIC | Age: 64
End: 2024-02-12

## 2024-02-12 DIAGNOSIS — Z79.01 LONG TERM CURRENT USE OF ANTICOAGULANT THERAPY: ICD-10-CM

## 2024-02-12 DIAGNOSIS — Z86.711 HISTORY OF PULMONARY EMBOLISM: Primary | ICD-10-CM

## 2024-02-12 DIAGNOSIS — Z86.711 HISTORY OF PULMONARY EMBOLISM: ICD-10-CM

## 2024-02-12 LAB — INR BLD: 2.4 (ref 0.9–1.1)

## 2024-02-12 PROCEDURE — 36416 COLLJ CAPILLARY BLOOD SPEC: CPT

## 2024-02-12 PROCEDURE — 85610 PROTHROMBIN TIME: CPT

## 2024-02-12 RX ORDER — WARFARIN SODIUM 6 MG/1
TABLET ORAL
Qty: 5 TABLET | Refills: 0 | Status: SHIPPED | OUTPATIENT
Start: 2024-02-12 | End: 2024-02-28

## 2024-02-12 NOTE — PROGRESS NOTES
ANTICOAGULATION MANAGEMENT     Petey Archibald 63 year old male is on warfarin with therapeutic INR result. (Goal INR 2.0-3.0)    Recent labs: (last 7 days)     02/12/24  1025   INR 2.4*       ASSESSMENT     Source(s): Chart Review and Patient/Caregiver Call     Warfarin doses taken: Warfarin taken as instructed  Diet: No new diet changes identified  Medication/supplement changes:  Bactrim 14 day course (dates: 2/6-2/20) subsequent INRs may be increased. With Sulfamethoxazole-Trimethoprim, [facility] protocol Appendix G recommends empiric reduction of warfarin dose in therapeutic/supratherapeutic patients.  New illness, injury, or hospitalization: Left toe, left foot, right foot, left leg ulcer   Signs or symptoms of bleeding or clotting: No  Previous result: Supratherapeutic held x1 then 12.5% empiric dose reduction while on Bactrim   Additional findings:  Updated warfarin script sent to [facility] pharmacy       PLAN     Recommended plan for temporary change(s) affecting INR     Dosing Instructions:  Continue the decreased dosing schedule  with next INR in 4 days       Summary  As of 2/12/2024      Full warfarin instructions:  6 mg every day   Next INR check:  2/16/2024               Telephone call with Bertrand who verbalizes understanding and agrees to plan.  Sent new warfarin script to [facility] pharmacy.  Sent request to mail AVS.      Lab visit scheduled 2/16/24    Education provided:   Goal range and lab monitoring: goal range and significance of current result  Contact [phone]  with any changes, questions or concerns.     Plan made per [facility] anticoagulation protocol    Radha Baires, RN  Anticoagulation Clinic  2/12/2024    _______________________________________________________________________     Anticoagulation Episode Summary       Current INR goal:  2.0-3.0   TTR:  69.3% (1 y)   Target end date:  Indefinite   Send INR reminders to:  EDU JIMENEZ    Indications    History of pulmonary embolism  [Z86.711]  Long term current use of anticoagulant therapy [Z79.01]             Comments:  REM [facility]; A new Coumadin Prescription will need to sent to [facility] with current dose and next INR check.             Anticoagulation Care Providers       Provider Role Specialty Phone number    Demetri Archer MD Referring Internal Medicine [phone]

## 2024-02-16 ENCOUNTER — LAB (OUTPATIENT)
Dept: LAB | Facility: CLINIC | Age: 64
End: 2024-02-16
Payer: MEDICARE

## 2024-02-16 ENCOUNTER — ANTICOAGULATION THERAPY VISIT (OUTPATIENT)
Dept: ANTICOAGULATION | Facility: CLINIC | Age: 64
End: 2024-02-16

## 2024-02-16 ENCOUNTER — TELEPHONE (OUTPATIENT)
Dept: INTERNAL MEDICINE | Facility: CLINIC | Age: 64
End: 2024-02-16

## 2024-02-16 DIAGNOSIS — Z79.01 LONG TERM CURRENT USE OF ANTICOAGULANT THERAPY: ICD-10-CM

## 2024-02-16 DIAGNOSIS — Z86.711 HISTORY OF PULMONARY EMBOLISM: ICD-10-CM

## 2024-02-16 DIAGNOSIS — Z86.711 HISTORY OF PULMONARY EMBOLISM: Primary | ICD-10-CM

## 2024-02-16 LAB — INR BLD: 2.9 (ref 0.9–1.1)

## 2024-02-16 PROCEDURE — 85610 PROTHROMBIN TIME: CPT

## 2024-02-16 PROCEDURE — 36416 COLLJ CAPILLARY BLOOD SPEC: CPT

## 2024-02-16 RX ORDER — WARFARIN SODIUM 2 MG/1
TABLET ORAL
Qty: 13 TABLET | Refills: 0 | Status: SHIPPED | OUTPATIENT
Start: 2024-02-16 | End: 2024-02-21

## 2024-02-16 NOTE — PROGRESS NOTES
ANTICOAGULATION MANAGEMENT     Petey Archibald 63 year old male is on warfarin with therapeutic INR result. (Goal INR 2.0-3.0)    Recent labs: (last 7 days)     02/16/24  1046   INR 2.9*       ASSESSMENT     Source(s): Chart Review and Home Care/Facility Nurse     Warfarin doses taken: Warfarin taken as instructed  Diet: No new diet changes identified  Medication/supplement changes:  bactrim through 2/21/24  New illness, injury, or hospitalization: No  Signs or symptoms of bleeding or clotting: No  Previous result: Therapeutic last visit; previously outside of goal range  Additional findings: None       PLAN     Recommended plan for temporary change(s) affecting INR     Dosing Instructions: decrease your warfarin dose (10% change) with next INR in 5 days       Summary  As of 2/16/2024      Full warfarin instructions:  2/16: 4 mg; Otherwise 4 mg every Mon, Fri; 6 mg all other days   Next INR check:  2/21/2024               Telephone call with Bertrand who verbalizes understanding and agrees to plan  Sent prescription and mailed AVS      Lab visit scheduled    Education provided:   Please call back if any changes to your diet, medications or how you've been taking warfarin  Symptom monitoring: monitoring for bleeding signs and symptoms, monitoring for clotting signs and symptoms, and monitoring for stroke signs and symptoms  Contact [phone]  with any changes, questions or concerns.     Plan made with [facility] Pharmacist Alyce Curtis, RN  Anticoagulation Clinic  2/16/2024    _______________________________________________________________________     Anticoagulation Episode Summary       Current INR goal:  2.0-3.0   TTR:  69.4% (1 y)   Target end date:  Indefinite   Send INR reminders to:  EDU JIMENEZ    Indications    History of pulmonary embolism [Z86.711]  Long term current use of anticoagulant therapy [Z79.01]             Comments:  REM [facility]; A new Coumadin Prescription will need to  sent to [facility] with current dose and next INR check.             Anticoagulation Care Providers       Provider Role Specialty Phone number    Demetri Archer MD Referring Internal Medicine [phone]

## 2024-02-16 NOTE — TELEPHONE ENCOUNTER
Patient Returning Call    Reason for call:  Patient's  is returning a call and would like a call back.      Information relayed to patient:      Patient has additional questions:        Could we send this information to you in [email or web address] or would you prefer to receive a phone call?:   Patient would prefer a phone call   Okay to leave a detailed message?: Yes at [phone]

## 2024-02-16 NOTE — PROGRESS NOTES
"See TE from today-- \"\" called back.   I tried to call again, no answer. VM left to call [facility] back.   Will need to try again later today.   Note that pt is on bactrim (2/6-2/20) and MD has been decreased while on it.     Blossom Levy RN    "

## 2024-02-16 NOTE — PROGRESS NOTES
ANTICOAGULATION MANAGEMENT     Petey Archibald 63 year old male is on warfarin with therapeutic INR result. (Goal INR 2.0-3.0)    Recent labs: (last 7 days)     02/16/24  1046   INR 2.9*       ASSESSMENT     Source(s): Chart Review  Previous INR was Therapeutic last visit; previously outside of goal range  Medication, diet, health changes since last INR -bactrim 2/6-2/20 - MD has been decreased (12.5%) while on it.    left for Bertrand Lewis supervisor at [facility] - [phone].  left asking to call [facility] back. Will need to try again later.          PLAN     Unable to reach Bertrand today.     left to call [facility] back. Will try again later.     Follow up required to confirm warfarin dose taken and assess for changes    Blossom Levy, RN  Anticoagulation Clinic  2/16/2024

## 2024-02-21 ENCOUNTER — ANTICOAGULATION THERAPY VISIT (OUTPATIENT)
Dept: ANTICOAGULATION | Facility: CLINIC | Age: 64
End: 2024-02-21

## 2024-02-21 ENCOUNTER — LAB (OUTPATIENT)
Dept: LAB | Facility: CLINIC | Age: 64
End: 2024-02-21
Payer: MEDICARE

## 2024-02-21 DIAGNOSIS — Z79.01 LONG TERM CURRENT USE OF ANTICOAGULANT THERAPY: ICD-10-CM

## 2024-02-21 DIAGNOSIS — Z86.711 HISTORY OF PULMONARY EMBOLISM: Primary | ICD-10-CM

## 2024-02-21 DIAGNOSIS — Z86.711 HISTORY OF PULMONARY EMBOLISM: ICD-10-CM

## 2024-02-21 LAB — INR BLD: 3.4 (ref 0.9–1.1)

## 2024-02-21 PROCEDURE — 36415 COLL VENOUS BLD VENIPUNCTURE: CPT

## 2024-02-21 PROCEDURE — 85610 PROTHROMBIN TIME: CPT

## 2024-02-21 RX ORDER — WARFARIN SODIUM 2 MG/1
TABLET ORAL
Qty: 26 TABLET | Refills: 0 | Status: SHIPPED | OUTPATIENT
Start: 2024-02-21 | End: 2024-02-28

## 2024-02-21 NOTE — PROGRESS NOTES
ANTICOAGULATION MANAGEMENT     Petey Archibald 63 year old male is on warfarin with supratherapeutic INR result. (Goal INR 2.0-3.0)    Recent labs: (last 7 days)     02/21/24  1134   INR 3.4*       ASSESSMENT     Source(s): Chart Review and Home Care/Facility Nurse     Warfarin doses taken: Warfarin taken as instructed  Diet: Decreased greens/vitamin K in diet; plans to resume previous intake  Medication/supplement changes:  done with bactrim  New illness, injury, or hospitalization: No  Signs or symptoms of bleeding or clotting: No  Previous result: Therapeutic last 2(+) visits  Additional findings: None       PLAN     Recommended plan for temporary change(s) affecting INR     Dosing Instructions: partial hold then continue pre bactrim dosing with next INR in 1 week       Summary  As of 2/21/2024      Full warfarin instructions:  2/21: 4 mg; Otherwise 8 mg every Sun, Tue, Fri; 6 mg all other days   Next INR check:  2/28/2024               Telephone call with [facility] nurse who agrees to plan and repeated back plan correctly    Rx sent into the pharmacy.  Sent to onsite to mail AVS to facility listed in the description.    Lab visit scheduled    Education provided:   None required    Plan made with [facility] Pharmacist Alyce Quiroz, RN  Anticoagulation Clinic  2/21/2024    _______________________________________________________________________     Anticoagulation Episode Summary       Current INR goal:  2.0-3.0   TTR:  68.8% (1 y)   Target end date:  Indefinite   Send INR reminders to:  EDU JIMENEZ    Indications    History of pulmonary embolism [Z86.711]  Long term current use of anticoagulant therapy [Z79.01]             Comments:  REM [facility]; A new Coumadin Prescription will need to sent to [facility] with current dose and next INR check.             Anticoagulation Care Providers       Provider Role Specialty Phone number    Demetri Archer MD Referring Internal  Medicine [phone]

## 2024-02-21 NOTE — LETTER
[facility] ANTICOAGUL[facility]  [address]  [facility] [address]  Phone: [phone]  Fax: [phone]     February 21, 2024        Petey Archibald  [address]  [facility] [address]

## 2024-02-27 ENCOUNTER — TELEPHONE (OUTPATIENT)
Dept: INTERNAL MEDICINE | Facility: CLINIC | Age: 64
End: 2024-02-27

## 2024-02-27 ENCOUNTER — HOSPITAL ENCOUNTER (OUTPATIENT)
Dept: WOUND CARE | Facility: CLINIC | Age: 64
Discharge: HOME | End: 2024-02-27
Attending: FAMILY MEDICINE | Admitting: FAMILY MEDICINE
Payer: MEDICARE

## 2024-02-27 ENCOUNTER — TELEPHONE (OUTPATIENT)
Dept: INTERNAL MEDICINE | Facility: CLINIC | Age: 64
End: 2024-02-27
Payer: MEDICARE

## 2024-02-27 DIAGNOSIS — L97.522 ULCER OF LEFT FOOT WITH FAT LAYER EXPOSED (H): ICD-10-CM

## 2024-02-27 DIAGNOSIS — L97.512 ULCER OF RIGHT FOOT WITH FAT LAYER EXPOSED (H): ICD-10-CM

## 2024-02-27 DIAGNOSIS — R60.0 LOWER EXTREMITY EDEMA: ICD-10-CM

## 2024-02-27 DIAGNOSIS — L97.922 CHRONIC ULCER OF LEFT LEG WITH FAT LAYER EXPOSED (H): ICD-10-CM

## 2024-02-27 DIAGNOSIS — L97.522 ULCER OF GREAT TOE, LEFT, WITH FAT LAYER EXPOSED (H): ICD-10-CM

## 2024-02-27 DIAGNOSIS — S91.302D OPEN WOUND OF LEFT FOOT, SUBSEQUENT ENCOUNTER: Primary | ICD-10-CM

## 2024-02-27 PROCEDURE — 99214 OFFICE O/P EST MOD 30 MIN: CPT | Mod: 25 | Performed by: FAMILY MEDICINE

## 2024-02-27 PROCEDURE — 11042 DBRDMT SUBQ TIS 1ST 20SQCM/<: CPT | Performed by: FAMILY MEDICINE

## 2024-02-27 NOTE — TELEPHONE ENCOUNTER
Steffanie LIU from [facility] called stating Dr. Archer received a medlist from [facility] to sign with 4 meds that have been discontinued:    Bacitracin - wound clinic provider wound care order for him.   Desenex powder - no longer needed, it was time limited for 6 months.   Mucinex - discontinued Dec. 20th 2022.   Nystatin triamcinolone cream - rash is resolved.     Steffanie LIU will refax updated medlist to be singed.     Prior auth for nystatin triamcinolone cream might come through - but is not needed.

## 2024-02-27 NOTE — TELEPHONE ENCOUNTER
Pharmacy called and said to disregard fax for a PA for the nystatin triamcinalone because patient wishes not to do the PA.

## 2024-02-27 NOTE — DISCHARGE INSTRUCTIONS
"Petey Archibald      1960  A DME order for supplies has been placed to [facility]. If there are any issues with your order including not receiving the order please call [facility] at [phone]. They can also provide a tracking number for you.  [facility] [place] (3x week) Fax: laurie Lala [phone]    Plan:  Call [facility] in [place] [phone]   Dressing changes outside of clinic are being performed by [facility] Staff  Elevate Legs to hip or higher 30 min 2 times a day  Schedule showers so that patient can have wound care done right after  Control smoking- nicotine products  Protein diet: shakes and bars  Monitor INR--contact INR Nurse for instructions     Wound Dressing Change: Right Lateral Dorsal Foot,   - Prepare surface and wash your hands with soap and water  - Cleanse with mild unscented soap (such as Cetaphil, Cerave or Dove) and water  - If able, apply small amount of VASHE on gauze, lay into wound bed, let sit for 10 minutes, remove gauze (do not rinse)   - Apply light layer Desitin/ Zinc oxide barrier paste around wound edge  - Apply ~1/6 piece 4x4 Melgisorb alginate to wound bed (cut-to-fit size of wound bed)  - Cover with 1/2 of 5x9 ABD per wound   - Secure with 1 roll of 4\" conforming roll gauze and 2'' Medipore tape  - Pull up Spandagrip Size F from base of toes to the knee  Change 3 times weekly and as needed for drainage     Wound Dressing Change: Left dorsal 1st toe, dorsal foot, Anterior and posterior lower leg  - Prepare surface and wash your hands with soap and water  - Cleanse with mild unscented soap and water (such as Cetaphil, Cerave or Dove)  - If able, apply small amount of VASHE on gauze, lay into wound bed, let sit for 10 minutes, remove gauze (do not rinse)   - apply light layer Desitin/ Zinc oxide barrier paste around wound edge  - Apply 1/2 piece 4x4 Melgisorb alginate to wound bed  - Cover with two kerramax non-border 4x4 (or similar " "superabsorbant)--needs two due to copious drainage  - Secure with 1 roll of 4\" conforming roll gauze and 2'' Medipore tape  - Pull up Spandagrip Size F from base of toes to the knee  Change 3 times weekly and as needed for drainage     Elevation: elevate your legs above the level of your heart for 30 minutes 2-3 times each day  - Lay on the couch or your bed and prop your legs up on pillows  - Recline back as far as you can go in your recliner and prop your legs on pillows.      Main Provider: Miguel Hampton M.D. February 27, 2024    Call us at [phone] if you have any questions about your wounds, have redness or swelling around your wound, have a fever of 101 degrees Fahrenheit or greater or if you have any other problems or concerns. We answer the phone Monday through Friday 8 am to 4 pm, please leave a message as we check the voicemail frequently throughout the day.     If you had a positive experience please indicate that on your patient satisfaction survey form that [facility] will be sending you.  It was a pleasure meeting with you today.  Thank you for allowing me and my team the privilege of caring for you today.  YOU are the reason we are here, and I truly hope we provided you with the excellent service you deserve.  Please let us know if there is anything else we can do for you so that we can be sure you are leaving completely satisfied with your care experience.    If you have any billing related questions please call the [facility] Business office at [phone]. The clinic staff does not handle billing related matters.  If you are scheduled to have a follow up appointment, you will receive a reminder call the day before your visit. On the appointment day please arrive 15 minutes prior to your appointment time. If you are unable to keep that appointment, please call the clinic to cancel or reschedule. If you are more than 10 minutes late or greater for your scheduled appointment time, the " clinic policy is that you may be asked to reschedule.

## 2024-02-27 NOTE — PROGRESS NOTES
Wound Clinic Note         Visit date: 02/27/2024       Cheif Complaint:     Petey Archibald is a 63 year old   male had concerns including WOUND CARE.  The patient has lower extremity edema and bilateral leg ulcers.          HISTORY OF PRESENT ILLNESS:    Petey Archibald reports the wound has been present since mid 2022.  The wound began without a clear cause.   He reports prior to this wound developing he has not had other difficult to heal wounds on the legs.    Today the patient is again accompanied to the wound clinic by an employee at his group home and they both provide portions of the interval history.    At his last clinic visit he had signs of infection and I prescribed an antibiotic.  He confirms that he completed taking the antibiotic with no symptoms of an adverse reaction.    Today the patient confirms that they have been changing the bandages 3 times a week using a zinc oxide barrier cream applied to the periwound, alginate, an ABD pad and a spandagrip stocking.  There is been light to moderate serous drainage from the wounds.  There is been no difficulties with the dressing changes.    They both report that they have not heard anything about scheduling the venous insufficiency ultrasound that I had ordered.    The pateint denies fevers or chills.  They report the pain from the wound has been 0/10 and has remained about the same recently.      The patient reports they currently do not have any routine for elevating their legs.  The patient confirms they do sleep in a bed.  Petey seems very frustrated with himself today because he admits right away that he has not been elevating his legs recently.    Today the patient reports maintaining a high protein diet, but has not been taking protein supplements lately.        The patient denies a history of diabetes or chronic steroid use.  He continues to use a nicotine inhaler.        The patient has not had any symptoms of infection relating to the  wound recently and is not currently on antibiotics.       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       Surgical Hx:   Past Surgical History:   Procedure Laterality Date    COLONOSCOPY N/A 4/15/2019    Procedure: COMBINED COLONOSCOPY, SINGLE OR MULTIPLE BIOPSY/POLYPECTOMY BY BIOPSY;  Surgeon: Jovana Ohara MD;  Location:  GI    COLONOSCOPY N/A 6/7/2021    Procedure: COLONOSCOPY with polypectomies;  Surgeon: Sahil Cid MD;  Location: [facility] OR    EXCISE MALIGNANT LESIONS, TRUNK/ARMS/LEGS 0.5CM OR LESS Left 5/26/2017    Procedure: EXCISE MALIGNANT LESIONS, TRUNK/ARMS/LEGS 0.5CM OR LESS;  EXCISION LEFT ELBOW MASS;  Surgeon: Jose Azevedo MD;  Location:  GI    RECTAL SURGERY      perianal abscess?          Allergies:    Allergies   Allergen Reactions    Bees     Clavulanic Acid     Amoxicillin-Pot Clavulanate Rash     Augmentin    Penicillins Rash              Medication History:    Current Outpatient Medications   Medication Sig    acetaminophen (TYLENOL) 325 MG tablet Take 1-2 tablets (325-650 mg) by mouth every 6 hours as needed for mild pain, fever or headaches    albuterol (ALBUTEROL) 108 (90 BASE) MCG/ACT inhaler Inhale 2 puffs into the lungs every 6 hours as needed     "ARIPiprazole (ABILIFY) 5 MG tablet Take 5 mg by mouth daily    buPROPion (WELLBUTRIN SR) 150 MG 12 hr tablet Take 150 mg by mouth 2 times daily    Cadexomer Iodine, topical, 0.9% (IODOSORB) 0.9 % GEL gel Apply topically daily Apply to left foot wound daily after cleansing the wound and blotting it dry.    cloNIDine (CATAPRES) 0.1 MG tablet Take 0.1 mg by mouth daily as needed    clotrimazole (LOTRIMIN) 1 % external cream Apply topically 2 times daily    diclofenac (VOLTAREN) 1 % topical gel Apply 4 g topically 2 times daily as needed for moderate pain    diphenhydrAMINE (BENADRYL) 25 MG capsule Take 50 mg by mouth every 6 hours as needed for itching or allergies    Emollient (CERAVE) CREA Externally apply topically daily    EPINEPHrine (ANY BX GENERIC EQUIV) 0.3 MG/0.3ML injection 2-pack INJECT 0.3MG INTRAMUSCULARLY ONE TIME FOR ONE DOSE AS NEEDED FOR ANAPHYLAXIS. MAY REPEAT ONE TIME IN 5-15 MINUTES IF RESPONSE TO INITIAL DOSE IS INADEQUATE. *1 TOTAL FILL, ORIGINAL FROM EMERGENCY ROOM*    EPINEPHrine (EPIPEN 2-BRE) 0.3 MG/0.3ML injection 2-pack INJECT 0.3MG INTRAMUSCULAR ONE TIME FOR ONE DOSE AS NEEDED FOR ANAPHYLAXIS (CALL 911 IF YOU HAVE TO GIVE) (FOR BEE STINGS) **LABEL EACH PEN*    gabapentin (NEURONTIN) 100 MG capsule Take 400 mg by mouth 3 times daily At 0900, 1200, and 2000    Gauze Pads & Dressings (GAUZE PADS 4\"X4\") 4\"X4\" PADS 1 each 3 times daily as needed    loratadine (CLARITIN) 10 MG tablet Take 10 mg by mouth daily    LORazepam (ATIVAN) 1 MG tablet Take 1 mg by mouth daily as needed for anxiety    montelukast (SINGULAIR) 10 MG tablet TAKE 1 TABLET BY MOUTH EVERY MORNING (ASTHMA)    Multiple Vitamin (DAILY-JOVAN) TABS TAKE 1 TABLET BY MOUTH EVERY MORNING (VITAMINS)    naltrexone (DEPADE/REVIA) 50 MG tablet Take 100 mg by mouth daily    nicotine (COMMIT) 2 MG lozenge Place 2 mg inside cheek daily as needed    nystatin-triamcinolone (MYCOLOG II) 554625-7.1 UNIT/GM-% external cream Apply topically 2 times " daily For 1-2 weeks.    omeprazole (PRILOSEC) 40 MG DR capsule Take 1 capsule (40 mg) by mouth daily    order for DME Equipment being ordered: roll of micropore tape to dress foot wound bid    order for DME Equipment being ordered: 1 surgical shoe    order for DME [employer] [facility] Phone [phone] Fax [phone]  EdemaWear Size Medium/Yellow qty 4 sleeves  Length of Need: 1 month  Frequency of dressing change daily    order for DME [employer] Stockings  Phone #[phone]  Fax #[phone]  Coolflex Velcro wraps    Length of Need: Life Time  # of Pairs 1 set    order for DME [employer] [facility] Phone [phone] Fax [phone]  Primary Dressing Hydrofera Blue Ready   Qty 5 sheets  Secondary Dressing 4' roll gauze Qty 30  Secondary Dressing 2' medipore tape Qty 1  Secondary Dressing 4x4 gauze loaf Qty  1  Length of Need: 1 month  Frequency of dressing change: daily    order for DME Oxygen 2 Li/min  at night and bled into BiPAP    order for DME Equipment being ordered: CPAP with mask and tubing    order for DME Equipment being ordered: support compression hose BK  2 pair black 30mm HG   To be applied on arising & removed while lying down to go to sleep    polyethylene glycol (MIRALAX) 17 GM/Dose powder Take 17 g (1 capful) by mouth daily as needed for constipation    PULMICORT FLEXHALER 180 MCG/ACT inhaler INHALE 2 PUFFS INTO THE LUNGS TWICE DAILY.    SENNA-TABS 8.6 MG tablet Take 1 tablet by mouth daily    sertraline (ZOLOFT) 100 MG tablet Take 200 mg by mouth daily    simvastatin (ZOCOR) 40 MG tablet TAKE 1 TABLET BY MOUTH EVERY MORNING.  (CHOLESTEROL)    SPIRIVA HANDIHALER 18 MCG inhaled capsule INHALE CONTENTS OF 1 CAPSULE INTO THE LUNGS ONCE DAILY    spironolactone (ALDACTONE) 25 MG tablet TAKE 1 TABLET BY MOUTH ONCE DAILY. (HIGH BLOOD PRESSURE)    tiZANidine (ZANAFLEX) 2 MG tablet TAKE 1 TABLET BY MOUTH THREE TIMES DAILY.    tiZANidine (ZANAFLEX) 2 MG tablet Take 1 tablet (2 mg) by mouth 3  times daily    traZODone (DESYREL) 100 MG tablet Take 100 mg by mouth At Bedtime    triamcinolone (KENALOG) 0.1 % external cream Apply to AA BID x 1-2 week then PRN only    vitamin B complex with vitamin C (STRESS TAB) tablet Take 1 tablet by mouth daily    warfarin ANTICOAGULANT (COUMADIN) 2 MG tablet Take 2/21: 4 mg; Otherwise 8 mg every Sun, Tue, Fri; 6 mg all other days, recheck INR 2/28/24    warfarin ANTICOAGULANT (COUMADIN) 6 MG tablet 6mg daily until next INR check on 2/16/24     No current facility-administered medications for this encounter.         Tobacco History:  reports that he has quit smoking. His smoking use included cigarettes. He has a 30 pack-year smoking history. He has never used smokeless tobacco.       REVIEW OF SYMPTOMS:   The review of systems was negative except as noted in the HPI.           PHYSICAL EXAMINATION:     There were no vitals taken for this visit.           GENERAL: The patient overall appears well and is no acute distress.   HEAD: normocephalic   EYES: Sclera and conjunctiva clear   NECK: no obvious masses   LUNGS: breathing is unlabored.   EXTREMITIES: No clubbing, cyanosis or edema   SKIN: No rashes or other abnormalities except as noted under the Wound section below.   NEUROLOGICAL: normal motor and sensory function   EDEMA: Moderate       WOUND: The wound appears healthy with no sign of infection.   Wound bed: necrotic material at the left foot wound only  Periwound: healthy intact skin  All the wounds are quite a bit improved today compared with his last clinic visit and as noted above there are no residual signs of infection.  There is just some necrotic material at the left dorsal foot wound.    Also see below for wound details:       Circumferential volume measures:             No data to display                Ulceration(s)/Wound(s):   Please see the media tab under the chart review for pictures of the wounds.  Nursing staff removed dressings and cleansed  wound.    Wound (used by [facility] only) 08/22/23 1341 Left dorsal 1 toe ulceration, venous (Active)   Thickness/Stage full thickness 02/27/24 1300   Base slough 02/27/24 1300   Periwound edematous 02/27/24 1300   Periwound Temperature warm 02/27/24 1300   Periwound Skin Turgor soft 02/27/24 1300   Edges open 02/27/24 1300   Length (cm) 0.5 02/27/24 1300   Width (cm) 1.8 02/27/24 1300   Depth (cm) 0.2 02/27/24 1300   Wound (cm^2) 0.9 cm^2 02/27/24 1300   Wound Volume (cm^3) 0.18 cm^3 02/27/24 1300   Wound healing % 78.26 02/27/24 1300   Drainage Characteristics/Odor serous 02/27/24 1300   Drainage Amount moderate 02/27/24 1300   Care, Wound non-select wound debridement performed. 02/27/24 1300       Wound (used by [facility] only) 11/15/23 0906 Right lateral;dorsal foot ulceration, venous (Active)   Thickness/Stage full thickness 02/27/24 1300   Base granulating;scab 02/27/24 1300   Periwound redness;edematous 02/27/24 1300   Periwound Temperature warm 02/27/24 1300   Periwound Skin Turgor soft 02/27/24 1300   Edges open 02/27/24 1300   Length (cm) 0.3 02/27/24 1300   Width (cm) 0.4 02/27/24 1300   Depth (cm) 0.1 02/27/24 1300   Wound (cm^2) 0.12 cm^2 02/27/24 1300   Wound Volume (cm^3) 0.01 cm^3 02/27/24 1300   Wound healing % 93.33 02/27/24 1300   Drainage Characteristics/Odor serous 02/27/24 1300   Drainage Amount moderate 02/27/24 1300   Care, Wound non-select wound debridement performed. 02/27/24 1300       Wound (used by [facility] only) 11/15/23 0911 Left dorsal foot ulceration, venous (Active)   Thickness/Stage full thickness 02/27/24 1300   Base slough 02/27/24 1300   Periwound edematous 02/27/24 1300   Periwound Temperature warm 02/27/24 1300   Periwound Skin Turgor soft 02/27/24 1300   Edges open 02/27/24 1300   Length (cm) 2 02/27/24 1300   Width (cm) 2.2 02/27/24 1300   Depth (cm) 0.3 02/27/24 1300   Wound (cm^2) 4.4 cm^2 02/27/24 1300   Wound Volume (cm^3) 1.32 cm^3 02/27/24 1300   Wound healing % 75 02/27/24  1300   Drainage Characteristics/Odor serous 02/27/24 1300   Drainage Amount copious 02/27/24 1300   Care, Wound non-select wound debridement performed. 02/27/24 1300       Wound (used by [facility] only) 12/27/23 1410 Left anterior;lower leg ulceration, venous (Active)   Thickness/Stage full thickness 02/27/24 1300   Base slough 02/27/24 1300   Periwound edematous;excoriated 02/27/24 1300   Periwound Temperature warm 02/27/24 1300   Periwound Skin Turgor soft 02/27/24 1300   Edges open 02/27/24 1300   Length (cm) 9 02/27/24 1300   Width (cm) 2.2 02/27/24 1300   Depth (cm) 0.3 02/27/24 1300   Wound (cm^2) 19.8 cm^2 02/27/24 1300   Wound Volume (cm^3) 5.94 cm^3 02/27/24 1300   Wound healing % -2946.15 02/27/24 1300   Drainage Characteristics/Odor serous 02/27/24 1300   Drainage Amount copious 02/27/24 1300   Care, Wound debrided 02/27/24 1300       Wound (used by [facility] only) 02/06/24 1301 Left posterior;lower leg ulceration, venous (Active)   Thickness/Stage full thickness 02/27/24 1300   Base scab;dry 02/27/24 1300   Periwound edematous 02/27/24 1300   Periwound Temperature warm 02/27/24 1300   Periwound Skin Turgor firm 02/27/24 1300   Edges rolled/closed 02/27/24 1300   Length (cm) 1 02/27/24 1300   Width (cm) 1 02/27/24 1300   Depth (cm) 0.1 02/27/24 1300   Wound (cm^2) 1 cm^2 02/27/24 1300   Wound Volume (cm^3) 0.1 cm^3 02/27/24 1300   Wound healing % 16.67 02/27/24 1300   Drainage Characteristics/Odor serous 02/27/24 1300   Drainage Amount large 02/27/24 1300   Care, Wound non-select wound debridement performed. 02/27/24 1300                             Recent Labs   Lab Test 06/03/21  1448 03/23/21  1042 07/28/20  0929   A1C 5.7* 5.6 5.7*          Recent Labs   Lab Test 02/14/23  0059 06/08/22  1835 07/28/20  0929   ALBUMIN 3.9 3.2* 3.3*              Procedure note:  I had previous obtain informed consent from the patient to perform serial debridements, I confirmed this again with the patient today verbally.   Anesthetized as needed with lidocaine.  Using a curette and/or a scalpel I performed an excisional debridement removing all necrotic material at the left foot wound in to the level of viable subcutaneous tissue.  I obtained hemostasis with direct pressure.  The patient tolerated the procedure well.  EBL: <5 ml  Total debridement surface area: Less than 20 cm     The other wounds did not require sharp debridement.      ASSESSMENT:   This is a 63 year old  male with lower extremity edema and bilateral lower extremity wounds.          PLAN:   We will bandage all of the wound areas beginning with a zinc oxide barrier cream applied to the periwound followed by alginate, an ABD pad and the spandagrip stocking change 3 times a week.      I have asked the wound care nurse to help get the venous insufficiency ultrasound scheduled and we will also give them the phone number to call and get the study scheduled.      Separate from his wound care instructions I then discussed the treatment of his lower extremity edema.  This primarily involves compression and elevation.  I have strongly encouraged him to wear his spandagrip stockings every day.  I have again explained to the patient today that controlling the edema is probably the most important thing we can do to help heal the wound.  I have specifically recommended that they lay down with their legs above the level of the heart for 30 minutes at least twice a day.  I emphasized that if we can not control the edema we will likely not be able to get this wound to heal.     I have encouraged the patient to continue on their high protein diet to aid in wound healing.   The patient will return to the wound clinic in 2-3 weeks to see me again.        30 minutes spent on the date of the encounter doing chart review, history and exam, documentation and further activities per the note, this time excludes any procedure time      Miguel Hampton MD  02/27/2024   3:18 PM   [facility]  "[facility]  [phone]    This note was electronically signed by Miguel Hampton MD        Further instructions from your care team         Petey Archibald      1960  A DME order for supplies has been placed to [facility]. If there are any issues with your order including not receiving the order please call [facility] at [phone]. They can also provide a tracking number for you.  [facility] group home (3x week) Fax: laurie Lala [phone]    Plan:  Call [facility] in [place] [phone]   Dressing changes outside of clinic are being performed by Group Home Staff  Elevate Legs to hip or higher 30 min 2 times a day  Schedule showers so that patient can have wound care done right after  Control smoking- nicotine products  Protein diet: shakes and bars  Monitor INR--contact INR Nurse for instructions     Wound Dressing Change: Right Lateral Dorsal Foot,   - Prepare surface and wash your hands with soap and water  - Cleanse with mild unscented soap (such as Cetaphil, Cerave or Dove) and water  - If able, apply small amount of VASHE on gauze, lay into wound bed, let sit for 10 minutes, remove gauze (do not rinse)   - Apply light layer Desitin/ Zinc oxide barrier paste around wound edge  - Apply ~1/6 piece 4x4 Melgisorb alginate to wound bed (cut-to-fit size of wound bed)  - Cover with 1/2 of 5x9 ABD per wound   - Secure with 1 roll of 4\" conforming roll gauze and 2'' Medipore tape  - Pull up Spandagrip Size F from base of toes to the knee  Change 3 times weekly and as needed for drainage     Wound Dressing Change: Left dorsal 1st toe, dorsal foot, Anterior and posterior lower leg  - Prepare surface and wash your hands with soap and water  - Cleanse with mild unscented soap and water (such as Cetaphil, Cerave or Dove)  - If able, apply small amount of VASHE on gauze, lay into wound bed, let sit for 10 minutes, remove gauze (do not rinse)   - apply light layer Desitin/ " "Zinc oxide barrier paste around wound edge  - Apply 1/2 piece 4x4 Melgisorb alginate to wound bed  - Cover with two kerramax non-border 4x4 (or similar superabsorbant)--needs two due to copious drainage  - Secure with 1 roll of 4\" conforming roll gauze and 2'' Medipore tape  - Pull up Spandagrip Size F from base of toes to the knee  Change 3 times weekly and as needed for drainage     Elevation: elevate your legs above the level of your heart for 30 minutes 2-3 times each day  - Lay on the couch or your bed and prop your legs up on pillows  - Recline back as far as you can go in your recliner and prop your legs on pillows.      Main Provider: Miguel Hampton M.D. February 27, 2024    Call us at [phone] if you have any questions about your wounds, have redness or swelling around your wound, have a fever of 101 degrees Fahrenheit or greater or if you have any other problems or concerns. We answer the phone Monday through Friday 8 am to 4 pm, please leave a message as we check the voicemail frequently throughout the day.     If you had a positive experience please indicate that on your patient satisfaction survey form that [facility] will be sending you.  It was a pleasure meeting with you today.  Thank you for allowing me and my team the privilege of caring for you today.  YOU are the reason we are here, and I truly hope we provided you with the excellent service you deserve.  Please let us know if there is anything else we can do for you so that we can be sure you are leaving completely satisfied with your care experience.    If you have any billing related questions please call the [facility] office at [phone]. The clinic staff does not handle billing related matters.  If you are scheduled to have a follow up appointment, you will receive a reminder call the day before your visit. On the appointment day please arrive 15 minutes prior to your appointment time. If you are unable to keep " that appointment, please call the clinic to cancel or reschedule. If you are more than 10 minutes late or greater for your scheduled appointment time, the clinic policy is that you may be asked to reschedule.         ,

## 2024-02-28 ENCOUNTER — LAB (OUTPATIENT)
Dept: LAB | Facility: CLINIC | Age: 64
End: 2024-02-28
Payer: MEDICARE

## 2024-02-28 ENCOUNTER — ANTICOAGULATION THERAPY VISIT (OUTPATIENT)
Dept: ANTICOAGULATION | Facility: CLINIC | Age: 64
End: 2024-02-28

## 2024-02-28 ENCOUNTER — MEDICAL CORRESPONDENCE (OUTPATIENT)
Dept: HEALTH INFORMATION MANAGEMENT | Facility: CLINIC | Age: 64
End: 2024-02-28

## 2024-02-28 DIAGNOSIS — Z86.711 HISTORY OF PULMONARY EMBOLISM: ICD-10-CM

## 2024-02-28 DIAGNOSIS — Z79.01 LONG TERM CURRENT USE OF ANTICOAGULANT THERAPY: ICD-10-CM

## 2024-02-28 DIAGNOSIS — Z86.711 HISTORY OF PULMONARY EMBOLISM: Primary | ICD-10-CM

## 2024-02-28 LAB — INR BLD: 2.8 (ref 0.9–1.1)

## 2024-02-28 PROCEDURE — 85610 PROTHROMBIN TIME: CPT

## 2024-02-28 PROCEDURE — 36416 COLLJ CAPILLARY BLOOD SPEC: CPT

## 2024-02-28 RX ORDER — WARFARIN SODIUM 6 MG/1
TABLET ORAL
Qty: 7 TABLET | Refills: 0 | Status: SHIPPED | OUTPATIENT
Start: 2024-02-28 | End: 2024-03-06

## 2024-02-28 RX ORDER — WARFARIN SODIUM 2 MG/1
TABLET ORAL
Qty: 3 TABLET | Refills: 0 | Status: SHIPPED | OUTPATIENT
Start: 2024-02-28 | End: 2024-03-06

## 2024-02-28 NOTE — PROGRESS NOTES
ANTICOAGULATION MANAGEMENT     Petey Archibald 63 year old male is on warfarin with therapeutic INR result. (Goal INR 2.0-3.0)    Recent labs: (last 7 days)     02/28/24  1028   INR 2.8*       ASSESSMENT     Source(s): Chart Review and Home Care/Facility Nurse Ania Lewis at [phone]- [facility]     Warfarin doses taken: Warfarin taken as instructed  Diet: No new diet changes identified  Medication/supplement changes: None noted  New illness, injury, or hospitalization: No  Signs or symptoms of bleeding or clotting: No  Previous result: Supratherapeutic  Additional findings: RX faxed to [facility] mailed to-- [address] [facility] [place][note 2] [id number]        PLAN     Recommended plan for no diet, medication or health factor changes affecting INR     Dosing Instructions: Continue your current warfarin dose with next INR in 1 week       Summary  As of 2/28/2024      Full warfarin instructions:  8 mg every Sun, Tue, Fri; 6 mg all other days   Next INR check:  3/6/2024               Telephone call with [facility] nurse who verbalizes understanding and agrees to plan    Orders given to  Homecare nurse/facility to recheck    Education provided:   Please call back if any changes to your diet, medications or how you've been taking warfarin    Plan made per [facility] anticoagulation protocol    Blossom Lvey, RN  Anticoagulation Clinic  2/28/2024    _______________________________________________________________________     Anticoagulation Episode Summary       Current INR goal:  2.0-3.0   TTR:  69.5% (1 y)   Target end date:  Indefinite   Send INR reminders to:  EDU JIMENEZ    Indications    History of pulmonary embolism [Z86.711]  Long term current use of anticoagulant therapy [Z79.01]             Comments:  [facility]; A new Coumadin Prescription will need to sent to [facility] with current dose and next INR check.             Anticoagulation Care Providers       Provider Role  Specialty Phone number    Demetri Archer MD Referring Internal Medicine [phone]

## 2024-03-05 ENCOUNTER — OFFICE VISIT (OUTPATIENT)
Dept: URGENT CARE | Facility: URGENT CARE | Age: 64
End: 2024-03-05
Payer: MEDICARE

## 2024-03-05 VITALS
HEART RATE: 69 BPM | RESPIRATION RATE: 16 BRPM | TEMPERATURE: 97.7 F | DIASTOLIC BLOOD PRESSURE: 74 MMHG | SYSTOLIC BLOOD PRESSURE: 122 MMHG | OXYGEN SATURATION: 97 %

## 2024-03-05 DIAGNOSIS — F41.9 ANXIETY: ICD-10-CM

## 2024-03-05 DIAGNOSIS — G47.09 OTHER INSOMNIA: ICD-10-CM

## 2024-03-05 DIAGNOSIS — F25.0 SCHIZOAFFECTIVE DISORDER, BIPOLAR TYPE (H): Primary | ICD-10-CM

## 2024-03-05 PROCEDURE — 99214 OFFICE O/P EST MOD 30 MIN: CPT | Performed by: PHYSICIAN ASSISTANT

## 2024-03-05 RX ORDER — BUPROPION HYDROCHLORIDE 150 MG/1
150 TABLET, EXTENDED RELEASE ORAL 2 TIMES DAILY
Qty: 90 TABLET | Refills: 0 | Status: SHIPPED | OUTPATIENT
Start: 2024-03-05 | End: 2024-05-13

## 2024-03-05 RX ORDER — SERTRALINE HYDROCHLORIDE 100 MG/1
200 TABLET, FILM COATED ORAL DAILY
Qty: 90 TABLET | Refills: 0 | Status: SHIPPED | OUTPATIENT
Start: 2024-03-05 | End: 2024-05-13

## 2024-03-05 RX ORDER — ARIPIPRAZOLE 5 MG/1
5 TABLET ORAL DAILY
Qty: 45 TABLET | Refills: 0 | Status: SHIPPED | OUTPATIENT
Start: 2024-03-05

## 2024-03-05 RX ORDER — TRAZODONE HYDROCHLORIDE 100 MG/1
100 TABLET ORAL AT BEDTIME
Qty: 45 TABLET | Refills: 0 | Status: SHIPPED | OUTPATIENT
Start: 2024-03-05 | End: 2024-04-19

## 2024-03-05 RX ORDER — NALTREXONE HYDROCHLORIDE 50 MG/1
100 TABLET, FILM COATED ORAL DAILY
Qty: 90 TABLET | Refills: 0 | Status: SHIPPED | OUTPATIENT
Start: 2024-03-05 | End: 2024-04-19

## 2024-03-05 NOTE — PROGRESS NOTES
Patient presents with:  Urgent Care: Pt requests for medication refill. Pt missed his appointment with pcp.Trazodone 150 mg, sertraline, naltrexone 50 mg,bupropion 150 mg, aripiprazole 5 mg.    (F25.0) Schizoaffective disorder, bipolar type (H)  (primary encounter diagnosis)  Comment:   Plan: ARIPiprazole (ABILIFY) 5 MG tablet, naltrexone         (DEPADE/REVIA) 50 MG tablet            (G47.09) Other insomnia  Comment:   Plan: traZODone (DESYREL) 100 MG tablet            (F41.9) Anxiety  Comment:   Plan: sertraline (ZOLOFT) 100 MG tablet, buPROPion         (WELLBUTRIN SR) 150 MG 12 hr tablet            I was unable to connect with the clinic or clinician that prescribes these medications.    I have prescribed enough of each to get you to your appointment on 4/17/24.     In the future, should you miss an appointment and need refills of your medications, please contact the provider at the [facility] mental health clinic and ask for a refill to cover you until your rescheduled appointment.      At the end of the encounter, I discussed results, diagnosis, medications. Discussed red flags for immediate return to clinic/ER, as well as indications for follow up if no improvement. Patient understood and agreed to plan. Patient was stable for discharge     Keep appointment scheduled with your primary mental health provider on 4/17/2024.    31 minutes spent by me on the date of the encounter doing chart review, patient visit, documentation, and discussion with caregiver    31    SUBJECTIVE:   Petey Archibald is a 63 year old male who is here today with his caregiver who helps with the history.  He missed an appointment back in January, and did not realize he was out of refills for many of his medications.  He has been out of each of the 4 he was requesting today, for the past 4 days.  He states that he is in his usual state of health.      Patient Active Problem List   Diagnosis    GERD (gastroesophageal reflux disease)     History of pulmonary embolism    Tobacco dependence:40-50 pk yr hx    Borderline mental retardation    History of deep vein thrombosis of lower extremity    Pilonidal cyst    Asymptomatic varicose veins, bilateral    Callus of foot on Rt lat dist  since 8-15     Morbid obesity due to excess calories (H)  BMI 40-50    Hypercholesterolemia    Mixed simple and mucopurulent chronic bronchitis (HCC) CT 4-06 wnl and neg bronch for hemoptesis spirometry 7-26-16  FVC=59% & w mod restriction  and lung age of 82 in 56 y/o     Moderate episode of recurrent major depressive disorder (H)    Long term current use of anticoagulant therapy    Glucose intolerance (impaired glucose tolerance)    Erectile dysfunction, unspecified erectile dysfunction type    NEL (obstructive sleep apnea)    Anxiety    Thrombophlebitis of superficial veins of both lower extremities: Greater Saphenous VV     History of colonic polyps    Lymphedema of left leg    Schizoaffective disorder, bipolar type (H)    Adenomatous polyp of ascending colon    Colon cancer screening    Edentulism, partial, class IV    Torus palatinus    Diverticular disease of large intestine    Hemorrhoids    SBO (small bowel obstruction) (H)    Vomiting and diarrhea    Insomnia    Open wound of left foot    Ulcer of great toe, left, with fat layer exposed (H)    Lower extremity edema    Chronic ulcer of left leg with fat layer exposed (H)    Ulcer of left foot with fat layer exposed (H)    Ulcer of right foot with fat layer exposed (H)         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    Chronic infection     MRSA    Coagulation disorder (H24)     on coumadin    COPD (chronic obstructive pulmonary disease) (H)     Diabetic ulcer of right midfoot with fat layer exposed (H) 11/15/2023    History of DVT of lower extremity     History of pulmonary embolism     Hyperlipidemia     Mild intellectual disabilities     Morbid obesity (H)     NEL  (obstructive sleep apnea)     Peripheral edema     Peripheral vascular disease (H24)     Sleep apnea     uses CPAP    Thrombosis     Tobacco dependence     Uncomplicated asthma     Venous stasis dermatitis          Current Outpatient Medications   Medication Sig Dispense Refill    Multiple Vitamins-Iron (DAILY-JOVAN/IRON/BETA-CAROTENE) TABS TAKE 1 TABLET BY MOUTH DAILY. (Patient not taking: Reported on 10/19/2020) 30 tablet 7     Social History     Tobacco Use    Smoking status: Never Smoker    Smokeless tobacco: Never Used   Substance Use Topics    Alcohol use: Not on file     Family History   Problem Relation Age of Onset    Diabetes Mother     Diabetes Father          ROS:    10 point ROS of systems including Constitutional, Eyes, Respiratory, Cardiovascular, Gastroenterology, Genitourinary, Integumentary, Muscularskeletal, Psychiatric ,neurological were all negative except for pertinent positives noted in my HPI       OBJECTIVE:  /74   Pulse 69   Temp 97.7  F (36.5  C) (Tympanic)   Resp 16   SpO2 97%   Physical Exam:  GENERAL APPEARANCE: healthy, alert and no distress  EYES: EOMI,  PERRL, conjunctiva clear  RESP: lungs clear to auscultation - no rales, rhonchi or wheezes  CV: regular rates and rhythm, normal S1 S2, no murmur noted  NEURO:  normal speech and mentation  SKIN: no suspicious lesions or rashes

## 2024-03-05 NOTE — PATIENT INSTRUCTIONS
(F25.0) Schizoaffective disorder, bipolar type (H)  (primary encounter diagnosis)  Comment:   Plan: ARIPiprazole (ABILIFY) 5 MG tablet, naltrexone         (DEPADE/REVIA) 50 MG tablet            (G47.09) Other insomnia  Comment:   Plan: traZODone (DESYREL) 100 MG tablet            (F41.9) Anxiety  Comment:   Plan: sertraline (ZOLOFT) 100 MG tablet, buPROPion         (WELLBUTRIN SR) 150 MG 12 hr tablet            I was unable to connect with the clinic or clinician that prescribes these medications.    I have prescribed enough of each to get you to your appointment on 4/17/24.     In the future, should you miss an appointment and need refills of your medications, please contact the provider at the [facility] mental health clinic and ask for a refill to cover you until your rescheduled appointment.

## 2024-03-06 ENCOUNTER — LAB (OUTPATIENT)
Dept: LAB | Facility: CLINIC | Age: 64
End: 2024-03-06
Payer: MEDICARE

## 2024-03-06 ENCOUNTER — ANTICOAGULATION THERAPY VISIT (OUTPATIENT)
Dept: ANTICOAGULATION | Facility: CLINIC | Age: 64
End: 2024-03-06

## 2024-03-06 ENCOUNTER — MEDICAL CORRESPONDENCE (OUTPATIENT)
Dept: HEALTH INFORMATION MANAGEMENT | Facility: CLINIC | Age: 64
End: 2024-03-06

## 2024-03-06 DIAGNOSIS — Z86.711 HISTORY OF PULMONARY EMBOLISM: Primary | ICD-10-CM

## 2024-03-06 DIAGNOSIS — Z79.01 LONG TERM CURRENT USE OF ANTICOAGULANT THERAPY: ICD-10-CM

## 2024-03-06 DIAGNOSIS — Z86.711 HISTORY OF PULMONARY EMBOLISM: ICD-10-CM

## 2024-03-06 LAB — INR BLD: 2.2 (ref 0.9–1.1)

## 2024-03-06 PROCEDURE — 36416 COLLJ CAPILLARY BLOOD SPEC: CPT

## 2024-03-06 PROCEDURE — 85610 PROTHROMBIN TIME: CPT

## 2024-03-06 RX ORDER — WARFARIN SODIUM 2 MG/1
TABLET ORAL
Qty: 6 TABLET | Refills: 0 | Status: SHIPPED | OUTPATIENT
Start: 2024-03-06 | End: 2024-03-20

## 2024-03-06 RX ORDER — WARFARIN SODIUM 6 MG/1
TABLET ORAL
Qty: 14 TABLET | Refills: 0 | Status: SHIPPED | OUTPATIENT
Start: 2024-03-06 | End: 2024-03-20

## 2024-03-06 NOTE — PROGRESS NOTES
AVS placed in outgoing mail to [facility].  Debbie Thurston RN, BSN  Anticoagulation Clinic

## 2024-03-06 NOTE — PROGRESS NOTES
ANTICOAGULATION MANAGEMENT     Petey Archibald 63 year old male is on warfarin with therapeutic INR result. (Goal INR 2.0-3.0)    Recent labs: (last 7 days)     03/06/24  1047   INR 2.2*       ASSESSMENT     Source(s): Chart Review and Home Care/Facility Nurse     Warfarin doses taken: Warfarin taken as instructed  Diet: No new diet changes identified  Medication/supplement changes: None noted  New illness, injury, or hospitalization: No  Signs or symptoms of bleeding or clotting: No  Previous result: Therapeutic last visit; previously outside of goal range  Additional findings:  Lab Appt Scheduled for 3/20/24 at 11:45 am at [facility].        PLAN     Recommended plan for no diet, medication or health factor changes affecting INR     Dosing Instructions: Continue your current warfarin dose with next INR in 2 weeks       Summary  As of 3/6/2024      Full warfarin instructions:  8 mg every Sun, Tue, Fri; 6 mg all other days   Next INR check:  3/20/2024               Telephone call with [facility] nurse who agrees to plan and repeated back plan correctly / AVS to be mailed to group Home  / Med refill sent to [facility].     Orders given to  Homecare nurse/facility to recheck    Education provided:   Please call back if any changes to your diet, medications or how you've been taking warfarin    Plan made per [facility] anticoagulation protocol    Dorothy Gilman, RN  Anticoagulation Clinic  3/6/2024    _______________________________________________________________________     Anticoagulation Episode Summary       Current INR goal:  2.0-3.0   TTR:  70.6% (1 y)   Target end date:  Indefinite   Send INR reminders to:  EDU JIMENEZ    Indications    History of pulmonary embolism [Z86.711]  Long term current use of anticoagulant therapy [Z79.01]             Comments:  REM half-way; A new Coumadin Prescription will need to sent to [facility] with current dose and next INR check.             Anticoagulation Care  Providers       Provider Role Specialty Phone number    Demetri Archer MD Referring Internal Medicine [phone]

## 2024-03-20 ENCOUNTER — ANCILLARY PROCEDURE (OUTPATIENT)
Dept: ULTRASOUND IMAGING | Facility: CLINIC | Age: 64
End: 2024-03-20
Attending: FAMILY MEDICINE
Payer: MEDICARE

## 2024-03-20 ENCOUNTER — ANTICOAGULATION THERAPY VISIT (OUTPATIENT)
Dept: ANTICOAGULATION | Facility: CLINIC | Age: 64
End: 2024-03-20

## 2024-03-20 ENCOUNTER — LAB (OUTPATIENT)
Dept: LAB | Facility: CLINIC | Age: 64
End: 2024-03-20
Payer: MEDICARE

## 2024-03-20 DIAGNOSIS — Z86.711 HISTORY OF PULMONARY EMBOLISM: ICD-10-CM

## 2024-03-20 DIAGNOSIS — L97.522 ULCER OF GREAT TOE, LEFT, WITH FAT LAYER EXPOSED (H): ICD-10-CM

## 2024-03-20 DIAGNOSIS — Z79.01 LONG TERM CURRENT USE OF ANTICOAGULANT THERAPY: ICD-10-CM

## 2024-03-20 DIAGNOSIS — L97.512 ULCER OF RIGHT FOOT WITH FAT LAYER EXPOSED (H): ICD-10-CM

## 2024-03-20 DIAGNOSIS — Z86.711 HISTORY OF PULMONARY EMBOLISM: Primary | ICD-10-CM

## 2024-03-20 DIAGNOSIS — L97.922 CHRONIC ULCER OF LEFT LEG WITH FAT LAYER EXPOSED (H): ICD-10-CM

## 2024-03-20 DIAGNOSIS — L97.522 ULCER OF LEFT FOOT WITH FAT LAYER EXPOSED (H): ICD-10-CM

## 2024-03-20 DIAGNOSIS — R60.0 LOWER EXTREMITY EDEMA: ICD-10-CM

## 2024-03-20 LAB — INR BLD: 2.8 (ref 0.9–1.1)

## 2024-03-20 PROCEDURE — 93970 EXTREMITY STUDY: CPT | Performed by: SURGERY

## 2024-03-20 PROCEDURE — 85610 PROTHROMBIN TIME: CPT

## 2024-03-20 PROCEDURE — 36416 COLLJ CAPILLARY BLOOD SPEC: CPT

## 2024-03-20 RX ORDER — WARFARIN SODIUM 2 MG/1
TABLET ORAL
Qty: 9 TABLET | Refills: 0 | Status: SHIPPED | OUTPATIENT
Start: 2024-03-20 | End: 2024-04-10

## 2024-03-20 RX ORDER — WARFARIN SODIUM 6 MG/1
TABLET ORAL
Qty: 21 TABLET | Refills: 0 | Status: SHIPPED | OUTPATIENT
Start: 2024-03-20 | End: 2024-04-10

## 2024-03-20 NOTE — PROGRESS NOTES
2:27 PM    AVS printed and placed in the mail.     Rosie Meyer RN, BSN, PHN  Anticoagulation Clinic   [phone]

## 2024-03-20 NOTE — PROGRESS NOTES
ANTICOAGULATION MANAGEMENT     Petey Archibald 63 year old male is on warfarin with therapeutic INR result. (Goal INR 2.0-3.0)    Recent labs: (last 7 days)     03/20/24  1158   INR 2.8*       ASSESSMENT     Source(s): Chart Review and Patient/Caregiver Call     Warfarin doses taken: Warfarin taken as instructed  Diet: No new diet changes identified  Medication/supplement changes: None noted  New illness, injury, or hospitalization: No  Signs or symptoms of bleeding or clotting: No  Previous result: Therapeutic last 2(+) visits  Additional findings: None       PLAN     Recommended plan for no diet, medication or health factor changes affecting INR     Dosing Instructions: Continue your current warfarin dose with next INR in 3 weeks       Summary  As of 3/20/2024      Full warfarin instructions:  8 mg every Sun, Tue, Fri; 6 mg all other days   Next INR check:  4/10/2024               Telephone call with [facility]rand who agrees to plan and repeated back plan correctly    Lab visit scheduled    Education provided:   Please call back if any changes to your diet, medications or how you've been taking warfarin  Goal range and lab monitoring: goal range and significance of current result, Importance of therapeutic range, and Importance of following up at instructed interval    Plan made per [facility] anticoagulation protocol    Estephania Jonas RN  Anticoagulation Clinic  3/20/2024    _______________________________________________________________________     Anticoagulation Episode Summary       Current INR goal:  2.0-3.0   TTR:  70.6% (1 y)   Target end date:  Indefinite   Send INR reminders to:  EDU JIMENEZ    Indications    History of pulmonary embolism [Z86.711]  Long term current use of anticoagulant therapy [Z79.01]             Comments:  REM assisted; A new Coumadin Prescription will need to sent to [facility] with current dose and next INR check.             Anticoagulation Care Providers       Provider Role Specialty Phone  number    Demetri Archer MD [facility] Internal Medicine [phone]

## 2024-03-20 NOTE — LETTER
March 20, 2024    Petey Archibald  [address]  [facility] [facility] [place] [address]

## 2024-04-01 ENCOUNTER — MEDICAL CORRESPONDENCE (OUTPATIENT)
Dept: HEALTH INFORMATION MANAGEMENT | Facility: CLINIC | Age: 64
End: 2024-04-01
Payer: MEDICARE

## 2024-04-01 ENCOUNTER — TRANSFERRED RECORDS (OUTPATIENT)
Dept: HEALTH INFORMATION MANAGEMENT | Facility: CLINIC | Age: 64
End: 2024-04-01
Payer: MEDICARE

## 2024-04-07 ENCOUNTER — HEALTH MAINTENANCE LETTER (OUTPATIENT)
Age: 64
End: 2024-04-07

## 2024-04-09 ENCOUNTER — HOSPITAL ENCOUNTER (OUTPATIENT)
Dept: WOUND CARE | Facility: CLINIC | Age: 64
Discharge: HOME | End: 2024-04-09
Attending: FAMILY MEDICINE | Admitting: FAMILY MEDICINE
Payer: MEDICARE

## 2024-04-09 DIAGNOSIS — L97.522 ULCER OF GREAT TOE, LEFT, WITH FAT LAYER EXPOSED (H): ICD-10-CM

## 2024-04-09 DIAGNOSIS — L97.512 ULCER OF RIGHT FOOT WITH FAT LAYER EXPOSED (H): ICD-10-CM

## 2024-04-09 DIAGNOSIS — L97.922 CHRONIC ULCER OF LEFT LEG WITH FAT LAYER EXPOSED (H): ICD-10-CM

## 2024-04-09 DIAGNOSIS — S81.802D WOUND OF LEFT LOWER EXTREMITY, SUBSEQUENT ENCOUNTER: ICD-10-CM

## 2024-04-09 DIAGNOSIS — R60.0 LOWER EXTREMITY EDEMA: Primary | ICD-10-CM

## 2024-04-09 DIAGNOSIS — S91.302D OPEN WOUND OF LEFT FOOT, SUBSEQUENT ENCOUNTER: ICD-10-CM

## 2024-04-09 DIAGNOSIS — L97.522 ULCER OF LEFT FOOT WITH FAT LAYER EXPOSED (H): ICD-10-CM

## 2024-04-09 PROCEDURE — 11042 DBRDMT SUBQ TIS 1ST 20SQCM/<: CPT | Performed by: FAMILY MEDICINE

## 2024-04-09 PROCEDURE — 99214 OFFICE O/P EST MOD 30 MIN: CPT | Mod: 25 | Performed by: FAMILY MEDICINE

## 2024-04-09 NOTE — DISCHARGE INSTRUCTIONS
"04/09/2024  Petey Archibald   1960  A DME order for supplies has been placed to [facility]. If there are any issues with your order including not receiving the order please call [facility] at [phone]. They can also provide a tracking number for you.  [facility] [place] (3x week) Fax: laurie Lala [phone]     Plan: 4/9/24  Referral: to [facility] for Vein Referral in [place] [phone]   Dressing changes outside of clinic are being performed by Group Home Staff  Wear tube stocking to help with swelling  Elevate Legs to hip or higher 30 min 10 am and 2pm  Control smoking and all nicotine products  Schedule showers so that patient can have wound care done afterwards  Done: Venous Competency and need follow up with Vein Doctor   Protein diet: shakes and bars  Monitor INR--contact INR Nurse for instructions      Wound Dressing Change: Right Lateral Dorsal Foot,   - Prepare surface and wash your hands with soap and water  - Cleanse with mild unscented soap (such as Cetaphil, Cerave or Dove) and water  - If able, apply small amount of VASHE on gauze, lay into wound bed, let sit for 10 minutes, remove gauze (do not rinse)   - Apply light layer Desitin/ Zinc oxide barrier paste around wound edge  - Apply ~1/6 piece 4x4 Melgisorb alginate to wound bed (cut-to-fit size of wound bed)  - Cover with 1/2 of 5x9 ABD per wound   - Secure with 1 roll of 4\" conforming roll gauze and 2'' Medipore tape  - Pull up Spandagrip Size F from base of toes to the knee  Change 3 times weekly and as needed for drainage     Wound Dressing Change: Left dorsal 1st toe, dorsal foot, Anterior lower leg  - Prepare surface and wash your hands with soap and water  - Cleanse with mild unscented soap and water (such as Cetaphil, Cerave or Dove)  - If able, apply small amount of VASHE on gauze, lay into wound bed, let sit for 10 minutes, remove gauze (do not rinse)   - apply light layer Desitin/ Zinc oxide barrier paste around wound edge  - " "Apply 1/2 piece 4x4 Melgisorb alginate to wound bed  - Cover with two 4x4 kerramax non-border   - Secure with 1 roll of 4\" conforming roll gauze and 2'' Medipore tape  - Pull up Spandagrip Size G from base of toes to the knee  Change 3 times weekly and as needed for drainage     Elevation: elevate your legs above the level of your heart for 30 minutes 2 times each day  - Lay on the couch or your bed and prop your legs up on pillows  - Recline back as far as you can go in your recliner and prop your legs on pillows.     Main Provider: Miguel Hampton M.D. April 9, 2024    Call us at [phone] if you have any questions about your wounds, if you have redness or swelling around your wound, have a fever of 101 degrees Fahrenheit or greater or if you have any other problems or concerns. We answer the phone Monday through Friday 8 am to 4 pm, please leave a message as we check the voicemail frequently throughout the day. If you have a concern over the weekend, please leave a message and we will return your call Monday. If the need is urgent, go to the ER or urgent care.    If you had a positive experience please indicate that on your patient satisfaction survey form that [facility] will be sending you.  It was a pleasure meeting with you today.  Thank you for allowing me and my team the privilege of caring for you today.  YOU are the reason we are here, and I truly hope we provided you with the excellent service you deserve.  Please let us know if there is anything else we can do for you so that we can be sure you are leaving completely satisfied with your care experience.      If you have any billing related questions please call the [facility] Business office at [phone]. The clinic staff does not handle billing related matters.  If you are scheduled to have a follow up appointment, you will receive a reminder call the day before your visit. On the appointment day please arrive 15 minutes prior to your " appointment time. If you are unable to keep that appointment, please call the clinic to cancel or reschedule. If you are more than 10 minutes late or greater for your scheduled appointment time, the clinic policy is that you may be asked to reschedule.

## 2024-04-09 NOTE — PROGRESS NOTES
Wound Clinic Note         Visit date: 04/09/2024       Cheif Complaint:     Petey Archibald is a 63 year old   male had concerns including WOUND CARE.  The patient has lower extremity edema and bilateral leg ulcers.          HISTORY OF PRESENT ILLNESS:    Petey Archibald reports the wound has been present since mid 2022.  The wound began without a clear cause.   He reports prior to this wound developing he has not had other difficult to heal wounds on the legs.    Today the patient is again accompanied to the wound clinic by an employee at his group home and they both provide portions of the interval history.    Since his last clinic visit with me he finally got his venous insufficiency ultrasound performed on March 20, 2024.      Today the patient reports that they have been changing the bandages 3 times a week using a zinc oxide barrier cream applied to the periwound, alginate, an ABD pad and a spandagrip stocking.  Although he just wears some type of superabsorbent pad over the left leg wound into the clinic today with no compression and no bandages on the other wound areas.      The pateint denies fevers or chills.  They report the pain from the wound has been 0/10 and has remained about the same recently.      The patient reports they currently do not have any routine for elevating their legs.  The patient confirms they do sleep in a bed.  Petey again admits right away that he has not been elevating his legs recently.    Today the patient reports maintaining a high protein diet, but has not been taking protein supplements lately.        The patient denies a history of diabetes or chronic steroid use.  The patient confirms they do smoke cigarettes and reports smoking 48 cigarettes a day he has gone back to cigarette smoking in addition to the nicotine inhaler and reports smoking up to 2 packs a day.    The patient has not had any symptoms of infection relating to the wound recently and is not  currently on antibiotics.       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       Surgical Hx:   Past Surgical History:   Procedure Laterality Date    COLONOSCOPY N/A 4/15/2019    Procedure: COMBINED COLONOSCOPY, SINGLE OR MULTIPLE BIOPSY/POLYPECTOMY BY BIOPSY;  Surgeon: Jovana Ohara MD;  Location:  GI    COLONOSCOPY N/A 6/7/2021    Procedure: COLONOSCOPY with polypectomies;  Surgeon: Sahil Cid MD;  Location: [facility] OR    EXCISE MALIGNANT LESIONS, TRUNK/ARMS/LEGS 0.5CM OR LESS Left 5/26/2017    Procedure: EXCISE MALIGNANT LESIONS, TRUNK/ARMS/LEGS 0.5CM OR LESS;  EXCISION LEFT ELBOW MASS;  Surgeon: Jose Azevedo MD;  Location:  GI    RECTAL SURGERY      perianal abscess?          Allergies:    Allergies   Allergen Reactions    Bees     Clavulanic Acid     Amoxicillin-Pot Clavulanate Rash     Augmentin    Penicillins Rash              Medication History:    Current Outpatient Medications   Medication Sig Dispense Refill    acetaminophen (TYLENOL) 325 MG tablet Take 1-2 tablets (325-650 mg) by mouth every 6 hours as needed for mild pain, fever or headaches 100 tablet 3    albuterol (ALBUTEROL) 108 (90 BASE) MCG/ACT inhaler Inhale 2 puffs into the lungs every 6 hours as needed  "1 Inhaler 0    ARIPiprazole (ABILIFY) 5 MG tablet Take 1 tablet (5 mg) by mouth daily 45 tablet 0    buPROPion (WELLBUTRIN SR) 150 MG 12 hr tablet Take 1 tablet (150 mg) by mouth 2 times daily for 45 days 90 tablet 0    Cadexomer Iodine, topical, 0.9% (IODOSORB) 0.9 % GEL gel Apply topically daily Apply to left foot wound daily after cleansing the wound and blotting it dry. 1 Tube 3    cloNIDine (CATAPRES) 0.1 MG tablet Take 0.1 mg by mouth daily as needed      clotrimazole (LOTRIMIN) 1 % external cream Apply topically 2 times daily 60 g 0    diclofenac (VOLTAREN) 1 % topical gel Apply 4 g topically 2 times daily as needed for moderate pain 100 g 1    diphenhydrAMINE (BENADRYL) 25 MG capsule Take 50 mg by mouth every 6 hours as needed for itching or allergies      Emollient (CERAVE) CREA Externally apply topically daily 453 g 11    EPINEPHrine (ANY BX GENERIC EQUIV) 0.3 MG/0.3ML injection 2-pack INJECT 0.3MG INTRAMUSCULARLY ONE TIME FOR ONE DOSE AS NEEDED FOR ANAPHYLAXIS. MAY REPEAT ONE TIME IN 5-15 MINUTES IF RESPONSE TO INITIAL DOSE IS INADEQUATE. *1 TOTAL FILL, ORIGINAL FROM EMERGENCY ROOM* 2 each 1    EPINEPHrine (EPIPEN 2-BRE) 0.3 MG/0.3ML injection 2-pack INJECT 0.3MG INTRAMUSCULAR ONE TIME FOR ONE DOSE AS NEEDED FOR ANAPHYLAXIS (CALL 911 IF YOU HAVE TO GIVE) (FOR BEE STINGS) **LABEL EACH PEN* 2 mL 3    gabapentin (NEURONTIN) 100 MG capsule Take 400 mg by mouth 3 times daily At 0900, 1200, and 2000      Gauze Pads & Dressings (GAUZE PADS 4\"X4\") 4\"X4\" PADS 1 each 3 times daily as needed 25 each 1    loratadine (CLARITIN) 10 MG tablet Take 10 mg by mouth daily      LORazepam (ATIVAN) 1 MG tablet Take 1 mg by mouth daily as needed for anxiety      montelukast (SINGULAIR) 10 MG tablet TAKE 1 TABLET BY MOUTH EVERY MORNING (ASTHMA) 30 tablet 11    Multiple Vitamin (DAILY-JOVAN) TABS TAKE 1 TABLET BY MOUTH EVERY MORNING (VITAMINS) 30 tablet 11    naltrexone (DEPADE/REVIA) 50 MG tablet Take 2 tablets (100 mg) by mouth " daily for 45 days 90 tablet 0    nicotine (COMMIT) 2 MG lozenge Place 2 mg inside cheek daily as needed      nystatin-triamcinolone (MYCOLOG II) 318243-0.1 UNIT/GM-% external cream Apply topically 2 times daily For 1-2 weeks.      omeprazole (PRILOSEC) 40 MG DR capsule Take 1 capsule (40 mg) by mouth daily 90 capsule 2    order for DME Equipment being ordered: roll of micropore tape to dress foot wound bid 1 each 0    order for DME Equipment being ordered: 1 surgical shoe 1 Device 0    order for DME [employer] [facility] Phone [phone] Fax [phone]  EdemaWear Size Medium/Yellow qty 4 sleeves  Length of Need: 1 month  Frequency of dressing change daily 1 Device 0    order for DME [employer] Stockings  Phone #[phone]  Fax #[phone]  Coolflex Velcro wraps    Length of Need: Life Time  # of Pairs 1 set 30 days 0    order for DME [employer] Order Phone [phone] Fax [phone]  Primary Dressing Hydrofera Blue Ready   Qty 5 sheets  Secondary Dressing 4' roll gauze Qty 30  Secondary Dressing 2' medipore tape Qty 1  Secondary Dressing 4x4 gauze loaf Qty  1  Length of Need: 1 month  Frequency of dressing change: daily 30 days 0    order for DME Oxygen 2 Li/min  at night and bled into BiPAP 1 Device 0    order for DME Equipment being ordered: CPAP with mask and tubing 1 Device 0    order for DME Equipment being ordered: support compression hose BK  2 pair black 30mm HG   To be applied on arising & removed while lying down to go to sleep 1 each 0    polyethylene glycol (MIRALAX) 17 GM/Dose powder Take 17 g (1 capful) by mouth daily as needed for constipation 578 g 0    PULMICORT FLEXHALER 180 MCG/ACT inhaler INHALE 2 PUFFS INTO THE LUNGS TWICE DAILY. 1 each 11    SENNA-TABS 8.6 MG tablet Take 1 tablet by mouth daily      sertraline (ZOLOFT) 100 MG tablet Take 2 tablets (200 mg) by mouth daily for 45 days 90 tablet 0    simvastatin (ZOCOR) 40 MG tablet TAKE 1 TABLET BY MOUTH EVERY MORNING.   (CHOLESTEROL) 30 tablet 11    SPIRIVA HANDIHALER 18 MCG inhaled capsule INHALE CONTENTS OF 1 CAPSULE INTO THE LUNGS ONCE DAILY 30 capsule 11    spironolactone (ALDACTONE) 25 MG tablet TAKE 1 TABLET BY MOUTH ONCE DAILY. (HIGH BLOOD PRESSURE) 30 tablet 11    tiZANidine (ZANAFLEX) 2 MG tablet TAKE 1 TABLET BY MOUTH THREE TIMES DAILY. 106 tablet 11    tiZANidine (ZANAFLEX) 2 MG tablet Take 1 tablet (2 mg) by mouth 3 times daily 90 tablet 1    traZODone (DESYREL) 100 MG tablet Take 1 tablet (100 mg) by mouth at bedtime for 45 days 45 tablet 0    triamcinolone (KENALOG) 0.1 % external cream Apply to AA BID x 1-2 week then PRN only 80 g 2    vitamin B complex with vitamin C (STRESS TAB) tablet Take 1 tablet by mouth daily 90 tablet 3    warfarin ANTICOAGULANT (COUMADIN) 2 MG tablet Take 1 tablet (along with a 6 mg tab for a total of 8 mg) by mouth every Sun, Tue, Fri. Next INR is 4/10/24 9 tablet 0    warfarin ANTICOAGULANT (COUMADIN) 6 MG tablet Take 1 Tablet (6 mg) by mouth every day. Next INR Date is 4/10/24 21 tablet 0     No current facility-administered medications for this encounter.         Tobacco History:  reports that he has quit smoking. His smoking use included cigarettes. He has a 30 pack-year smoking history. He has never used smokeless tobacco.       REVIEW OF SYMPTOMS:   The review of systems was negative except as noted in the HPI.           PHYSICAL EXAMINATION:     There were no vitals taken for this visit.           GENERAL: The patient overall appears well and is no acute distress.   HEAD: normocephalic   EYES: Sclera and conjunctiva clear   NECK: no obvious masses   LUNGS: breathing is unlabored.   EXTREMITIES: No clubbing, cyanosis or edema   SKIN: No rashes or other abnormalities except as noted under the Wound section below.   NEUROLOGICAL: normal motor and sensory function   EDEMA: Moderate       WOUND: The wound appears healthy with no sign of infection.   Wound bed: necrotic material at the  left leg wound only  Periwound: healthy intact skin  Despite the issues noted in the history of present illness the wounds to continue to make progress towards healing.  Today there is just necrotic material in the left leg wound.    Also see below for wound details:       Circumferential volume measures:             No data to display                Ulceration(s)/Wound(s):   Please see the media tab under the chart review for pictures of the wounds.  Nursing staff removed dressings and cleansed wound.    Wound (used by OP [facility] only) 08/22/23 1341 Left dorsal 1 toe ulceration, venous (Active)   Thickness/Stage full thickness 04/09/24 1200   Base slough 04/09/24 1200   Periwound edematous 04/09/24 1200   Periwound Temperature warm 04/09/24 1200   Periwound Skin Turgor soft 04/09/24 1200   Edges open 04/09/24 1200   Length (cm) 0.7 04/09/24 1200   Width (cm) 1.4 04/09/24 1200   Depth (cm) 0.1 04/09/24 1200   Wound (cm^2) 0.98 cm^2 04/09/24 1200   Wound Volume (cm^3) 0.1 cm^3 04/09/24 1200   Wound healing % 76.33 04/09/24 1200   Drainage Characteristics/Odor serous 04/09/24 1200   Drainage Amount moderate 04/09/24 1200   Care, Wound non-select wound debridement performed. 04/09/24 1200       Wound (used by [facility] only) 11/15/23 0906 Right lateral;dorsal foot ulceration, venous (Active)   Thickness/Stage full thickness 04/09/24 1200   Base scab 04/09/24 1200   Periwound redness;edematous 04/09/24 1200   Periwound Temperature warm 04/09/24 1200   Periwound Skin Turgor soft 04/09/24 1200   Edges open 04/09/24 1200   Length (cm) 1.1 04/09/24 1200   Width (cm) 1.2 04/09/24 1200   Depth (cm) 0.1 04/09/24 1200   Wound (cm^2) 1.32 cm^2 04/09/24 1200   Wound Volume (cm^3) 0.13 cm^3 04/09/24 1200   Wound healing % 26.67 04/09/24 1200   Drainage Characteristics/Odor serous 04/09/24 1200   Drainage Amount moderate 04/09/24 1200   Care, Wound non-select wound debridement performed. 04/09/24 1200       Wound (used by [facility] only)  11/15/23 0911 Left dorsal foot ulceration, venous (Active)   Thickness/Stage full thickness 04/09/24 1200   Base slough 04/09/24 1200   Periwound edematous 04/09/24 1200   Periwound Temperature warm 04/09/24 1200   Periwound Skin Turgor soft 04/09/24 1200   Edges open 04/09/24 1200   Length (cm) 1 04/09/24 1200   Width (cm) 1 04/09/24 1200   Depth (cm) 0.1 04/09/24 1200   Wound (cm^2) 1 cm^2 04/09/24 1200   Wound Volume (cm^3) 0.1 cm^3 04/09/24 1200   Wound healing % 94.32 04/09/24 1200   Drainage Characteristics/Odor serous 04/09/24 1200   Drainage Amount copious 04/09/24 1200   Care, Wound non-select wound debridement performed. 04/09/24 1200       Wound (used by OP [facility] only) 12/27/23 1410 Left anterior;lower leg ulceration, venous (Active)   Thickness/Stage full thickness 04/09/24 1200   Base slough;granulating 04/09/24 1200   Periwound edematous;excoriated 04/09/24 1200   Periwound Temperature warm 04/09/24 1200   Periwound Skin Turgor soft 04/09/24 1200   Edges open 04/09/24 1200   Length (cm) 4 04/09/24 1200   Width (cm) 2.5 04/09/24 1200   Depth (cm) 0.6 04/09/24 1200   Wound (cm^2) 10 cm^2 04/09/24 1200   Wound Volume (cm^3) 6 cm^3 04/09/24 1200   Wound healing % -1438.46 04/09/24 1200   Drainage Characteristics/Odor serous 04/09/24 1200   Drainage Amount copious 04/09/24 1200   Care, Wound debrided 04/09/24 1200                               Recent Labs   Lab Test 06/03/21  1448 03/23/21  1042 07/28/20  0929   A1C 5.7* 5.6 5.7*          Recent Labs   Lab Test 02/14/23  0059 06/08/22  1835 07/28/20  0929   ALBUMIN 3.9 3.2* 3.3*              Procedure note:  I had previous obtain informed consent from the patient to perform serial debridements, I confirmed this again with the patient today verbally.  Anesthetized as needed with lidocaine.  Using a curette and/or a scalpel I performed an excisional debridement removing all necrotic material at the left leg wound in to the level of viable subcutaneous tissue.   I obtained hemostasis with direct pressure.  The patient tolerated the procedure well.  EBL: <5 ml  Total debridement surface area: Less than 20 cm     The other wounds did not require sharp debridement.      ASSESSMENT:   This is a 63 year old  male with lower extremity edema and bilateral lower extremity wounds.          PLAN:   We will bandage all of the wound areas beginning with a zinc oxide barrier cream applied to the periwound followed by alginate, an ABD pad and the spandagrip stocking change 3 times a week.      I reviewed the results of the venous insufficiency ultrasound with him today and I recommended that he meet with a specialist to discuss treatment options for his venous insufficiency.  The patient was in agreement with this and I have entered an order for the referral.      Separate from his wound care instructions I then discussed the treatment of his lower extremity edema.  This primarily involves compression and elevation.  I have strongly encouraged him to wear his spandagrip stockings every day.  I have again explained to the patient today that controlling the edema is probably the most important thing we can do to help heal the wound.  I have specifically recommended that they lay down with their legs above the level of the heart for 30 minutes at least twice a day.  I emphasized that if we can not control the edema we will likely not be able to get this wound to heal.     I have encouraged the patient to continue on their high protein diet to aid in wound healing.   I have also strongly encouraged him to decrease his nicotine intake which slows wound healing.  The patient will return to the wound clinic in 3-4 weeks to see me again.        30 minutes spent on the date of the encounter doing chart review, history and exam, documentation and further activities per the note, this time excludes any procedure time      Miguel Hampton MD  04/09/2024   2:30 PM   [facility]  "Vascular/Wound  [phone]    This note was electronically signed by Miguel Hampton MD        Further instructions from your care team         04/09/2024  Petey Archibald   1960  A DME order for supplies has been placed to [facility]. If there are any issues with your order including not receiving the order please call [facility] at [phone]. They can also provide a tracking number for you.  [facility] group home (3x week) Fax: laurie Lala [phone]     Plan: 4/9/24  Referral: to [facility] for Vein Referral in [place] [phone]   Dressing changes outside of clinic are being performed by Group Home Staff  Wear tube stocking to help with swelling  Elevate Legs to hip or higher 30 min 10 am and 2pm  Control smoking and all nicotine products  Schedule showers so that patient can have wound care done afterwards  Done: Venous Competency and need follow up with Vein Doctor   Protein diet: shakes and bars  Monitor INR--contact INR Nurse for instructions      Wound Dressing Change: Right Lateral Dorsal Foot,   - Prepare surface and wash your hands with soap and water  - Cleanse with mild unscented soap (such as Cetaphil, Cerave or Dove) and water  - If able, apply small amount of VASHE on gauze, lay into wound bed, let sit for 10 minutes, remove gauze (do not rinse)   - Apply light layer Desitin/ Zinc oxide barrier paste around wound edge  - Apply ~1/6 piece 4x4 Melgisorb alginate to wound bed (cut-to-fit size of wound bed)  - Cover with 1/2 of 5x9 ABD per wound   - Secure with 1 roll of 4\" conforming roll gauze and 2'' Medipore tape  - Pull up Spandagrip Size F from base of toes to the knee  Change 3 times weekly and as needed for drainage     Wound Dressing Change: Left dorsal 1st toe, dorsal foot, Anterior lower leg  - Prepare surface and wash your hands with soap and water  - Cleanse with mild unscented soap and water (such as Cetaphil, Cerave or Dove)  - If able, apply small amount of VASHE on " "gauze, lay into wound bed, let sit for 10 minutes, remove gauze (do not rinse)   - apply light layer Desitin/ Zinc oxide barrier paste around wound edge  - Apply 1/2 piece 4x4 Melgisorb alginate to wound bed  - Cover with two 4x4 kerramax non-border   - Secure with 1 roll of 4\" conforming roll gauze and 2'' Medipore tape  - Pull up Spandagrip Size G from base of toes to the knee  Change 3 times weekly and as needed for drainage     Elevation: elevate your legs above the level of your heart for 30 minutes 2 times each day  - Lay on the couch or your bed and prop your legs up on pillows  - Recline back as far as you can go in your recliner and prop your legs on pillows.     Main Provider: Miguel Hampton M.D. April 9, 2024    Call us at [phone] if you have any questions about your wounds, if you have redness or swelling around your wound, have a fever of 101 degrees Fahrenheit or greater or if you have any other problems or concerns. We answer the phone Monday through Friday 8 am to 4 pm, please leave a message as we check the voicemail frequently throughout the day. If you have a concern over the weekend, please leave a message and we will return your call Monday. If the need is urgent, go to the ER or urgent care.    If you had a positive experience please indicate that on your patient satisfaction survey form that [facility] will be sending you.  It was a pleasure meeting with you today.  Thank you for allowing me and my team the privilege of caring for you today.  YOU are the reason we are here, and I truly hope we provided you with the excellent service you deserve.  Please let us know if there is anything else we can do for you so that we can be sure you are leaving completely satisfied with your care experience.      If you have any billing related questions please call the [facility] Business office at [phone]. The clinic staff does not handle billing related matters.  If you are scheduled " to have a follow up appointment, you will receive a reminder call the day before your visit. On the appointment day please arrive 15 minutes prior to your appointment time. If you are unable to keep that appointment, please call the clinic to cancel or reschedule. If you are more than 10 minutes late or greater for your scheduled appointment time, the clinic policy is that you may be asked to reschedule.         ,

## 2024-04-10 ENCOUNTER — LAB (OUTPATIENT)
Dept: LAB | Facility: CLINIC | Age: 64
End: 2024-04-10
Payer: MEDICARE

## 2024-04-10 ENCOUNTER — ANTICOAGULATION THERAPY VISIT (OUTPATIENT)
Dept: ANTICOAGULATION | Facility: CLINIC | Age: 64
End: 2024-04-10

## 2024-04-10 ENCOUNTER — TELEPHONE (OUTPATIENT)
Dept: VASCULAR SURGERY | Facility: CLINIC | Age: 64
End: 2024-04-10

## 2024-04-10 DIAGNOSIS — Z79.01 LONG TERM CURRENT USE OF ANTICOAGULANT THERAPY: ICD-10-CM

## 2024-04-10 DIAGNOSIS — Z86.711 HISTORY OF PULMONARY EMBOLISM: ICD-10-CM

## 2024-04-10 DIAGNOSIS — Z86.711 HISTORY OF PULMONARY EMBOLISM: Primary | ICD-10-CM

## 2024-04-10 LAB — INR BLD: 2 (ref 0.9–1.1)

## 2024-04-10 PROCEDURE — 36416 COLLJ CAPILLARY BLOOD SPEC: CPT

## 2024-04-10 PROCEDURE — 85610 PROTHROMBIN TIME: CPT

## 2024-04-10 RX ORDER — WARFARIN SODIUM 2 MG/1
TABLET ORAL
Qty: 9 TABLET | Refills: 0 | Status: SHIPPED | OUTPATIENT
Start: 2024-04-10 | End: 2024-04-24

## 2024-04-10 RX ORDER — WARFARIN SODIUM 6 MG/1
TABLET ORAL
Qty: 15 TABLET | Refills: 0 | Status: SHIPPED | OUTPATIENT
Start: 2024-04-10 | End: 2024-04-24

## 2024-04-10 NOTE — PROGRESS NOTES
ANTICOAGULATION MANAGEMENT     Petey Archibald 63 year old male is on warfarin with therapeutic INR result. (Goal INR 2.0-3.0)    Recent labs: (last 7 days)     04/10/24  0948   INR 2.0*       ASSESSMENT     Source(s): Chart Review and Home Care/Facility Nurse     Warfarin doses taken: Warfarin taken as instructed  Diet: No new diet changes identified  Medication/supplement changes: No  New illness, injury, or hospitalization: No  Signs or symptoms of bleeding or clotting: No  Previous result: Therapeutic last 2(+) visits  Additional findings:  Warfarin refill sent to [facility]  Increased smoking and vaping- per Bertrand the patient was advised by provider to stop        PLAN     Recommended plan for ongoing change(s) affecting INR     Dosing Instructions: Continue your current warfarin dose with next INR in 2 weeks       Summary  As of 4/10/2024      Full warfarin instructions:  8 mg every Sun, Tue, Fri; 6 mg all other days   Next INR check:  4/24/2024               Telephone call with [facility] nurse who verbalizes understanding and agrees to plan and who agrees to plan and repeated back plan correctly  AVS mailed to :  ESTEFANI Alvarez [place]- [id number]   [facility] [id number]     Lab visit scheduled    Education provided:   Contact [phone] with any changes, questions or concerns.     Plan made per [facility] anticoagulation protocol    Myranda Welsh RN  Anticoagulation Clinic  4/10/2024    _______________________________________________________________________     Anticoagulation Episode Summary       Current INR goal:  2.0-3.0   TTR:  70.6% (1 y)   Target end date:  Indefinite   Send INR reminders to:  EDU JIMENEZ    Indications    History of pulmonary embolism [Z86.711]  Long term current use of anticoagulant therapy [Z79.01]             Comments:  ESTEFANI correction; A new Coumadin Prescription will need to sent to [facility] with current dose and next INR check.             Anticoagulation  Care Providers       Provider Role Specialty Phone number    Demetri Archer MD Referring Internal Medicine [phone]

## 2024-04-23 ENCOUNTER — OFFICE VISIT (OUTPATIENT)
Dept: VASCULAR SURGERY | Facility: CLINIC | Age: 64
End: 2024-04-23
Payer: MEDICARE

## 2024-04-23 DIAGNOSIS — L97.512 ULCER OF RIGHT FOOT WITH FAT LAYER EXPOSED (H): ICD-10-CM

## 2024-04-23 DIAGNOSIS — L97.922 CHRONIC ULCER OF LEFT LEG WITH FAT LAYER EXPOSED (H): Primary | ICD-10-CM

## 2024-04-23 PROCEDURE — 99203 OFFICE O/P NEW LOW 30 MIN: CPT | Performed by: SURGERY

## 2024-04-23 NOTE — PROGRESS NOTES
[facility] Vein Clinic [place] Progress Note    Petey Archibald is a patient who is familiar with me.  I performed radiofrequency ablation of his left great saphenous vein with ultrasound-guided sclerotherapy of left great saphenous vein tributaries on 2020 to treat a left lower extremity venous stasis ulcer.  I last saw him in surgical follow-up on 2021 at which time I noted that the great saphenous vein tributary we had injected had not closed down.    He returns at this time with a new left distal medial leg venous stasis ulcer which is proving recalcitrant to healing.  He also has a small eschar over his dorsal-lateral left foot and in a mirror image location over his dorsal lateral right foot.  He states that these wounds formed when he was wearing a pair of shoes that rubbed his feet bilaterally.  These did not form spontaneously according to the patient.    Physical Exam  General: Pleasant male in no acute distress.   Extremities: Right lower extremity: There is a round eschar over the dorsal lateral hindfoot.  No other stasis ulcerations are appreciated.  Stasis hyperpigmentation of the leg    Left lower extremity:  2 cm diameter clean, distal medial leg venous stasis ulcer.  There is lipodermatosclerosis.  Varicosities are noted coursing down the left thigh and calf in the area of the ulcer.  There is a small eschar on the dorsal lateral foot/hindfoot.    Ultrasound:  Name:  Petey Archibald                                          Patient ID: [id number]  Date: 2024                                                 : 1960  Sex: male                                                                    Examined by: FERNANDO Alas RVT  Age:  63 year old                                                         Reading MD: AI Muhammad MD     INDICATION:  Edema, bilateral ulcers, history of left leg venous procedures     EXAM TYPE  BILATERAL LOWER EXTREMITY VENOUS  DUPLEX FOR VENOUS INSUFFICIENCY  TECHNICAL SUMMARY     A duplex ultrasound study using color flow was performed, to evaluate the bilateral lower extremity veins for valvular incompetence with the patient in a steep reversed trendelenberg.      RIGHT:     The deep veins demonstrate phasic flow, compress and respond to augmentations.  There is no DVT.  The common femoral, proximal to distal femoral, and popliteal veins are incompetent and free of thrombus. The remaining deep veins are competent and free of thrombus.      Chronic non-occlusive thrombus is visualized in the GSV focally in the proximal calf.  The GSV demonstrates phasic flow, compresses and responds to augmentations from the saphenofemoral junction to the knee, and mid calf with no evidence of thrombus. The GSV is not visualized at the distal calf.  The great saphenous vein measures 9.4 mm at the saphenofemoral junction, 7.4 mm at the proximal thigh, and 7.0 mm at the knee. The GSV is incompetent from SFJ to Mid Calf, with the greatest reflux time of 4751 milliseconds.  The GSV gives rise to multiple incompetent varicose veins, the largest measures 6.1 mm off the Proximal Calf that courses Medial with a reflux time of 1732 milliseconds.      The AASV is competent ( 5.3 mm) draining into the saphenofemoral junction.      The Giacomini vein is absent.     The SSV demonstrates phasic flow, compresses and responds to augmentations from the popliteal space to the ankle.  No thrombus is seen. The saphenopopliteal junction is competent (3.6 mm). The SSV is incompetent from the Proximal Calf to Distal Calf with a reflux time of 1386 milliseconds.      Perforators: There is no evidence of incompetent  veins at any level.         LEFT:     The deep veins demonstrate phasic flow, compress and respond to augmentations.  There is no DVT.  The common femoral and proximal to mid femoral veins are incompetent and free of thrombus. The remaining deep veins are  competent and free of thrombus.      The GSV is not visualized from proximal thigh to distal calf.  The greater saphenous vein measures 9.8 mm at the saphenofemoral junction. The GSV is incompetent at the SFJ with the greatest reflux time of 3332 milliseconds.       The AASV is competent ( 6.6 mm) draining into the saphenofemoral junction.      The Giacomini vein is absent.     The SSV demonstrates phasic flow, compresses and responds to augmentations from the popliteal space to the ankle.  No thrombus is seen. The saphenopopliteal junction is competent ( 2.9 mm). The SSV is incompetent at the Mid Calf with a reflux time of 693 milliseconds.       Perforators: There is no evidence of incompetent  veins at any level.      Incompetent varicose veins course down the medial thigh and calf with branches to the posterior calf, measuring 6.5 mm at the medial thigh and 6.9 mm at the medial calf, with the greatest reflux time of 3035 milliseconds.  The source of the varicosities could not be determined.           FINAL SUMMARY:  Right lower extremity:  Deep veins  1.  No deep vein thrombosis  2.  Common femoral through popliteal vein incompetence     Superficial veins  1.  Chronic, nonocclusive thrombus noted in the below-knee right great saphenous vein.  The distal calf great saphenous vein is not visualized.  2.  Saphenofemoral junction through mid calf great saphenous vein incompetence, the source of multiple, incompetent varicosities in the thigh and calf  3.  Proximal through distal calf small saphenous vein incompetence      veins  No incompetent perforators     Left lower extremity:  Deep veins  1.  No deep vein thrombosis  2.  Common femoral through mid femoral vein incompetence.  The distal femoral and popliteal veins are competent     Superficial veins  1.  Absent left great saphenous vein distal to the incompetent, left saphenofemoral junction  2.  Mid calf small saphenous vein incompetence  3.   Multiple, incompetent varicosities on the medial thigh and medial calf, source indeterminate. Varicosities course from the saphenofemoral junction      veins  No incompetent perforators.     Incompetence Criteria: Greater than 500 milliseconds reflux in the superficial and  veins and greater than 1000 milliseconds reflux in the deep veins.     RONALD Muhammad MD, FACS     Assessment:  Recurrent, nonhealing left distal medial leg stasis ulceration.  I discussed his lower extremity venous competency study which reveals a closed left great saphenous vein from near the saphenofemoral junction to the ankle.  There are varicosities originating from the saphenofemoral junction coursing down the thigh measuring 6.5 mm in diameter of the thigh with reflux time of 3 seconds and 6.9 mm in diameter on the medial calf with reflux time of 1.5 seconds.  He has varicosities in the medial leg course deep to the stasis ulcer.    He does have right great saphenous vein incompetence with the great saphenous vein measuring 7.4 mm in diameter in the proximal thigh with reflux time of 3 seconds, the source of multiple incompetent varicosities in the thigh and calf.    The stasis ulcer on the left leg is likely being hindered and healing by the sizable varicosities.  There is no significant, axial incompetence which needs to be treated, simply the varicosities.  I think this would be best managed with medically necessary, ultrasound-guided sclerotherapy.  He is anticoagulated and would continue his anticoagulation during the treatment.  Risk of the treatment include allergic reaction, deep vein thrombosis and ulceration.  Petey voiced understanding and his questions were answered.    I stepped out to the lobby to speak to his personal care attendant but was unable to locate him.    Plan:  Ultrasound-guided, medically necessary sclerotherapy left lower extremity varicose veins, medial thigh and medial  diony.    Ludwig Muhammad MD, FACS    Dictated using Dragon voice recognition software which may result in transcription errors        April 23, 2024    Vein Treatment Recommendation    Spoke with patient in clinic.    Dr. Muhammad has recommended patient to have the following vein procedure(s):     1. Left leg Ultrasound Guided Sclerotherapy (medically necessary) 2 sessions      Patient Pre-op Questions:  Preferred Pharmacy: [facility] in [place]  Anticoagulant/ASA: Yes, coumadin  Artificial Joint or Heart Valve:  No  Open ulcer:  Yes    Per Dr. Muhammad, pt may take 1mg Ativan one hour prior to treatment. Informed pt a nurse will call him one week prior to treatment date to send in medication.    Patient is recommended to wear compression hose following his treatment. Pt is unable to wear traditional compression hose, so may have to ACE wrap. Pt stated he does not have Edemawear?    Handed patient written procedure instructions to review on his own (see After Visit Summary).    Next steps:    Insurance Submission  Informed patient this process could take up to 14 business days, but once approved, the patient will be contacted by our surgery scheduler to schedule the above procedure. Gave patient our surgery scheduler's information.    Patient is in agreement with all of the above and has no further questions at this time.    Blossom Tiwari RN  [facility]  Vein Clinic

## 2024-04-23 NOTE — NURSING NOTE
Patient Reported symptoms:    Right leg   Heaviness None of the time   Achiness Most of the time   Swelling A good bit of the time   Throbbing None of the time   Itching None of the time   Appearance Extremely noticeable   Impact on work/activities Symptoms but full able to participate    Left Leg   Heaviness None of the time   Achiness None of the time   Swelling A good bit of the time   Throbbing None of the time   Itching None of the time   Appearance Extremely noticeable   Impact on work/activities Symptoms but full able to participate

## 2024-04-23 NOTE — LETTER
2024         RE: Petey Archibald  [address]  [place] [id number]        Dear Colleague,    Thank you for referring your patient, Petey Archibald, to the [facility] VEIN CLINIC [place]. Please see a copy of my visit note below.    [facility] Progress Note    Petey Archibald is a patient who is familiar with me.  I performed radiofrequency ablation of his left great saphenous vein with ultrasound-guided sclerotherapy of left great saphenous vein tributaries on 2024 a left lower extremity venous stasis ulcer.  I last saw him in surgical follow-up on 2021 at which time I noted that the great saphenous vein tributary we had injected had not closed down.    He returns at this time with a new left distal medial leg venous stasis ulcer which is proving recalcitrant to healing.  He also has a small eschar over his dorsal-lateral left foot and in a mirror image location over his dorsal lateral right foot.  He states that these wounds formed when he was wearing a pair of shoes that rubbed his feet bilaterally.  These did not form spontaneously according to the patient.    Physical Exam  General: Pleasant male in no acute distress.   Extremities: Right lower extremity: There is a round eschar over the dorsal lateral hindfoot.  No other stasis ulcerations are appreciated.  Stasis hyperpigmentation of the leg    Left lower extremity:  2 cm diameter clean, distal medial leg venous stasis ulcer.  There is lipodermatosclerosis.  Varicosities are noted coursing down the left thigh and calf in the area of the ulcer.  There is a small eschar on the dorsal lateral foot/hindfoot.    Ultrasound:  Name:  Petey Archibald                                          Patient ID: [id number]  Date: 2024                                                 : 1960  Sex: male                                                                    Examined by: FERNANDO  Bishop, RVT  Age:  63 year old                                                         Reading MD: AI Muhammad MD     INDICATION:  Edema, bilateral ulcers, history of left leg venous procedures     EXAM TYPE  BILATERAL LOWER EXTREMITY VENOUS DUPLEX FOR VENOUS INSUFFICIENCY  TECHNICAL SUMMARY     A duplex ultrasound study using color flow was performed, to evaluate the bilateral lower extremity veins for valvular incompetence with the patient in a steep reversed trendelenberg.      RIGHT:     The deep veins demonstrate phasic flow, compress and respond to augmentations.  There is no DVT.  The common femoral, proximal to distal femoral, and popliteal veins are incompetent and free of thrombus. The remaining deep veins are competent and free of thrombus.      Chronic non-occlusive thrombus is visualized in the GSV focally in the proximal calf.  The GSV demonstrates phasic flow, compresses and responds to augmentations from the saphenofemoral junction to the knee, and mid calf with no evidence of thrombus. The GSV is not visualized at the distal calf.  The great saphenous vein measures 9.4 mm at the saphenofemoral junction, 7.4 mm at the proximal thigh, and 7.0 mm at the knee. The GSV is incompetent from SFJ to Mid Calf, with the greatest reflux time of 4751 milliseconds.  The GSV gives rise to multiple incompetent varicose veins, the largest measures 6.1 mm off the Proximal Calf that courses Medial with a reflux time of 1732 milliseconds.      The AASV is competent ( 5.3 mm) draining into the saphenofemoral junction.      The Giacomini vein is absent.     The SSV demonstrates phasic flow, compresses and responds to augmentations from the popliteal space to the ankle.  No thrombus is seen. The saphenopopliteal junction is competent (3.6 mm). The SSV is incompetent from the Proximal Calf to Distal Calf with a reflux time of 1386 milliseconds.      Perforators: There is no evidence of incompetent   veins at any level.         LEFT:     The deep veins demonstrate phasic flow, compress and respond to augmentations.  There is no DVT.  The common femoral and proximal to mid femoral veins are incompetent and free of thrombus. The remaining deep veins are competent and free of thrombus.      The GSV is not visualized from proximal thigh to distal calf.  The greater saphenous vein measures 9.8 mm at the saphenofemoral junction. The GSV is incompetent at the SFJ with the greatest reflux time of 3332 milliseconds.       The AASV is competent ( 6.6 mm) draining into the saphenofemoral junction.      The Giacomini vein is absent.     The SSV demonstrates phasic flow, compresses and responds to augmentations from the popliteal space to the ankle.  No thrombus is seen. The saphenopopliteal junction is competent ( 2.9 mm). The SSV is incompetent at the Mid Calf with a reflux time of 693 milliseconds.       Perforators: There is no evidence of incompetent  veins at any level.      Incompetent varicose veins course down the medial thigh and calf with branches to the posterior calf, measuring 6.5 mm at the medial thigh and 6.9 mm at the medial calf, with the greatest reflux time of 3035 milliseconds.  The source of the varicosities could not be determined.           FINAL SUMMARY:  Right lower extremity:  Deep veins  1.  No deep vein thrombosis  2.  Common femoral through popliteal vein incompetence     Superficial veins  1.  Chronic, nonocclusive thrombus noted in the below-knee right great saphenous vein.  The distal calf great saphenous vein is not visualized.  2.  Saphenofemoral junction through mid calf great saphenous vein incompetence, the source of multiple, incompetent varicosities in the thigh and calf  3.  Proximal through distal calf small saphenous vein incompetence      veins  No incompetent perforators     Left lower extremity:  Deep veins  1.  No deep vein thrombosis  2.  Common femoral  through mid femoral vein incompetence.  The distal femoral and popliteal veins are competent     Superficial veins  1.  Absent left great saphenous vein distal to the incompetent, left saphenofemoral junction  2.  Mid calf small saphenous vein incompetence  3.  Multiple, incompetent varicosities on the medial thigh and medial calf, source indeterminate. Varicosities course from the saphenofemoral junction      veins  No incompetent perforators.     Incompetence Criteria: Greater than 500 milliseconds reflux in the superficial and  veins and greater than 1000 milliseconds reflux in the deep veins.     RONALD Muhammad MD, FACS     Assessment:  Recurrent, nonhealing left distal medial leg stasis ulceration.  I discussed his lower extremity venous competency study which reveals a closed left great saphenous vein from near the saphenofemoral junction to the ankle.  There are varicosities originating from the saphenofemoral junction coursing down the thigh measuring 6.5 mm in diameter of the thigh with reflux time of 3 seconds and 6.9 mm in diameter on the medial calf with reflux time of 1.5 seconds.  He has varicosities in the medial leg course deep to the stasis ulcer.    He does have right great saphenous vein incompetence with the great saphenous vein measuring 7.4 mm in diameter in the proximal thigh with reflux time of 3 seconds, the source of multiple incompetent varicosities in the thigh and calf.    The stasis ulcer on the left leg is likely being hindered and healing by the sizable varicosities.  There is no significant, axial incompetence which needs to be treated, simply the varicosities.  I think this would be best managed with medically necessary, ultrasound-guided sclerotherapy.  He is anticoagulated and would continue his anticoagulation during the treatment.  Risk of the treatment include allergic reaction, deep vein thrombosis and ulceration.  Petey voiced understanding and his  questions were answered.    I stepped out to the lobby to speak to his personal care attendant but was unable to locate him.    Plan:  Ultrasound-guided, medically necessary sclerotherapy left lower extremity varicose veins, medial thigh and medial calf.    Ludwig Muhammad MD, FACS    Dictated using Dragon voice recognition software which may result in transcription errors        April 23, 2024    Vein Treatment Recommendation    Spoke with patient in clinic.    Dr. Muhammad has recommended patient to have the following vein procedure(s):     1. Left leg Ultrasound Guided Sclerotherapy (medically necessary) 2 sessions      Patient Pre-op Questions:  Preferred Pharmacy: [facility] [place]  Anticoagulant/ASA: Yes, coumadin  Artificial Joint or Heart Valve:  No  Open ulcer:  Yes    Per Dr. Muhammad, pt may take 1mg Ativan one hour prior to treatment. Informed pt a nurse will call him one week prior to treatment date to send in medication.    Patient is recommended to wear compression hose following his treatment. Pt is unable to wear traditional compression hose, so may have to ACE wrap. Pt stated he does not have Edemawear?    Handed patient written procedure instructions to review on his own (see After Visit Summary).    Next steps:    Insurance Submission  Informed patient this process could take up to 14 business days, but once approved, the patient will be contacted by our surgery scheduler to schedule the above procedure. Gave patient our surgery scheduler's information.    Patient is in agreement with all of the above and has no further questions at this time.    Blossom Tiwari RN  [facility]  Vein Clinic      Again, thank you for allowing me to participate in the care of your patient.        Sincerely,        Ludwig Muhammad MD

## 2024-04-23 NOTE — PATIENT INSTRUCTIONS
Sclerotherapy: Pre-Treatment Instructions    Recommended Sessions:  __2___ treatment sessions    Ultrasound Guided Sclerotherapy - Medically Necessary    Dr. Muhammad is OK with patient taking Ativan 1mg one hour prior to this treatment. A nurse will call patient one week prior to when he is scheduled to send over the Ativan Rx.    Time Required per Treatment Session - About 45 minutes  Please come in 15 minutes before your scheduled appointment.  30 min.  Sclerotherapy treatments last approximately 30 minutes.  5 min.    A staff member will wipe your legs off with warm water and dry them with a wash cloth.                 Then you can put your compression hose on, get dressed and check out.  10 min.  After your treatment, you will be asked to walk around for 10 minutes before you get in your car.    Medications  Five days before your appointment, discontinue aspirin (Bufferin, Anacin, etc.) and Ibuprofen (Motrin, Advil, Aleve, etc.) to reduce bruising. Resume these medications the day following the treatment.    Leg Preparation  Do not shave your legs or apply any oil, lotion or powder the night before or the day of your treatment.    Clothing  Shorts:  Bring a pair of loose, comfortable shorts to wear during your treatment (or you can choose to wear ours). Shoes: Bring comfortable shoes to accommodate the compression hose after your treatment. Do not wear flip-flops or thong-style sandals unless you have open-toe compression hose.    Photographs  Photos will be taken before each treatment. This helps monitor your progress.    Injections  The physician will inject your veins with the sclerotherapy solution chosen to meet your specific needs.    Compression Hose  Please bring your compression hose if you have them. They may also be reserved for you at our clinic. Compression hose must be worn immediately after each sclerotherapy treatment.  The hose must be compression level 20-30, and they must be worn for 24  hours straight after your treatment.  If you have never worn compression hose before, a staff member will teach you how to put them on.             You cannot have a treatment without compression hose.               They are critical to the success of your treatment!    You may purchase your compression hose from us. We will measure you and have the hose available when you come for your treatment.    Cancellation and Rescheduling  If you need to cancel or reschedule your sclerotherapy treatment, please give our office at least 24 hour notice.    Sclerotherapy: Basic Information    What is sclerotherapy?  Sclerotherapy is a treatment for  spider  veins.  Spider  veins are small veins just under your skin that can look red, blue or purple. Most  spider  veins are only a cosmetic problem.  Spider  veins are not useful and treating them will not affect your circulation.    How does sclerotherapy work?  1.  Injections: A very small needle is used to inject a solution into the  spider  veins. The solution irritates the cells that line the vein walls. This causes the veins to collapse. The vein walls to stick together and they can no longer carry blood. Different solutions are used based on the size of the veins.  2.  Compression:  The spider veins are kept collapsed by wearing compression stockings. Your body will break down and absorb the treated veins. You wear the compression hose for 24 hours after the treatment and then for 4 more days during your waking hours only.    How does the body heal after sclerotherapy?  The process is similar to how your body heals after a bad bruise. It takes 4-6 weeks or more for the healing to be complete. When the healing is complete, the vein is no longer visible. It may take more than one treatment.    How do I get the best results?  It is important to follow the post-sclerotherapy instructions. The best results require time and patience. The injection sites will continue to heal and  fade for months after a treatment. Please discuss your expectations with your doctor to keep them realistic. Your doctor will do everything possible to meet or exceed your expectations.    How many treatments are needed?  After your initial exam, your doctor will give you an estimate of the number of treatments that may be required. It depends upon the size, type, and quantity of your  spider  veins and on the doctor's assessment, your history and expectations. You may end up needing fewer or more treatments.    How soon can I have another treatment?  Additional treatments are scheduled every 4-6 weeks to allow time for the body to respond to the previous treatment.    Common Side Effects:  Itching  The areas that were injected may itch. This is usually mild and lasts less than a day. Do not use lotions or creams on your legs until the injection sites have healed over.    Pain  It is common to have some tenderness at the injection sites. Injection of the solution can be uncomfortable, but is usually well tolerated by most patients. The tenderness is temporary, lasting 24 hours at most. Tylenol or Ibuprofen can be used, if needed, following the product directions.    Bruising  This may occur at the injections sites. Bruising may be minimized by avoiding Aspirin and Ibuprofen products for five days before each treatment session.    Hyperpigmentation  A light brown discoloration of the skin may develop along the veins in the areas injected. Approximately 20-30% of patients treated note the discoloration (which is lighter and less obvious than the veins that are being treated). The hyperpigmentation usually fades in a couple of weeks, but may take several months to a year to totally resolve. There is a 1% chance of hyperpigmentation continuing after one year.    Trapped blood  A small amount of blood may become trapped and hardened in the veins. This may feel like a knot or cord and it may look dark blue or bruised.  This is a common occurrence. You may need to return before your next treatment so this area can be drained to remove the trapped blood. This will reduce the hyperpigmentation that can occur. The chance of this occurring can be decreased with proper use of compression hose after your treatment.    Matting  Matting is the formation of new, fine  spider  veins in the area injected.  It occurs in approximately 10% of patients injected. The exact reason for this is unknown. If untreated, the matting usually resolves in 3 to 12 months, but very rarely, it can be permanent. If the matting does not fade, it can be re-injected.    Rare Side Effects:  Ulceration at injection sites  Very rarely, a small ulcer will occur at the site where a vein is injected. An ulcer can take 4 to 6 weeks to completely heal. A small scar may result.    Allergic reactions  There is a very rare incidence of an allergic reaction to the solution injected. You will be observed for such reaction and will be treated appropriately should it occur. Please inform us of any allergy history.    Pulmonary embolus/Deep vein thrombosis  This is a blood clot which moves to the lungs/a blood clot in the deep vein system. There is an extraordinarily low incidence of this complication.      SCLEROTHERAPY AFTER CARE  Immediately:  After treatment, walk for 10-15 minutes before getting in your car.  If your trip home is more than 1 hour, stop and walk around for 5-10 minutes. Avoid sitting or standing for extended periods.   First 24 hours: Wear your compression continuously, even while in bed. After the 24 hours, you may shower if you want to. Put your hose back on, unless you are going to bed. You should NOT wear compression to bed after the first 24 hours. You may fly the next day, but wear your compression.   For 5 days: Wear the compression hose for waking hours only. You may continue to wear them longer than 5 days if you prefer.   For days 5-7: Walking is  encouraged, as it promotes efficient circulation in your veins. You may do activities that raise your heart rate, but do NOT run, jog, do high impact aerobics, or weight lifting. After 7 days, no activity restrictions.  Shaving: Wait a few days to shave or apply lotion.   Bathing: Do NOT take hot baths or sit in a hot tub for 7-10 days.    For 1 year: Wear SPF 30 sunscreen on your legs when in the sun. This is very important! It helps prevent darkening of the skin at the injection sites.   Medications: You may resume your usual medications, including aspirin or ibuprofen.    Common Things to Expect       Compression must be worn for the first 24 hours and then during the day for 5-7 days.    If larger veins are treated with ultrasound-guided sclerotherapy, you will have redness, firmness, tenderness, and swelling.  This firmness and tenderness may take 3-6 months to resolve. Ibuprofen and compression hose will aid in this process.    You will have bruising that can last up to 3 weeks. Most fading of the veins will occur between 3 and 6 weeks after treatment.    You may notice brown discoloration (hyperpigmentation) at the treatment site.  This should fade with time, but will take 3 months to 1 year to fully heal.     Some treated veins may look darker because of trapped blood within the vein. This trapped blood can be removed at a minimum of 1 month following treatment. Larger veins are more likely to develop trapped blood.    It is very important for you to use at least SPF 30 sunscreen in order to help prevent the discoloration of your skin.    Migraines rarely occur following sclerotherapy, but are more likely in patients with a history of migraines.  Treat as you would any other migraine.

## 2024-04-24 ENCOUNTER — ANTICOAGULATION THERAPY VISIT (OUTPATIENT)
Dept: ANTICOAGULATION | Facility: CLINIC | Age: 64
End: 2024-04-24

## 2024-04-24 ENCOUNTER — LAB (OUTPATIENT)
Dept: LAB | Facility: CLINIC | Age: 64
End: 2024-04-24
Payer: MEDICARE

## 2024-04-24 DIAGNOSIS — Z79.01 LONG TERM CURRENT USE OF ANTICOAGULANT THERAPY: ICD-10-CM

## 2024-04-24 DIAGNOSIS — Z86.711 HISTORY OF PULMONARY EMBOLISM: Primary | ICD-10-CM

## 2024-04-24 DIAGNOSIS — Z86.711 HISTORY OF PULMONARY EMBOLISM: ICD-10-CM

## 2024-04-24 LAB — INR BLD: 2.6 (ref 0.9–1.1)

## 2024-04-24 PROCEDURE — 36416 COLLJ CAPILLARY BLOOD SPEC: CPT

## 2024-04-24 PROCEDURE — 85610 PROTHROMBIN TIME: CPT

## 2024-04-24 RX ORDER — WARFARIN SODIUM 6 MG/1
TABLET ORAL
Qty: 20 TABLET | Refills: 0 | Status: SHIPPED | OUTPATIENT
Start: 2024-04-24 | End: 2024-05-15

## 2024-04-24 RX ORDER — WARFARIN SODIUM 2 MG/1
TABLET ORAL
Qty: 10 TABLET | Refills: 0 | Status: SHIPPED | OUTPATIENT
Start: 2024-04-24 | End: 2024-05-20

## 2024-04-24 NOTE — PROGRESS NOTES
ANTICOAGULATION MANAGEMENT     Petey Archibald 63 year old male is on warfarin with therapeutic INR result. (Goal INR 2.0-3.0)    Recent labs: (last 7 days)     04/24/24  1329   INR 2.6*       ASSESSMENT     Source(s): Chart Review and Group home care supervisor      Warfarin doses taken: Warfarin taken as instructed  Diet: No new diet changes identified  Medication/supplement changes: None noted  New illness, injury, or hospitalization: No  Signs or symptoms of bleeding or clotting: No  Previous result: Therapeutic last 2(+) visits  Additional findings: None       PLAN     Recommended plan for no diet, medication or health factor changes affecting INR     Dosing Instructions: Continue your current warfarin dose with next INR in 3 weeks       Summary  As of 4/24/2024      Full warfarin instructions:  8 mg every Sun, Tue, Fri; 6 mg all other days   Next INR check:  5/15/2024               Telephone call with Bertrand Lewis who verbalizes understanding and agrees to plan    AVS mailed to [facility] [place]  Rx sent to [facility]  Lab visit scheduled    Education provided:   Please call back if any changes to your diet, medications or how you've been taking warfarin    Plan made per [facility] anticoagulation protocol    Radha Hughes, RN  Anticoagulation Clinic  4/24/2024    _______________________________________________________________________     Anticoagulation Episode Summary       Current INR goal:  2.0-3.0   TTR:  70.6% (1 y)   Target end date:  Indefinite   Send INR reminders to:  EDU JIMENEZ    Indications    History of pulmonary embolism [Z86.711]  Long term current use of anticoagulant therapy [Z79.01]             Comments:  [facility] long-term; A new Coumadin Prescription will need to sent to [facility] with current dose and next INR check.             Anticoagulation Care Providers       Provider Role Specialty Phone number    Demetri Archer MD Referring Internal Medicine [phone]

## 2024-05-07 ENCOUNTER — HOSPITAL ENCOUNTER (OUTPATIENT)
Dept: WOUND CARE | Facility: CLINIC | Age: 64
Discharge: HOME | End: 2024-05-07
Attending: FAMILY MEDICINE | Admitting: FAMILY MEDICINE
Payer: MEDICARE

## 2024-05-07 VITALS — DIASTOLIC BLOOD PRESSURE: 68 MMHG | HEART RATE: 76 BPM | SYSTOLIC BLOOD PRESSURE: 116 MMHG | TEMPERATURE: 97.5 F

## 2024-05-07 DIAGNOSIS — L97.522 ULCER OF GREAT TOE, LEFT, WITH FAT LAYER EXPOSED (H): ICD-10-CM

## 2024-05-07 DIAGNOSIS — R60.0 LOWER EXTREMITY EDEMA: ICD-10-CM

## 2024-05-07 DIAGNOSIS — L97.922 CHRONIC ULCER OF LEFT LEG WITH FAT LAYER EXPOSED (H): ICD-10-CM

## 2024-05-07 DIAGNOSIS — S91.302D OPEN WOUND OF LEFT FOOT, SUBSEQUENT ENCOUNTER: Primary | ICD-10-CM

## 2024-05-07 DIAGNOSIS — L97.512 ULCER OF RIGHT FOOT WITH FAT LAYER EXPOSED (H): ICD-10-CM

## 2024-05-07 DIAGNOSIS — L97.522 ULCER OF LEFT FOOT WITH FAT LAYER EXPOSED (H): ICD-10-CM

## 2024-05-07 PROCEDURE — 99214 OFFICE O/P EST MOD 30 MIN: CPT | Performed by: FAMILY MEDICINE

## 2024-05-07 PROCEDURE — 97602 WOUND(S) CARE NON-SELECTIVE: CPT

## 2024-05-07 NOTE — PROGRESS NOTES
Wound Clinic Note         Visit date: 05/07/2024       Cheif Complaint:     Petey Archibald is a 63 year old   male had concerns including WOUND CARE.  The patient has lower extremity edema and bilateral leg ulcers.          HISTORY OF PRESENT ILLNESS:    Petey Archibald reports the wound has been present since mid 2022.  The wound began without a clear cause.   He reports prior to this wound developing he has not had other difficult to heal wounds on the legs.    Today the patient is again accompanied to the wound clinic by an employee at his group home and they both provide portions of the interval history.    Since his last clinic visit with me he met with Dr. Muhammad on April 23, 2024 to discuss treatment options for his superficial venous insufficiency.  Dr. Muhammad has recommended sclerotherapy.  Sclerotherapy is now scheduled for June 11, 2024.      Today the patient reports that they have been changing the bandages 3 times a week using a zinc oxide barrier cream applied to the periwound, alginate, an ABD pad and a spandagrip stocking.  It turns out today that the patient has been taking the bandages off at night and the group home employee reports that they try to put the bandages back on in the morning.  The patient seems to indicate that most of the time the bandages are on.      The pateint denies fevers or chills.  They report the pain from the wound has been 0/10 and has remained about the same recently.      The patient reports they currently do not have any routine for elevating their legs.  The patient confirms they do sleep in a bed.  Petey again admits right away that he has not been elevating his legs recently.    Today the patient reports maintaining a high protein diet, but has not been taking protein supplements lately.        The patient denies a history of diabetes or chronic steroid use.  The patient confirms they do smoke cigarettes and reports smoking 48 cigarettes a day he  has gone back to cigarette smoking in addition to the nicotine inhaler and reports smoking up to 2 packs a day.    The patient has not had any symptoms of infection relating to the wound recently and is not currently on antibiotics.       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       Surgical Hx:   Past Surgical History:   Procedure Laterality Date    COLONOSCOPY N/A 4/15/2019    Procedure: COMBINED COLONOSCOPY, SINGLE OR MULTIPLE BIOPSY/POLYPECTOMY BY BIOPSY;  Surgeon: Jovana Ohara MD;  Location:  GI    COLONOSCOPY N/A 6/7/2021    Procedure: COLONOSCOPY with polypectomies;  Surgeon: Sahil Cid MD;  Location: [facility] OR    EXCISE MALIGNANT LESIONS, TRUNK/ARMS/LEGS 0.5CM OR LESS Left 5/26/2017    Procedure: EXCISE MALIGNANT LESIONS, TRUNK/ARMS/LEGS 0.5CM OR LESS;  EXCISION LEFT ELBOW MASS;  Surgeon: Jose Azevedo MD;  Location:  GI    RECTAL SURGERY      perianal abscess?          Allergies:    Allergies   Allergen Reactions    Bees     Clavulanic Acid     Amoxicillin-Pot Clavulanate Rash     Augmentin    Penicillins Rash              Medication History:    Current Outpatient Medications   Medication Sig Dispense Refill    acetaminophen (TYLENOL) 325 MG tablet Take 1-2  "tablets (325-650 mg) by mouth every 6 hours as needed for mild pain, fever or headaches 100 tablet 3    albuterol (ALBUTEROL) 108 (90 BASE) MCG/ACT inhaler Inhale 2 puffs into the lungs every 6 hours as needed 1 Inhaler 0    ARIPiprazole (ABILIFY) 5 MG tablet Take 1 tablet (5 mg) by mouth daily 45 tablet 0    buPROPion (WELLBUTRIN SR) 150 MG 12 hr tablet Take 1 tablet (150 mg) by mouth 2 times daily for 45 days 90 tablet 0    Cadexomer Iodine, topical, 0.9% (IODOSORB) 0.9 % GEL gel Apply topically daily Apply to left foot wound daily after cleansing the wound and blotting it dry. 1 Tube 3    cloNIDine (CATAPRES) 0.1 MG tablet Take 0.1 mg by mouth daily as needed      clotrimazole (LOTRIMIN) 1 % external cream Apply topically 2 times daily 60 g 0    diclofenac (VOLTAREN) 1 % topical gel Apply 4 g topically 2 times daily as needed for moderate pain 100 g 1    diphenhydrAMINE (BENADRYL) 25 MG capsule Take 50 mg by mouth every 6 hours as needed for itching or allergies      Emollient (CERAVE) CREA Externally apply topically daily 453 g 11    EPINEPHrine (ANY BX GENERIC EQUIV) 0.3 MG/0.3ML injection 2-pack INJECT 0.3MG INTRAMUSCULARLY ONE TIME FOR ONE DOSE AS NEEDED FOR ANAPHYLAXIS. MAY REPEAT ONE TIME IN 5-15 MINUTES IF RESPONSE TO INITIAL DOSE IS INADEQUATE. *1 TOTAL FILL, ORIGINAL FROM EMERGENCY ROOM* 2 each 1    EPINEPHrine (EPIPEN 2-BRE) 0.3 MG/0.3ML injection 2-pack INJECT 0.3MG INTRAMUSCULAR ONE TIME FOR ONE DOSE AS NEEDED FOR ANAPHYLAXIS (CALL 911 IF YOU HAVE TO GIVE) (FOR BEE STINGS) **LABEL EACH PEN* 2 mL 3    gabapentin (NEURONTIN) 100 MG capsule Take 400 mg by mouth 3 times daily At 0900, 1200, and 2000      Gauze Pads & Dressings (GAUZE PADS 4\"X4\") 4\"X4\" PADS 1 each 3 times daily as needed 25 each 1    loratadine (CLARITIN) 10 MG tablet Take 10 mg by mouth daily      LORazepam (ATIVAN) 1 MG tablet Take 1 mg by mouth daily as needed for anxiety      montelukast (SINGULAIR) 10 MG tablet TAKE 1 TABLET BY MOUTH " EVERY MORNING (ASTHMA) 30 tablet 11    Multiple Vitamin (DAILY-JOVAN) TABS TAKE 1 TABLET BY MOUTH EVERY MORNING (VITAMINS) 30 tablet 11    naltrexone (DEPADE/REVIA) 50 MG tablet Take 2 tablets (100 mg) by mouth daily for 45 days 90 tablet 0    nicotine (COMMIT) 2 MG lozenge Place 2 mg inside cheek daily as needed      nystatin-triamcinolone (MYCOLOG II) 607069-9.1 UNIT/GM-% external cream Apply topically 2 times daily For 1-2 weeks.      omeprazole (PRILOSEC) 40 MG DR capsule Take 1 capsule (40 mg) by mouth daily 90 capsule 2    order for DME Equipment being ordered: roll of micropore tape to dress foot wound bid 1 each 0    order for DME Equipment being ordered: 1 surgical shoe 1 Device 0    order for DME [employer] [facility] Phone [phone] Fax [phone]  EdemaWear Size Medium/Yellow qty 4 sleeves  Length of Need: 1 month  Frequency of dressing change daily 1 Device 0    order for DME [employer] Stockings  Phone #[phone]  Fax #[phone]  Coolflex Velcro wraps    Length of Need: Life Time  # of Pairs 1 set 30 days 0    order for DME [employer] [facility] Phone [phone] Fax [phone]  Primary Dressing Hydrofera Blue Ready   Qty 5 sheets  Secondary Dressing 4' roll gauze Qty 30  Secondary Dressing 2' medipore tape Qty 1  Secondary Dressing 4x4 gauze loaf Qty  1  Length of Need: 1 month  Frequency of dressing change: daily 30 days 0    order for DME Oxygen 2 Li/min  at night and bled into BiPAP 1 Device 0    order for DME Equipment being ordered: CPAP with mask and tubing 1 Device 0    order for DME Equipment being ordered: support compression hose BK  2 pair black 30mm HG   To be applied on arising & removed while lying down to go to sleep 1 each 0    polyethylene glycol (MIRALAX) 17 GM/Dose powder Take 17 g (1 capful) by mouth daily as needed for constipation 578 g 0    PULMICORT FLEXHALER 180 MCG/ACT inhaler INHALE 2 PUFFS INTO THE LUNGS TWICE DAILY. 1 each 11    SENNA-TABS 8.6 MG tablet  Take 1 tablet by mouth daily      sertraline (ZOLOFT) 100 MG tablet Take 2 tablets (200 mg) by mouth daily for 45 days 90 tablet 0    simvastatin (ZOCOR) 40 MG tablet TAKE 1 TABLET BY MOUTH EVERY MORNING.  (CHOLESTEROL) 30 tablet 11    SPIRIVA HANDIHALER 18 MCG inhaled capsule INHALE CONTENTS OF 1 CAPSULE INTO THE LUNGS ONCE DAILY 30 capsule 11    spironolactone (ALDACTONE) 25 MG tablet TAKE 1 TABLET BY MOUTH ONCE DAILY. (HIGH BLOOD PRESSURE) 30 tablet 11    tiZANidine (ZANAFLEX) 2 MG tablet TAKE 1 TABLET BY MOUTH THREE TIMES DAILY. 106 tablet 11    tiZANidine (ZANAFLEX) 2 MG tablet Take 1 tablet (2 mg) by mouth 3 times daily 90 tablet 1    traZODone (DESYREL) 100 MG tablet Take 1 tablet (100 mg) by mouth at bedtime for 45 days 45 tablet 0    triamcinolone (KENALOG) 0.1 % external cream Apply to AA BID x 1-2 week then PRN only 80 g 2    vitamin B complex with vitamin C (STRESS TAB) tablet Take 1 tablet by mouth daily 90 tablet 3    warfarin ANTICOAGULANT (COUMADIN) 2 MG tablet Take 1 tablet (along with a 6 mg tab for a total of 8 mg) by mouth every Sun, Tue, Fri. Next INR is 5/15/24 10 tablet 0    warfarin ANTICOAGULANT (COUMADIN) 6 MG tablet Take 1 Tablet (6 mg) by mouth every day. Next INR Date is 5/15/24 20 tablet 0     No current facility-administered medications for this encounter.         Tobacco History:  reports that he has quit smoking. His smoking use included cigarettes. He has a 30 pack-year smoking history. He has never used smokeless tobacco.       REVIEW OF SYMPTOMS:   The review of systems was negative except as noted in the HPI.           PHYSICAL EXAMINATION:     /68 (BP Location: Left arm, Patient Position: Sitting)   Pulse 76   Temp 97.5  F (36.4  C) (Temporal)            GENERAL: The patient overall appears well and is no acute distress.   HEAD: normocephalic   EYES: Sclera and conjunctiva clear   NECK: no obvious masses   LUNGS: breathing is unlabored.   EXTREMITIES: No clubbing,  cyanosis or edema   SKIN: No rashes or other abnormalities except as noted under the Wound section below.   NEUROLOGICAL: normal motor and sensory function   EDEMA: Moderate       WOUND: The wound appears healthy with no sign of infection.   Wound bed: necrotic material   Periwound: healthy intact skin  The wounds are overall about the same size compared with his last clinic visit.    Also see below for wound details:       Circumferential volume measures:             No data to display                Ulceration(s)/Wound(s):   Please see the media tab under the chart review for pictures of the wounds.  Nursing staff removed dressings and cleansed wound.    Wound (used by [facility] only) 08/22/23 1341 Left dorsal 1 toe ulceration, venous (Active)   Thickness/Stage full thickness 05/07/24 1407   Base scab;dry 05/07/24 1407   Periwound edematous 05/07/24 1407   Periwound Temperature warm 05/07/24 1407   Periwound Skin Turgor soft 05/07/24 1407   Edges rolled/closed 05/07/24 1407   Length (cm) 0 05/07/24 1407   Width (cm) 0 05/07/24 1407   Depth (cm) 0 05/07/24 1407   Wound (cm^2) 0 cm^2 05/07/24 1407   Wound Volume (cm^3) 0 cm^3 05/07/24 1407   Wound healing % 100 05/07/24 1407   Drainage Amount none 05/07/24 1407       Wound (used by [facility] only) 11/15/23 0906 Right lateral;dorsal foot ulceration, venous (Active)   Thickness/Stage full thickness 05/07/24 1407   Base pink;slough 05/07/24 1407   Periwound redness;edematous 05/07/24 1407   Periwound Temperature warm 05/07/24 1407   Periwound Skin Turgor soft 05/07/24 1407   Edges open 05/07/24 1407   Length (cm) 0.7 05/07/24 1407   Width (cm) 0.8 05/07/24 1407   Depth (cm) 0.3 05/07/24 1407   Wound (cm^2) 0.56 cm^2 05/07/24 1407   Wound Volume (cm^3) 0.17 cm^3 05/07/24 1407   Wound healing % 68.89 05/07/24 1407   Drainage Characteristics/Odor serous 05/07/24 1407   Drainage Amount moderate 05/07/24 1407   Care, Wound non-select wound debridement performed. 05/07/24 1407        Wound (used by [facility] only) 11/15/23 0911 Left dorsal foot ulceration, venous (Active)   Thickness/Stage full thickness 05/07/24 1407   Base slough 05/07/24 1407   Periwound edematous 05/07/24 1407   Periwound Temperature warm 05/07/24 1407   Periwound Skin Turgor soft 05/07/24 1407   Edges open 05/07/24 1407   Length (cm) 1.1 05/07/24 1407   Width (cm) 1.3 05/07/24 1407   Depth (cm) 0.2 05/07/24 1407   Wound (cm^2) 1.43 cm^2 05/07/24 1407   Wound Volume (cm^3) 0.29 cm^3 05/07/24 1407   Wound healing % 91.88 05/07/24 1407   Drainage Characteristics/Odor serous 05/07/24 1407   Drainage Amount copious 05/07/24 1407   Care, Wound non-select wound debridement performed. 05/07/24 1407       Wound (used by [facility] only) 12/27/23 1410 Left anterior;lower leg ulceration, venous (Active)   Thickness/Stage full thickness 05/07/24 1407   Base slough;granulating 05/07/24 1407   Periwound edematous;excoriated;macerated 05/07/24 1407   Periwound Temperature warm 05/07/24 1407   Periwound Skin Turgor soft 05/07/24 1407   Edges open 05/07/24 1407   Length (cm) 4.7 05/07/24 1407   Width (cm) 2.8 05/07/24 1407   Depth (cm) 0.3 05/07/24 1407   Wound (cm^2) 13.16 cm^2 05/07/24 1407   Wound Volume (cm^3) 3.95 cm^3 05/07/24 1407   Wound healing % -1924.62 05/07/24 1407   Drainage Characteristics/Odor serous 05/07/24 1407   Drainage Amount copious 05/07/24 1407   Care, Wound non-select wound debridement performed. 05/07/24 1407       Wound (used by [facility] only) 05/07/24 1421 Left medial ankle ulceration, venous (Active)   Thickness/Stage full thickness 05/07/24 1407   Base red;pink 05/07/24 1407   Periwound intact;edematous 05/07/24 1407   Periwound Temperature warm 05/07/24 1407   Periwound Skin Turgor soft 05/07/24 1407   Edges open 05/07/24 1407   Length (cm) 0.4 05/07/24 1407   Width (cm) 0.8 05/07/24 1407   Depth (cm) 0.2 05/07/24 1407   Wound (cm^2) 0.32 cm^2 05/07/24 1407   Wound Volume (cm^3) 0.06 cm^3 05/07/24 1407   Drainage  Characteristics/Odor serosanguineous 05/07/24 1407   Drainage Amount moderate 05/07/24 1407   Care, Wound non-select wound debridement performed. 05/07/24 1407       Wound (used by [facility] only) 05/07/24 1422 Left medial;lower leg ulceration, venous (Active)   Thickness/Stage full thickness 05/07/24 1407   Base red 05/07/24 1407   Periwound edematous 05/07/24 1407   Periwound Temperature warm 05/07/24 1407   Periwound Skin Turgor soft 05/07/24 1407   Edges open 05/07/24 1407   Length (cm) 0.3 05/07/24 1407   Width (cm) 0.3 05/07/24 1407   Depth (cm) 0.1 05/07/24 1407   Wound (cm^2) 0.09 cm^2 05/07/24 1407   Wound Volume (cm^3) 0.01 cm^3 05/07/24 1407   Drainage Characteristics/Odor serosanguineous 05/07/24 1407   Drainage Amount moderate 05/07/24 1407   Care, Wound non-select wound debridement performed. 05/07/24 1407                                 Recent Labs   Lab Test 06/03/21  1448 03/23/21  1042 07/28/20  0929   A1C 5.7* 5.6 5.7*          Recent Labs   Lab Test 02/14/23  0059 06/08/22  1835 07/28/20  0929   ALBUMIN 3.9 3.2* 3.3*              No sharp debridement performed today.   The patient refused debridement today.      ASSESSMENT:   This is a 63 year old  male with lower extremity edema and bilateral lower extremity wounds.          PLAN:   We will bandage all of the wound areas beginning with a zinc oxide barrier cream applied to the periwound followed by alginate, an ABD pad and the spandagrip stocking change 3 times a week.  I strongly encouraged the patient to keep the bandages on.    I have strongly encouraged him to continue to work with Dr. Muhammad to treat his venous insufficiency.    We are currently treating these wounds palliatively since the patient is thoroughly noncompliant.    Separate from his wound care instructions I then discussed the treatment of his lower extremity edema.  This primarily involves compression and elevation.  I have strongly encouraged him to wear his spandagrip  stockings every day.  I have again explained to the patient today that controlling the edema is probably the most important thing we can do to help heal the wound.  I have specifically recommended that they lay down with their legs above the level of the heart for 30 minutes at least twice a day.  I emphasized that if we can not control the edema we will likely not be able to get this wound to heal.     I have encouraged the patient to continue on their high protein diet to aid in wound healing.   I have also strongly encouraged him to decrease his nicotine intake which slows wound healing.  I advised him of the risk of nicotine overdose.  The patient will return to the wound clinic in 4 weeks to see me again.        30 minutes spent on the date of the encounter doing chart review, history and exam, documentation and further activities per the note, this time excludes any procedure time      Miguel Hampton MD  05/07/2024   2:50 PM   [facility]/Wound  [phone]    This note was electronically signed by Miguel Hampton MD        Further instructions from your care team         05/07/2024   Petey Archibald   1960      A DME order for supplies has been placed to [facility]. If there are any issues  with your order including not receiving the order please call [facility] at [phone].  They can also provide a tracking number for you.    [facility] group home (3x week) Fax: laurie Lala [phone]    Plan:   Scheduled on 6/11 with Dr. Muhammad for sclerotherapy  Dressing changes outside of clinic are being performed by Group Home Staff  Wear tube stocking to help with swelling  Elevate Legs to hip or higher 30 min 10 am and 2pm  Control smoking and all nicotine products  Schedule showers so that patient can have wound care done afterwards  Done: Venous Competency and need follow up with Vein Doctor  Protein diet: shakes and bars  Monitor INR--contact INR Nurse for  "instructions      Wound Dressing Change: Right Lateral Dorsal Foot  - Prepare surface and wash your hands with soap and water  - Cleanse with mild unscented soap (such as Cetaphil, Cerave or Dove) and water  - If able, apply small amount of VASHE on gauze, lay into wound bed, let sit for 10 minutes, remove gauze (do not rinse)  - Apply light layer Desitin/ Zinc oxide barrier paste around wound edge  - Apply ~1/6 piece 4x4 Melgisorb alginate to wound bed (cut-to-fit size of wound bed)  - Cover with 1/2 of 5x9 ABD per wound  - Secure with 1 roll of 4\" conforming roll gauze and 2'' Medipore tape  - Pull up Spandagrip Size F from base of toes to the knee  Change 3 times weekly and as needed for drainage7      Wound Dressing Change: Left dorsal foot, left medial lower leg, Left anterior lower leg, and left medial ankle   - Prepare surface and wash your hands with soap and water  - Cleanse with mild unscented soap and water (such as Cetaphil, Cerave or Dove)  - If able, apply small amount of VASHE on gauze, lay into wound bed, let sit for 10 minutes, remove gauze (do not rinse)  - apply light layer Desitin/ Zinc oxide barrier paste around wound edge  - Apply 1/5th piece 4x4 Melgisorb alginate to wound bed  - Cover with one kerramax non-border 4x4 (or similar superabsorbant) to each wound (double up on left anterior lower leg due to copious drainage)  - Secure with 1 roll of 4\" conforming roll gauze and 2'' Medipore tape  - Pull up Spandagrip Size F from base of toes to the knee  Change 3 times weekly and as needed for drainage      Elevation: elevate your legs above the level of your heart for 30 minutes 2 times  each day  - Lay on the couch or your bed and prop your legs up on pillows  - Recline back as far as you can go in your recliner and prop your legs on pillows.     Main Provider: Miguel Hampton M.D. May 7, 2024    Call us at [phone] if you have any questions about your wounds, if you have redness or " swelling around your wound, have a fever of 101 degrees Fahrenheit or greater or if you have any other problems or concerns. We answer the phone Monday through Friday 8 am to 4 pm, please leave a message as we check the voicemail frequently throughout the day. If you have a concern over the weekend, please leave a message and we will return your call Monday. If the need is urgent, go to the ER or urgent care.    If you had a positive experience please indicate that on your patient satisfaction survey form that [facility] will be sending you.    It was a pleasure meeting with you today.  Thank you for allowing me and my team the privilege of caring for you today.  YOU are the reason we are here, and I truly hope we provided you with the excellent service you deserve.  Please let us know if there is anything else we can do for you so that we can be sure you are leaving completely satisfied with your care experience.      If you have any billing related questions please call the [facility] Business office at [phone]. The clinic staff does not handle billing related matters.    If you are scheduled to have a follow up appointment, you will receive a reminder call the day before your visit. On the appointment day please arrive 15 minutes prior to your appointment time. If you are unable to keep that appointment, please call the clinic to cancel or reschedule. If you are more than 10 minutes late or greater for your scheduled appointment time, the clinic policy is that you may be asked to reschedule.         ,

## 2024-05-07 NOTE — DISCHARGE INSTRUCTIONS
"05/07/2024   Petey Stoddardtz   1960      A DME order for supplies has been placed to [facility]. If there are any issues  with your order including not receiving the order please call [facility] at [phone].  They can also provide a tracking number for you.    [facility] group home (3x week) Fax: laurie Lala [phone]    Plan:   Scheduled on 6/11 with Dr. Muhammad for sclerotherapy  Dressing changes outside of clinic are being performed by Group Home Staff  Wear tube stocking to help with swelling  Elevate Legs to hip or higher 30 min 10 am and 2pm  Control smoking and all nicotine products  Schedule showers so that patient can have wound care done afterwards  Done: Venous Competency and need follow up with Vein Doctor  Protein diet: shakes and bars  Monitor INR--contact INR Nurse for instructions      Wound Dressing Change: Right Lateral Dorsal Foot  - Prepare surface and wash your hands with soap and water  - Cleanse with mild unscented soap (such as Cetaphil, Cerave or Dove) and water  - If able, apply small amount of VASHE on gauze, lay into wound bed, let sit for 10 minutes, remove gauze (do not rinse)  - Apply light layer Desitin/ Zinc oxide barrier paste around wound edge  - Apply ~1/6 piece 4x4 Melgisorb alginate to wound bed (cut-to-fit size of wound bed)  - Cover with 1/2 of 5x9 ABD per wound  - Secure with 1 roll of 4\" conforming roll gauze and 2'' Medipore tape  - Pull up Spandagrip Size F from base of toes to the knee  Change 3 times weekly and as needed for drainage7      Wound Dressing Change: Left dorsal foot, left medial lower leg, Left anterior lower leg, and left medial ankle   - Prepare surface and wash your hands with soap and water  - Cleanse with mild unscented soap and water (such as Cetaphil, Cerave or Dove)  - If able, apply small amount of VASHE on gauze, lay into wound bed, let sit for 10 minutes, remove gauze (do not rinse)  - apply light layer Desitin/ Zinc oxide barrier paste " "around wound edge  - Apply 1/5th piece 4x4 Melgisorb alginate to wound bed  - Cover with one kerramax non-border 4x4 (or similar superabsorbant) to each wound (double up on left anterior lower leg due to copious drainage)  - Secure with 1 roll of 4\" conforming roll gauze and 2'' Medipore tape  - Pull up Spandagrip Size F from base of toes to the knee  Change 3 times weekly and as needed for drainage      Elevation: elevate your legs above the level of your heart for 30 minutes 2 times  each day  - Lay on the couch or your bed and prop your legs up on pillows  - Recline back as far as you can go in your recliner and prop your legs on pillows.     Main Provider: Miguel Hampton M.D. May 7, 2024    Call us at [phone] if you have any questions about your wounds, if you have redness or swelling around your wound, have a fever of 101 degrees Fahrenheit or greater or if you have any other problems or concerns. We answer the phone Monday through Friday 8 am to 4 pm, please leave a message as we check the voicemail frequently throughout the day. If you have a concern over the weekend, please leave a message and we will return your call Monday. If the need is urgent, go to the ER or urgent care.    If you had a positive experience please indicate that on your patient satisfaction survey form that [facility] will be sending you.    It was a pleasure meeting with you today.  Thank you for allowing me and my team the privilege of caring for you today.  YOU are the reason we are here, and I truly hope we provided you with the excellent service you deserve.  Please let us know if there is anything else we can do for you so that we can be sure you are leaving completely satisfied with your care experience.      If you have any billing related questions please call the [facility] Business office at [phone]. The clinic staff does not handle billing related matters.    If you are scheduled to have a follow up " appointment, you will receive a reminder call the day before your visit. On the appointment day please arrive 15 minutes prior to your appointment time. If you are unable to keep that appointment, please call the clinic to cancel or reschedule. If you are more than 10 minutes late or greater for your scheduled appointment time, the clinic policy is that you may be asked to reschedule.

## 2024-05-08 ENCOUNTER — TELEPHONE (OUTPATIENT)
Dept: WOUND CARE | Facility: CLINIC | Age: 64
End: 2024-05-08
Payer: MEDICARE

## 2024-05-08 ENCOUNTER — MEDICAL CORRESPONDENCE (OUTPATIENT)
Dept: HEALTH INFORMATION MANAGEMENT | Facility: CLINIC | Age: 64
End: 2024-05-08
Payer: MEDICARE

## 2024-05-08 NOTE — TELEPHONE ENCOUNTER
Faxed over clinic notes to the fax number provided.   
Kristal with [facility] called to say they received the wound supply order for patient but not the office notes.  Kristal is asking for the office notes to be faxed to [phone].  If you need to reach Kristal, her direct line is [phone].   
None known

## 2024-05-13 ENCOUNTER — HOSPITAL ENCOUNTER (OUTPATIENT)
Facility: CLINIC | Age: 64
Setting detail: OBSERVATION
Discharge: NURSING, REHAB OR LONG-TERM CARE | End: 2024-05-14
Attending: EMERGENCY MEDICINE | Admitting: EMERGENCY MEDICINE
Payer: MEDICARE

## 2024-05-13 DIAGNOSIS — Z86.14 H/O METHICILLIN RESISTANT STAPHYLOCOCCUS AUREUS INFECTION: ICD-10-CM

## 2024-05-13 DIAGNOSIS — M70.22 OLECRANON BURSITIS OF BOTH ELBOWS: Primary | ICD-10-CM

## 2024-05-13 DIAGNOSIS — L03.114 CELLULITIS OF LEFT UPPER EXTREMITY: ICD-10-CM

## 2024-05-13 DIAGNOSIS — M70.21 OLECRANON BURSITIS OF BOTH ELBOWS: Primary | ICD-10-CM

## 2024-05-13 LAB
ALBUMIN SERPL BCG-MCNC: 3.6 G/DL (ref 3.5–5.2)
ALP SERPL-CCNC: 92 U/L (ref 40–150)
ALT SERPL W P-5'-P-CCNC: 15 U/L (ref 0–70)
ANION GAP SERPL CALCULATED.3IONS-SCNC: 12 MMOL/L (ref 7–15)
AST SERPL W P-5'-P-CCNC: 18 U/L (ref 0–45)
BASE EXCESS BLDV CALC-SCNC: -1 MMOL/L (ref -3–3)
BASOPHILS # BLD AUTO: 0 10E3/UL (ref 0–0.2)
BASOPHILS NFR BLD AUTO: 0 %
BILIRUB SERPL-MCNC: 0.2 MG/DL
BUN SERPL-MCNC: 20.3 MG/DL (ref 8–23)
CALCIUM SERPL-MCNC: 8.7 MG/DL (ref 8.8–10.2)
CHLORIDE SERPL-SCNC: 104 MMOL/L (ref 98–107)
CREAT SERPL-MCNC: 1 MG/DL (ref 0.67–1.17)
CRP SERPL-MCNC: 59.82 MG/L
DEPRECATED HCO3 PLAS-SCNC: 21 MMOL/L (ref 22–29)
EGFRCR SERPLBLD CKD-EPI 2021: 84 ML/MIN/1.73M2
EOSINOPHIL # BLD AUTO: 0.2 10E3/UL (ref 0–0.7)
EOSINOPHIL NFR BLD AUTO: 3 %
ERYTHROCYTE [DISTWIDTH] IN BLOOD BY AUTOMATED COUNT: 16.4 % (ref 10–15)
GLUCOSE SERPL-MCNC: 151 MG/DL (ref 70–99)
HCO3 BLDV-SCNC: 24 MMOL/L (ref 21–28)
HCT VFR BLD AUTO: 31.9 % (ref 40–53)
HGB BLD-MCNC: 9.8 G/DL (ref 13.3–17.7)
IMM GRANULOCYTES # BLD: 0 10E3/UL
IMM GRANULOCYTES NFR BLD: 0 %
INR PPP: 2.5 (ref 0.85–1.15)
LACTATE BLD-SCNC: 1.4 MMOL/L
LYMPHOCYTES # BLD AUTO: 0.8 10E3/UL (ref 0.8–5.3)
LYMPHOCYTES NFR BLD AUTO: 16 %
MCH RBC QN AUTO: 27.1 PG (ref 26.5–33)
MCHC RBC AUTO-ENTMCNC: 30.7 G/DL (ref 31.5–36.5)
MCV RBC AUTO: 88 FL (ref 78–100)
MONOCYTES # BLD AUTO: 0.5 10E3/UL (ref 0–1.3)
MONOCYTES NFR BLD AUTO: 9 %
NEUTROPHILS # BLD AUTO: 3.7 10E3/UL (ref 1.6–8.3)
NEUTROPHILS NFR BLD AUTO: 72 %
NRBC # BLD AUTO: 0 10E3/UL
NRBC BLD AUTO-RTO: 0 /100
PCO2 BLDV: 40 MM HG (ref 40–50)
PH BLDV: 7.39 [PH] (ref 7.32–7.43)
PLATELET # BLD AUTO: 236 10E3/UL (ref 150–450)
PO2 BLDV: 51 MM HG (ref 25–47)
POTASSIUM SERPL-SCNC: 4.1 MMOL/L (ref 3.4–5.3)
PROT SERPL-MCNC: 7.4 G/DL (ref 6.4–8.3)
RBC # BLD AUTO: 3.61 10E6/UL (ref 4.4–5.9)
SAO2 % BLDV: 85 % (ref 70–75)
SODIUM SERPL-SCNC: 137 MMOL/L (ref 135–145)
WBC # BLD AUTO: 5.3 10E3/UL (ref 4–11)

## 2024-05-13 PROCEDURE — 85610 PROTHROMBIN TIME: CPT | Performed by: EMERGENCY MEDICINE

## 2024-05-13 PROCEDURE — 86140 C-REACTIVE PROTEIN: CPT | Performed by: EMERGENCY MEDICINE

## 2024-05-13 PROCEDURE — 99285 EMERGENCY DEPT VISIT HI MDM: CPT | Mod: 25

## 2024-05-13 PROCEDURE — G0378 HOSPITAL OBSERVATION PER HR: HCPCS

## 2024-05-13 PROCEDURE — 99222 1ST HOSP IP/OBS MODERATE 55: CPT | Mod: AI | Performed by: HOSPITALIST

## 2024-05-13 PROCEDURE — 258N000003 HC RX IP 258 OP 636: Performed by: EMERGENCY MEDICINE

## 2024-05-13 PROCEDURE — 36415 COLL VENOUS BLD VENIPUNCTURE: CPT | Performed by: EMERGENCY MEDICINE

## 2024-05-13 PROCEDURE — 96366 THER/PROPH/DIAG IV INF ADDON: CPT

## 2024-05-13 PROCEDURE — 85025 COMPLETE CBC W/AUTO DIFF WBC: CPT | Performed by: EMERGENCY MEDICINE

## 2024-05-13 PROCEDURE — 250N000011 HC RX IP 250 OP 636: Performed by: EMERGENCY MEDICINE

## 2024-05-13 PROCEDURE — 87040 BLOOD CULTURE FOR BACTERIA: CPT | Performed by: EMERGENCY MEDICINE

## 2024-05-13 PROCEDURE — 96365 THER/PROPH/DIAG IV INF INIT: CPT

## 2024-05-13 PROCEDURE — 82040 ASSAY OF SERUM ALBUMIN: CPT | Performed by: EMERGENCY MEDICINE

## 2024-05-13 PROCEDURE — 82803 BLOOD GASES ANY COMBINATION: CPT

## 2024-05-13 RX ORDER — CEFTRIAXONE 2 G/1
2 INJECTION, POWDER, FOR SOLUTION INTRAMUSCULAR; INTRAVENOUS ONCE
Status: DISCONTINUED | OUTPATIENT
Start: 2024-05-13 | End: 2024-05-13

## 2024-05-13 RX ORDER — ONDANSETRON 4 MG/1
4 TABLET, ORALLY DISINTEGRATING ORAL EVERY 6 HOURS PRN
Status: DISCONTINUED | OUTPATIENT
Start: 2024-05-13 | End: 2024-05-14 | Stop reason: HOSPADM

## 2024-05-13 RX ORDER — ACETAMINOPHEN 650 MG/1
650 SUPPOSITORY RECTAL EVERY 4 HOURS PRN
Status: DISCONTINUED | OUTPATIENT
Start: 2024-05-13 | End: 2024-05-14 | Stop reason: HOSPADM

## 2024-05-13 RX ORDER — ONDANSETRON 2 MG/ML
4 INJECTION INTRAMUSCULAR; INTRAVENOUS EVERY 6 HOURS PRN
Status: DISCONTINUED | OUTPATIENT
Start: 2024-05-13 | End: 2024-05-14 | Stop reason: HOSPADM

## 2024-05-13 RX ORDER — AMOXICILLIN 250 MG
2 CAPSULE ORAL 2 TIMES DAILY PRN
Status: DISCONTINUED | OUTPATIENT
Start: 2024-05-13 | End: 2024-05-14 | Stop reason: HOSPADM

## 2024-05-13 RX ORDER — ACETAMINOPHEN 325 MG/1
650 TABLET ORAL EVERY 4 HOURS PRN
Status: DISCONTINUED | OUTPATIENT
Start: 2024-05-13 | End: 2024-05-14 | Stop reason: HOSPADM

## 2024-05-13 RX ORDER — CEFAZOLIN SODIUM 2 G/100ML
2 INJECTION, SOLUTION INTRAVENOUS ONCE
Status: DISCONTINUED | OUTPATIENT
Start: 2024-05-13 | End: 2024-05-13

## 2024-05-13 RX ORDER — VANCOMYCIN HYDROCHLORIDE 1 G/200ML
1000 INJECTION, SOLUTION INTRAVENOUS EVERY 12 HOURS
Status: DISCONTINUED | OUTPATIENT
Start: 2024-05-14 | End: 2024-05-14 | Stop reason: HOSPADM

## 2024-05-13 RX ORDER — AMOXICILLIN 250 MG
1 CAPSULE ORAL 2 TIMES DAILY PRN
Status: DISCONTINUED | OUTPATIENT
Start: 2024-05-13 | End: 2024-05-14 | Stop reason: HOSPADM

## 2024-05-13 RX ADMIN — VANCOMYCIN HYDROCHLORIDE 2500 MG: 10 INJECTION, POWDER, LYOPHILIZED, FOR SOLUTION INTRAVENOUS at 20:37

## 2024-05-13 ASSESSMENT — ACTIVITIES OF DAILY LIVING (ADL)
ADLS_ACUITY_SCORE: 38

## 2024-05-14 ENCOUNTER — DOCUMENTATION ONLY (OUTPATIENT)
Dept: ANTICOAGULATION | Facility: CLINIC | Age: 64
End: 2024-05-14
Payer: MEDICARE

## 2024-05-14 VITALS
TEMPERATURE: 98.3 F | RESPIRATION RATE: 18 BRPM | OXYGEN SATURATION: 96 % | WEIGHT: 315 LBS | HEIGHT: 66 IN | SYSTOLIC BLOOD PRESSURE: 124 MMHG | DIASTOLIC BLOOD PRESSURE: 60 MMHG | HEART RATE: 71 BPM | BODY MASS INDEX: 50.62 KG/M2

## 2024-05-14 DIAGNOSIS — Z79.01 LONG TERM CURRENT USE OF ANTICOAGULANT THERAPY: ICD-10-CM

## 2024-05-14 DIAGNOSIS — Z86.711 HISTORY OF PULMONARY EMBOLISM: Primary | ICD-10-CM

## 2024-05-14 LAB
ALBUMIN SERPL BCG-MCNC: 3.4 G/DL (ref 3.5–5.2)
ALP SERPL-CCNC: 84 U/L (ref 40–150)
ALT SERPL W P-5'-P-CCNC: 13 U/L (ref 0–70)
ANION GAP SERPL CALCULATED.3IONS-SCNC: 10 MMOL/L (ref 7–15)
AST SERPL W P-5'-P-CCNC: 18 U/L (ref 0–45)
BASOPHILS # BLD AUTO: 0 10E3/UL (ref 0–0.2)
BASOPHILS NFR BLD AUTO: 0 %
BILIRUB SERPL-MCNC: 0.3 MG/DL
BUN SERPL-MCNC: 14.9 MG/DL (ref 8–23)
CALCIUM SERPL-MCNC: 8.9 MG/DL (ref 8.8–10.2)
CHLORIDE SERPL-SCNC: 110 MMOL/L (ref 98–107)
CREAT SERPL-MCNC: 0.88 MG/DL (ref 0.67–1.17)
CRP SERPL-MCNC: 85.49 MG/L
DEPRECATED HCO3 PLAS-SCNC: 23 MMOL/L (ref 22–29)
EGFRCR SERPLBLD CKD-EPI 2021: >90 ML/MIN/1.73M2
EOSINOPHIL # BLD AUTO: 0.2 10E3/UL (ref 0–0.7)
EOSINOPHIL NFR BLD AUTO: 4 %
ERYTHROCYTE [DISTWIDTH] IN BLOOD BY AUTOMATED COUNT: 16.6 % (ref 10–15)
GLUCOSE SERPL-MCNC: 139 MG/DL (ref 70–99)
HCT VFR BLD AUTO: 31.5 % (ref 40–53)
HGB BLD-MCNC: 9.5 G/DL (ref 13.3–17.7)
IMM GRANULOCYTES # BLD: 0 10E3/UL
IMM GRANULOCYTES NFR BLD: 0 %
INR PPP: 2.6 (ref 0.85–1.15)
LYMPHOCYTES # BLD AUTO: 0.9 10E3/UL (ref 0.8–5.3)
LYMPHOCYTES NFR BLD AUTO: 23 %
MCH RBC QN AUTO: 26.7 PG (ref 26.5–33)
MCHC RBC AUTO-ENTMCNC: 30.2 G/DL (ref 31.5–36.5)
MCV RBC AUTO: 89 FL (ref 78–100)
MONOCYTES # BLD AUTO: 0.4 10E3/UL (ref 0–1.3)
MONOCYTES NFR BLD AUTO: 10 %
NEUTROPHILS # BLD AUTO: 2.5 10E3/UL (ref 1.6–8.3)
NEUTROPHILS NFR BLD AUTO: 63 %
NRBC # BLD AUTO: 0 10E3/UL
NRBC BLD AUTO-RTO: 0 /100
PLATELET # BLD AUTO: 221 10E3/UL (ref 150–450)
POTASSIUM SERPL-SCNC: 4.5 MMOL/L (ref 3.4–5.3)
PROT SERPL-MCNC: 7.1 G/DL (ref 6.4–8.3)
RBC # BLD AUTO: 3.56 10E6/UL (ref 4.4–5.9)
SODIUM SERPL-SCNC: 143 MMOL/L (ref 135–145)
WBC # BLD AUTO: 4 10E3/UL (ref 4–11)

## 2024-05-14 PROCEDURE — 86140 C-REACTIVE PROTEIN: CPT | Performed by: PHYSICIAN ASSISTANT

## 2024-05-14 PROCEDURE — 85025 COMPLETE CBC W/AUTO DIFF WBC: CPT | Performed by: HOSPITALIST

## 2024-05-14 PROCEDURE — 96376 TX/PRO/DX INJ SAME DRUG ADON: CPT

## 2024-05-14 PROCEDURE — 250N000011 HC RX IP 250 OP 636: Performed by: HOSPITALIST

## 2024-05-14 PROCEDURE — 80053 COMPREHEN METABOLIC PANEL: CPT | Performed by: HOSPITALIST

## 2024-05-14 PROCEDURE — G0378 HOSPITAL OBSERVATION PER HR: HCPCS

## 2024-05-14 PROCEDURE — 36415 COLL VENOUS BLD VENIPUNCTURE: CPT | Performed by: HOSPITALIST

## 2024-05-14 PROCEDURE — 99238 HOSP IP/OBS DSCHRG MGMT 30/<: CPT | Performed by: INTERNAL MEDICINE

## 2024-05-14 PROCEDURE — 85610 PROTHROMBIN TIME: CPT | Performed by: HOSPITALIST

## 2024-05-14 RX ORDER — SULFAMETHOXAZOLE/TRIMETHOPRIM 800-160 MG
1 TABLET ORAL 2 TIMES DAILY
Qty: 14 TABLET | Refills: 0 | Status: SHIPPED | OUTPATIENT
Start: 2024-05-14 | End: 2024-05-24

## 2024-05-14 RX ADMIN — VANCOMYCIN HYDROCHLORIDE 1000 MG: 1 INJECTION, SOLUTION INTRAVENOUS at 08:34

## 2024-05-14 ASSESSMENT — ACTIVITIES OF DAILY LIVING (ADL)
ADLS_ACUITY_SCORE: 47
ADLS_ACUITY_SCORE: 47
ADLS_ACUITY_SCORE: 46
ADLS_ACUITY_SCORE: 47
ADLS_ACUITY_SCORE: 46
ADLS_ACUITY_SCORE: 46
ADLS_ACUITY_SCORE: 47
ADLS_ACUITY_SCORE: 46
ADLS_ACUITY_SCORE: 47
ADLS_ACUITY_SCORE: 47
DEPENDENT_IADLS:: MEDICATION MANAGEMENT;MEAL PREPARATION;TRANSPORTATION
ADLS_ACUITY_SCORE: 44
ADLS_ACUITY_SCORE: 47
ADLS_ACUITY_SCORE: 46
ADLS_ACUITY_SCORE: 44
ADLS_ACUITY_SCORE: 46
ADLS_ACUITY_SCORE: 47
ADLS_ACUITY_SCORE: 47

## 2024-05-14 NOTE — CONSULTS
[facility]    Orthopedic Consultation    Petey Archibald MRN# [id number]   Age: 63 year old YOB: 1960     Date of Admission: 5/13/2024    Reason for consult: Left olecranon bursitis       Requesting physician: Chavo Sam DO        Level of consult: Consult, follow and place orders           Assessment and Plan:   Assessment:   Left olecranon bursitis with no current signs of cellulitis or septic bursitis  Left medial elbow swelling      Plan:   The patient's history and clinical/diagnostic findings were reviewed with the on-call orthopedic trauma surgeon, Dr. Christian Mcdowell. Patient reports swelling to the left posterior elbow for the past 24 hours. No drainage, obvious redness, or significant pain. Also notes swelling to the posteromedial elbow region. He has a history of right olecranon bursitis as well as MRSA positive right elbow abscess in the past. Denies systemic symptoms. No significant pain with left elbow ROM and no obvious erythema. NVI. Afebrile, VSS. WBC 4.0, CRP 59.82 -> 85.49 this hospitalization. It is possible that this is an aseptic left olecranon bursitis, however, would recommend prophylactic antibiotic use. Reviewed imaging of the left elbow with Dr. Mcdowell, who recommends nonoperative management at this time. Recommendations as follows:    -Trend WBC, CRP.  -Continue with antibiotics. Okay to remain on IV antibiotics, currently vancomycin given MRSA history, but would recommend Bactrim x 10 days at discharge whenever medically cleared to leave the hospital.  -Encourage elevation, compression (Ace wrap from mid-forearm to axilla - RN will apply today and should remove once daily for skin checks until swelling improves), and use of cold compresses.   -Avoid applying pressure to the posterior left elbow. Avoid frequent/repetitive flexion/extension until symptoms improve of the left elbow as this can increase irritation of the bursa, but it is  okay to move the elbow to prevent stiffness. Avoid use of sling as this may apply pressure to the bursa and patient may rely too heavily on sling and cause increased stiffness of the elbow.   -Okay for ROMAT and WBAT LUE.   -Pain regimen per medicine team.  -Orthopedic team will reexamine the patient tomorrow.    Please contact orthopedic trauma team if any questions or concerns arise.           Chief Complaint:   Left olecranon bursitis         History of Present Illness:   Medical history obtained via chart review and discussion with the patient. Petey Archibald is a 63 year old right-hand dominant male with past medical history of previous MRSA abscess of the right elbow, mild intellectual disabilities, morbid obesity, NEL, schizoaffective disorder, COPD, and history of DVT/PE who was admitted on 5/13/24 for possible left septic olecranon bursitis vs cellulitis. Patient reports that for the past 24 hours, he has noticed increased swelling and discomfort to the left posterior elbow. He may have had some redness to begin with, but this has seemingly improved. Denies drainage to the area. No significant increased pain with left elbow ROM. Denies numbness and tingling to the LUE. Denies subjective fever or chills. Patient has a history of right olecranon bursitis. Patient denies any prior trauma or surgeries to the left elbow, but does endorse leaning on his elbows frequently. He notes that his symptoms are improved with IV antibiotics and wants to go home. He lives in a group home. Denies the use of a walker or cane at baseline. No PTA anticoagulants. Tolerating PO intake.           Past Medical History:     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    Chronic infection     MRSA    Coagulation disorder (H24)     on coumadin    COPD (chronic obstructive pulmonary disease) (H)     Diabetic ulcer of right midfoot with fat layer exposed (H) 11/15/2023    History of DVT of lower  extremity     History of pulmonary embolism     Hyperlipidemia     Mild intellectual disabilities     Morbid obesity (H)     NEL (obstructive sleep apnea)     Peripheral edema     Peripheral vascular disease (H24)     Sleep apnea     uses CPAP    Thrombosis     Tobacco dependence     Uncomplicated asthma     Venous stasis dermatitis              Past Surgical History:     Past Surgical History:   Procedure Laterality Date    COLONOSCOPY N/A 4/15/2019    Procedure: COMBINED COLONOSCOPY, SINGLE OR MULTIPLE BIOPSY/POLYPECTOMY BY BIOPSY;  Surgeon: Jovana Ohara MD;  Location:  GI    COLONOSCOPY N/A 6/7/2021    Procedure: COLONOSCOPY with polypectomies;  Surgeon: Sahil Cid MD;  Location: [facility] OR    EXCISE MALIGNANT LESIONS, TRUNK/ARMS/LEGS 0.5CM OR LESS Left 5/26/2017    Procedure: EXCISE MALIGNANT LESIONS, TRUNK/ARMS/LEGS 0.5CM OR LESS;  EXCISION LEFT ELBOW MASS;  Surgeon: Jose Azevedo MD;  Location:  GI    RECTAL SURGERY      perianal abscess?             Social History:     Social History     Tobacco Use    Smoking status: Former     Current packs/day: 1.00     Average packs/day: 1 pack/day for 30.0 years (30.0 ttl pk-yrs)     Types: Cigarettes    Smokeless tobacco: Never    Tobacco comments:     started at age 18    Substance Use Topics    Alcohol use: No     Alcohol/week: 0.0 standard drinks of alcohol             Family History:     Family History   Problem Relation Age of Onset    Unknown/Adopted Mother     Unknown/Adopted Father     Diabetes Brother              Immunizations:     VACCINE/DOSE   Diptheria   DPT   DTAP   HBIG   Hepatitis A   Hepatitis B   HIB   Influenza   Measles   Meningococcal   MMR   Mumps   Pneumococcal   Polio   Rubella   Small Pox   TDAP   Varicella   Zoster             Allergies:     Allergies   Allergen Reactions    Bees     Clavulanic Acid     Amoxicillin-Pot Clavulanate Rash     Augmentin    Penicillins Rash             Medications:     Current  Facility-Administered Medications   Medication Dose Route Frequency Provider Last Rate Last Admin    acetaminophen (TYLENOL) tablet 650 mg  650 mg Oral Q4H PRN Chavo Sam DO        Or    acetaminophen (TYLENOL) Suppository 650 mg  650 mg Rectal Q4H PRN Chavo Sam DO        ondansetron (ZOFRAN ODT) ODT tab 4 mg  4 mg Oral Q6H PRN Chavo Sam DO        Or    ondansetron (ZOFRAN) injection 4 mg  4 mg Intravenous Q6H PRN Chavo Sam DO        senna-docusate (SENOKOT-S/PERICOLACE) 8.6-50 MG per tablet 1 tablet  1 tablet Oral BID PRN Chavo Sam DO        Or    senna-docusate (SENOKOT-S/PERICOLACE) 8.6-50 MG per tablet 2 tablet  2 tablet Oral BID PRN Chavo Sam DO        vancomycin (VANCOCIN) 1,000 mg in 200 mL dextrose intermittent infusion  1,000 mg Intravenous Q12H Chavo Sam  mL/hr at 05/14/24 0834 1,000 mg at 05/14/24 0834             Review of Systems:   CV: NEGATIVE for chest pain, palpitations or peripheral edema  C: NEGATIVE for fever, chills, change in weight  E/M: NEGATIVE for ear, mouth and throat problems  R: NEGATIVE for significant cough or SOB          Physical Exam:   All vitals have been reviewed  Patient Vitals for the past 24 hrs:   BP Temp Temp src Pulse Resp SpO2 Height Weight   05/14/24 1154 124/60 98.3  F (36.8  C) Oral 71 18 96 % -- --   05/14/24 0753 117/59 97.6  F (36.4  C) Oral 69 18 96 % -- --   05/13/24 2300 129/63 99.6  F (37.6  C) Oral 68 18 97 % -- --   05/13/24 2232 120/71 -- -- 82 -- 92 % -- --   05/13/24 2202 -- -- -- -- -- 91 % -- --   05/13/24 2201 -- -- -- -- -- 90 % -- --   05/13/24 2200 123/73 -- -- 76 -- -- -- --   05/13/24 2133 -- -- -- -- -- 95 % -- --   05/13/24 2128 123/71 -- -- 74 -- 96 % -- --   05/13/24 2115 -- -- -- -- -- 96 % -- --   05/13/24 2105 122/67 -- -- -- -- 96 % -- --   05/13/24 2041 -- -- -- -- -- 96 % -- --   05/13/24 2040 -- -- -- -- -- 95 % -- --  "  05/13/24 2039 124/72 -- -- 83 -- -- -- --   05/13/24 1932 136/60 100  F (37.8  C) Oral 80 16 97 % 1.676 m (5' 6\") 148.8 kg (328 lb)       Intake/Output Summary (Last 24 hours) at 5/14/2024 1408  Last data filed at 5/13/2024 2300  Gross per 24 hour   Intake --   Output 700 ml   Net -700 ml       Constitutional: Pleasant, alert, appropriate, following commands. NAD. Non-toxic appearing. Initially sitting with pillow resting over face in chair when first encountered (states to \"block out the sun\").  HEENT: Head atraumatic normocephalic. Pupils equal round and reactive.  Respiratory: Unlabored breathing no audible wheeze  Cardiovascular: Regular rate and rhythm per pulses.  GI: Abdomen is non-distended.  Lymph/Hematologic: No lymphadenopathy in areas examined.  Genitourinary: No moore  Skin: No rashes, no cyanosis, no edema.  Musculoskeletal: Left upper extremity: Skin intact to the examined areas but with questionable small blister to the posterior aspect of the elbow. No apparent erythema or ecchymosis. Swelling noted to the distal humeral region at the posteromedial aspect without palpable fluid collection. Left olecranon bursa is fluctuant and without expressible/active drainage. Minimal tenderness to the olecranon bursa and posteromedial distal humeral region. Nontender over the radiocapitellar joint, medial and lateral epicondyles, biceps, triceps, forearm. Full AROM of the shoulder, wrist, and digits. Left elbow active ROM  degrees E/F with swelling and discomfort limiting endpoints. No significant pain with rotation of the forearm. Radial pulse palpable. Capillary refill brisk. SILT A/M/R/U nerve distributions.  Neurologic: GCS 15, A&OX4, normal mood and somewhat flat affect          Data:   All laboratory data reviewed  Results for orders placed or performed during the hospital encounter of 05/13/24   INR     Status: Abnormal   Result Value Ref Range    INR 2.50 (H) 0.85 - 1.15   Comprehensive metabolic " panel     Status: Abnormal   Result Value Ref Range    Sodium 137 135 - 145 mmol/L    Potassium 4.1 3.4 - 5.3 mmol/L    Carbon Dioxide (CO2) 21 (L) 22 - 29 mmol/L    Anion Gap 12 7 - 15 mmol/L    Urea Nitrogen 20.3 8.0 - 23.0 mg/dL    Creatinine 1.00 0.67 - 1.17 mg/dL    GFR Estimate 84 >60 mL/min/1.73m2    Calcium 8.7 (L) 8.8 - 10.2 mg/dL    Chloride 104 98 - 107 mmol/L    Glucose 151 (H) 70 - 99 mg/dL    Alkaline Phosphatase 92 40 - 150 U/L    AST 18 0 - 45 U/L    ALT 15 0 - 70 U/L    Protein Total 7.4 6.4 - 8.3 g/dL    Albumin 3.6 3.5 - 5.2 g/dL    Bilirubin Total 0.2 <=1.2 mg/dL   CRP inflammation     Status: Abnormal   Result Value Ref Range    CRP Inflammation 59.82 (H) <5.00 mg/L   iStat Gases (lactate) venous, POCT     Status: Abnormal   Result Value Ref Range    Lactic Acid POCT 1.4 <=2.0 mmol/L    Bicarbonate Venous POCT 24 21 - 28 mmol/L    O2 Sat, Venous POCT 85 (H) 70 - 75 %    pCO2 Venous POCT 40 40 - 50 mm Hg    pH Venous POCT 7.39 7.32 - 7.43    pO2 Venous POCT 51 (H) 25 - 47 mm Hg    Base Excess/Deficit (+/-) POCT -1.0 -3.0 - 3.0 mmol/L   CBC with platelets and differential     Status: Abnormal   Result Value Ref Range    WBC Count 5.3 4.0 - 11.0 10e3/uL    RBC Count 3.61 (L) 4.40 - 5.90 10e6/uL    Hemoglobin 9.8 (L) 13.3 - 17.7 g/dL    Hematocrit 31.9 (L) 40.0 - 53.0 %    MCV 88 78 - 100 fL    MCH 27.1 26.5 - 33.0 pg    MCHC 30.7 (L) 31.5 - 36.5 g/dL    RDW 16.4 (H) 10.0 - 15.0 %    Platelet Count 236 150 - 450 10e3/uL    % Neutrophils 72 %    % Lymphocytes 16 %    % Monocytes 9 %    % Eosinophils 3 %    % Basophils 0 %    % Immature Granulocytes 0 %    NRBCs per 100 WBC 0 <1 /100    Absolute Neutrophils 3.7 1.6 - 8.3 10e3/uL    Absolute Lymphocytes 0.8 0.8 - 5.3 10e3/uL    Absolute Monocytes 0.5 0.0 - 1.3 10e3/uL    Absolute Eosinophils 0.2 0.0 - 0.7 10e3/uL    Absolute Basophils 0.0 0.0 - 0.2 10e3/uL    Absolute Immature Granulocytes 0.0 <=0.4 10e3/uL    Absolute NRBCs 0.0 10e3/uL   INR      Status: Abnormal   Result Value Ref Range    INR 2.60 (H) 0.85 - 1.15   Comprehensive metabolic panel     Status: Abnormal   Result Value Ref Range    Sodium 143 135 - 145 mmol/L    Potassium 4.5 3.4 - 5.3 mmol/L    Carbon Dioxide (CO2) 23 22 - 29 mmol/L    Anion Gap 10 7 - 15 mmol/L    Urea Nitrogen 14.9 8.0 - 23.0 mg/dL    Creatinine 0.88 0.67 - 1.17 mg/dL    GFR Estimate >90 >60 mL/min/1.73m2    Calcium 8.9 8.8 - 10.2 mg/dL    Chloride 110 (H) 98 - 107 mmol/L    Glucose 139 (H) 70 - 99 mg/dL    Alkaline Phosphatase 84 40 - 150 U/L    AST 18 0 - 45 U/L    ALT 13 0 - 70 U/L    Protein Total 7.1 6.4 - 8.3 g/dL    Albumin 3.4 (L) 3.5 - 5.2 g/dL    Bilirubin Total 0.3 <=1.2 mg/dL   CBC with platelets and differential     Status: Abnormal   Result Value Ref Range    WBC Count 4.0 4.0 - 11.0 10e3/uL    RBC Count 3.56 (L) 4.40 - 5.90 10e6/uL    Hemoglobin 9.5 (L) 13.3 - 17.7 g/dL    Hematocrit 31.5 (L) 40.0 - 53.0 %    MCV 89 78 - 100 fL    MCH 26.7 26.5 - 33.0 pg    MCHC 30.2 (L) 31.5 - 36.5 g/dL    RDW 16.6 (H) 10.0 - 15.0 %    Platelet Count 221 150 - 450 10e3/uL    % Neutrophils 63 %    % Lymphocytes 23 %    % Monocytes 10 %    % Eosinophils 4 %    % Basophils 0 %    % Immature Granulocytes 0 %    NRBCs per 100 WBC 0 <1 /100    Absolute Neutrophils 2.5 1.6 - 8.3 10e3/uL    Absolute Lymphocytes 0.9 0.8 - 5.3 10e3/uL    Absolute Monocytes 0.4 0.0 - 1.3 10e3/uL    Absolute Eosinophils 0.2 0.0 - 0.7 10e3/uL    Absolute Basophils 0.0 0.0 - 0.2 10e3/uL    Absolute Immature Granulocytes 0.0 <=0.4 10e3/uL    Absolute NRBCs 0.0 10e3/uL   CRP inflammation     Status: Abnormal   Result Value Ref Range    CRP Inflammation 85.49 (H) <5.00 mg/L   Blood Culture Peripheral Blood     Status: Normal (Preliminary result)    Specimen: Peripheral Blood   Result Value Ref Range    Culture No growth after 12 hours    Blood Culture Peripheral Blood     Status: Normal (Preliminary result)    Specimen: Peripheral Blood   Result Value Ref  Range    Culture No growth after 12 hours    CBC with platelets differential     Status: Abnormal    Narrative    The following orders were created for panel order CBC with platelets differential.  Procedure                               Abnormality         Status                     ---------                               -----------         ------                     CBC with platelets and d...[[id number]]  Abnormal            Final result                 Please view results for these tests on the individual orders.   CBC with platelets differential     Status: Abnormal    Narrative    The following orders were created for panel order CBC with platelets differential.  Procedure                               Abnormality         Status                     ---------                               -----------         ------                     CBC with platelets and d...[[id number]]  Abnormal            Final result                 Please view results for these tests on the individual orders.          Attestation:  I have reviewed today's vital signs, notes, medications, labs and imaging with Dr. Christian Mcdowell.  Amount of time performed on this consult: 50 minutes.    Colette James PA-C  [facility]

## 2024-05-14 NOTE — TREATMENT PLAN
"Left olecranon bursitis    Patient admitted with concern of septic left olecranon bursa.  Hx of MRSA abscess in right elbow.  Low grade fever of 100.0 but no signs of sepsis. CRP at admission was 60  Has bilateral appears to be chronic olecranonon bursitis but complaining of left posterior elbow pain.  Denies trauma.     /59 (BP Location: Right arm)   Pulse 69   Temp 97.6  F (36.4  C) (Oral)   Resp 18   Ht 1.676 m (5' 6\")   Wt 148.8 kg (328 lb)   SpO2 96%   BMI 52.94 kg/m      Brief exam: Bilateral chronic appearing olecranon bursitis with mild erythema with mild tenderness along left olecranon bursa.  Able to range elbow joints with minimal pain.     Plan:   Continue IV antibiotics  No surgical intervention required at this time.  Okay to eat.   Pad elbows with pillows to avoid pressure on elbows.   Trend CRP  "

## 2024-05-14 NOTE — PROGRESS NOTES
Care Management Discharge Note    Discharge Date: 05/14/2024       Discharge Disposition: Group Home    Discharge Services: None    Discharge DME: None    Discharge Transportation: other (see comments) (Group Home will come and get Pt at discharge)    Private pay costs discussed: Not applicable    Does the patient's insurance plan have a 3 day qualifying hospital stay waiver?  No    PAS Confirmation Code:    Patient/family educated on Medicare website which has current facility and service quality ratings:      Education Provided on the Discharge Plan:    Persons Notified of Discharge Plans: Bedside RN  Patient/Family in Agreement with the Plan: yes    Handoff Referral Completed: No    Additional Information:  Pt is able to return to his  today. Writer called the  ([facility] ([place]) ([phone]) ) and left a message to call back.   The group home would be able to come and get the Pt. Writer left CM desk phone number and the RN charge's phone number to call after 4pm.        Elsie Grace, RN, BSN, Care Coordinator

## 2024-05-14 NOTE — PROGRESS NOTES
RECEIVING UNIT ED HANDOFF REVIEW    ED Nurse Handoff Report was reviewed by: Sepideh Flower RN on May 13, 2024 at 10:43 PM

## 2024-05-14 NOTE — CONSULTS
Care Management Initial Consult    General Information  Assessment completed with: Petey Deutsch  Type of CM/SW Visit: Initial Assessment    Primary Care Provider verified and updated as needed: Yes   Readmission within the last 30 days: no previous admission in last 30 days      Reason for Consult: discharge planning  Advance Care Planning:            Communication Assessment  Patient's communication style: spoken language (English or Bilingual)             Cognitive  Cognitive/Neuro/Behavioral: .WDL except  Level of Consciousness: alert  Arousal Level: opens eyes spontaneously  Orientation: disoriented to, place  Mood/Behavior: calm, cooperative  Best Language: 0 - No aphasia  Speech: clear, spontaneous    Living Environment:   People in home: facility resident ([facility] ([phone])     Current living Arrangements: group home      Able to return to prior arrangements: yes       Family/Social Support:  Care provided by: self  Provides care for: no one  Marital Status: Single             Description of Support System: Supportive, Involved         Current Resources:   Patient receiving home care services: No     Community Resources: Group Home  Equipment currently used at home: none  Supplies currently used at home: None    Employment/Financial:  Employment Status: disabled        Financial Concerns: none   Referral to Financial Worker: No       Does the patient's insurance plan have a 3 day qualifying hospital stay waiver?  No    Lifestyle & Psychosocial Needs:  Social Determinants of Health     Food Insecurity: Low Risk  (1/3/2024)    Food Insecurity     Within the past 12 months, did you worry that your food would run out before you got money to buy more?: No     Within the past 12 months, did the food you bought just not last and you didn t have money to get more?: No   Depression: At risk (1/3/2024)    PHQ-2     PHQ-2 Score: 5   Housing Stability: Low Risk  (1/3/2024)    Housing Stability     Do you have  housing? : Yes     Are you worried about losing your housing?: No   Tobacco Use: Medium Risk (4/23/2024)    Patient History     Smoking Tobacco Use: Former     Smokeless Tobacco Use: Never     Passive Exposure: Not on file   Financial Resource Strain: Low Risk  (1/3/2024)    Financial Resource Strain     Within the past 12 months, have you or your family members you live with been unable to get utilities (heat, electricity) when it was really needed?: No   Alcohol Use: Not on file   Transportation Needs: Low Risk  (1/3/2024)    Transportation Needs     Within the past 12 months, has lack of transportation kept you from medical appointments, getting your medicines, non-medical meetings or appointments, work, or from getting things that you need?: No   Physical Activity: Not on File (11/8/2019)    Received from [facility]     Physical Activity     Physical Activity: 0   Interpersonal Safety: Low Risk  (1/3/2024)    Interpersonal Safety     Do you feel physically and emotionally safe where you currently live?: Yes     Within the past 12 months, have you been hit, slapped, kicked or otherwise physically hurt by someone?: No     Within the past 12 months, have you been humiliated or emotionally abused in other ways by your partner or ex-partner?: No   Stress: Not on File (11/8/2019)    Received from [facility]     Stress     Stress: 0   Social Connections: Not on File (11/8/2019)    Received from [facility]     Social Connections     Social Connections and Isolation: 0   Health Literacy: Not on file       Functional Status:  Prior to admission patient needed assistance:   Dependent ADLs:: Independent  Dependent IADLs:: Medication Management, Meal Preparation, Transportation       Mental Health Status:          Chemical Dependency Status:                Values/Beliefs:  Spiritual, Cultural Beliefs, Judaism Practices, Values that affect care: no               Additional Information:  Consulted for discharge  planning, writer met with Pt at bedside and introduced self and role in discharge planning. Pt lives in [facility] ([place]) ([phone]). Per Pt prior to admit, Pt is independent with all ADL's. Group home provides medication management, transportation and meals . Pt denies any financial concerns at this time. Pt wasn't receiving any [facility] or [facility] services. [facility] uses [facility] . [facility] will transport Pt at discharge.            Inpatient Care Coordinator will continue to follow for discharge needs.          Elsie Grace, RN, BSN, Care Coordinator

## 2024-05-14 NOTE — PROGRESS NOTES
"ANTICOAGULATION  MANAGEMENT     Interacting Medication Review    Interacting medication(s): Sulfamethoxazole-Trimethoprim (Bactrim) with warfarin.    Duration: 10 days  (5/14 to 5/24)    Indication:  cellulitis vs olecranon bursitis    New medication?: Yes, interaction may increase INR and risk of bleeding. Closer INR monitoring recommended. and With Sulfamethoxazole-Trimethoprim, [facility] protocol Appendix G recommends empiric reduction of warfarin dose in therapeutic/supratherapeutic patients.        PLAN     Temporarily adjust warfarin dose during interaction (10-20% change) with next INR in 3 days      - Per chart patient is still inpatient and has not yet  been discharged from hospital.     Group home nurse not available at this time - INR is due tomorrow for patient - will need to give MD decrease tomorrow with INR result.   No change to MD made during this encounter as [facility] RN was unable to speak with group home nurse.     [facility] priority set/moved to \"high\" for new major drug interaction    Plan made per [facility] anticoagulation protocol    Sarah Tucker RN  Anticoagulation Clinic        "

## 2024-05-14 NOTE — PHARMACY-VANCOMYCIN DOSING SERVICE
Pharmacy Vancomycin Initial Note  Date of Service May 13, 2024  Patient's  1960  63 year old, male    Indication: Skin and Soft Tissue Infection    Current estimated CrCl = Estimated Creatinine Clearance: 101.9 mL/min (based on SCr of 1 mg/dL).    Creatinine for last 3 days  2024:  7:57 PM Creatinine 1.00 mg/dL    Recent Vancomycin Level(s) for last 3 days  No results found for requested labs within last 3 days.      Vancomycin IV Administrations (past 72 hours)                     vancomycin (VANCOCIN) 2,500 mg in 0.9% NaCl 500 mL intermittent infusion (mg) 2,500 mg New Bag 24                    Nephrotoxins and other renal medications (From now, onward)      Start     Dose/Rate Route Frequency Ordered Stop    24 0830  vancomycin (VANCOCIN) 1,000 mg in 200 mL dextrose intermittent infusion         1,000 mg  200 mL/hr over 1 Hours Intravenous EVERY 12 HOURS 24 2322              Contrast Orders - past 72 hours (72h ago, onward)      None            [employer] Prediction of Planned Initial Vancomycin Regimen  Loading dose: 2500 mg  Regimen: 1000 mg IV every 12 hours.  Start time: 08:37 on 2024  Exposure target: AUC24 (range)400-600 mg/L.hr   AUC24,ss: 524 mg/L.hr  Probability of AUC24 > 400: 69 %  Ctrough,ss: 15.4 mg/L  Probability of Ctrough,ss > 20: 36 %  Probability of nephrotoxicity (Lodise PER ): 11 %          Plan:  Start vancomycin 1000 mg IV q12h.   Vancomycin monitoring method: AUC  Vancomycin therapeutic monitoring goal: 400-600 mg*h/L  Pharmacy will check vancomycin levels as appropriate in 1-3 Days.    Serum creatinine levels will be ordered daily for the first week of therapy and at least twice weekly for subsequent weeks.      Nicole Allen [facility]

## 2024-05-14 NOTE — DISCHARGE SUMMARY
"[facility]  Hospitalist Discharge Summary      Date of Admission:  5/13/2024  Date of Discharge:  5/14/2024  Discharging Provider: Josemanuel Schmitt MD  Discharge Service: Hospitalist Service    Discharge Diagnoses            Left elbow cellulitis versus olecranon bursitis, rule out septic bursitis  History of MRSA abscess of the right elbow        Chronic medical history  PAD with LLE wound: follows with vascular surgery, has a recent note. Woc consult  History of schizoaffective: resume home meds once verified  NEL: resume home cpap  Tobacco use: urged cessation  Depression/schizoaffective disorder: resume home meds once verified  Morbid obesity: increase in all cause mortality           Clinically Significant Risk Factors Present on Admission               # Drug Induced Coagulation Defect: home medication list includes an anticoagulant medication    # Hypertension: Home medication list includes antihypertensive(s)      # Severe Obesity: Estimated body mass index is 52.94 kg/m  as calculated from the following:    Height as of this encounter: 1.676 m (5' 6\").    Weight as of this encounter: 148.8 kg (328 lb).       # COPD: noted on problem list        Disposition Plan     Medically Ready for Discharge: home today          Clinically Significant Risk Factors     # Severe Obesity: Estimated body mass index is 52.94 kg/m  as calculated from the following:    Height as of this encounter: 1.676 m (5' 6\").    Weight as of this encounter: 148.8 kg (328 lb).       Follow-ups Needed After Discharge   Follow-up Appointments     Follow-up and recommended labs and tests       Follow up with orthopedics in 3 weeks and or as directed        {Additional follow-up instructions/to-do's for PCP  and orthopedics    Unresulted Labs Ordered in the Past 30 Days of this Admission       Date and Time Order Name Status Description    5/13/2024  7:31 PM Blood Culture Peripheral Blood Preliminary     5/13/2024  " 7:31 PM Blood Culture Peripheral Blood Preliminary         These results will be followed up by PCP and orthopedics    Discharge Disposition   Discharged to home  Condition at discharge: Stable    Hospital Course   Petey Archibald is a 63 year old male with previous MRSA abscess of the right elbow, mild intellectual disabilities, morbid obesity, NEL, schizoaffective d/o, copd, history of DVT/PE admitted on 5/13/2024 with left elbow cellulitis and possible underlying cyst or abscess        Left elbow cellulitis versus olecranon bursitis, rule out septic bursitis  History of MRSA abscess of the right elbow  Low grade fever of 100.0 but no signs of sepsis. Note that CRP is 60  Has bilateral appears to be chronic olecranonon bursitis but much worse, erythematous and red on the left side  Plan  - admit to inpatient and suspect will need   - given IV vancomycin as inpatient  - orthopedics consult  noted, recommendation of at least 2 trials of antibiotics before aspiration or surgery.  Discharge with 10 days of bactrim and ace wrap and restrictions.    -patient can return back to group home     Chronic medical history  PAD with LLE wound: follows with vascular surgery, has a recent note. Woc consult  History of schizoaffective: resume home meds once verified  NEL: resume home cpap  Tobacco use: urged cessation  Depression/schizoaffective disorder: resume home meds once verified  Morbid obesity: increase in all cause mortality     Diet:  regular  DVT Prophylaxis: Pneumatic Compression Devices  Tapia Catheter: Not present  Lines: None     Cardiac Monitoring: None  Code Status:  full code        Clinically Significant Risk Factors Present on Admission               # Drug Induced Coagulation Defect: home medication list includes an anticoagulant medication    # Hypertension: Home medication list includes antihypertensive(s)      # Severe Obesity: Estimated body mass index is 52.94 kg/m  as calculated from the following:     "Height as of this encounter: 1.676 m (5' 6\").    Weight as of this encounter: 148.8 kg (328 lb).       # COPD: noted on problem list      Disposition Plan     Medically Ready for Discharge: back to group home today          Consultations This Hospital Stay   PHARMACY TO DOSE VANCO  PHARMACY TO DOSE VANCO  ORTHOPEDIC SURGERY IP CONSULT  CARE MANAGEMENT / SOCIAL WORK IP CONSULT    Code Status   Full Code    Time Spent on this Encounter   I, Josemanuel Schmitt MD, personally saw the patient today and spent less than or equal to 30 minutes discharging this patient.       Josemanuel Schmitt MD  [facility]  [address] [address]  Phone: [phone]  Fax: [phone]  ______________________________________________________________________    Physical Exam   Vital Signs: Temp: 98.3  F (36.8  C) Temp src: Oral BP: 124/60 Pulse: 71   Resp: 18 SpO2: 96 % O2 Device: None (Room air)    Weight: 328 lbs 0 oz         Primary Care Physician   Demetri Archer    Discharge Orders      Reason for your hospital stay    Admit for chronic olecranon bursitis     Follow-up and recommended labs and tests     Follow up with orthopedics in 3 weeks and or as directed     Activity    Your activity upon discharge: activity as tolerated     Discharge Instructions    Encourage elevation, compression (Ace wrap from mid-forearm to axilla - RN will apply today and should remove once daily for skin checks until swelling improves), and use of cold compresses.   -Avoid applying pressure to the posterior left elbow. Avoid frequent/repetitive flexion/extension until symptoms improve of the left elbow as this can increase irritation of the bursa, but it is okay to move the elbow to prevent stiffness. Avoid use of sling as this may apply pressure to the bursa and patient may rely too heavily on sling and cause increased stiffness of the elbow.     Diet    Follow this diet upon discharge: Orders Placed This " Encounter      Regular Diet Adult       Significant Results and Procedures   Most Recent 3 CBC's:  Recent Labs   Lab Test 24  0718 05/13/24  1957 02/15/23  0619   WBC 4.0 5.3 4.8   HGB 9.5* 9.8* 11.3*   MCV 89 88 90    236 228     Most Recent 3 BMP's:  Recent Labs   Lab Test 24  0718 24  1049    137 141   POTASSIUM 4.5 4.1 4.3   CHLORIDE 110* 104 105   CO2 23 21* 26   BUN 14.9 20.3 16.9   CR 0.88 1.00 0.91   ANIONGAP 10 12 10   JESSICA 8.9 8.7* 9.8   * 151* 98   ,   Results for orders placed or performed in visit on 24   US Venous Competency Bilateral    Narrative    Name:  Petey Archibald                                          Patient   ID: [id number]  Date: 2024                                                 :   1960  Sex: male    Impression           Examined by: FERNANDO Alas RVT  Age:  63 year old                                                           Reading MD: AI Muhammad MD     INDICATION:  Edema, bilateral ulcers, history of left leg venous   procedures     EXAM TYPE  BILATERAL LOWER EXTREMITY VENOUS DUPLEX FOR VENOUS INSUFFICIENCY  TECHNICAL SUMMARY     A duplex ultrasound study using color flow was performed, to evaluate the   bilateral lower extremity veins for valvular incompetence with the patient   in a steep reversed trendelenberg.      RIGHT:     The deep veins demonstrate phasic flow, compress and respond to   augmentations.  There is no DVT.  The common femoral, proximal to distal   femoral, and popliteal veins are incompetent and free of thrombus. The   remaining deep veins are competent and free of thrombus.      Chronic non-occlusive thrombus is visualized in the GSV focally in the   proximal calf.  The GSV demonstrates phasic flow, compresses and responds   to augmentations from the saphenofemoral junction to the knee, and mid   calf with no evidence of thrombus. The GSV is not visualized at the distal    calf.  The great saphenous vein measures 9.4 mm at the saphenofemoral   junction, 7.4 mm at the proximal thigh, and 7.0 mm at the knee. The GSV is   incompetent from SFJ to Mid Calf, with the greatest reflux time of 4751   milliseconds.  The GSV gives rise to multiple incompetent varicose veins,   the largest measures 6.1 mm off the Proximal Calf that courses Medial with   a reflux time of 1732 milliseconds.      The AASV is competent ( 5.3 mm) draining into the saphenofemoral junction.        The Giacomini vein is absent.     The SSV demonstrates phasic flow, compresses and responds to augmentations   from the popliteal space to the ankle.  No thrombus is seen. The   saphenopopliteal junction is competent (3.6 mm). The SSV is incompetent   from the Proximal Calf to Distal Calf with a reflux time of 1386   milliseconds.      Perforators: There is no evidence of incompetent  veins at any   level.         LEFT:     The deep veins demonstrate phasic flow, compress and respond to   augmentations.  There is no DVT.  The common femoral and proximal to mid   femoral veins are incompetent and free of thrombus. The remaining deep   veins are competent and free of thrombus.     The GSV is not visualized from proximal thigh to distal calf.  The greater   saphenous vein measures 9.8 mm at the saphenofemoral junction. The GSV is   incompetent at the SFJ with the greatest reflux time of 3332 milliseconds.         The AASV is competent ( 6.6 mm) draining into the saphenofemoral junction.        The Giacomini vein is absent.     The SSV demonstrates phasic flow, compresses and responds to augmentations   from the popliteal space to the ankle.  No thrombus is seen. The   saphenopopliteal junction is competent ( 2.9 mm). The SSV is incompetent   at the Mid Calf with a reflux time of 693 milliseconds.       Perforators: There is no evidence of incompetent  veins at any   level.      Incompetent varicose veins  course down the medial thigh and calf with   branches to the posterior calf, measuring 6.5 mm at the medial thigh and   6.9 mm at the medial calf, with the greatest reflux time of 3035   milliseconds.  The source of the varicosities could not be determined.           FINAL SUMMARY:  Right lower extremity:  Deep veins  1.  No deep vein thrombosis  2.  Common femoral through popliteal vein incompetence    Superficial veins  1.  Chronic, nonocclusive thrombus noted in the below-knee right great   saphenous vein.  The distal calf great saphenous vein is not visualized.  2.  Saphenofemoral junction through mid calf great saphenous vein   incompetence, the source of multiple, incompetent varicosities in the   thigh and calf  3.  Proximal through distal calf small saphenous vein incompetence     veins  No incompetent perforators    Left lower extremity:  Deep veins  1.  No deep vein thrombosis  2.  Common femoral through mid femoral vein incompetence.  The distal   femoral and popliteal veins are competent    Superficial veins  1.  Absent left great saphenous vein distal to the incompetent, left   saphenofemoral junction  2.  Mid calf small saphenous vein incompetence  3.  Multiple, incompetent varicosities on the medial thigh and medial   calf, source indeterminate. Varicosities course from the saphenofemoral   junction     veins  No incompetent perforators.    Incompetence Criteria: Greater than 500 milliseconds reflux in the   superficial and  veins and greater than 1000 milliseconds reflux   in the deep veins.    C Marcial Muhammad MD, FACS          *Note: Due to a large number of results and/or encounters for the requested time period, some results have not been displayed. A complete set of results can be found in Results Review.       Discharge Medications   Current Discharge Medication List        START taking these medications    Details   sulfamethoxazole-trimethoprim (BACTRIM DS)  800-160 MG tablet Take 1 tablet by mouth 2 times daily for 10 days  Qty: 14 tablet, Refills: 0    Associated Diagnoses: Olecranon bursitis of both elbows           CONTINUE these medications which have NOT CHANGED    Details   acetaminophen (TYLENOL) 325 MG tablet Take 1-2 tablets (325-650 mg) by mouth every 6 hours as needed for mild pain, fever or headaches  Qty: 100 tablet, Refills: 3    Associated Diagnoses: Pain      albuterol (ALBUTEROL) 108 (90 BASE) MCG/ACT inhaler Inhale 2 puffs into the lungs every 6 hours as needed  Qty: 1 Inhaler, Refills: 0    Associated Diagnoses: Pulmonary emphysema, unspecified emphysema type (H)      ARIPiprazole (ABILIFY) 5 MG tablet Take 1 tablet (5 mg) by mouth daily  Qty: 45 tablet, Refills: 0    Associated Diagnoses: Schizoaffective disorder, bipolar type (H)      buPROPion (WELLBUTRIN SR) 150 MG 12 hr tablet Take 1 tablet (150 mg) by mouth 2 times daily for 45 days  Qty: 90 tablet, Refills: 0    Associated Diagnoses: Anxiety      Cadexomer Iodine, topical, 0.9% (IODOSORB) 0.9 % GEL gel Apply topically daily Apply to left foot wound daily after cleansing the wound and blotting it dry.  Qty: 1 Tube, Refills: 3    Comments: 40 g tube  Associated Diagnoses: Skin ulcer of left foot with fat layer exposed (H)      cloNIDine (CATAPRES) 0.1 MG tablet Take 0.1 mg by mouth daily as needed      clotrimazole (LOTRIMIN) 1 % external cream Apply topically 2 times daily  Qty: 60 g, Refills: 0    Associated Diagnoses: Left leg cellulitis      diclofenac (VOLTAREN) 1 % topical gel Apply 4 g topically 2 times daily as needed for moderate pain  Qty: 100 g, Refills: 1    Comments: Any size tube okay  Associated Diagnoses: Left inguinal pain      diphenhydrAMINE (BENADRYL) 25 MG capsule Take 50 mg by mouth every 6 hours as needed for itching or allergies      Emollient (CERAVE) CREA Externally apply topically daily  Qty: 453 g, Refills: 11    Associated Diagnoses: Ulcer of left lower extremity  "with fat layer exposed (H)      !! EPINEPHrine (ANY BX GENERIC EQUIV) 0.3 MG/0.3ML injection 2-pack INJECT 0.3MG INTRAMUSCULARLY ONE TIME FOR ONE DOSE AS NEEDED FOR ANAPHYLAXIS. MAY REPEAT ONE TIME IN 5-15 MINUTES IF RESPONSE TO INITIAL DOSE IS INADEQUATE. *1 TOTAL FILL, ORIGINAL FROM EMERGENCY ROOM*  Qty: 2 each, Refills: 1    Comments: URGENT!  Associated Diagnoses: Bee sting reaction, accidental or unintentional, subsequent encounter      !! EPINEPHrine (EPIPEN 2-RBE) 0.3 MG/0.3ML injection 2-pack INJECT 0.3MG INTRAMUSCULAR ONE TIME FOR ONE DOSE AS NEEDED FOR ANAPHYLAXIS (CALL 911 IF YOU HAVE TO GIVE) (FOR BEE STINGS) **LABEL EACH PEN*  Qty: 2 mL, Refills: 3    Associated Diagnoses: Allergic to bees      gabapentin (NEURONTIN) 100 MG capsule Take 400 mg by mouth 3 times daily At 0900, 1200, and 2000      Gauze Pads & Dressings (GAUZE PADS 4\"X4\") 4\"X4\" PADS 1 each 3 times daily as needed  Qty: 25 each, Refills: 1    Associated Diagnoses: Perirectal abscess      loratadine (CLARITIN) 10 MG tablet Take 10 mg by mouth daily      LORazepam (ATIVAN) 1 MG tablet Take 1 mg by mouth daily as needed for anxiety      montelukast (SINGULAIR) 10 MG tablet TAKE 1 TABLET BY MOUTH EVERY MORNING (ASTHMA)  Qty: 30 tablet, Refills: 11    Comments: MMO/ST  Associated Diagnoses: Mixed simple and mucopurulent chronic bronchitis (H)      !! Multiple Vitamin (DAILY-JOVAN) TABS TAKE 1 TABLET BY MOUTH EVERY MORNING (VITAMINS)  Qty: 30 tablet, Refills: 11    Comments: MMO/1ST  Associated Diagnoses: Schizoaffective disorder, bipolar type (H)      naltrexone (DEPADE/REVIA) 50 MG tablet Take 2 tablets (100 mg) by mouth daily for 45 days  Qty: 90 tablet, Refills: 0    Associated Diagnoses: Schizoaffective disorder, bipolar type (H)      nicotine (COMMIT) 2 MG lozenge Place 2 mg inside cheek daily as needed      nystatin-triamcinolone (MYCOLOG II) 029313-9.1 UNIT/GM-% external cream Apply topically 2 times daily For 1-2 weeks.      omeprazole " (PRILOSEC) 40 MG DR capsule Take 1 capsule (40 mg) by mouth daily  Qty: 90 capsule, Refills: 2    Associated Diagnoses: Gastroesophageal reflux disease, unspecified whether esophagitis present      !! order for DME Equipment being ordered: roll of micropore tape to dress foot wound bid  Qty: 1 each, Refills: 0    Associated Diagnoses: Ulcer of left lower extremity with fat layer exposed (H)      !! order for DME Equipment being ordered: 1 surgical shoe  Qty: 1 Device, Refills: 0    Associated Diagnoses: Open wound of left foot, initial encounter      !! order for DME [employer] [facility] Phone [phone] Fax [phone]  EdemaWear Size Medium/Yellow qty 4 sleeves  Length of Need: 1 month  Frequency of dressing change daily  Qty: 1 Device, Refills: 0    Associated Diagnoses: Ulcer of left lower extremity with fat layer exposed (H); Lymphedema of left leg      !! order for DME [employer]  Phone #[phone]  Fax #[phone]  Coolflex Velcro wraps    Length of Need: Life Time  # of Pairs 1 set  Qty: 30 days, Refills: 0    Associated Diagnoses: Ulcer of left lower extremity with fat layer exposed (H); Lymphedema of left leg      !! order for DME [employer] [facility] Phone [phone] Fax [phone]  Primary Dressing Hydrofera Blue Ready   Qty 5 sheets  Secondary Dressing 4' roll gauze Qty 30  Secondary Dressing 2' medipore tape Qty 1  Secondary Dressing 4x4 gauze loaf Qty  1  Length of Need: 1 month  Frequency of dressing change: daily  Qty: 30 days, Refills: 0    Associated Diagnoses: Ulcer of left lower extremity with fat layer exposed (H)      !! order for DME Oxygen 2 Li/min  at night and bled into BiPAP  Qty: 1 Device, Refills: 0    Associated Diagnoses: Nocturnal hypoxemia      !! order for DME Equipment being ordered: CPAP with mask and tubing  Qty: 1 Device, Refills: 0    Associated Diagnoses: NEL (obstructive sleep apnea)      !! order for DME Equipment being ordered: support  compression hose BK  2 pair black 30mm HG   To be applied on arising & removed while lying down to go to sleep  Qty: 1 each, Refills: 0    Associated Diagnoses: Localized edema      polyethylene glycol (MIRALAX) 17 GM/Dose powder Take 17 g (1 capful) by mouth daily as needed for constipation  Qty: 578 g, Refills: 0    Associated Diagnoses: Constipation, unspecified constipation type      PULMICORT FLEXHALER 180 MCG/ACT inhaler INHALE 2 PUFFS INTO THE LUNGS TWICE DAILY.  Qty: 1 each, Refills: 11    Associated Diagnoses: Mixed simple and mucopurulent chronic bronchitis (H)      SENNA-TABS 8.6 MG tablet Take 1 tablet by mouth daily      sertraline (ZOLOFT) 100 MG tablet Take 2 tablets (200 mg) by mouth daily for 45 days  Qty: 90 tablet, Refills: 0    Associated Diagnoses: Anxiety      simvastatin (ZOCOR) 40 MG tablet TAKE 1 TABLET BY MOUTH EVERY MORNING.  (CHOLESTEROL)  Qty: 30 tablet, Refills: 11    Comments: MMO/1ST  Associated Diagnoses: Hypercholesterolemia      SPIRIVA HANDIHALER 18 MCG inhaled capsule INHALE CONTENTS OF 1 CAPSULE INTO THE LUNGS ONCE DAILY  Qty: 30 capsule, Refills: 11    Associated Diagnoses: Mixed simple and mucopurulent chronic bronchitis (H)      spironolactone (ALDACTONE) 25 MG tablet TAKE 1 TABLET BY MOUTH ONCE DAILY. (HIGH BLOOD PRESSURE)  Qty: 30 tablet, Refills: 11    Comments: MMO/1ST  Associated Diagnoses: Localized edema      !! tiZANidine (ZANAFLEX) 2 MG tablet TAKE 1 TABLET BY MOUTH THREE TIMES DAILY.  Qty: 106 tablet, Refills: 11    Comments: SD1  Associated Diagnoses: Spasm of muscle; Focal dystonia      !! tiZANidine (ZANAFLEX) 2 MG tablet Take 1 tablet (2 mg) by mouth 3 times daily  Qty: 90 tablet, Refills: 1    Associated Diagnoses: Focal dystonia; Spasm of muscle      traZODone (DESYREL) 100 MG tablet Take 1 tablet (100 mg) by mouth at bedtime for 45 days  Qty: 45 tablet, Refills: 0    Associated Diagnoses: Other insomnia      triamcinolone (KENALOG) 0.1 % external cream Apply  to AA BID x 1-2 week then PRN only  Qty: 80 g, Refills: 2    Associated Diagnoses: Dermatitis      !! 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 warfarin ANTICOAGULANT (COUMADIN) 2 MG tablet Take 1 tablet (along with a 6 mg tab for a total of 8 mg) by mouth every Sun, Tue, Fri. Next INR is 5/15/24  Qty: 10 tablet, Refills: 0    Associated Diagnoses: History of pulmonary embolism; Long term current use of anticoagulant therapy      !! warfarin ANTICOAGULANT (COUMADIN) 6 MG tablet Take 1 Tablet (6 mg) by mouth every day. Next INR Date is 5/15/24  Qty: 20 tablet, Refills: 0    Associated Diagnoses: History of pulmonary embolism; Long term current use of anticoagulant therapy       !! - Potential duplicate medications found. Please discuss with provider.        Allergies   Allergies   Allergen Reactions    Bees     Clavulanic Acid     Amoxicillin-Pot Clavulanate Rash     Augmentin    Penicillins Rash

## 2024-05-14 NOTE — ED NOTES
Bed: ED06  Expected date:   Expected time:   Means of arrival:   Comments:  417 57m R arm pain, and abdo pain, hx of blood clots

## 2024-05-14 NOTE — PROGRESS NOTES
Pt admitted from ED due to Left Elbow Cellulitis. On contact precaution for MRS. A&O x4. Up with assist of I KALPANA and walker. NPO, Rash noted to Pt right buttock and thigh. Wounds noted to Pt bilateral LE. Sticky note placed for wound care consult.

## 2024-05-14 NOTE — ED NOTES
[facility]  ED Nurse Handoff Report    ED Chief complaint: Arm Pain (/)      ED Diagnosis:   Final diagnoses:   Cellulitis of left upper extremity   H/O methicillin resistant Staphylococcus aureus infection       Code Status: Full Code    Allergies:   Allergies   Allergen Reactions    Bees     Clavulanic Acid     Amoxicillin-Pot Clavulanate Rash     Augmentin    Penicillins Rash       Patient Story:   Patient arrives from [facility] [place] home via ambulance with pain and swelling in left elbow and top of hand. Elbow is red and painful to the touch. Has a history of blood clots, is on warfarin. Labs obtained in ED, medicated per MAR.     Focused Assessment:    Neuro: Alert, oriented x 4  Respiratory:Clear lung sounds, on room air   Cardiology:  WNL   Gastrointestinal: soft, non tender, non distended   Genitourinary/Renal:  WNL  Musculoskeletal: moves all extremities   Skin: Redness left elbow, swelling and redness ankles  Lines: 20g right forearm    Labs Ordered and Resulted from Time of ED Arrival to Time of ED Departure   INR - Abnormal       Result Value    INR 2.50 (*)    COMPREHENSIVE METABOLIC PANEL - Abnormal    Sodium 137      Potassium 4.1      Carbon Dioxide (CO2) 21 (*)     Anion Gap 12      Urea Nitrogen 20.3      Creatinine 1.00      GFR Estimate 84      Calcium 8.7 (*)     Chloride 104      Glucose 151 (*)     Alkaline Phosphatase 92      AST 18      ALT 15      Protein Total 7.4      Albumin 3.6      Bilirubin Total 0.2     CRP INFLAMMATION - Abnormal    CRP Inflammation 59.82 (*)    ISTAT GASES LACTATE VENOUS POCT - Abnormal    Lactic Acid POCT 1.4      Bicarbonate Venous POCT 24      O2 Sat, Venous POCT 85 (*)     pCO2 Venous POCT 40      pH Venous POCT 7.39      pO2 Venous POCT 51 (*)     Base Excess/Deficit (+/-) POCT -1.0     CBC WITH PLATELETS AND DIFFERENTIAL - Abnormal    WBC Count 5.3      RBC Count 3.61 (*)     Hemoglobin 9.8 (*)     Hematocrit 31.9 (*)     MCV 88      MCH 27.1       "MCHC 30.7 (*)     RDW 16.4 (*)     Platelet Count 236      % Neutrophils 72      % Lymphocytes 16      % Monocytes 9      % Eosinophils 3      % Basophils 0      % Immature Granulocytes 0      NRBCs per 100 WBC 0      Absolute Neutrophils 3.7      Absolute Lymphocytes 0.8      Absolute Monocytes 0.5      Absolute Eosinophils 0.2      Absolute Basophils 0.0      Absolute Immature Granulocytes 0.0      Absolute NRBCs 0.0     BLOOD CULTURE   BLOOD CULTURE        No orders to display         Treatments and/or interventions provided:      Medications   vancomycin (VANCOCIN) 2,500 mg in 0.9% NaCl 500 mL intermittent infusion (2,500 mg Intravenous $New Bag 5/13/24 2037)        Patient's response to treatments and/or interventions:   Resting comfortably    To be done/followed up on inpatient unit:    See any in-patient orders    Does this patient have any cognitive concerns?:  None    Activity level - Baseline/Home:    Independent    Activity Level - Current:     Independent and Stand with Assist    Patient's Preferred language: English     Needed?: No    Isolation: Contact  Infection: MRSA  Patient tested for COVID 19 prior to admission: NO    Bariatric?: Yes    Vital Signs:   Vitals:    05/13/24 1932 05/13/24 2039 05/13/24 2040 05/13/24 2041   BP: 136/60 124/72     Pulse: 80 83     Resp: 16      Temp: 100  F (37.8  C)      TempSrc: Oral      SpO2: 97%  95% 96%   Weight: 148.8 kg (328 lb)      Height: 1.676 m (5' 6\")          Cardiac Rhythm:     Was the PSS-3 completed:   Yes    Family Comments: None    For the majority of the shift this patient's behavior was Green.   Behavioral interventions performed were     ED NURSE PHONE NUMBER: *[id number]         "

## 2024-05-14 NOTE — ED PROVIDER NOTES
Emergency Department Note      History of Present Illness     Chief Complaint  Left arm pain and swelling    HPI  Petey Archibald is a 63 year old male brought in by ambulance from his group home due to left arm pain and swelling as well as redness.  He states that this started this morning.  He denies any known injury.  He denies any fevers, chest pain, or shortness of breath.  He states that he always has some swelling to both elbows, and that it is essentially unchanged.  He is right-hand dominant.  He has chronic edema in his lower extremities as well, which he states are at their baseline.  Vital signs were normal for EMS, as was his blood sugar.    Independent Historian  EMS as detailed above.    Review of External Notes  I reviewed the vascular surgery clinic note from 4/23/2024.  Past Medical History   Medical History and Problem List  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    Chronic infection     Coagulation disorder (H24)     COPD (chronic obstructive pulmonary disease) (H)     Diabetic ulcer of right midfoot with fat layer exposed (H) 11/15/2023    History of DVT of lower extremity     History of pulmonary embolism     Hyperlipidemia     Mild intellectual disabilities     Morbid obesity (H)     NEL (obstructive sleep apnea)     Peripheral edema     Peripheral vascular disease (H24)     Sleep apnea     Thrombosis     Tobacco dependence     Uncomplicated asthma     Venous stasis dermatitis        Medications  No current outpatient medications on file.      Surgical History   Past Surgical History:   Procedure Laterality Date    COLONOSCOPY N/A 4/15/2019    Procedure: COMBINED COLONOSCOPY, SINGLE OR MULTIPLE BIOPSY/POLYPECTOMY BY BIOPSY;  Surgeon: Jovana Ohara MD;  Location:  GI    COLONOSCOPY N/A 6/7/2021    Procedure: COLONOSCOPY with polypectomies;  Surgeon: Sahil Cid MD;  Location: [facility] OR    EXCISE MALIGNANT LESIONS, TRUNK/ARMS/LEGS 0.5CM  "OR LESS Left 5/26/2017    Procedure: EXCISE MALIGNANT LESIONS, TRUNK/ARMS/LEGS 0.5CM OR LESS;  EXCISION LEFT ELBOW MASS;  Surgeon: Jose Azevedo MD;  Location:  GI    RECTAL SURGERY      perianal abscess?     Physical Exam   Patient Vitals for the past 24 hrs:   BP Temp Temp src Pulse Resp SpO2 Height Weight   05/13/24 2300 129/63 99.6  F (37.6  C) Oral 68 18 97 % -- --   05/13/24 2232 120/71 -- -- 82 -- 92 % -- --   05/13/24 2202 -- -- -- -- -- 91 % -- --   05/13/24 2201 -- -- -- -- -- 90 % -- --   05/13/24 2200 123/73 -- -- 76 -- -- -- --   05/13/24 2133 -- -- -- -- -- 95 % -- --   05/13/24 2128 123/71 -- -- 74 -- 96 % -- --   05/13/24 2115 -- -- -- -- -- 96 % -- --   05/13/24 2105 122/67 -- -- -- -- 96 % -- --   05/13/24 2041 -- -- -- -- -- 96 % -- --   05/13/24 2040 -- -- -- -- -- 95 % -- --   05/13/24 2039 124/72 -- -- 83 -- -- -- --   05/13/24 1932 136/60 100  F (37.8  C) Oral 80 16 97 % 1.676 m (5' 6\") 148.8 kg (328 lb)     Physical Exam  Nursing note and vitals reviewed.  Constitutional:  Awake and alert. Cooperative.   HENT:   Nose:    Nose normal.   Mouth/Throat:   Mucous membranes are normal.   Eyes:    Conjunctivae normal and EOM are normal.      Pupils are equal, round, and reactive to light.   Neck:    Trachea normal.   Cardiovascular:  Normal rate, regular rhythm, normal heart sounds and normal pulses. No murmur heard.  Pulmonary/Chest:  Effort normal and breath sounds normal.   Abdominal:   Soft. Normal appearance and bowel sounds are normal.      There is no tenderness.      There is no rebound and no CVA tenderness.   Musculoskeletal:  Extremities atraumatic x 4.   Lymphadenopathy:  No cervical adenopathy.   Neurological:   Awake and alert. Normal strength.      No cranial nerve deficit or sensory deficit. GCS eye subscore is 4. GCS verbal subscore is 5. GCS motor subscore is 6.   Skin:    There is some swelling, erythema, and warmth to the left elbow extending up the posterior aspect of " the left upper arm and going into the left forearm.  There are no breaks in the skin.  No induration or fluctuance present.  There is a fluid-filled cystic structure in the olecranon region which is nontender.  Additionally, there is a fluid-filled cystic structure in the right olecranon region which is nontender and not warm or erythematous.  Psychiatric:   Normal mood and affect.    Diagnostics   Lab Results   Labs Ordered and Resulted from Time of ED Arrival to Time of ED Departure   INR - Abnormal       Result Value    INR 2.50 (*)    COMPREHENSIVE METABOLIC PANEL - Abnormal    Sodium 137      Potassium 4.1      Carbon Dioxide (CO2) 21 (*)     Anion Gap 12      Urea Nitrogen 20.3      Creatinine 1.00      GFR Estimate 84      Calcium 8.7 (*)     Chloride 104      Glucose 151 (*)     Alkaline Phosphatase 92      AST 18      ALT 15      Protein Total 7.4      Albumin 3.6      Bilirubin Total 0.2     CRP INFLAMMATION - Abnormal    CRP Inflammation 59.82 (*)    ISTAT GASES LACTATE VENOUS POCT - Abnormal    Lactic Acid POCT 1.4      Bicarbonate Venous POCT 24      O2 Sat, Venous POCT 85 (*)     pCO2 Venous POCT 40      pH Venous POCT 7.39      pO2 Venous POCT 51 (*)     Base Excess/Deficit (+/-) POCT -1.0     CBC WITH PLATELETS AND DIFFERENTIAL - Abnormal    WBC Count 5.3      RBC Count 3.61 (*)     Hemoglobin 9.8 (*)     Hematocrit 31.9 (*)     MCV 88      MCH 27.1      MCHC 30.7 (*)     RDW 16.4 (*)     Platelet Count 236      % Neutrophils 72      % Lymphocytes 16      % Monocytes 9      % Eosinophils 3      % Basophils 0      % Immature Granulocytes 0      NRBCs per 100 WBC 0      Absolute Neutrophils 3.7      Absolute Lymphocytes 0.8      Absolute Monocytes 0.5      Absolute Eosinophils 0.2      Absolute Basophils 0.0      Absolute Immature Granulocytes 0.0      Absolute NRBCs 0.0     BLOOD CULTURE   BLOOD CULTURE       Imaging  No orders to display         Independent Interpretation  None  ED Course     Medications Administered  Medications   senna-docusate (SENOKOT-S/PERICOLACE) 8.6-50 MG per tablet 1 tablet (has no administration in time range)     Or   senna-docusate (SENOKOT-S/PERICOLACE) 8.6-50 MG per tablet 2 tablet (has no administration in time range)   ondansetron (ZOFRAN ODT) ODT tab 4 mg (has no administration in time range)     Or   ondansetron (ZOFRAN) injection 4 mg (has no administration in time range)   acetaminophen (TYLENOL) tablet 650 mg (has no administration in time range)     Or   acetaminophen (TYLENOL) Suppository 650 mg (has no administration in time range)   vancomycin (VANCOCIN) 1,000 mg in 200 mL dextrose intermittent infusion (has no administration in time range)   vancomycin (VANCOCIN) 2,500 mg in 0.9% NaCl 500 mL intermittent infusion (0 mg Intravenous Stopped 5/13/24 6021)       Procedures  Procedures     Discussion of Management  Admitting Hospitalist, Dr. Sam    Social Determinants of Health adding to complexity of care  Education/Literacy    ED Course     Medical Decision Making / Diagnosis   CMS Diagnoses: None    MIPS     None    MDM  Petey Archibald is a 63 year old male brought in by ambulance from his group home due to left upper extremity cellulitis.  I do not think that he has a septic joint, although he certainly could potentially have olecranon bursitis or the start of that.  He does not appear septic or toxic, but he does have a low-grade fever here and a history of MRSA.  I proceeded with the above workup, and he was provided IV vancomycin due to his history of MRSA.  I think it is best given the extent of his cellulitis and his history that he come into the hospital for further evaluation and management.  I subsequently spoke with Dr. Sam, who will be taking care of him.    Disposition  The patient was admitted to the hospital under the care of Dr. Sam.     ICD-10 Codes:    ICD-10-CM    1. Cellulitis of left upper extremity  L03.114       2.  H/O methicillin resistant Staphylococcus aureus infection  Z86.14                  Carlos Sandoval MD    [employer]        Carlos Sandoval MD  05/13/24 3502

## 2024-05-14 NOTE — H&P
[facility]    History and Physical - Hospitalist Service       Date of Admission:  5/13/2024    Assessment & Plan      Petey Archibald is a 63 year old male with previous MRSA abscess of the right elbow, mild intellectual disabilities, morbid obesity, NEL, schizoaffective d/o, copd, history of DVT/PE admitted on 5/13/2024 with left elbow cellulitis and possible underlying cyst or abscess      Left elbow cellulitis versus olecranon bursitis, rule out septic bursitis  History of MRSA abscess of the right elbow  Low grade fever of 100.0 but no signs of sepsis. Note that CRP is 60  Has bilateral appears to be chronic olecranonon bursitis but much worse, erythematous and red on the left side  Plan  - admit to inpatient and suspect will need   - continue vancomycin  - will consult orthopedics to evaluate for intervention given the elbow fluid collection and concern for rule out septic bursitis  - hold coumadin tonight  -I offered to call his sister however the patient states he would like to wait until tomorrow given that she is sleeping.  He states he makes his own medical decisions and his group home confirmed he does not have a guardian    Chronic medical history  PAD with LLE wound: follows with vascular surgery, has a recent note. Woc consult  History of schizoaffective: resume home meds once verified  NEL: resume home cpap  Tobacco use: urged cessation  Depression/schizoaffective disorder: resume home meds once verified  Morbid obesity: increase in all cause mortality          Diet:  ergular, npo at midnight  DVT Prophylaxis: Pneumatic Compression Devices  Tapia Catheter: Not present  Lines: None     Cardiac Monitoring: None  Code Status:  full code    Clinically Significant Risk Factors Present on Admission               # Drug Induced Coagulation Defect: home medication list includes an anticoagulant medication    # Hypertension: Home medication list includes antihypertensive(s)      #  "Severe Obesity: Estimated body mass index is 52.94 kg/m  as calculated from the following:    Height as of this encounter: 1.676 m (5' 6\").    Weight as of this encounter: 148.8 kg (328 lb).       # COPD: noted on problem list        Disposition Plan     Medically Ready for Discharge: Anticipated Tomorrow           Chavo Sam DO  Hospitalist Service  [facility]  Securely message with [employer] (more info)  Text page via [employer] Paging/Directory     ______________________________________________________________________    Chief Complaint   Left elbow pain    History is obtained from the patient    History of Present Illness   Petey Archibald is a 63 year old male with past medical history of borderline intellectual disability, lives in group home, tobacco use, schizoaffective disorder, depression, COPD, peripheral arterial disease with chronic left lower extremity wound, and previous MRSA abscess of the right elbow who presents with left elbow swelling and pain.  This occurred abruptly today and he is having trouble moving his elbow and the full range of motion.    History is difficult due to underlying patient characteristics.  I attempted to call his group home however they did not know why he was sent to the emergency department.    In the emergency department he was found to have bilateral elbow fluid collections appearing to be potentially some chronic olecranon bursitis.  The left elbow is painful and red and tender to touch.  His CRP was elevated.  Because of his previous history of MRSA and an abscess of his elbow on the other side the patient was started on IV vancomycin and admission was requested.            Past Medical History    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    Chronic infection     MRSA    Coagulation disorder (H24)     on coumadin    COPD (chronic obstructive pulmonary disease) (H)     Diabetic ulcer of right midfoot " with fat layer exposed (H) 11/15/2023    History of DVT of lower extremity     History of pulmonary embolism     Hyperlipidemia     Mild intellectual disabilities     Morbid obesity (H)     NEL (obstructive sleep apnea)     Peripheral edema     Peripheral vascular disease (H24)     Sleep apnea     uses CPAP    Thrombosis     Tobacco dependence     Uncomplicated asthma     Venous stasis dermatitis        Past Surgical History   Past Surgical History:   Procedure Laterality Date    COLONOSCOPY N/A 4/15/2019    Procedure: COMBINED COLONOSCOPY, SINGLE OR MULTIPLE BIOPSY/POLYPECTOMY BY BIOPSY;  Surgeon: Jovana Ohara MD;  Location:  GI    COLONOSCOPY N/A 6/7/2021    Procedure: COLONOSCOPY with polypectomies;  Surgeon: Sahil Cid MD;  Location: [facility] OR    EXCISE MALIGNANT LESIONS, TRUNK/ARMS/LEGS 0.5CM OR LESS Left 5/26/2017    Procedure: EXCISE MALIGNANT LESIONS, TRUNK/ARMS/LEGS 0.5CM OR LESS;  EXCISION LEFT ELBOW MASS;  Surgeon: Jose Azevedo MD;  Location:  GI    RECTAL SURGERY      perianal abscess?       Prior to Admission Medications   Prior to Admission Medications   Prescriptions Last Dose Informant Patient Reported? Taking?   ARIPiprazole (ABILIFY) 5 MG tablet   No No   Sig: Take 1 tablet (5 mg) by mouth daily   Cadexomer Iodine, topical, 0.9% (IODOSORB) 0.9 % GEL gel  Nursing Home No No   Sig: Apply topically daily Apply to left foot wound daily after cleansing the wound and blotting it dry.   EPINEPHrine (ANY BX GENERIC EQUIV) 0.3 MG/0.3ML injection 2-pack   No No   Sig: INJECT 0.3MG INTRAMUSCULARLY ONE TIME FOR ONE DOSE AS NEEDED FOR ANAPHYLAXIS. MAY REPEAT ONE TIME IN 5-15 MINUTES IF RESPONSE TO INITIAL DOSE IS INADEQUATE. *1 TOTAL FILL, ORIGINAL FROM EMERGENCY ROOM*   EPINEPHrine (EPIPEN 2-BRE) 0.3 MG/0.3ML injection 2-pack  Nursing Home No No   Sig: INJECT 0.3MG INTRAMUSCULAR ONE TIME FOR ONE DOSE AS NEEDED FOR ANAPHYLAXIS (CALL 911 IF YOU HAVE TO GIVE) (FOR BEE STINGS) **LABEL EACH  "PEN*   Emollient (CERAVE) CREA   No No   Sig: Externally apply topically daily   Gauze Pads & Dressings (GAUZE PADS 4\"X4\") 4\"X4\" PADS  Nursing Home No No   Si each 3 times daily as needed   LORazepam (ATIVAN) 1 MG tablet   Yes No   Sig: Take 1 mg by mouth daily as needed for anxiety   Multiple Vitamin (DAILY-JOVAN) TABS   No No   Sig: TAKE 1 TABLET BY MOUTH EVERY MORNING (VITAMINS)   PULMICORT FLEXHALER 180 MCG/ACT inhaler   No No   Sig: INHALE 2 PUFFS INTO THE LUNGS TWICE DAILY.   SENNA-TABS 8.6 MG tablet   Yes No   Sig: Take 1 tablet by mouth daily   SPIRIVA HANDIHALER 18 MCG inhaled capsule   No No   Sig: INHALE CONTENTS OF 1 CAPSULE INTO THE LUNGS ONCE DAILY   acetaminophen (TYLENOL) 325 MG tablet   No No   Sig: Take 1-2 tablets (325-650 mg) by mouth every 6 hours as needed for mild pain, fever or headaches   albuterol (ALBUTEROL) 108 (90 BASE) MCG/ACT inhaler  Nursing Home No No   Sig: Inhale 2 puffs into the lungs every 6 hours as needed   buPROPion (WELLBUTRIN SR) 150 MG 12 hr tablet   No No   Sig: Take 1 tablet (150 mg) by mouth 2 times daily for 45 days   cloNIDine (CATAPRES) 0.1 MG tablet   Yes No   Sig: Take 0.1 mg by mouth daily as needed   clotrimazole (LOTRIMIN) 1 % external cream  Nursing Home No No   Sig: Apply topically 2 times daily   diclofenac (VOLTAREN) 1 % topical gel   No No   Sig: Apply 4 g topically 2 times daily as needed for moderate pain   diphenhydrAMINE (BENADRYL) 25 MG capsule   Yes No   Sig: Take 50 mg by mouth every 6 hours as needed for itching or allergies   gabapentin (NEURONTIN) 100 MG capsule  Nursing Home Yes No   Sig: Take 400 mg by mouth 3 times daily At 0900, 1200, and 2000   loratadine (CLARITIN) 10 MG tablet   Yes No   Sig: Take 10 mg by mouth daily   montelukast (SINGULAIR) 10 MG tablet   No No   Sig: TAKE 1 TABLET BY MOUTH EVERY MORNING (ASTHMA)   naltrexone (DEPADE/REVIA) 50 MG tablet   No No   Sig: Take 2 tablets (100 mg) by mouth daily for 45 days   nicotine " (COMMIT) 2 MG lozenge   Yes No   Sig: Place 2 mg inside cheek daily as needed   nystatin-triamcinolone (MYCOLOG II) 224580-1.1 UNIT/GM-% external cream  Nursing Home Yes No   Sig: Apply topically 2 times daily For 1-2 weeks.   omeprazole (PRILOSEC) 40 MG DR capsule   No No   Sig: Take 1 capsule (40 mg) by mouth daily   order for DME   No No   Sig: Equipment being ordered: support compression hose BK  2 pair black 30mm HG   To be applied on arising & removed while lying down to go to sleep   order for DME   No No   Sig: Equipment being ordered: CPAP with mask and tubing   order for DME   No No   Sig: Oxygen 2 Li/min  at night and bled into BiPAP   order for DME   No No   Sig: [employer] [facility] Phone [phone] Fax [phone]  Primary Dressing Hydrofera Blue Ready   Qty 5 sheets  Secondary Dressing 4' roll gauze Qty 30  Secondary Dressing 2' medipore tape Qty 1  Secondary Dressing 4x4 gauze loaf Qty  1  Length of Need: 1 month  Frequency of dressing change: daily   order for DME   No No   Sig: [employer] [facility] Phone [phone] Fax [phone]  EdemaWear Size Medium/Yellow qty 4 sleeves  Length of Need: 1 month  Frequency of dressing change daily   order for DME   No No   Sig: [employer]  Phone #[phone]  Fax #[phone]  Coolflex Velcro wraps    Length of Need: Life Time  # of Pairs 1 set   order for DME   No No   Sig: Equipment being ordered: 1 surgical shoe   order for DME   No No   Sig: Equipment being ordered: roll of micropore tape to dress foot wound bid   polyethylene glycol (MIRALAX) 17 GM/Dose powder   No No   Sig: Take 17 g (1 capful) by mouth daily as needed for constipation   sertraline (ZOLOFT) 100 MG tablet   No No   Sig: Take 2 tablets (200 mg) by mouth daily for 45 days   simvastatin (ZOCOR) 40 MG tablet   No No   Sig: TAKE 1 TABLET BY MOUTH EVERY MORNING.  (CHOLESTEROL)   spironolactone (ALDACTONE) 25 MG tablet   No No   Sig: TAKE 1 TABLET BY MOUTH ONCE DAILY.  (HIGH BLOOD PRESSURE)   tiZANidine (ZANAFLEX) 2 MG tablet   No No   Sig: Take 1 tablet (2 mg) by mouth 3 times daily   tiZANidine (ZANAFLEX) 2 MG tablet   No No   Sig: TAKE 1 TABLET BY MOUTH THREE TIMES DAILY.   traZODone (DESYREL) 100 MG tablet   No No   Sig: Take 1 tablet (100 mg) by mouth at bedtime for 45 days   triamcinolone (KENALOG) 0.1 % external cream   No No   Sig: Apply to AA BID x 1-2 week then PRN only   vitamin B complex with vitamin C (STRESS TAB) tablet   No No   Sig: Take 1 tablet by mouth daily   warfarin ANTICOAGULANT (COUMADIN) 2 MG tablet   No No   Sig: Take 1 tablet (along with a 6 mg tab for a total of 8 mg) by mouth every Sun, Tue, Fri. Next INR is 5/15/24   warfarin ANTICOAGULANT (COUMADIN) 6 MG tablet   No No   Sig: Take 1 Tablet (6 mg) by mouth every day. Next INR Date is 5/15/24      Facility-Administered Medications: None        Review of Systems    The 10 point Review of Systems is negative other than noted in the HPI or here.     Social History   I have reviewed this patient's social history and updated it with pertinent information if needed.  Social History     Tobacco Use    Smoking status: Former     Current packs/day: 1.00     Average packs/day: 1 pack/day for 30.0 years (30.0 ttl pk-yrs)     Types: Cigarettes    Smokeless tobacco: Never    Tobacco comments:     started at age 18    Vaping Use    Vaping status: Every Day   Substance Use Topics    Alcohol use: No     Alcohol/week: 0.0 standard drinks of alcohol    Drug use: No         Family History   I have reviewed this patient's family history and updated it with pertinent information if needed.  Family History   Problem Relation Age of Onset    Unknown/Adopted Mother     Unknown/Adopted Father     Diabetes Brother          Allergies   Allergies   Allergen Reactions    Bees     Clavulanic Acid     Amoxicillin-Pot Clavulanate Rash     Augmentin    Penicillins Rash        Physical Exam   Vital Signs: Temp: 100  F (37.8  C) Temp  src: Oral BP: 124/72 Pulse: 83   Resp: 16 SpO2: 96 %      Weight: 328 lbs 0 oz    General Appearance: No acute distress  Respiratory: Clear to auscultation bilaterally.  No adventitious sounds.  Work of breathing within normal limits.  Cardiovascular: Regular rate and rhythm.  No murmurs.  No lower extremity edema.  GI: Soft nontender nondistended.  No rebound or guarding.  Bowel sounds present in all 4 quadrants.  Skin: Bilateral fluid collections around olecranon process.  On the left side there is surrounding erythema.  Range of motion of the left elbow notable for pain past 90 degrees of flexion       Medical Decision Making       55 MINUTES SPENT BY ME on the date of service doing chart review, history, exam, documentation & further activities per the note.      Data     I have personally reviewed the following data over the past 24 hrs:    5.3  \   9.8 (L)   / 236     137 104 20.3 /  151 (H)   4.1 21 (L) 1.00 \     ALT: 15 AST: 18 AP: 92 TBILI: 0.2   ALB: 3.6 TOT PROTEIN: 7.4 LIPASE: N/A     Procal: N/A CRP: 59.82 (H) Lactic Acid: 1.4       INR:  2.50 (H) PTT:  N/A   D-dimer:  N/A Fibrinogen:  N/A       Imaging results reviewed over the past 24 hrs:   No results found for this or any previous visit (from the past 24 hour(s)).

## 2024-05-14 NOTE — PROGRESS NOTES
Observation goals PRIOR TO DISCHARGE  Comments: -diagnostic tests and consults completed and resulted - met  -vital signs normal or at patient baseline - met  Nurse to notify provider when observation goals have been met and patient is ready for discharge.     A&O x3, disoriented to place. Ast of 1 GB/W. Regular diet w/ good appetite. Ace wrap on LUE. Voiding in the bathroom. PIV removed. Went over AVS and discharge medications. Discharge back to group home, via [facility] Chair ride.

## 2024-05-14 NOTE — ED TRIAGE NOTES
Patient arrives via ambulance from a group home. This morning he started having pain in left arm and top of hand. Has a history of blood clots, is on warfarin. Pain increases when applying weight.

## 2024-05-15 ENCOUNTER — LAB (OUTPATIENT)
Dept: LAB | Facility: CLINIC | Age: 64
End: 2024-05-15
Payer: MEDICARE

## 2024-05-15 ENCOUNTER — ANTICOAGULATION THERAPY VISIT (OUTPATIENT)
Dept: ANTICOAGULATION | Facility: CLINIC | Age: 64
End: 2024-05-15

## 2024-05-15 ENCOUNTER — DOCUMENTATION ONLY (OUTPATIENT)
Dept: ANTICOAGULATION | Facility: CLINIC | Age: 64
End: 2024-05-15

## 2024-05-15 DIAGNOSIS — Z86.711 HISTORY OF PULMONARY EMBOLISM: Primary | ICD-10-CM

## 2024-05-15 DIAGNOSIS — Z79.01 LONG TERM CURRENT USE OF ANTICOAGULANT THERAPY: ICD-10-CM

## 2024-05-15 DIAGNOSIS — Z86.711 HISTORY OF PULMONARY EMBOLISM: ICD-10-CM

## 2024-05-15 LAB — INR BLD: 3 (ref 0.9–1.1)

## 2024-05-15 PROCEDURE — 36416 COLLJ CAPILLARY BLOOD SPEC: CPT

## 2024-05-15 PROCEDURE — 85610 PROTHROMBIN TIME: CPT

## 2024-05-15 RX ORDER — WARFARIN SODIUM 6 MG/1
TABLET ORAL
Qty: 5 TABLET | Refills: 0 | Status: SHIPPED | OUTPATIENT
Start: 2024-05-15 | End: 2024-05-31

## 2024-05-15 NOTE — PROGRESS NOTES
ANTICOAGULATION MANAGEMENT     Petey Archibald 63 year old male is on warfarin with therapeutic INR result. (Goal INR 2.0-3.0)    Recent labs: (last 7 days)     05/15/24  1040   INR 3.0*       ASSESSMENT     Source(s): Chart Review     Warfarin doses taken: Warfarin taken as instructed  Diet: No new diet changes identified  Medication/supplement changes:  Bactrim 10 day course (dates: 5/14/24-5/23/24) subsequent INRs may be increased. Closer INR monitoring recommended. and With Sulfamethoxazole-Trimethoprim, ACC protocol Appendix G recommends empiric reduction of warfarin dose in therapeutic/supratherapeutic patients.  New illness, injury, or hospitalization: Yes: Admitted to [facility] 5/13/24-5/14/24 for chronic olecranon bursiti  Signs or symptoms of bleeding or clotting: No  Previous result: Therapeutic last 2(+) visits  Additional findings: Refill needed today. Petey meets all criteria for refill (current [facility] referral, office visit with referring provider/group in last 1 year unless directed to return in 2 years in last referring provider visit note, lab monitoring up to date or not exceeding 2 weeks overdue). Rx instructions and quantity supplied updated to match patient's current dosing plan.  10 day supply with 0 refills granted per [facility] protocol        PLAN     Recommended plan for temporary change(s) and ongoing change(s) affecting INR     Dosing Instructions: decrease your warfarin dose (12.5% change) with next INR in 5 days       Summary  As of 5/15/2024      Full warfarin instructions:  6 mg every day   Next INR check:  5/20/2024               Telephone call with group sudheer Diallo staff member who verbalizes understanding and agrees to plan  Detailed voice message left for group sudheer Thurston with dosing instructions and follow up date.     Lab visit scheduled    Education provided:   Goal range and lab monitoring: goal range and significance of current result, Importance of  therapeutic range, and Importance of following up at instructed interval  Interaction IS anticipated between warfarin and Bactrim    Plan made with [facility] Pharmacist Alyce Lopez, RN  Anticoagulation Clinic  5/15/2024    _______________________________________________________________________     Anticoagulation Episode Summary       Current INR goal:  2.0-3.0   TTR:  70.6% (1 y)   Target end date:  Indefinite   Send INR reminders to:  EDU JIMENEZ    Indications    History of pulmonary embolism [Z86.711]  Long term current use of anticoagulant therapy [Z79.01]             Comments:  REM care home; A new Coumadin Prescription will need to sent to [facility] with current dose and next INR check.             Anticoagulation Care Providers       Provider Role Specialty Phone number    Demetri Archer MD Referring Internal Medicine [phone]

## 2024-05-15 NOTE — PROGRESS NOTES
ANTICOAGULATION  MANAGEMENT: Discharge Review    Petey Archibald chart reviewed for anticoagulation continuity of care    Hospital Admission on 5/13-5/14 for left elbow bursitis/cellulitis.    Discharge disposition: Group home     Results:    Recent labs: (last 7 days)     05/13/24 1957 05/14/24  0718 05/15/24  1040   INR 2.50* 2.60* 3.0*     Anticoagulation inpatient management:     not applicable     Anticoagulation discharge instructions:     Warfarin dosing: home regimen continued   Bridging: No   INR goal change: No      Medication changes affecting anticoagulation: Yes: BPA for Bactrim for 10 days see [facility] encounter from 05/15/24      Additional factors affecting anticoagulation: No     PLAN     No adjustment to anticoagulation plan needed - patient was dosed today     Patient not contacted    No adjustment to Anticoagulation Calendar was required    Sarah Tucker RN

## 2024-05-16 ENCOUNTER — PATIENT OUTREACH (OUTPATIENT)
Dept: INTERNAL MEDICINE | Facility: CLINIC | Age: 64
End: 2024-05-16
Payer: MEDICARE

## 2024-05-16 NOTE — TELEPHONE ENCOUNTER
Transitions of Care Outreach  Chief Complaint   Patient presents with    Hospital F/U       Most Recent Admission Date: 5/13/2024   Most Recent Admission Diagnosis: Cellulitis of left upper extremity - L03.114  H/O methicillin resistant Staphylococcus aureus infection - Z86.14     Most Recent Discharge Date: 5/14/2024   Most Recent Discharge Diagnosis: Cellulitis of left upper extremity - L03.114  H/O methicillin resistant Staphylococcus aureus infection - Z86.14  Olecranon bursitis of both elbows - M70.21, M70.22     Transitions of Care Assessment    Discharge Assessment  How are you doing now that you are home?: doing good  How are your symptoms? (Red Flag symptoms escalate to triage hotline per guidelines): Improved  Do you know how to contact your clinic care team if you have future questions or changes to your health status? : Yes  Does the patient have their discharge instructions? : Yes  Does the patient have questions regarding their discharge instructions? : No  Were you started on any new medications or were there changes to any of your previous medications? : No  Does the patient have all of their medications?: Yes  Do you have questions regarding any of your medications? : No  Do you have all of your needed medical supplies or equipment (DME)?  (i.e. oxygen tank, CPAP, cane, etc.): No - What equipment or supplies are needed?    Follow up Plan     Discharge Follow-Up  Discharge follow up appointment scheduled in alignment with recommended follow up timeframe or Transitions of Risk Category? (Low = within 30 days; Moderate= within 14 days; High= within 7 days): Yes  Discharge Follow Up Appointment Date: 06/05/24  Discharge Follow Up Appointment Scheduled with?: Primary Care Provider    Future Appointments   Date Time Provider Department Center   5/20/2024  1:15 PM [facility]    6/4/2024 11:20 AM Miguel Hampton MD [facility]   6/5/2024  2:00 PM Demetri Archer MD [facility]    6/11/2024  9:45 AM Ludwig Muhammad MD [facility]   6/19/2024  9:45 AM [facility]   7/2/2024  1:00 PM Miguel Hampton MD [facility]       Outpatient Plan as outlined on AVS reviewed with patient.    For any urgent concerns, please contact our 24 hour nurse triage line: [phone] ([phone])       Imelda Muniz RN

## 2024-05-17 NOTE — PHARMACY-ANTICOAGULATION SERVICE
Clinical Pharmacy - Warfarin Dosing Consult     Pharmacy has been consulted to manage this patient s warfarin therapy.  Indication: DVT/ PE Treatment  Therapy Goal: INR 2-3  Warfarin Prior to Admission: Yes  Warfarin PTA Regimen: 8 mg po daily  Significant drug interactions: ancef, clindamycin started  Recent documented change in oral intake/nutrition: Unknown    INR   Date Value Ref Range Status   07/26/2020 2.30 (H) 0.86 - 1.14 Final   06/30/2020 2.60 (H) 0.86 - 1.14 Final     Comment:     This test is intended for monitoring Coumadin therapy.  Results are not   accurate in patients with prolonged INR due to factor deficiency.         Pharmacy will monitor Petey Archibald daily and order warfarin doses to achieve specified goal.      Please contact pharmacy as soon as possible if the warfarin needs to be held for a procedure or if the warfarin goals change.     Weakness

## 2024-05-18 LAB — BACTERIA BLD CULT: NO GROWTH

## 2024-05-19 LAB — BACTERIA BLD CULT: NO GROWTH

## 2024-05-20 ENCOUNTER — ANTICOAGULATION THERAPY VISIT (OUTPATIENT)
Dept: ANTICOAGULATION | Facility: CLINIC | Age: 64
End: 2024-05-20

## 2024-05-20 ENCOUNTER — LAB (OUTPATIENT)
Dept: LAB | Facility: CLINIC | Age: 64
End: 2024-05-20
Payer: MEDICARE

## 2024-05-20 DIAGNOSIS — Z79.01 LONG TERM CURRENT USE OF ANTICOAGULANT THERAPY: ICD-10-CM

## 2024-05-20 DIAGNOSIS — Z86.711 HISTORY OF PULMONARY EMBOLISM: ICD-10-CM

## 2024-05-20 DIAGNOSIS — Z86.711 HISTORY OF PULMONARY EMBOLISM: Primary | ICD-10-CM

## 2024-05-20 LAB — INR BLD: 3.2 (ref 0.9–1.1)

## 2024-05-20 PROCEDURE — 85610 PROTHROMBIN TIME: CPT

## 2024-05-20 PROCEDURE — 36416 COLLJ CAPILLARY BLOOD SPEC: CPT

## 2024-05-20 RX ORDER — WARFARIN SODIUM 2 MG/1
TABLET ORAL
Qty: 12 TABLET | Refills: 0 | Status: SHIPPED | OUTPATIENT
Start: 2024-05-20 | End: 2024-05-28

## 2024-05-20 NOTE — PROGRESS NOTES
ANTICOAGULATION MANAGEMENT     Petey Archibald 63 year old male is on warfarin with supratherapeutic INR result. (Goal INR 2.0-3.0)    Recent labs: (last 7 days)     05/20/24  1320   INR 3.2*       ASSESSMENT     Source(s): Chart Reviewed and LEFT DETAILED MESSAGE WITH Home Care/Facility Nurse     Medication/supplement changes:  Yes.    Bactrim 10 day course (dates: 5/14/24-5/23/24) subsequent INRs may be increased. Closer INR monitoring recommended. and With Sulfamethoxazole-Trimethoprim, [facility] protocol Appendix G recommends empiric reduction of warfarin dose in therapeutic/supratherapeutic patients.   New illness, injury, or hospitalization: Yes:    Recent hospitalization from 5/13-14/24 for chronic olecranon bursitis.  Previous result: Therapeutic last 8 INR visits  Additional findings:  Yes.   - Call [facility] Bertrand chapman at [phone].   - AVS mailed to [facility] (no reliable fax) ; direct number if needed ([phone]).   - New e-script sent each time, including dates of INR, to [facility]       PLAN     Recommended plan for temporary change(s) affecting INR     Dosing Instructions: decrease your warfarin dose (14.3% change) with next INR in 3-4 days       Summary  As of 5/20/2024      Full warfarin instructions:  4 mg every Mon, Wed, Fri; 6 mg all other days   Next INR check:  5/23/2024               Detailed voice message left for Debbie Thurston ( @ facility nurse with dosing instructions and follow up date.    - Mail AVS to [facility].    Contact Anticoagulation line dedicated for Home Care / Facility ([phone] with any questions or concerns, and to schedule and to schedule next INR in 3-4 days.    Education provided:   Please call back if any changes to your diet, medications or how you've been taking warfarin  Taking warfarin: Importance of taking warfarin as instructed  Goal range and lab monitoring: goal range and significance of current  result  Interaction IS anticipated between warfarin and Bactrim-DS    Plan made with [facility] Pharmacist May Houston, RN  Anticoagulation Clinic  5/20/2024    _______________________________________________________________________     Anticoagulation Episode Summary       Current INR goal:  2.0-3.0   TTR:  69.2% (1 y)   Target end date:  Indefinite   Send INR reminders to:  EDU JIMENEZ    Indications    History of pulmonary embolism [Z86.711]  Long term current use of anticoagulant therapy [Z79.01]             Comments:  [facility]; A new Coumadin Prescription will need to sent to [facility] with current dose and next INR check.             Anticoagulation Care Providers       Provider Role Specialty Phone number    Demetri Archer MD Referring Internal Medicine [phone]

## 2024-05-28 ENCOUNTER — LAB (OUTPATIENT)
Dept: LAB | Facility: CLINIC | Age: 64
End: 2024-05-28
Payer: MEDICARE

## 2024-05-28 ENCOUNTER — ANTICOAGULATION THERAPY VISIT (OUTPATIENT)
Dept: ANTICOAGULATION | Facility: CLINIC | Age: 64
End: 2024-05-28

## 2024-05-28 DIAGNOSIS — Z79.01 LONG TERM CURRENT USE OF ANTICOAGULANT THERAPY: ICD-10-CM

## 2024-05-28 DIAGNOSIS — Z86.711 HISTORY OF PULMONARY EMBOLISM: ICD-10-CM

## 2024-05-28 DIAGNOSIS — Z86.711 HISTORY OF PULMONARY EMBOLISM: Primary | ICD-10-CM

## 2024-05-28 DIAGNOSIS — F25.0 SCHIZOAFFECTIVE DISORDER, BIPOLAR TYPE (H): ICD-10-CM

## 2024-05-28 LAB — INR BLD: 2.8 (ref 0.9–1.1)

## 2024-05-28 PROCEDURE — 85610 PROTHROMBIN TIME: CPT

## 2024-05-28 PROCEDURE — 36416 COLLJ CAPILLARY BLOOD SPEC: CPT

## 2024-05-28 RX ORDER — WARFARIN SODIUM 2 MG/1
TABLET ORAL
Qty: 10 TABLET | Refills: 0 | Status: SHIPPED | OUTPATIENT
Start: 2024-05-28 | End: 2024-05-31

## 2024-05-28 RX ORDER — NALTREXONE HYDROCHLORIDE 50 MG/1
TABLET, FILM COATED ORAL
Qty: 90 TABLET | Refills: 0 | OUTPATIENT
Start: 2024-05-28

## 2024-05-28 RX ORDER — WARFARIN SODIUM 2 MG/1
TABLET ORAL
Qty: 5 TABLET | Refills: 0 | Status: SHIPPED | OUTPATIENT
Start: 2024-05-28 | End: 2024-05-28

## 2024-05-28 NOTE — PROGRESS NOTES
ANTICOAGULATION MANAGEMENT     Petey Archibald 63 year old male is on warfarin with therapeutic INR result. (Goal INR 2.0-3.0)    Recent labs: (last 7 days)     05/28/24  1504   INR 2.8*       ASSESSMENT     Source(s): Chart Review and Patient/Caregiver Call     Warfarin doses taken: Warfarin taken as instructed Dose empirically reduced during antibiotics  Diet: No new diet changes identified  Medication/supplement changes:  Pt finished bactrim on 5/23/24  New illness, injury, or hospitalization: No  Signs or symptoms of bleeding or clotting: No  Previous result: Supratherapeutic  Additional findings: None       PLAN     Recommended plan for ongoing change(s) affecting INR     Dosing Instructions: Increase your warfarin dose (33.3% change)- back to previous maintenance dose with next INR in 3 days       Summary  As of 5/28/2024      Full warfarin instructions:  8 mg every Sun, Tue, Fri; 6 mg all other days   Next INR check:  5/31/2024               Telephone call with  who verbalizes understanding and agrees to plan and who agrees to plan and repeated back plan correctly    Lab visit scheduled    Education provided:   None required    Plan made per [facility] anticoagulation protocol    Luz Maria Moeller RN  Anticoagulation Clinic  5/28/2024    _______________________________________________________________________     Anticoagulation Episode Summary       Current INR goal:  2.0-3.0   TTR:  68.1% (1 y)   Target end date:  Indefinite   Send INR reminders to:  EDU JIMENEZ    Indications    History of pulmonary embolism [Z86.711]  Long term current use of anticoagulant therapy [Z79.01]             Comments:  [facility]; A new Coumadin Prescription will need to sent to [facility] with current dose and next INR check.             Anticoagulation Care Providers       Provider Role Specialty Phone number    Demetri Archer MD Referring Internal Medicine [phone]

## 2024-05-28 NOTE — PROGRESS NOTES
Mailed AVS    Ashley Quiroz RN    [facility] Anticoagulation [facility]

## 2024-05-28 NOTE — TELEPHONE ENCOUNTER
ANTICOAGULATION MANAGEMENT:  Medication Refill    Anticoagulation Summary  As of 5/20/2024      Warfarin maintenance plan:  4 mg (2 mg x 2) every Mon, Wed, Fri; 6 mg (6 mg x 1) all other days   Next INR check:  5/23/2024   Target end date:  Indefinite    Indications    History of pulmonary embolism [Z86.711]  Long term current use of anticoagulant therapy [Z79.01]                 Anticoagulation Care Providers       Provider Role Specialty Phone number    Demetri Archer MD Referring Internal Medicine [phone]            Refill Criteria    Visit with referring provider/group: Meets criteria: office visit within referring provider group in the last 1 year on 1/3/2024    [facility] referral last signed: 08/17/2023; within last year: Yes    Lab monitoring not exceeding 2 weeks overdue: Yes    Petey meets all criteria for refill. Rx instructions and quantity supplied updated to match patient's current dosing plan.  2 day supply with 0 refills granted per [facility] protocol - past due by 4 days for INR    Jessica Tran RN  Anticoagulation Clinic

## 2024-05-31 ENCOUNTER — LAB (OUTPATIENT)
Dept: LAB | Facility: CLINIC | Age: 64
End: 2024-05-31
Payer: MEDICARE

## 2024-05-31 ENCOUNTER — ANTICOAGULATION THERAPY VISIT (OUTPATIENT)
Dept: ANTICOAGULATION | Facility: CLINIC | Age: 64
End: 2024-05-31

## 2024-05-31 DIAGNOSIS — Z86.711 HISTORY OF PULMONARY EMBOLISM: ICD-10-CM

## 2024-05-31 DIAGNOSIS — Z79.01 LONG TERM CURRENT USE OF ANTICOAGULANT THERAPY: ICD-10-CM

## 2024-05-31 DIAGNOSIS — Z86.711 HISTORY OF PULMONARY EMBOLISM: Primary | ICD-10-CM

## 2024-05-31 LAB — INR BLD: 2.5 (ref 0.9–1.1)

## 2024-05-31 PROCEDURE — 85610 PROTHROMBIN TIME: CPT

## 2024-05-31 PROCEDURE — 36416 COLLJ CAPILLARY BLOOD SPEC: CPT

## 2024-05-31 RX ORDER — WARFARIN SODIUM 6 MG/1
TABLET ORAL
Qty: 10 TABLET | Refills: 0 | Status: SHIPPED | OUTPATIENT
Start: 2024-05-31 | End: 2024-06-06

## 2024-05-31 RX ORDER — WARFARIN SODIUM 2 MG/1
TABLET ORAL
Qty: 10 TABLET | Refills: 0 | Status: SHIPPED | OUTPATIENT
Start: 2024-05-31 | End: 2024-06-06

## 2024-05-31 NOTE — PROGRESS NOTES
ANTICOAGULATION MANAGEMENT     Petey Archibald 63 year old male is on warfarin with therapeutic INR result. (Goal INR 2.0-3.0)    Recent labs: (last 7 days)     05/31/24  1450   INR 2.5*       ASSESSMENT     Source(s): Chart Review and Home Care/Facility Nurse     Warfarin doses taken: Warfarin taken as instructed  Diet: No new diet changes identified  Medication/supplement changes: None noted  New illness, injury, or hospitalization: No  Signs or symptoms of bleeding or clotting: No  Previous result: Therapeutic last visit; previously outside of goal range  Additional findings: None and may have had less cigarettes and vape over the last few days.       PLAN     Recommended plan for no diet, medication or health factor changes affecting INR     Dosing Instructions: Continue your current warfarin dose with next INR in 1 week       Summary  As of 5/31/2024      Full warfarin instructions:  8 mg every Sun, Tue, Fri; 6 mg all other days   Next INR check:  6/6/2024               Telephone call with [facility] facility nurse who agrees to plan and repeated back plan correctly    Rx sent into the pharmacy with update sig and recheck date.    Mailed to facility by onsite staff.      Lab visit scheduled    Education provided:   None required    Plan made per [facility] anticoagulation protocol    Ashley Quiroz RN  Anticoagulation Clinic  5/31/2024    _______________________________________________________________________     Anticoagulation Episode Summary       Current INR goal:  2.0-3.0   TTR:  68.1% (1 y)   Target end date:  Indefinite   Send INR reminders to:  EDU JIMENEZ    Indications    History of pulmonary embolism [Z86.711]  Long term current use of anticoagulant therapy [Z79.01]             Comments:  [facility]; A new Coumadin Prescription will need to sent to [facility] with current dose and next INR check.             Anticoagulation Care Providers       Provider Role Specialty Phone number    Gianni  Demetri Caballero MD [facility] Internal Medicine [phone]

## 2024-06-04 ENCOUNTER — HOSPITAL ENCOUNTER (OUTPATIENT)
Dept: WOUND CARE | Facility: CLINIC | Age: 64
Discharge: HOME | End: 2024-06-04
Attending: FAMILY MEDICINE | Admitting: FAMILY MEDICINE
Payer: MEDICARE

## 2024-06-04 VITALS
TEMPERATURE: 98.3 F | DIASTOLIC BLOOD PRESSURE: 67 MMHG | HEART RATE: 73 BPM | RESPIRATION RATE: 17 BRPM | SYSTOLIC BLOOD PRESSURE: 120 MMHG

## 2024-06-04 DIAGNOSIS — S91.302D OPEN WOUND OF LEFT FOOT, SUBSEQUENT ENCOUNTER: Primary | ICD-10-CM

## 2024-06-04 DIAGNOSIS — L97.922 CHRONIC ULCER OF LEFT LEG WITH FAT LAYER EXPOSED (H): ICD-10-CM

## 2024-06-04 DIAGNOSIS — R60.0 LOWER EXTREMITY EDEMA: ICD-10-CM

## 2024-06-04 DIAGNOSIS — L97.512 ULCER OF RIGHT FOOT WITH FAT LAYER EXPOSED (H): ICD-10-CM

## 2024-06-04 PROCEDURE — 99214 OFFICE O/P EST MOD 30 MIN: CPT | Mod: 25 | Performed by: FAMILY MEDICINE

## 2024-06-04 PROCEDURE — 97602 WOUND(S) CARE NON-SELECTIVE: CPT

## 2024-06-04 PROCEDURE — 11042 DBRDMT SUBQ TIS 1ST 20SQCM/<: CPT | Performed by: FAMILY MEDICINE

## 2024-06-04 NOTE — DISCHARGE INSTRUCTIONS
06/04/2024   Petey Archibald   1960    A DME order for supplies has been placed to [facility]. If there are any issues with your order including not receiving the order please call [facility] at [phone]. They can also provide a tracking number for you.    Dressing changes outside of clinic are being performed by Group Home Staff    [facility] (3x week) Fax: laurie Lala [phone]     Plan: 6/4/24  Scheduled on 6/11 with Dr. Muhammad for sclerotherapy  Wear tube stocking to help with swelling  Elevate Legs to hip or higher 30 min 10 am and 2pm  Control smoking and work to reduce or eliminate all nicotine products  Schedule showers so that patient can have wound care done afterwards  Venous Competency and need follow up with Vein Doctor - Done  Protein diet: shakes and bars  Monitor INR - contact INR Nurse for instructions  Continue to manage your elbows with your Primary care doctor (no open wounds visualized today)  Complete all of your antibiotics as ordered.      Wound Dressing Change: Right Lateral Dorsal Foot  - Prepare surface and wash your hands with soap and water  - Cleanse with mild unscented soap (such as Cetaphil, Cerave or Dove) and water  - If able, apply small amount of VASHE on gauze, lay into wound bed, let sit for 10 minutes, remove gauze (do not rinse)  - Apply light layer Desitin/ Zinc oxide barrier paste around wound edge  - Apply ~1/20 piece 4x4 Melgisorb alginate to wound bed (cut-to-fit size of wound bed)  - Cover with 3x3 Mepilex Silicone bordered dressing  - Apply Atrac-Tain lotion or similar to intact skin  - Pull up Spandage 7 from base of toes to the knee  Change 3 times weekly and as needed for soilage/drainage     Wound Dressing Change: Left dorsal foot - scabbed over  - Prepare surface and wash your hands with soap and water  - Cleanse with mild unscented soap and water (such as Cetaphil, Cerave or Dove)  - Apply Atrac-Tain lotion or similar to intact skin  - Pull up  Spandage Size 7 from base of toes to back of the knee  Monitor daily for signs of opening or draining     Wound Dressing Change: Left anterior lower leg   - Prepare surface and wash your hands with soap and water  - Cleanse with mild unscented soap and water (such as Cetaphil, Cerave or Dove)  - If able, apply small amount of VASHE on gauze, lay into wound bed, let sit for 10 minutes, remove gauze (do not rinse)  - apply light layer Desitin/Zinc oxide barrier paste around wound edges  - Apply 1/6th piece 4x4 Melgisorb alginate to wound bed  - Apply one 4x4 Mepilex with silicone bordered dressing  - Apply Atrac-Tain lotion or similar to intact skin  - Pull up Spandage Size 7 from base of toes to back of the knee  Change 3 times weekly and as needed for drainage    Compression:   Your compression is  Spandage Size 7  and can be removed at night and put back on first thing in the morning.   Please remove compression dressing if toes turn blue and/or tingle and can not be relieved by raising the leg for one hour. If unable to reapply in the morning, keep compression on until next dressing change.    Walk as much as you can, as you are able. Whenever you sit raise your ankle above your hips to promote wound healing.     Elevation: elevate your legs above the level of your heart for 30 minutes 2 times each day  - Lay on the couch or your bed and prop your legs up on pillows  - Recline back as far as you can go in your recliner and prop your legs on pillows.     A diet high in protein is important for wound healing, we recommend getting 90 grams of protein per day.   Taking protein shakes or bars are a good way to get extra protein in your diet.     Good sources of protein:  Pork 26g per 3 oz  Whey protein powder - 24g per scoop (on average)  Greek yogurt - 23g per 8oz   Chicken or Turkey - 23g per 3oz  Fish - 20-25g per 3oz  Beef - 18-23g per 3oz  Tofu - 10g per 1/2 cup  Navy beans - 20g per cup  Cottage cheese - 14g per  1/2 cup   Lentils - 13g per 1/4 cup  Beef jerky 13g per 1oz  2% milk - 8g per cup  Peanut butter - 8g per 2 tablespoons  Eggs - 6g per egg  Mixed nuts - 6g per 2oz      Main Provider: Miguel Hampton M.D. June 4, 2024    Call us at [phone] if you have any questions about your wounds, if you have redness or swelling around your wound, have a fever of 101 degrees Fahrenheit or greater or if you have any other problems or concerns. We answer the phone Monday through Friday 8 am to 4 pm, please leave a message as we check the voicemail frequently throughout the day. If you have a concern over the weekend, please leave a message and we will return your call Monday. If the need is urgent, go to the ER or urgent care.    If you had a positive experience please indicate that on your patient satisfaction survey form that [facility] will be sending you.    It was a pleasure meeting with you today.  Thank you for allowing me and my team the privilege of caring for you today.  YOU are the reason we are here, and I truly hope we provided you with the excellent service you deserve.  Please let us know if there is anything else we can do for you so that we can be sure you are leaving completely satisfied with your care experience.      If you have any billing related questions please call the [facility] Business office at [phone]. The clinic staff does not handle billing related matters.    If you are scheduled to have a follow up appointment, you will receive a reminder call the day before your visit. On the appointment day please arrive 15 minutes prior to your appointment time. If you are unable to keep that appointment, please call the clinic to cancel or reschedule. If you are more than 10 minutes late or greater for your scheduled appointment time, the clinic policy is that you may be asked to reschedule.

## 2024-06-04 NOTE — PROGRESS NOTES
Wound Clinic Note         Visit date: 06/04/2024       Cheif Complaint:     Petey Archibald is a 63 year old   male had concerns including WOUND CARE.  The patient has lower extremity edema and bilateral leg ulcers.          HISTORY OF PRESENT ILLNESS:    Petey Archibald reports the wound has been present since mid 2022.  The wound began without a clear cause.   He reports prior to this wound developing he has not had other difficult to heal wounds on the legs.    Since his last clinic visit with me he was admitted to the hospital on May 13, 2024 for left elbow cellulitis.    Today the patient is again accompanied to the wound clinic by an employee at his group home and they both provide portions of the interval history.    He met with Dr. Muhammad on April 23, 2024 to discuss treatment options for his superficial venous insufficiency.  Dr. Muhammad has recommended sclerotherapy.  Sclerotherapy is now scheduled for June 11, 2024.    He confirms today that he is still taking the antibiotics that he was discharged from the hospital with, with no symptoms of an adverse reaction.  He also has a appointment with his primary care doctor later this week to follow-up on the hospitalization.    Today he comes into the wound clinic with small pieces of alginate and a Band-Aid over the wounds.  He is not wearing any form of compression.      The pateint denies fevers or chills.  They report the pain from the wound has been 0/10 and has remained about the same recently.      The patient reports they currently do not have any routine for elevating their legs.  The patient confirms they do sleep in a bed.  Petey again admits right away that he has not been elevating his legs recently.    Today the patient reports maintaining a high protein diet, but has not been taking protein supplements lately.        The patient denies a history of diabetes or chronic steroid use.  He is gone back to vaping now instead of smoking  cigarettes.    The patient has not had any symptoms of infection relating to the wound recently and is not currently on antibiotics.       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       Surgical Hx:   Past Surgical History:   Procedure Laterality Date    COLONOSCOPY N/A 4/15/2019    Procedure: COMBINED COLONOSCOPY, SINGLE OR MULTIPLE BIOPSY/POLYPECTOMY BY BIOPSY;  Surgeon: Jovana Ohara MD;  Location:  GI    COLONOSCOPY N/A 6/7/2021    Procedure: COLONOSCOPY with polypectomies;  Surgeon: Sahil Cid MD;  Location: [facility] OR    EXCISE MALIGNANT LESIONS, TRUNK/ARMS/LEGS 0.5CM OR LESS Left 5/26/2017    Procedure: EXCISE MALIGNANT LESIONS, TRUNK/ARMS/LEGS 0.5CM OR LESS;  EXCISION LEFT ELBOW MASS;  Surgeon: Jose Azevedo MD;  Location:  GI    RECTAL SURGERY      perianal abscess?          Allergies:    Allergies   Allergen Reactions    Bees     Clavulanic Acid     Amoxicillin-Pot Clavulanate Rash     Augmentin    Penicillins Rash              Medication History:    Current Outpatient Medications   Medication Sig Dispense Refill    acetaminophen (TYLENOL) 325 MG tablet Take 1-2 tablets (325-650 mg) by mouth every 6 hours as needed for mild pain, fever or headaches 100 tablet 3     "albuterol (ALBUTEROL) 108 (90 BASE) MCG/ACT inhaler Inhale 2 puffs into the lungs every 6 hours as needed 1 Inhaler 0    ARIPiprazole (ABILIFY) 5 MG tablet Take 1 tablet (5 mg) by mouth daily 45 tablet 0    buPROPion (WELLBUTRIN SR) 150 MG 12 hr tablet Take 1 tablet (150 mg) by mouth 2 times daily for 45 days 90 tablet 0    Cadexomer Iodine, topical, 0.9% (IODOSORB) 0.9 % GEL gel Apply topically daily Apply to left foot wound daily after cleansing the wound and blotting it dry. 1 Tube 3    cloNIDine (CATAPRES) 0.1 MG tablet Take 0.1 mg by mouth daily as needed      clotrimazole (LOTRIMIN) 1 % external cream Apply topically 2 times daily 60 g 0    diclofenac (VOLTAREN) 1 % topical gel Apply 4 g topically 2 times daily as needed for moderate pain 100 g 1    diphenhydrAMINE (BENADRYL) 25 MG capsule Take 50 mg by mouth every 6 hours as needed for itching or allergies      Emollient (CERAVE) CREA Externally apply topically daily 453 g 11    EPINEPHrine (ANY BX GENERIC EQUIV) 0.3 MG/0.3ML injection 2-pack INJECT 0.3MG INTRAMUSCULARLY ONE TIME FOR ONE DOSE AS NEEDED FOR ANAPHYLAXIS. MAY REPEAT ONE TIME IN 5-15 MINUTES IF RESPONSE TO INITIAL DOSE IS INADEQUATE. *1 TOTAL FILL, ORIGINAL FROM EMERGENCY ROOM* 2 each 1    EPINEPHrine (EPIPEN 2-BRE) 0.3 MG/0.3ML injection 2-pack INJECT 0.3MG INTRAMUSCULAR ONE TIME FOR ONE DOSE AS NEEDED FOR ANAPHYLAXIS (CALL 911 IF YOU HAVE TO GIVE) (FOR BEE STINGS) **LABEL EACH PEN* 2 mL 3    gabapentin (NEURONTIN) 100 MG capsule Take 400 mg by mouth 3 times daily At 0900, 1200, and 2000      Gauze Pads & Dressings (GAUZE PADS 4\"X4\") 4\"X4\" PADS 1 each 3 times daily as needed 25 each 1    loratadine (CLARITIN) 10 MG tablet Take 10 mg by mouth daily      LORazepam (ATIVAN) 1 MG tablet Take 1 mg by mouth daily as needed for anxiety      montelukast (SINGULAIR) 10 MG tablet TAKE 1 TABLET BY MOUTH EVERY MORNING (ASTHMA) 30 tablet 11    Multiple Vitamin (DAILY-JOVAN) TABS TAKE 1 TABLET BY MOUTH EVERY " MORNING (VITAMINS) 30 tablet 11    naltrexone (DEPADE/REVIA) 50 MG tablet Take 2 tablets (100 mg) by mouth daily for 45 days 90 tablet 0    nicotine (COMMIT) 2 MG lozenge Place 2 mg inside cheek daily as needed      nystatin-triamcinolone (MYCOLOG II) 358721-5.1 UNIT/GM-% external cream Apply topically 2 times daily For 1-2 weeks.      omeprazole (PRILOSEC) 40 MG DR capsule Take 1 capsule (40 mg) by mouth daily 90 capsule 2    order for DME Equipment being ordered: roll of micropore tape to dress foot wound bid 1 each 0    order for DME Equipment being ordered: 1 surgical shoe 1 Device 0    order for DME [employer] [facility] Phone [phone] Fax [phone]  EdemaWear Size Medium/Yellow qty 4 sleeves  Length of Need: 1 month  Frequency of dressing change daily 1 Device 0    order for DME [employer] Compression Stockings  Phone #[phone]  Fax #[phone]  Coolflex Velcro wraps    Length of Need: Life Time  # of Pairs 1 set 30 days 0    order for DME [employer] Order Phone [phone] Fax [phone]  Primary Dressing Hydrofera Blue Ready   Qty 5 sheets  Secondary Dressing 4' roll gauze Qty 30  Secondary Dressing 2' medipore tape Qty 1  Secondary Dressing 4x4 gauze loaf Qty  1  Length of Need: 1 month  Frequency of dressing change: daily 30 days 0    order for DME Oxygen 2 Li/min  at night and bled into BiPAP 1 Device 0    order for DME Equipment being ordered: CPAP with mask and tubing 1 Device 0    order for DME Equipment being ordered: support compression hose BK  2 pair black 30mm HG   To be applied on arising & removed while lying down to go to sleep 1 each 0    polyethylene glycol (MIRALAX) 17 GM/Dose powder Take 17 g (1 capful) by mouth daily as needed for constipation 578 g 0    PULMICORT FLEXHALER 180 MCG/ACT inhaler INHALE 2 PUFFS INTO THE LUNGS TWICE DAILY. 1 each 11    SENNA-TABS 8.6 MG tablet Take 1 tablet by mouth daily      sertraline (ZOLOFT) 100 MG tablet Take 2 tablets (200 mg) by mouth  daily for 45 days 90 tablet 0    simvastatin (ZOCOR) 40 MG tablet TAKE 1 TABLET BY MOUTH EVERY MORNING.  (CHOLESTEROL) 30 tablet 11    SPIRIVA HANDIHALER 18 MCG inhaled capsule INHALE CONTENTS OF 1 CAPSULE INTO THE LUNGS ONCE DAILY 30 capsule 11    spironolactone (ALDACTONE) 25 MG tablet TAKE 1 TABLET BY MOUTH ONCE DAILY. (HIGH BLOOD PRESSURE) 30 tablet 11    tiZANidine (ZANAFLEX) 2 MG tablet TAKE 1 TABLET BY MOUTH THREE TIMES DAILY. 106 tablet 11    tiZANidine (ZANAFLEX) 2 MG tablet Take 1 tablet (2 mg) by mouth 3 times daily (Patient not taking: Reported on 5/13/2024) 90 tablet 1    traZODone (DESYREL) 100 MG tablet Take 1 tablet (100 mg) by mouth at bedtime for 45 days 45 tablet 0    triamcinolone (KENALOG) 0.1 % external cream Apply to AA BID x 1-2 week then PRN only 80 g 2    vitamin B complex with vitamin C (STRESS TAB) tablet Take 1 tablet by mouth daily 90 tablet 3    warfarin ANTICOAGULANT (COUMADIN) 2 MG tablet Take 8 mg (2 mg x 1 and 6 mg x 1) every Sun, Tue, Fri; 6 mg (6 mg x 1) all other days or as directed by the Anticoagulation Clinic. Next INR Date is 6/6/24 10 tablet 0    warfarin ANTICOAGULANT (COUMADIN) 6 MG tablet Take 8 mg (2 mg x 1 and 6 mg x 1) every Sun, Tue, Fri; 6 mg (6 mg x 1) all other days or as directed by the Anticoagulation Clinic. Next INR Date is 6/6/24 10 tablet 0     No current facility-administered medications for this encounter.         Tobacco History:  reports that he has quit smoking. His smoking use included cigarettes. He has a 30 pack-year smoking history. He has never used smokeless tobacco.       REVIEW OF SYMPTOMS:   The review of systems was negative except as noted in the HPI.           PHYSICAL EXAMINATION:     /67 (BP Location: Left arm, Patient Position: Chair, Cuff Size: Adult Large)   Pulse 73   Temp 98.3  F (36.8  C) (Temporal)   Resp 17            GENERAL: The patient overall appears well and is no acute distress.   HEAD: normocephalic   EYES: Sclera  and conjunctiva clear   NECK: no obvious masses   LUNGS: breathing is unlabored.   EXTREMITIES: No clubbing, cyanosis or edema   SKIN: No rashes or other abnormalities except as noted under the Wound section below.   NEUROLOGICAL: normal motor and sensory function   EDEMA: Moderate       WOUND: The wound appears healthy with no sign of infection.   Wound bed: necrotic material at the left anterior leg wound only  Periwound: healthy intact skin  Some of the smaller wounds have healed since his last clinic visit and the left lateral foot wound is scabbed over.    All of his wounds are full-thickness.    Also see below for wound details:       Circumferential volume measures:             No data to display                Ulceration(s)/Wound(s):   Please see the media tab under the chart review for pictures of the wounds.  Nursing staff removed dressings and cleansed wound.    Wound (used by [facility] only) 11/15/23 0906 Right lateral;dorsal foot ulceration, venous (Active)   Thickness/Stage full thickness 06/04/24 1300   Base slough;granulating 06/04/24 1300   Periwound edematous 06/04/24 1300   Periwound Temperature warm 06/04/24 1300   Periwound Skin Turgor soft 06/04/24 1300   Edges open 06/04/24 1300   Length (cm) 0.4 06/04/24 1300   Width (cm) 0.3 06/04/24 1300   Depth (cm) 0.2 06/04/24 1300   Wound (cm^2) 0.12 cm^2 06/04/24 1300   Wound Volume (cm^3) 0.02 cm^3 06/04/24 1300   Wound healing % 93.33 06/04/24 1300   Drainage Characteristics/Odor serous 06/04/24 1300   Drainage Amount moderate 06/04/24 1300   Care, Wound non-select wound debridement performed. 06/04/24 1300       Wound (used by [facility] only) 11/15/23 0911 Left dorsal foot ulceration, venous (Active)   Thickness/Stage full thickness 05/07/24 1407   Base scab 06/04/24 1300   Periwound edematous 05/07/24 1407   Periwound Temperature warm 05/07/24 1407   Periwound Skin Turgor soft 05/07/24 1407   Edges open 05/07/24 1407   Length (cm) 1.1 05/07/24 1407    Width (cm) 1.3 05/07/24 1407   Depth (cm) 0.2 05/07/24 1407   Wound (cm^2) 1.43 cm^2 05/07/24 1407   Wound Volume (cm^3) 0.29 cm^3 05/07/24 1407   Wound healing % 91.88 05/07/24 1407   Drainage Characteristics/Odor serous 05/07/24 1407   Drainage Amount copious 05/07/24 1407   Care, Wound non-select wound debridement performed. 05/07/24 1407       Wound (used by [facility] only) 12/27/23 1410 Left anterior;lower leg ulceration, venous (Active)   Thickness/Stage full thickness 06/04/24 1300   Base slough;granulating 06/04/24 1300   Periwound swelling;pink 06/04/24 1300   Periwound Temperature warm 06/04/24 1300   Periwound Skin Turgor soft 06/04/24 1300   Edges open 06/04/24 1300   Length (cm) 4.6 06/04/24 1300   Width (cm) 2.9 06/04/24 1300   Depth (cm) 0.2 06/04/24 1300   Wound (cm^2) 13.34 cm^2 06/04/24 1300   Wound Volume (cm^3) 2.67 cm^3 06/04/24 1300   Wound healing % -1952.31 06/04/24 1300   Drainage Characteristics/Odor serous 06/04/24 1300   Drainage Amount moderate 06/04/24 1300   Care, Wound debrided 06/04/24 1300               Recent Labs   Lab Test 06/03/21  1448 03/23/21  1042 07/28/20  0929   A1C 5.7* 5.6 5.7*          Recent Labs   Lab Test 02/14/23  0059 06/08/22  1835 07/28/20  0929   ALBUMIN 3.9 3.2* 3.3*              Procedure note:  I had previous obtain informed consent from the patient to perform serial debridements, I confirmed this again with the patient today verbally.  Anesthetized as needed with lidocaine.  Using a curette and/or a scalpel I performed an excisional debridement removing all necrotic material at the left anterior leg wound in to the level of viable subcutaneous tissue.  I obtained hemostasis with direct pressure.  The patient tolerated the procedure well.  EBL: <5 ml  Total debridement surface area: Less than 20 cm         ASSESSMENT:   This is a 63 year old  male with lower extremity edema and bilateral lower extremity wounds.          PLAN:   We will bandage all of the wound  areas beginning with a zinc oxide barrier cream applied to the periwound followed by alginate, a Mepilex bandage and the spandagrip stocking change 3 times a week.  I strongly encouraged the patient to keep the bandages on.    I have strongly encouraged him to continue to work with Dr. Muhammad to treat his venous insufficiency.    We are still treating these wounds palliatively since the patient is thoroughly noncompliant.    Separate from his wound care instructions I then discussed the treatment of his lower extremity edema.  This primarily involves compression and elevation.  I have strongly encouraged him to wear his spandagrip stockings every day.  I have again explained to the patient today that controlling the edema is probably the most important thing we can do to help heal the wound.  I have specifically recommended that they lay down with their legs above the level of the heart for 30 minutes at least twice a day.  I emphasized that if we can not control the edema we will likely not be able to get this wound to heal.     I have encouraged the patient to continue on their high protein diet to aid in wound healing.   I have also strongly encouraged him to decrease his nicotine intake which slows wound healing.   The patient will return to the wound clinic in 4 weeks to see me again.        30 minutes spent on the date of the encounter doing chart review, history and exam, documentation and further activities per the note, this time excludes any procedure time      Miguel Hampton MD  06/04/2024   2:42 PM   [facility]/Wound  [phone]    This note was electronically signed by Miguel Hampton MD        Further instructions from your care team         06/04/2024   Petey Archibald   1960    A DME order for supplies has been placed to [facility]. If there are any issues with your order including not receiving the order please call [facility] at [phone]. They can also  provide a tracking number for you.    Dressing changes outside of clinic are being performed by Group Home Staff    [facility] group home (3x week) Fax: laurie Lala [phone]     Plan: 6/4/24  Scheduled on 6/11 with Dr. Muhammad for sclerotherapy  Wear tube stocking to help with swelling  Elevate Legs to hip or higher 30 min 10 am and 2pm  Control smoking and work to reduce or eliminate all nicotine products  Schedule showers so that patient can have wound care done afterwards  Venous Competency and need follow up with Vein Doctor - Done  Protein diet: shakes and bars  Monitor INR - contact INR Nurse for instructions  Continue to manage your elbows with your Primary care doctor (no open wounds visualized today)  Complete all of your antibiotics as ordered.      Wound Dressing Change: Right Lateral Dorsal Foot  - Prepare surface and wash your hands with soap and water  - Cleanse with mild unscented soap (such as Cetaphil, Cerave or Dove) and water  - If able, apply small amount of VASHE on gauze, lay into wound bed, let sit for 10 minutes, remove gauze (do not rinse)  - Apply light layer Desitin/ Zinc oxide barrier paste around wound edge  - Apply ~1/20 piece 4x4 Melgisorb alginate to wound bed (cut-to-fit size of wound bed)  - Cover with 3x3 Mepilex Silicone bordered dressing  - Apply Atrac-Tain lotion or similar to intact skin  - Pull up Spandage 7 from base of toes to the knee  Change 3 times weekly and as needed for soilage/drainage     Wound Dressing Change: Left dorsal foot - scabbed over  - Prepare surface and wash your hands with soap and water  - Cleanse with mild unscented soap and water (such as Cetaphil, Cerave or Dove)  - Apply Atrac-Tain lotion or similar to intact skin  - Pull up Spandage Size 7 from base of toes to back of the knee  Monitor daily for signs of opening or draining     Wound Dressing Change: Left anterior lower leg   - Prepare surface and wash your hands with soap and water  - Cleanse with  mild unscented soap and water (such as Cetaphil, Cerave or Dove)  - If able, apply small amount of VASHE on gauze, lay into wound bed, let sit for 10 minutes, remove gauze (do not rinse)  - apply light layer Desitin/Zinc oxide barrier paste around wound edges  - Apply 1/6th piece 4x4 Melgisorb alginate to wound bed  - Apply one 4x4 Mepilex with silicone bordered dressing  - Apply Atrac-Tain lotion or similar to intact skin  - Pull up Spandage Size 7 from base of toes to back of the knee  Change 3 times weekly and as needed for drainage    Compression:   Your compression is  Spandage Size 7  and can be removed at night and put back on first thing in the morning.   Please remove compression dressing if toes turn blue and/or tingle and can not be relieved by raising the leg for one hour. If unable to reapply in the morning, keep compression on until next dressing change.    Walk as much as you can, as you are able. Whenever you sit raise your ankle above your hips to promote wound healing.     Elevation: elevate your legs above the level of your heart for 30 minutes 2 times each day  - Lay on the couch or your bed and prop your legs up on pillows  - Recline back as far as you can go in your recliner and prop your legs on pillows.     A diet high in protein is important for wound healing, we recommend getting 90 grams of protein per day.   Taking protein shakes or bars are a good way to get extra protein in your diet.     Good sources of protein:  Pork 26g per 3 oz  Whey protein powder - 24g per scoop (on average)  Greek yogurt - 23g per 8oz   Chicken or Turkey - 23g per 3oz  Fish - 20-25g per 3oz  Beef - 18-23g per 3oz  Tofu - 10g per 1/2 cup  Navy beans - 20g per cup  Cottage cheese - 14g per 1/2 cup   Lentils - 13g per 1/4 cup  Beef jerky 13g per 1oz  2% milk - 8g per cup  Peanut butter - 8g per 2 tablespoons  Eggs - 6g per egg  Mixed nuts - 6g per 2oz      Main Provider: Miguel Hampton M.D. June 4, 2024    Call  us at [phone] if you have any questions about your wounds, if you have redness or swelling around your wound, have a fever of 101 degrees Fahrenheit or greater or if you have any other problems or concerns. We answer the phone Monday through Friday 8 am to 4 pm, please leave a message as we check the voicemail frequently throughout the day. If you have a concern over the weekend, please leave a message and we will return your call Monday. If the need is urgent, go to the ER or urgent care.    If you had a positive experience please indicate that on your patient satisfaction survey form that [facility] will be sending you.    It was a pleasure meeting with you today.  Thank you for allowing me and my team the privilege of caring for you today.  YOU are the reason we are here, and I truly hope we provided you with the excellent service you deserve.  Please let us know if there is anything else we can do for you so that we can be sure you are leaving completely satisfied with your care experience.      If you have any billing related questions please call the [facility] Business office at [phone]. The clinic staff does not handle billing related matters.    If you are scheduled to have a follow up appointment, you will receive a reminder call the day before your visit. On the appointment day please arrive 15 minutes prior to your appointment time. If you are unable to keep that appointment, please call the clinic to cancel or reschedule. If you are more than 10 minutes late or greater for your scheduled appointment time, the clinic policy is that you may be asked to reschedule.         ,

## 2024-06-05 ENCOUNTER — TELEPHONE (OUTPATIENT)
Dept: WOUND CARE | Facility: CLINIC | Age: 64
End: 2024-06-05

## 2024-06-05 ENCOUNTER — MEDICAL CORRESPONDENCE (OUTPATIENT)
Dept: HEALTH INFORMATION MANAGEMENT | Facility: CLINIC | Age: 64
End: 2024-06-05

## 2024-06-05 ENCOUNTER — OFFICE VISIT (OUTPATIENT)
Dept: INTERNAL MEDICINE | Facility: CLINIC | Age: 64
End: 2024-06-05
Payer: MEDICARE

## 2024-06-05 VITALS — SYSTOLIC BLOOD PRESSURE: 125 MMHG | HEART RATE: 74 BPM | DIASTOLIC BLOOD PRESSURE: 72 MMHG

## 2024-06-05 DIAGNOSIS — M70.22 OLECRANON BURSITIS OF BOTH ELBOWS: Primary | ICD-10-CM

## 2024-06-05 DIAGNOSIS — M70.21 OLECRANON BURSITIS OF BOTH ELBOWS: Primary | ICD-10-CM

## 2024-06-05 DIAGNOSIS — G24.8 FOCAL DYSTONIA: ICD-10-CM

## 2024-06-05 PROCEDURE — G2211 COMPLEX E/M VISIT ADD ON: HCPCS | Performed by: INTERNAL MEDICINE

## 2024-06-05 PROCEDURE — 99213 OFFICE O/P EST LOW 20 MIN: CPT | Performed by: INTERNAL MEDICINE

## 2024-06-05 NOTE — TELEPHONE ENCOUNTER
Dr. Hampton's note addended to add that all wounds are full thickness. The flowsheet currently will not allow this documentation and is actively being worked on. Addended note faxed to Sha[facility].

## 2024-06-05 NOTE — PROGRESS NOTES
Assessment & Plan   Olecranon bursitis of both elbows  I recommended formal orthopedics evaluation as an outpatient.  They did see him inpatient and recommended 2 different antibiotic regimens prior to any drainage.  Referral placed for proper outpatient constipation.  - Orthopedic  Referral; Future    Focal dystonia  Patient previously followed with [facility] for this issue.  He has not seen them in roughly 2 years.  They were performing Botox injections to help alleviate his symptoms.  He was agreeable to re-establish with them.  I encouraged him to reach out to their clinic.    Signed Electronically by:  Demetri Holder MD, MPH  [facility]  Internal Medicine    The longitudinal plan of care for the diagnosis(es)/condition(s) as documented were addressed during this visit. Due to the added complexity in care, I will continue to support Petey in the subsequent management and with ongoing continuity of care.    Subjective   Petey is a 63 year old who presented to clinic today for a hospital follow-up visit.  Unfortunately, shortly before this visit our clinic building's power went out.  Patient relies on our building's elevator to get to our second floor, however I was able to see him in a first floor urgent care room using generator-powered lights.  Patient reports he has been stable since discharge.  His left elbow is improving.  His right elbow unfortunately is now showing signs of swelling as well.  No real pain in that elbow.  No redness.  His chronic left groin pain persists.  He is wondering what to do about these issues.  He is wondering if he needs his elbows drained.      Objective    /72   Pulse 74   There is no height or weight on file to calculate BMI.    Physical Exam   GENERAL: alert and in no distress.  EYES: conjunctivae/corneas clear. EOMs grossly intact  HENT: Facies symmetric.  RESP: No iWOB.  MSK: Ambulates with walker. Antalgic gait. Bilateral  olecranon bursitis noted. No erythema over R elbow. Mild over L elbow.  NEURO: CN II-XII grossly intact.

## 2024-06-05 NOTE — TELEPHONE ENCOUNTER
Clarence is requesting documentation discussing the stage of each wound from yesterdays visit with Dr Hampton. Please either call [phone] or please the addend the notes.

## 2024-06-06 ENCOUNTER — LAB (OUTPATIENT)
Dept: LAB | Facility: CLINIC | Age: 64
End: 2024-06-06
Payer: MEDICARE

## 2024-06-06 ENCOUNTER — ANTICOAGULATION THERAPY VISIT (OUTPATIENT)
Dept: ANTICOAGULATION | Facility: CLINIC | Age: 64
End: 2024-06-06

## 2024-06-06 DIAGNOSIS — Z79.01 LONG TERM CURRENT USE OF ANTICOAGULANT THERAPY: ICD-10-CM

## 2024-06-06 DIAGNOSIS — Z86.711 HISTORY OF PULMONARY EMBOLISM: Primary | ICD-10-CM

## 2024-06-06 DIAGNOSIS — Z86.711 HISTORY OF PULMONARY EMBOLISM: ICD-10-CM

## 2024-06-06 LAB — INR BLD: 2.8 (ref 0.9–1.1)

## 2024-06-06 PROCEDURE — 85610 PROTHROMBIN TIME: CPT

## 2024-06-06 PROCEDURE — 36416 COLLJ CAPILLARY BLOOD SPEC: CPT

## 2024-06-06 RX ORDER — WARFARIN SODIUM 2 MG/1
TABLET ORAL
Qty: 10 TABLET | Refills: 0 | Status: SHIPPED | OUTPATIENT
Start: 2024-06-06 | End: 2024-06-20

## 2024-06-06 RX ORDER — WARFARIN SODIUM 6 MG/1
TABLET ORAL
Qty: 14 TABLET | Refills: 0 | Status: SHIPPED | OUTPATIENT
Start: 2024-06-06 | End: 2024-06-20

## 2024-06-06 NOTE — PROGRESS NOTES
ANTICOAGULATION MANAGEMENT     Petey Archibald 63 year old male is on warfarin with therapeutic INR result. (Goal INR 2.0-3.0)    Recent labs: (last 7 days)     06/06/24  1036   INR 2.8*       ASSESSMENT     Source(s): Chart Review and Home Care/Facility Nurse     Warfarin doses taken: Warfarin taken as instructed  Diet: No new diet changes identified  Medication/supplement changes: None noted  New illness, injury, or hospitalization: No ongoing issue with both elbows - was seen by PCP and noted that there was a referral to ortho sent.  Signs or symptoms of bleeding or clotting: No  Previous result: Therapeutic last 2(+) visits  Additional findings:  Warfarin prescribed to [facility]  Will mail AVS to :ESTEFANI Alvarez - [phone]   [facility] [id number]        PLAN     Recommended plan for ongoing change(s) affecting INR     Dosing Instructions: Continue your current warfarin dose with next INR on 6/19 as already scheduled.    Summary  As of 6/6/2024      Full warfarin instructions:  8 mg every Sun, Tue, Fri; 6 mg all other days   Next INR check:  6/19/2024               Telephone call with [facility] nurse who verbalizes understanding and agrees to plan and who agrees to plan and repeated back plan correctly      Lab visit scheduled    Education provided:   Contact [phone] with any changes, questions or concerns.     Plan made per [facility] anticoagulation protocol    Myranda Welsh RN  Anticoagulation Clinic  6/6/2024    _______________________________________________________________________     Anticoagulation Episode Summary       Current INR goal:  2.0-3.0   TTR:  68.1% (1 y)   Target end date:  Indefinite   Send INR reminders to:  EDU JIMENEZ    Indications    History of pulmonary embolism [Z86.711]  Long term current use of anticoagulant therapy [Z79.01]             Comments:  [facility]; A new Coumadin Prescription will need to sent to [facility] with current dose and next INR check.              Anticoagulation Care Providers       Provider Role Specialty Phone number    Demetri Archer MD Referring Internal Medicine [phone]

## 2024-06-11 ENCOUNTER — OFFICE VISIT (OUTPATIENT)
Dept: VASCULAR SURGERY | Facility: CLINIC | Age: 64
End: 2024-06-11
Payer: MEDICARE

## 2024-06-11 DIAGNOSIS — I83.022 VARICOSE VEINS OF LEFT LOWER EXTREMITY WITH ULCER OF CALF WITH FAT LAYER EXPOSED (H): ICD-10-CM

## 2024-06-11 DIAGNOSIS — L97.922 CHRONIC ULCER OF LEFT LEG WITH FAT LAYER EXPOSED (H): Primary | ICD-10-CM

## 2024-06-11 DIAGNOSIS — L97.222 VARICOSE VEINS OF LEFT LOWER EXTREMITY WITH ULCER OF CALF WITH FAT LAYER EXPOSED (H): ICD-10-CM

## 2024-06-11 PROCEDURE — 76942 ECHO GUIDE FOR BIOPSY: CPT | Performed by: SURGERY

## 2024-06-11 PROCEDURE — 36471 NJX SCLRSNT MLT INCMPTNT VN: CPT | Mod: LT | Performed by: SURGERY

## 2024-06-11 NOTE — PROGRESS NOTES
Vein Clinic Sclerotherapy Note     Indications:  Nonhealing, recurrent left distal anterolateral leg venous stasis ulcer     Procedure:  Ultrasound-guided, medically necessary sclerotherapy multiple left thigh, leg and HEATHER ulcer varicose veins     Procedure description  Details of procedure including risks of allergic reaction, deep vein thrombosis, ulceration, superficial thrombophlebitis and hyperpigmentation were discussed.  The patient voiced understanding and wished to proceed.  Informed consent was obtained.    After explaining the risks and obtaining informed consent, I imaged the left medial thigh and noted varicosities on the anteromedial and posteromedial thigh.  Isolated both of these areas with ultrasound and injected them with 1% polidocanol foam under ultrasound guidance.  I then moved down to the distal thigh, just cephalad and slightly medial to the patella injecting 1% polidocanol foam.  I then injected areas in the proximal medial calf, just cephalad of the ulcer and then just lateral/anterior to the ulcer using 1% polidocanol foam, all under ultrasound guidance.  I used a total of 15 mL of 1% polidocanol foam.  The patient tolerated this well without significant pain.  I had him perform ankle pumps.    Multiple, large varicosities remained deep to and just distal to the ulcer which will need to be treated at his next visit.    We cleaned his leg and then dressed his wound.  We had him walk for 10 minutes before driving home.  He will return in approximately 6 weeks in follow-up  Sclerotherapy    Date/Time: 6/11/2024 10:58 AM    Performed by: Ludwig Muhammad MD  Authorized by: Ludwig Muhammad MD    Time out: Immediately prior to the procedure a time out was called    Type:  Medically Necessary  Vein:  Multiple Veins  Yes    Procedure side:  Left  Solution/Amount:  1% POLIDOCANOL  Syringes:  5 syringes (15 mL 1% polidocanol foam)  Patient tolerance:  Patient tolerated the  procedure well with no immediate complications  Wrap/Hose:  Markie Muhammad MD    Dictated using Dragon voice recognition software which may result in transcription errors

## 2024-06-12 NOTE — ADDENDUM NOTE
Encounter addended by: Miguel Hampton MD on: 6/12/2024 10:03 AM   Actions taken: Clinical Note Signed

## 2024-06-20 ENCOUNTER — ANTICOAGULATION THERAPY VISIT (OUTPATIENT)
Dept: ANTICOAGULATION | Facility: CLINIC | Age: 64
End: 2024-06-20

## 2024-06-20 ENCOUNTER — LAB (OUTPATIENT)
Dept: LAB | Facility: CLINIC | Age: 64
End: 2024-06-20
Payer: MEDICARE

## 2024-06-20 DIAGNOSIS — Z79.01 LONG TERM CURRENT USE OF ANTICOAGULANT THERAPY: ICD-10-CM

## 2024-06-20 DIAGNOSIS — Z86.711 HISTORY OF PULMONARY EMBOLISM: ICD-10-CM

## 2024-06-20 DIAGNOSIS — Z86.711 HISTORY OF PULMONARY EMBOLISM: Primary | ICD-10-CM

## 2024-06-20 LAB — INR BLD: 2.3 (ref 0.9–1.1)

## 2024-06-20 PROCEDURE — 85610 PROTHROMBIN TIME: CPT

## 2024-06-20 PROCEDURE — 36416 COLLJ CAPILLARY BLOOD SPEC: CPT

## 2024-06-20 RX ORDER — WARFARIN SODIUM 2 MG/1
TABLET ORAL
Qty: 10 TABLET | Refills: 0 | Status: SHIPPED | OUTPATIENT
Start: 2024-06-20 | End: 2024-07-03

## 2024-06-20 RX ORDER — WARFARIN SODIUM 6 MG/1
TABLET ORAL
Qty: 22 TABLET | Refills: 0 | Status: SHIPPED | OUTPATIENT
Start: 2024-06-20 | End: 2024-07-03

## 2024-06-20 NOTE — PROGRESS NOTES
ANTICOAGULATION MANAGEMENT     Petey Archibald 63 year old male is on warfarin with therapeutic INR result. (Goal INR 2.0-3.0)    Recent labs: (last 7 days)     06/20/24  1410   INR 2.3*       ASSESSMENT     Source(s): Chart Review  Previous INR was Therapeutic last 2(+) visits  Medication, diet, health changes since last INR chart reviewed; none identified  Updated script sent to [facility]          PLAN     Recommended plan for no diet, medication or health factor changes affecting INR     Dosing Instructions: Continue your current warfarin dose with next INR in 2-3 weeks       Summary  As of 6/20/2024      Full warfarin instructions:  8 mg every Sun, Tue, Fri; 6 mg all other days   Next INR check:  7/11/2024               Detailed voice message left for [facility]  and [facility] main line with dosing instructions and follow up date.   AVS mailed to [facility] [place]    Contact [phone]  to schedule and with any changes, questions or concerns.     Education provided:   Please call back if any changes to your diet, medications or how you've been taking warfarin    Plan made per [facility] anticoagulation protocol    Arnaldo Lopez RN  Anticoagulation Clinic  6/20/2024    _______________________________________________________________________     Anticoagulation Episode Summary       Current INR goal:  2.0-3.0   TTR:  68.1% (1 y)   Target end date:  Indefinite   Send INR reminders to:  EDU JIMENEZ    Indications    History of pulmonary embolism [Z86.711]  Long term current use of anticoagulant therapy [Z79.01]             Comments:  [facility]; A new Coumadin Prescription will need to sent to [facility] with current dose and next INR check.             Anticoagulation Care Providers       Provider Role Specialty Phone number    Demetri Archer MD Referring Internal Medicine [phone]

## 2024-07-02 ENCOUNTER — TELEPHONE (OUTPATIENT)
Dept: INTERNAL MEDICINE | Facility: CLINIC | Age: 64
End: 2024-07-02

## 2024-07-02 ENCOUNTER — HOSPITAL ENCOUNTER (OUTPATIENT)
Dept: WOUND CARE | Facility: CLINIC | Age: 64
Discharge: HOME | End: 2024-07-02
Attending: FAMILY MEDICINE | Admitting: FAMILY MEDICINE
Payer: MEDICARE

## 2024-07-02 VITALS — DIASTOLIC BLOOD PRESSURE: 67 MMHG | TEMPERATURE: 96.8 F | SYSTOLIC BLOOD PRESSURE: 133 MMHG | HEART RATE: 83 BPM

## 2024-07-02 DIAGNOSIS — L97.922 CHRONIC ULCER OF LEFT LEG WITH FAT LAYER EXPOSED (H): ICD-10-CM

## 2024-07-02 DIAGNOSIS — R60.0 LOWER EXTREMITY EDEMA: ICD-10-CM

## 2024-07-02 DIAGNOSIS — L97.512 ULCER OF RIGHT FOOT WITH FAT LAYER EXPOSED (H): ICD-10-CM

## 2024-07-02 DIAGNOSIS — L97.522 ULCER OF LEFT FOOT WITH FAT LAYER EXPOSED (H): ICD-10-CM

## 2024-07-02 DIAGNOSIS — L97.522 ULCER OF GREAT TOE, LEFT, WITH FAT LAYER EXPOSED (H): ICD-10-CM

## 2024-07-02 DIAGNOSIS — S91.302D OPEN WOUND OF LEFT FOOT, SUBSEQUENT ENCOUNTER: Primary | ICD-10-CM

## 2024-07-02 PROCEDURE — 99214 OFFICE O/P EST MOD 30 MIN: CPT | Performed by: FAMILY MEDICINE

## 2024-07-02 PROCEDURE — 97602 WOUND(S) CARE NON-SELECTIVE: CPT

## 2024-07-02 NOTE — TELEPHONE ENCOUNTER
Spoke to pt's group home nurse -    Shared PCP's note regarding the Abilify and request for records from the eye clinic the pt visits.    Nurse said she would call back if she has any further questions.   TRANSFER

## 2024-07-02 NOTE — TELEPHONE ENCOUNTER
1) Noted.  2) I see no past eye doctor notes in his chart. Probably the first step here is just to get records to our clinic.

## 2024-07-02 NOTE — TELEPHONE ENCOUNTER
"TO PCP:     Group home nurse called     1) Pt takes Abilify 5mg daily - has been only getting it on Fridays rather than daily as scheduled for the past month. Calling to report he missed doses of this med - pt is now taking this correctly. States she thinks the ordering provider was through the [facility]     2) Group home nurse is new there, prior nurse quit. She is going through all of pt's records there and noted the eye doctor requests patient see his PCP for an \"[employer] workup\" - does not believe pt was having any symptoms at the time he saw provider there. Last eye doctor visit was 8/31/23 - [facility]. Pt has seen PCP since then. Asking if he should follow up with you on this now?     Pt's nurse requesting a call back     Blossom LOERA Triage RN  [facility] Internal Medicine Clinic     "

## 2024-07-02 NOTE — DISCHARGE INSTRUCTIONS
07/02/2024   Petey Archibald   1960      A DME order for supplies has been placed to [facility]. If there are any issues  with your order including not receiving the order please call [facility] at [phone].  They can also provide a tracking number for you.    Dressing changes outside of clinic are being performed by Group Home Staff  [facility] group home (3x week) Fax: laurie Lala [phone]  -Group home staff - please take off compression stockings at night and put back on in the AM when you put on patients socks and shoes.      Plan: 7/2/24  Scheduled on 6/11 with Dr. Muhammad for sclerotherapy  Wear tube stocking to help with swelling  Elevate Legs to hip or higher 30 min 10 am and 2pm  Control smoking and work to reduce or eliminate all nicotine products  Schedule showers so that patient can have wound care done afterwards  Venous Competency and need follow up with Vein Doctor - Done  Protein diet: shakes and bars  Monitor INR - contact INR Nurse for instructions  Continue to manage your elbows with your Primary care doctor (no open wounds visualized today)  Complete all of your antibiotics as ordered.      Wound Dressing Change: Right Lateral Dorsal Foot  - Prepare surface and wash your hands with soap and water  - Cleanse with mild unscented soap (such as Cetaphil, Cerave or Dove) and water  - If able, apply small amount of VASHE on gauze, lay into wound bed, let sit for 10 minutes, remove gauze (do not rinse)  - Apply light layer Desitin/ Zinc oxide barrier paste around wound edge  - Apply ~1/20 piece 4x4 Melgisorb alginate to wound bed (cut-to-fit size of wound bed)  - Cover with 3x3 Mepilex Silicone bordered dressing  - Apply Atrac-Tain lotion or similar to intact skin  - Pull up Spandagrip size F from toes to behind the knee  Change 3 times weekly and as needed for soilage/drainage        Wound Dressing Change: Left anterior lower leg  - Prepare surface and wash your hands with soap and water  -  Cleanse with mild unscented soap and water (such as Cetaphil, Cerave or Dove)  - If able, apply small amount of VASHE on gauze, lay into wound bed, let sit for 10 minutes, remove gauze (do not rinse)  - apply light layer Desitin/Zinc oxide barrier paste around wound edges  - Apply 1/4th piece 4x4 Melgisorb alginate to wound bed  - Apply one 4x4 Mepilex with silicone bordered dressing  - Apply Atrac-Tain lotion or similar to intact skin  - Pull up Spandagrip size F from toes to behind the knee  Change 3 times weekly and as needed for drainage      Compression:  Your compression is Spandage Size 7 and can be removed at night and put back on  first thing in the morning.  Please remove compression dressing if toes turn blue and/or tingle and can not be  relieved by raising the leg for one hour. If unable to reapply in the morning, keep  compression on until next dressing change.  Walk as much as you can, as you are able. Whenever you sit raise your ankle  above your hips to promote wound healing.  Elevation: elevate your legs above the level of your heart for 30 minutes 2 times  each day  - Lay on the couch or your bed and prop your legs up on pillows  Petey Archibald (MRN: [id number])  Printed at 6/4/2024 2:40 PM Page 14 of 15  - Recline back as far as you can go in your recliner and prop your legs on pillows.  A diet high in protein is important for wound healing, we recommend getting 90 grams  of protein per day.  Taking protein shakes or bars are a good way to get extra protein in your diet.    Good sources of protein:  Pork 26g per 3 oz  Whey protein powder - 24g per scoop (on average)  Greek yogurt - 23g per 8oz  Chicken or Turkey - 23g per 3oz  Fish - 20-25g per 3oz  Beef - 18-23g per 3oz  Tofu - 10g per 1/2 cup  Navy beans - 20g per cup  Cottage cheese - 14g per 1/2 cup  Lentils - 13g per 1/4 cup  Beef jerky 13g per 1oz  2% milk - 8g per cup  Peanut butter - 8g per 2 tablespoons  Eggs - 6g per egg  Mixed nuts  - 6g per 2oz     Main Provider: Miguel Hampton M.D. July 2, 2024    Call us at [phone] if you have any questions about your wounds, if you have redness or swelling around your wound, have a fever of 101 degrees Fahrenheit or greater or if you have any other problems or concerns. We answer the phone Monday through Friday 8 am to 4 pm, please leave a message as we check the voicemail frequently throughout the day. If you have a concern over the weekend, please leave a message and we will return your call Monday. If the need is urgent, go to the ER or urgent care.    If you had a positive experience please indicate that on your patient satisfaction survey form that [facility] will be sending you.    It was a pleasure meeting with you today.  Thank you for allowing me and my team the privilege of caring for you today.  YOU are the reason we are here, and I truly hope we provided you with the excellent service you deserve.  Please let us know if there is anything else we can do for you so that we can be sure you are leaving completely satisfied with your care experience.      If you have any billing related questions please call the [facility] Business office at [phone]. The clinic staff does not handle billing related matters.    If you are scheduled to have a follow up appointment, you will receive a reminder call the day before your visit. On the appointment day please arrive 15 minutes prior to your appointment time. If you are unable to keep that appointment, please call the clinic to cancel or reschedule. If you are more than 10 minutes late or greater for your scheduled appointment time, the clinic policy is that you may be asked to reschedule.

## 2024-07-02 NOTE — ADDENDUM NOTE
Encounter addended by: Leah Bonilla RN on: 7/2/2024 2:16 PM   Actions taken: Visit diagnoses modified, Order list changed, Diagnosis association updated

## 2024-07-02 NOTE — PROGRESS NOTES
Wound Clinic Note         Visit date: 07/02/2024       Cheif Complaint:     Petey Archibald is a 63 year old   male had concerns including WOUND CARE.  The patient has lower extremity edema and bilateral leg ulcers.          HISTORY OF PRESENT ILLNESS:    Petey Archibald reports the wound has been present since mid 2022.  The wound began without a clear cause.   He reports prior to this wound developing he has not had other difficult to heal wounds on the legs.    Since his last clinic visit with me he did undergo the procedure to treat his venous insufficiency with Dr. Muhammad on June 11, 2024.  He has another procedure scheduled in August.    He has completed taking all antibiotics with no symptoms of an adverse reaction.    Today he comes into the wound clinic with no bandages on the right lower extremity and alginate and Mepilex with no form of compression on the left lower extremity.  There is moderate serous drainage from the left lower extremity and light serous drainage from the right lower extremity.      The pateint denies fevers or chills.  They report the pain from the wound has been 0/10 and has remained about the same recently.      The patient reports propping up their legs occasionally during the day, but they do not elevate their legs above the level of their heart.  The patient confirms they do sleep in a bed.     Today the patient reports maintaining a high protein diet, but has not been taking protein supplements lately.        The patient denies a history of diabetes or chronic steroid use.  He is gone back to vaping now instead of smoking cigarettes.    The patient has not had any symptoms of infection relating to the wound recently and is not currently on antibiotics.       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       Surgical Hx:   Past Surgical History:   Procedure Laterality Date    COLONOSCOPY N/A 4/15/2019    Procedure: COMBINED COLONOSCOPY, SINGLE OR MULTIPLE BIOPSY/POLYPECTOMY BY BIOPSY;  Surgeon: Jovana Ohara MD;  Location:  GI    COLONOSCOPY N/A 6/7/2021    Procedure: COLONOSCOPY with polypectomies;  Surgeon: Sahil Cid MD;  Location: [facility] OR    EXCISE MALIGNANT LESIONS, TRUNK/ARMS/LEGS 0.5CM OR LESS Left 5/26/2017    Procedure: EXCISE MALIGNANT LESIONS, TRUNK/ARMS/LEGS 0.5CM OR LESS;  EXCISION LEFT ELBOW MASS;  Surgeon: Jose Azevedo MD;  Location:  GI    RECTAL SURGERY      perianal abscess?          Allergies:    Allergies   Allergen Reactions    Bees     Clavulanic Acid     Amoxicillin-Pot Clavulanate Rash     Augmentin    Penicillins Rash              Medication History:    Current Outpatient Medications   Medication Sig Dispense Refill    acetaminophen (TYLENOL) 325 MG tablet Take 1-2 tablets (325-650 mg) by mouth every 6 hours as needed for mild pain, fever or headaches 100 tablet 3    albuterol (ALBUTEROL) 108 (90 BASE) MCG/ACT inhaler Inhale 2 puffs into the lungs every 6 hours as needed 1 Inhaler 0    ARIPiprazole (ABILIFY) 5 MG tablet Take 1 tablet (5 mg) by mouth daily 45 tablet 0    buPROPion (WELLBUTRIN SR) 150 MG 12 hr tablet Take 1 tablet (150 mg) by mouth 2 times daily for 45 days 90 tablet 0    Cadexomer  "Iodine, topical, 0.9% (IODOSORB) 0.9 % GEL gel Apply topically daily Apply to left foot wound daily after cleansing the wound and blotting it dry. 1 Tube 3    cloNIDine (CATAPRES) 0.1 MG tablet Take 0.1 mg by mouth daily as needed      clotrimazole (LOTRIMIN) 1 % external cream Apply topically 2 times daily 60 g 0    diclofenac (VOLTAREN) 1 % topical gel Apply 4 g topically 2 times daily as needed for moderate pain 100 g 1    diphenhydrAMINE (BENADRYL) 25 MG capsule Take 50 mg by mouth every 6 hours as needed for itching or allergies      Emollient (CERAVE) CREA Externally apply topically daily 453 g 11    EPINEPHrine (ANY BX GENERIC EQUIV) 0.3 MG/0.3ML injection 2-pack INJECT 0.3MG INTRAMUSCULARLY ONE TIME FOR ONE DOSE AS NEEDED FOR ANAPHYLAXIS. MAY REPEAT ONE TIME IN 5-15 MINUTES IF RESPONSE TO INITIAL DOSE IS INADEQUATE. *1 TOTAL FILL, ORIGINAL FROM EMERGENCY ROOM* 2 each 1    EPINEPHrine (EPIPEN 2-BRE) 0.3 MG/0.3ML injection 2-pack INJECT 0.3MG INTRAMUSCULAR ONE TIME FOR ONE DOSE AS NEEDED FOR ANAPHYLAXIS (CALL 911 IF YOU HAVE TO GIVE) (FOR BEE STINGS) **LABEL EACH PEN* 2 mL 3    gabapentin (NEURONTIN) 100 MG capsule Take 400 mg by mouth 3 times daily At 0900, 1200, and 2000      Gauze Pads & Dressings (GAUZE PADS 4\"X4\") 4\"X4\" PADS 1 each 3 times daily as needed 25 each 1    loratadine (CLARITIN) 10 MG tablet Take 10 mg by mouth daily      LORazepam (ATIVAN) 1 MG tablet Take 1 mg by mouth daily as needed for anxiety      montelukast (SINGULAIR) 10 MG tablet TAKE 1 TABLET BY MOUTH EVERY MORNING (ASTHMA) 30 tablet 11    Multiple Vitamin (DAILY-JOVAN) TABS TAKE 1 TABLET BY MOUTH EVERY MORNING (VITAMINS) 30 tablet 11    naltrexone (DEPADE/REVIA) 50 MG tablet Take 2 tablets (100 mg) by mouth daily for 45 days 90 tablet 0    nicotine (COMMIT) 2 MG lozenge Place 2 mg inside cheek daily as needed      nystatin-triamcinolone (MYCOLOG II) 093432-1.1 UNIT/GM-% external cream Apply topically 2 times daily For 1-2 weeks.      " omeprazole (PRILOSEC) 40 MG DR capsule Take 1 capsule (40 mg) by mouth daily 90 capsule 2    order for DME Equipment being ordered: roll of micropore tape to dress foot wound bid 1 each 0    order for DME Equipment being ordered: 1 surgical shoe 1 Device 0    order for DME [employer] [facility] Phone [phone] Fax [phone]  EdemaWear Size Medium/Yellow qty 4 sleeves  Length of Need: 1 month  Frequency of dressing change daily 1 Device 0    order for DME [employer] Compression Stockings  Phone #[phone]  Fax #[phone]  Coolflex Velcro wraps    Length of Need: Life Time  # of Pairs 1 set 30 days 0    order for DME [employer] Order Phone [phone] Fax [phone]  Primary Dressing Hydrofera Blue Ready   Qty 5 sheets  Secondary Dressing 4' roll gauze Qty 30  Secondary Dressing 2' medipore tape Qty 1  Secondary Dressing 4x4 gauze loaf Qty  1  Length of Need: 1 month  Frequency of dressing change: daily 30 days 0    order for DME Oxygen 2 Li/min  at night and bled into BiPAP 1 Device 0    order for DME Equipment being ordered: CPAP with mask and tubing 1 Device 0    order for DME Equipment being ordered: support compression hose BK  2 pair black 30mm HG   To be applied on arising & removed while lying down to go to sleep 1 each 0    polyethylene glycol (MIRALAX) 17 GM/Dose powder Take 17 g (1 capful) by mouth daily as needed for constipation 578 g 0    PULMICORT FLEXHALER 180 MCG/ACT inhaler INHALE 2 PUFFS INTO THE LUNGS TWICE DAILY. 1 each 11    SENNA-TABS 8.6 MG tablet Take 1 tablet by mouth daily      sertraline (ZOLOFT) 100 MG tablet Take 2 tablets (200 mg) by mouth daily for 45 days 90 tablet 0    simvastatin (ZOCOR) 40 MG tablet TAKE 1 TABLET BY MOUTH EVERY MORNING.  (CHOLESTEROL) 30 tablet 11    SPIRIVA HANDIHALER 18 MCG inhaled capsule INHALE CONTENTS OF 1 CAPSULE INTO THE LUNGS ONCE DAILY 30 capsule 11    spironolactone (ALDACTONE) 25 MG tablet TAKE 1 TABLET BY MOUTH ONCE DAILY. (HIGH BLOOD  PRESSURE) 30 tablet 11    tiZANidine (ZANAFLEX) 2 MG tablet TAKE 1 TABLET BY MOUTH THREE TIMES DAILY. 106 tablet 11    traZODone (DESYREL) 100 MG tablet Take 1 tablet (100 mg) by mouth at bedtime for 45 days 45 tablet 0    triamcinolone (KENALOG) 0.1 % external cream Apply to AA BID x 1-2 week then PRN only 80 g 2    vitamin B complex with vitamin C (STRESS TAB) tablet Take 1 tablet by mouth daily 90 tablet 3    warfarin ANTICOAGULANT (COUMADIN) 2 MG tablet Take 8 mg (2 mg x 1 and 6 mg x 1) every Sun, Tue, Fri; 6 mg (6 mg x 1) all other days or as directed by the Anticoagulation Clinic. Next INR Date is 7/11/24 10 tablet 0    warfarin ANTICOAGULANT (COUMADIN) 6 MG tablet Take 8 mg (2 mg x 1 and 6 mg x 1) every Sun, Tue, Fri; 6 mg (6 mg x 1) all other days or as directed by the Anticoagulation Clinic. Next INR Date is 7/11/24 22 tablet 0     No current facility-administered medications for this encounter.         Tobacco History:  reports that he has quit smoking. His smoking use included cigarettes. He has a 30 pack-year smoking history. He has never used smokeless tobacco.       REVIEW OF SYMPTOMS:   The review of systems was negative except as noted in the HPI.           PHYSICAL EXAMINATION:     /67 (BP Location: Left arm, Patient Position: Chair)   Pulse 83   Temp 96.8  F (36  C) (Temporal)            GENERAL: The patient overall appears well and is no acute distress.   HEAD: normocephalic   EYES: Sclera and conjunctiva clear   NECK: no obvious masses   LUNGS: breathing is unlabored.   EXTREMITIES: No clubbing, cyanosis or edema   SKIN: No rashes or other abnormalities except as noted under the Wound section below.   NEUROLOGICAL: normal motor and sensory function   EDEMA: Moderate       WOUND: The wound appears healthy with no sign of infection.   Wound bed: granulation tissue   Periwound: healthy intact skin  On the left leg the wound bed appears much healthier but the wound measurement is about the  same size.  The right foot wound is fairly small but about the same size compared with his last clinic visit.    All of his wounds are full-thickness.    Also see below for wound details:       Circumferential volume measures:             No data to display                Ulceration(s)/Wound(s):   Please see the media tab under the chart review for pictures of the wounds.  Nursing staff removed dressings and cleansed wound.    Wound (used by [facility] only) 11/15/23 0906 Right lateral;dorsal foot ulceration, venous (Active)   Thickness/Stage full thickness 07/02/24 1320   Base slough;scab 07/02/24 1320   Periwound edematous 07/02/24 1320   Periwound Temperature warm 07/02/24 1320   Periwound Skin Turgor soft 07/02/24 1320   Edges open 07/02/24 1320   Length (cm) 0.8 07/02/24 1320   Width (cm) 1.1 07/02/24 1320   Depth (cm) 0.3 07/02/24 1320   Wound (cm^2) 0.88 cm^2 07/02/24 1320   Wound Volume (cm^3) 0.26 cm^3 07/02/24 1320   Wound healing % 51.11 07/02/24 1320   Drainage Characteristics/Odor serous 07/02/24 1320   Drainage Amount moderate 07/02/24 1320   Care, Wound non-select wound debridement performed. 07/02/24 1320       Wound (used by [facility] only) 11/15/23 0911 Left dorsal foot ulceration, venous (Active)   Thickness/Stage full thickness 05/07/24 1407   Base scab 07/02/24 1320   Periwound edematous 05/07/24 1407   Periwound Temperature warm 05/07/24 1407   Periwound Skin Turgor soft 05/07/24 1407   Edges open 05/07/24 1407   Length (cm) 1.1 05/07/24 1407   Width (cm) 1.3 05/07/24 1407   Depth (cm) 0.2 05/07/24 1407   Wound (cm^2) 1.43 cm^2 05/07/24 1407   Wound Volume (cm^3) 0.29 cm^3 05/07/24 1407   Wound healing % 91.88 05/07/24 1407   Drainage Characteristics/Odor serous 05/07/24 1407   Drainage Amount copious 05/07/24 1407   Care, Wound non-select wound debridement performed. 05/07/24 1407       Wound (used by [facility] only) 12/27/23 1410 Left anterior;lower leg ulceration, venous (Active)   Thickness/Stage  full thickness 07/02/24 1320   Base slough;granulating 07/02/24 1320   Periwound swelling;pink 07/02/24 1320   Periwound Temperature warm 07/02/24 1320   Periwound Skin Turgor soft 07/02/24 1320   Edges open 07/02/24 1320   Length (cm) 4.7 07/02/24 1320   Width (cm) 3.2 07/02/24 1320   Depth (cm) 0.3 07/02/24 1320   Wound (cm^2) 15.04 cm^2 07/02/24 1320   Wound Volume (cm^3) 4.51 cm^3 07/02/24 1320   Wound healing % -2213.85 07/02/24 1320   Drainage Characteristics/Odor serous 07/02/24 1320   Drainage Amount moderate 07/02/24 1320   Care, Wound non-select wound debridement performed. 07/02/24 1320               Recent Labs   Lab Test 06/03/21  1448 03/23/21  1042 07/28/20  0929   A1C 5.7* 5.6 5.7*          Recent Labs   Lab Test 02/14/23  0059 06/08/22  1835 07/28/20  0929   ALBUMIN 3.9 3.2* 3.3*              No sharp debridement performed today.         ASSESSMENT:   This is a 63 year old  male with lower extremity edema and bilateral lower extremity wounds.          PLAN:   We will bandage all of the wound areas beginning with a zinc oxide barrier cream applied to the periwound followed by alginate, a Mepilex bandage and the spandagrip stocking change 3 times a week.  I strongly encouraged the patient to keep the bandages on.    I reviewed the dressing recommendations with the patient and I have advised him that we need to follow these bandage recommendations along with doing the leg elevation in order to have any hope of healing these wounds.  If we cannot follow these recommendations I do not expect the wounds to heal.    I have strongly encouraged him to continue to work with Dr. Muhammad to treat his venous insufficiency.    We are still treating these wounds palliatively since the patient is thoroughly noncompliant.    Separate from his wound care instructions I then discussed the treatment of his lower extremity edema.  This primarily involves compression and elevation.  I have strongly encouraged him to  wear his spandagrip stockings every day.  I have again explained to the patient today that controlling the edema is probably the most important thing we can do to help heal the wound.  I have specifically recommended that they lay down with their legs above the level of the heart for 30 minutes at least twice a day.  I emphasized that if we can not control the edema we will likely not be able to get this wound to heal.     I have encouraged the patient to continue on their high protein diet to aid in wound healing.   I have also strongly encouraged him to decrease his nicotine intake which slows wound healing.   The patient will return to the wound clinic in 4 weeks if he would like to continue to work on getting the wounds to heal.        30 minutes spent on the date of the encounter doing chart review, history and exam, documentation and further activities per the note, this time excludes any procedure time      Miguel Hampton MD  07/02/2024   2:02 PM   [facility]/Wound  [phone]    This note was electronically signed by Miguel Hampton MD        Further instructions from your care team         07/02/2024   Petey Archibald   1960      A DME order for supplies has been placed to [facility]. If there are any issues  with your order including not receiving the order please call [facility] at [phone].  They can also provide a tracking number for you.    Dressing changes outside of clinic are being performed by Group Home Staff  [facility] group home (3x week) Fax: laurie Lala [phone]  -Group home staff - please take off compression stockings at night and put back on in the AM when you put on patients socks and shoes.      Plan: 7/2/24  Scheduled on 6/11 with Dr. Muhammad for sclerotherapy  Wear tube stocking to help with swelling  Elevate Legs to hip or higher 30 min 10 am and 2pm  Control smoking and work to reduce or eliminate all nicotine products  Schedule showers so  that patient can have wound care done afterwards  Venous Competency and need follow up with Vein Doctor - Done  Protein diet: shakes and bars  Monitor INR - contact INR Nurse for instructions  Continue to manage your elbows with your Primary care doctor (no open wounds visualized today)  Complete all of your antibiotics as ordered.      Wound Dressing Change: Right Lateral Dorsal Foot  - Prepare surface and wash your hands with soap and water  - Cleanse with mild unscented soap (such as Cetaphil, Cerave or Dove) and water  - If able, apply small amount of VASHE on gauze, lay into wound bed, let sit for 10 minutes, remove gauze (do not rinse)  - Apply light layer Desitin/ Zinc oxide barrier paste around wound edge  - Apply ~1/20 piece 4x4 Melgisorb alginate to wound bed (cut-to-fit size of wound bed)  - Cover with 3x3 Mepilex Silicone bordered dressing  - Apply Atrac-Tain lotion or similar to intact skin  - Pull up Spandagrip size F from toes to behind the knee  Change 3 times weekly and as needed for soilage/drainage        Wound Dressing Change: Left anterior lower leg  - Prepare surface and wash your hands with soap and water  - Cleanse with mild unscented soap and water (such as Cetaphil, Cerave or Dove)  - If able, apply small amount of VASHE on gauze, lay into wound bed, let sit for 10 minutes, remove gauze (do not rinse)  - apply light layer Desitin/Zinc oxide barrier paste around wound edges  - Apply 1/4th piece 4x4 Melgisorb alginate to wound bed  - Apply one 4x4 Mepilex with silicone bordered dressing  - Apply Atrac-Tain lotion or similar to intact skin  - Pull up Spandagrip size F from toes to behind the knee  Change 3 times weekly and as needed for drainage      Compression:  Your compression is Spandage Size 7 and can be removed at night and put back on  first thing in the morning.  Please remove compression dressing if toes turn blue and/or tingle and can not be  relieved by raising the leg for one  hour. If unable to reapply in the morning, keep  compression on until next dressing change.  Walk as much as you can, as you are able. Whenever you sit raise your ankle  above your hips to promote wound healing.  Elevation: elevate your legs above the level of your heart for 30 minutes 2 times  each day  - Lay on the couch or your bed and prop your legs up on pillows  Petey Archibald (MRN: [id number])  Printed at 6/4/2024 2:40 PM Page 14 of 15  - Recline back as far as you can go in your recliner and prop your legs on pillows.  A diet high in protein is important for wound healing, we recommend getting 90 grams  of protein per day.  Taking protein shakes or bars are a good way to get extra protein in your diet.    Good sources of protein:  Pork 26g per 3 oz  Whey protein powder - 24g per scoop (on average)  Greek yogurt - 23g per 8oz  Chicken or Turkey - 23g per 3oz  Fish - 20-25g per 3oz  Beef - 18-23g per 3oz  Tofu - 10g per 1/2 cup  Navy beans - 20g per cup  Cottage cheese - 14g per 1/2 cup  Lentils - 13g per 1/4 cup  Beef jerky 13g per 1oz  2% milk - 8g per cup  Peanut butter - 8g per 2 tablespoons  Eggs - 6g per egg  Mixed nuts - 6g per 2oz     Main Provider: Miguel Hampton M.D. July 2, 2024    Call us at [phone] if you have any questions about your wounds, if you have redness or swelling around your wound, have a fever of 101 degrees Fahrenheit or greater or if you have any other problems or concerns. We answer the phone Monday through Friday 8 am to 4 pm, please leave a message as we check the voicemail frequently throughout the day. If you have a concern over the weekend, please leave a message and we will return your call Monday. If the need is urgent, go to the ER or urgent care.    If you had a positive experience please indicate that on your patient satisfaction survey form that [facility] will be sending you.    It was a pleasure meeting with you today.  Thank you for allowing me and  my team the privilege of caring for you today.  YOU are the reason we are here, and I truly hope we provided you with the excellent service you deserve.  Please let us know if there is anything else we can do for you so that we can be sure you are leaving completely satisfied with your care experience.      If you have any billing related questions please call the [facility] Business office at [phone]. The clinic staff does not handle billing related matters.    If you are scheduled to have a follow up appointment, you will receive a reminder call the day before your visit. On the appointment day please arrive 15 minutes prior to your appointment time. If you are unable to keep that appointment, please call the clinic to cancel or reschedule. If you are more than 10 minutes late or greater for your scheduled appointment time, the clinic policy is that you may be asked to reschedule.         ,

## 2024-07-03 ENCOUNTER — LAB (OUTPATIENT)
Dept: LAB | Facility: CLINIC | Age: 64
End: 2024-07-03
Payer: MEDICARE

## 2024-07-03 ENCOUNTER — ANTICOAGULATION THERAPY VISIT (OUTPATIENT)
Dept: ANTICOAGULATION | Facility: CLINIC | Age: 64
End: 2024-07-03

## 2024-07-03 DIAGNOSIS — Z79.01 LONG TERM CURRENT USE OF ANTICOAGULANT THERAPY: ICD-10-CM

## 2024-07-03 DIAGNOSIS — Z86.711 HISTORY OF PULMONARY EMBOLISM: ICD-10-CM

## 2024-07-03 DIAGNOSIS — Z86.711 HISTORY OF PULMONARY EMBOLISM: Primary | ICD-10-CM

## 2024-07-03 LAB — INR BLD: 2.7 (ref 0.9–1.1)

## 2024-07-03 PROCEDURE — 85610 PROTHROMBIN TIME: CPT

## 2024-07-03 PROCEDURE — 36416 COLLJ CAPILLARY BLOOD SPEC: CPT

## 2024-07-03 RX ORDER — WARFARIN SODIUM 6 MG/1
TABLET ORAL
Qty: 22 TABLET | Refills: 0 | Status: SHIPPED | OUTPATIENT
Start: 2024-07-03 | End: 2024-07-31

## 2024-07-03 RX ORDER — WARFARIN SODIUM 2 MG/1
TABLET ORAL
Qty: 10 TABLET | Refills: 0 | Status: SHIPPED | OUTPATIENT
Start: 2024-07-03 | End: 2024-07-24

## 2024-07-03 NOTE — PROGRESS NOTES
ANTICOAGULATION MANAGEMENT     Petey Archibald 63 year old male is on warfarin with therapeutic INR result. (Goal INR 2.0-3.0)    Recent labs: (last 7 days)     07/03/24  1041   INR 2.7*       ASSESSMENT     Source(s): Chart Review and Home Care/Facility Nurse     Warfarin doses taken: Warfarin taken as instructed  Diet: No new diet changes identified  Medication/supplement changes: None noted  New illness, injury, or hospitalization: No  Signs or symptoms of bleeding or clotting: No  Previous result: Therapeutic last 2(+) visits  Additional findings: Updated script sent to [facility]       PLAN     Recommended plan for no diet, medication or health factor changes affecting INR     Dosing Instructions: Continue your current warfarin dose with next INR in 3 weeks       Summary  As of 7/3/2024      Full warfarin instructions:  8 mg every Sun, Tue, Fri; 6 mg all other days   Next INR check:  7/24/2024               Telephone call with [facility] facility nurse who verbalizes understanding and agrees to plan  Faxed dosing and follow up instructions to [phone]  AVS mailed to group home     Lab visit scheduled    Education provided: Goal range and lab monitoring: goal range and significance of current result and Importance of therapeutic range    Plan made per [facility] anticoagulation protocol    Arnaldo Lopez RN  Anticoagulation Clinic  7/3/2024    _______________________________________________________________________     Anticoagulation Episode Summary       Current INR goal:  2.0-3.0   TTR:  68.1% (1 y)   Target end date:  Indefinite   Send INR reminders to:  EDU JIMENEZ    Indications    History of pulmonary embolism [Z86.711]  Long term current use of anticoagulant therapy [Z79.01]             Comments:  [facility]; A new Coumadin Prescription will need to sent to [facility] with current dose and next INR check.             Anticoagulation Care Providers       Provider Role Specialty Phone number    Gianni  Demetri Caballero MD [facility] Internal Medicine [phone]

## 2024-07-08 DIAGNOSIS — R21 RASH: Primary | ICD-10-CM

## 2024-07-08 RX ORDER — AMMONIUM LACTATE 12 G/100G
CREAM TOPICAL
Qty: 140 G | Refills: 11 | Status: SHIPPED | OUTPATIENT
Start: 2024-07-08

## 2024-07-21 ENCOUNTER — HOSPITAL ENCOUNTER (EMERGENCY)
Facility: CLINIC | Age: 64
Discharge: NURSING, REHAB OR LONG-TERM CARE | End: 2024-07-22
Attending: EMERGENCY MEDICINE | Admitting: EMERGENCY MEDICINE
Payer: MEDICARE

## 2024-07-21 DIAGNOSIS — S22.080A T12 COMPRESSION FRACTURE, INITIAL ENCOUNTER (H): ICD-10-CM

## 2024-07-21 DIAGNOSIS — M54.50 ACUTE BILATERAL LOW BACK PAIN WITHOUT SCIATICA: ICD-10-CM

## 2024-07-21 PROCEDURE — 99284 EMERGENCY DEPT VISIT MOD MDM: CPT | Mod: 25

## 2024-07-21 NOTE — Clinical Note
Petey Archibald was seen and treated in our emergency department on 7/21/2024.  He may return to work on 07/24/2024.       If you have any questions or concerns, please don't hesitate to call.      Leeanna Negro MD

## 2024-07-21 NOTE — Clinical Note
Petey Archibald was seen and treated in our emergency department on 7/21/2024.  He may return to work on 07/22/2024.       If you have any questions or concerns, please don't hesitate to call.      Leeanna Negro MD

## 2024-07-22 ENCOUNTER — DOCUMENTATION ONLY (OUTPATIENT)
Dept: ANTICOAGULATION | Facility: CLINIC | Age: 64
End: 2024-07-22

## 2024-07-22 ENCOUNTER — APPOINTMENT (OUTPATIENT)
Dept: CT IMAGING | Facility: CLINIC | Age: 64
End: 2024-07-22
Attending: EMERGENCY MEDICINE
Payer: MEDICARE

## 2024-07-22 VITALS
HEART RATE: 80 BPM | RESPIRATION RATE: 15 BRPM | OXYGEN SATURATION: 96 % | DIASTOLIC BLOOD PRESSURE: 62 MMHG | SYSTOLIC BLOOD PRESSURE: 121 MMHG | TEMPERATURE: 98.4 F

## 2024-07-22 LAB
ANION GAP SERPL CALCULATED.3IONS-SCNC: 8 MMOL/L (ref 7–15)
BASOPHILS # BLD AUTO: 0 10E3/UL (ref 0–0.2)
BASOPHILS NFR BLD AUTO: 1 %
BUN SERPL-MCNC: 14.1 MG/DL (ref 8–23)
CALCIUM SERPL-MCNC: 8.3 MG/DL (ref 8.8–10.4)
CHLORIDE SERPL-SCNC: 104 MMOL/L (ref 98–107)
CREAT SERPL-MCNC: 0.98 MG/DL (ref 0.67–1.17)
EGFRCR SERPLBLD CKD-EPI 2021: 86 ML/MIN/1.73M2
EOSINOPHIL # BLD AUTO: 0.2 10E3/UL (ref 0–0.7)
EOSINOPHIL NFR BLD AUTO: 5 %
ERYTHROCYTE [DISTWIDTH] IN BLOOD BY AUTOMATED COUNT: 16.7 % (ref 10–15)
GLUCOSE SERPL-MCNC: 137 MG/DL (ref 70–99)
HCO3 SERPL-SCNC: 26 MMOL/L (ref 22–29)
HCT VFR BLD AUTO: 30.5 % (ref 40–53)
HGB BLD-MCNC: 9.2 G/DL (ref 13.3–17.7)
IMM GRANULOCYTES # BLD: 0 10E3/UL
IMM GRANULOCYTES NFR BLD: 1 %
INR PPP: 2.44 (ref 0.85–1.15)
LYMPHOCYTES # BLD AUTO: 1.1 10E3/UL (ref 0.8–5.3)
LYMPHOCYTES NFR BLD AUTO: 28 %
MCH RBC QN AUTO: 26.6 PG (ref 26.5–33)
MCHC RBC AUTO-ENTMCNC: 30.2 G/DL (ref 31.5–36.5)
MCV RBC AUTO: 88 FL (ref 78–100)
MONOCYTES # BLD AUTO: 0.4 10E3/UL (ref 0–1.3)
MONOCYTES NFR BLD AUTO: 10 %
NEUTROPHILS # BLD AUTO: 2.2 10E3/UL (ref 1.6–8.3)
NEUTROPHILS NFR BLD AUTO: 55 %
NRBC # BLD AUTO: 0 10E3/UL
NRBC BLD AUTO-RTO: 0 /100
PLATELET # BLD AUTO: 228 10E3/UL (ref 150–450)
POTASSIUM SERPL-SCNC: 4.2 MMOL/L (ref 3.4–5.3)
RBC # BLD AUTO: 3.46 10E6/UL (ref 4.4–5.9)
SODIUM SERPL-SCNC: 138 MMOL/L (ref 135–145)
WBC # BLD AUTO: 4 10E3/UL (ref 4–11)

## 2024-07-22 PROCEDURE — 80048 BASIC METABOLIC PNL TOTAL CA: CPT | Performed by: EMERGENCY MEDICINE

## 2024-07-22 PROCEDURE — 85610 PROTHROMBIN TIME: CPT | Performed by: EMERGENCY MEDICINE

## 2024-07-22 PROCEDURE — 85025 COMPLETE CBC W/AUTO DIFF WBC: CPT | Performed by: EMERGENCY MEDICINE

## 2024-07-22 PROCEDURE — 250N000013 HC RX MED GY IP 250 OP 250 PS 637: Performed by: EMERGENCY MEDICINE

## 2024-07-22 PROCEDURE — 36415 COLL VENOUS BLD VENIPUNCTURE: CPT | Performed by: EMERGENCY MEDICINE

## 2024-07-22 PROCEDURE — 72131 CT LUMBAR SPINE W/O DYE: CPT | Mod: MF

## 2024-07-22 RX ORDER — LIDOCAINE 4 G/G
1 PATCH TOPICAL EVERY 24 HOURS
Qty: 10 PATCH | Refills: 0 | Status: SHIPPED | OUTPATIENT
Start: 2024-07-22

## 2024-07-22 RX ORDER — OXYCODONE HYDROCHLORIDE 5 MG/1
5 TABLET ORAL ONCE
Status: COMPLETED | OUTPATIENT
Start: 2024-07-22 | End: 2024-07-22

## 2024-07-22 RX ADMIN — OXYCODONE HYDROCHLORIDE 5 MG: 5 TABLET ORAL at 00:27

## 2024-07-22 ASSESSMENT — ACTIVITIES OF DAILY LIVING (ADL)
ADLS_ACUITY_SCORE: 40
ADLS_ACUITY_SCORE: 38

## 2024-07-22 ASSESSMENT — COLUMBIA-SUICIDE SEVERITY RATING SCALE - C-SSRS
6. HAVE YOU EVER DONE ANYTHING, STARTED TO DO ANYTHING, OR PREPARED TO DO ANYTHING TO END YOUR LIFE?: NO
1. IN THE PAST MONTH, HAVE YOU WISHED YOU WERE DEAD OR WISHED YOU COULD GO TO SLEEP AND NOT WAKE UP?: NO
2. HAVE YOU ACTUALLY HAD ANY THOUGHTS OF KILLING YOURSELF IN THE PAST MONTH?: NO

## 2024-07-22 NOTE — PROGRESS NOTES
ANTICOAGULATION  MANAGEMENT: Discharge Review    Petey Archibald chart reviewed for anticoagulation continuity of care    Emergency room visit on 07/21/2024 for T12 compression fracture.    Discharge disposition: Home    Results:    Recent labs: (last 7 days)     07/22/24  0024   INR 2.44*     Anticoagulation inpatient management:     not applicable     Anticoagulation discharge instructions:     Warfarin dosing: home regimen continued   Bridging: No   INR goal change: No      Medication changes affecting anticoagulation: No    Additional factors affecting anticoagulation: Yes: Give lidocaine patch for pain.     PLAN     No adjustment to anticoagulation plan needed    Patient not contacted    No adjustment to Anticoagulation Calendar was required    Ashley Quiroz RN

## 2024-07-22 NOTE — ED TRIAGE NOTES
"Pt BIBA from group home with c/o lower back pain x2 days. Denies fall, trauma. Pt uses walker at baseline, requiring assist to stand and walk to stretcher. Acetaminophen taken PTA. Pt reports that \"when I go out and vape, it's hard to catch my breath\". Denies urinary symptoms and  n/v.        "

## 2024-07-22 NOTE — ED NOTES
Bed: ED03  Expected date: 7/21/24  Expected time: 11:55 PM  Means of arrival: Ambulance  Comments:  HEMS 427 63M low back pain; unable to ambulante

## 2024-07-22 NOTE — DISCHARGE INSTRUCTIONS
Tylenol 500-650 mg every 6 hours as needed (max dose of 3000 mg per day)  Continue your muscle relaxer tizanidine  Lidocaine patches as needed  Follow up with primary care provider or orthopedics regarding the compression fracture that is likely causing your pain  The pain should improve over time    Discharge Instructions  Back Pain  You were seen today for back pain. Back pain can have many causes, but most will get better without surgery or other specific treatment. Sometimes there is a herniated ( slipped ) disc. We do not usually do MRI scans to look for these right away, since most herniated discs will get better on their own with time.  Today, we did not find any evidence that your back pain was caused by a serious condition. However, sometimes symptoms develop over time and cannot be found during an emergency visit, so it is very important that you follow up with your primary provider.  Generally, every Emergency Department visit should have a follow-up clinic visit with either a primary or a specialty clinic/provider. Please follow-up as instructed by your emergency provider today.    Return to the Emergency Department if:  You develop a fever with your back pain.   You have weakness or change in sensation in one or both legs.  You lose control of your bowels or bladder, or cannot empty your bladder (cannot pee).  Your pain gets much worse.     Follow-up with your provider:  Unless your pain has completely gone away, please make an appointment with your provider within one week. Most of the routine care for back pain is available in a clinic and not the Emergency Department. You may need further management of your back pain, such as more pain medication, imaging such as an X-ray or MRI, or physical therapy.    What can I do to help myself?  Remain Active -- People are often afraid that they will hurt their back further or delay recovery by remaining active, but this is one of the best things you can do for  your back. In fact, staying in bed for a long time to rest is not recommended. Studies have shown that people with low back pain recover faster when they remain active. Movement helps to bring blood flow to the muscles and relieve muscle spasms as well as preventing loss of muscle strength.  Heat -- Using a heating pad can help with low back pain during the first few weeks. Do not sleep with a heating pad, as you can be burned.   Pain medications - You may take a pain medication such as Tylenol  (acetaminophen), Advil , Motrin  (ibuprofen) or Aleve  (naproxen).  If you were given a prescription for medicine here today, be sure to read all of the information (including the package insert) that comes with your prescription.  This will include important information about the medicine, its side effects, and any warnings that you need to know about.  The pharmacist who fills the prescription can provide more information and answer questions you may have about the medicine.  If you have questions or concerns that the pharmacist cannot address, please call or return to the Emergency Department.   Remember that you can always come back to the Emergency Department if you are not able to see your regular provider in the amount of time listed above, if you get any new symptoms, or if there is anything that worries you.

## 2024-07-22 NOTE — ED PROVIDER NOTES
Emergency Department Note      History of Present Illness     Chief Complaint   Back Pain    HPI   Petey Archibald is a 63 year old male with a history of hypertension and hyperlipidemia who presents to the ED for lower back pain. For the past couple of days, the patient has been experiencing lower back pain. The pain spans across his lower back. The pain does not radiate down his legs. He states that it hurts to move. He does use a walker at baseline and the back pain makes it difficult to walk. He denies any recent weird movements, falls, or trauma. He does stand a lot for his job and has to bend over quite a bit. He denies any abdominal pain or trouble urinating or passing stool. He took Tylenol at 2300 for pain.     Independent Historian   None    Review of External Notes   None    Past Medical History   Medical History and Problem List   Obesity  Hypertension  Asthma  COPD  Sleep apnea  GERD  Depressive disorder  Insomnia  Nicotine dependence  Intellectual disabilities  Impulse disorders  History of DVT  Hyperlipidemia  Peripheral vascular disease  Asthma  Venous stasis dermatitis    Medications   Budesonide  Dexlansoprazole  Ferrous gluconate  Furosemide  Montelukast  Spironolactone  Simvastatin  Tiotropium bromide  Warfarin  Bupropion  Naltrexone  Aripiprazole  Trazodone  Sertraline    Surgical History   Colonoscopy  Rectal surgery  Malignant lesion excision    Physical Exam   Patient Vitals for the past 24 hrs:   BP Temp Temp src Pulse Resp SpO2   07/22/24 0007 -- -- -- -- 15 --   07/22/24 0005 121/62 98.4  F (36.9  C) Oral 80 -- 96 %     Physical Exam  General: Sitting up in bed  Eyes:  The pupils are equal and round    Conjunctivae and sclerae are normal  ENT:    Atraumatic face  Neck:  Normal range of motion  CV:  Regular rate,  regular rhythm     Skin warm and well perfused   Resp:  Non labored breathing on room air    No tachypnea    No cough heard    Lungs clear bilaterally  GI:  Abdomen is soft,  there is no rigidity    No distension    No rebound tenderness     No abdominal tenderness  MS:  Mild tenderness diffusely on lower back  Skin:  No rash or acute skin lesions noted  Neuro:   Awake, alert.      Speech is normal and fluent.    Face is symmetric.     Moves all extremities equally    SILT on bilateral LE    Equal strength on dorsiflexion/plantarflexion/EHL bilaterally    Patellar reflexes normal bilaterally  Psych: Normal affect.  Appropriate interactions.    Diagnostics   Lab Results   Labs Ordered and Resulted from Time of ED Arrival to Time of ED Departure   BASIC METABOLIC PANEL - Abnormal       Result Value    Sodium 138      Potassium 4.2      Chloride 104      Carbon Dioxide (CO2) 26      Anion Gap 8      Urea Nitrogen 14.1      Creatinine 0.98      GFR Estimate 86      Calcium 8.3 (*)     Glucose 137 (*)    INR - Abnormal    INR 2.44 (*)    CBC WITH PLATELETS AND DIFFERENTIAL - Abnormal    WBC Count 4.0      RBC Count 3.46 (*)     Hemoglobin 9.2 (*)     Hematocrit 30.5 (*)     MCV 88      MCH 26.6      MCHC 30.2 (*)     RDW 16.7 (*)     Platelet Count 228      % Neutrophils 55      % Lymphocytes 28      % Monocytes 10      % Eosinophils 5      % Basophils 1      % Immature Granulocytes 1      NRBCs per 100 WBC 0      Absolute Neutrophils 2.2      Absolute Lymphocytes 1.1      Absolute Monocytes 0.4      Absolute Eosinophils 0.2      Absolute Basophils 0.0      Absolute Immature Granulocytes 0.0      Absolute NRBCs 0.0       Imaging   CT Lumbar Spine w/o Contrast   Final Result   IMPRESSION:   1.  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   2.  Mild acute/early subacute compression fracture with Schmorl's node at the superior endplate of T12. Less than 20% loss of height. Correlate for pain in this locale.   3.  No high-grade central canal or neural foraminal stenosis.           ED Course    Medications Administered   Medications   oxyCODONE (ROXICODONE) tablet 5 mg (5 mg  Oral $Given 7/22/24 0027)     Discussion of Management   None    ED Course   ED Course as of 07/22/24 0319   Mon Jul 22, 2024   0007 I obtained history and examined the patient as noted above.     0314 The patient is comfortable with plan for discharge.       Optional/Additional Documentation  None    Medical Decision Making / Diagnosis   DEE Archibald is a 63 year old male who presented to the emergency department back pain.  Patient with nontraumatic back pain.  Given his size and on anticoagulation, did think CT lumbar spine was indicated.  CT shows compression fracture.  Likely cause of his pain.  No neurologic changes or bowel or bladder changes to suggest cauda equina.  MRI of spine not indicated.  Recommend follow-up with orthopedics or primary care provider.  It appears based on his medication list that he is already on a muscle relaxer.  Recommended lidocaine patches, Tylenol for pain.  Patient wanting to go home.    Disposition   The patient was discharged.     Diagnosis     ICD-10-CM    1. T12 compression fracture, initial encounter (H)  S22.080A       2. Acute bilateral low back pain without sciatica  M54.50            Discharge Medications   Discharge Medication List as of 7/22/2024  3:22 AM        START taking these medications    Details   Lidocaine (LIDOCARE) 4 % Patch Place 1 patch onto the skin every 24 hours To prevent lidocaine toxicity, patient should be patch free for 12 hrs daily.Disp-10 patch, [id number]           Scribe Disclosure:  Teo TA, am serving as a scribe at 12:15 AM on 7/22/2024 to document services personally performed by Leeanna Negro MD based on my observations and the provider's statements to me.        Leeanna Negro MD  07/22/24 9407

## 2024-07-24 ENCOUNTER — ANTICOAGULATION THERAPY VISIT (OUTPATIENT)
Dept: ANTICOAGULATION | Facility: CLINIC | Age: 64
End: 2024-07-24

## 2024-07-24 ENCOUNTER — DOCUMENTATION ONLY (OUTPATIENT)
Dept: ANTICOAGULATION | Facility: CLINIC | Age: 64
End: 2024-07-24

## 2024-07-24 ENCOUNTER — LAB (OUTPATIENT)
Dept: LAB | Facility: CLINIC | Age: 64
End: 2024-07-24
Payer: MEDICARE

## 2024-07-24 DIAGNOSIS — Z86.711 HISTORY OF PULMONARY EMBOLISM: Primary | ICD-10-CM

## 2024-07-24 DIAGNOSIS — Z79.01 LONG TERM CURRENT USE OF ANTICOAGULANT THERAPY: ICD-10-CM

## 2024-07-24 DIAGNOSIS — Z86.711 HISTORY OF PULMONARY EMBOLISM: ICD-10-CM

## 2024-07-24 LAB — INR BLD: 3.1 (ref 0.9–1.1)

## 2024-07-24 PROCEDURE — 36416 COLLJ CAPILLARY BLOOD SPEC: CPT

## 2024-07-24 PROCEDURE — 85610 PROTHROMBIN TIME: CPT

## 2024-07-24 RX ORDER — WARFARIN SODIUM 2 MG/1
TABLET ORAL
Qty: 18 TABLET | Refills: 0 | Status: SHIPPED | OUTPATIENT
Start: 2024-07-24 | End: 2024-07-31

## 2024-07-24 NOTE — PROGRESS NOTES
ANTICOAGULATION CLINIC REFERRAL RENEWAL REQUEST       An annual renewal order is required for all patients referred to [facility].?  Please review and sign the pended referral order for Petey Archibald.       ANTICOAGULATION SUMMARY      Warfarin indication(s)   PE    Mechanical heart valve present?  NO       Current goal range   INR: 2.0-3.0     Goal appropriate for indication? Goal INR 2-3, standard for indication(s) above     Time in Therapeutic Range (TTR)  (Goal > 60%) 68%       Office visit with referring provider's group within last year yes on 6/5/24       Myranda Crutis RN  [facility]

## 2024-07-24 NOTE — PROGRESS NOTES
ANTICOAGULATION MANAGEMENT     Petey Archibald 63 year old male is on warfarin with supratherapeutic INR result. (Goal INR 2.0-3.0)    Recent labs: (last 7 days)     07/24/24  0957   INR 3.1*       ASSESSMENT     Source(s): Chart Review and Patient/Caregiver Call     Warfarin doses taken: Warfarin taken as instructed  Diet: No new diet changes identified  Medication/supplement changes:  Using Tylenol PRN for back pain, about 500 mg per day  New illness, injury, or hospitalization: Yes: patient was seen in the ER on 7/21/24 for T12 fracture. Back pain is starting to improve per [facility]. Patient will follow up with primary care provider on 7/29/24.  Signs or symptoms of bleeding or clotting: No  Previous result: Therapeutic last 2(+) visits  Additional findings: None       PLAN     Recommended plan for temporary change(s) affecting INR     Dosing Instructions: partial hold then continue your current warfarin dose with next INR in 5 days       Summary  As of 7/24/2024      Full warfarin instructions:  7/24: 3 mg; Otherwise 8 mg every Sun, Tue, Fri; 6 mg all other days   Next INR check:                 Telephone call with [facility] facility nurse who agrees to plan and repeated back plan correctly    Check at provider office visit    Education provided: Please call back if any changes to your diet, medications or how you've been taking warfarin  Symptom monitoring: monitoring for bleeding signs and symptoms, monitoring for clotting signs and symptoms, and monitoring for stroke signs and symptoms  Contact [phone] with any changes, questions or concerns.     Plan made per [facility] anticoagulation protocol    Myranda Curtis RN  Anticoagulation Clinic  7/24/2024    _______________________________________________________________________     Anticoagulation Episode Summary       Current INR goal:  2.0-3.0   TTR:  68.0% (1 y)   Target end date:  Indefinite   Send INR reminders to:  EDU JIMENEZ    Indications    History of  pulmonary embolism [Z86.711]  Long term current use of anticoagulant therapy [Z79.01]             Comments:  [facility]; A new Coumadin Prescription will need to sent to [facility] with current dose and next INR check.             Anticoagulation Care Providers       Provider Role Specialty Phone number    Demetri Archer MD Referring Internal Medicine [phone]

## 2024-07-29 ENCOUNTER — OFFICE VISIT (OUTPATIENT)
Dept: INTERNAL MEDICINE | Facility: CLINIC | Age: 64
End: 2024-07-29
Payer: MEDICARE

## 2024-07-29 VITALS
BODY MASS INDEX: 50.62 KG/M2 | TEMPERATURE: 97.5 F | WEIGHT: 315 LBS | OXYGEN SATURATION: 96 % | RESPIRATION RATE: 20 BRPM | DIASTOLIC BLOOD PRESSURE: 73 MMHG | SYSTOLIC BLOOD PRESSURE: 121 MMHG | HEIGHT: 66 IN | HEART RATE: 95 BPM

## 2024-07-29 DIAGNOSIS — M54.50 ACUTE RIGHT-SIDED LOW BACK PAIN WITHOUT SCIATICA: Primary | ICD-10-CM

## 2024-07-29 DIAGNOSIS — E66.01 MORBID OBESITY DUE TO EXCESS CALORIES (H): ICD-10-CM

## 2024-07-29 DIAGNOSIS — S22.080A COMPRESSION FRACTURE OF T12 VERTEBRA, INITIAL ENCOUNTER (H): ICD-10-CM

## 2024-07-29 PROCEDURE — 99213 OFFICE O/P EST LOW 20 MIN: CPT | Performed by: INTERNAL MEDICINE

## 2024-07-29 ASSESSMENT — PATIENT HEALTH QUESTIONNAIRE - PHQ9
10. IF YOU CHECKED OFF ANY PROBLEMS, HOW DIFFICULT HAVE THESE PROBLEMS MADE IT FOR YOU TO DO YOUR WORK, TAKE CARE OF THINGS AT HOME, OR GET ALONG WITH OTHER PEOPLE: SOMEWHAT DIFFICULT
SUM OF ALL RESPONSES TO PHQ QUESTIONS 1-9: 10
SUM OF ALL RESPONSES TO PHQ QUESTIONS 1-9: 10

## 2024-07-29 NOTE — LETTER
July 29, 2024      Petey Stoddardtz  [address]  [place] [facility] [place] [address]        To Whom It May Concern:    Petey Archibald was seen in our clinic. He may return to work with the following: limited to sitting until 8/3/24. After this may return full duty.       Sincerely,      Rustam Lyn M.D.  Dept of Internal Medicine- [facility]

## 2024-07-29 NOTE — PROGRESS NOTES
"  Assessment & Plan     Acute right-sided low back pain without sciatica  Not convinced his sx are related to this t12 comp fx- sx seem quite MSK in how he describes it and in what he felt was cause.  No fall / trauma.  Focus on pain mgmt for now    Compression fracture of T12 vertebra, initial encounter (H)  Get dexa given this finding  - DX Bone Density; Future    Morbid obesity due to excess calories (H)  BMI 40-50  Weight loss        MED REC REQUIRED  Post Medication Reconciliation Status:  Patient was not discharged from an inpatient facility or TCU  BMI  Estimated body mass index is 52.62 kg/m  as calculated from the following:    Height as of this encounter: 1.676 m (5' 6\").    Weight as of this encounter: 147.9 kg (326 lb).   Weight management plan: Discussed healthy diet and exercise guidelines          Meagan Angelo is a 63 year old, presenting for the following health issues:  ER F/U    HPI       ED/UC Followup:    Facility:  [facility]  Date of visit: 07/21/2024  Reason for visit: lower back pain  Current Status: still having lower Right sided back pain, out of the lidocaine patches, but they did help a little      Works as  - feels mostly R lower side today. When in the ER he c/ diffuse lower back pain w/o radicular pain.   He notes that he fell 3-4 mo on his porch but denies any other falls.           Objective    /73   Pulse 95   Temp 97.5  F (36.4  C) (Temporal)   Resp 20   Ht 1.676 m (5' 6\")   Wt 147.9 kg (326 lb)   SpO2 96%   BMI 52.62 kg/m    Body mass index is 52.62 kg/m .  Physical Exam   GENERAL: obese  ABDOMEN: very protuberant and obese  Comprehensive back pain exam:  Tenderness of R lower lumbar area near flank. No central tenderness over spine            Signed Electronically by: Rustam Lyn MD    "

## 2024-07-31 ENCOUNTER — LAB (OUTPATIENT)
Dept: LAB | Facility: CLINIC | Age: 64
End: 2024-07-31
Payer: MEDICARE

## 2024-07-31 ENCOUNTER — TELEPHONE (OUTPATIENT)
Dept: ANTICOAGULATION | Facility: CLINIC | Age: 64
End: 2024-07-31

## 2024-07-31 ENCOUNTER — ANTICOAGULATION THERAPY VISIT (OUTPATIENT)
Dept: ANTICOAGULATION | Facility: CLINIC | Age: 64
End: 2024-07-31

## 2024-07-31 DIAGNOSIS — Z86.711 HISTORY OF PULMONARY EMBOLISM: Primary | ICD-10-CM

## 2024-07-31 DIAGNOSIS — Z79.01 LONG TERM CURRENT USE OF ANTICOAGULANT THERAPY: ICD-10-CM

## 2024-07-31 DIAGNOSIS — Z86.711 HISTORY OF PULMONARY EMBOLISM: ICD-10-CM

## 2024-07-31 LAB — INR BLD: 1.7 (ref 0.9–1.1)

## 2024-07-31 PROCEDURE — 36416 COLLJ CAPILLARY BLOOD SPEC: CPT

## 2024-07-31 PROCEDURE — 85610 PROTHROMBIN TIME: CPT

## 2024-07-31 RX ORDER — WARFARIN SODIUM 6 MG/1
TABLET ORAL
Qty: 22 TABLET | Refills: 0 | Status: SHIPPED | OUTPATIENT
Start: 2024-07-31 | End: 2024-08-08

## 2024-07-31 RX ORDER — WARFARIN SODIUM 2 MG/1
TABLET ORAL
Qty: 18 TABLET | Refills: 0 | Status: SHIPPED | OUTPATIENT
Start: 2024-07-31 | End: 2024-08-08

## 2024-07-31 NOTE — TELEPHONE ENCOUNTER
ANTICOAGULATION     Petey Archibald is overdue for an INR check.     Spoke with [facility] and scheduled lab appointment on 7/31/24 at 11 am in clinic lab    Lore Dugan RN

## 2024-07-31 NOTE — PROGRESS NOTES
ANTICOAGULATION MANAGEMENT     Petey Archibald 63 year old male is on warfarin with subtherapeutic INR result. (Goal INR 2.0-3.0)    Recent labs: (last 7 days)     07/31/24  1115   INR 1.7*       ASSESSMENT     Source(s): Chart Review  Previous INR was Supratherapeutic  Medication, diet, health changes since last INR chart reviewed; none identified         PLAN     Recommended plan for temporary change(s) affecting INR     Dosing Instructions: Continue your current warfarin dose with next INR in 1 week       Summary  As of 7/31/2024      Full warfarin instructions:  8 mg every Sun, Tue, Fri; 6 mg all other days   Next INR check:  8/7/2024             No return call from [employer].  Faxed dosing and follow up instructions to Dai at [phone]  Rx sent to pharmacy.  Will have onsite mail AVS    Attempted to schedule lab and Dai states she thinks one has been. She states she will call back after looking.    Education provided: None required    Plan made per [facility] anticoagulation protocol    Ashley Quiroz, RN  Anticoagulation Clinic  7/31/2024    _______________________________________________________________________     Anticoagulation Episode Summary       Current INR goal:  2.0-3.0   TTR:  67.4% (1 y)   Target end date:  Indefinite   Send INR reminders to:  EDU JIMENEZ    Indications    History of pulmonary embolism [Z86.711]  Long term current use of anticoagulant therapy [Z79.01]             Comments:  [facility]; A new Coumadin Prescription will need to sent to [facility] with current dose and next INR check.             Anticoagulation Care Providers       Provider Role Specialty Phone number    Demetri Archer MD Referring Internal Medicine [phone]

## 2024-08-01 NOTE — PROGRESS NOTES
AVS mailed to:  [address] [address]   [facility] [address]    JODI ZELAYA RN  Anticoagulation Clinic  [phone]

## 2024-08-05 ENCOUNTER — OFFICE VISIT (OUTPATIENT)
Dept: INTERNAL MEDICINE | Facility: CLINIC | Age: 64
End: 2024-08-05
Payer: MEDICARE

## 2024-08-05 ENCOUNTER — DOCUMENTATION ONLY (OUTPATIENT)
Dept: ANTICOAGULATION | Facility: CLINIC | Age: 64
End: 2024-08-05

## 2024-08-05 ENCOUNTER — TELEPHONE (OUTPATIENT)
Dept: INTERNAL MEDICINE | Facility: CLINIC | Age: 64
End: 2024-08-05

## 2024-08-05 ENCOUNTER — HOSPITAL ENCOUNTER (EMERGENCY)
Facility: CLINIC | Age: 64
Discharge: NURSING, REHAB OR LONG-TERM CARE | End: 2024-08-05
Attending: EMERGENCY MEDICINE | Admitting: EMERGENCY MEDICINE
Payer: MEDICARE

## 2024-08-05 VITALS
WEIGHT: 315 LBS | RESPIRATION RATE: 22 BRPM | OXYGEN SATURATION: 95 % | HEART RATE: 91 BPM | BODY MASS INDEX: 50.62 KG/M2 | SYSTOLIC BLOOD PRESSURE: 130 MMHG | HEIGHT: 66 IN | DIASTOLIC BLOOD PRESSURE: 80 MMHG | TEMPERATURE: 96.8 F

## 2024-08-05 VITALS
SYSTOLIC BLOOD PRESSURE: 145 MMHG | TEMPERATURE: 98.8 F | OXYGEN SATURATION: 98 % | HEART RATE: 74 BPM | RESPIRATION RATE: 24 BRPM | DIASTOLIC BLOOD PRESSURE: 72 MMHG

## 2024-08-05 DIAGNOSIS — M54.50 RIGHT-SIDED LOW BACK PAIN WITHOUT SCIATICA, UNSPECIFIED CHRONICITY: ICD-10-CM

## 2024-08-05 DIAGNOSIS — M54.50 ACUTE RIGHT-SIDED LOW BACK PAIN WITHOUT SCIATICA: Primary | ICD-10-CM

## 2024-08-05 PROCEDURE — 250N000013 HC RX MED GY IP 250 OP 250 PS 637: Performed by: EMERGENCY MEDICINE

## 2024-08-05 PROCEDURE — 99213 OFFICE O/P EST LOW 20 MIN: CPT | Performed by: INTERNAL MEDICINE

## 2024-08-05 PROCEDURE — 99283 EMERGENCY DEPT VISIT LOW MDM: CPT

## 2024-08-05 RX ORDER — ACETAMINOPHEN 500 MG
1000 TABLET ORAL ONCE
Status: COMPLETED | OUTPATIENT
Start: 2024-08-05 | End: 2024-08-05

## 2024-08-05 RX ORDER — LIDOCAINE 4 G/G
1 PATCH TOPICAL EVERY 24 HOURS
Qty: 10 PATCH | Refills: 0 | Status: SHIPPED | OUTPATIENT
Start: 2024-08-05 | End: 2024-08-13

## 2024-08-05 RX ORDER — LIDOCAINE 4 G/G
1 PATCH TOPICAL ONCE
Status: DISCONTINUED | OUTPATIENT
Start: 2024-08-05 | End: 2024-08-05 | Stop reason: HOSPADM

## 2024-08-05 RX ORDER — METHOCARBAMOL 750 MG/1
750 TABLET, FILM COATED ORAL 3 TIMES DAILY PRN
Qty: 30 TABLET | Refills: 1 | Status: SHIPPED | OUTPATIENT
Start: 2024-08-05 | End: 2024-08-07

## 2024-08-05 RX ADMIN — ACETAMINOPHEN 1000 MG: 500 TABLET, FILM COATED ORAL at 05:24

## 2024-08-05 RX ADMIN — LIDOCAINE 1 PATCH: 4 PATCH TOPICAL at 05:46

## 2024-08-05 ASSESSMENT — ACTIVITIES OF DAILY LIVING (ADL)
ADLS_ACUITY_SCORE: 38

## 2024-08-05 ASSESSMENT — COLUMBIA-SUICIDE SEVERITY RATING SCALE - C-SSRS
6. HAVE YOU EVER DONE ANYTHING, STARTED TO DO ANYTHING, OR PREPARED TO DO ANYTHING TO END YOUR LIFE?: NO
2. HAVE YOU ACTUALLY HAD ANY THOUGHTS OF KILLING YOURSELF IN THE PAST MONTH?: NO
1. IN THE PAST MONTH, HAVE YOU WISHED YOU WERE DEAD OR WISHED YOU COULD GO TO SLEEP AND NOT WAKE UP?: NO

## 2024-08-05 NOTE — PROGRESS NOTES
"  Assessment & Plan     Acute right-sided low back pain without sciatica  Certainly would not expect resolution or even improvement in his symptoms compared to the ER visit that occurred just early this morning.  At any rate, we will also offered nonsedating muscle relaxant to assist with symptom control.  Still no indication for any imaging study, as was the case a few short hours ago in the ER.  - methocarbamol (ROBAXIN) 750 MG tablet; Take 1 tablet (750 mg) by mouth 3 times daily as needed for muscle spasms  - Physical Therapy  Referral; Future        MED REC REQUIRED  Post Medication Reconciliation Status:         Meagan Angelo is a 63 year old, presenting for the following health issues:  Pre-Op Exam and ER F/U    HPI       ED/UC Followup:    Facility:  [facility]  Date of visit: 08/05/2024  Reason for visit: Back Pain  Current Status: Still having lower back pain. When sleeping and he turns puts more strain on bed. When sleeping and trying to get out of bed has a hard time.     Was just seen in the ER early this morning.  Was appropriately given conservative therapy for non-radicular low back pain, told he needed to follow-up with his primary, so is here today because I have an available appointment.    Not surprisingly, his back pain has not yet resolved.  He has not even had a chance to obtain the lidocaine patch prescription that was prescribed in the ER earlier this morning.  He continues to complain of well localized right-sided low back pain, nonradicular, no leg weakness, no loss of control of bowel or bladder.        Review of Systems  Constitutional, HEENT, cardiovascular, pulmonary, gi and gu systems are negative, except as otherwise noted.      Objective    /80 (BP Location: Left arm, Patient Position: Sitting, Cuff Size: Adult Regular)   Pulse 91   Temp 96.8  F (36  C) (Temporal)   Resp 22   Ht 1.676 m (5' 6\")   Wt (!) 161.8 kg (356 lb 11.2 oz)   SpO2 95%   BMI 57.57 kg/m  "   Body mass index is 57.57 kg/m .  Physical Exam   GENERAL: alert and no distress  RESP: lungs clear to auscultation - no rales, rhonchi or wheezes  CV: regular rate and rhythm, normal S1 S2, no S3 or S4, no murmur, click or rub, no peripheral edema  ABDOMEN: soft, nontender, no hepatosplenomegaly, no masses and bowel sounds normal  MS: Right-sided lumbar paraspinous tenderness  NEURO: Strength of both legs is normal.            Signed Electronically by: Vasu Willams MD

## 2024-08-05 NOTE — ED NOTES
Bed: ED03  Expected date:   Expected time:   Means of arrival:   Comments:  HEMS 431 63M green back pain eta 0505

## 2024-08-05 NOTE — PATIENT INSTRUCTIONS
- Start taking Robaxin up to three times daily as needed for low back pain.  (Prescription has been sent to your pharmacy)  - Continue the lidocaine patches as currently    - Physical Therapy will contact you to schedule your appointment with them.  Please contact us if you do not hear from them.

## 2024-08-05 NOTE — TELEPHONE ENCOUNTER
[facility] pharmacy called the clinic regarding methocarbamol (ROBAXIN) 750 MG tablet .    They wanted to confirm that provider intended to prescribe only a 10-day as needed script, and did not want to increase the dispense amount.     Routing to provider for review.    Thank you,  Alana Beltran RN

## 2024-08-05 NOTE — ED PROVIDER NOTES
Emergency Department Note      History of Present Illness     Chief Complaint   Back Pain    HPI   Petey Archibald is a 63 year old male on Coumadin with history of COPD, hypertension, hyperlipidemia, and DVT who presents with back pain. The patient reports that he has been having lower right back pain that is worsened when he attempts to move. He explains that he has been taking Tylenol and Flexeril for the pain with little relief. The patient adds that he was using lidocaine patches but he ran out of them. He also complains of numbness in the left toes and left leg pain, but these are chronic and overall unconcerning to him today. The patient denies urinary symptoms, irregular bowel movements, weakness of lower extremities, and fever.    Independent Historian   None    Review of External Notes   I reviewed the patient's IM office visit note from 7/29/24 for lower right back pain    Past Medical History     Medical History and Problem List   Obesity  Hypertension  Asthma  COPD  Sleep apnea  GERD  Depressive disorder  Insomnia  Nicotine dependence  Intellectual disabilities  DVT  Hyperlipidemia  Peripheral vascular disease  Asthma  Venous stasis dermatitis     Medications   Budesonide  Dexlansoprazole  Ferrous gluconate  Furosemide  Montelukast  Spironolactone  Simvastatin  Tiotropium bromide  Warfarin  Bupropion  Naltrexone  Aripiprazole  Trazodone  Sertraline     Surgical History   Colonoscopy  Rectal surgery  Malignant lesion excision      Physical Exam     Patient Vitals for the past 24 hrs:   BP Temp Temp src Pulse Resp SpO2   08/05/24 0510 (!) 145/72 98.8  F (37.1  C) Oral 74 24 98 %     Physical Exam  General: The patient appears comfortable lying on the bed  Head:  The scalp, face, and head appear normal  Eyes:  The pupils are equal and round    Conjunctivae and sclerae are normal  ENT:    Moist mucous membranes  Neck:  Normal range of motion  CV:  Regular rate and underlying rhythm     DP/PT pulses 2+  bilaterally  Resp:  Lungs are clear    Non-labored breathing    No rales    No wheezing   GI:  Abdomen is soft, there is no rigidity    No distension    No rebound tenderness     No abdominal tenderness    No guarding  MS:  Back:    The cervical and thoracic spine is non-tender in the midline    The lumbar spine is non-tender in the midline    There is  lumbar paraspinous muscular pain to palpation on right side    Legs:    There is normal motor strength:     Iliopsoas, quadriceps, hamstrings, tibialis anterior, gastrocnemius, EHL    SILT on bilateral lower extremities    There is normal capillary refill to the toes  Skin:  No rash or acute skin lesions noted.  No shingles noted on back  Neuro:  Speech is normal and fluent    Awake and alert    Face symmetric    ED Course      Medications Administered   Medications   Lidocaine (LIDOCARE) 4 % Patch 1 patch (1 patch Transdermal $Patch/Med Applied 8/5/24 0546)   acetaminophen (TYLENOL) tablet 1,000 mg (1,000 mg Oral $Given 8/5/24 0524)     Discussion of Management   None    ED Course   ED Course as of 08/05/24 0609   Mon Aug 05, 2024   0535 I obtained history and examined the patient as noted above       Additional Documentation  None    Medical Decision Making / Diagnosis     MDM   Petey Archibald is a 63 year old male Petey Archibald is a 63 year old male who presents for evaluation of back pain.  He has history of back pain in the past.  Pain has improved with interventions in the emergency department. The patient did not sustain any trauma, therefore x-rays are not necessary due to the low likelihood of fracture or subluxation.  No red flag symptoms to suggest MRI is indicated at this point.  There is no clinical evidence of cauda equina syndrome, discitis, spinal/epidural space hematoma or epidural abscess or other worrisome etiology. The neurological exam is normal and the patient's symptoms seem consistent with musculoskeletal issues.  It seems to worsen his  pain.  He did have a CT lumbar spine on July 21 that showed T12 compression fracture.  This may be contributing to his pain as well as musculoskeletal/muscle spasm.  The patient will be discharged with lidocaine patches to use as directed.  Taking Tylenol for pain and has muscle relaxer's at home.  I refilled his lidocaine patch which it sounds like he ran out of at home. Tylenol given in the emergency department.  He reports that he already wants to go back to his group home so transport arranged for him to go back to his group home.    Disposition   The patient was discharged.     Diagnosis     ICD-10-CM    1. Right-sided low back pain without sciatica, unspecified chronicity  M54.50          Discharge Medications   New Prescriptions    LIDOCAINE (LIDOCARE) 4 % PATCH    Place 1 patch onto the skin every 24 hours To prevent lidocaine toxicity, patient should be patch free for 12 hrs daily.     Scribe Disclosure:  Kwabena TA, am serving as a scribe at 5:40 AM on 8/5/2024 to document services personally performed by Leeanna Negro MD based on my observations and the provider's statements to me.        Leeanna Negro MD  08/05/24 0615

## 2024-08-05 NOTE — ED TRIAGE NOTES
BIBA from Group Home. Right lower back pain for a few weeks. Was seen here before for same.     Triage Assessment (Adult)       Row Name 08/05/24 0514          Triage Assessment    Airway WDL WDL        Respiratory WDL    Respiratory WDL WDL        Skin Circulation/Temperature WDL    Skin Circulation/Temperature WDL WDL        Cardiac WDL    Cardiac WDL WDL        Peripheral/Neurovascular WDL    Peripheral Neurovascular WDL WDL        Cognitive/Neuro/Behavioral WDL    Cognitive/Neuro/Behavioral WDL WDL

## 2024-08-05 NOTE — PROGRESS NOTES
Preoperative Evaluation  [address] [address]  Phone: [phone]  Primary Provider: Demetri Archer MD  Pre-op Performing Provider: Vasu Willams MD  Aug 5, 2024   {Provider  Link to PREOP SmartSet  REQUIRED to apply standard patient instructions and medication directions to the AVS :193006}  {ROOMER review and update patient entered surgical information if needed :856751}        8/5/2024   Surgical Information   What procedure is being done? follow up   Facility or Hospital where procedure/surgery will be performed: [facility]   Who is doing the procedure / surgery? na   Date of surgery / procedure: na   Time of surgery / procedure: na   Where do you plan to recover after surgery? at home with Home Care        Fax number for surgical facility: {:[phone]}    {Provider Charting Preference for Preop :699467}    Meagan Agnelo is a 63 year old, presenting for the following:  Pre-Op Exam      {(!) Visit Details have not yet been documented.  Please enter Visit Details and then use this list to pull in documentation. (Optional):221082}  HPI related to upcoming procedure: ***        8/5/2024   Pre-Op Questionnaire   Have you ever had a heart attack or stroke? No   Have you ever had surgery on your heart or blood vessels, such as a stent placement, a coronary artery bypass, or surgery on an artery in your head, neck, heart, or legs? No   Do you have chest pain with activity? No   Do you have a history of heart failure? No   Do you currently have a cold, bronchitis or symptoms of other infection? (!) UNKNOWN ***   Do you have a cough, shortness of breath, or wheezing? No   Do you or anyone in your family have previous history of blood clots? (!) YES ***   Do you or does anyone in your family have a serious bleeding problem such as prolonged bleeding following surgeries or cuts? (!) UNKNOWN ***   Have you ever had problems with anemia or been  told to take iron pills? No   Have you had any abnormal blood loss such as black, tarry or bloody stools? No   Have you ever had a blood transfusion? No   Are you willing to have a blood transfusion if it is medically needed before, during, or after your surgery? Yes   Have you or any of your relatives ever had problems with anesthesia? (!) YES ***   Do you have sleep apnea, excessive snoring or daytime drowsiness? (!) YES   Do you have a CPAP machine? Yes   Do you have any artifical heart valves or other implanted medical devices like a pacemaker, defibrillator, or continuous glucose monitor? No   Do you have artificial joints? No   Are you allergic to latex? No        Health Care Directive  Patient does not have a Health Care Directive or Living Will: {ADVANCE_DIRECTIVE_STATUS:509122}    Preoperative Review of   {Mnpmpreport:050574}  {Review MNPMP for all patients per [facility] MNPMP Profile:929207}    {Chronic problem details (Optional) :842507}    Patient Active Problem List    Diagnosis Date Noted    Lower extremity edema 09/06/2023     Priority: High    Open wound of left foot 08/22/2023     Priority: High    Cellulitis of left upper extremity 05/13/2024     Priority: Medium    H/O methicillin resistant Staphylococcus aureus infection 05/13/2024     Priority: Medium    Ulcer of left foot with fat layer exposed (H) 11/15/2023     Priority: Medium    Ulcer of right foot with fat layer exposed (H) 11/15/2023     Priority: Medium    Chronic ulcer of left leg with fat layer exposed (H) 09/20/2023     Priority: Medium    Ulcer of great toe, left, with fat layer exposed (H) 08/22/2023     Priority: Medium    SBO (small bowel obstruction) (H) 02/15/2023     Priority: Medium    Vomiting and diarrhea 02/15/2023     Priority: Medium    Insomnia 10/04/2022     Priority: Medium    Torus palatinus 04/26/2021     Priority: Medium    Colon cancer screening 03/26/2021     Priority: Medium     Added automatically from request for  surgery [id number]      Adenomatous polyp of ascending colon 11/16/2020     Priority: Medium    History of pulmonary embolism 09/19/2020     Priority: Medium    Schizoaffective disorder, bipolar type (H) 05/18/2020     Priority: Medium    Lymphedema of left leg 08/29/2019     Priority: Medium    Edentulism, partial, class IV 11/08/2018     Priority: Medium    Diverticular disease of large intestine 02/21/2018     Priority: Medium    History of colonic polyps 02/02/2018     Priority: Medium    Thrombophlebitis of superficial veins of both lower extremities: Greater Saphenous VV  02/28/2017     Priority: Medium    Hemorrhoids 02/07/2017     Priority: Medium    Anxiety 12/23/2016     Priority: Medium    NEL (obstructive sleep apnea) 12/05/2016     Priority: Medium    Erectile dysfunction, unspecified erectile dysfunction type 08/11/2016     Priority: Medium    Glucose intolerance (impaired glucose tolerance) 07/25/2016     Priority: Medium    Long term current use of anticoagulant therapy 03/16/2016     Priority: Medium    Morbid obesity due to excess calories (H)  BMI 40-50 01/04/2016     Priority: Medium    Hypercholesterolemia 01/04/2016     Priority: Medium    Moderate episode of recurrent major depressive disorder (H) 01/04/2016     Priority: Medium    Callus of foot on Rt lat dist  since 8-15  10/01/2015     Priority: Medium    Pilonidal cyst 08/20/2015     Priority: Medium    Asymptomatic varicose veins, bilateral 08/20/2015     Priority: Medium    Mixed simple and mucopurulent chronic bronchitis (HCC) CT 4-06 wnl and neg bronch for hemoptesis spirometry 7-26-16  FVC=59% & w mod restriction  and lung age of 82 in 54 y/o  08/22/2014     Priority: Medium    History of deep vein thrombosis of lower extremity 03/13/2013     Priority: Medium    Borderline mental retardation 02/20/2013     Priority: Medium    Tobacco dependence:40-50 pk yr hx      Priority: Medium    GERD (gastroesophageal reflux disease) 10/19/2012      Priority: Medium      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    Chronic infection     MRSA    Coagulation disorder (H24)     on coumadin    COPD (chronic obstructive pulmonary disease) (H)     Diabetic ulcer of right midfoot with fat layer exposed (H) 11/15/2023    History of DVT of lower extremity     History of pulmonary embolism     Hyperlipidemia     Mild intellectual disabilities     Morbid obesity (H)     NEL (obstructive sleep apnea)     Peripheral edema     Peripheral vascular disease (H24)     Sleep apnea     uses CPAP    Thrombosis     Tobacco dependence     Uncomplicated asthma     Venous stasis dermatitis      Past Surgical History:   Procedure Laterality Date    COLONOSCOPY N/A 4/15/2019    Procedure: COMBINED COLONOSCOPY, SINGLE OR MULTIPLE BIOPSY/POLYPECTOMY BY BIOPSY;  Surgeon: Jovana Ohara MD;  Location:  GI    COLONOSCOPY N/A 6/7/2021    Procedure: COLONOSCOPY with polypectomies;  Surgeon: Sahil Cid MD;  Location: [facility] OR    EXCISE MALIGNANT LESIONS, TRUNK/ARMS/LEGS 0.5CM OR LESS Left 5/26/2017    Procedure: EXCISE MALIGNANT LESIONS, TRUNK/ARMS/LEGS 0.5CM OR LESS;  EXCISION LEFT ELBOW MASS;  Surgeon: oJse Azevedo MD;  Location:  GI    RECTAL SURGERY      perianal abscess?     Current Outpatient Medications   Medication Sig Dispense Refill    acetaminophen (TYLENOL) 325 MG tablet Take 1-2 tablets (325-650 mg) by mouth every 6 hours as needed for mild pain, fever or headaches 100 tablet 3    albuterol (ALBUTEROL) 108 (90 BASE) MCG/ACT inhaler Inhale 2 puffs into the lungs every 6 hours as needed 1 Inhaler 0    ammonium lactate (AMLACTIN) 12 % external cream APPLY TO FEET ONCE OR TWICE DAILY AS NEEDED FOR ITCHY DRY & CRACKED SKIN. 140 g 11    ARIPiprazole (ABILIFY) 5 MG tablet Take 1 tablet (5 mg) by mouth daily 45 tablet 0    buPROPion (WELLBUTRIN SR) 150 MG 12 hr tablet Take 1 tablet (150 mg) by mouth 2 times daily for 45 days 90  "tablet 0    Cadexomer Iodine, topical, 0.9% (IODOSORB) 0.9 % GEL gel Apply topically daily Apply to left foot wound daily after cleansing the wound and blotting it dry. 1 Tube 3    cloNIDine (CATAPRES) 0.1 MG tablet Take 0.1 mg by mouth daily as needed      clotrimazole (LOTRIMIN) 1 % external cream Apply topically 2 times daily 60 g 0    diclofenac (VOLTAREN) 1 % topical gel Apply 4 g topically 2 times daily as needed for moderate pain 100 g 1    diphenhydrAMINE (BENADRYL) 25 MG capsule Take 50 mg by mouth every 6 hours as needed for itching or allergies      Emollient (CERAVE) CREA Externally apply topically daily 453 g 11    EPINEPHrine (ANY BX GENERIC EQUIV) 0.3 MG/0.3ML injection 2-pack INJECT 0.3MG INTRAMUSCULARLY ONE TIME FOR ONE DOSE AS NEEDED FOR ANAPHYLAXIS. MAY REPEAT ONE TIME IN 5-15 MINUTES IF RESPONSE TO INITIAL DOSE IS INADEQUATE. *1 TOTAL FILL, ORIGINAL FROM EMERGENCY ROOM* 2 each 1    EPINEPHrine (EPIPEN 2-BRE) 0.3 MG/0.3ML injection 2-pack INJECT 0.3MG INTRAMUSCULAR ONE TIME FOR ONE DOSE AS NEEDED FOR ANAPHYLAXIS (CALL 911 IF YOU HAVE TO GIVE) (FOR BEE STINGS) **LABEL EACH PEN* 2 mL 3    gabapentin (NEURONTIN) 100 MG capsule Take 400 mg by mouth 3 times daily At 0900, 1200, and 2000      Gauze Pads & Dressings (GAUZE PADS 4\"X4\") 4\"X4\" PADS 1 each 3 times daily as needed 25 each 1    Lidocaine (LIDOCARE) 4 % Patch Place 1 patch onto the skin every 24 hours To prevent lidocaine toxicity, patient should be patch free for 12 hrs daily. 10 patch 0    Lidocaine (LIDOCARE) 4 % Patch Place 1 patch onto the skin every 24 hours To prevent lidocaine toxicity, patient should be patch free for 12 hrs daily. (Patient not taking: Reported on 7/29/2024) 10 patch 0    loratadine (CLARITIN) 10 MG tablet Take 10 mg by mouth daily      LORazepam (ATIVAN) 1 MG tablet Take 1 mg by mouth daily as needed for anxiety      montelukast (SINGULAIR) 10 MG tablet TAKE 1 TABLET BY MOUTH EVERY MORNING (ASTHMA) 30 tablet 11    " Multiple Vitamin (DAILY-JOVAN) TABS TAKE 1 TABLET BY MOUTH EVERY MORNING (VITAMINS) 30 tablet 11    naltrexone (DEPADE/REVIA) 50 MG tablet Take 2 tablets (100 mg) by mouth daily for 45 days 90 tablet 0    nicotine (COMMIT) 2 MG lozenge Place 2 mg inside cheek daily as needed      nystatin-triamcinolone (MYCOLOG II) 274095-4.1 UNIT/GM-% external cream Apply topically 2 times daily For 1-2 weeks.      omeprazole (PRILOSEC) 40 MG DR capsule Take 1 capsule (40 mg) by mouth daily 90 capsule 2    order for DME Equipment being ordered: roll of micropore tape to dress foot wound bid 1 each 0    order for DME Equipment being ordered: 1 surgical shoe 1 Device 0    order for DME [employer] [facility] Phone [phone] Fax [phone]  EdemaWear Size Medium/Yellow qty 4 sleeves  Length of Need: 1 month  Frequency of dressing change daily 1 Device 0    order for DME [employer] Stock[employer]  Phone #[phone]  Fax #[phone]  Coolflex Velcro wraps    Length of Need: Life Time  # of Pairs 1 set 30 days 0    order for DME [employer] [facility] Phone [phone] Fax [phone]  Primary Dressing Hydrofera Blue Ready   Qty 5 sheets  Secondary Dressing 4' roll gauze Qty 30  Secondary Dressing 2' medipore tape Qty 1  Secondary Dressing 4x4 gauze loaf Qty  1  Length of Need: 1 month  Frequency of dressing change: daily 30 days 0    order for DME Oxygen 2 Li/min  at night and bled into BiPAP 1 Device 0    order for DME Equipment being ordered: CPAP with mask and tubing 1 Device 0    order for DME Equipment being ordered: support compression hose BK  2 pair black 30mm HG   To be applied on arising & removed while lying down to go to sleep 1 each 0    polyethylene glycol (MIRALAX) 17 GM/Dose powder Take 17 g (1 capful) by mouth daily as needed for constipation 578 g 0    PULMICORT FLEXHALER 180 MCG/ACT inhaler INHALE 2 PUFFS INTO THE LUNGS TWICE DAILY. 1 each 11    SENNA-TABS 8.6 MG tablet Take 1 tablet by mouth daily       "sertraline (ZOLOFT) 100 MG tablet Take 2 tablets (200 mg) by mouth daily for 45 days 90 tablet 0    simvastatin (ZOCOR) 40 MG tablet TAKE 1 TABLET BY MOUTH EVERY MORNING.  (CHOLESTEROL) 30 tablet 11    SPIRIVA HANDIHALER 18 MCG inhaled capsule INHALE CONTENTS OF 1 CAPSULE INTO THE LUNGS ONCE DAILY 30 capsule 11    spironolactone (ALDACTONE) 25 MG tablet TAKE 1 TABLET BY MOUTH ONCE DAILY. (HIGH BLOOD PRESSURE) 30 tablet 11    tiZANidine (ZANAFLEX) 2 MG tablet TAKE 1 TABLET BY MOUTH THREE TIMES DAILY. 106 tablet 11    traZODone (DESYREL) 100 MG tablet Take 1 tablet (100 mg) by mouth at bedtime for 45 days 45 tablet 0    triamcinolone (KENALOG) 0.1 % external cream Apply to AA BID x 1-2 week then PRN only 80 g 2    vitamin B complex with vitamin C (STRESS TAB) tablet Take 1 tablet by mouth daily 90 tablet 3    warfarin ANTICOAGULANT (COUMADIN) 2 MG tablet Take 8 mg every Sun, Tue, Fri; 6 mg all other days, next INR Date is 08/07/24 18 tablet 0    warfarin ANTICOAGULANT (COUMADIN) 6 MG tablet Take 8 mg (2 mg x 1 and 6 mg x 1) every Sun, Tue, Fri; 6 mg (6 mg x 1) all other days or as directed by the Anticoagulation Clinic. Next INR Date is 08/07/24 22 tablet 0       Allergies   Allergen Reactions    Bees     Clavulanic Acid     Amoxicillin-Pot Clavulanate Rash     Augmentin    Penicillins Rash        Social History     Tobacco Use    Smoking status: Former     Current packs/day: 1.00     Average packs/day: 1 pack/day for 30.0 years (30.0 ttl pk-yrs)     Types: Cigarettes    Smokeless tobacco: Never    Tobacco comments:     started at age 18    Substance Use Topics    Alcohol use: No     Alcohol/week: 0.0 standard drinks of alcohol     {FAMILY HISTORY (Optional):726629420}  History   Drug Use No           {ROS Picklists (Optional):615211}    Objective    There were no vitals taken for this visit.   Estimated body mass index is 52.62 kg/m  as calculated from the following:    Height as of 7/29/24: 1.676 m (5' 6\").    " Weight as of 24: 147.9 kg (326 lb).  Physical Exam  {Exam List :597143}    Recent Labs   Lab Test 24  1115 24  0957 24  0024 05/15/24  1040 24  0718 24  1104 24  1049   HGB  --   --  9.2*  --  9.5*   < >  --    PLT  --   --  228  --  221   < >  --    INR 1.7* 3.1* 2.44*   < > 2.60*   < > 3.4*   NA  --   --  138  --  143   < > 141   POTASSIUM  --   --  4.2  --  4.5   < > 4.3   CR  --   --  0.98  --  0.88   < > 0.91   A1C  --   --   --   --   --   --  5.7*    < > = values in this interval not displayed.        Diagnostics  {LABS:216903}   {EK}    Revised Cardiac Risk Index (RCRI)  The patient has the following serious cardiovascular risks for perioperative complications:  {PREOP REVISED CARDIAC RISK INDEX (RCRI) :085104}     RCRI Interpretation: {REVISED CARDIAC RISK INTERPRETATION :547864}         Signed Electronically by: Vasu Willams MD  A copy of this evaluation report is provided to the requesting physician.    {Provider Resources  Preop "[employer]"  [facility] Preop Guidelines  Revised Cardiac Risk Index :702529}   {Email feedback regarding this note to [email or web address]   :803745}

## 2024-08-05 NOTE — DISCHARGE INSTRUCTIONS
Follow-up with your primary care provider  Tylenol as needed for pain  Continue your muscle relaxers as prescribed  Lidocaine patches as needed    Discharge Instructions  Back Pain  You were seen today for back pain. Back pain can have many causes, but most will get better without surgery or other specific treatment. Sometimes there is a herniated ( slipped ) disc. We do not usually do MRI scans to look for these right away, since most herniated discs will get better on their own with time.  Today, we did not find any evidence that your back pain was caused by a serious condition. However, sometimes symptoms develop over time and cannot be found during an emergency visit, so it is very important that you follow up with your primary provider.  Generally, every Emergency Department visit should have a follow-up clinic visit with either a primary or a specialty clinic/provider. Please follow-up as instructed by your emergency provider today.    Return to the Emergency Department if:  You develop a fever with your back pain.   You have weakness or change in sensation in one or both legs.  You lose control of your bowels or bladder, or cannot empty your bladder (cannot pee).  Your pain gets much worse.     Follow-up with your provider:  Unless your pain has completely gone away, please make an appointment with your provider within one week. Most of the routine care for back pain is available in a clinic and not the Emergency Department. You may need further management of your back pain, such as more pain medication, imaging such as an X-ray or MRI, or physical therapy.    What can I do to help myself?  Remain Active -- People are often afraid that they will hurt their back further or delay recovery by remaining active, but this is one of the best things you can do for your back. In fact, staying in bed for a long time to rest is not recommended. Studies have shown that people with low back pain recover faster when they  remain active. Movement helps to bring blood flow to the muscles and relieve muscle spasms as well as preventing loss of muscle strength.  Heat -- Using a heating pad can help with low back pain during the first few weeks. Do not sleep with a heating pad, as you can be burned.   Pain medications - You may take a pain medication such as Tylenol  (acetaminophen), Advil , Motrin  (ibuprofen) or Aleve  (naproxen).  If you were given a prescription for medicine here today, be sure to read all of the information (including the package insert) that comes with your prescription.  This will include important information about the medicine, its side effects, and any warnings that you need to know about.  The pharmacist who fills the prescription can provide more information and answer questions you may have about the medicine.  If you have questions or concerns that the pharmacist cannot address, please call or return to the Emergency Department.   Remember that you can always come back to the Emergency Department if you are not able to see your regular provider in the amount of time listed above, if you get any new symptoms, or if there is anything that worries you.

## 2024-08-05 NOTE — PROGRESS NOTES
ANTICOAGULATION  MANAGEMENT: Discharge Review    Petey Archibald chart reviewed for anticoagulation continuity of care    Emergency room visit on 8/5/24 for worsening back pain, refill of lidocaine patches.    Discharge disposition:  group home    Prior to Admission:     Summary  As of 7/31/2024      Full warfarin instructions:  8 mg every Sun, Tue, Fri; 6 mg all other days   Next INR check:  8/7/2024               Recent Results:    Recent labs: (last 7 days)     07/31/24  1115   INR 1.7*       Anticoagulation inpatient management:     not applicable     Anticoagulation discharge instructions:     Warfarin dosing: home regimen continued   Bridging: No    INR goal change: No      Medication changes affecting anticoagulation: No    Additional factors affecting anticoagulation: No    ASSESSMENT/ PLAN     Agree with continuing warfarin maintenance dose at discharge    Follow up INR already scheduled 8/5/24    Patient not contacted    No adjustment to Anticoagulation Calendar was required  No change to [facility] priority    Fabiola Hutton RN

## 2024-08-05 NOTE — TELEPHONE ENCOUNTER
Pharmacist notified     Blossom LOERA, Triage RN  [facility] Internal Medicine [facility]

## 2024-08-06 NOTE — TELEPHONE ENCOUNTER
Received a call from Nidia at [facility] wanting to make sure Dr. Willams is aware the patient is also on Tizanidine 2 mg TID.    Pharmacy will need a call back with the provider's response before Methocarbamol can be filled.     Will route to Dr. Willams for review.     Sadie Monzon RN

## 2024-08-07 NOTE — TELEPHONE ENCOUNTER
No, I was not aware this patient was on tizanidine.  Did not see that on first pass of his extensive med list.  Certainly does not need both muscle relaxants - should continue tizanidine for his back pain.

## 2024-08-07 NOTE — TELEPHONE ENCOUNTER
Called and spoke to the pharmacist. Gave verbal order to discontinue script for Methocarbamol.     Called and spoke to the patient. Informed patient about the medication change and advised patient to continue taking his Tizanidine. During the phone call, patient made note that he does feel like his back pain in getting worse. Patient states he is currently wearing a Lidocaine patch but he does not feel like it is helping. Patient made note that he often lifts heavy objects (pans and boxes) at work with one hand. RN advised patient to start to lift with two hands to see if that makes a difference. RN also advised patient to follow-up with PT as recommended by Dr. Willams during the last office visit. Patient also made that that he has been experiencing some SOB when trying to get of bed: patient states he mentioned this symptom to Dr. Willams during the last office visit.     Patient is wondering if Dr. Willams has any further recommendations on what he can do for his back pain?     Will route to Dr. Willams for review.    Thank you,  Sadie Monzon RN

## 2024-08-08 ENCOUNTER — LAB (OUTPATIENT)
Dept: LAB | Facility: CLINIC | Age: 64
End: 2024-08-08
Payer: MEDICARE

## 2024-08-08 ENCOUNTER — ANTICOAGULATION THERAPY VISIT (OUTPATIENT)
Dept: ANTICOAGULATION | Facility: CLINIC | Age: 64
End: 2024-08-08

## 2024-08-08 DIAGNOSIS — Z86.711 HISTORY OF PULMONARY EMBOLISM: ICD-10-CM

## 2024-08-08 DIAGNOSIS — Z86.711 HISTORY OF PULMONARY EMBOLISM: Primary | ICD-10-CM

## 2024-08-08 DIAGNOSIS — Z79.01 LONG TERM CURRENT USE OF ANTICOAGULANT THERAPY: ICD-10-CM

## 2024-08-08 LAB — INR BLD: 2.6 (ref 0.9–1.1)

## 2024-08-08 PROCEDURE — 85610 PROTHROMBIN TIME: CPT

## 2024-08-08 PROCEDURE — 36416 COLLJ CAPILLARY BLOOD SPEC: CPT

## 2024-08-08 RX ORDER — WARFARIN SODIUM 2 MG/1
TABLET ORAL
Qty: 10 TABLET | Refills: 0 | Status: SHIPPED | OUTPATIENT
Start: 2024-08-08 | End: 2024-08-23

## 2024-08-08 RX ORDER — WARFARIN SODIUM 6 MG/1
TABLET ORAL
Qty: 20 TABLET | Refills: 0 | Status: SHIPPED | OUTPATIENT
Start: 2024-08-08 | End: 2024-08-23

## 2024-08-08 NOTE — PROGRESS NOTES
ANTICOAGULATION MANAGEMENT     Petey Archibald 63 year old male is on warfarin with therapeutic INR result. (Goal INR 2.0-3.0)    Recent labs: (last 7 days)     08/08/24  1610   INR 2.6*       ASSESSMENT     Source(s): Chart Review  Previous INR was Subtherapeutic  Medication, diet, health changes since last INR chart reviewed; none identified         PLAN     Unable to reach Zac or Dai today.    No instructions left as the voice mail was not secure    Follow up required to confirm warfarin dose taken and assess for changes    Radha Hughes RN  Anticoagulation Clinic  8/8/2024

## 2024-08-08 NOTE — TELEPHONE ENCOUNTER
Called and spoke with group home staff, who stated that pt is currently napping.   They will pass on to the pt that PCP does not have any further recommendations at this time.     Closing encounter.    Thank you,  Alana Beltran RN

## 2024-08-09 NOTE — PROGRESS NOTES
ANTICOAGULATION MANAGEMENT     Petey Archibald 63 year old male is on warfarin with therapeutic INR result. (Goal INR 2.0-3.0)    Recent labs: (last 7 days)     08/08/24  1610   INR 2.6*       ASSESSMENT     Source(s): Chart Review and Home Care/Facility Nurse     Warfarin doses taken: Warfarin taken as instructed  Diet: No new diet changes identified  Medication/supplement changes: None noted  New illness, injury, or hospitalization: No  Signs or symptoms of bleeding or clotting: No  Previous result: Therapeutic last visit; previously outside of goal range  Additional findings:  Kathy,  confirmed he is in contact with patient on a daily basis and is able to compete assessment questions.  Confirmed with ALEXA Lala, RX and recheck date       PLAN     Recommended plan for no diet, medication or health factor changes affecting INR     Dosing Instructions: Continue your current warfarin dose with next INR in 1 week       Summary  As of 8/8/2024      Full warfarin instructions:  8 mg every Sun, Tue, Fri; 6 mg all other days   Next INR check:  8/9/2024               Telephone call with Kathy  who agrees to plan and repeated back plan correctly    Orders given to  Homecare nurse/facility to recheck    AVS faxed to facility    RX faxed to [facility]    Education provided: Please call back if any changes to your diet, medications or how you've been taking warfarin    Plan made per [facility] anticoagulation protocol    Elana Wu RN  Anticoagulation Clinic  8/9/2024    _______________________________________________________________________     Anticoagulation Episode Summary       Current INR goal:  2.0-3.0   TTR:  68.8% (1 y)   Target end date:  Indefinite   Send INR reminders to:  EDU JIMENEZ    Indications    History of pulmonary embolism [Z86.711]  Long term current use of anticoagulant therapy [Z79.01]             Comments:  [facility]; A new Coumadin Prescription will need to  sent to [facility] with current dose and next INR check.             Anticoagulation Care Providers       Provider Role Specialty Phone number    Demetri Archer MD Referring Internal Medicine [phone]

## 2024-08-13 ENCOUNTER — OFFICE VISIT (OUTPATIENT)
Dept: VASCULAR SURGERY | Facility: CLINIC | Age: 64
End: 2024-08-13
Payer: MEDICARE

## 2024-08-13 DIAGNOSIS — L97.222 VARICOSE VEINS OF LEFT LOWER EXTREMITY WITH ULCER OF CALF WITH FAT LAYER EXPOSED (H): Primary | ICD-10-CM

## 2024-08-13 DIAGNOSIS — I83.022 VARICOSE VEINS OF LEFT LOWER EXTREMITY WITH ULCER OF CALF WITH FAT LAYER EXPOSED (H): Primary | ICD-10-CM

## 2024-08-13 DIAGNOSIS — M54.50 ACUTE RIGHT-SIDED LOW BACK PAIN WITHOUT SCIATICA: Primary | ICD-10-CM

## 2024-08-13 PROCEDURE — 36471 NJX SCLRSNT MLT INCMPTNT VN: CPT | Mod: LT | Performed by: SURGERY

## 2024-08-13 PROCEDURE — 76942 ECHO GUIDE FOR BIOPSY: CPT | Performed by: SURGERY

## 2024-08-13 RX ORDER — LIDOCAINE PAIN RELIEF 40 MG/1000MG
PATCH TOPICAL
Qty: 10 PATCH | Refills: 23 | Status: SHIPPED | OUTPATIENT
Start: 2024-08-13

## 2024-08-13 NOTE — TELEPHONE ENCOUNTER
Medication passed protocol, however, refill RN could not approve because  needs diagnosis code associated with prescription request  Provider, please approve or deny the prescription.

## 2024-08-13 NOTE — PROGRESS NOTES
Vein Clinic Sclerotherapy Note     Indications:  Chronic, venous stasis ulcer left lower extremity; status post ultrasound-guided, medically necessary sclerotherapy 6/11/2024     Procedure:  Medically necessary, ultrasound-guided sclerotherapy left lower extremity Sub ulcer varicose veins     Procedure description  Details of procedure including risks of allergic reaction, deep vein thrombosis, ulceration, superficial thrombophlebitis and hyperpigmentation were discussed.  The patient voiced understanding and wished to proceed.  Informed consent was obtained.    I imaged his left lower extremity noting large varicosities that were patent just cephalad of, deep to and just distal to the stasis ulcer on the anterior left leg.  We isolated the veins at the cephalad border of the wound with ultrasound, directed 27-gauge needle in the vein and injected 1% polidocanol foam, 3 mL.  I then imaged slightly distal to the wound, on its inferior border and directed a 27-gauge needle into the vein injecting an additional 3 mL of 1% polidocanol foam filling the veins deep to the ulcer in their entirety.  We also filled the vein cephalad and caudad to the ulcer.    Had the patient perform ankle pumps.    We cleaned his leg, dressed his wound and then applied compression.  We had him walk for 10 minutes.    He will return on an as-needed basis.    Sclerotherapy    Date/Time: 8/13/2024 12:12 PM    Performed by: Ludwig Muhammad MD  Authorized by: Lduwig Muhammad MD    Time out: Immediately prior to the procedure a time out was called    Type:  Medically Necessary  Vein:  Multiple Veins  Yes    Procedure side:  Left  Solution/Amount:  1% POLIDOCANOL  Syringes:  2 syringes (6 mL) 1% polidocanol foam  Patient tolerance:  Patient tolerated the procedure well with no immediate complications  Wrap/Hose:  Wraps      RONALD Muhammad MD    Dictated using Dragon voice recognition software which may result in  transcription errors

## 2024-08-13 NOTE — LETTER
8/13/2024      Petey Archibald  [address]  [place] [address]      Dear Colleague,    Thank you for referring your patient, Petey Archibald, to the [facility] [place]. Please see a copy of my visit note below.        Vein Clinic Sclerotherapy Note     Indications:  Chronic, venous stasis ulcer left lower extremity; status post ultrasound-guided, medically necessary sclerotherapy 6/11/2024     Procedure:  Medically necessary, ultrasound-guided sclerotherapy left lower extremity Sub ulcer varicose veins     Procedure description  Details of procedure including risks of allergic reaction, deep vein thrombosis, ulceration, superficial thrombophlebitis and hyperpigmentation were discussed.  The patient voiced understanding and wished to proceed.  Informed consent was obtained.    I imaged his left lower extremity noting large varicosities that were patent just cephalad of, deep to and just distal to the stasis ulcer on the anterior left leg.  We isolated the veins at the cephalad border of the wound with ultrasound, directed 27-gauge needle in the vein and injected 1% polidocanol foam, 3 mL.  I then imaged slightly distal to the wound, on its inferior border and directed a 27-gauge needle into the vein injecting an additional 3 mL of 1% polidocanol foam filling the veins deep to the ulcer in their entirety.  We also filled the vein cephalad and caudad to the ulcer.    Had the patient perform ankle pumps.    We cleaned his leg, dressed his wound and then applied compression.  We had him walk for 10 minutes.    He will return on an as-needed basis.    Sclerotherapy    Date/Time: 8/13/2024 12:12 PM    Performed by: Ludwig Muhammad MD  Authorized by: Ludwig Muhammad MD    Time out: Immediately prior to the procedure a time out was called    Type:  Medically Necessary  Vein:  Multiple Veins  Yes    Procedure side:  Left  Solution/Amount:  1% POLIDOCANOL  Syringes:  2  syringes (6 mL) 1% polidocanol foam  Patient tolerance:  Patient tolerated the procedure well with no immediate complications  Wrap/Hose:  Wraps      C Marcial Muhammad MD    Dictated using Dragon voice recognition software which may result in transcription errors      Again, thank you for allowing me to participate in the care of your patient.        Sincerely,        Ludwig Muhammad MD

## 2024-08-15 NOTE — ADDENDUM NOTE
Encounter addended by: Katlin Ragland, PT on: 8/15/2024 1:25 PM   Actions taken: Clinical Note Signed, Flowsheet accepted, Episode resolved

## 2024-08-15 NOTE — PROGRESS NOTES
"   04/08/22 1500   Appointment Info   Signing clinician's name / credentials Katlin Ragland   Visits Used 1   PT Tx Diagnosis Impaired functional mobility    Quick Adds Supervision   Supervision   Student Supervision Direct supervision provided;Line-of site supervision provided;Therapy services provided with the co-signing licensed therapist guiding and directing the services, and providing the skilled judgement and assessment throughout the session   Subjective Report   Subjective Report Pt has been walking in the house. Pt is hoping to start walking more with a group outside. Pt feels his mobility is the main issue. Pt recently saw wound care doctor that declared cellulitis healed on L foot. Pt is currently getting Botox injections for \"rotating pain\" in L leg.   Objective Measures   Objective Measures Objective Measure 1;Objective Measure 2;Objective Measure 3   Objective Measure 1   Objective Measure 30 sec STS   Details 6 STS from 25 in mat w/ 4WW in front   Objective Measure 2   Objective Measure 25 foot walk   Details 20 sec, 0.38 m/s   Treatment Interventions (PT)   Interventions Therapeutic Procedure/Exercise;Physical Performance Test/Measures   Therapeutic Procedure/Exercise   Therapeutic Procedures: strength, endurance, ROM, flexibility minutes ([id number]) 45   Skilled Intervention Instruction of HEP, education on walking program   Patient Response/Progress Pain on L w/ most movement   Education   Learner/Method Patient;Caregiver   Plan   Home program walking program, STS, standing weightshifts, seated R hip flex, seated R knee ext, seated R ankle DF, seated L foot slide outs   Plan for next session Discharge   Medicare Claim Information   Medical Diagnosis Lymphedema of left leg I89.0, Pain R52          DISCHARGE  Reason for Discharge: Pt lost to follow up, thus goals unable to be reassessed    Equipment Issued:     Discharge Plan: Pt to return to OP PT for any future balance or strengthening " needs.    Referring Provider:  Demetri Archer

## 2024-08-21 ENCOUNTER — TELEPHONE (OUTPATIENT)
Dept: WOUND CARE | Facility: CLINIC | Age: 64
End: 2024-08-21
Payer: MEDICARE

## 2024-08-21 ENCOUNTER — TELEPHONE (OUTPATIENT)
Dept: INTERNAL MEDICINE | Facility: CLINIC | Age: 64
End: 2024-08-21
Payer: MEDICARE

## 2024-08-21 DIAGNOSIS — L97.522 ULCER OF GREAT TOE, LEFT, WITH FAT LAYER EXPOSED (H): Primary | ICD-10-CM

## 2024-08-21 DIAGNOSIS — L97.522 CHRONIC ULCER OF LEFT FOOT WITH FAT LAYER EXPOSED (H): ICD-10-CM

## 2024-08-21 DIAGNOSIS — S22.080D COMPRESSION FRACTURE OF T12 VERTEBRA WITH ROUTINE HEALING, SUBSEQUENT ENCOUNTER: ICD-10-CM

## 2024-08-21 DIAGNOSIS — L97.512 ULCER OF RIGHT FOOT WITH FAT LAYER EXPOSED (H): ICD-10-CM

## 2024-08-21 NOTE — TELEPHONE ENCOUNTER
Returned call to Dai. Discussed that since the patient has not been seen since 7/2/24 and has no follow up appointment at the wound clinic at this time that the request for home care should come from the patients PCP. Dai verbalized understanding.

## 2024-08-21 NOTE — TELEPHONE ENCOUNTER
Group home nurse, Dai, is requesting home care for the patient to help accommodate the 3 x/ week  wound care orders. Patient claims to change his dressings at least once a week but is not able to do so consistently if at all. Dai is only able to accommodate 1 x/ week at best and patient's group home is unable to help with dressing changes.     Dai [phone]

## 2024-08-21 NOTE — TELEPHONE ENCOUNTER
Nurse of group home calling stating Patient has wounds and she would like home care nursing.  Wound care said they need 3x a week and group home care nurse not able to do that. Petey is in a group home. Does not currently have home care? Please advise    ALEXA Gomez  [facility]

## 2024-08-22 ENCOUNTER — HOSPITAL ENCOUNTER (EMERGENCY)
Facility: CLINIC | Age: 64
Discharge: NURSING, REHAB OR LONG-TERM CARE | End: 2024-08-23
Attending: EMERGENCY MEDICINE | Admitting: EMERGENCY MEDICINE
Payer: MEDICARE

## 2024-08-22 ENCOUNTER — APPOINTMENT (OUTPATIENT)
Dept: CT IMAGING | Facility: CLINIC | Age: 64
End: 2024-08-22
Attending: EMERGENCY MEDICINE
Payer: MEDICARE

## 2024-08-22 VITALS
HEART RATE: 71 BPM | DIASTOLIC BLOOD PRESSURE: 70 MMHG | SYSTOLIC BLOOD PRESSURE: 130 MMHG | BODY MASS INDEX: 57.46 KG/M2 | WEIGHT: 315 LBS | OXYGEN SATURATION: 96 % | RESPIRATION RATE: 16 BRPM | TEMPERATURE: 97.3 F

## 2024-08-22 DIAGNOSIS — R10.9 RIGHT FLANK PAIN: ICD-10-CM

## 2024-08-22 PROCEDURE — 74176 CT ABD & PELVIS W/O CONTRAST: CPT | Mod: MA

## 2024-08-22 PROCEDURE — 99284 EMERGENCY DEPT VISIT MOD MDM: CPT | Mod: 25

## 2024-08-22 RX ORDER — ACETAMINOPHEN 500 MG
1000 TABLET ORAL EVERY 8 HOURS
Qty: 180 TABLET | Refills: 0 | Status: SHIPPED | OUTPATIENT
Start: 2024-08-22 | End: 2024-09-21

## 2024-08-22 ASSESSMENT — ACTIVITIES OF DAILY LIVING (ADL)
ADLS_ACUITY_SCORE: 38

## 2024-08-22 NOTE — ED TRIAGE NOTES
Pt present via EMS, per EMS pt from  for right sided abd pain for last 2 weeks now getting worse

## 2024-08-22 NOTE — ED PROVIDER NOTES
Emergency Department Note      History of Present Illness     Chief Complaint   Abdominal Pain       HPI   Petey Archibald is a 63 year old male on Warfarin with a history of COPD, DVT, hyperlipidemia, and hypertension presenting with abdominal pain. After the patient lifted a big pan with one hand out of the  5 feet away to a counter at his work 2 weeks ago. The patient thinks that this triggered his constant right lower quadrant abdominal pain. He notes that it is hard to get out of his chair after sitting for a while and to get out of bed after laying on his right side. When he goes through the same movement without the pan, his pain worsens. Walking and eating does not change his symptoms. The patient had 3 bowel movements in one day, but has since returned to his normal schedule.     Independent Historian   None    Review of External Notes   I reviewed the patient's primary care follow up notes from July and August 2024.    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   Depressive disorder   Fissure of left great toe  GERD  History of DVT of lower extremity  History of pulmonary embolism  Hyperlipidemia  Hypertension   Insomnia   Mild intellectual disabilities  Morbid obesity   Nicotine dependence   NEL   Obesity   Peripheral edema  Peripheral vascular disease  Sleep apnea  Stasis edema   Statis ulcer of right leg   Thrombosis  Tobacco dependence  Uncomplicated asthma  Venous stasis dermatitis    Medications   Albuterol   Aripiprazole  Bupropion   Clonidine   Diphenhydramine   Gabapentin   Furosemide   Loratadine   Lorazepam   Montelukast   Naltrexone   Omeprazole   Sertraline   Simvastatin   Spironolactone   Tizanidine   Tizanidine   Trazodone   Warfarin     Surgical History   Colonoscopy   x2  Excise malignant lesions, trunk/arms/legs, left  Rectal surgery    Physical Exam     Patient Vitals for the past 24 hrs:   BP Pulse Resp SpO2   08/22/24 2137 130/70 71 16 96 %     Physical Exam  Eye:  Pupils are equal, round, and reactive.  Extraocular movements intact.    ENT:  No rhinorrhea.  Moist mucus membranes.  Normal tongue and tonsil.    Cardiac:  Regular rate and rhythm.  No murmurs, gallops, or rubs.    Pulmonary:  Clear to auscultation bilaterally.  No wheezes, rales, or rhonchi.    Abdomen:  Positive bowel sounds.  Abdomen is soft and non-distended, without focal tenderness.  Mild tenderness in the right flank without rebound.     Musculoskeletal:  Normal movement of all extremities without evidence for deficit.    Skin:  Warm and dry without rashes.    Neurologic:  Non-focal exam without asymmetric weakness or numbness.     Psychiatric:  Normal affect with appropriate interaction with examiner.      Diagnostics     Lab Results   Labs Ordered and Resulted from Time of ED Arrival to Time of ED Departure - No data to display    Imaging   CT Abdomen Pelvis w/o Contrast   Final Result   IMPRESSION:    1.  Acute-appearing T12 compression fracture.   2.  No additional visualized explanation for patient's symptoms. Specifically, no nephroureterolithiasis or hydronephrosis.             Independent Interpretation   None    ED Course      Medications Administered   Medications - No data to display    Procedures   Procedures     Discussion of Management   None    ED Course   ED Course as of 08/23/24 1935 Thu Aug 22, 2024   1859 I obtained the history and examined the patient as above.    2122 I rechecked and updated the patient.         Additional Documentation  None    Medical Decision Making / Diagnosis     CMS Diagnoses: None    MIPS     None    [facility]   Petey Archibald is a 63 year old male presenting to us with ongoing complaints of right-sided back pain.  Initially, his history made it seem as though  this was an acute issue.  However, upon review of his chart, he was diagnosed with his T12 compression fracture in July and has had continued follow-up for it.  On my exam, I am able to reproduce the discomfort as I palpate over T12 and into the right side of the back.  Imaging of the abdomen does not show any evidence of kidney stones, biliary colic, or other more serious conditions.  I have no concerns for pulmonary embolism as he is anticoagulated.  I feel he is safe for discharge home with scheduled Tylenol and close outpatient follow-up.  He will otherwise return to us for any worsening of condition or other emergent concerns.    Disposition   The patient was discharged.     Diagnosis     ICD-10-CM    1. Right flank pain  R10.9            Discharge Medications   Discharge Medication List as of 8/22/2024 10:12 PM            Scribe Disclosure:  I, Phoenix Peterson, am serving as a scribe at 8:11 PM on 8/22/2024 to document services personally performed by Trierweiler, Chad A, MD based on my observations and the provider's statements to me.        Trierweiler, Chad A, MD  08/23/24 1936

## 2024-08-23 ENCOUNTER — TELEPHONE (OUTPATIENT)
Dept: ANTICOAGULATION | Facility: CLINIC | Age: 64
End: 2024-08-23

## 2024-08-23 DIAGNOSIS — Z79.01 LONG TERM CURRENT USE OF ANTICOAGULANT THERAPY: ICD-10-CM

## 2024-08-23 DIAGNOSIS — Z86.711 HISTORY OF PULMONARY EMBOLISM: Primary | ICD-10-CM

## 2024-08-23 RX ORDER — WARFARIN SODIUM 6 MG/1
TABLET ORAL
Qty: 4 TABLET | Refills: 0 | Status: SHIPPED | OUTPATIENT
Start: 2024-08-23 | End: 2024-08-27

## 2024-08-23 RX ORDER — WARFARIN SODIUM 2 MG/1
TABLET ORAL
Qty: 3 TABLET | Refills: 0 | Status: SHIPPED | OUTPATIENT
Start: 2024-08-23 | End: 2024-08-27

## 2024-08-23 ASSESSMENT — ACTIVITIES OF DAILY LIVING (ADL): ADLS_ACUITY_SCORE: 38

## 2024-08-23 NOTE — TELEPHONE ENCOUNTER
"ANTICOAGULATION  MANAGEMENT: Discharge Review    Petey Archibald chart reviewed for anticoagulation continuity of care    Emergency room visit on 8/22/24 for right flank pain.    Discharge disposition: Home- group home    Results:    No results for input(s): \"INR\", \"[id number]\", \"F2\", \"ALMWH\", \"AAUFH\" in the last 168 hours.  Anticoagulation inpatient management:     not applicable     Anticoagulation discharge instructions:     Warfarin dosing: home regimen continued 8 mg every Sun, Tue, Fri; 6 mg all other days    Bridging: No   INR goal change: No      Medication changes affecting anticoagulation: No    Additional factors affecting anticoagulation: Yes: right flank pain.  Dai report that patient missed INR recheck yesterday. Only have 1 tablet of warfarin 6 mg left.  New erx sent to [facility] to cover the the weekend.      PLAN     No adjustment to anticoagulation plan needed    Spoke with nurse Lala, lab appt scheduled fro 8/26/24     Anticoagulation Calendar updated    Elizabeth Bueno RN    "

## 2024-08-23 NOTE — DISCHARGE INSTRUCTIONS
As discussed, your symptoms are likely related to your ongoing problems of your thoracic vertebrae fracture.  Continue with ibuprofen and Tylenol and limit activities to those that are not painful.  Continue to work with your primary team.  Return to the ER for worsening of condition or other emergent concerns.

## 2024-08-25 ENCOUNTER — HEALTH MAINTENANCE LETTER (OUTPATIENT)
Age: 64
End: 2024-08-25

## 2024-08-27 ENCOUNTER — ANTICOAGULATION THERAPY VISIT (OUTPATIENT)
Dept: ANTICOAGULATION | Facility: CLINIC | Age: 64
End: 2024-08-27

## 2024-08-27 ENCOUNTER — NURSE TRIAGE (OUTPATIENT)
Dept: NURSING | Facility: CLINIC | Age: 64
End: 2024-08-27

## 2024-08-27 ENCOUNTER — TELEPHONE (OUTPATIENT)
Dept: INTERNAL MEDICINE | Facility: CLINIC | Age: 64
End: 2024-08-27
Payer: MEDICARE

## 2024-08-27 ENCOUNTER — LAB (OUTPATIENT)
Dept: LAB | Facility: CLINIC | Age: 64
End: 2024-08-27
Payer: MEDICARE

## 2024-08-27 DIAGNOSIS — Z79.01 LONG TERM CURRENT USE OF ANTICOAGULANT THERAPY: ICD-10-CM

## 2024-08-27 DIAGNOSIS — Z86.711 HISTORY OF PULMONARY EMBOLISM: ICD-10-CM

## 2024-08-27 DIAGNOSIS — Z86.711 HISTORY OF PULMONARY EMBOLISM: Primary | ICD-10-CM

## 2024-08-27 LAB — INR BLD: 1.2 (ref 0.9–1.1)

## 2024-08-27 PROCEDURE — 36416 COLLJ CAPILLARY BLOOD SPEC: CPT

## 2024-08-27 PROCEDURE — 85610 PROTHROMBIN TIME: CPT

## 2024-08-27 RX ORDER — WARFARIN SODIUM 2 MG/1
TABLET ORAL
Qty: 3 TABLET | Refills: 0 | Status: SHIPPED | OUTPATIENT
Start: 2024-08-27 | End: 2024-09-03

## 2024-08-27 RX ORDER — WARFARIN SODIUM 6 MG/1
TABLET ORAL
Qty: 9 TABLET | Refills: 0 | Status: SHIPPED | OUTPATIENT
Start: 2024-08-27 | End: 2024-09-03

## 2024-08-27 NOTE — TELEPHONE ENCOUNTER
Nurse Triage SBAR    Situation: Medication request    Background: [facility]     Assessment: Dai states that the pharmacy said that they did not receive the prescription for warfarin.     RN contacted [facility] and they stated the medication was being checked off by the pharmacist now and it will be sent out tonight.     Recommendation: RN attempted to call ALEXA Lala, back but she did not answer. M for her to call back.    Kristal Mehta RN Nursing Advisor 8/27/2024 6:43 PM     Reason for Disposition   [1] Prescription prescribed recently is not at pharmacy AND [2] triager has access to patient's EMR AND [3] prescription is recorded in the EMR    Protocols used: Medication Question Call-A-

## 2024-08-27 NOTE — TELEPHONE ENCOUNTER
FYI - Status Update    Who is Calling: May luu with Home care    Update: RN calling to notify PCP of delay in nursing start of care until 8/31    Does caller want a call/response back: May LIU  [phone]

## 2024-08-27 NOTE — PROGRESS NOTES
ANTICOAGULATION MANAGEMENT     Petey Archibald 63 year old male is on warfarin with subtherapeutic INR result. (Goal INR 2.0-3.0)    Recent labs: (last 7 days)     08/27/24  1611   INR 1.2*       ASSESSMENT     Source(s): Chart Review and Home Care/Facility Nurse     Warfarin doses taken: Missed dose(s) may be affecting INR-per Dai there is new staff at the group home and she does not think this staff understands the patient needs to take this medication every night.  Dai will follow up with new group home staff  Diet: No new diet changes identified  Medication/supplement changes: None noted  New illness, injury, or hospitalization: No  Signs or symptoms of bleeding or clotting: No  Previous result: Therapeutic last visit; previously outside of goal range  Additional findings: None       PLAN     Recommended plan for temporary change(s) affecting INR     Dosing Instructions: booster dose then continue your current warfarin dose with next INR in 1 week       Summary  As of 8/27/2024      Full warfarin instructions:  8/27: 12 mg; Otherwise 8 mg every Sun, Tue, Fri; 6 mg all other days   Next INR check:  9/3/2024               Telephone call with [facility] facility nurse who verbalizes understanding and agrees to plan and who agrees to plan and repeated back plan correctly  Faxed dosing and follow up instructions to [facility]    Lab visit scheduled    Education provided: Taking warfarin: Importance of taking warfarin as instructed  Symptom monitoring: monitoring for clotting signs and symptoms, monitoring for stroke signs and symptoms, and when to seek medical attention/emergency care    Plan made per [facility] anticoagulation protocol    Radha Hughes, RN  Anticoagulation Clinic  8/27/2024    _______________________________________________________________________     Anticoagulation Episode Summary       Current INR goal:  2.0-3.0   TTR:  67.5% (1 y)   Target end date:  Indefinite   Send INR  reminders to:  [facility]    Indications    History of pulmonary embolism [Z86.711]  Long term current use of anticoagulant therapy [Z79.01]             Comments:  [facility]; A new Coumadin Prescription will need to sent to [facility] with current dose and next INR check.             Anticoagulation Care Providers       Provider Role Specialty Phone number    Demetri Archer MD Referring Internal Medicine [phone]

## 2024-08-27 NOTE — TELEPHONE ENCOUNTER
Nurse Triage SBAR    Situation: Medication request.     Background: ALEXA Lala, from [facility] calling. Talked to Yudy from the INR clinic. INR is lower as he has not had it.     Assessment: Dai is calling stating the patient does not have any Warfarin and that a prescription needs to be sent to [facility].     Recommendation: RN noted that a prescription was already sent into the pharmacy. Dai stated understanding and will call the pharmacy now. RN advised for her to call back if she needs anything else.     Kristal Mehta RN Nursing Advisor 8/27/2024 6:34 PM     Reason for Disposition   [1] Prescription prescribed recently is not at pharmacy AND [2] triager has access to patient's EMR AND [3] prescription is recorded in the EMR    Protocols used: Medication Question Call-A-

## 2024-09-03 ENCOUNTER — LAB (OUTPATIENT)
Dept: LAB | Facility: CLINIC | Age: 64
End: 2024-09-03
Payer: MEDICARE

## 2024-09-03 ENCOUNTER — MEDICAL CORRESPONDENCE (OUTPATIENT)
Dept: HEALTH INFORMATION MANAGEMENT | Facility: CLINIC | Age: 64
End: 2024-09-03

## 2024-09-03 ENCOUNTER — ANTICOAGULATION THERAPY VISIT (OUTPATIENT)
Dept: ANTICOAGULATION | Facility: CLINIC | Age: 64
End: 2024-09-03

## 2024-09-03 DIAGNOSIS — Z86.711 HISTORY OF PULMONARY EMBOLISM: ICD-10-CM

## 2024-09-03 DIAGNOSIS — Z79.01 LONG TERM CURRENT USE OF ANTICOAGULANT THERAPY: ICD-10-CM

## 2024-09-03 DIAGNOSIS — Z86.711 HISTORY OF PULMONARY EMBOLISM: Primary | ICD-10-CM

## 2024-09-03 LAB — INR BLD: 2.4 (ref 0.9–1.1)

## 2024-09-03 PROCEDURE — 85610 PROTHROMBIN TIME: CPT

## 2024-09-03 PROCEDURE — 36416 COLLJ CAPILLARY BLOOD SPEC: CPT

## 2024-09-03 RX ORDER — WARFARIN SODIUM 2 MG/1
TABLET ORAL
Qty: 3 TABLET | Refills: 0 | Status: SHIPPED | OUTPATIENT
Start: 2024-09-03 | End: 2024-09-10

## 2024-09-03 RX ORDER — WARFARIN SODIUM 6 MG/1
TABLET ORAL
Qty: 9 TABLET | Refills: 0 | Status: SHIPPED | OUTPATIENT
Start: 2024-09-03 | End: 2024-09-10

## 2024-09-03 NOTE — PROGRESS NOTES
ANTICOAGULATION MANAGEMENT     Petey Archibald 63 year old male is on warfarin with subtherapeutic INR result. (Goal INR 2.0-3.0)    Recent labs: (last 7 days)     09/03/24  1547   INR 2.4*       ASSESSMENT     Source(s): Chart Review  Previous INR was Subtherapeutic.  Most likely due to missed doses.  Medication, diet, health changes since last INR chart reviewed; none identified         PLAN     Unable to reach [facility] today. Left a very generic message to return call to anticoagulation clinic since [facility] did not have a detailed message.    Orders faxed to [facility] to continue on same dose and recheck in 1 week.  Also faxed AVS to [facility] with request to call anticoagulation clinic to discuss further.    Follow up required to confirm warfarin dose taken and assess for changes    Kayleen Ott RN  Anticoagulation Clinic  9/3/2024

## 2024-09-04 NOTE — PROGRESS NOTES
Onsite to please mail AVS to [facility] [place]- [address]   [facility] [address]     Ashley Quiroz RN    [facility]

## 2024-09-05 ENCOUNTER — TELEPHONE (OUTPATIENT)
Dept: WOUND CARE | Facility: CLINIC | Age: 64
End: 2024-09-05
Payer: MEDICARE

## 2024-09-05 NOTE — TELEPHONE ENCOUNTER
[facility]    Who is the name of the provider?:  [facility]    What is the location you see this provider at/preferred location?: [facility] [place]  [place]  Person calling / Facility: Dai Quiorz RN - Group Home  Phone number:  [phone]   Nurse call back needed:  ??  Can we leave a detailed message on this number?  YES     Reason for call:  Last seen 7/2/24.  Requesting appointment for ulcers on left leg.        Pharmacy location:  YES

## 2024-09-06 ENCOUNTER — TELEPHONE (OUTPATIENT)
Dept: INTERNAL MEDICINE | Facility: CLINIC | Age: 64
End: 2024-09-06
Payer: MEDICARE

## 2024-09-06 NOTE — TELEPHONE ENCOUNTER
Called Dai's number. No answer. Left message to call back if any questions about warfarin dosing or other issue about INR done 9/3.  Jessica Tran RN

## 2024-09-06 NOTE — TELEPHONE ENCOUNTER
Please see anticoag encounter dated 9/3.  Warfarin orders were faxed and mailed to patient's group home since unable to reach by phone.  New rx sent to [facility] also.  Left message for Dai to call anticoagulation clinic as she may not have received orders if she is calling today.  Also was unable to do assessment since we were unable to reach the group home on 9/3.

## 2024-09-09 NOTE — PROGRESS NOTES
ASSESSMENT & PLAN    Petey was seen today for edema and edema.    Diagnoses and all orders for this visit:    Olecranon bursitis of both elbows  -     XR Elbow Bilateral G/E 3 Views; Future      This issue is chronic and Unchanged. Petey presents our clinic today to discuss the swelling of his right elbow.  He has a notable olecranon bursitis on the right elbow, but reassuringly there is no overlying erythema, heat, or drainage to suggest acute infection.  We discussed that these bursitis are usually due to repetitive trauma, and that this should be managed conservatively.  We discussed that while we could drain the bursa, it would increase the risk of infection and we usually reserve this for painful, refractory cases.  We discussed the treatment including compression and icing as well as anti-inflammatory medicines.  We determined the following plan:  - Patient was wrapped in an Ace bandage today, he will use this for compression  - He will ice the area, 20 minutes at a time, 2-3 times a day  - He will avoid leaning on the area/repetitive trauma  - He can follow-up in our clinic as needed      [facility] [place]    -----  Chief Complaint   Patient presents with    Right Elbow - Edema    Left Elbow - Edema       SUBJECTIVE  Petey Archibald is a/an 63 year old male who is seen as a self referral for evaluation of bilateral elbow swelling.     The patient is seen with their group home staff.  The patient is Right handed    Onset: at least 1 month(s) ago. Reports insidious onset without acute precipitating event.  Location of Pain: bilateral elbows over olecranon process (R>L)  Worsened by: nothing  Better with: nothing  Treatments tried: rest/activity avoidance and Tylenol  Associated symptoms: swelling    Orthopedic/Surgical history: NO  Social History/Occupation: works as a       REVIEW OF SYSTEMS:  Review of systems negative unless mentioned in  "HPI     OBJECTIVE:  BP (!) 142/72   Ht 1.676 m (5' 6\")   Wt (!) 161.5 kg (356 lb)   BMI 57.46 kg/m     General: healthy, alert and in no distress  Skin: no suspicious lesions or rash.  CV: distal perfusion intact   Resp: normal respiratory effort without conversational dyspnea   Psych: normal mood and affect  Gait: NORMAL  Neuro: Normal light sensory exam of DIVINA extremity     Focused Musculoskeletal Exam :   Elbow Exam    Left  Right   Alignment  Normal  Normal    Inspection Normal Olecranon bursitis    Palpation No tenderness over common extensor or lateral epicondyle, flexor/pronator mass or medial epicondyle, radial head, radial tunnel, or cubital tunnel.  No tenderness over common extensor or lateral epicondyle, flexor/pronator mass or medial epicondyle, radial head, radial tunnel, or cubital tunnel.    Range of Motion     Flexion 140 140   Extension 0 0   Supination 0-80 0-80   Pronation 0-80 0-80   Strength Grossly normal Grossly normal   Pain with resisted wrist extension Negative Negative   Pain with resisted wrist flexion  Negative Negative   Pain with resisted pronation/supination Negative Negative   Hook Test Negative Negative   Valgus stress testing Negative  Milking: no Negative  Milking: no   Other Special Tests  Able to make 'OK' sign (AIN)  Full thumb IP abduction strength (radial) Able to make 'OK' sign (AIN)  Full thumb IP abduction strength (radial)   Sensation Intact Intact          RADIOLOGY:  Final results and radiologist's interpretation, available in the [facility] health record.  Images were reviewed with the patient in the office today.  My personal interpretation of the performed imaging: Normal joint spacing and alignment of the elbow.  Large soft tissue swelling overlying the right elbow.  No acute bony abnormalities.      "

## 2024-09-10 ENCOUNTER — LAB (OUTPATIENT)
Dept: LAB | Facility: CLINIC | Age: 64
End: 2024-09-10
Payer: MEDICARE

## 2024-09-10 ENCOUNTER — ANCILLARY PROCEDURE (OUTPATIENT)
Dept: GENERAL RADIOLOGY | Facility: CLINIC | Age: 64
End: 2024-09-10
Attending: STUDENT IN AN ORGANIZED HEALTH CARE EDUCATION/TRAINING PROGRAM
Payer: MEDICARE

## 2024-09-10 ENCOUNTER — ANTICOAGULATION THERAPY VISIT (OUTPATIENT)
Dept: ANTICOAGULATION | Facility: CLINIC | Age: 64
End: 2024-09-10

## 2024-09-10 ENCOUNTER — OFFICE VISIT (OUTPATIENT)
Dept: ORTHOPEDICS | Facility: CLINIC | Age: 64
End: 2024-09-10
Payer: MEDICARE

## 2024-09-10 ENCOUNTER — HOSPITAL ENCOUNTER (OUTPATIENT)
Dept: WOUND CARE | Facility: CLINIC | Age: 64
Discharge: HOME | End: 2024-09-10
Attending: FAMILY MEDICINE | Admitting: FAMILY MEDICINE
Payer: MEDICARE

## 2024-09-10 VITALS
SYSTOLIC BLOOD PRESSURE: 142 MMHG | WEIGHT: 315 LBS | DIASTOLIC BLOOD PRESSURE: 72 MMHG | BODY MASS INDEX: 50.62 KG/M2 | HEIGHT: 66 IN

## 2024-09-10 VITALS — DIASTOLIC BLOOD PRESSURE: 84 MMHG | SYSTOLIC BLOOD PRESSURE: 150 MMHG | TEMPERATURE: 96.3 F | HEART RATE: 79 BPM

## 2024-09-10 DIAGNOSIS — Z86.711 HISTORY OF PULMONARY EMBOLISM: Primary | ICD-10-CM

## 2024-09-10 DIAGNOSIS — L97.922 ULCER OF LEFT LOWER EXTREMITY WITH FAT LAYER EXPOSED (H): ICD-10-CM

## 2024-09-10 DIAGNOSIS — Z79.01 LONG TERM CURRENT USE OF ANTICOAGULANT THERAPY: ICD-10-CM

## 2024-09-10 DIAGNOSIS — Z86.711 HISTORY OF PULMONARY EMBOLISM: ICD-10-CM

## 2024-09-10 DIAGNOSIS — R60.0 LOWER EXTREMITY EDEMA: ICD-10-CM

## 2024-09-10 DIAGNOSIS — M70.21 OLECRANON BURSITIS OF BOTH ELBOWS: Primary | ICD-10-CM

## 2024-09-10 DIAGNOSIS — L97.912 ULCER OF RIGHT LEG, WITH FAT LAYER EXPOSED (H): ICD-10-CM

## 2024-09-10 DIAGNOSIS — M70.22 OLECRANON BURSITIS OF BOTH ELBOWS: ICD-10-CM

## 2024-09-10 DIAGNOSIS — M70.22 OLECRANON BURSITIS OF BOTH ELBOWS: Primary | ICD-10-CM

## 2024-09-10 DIAGNOSIS — M70.21 OLECRANON BURSITIS OF BOTH ELBOWS: ICD-10-CM

## 2024-09-10 DIAGNOSIS — S91.302D OPEN WOUND OF LEFT FOOT, SUBSEQUENT ENCOUNTER: Primary | ICD-10-CM

## 2024-09-10 LAB — INR BLD: 3.5 (ref 0.9–1.1)

## 2024-09-10 PROCEDURE — 85610 PROTHROMBIN TIME: CPT

## 2024-09-10 PROCEDURE — 36416 COLLJ CAPILLARY BLOOD SPEC: CPT

## 2024-09-10 PROCEDURE — 99213 OFFICE O/P EST LOW 20 MIN: CPT | Performed by: STUDENT IN AN ORGANIZED HEALTH CARE EDUCATION/TRAINING PROGRAM

## 2024-09-10 PROCEDURE — 73080 X-RAY EXAM OF ELBOW: CPT | Mod: TC | Performed by: INTERNAL MEDICINE

## 2024-09-10 PROCEDURE — 99214 OFFICE O/P EST MOD 30 MIN: CPT | Performed by: FAMILY MEDICINE

## 2024-09-10 PROCEDURE — 97602 WOUND(S) CARE NON-SELECTIVE: CPT

## 2024-09-10 RX ORDER — WARFARIN SODIUM 2 MG/1
TABLET ORAL
Qty: 5 TABLET | Refills: 0 | Status: SHIPPED | OUTPATIENT
Start: 2024-09-10

## 2024-09-10 RX ORDER — WARFARIN SODIUM 6 MG/1
TABLET ORAL
Qty: 9 TABLET | Refills: 0 | Status: SHIPPED | OUTPATIENT
Start: 2024-09-10 | End: 2024-09-17

## 2024-09-10 NOTE — PROGRESS NOTES
Onsite staff; Please send AVS for 9/10/24 INR result to:  [facility] - [address] [place] [address]

## 2024-09-10 NOTE — PROGRESS NOTES
Wound Clinic Note         Visit date: 09/10/2024       Cheif Complaint:     Petey Archibald is a 63 year old   male had no chief complaint listed for this encounter.  The patient has lower extremity edema and bilateral leg ulcers.          HISTORY OF PRESENT ILLNESS:    Petey Archibald reports the wound has been present since mid 2022.  The wound began without a clear cause.   He reports prior to this wound developing he has not had other difficult to heal wounds on the legs.    I last saw this patient in the wound clinic on July 2, 2024.  Since that time he has no showed or canceled his subsequent appointments.    He did undergo the procedure to treat his venous insufficiency with Dr. Muhammad on June 11, 2024.  He had another flare therapy procedure performed on August 13, 2024, at that time Dr. Muhammad reported to the patient that there was no other areas of superficial venous insufficiency needing treatment.      He comes into the wound clinic today just wearing Band-Aids over the open wounds.  He reports that there is a nurse at his group home that occasionally changes the bandages but has not been changing the bandages recently.  He has been applying the bandages himself.  He has not been wearing any form of compression recently.        The pateint denies fevers or chills.  They report the pain from the wound has been 0/10 and has remained about the same recently.      The patient reports they currently do not have any routine for elevating their legs.  The patient confirms they do sleep in a bed.     Today the patient reports maintaining a high protein diet, but has not been taking protein supplements lately.        The patient denies a history of diabetes or chronic steroid use.  Today he reports he is back to cigarette smoking and reports smoking about a pack a day.    The patient has not had any symptoms of infection relating to the wound recently and is not currently on antibiotics.        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       Surgical Hx:   Past Surgical History:   Procedure Laterality Date    COLONOSCOPY N/A 4/15/2019    Procedure: COMBINED COLONOSCOPY, SINGLE OR MULTIPLE BIOPSY/POLYPECTOMY BY BIOPSY;  Surgeon: Jovana Ohara MD;  Location:  GI    COLONOSCOPY N/A 6/7/2021    Procedure: COLONOSCOPY with polypectomies;  Surgeon: Sahil Cid MD;  Location: [facility] OR    EXCISE MALIGNANT LESIONS, TRUNK/ARMS/LEGS 0.5CM OR LESS Left 5/26/2017    Procedure: EXCISE MALIGNANT LESIONS, TRUNK/ARMS/LEGS 0.5CM OR LESS;  EXCISION LEFT ELBOW MASS;  Surgeon: Jose Azevedo MD;  Location:  GI    RECTAL SURGERY      perianal abscess?          Allergies:    Allergies   Allergen Reactions    Bees     Clavulanic Acid     Amoxicillin-Pot Clavulanate Rash     Augmentin    Penicillins Rash              Medication History:    Current Outpatient Medications   Medication Sig Dispense Refill    acetaminophen (TYLENOL) 500 MG tablet Take 2 tablets (1,000 mg) by mouth every 8 hours. 180 tablet 0    albuterol (ALBUTEROL) 108 (90 BASE) MCG/ACT inhaler Inhale 2 puffs into the lungs every 6 hours as needed 1 Inhaler 0    ammonium lactate (AMLACTIN) 12 % external cream APPLY TO  "FEET ONCE OR TWICE DAILY AS NEEDED FOR ITCHY DRY & CRACKED SKIN. 140 g 11    ARIPiprazole (ABILIFY) 5 MG tablet Take 1 tablet (5 mg) by mouth daily 45 tablet 0    buPROPion (WELLBUTRIN SR) 150 MG 12 hr tablet Take 1 tablet (150 mg) by mouth 2 times daily for 45 days 90 tablet 0    Cadexomer Iodine, topical, 0.9% (IODOSORB) 0.9 % GEL gel Apply topically daily Apply to left foot wound daily after cleansing the wound and blotting it dry. 1 Tube 3    cloNIDine (CATAPRES) 0.1 MG tablet Take 0.1 mg by mouth daily as needed      clotrimazole (LOTRIMIN) 1 % external cream Apply topically 2 times daily 60 g 0    diclofenac (VOLTAREN) 1 % topical gel Apply 4 g topically 2 times daily as needed for moderate pain 100 g 1    diphenhydrAMINE (BENADRYL) 25 MG capsule Take 50 mg by mouth every 6 hours as needed for itching or allergies      Emollient (CERAVE) CREA Externally apply topically daily 453 g 11    EPINEPHrine (ANY BX GENERIC EQUIV) 0.3 MG/0.3ML injection 2-pack INJECT 0.3MG INTRAMUSCULARLY ONE TIME FOR ONE DOSE AS NEEDED FOR ANAPHYLAXIS. MAY REPEAT ONE TIME IN 5-15 MINUTES IF RESPONSE TO INITIAL DOSE IS INADEQUATE. *1 TOTAL FILL, ORIGINAL FROM EMERGENCY ROOM* 2 each 1    EPINEPHrine (EPIPEN 2-BRE) 0.3 MG/0.3ML injection 2-pack INJECT 0.3MG INTRAMUSCULAR ONE TIME FOR ONE DOSE AS NEEDED FOR ANAPHYLAXIS (CALL 911 IF YOU HAVE TO GIVE) (FOR BEE STINGS) **LABEL EACH PEN* 2 mL 3    gabapentin (NEURONTIN) 100 MG capsule Take 400 mg by mouth 3 times daily At 0900, 1200, and 2000      Gauze Pads & Dressings (GAUZE PADS 4\"X4\") 4\"X4\" PADS 1 each 3 times daily as needed 25 each 1    Lidocaine (LIDOCARE) 4 % Patch Place 1 patch onto the skin every 24 hours To prevent lidocaine toxicity, patient should be patch free for 12 hrs daily. 10 patch 0    LIDOCAINE PAIN RELIEF 4 % Patch PLACE 1 PATCH ONTO THE SKIN EVERY 24 HOURS. TO PREVENT LIDOCAINE TOXICITY, PATIENT SHOULD BE PATCH FREE FOR 12 HOURS DAILY **NON-COVERED MED** *1 TOTAL FILL* " 10 patch 23    loratadine (CLARITIN) 10 MG tablet Take 10 mg by mouth daily      LORazepam (ATIVAN) 1 MG tablet Take 1 mg by mouth daily as needed for anxiety      montelukast (SINGULAIR) 10 MG tablet TAKE 1 TABLET BY MOUTH EVERY MORNING (ASTHMA) 30 tablet 11    Multiple Vitamin (DAILY-JOVAN) TABS TAKE 1 TABLET BY MOUTH EVERY MORNING (VITAMINS) 30 tablet 11    naltrexone (DEPADE/REVIA) 50 MG tablet Take 2 tablets (100 mg) by mouth daily for 45 days 90 tablet 0    nicotine (COMMIT) 2 MG lozenge Place 2 mg inside cheek daily as needed      nystatin-triamcinolone (MYCOLOG II) 653090-2.1 UNIT/GM-% external cream Apply topically 2 times daily For 1-2 weeks.      omeprazole (PRILOSEC) 40 MG DR capsule Take 1 capsule (40 mg) by mouth daily 90 capsule 2    order for DME Equipment being ordered: roll of micropore tape to dress foot wound bid 1 each 0    order for DME Equipment being ordered: 1 surgical shoe 1 Device 0    order for DME [employer] [facility] Phone [phone] Fax [phone]  EdemaWear Size Medium/Yellow qty 4 sleeves  Length of Need: 1 month  Frequency of dressing change daily 1 Device 0    order for DME [employer] Compression Stockings  Phone #[phone]  Fax #[phone]  Coolflex Velcro wraps    Length of Need: Life Time  # of Pairs 1 set 30 days 0    order for DME [employer] [facility] Phone [phone] Fax [phone]  Primary Dressing Hydrofera Blue Ready   Qty 5 sheets  Secondary Dressing 4' roll gauze Qty 30  Secondary Dressing 2' medipore tape Qty 1  Secondary Dressing 4x4 gauze loaf Qty  1  Length of Need: 1 month  Frequency of dressing change: daily 30 days 0    order for DME Oxygen 2 Li/min  at night and bled into BiPAP 1 Device 0    order for DME Equipment being ordered: CPAP with mask and tubing 1 Device 0    order for DME Equipment being ordered: support compression hose BK  2 pair black 30mm HG   To be applied on arising & removed while lying down to go to sleep 1 each 0    polyethylene  glycol (MIRALAX) 17 GM/Dose powder Take 17 g (1 capful) by mouth daily as needed for constipation 578 g 0    PULMICORT FLEXHALER 180 MCG/ACT inhaler INHALE 2 PUFFS INTO THE LUNGS TWICE DAILY. 1 each 11    SENNA-TABS 8.6 MG tablet Take 1 tablet by mouth daily      sertraline (ZOLOFT) 100 MG tablet Take 2 tablets (200 mg) by mouth daily for 45 days 90 tablet 0    simvastatin (ZOCOR) 40 MG tablet TAKE 1 TABLET BY MOUTH EVERY MORNING.  (CHOLESTEROL) 30 tablet 11    SPIRIVA HANDIHALER 18 MCG inhaled capsule INHALE CONTENTS OF 1 CAPSULE INTO THE LUNGS ONCE DAILY 30 capsule 11    spironolactone (ALDACTONE) 25 MG tablet TAKE 1 TABLET BY MOUTH ONCE DAILY. (HIGH BLOOD PRESSURE) 30 tablet 11    tiZANidine (ZANAFLEX) 2 MG tablet TAKE 1 TABLET BY MOUTH THREE TIMES DAILY. 106 tablet 11    traZODone (DESYREL) 100 MG tablet Take 1 tablet (100 mg) by mouth at bedtime for 45 days 45 tablet 0    triamcinolone (KENALOG) 0.1 % external cream Apply to AA BID x 1-2 week then PRN only 80 g 2    vitamin B complex with vitamin C (STRESS TAB) tablet Take 1 tablet by mouth daily 90 tablet 3    warfarin ANTICOAGULANT (COUMADIN) 2 MG tablet Take 4 mgs on 9/10/24, then  (2 mg x 1 and 6 mg x 1) every Tuesday  Next INR Date is 9/17/24 5 tablet 0    warfarin ANTICOAGULANT (COUMADIN) 6 MG tablet Take 4 mg 9/10/24,  then  (1 tablet) Monday, Wednesday, Thursday, Friday, Saturday, Sunday,  8 mgs on Tuesday Recheck INR on 9/17/24 9 tablet 0     No current facility-administered medications for this encounter.         Tobacco History:  reports that he has quit smoking. His smoking use included cigarettes. He has a 30 pack-year smoking history. He has never used smokeless tobacco.       REVIEW OF SYMPTOMS:   The review of systems was negative except as noted in the HPI.           PHYSICAL EXAMINATION:     There were no vitals taken for this visit.           GENERAL: The patient overall appears well and is no acute distress.   HEAD: normocephalic   EYES:  Sclera and conjunctiva clear   NECK: no obvious masses   LUNGS: breathing is unlabored.   EXTREMITIES: No clubbing, cyanosis or edema   SKIN: No rashes or other abnormalities except as noted under the Wound section below.   NEUROLOGICAL: normal motor and sensory function   EDEMA: Moderate       WOUND: The wound appears healthy with no sign of infection.   Wound bed: granulation tissue   Periwound: healthy intact skin  On the left leg the wound bed appears about the same size.  He has a new small wound on the right lateral leg.  All of his wounds are full-thickness.    Also see below for wound details:       Circumferential volume measures:             No data to display                Ulceration(s)/Wound(s):   Please see the media tab under the chart review for pictures of the wounds.  Nursing staff removed dressings and cleansed wound.    Wound (used by [facility] only) 11/15/23 0906 Right lateral;dorsal foot ulceration, venous (Active)   Thickness/Stage full thickness 09/10/24 1539   Base slough 09/10/24 1539   Periwound edematous 09/10/24 1539   Periwound Temperature warm 09/10/24 1539   Periwound Skin Turgor soft 09/10/24 1539   Edges open 09/10/24 1539   Length (cm) 0.4 09/10/24 1539   Width (cm) 0.5 09/10/24 1539   Depth (cm) 0.2 09/10/24 1539   Wound (cm^2) 0.2 cm^2 09/10/24 1539   Wound Volume (cm^3) 0.04 cm^3 09/10/24 1539   Wound healing % 88.89 09/10/24 1539   Drainage Characteristics/Odor serous 09/10/24 1539   Drainage Amount moderate 09/10/24 1539   Care, Wound non-select wound debridement performed. 09/10/24 1539       Wound (used by [facility] only) 12/27/23 1410 Left anterior;lower leg ulceration, venous (Active)   Thickness/Stage full thickness 09/10/24 1539   Base slough;granulating 09/10/24 1539   Periwound swelling;pink 09/10/24 1539   Periwound Temperature warm 09/10/24 1539   Periwound Skin Turgor soft 09/10/24 1539   Edges open 09/10/24 1539   Length (cm) 8.5 09/10/24 1539   Width (cm) 7.5 09/10/24  1539   Depth (cm) 0.3 09/10/24 1539   Wound (cm^2) 63.75 cm^2 09/10/24 1539   Wound Volume (cm^3) 19.13 cm^3 09/10/24 1539   Wound healing % -9707.69 09/10/24 1539   Drainage Characteristics/Odor serosanguineous;malodorous 09/10/24 1539   Drainage Amount moderate 09/10/24 1539   Care, Wound non-select wound debridement performed. 09/10/24 1539       Wound (used by [facility] only) 09/10/24 1536 leg Right anterior;lower ulceration, venous (Active)   Thickness/Stage full thickness 09/10/24 1539   Base slough;granulating 09/10/24 1539   Periwound intact 09/10/24 1539   Periwound Temperature warm 09/10/24 1539   Periwound Skin Turgor soft 09/10/24 1539   Edges open 09/10/24 1539   Length (cm) 1.2 09/10/24 1539   Width (cm) 0.8 09/10/24 1539   Depth (cm) 0.1 09/10/24 1539   Wound (cm^2) 0.96 cm^2 09/10/24 1539   Wound Volume (cm^3) 0.1 cm^3 09/10/24 1539   Drainage Characteristics/Odor serosanguineous 09/10/24 1539   Drainage Amount moderate 09/10/24 1539   Care, Wound non-select wound debridement performed. 09/10/24 1539               Recent Labs   Lab Test 06/03/21  1448 03/23/21  1042 07/28/20  0929   A1C 5.7* 5.6 5.7*          Recent Labs   Lab Test 02/14/23  0059 06/08/22  1835 07/28/20  0929   ALBUMIN 3.9 3.2* 3.3*              No sharp debridement performed today.         ASSESSMENT:   This is a 63 year old  male with lower extremity edema and bilateral lower extremity wounds.          PLAN:   We will bandage all of the wound areas beginning with Hydrofera Blue, a Mepilex bandage and the spandagrip stocking change 3 times a week.        We are still treating these wounds palliatively since the patient is thoroughly noncompliant.    Separate from his wound care instructions I then discussed the treatment of his lower extremity edema.  This primarily involves compression and elevation.  I have strongly encouraged him to wear his spandagrip stockings every day.  I have again explained to the patient today that  controlling the edema is probably the most important thing we can do to help heal the wound.  I have specifically recommended that they lay down with their legs above the level of the heart for 30 minutes at least twice a day.  I emphasized that if we can not control the edema we will likely not be able to get this wound to heal.     I have encouraged the patient to continue on their high protein diet to aid in wound healing.   I have also strongly encouraged him to decrease his nicotine intake which slows wound healing.   The patient will return to the wound clinic in 4 weeks if he would like to continue to work on getting the wounds to heal.        30 minutes spent on the date of the encounter doing chart review, history and exam, documentation and further activities per the note, this time excludes any procedure time      Miguel Hampton MD  09/10/2024   3:59 PM   [facility] Vascular/Wound  [phone]    This note was electronically signed by Miguel Hampton MD        Further instructions from your care team         09/10/2024   Petey Archibald   1960    A DME order for supplies has been placed to [facility]. If there are any issues with your order including not receiving the order please call [facility] at [phone]. They can also provide a tracking number for you.    Dressing changes outside of clinic are being performed by Group home staff/patient  [facility] Fax: Bernard Lala [phone]     Plan: 9/10/24  Group home staff - please take off compression stockings at night and put back on in the AM when you put on patients socks and shoes  Wear tube stocking to help with swelling  Elevate Legs to hip or higher 30 min 10 am and 2pm  Control smoking and work to reduce or eliminate all nicotine products  Schedule showers so that patient can have wound care done afterwards  Protein diet: shakes and bars      Wound Dressing Change: Right anterior lower leg  - Prepare surface and  wash your hands with soap and water  - Cleanse with mild unscented soap (such as Cetaphil, Cerave or Dove) and water  - If able, apply small amount of VASHE on gauze, lay into wound bed, let sit for 10 minutes, remove gauze (do not rinse)  - Apply light layer Desitin/ Zinc oxide barrier paste around wound edge  - Apply 1/30 piece of 4x5 Hydrofera Blue Ready Transfer to wound bed  - Cover with 3x3 Mepilex Silicone bordered dressing  - Apply Atrac-Tain lotion or similar to intact skin  - Pull up Spandagrip size F from toes to behind the knee  Change 3 times weekly and as needed for soilage/drainage     Wound Dressing Change: Right lateral dorsal foot  - Prepare surface and wash your hands with soap and water  - Cleanse with mild unscented soap (such as Cetaphil, Cerave or Dove) and water  - If able, apply small amount of VASHE on gauze, lay into wound bed, let sit for 10 minutes, remove gauze (do not rinse)  - Apply light layer Desitin/ Zinc oxide barrier paste around wound edge  - Apply 1/30 piece of 4x5 Hydrofera Blue Ready Transfer to wound bed  - Cover with 3x3 Mepilex Silicone bordered dressing  - Apply Atrac-Tain lotion or similar to intact skin  - Pull up Spandagrip size F from toes to behind the knee  Change 3 times weekly and as needed for soilage/drainage      Wound Dressing Change: Left anterior lower leg  - Prepare surface and wash your hands with soap and water  - Cleanse with mild unscented soap and water (such as Cetaphil, Cerave or Dove)  - If able, apply small amount of VASHE on gauze, lay into wound bed, let sit for 10 minutes, remove gauze (do not rinse)  - apply light layer Desitin/Zinc oxide barrier paste around wound edges  - Apply 1/4 piece of Hydrofera Blue Ready Transfer to wound bed  - Apply one 5x5 Mepilex with silicone bordered dressing  - Apply Atrac-Tain lotion or similar to intact skin  - Pull up Spandagrip size F from toes to behind the knee  Change 3 times weekly and as needed for  drainage        Compression:  Your compression is Spandagrip and can be removed at night and put back on  first thing in the morning.  Please remove compression dressing if toes turn blue and/or tingle and can not be  relieved by raising the leg for one hour. If unable to reapply in the morning, keep  compression on until next dressing change.  Walk as much as you can, as you are able. Whenever you sit raise your ankle  above your hips to promote wound healing.  Elevation: elevate your legs above the level of your heart for 30 minutes 2 times  each day  - Lay on the couch or your bed and prop your legs up on pillows  - Recline back as far as you can go in your recliner and prop your legs on pillows.  A diet high in protein is important for wound healing, we recommend getting 90 grams  of protein per day.  Taking protein shakes or bars are a good way to get extra protein in your diet.     Good sources of protein:  Pork 26g per 3 oz  Whey protein powder - 24g per scoop (on average)  Greek yogurt - 23g per 8oz  Chicken or Turkey - 23g per 3oz  Fish - 20-25g per 3oz  Beef - 18-23g per 3oz  Tofu - 10g per 1/2 cup  Navy beans - 20g per cup  Cottage cheese - 14g per 1/2 cup  Lentils - 13g per 1/4 cup  Beef jerky 13g per 1oz  2% milk - 8g per cup  Peanut butter - 8g per 2 tablespoons  Eggs - 6g per egg  Mixed nuts - 6g per 2oz     Main Provider: Miguel Hampton M.D. September 10, 2024    Call us at [phone] if you have any questions about your wounds, if you have redness or swelling around your wound, have a fever of 101 degrees Fahrenheit or greater or if you have any other problems or concerns. We answer the phone Monday through Friday 8 am to 4 pm, please leave a message as we check the voicemail frequently throughout the day. If you have a concern over the weekend, please leave a message and we will return your call Monday. If the need is urgent, go to the ER or urgent care.    If you had a positive experience  please indicate that on your patient satisfaction survey form that [facility] will be sending you.    It was a pleasure meeting with you today.  Thank you for allowing me and my team the privilege of caring for you today.  YOU are the reason we are here, and I truly hope we provided you with the excellent service you deserve.  Please let us know if there is anything else we can do for you so that we can be sure you are leaving completely satisfied with your care experience.      If you have any billing related questions please call the [facility] Business office at [phone]. The clinic staff does not handle billing related matters.    If you are scheduled to have a follow up appointment, you will receive a reminder call the day before your visit. On the appointment day please arrive 15 minutes prior to your appointment time. If you are unable to keep that appointment, please call the clinic to cancel or reschedule. If you are more than 10 minutes late or greater for your scheduled appointment time, the clinic policy is that you may be asked to reschedule.         ,

## 2024-09-10 NOTE — LETTER
9/10/2024      Petey Archibald  [address]  [facility] [id number]      Dear Colleague,    Thank you for referring your patient, Petey Archibald, to the [facility]. Please see a copy of my visit note below.    ASSESSMENT & PLAN    Petey was seen today for edema and edema.    Diagnoses and all orders for this visit:    Olecranon bursitis of both elbows  -     XR Elbow Bilateral G/E 3 Views; Future      This issue is chronic and Unchanged. Petey presents our clinic today to discuss the swelling of his right elbow.  He has a notable olecranon bursitis on the right elbow, but reassuringly there is no overlying erythema, heat, or drainage to suggest acute infection.  We discussed that these bursitis are usually due to repetitive trauma, and that this should be managed conservatively.  We discussed that while we could drain the bursa, it would increase the risk of infection and we usually reserve this for painful, refractory cases.  We discussed the treatment including compression and icing as well as anti-inflammatory medicines.  We determined the following plan:  - Patient was wrapped in an Ace bandage today, he will use this for compression  - He will ice the area, 20 minutes at a time, 2-3 times a day  - He will avoid leaning on the area/repetitive trauma  - He can follow-up in our clinic as needed      Prashanth Pa,   [facility]    -----  Chief Complaint   Patient presents with     Right Elbow - Edema     Left Elbow - Edema       SUBJECTIVE  Petey Archibald is a/an 63 year old male who is seen as a self referral for evaluation of bilateral elbow swelling.     The patient is seen with their group home staff.  The patient is Right handed    Onset: at least 1 month(s) ago. Reports insidious onset without acute precipitating event.  Location of Pain: bilateral elbows over olecranon process (R>L)  Worsened by:  "nothing  Better with: nothing  Treatments tried: rest/activity avoidance and Tylenol  Associated symptoms: swelling    Orthopedic/Surgical history: NO  Social History/Occupation: works as a       REVIEW OF SYSTEMS:  Review of systems negative unless mentioned in HPI     OBJECTIVE:  BP (!) 142/72   Ht 1.676 m (5' 6\")   Wt (!) 161.5 kg (356 lb)   BMI 57.46 kg/m     General: healthy, alert and in no distress  Skin: no suspicious lesions or rash.  CV: distal perfusion intact   Resp: normal respiratory effort without conversational dyspnea   Psych: normal mood and affect  Gait: NORMAL  Neuro: Normal light sensory exam of DIVINA extremity     Focused Musculoskeletal Exam :   Elbow Exam    Left  Right   Alignment  Normal  Normal    Inspection Normal Olecranon bursitis    Palpation No tenderness over common extensor or lateral epicondyle, flexor/pronator mass or medial epicondyle, radial head, radial tunnel, or cubital tunnel.  No tenderness over common extensor or lateral epicondyle, flexor/pronator mass or medial epicondyle, radial head, radial tunnel, or cubital tunnel.    Range of Motion     Flexion 140 140   Extension 0 0   Supination 0-80 0-80   Pronation 0-80 0-80   Strength Grossly normal Grossly normal   Pain with resisted wrist extension Negative Negative   Pain with resisted wrist flexion  Negative Negative   Pain with resisted pronation/supination Negative Negative   Hook Test Negative Negative   Valgus stress testing Negative  Milking: no Negative  Milking: no   Other Special Tests  Able to make 'OK' sign (AIN)  Full thumb IP abduction strength (radial) Able to make 'OK' sign (AIN)  Full thumb IP abduction strength (radial)   Sensation Intact Intact          RADIOLOGY:  Final results and radiologist's interpretation, available in the [facility] health record.  Images were reviewed with the patient in the office today.  My personal interpretation of the performed imaging: Normal joint spacing and alignment " of the elbow.  Large soft tissue swelling overlying the right elbow.  No acute bony abnormalities.        Again, thank you for allowing me to participate in the care of your patient.        Sincerely,        Prashanth Pa, DO

## 2024-09-10 NOTE — PROGRESS NOTES
ANTICOAGULATION MANAGEMENT     Petey Archibald 63 year old male is on warfarin with supratherapeutic INR result. (Goal INR 2.0-3.0)    Recent labs: (last 7 days)     09/10/24  1140   INR 3.5*       ASSESSMENT     Source(s): Chart Review and Patient/Caregiver Call     Warfarin doses taken: Warfarin taken as instructed  Diet: No new diet changes identified  Medication/supplement changes: , Taking daily Tylenol  New illness, injury, or hospitalization: No, on going wound management legs  Signs or symptoms of bleeding or clotting: No  Previous result: Therapeutic last visit; previously outside of goal range  Additional findings: None       PLAN     Recommended plan for no diet, medication or health factor changes affecting INR     Dosing Instructions: partial hold then decrease your warfarin dose (8.3% change) with next INR in 1 week       Summary  As of 9/10/2024      Full warfarin instructions:  9/10: 4 mg; Otherwise 8 mg every Tue; 6 mg all other days   Next INR check:  9/17/2024               Telephone call with Landen who agrees to plan and repeated back plan correctly    Lab appointment scheduled for 9/17/24 1 PM    RX to Shaquille HOOD faxed to [facility]    AVS to be mailed    Education provided: Please call back if any changes to your diet, medications or how you've been taking warfarin  Goal range and lab monitoring: goal range and significance of current result    Plan made per [facility] anticoagulation protocol    Elana Wu RN  9/10/2024  Anticoagulation Clinic  [employer] for routing messages: joce JIMENEZ  [facility] patient phone line: [phone]        _______________________________________________________________________     Anticoagulation Episode Summary       Current INR goal:  2.0-3.0   TTR:  67.6% (1 y)   Target end date:  Indefinite   Send INR reminders to:  EDU JIMENEZ    Indications    History of pulmonary embolism [Z86.711]  Long term current use of anticoagulant therapy [Z79.01]              Comments:  [facility]; A new Coumadin Prescription will need to sent to [facility] with current dose and next INR check.             Anticoagulation Care Providers       Provider Role Specialty Phone number    Demetri Archer MD Referring Internal Medicine [phone]

## 2024-09-10 NOTE — DISCHARGE INSTRUCTIONS
09/10/2024   Petey Archibald   1960    A DME order for supplies has been placed to [facility]. If there are any issues with your order including not receiving the order please call [facility] at [phone]. They can also provide a tracking number for you.    Dressing changes outside of clinic are being performed by Group home staff/patient  [facility] Fax: Bernard Lala [phone]     Plan: 9/10/24  Group home staff - please take off compression stockings at night and put back on in the AM when you put on patients socks and shoes  Wear tube stocking to help with swelling  Elevate Legs to hip or higher 30 min 10 am and 2pm  Control smoking and work to reduce or eliminate all nicotine products  Schedule showers so that patient can have wound care done afterwards  Protein diet: shakes and bars      Wound Dressing Change: Right anterior lower leg  - Prepare surface and wash your hands with soap and water  - Cleanse with mild unscented soap (such as Cetaphil, Cerave or Dove) and water  - If able, apply small amount of VASHE on gauze, lay into wound bed, let sit for 10 minutes, remove gauze (do not rinse)  - Apply light layer Desitin/ Zinc oxide barrier paste around wound edge  - Apply 1/30 piece of 4x5 Hydrofera Blue Ready Transfer to wound bed  - Cover with 3x3 Mepilex Silicone bordered dressing  - Apply Atrac-Tain lotion or similar to intact skin  - Pull up Spandagrip size F from toes to behind the knee  Change 3 times weekly and as needed for soilage/drainage     Wound Dressing Change: Right lateral dorsal foot  - Prepare surface and wash your hands with soap and water  - Cleanse with mild unscented soap (such as Cetaphil, Cerave or Dove) and water  - If able, apply small amount of VASHE on gauze, lay into wound bed, let sit for 10 minutes, remove gauze (do not rinse)  - Apply light layer Desitin/ Zinc oxide barrier paste around wound edge  - Apply 1/30 piece of 4x5 Hydrofera Blue Ready Transfer to wound  bed  - Cover with 3x3 Mepilex Silicone bordered dressing  - Apply Atrac-Tain lotion or similar to intact skin  - Pull up Spandagrip size F from toes to behind the knee  Change 3 times weekly and as needed for soilage/drainage      Wound Dressing Change: Left anterior lower leg  - Prepare surface and wash your hands with soap and water  - Cleanse with mild unscented soap and water (such as Cetaphil, Cerave or Dove)  - If able, apply small amount of VASHE on gauze, lay into wound bed, let sit for 10 minutes, remove gauze (do not rinse)  - apply light layer Desitin/Zinc oxide barrier paste around wound edges  - Apply 1/4 piece of Hydrofera Blue Ready Transfer to wound bed  - Apply one 5x5 Mepilex with silicone bordered dressing  - Apply Atrac-Tain lotion or similar to intact skin  - Pull up Spandagrip size F from toes to behind the knee  Change 3 times weekly and as needed for drainage        Compression:  Your compression is Spandagrip and can be removed at night and put back on  first thing in the morning.  Please remove compression dressing if toes turn blue and/or tingle and can not be  relieved by raising the leg for one hour. If unable to reapply in the morning, keep  compression on until next dressing change.  Walk as much as you can, as you are able. Whenever you sit raise your ankle  above your hips to promote wound healing.  Elevation: elevate your legs above the level of your heart for 30 minutes 2 times  each day  - Lay on the couch or your bed and prop your legs up on pillows  - Recline back as far as you can go in your recliner and prop your legs on pillows.  A diet high in protein is important for wound healing, we recommend getting 90 grams  of protein per day.  Taking protein shakes or bars are a good way to get extra protein in your diet.     Good sources of protein:  Pork 26g per 3 oz  Whey protein powder - 24g per scoop (on average)  Greek yogurt - 23g per 8oz  Chicken or Turkey - 23g per 3oz  Fish  - 20-25g per 3oz  Beef - 18-23g per 3oz  Tofu - 10g per 1/2 cup  Navy beans - 20g per cup  Cottage cheese - 14g per 1/2 cup  Lentils - 13g per 1/4 cup  Beef jerky 13g per 1oz  2% milk - 8g per cup  Peanut butter - 8g per 2 tablespoons  Eggs - 6g per egg  Mixed nuts - 6g per 2oz     Main Provider: Miguel Hampton M.D. September 10, 2024    Call us at [phone] if you have any questions about your wounds, if you have redness or swelling around your wound, have a fever of 101 degrees Fahrenheit or greater or if you have any other problems or concerns. We answer the phone Monday through Friday 8 am to 4 pm, please leave a message as we check the voicemail frequently throughout the day. If you have a concern over the weekend, please leave a message and we will return your call Monday. If the need is urgent, go to the ER or urgent care.    If you had a positive experience please indicate that on your patient satisfaction survey form that [facility] will be sending you.    It was a pleasure meeting with you today.  Thank you for allowing me and my team the privilege of caring for you today.  YOU are the reason we are here, and I truly hope we provided you with the excellent service you deserve.  Please let us know if there is anything else we can do for you so that we can be sure you are leaving completely satisfied with your care experience.      If you have any billing related questions please call the [facility] office at [phone]. The clinic staff does not handle billing related matters.    If you are scheduled to have a follow up appointment, you will receive a reminder call the day before your visit. On the appointment day please arrive 15 minutes prior to your appointment time. If you are unable to keep that appointment, please call the clinic to cancel or reschedule. If you are more than 10 minutes late or greater for your scheduled appointment time, the clinic policy is that you may be  asked to reschedule.

## 2024-09-11 ENCOUNTER — MEDICAL CORRESPONDENCE (OUTPATIENT)
Dept: HEALTH INFORMATION MANAGEMENT | Facility: CLINIC | Age: 64
End: 2024-09-11
Payer: MEDICARE

## 2024-09-17 ENCOUNTER — ANTICOAGULATION THERAPY VISIT (OUTPATIENT)
Dept: ANTICOAGULATION | Facility: CLINIC | Age: 64
End: 2024-09-17

## 2024-09-17 ENCOUNTER — LAB (OUTPATIENT)
Dept: LAB | Facility: CLINIC | Age: 64
End: 2024-09-17
Payer: MEDICARE

## 2024-09-17 DIAGNOSIS — Z79.01 LONG TERM CURRENT USE OF ANTICOAGULANT THERAPY: ICD-10-CM

## 2024-09-17 DIAGNOSIS — Z86.711 HISTORY OF PULMONARY EMBOLISM: Primary | ICD-10-CM

## 2024-09-17 DIAGNOSIS — Z86.711 HISTORY OF PULMONARY EMBOLISM: ICD-10-CM

## 2024-09-17 LAB — INR BLD: 3.4 (ref 0.9–1.1)

## 2024-09-17 PROCEDURE — 36416 COLLJ CAPILLARY BLOOD SPEC: CPT

## 2024-09-17 PROCEDURE — 85610 PROTHROMBIN TIME: CPT

## 2024-09-17 RX ORDER — WARFARIN SODIUM 6 MG/1
TABLET ORAL
Qty: 14 TABLET | Refills: 0 | Status: SHIPPED | OUTPATIENT
Start: 2024-09-17 | End: 2024-10-01

## 2024-09-17 NOTE — PROGRESS NOTES
ANTICOAGULATION MANAGEMENT     Petey Archibald 63 year old male is on warfarin with supratherapeutic INR result. (Goal INR 2.0-3.0)    Recent labs: (last 7 days)     09/17/24  1614   INR 3.4*       ASSESSMENT     Source(s): Chart Review and Patient/Caregiver Call     Warfarin doses taken: Warfarin taken as instructed  Diet: No new diet changes identified  Medication/supplement changes: None noted  New illness, injury, or hospitalization: No  Signs or symptoms of bleeding or clotting: No  Previous result: Supratherapeutic  Additional findings: None       PLAN     Recommended plan for no diet, medication or health factor changes affecting INR     Dosing Instructions: decrease your warfarin dose (11.4% change) with next INR in 2 weeks       Summary  As of 9/17/2024      Full warfarin instructions:  3 mg every Tue; 6 mg all other days   Next INR check:  10/1/2024               Telephone call with Landen with group home who verbalizes understanding and agrees to plan and who agrees to plan and repeated back plan correctly  Faxed dosing and follow up instructions to [facility]    Lab visit scheduled    Education provided: Please call back if any changes to your diet, medications or how you've been taking warfarin    Plan made per [facility] anticoagulation protocol    Radha Hughes RN  9/17/2024  Anticoagulation Clinic  [place] for routing messages: joce JIMENEZ  [facility] patient phone line: [phone]        _______________________________________________________________________     Anticoagulation Episode Summary       Current INR goal:  2.0-3.0   TTR:  67.6% (1 y)   Target end date:  Indefinite   Send INR reminders to:  EDU JIMENEZ    Indications    History of pulmonary embolism [Z86.711]  Long term current use of anticoagulant therapy [Z79.01]             Comments:  [facility]; A new Coumadin Prescription will need to sent to [facility] with current dose and next INR check.              Anticoagulation Care Providers       Provider Role Specialty Phone number    Demetri Archer MD Referring Internal Medicine [phone]

## 2024-10-01 ENCOUNTER — ANTICOAGULATION THERAPY VISIT (OUTPATIENT)
Dept: ANTICOAGULATION | Facility: CLINIC | Age: 64
End: 2024-10-01

## 2024-10-01 ENCOUNTER — LAB (OUTPATIENT)
Dept: LAB | Facility: CLINIC | Age: 64
End: 2024-10-01
Payer: MEDICARE

## 2024-10-01 DIAGNOSIS — Z86.711 HISTORY OF PULMONARY EMBOLISM: ICD-10-CM

## 2024-10-01 DIAGNOSIS — Z86.711 HISTORY OF PULMONARY EMBOLISM: Primary | ICD-10-CM

## 2024-10-01 DIAGNOSIS — Z79.01 LONG TERM CURRENT USE OF ANTICOAGULANT THERAPY: ICD-10-CM

## 2024-10-01 LAB — INR BLD: 3.1 (ref 0.9–1.1)

## 2024-10-01 PROCEDURE — 85610 PROTHROMBIN TIME: CPT

## 2024-10-01 PROCEDURE — 36415 COLL VENOUS BLD VENIPUNCTURE: CPT

## 2024-10-01 RX ORDER — WARFARIN SODIUM 6 MG/1
TABLET ORAL
Qty: 14 TABLET | Refills: 0 | Status: SHIPPED | OUTPATIENT
Start: 2024-10-01

## 2024-10-01 NOTE — PROGRESS NOTES
ANTICOAGULATION MANAGEMENT     Petey Archibald 63 year old male is on warfarin with supratherapeutic INR result. (Goal INR 2.0-3.0)    Recent labs: (last 7 days)     10/01/24  1600   INR 3.1*       ASSESSMENT     Source(s): Chart Review and Patient/Caregiver Call     Warfarin doses taken: Warfarin taken as instructed  Diet: No new diet changes identified  Medication/supplement changes: None noted  New illness, injury, or hospitalization: No  Signs or symptoms of bleeding or clotting: No  Previous result: Supratherapeutic decreased by 11.4%  Additional findings: None       PLAN     Recommended plan for no diet, medication or health factor changes affecting INR     Dosing Instructions: decrease your warfarin dose (7.7% change) with next INR in 2 weeks       Summary  As of 10/1/2024      Full warfarin instructions:  3 mg every Tue, Fri; 6 mg all other days   Next INR check:  10/15/2024               Telephone call with Landen at group [place] who verbalizes understanding and agrees to plan and who agrees to plan and repeated back plan correctly.  Sent new RX to [facility] pharmacy.  Sent message to onsite pool to mail AVS to group home.  Landen said AVS doesn't need to be faxed, just mail it and sent new script to pharmacy.      Lab visit scheduled 10/15/24    Education provided: Goal range and lab monitoring: goal range and significance of current result  Contact [phone] with any changes, questions or concerns.     Plan made per [facility] anticoagulation protocol    Radha Baires, RN  10/1/2024  Anticoagulation Clinic  [employer] pool for routing messages: joce JIMENEZ  [facility] patient phone line: [phone]        _______________________________________________________________________     Anticoagulation Episode Summary       Current INR goal:  2.0-3.0   TTR:  67.6% (1 y)   Target end date:  Indefinite   Send INR reminders to:  EDU JIMENEZ    Indications    History of pulmonary embolism [Z86.711]  Long term  current use of anticoagulant therapy [Z79.01]             Comments:  [facility]; A new Coumadin Prescription will need to sent to [facility] with current dose and next INR check.             Anticoagulation Care Providers       Provider Role Specialty Phone number    Demetri Archer MD Referring Internal Medicine [phone]

## 2024-10-08 ENCOUNTER — HOSPITAL ENCOUNTER (OUTPATIENT)
Dept: WOUND CARE | Facility: CLINIC | Age: 64
Discharge: HOME | End: 2024-10-08
Attending: FAMILY MEDICINE | Admitting: FAMILY MEDICINE
Payer: MEDICARE

## 2024-10-08 VITALS — SYSTOLIC BLOOD PRESSURE: 141 MMHG | HEART RATE: 96 BPM | TEMPERATURE: 97.2 F | DIASTOLIC BLOOD PRESSURE: 73 MMHG

## 2024-10-08 DIAGNOSIS — R60.0 LOWER EXTREMITY EDEMA: ICD-10-CM

## 2024-10-08 DIAGNOSIS — L97.522 ULCER OF LEFT FOOT WITH FAT LAYER EXPOSED (H): ICD-10-CM

## 2024-10-08 DIAGNOSIS — L97.522 ULCER OF GREAT TOE, LEFT, WITH FAT LAYER EXPOSED (H): ICD-10-CM

## 2024-10-08 DIAGNOSIS — S91.302D OPEN WOUND OF LEFT FOOT, SUBSEQUENT ENCOUNTER: Primary | ICD-10-CM

## 2024-10-08 DIAGNOSIS — L97.512 ULCER OF RIGHT FOOT WITH FAT LAYER EXPOSED (H): ICD-10-CM

## 2024-10-08 DIAGNOSIS — L97.912 ULCER OF RIGHT LEG, WITH FAT LAYER EXPOSED (H): ICD-10-CM

## 2024-10-08 DIAGNOSIS — L97.922 ULCER OF LEFT LOWER EXTREMITY WITH FAT LAYER EXPOSED (H): ICD-10-CM

## 2024-10-08 PROCEDURE — 11042 DBRDMT SUBQ TIS 1ST 20SQCM/<: CPT | Performed by: FAMILY MEDICINE

## 2024-10-08 PROCEDURE — 99214 OFFICE O/P EST MOD 30 MIN: CPT | Mod: 25 | Performed by: FAMILY MEDICINE

## 2024-10-08 NOTE — PROGRESS NOTES
Wound Clinic Note         Visit date: 10/08/2024       Cheif Complaint:     Petey Archibald is a 63 year old   male had concerns including WOUND CARE.  The patient has lower extremity edema and bilateral leg ulcers.          HISTORY OF PRESENT ILLNESS:    Petey Archibald reports the wound has been present since mid 2022.  The wound began without a clear cause.   He reports prior to this wound developing he has not had other difficult to heal wounds on the legs.  He did undergo the procedure to treat his venous insufficiency with Dr. Muhammad on June 11, 2024.  He had another flare therapy procedure performed on August 13, 2024, at that time Dr. Muhammad reported to the patient that there was no other areas of superficial venous insufficiency needing treatment.        He comes into the wound clinic today just wearing ABD pads over the open wounds.  He reports that there is a nurse at his group home that occasionally changes the bandages once a week.  He has been applying the bandages himself.  He has not been wearing any form of compression recently.      The pateint denies fevers or chills.  They report the pain from the wound has been 0/10 and has remained about the same recently.      The patient reports they currently do not have any routine for elevating their legs.  The patient confirms they do sleep in a bed.     Today the patient reports maintaining a high protein diet, but has not been taking protein supplements lately.        The patient denies a history of diabetes or chronic steroid use.  Today he reports he continues cigarette smoking and reports smoking about a pack a day and he vapes in addition to this.    The patient has not had any symptoms of infection relating to the wound recently and is not currently on antibiotics.       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       Surgical Hx:   Past Surgical History:   Procedure Laterality Date    COLONOSCOPY N/A 4/15/2019    Procedure: COMBINED COLONOSCOPY, SINGLE OR MULTIPLE BIOPSY/POLYPECTOMY BY BIOPSY;  Surgeon: Jovana Ohara MD;  Location:  GI    COLONOSCOPY N/A 6/7/2021    Procedure: COLONOSCOPY with polypectomies;  Surgeon: Sahil Cid MD;  Location: [facility] OR    EXCISE MALIGNANT LESIONS, TRUNK/ARMS/LEGS 0.5CM OR LESS Left 5/26/2017    Procedure: EXCISE MALIGNANT LESIONS, TRUNK/ARMS/LEGS 0.5CM OR LESS;  EXCISION LEFT ELBOW MASS;  Surgeon: Jose Azevedo MD;  Location:  GI    RECTAL SURGERY      perianal abscess?          Allergies:    Allergies   Allergen Reactions    Bees     Clavulanic Acid     Amoxicillin-Pot Clavulanate Rash     Augmentin    Penicillins Rash              Medication History:    Current Outpatient Medications   Medication Sig Dispense Refill    albuterol (ALBUTEROL) 108 (90 BASE) MCG/ACT inhaler Inhale 2 puffs into the lungs every 6 hours as needed 1 Inhaler 0    ammonium lactate (AMLACTIN) 12 % external cream APPLY TO FEET ONCE OR TWICE DAILY AS NEEDED FOR ITCHY DRY & CRACKED SKIN. 140 g 11    ARIPiprazole (ABILIFY) 5 MG tablet Take 1 tablet (5 mg) by mouth daily 45 tablet 0    buPROPion (WELLBUTRIN SR) 150 MG 12 hr tablet Take 1 tablet (150 mg) by mouth 2 times daily for 45 days 90 tablet 0  "   Cadexomer Iodine, topical, 0.9% (IODOSORB) 0.9 % GEL gel Apply topically daily Apply to left foot wound daily after cleansing the wound and blotting it dry. 1 Tube 3    cloNIDine (CATAPRES) 0.1 MG tablet Take 0.1 mg by mouth daily as needed      clotrimazole (LOTRIMIN) 1 % external cream Apply topically 2 times daily 60 g 0    diclofenac (VOLTAREN) 1 % topical gel Apply 4 g topically 2 times daily as needed for moderate pain 100 g 1    diphenhydrAMINE (BENADRYL) 25 MG capsule Take 50 mg by mouth every 6 hours as needed for itching or allergies      Emollient (CERAVE) CREA Externally apply topically daily 453 g 11    EPINEPHrine (ANY BX GENERIC EQUIV) 0.3 MG/0.3ML injection 2-pack INJECT 0.3MG INTRAMUSCULARLY ONE TIME FOR ONE DOSE AS NEEDED FOR ANAPHYLAXIS. MAY REPEAT ONE TIME IN 5-15 MINUTES IF RESPONSE TO INITIAL DOSE IS INADEQUATE. *1 TOTAL FILL, ORIGINAL FROM EMERGENCY ROOM* 2 each 1    EPINEPHrine (EPIPEN 2-BRE) 0.3 MG/0.3ML injection 2-pack INJECT 0.3MG INTRAMUSCULAR ONE TIME FOR ONE DOSE AS NEEDED FOR ANAPHYLAXIS (CALL 911 IF YOU HAVE TO GIVE) (FOR BEE STINGS) **LABEL EACH PEN* 2 mL 3    gabapentin (NEURONTIN) 100 MG capsule Take 400 mg by mouth 3 times daily At 0900, 1200, and 2000      Gauze Pads & Dressings (GAUZE PADS 4\"X4\") 4\"X4\" PADS 1 each 3 times daily as needed 25 each 1    Lidocaine (LIDOCARE) 4 % Patch Place 1 patch onto the skin every 24 hours To prevent lidocaine toxicity, patient should be patch free for 12 hrs daily. 10 patch 0    LIDOCAINE PAIN RELIEF 4 % Patch PLACE 1 PATCH ONTO THE SKIN EVERY 24 HOURS. TO PREVENT LIDOCAINE TOXICITY, PATIENT SHOULD BE PATCH FREE FOR 12 HOURS DAILY **NON-COVERED MED** *1 TOTAL FILL* 10 patch 23    loratadine (CLARITIN) 10 MG tablet Take 10 mg by mouth daily      LORazepam (ATIVAN) 1 MG tablet Take 1 mg by mouth daily as needed for anxiety      montelukast (SINGULAIR) 10 MG tablet TAKE 1 TABLET BY MOUTH EVERY MORNING (ASTHMA) 30 tablet 11    Multiple Vitamin " (DAILY-JOVAN) TABS TAKE 1 TABLET BY MOUTH EVERY MORNING (VITAMINS) 30 tablet 11    naltrexone (DEPADE/REVIA) 50 MG tablet Take 2 tablets (100 mg) by mouth daily for 45 days 90 tablet 0    nicotine (COMMIT) 2 MG lozenge Place 2 mg inside cheek daily as needed      nystatin-triamcinolone (MYCOLOG II) 129001-4.1 UNIT/GM-% external cream Apply topically 2 times daily For 1-2 weeks.      omeprazole (PRILOSEC) 40 MG DR capsule Take 1 capsule (40 mg) by mouth daily 90 capsule 2    order for DME Equipment being ordered: roll of micropore tape to dress foot wound bid 1 each 0    order for DME Equipment being ordered: 1 surgical shoe 1 Device 0    order for DME [employer] [facility] Phone [phone] Fax [phone]  EdemaWear Size Medium/Yellow qty 4 sleeves  Length of Need: 1 month  Frequency of dressing change daily 1 Device 0    order for DME [employer] Compression Stockings  Phone #[phone]  Fax #[phone]  Coolflex Velcro wraps    Length of Need: Life Time  # of Pairs 1 set 30 days 0    order for DME [employer] [facility] Order Phone [phone] Fax [phone]  Primary Dressing Hydrofera Blue Ready   Qty 5 sheets  Secondary Dressing 4' roll gauze Qty 30  Secondary Dressing 2' medipore tape Qty 1  Secondary Dressing 4x4 gauze loaf Qty  1  Length of Need: 1 month  Frequency of dressing change: daily 30 days 0    order for DME Oxygen 2 Li/min  at night and bled into BiPAP 1 Device 0    order for DME Equipment being ordered: CPAP with mask and tubing 1 Device 0    order for DME Equipment being ordered: support compression hose BK  2 pair black 30mm HG   To be applied on arising & removed while lying down to go to sleep 1 each 0    polyethylene glycol (MIRALAX) 17 GM/Dose powder Take 17 g (1 capful) by mouth daily as needed for constipation 578 g 0    PULMICORT FLEXHALER 180 MCG/ACT inhaler INHALE 2 PUFFS INTO THE LUNGS TWICE DAILY. 1 each 11    SENNA-TABS 8.6 MG tablet Take 1 tablet by mouth daily      sertraline (ZOLOFT)  100 MG tablet Take 2 tablets (200 mg) by mouth daily for 45 days 90 tablet 0    simvastatin (ZOCOR) 40 MG tablet TAKE 1 TABLET BY MOUTH EVERY MORNING.  (CHOLESTEROL) 30 tablet 11    SPIRIVA HANDIHALER 18 MCG inhaled capsule INHALE CONTENTS OF 1 CAPSULE INTO THE LUNGS ONCE DAILY 30 capsule 11    spironolactone (ALDACTONE) 25 MG tablet TAKE 1 TABLET BY MOUTH ONCE DAILY. (HIGH BLOOD PRESSURE) 30 tablet 11    tiZANidine (ZANAFLEX) 2 MG tablet TAKE 1 TABLET BY MOUTH THREE TIMES DAILY. 106 tablet 11    traZODone (DESYREL) 100 MG tablet Take 1 tablet (100 mg) by mouth at bedtime for 45 days 45 tablet 0    triamcinolone (KENALOG) 0.1 % external cream Apply to AA BID x 1-2 week then PRN only 80 g 2    vitamin B complex with vitamin C (STRESS TAB) tablet Take 1 tablet by mouth daily 90 tablet 3    warfarin ANTICOAGULANT (COUMADIN) 2 MG tablet Take 4 mgs on 9/10/24, then  (2 mg x 1 and 6 mg x 1) every Tuesday  Next INR Date is 9/17/24 5 tablet 0    warfarin ANTICOAGULANT (COUMADIN) 6 MG tablet Take 3 mg on Tuesday and Friday and 6 mg all the other days of the week.  Recheck INR on 10/15/24 14 tablet 0     No current facility-administered medications for this encounter.         Tobacco History:  reports that he has quit smoking. His smoking use included cigarettes. He has a 30 pack-year smoking history. He has never used smokeless tobacco.       REVIEW OF SYMPTOMS:   The review of systems was negative except as noted in the HPI.           PHYSICAL EXAMINATION:     BP (!) 141/73   Pulse 96   Temp 97.2  F (36.2  C) (Temporal)            GENERAL: The patient overall appears well and is no acute distress.   HEAD: normocephalic   EYES: Sclera and conjunctiva clear   NECK: no obvious masses   LUNGS: breathing is unlabored.   EXTREMITIES: No clubbing, cyanosis or edema   SKIN: No rashes or other abnormalities except as noted under the Wound section below.   NEUROLOGICAL: normal motor and sensory function   EDEMA: Moderate        WOUND: The wound appears healthy with no sign of infection.   Wound bed: necrotic material at the left leg wound  Periwound: healthy intact skin  Left leg wound is much worse today and is covered with black necrotic material.  On the right leg there is just edema fluid percolating through the skin without distinct open wounds.    Also see below for wound details:       Circumferential volume measures:             No data to display                Ulceration(s)/Wound(s):   Please see the media tab under the chart review for pictures of the wounds.  Nursing staff removed dressings and cleansed wound.    Wound (used by [facility] only) 11/15/23 0906 Right lateral;dorsal foot ulceration, venous (Active)   Thickness/Stage full thickness 10/08/24 1158   Base scab 10/08/24 1158   Periwound edematous 10/08/24 1158   Periwound Temperature warm 10/08/24 1158   Periwound Skin Turgor soft 10/08/24 1158   Edges open 10/08/24 1158   Length (cm) 0.4 09/10/24 1539   Width (cm) 0.5 09/10/24 1539   Depth (cm) 0.2 09/10/24 1539   Wound (cm^2) 0.2 cm^2 09/10/24 1539   Wound Volume (cm^3) 0.04 cm^3 09/10/24 1539   Wound healing % 88.89 09/10/24 1539   Drainage Characteristics/Odor serous 09/10/24 1539   Drainage Amount none 10/08/24 1158   Care, Wound non-select wound debridement performed. 09/10/24 1539       Wound (used by [facility] only) 12/27/23 1410 Left anterior;lower leg ulceration, venous (Active)   Thickness/Stage full thickness 10/08/24 1158   Base slough;necrotic 10/08/24 1158   Periwound swelling;pink 10/08/24 1158   Periwound Temperature warm 10/08/24 1158   Periwound Skin Turgor soft 10/08/24 1158   Edges open 10/08/24 1158   Length (cm) 7.2 10/08/24 1158   Width (cm) 4.2 10/08/24 1158   Depth (cm) 0.4 10/08/24 1158   Wound (cm^2) 30.24 cm^2 10/08/24 1158   Wound Volume (cm^3) 12.1 cm^3 10/08/24 1158   Wound healing % -4552.31 10/08/24 1158   Drainage Characteristics/Odor serosanguineous;malodorous 10/08/24 1158   Drainage  Amount moderate 10/08/24 1158   Care, Wound debrided 10/08/24 1158       Wound (used by [facility] only) 09/10/24 1536 leg Right anterior;lower;medial ulceration, venous (Active)   Thickness/Stage full thickness 10/08/24 1158   Base epithelialization;moist;granulating 10/08/24 1158   Periwound edematous;redness 10/08/24 1158   Periwound Temperature warm 10/08/24 1158   Periwound Skin Turgor soft 10/08/24 1158   Edges open 10/08/24 1158   Length (cm) 6.2 10/08/24 1158   Width (cm) 10.5 10/08/24 1158   Depth (cm) 0.1 10/08/24 1158   Wound (cm^2) 65.1 cm^2 10/08/24 1158   Wound Volume (cm^3) 6.51 cm^3 10/08/24 1158   Wound healing % -6681.25 10/08/24 1158   Drainage Characteristics/Odor serous 10/08/24 1158   Drainage Amount large 10/08/24 1158   Care, Wound non-select wound debridement performed. 10/08/24 1158               Recent Labs   Lab Test 06/03/21  1448 03/23/21  1042 07/28/20  0929   A1C 5.7* 5.6 5.7*          Recent Labs   Lab Test 02/14/23  0059 06/08/22  1835 07/28/20  0929   ALBUMIN 3.9 3.2* 3.3*              Procedure note:  Informed Consent:  Patient acknowledges that I have explained the patient's general medical condition to him/her.  Patient has been informed and acknowledges that I have explained the risks or complications of wound debridement including, but not limited to, scarring, damage to blood vessels or surrounding areas such as nerves and organs, allergic reactions to topical and injected anaesthetic and/or skin prep solutions.  Other risks include excessive bleeding, removal of healthy tissue, infection, pain and inflammation, and prolonged or failure to heal.  Patient acknowledges that bleeding after debridement and pain may worsen after debridement and that dead/necrotic tissue may cause bacteria and toxins to be released into the bloodstream and cause sepsis or shock.  Patient acknowledges that I have explained that the wound may be larger after debridement.  Patient acknowledges that they  may need serial debridements while under care in the wound department.  Patient acknowledges that they were given an opportunity to ask questions about treatment and I have answered patient's questions.    Anesthetized as needed with lidocaine.  Using a curette and/or a scalpel I performed an excisional debridement removing all necrotic material at the left leg wound in to the level of viable subcutaneous tissue.  I obtained hemostasis with direct pressure.  The patient tolerated the procedure well.  EBL: <5 ml  Total debridement surface area: Less than 20 cm     There was no debridement required at the right leg.    ASSESSMENT:   This is a 63 year old  male with lower extremity edema and bilateral lower extremity wounds.          PLAN:   We have tried having him do the bandage changes and the staff at the group home do the dressing changes and neither of these options have been successful.  We will try managing his wounds with weekly multilayer compression wraps changed once a week here in the wound clinic.      We are still treating these wounds palliatively since the patient is thoroughly noncompliant.    Separate from his wound care instructions I then discussed the treatment of his lower extremity edema.  This primarily involves compression and elevation.  I have strongly encouraged him to wear his spandagrip stockings every day.  I have again explained to the patient today that controlling the edema is probably the most important thing we can do to help heal the wound.  I have specifically recommended that they lay down with their legs above the level of the heart for 30 minutes at least twice a day.  I emphasized that if we can not control the edema we will likely not be able to get this wound to heal.     I have encouraged the patient to continue on their high protein diet to aid in wound healing.   I have also strongly encouraged him to decrease his nicotine intake which slows wound healing.   The patient  will return to the wound clinic once a week to have the multilayer wraps changed.        30 minutes spent on the date of the encounter doing chart review, history and exam, documentation and further activities per the note, this time excludes any procedure time      Miguel Hampton MD  10/08/2024   12:59 PM   [facility] Vascular/Wound  [phone]    This note was electronically signed by Miguel Hampton MD        Further instructions from your care team         10/08/2024   Petey Dragan   1960    A DME order was not completed because the patient is having dressing changes done at [facility]. PLEASE BRING ANY SUPPLIES THAT WERE GIVEN TO YOU WITH YOU TO YOUR NEXT VISIT.    Dressing changes outside of clinic are being performed by  N/A    No [employer] order today;      [facility] Fax: Bernard Lala [phone] [facility] phone [phone]    We are holding Wednesday AM slots weekly for Petey at 0800 for remainder of month; please call to confirm these!      Plan: 10/08/24  Group home staff - nothing to do to the 2 layer wraps; patient must keep them dry while sponge bathing; encourage elevation  Elevate Legs to hip or higher 30 min 10 am and 2pm  Control smoking and work to reduce or eliminate all nicotine products  Bathing is only sponge bath while we use 2 layer wraps; 2 layer wraps cannot get wet.   Protein diet: shakes and bars  Return here weekly for dressing changes      Wound Dressing Change: Right anterior lower leg  - Prepare surface and wash your hands with soap and water  - Cleanse with mild unscented soap (such as Cetaphil, Cerave or Dove) and water  - Apply Atrac-Tain lotion or similar to intact skin  - If available, apply small amount of VASHE on gauze, lay into wound bed, let sit for 10 minutes, remove gauze (do not rinse)  - Apply light layer Desitin/ Zinc oxide barrier paste around wound edge  - Apply 1.5 piece of 4x4 plain alginate to wound bed  - Cover with 2 small Kerramax   -  apply XL 2 layer wrap   Change once weekly here in clinic      Wound Dressing Change: Left anterior lower leg  - Prepare surface and wash your hands with soap and water  - Cleanse with mild unscented soap and water (such as Cetaphil, Cerave or Dove)  - Apply Atrac-Tain lotion or similar to intact skin  - If able, apply small amount of VASHE on gauze, lay into wound bed, let sit for 10 minutes, remove gauze (do not rinse)  - apply light layer Desitin/Zinc oxide barrier paste around wound edges  - Apply 1/2 piece of Hydrofera Blue Ready Transfer to wound bed  - cover with 2 small Kerramax  - apply XL 2 layer wrap  Change once weekly here in clinic     Compression:   Your compression wrap is a 2 layer CoFlex wrap and should stay on until next visit - either with home care or at the clinic.  Please remove compression dressing if toes turn blue and/or tingle and can not be relieved by raising the leg for one hour.   Walk as much as you can, as you are able. When you sit raise your ankle above your hips to promote wound healing.     It is very important to follow your healthcare providers instructions. Studies indicate when compression therapy is applied as directed, healing may occur faster and more completely. Do Not remove CoFlex unless your healthcare provider tells you to remove it.    Helpful Tips and general instructions:  -Your bandage may feel tight at first. This is normal. When the swelling goes down, it will not feel as tight.  -Wear the stocking that comes with the system over the bandage to help you put on shoes and clothing  -Wear comfortable shoes and walk regularly. Walking will improve your circulation which will improve healing.  -Keep your bandage dry.   -CoFlex may be left on your leg for up to 7 days. After that your doctor or nurse will apply a new CoFlex.   -Call your doctor or nurse if the bandage gets wet, slips down or if you have pain, tingling, numbness or discoloration.            Walk as  much as you can, as you are able.   Whenever you sit raise your ankle above your hips to promote wound healing.  Sleep in your bed is ideal.   Elevation: elevate your legs above level of your hips for 30 minutes 2 times each day  - Lay on the couch or your bed and prop your legs up on pillows  - Recline back as far as you can go in your recliner and prop your legs on pillows.    A diet high in protein is important for wound healing, we recommend getting 90 grams of protein per day.  Taking protein shakes or bars are a good way to get extra protein in your diet.     Good sources of protein:  Pork 26g per 3 oz  Whey protein powder - 24g per scoop (on average)  Greek yogurt - 23g per 8oz  Chicken or Turkey - 23g per 3oz  Fish - 20-25g per 3oz  Beef - 18-23g per 3oz  Tofu - 10g per 1/2 cup  Navy beans - 20g per cup  Cottage cheese - 14g per 1/2 cup  Lentils - 13g per 1/4 cup  Beef jerky 13g per 1oz  2% milk - 8g per cup  Peanut butter - 8g per 2 tablespoons  Eggs - 6g per egg  Mixed nuts - 6g per 2oz    Spoke with patient regarding options for smoking cessation.  Discussed the use of : No interventions.   Patient chooses to consider cessation.         Main Provider: Miguel Hampton M.D. October 8, 2024    Call us at [phone] if you have any questions about your wounds, if you have redness or swelling around your wound, have a fever of 101 degrees Fahrenheit or greater or if you have any other problems or concerns. We answer the phone Monday through Friday 8 am to 4 pm, please leave a message as we check the voicemail frequently throughout the day. If you have a concern over the weekend, please leave a message and we will return your call Monday. If the need is urgent, go to the ER or urgent care.    If you had a positive experience please indicate that on your patient satisfaction survey form that [facility] will be sending you.    It was a pleasure meeting with you today.  Thank you for allowing me and  my team the privilege of caring for you today.  YOU are the reason we are here, and I truly hope we provided you with the excellent service you deserve.  Please let us know if there is anything else we can do for you so that we can be sure you are leaving completely satisfied with your care experience.      If you have any billing related questions please call the [facility] Business office at [phone]. The clinic staff does not handle billing related matters.    If you are scheduled to have a follow up appointment, you will receive a reminder call the day before your visit. On the appointment day please arrive 15 minutes prior to your appointment time. If you are unable to keep that appointment, please call the clinic to cancel or reschedule. If you are more than 10 minutes late or greater for your scheduled appointment time, the clinic policy is that you may be asked to reschedule.        ,

## 2024-10-08 NOTE — ADDENDUM NOTE
Encounter addended by: May Calderon RN on: 10/8/2024 2:32 PM   Actions taken: Edited Discharge Instructions

## 2024-10-15 ENCOUNTER — ANTICOAGULATION THERAPY VISIT (OUTPATIENT)
Dept: ANTICOAGULATION | Facility: CLINIC | Age: 64
End: 2024-10-15

## 2024-10-15 ENCOUNTER — LAB (OUTPATIENT)
Dept: LAB | Facility: CLINIC | Age: 64
End: 2024-10-15
Payer: MEDICARE

## 2024-10-15 ENCOUNTER — HOSPITAL ENCOUNTER (OUTPATIENT)
Dept: WOUND CARE | Facility: CLINIC | Age: 64
Discharge: HOME | End: 2024-10-15
Attending: FAMILY MEDICINE | Admitting: FAMILY MEDICINE
Payer: MEDICARE

## 2024-10-15 VITALS — SYSTOLIC BLOOD PRESSURE: 137 MMHG | HEART RATE: 91 BPM | TEMPERATURE: 98.3 F | DIASTOLIC BLOOD PRESSURE: 82 MMHG

## 2024-10-15 DIAGNOSIS — L97.912 ULCER OF RIGHT LEG, WITH FAT LAYER EXPOSED (H): ICD-10-CM

## 2024-10-15 DIAGNOSIS — Z86.711 HISTORY OF PULMONARY EMBOLISM: Primary | ICD-10-CM

## 2024-10-15 DIAGNOSIS — Z79.01 LONG TERM CURRENT USE OF ANTICOAGULANT THERAPY: ICD-10-CM

## 2024-10-15 DIAGNOSIS — Z86.711 HISTORY OF PULMONARY EMBOLISM: ICD-10-CM

## 2024-10-15 DIAGNOSIS — R60.0 LOWER EXTREMITY EDEMA: Primary | ICD-10-CM

## 2024-10-15 DIAGNOSIS — L97.922 ULCER OF LEFT LOWER EXTREMITY WITH FAT LAYER EXPOSED (H): ICD-10-CM

## 2024-10-15 LAB — INR BLD: 1.9 (ref 0.9–1.1)

## 2024-10-15 PROCEDURE — 11042 DBRDMT SUBQ TIS 1ST 20SQCM/<: CPT | Performed by: FAMILY MEDICINE

## 2024-10-15 PROCEDURE — 99214 OFFICE O/P EST MOD 30 MIN: CPT | Mod: 25 | Performed by: FAMILY MEDICINE

## 2024-10-15 PROCEDURE — 36416 COLLJ CAPILLARY BLOOD SPEC: CPT

## 2024-10-15 PROCEDURE — 85610 PROTHROMBIN TIME: CPT

## 2024-10-15 RX ORDER — WARFARIN SODIUM 6 MG/1
TABLET ORAL
Qty: 15 TABLET | Refills: 0 | Status: SHIPPED | OUTPATIENT
Start: 2024-10-15 | End: 2024-10-30

## 2024-10-15 NOTE — PROGRESS NOTES
Wound Clinic Note         Visit date: 10/15/2024       Cheif Complaint:     Petey Archibald is a 63 year old   male had concerns including WOUND CARE.  The patient has lower extremity edema and bilateral leg ulcers.          HISTORY OF PRESENT ILLNESS:    Petey Archibald reports the wound has been present since mid 2022.  The wound began without a clear cause.   He reports prior to this wound developing he has not had other difficult to heal wounds on the legs.  He did undergo the procedure to treat his venous insufficiency with Dr. Muhammad on June 11, 2024.  He had another sclerotherapy procedure performed on August 13, 2024, at that time Dr. Muhammad reported to the patient that there was no other areas of superficial venous insufficiency needing treatment.      Since we have not been successful in the past with the patient doing the dressing changes himself or having the staff members at his group home during the dressing changes we decided at his last clinic visit to switch him to a multilayer wrap changed here in the wound clinic once a week.  He reports today he was able to tolerate having the wraps on his legs and he did not take them off.      The pateint denies fevers or chills.  They report the pain from the wound has been 0/10 and has remained about the same recently.      The patient reports they currently do not have any routine for elevating their legs.  The patient confirms they do sleep in a bed.  He reports he is getting a new lift chair which will help him elevate his legs above the level of his heart but he does not expect to get that chair for another week or 2.    Today the patient reports maintaining a high protein diet, but has not been taking protein supplements lately.        The patient denies a history of diabetes or chronic steroid use.  Today he reports he he ran out of cigarettes and cannot afford to get anymore but he does still vape.    The patient has not had any  symptoms of infection relating to the wound recently and is not currently on antibiotics.       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       Surgical Hx:   Past Surgical History:   Procedure Laterality Date    COLONOSCOPY N/A 4/15/2019    Procedure: COMBINED COLONOSCOPY, SINGLE OR MULTIPLE BIOPSY/POLYPECTOMY BY BIOPSY;  Surgeon: Jovana Ohara MD;  Location:  GI    COLONOSCOPY N/A 6/7/2021    Procedure: COLONOSCOPY with polypectomies;  Surgeon: Sahil Cid MD;  Location: [facility] OR    EXCISE MALIGNANT LESIONS, TRUNK/ARMS/LEGS 0.5CM OR LESS Left 5/26/2017    Procedure: EXCISE MALIGNANT LESIONS, TRUNK/ARMS/LEGS 0.5CM OR LESS;  EXCISION LEFT ELBOW MASS;  Surgeon: Jose Azevedo MD;  Location:  GI    RECTAL SURGERY      perianal abscess?          Allergies:    Allergies   Allergen Reactions    Bees     Clavulanic Acid     Amoxicillin-Pot Clavulanate Rash     Augmentin    Penicillins Rash              Medication History:    Current Outpatient Medications   Medication Sig Dispense Refill    albuterol (ALBUTEROL) 108 (90 BASE) MCG/ACT inhaler Inhale 2 puffs into the lungs every 6 hours as needed 1 Inhaler 0    ammonium lactate (AMLACTIN) 12 % external cream APPLY TO FEET ONCE OR TWICE  "DAILY AS NEEDED FOR ITCHY DRY & CRACKED SKIN. 140 g 11    ARIPiprazole (ABILIFY) 5 MG tablet Take 1 tablet (5 mg) by mouth daily 45 tablet 0    buPROPion (WELLBUTRIN SR) 150 MG 12 hr tablet Take 1 tablet (150 mg) by mouth 2 times daily for 45 days 90 tablet 0    Cadexomer Iodine, topical, 0.9% (IODOSORB) 0.9 % GEL gel Apply topically daily Apply to left foot wound daily after cleansing the wound and blotting it dry. 1 Tube 3    cloNIDine (CATAPRES) 0.1 MG tablet Take 0.1 mg by mouth daily as needed      clotrimazole (LOTRIMIN) 1 % external cream Apply topically 2 times daily 60 g 0    diclofenac (VOLTAREN) 1 % topical gel Apply 4 g topically 2 times daily as needed for moderate pain 100 g 1    diphenhydrAMINE (BENADRYL) 25 MG capsule Take 50 mg by mouth every 6 hours as needed for itching or allergies      Emollient (CERAVE) CREA Externally apply topically daily 453 g 11    EPINEPHrine (ANY BX GENERIC EQUIV) 0.3 MG/0.3ML injection 2-pack INJECT 0.3MG INTRAMUSCULARLY ONE TIME FOR ONE DOSE AS NEEDED FOR ANAPHYLAXIS. MAY REPEAT ONE TIME IN 5-15 MINUTES IF RESPONSE TO INITIAL DOSE IS INADEQUATE. *1 TOTAL FILL, ORIGINAL FROM EMERGENCY ROOM* 2 each 1    EPINEPHrine (EPIPEN 2-BRE) 0.3 MG/0.3ML injection 2-pack INJECT 0.3MG INTRAMUSCULAR ONE TIME FOR ONE DOSE AS NEEDED FOR ANAPHYLAXIS (CALL 911 IF YOU HAVE TO GIVE) (FOR BEE STINGS) **LABEL EACH PEN* 2 mL 3    gabapentin (NEURONTIN) 100 MG capsule Take 400 mg by mouth 3 times daily At 0900, 1200, and 2000      Gauze Pads & Dressings (GAUZE PADS 4\"X4\") 4\"X4\" PADS 1 each 3 times daily as needed 25 each 1    Lidocaine (LIDOCARE) 4 % Patch Place 1 patch onto the skin every 24 hours To prevent lidocaine toxicity, patient should be patch free for 12 hrs daily. 10 patch 0    LIDOCAINE PAIN RELIEF 4 % Patch PLACE 1 PATCH ONTO THE SKIN EVERY 24 HOURS. TO PREVENT LIDOCAINE TOXICITY, PATIENT SHOULD BE PATCH FREE FOR 12 HOURS DAILY **NON-COVERED MED** *1 TOTAL FILL* 10 patch 23    " loratadine (CLARITIN) 10 MG tablet Take 10 mg by mouth daily      LORazepam (ATIVAN) 1 MG tablet Take 1 mg by mouth daily as needed for anxiety      montelukast (SINGULAIR) 10 MG tablet TAKE 1 TABLET BY MOUTH EVERY MORNING (ASTHMA) 30 tablet 11    Multiple Vitamin (DAILY-JOVAN) TABS TAKE 1 TABLET BY MOUTH EVERY MORNING (VITAMINS) 30 tablet 11    naltrexone (DEPADE/REVIA) 50 MG tablet Take 2 tablets (100 mg) by mouth daily for 45 days 90 tablet 0    nicotine (COMMIT) 2 MG lozenge Place 2 mg inside cheek daily as needed      nystatin-triamcinolone (MYCOLOG II) 704249-5.1 UNIT/GM-% external cream Apply topically 2 times daily For 1-2 weeks.      omeprazole (PRILOSEC) 40 MG DR capsule Take 1 capsule (40 mg) by mouth daily 90 capsule 2    order for DME Equipment being ordered: roll of micropore tape to dress foot wound bid 1 each 0    order for DME Equipment being ordered: 1 surgical shoe 1 Device 0    order for DME [employer] [facility] Phone [phone] Fax [phone]  EdemaWear Size Medium/Yellow qty 4 sleeves  Length of Need: 1 month  Frequency of dressing change daily 1 Device 0    order for DME [employer] Compression Stockings  Phone #[phone]  Fax #[phone]  Coolflex Velcro wraps    Length of Need: Life Time  # of Pairs 1 set 30 days 0    order for DME [employer] [facility] Phone [phone] Fax [phone]  Primary Dressing Hydrofera Blue Ready   Qty 5 sheets  Secondary Dressing 4' roll gauze Qty 30  Secondary Dressing 2' medipore tape Qty 1  Secondary Dressing 4x4 gauze loaf Qty  1  Length of Need: 1 month  Frequency of dressing change: daily 30 days 0    order for DME Oxygen 2 Li/min  at night and bled into BiPAP 1 Device 0    order for DME Equipment being ordered: CPAP with mask and tubing 1 Device 0    order for DME Equipment being ordered: support compression hose BK  2 pair black 30mm HG   To be applied on arising & removed while lying down to go to sleep 1 each 0    polyethylene glycol (MIRALAX) 17  GM/Dose powder Take 17 g (1 capful) by mouth daily as needed for constipation 578 g 0    PULMICORT FLEXHALER 180 MCG/ACT inhaler INHALE 2 PUFFS INTO THE LUNGS TWICE DAILY. 1 each 11    SENNA-TABS 8.6 MG tablet Take 1 tablet by mouth daily      sertraline (ZOLOFT) 100 MG tablet Take 2 tablets (200 mg) by mouth daily for 45 days 90 tablet 0    simvastatin (ZOCOR) 40 MG tablet TAKE 1 TABLET BY MOUTH EVERY MORNING.  (CHOLESTEROL) 30 tablet 11    SPIRIVA HANDIHALER 18 MCG inhaled capsule INHALE CONTENTS OF 1 CAPSULE INTO THE LUNGS ONCE DAILY 30 capsule 11    spironolactone (ALDACTONE) 25 MG tablet TAKE 1 TABLET BY MOUTH ONCE DAILY. (HIGH BLOOD PRESSURE) 30 tablet 11    tiZANidine (ZANAFLEX) 2 MG tablet TAKE 1 TABLET BY MOUTH THREE TIMES DAILY. 106 tablet 11    traZODone (DESYREL) 100 MG tablet Take 1 tablet (100 mg) by mouth at bedtime for 45 days 45 tablet 0    triamcinolone (KENALOG) 0.1 % external cream Apply to AA BID x 1-2 week then PRN only 80 g 2    vitamin B complex with vitamin C (STRESS TAB) tablet Take 1 tablet by mouth daily 90 tablet 3    warfarin ANTICOAGULANT (COUMADIN) 2 MG tablet Take 4 mgs on 9/10/24, then  (2 mg x 1 and 6 mg x 1) every Tuesday  Next INR Date is 9/17/24 5 tablet 0    warfarin ANTICOAGULANT (COUMADIN) 6 MG tablet Take 3 mg on Tuesday and Friday and 6 mg all the other days of the week.  Recheck INR on 10/15/24 14 tablet 0     No current facility-administered medications for this encounter.         Tobacco History:  reports that he has quit smoking. His smoking use included cigarettes. He has a 30 pack-year smoking history. He has never used smokeless tobacco.       REVIEW OF SYMPTOMS:   The review of systems was negative except as noted in the HPI.           PHYSICAL EXAMINATION:     /82 (BP Location: Left arm, Patient Position: Chair, Cuff Size: Adult Large)   Pulse 91   Temp 98.3  F (36.8  C) (Skin)            GENERAL: The patient overall appears well and is no acute distress.    HEAD: normocephalic   EYES: Sclera and conjunctiva clear   NECK: no obvious masses   LUNGS: breathing is unlabored.   EXTREMITIES: No clubbing, cyanosis or edema   SKIN: No rashes or other abnormalities except as noted under the Wound section below.   NEUROLOGICAL: normal motor and sensory function   EDEMA: Moderate       WOUND: The wound appears healthy with no sign of infection.   Wound bed: necrotic material at the left leg wound  Periwound: healthy intact skin  The left anterior leg wound appears healthier with more granulation tissue in the wound bed but there is still necrotic material here.  All the areas on the right lower extremity have healed.    Also see below for wound details:       Circumferential volume measures:             No data to display                Ulceration(s)/Wound(s):   Please see the media tab under the chart review for pictures of the wounds.  Nursing staff removed dressings and cleansed wound.    Wound (used by [facility] only) 12/27/23 1410 Left anterior;lower leg ulceration, venous (Active)   Thickness/Stage full thickness 10/15/24 1306   Base slough;granulating 10/15/24 1306   Periwound swelling;pink 10/15/24 1306   Periwound Temperature warm 10/15/24 1306   Periwound Skin Turgor soft 10/15/24 1306   Edges open 10/15/24 1306   Length (cm) 6.9 10/15/24 1306   Width (cm) 3.6 10/15/24 1306   Depth (cm) 0.4 10/15/24 1306   Wound (cm^2) 24.84 cm^2 10/15/24 1306   Wound Volume (cm^3) 9.94 cm^3 10/15/24 1306   Wound healing % -3721.54 10/15/24 1306   Drainage Characteristics/Odor serosanguineous 10/15/24 1306   Drainage Amount large 10/15/24 1306   Care, Wound debrided 10/08/24 1158       Wound (used by [facility] only) 09/10/24 1536 leg Right anterior;lower;medial ulceration, venous (Active)   Thickness/Stage full thickness 10/15/24 1306   Base epithelialization 10/15/24 1306   Periwound pink 10/15/24 1306   Periwound Temperature warm 10/15/24 1306   Periwound Skin Turgor soft 10/15/24 1306    Edges open 10/15/24 1306   Length (cm) 0.1 10/15/24 1306   Width (cm) 0.1 10/15/24 1306   Depth (cm) 0.1 10/15/24 1306   Wound (cm^2) 0.01 cm^2 10/15/24 1306   Wound Volume (cm^3) 0 cm^3 10/15/24 1306   Wound healing % 98.96 10/15/24 1306   Drainage Characteristics/Odor serous 10/15/24 1306   Drainage Amount scant 10/15/24 1306   Care, Wound non-select wound debridement performed. 10/15/24 1306               Recent Labs   Lab Test 06/03/21  1448 03/23/21  1042 07/28/20  0929   A1C 5.7* 5.6 5.7*          Recent Labs   Lab Test 02/14/23  0059 06/08/22  1835 07/28/20  0929   ALBUMIN 3.9 3.2* 3.3*              Procedure note:  I had previous obtain informed consent from the patient to perform serial debridements, I confirmed this again with the patient today verbally.  Anesthetized as needed with lidocaine.  Using a curette and/or a scalpel I performed an excisional debridement removing all necrotic material at the left leg wound in to the level of viable subcutaneous tissue.  I obtained hemostasis with direct pressure.  The patient tolerated the procedure well.  EBL: <5 ml  Total debridement surface area: Less than 20 cm     There was no debridement required at the right leg.    ASSESSMENT:   This is a 63 year old  male with lower extremity edema and bilateral lower extremity wounds.          PLAN:   We will continue to apply the multilayer wrap to both lower extremities.  I have asked him to bring his regular compression stocking with him to the next clinic visit if the right leg is still closed will be able to transition him to his regular compression stocking at that time.      We are still treating these wounds palliatively since the patient is thoroughly noncompliant.    Separate from his wound care instructions I then discussed the treatment of his lower extremity edema.  This primarily involves compression and elevation.  I have strongly encouraged him to wear his spandagrip stockings every day.  I have again  explained to the patient today that controlling the edema is probably the most important thing we can do to help heal the wound.  I have specifically recommended that they lay down with their legs above the level of the heart for 30 minutes at least twice a day.  I emphasized that if we can not control the edema we will likely not be able to get this wound to heal.     I have encouraged the patient to continue on their high protein diet to aid in wound healing.   I have also strongly encouraged him to decrease his nicotine intake which slows wound healing.   The patient will return to the wound clinic once a week to have the multilayer wraps changed.        30 minutes spent on the date of the encounter doing chart review, history and exam, documentation and further activities per the note, this time excludes any procedure time      Miguel Hampton MD  10/15/2024   1:20 PM   [facility] V[facility]/Wound  [phone]    This note was electronically signed by Miguel Hampton MD        Further instructions from your care team         10/15/2024   Petey Archibald   1960    A DME order was not completed because the patient is having dressing changes done at [facility]. PLEASE BRING ANY SUPPLIES THAT WERE GIVEN TO YOU WITH YOU TO YOUR NEXT VISIT.     Dressing changes outside of clinic are being performed by  N/A     No [facility] order today;       [facility] Fax: Attn nurse Dai:  fax [phone]; Dai phone [phone]; staff Abbi phone [phone]     We have scheduled Wednesday 0800 AM slots once weekly for Petey for remainder of month into first week of Novermber; please call to confirm these! You will also get reminder calls the day before appointments.  We confirm that you would prefer mid morning or later times; if these become available, we will confirm with you to secure.      Plan: 10/15/24  Group home staff - nothing to do to the 2 layer wraps; patient must keep them dry while sponge  bathing; encourage elevation  Elevate Legs to hip or higher 30 min 10 am and 2pm  Control smoking and work to reduce or eliminate all nicotine products  Bathing is only sponge bath while we use 2 layer wraps; 2 layer wraps cannot get wet.   Protein diet: shakes and bars  Return here weekly for dressing changes      Wound Dressing Change: Right anterior lower leg  - Prepare surface and wash your hands with soap and water  - Cleanse with mild unscented soap (such as Cetaphil, Cerave or Dove) and water  - Apply Atrac-Tain lotion or similar to intact skin  - If available, apply small amount of VASHE on gauze, lay into wound bed, let sit for 10 minutes, remove gauze (do not rinse)  - Apply light layer Desitin/ Zinc oxide barrier paste around wound edge  - Apply 1.5 piece of 4x4 plain alginate to wound bed  - Cover with 2 small Kerramax   - apply XL 2 layer wrap   Change once weekly here in clinic      Wound Dressing Change: Left anterior lower leg  - Prepare surface and wash your hands with soap and water  - Cleanse with mild unscented soap and water (such as Cetaphil, Cerave or Dove)  - Apply Atrac-Tain lotion or similar to intact skin  - If able, apply small amount of VASHE on gauze, lay into wound bed, let sit for 10 minutes, remove gauze (do not rinse)  - apply light layer Desitin/Zinc oxide barrier paste around wound edges  - Apply 1/2 piece of Hydrofera Blue Ready Transfer to wound bed  - cover with 2 small Kerramax  - apply XL 2 layer wrap  Change once weekly here in clinic      Compression:   Your compression wrap is a 2 layer CoFlex wrap and should stay on until next visit - either with home care or at the clinic.  Please remove compression dressing if toes turn blue and/or tingle and can not be relieved by raising the leg for one hour.   Walk as much as you can, as you are able. When you sit raise your ankle above your hips to promote wound healing.      It is very important to follow your healthcare providers  instructions. Studies indicate when compression therapy is applied as directed, healing may occur faster and more completely. Do Not remove CoFlex unless your healthcare provider tells you to remove it.     Helpful Tips and general instructions:  -Your bandage may feel tight at first. This is normal. When the swelling goes down, it will not feel as tight.  -Wear the stocking that comes with the system over the bandage to help you put on shoes and clothing  -Wear comfortable shoes and walk regularly. Walking will improve your circulation which will improve healing.  -Keep your bandage dry.   -CoFlex may be left on your leg for up to 7 days. After that your doctor or nurse will apply a new CoFlex.   -Call your doctor or nurse if the bandage gets wet, slips down or if you have pain, tingling, numbness or discoloration.             Walk as much as you can, as you are able.   Whenever you sit raise your ankle above your hips to promote wound healing.  Sleep in your bed is ideal.   Elevation: elevate your legs above level of your hips for 30 minutes 2 times each day  - Lay on the couch or your bed and prop your legs up on pillows  - Recline back as far as you can go in your recliner and prop your legs on pillows.     A diet high in protein is important for wound healing, we recommend getting 90 grams of protein per day.  Taking protein shakes or bars are a good way to get extra protein in your diet.     Good sources of protein:  Pork 26g per 3 oz  Whey protein powder - 24g per scoop (on average)  Greek yogurt - 23g per 8oz  Chicken or Turkey - 23g per 3oz  Fish - 20-25g per 3oz  Beef - 18-23g per 3oz  Tofu - 10g per 1/2 cup  Navy beans - 20g per cup  Cottage cheese - 14g per 1/2 cup  Lentils - 13g per 1/4 cup  Beef jerky 13g per 1oz  2% milk - 8g per cup  Peanut butter - 8g per 2 tablespoons  Eggs - 6g per egg  Mixed nuts - 6g per 2oz     Spoke with patient regarding options for smoking cessation.  Discussed the use of :  No interventions.   Patient chooses to consider cessation.      Main Provider: Miguel Hampton M.D. October 15, 2024    Call us at [phone] if you have any questions about your wounds, if you have redness or swelling around your wound, have a fever of 101 degrees Fahrenheit or greater or if you have any other problems or concerns. We answer the phone Monday through Friday 8 am to 4 pm, please leave a message as we check the voicemail frequently throughout the day. If you have a concern over the weekend, please leave a message and we will return your call Monday. If the need is urgent, go to the ER or urgent care.    If you had a positive experience please indicate that on your patient satisfaction survey form that [facility] will be sending you.    It was a pleasure meeting with you today.  Thank you for allowing me and my team the privilege of caring for you today.  YOU are the reason we are here, and I truly hope we provided you with the excellent service you deserve.  Please let us know if there is anything else we can do for you so that we can be sure you are leaving completely satisfied with your care experience.      If you have any billing related questions please call the [facility] Business office at [phone]. The clinic staff does not handle billing related matters.    If you are scheduled to have a follow up appointment, you will receive a reminder call the day before your visit. On the appointment day please arrive 15 minutes prior to your appointment time. If you are unable to keep that appointment, please call the clinic to cancel or reschedule. If you are more than 10 minutes late or greater for your scheduled appointment time, the clinic policy is that you may be asked to reschedule.        ,

## 2024-10-15 NOTE — PROGRESS NOTES
ANTICOAGULATION MANAGEMENT     Petey Archibald 63 year old male is on warfarin with subtherapeutic INR result. (Goal INR 2.0-3.0)    Recent labs: (last 7 days)     10/15/24  1604   INR 1.9*       ASSESSMENT     Source(s): Chart Review and Patient/Caregiver Call     Warfarin doses taken: Warfarin taken as instructed  Diet: No new diet changes identified  Medication/supplement changes: None noted  New illness, injury, or hospitalization: No  Signs or symptoms of bleeding or clotting: No  Previous result: Supratherapeutic  Additional findings:  Warfarin escribed to [facility]       PLAN     Recommended plan for no diet, medication or health factor changes affecting INR     Dosing Instructions: Increase your warfarin dose (8% change) with next INR in 2 weeks       Summary  As of 10/15/2024      Full warfarin instructions:  3 mg every Fri; 6 mg all other days   Next INR check:  10/29/2024               Telephone call with Landen who verbalizes understanding and agrees to plan  Will mail AVS to: ESTEF[facility] Alvarez [place]- [address]   [facility] [id number]     Orders given to  Homecare nurse/facility to recheck    Education provided: Goal range and lab monitoring: goal range and significance of current result  Contact [phone]  with any changes, questions or concerns.     Plan made per [facility] anticoagulation protocol    Myranda Welsh RN  10/15/2024  Anticoagulation Clinic  Havsjo Delikatesser for routing messages: joce JIMENEZ  [facility] patient phone line: [phone]        _______________________________________________________________________     Anticoagulation Episode Summary       Current INR goal:  2.0-3.0   TTR:  69.8% (1 y)   Target end date:  Indefinite   Send INR reminders to:  EDU JIMENEZ    Indications    History of pulmonary embolism [Z86.711]  Long term current use of anticoagulant therapy [Z79.01]             Comments:  ESTEFANI FDC; A new Coumadin Prescription will need to sent to [facility] with current  dose and next INR check.             Anticoagulation Care Providers       Provider Role Specialty Phone number    Demetri Archer MD Referring Internal Medicine [phone]

## 2024-10-15 NOTE — DISCHARGE INSTRUCTIONS
10/15/2024   Petey Archibald   1960    A DME order was not completed because the patient is having dressing changes done at [facility]. PLEASE BRING ANY SUPPLIES THAT WERE GIVEN TO YOU WITH YOU TO YOUR NEXT VISIT.     Dressing changes outside of clinic are being performed by  N/A     No Shaquille order today;       [facility] Fax: Attn nurse Dai:  fax [phone]; Dai phone [phone]; staff Abbi phone [phone]     We have scheduled Wednesday 0800 AM slots once weekly for Petey for remainder of month into first week of Novermber; please call to confirm these! You will also get reminder calls the day before appointments.  We confirm that you would prefer mid morning or later times; if these become available, we will confirm with you to secure.      Plan: 10/15/24  Bring your compression pieces/socks to your next visits so that we may review their efficacy and may put you to wear them   Group home staff - nothing to do to the 2 layer wraps; patient must keep them dry while sponge bathing; encourage elevation  Elevate Legs to hip or higher 30 min 10 am and 2pm  Control smoking and work to reduce or eliminate all nicotine products  Bathing is only sponge bath while we use 2 layer wraps; 2 layer wraps cannot get wet.   Protein diet: shakes and bars  Return here weekly for dressing changes      Wound Dressing Change: Right anterior lower leg  - Prepare surface and wash your hands with soap and water  - Cleanse with mild unscented soap (such as Cetaphil, Cerave or Dove) and water  - Apply Atrac-Tain lotion or similar to intact skin  - If available, apply small amount of VASHE on gauze, lay into wound bed, let sit for 10 minutes, remove gauze (do not rinse)  - Apply light layer Desitin/ Zinc oxide barrier paste around wound edge PRN  - apply XL 2 layer wrap   Change once weekly here in clinic      Wound Dressing Change: Left anterior lower leg  - Prepare surface and wash your hands with soap and water  -  Cleanse with mild unscented soap and water (such as Cetaphil, Cerave or Dove)  - Apply Atrac-Tain lotion or similar to intact skin  - If able, apply small amount of VASHE on gauze, lay into wound bed, let sit for 10 minutes, remove gauze (do not rinse)  - apply light layer Desitin/Zinc oxide barrier paste around wound edges  - Apply 1/2 piece of Hydrofera Blue Ready Transfer to wound bed  - cover with 2 small Kerramax  - apply XL 2 layer wrap  Change once weekly here in clinic      Compression:   Your compression wrap is a 2 layer CoFlex wrap and should stay on until next visit - either with home care or at the clinic.  Please remove compression dressing if toes turn blue and/or tingle and can not be relieved by raising the leg for one hour.   Walk as much as you can, as you are able. When you sit raise your ankle above your hips to promote wound healing.      It is very important to follow your healthcare providers instructions. Studies indicate when compression therapy is applied as directed, healing may occur faster and more completely. Do Not remove CoFlex unless your healthcare provider tells you to remove it.     Helpful Tips and general instructions:  -Your bandage may feel tight at first. This is normal. When the swelling goes down, it will not feel as tight.  -Wear the stocking that comes with the system over the bandage to help you put on shoes and clothing  -Wear comfortable shoes and walk regularly. Walking will improve your circulation which will improve healing.  -Keep your bandage dry.   -CoFlex may be left on your leg for up to 7 days. After that your doctor or nurse will apply a new CoFlex.   -Call your doctor or nurse if the bandage gets wet, slips down or if you have pain, tingling, numbness or discoloration.             Walk as much as you can, as you are able.   Whenever you sit raise your ankle above your hips to promote wound healing.  Sleep in your bed is ideal.   Elevation: elevate your legs  above level of your hips for 30 minutes 2 times each day  - Lay on the couch or your bed and prop your legs up on pillows  - Recline back as far as you can go in your recliner and prop your legs on pillows.     A diet high in protein is important for wound healing, we recommend getting 90 grams of protein per day.  Taking protein shakes or bars are a good way to get extra protein in your diet.     Good sources of protein:  Pork 26g per 3 oz  Whey protein powder - 24g per scoop (on average)  Greek yogurt - 23g per 8oz  Chicken or Turkey - 23g per 3oz  Fish - 20-25g per 3oz  Beef - 18-23g per 3oz  Tofu - 10g per 1/2 cup  Navy beans - 20g per cup  Cottage cheese - 14g per 1/2 cup  Lentils - 13g per 1/4 cup  Beef jerky 13g per 1oz  2% milk - 8g per cup  Peanut butter - 8g per 2 tablespoons  Eggs - 6g per egg  Mixed nuts - 6g per 2oz     Spoke with patient regarding options for smoking cessation.  Discussed the use of : No interventions.   Patient chooses to consider cessation.   Suggested vape product without nicotine 10/15/24     Main Provider: Miguel Hampton M.D. October 15, 2024    Call us at [phone] if you have any questions about your wounds, if you have redness or swelling around your wound, have a fever of 101 degrees Fahrenheit or greater or if you have any other problems or concerns. We answer the phone Monday through Friday 8 am to 4 pm, please leave a message as we check the voicemail frequently throughout the day. If you have a concern over the weekend, please leave a message and we will return your call Monday. If the need is urgent, go to the ER or urgent care.    If you had a positive experience please indicate that on your patient satisfaction survey form that [facility] will be sending you.    It was a pleasure meeting with you today.  Thank you for allowing me and my team the privilege of caring for you today.  YOU are the reason we are here, and I truly hope we provided you with the  excellent service you deserve.  Please let us know if there is anything else we can do for you so that we can be sure you are leaving completely satisfied with your care experience.      If you have any billing related questions please call the [facility] Business office at [phone]. The clinic staff does not handle billing related matters.    If you are scheduled to have a follow up appointment, you will receive a reminder call the day before your visit. On the appointment day please arrive 15 minutes prior to your appointment time. If you are unable to keep that appointment, please call the clinic to cancel or reschedule. If you are more than 10 minutes late or greater for your scheduled appointment time, the clinic policy is that you may be asked to reschedule.

## 2024-10-22 ENCOUNTER — TELEPHONE (OUTPATIENT)
Dept: WOUND CARE | Facility: CLINIC | Age: 64
End: 2024-10-22
Payer: MEDICARE

## 2024-10-22 NOTE — TELEPHONE ENCOUNTER
Returned call [facility]. There are no other appointments available on 10/23/24, but the patient now has a secured ride and is fine with the 8am appointment. No further questions or concerns.

## 2024-10-22 NOTE — TELEPHONE ENCOUNTER
Called [facility] with reminder call, asked if there are any later times open on 10/23/24 to reschedule the patient. Informed  will review schedule and contact [facility] if there is any openings later in the day.    [facility] [phone];

## 2024-10-23 ENCOUNTER — HOSPITAL ENCOUNTER (OUTPATIENT)
Dept: WOUND CARE | Facility: CLINIC | Age: 64
Discharge: HOME | End: 2024-10-23
Attending: FAMILY MEDICINE | Admitting: FAMILY MEDICINE
Payer: MEDICARE

## 2024-10-23 VITALS — HEART RATE: 74 BPM | TEMPERATURE: 98.7 F | SYSTOLIC BLOOD PRESSURE: 142 MMHG | DIASTOLIC BLOOD PRESSURE: 78 MMHG

## 2024-10-23 DIAGNOSIS — S91.302D OPEN WOUND OF LEFT FOOT, SUBSEQUENT ENCOUNTER: Primary | ICD-10-CM

## 2024-10-23 DIAGNOSIS — L97.922 ULCER OF LEFT LOWER EXTREMITY WITH FAT LAYER EXPOSED (H): ICD-10-CM

## 2024-10-23 DIAGNOSIS — R60.0 LOWER EXTREMITY EDEMA: ICD-10-CM

## 2024-10-23 DIAGNOSIS — L97.912 ULCER OF RIGHT LEG, WITH FAT LAYER EXPOSED (H): ICD-10-CM

## 2024-10-23 PROCEDURE — 99214 OFFICE O/P EST MOD 30 MIN: CPT | Performed by: FAMILY MEDICINE

## 2024-10-23 PROCEDURE — 97602 WOUND(S) CARE NON-SELECTIVE: CPT

## 2024-10-23 PROCEDURE — 29581 APPL MULTLAYER CMPRN SYS LEG: CPT

## 2024-10-23 NOTE — PROGRESS NOTES
Wound Clinic Note         Visit date: 10/23/2024       Cheif Complaint:     Petey Archibald is a 63 year old   male had concerns including WOUND CARE.  The patient has lower extremity edema and bilateral leg ulcers.          HISTORY OF PRESENT ILLNESS:    Petey Archibald reports the wound has been present since mid 2022.  The wound began without a clear cause.   He reports prior to this wound developing he has not had other difficult to heal wounds on the legs.  He did undergo the procedure to treat his venous insufficiency with Dr. Muhammad on June 11, 2024.  He had another sclerotherapy procedure performed on August 13, 2024, at that time Dr. Muhammad reported to the patient that there was no other areas of superficial venous insufficiency needing treatment.      He reports today has been tolerating the multilayer compression wrap on his legs without any difficulties.    He did bring his compression stockings with the expectation that we could transition him on the right side to his compression stockings.  However it turns out that his compression stockings are just spandagrip stockings that are fairly worn out.    The pateint denies fevers or chills.  They report the pain from the wound has been 0/10 and has remained about the same recently.      The patient reports they currently do not have any routine for elevating their legs.  The patient confirms they do sleep in a bed.  He still has not gotten his new lift chair and reports he expects to receive it at any time now.    Today the patient reports maintaining a high protein diet, but has not been taking protein supplements lately.        The patient denies a history of diabetes or chronic steroid use.  Today he reports he is again smoking cigarettes and vaping.    The patient has not had any symptoms of infection relating to the wound recently and is not currently on antibiotics.       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       Surgical Hx:   Past Surgical History:   Procedure Laterality Date    COLONOSCOPY N/A 4/15/2019    Procedure: COMBINED COLONOSCOPY, SINGLE OR MULTIPLE BIOPSY/POLYPECTOMY BY BIOPSY;  Surgeon: Jovana Ohara MD;  Location:  GI    COLONOSCOPY N/A 6/7/2021    Procedure: COLONOSCOPY with polypectomies;  Surgeon: Sahil Cid MD;  Location: [facility] OR    EXCISE MALIGNANT LESIONS, TRUNK/ARMS/LEGS 0.5CM OR LESS Left 5/26/2017    Procedure: EXCISE MALIGNANT LESIONS, TRUNK/ARMS/LEGS 0.5CM OR LESS;  EXCISION LEFT ELBOW MASS;  Surgeon: Jose Azevedo MD;  Location:  GI    RECTAL SURGERY      perianal abscess?          Allergies:    Allergies   Allergen Reactions    Bees     Clavulanic Acid     Amoxicillin-Pot Clavulanate Rash     Augmentin    Penicillins Rash              Medication History:    Current Outpatient Medications   Medication Sig Dispense Refill    albuterol (ALBUTEROL) 108 (90 BASE) MCG/ACT inhaler Inhale 2 puffs into the lungs every 6 hours as needed 1 Inhaler 0    ammonium lactate (AMLACTIN) 12 % external cream APPLY TO FEET ONCE OR TWICE DAILY AS NEEDED FOR ITCHY DRY & CRACKED SKIN. 140 g 11    ARIPiprazole (ABILIFY) 5 MG tablet Take 1 tablet (5 mg) by mouth daily 45 tablet 0    buPROPion  "(WELLBUTRIN SR) 150 MG 12 hr tablet Take 1 tablet (150 mg) by mouth 2 times daily for 45 days 90 tablet 0    Cadexomer Iodine, topical, 0.9% (IODOSORB) 0.9 % GEL gel Apply topically daily Apply to left foot wound daily after cleansing the wound and blotting it dry. 1 Tube 3    cloNIDine (CATAPRES) 0.1 MG tablet Take 0.1 mg by mouth daily as needed      clotrimazole (LOTRIMIN) 1 % external cream Apply topically 2 times daily 60 g 0    diclofenac (VOLTAREN) 1 % topical gel Apply 4 g topically 2 times daily as needed for moderate pain 100 g 1    diphenhydrAMINE (BENADRYL) 25 MG capsule Take 50 mg by mouth every 6 hours as needed for itching or allergies      Emollient (CERAVE) CREA Externally apply topically daily 453 g 11    EPINEPHrine (ANY BX GENERIC EQUIV) 0.3 MG/0.3ML injection 2-pack INJECT 0.3MG INTRAMUSCULARLY ONE TIME FOR ONE DOSE AS NEEDED FOR ANAPHYLAXIS. MAY REPEAT ONE TIME IN 5-15 MINUTES IF RESPONSE TO INITIAL DOSE IS INADEQUATE. *1 TOTAL FILL, ORIGINAL FROM EMERGENCY ROOM* 2 each 1    EPINEPHrine (EPIPEN 2-BRE) 0.3 MG/0.3ML injection 2-pack INJECT 0.3MG INTRAMUSCULAR ONE TIME FOR ONE DOSE AS NEEDED FOR ANAPHYLAXIS (CALL 911 IF YOU HAVE TO GIVE) (FOR BEE STINGS) **LABEL EACH PEN* 2 mL 3    gabapentin (NEURONTIN) 100 MG capsule Take 400 mg by mouth 3 times daily At 0900, 1200, and 2000      Gauze Pads & Dressings (GAUZE PADS 4\"X4\") 4\"X4\" PADS 1 each 3 times daily as needed 25 each 1    Lidocaine (LIDOCARE) 4 % Patch Place 1 patch onto the skin every 24 hours To prevent lidocaine toxicity, patient should be patch free for 12 hrs daily. 10 patch 0    LIDOCAINE PAIN RELIEF 4 % Patch PLACE 1 PATCH ONTO THE SKIN EVERY 24 HOURS. TO PREVENT LIDOCAINE TOXICITY, PATIENT SHOULD BE PATCH FREE FOR 12 HOURS DAILY **NON-COVERED MED** *1 TOTAL FILL* 10 patch 23    loratadine (CLARITIN) 10 MG tablet Take 10 mg by mouth daily      LORazepam (ATIVAN) 1 MG tablet Take 1 mg by mouth daily as needed for anxiety      " montelukast (SINGULAIR) 10 MG tablet TAKE 1 TABLET BY MOUTH EVERY MORNING (ASTHMA) 30 tablet 11    Multiple Vitamin (DAILY-JOVAN) TABS TAKE 1 TABLET BY MOUTH EVERY MORNING (VITAMINS) 30 tablet 11    naltrexone (DEPADE/REVIA) 50 MG tablet Take 2 tablets (100 mg) by mouth daily for 45 days 90 tablet 0    nicotine (COMMIT) 2 MG lozenge Place 2 mg inside cheek daily as needed      nystatin-triamcinolone (MYCOLOG II) 665948-5.1 UNIT/GM-% external cream Apply topically 2 times daily For 1-2 weeks.      omeprazole (PRILOSEC) 40 MG DR capsule Take 1 capsule (40 mg) by mouth daily 90 capsule 2    order for DME Equipment being ordered: roll of micropore tape to dress foot wound bid 1 each 0    order for DME Equipment being ordered: 1 surgical shoe 1 Device 0    order for DME [facility] Phone [phone] Fax [phone]  EdemaWear Size Medium/Yellow qty 4 sleeves  Length of Need: 1 month  Frequency of dressing change daily 1 Device 0    order for DME [employer] Compression Stockings  Phone #[phone]  Fax #[phone]  Coolflex Velcro wraps    Length of Need: Life Time  # of Pairs 1 set 30 days 0    order for DME [facility] [facility] Phone [phone] Fax [phone]  Primary Dressing Hydrofera Blue Ready   Qty 5 sheets  Secondary Dressing 4' roll gauze Qty 30  Secondary Dressing 2' medipore tape Qty 1  Secondary Dressing 4x4 gauze loaf Qty  1  Length of Need: 1 month  Frequency of dressing change: daily 30 days 0    order for DME Oxygen 2 Li/min  at night and bled into BiPAP 1 Device 0    order for DME Equipment being ordered: CPAP with mask and tubing 1 Device 0    order for DME Equipment being ordered: support compression hose BK  2 pair black 30mm HG   To be applied on arising & removed while lying down to go to sleep 1 each 0    polyethylene glycol (MIRALAX) 17 GM/Dose powder Take 17 g (1 capful) by mouth daily as needed for constipation 578 g 0    PULMICORT FLEXHALER 180 MCG/ACT inhaler INHALE 2 PUFFS INTO  THE LUNGS TWICE DAILY. 1 each 11    SENNA-TABS 8.6 MG tablet Take 1 tablet by mouth daily      sertraline (ZOLOFT) 100 MG tablet Take 2 tablets (200 mg) by mouth daily for 45 days 90 tablet 0    simvastatin (ZOCOR) 40 MG tablet TAKE 1 TABLET BY MOUTH EVERY MORNING.  (CHOLESTEROL) 30 tablet 11    SPIRIVA HANDIHALER 18 MCG inhaled capsule INHALE CONTENTS OF 1 CAPSULE INTO THE LUNGS ONCE DAILY 30 capsule 11    spironolactone (ALDACTONE) 25 MG tablet TAKE 1 TABLET BY MOUTH ONCE DAILY. (HIGH BLOOD PRESSURE) 30 tablet 11    tiZANidine (ZANAFLEX) 2 MG tablet TAKE 1 TABLET BY MOUTH THREE TIMES DAILY. 106 tablet 11    traZODone (DESYREL) 100 MG tablet Take 1 tablet (100 mg) by mouth at bedtime for 45 days 45 tablet 0    triamcinolone (KENALOG) 0.1 % external cream Apply to AA BID x 1-2 week then PRN only 80 g 2    vitamin B complex with vitamin C (STRESS TAB) tablet Take 1 tablet by mouth daily 90 tablet 3    warfarin ANTICOAGULANT (COUMADIN) 2 MG tablet Take 4 mgs on 9/10/24, then  (2 mg x 1 and 6 mg x 1) every Tuesday  Next INR Date is 9/17/24 5 tablet 0    warfarin ANTICOAGULANT (COUMADIN) 6 MG tablet Take 3 mg on Friday and 6 mg all the other days of the week.  Recheck INR on 10/29/24 15 tablet 0     No current facility-administered medications for this encounter.         Tobacco History:  reports that he has quit smoking. His smoking use included cigarettes. He has a 30 pack-year smoking history. He has never used smokeless tobacco.       REVIEW OF SYMPTOMS:   The review of systems was negative except as noted in the HPI.           PHYSICAL EXAMINATION:     BP (!) 142/78 (BP Location: Right arm, Patient Position: Chair, Cuff Size: Adult Regular)   Pulse 74   Temp 98.7  F (37.1  C) (Temporal)            GENERAL: The patient overall appears well and is no acute distress.   HEAD: normocephalic   EYES: Sclera and conjunctiva clear   NECK: no obvious masses   LUNGS: breathing is unlabored.   EXTREMITIES: No clubbing,  cyanosis or edema   SKIN: No rashes or other abnormalities except as noted under the Wound section below.   NEUROLOGICAL: normal motor and sensory function   EDEMA: Moderate       WOUND: The wound appears healthy with no sign of infection.   Wound bed: necrotic material at the left leg wound  Periwound: healthy intact skin  The left anterior leg wound is smaller today compared with his last clinic visit but there is still necrotic material here.  I was able to clear away the loose necrotic material from the left anterior leg wound with a piece of gauze, no sharp debridement was required.  The wounds he had previously on the right leg are still closed but the skin here is still very fragile.    Also see below for wound details:       Circumferential volume measures:             No data to display                Ulceration(s)/Wound(s):   Please see the media tab under the chart review for pictures of the wounds.  Nursing staff removed dressings and cleansed wound.    Wound (used by [facility] only) 12/27/23 1410 Left anterior;lower leg ulceration, venous (Active)   Thickness/Stage full thickness 10/23/24 0805   Base slough;granulating 10/23/24 0805   Periwound swelling;pink 10/23/24 0805   Periwound Temperature warm 10/23/24 0805   Periwound Skin Turgor soft 10/23/24 0805   Edges open 10/23/24 0805   Length (cm) 6.5 10/23/24 0805   Width (cm) 3.5 10/23/24 0805   Depth (cm) 0.3 10/23/24 0805   Wound (cm^2) 22.75 cm^2 10/23/24 0805   Wound Volume (cm^3) 6.83 cm^3 10/23/24 0805   Wound healing % -3400 10/23/24 0805   Drainage Characteristics/Odor serosanguineous 10/23/24 0805   Drainage Amount moderate 10/23/24 0805   Care, Wound non-select wound debridement performed. 10/23/24 0805       Wound (used by [facility] only) 09/10/24 1536 leg Right anterior;lower;medial ulceration, venous (Active)   Thickness/Stage full thickness 10/23/24 0805   Base epithelialization 10/23/24 0805   Periwound pink 10/23/24 0805   Periwound  Temperature warm 10/23/24 0805   Periwound Skin Turgor soft 10/23/24 0805   Edges open 10/23/24 0805   Length (cm) 0.1 10/15/24 1306   Width (cm) 0.1 10/15/24 1306   Depth (cm) 0.1 10/15/24 1306   Wound (cm^2) 0.01 cm^2 10/15/24 1306   Wound Volume (cm^3) 0 cm^3 10/15/24 1306   Wound healing % 98.96 10/15/24 1306   Drainage Characteristics/Odor serous 10/23/24 0805   Drainage Amount scant 10/23/24 0805   Care, Wound non-select wound debridement performed. 10/23/24 0805               Recent Labs   Lab Test 06/03/21  1448 03/23/21  1042 07/28/20  0929   A1C 5.7* 5.6 5.7*          Recent Labs   Lab Test 02/14/23  0059 06/08/22  1835 07/28/20  0929   ALBUMIN 3.9 3.2* 3.3*              No sharp debridement performed today.       ASSESSMENT:   This is a 63 year old  male with lower extremity edema and bilateral lower extremity wounds.          PLAN:   We will continue to apply the multilayer wrap to both lower extremities.  We will wrap his right leg again until we can get him good compression stockings.  I will also send an order for compression stockings and have him go down to the medical supply store to pick those up.      We are still treating these wounds palliatively since the patient is thoroughly noncompliant.    Separate from his wound care instructions I then discussed the treatment of his lower extremity edema.  This primarily involves compression and elevation.  I have strongly encouraged him to wear his spandagrip stockings every day.  I have again explained to the patient today that controlling the edema is probably the most important thing we can do to help heal the wound.  I have specifically recommended that they lay down with their legs above the level of the heart for 30 minutes at least twice a day.  I emphasized that if we can not control the edema we will likely not be able to get this wound to heal.     I have encouraged the patient to continue on their high protein diet to aid in wound healing.    I have also strongly encouraged him to decrease his nicotine intake which slows wound healing.   The patient will return to the wound clinic once a week to have the multilayer wraps changed.        30 minutes spent on the date of the encounter doing chart review, history and exam, documentation and further activities per the note, this time excludes any procedure time      Miguel Hampton MD  10/23/2024   8:19 AM   [facility] Vascular/Wound  [phone]    This note was electronically signed by Miguel Hampton MD        Further instructions from your care team         10/23/2024   Petey Archibald   1960    A DME order was not completed because the patient is having dressing changes done at [facility]. PLEASE BRING ANY SUPPLIES THAT WERE GIVEN TO YOU WITH YOU TO YOUR NEXT VISIT.     Dressing changes outside of clinic are being performed by  N/A     [facility] Fax: Attn nurse Dai:  fax [phone]; Dai phone [phone]; staff Abbi phone [phone]     We have scheduled Wednesday 0800 AM slots once weekly for [facility]; please call to confirm these! You will also get reminder calls the day before appointments.  We confirm that you would prefer mid morning or later times; if these become available, we will confirm with you to secure.      Plan: 10/23/24  Patient is sleeping in bed; waiting for new recliner yet; please offer available med for discomfort; enc elevation  We have ordered compression stockings; can take to [facility] pan [phone] to fulfill if desired or [facility] (see below)  [employer] - (Phone: [phone], Fax: [phone]) for compression stockings if desired to get fulfilled by RX  Please bring these stockings to next appointment; possible to transition into these  Group home staff - nothing to do to the 2 layer wraps; patient must keep them dry while sponge bathing; encourage elevation  Elevate Legs to hip or higher 30  min 10 am and 2pm  Control smoking and work to reduce or eliminate all nicotine products discussed again 10/23/24  Bathing is only sponge bath while we use 2 layer wraps; 2 layer wraps cannot get wet.   Protein diet: shakes and bars  Return here weekly for dressing changes      Wound Dressing Change: Right anterior lower leg  - Prepare surface and wash your hands with soap and water  - Cleanse with mild unscented soap (such as Cetaphil, Cerave or Dove) and water  - Apply Atrac-Tain lotion or similar to intact skin  - If available, apply small amount of VASHE on gauze, lay into wound bed, let sit for 10 minutes, remove gauze (do not rinse)  - Apply light layer Desitin/ Zinc oxide barrier paste around wound edge PRN  - apply XL 2 layer wrap   Change once weekly here in clinic      Wound Dressing Change: Left anterior lower leg  - Prepare surface and wash your hands with soap and water  - Cleanse with mild unscented soap and water (such as Cetaphil, Cerave or Dove)  - Apply Atrac-Tain lotion or similar to intact skin  - If able, apply small amount of VASHE on gauze, lay into wound bed, let sit for 10 minutes, remove gauze (do not rinse)  - apply light layer Desitin/Zinc oxide barrier paste around wound edges  - Apply 1/2 piece of Hydrofera Blue Ready Transfer to wound bed  - cover with 2 small Kerramax  - apply XL 2 layer wrap  Change once weekly here in clinic      Compression:   Your compression wrap is a 2 layer CoFlex wrap and should stay on until next visit - either with home care or at the clinic.  Please remove compression dressing if toes turn blue and/or tingle and can not be relieved by raising the leg for one hour.   Walk as much as you can, as you are able. When you sit raise your ankle above your hips to promote wound healing.      It is very important to follow your healthcare providers instructions. Studies indicate when compression therapy is applied as directed, healing may occur faster and more  completely. Do Not remove CoFlex unless your healthcare provider tells you to remove it.     Helpful Tips and general instructions:  -Your bandage may feel tight at first. This is normal. When the swelling goes down, it will not feel as tight.  -Wear the stocking that comes with the system over the bandage to help you put on shoes and clothing  -Wear comfortable shoes and walk regularly. Walking will improve your circulation which will improve healing.  -Keep your bandage dry.   -CoFlex may be left on your leg for up to 7 days. After that your doctor or nurse will apply a new CoFlex.   -Call your doctor or nurse if the bandage gets wet, slips down or if you have pain, tingling, numbness or discoloration.       Walk as much as you can, as you are able.   Whenever you sit raise your ankle above your hips to promote wound healing.  Sleep in your bed is ideal.   Elevation: elevate your legs above level of your hips for 30 minutes 2 times each day  - Lay on the couch or your bed and prop your legs up on pillows  - Recline back as far as you can go in your recliner and prop your legs on pillows.     A diet high in protein is important for wound healing, we recommend getting 90 grams of protein per day.  Taking protein shakes or bars are a good way to get extra protein in your diet.     Spoke with patient regarding options for smoking cessation.  Discussed the use of : No interventions.   Patient chooses to consider cessation.   Suggested vape product without nicotine 10/23/24     Main Provider: Miguel Hampton M.D. October 23, 2024    Call us at [phone] if you have any questions about your wounds, if you have redness or swelling around your wound, have a fever of 101 degrees Fahrenheit or greater or if you have any other problems or concerns. We answer the phone Monday through Friday 8 am to 4 pm, please leave a message as we check the voicemail frequently throughout the day. If you have a concern over the  weekend, please leave a message and we will return your call Monday. If the need is urgent, go to the ER or urgent care.    If you had a positive experience please indicate that on your patient satisfaction survey form that [facility] will be sending you.    It was a pleasure meeting with you today.  Thank you for allowing me and my team the privilege of caring for you today.  YOU are the reason we are here, and I truly hope we provided you with the excellent service you deserve.  Please let us know if there is anything else we can do for you so that we can be sure you are leaving completely satisfied with your care experience.      If you have any billing related questions please call the [facility] Business office at [phone]. The clinic staff does not handle billing related matters.    If you are scheduled to have a follow up appointment, you will receive a reminder call the day before your visit. On the appointment day please arrive 15 minutes prior to your appointment time. If you are unable to keep that appointment, please call the clinic to cancel or reschedule. If you are more than 10 minutes late or greater for your scheduled appointment time, the clinic policy is that you may be asked to reschedule.        ,

## 2024-10-23 NOTE — DISCHARGE INSTRUCTIONS
10/23/2024   Petey Archibald   1960    A DME order was not completed because the patient is having dressing changes done at [facility]. PLEASE BRING ANY SUPPLIES THAT WERE GIVEN TO YOU WITH YOU TO YOUR NEXT VISIT.     Dressing changes outside of clinic are being performed by  N/A     [facility] Fax: Attn nurse Dai:  fax [phone]; Dai phone [phone]; staff Abbi phone [phone]     We have scheduled Wednesday 0800 AM slots once weekly for Petey for remainder of month into first week of Novermber; please call to confirm these! You will also get reminder calls the day before appointments.  We confirm that you would prefer mid morning or later times; if these become available, we will confirm with you to secure.      Plan: 10/23/24  Patient is sleeping in bed; waiting for new recliner yet; please offer available med for discomfort; enc elevation  We have ordered compression stockings; can take to [phone] to fulfill if desired or [facility] (see below)  [facility] - (Phone: [phone], Fax: [phone]) for compression stockings if desired to get fulfilled by RX  Please bring these stockings to next appointment; possible to transition into these  Group home staff - nothing to do to the 2 layer wraps; patient must keep them dry while sponge bathing; encourage elevation  Elevate Legs to hip or higher 30 min 10 am and 2pm  Control smoking and work to reduce or eliminate all nicotine products discussed again 10/23/24  Bathing is only sponge bath while we use 2 layer wraps; 2 layer wraps cannot get wet.   Protein diet: shakes and bars  Return here weekly for dressing changes      Wound Dressing Change: Right anterior lower leg  - Prepare surface and wash your hands with soap and water  - Cleanse with mild unscented soap (such as Cetaphil, Cerave or Dove) and water  - Apply Atrac-Tain lotion or similar to intact skin  - If available, apply small amount of VASHE on gauze, lay into wound bed, let  sit for 10 minutes, remove gauze (do not rinse)  - Apply light layer Desitin/ Zinc oxide barrier paste around wound edge PRN  - apply XL 2 layer wrap   Change once weekly here in clinic      Wound Dressing Change: Left anterior lower leg  - Prepare surface and wash your hands with soap and water  - Cleanse with mild unscented soap and water (such as Cetaphil, Cerave or Dove)  - Apply Atrac-Tain lotion or similar to intact skin  - If able, apply small amount of VASHE on gauze, lay into wound bed, let sit for 10 minutes, remove gauze (do not rinse)  - apply light layer Desitin/Zinc oxide barrier paste around wound edges  - Apply 1/2 piece of Hydrofera Blue Ready Transfer to wound bed  - cover with 1 small Kerramax  - apply XL 2 layer wrap  Change once weekly here in clinic      Compression:   Your compression wrap is a 2 layer CoFlex wrap and should stay on until next visit - either with home care or at the clinic.  Please remove compression dressing if toes turn blue and/or tingle and can not be relieved by raising the leg for one hour.   Walk as much as you can, as you are able. When you sit raise your ankle above your hips to promote wound healing.      It is very important to follow your healthcare providers instructions. Studies indicate when compression therapy is applied as directed, healing may occur faster and more completely. Do Not remove CoFlex unless your healthcare provider tells you to remove it.     Helpful Tips and general instructions:  -Your bandage may feel tight at first. This is normal. When the swelling goes down, it will not feel as tight.  -Wear the stocking that comes with the system over the bandage to help you put on shoes and clothing  -Wear comfortable shoes and walk regularly. Walking will improve your circulation which will improve healing.  -Keep your bandage dry.   -CoFlex may be left on your leg for up to 7 days. After that your doctor or nurse will apply a new CoFlex.   -Call your  doctor or nurse if the bandage gets wet, slips down or if you have pain, tingling, numbness or discoloration.       Walk as much as you can, as you are able.   Whenever you sit raise your ankle above your hips to promote wound healing.  Sleep in your bed is ideal.   Elevation: elevate your legs above level of your hips for 30 minutes 2 times each day  - Lay on the couch or your bed and prop your legs up on pillows  - Recline back as far as you can go in your recliner and prop your legs on pillows.     A diet high in protein is important for wound healing, we recommend getting 90 grams of protein per day.  Taking protein shakes or bars are a good way to get extra protein in your diet.     Spoke with patient regarding options for smoking cessation.  Discussed the use of : No interventions.   Patient chooses to consider cessation.   Suggested vape product without nicotine 10/23/24     Main Provider: Miguel Hampton M.D. October 23, 2024    Call us at [phone] if you have any questions about your wounds, if you have redness or swelling around your wound, have a fever of 101 degrees Fahrenheit or greater or if you have any other problems or concerns. We answer the phone Monday through Friday 8 am to 4 pm, please leave a message as we check the voicemail frequently throughout the day. If you have a concern over the weekend, please leave a message and we will return your call Monday. If the need is urgent, go to the ER or urgent care.    If you had a positive experience please indicate that on your patient satisfaction survey form that [facility] will be sending you.    It was a pleasure meeting with you today.  Thank you for allowing me and my team the privilege of caring for you today.  YOU are the reason we are here, and I truly hope we provided you with the excellent service you deserve.  Please let us know if there is anything else we can do for you so that we can be sure you are leaving completely  satisfied with your care experience.      If you have any billing related questions please call the [facility] Business office at [phone]. The clinic staff does not handle billing related matters.    If you are scheduled to have a follow up appointment, you will receive a reminder call the day before your visit. On the appointment day please arrive 15 minutes prior to your appointment time. If you are unable to keep that appointment, please call the clinic to cancel or reschedule. If you are more than 10 minutes late or greater for your scheduled appointment time, the clinic policy is that you may be asked to reschedule.

## 2024-10-28 RX ORDER — CHOLECALCIFEROL (VITAMIN D3) 25 MCG
CAPSULE ORAL
Qty: 26 CAPSULE | Refills: 11 | OUTPATIENT
Start: 2024-10-28

## 2024-10-29 ENCOUNTER — ANTICOAGULATION THERAPY VISIT (OUTPATIENT)
Dept: ANTICOAGULATION | Facility: CLINIC | Age: 64
End: 2024-10-29

## 2024-10-29 ENCOUNTER — LAB (OUTPATIENT)
Dept: LAB | Facility: CLINIC | Age: 64
End: 2024-10-29
Payer: MEDICARE

## 2024-10-29 DIAGNOSIS — Z79.01 LONG TERM CURRENT USE OF ANTICOAGULANT THERAPY: ICD-10-CM

## 2024-10-29 DIAGNOSIS — Z86.711 HISTORY OF PULMONARY EMBOLISM: Primary | ICD-10-CM

## 2024-10-29 DIAGNOSIS — Z86.711 HISTORY OF PULMONARY EMBOLISM: ICD-10-CM

## 2024-10-29 LAB — INR BLD: 1.7 (ref 0.9–1.1)

## 2024-10-29 PROCEDURE — 36416 COLLJ CAPILLARY BLOOD SPEC: CPT

## 2024-10-29 PROCEDURE — 85610 PROTHROMBIN TIME: CPT

## 2024-10-29 NOTE — PROGRESS NOTES
ANTICOAGULATION MANAGEMENT     Petey Archibald 63 year old male is on warfarin with subtherapeutic INR result. (Goal INR 2.0-3.0)    Recent labs: (last 7 days)     10/29/24  1613   INR 1.7*       ASSESSMENT     Source(s): Chart Review and Home Care/Facility Nurse     Warfarin doses taken: Reviewed in chart    Previous result: Subtherapeutic  Additional findings:  Landen stated that she stepped down from her position and directed ACN to contact Dai at [phone]  regarding patient's INR result today       PLAN     Unable to reach Dai today.    Left message to take a booster dose of warfarin,  9 mg tonight. Request call back for assessment.    Follow up required to confirm warfarin dose taken and assess for changes and discuss dosing instructions and confirm understanding of instructions    Myranda Welsh RN  10/29/2024  Anticoagulation Clinic  [facility] for routing messages: joce JIMENEZ  [facility] patient phone line: [phone]

## 2024-10-30 RX ORDER — WARFARIN SODIUM 6 MG/1
TABLET ORAL
Qty: 20 TABLET | Refills: 0 | Status: SHIPPED | OUTPATIENT
Start: 2024-10-30 | End: 2024-11-12

## 2024-10-30 NOTE — PROGRESS NOTES
ANTICOAGULATION MANAGEMENT     Petey Archibald 63 year old male is on warfarin with subtherapeutic INR result. (Goal INR 2.0-3.0)    Recent labs: (last 7 days)     10/29/24  1613   INR 1.7*       ASSESSMENT     Source(s): Chart Review and Home Care/Facility Nurse     Warfarin doses taken: Warfarin taken as instructed per Zac patient was given 6 mg dose yesterday  Diet: No new diet changes identified  Medication/supplement changes: None noted  New illness, injury, or hospitalization: No  Signs or symptoms of bleeding or clotting: No  Previous result: Subtherapeutic  Additional findings:  Landen is not the  for patient's INR anymore, Left a message for Dai yesterday but no call back.       PLAN     Recommended plan for no diet, medication or health factor changes affecting INR     Dosing Instructions: Increase your warfarin dose (8% change) with next INR in 2 weeks       Summary  As of 10/29/2024      Full warfarin instructions:  6 mg every day   Next INR check:  11/12/2024               Telephone call with Zac house supervisor who verbalizes understanding and agrees to plan    Will mail AVS to: [facility] [id number]   [facility] [address]      Lab visit scheduled    Education provided: Contact [phone] with any changes, questions or concerns.     Plan made per [facility] anticoagulation protocol    Myranda Welsh RN  10/30/2024  Anticoagulation Clinic  [employer] for routing messages: joce JIMENEZ  [facility] patient phone line: [phone]        _______________________________________________________________________     Anticoagulation Episode Summary       Current INR goal:  2.0-3.0   TTR:  65.9% (1 y)   Target end date:  Indefinite   Send INR reminders to:  EDU JIMENEZ    Indications    History of pulmonary embolism [Z86.711]  Long term current use of anticoagulant therapy [Z79.01]             Comments:  [facility]; A new Coumadin Prescription will need to sent  to [facility] with current dose and next INR check.             Anticoagulation Care Providers       Provider Role Specialty Phone number    Demetri Archer MD Referring Internal Medicine [phone]

## 2024-11-06 ENCOUNTER — TELEPHONE (OUTPATIENT)
Dept: WOUND CARE | Facility: CLINIC | Age: 64
End: 2024-11-06
Payer: MEDICARE

## 2024-11-06 NOTE — TELEPHONE ENCOUNTER
Received a call from patient and staff stating patient missed his appointment for today and would like to reschedule for later today if possible or next available.    Petey [phone]

## 2024-11-06 NOTE — TELEPHONE ENCOUNTER
"Returned call to staff member Abbi per the appointment notes to \"call Abbi for reminder calls, scheduling at [phone]\". Writer updated Abbi that there are currently no openings between today 11/06 and the patient's next appointment on 11/14/24. There were no other questions or concerns.   "

## 2024-11-07 ENCOUNTER — TELEPHONE (OUTPATIENT)
Dept: WOUND CARE | Facility: CLINIC | Age: 64
End: 2024-11-07
Payer: MEDICARE

## 2024-11-07 NOTE — TELEPHONE ENCOUNTER
Returned call to Dai. Ok given for the patient to shower but a new dressing and compression needs to be applied after the shower. Dai will see the patient tomorrow and will reapply the dressing and is pretty sure there is compression available with his belongings.

## 2024-11-12 ENCOUNTER — LAB (OUTPATIENT)
Dept: LAB | Facility: CLINIC | Age: 64
End: 2024-11-12
Payer: MEDICARE

## 2024-11-12 ENCOUNTER — ANTICOAGULATION THERAPY VISIT (OUTPATIENT)
Dept: ANTICOAGULATION | Facility: CLINIC | Age: 64
End: 2024-11-12

## 2024-11-12 DIAGNOSIS — Z79.01 LONG TERM CURRENT USE OF ANTICOAGULANT THERAPY: ICD-10-CM

## 2024-11-12 DIAGNOSIS — Z86.711 HISTORY OF PULMONARY EMBOLISM: ICD-10-CM

## 2024-11-12 DIAGNOSIS — Z86.711 HISTORY OF PULMONARY EMBOLISM: Primary | ICD-10-CM

## 2024-11-12 LAB — INR BLD: 1.2 (ref 0.9–1.1)

## 2024-11-12 PROCEDURE — 85610 PROTHROMBIN TIME: CPT

## 2024-11-12 PROCEDURE — 36416 COLLJ CAPILLARY BLOOD SPEC: CPT

## 2024-11-12 RX ORDER — WARFARIN SODIUM 6 MG/1
TABLET ORAL
Qty: 20 TABLET | Refills: 0 | Status: SHIPPED | OUTPATIENT
Start: 2024-11-12

## 2024-11-12 NOTE — PROGRESS NOTES
"ANTICOAGULATION MANAGEMENT     Petey Archibald 63 year old male is on warfarin with therapeutic INR result. (Goal INR 2.0-3.0)    Recent labs: (last 7 days)     11/12/24  1020   INR 1.2*       ASSESSMENT     Source(s): Chart Review and Home Care/Facility Nurse     Warfarin doses taken: Warfarin taken as instructed-Zac stated his warfarin was \"punched out\".   Diet:  has had protein/granola bars  Medication/supplement changes: None noted  New illness, injury, or hospitalization: No  Signs or symptoms of bleeding or clotting: No  Previous result: Subtherapeutic  Additional findings:  patient was 1.2 in August and no changes were noted. Boosted and resumed dose, spoke with Dai at that time who stated there has been a lot of new staff.  Will call Dai again to re-iterate the importance this medication is given every day.       PLAN     Recommended plan for temporary change(s) affecting INR     Dosing Instructions: booster dose then continue your current warfarin dose with next INR in 1 week       Summary  As of 11/12/2024      Full warfarin instructions:  11/12: 12 mg; Otherwise 6 mg every day   Next INR check:  11/19/2024               Telephone call with [facility] staff who verbalizes understanding and agrees to plan  Sent [employer] message with dosing and follow up instructions    Lab visit scheduled    Education provided: Taking warfarin: purpose of warfarin and how it works and Importance of taking warfarin as instructed    Plan made per [facility] anticoagulation protocol    Radha Hughes RN  11/12/2024  Anticoagulation Clinic  [employer] for routing messages: joce JIMENEZ  [facility] patient phone line: [phone]        _______________________________________________________________________     Anticoagulation Episode Summary       Current INR goal:  2.0-3.0   TTR:  62.2% (1 y)   Target end date:  Indefinite   Send INR reminders to:  EDU JIMENEZ    Indications    History of pulmonary " embolism [Z86.711]  Long term current use of anticoagulant therapy [Z79.01]             Comments:  [facility] half-way; A new Coumadin Prescription will need to sent to [facility] with current dose and next INR check.             Anticoagulation Care Providers       Provider Role Specialty Phone number    Demetri Archer MD Referring Internal Medicine [phone]

## 2024-11-14 ENCOUNTER — HOSPITAL ENCOUNTER (OUTPATIENT)
Dept: WOUND CARE | Facility: CLINIC | Age: 64
Discharge: HOME | End: 2024-11-14
Attending: FAMILY MEDICINE | Admitting: FAMILY MEDICINE
Payer: MEDICARE

## 2024-11-14 VITALS — TEMPERATURE: 98.6 F | DIASTOLIC BLOOD PRESSURE: 79 MMHG | SYSTOLIC BLOOD PRESSURE: 152 MMHG | HEART RATE: 70 BPM

## 2024-11-14 DIAGNOSIS — S91.302D OPEN WOUND OF LEFT FOOT, SUBSEQUENT ENCOUNTER: ICD-10-CM

## 2024-11-14 DIAGNOSIS — L97.922 ULCER OF LEFT LOWER EXTREMITY WITH FAT LAYER EXPOSED (H): Primary | ICD-10-CM

## 2024-11-14 DIAGNOSIS — R60.0 LOWER EXTREMITY EDEMA: ICD-10-CM

## 2024-11-14 PROBLEM — L97.522 ULCER OF LEFT FOOT WITH FAT LAYER EXPOSED (H): Status: RESOLVED | Noted: 2023-11-15 | Resolved: 2024-11-14

## 2024-11-14 PROBLEM — L97.522 ULCER OF GREAT TOE, LEFT, WITH FAT LAYER EXPOSED (H): Status: RESOLVED | Noted: 2023-08-22 | Resolved: 2024-11-14

## 2024-11-14 PROBLEM — L03.114 CELLULITIS OF LEFT UPPER EXTREMITY: Status: RESOLVED | Noted: 2024-05-13 | Resolved: 2024-11-14

## 2024-11-14 PROBLEM — L97.512 ULCER OF RIGHT FOOT WITH FAT LAYER EXPOSED (H): Status: RESOLVED | Noted: 2023-11-15 | Resolved: 2024-11-14

## 2024-11-14 PROBLEM — L97.912 ULCER OF RIGHT LEG, WITH FAT LAYER EXPOSED (H): Status: RESOLVED | Noted: 2024-09-10 | Resolved: 2024-11-14

## 2024-11-14 PROCEDURE — 97602 WOUND(S) CARE NON-SELECTIVE: CPT

## 2024-11-14 PROCEDURE — 99214 OFFICE O/P EST MOD 30 MIN: CPT | Performed by: FAMILY MEDICINE

## 2024-11-14 NOTE — PROGRESS NOTES
Wound Clinic Note         Visit date: 11/14/2024       Cheif Complaint:     Petey Archibald is a 63 year old   male had concerns including WOUND CARE.  The patient has lower extremity edema and bilateral leg ulcers.          HISTORY OF PRESENT ILLNESS:    Petey Archibald reports the wound has been present since mid 2022.  The wound began without a clear cause.   He reports prior to this wound developing he has not had other difficult to heal wounds on the legs.  He did undergo the procedure to treat his venous insufficiency with Dr. Muhammad on June 11, 2024.  He had another sclerotherapy procedure performed on August 13, 2024, at that time Dr. Muhammad reported to the patient that there was no other areas of superficial venous insufficiency needing treatment.      I last saw this patient in the wound clinic on October 23, 2024 at which time we placed a multilayer wrap with the expectation that he would return to the wound clinic in a week to have that changed.  He missed that appointment so the wrap was removed by the nurse at his group home and ABD pads and a spandagrip stocking was placed on his leg which is stayed in place for the last week.      The pateint denies fevers or chills.  They report the pain from the wound has been 0/10 and has remained about the same recently.      The patient reports laying down to elevate their legs above the level of their heart at least twice a day.  The patient confirms they do sleep in a bed.  He has gotten his lift chair and has been using it to elevate his legs above the level of his heart as described above.    Today the patient reports maintaining a high protein diet, but has not been taking protein supplements lately.        The patient denies a history of diabetes or chronic steroid use.  Today he reports he is again smoking cigarettes and vaping.    The patient has not had any symptoms of infection relating to the wound recently and is not currently on  antibiotics.       Problem List:   Past Medical History:   Diagnosis Date    Borderline mental retardation 02/20/2013    Cellulitis of left upper extremity 05/13/2024    Chronic infection     MRSA    Coagulation disorder (H)     on coumadin    COPD (chronic obstructive pulmonary disease) (H)     Diabetic ulcer of right midfoot with fat layer exposed (H) 11/15/2023    History of DVT of lower extremity     History of pulmonary embolism     Hyperlipidemia     Mild intellectual disabilities     Morbid obesity (H)     NEL (obstructive sleep apnea)     Peripheral edema     Peripheral vascular disease (H)     Sleep apnea     uses CPAP    Thrombosis     Tobacco dependence     Ulcer of great toe, left, with fat layer exposed (H) 08/22/2023    Ulcer of left foot with fat layer exposed (H) 11/15/2023    Ulcer of right foot with fat layer exposed (H) 11/15/2023    Ulcer of right leg, with fat layer exposed (H) 09/10/2024    Uncomplicated asthma     Venous stasis dermatitis              Family Hx: family history includes Diabetes in his brother; Unknown/Adopted in his father and mother.       Surgical Hx:   Past Surgical History:   Procedure Laterality Date    COLONOSCOPY N/A 4/15/2019    Procedure: COMBINED COLONOSCOPY, SINGLE OR MULTIPLE BIOPSY/POLYPECTOMY BY BIOPSY;  Surgeon: Jovana Ohara MD;  Location:  GI    COLONOSCOPY N/A 6/7/2021    Procedure: COLONOSCOPY with polypectomies;  Surgeon: Sahil Cid MD;  Location: [facility] OR    EXCISE MALIGNANT LESIONS, TRUNK/ARMS/LEGS 0.5CM OR LESS Left 5/26/2017    Procedure: EXCISE MALIGNANT LESIONS, TRUNK/ARMS/LEGS 0.5CM OR LESS;  EXCISION LEFT ELBOW MASS;  Surgeon: Jose Azevedo MD;  Location:  GI    RECTAL SURGERY      perianal abscess?          Allergies:    Allergies   Allergen Reactions    Bees     Clavulanic Acid     Amoxicillin-Pot Clavulanate Rash     Augmentin    Penicillins Rash              Medication History:    Current Outpatient Medications  "  Medication Sig Dispense Refill    albuterol (ALBUTEROL) 108 (90 BASE) MCG/ACT inhaler Inhale 2 puffs into the lungs every 6 hours as needed 1 Inhaler 0    ammonium lactate (AMLACTIN) 12 % external cream APPLY TO FEET ONCE OR TWICE DAILY AS NEEDED FOR ITCHY DRY & CRACKED SKIN. 140 g 11    ARIPiprazole (ABILIFY) 5 MG tablet Take 1 tablet (5 mg) by mouth daily 45 tablet 0    buPROPion (WELLBUTRIN SR) 150 MG 12 hr tablet Take 1 tablet (150 mg) by mouth 2 times daily for 45 days 90 tablet 0    Cadexomer Iodine, topical, 0.9% (IODOSORB) 0.9 % GEL gel Apply topically daily Apply to left foot wound daily after cleansing the wound and blotting it dry. 1 Tube 3    cloNIDine (CATAPRES) 0.1 MG tablet Take 0.1 mg by mouth daily as needed      clotrimazole (LOTRIMIN) 1 % external cream Apply topically 2 times daily 60 g 0    diclofenac (VOLTAREN) 1 % topical gel Apply 4 g topically 2 times daily as needed for moderate pain 100 g 1    diphenhydrAMINE (BENADRYL) 25 MG capsule Take 50 mg by mouth every 6 hours as needed for itching or allergies      Emollient (CERAVE) CREA Externally apply topically daily 453 g 11    EPINEPHrine (ANY BX GENERIC EQUIV) 0.3 MG/0.3ML injection 2-pack INJECT 0.3MG INTRAMUSCULARLY ONE TIME FOR ONE DOSE AS NEEDED FOR ANAPHYLAXIS. MAY REPEAT ONE TIME IN 5-15 MINUTES IF RESPONSE TO INITIAL DOSE IS INADEQUATE. *1 TOTAL FILL, ORIGINAL FROM EMERGENCY ROOM* 2 each 1    EPINEPHrine (EPIPEN 2-BRE) 0.3 MG/0.3ML injection 2-pack INJECT 0.3MG INTRAMUSCULAR ONE TIME FOR ONE DOSE AS NEEDED FOR ANAPHYLAXIS (CALL 911 IF YOU HAVE TO GIVE) (FOR BEE STINGS) **LABEL EACH PEN* 2 mL 3    gabapentin (NEURONTIN) 100 MG capsule Take 400 mg by mouth 3 times daily At 0900, 1200, and 2000      Gauze Pads & Dressings (GAUZE PADS 4\"X4\") 4\"X4\" PADS 1 each 3 times daily as needed 25 each 1    Lidocaine (LIDOCARE) 4 % Patch Place 1 patch onto the skin every 24 hours To prevent lidocaine toxicity, patient should be patch free for 12 " hrs daily. 10 patch 0    LIDOCAINE PAIN RELIEF 4 % Patch PLACE 1 PATCH ONTO THE SKIN EVERY 24 HOURS. TO PREVENT LIDOCAINE TOXICITY, PATIENT SHOULD BE PATCH FREE FOR 12 HOURS DAILY **NON-COVERED MED** *1 TOTAL FILL* 10 patch 23    loratadine (CLARITIN) 10 MG tablet Take 10 mg by mouth daily      LORazepam (ATIVAN) 1 MG tablet Take 1 mg by mouth daily as needed for anxiety      montelukast (SINGULAIR) 10 MG tablet TAKE 1 TABLET BY MOUTH EVERY MORNING (ASTHMA) 30 tablet 11    Multiple Vitamin (DAILY-JOVAN) TABS TAKE 1 TABLET BY MOUTH EVERY MORNING (VITAMINS) 30 tablet 11    naltrexone (DEPADE/REVIA) 50 MG tablet Take 2 tablets (100 mg) by mouth daily for 45 days 90 tablet 0    nicotine (COMMIT) 2 MG lozenge Place 2 mg inside cheek daily as needed      nystatin-triamcinolone (MYCOLOG II) 730265-5.1 UNIT/GM-% external cream Apply topically 2 times daily For 1-2 weeks.      omeprazole (PRILOSEC) 40 MG DR capsule Take 1 capsule (40 mg) by mouth daily 90 capsule 2    order for DME Equipment being ordered: roll of micropore tape to dress foot wound bid 1 each 0    order for DME Equipment being ordered: 1 surgical shoe 1 Device 0    order for DME [facility] [employer] [facility] Phone [phone] Fax [phone]  EdemaWear Size Medium/Yellow qty 4 sleeves  Length of Need: 1 month  Frequency of dressing change daily 1 Device 0    order for DME [employer] Compression Stockings  Phone #[phone]  Fax #[phone]  Coolflex Velcro wraps    Length of Need: Life Time  # of Pairs 1 set 30 days 0    order for DME [facility] [facility] Phone [phone] Fax [phone]  Primary Dressing Hydrofera Blue Ready   Qty 5 sheets  Secondary Dressing 4' roll gauze Qty 30  Secondary Dressing 2' medipore tape Qty 1  Secondary Dressing 4x4 gauze loaf Qty  1  Length of Need: 1 month  Frequency of dressing change: daily 30 days 0    order for DME Oxygen 2 Li/min  at night and bled into BiPAP 1 Device 0    order for DME Equipment being ordered: CPAP with  mask and tubing 1 Device 0    order for DME Equipment being ordered: support compression hose BK  2 pair black 30mm HG   To be applied on arising & removed while lying down to go to sleep 1 each 0    polyethylene glycol (MIRALAX) 17 GM/Dose powder Take 17 g (1 capful) by mouth daily as needed for constipation 578 g 0    PULMICORT FLEXHALER 180 MCG/ACT inhaler INHALE 2 PUFFS INTO THE LUNGS TWICE DAILY. 1 each 11    SENNA-TABS 8.6 MG tablet Take 1 tablet by mouth daily      sertraline (ZOLOFT) 100 MG tablet Take 2 tablets (200 mg) by mouth daily for 45 days 90 tablet 0    simvastatin (ZOCOR) 40 MG tablet TAKE 1 TABLET BY MOUTH EVERY MORNING.  (CHOLESTEROL) 30 tablet 11    SPIRIVA HANDIHALER 18 MCG inhaled capsule INHALE CONTENTS OF 1 CAPSULE INTO THE LUNGS ONCE DAILY 30 capsule 11    spironolactone (ALDACTONE) 25 MG tablet TAKE 1 TABLET BY MOUTH ONCE DAILY. (HIGH BLOOD PRESSURE) 30 tablet 11    tiZANidine (ZANAFLEX) 2 MG tablet TAKE 1 TABLET BY MOUTH THREE TIMES DAILY. 106 tablet 11    traZODone (DESYREL) 100 MG tablet Take 1 tablet (100 mg) by mouth at bedtime for 45 days 45 tablet 0    triamcinolone (KENALOG) 0.1 % external cream Apply to AA BID x 1-2 week then PRN only 80 g 2    vitamin B complex with vitamin C (STRESS TAB) tablet Take 1 tablet by mouth daily 90 tablet 3    warfarin ANTICOAGULANT (COUMADIN) 2 MG tablet Take 4 mgs on 9/10/24, then  (2 mg x 1 and 6 mg x 1) every Tuesday  Next INR Date is 9/17/24 5 tablet 0    warfarin ANTICOAGULANT (COUMADIN) 6 MG tablet Booster dose 12 mg on 11/12/24 then take 6 mg all the other days of the week.  Recheck INR on 11/19/24 20 tablet 0     No current facility-administered medications for this encounter.         Tobacco History:  reports that he has quit smoking. His smoking use included cigarettes. He has a 30 pack-year smoking history. He has never used smokeless tobacco.       REVIEW OF SYMPTOMS:   The review of systems was negative except as noted in the HPI.            PHYSICAL EXAMINATION:     BP (!) 152/79 (BP Location: Left arm, Patient Position: Semi-Haynes's, Cuff Size: Adult Large)   Pulse 70   Temp 98.6  F (37  C) (Oral)            GENERAL: The patient overall appears well and is no acute distress.   HEAD: normocephalic   EYES: Sclera and conjunctiva clear   NECK: no obvious masses   LUNGS: breathing is unlabored.   EXTREMITIES: No clubbing, cyanosis or edema   SKIN: No rashes or other abnormalities except as noted under the Wound section below.   NEUROLOGICAL: normal motor and sensory function   EDEMA: Moderate       WOUND: The wound appears healthy with no sign of infection.   Wound bed: necrotic material at the left leg wound  Periwound: healthy intact skin  The left anterior leg wound is smaller today compared with his last clinic visit.  There is some loose necrotic material in the wound bed which I was able to clear away with a piece of gauze, no sharp debridement was required.  All the wounds on the right leg are healed today.    Also see below for wound details:       Circumferential volume measures:             No data to display                Ulceration(s)/Wound(s):   Please see the media tab under the chart review for pictures of the wounds.  Nursing staff removed dressings and cleansed wound.    Wound (used by OP [facility] only) 12/27/23 1410 Left anterior;lower leg ulceration, venous (Active)   Thickness/Stage full thickness 11/14/24 1249   Base slough;granulating 11/14/24 1249   Periwound swelling;pink 11/14/24 1249   Periwound Temperature warm 11/14/24 1249   Periwound Skin Turgor soft 11/14/24 1249   Edges open 11/14/24 1249   Length (cm) 4.8 11/14/24 1249   Width (cm) 3 11/14/24 1249   Depth (cm) 0.2 11/14/24 1249   Wound (cm^2) 14.4 cm^2 11/14/24 1249   Wound Volume (cm^3) 2.88 cm^3 11/14/24 1249   Wound healing % -2115.38 11/14/24 1249   Drainage Characteristics/Odor serosanguineous 11/14/24 1249   Drainage Amount moderate 11/14/24 1249   Care, Wound  non-select wound debridement performed. 11/14/24 1249               Recent Labs   Lab Test 06/03/21  1448 03/23/21  1042 07/28/20  0929   A1C 5.7* 5.6 5.7*          Recent Labs   Lab Test 02/14/23  0059 06/08/22  1835 07/28/20  0929   ALBUMIN 3.9 3.2* 3.3*              No sharp debridement performed today.       ASSESSMENT:   This is a 63 year old  male with lower extremity edema and bilateral lower extremity wounds.          PLAN:   I discussed with the patient that overall I think the multilayer wraps have been working well for him however he is very resistant to going back into the multilayer wraps and prefers to have a bandage regiment that the nurses at his group home can change.  Therefore the left leg wound will be bandaged with Hydrofera Blue, ABD pads and a spandagrip stocking change 3 times a week by the nurses at his group home.  We will apply a spandagrip stocking to his right leg which she can use temporarily to control his swelling.  I have also again asked the patient to go downstairs to the medical supply store to get a pair of compression stockings that he can use long-term to control his swelling.      Separate from his wound care instructions I then discussed the treatment of his lower extremity edema.  This primarily involves compression and elevation.  I have strongly encouraged him to wear his spandagrip stockings every day.  I have encouraged the patient to continue to elevate the legs as they have been doing, including laying down with their legs above the level of the heart for 30 minutes at least twice a day.     I have encouraged the patient to continue on their high protein diet to aid in wound healing.   I have also strongly encouraged him to decrease his nicotine intake which slows wound healing.   The patient will return to the wound clinic to see me again in 4 weeks.      30 minutes spent on the date of the encounter doing chart review, history and exam, documentation and further  activities per the note, this time excludes any procedure time      Miguel Hampton MD  11/14/2024   1:36 PM   [facility] Vascular/Wound  [phone]    This note was electronically signed by Miguel Hampton MD        Further instructions from your care team         11/14/2024   Petey Archibald   1960    A DME order for supplies has been placed to [employer]. If there are any issues with your order including not receiving the order please call our clinic at [phone]. Do not call [employer]. We are better able to help you. We can contact the [employer] rep to better assist than if you call the general [facility] number. We can provide a tracking number also if needed.     Dressing changes outside of clinic are being performed by  group home staff.    Plan 11/14/2024     [facility] Fax: Attn nurse Dai:  fax [phone]; Dai phone [phone]; staff Abbi phone [phone]    To Do: Call to schedule your next appointment in 4 weeks (around December 12th)    We have ordered compression stockings; can take to [facility] medical pan [phone] to fulfill if desired or [facility] (see below)  Call [employer] - (Phone: [phone], Fax: [phone]) for compression stockings if desired to get fulfilled by RX - you have to get measured for these.     Please bring these stockings to next appointment; possible to transition into these  Done: new recliner yet; please offer available med for discomfort; encourage elevation  Group home staff -  encourage elevation  Elevate Legs to hip or higher 30 min 10 am and 2pm  Control smoking and work to reduce or eliminate all nicotine products discussed again 10/23/24  Bathing is only sponge bath while we use 2 layer wraps; 2 layer wraps cannot get wet.   Protein diet: shakes and bars      Right anterior lower leg  - Cleanse with mild unscented soap (such as Cetaphil, Cerave or Dove) and water  - Apply Atrac-Tain lotion or similar to intact skin  - Apply compression  sock to leg in the AM - okay to remove at night if sleeping in bed        Wound Dressing Change: Left anterior lower leg  - Prepare surface and wash your hands with soap and water  - Cleanse with mild unscented soap and water (such as Cetaphil, Cerave or Dove)  - Apply Atrac-Tain lotion or similar to intact skin  - If able, apply small amount of VASHE on gauze, lay into wound bed, let sit for 10 minutes, remove gauze (do not rinse)  - apply light layer Desitin/Zinc oxide barrier paste around wound edges  - Apply 1/4th piece of Hydrofera Blue Ready Transfer to wound bed  - cover with 1/2 of a 5 x 9 ABD  Secure with 1 roll 4 inch conforming roll gauze and tape.   - apply Size F Spandagrip from ankle to back of knees and size E Spandagrip from toes to ankle  -Change dressing three times per week      Compression:   It is very important to follow your healthcare providers instructions. Studies indicate when compression therapy is applied as directed, healing may occur faster and more completely.      Helpful Tips and general instructions:  -Your bandage may feel tight at first. This is normal. When the swelling goes down, it will not feel as tight.  -Wear comfortable shoes and walk regularly. Walking will improve your circulation which will improve healing.  -Keep your bandage dry.     Walk as much as you can, as you are able.   Whenever you sit raise your ankle above your hips to promote wound healing.  Sleep in your bed is ideal.   Elevation: elevate your legs above level of your hips for 30 minutes 2 times each day  - Lay on the couch or your bed and prop your legs up on pillows  - Recline back as far as you can go in your recliner and prop your legs on pillows.     A diet high in protein is important for wound healing, we recommend getting 90 grams of protein per day.  Taking protein shakes or bars are a good way to get extra protein in your diet.     Spoke with patient regarding options for smoking cessation.   Discussed the use of : No interventions.   Patient chooses to consider cessation.   Suggested vape product without nicotine 10/23/24       Main Provider: Miguel Hampton M.D. November 14, 2024    Call us at [phone] if you have any questions about your wounds, if you have redness or swelling around your wound, have a fever of 101 degrees Fahrenheit or greater or if you have any other problems or concerns. We answer the phone Monday through Friday 8 am to 4 pm, please leave a message as we check the voicemail frequently throughout the day. If you have a concern over the weekend, please leave a message and we will return your call Monday. If the need is urgent, go to the ER or urgent care.    If you had a positive experience please indicate that on your patient satisfaction survey form that [facility] will be sending you.    It was a pleasure meeting with you today.  Thank you for allowing me and my team the privilege of caring for you today.  YOU are the reason we are here, and I truly hope we provided you with the excellent service you deserve.  Please let us know if there is anything else we can do for you so that we can be sure you are leaving completely satisfied with your care experience.      If you have any billing related questions please call the [facility] Business office at [phone]. The clinic staff does not handle billing related matters.    If you are scheduled to have a follow up appointment, you will receive a reminder call the day before your visit. On the appointment day please arrive 15 minutes prior to your appointment time. If you are unable to keep that appointment, please call the clinic to cancel or reschedule. If you are more than 10 minutes late or greater for your scheduled appointment time, the clinic policy is that you may be asked to reschedule.         ,

## 2024-11-14 NOTE — DISCHARGE INSTRUCTIONS
11/14/2024   Petey Archibald   1960    A DME order for supplies has been placed to [facility]. If there are any issues with your order including not receiving the order please call our clinic at [phone]. Do not call [facility]. We are better able to help you. We can contact the [place] rep to better assist than if you call the general [place] number. We can provide a tracking number also if needed.     Dressing changes outside of clinic are being performed by  group home staff.    Plan 11/14/2024     [facility] Fax: Attn nurse Dai:  fax [phone]; Dai phone [phone]; staff Abbi phone [phone]    To Do: Call to schedule your next appointment in 4 weeks (around December 12th)    We have ordered compression stockings; can take to [facility] pan [facility] to fulfill if desired or [facility] (see below)  Call [facility] - (Phone: [phone], Fax: [phone]) for compression stockings if desired to get fulfilled by RX - you have to get measured for these.     Please bring these stockings to next appointment; possible to transition into these  Done: new recliner yet; please offer available med for discomfort; encourage elevation  Group home staff -  encourage elevation  Elevate Legs to hip or higher 30 min 10 am and 2pm  Control smoking and work to reduce or eliminate all nicotine products discussed again 10/23/24  Bathing is only sponge bath while we use 2 layer wraps; 2 layer wraps cannot get wet.   Protein diet: shakes and bars      Right anterior lower leg  - Cleanse with mild unscented soap (such as Cetaphil, Cerave or Dove) and water  - Apply Atrac-Tain lotion or similar to intact skin  - Apply compression sock to leg in the AM - okay to remove at night if sleeping in bed        Wound Dressing Change: Left anterior lower leg  - Prepare surface and wash your hands with soap and water  - Cleanse with mild unscented soap and water (such as Cetaphil, Cerave or Dove)  - Apply Atrac-Tain lotion or  similar to intact skin  - If able, apply small amount of VASHE on gauze, lay into wound bed, let sit for 10 minutes, remove gauze (do not rinse)  - apply light layer Desitin/Zinc oxide barrier paste around wound edges  - Apply 1/4th piece of Hydrofera Blue Ready Transfer to wound bed  - cover with 1/2 of a 5 x 9 ABD  Secure with 1 roll 4 inch conforming roll gauze and tape.   - apply Size F Spandagrip from ankle to back of knees and size E Spandagrip from toes to ankle  -Change dressing three times per week      Compression:   It is very important to follow your healthcare providers instructions. Studies indicate when compression therapy is applied as directed, healing may occur faster and more completely.      Helpful Tips and general instructions:  -Your bandage may feel tight at first. This is normal. When the swelling goes down, it will not feel as tight.  -Wear comfortable shoes and walk regularly. Walking will improve your circulation which will improve healing.  -Keep your bandage dry.     Walk as much as you can, as you are able.   Whenever you sit raise your ankle above your hips to promote wound healing.  Sleep in your bed is ideal.   Elevation: elevate your legs above level of your hips for 30 minutes 2 times each day  - Lay on the couch or your bed and prop your legs up on pillows  - Recline back as far as you can go in your recliner and prop your legs on pillows.     A diet high in protein is important for wound healing, we recommend getting 90 grams of protein per day.  Taking protein shakes or bars are a good way to get extra protein in your diet.     Spoke with patient regarding options for smoking cessation.  Discussed the use of : No interventions.   Patient chooses to consider cessation.   Suggested vape product without nicotine 10/23/24       Main Provider: Miguel Hampton M.D. November 14, 2024    Call us at [phone] if you have any questions about your wounds, if you have redness or  swelling around your wound, have a fever of 101 degrees Fahrenheit or greater or if you have any other problems or concerns. We answer the phone Monday through Friday 8 am to 4 pm, please leave a message as we check the voicemail frequently throughout the day. If you have a concern over the weekend, please leave a message and we will return your call Monday. If the need is urgent, go to the ER or urgent care.    If you had a positive experience please indicate that on your patient satisfaction survey form that [facility] will be sending you.    It was a pleasure meeting with you today.  Thank you for allowing me and my team the privilege of caring for you today.  YOU are the reason we are here, and I truly hope we provided you with the excellent service you deserve.  Please let us know if there is anything else we can do for you so that we can be sure you are leaving completely satisfied with your care experience.      If you have any billing related questions please call the [facility] Business office at [phone]. The clinic staff does not handle billing related matters.    If you are scheduled to have a follow up appointment, you will receive a reminder call the day before your visit. On the appointment day please arrive 15 minutes prior to your appointment time. If you are unable to keep that appointment, please call the clinic to cancel or reschedule. If you are more than 10 minutes late or greater for your scheduled appointment time, the clinic policy is that you may be asked to reschedule.

## 2024-11-19 ENCOUNTER — DOCUMENTATION ONLY (OUTPATIENT)
Dept: ANTICOAGULATION | Facility: CLINIC | Age: 64
End: 2024-11-19

## 2024-11-19 ENCOUNTER — HOSPITAL ENCOUNTER (EMERGENCY)
Facility: CLINIC | Age: 64
Discharge: HOME | End: 2024-11-19
Attending: EMERGENCY MEDICINE | Admitting: EMERGENCY MEDICINE
Payer: MEDICARE

## 2024-11-19 VITALS
HEART RATE: 88 BPM | OXYGEN SATURATION: 97 % | DIASTOLIC BLOOD PRESSURE: 68 MMHG | RESPIRATION RATE: 19 BRPM | SYSTOLIC BLOOD PRESSURE: 124 MMHG | TEMPERATURE: 98.1 F

## 2024-11-19 DIAGNOSIS — T14.8XXA CHRONIC WOUND: ICD-10-CM

## 2024-11-19 DIAGNOSIS — S66.911A STRAIN OF RIGHT WRIST, INITIAL ENCOUNTER: ICD-10-CM

## 2024-11-19 PROCEDURE — 250N000013 HC RX MED GY IP 250 OP 250 PS 637: Performed by: EMERGENCY MEDICINE

## 2024-11-19 PROCEDURE — 99283 EMERGENCY DEPT VISIT LOW MDM: CPT

## 2024-11-19 RX ORDER — ACETAMINOPHEN 500 MG
1000 TABLET ORAL ONCE
Status: COMPLETED | OUTPATIENT
Start: 2024-11-19 | End: 2024-11-19

## 2024-11-19 RX ADMIN — ACETAMINOPHEN 1000 MG: 500 TABLET, FILM COATED ORAL at 02:01

## 2024-11-19 ASSESSMENT — ACTIVITIES OF DAILY LIVING (ADL)
ADLS_ACUITY_SCORE: 0
ADLS_ACUITY_SCORE: 0

## 2024-11-19 NOTE — ED PROVIDER NOTES
Emergency Department Note      History of Present Illness     Chief Complaint   Arm Pain      HPI   Petey Archibald is a 63 year old male, anticoagulated on Coumadin, with a history of DVT, PE, hypercholesterolemia, peripheral edema, venous stasis dermatitis, and bilateral varicose veins who presents to the ED via EMS for arm pain and leg wound. The patient reports experiencing right arm pain, pain in his right 4th and 5th digit, and back pain for for the past couple of hours. 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 He notes that he quit today after working there for 10 years. He denies any fall/injury to the area or history of similar pain in the arm, though he notes he has been dealing with this back pain for a while. He has not taken any medication for this pain. Patient further reports chronic right leg wound. He states that he poked his lower right leg with scissors yesterday, and suspects the wound won't heal because he is on Coumadin. He is scheduled to revisit the wound clinic in 3 weeks. Of note, patient cannot recall the address of his group home at this time.    Independent Historian   None    Review of External Notes   I reviewed patient's wound care note from 11/14/24 for ulcer of the left leg.    Past Medical History     Medical History and Problem List   Borderline mental retardation  Cellulitis of left upper extremity  Chronic infection  Coagulation disorder  COPD  DVT of lower extremity  Pulmonary embolism  Hypercholesterolemia  Mild intellectual disabilities  Morbid obesity  NEL (obstructive sleep apnea)  Peripheral edema  Peripheral vascular disease  Sleep apnea  Tobacco dependence  Ulcer of great toe, left, with fat layer exposed  Ulcer of left foot with fat layer exposed  Ulcer of right foot with fat layer exposed  Ulcer of right leg, with fat layer exposed  Uncomplicated asthma  Venous stasis dermatitis  MDD  Adenomatous polyp of ascending  colon  CARL  Varicose veins, bilateral  Diverticular disease of large intestine  Edentulism, partial, class IV  Erectile dysfunction  GERD  Glucose intolerance  Methicillin resistant Staphylococcus aureus infection  Hemorrhoids  Insomnia  Lymphedema of left leg  Mixed simple and mucopurulent chronic bronchitis  Pilonidal cyst  Small bowel obstruction  Schizoaffective disorder, bipolar type  Tobacco dependence  Torus palatinus    Medications   Neurontin  Desyrel  Coumadin  Albuterol  Amlactin  Abilify  Iodosorb  Catapres  Lotrimin  Voltaren  Benadryl  Cerave  Epinephrine  Lidocare  Claritin  Ativan  Singulair  Depade/Revia  Commit  Mycolog II  Prilosec  Miralax  Senna-tabs  Zocor  Aldactone  Zanaflex  Kenalog    Surgical History   Past Surgical History:   Procedure Laterality Date    COLONOSCOPY N/A 4/15/2019    Procedure: COMBINED COLONOSCOPY, SINGLE OR MULTIPLE BIOPSY/POLYPECTOMY BY BIOPSY;  Surgeon: Jovana Ohara MD;  Location:  GI    COLONOSCOPY N/A 6/7/2021    Procedure: COLONOSCOPY with polypectomies;  Surgeon: Sahil Cid MD;  Location: [facility] OR    EXCISE MALIGNANT LESIONS, TRUNK/ARMS/LEGS 0.5CM OR LESS Left 5/26/2017    Procedure: EXCISE MALIGNANT LESIONS, TRUNK/ARMS/LEGS 0.5CM OR LESS;  EXCISION LEFT ELBOW MASS;  Surgeon: Jose Azevedo MD;  Location:  GI    RECTAL SURGERY      perianal abscess?       Physical Exam     Patient Vitals for the past 24 hrs:   BP Temp Temp src Pulse Resp SpO2   11/19/24 0043 -- 98.1  F (36.7  C) Temporal -- -- --   11/19/24 0030 124/68 -- -- 88 19 97 %     Physical Exam  General: Does not appear in acute distress  Head: No signs of trauma.   Mouth/Throat: Oropharynx is clear and moist.   Eyes: Conjunctivae are normal.   Neck: Normal range of motion. No nuchal rigidity.   CV: Normal rate and regular rhythm.    Resp: Effort normal and breath sounds normal. No respiratory distress.   MSK: Normal range of motion. +tenderness to right wrist.  No swelling,  erythema, warmth, ecchymosis.    Neuro: The patient is alert and oriented. Speech normal.  Skin: Skin is warm and dry. Chronic wound to right lower extremity  Psych: normal mood and affect. behavior is normal.         Diagnostics     Lab Results   Labs Ordered and Resulted from Time of ED Arrival to Time of ED Departure - No data to display    Imaging   No orders to display     Independent Interpretation   None    ED Course      Medications Administered   Medications   acetaminophen (TYLENOL) tablet 1,000 mg (1,000 mg Oral $Given 11/19/24 0201)       Procedures   Procedures     Discussion of Management   None    ED Course   ED Course as of 11/19/24 0702   Tue Nov 19, 2024   0124 I obtained history and examined the patient as noted above.         Additional Documentation  None    Medical Decision Making / Diagnosis     CMS Diagnoses: None    MIPS       None    MDM   Petey Archibald is a 63 year old male presents primarily with a complaint of right arm pain.  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  He reports the pain is primarily in the right wrist area, but radiates up the arm.  From evaluation did have some tenderness to the wrist but there is no signs of infection, trauma, gout, or other significant acute process.  There is symptoms consistent with an overuse injury or possible carpal tunnel and he was given a splint for this.  He did report having some back pain, but reports this is chronic.  Patient also reported wound to his right leg.  He reports that this is chronic and that the wound clinic had put a dressing on, but he decided to cut it off himself.  At this time I do not see any concerning signs for infection or other acute process to the leg and recommended continued supportive care and working with the wound clinic.  Patient was discharged back to his group home.    Disposition   The patient was discharged.     Diagnosis      ICD-10-CM    1. Strain of right wrist, initial encounter  S66.911A Wrist/Arm/Hand Bracing Supplies Order Wrist Brace; Right; non-thumb spica      2. Chronic wound  T14.8XXA            Discharge Medications   Discharge Medication List as of 11/19/2024  2:10 AM            Scribe Disclosure:  CELY, Ashlyn Ryan, am serving as a scribe at 1:40 AM on 11/19/2024 to document services personally performed by Silvino Victoria MD based on my observations and the provider's statements to me.        Silvino Victoria MD  11/19/24 0706

## 2024-11-19 NOTE — PROGRESS NOTES
ANTICOAGULATION  MANAGEMENT: Discharge Review    Petey Archibald chart reviewed for anticoagulation continuity of care    Emergency room visit on 11/19/24 for strain of right wrist.    Discharge disposition: Home    Results:    Recent labs: (last 7 days)     11/12/24  1020   INR 1.2*     Anticoagulation inpatient management:     not applicable     Anticoagulation discharge instructions:     Warfarin dosing: home regimen continued   Bridging: No   INR goal change: No      Medication changes affecting anticoagulation: Yes: Tylenol may increase INR    Additional factors affecting anticoagulation: No     PLAN     No adjustment to anticoagulation plan needed    Patient not contacted    No adjustment to Anticoagulation Calendar was required    Myranda Curtis RN  11/19/2024  Anticoagulation Clinic  [facility] for routing messages: joce JIMENEZ  [facility] patient phone line: [phone]

## 2024-11-19 NOTE — DISCHARGE INSTRUCTIONS
Please try using the wrist splint along with Tylenol and ice to help with your discomfort.  Please follow-up with your wound specialist regarding your leg.

## 2024-11-19 NOTE — ED TRIAGE NOTES
"BIBA from  c/o \"over doing it at work\" c/o pains to Right hand, lower back, and would like to have his chronic right lower leg wound evaluated. Per EMS , No meds PTA      "

## 2024-11-19 NOTE — ED NOTES
Spoke with Veronica from  @ [phone]. Called [facility] to arrange transport, unable to go on wheelchair van due to his weight, will send an ambulance, ETA [phone]

## 2024-11-20 ENCOUNTER — ANTICOAGULATION THERAPY VISIT (OUTPATIENT)
Dept: ANTICOAGULATION | Facility: CLINIC | Age: 64
End: 2024-11-20

## 2024-11-20 ENCOUNTER — LAB (OUTPATIENT)
Dept: LAB | Facility: CLINIC | Age: 64
End: 2024-11-20
Payer: MEDICARE

## 2024-11-20 DIAGNOSIS — Z86.711 HISTORY OF PULMONARY EMBOLISM: ICD-10-CM

## 2024-11-20 DIAGNOSIS — Z79.01 LONG TERM CURRENT USE OF ANTICOAGULANT THERAPY: ICD-10-CM

## 2024-11-20 LAB — INR BLD: 1.9 (ref 0.9–1.1)

## 2024-11-20 PROCEDURE — 85610 PROTHROMBIN TIME: CPT

## 2024-11-20 RX ORDER — WARFARIN SODIUM 6 MG/1
TABLET ORAL
Qty: 20 TABLET | Refills: 0 | Status: SHIPPED | OUTPATIENT
Start: 2024-11-20

## 2024-11-20 NOTE — PROGRESS NOTES
"ANTICOAGULATION MANAGEMENT     Petey Archibald 63 year old male is on warfarin with subtherapeutic INR result. (Goal INR 2.0-3.0)    Recent labs: (last 7 days)     11/20/24  1608   INR 1.9*       ASSESSMENT     Source(s): Chart Review  Previous INR was Subtherapeutic  Attempted to contact group home twice but no answer and \"mailbox full\"  Left a detailed voicemail for Dai at [phone]. Faxed orders to [phone]. Will mail AVS to [facility].  INR looks like it is trending up. Pt may have not been getting his warfarin everyday per previous progress notes.  Pt was in the ER yesterday 11/19/24 for wrist pain.  Rx sent to [facility]         PLAN     Recommended plan for ongoing change(s) affecting INR     Dosing Instructions: Continue your current warfarin dose with next INR in 10 days       Summary  As of 11/20/2024      Full warfarin instructions:  6 mg every day   Next INR check:  12/2/2024               Detailed voice message left for Dai facility nurse with dosing instructions and follow up date.   Faxed dosing and follow up instructions to [phone]    Orders given to  Homecare nurse/facility to recheck    Education provided: Please call back if any changes to your diet, medications or how you've been taking warfarin  Contact [phone] with any changes, questions or concerns.     Plan made per [facility] anticoagulation protocol    Nereida Grant RN  11/20/2024  Anticoagulation Clinic  [facility] for routing messages: joce JIMENEZ  [facility] patient phone line: [phone]        _______________________________________________________________________     Anticoagulation Episode Summary       Current INR goal:  2.0-3.0   TTR:  60.0% (1 y)   Target end date:  Indefinite   Send INR reminders to:  EDU JIMENEZ    Indications    History of pulmonary embolism [Z86.711]  Long term current use of anticoagulant therapy [Z79.01]             Comments:  [facility] alf; A new Coumadin Prescription will need " to sent to [facility] with current dose and next INR check.             Anticoagulation Care Providers       Provider Role Specialty Phone number    Demetri Archer MD Referring Internal Medicine [phone]

## 2024-11-26 ENCOUNTER — LAB (OUTPATIENT)
Dept: LAB | Facility: CLINIC | Age: 64
End: 2024-11-26
Payer: MEDICARE

## 2024-11-26 ENCOUNTER — ANTICOAGULATION THERAPY VISIT (OUTPATIENT)
Dept: ANTICOAGULATION | Facility: CLINIC | Age: 64
End: 2024-11-26

## 2024-11-26 DIAGNOSIS — Z86.711 HISTORY OF PULMONARY EMBOLISM: ICD-10-CM

## 2024-11-26 DIAGNOSIS — Z79.01 LONG TERM CURRENT USE OF ANTICOAGULANT THERAPY: ICD-10-CM

## 2024-11-26 DIAGNOSIS — Z86.711 HISTORY OF PULMONARY EMBOLISM: Primary | ICD-10-CM

## 2024-11-26 LAB — INR BLD: 2.1 (ref 0.9–1.1)

## 2024-11-26 PROCEDURE — 36416 COLLJ CAPILLARY BLOOD SPEC: CPT

## 2024-11-26 PROCEDURE — 85610 PROTHROMBIN TIME: CPT

## 2024-11-26 RX ORDER — WARFARIN SODIUM 6 MG/1
TABLET ORAL
Qty: 20 TABLET | Refills: 0 | Status: SHIPPED | OUTPATIENT
Start: 2024-11-26

## 2024-11-26 NOTE — PROGRESS NOTES
ANTICOAGULATION MANAGEMENT     Petey Archibald 63 year old male is on warfarin with therapeutic INR result. (Goal INR 2.0-3.0)    Recent labs: (last 7 days)     11/26/24  1008   INR 2.1*       ASSESSMENT     Source(s): Chart Review and Patient/Caregiver Call     Warfarin doses taken: Warfarin taken as instructed  Diet: No new diet changes identified  Medication/supplement changes: None noted  New illness, injury, or hospitalization: No  Signs or symptoms of bleeding or clotting: No  Previous result: Subtherapeutic  Additional findings: None       PLAN     Recommended plan for no diet, medication or health factor changes affecting INR     Dosing Instructions: Continue your current warfarin dose with next INR in 2 weeks       Summary  As of 11/26/2024      Full warfarin instructions:  6 mg every day   Next INR check:  12/10/2024               Telephone call with group  home facility nurse who verbalizes understanding and agrees to plan  AVS faxed to group [facility] and medication faxed to [facility]     Check at provider office visit    Education provided: Contact [phone] with any changes, questions or concerns.     Plan made per [facility] anticoagulation protocol    Ysabel Tena RN  11/26/2024  Anticoagulation Clinic  [employer] [facility] [employer] for routing messages: joce JIMENEZ  [facility] patient phone line: [phone]        _______________________________________________________________________     Anticoagulation Episode Summary       Current INR goal:  2.0-3.0   TTR:  60.0% (1 y)   Target end date:  Indefinite   Send INR reminders to:  EDU JIMENEZ    Indications    History of pulmonary embolism [Z86.711]  Long term current use of anticoagulant therapy [Z79.01]             Comments:  [facility] retirement; A new Coumadin Prescription will need to sent to [facility] with current dose and next INR check.             Anticoagulation Care Providers       Provider Role Specialty Phone number    Demetri Archer MD Referring Internal  Medicine [phone]

## 2024-12-10 ENCOUNTER — OFFICE VISIT (OUTPATIENT)
Dept: INTERNAL MEDICINE | Facility: CLINIC | Age: 64
End: 2024-12-10
Payer: MEDICARE

## 2024-12-10 ENCOUNTER — LAB (OUTPATIENT)
Dept: LAB | Facility: CLINIC | Age: 64
End: 2024-12-10
Payer: MEDICARE

## 2024-12-10 ENCOUNTER — ANTICOAGULATION THERAPY VISIT (OUTPATIENT)
Dept: ANTICOAGULATION | Facility: CLINIC | Age: 64
End: 2024-12-10

## 2024-12-10 VITALS
BODY MASS INDEX: 50.62 KG/M2 | HEIGHT: 66 IN | DIASTOLIC BLOOD PRESSURE: 76 MMHG | SYSTOLIC BLOOD PRESSURE: 124 MMHG | OXYGEN SATURATION: 96 % | HEART RATE: 93 BPM | RESPIRATION RATE: 18 BRPM | WEIGHT: 315 LBS

## 2024-12-10 DIAGNOSIS — M54.50 LUMBAR PAIN: ICD-10-CM

## 2024-12-10 DIAGNOSIS — M25.531 RIGHT WRIST PAIN: ICD-10-CM

## 2024-12-10 DIAGNOSIS — Z79.01 LONG TERM CURRENT USE OF ANTICOAGULANT THERAPY: ICD-10-CM

## 2024-12-10 DIAGNOSIS — Z86.711 HISTORY OF PULMONARY EMBOLISM: Primary | ICD-10-CM

## 2024-12-10 DIAGNOSIS — R26.89 IMBALANCE: ICD-10-CM

## 2024-12-10 DIAGNOSIS — Z09 ENCOUNTER FOR EXAMINATION FOLLOWING TREATMENT AT HOSPITAL: Primary | ICD-10-CM

## 2024-12-10 DIAGNOSIS — M70.22 OLECRANON BURSITIS OF BOTH ELBOWS: ICD-10-CM

## 2024-12-10 DIAGNOSIS — M70.21 OLECRANON BURSITIS OF BOTH ELBOWS: ICD-10-CM

## 2024-12-10 DIAGNOSIS — Z86.711 HISTORY OF PULMONARY EMBOLISM: ICD-10-CM

## 2024-12-10 LAB — INR BLD: 3.2 (ref 0.9–1.1)

## 2024-12-10 PROCEDURE — G2211 COMPLEX E/M VISIT ADD ON: HCPCS | Performed by: INTERNAL MEDICINE

## 2024-12-10 PROCEDURE — 99213 OFFICE O/P EST LOW 20 MIN: CPT | Performed by: INTERNAL MEDICINE

## 2024-12-10 PROCEDURE — 85610 PROTHROMBIN TIME: CPT

## 2024-12-10 PROCEDURE — 36416 COLLJ CAPILLARY BLOOD SPEC: CPT

## 2024-12-10 RX ORDER — WARFARIN SODIUM 6 MG/1
TABLET ORAL
Qty: 20 TABLET | Refills: 0 | Status: SHIPPED | OUTPATIENT
Start: 2024-12-10

## 2024-12-10 RX ORDER — TRAZODONE HYDROCHLORIDE 150 MG/1
TABLET ORAL
COMMUNITY
Start: 2024-09-19

## 2024-12-10 NOTE — PATIENT INSTRUCTIONS
- See physical therapy! Call [phone] to schedule that appointment. Do the exercises they give you!    - See orthopedics for your wrist/elbow/arm pains! Call [phone] to schedule that appointment.

## 2024-12-10 NOTE — PROGRESS NOTES
"Assessment & Plan   Encounter for examination following treatment at hospital  Right wrist pain  Has been unable to wear wrist brace/splint given to him in ER due to it rubbing on his walker. We unfortunately do not have anything different in office today. Encouraged him to look into OTC options. Also recommended formal ortho eval if pain persists, referral placed.  - Orthopedic  Referral; Future    Olecranon bursitis of both elbows  Has followed with sports medicine in the past. Recommended he follow-up with them for this issue if he feels it is unresolved. Contact information given.    Lumbar pain  Imbalance  Recommended formal PT eval/treat and performed some motivational interviewing with patient today about attending scheduled PT appointments and doing the recommended exercises at home. He accepted another referral.  - Physical Therapy  Referral; Future    Signed Electronically by:  Demetri Holder MD, MPH  [facility]  Internal Medicine    The longitudinal plan of care for the diagnosis(es)/condition(s) as documented were addressed during this visit. Due to the added complexity in care, I will continue to support Petey in the subsequent management and with ongoing continuity of care.    Subjective   Petey is a 64 year old presenting for the following health issues: ER F/U    HPI   ED/UC Followup:  Facility:  [facility]  Date of visit: 11/19/2024  Reason for visit: Sprain of right wrist  Current Status: still hurts    He can't wear the brace given to him in the ER. Back pain persists. He has not done PT. Wondering what to do about his elbows.        Objective    /76   Pulse 93   Resp 18   Ht 1.676 m (5' 6\")   Wt (!) 159.1 kg (350 lb 12.8 oz)   SpO2 96%   BMI 56.62 kg/m    Body mass index is 56.62 kg/m .    Physical Exam   GENERAL: alert and in no distress.  EYES: conjunctivae/corneas clear. EOMs grossly intact  HENT: Facies " symmetric.  RESP: No iWOB.  MSK: Ambulates with walker. Olecranon bursa again prominent bilaterally.  SKIN: No significant ulcers, lesions, or rashes on the visualized portions of the skin  NEURO: CN II-XII grossly intact.

## 2024-12-10 NOTE — PROGRESS NOTES
ANTICOAGULATION MANAGEMENT     Petey Archibald 64 year old male is on warfarin with supratherapeutic INR result. (Goal INR 2.0-3.0)    Recent labs: (last 7 days)     12/10/24  1400   INR 3.2*       ASSESSMENT     Source(s): Chart Review and Home Care/Facility Nurse     Warfarin doses taken: Warfarin taken as instructed  Diet: No new diet changes identified  Medication/supplement changes: None noted  New illness, injury, or hospitalization: No  Signs or symptoms of bleeding or clotting: No  Previous result: Therapeutic last visit; previously outside of goal range  Additional findings: None       PLAN     Recommended plan for no diet, medication or health factor changes affecting INR     Dosing Instructions: partial hold then continue your current warfarin dose with next INR in 2 weeks       Summary  As of 12/10/2024      Full warfarin instructions:  12/10: 3 mg; Otherwise 6 mg every day   Next INR check:  12/23/2024               Telephone call with Group home leader  who verbalizes understanding and agrees to plan and who agrees to plan and repeated back plan correctly    Lab visit scheduled & script sent to [facility]     Education provided: Contact [phone] with any changes, questions or concerns.     Plan made per [facility] anticoagulation protocol    Sarah Almanza RN  12/10/2024  Anticoagulation Clinic  [employer] for routing messages: joce JIMENEZ  [facility] patient phone line: [phone]        _______________________________________________________________________     Anticoagulation Episode Summary       Current INR goal:  2.0-3.0   TTR:  63.2% (1 y)   Target end date:  Indefinite   Send INR reminders to:  EDU JIMENEZ    Indications    History of pulmonary embolism [Z86.711]  Long term current use of anticoagulant therapy [Z79.01]             Comments:  [facility]; A new Coumadin Prescription will need to sent to [facility] with current dose and next INR check.             Anticoagulation Care Providers        Provider Role Specialty Phone number    Demetri Archer MD Referring Internal Medicine [phone]

## 2024-12-16 ENCOUNTER — OFFICE VISIT (OUTPATIENT)
Dept: ORTHOPEDICS | Facility: CLINIC | Age: 64
End: 2024-12-16
Attending: INTERNAL MEDICINE
Payer: MEDICARE

## 2024-12-16 ENCOUNTER — ANCILLARY PROCEDURE (OUTPATIENT)
Dept: GENERAL RADIOLOGY | Facility: CLINIC | Age: 64
End: 2024-12-16
Attending: FAMILY MEDICINE
Payer: MEDICARE

## 2024-12-16 VITALS
SYSTOLIC BLOOD PRESSURE: 125 MMHG | HEIGHT: 66 IN | WEIGHT: 315 LBS | BODY MASS INDEX: 50.62 KG/M2 | DIASTOLIC BLOOD PRESSURE: 80 MMHG

## 2024-12-16 DIAGNOSIS — M19.041 LOCALIZED OSTEOARTHRITIS OF HAND, RIGHT: Primary | ICD-10-CM

## 2024-12-16 DIAGNOSIS — M25.531 RIGHT WRIST PAIN: ICD-10-CM

## 2024-12-16 PROCEDURE — 73110 X-RAY EXAM OF WRIST: CPT | Mod: RT | Performed by: FAMILY MEDICINE

## 2024-12-16 PROCEDURE — G2211 COMPLEX E/M VISIT ADD ON: HCPCS | Performed by: FAMILY MEDICINE

## 2024-12-16 PROCEDURE — 99214 OFFICE O/P EST MOD 30 MIN: CPT | Performed by: FAMILY MEDICINE

## 2024-12-16 RX ORDER — PREDNISONE 10 MG/1
10 TABLET ORAL DAILY
Qty: 5 TABLET | Refills: 0 | Status: SHIPPED | OUTPATIENT
Start: 2024-12-16

## 2024-12-16 NOTE — LETTER
12/16/2024      Petey Archibald  [address]  [facility] [address]      Dear Colleague,    Thank you for referring your patient, Petey Archibald, to the [facility] [place]. Please see a copy of my visit note below.    ASSESSMENT & PLAN  Patient Instructions     1. Localized osteoarthritis of hand, right    2. Right wrist pain      -Patient has acute right hand and wrist pain due to arthritis and overuse while using his walker  -X-rays taken office today show mild degenerative changes of the CMC and carpal joints  -Patient has full range of motion and strength on examination today.  Patient has mild tenderness palpation over the CMC joint but states this is typically not where he is feeling his pain  -Pain is mainly felt when he is supporting his weight while using his walker.  -Patient was recommended a wrist brace but he states he received 1 from the ER and cannot use due to parts of it taking into his hand  -Patient is taking Tylenol without relief  -Patient is unable to take oral anti-inflammatory medications since he is on Coumadin for blood clots  -Patient was offered hand therapy which she declined today  -Patient will try a short course of prednisone to decrease inflammation and pain  -Patient will continue to follow-up with me for further treatment recommendations  -Call direct clinic number [[phone]] at any time with questions or concerns.    Albert Yeo MD [facility]  [facility]        -----    SUBJECTIVE  Petey Archibald is a/an 64 year old Right handed male who is seen in consultation at the request of  Demetri Archer M.D. for evaluation of right wrist pain. The patient is seen by themselves.    Onset: 2 month(s) ago. Reports insidious onset without acute precipitating event.  Location of Pain: diffuse pain over right dorsal aspect of hand and wrist  Rating of Pain at worst: 4/10  Rating  of Pain Currently: 1/10  Worsened by: Putting pressure on their right hand  Better with: Activity avoidance  Treatments tried: rest/activity avoidance and Tylenol  Associated symptoms: no distal numbness or tingling; denies swelling or warmth  Orthopedic history: NO  Relevant surgical history: NO  Social history: Not currently working    Past Medical History:   Diagnosis Date     Borderline mental retardation 02/20/2013     Cellulitis of left upper extremity 05/13/2024     Chronic infection     MRSA     Coagulation disorder (H)     on coumadin     COPD (chronic obstructive pulmonary disease) (H)      Diabetic ulcer of right midfoot with fat layer exposed (H) 11/15/2023     History of DVT of lower extremity      History of pulmonary embolism      Hyperlipidemia      Mild intellectual disabilities      Morbid obesity (H)      NEL (obstructive sleep apnea)      Peripheral edema      Peripheral vascular disease (H)      Sleep apnea     uses CPAP     Thrombosis      Tobacco dependence      Ulcer of great toe, left, with fat layer exposed (H) 08/22/2023     Ulcer of left foot with fat layer exposed (H) 11/15/2023     Ulcer of right foot with fat layer exposed (H) 11/15/2023     Ulcer of right leg, with fat layer exposed (H) 09/10/2024     Uncomplicated asthma      Venous stasis dermatitis      Social History     Socioeconomic History     Marital status: Single   Tobacco Use     Smoking status: Former     Current packs/day: 1.00     Average packs/day: 1 pack/day for 30.0 years (30.0 ttl pk-yrs)     Types: Cigarettes     Smokeless tobacco: Never     Tobacco comments:     started at age 18    Vaping Use     Vaping status: Every Day   Substance and Sexual Activity     Alcohol use: No     Alcohol/week: 0.0 standard drinks of alcohol     Drug use: No     Sexual activity: Not Currently     Partners: Female   Other Topics Concern     Parent/sibling w/ CABG, MI or angioplasty before 65F 55M? No     Social Drivers of Health      Financial Resource Strain: Low Risk  (1/3/2024)    Financial Resource Strain      Within the past 12 months, have you or your family members you live with been unable to get utilities (heat, electricity) when it was really needed?: No   Food Insecurity: Not on File (9/26/2024)    Received from [employer]    Food Insecurity      Food: 0   Transportation Needs: Low Risk  (1/3/2024)    Transportation Needs      Within the past 12 months, has lack of transportation kept you from medical appointments, getting your medicines, non-medical meetings or appointments, work, or from getting things that you need?: No   Physical Activity: Not on File (11/8/2019)    Received from [employer]    Physical Activity      Physical Activity: 0   Stress: Not on File (11/8/2019)    Received from [employer]    Stress      Stress: 0   Social Connections: Not on File (9/21/2024)    Received from [employer]    Social Connections      Connectedness: 0   Interpersonal Safety: Low Risk  (11/14/2024)    Interpersonal Safety      Do you feel physically and emotionally safe where you currently live?: Yes      Within the past 12 months, have you been hit, slapped, kicked or otherwise physically hurt by someone?: No      Within the past 12 months, have you been humiliated or emotionally abused in other ways by your partner or ex-partner?: No   Housing Stability: Low Risk  (1/3/2024)    Housing Stability      Do you have housing? : Yes      Are you worried about losing your housing?: No         Patient's past medical, surgical, social, and family histories were reviewed today and no changes are noted.    REVIEW OF SYSTEMS:  10 point ROS is negative other than symptoms noted above in HPI, Past Medical History or as stated below  Constitutional: NEGATIVE for fever, chills, change in weight  Skin: NEGATIVE for worrisome rashes, moles or lesions  GI/: NEGATIVE for bowel or bladder changes  Neuro: NEGATIVE for weakness, dizziness or  "paresthesias    OBJECTIVE:  /80   Ht 1.676 m (5' 6\")   Wt (!) 158.8 kg (350 lb)   BMI 56.49 kg/m     General: healthy, alert and in no distress  HEENT: no scleral icterus or conjunctival erythema  Skin: no suspicious lesions or rash. No jaundice.  CV: regular rhythm by palpation  Resp: normal respiratory effort without conversational dyspnea   Psych: normal mood and affect  Gait: normal steady gait with appropriate coordination and balance  Neuro: Normal sensory exam of bilateral hands. Normal 2 pt discrimination.   MSK:  RIGHT WRIST  Inspection:    No swelling or obvious deformity or asymmetry  Palpation:    Tender about the 1st CMC. Remainder of bony and ligamentous line marks are nontender.     Metacarpals: normal    Thumb: normal    Fingers: normal  Range of Motion:    Full (active and passive) flexion, extension, pronation/supination, and ulnar/radial deviation.  Strength:    No deficits in flexion, extension, ulnar/radial deviation, or  strength.. Normal pinch strength.  Special Tests:    Positive: None    Negative: Finkelstein's, CMC grind, thenar eminence wasting, hypothenar eminence wasting.     RIGHT HAND  Inspection:    No swelling, bruising, discoloration, or obvious deformity or asymmetry  Palpation:   Carpals: Normal   Metacarpals: normal   Thumb: CMC   Fingers: normal  Range of Motion:    Full active flexion and extension at MCP, PIP, and DIP joints; normal finger cascade without malrotation.  Wrist pronation, supination, and ulnar/radial deviation normal.  Strength:     within normal limits, opposition within normal limits  Special Tests:    Positive: none    Negative: CMC grind, thenar eminence wasting, hypothenar eminence wasting, Finkelstein's, flexor digitorum superficialis testing, flexor digitorum profundus testing    Independent visualization of the below image:  Recent Results (from the past 24 hours)   XR Wrist Right G/E 3 Views    Narrative    Mild first CMC, carpal " metacarpal and radiocarpal degenerative changes.    No acute fracture or dislocation.           Albert Yeo MD [facility]      Again, thank you for allowing me to participate in the care of your patient.        Sincerely,        Albert Yeo, MD

## 2024-12-16 NOTE — PROGRESS NOTES
ASSESSMENT & PLAN  Patient Instructions     1. Localized osteoarthritis of hand, right    2. Right wrist pain      -Patient has acute right hand and wrist pain due to arthritis and overuse while using his walker  -X-rays taken office today show mild degenerative changes of the CMC and carpal joints  -Patient has full range of motion and strength on examination today.  Patient has mild tenderness palpation over the CMC joint but states this is typically not where he is feeling his pain  -Pain is mainly felt when he is supporting his weight while using his walker.  -Patient was recommended a wrist brace but he states he received 1 from the ER and cannot use due to parts of it taking into his hand  -Patient is taking Tylenol without relief  -Patient is unable to take oral anti-inflammatory medications since he is on Coumadin for blood clots  -Patient was offered hand therapy which she declined today  -Patient will try a short course of prednisone to decrease inflammation and pain  -Patient will continue to follow-up with me for further treatment recommendations  -Call direct clinic number [[phone]] at any time with questions or concerns.    Albert Yeo MD [facility] Orthopedics and Sports Medicine  [facility]        -----    SUBJECTIVE  Petey Archibald is a/an 64 year old Right handed male who is seen in consultation at the request of  Demetri Archer M.D. for evaluation of right wrist pain. The patient is seen by themselves.    Onset: 2 month(s) ago. Reports insidious onset without acute precipitating event.  Location of Pain: diffuse pain over right dorsal aspect of hand and wrist  Rating of Pain at worst: 4/10  Rating of Pain Currently: 1/10  Worsened by: Putting pressure on their right hand  Better with: Activity avoidance  Treatments tried: rest/activity avoidance and Tylenol  Associated symptoms: no distal numbness or tingling; denies swelling or warmth  Orthopedic history:  NO  Relevant surgical history: NO  Social history: Not currently working    Past Medical History:   Diagnosis Date    Borderline mental retardation 02/20/2013    Cellulitis of left upper extremity 05/13/2024    Chronic infection     MRSA    Coagulation disorder (H)     on coumadin    COPD (chronic obstructive pulmonary disease) (H)     Diabetic ulcer of right midfoot with fat layer exposed (H) 11/15/2023    History of DVT of lower extremity     History of pulmonary embolism     Hyperlipidemia     Mild intellectual disabilities     Morbid obesity (H)     NEL (obstructive sleep apnea)     Peripheral edema     Peripheral vascular disease (H)     Sleep apnea     uses CPAP    Thrombosis     Tobacco dependence     Ulcer of great toe, left, with fat layer exposed (H) 08/22/2023    Ulcer of left foot with fat layer exposed (H) 11/15/2023    Ulcer of right foot with fat layer exposed (H) 11/15/2023    Ulcer of right leg, with fat layer exposed (H) 09/10/2024    Uncomplicated asthma     Venous stasis dermatitis      Social History     Socioeconomic History    Marital status: Single   Tobacco Use    Smoking status: Former     Current packs/day: 1.00     Average packs/day: 1 pack/day for 30.0 years (30.0 ttl pk-yrs)     Types: Cigarettes    Smokeless tobacco: Never    Tobacco comments:     started at age 18    Vaping Use    Vaping status: Every Day   Substance and Sexual Activity    Alcohol use: No     Alcohol/week: 0.0 standard drinks of alcohol    Drug use: No    Sexual activity: Not Currently     Partners: Female   Other Topics Concern    Parent/sibling w/ CABG, MI or angioplasty before 65F 55M? No     Social Drivers of Health     Financial Resource Strain: Low Risk  (1/3/2024)    Financial Resource Strain     Within the past 12 months, have you or your family members you live with been unable to get utilities (heat, electricity) when it was really needed?: No   Food Insecurity: Not on File (9/26/2024)    Received from [employer]  "   Food Insecurity     Food: 0   Transportation Needs: Low Risk  (1/3/2024)    Transportation Needs     Within the past 12 months, has lack of transportation kept you from medical appointments, getting your medicines, non-medical meetings or appointments, work, or from getting things that you need?: No   Physical Activity: Not on File (11/8/2019)    Received from [employer]    Physical Activity     Physical Activity: 0   Stress: Not on File (11/8/2019)    Received from [employer]    Stress     Stress: 0   Social Connections: Not on File (9/21/2024)    Received from [employer]    Social Connections     Connectedness: 0   Interpersonal Safety: Low Risk  (11/14/2024)    Interpersonal Safety     Do you feel physically and emotionally safe where you currently live?: Yes     Within the past 12 months, have you been hit, slapped, kicked or otherwise physically hurt by someone?: No     Within the past 12 months, have you been humiliated or emotionally abused in other ways by your partner or ex-partner?: No   Housing Stability: Low Risk  (1/3/2024)    Housing Stability     Do you have housing? : Yes     Are you worried about losing your housing?: No         Patient's past medical, surgical, social, and family histories were reviewed today and no changes are noted.    REVIEW OF SYSTEMS:  10 point ROS is negative other than symptoms noted above in HPI, Past Medical History or as stated below  Constitutional: NEGATIVE for fever, chills, change in weight  Skin: NEGATIVE for worrisome rashes, moles or lesions  GI/: NEGATIVE for bowel or bladder changes  Neuro: NEGATIVE for weakness, dizziness or paresthesias    OBJECTIVE:  /80   Ht 1.676 m (5' 6\")   Wt (!) 158.8 kg (350 lb)   BMI 56.49 kg/m     General: healthy, alert and in no distress  HEENT: no scleral icterus or conjunctival erythema  Skin: no suspicious lesions or rash. No jaundice.  CV: regular rhythm by palpation  Resp: normal respiratory effort without " conversational dyspnea   Psych: normal mood and affect  Gait: normal steady gait with appropriate coordination and balance  Neuro: Normal sensory exam of bilateral hands. Normal 2 pt discrimination.   MSK:  RIGHT WRIST  Inspection:    No swelling or obvious deformity or asymmetry  Palpation:    Tender about the 1st CMC. Remainder of bony and ligamentous line marks are nontender.     Metacarpals: normal    Thumb: normal    Fingers: normal  Range of Motion:    Full (active and passive) flexion, extension, pronation/supination, and ulnar/radial deviation.  Strength:    No deficits in flexion, extension, ulnar/radial deviation, or  strength.. Normal pinch strength.  Special Tests:    Positive: None    Negative: Finkelstein's, CMC grind, thenar eminence wasting, hypothenar eminence wasting.     RIGHT HAND  Inspection:    No swelling, bruising, discoloration, or obvious deformity or asymmetry  Palpation:   Carpals: Normal   Metacarpals: normal   Thumb: CMC   Fingers: normal  Range of Motion:    Full active flexion and extension at MCP, PIP, and DIP joints; normal finger cascade without malrotation.  Wrist pronation, supination, and ulnar/radial deviation normal.  Strength:     within normal limits, opposition within normal limits  Special Tests:    Positive: none    Negative: CMC grind, thenar eminence wasting, hypothenar eminence wasting, Finkelstein's, flexor digitorum superficialis testing, flexor digitorum profundus testing    Independent visualization of the below image:  Recent Results (from the past 24 hours)   XR Wrist Right G/E 3 Views    Narrative    Mild first CMC, carpal metacarpal and radiocarpal degenerative changes.    No acute fracture or dislocation.           Albert Yeo MD [facility]

## 2024-12-16 NOTE — PATIENT INSTRUCTIONS
1. Localized osteoarthritis of hand, right    2. Right wrist pain      -Patient has acute right hand and wrist pain due to arthritis and overuse while using his walker  -X-rays taken office today show mild degenerative changes of the CMC and carpal joints  -Patient has full range of motion and strength on examination today.  Patient has mild tenderness palpation over the CMC joint but states this is typically not where he is feeling his pain  -Pain is mainly felt when he is supporting his weight while using his walker.  -Patient was recommended a wrist brace but he states he received 1 from the ER and cannot use due to parts of it taking into his hand  -Patient is taking Tylenol without relief  -Patient is unable to take oral anti-inflammatory medications since he is on Coumadin for blood clots  -Patient was offered hand therapy which she declined today  -Patient will try a short course of prednisone to decrease inflammation and pain  -Patient will continue to follow-up with me for further treatment recommendations  -Call direct clinic number [[phone]] at any time with questions or concerns.    Albert Yeo MD [facility]  [facility] [place]

## 2024-12-23 ENCOUNTER — TELEPHONE (OUTPATIENT)
Dept: INTERNAL MEDICINE | Facility: CLINIC | Age: 64
End: 2024-12-23

## 2024-12-23 ENCOUNTER — ANTICOAGULATION THERAPY VISIT (OUTPATIENT)
Dept: ANTICOAGULATION | Facility: CLINIC | Age: 64
End: 2024-12-23

## 2024-12-23 ENCOUNTER — LAB (OUTPATIENT)
Dept: LAB | Facility: CLINIC | Age: 64
End: 2024-12-23
Payer: MEDICARE

## 2024-12-23 DIAGNOSIS — Z86.711 HISTORY OF PULMONARY EMBOLISM: ICD-10-CM

## 2024-12-23 DIAGNOSIS — Z79.01 LONG TERM CURRENT USE OF ANTICOAGULANT THERAPY: ICD-10-CM

## 2024-12-23 DIAGNOSIS — Z86.711 HISTORY OF PULMONARY EMBOLISM: Primary | ICD-10-CM

## 2024-12-23 LAB — INR BLD: 2.8 (ref 0.9–1.1)

## 2024-12-23 PROCEDURE — 36416 COLLJ CAPILLARY BLOOD SPEC: CPT

## 2024-12-23 PROCEDURE — 85610 PROTHROMBIN TIME: CPT

## 2024-12-23 RX ORDER — WARFARIN SODIUM 6 MG/1
TABLET ORAL
Qty: 20 TABLET | Refills: 0 | Status: SHIPPED | OUTPATIENT
Start: 2024-12-23

## 2024-12-23 RX ORDER — WARFARIN SODIUM 6 MG/1
TABLET ORAL
Qty: 20 TABLET | Refills: 0 | Status: SHIPPED | OUTPATIENT
Start: 2024-12-23 | End: 2024-12-23

## 2024-12-23 NOTE — TELEPHONE ENCOUNTER
Pharm requesting provider review sig and clarify start date. Please update.      Disp Refills Start End CLAUDE   warfarin ANTICOAGULANT (COUMADIN) 6 MG tablet 20 tablet 0 12/23/2024 -- No   Sig: Take 1/2 tablet (3mg) tonight (12/10) then take 1 tablet (6 mg) daily or as directed by the INR clinic. Recheck INR on 1/06/2025   Sent to pharmacy as: Warfarin Sodium 6 MG Oral Tablet (COUMADIN)   Class: E-Prescribe   Order: [id number]   E-Prescribing Status: Receipt confirmed by pharmacy (12/23/2024  1:16 PM CST)

## 2024-12-23 NOTE — PROGRESS NOTES
Shaquille called to verify orders.   Gave the following orders- 6 mg daily, recheck in 1/6/25. Corrected in chart.   Blossom Levy RN

## 2024-12-23 NOTE — PROGRESS NOTES
ANTICOAGULATION MANAGEMENT     Petey Archibald 64 year old male is on warfarin with therapeutic INR result. (Goal INR 2.0-3.0)    Recent labs: (last 7 days)     12/23/24  1142   INR 2.8*       ASSESSMENT     Source(s): Chart Review and Home Care/Facility Nurse     Warfarin doses taken: Warfarin taken as instructed  Diet: No new diet changes identified  Medication/supplement changes: None noted  New illness, injury, or hospitalization: No  Signs or symptoms of bleeding or clotting: No  Previous result: Supratherapeutic  Additional findings: None       PLAN     Recommended plan for no diet, medication or health factor changes affecting INR     Dosing Instructions: Continue your current warfarin dose with next INR in 2 weeks       Summary  As of 12/23/2024      Full warfarin instructions:  6 mg every day   Next INR check:  1/6/2025               Telephone call with joyce Martin supervisor who verbalizes understanding and agrees to plan    Rx sent into the pharmacy with update sig and recheck date.    AVS faxed to facility and fax number verified.     Lab visit scheduled    Education provided: None required    Plan made per [facility] anticoagulation protocol    Ashley Quiroz RN  12/23/2024  Anticoagulation Clinic  [employer] for routing messages: joce JIMENEZ  [facility] patient phone line: [phone]        _______________________________________________________________________     Anticoagulation Episode Summary       Current INR goal:  2.0-3.0   TTR:  64.0% (1 y)   Target end date:  Indefinite   Send INR reminders to:  EDU JIMENEZ    Indications    History of pulmonary embolism [Z86.711]  Long term current use of anticoagulant therapy [Z79.01]             Comments:  REM correction; A new Coumadin Prescription will need to sent to [facility] with current dose and next INR check.             Anticoagulation Care Providers       Provider Role Specialty Phone number    Demetri Archer MD Referring Internal  Medicine [phone]

## 2024-12-23 NOTE — PROGRESS NOTES
Onsite please mail AVS to the following address:    [address]   [facility] [address]     Ashley Quiroz RN    [facility] Anticoagulation [facility]

## 2024-12-23 NOTE — TELEPHONE ENCOUNTER
Re routing med question to provider pool; please see note below and advise. Dr. Holder out of office until 12/31/24. Pharmacy is requesting order clarification.

## 2024-12-24 NOTE — TELEPHONE ENCOUNTER
Writer contact [facility] and verified current warfarin dosing until recheck on 1/6/25. All sorted and pharmacist verified no further questions. Ronda Shane, NERISSAN, RN

## 2024-12-29 ENCOUNTER — HEALTH MAINTENANCE LETTER (OUTPATIENT)
Age: 64
End: 2024-12-29

## 2025-01-07 ENCOUNTER — LAB (OUTPATIENT)
Dept: LAB | Facility: CLINIC | Age: 65
End: 2025-01-07
Payer: MEDICARE

## 2025-01-07 ENCOUNTER — ANTICOAGULATION THERAPY VISIT (OUTPATIENT)
Dept: ANTICOAGULATION | Facility: CLINIC | Age: 65
End: 2025-01-07

## 2025-01-07 DIAGNOSIS — Z79.01 LONG TERM CURRENT USE OF ANTICOAGULANT THERAPY: ICD-10-CM

## 2025-01-07 DIAGNOSIS — Z86.711 HISTORY OF PULMONARY EMBOLISM: Primary | ICD-10-CM

## 2025-01-07 DIAGNOSIS — Z86.711 HISTORY OF PULMONARY EMBOLISM: ICD-10-CM

## 2025-01-07 LAB — INR BLD: 2 (ref 0.9–1.1)

## 2025-01-07 PROCEDURE — 36416 COLLJ CAPILLARY BLOOD SPEC: CPT

## 2025-01-07 PROCEDURE — 85610 PROTHROMBIN TIME: CPT

## 2025-01-07 RX ORDER — WARFARIN SODIUM 6 MG/1
TABLET ORAL
Qty: 20 TABLET | Refills: 0 | Status: SHIPPED | OUTPATIENT
Start: 2025-01-07

## 2025-01-07 NOTE — PROGRESS NOTES
ANTICOAGULATION MANAGEMENT     Petey Archibald 64 year old male is on warfarin with therapeutic INR result. (Goal INR 2.0-3.0)    Recent labs: (last 7 days)     01/07/25  1345   INR 2.0*       ASSESSMENT     Source(s): Chart Review and Home Care/Facility Nurse     Warfarin doses taken: Warfarin taken as instructed  Diet: Increased greens/vitamin K in diet; plans to resume previous intake  Medication/supplement changes: None noted  New illness, injury, or hospitalization: No  Signs or symptoms of bleeding or clotting: No  Previous result: Therapeutic last visit; previously outside of goal range  Additional findings: Refill needed today. Petey meets all criteria for refill (current [facility] referral, visit with referring provider/group in last 15 months unless directed to return in 2 years in last referring provider visit note, lab monitoring up to date or not exceeding 2 weeks overdue). Rx instructions and quantity match patient's current dosing plan.  15 day supply with 0 refills granted per [facility] protocol        PLAN     Recommended plan for no diet, medication or health factor changes affecting INR     Dosing Instructions: Continue your current warfarin dose with next INR in 2 weeks       Summary  As of 1/7/2025      Full warfarin instructions:  6 mg every day   Next INR check:  1/21/2025               Telephone call with [facility]  who verbalizes understanding and agrees to plan  Sent videoNEXT message with dosing and follow up instructions    Lab visit scheduled    Education provided: Goal range and lab monitoring: goal range and significance of current result and Importance of therapeutic range    Plan made per [facility] anticoagulation protocol    Arnaldo Lopez, RN  1/7/2025  Anticoagulation Clinic  [employer] for routing messages: joce JIMENEZ  [facility] patient phone line: [phone]        _______________________________________________________________________     Anticoagulation Episode Summary        Current INR goal:  2.0-3.0   TTR:  66.1% (1 y)   Target end date:  Indefinite   Send INR reminders to:  EDU JIMENEZ    Indications    History of pulmonary embolism [Z86.711]  Long term current use of anticoagulant therapy [Z79.01]             Comments:  REM FDC; A new Coumadin Prescription will need to sent to [facility] with current dose and next INR check.             Anticoagulation Care Providers       Provider Role Specialty Phone number    Demetri Archer MD Referring Internal Medicine [phone]

## 2025-01-22 ENCOUNTER — LAB (OUTPATIENT)
Dept: LAB | Facility: CLINIC | Age: 65
End: 2025-01-22
Payer: MEDICARE

## 2025-01-22 ENCOUNTER — ANTICOAGULATION THERAPY VISIT (OUTPATIENT)
Dept: ANTICOAGULATION | Facility: CLINIC | Age: 65
End: 2025-01-22

## 2025-01-22 DIAGNOSIS — Z79.01 LONG TERM CURRENT USE OF ANTICOAGULANT THERAPY: ICD-10-CM

## 2025-01-22 DIAGNOSIS — Z86.711 HISTORY OF PULMONARY EMBOLISM: Primary | ICD-10-CM

## 2025-01-22 DIAGNOSIS — Z86.711 HISTORY OF PULMONARY EMBOLISM: ICD-10-CM

## 2025-01-22 LAB — INR BLD: 3.2 (ref 0.9–1.1)

## 2025-01-22 PROCEDURE — 36416 COLLJ CAPILLARY BLOOD SPEC: CPT

## 2025-01-22 PROCEDURE — 85610 PROTHROMBIN TIME: CPT

## 2025-01-22 RX ORDER — WARFARIN SODIUM 6 MG/1
TABLET ORAL
Qty: 20 TABLET | Refills: 0 | Status: SHIPPED | OUTPATIENT
Start: 2025-01-22

## 2025-01-22 NOTE — PROGRESS NOTES
AVS placed in the outgoing mail to the group home.  Debbie Thurston RN, BSN  Anticoagulation Clinic

## 2025-01-22 NOTE — PROGRESS NOTES
ANTICOAGULATION MANAGEMENT     Petey Archibald 64 year old male is on warfarin with supratherapeutic INR result. (Goal INR 2.0-3.0)    Recent labs: (last 7 days)     01/22/25  1416   INR 3.2*       ASSESSMENT     Source(s): Chart Review and Home Care/Facility Nurse     Warfarin doses taken: Warfarin taken as instructed  Diet: Patient eating more sweets, candy lately. No alcohol.  Medication/supplement changes: None noted  New illness, injury, or hospitalization: Yes: Patient has been having diarrhea over the weekend twice, no cold symptoms  Signs or symptoms of bleeding or clotting: No  Previous result: Therapeutic last 2(+) visits  Additional findings: None       PLAN     Recommended plan for no diet, medication or health factor changes affecting INR     Dosing Instructions: Continue your current warfarin dose with next INR in 2 weeks       Summary  As of 1/22/2025      Full warfarin instructions:  6 mg every day   Next INR check:  2/5/2025               Telephone call with [facility] home care nurse who verbalizes understanding and agrees to plan    Orders given to  Homecare nurse/facility to recheck    Education provided: Please call back if any changes to your diet, medications or how you've been taking warfarin    Plan made per [facility] anticoagulation protocol    Lore Dugan RN  1/22/2025  Anticoagulation Clinic  [employer] for routing messages: joce JIMENEZ  [facility] patient phone line: [phone]        _______________________________________________________________________     Anticoagulation Episode Summary       Current INR goal:  2.0-3.0   TTR:  65.6% (1 y)   Target end date:  Indefinite   Send INR reminders to:  EDU JIMENEZ    Indications    History of pulmonary embolism [Z86.711]  Long term current use of anticoagulant therapy [Z79.01]             Comments:  [facility] jail; A new Coumadin Prescription will need to sent to [facility] with current dose and next INR check.             Anticoagulation Care  Providers       Provider Role Specialty Phone number    Demetri Archer MD Referring Internal Medicine [phone]

## 2025-02-07 ENCOUNTER — MEDICAL CORRESPONDENCE (OUTPATIENT)
Dept: HEALTH INFORMATION MANAGEMENT | Facility: CLINIC | Age: 65
End: 2025-02-07
Payer: MEDICARE

## 2025-02-10 ENCOUNTER — LAB (OUTPATIENT)
Dept: LAB | Facility: CLINIC | Age: 65
End: 2025-02-10
Payer: MEDICARE

## 2025-02-10 DIAGNOSIS — Z79.01 LONG TERM CURRENT USE OF ANTICOAGULANT THERAPY: ICD-10-CM

## 2025-02-10 DIAGNOSIS — Z86.711 HISTORY OF PULMONARY EMBOLISM: ICD-10-CM

## 2025-02-10 LAB — INR BLD: 2.3 (ref 0.9–1.1)

## 2025-02-10 PROCEDURE — 36416 COLLJ CAPILLARY BLOOD SPEC: CPT

## 2025-02-10 PROCEDURE — 85610 PROTHROMBIN TIME: CPT

## 2025-02-11 ENCOUNTER — ANTICOAGULATION THERAPY VISIT (OUTPATIENT)
Dept: ANTICOAGULATION | Facility: CLINIC | Age: 65
End: 2025-02-11

## 2025-02-11 DIAGNOSIS — Z86.711 HISTORY OF PULMONARY EMBOLISM: Primary | ICD-10-CM

## 2025-02-11 DIAGNOSIS — Z79.01 LONG TERM CURRENT USE OF ANTICOAGULANT THERAPY: ICD-10-CM

## 2025-02-11 RX ORDER — WARFARIN SODIUM 6 MG/1
TABLET ORAL
Qty: 22 TABLET | Refills: 0 | Status: SHIPPED | OUTPATIENT
Start: 2025-02-11

## 2025-02-11 NOTE — PROGRESS NOTES
AVS placed in outgoing mail addressed to the group home.  Debbie Thurston RN, BSN  Anticoagulation Clinic

## 2025-02-11 NOTE — PROGRESS NOTES
ANTICOAGULATION MANAGEMENT     Petey Archibald 64 year old male is on warfarin with therapeutic INR result. (Goal INR 2.0-3.0)    Recent labs: (last 7 days)     02/10/25  1815   INR 2.3*       ASSESSMENT     Source(s): Chart Review and Home Care/Facility Nurse     Warfarin doses taken: Warfarin taken as instructed  Diet: No new diet changes identified  Medication/supplement changes: None noted  New illness, injury, or hospitalization: No  Signs or symptoms of bleeding or clotting: No  Previous result: Supratherapeutic  Additional findings: Refill needed today. Petey meets all criteria for refill (current [facility] referral, visit with referring provider/group in last 15 months unless directed to return in 2 years in last referring provider visit note, lab monitoring up to date or not exceeding 2 weeks overdue). Rx instructions and quantity supplied updated to match patient's current dosing plan.  21 day supply with 0 refills granted per [facility] protocol        PLAN     Recommended plan for no diet, medication or health factor changes affecting INR     Dosing Instructions: Continue your current warfarin dose with next INR in 3 weeks       Summary  As of 2/11/2025      Full warfarin instructions:  6 mg every day   Next INR check:  3/4/2025               Telephone call with [facility] facility nurse who verbalizes understanding and agrees to plan  [facility] (attn: Dai) and mailed to [facility]    Lab visit scheduled    Education provided: Please call back if any changes to your diet, medications or how you've been taking warfarin    Plan made per [facility] anticoagulation protocol    Radha Thurston RN  2/11/2025  Anticoagulation Clinic  [employer] for routing messages: joce JIMENEZ  [facility] patient phone line: [phone]        _______________________________________________________________________     Anticoagulation Episode Summary       Current INR goal:  2.0-3.0   TTR:  67.2%  (1 y)   Target end date:  Indefinite   Send INR reminders to:  EDU JIMENEZ    Indications    History of pulmonary embolism [Z86.711]  Long term current use of anticoagulant therapy [Z79.01]             Comments:  [facility]; A new Coumadin Prescription will need to sent to [facility] with current dose and next INR check.             Anticoagulation Care Providers       Provider Role Specialty Phone number    Demetri Archer MD Referring Internal Medicine [phone]

## 2025-03-01 ENCOUNTER — HEALTH MAINTENANCE LETTER (OUTPATIENT)
Age: 65
End: 2025-03-01

## 2025-03-04 DIAGNOSIS — G24.8 FOCAL DYSTONIA: ICD-10-CM

## 2025-03-04 DIAGNOSIS — M62.838 SPASM OF MUSCLE: ICD-10-CM

## 2025-03-04 RX ORDER — TIZANIDINE 2 MG/1
2 TABLET ORAL 3 TIMES DAILY
Qty: 90 TABLET | Refills: 4 | Status: SHIPPED | OUTPATIENT
Start: 2025-03-04

## 2025-03-06 ENCOUNTER — ANTICOAGULATION THERAPY VISIT (OUTPATIENT)
Dept: ANTICOAGULATION | Facility: CLINIC | Age: 65
End: 2025-03-06

## 2025-03-06 ENCOUNTER — LAB (OUTPATIENT)
Dept: LAB | Facility: CLINIC | Age: 65
End: 2025-03-06
Payer: MEDICARE

## 2025-03-06 DIAGNOSIS — Z86.711 HISTORY OF PULMONARY EMBOLISM: ICD-10-CM

## 2025-03-06 DIAGNOSIS — Z79.01 LONG TERM CURRENT USE OF ANTICOAGULANT THERAPY: ICD-10-CM

## 2025-03-06 DIAGNOSIS — Z86.711 HISTORY OF PULMONARY EMBOLISM: Primary | ICD-10-CM

## 2025-03-06 LAB — INR BLD: 2.9 (ref 0.9–1.1)

## 2025-03-06 RX ORDER — WARFARIN SODIUM 6 MG/1
TABLET ORAL
Qty: 28 TABLET | Refills: 0 | Status: SHIPPED | OUTPATIENT
Start: 2025-03-06

## 2025-03-06 NOTE — PROGRESS NOTES
ANTICOAGULATION MANAGEMENT     Petey Archibald 64 year old male is on warfarin with therapeutic INR result. (Goal INR 2.0-3.0)    Recent labs: (last 7 days)     03/06/25  1213   INR 2.9*       ASSESSMENT     Source(s): Chart Review and Patient/Caregiver Call     Warfarin doses taken: Warfarin taken as instructed  Diet: No new diet changes identified  Medication/supplement changes: None noted  New illness, injury, or hospitalization: No  Signs or symptoms of bleeding or clotting: No- staff reports patient did have a nosebleed and EMS was contacted to transport patient for evaluation but patient declined. Staff confirms patient is not having issues with bleeding/bruising at this time. Unable to determine the date of this. Advised staff that if patient has any further nosebleeds that he should be evaulated, especially if lasting 10 min or unable to control the bleeding.   Previous result: Therapeutic last 2(+) visits  Additional findings: None       PLAN     Recommended plan for no diet, medication or health factor changes affecting INR     Dosing Instructions: Continue your current warfarin dose with next INR in 4 weeks       Summary  As of 3/6/2025      Full warfarin instructions:  6 mg every day   Next INR check:  4/3/2025               Telephone call with group home staff Scar who verbalizes understanding and agrees to plan and who agrees to plan and repeated back plan correctly  Faxed dosing and follow up instructions to [facility] Attn: Dai [phone]  Escript to [facility]    Lab visit scheduled    Education provided: Symptom monitoring: monitoring for bleeding signs and symptoms, monitoring for clotting signs and symptoms, monitoring for stroke signs and symptoms, and when to seek medical attention/emergency care  Importance of notifying anticoagulation clinic for: changes in medications; a sooner lab recheck maybe needed and diarrhea, nausea/vomiting, reduced intake, cold/flu, and/or  infections; a sooner lab recheck maybe needed  Contact [phone] with any changes, questions or concerns.     Plan made per [facility] anticoagulation protocol    Janay Barcenas RN  3/6/2025  Anticoagulation Clinic  [facility] for routing messages: joce JIMENEZ  [facility] patient phone line: [phone]        _______________________________________________________________________     Anticoagulation Episode Summary       Current INR goal:  2.0-3.0   TTR:  69.6% (1 y)   Target end date:  Indefinite   Send INR reminders to:  EDU JIMENEZ    Indications    History of pulmonary embolism [Z86.711]  Long term current use of anticoagulant therapy [Z79.01]             Comments:  [facility]; A new Coumadin Prescription will need to sent to [facility] with current dose and next INR check.             Anticoagulation Care Providers       Provider Role Specialty Phone number    Demetri Archer MD Referring Internal Medicine [phone]

## 2025-03-16 ENCOUNTER — APPOINTMENT (OUTPATIENT)
Dept: GENERAL RADIOLOGY | Facility: CLINIC | Age: 65
End: 2025-03-16
Attending: EMERGENCY MEDICINE
Payer: MEDICARE

## 2025-03-16 ENCOUNTER — HOSPITAL ENCOUNTER (EMERGENCY)
Facility: CLINIC | Age: 65
Discharge: HOME | End: 2025-03-16
Attending: EMERGENCY MEDICINE | Admitting: EMERGENCY MEDICINE
Payer: MEDICARE

## 2025-03-16 VITALS
WEIGHT: 315 LBS | HEART RATE: 95 BPM | HEIGHT: 69 IN | RESPIRATION RATE: 14 BRPM | OXYGEN SATURATION: 97 % | TEMPERATURE: 98.8 F | SYSTOLIC BLOOD PRESSURE: 142 MMHG | DIASTOLIC BLOOD PRESSURE: 76 MMHG | BODY MASS INDEX: 46.65 KG/M2

## 2025-03-16 DIAGNOSIS — R06.02 SOB (SHORTNESS OF BREATH): ICD-10-CM

## 2025-03-16 DIAGNOSIS — Z72.0 TOBACCO ABUSE: ICD-10-CM

## 2025-03-16 LAB
ALBUMIN SERPL BCG-MCNC: 3.4 G/DL (ref 3.5–5.2)
ALP SERPL-CCNC: 104 U/L (ref 40–150)
ALT SERPL W P-5'-P-CCNC: 14 U/L (ref 0–70)
ANION GAP SERPL CALCULATED.3IONS-SCNC: 9 MMOL/L (ref 7–15)
AST SERPL W P-5'-P-CCNC: 20 U/L (ref 0–45)
ATRIAL RATE - MUSE: 105 BPM
BASOPHILS # BLD AUTO: 0 10E3/UL (ref 0–0.2)
BASOPHILS NFR BLD AUTO: 1 %
BILIRUB SERPL-MCNC: 0.2 MG/DL
BUN SERPL-MCNC: 11.6 MG/DL (ref 8–23)
CALCIUM SERPL-MCNC: 8.4 MG/DL (ref 8.8–10.4)
CHLORIDE SERPL-SCNC: 106 MMOL/L (ref 98–107)
CREAT SERPL-MCNC: 1.03 MG/DL (ref 0.67–1.17)
DIASTOLIC BLOOD PRESSURE - MUSE: NORMAL MMHG
EGFRCR SERPLBLD CKD-EPI 2021: 80 ML/MIN/1.73M2
EOSINOPHIL # BLD AUTO: 0.3 10E3/UL (ref 0–0.7)
EOSINOPHIL NFR BLD AUTO: 6 %
ERYTHROCYTE [DISTWIDTH] IN BLOOD BY AUTOMATED COUNT: 17.1 % (ref 10–15)
FLUAV RNA SPEC QL NAA+PROBE: NEGATIVE
FLUBV RNA RESP QL NAA+PROBE: NEGATIVE
GLUCOSE SERPL-MCNC: 131 MG/DL (ref 70–99)
HCO3 SERPL-SCNC: 24 MMOL/L (ref 22–29)
HCT VFR BLD AUTO: 32 % (ref 40–53)
HGB BLD-MCNC: 9.8 G/DL (ref 13.3–17.7)
IMM GRANULOCYTES # BLD: 0 10E3/UL
IMM GRANULOCYTES NFR BLD: 0 %
INR PPP: 2.5 (ref 0.85–1.15)
INTERPRETATION ECG - MUSE: NORMAL
LYMPHOCYTES # BLD AUTO: 1.2 10E3/UL (ref 0.8–5.3)
LYMPHOCYTES NFR BLD AUTO: 24 %
MCH RBC QN AUTO: 25.8 PG (ref 26.5–33)
MCHC RBC AUTO-ENTMCNC: 30.6 G/DL (ref 31.5–36.5)
MCV RBC AUTO: 84 FL (ref 78–100)
MONOCYTES # BLD AUTO: 0.4 10E3/UL (ref 0–1.3)
MONOCYTES NFR BLD AUTO: 9 %
NEUTROPHILS # BLD AUTO: 3 10E3/UL (ref 1.6–8.3)
NEUTROPHILS NFR BLD AUTO: 60 %
NRBC # BLD AUTO: 0 10E3/UL
NRBC BLD AUTO-RTO: 0 /100
NT-PROBNP SERPL-MCNC: 126 PG/ML (ref 0–900)
P AXIS - MUSE: 50 DEGREES
PLATELET # BLD AUTO: 233 10E3/UL (ref 150–450)
POTASSIUM SERPL-SCNC: 3.9 MMOL/L (ref 3.4–5.3)
PR INTERVAL - MUSE: 124 MS
PROT SERPL-MCNC: 7 G/DL (ref 6.4–8.3)
QRS DURATION - MUSE: 98 MS
QT - MUSE: 348 MS
QTC - MUSE: 459 MS
R AXIS - MUSE: 13 DEGREES
RBC # BLD AUTO: 3.8 10E6/UL (ref 4.4–5.9)
RSV RNA SPEC NAA+PROBE: NEGATIVE
SARS-COV-2 RNA RESP QL NAA+PROBE: NEGATIVE
SODIUM SERPL-SCNC: 139 MMOL/L (ref 135–145)
SYSTOLIC BLOOD PRESSURE - MUSE: NORMAL MMHG
T AXIS - MUSE: 43 DEGREES
TROPONIN T SERPL HS-MCNC: 6 NG/L
TROPONIN T SERPL HS-MCNC: 7 NG/L
VENTRICULAR RATE- MUSE: 105 BPM
WBC # BLD AUTO: 5 10E3/UL (ref 4–11)

## 2025-03-16 PROCEDURE — 71046 X-RAY EXAM CHEST 2 VIEWS: CPT

## 2025-03-16 PROCEDURE — 36415 COLL VENOUS BLD VENIPUNCTURE: CPT | Performed by: EMERGENCY MEDICINE

## 2025-03-16 PROCEDURE — 84295 ASSAY OF SERUM SODIUM: CPT | Performed by: EMERGENCY MEDICINE

## 2025-03-16 PROCEDURE — 87637 SARSCOV2&INF A&B&RSV AMP PRB: CPT | Performed by: EMERGENCY MEDICINE

## 2025-03-16 PROCEDURE — 85610 PROTHROMBIN TIME: CPT | Performed by: EMERGENCY MEDICINE

## 2025-03-16 PROCEDURE — 83880 ASSAY OF NATRIURETIC PEPTIDE: CPT | Performed by: EMERGENCY MEDICINE

## 2025-03-16 PROCEDURE — 99285 EMERGENCY DEPT VISIT HI MDM: CPT | Mod: 25

## 2025-03-16 PROCEDURE — 85025 COMPLETE CBC W/AUTO DIFF WBC: CPT | Performed by: EMERGENCY MEDICINE

## 2025-03-16 PROCEDURE — 93005 ELECTROCARDIOGRAM TRACING: CPT

## 2025-03-16 PROCEDURE — 84484 ASSAY OF TROPONIN QUANT: CPT | Performed by: EMERGENCY MEDICINE

## 2025-03-16 PROCEDURE — 82310 ASSAY OF CALCIUM: CPT | Performed by: EMERGENCY MEDICINE

## 2025-03-16 ASSESSMENT — ACTIVITIES OF DAILY LIVING (ADL)
ADLS_ACUITY_SCORE: 60
ADLS_ACUITY_SCORE: 58
ADLS_ACUITY_SCORE: 60
ADLS_ACUITY_SCORE: 58
ADLS_ACUITY_SCORE: 60

## 2025-03-17 ENCOUNTER — DOCUMENTATION ONLY (OUTPATIENT)
Dept: ANTICOAGULATION | Facility: CLINIC | Age: 65
End: 2025-03-17
Payer: MEDICARE

## 2025-03-17 NOTE — PROGRESS NOTES
ANTICOAGULATION  MANAGEMENT: Discharge Review    Petey Stoddardtz chart reviewed for anticoagulation continuity of care    Emergency room visit on 3/16/25 for shortness of breath.    Discharge disposition: Home    Results:    Recent labs: (last 7 days)     03/16/25  1825   INR 2.50*     Anticoagulation inpatient management:     not applicable     Anticoagulation discharge instructions:     Warfarin dosing: home regimen continued   Bridging: No   INR goal change: No      Medication changes affecting anticoagulation: No    Additional factors affecting anticoagulation: No     PLAN     No adjustment to anticoagulation plan needed    Patient not contacted    No adjustment to Anticoagulation Calendar was required    Appropriate follow up for next INR is already scheduled.     Myranda Curtis RN  3/17/2025  Anticoagulation Clinic  [facility] for routing messages: joce JIMENEZ  [facility] patient phone line: [phone]

## 2025-03-17 NOTE — ED PROVIDER NOTES
Emergency Department Note      History of Present Illness     Chief Complaint   Shortness of Breath      HPI   Petey Archibald is a 64 year old male with a history of PE, on warfarin, tobacco abuse, morbid obesity and peripheral edema who presents with shortness of breath.  Patient states that this is a third time he is felt short of breath in the past few weeks and his facility states that he did come here to get checked out.  He states he is short of breath because he smokes and he vapes.  Denies any chest pain, pleuritic chest pain, nor is he missed any doses of his warfarin.  He does note that his legs are swollen he takes a diuretic but he does not have a concerns there either.  No cough, cold, fevers or other infectious symptoms.  Patient immediately states I am going home when this is all done.    Independent Historian   None    Review of External Notes   I reviewed his most recent notes at his facility.    Past Medical History     Medical History and Problem List   Past Medical History:   Diagnosis Date    Borderline mental retardation 02/20/2013    Cellulitis of left upper extremity 05/13/2024    Chronic infection     Coagulation disorder     COPD (chronic obstructive pulmonary disease) (H)     Diabetic ulcer of right midfoot with fat layer exposed (H) 11/15/2023    History of DVT of lower extremity     History of pulmonary embolism     Hyperlipidemia     Mild intellectual disabilities     Morbid obesity (H)     NEL (obstructive sleep apnea)     Peripheral edema     Peripheral vascular disease     Sleep apnea     Thrombosis     Tobacco dependence     Ulcer of great toe, left, with fat layer exposed (H) 08/22/2023    Ulcer of left foot with fat layer exposed (H) 11/15/2023    Ulcer of right foot with fat layer exposed (H) 11/15/2023    Ulcer of right leg, with fat layer exposed (H) 09/10/2024    Uncomplicated asthma     Venous stasis dermatitis        Medications   acetaminophen (TYLENOL) 500 MG  "tablet  albuterol (ALBUTEROL) 108 (90 BASE) MCG/ACT inhaler  ammonium lactate (AMLACTIN) 12 % external cream  ARIPiprazole (ABILIFY) 5 MG tablet  buPROPion (WELLBUTRIN SR) 150 MG 12 hr tablet  Cadexomer Iodine, topical, 0.9% (IODOSORB) 0.9 % GEL gel  cloNIDine (CATAPRES) 0.1 MG tablet  clotrimazole (LOTRIMIN) 1 % external cream  diclofenac (VOLTAREN) 1 % topical gel  diphenhydrAMINE (BENADRYL) 25 MG capsule  Emollient (CERAVE) CREA  EPINEPHrine (ANY BX GENERIC EQUIV) 0.3 MG/0.3ML injection 2-pack  EPINEPHrine (EPIPEN 2-BRE) 0.3 MG/0.3ML injection 2-pack  gabapentin (NEURONTIN) 100 MG capsule  Gauze Pads & Dressings (GAUZE PADS 4\"X4\") 4\"X4\" PADS  Lidocaine (LIDOCARE) 4 % Patch  LIDOCAINE PAIN RELIEF 4 % Patch  loratadine (CLARITIN) 10 MG tablet  LORazepam (ATIVAN) 1 MG tablet  montelukast (SINGULAIR) 10 MG tablet  Multiple Vitamin (DAILY-JOVAN) TABS  naltrexone (DEPADE/REVIA) 50 MG tablet  nicotine (COMMIT) 2 MG lozenge  nystatin-triamcinolone (MYCOLOG II) 437441-6.1 UNIT/GM-% external cream  omeprazole (PRILOSEC) 40 MG DR capsule  order for DME  order for DME  order for DME  order for DME  order for DME  order for DME  order for DME  order for DME  polyethylene glycol (MIRALAX) 17 GM/Dose powder  predniSONE (DELTASONE) 10 MG tablet  PULMICORT FLEXHALER 180 MCG/ACT inhaler  SENNA-TABS 8.6 MG tablet  sertraline (ZOLOFT) 100 MG tablet  simvastatin (ZOCOR) 40 MG tablet  SPIRIVA HANDIHALER 18 MCG inhaled capsule  spironolactone (ALDACTONE) 25 MG tablet  tiZANidine (ZANAFLEX) 2 MG tablet  traZODone (DESYREL) 100 MG tablet  traZODone (DESYREL) 150 MG tablet  triamcinolone (KENALOG) 0.1 % external cream  vitamin B complex with vitamin C (STRESS TAB) tablet  warfarin ANTICOAGULANT (COUMADIN) 6 MG tablet        Surgical History   Past Surgical History:   Procedure Laterality Date    COLONOSCOPY N/A 4/15/2019    Procedure: COMBINED COLONOSCOPY, SINGLE OR MULTIPLE BIOPSY/POLYPECTOMY BY BIOPSY;  Surgeon: Jovana Ohara, " "MD;  Location:  GI    COLONOSCOPY N/A 6/7/2021    Procedure: COLONOSCOPY with polypectomies;  Surgeon: Sahil Cid MD;  Location: [facility] OR    EXCISE MALIGNANT LESIONS, TRUNK/ARMS/LEGS 0.5CM OR LESS Left 5/26/2017    Procedure: EXCISE MALIGNANT LESIONS, TRUNK/ARMS/LEGS 0.5CM OR LESS;  EXCISION LEFT ELBOW MASS;  Surgeon: Jose Azevedo MD;  Location:  GI    RECTAL SURGERY      perianal abscess?       Physical Exam     Patient Vitals for the past 24 hrs:   BP Temp Temp src Pulse Resp SpO2 Height Weight   03/16/25 2030 -- -- -- 95 -- 97 % -- --   03/16/25 2000 -- -- -- 98 -- 95 % -- --   03/16/25 1944 (!) 142/76 -- -- 97 14 95 % -- --   03/16/25 1721 (!) 148/60 98.8  F (37.1  C) Oral 109 18 96 % 1.753 m (5' 9\") (!) 158.8 kg (350 lb)     Physical Exam  Constitutional: Vital signs reviewed as above  General: Alert  HEENT: Moist mucous membranes  Eyes: Conjunctiva normal.   Neck: Normal range of motion  Cardiovascular: Tachycardic, Regular rhythm and normal heart sounds.  No MRG  Pulmonary/Chest: Effort normal and breath sounds normal. No respiratory distress. Patient has no wheezes. Patient has no rales.   Abdominal: Soft. Positive bowel sounds. No MRG.  Musculoskeletal/Extremities: Full ROM.  Endo: 3+ edema to the bilateral legs  Neurological: Alert, no focal deficits.  Skin: Skin is warm and dry.   Psychiatric: Somewhat agitated      Diagnostics     Lab Results   Labs Ordered and Resulted from Time of ED Arrival to Time of ED Departure   COMPREHENSIVE METABOLIC PANEL - Abnormal       Result Value    Sodium 139      Potassium 3.9      Carbon Dioxide (CO2) 24      Anion Gap 9      Urea Nitrogen 11.6      Creatinine 1.03      GFR Estimate 80      Calcium 8.4 (*)     Chloride 106      Glucose 131 (*)     Alkaline Phosphatase 104      AST 20      ALT 14      Protein Total 7.0      Albumin 3.4 (*)     Bilirubin Total 0.2     INR - Abnormal    INR 2.50 (*)    CBC WITH PLATELETS AND DIFFERENTIAL - Abnormal    WBC " Count 5.0      RBC Count 3.80 (*)     Hemoglobin 9.8 (*)     Hematocrit 32.0 (*)     MCV 84      MCH 25.8 (*)     MCHC 30.6 (*)     RDW 17.1 (*)     Platelet Count 233      % Neutrophils 60      % Lymphocytes 24      % Monocytes 9      % Eosinophils 6      % Basophils 1      % Immature Granulocytes 0      NRBCs per 100 WBC 0      Absolute Neutrophils 3.0      Absolute Lymphocytes 1.2      Absolute Monocytes 0.4      Absolute Eosinophils 0.3      Absolute Basophils 0.0      Absolute Immature Granulocytes 0.0      Absolute NRBCs 0.0     TROPONIN T, HIGH SENSITIVITY - Normal    Troponin T, High Sensitivity 6     NT PROBNP INPATIENT - Normal    N terminal Pro BNP Inpatient 126     INFLUENZA A/B, RSV AND SARS-COV2 PCR - Normal    Influenza A PCR Negative      Influenza B PCR Negative      RSV PCR Negative      SARS CoV2 PCR Negative     TROPONIN T, HIGH SENSITIVITY - Normal    Troponin T, High Sensitivity 7         Imaging   XR Chest 2 Views   Final Result   IMPRESSION: Exam significantly technically limited due to body habitus. Heart size is enlarged but stable. Probable new right lower lobe airspace disease consistent with pneumonia. Upper lobes are clear. No effusions or pneumothorax.          EKG   ECG results from 03/16/25   EKG 12-lead, tracing only     Value    Systolic Blood Pressure     Diastolic Blood Pressure     Ventricular Rate 105    Atrial Rate 105    NM Interval 124    QRS Duration 98        QTc 459    P Axis 50    R AXIS 13    T Axis 43    Interpretation ECG      Sinus tachycardia  Nonspecific T wave abnormality  Abnormal ECG  When compared with ECG of 22-Jul-2024 00:21,  No significant change was found  Confirmed by GENERATED REPORT, COMPUTER ([id number]),  Ruth Ann Ward ([id number]) on 3/16/2025 5:56:41 PM       *Note: Due to a large number of results and/or encounters for the requested time period, some results have not been displayed. A complete set of results can be found in Results Review.          Independent Interpretation   X-ray chest shows cardiac enlargement, possible right lower lobe infiltrate, no effusions or widened mediastinum.    ED Course      Medications Administered   Medications - No data to display    Procedures   Procedures     Discussion of Management   None    ED Course        Additional Documentation  None    Medical Decision Making / Diagnosis     CMS Diagnoses: None    MIPS       None    MDM   Petey Archibald is a 64 year old male who presents from his facility with concerns over shortness of breath.  The patient has noted concerns but this is a third time he is complained to it in the past couple of weeks at his home so they said he should get evaluation.  He thinks is all secondary to him vaping and smoking.  I think it also has something to do with his body habitus.  He is afebrile here.  He is therapeutic on his warfarin with an INR of 2.5.  Hemoglobin is stable at 9.8.  Electrolytes were unremarkable.  Influenza/RSV/COVID was negative.  Troponin was 7.  EKG is nondiagnostic.  BNP is 126.  Again he is not have any chest pressure or tightness.  He is afebrile.  His EKG is unremarkable.  Troponin and BNP were nonconcerning.  He is not actively coughing I doubt the spot receiving represents pneumonia and might just be artifact given his body habitus.  He is adamant that he still wants to go home.  He will discharge.  I strongly counseled him on smoking cessation.    Disposition   The patient was discharged.     Diagnosis     ICD-10-CM    1. Tobacco abuse  Z72.0       2. SOB (shortness of breath)  R06.02            Discharge Medications   New Prescriptions    No medications on file         MD Joaquin Vines Brent Aaron, MD  03/16/25 2121       Donovan Nuñez MD  03/16/25 2221

## 2025-03-18 DIAGNOSIS — Z87.891 PERSONAL HISTORY OF TOBACCO USE: ICD-10-CM

## 2025-03-18 DIAGNOSIS — K59.00 CONSTIPATION, UNSPECIFIED CONSTIPATION TYPE: ICD-10-CM

## 2025-03-18 DIAGNOSIS — K62.89 RECTAL PAIN: ICD-10-CM

## 2025-03-18 DIAGNOSIS — J41.8 MIXED SIMPLE AND MUCOPURULENT CHRONIC BRONCHITIS (H): ICD-10-CM

## 2025-03-18 DIAGNOSIS — F25.0 SCHIZOAFFECTIVE DISORDER, BIPOLAR TYPE (H): ICD-10-CM

## 2025-03-18 DIAGNOSIS — J43.9 PULMONARY EMPHYSEMA, UNSPECIFIED EMPHYSEMA TYPE (H): Chronic | ICD-10-CM

## 2025-03-18 RX ORDER — BUDESONIDE 180 UG/1
2 AEROSOL, POWDER RESPIRATORY (INHALATION) 2 TIMES DAILY
Qty: 1 EACH | Refills: 11 | Status: SHIPPED | OUTPATIENT
Start: 2025-03-18

## 2025-03-18 RX ORDER — SODIUM PHOSPHATE,MONO-DIBASIC 19G-7G/118
ENEMA (ML) RECTAL
Qty: 133 ML | Refills: 11 | Status: SHIPPED | OUTPATIENT
Start: 2025-03-18

## 2025-03-18 RX ORDER — HYDROCORTISONE 25 MG/G
CREAM TOPICAL DAILY PRN
Qty: 30 G | Refills: 0 | Status: SHIPPED | OUTPATIENT
Start: 2025-03-18 | End: 2025-03-25

## 2025-03-18 RX ORDER — ALBUTEROL SULFATE 90 UG/1
AEROSOL, METERED RESPIRATORY (INHALATION)
Qty: 18 G | Refills: 11 | Status: SHIPPED | OUTPATIENT
Start: 2025-03-18

## 2025-03-18 RX ORDER — VARENICLINE TARTRATE 1 MG/1
TABLET, FILM COATED ORAL
Qty: 62 TABLET | Refills: 11 | Status: SHIPPED | OUTPATIENT
Start: 2025-03-18

## 2025-03-18 RX ORDER — MULTIVITAMIN WITH FOLIC ACID 400 MCG
TABLET ORAL
Qty: 30 TABLET | Refills: 11 | Status: SHIPPED | OUTPATIENT
Start: 2025-03-18

## 2025-03-18 RX ORDER — DIPHENHYDRAMINE HCL 25 MG
50 CAPSULE ORAL EVERY 6 HOURS PRN
Qty: 60 CAPSULE | Refills: 1 | Status: SHIPPED | OUTPATIENT
Start: 2025-03-18

## 2025-03-18 RX ORDER — POLYETHYLENE GLYCOL 3350 17 G/17G
POWDER, FOR SOLUTION ORAL
Qty: 510 G | Refills: 11 | Status: SHIPPED | OUTPATIENT
Start: 2025-03-18

## 2025-03-18 NOTE — TELEPHONE ENCOUNTER
Clinic RN: Please investigate patient's chart or contact patient if the information cannot be found because the medication is listed as historical or discontinued. Confirm patient is taking this medication. Document findings and route refill encounter to provider for approval or denial. All medications.    Ruslan Oropeza, RN, BSN, MSN  RN Lead

## 2025-03-18 NOTE — TELEPHONE ENCOUNTER
Reviewed note from refill RN. Request has already been addressed in previous note.     Routing pending refills for provider to review. Please sign pending medications if approved.

## 2025-03-18 NOTE — TELEPHONE ENCOUNTER
TROLAMINE SALICYLATE/ALOE VERA (ASPERCREME WITH ALOE TOP)  Apply topically to affected area(s). Up to 3 times daily as needed on muscles and joints.   PROCTOZONE-HC 2.5 % cream [Pharmacy Med Name: Proctozone-HC 2.5 % Cream]     Reviewed med list and pending meds with group home staff. In addition to the medications already pended, pt is also requesting the two medications in note above. Not on active med list.     Pt requesting inhalers, please see hospital note. Pt reporting symptoms have not been improving that much, group home staff/RN will continue to monitor and notify MD if symptoms worsen or do not improve.

## 2025-03-18 NOTE — TELEPHONE ENCOUNTER
Clinic RN: Please investigate patient's chart or contact patient if the information cannot be found because  It looks like these are all needing med rec.  Pt was recently seen but many of these ordered 9 years ago.  Needed for pt's med setup in house most likely, but need to make sure doses are correct.  Please check and then send to Dr. Archer for review

## 2025-03-19 DIAGNOSIS — T63.441D BEE STING REACTION, ACCIDENTAL OR UNINTENTIONAL, SUBSEQUENT ENCOUNTER: ICD-10-CM

## 2025-03-19 RX ORDER — EPINEPHRINE 0.3 MG/.3ML
INJECTION SUBCUTANEOUS
Qty: 2 EACH | Refills: 5 | Status: SHIPPED | OUTPATIENT
Start: 2025-03-19

## 2025-04-07 ENCOUNTER — ANTICOAGULATION THERAPY VISIT (OUTPATIENT)
Dept: ANTICOAGULATION | Facility: CLINIC | Age: 65
End: 2025-04-07

## 2025-04-07 ENCOUNTER — LAB (OUTPATIENT)
Dept: LAB | Facility: CLINIC | Age: 65
End: 2025-04-07
Payer: MEDICARE

## 2025-04-07 DIAGNOSIS — Z79.01 LONG TERM CURRENT USE OF ANTICOAGULANT THERAPY: ICD-10-CM

## 2025-04-07 DIAGNOSIS — Z86.711 HISTORY OF PULMONARY EMBOLISM: Primary | ICD-10-CM

## 2025-04-07 DIAGNOSIS — Z86.711 HISTORY OF PULMONARY EMBOLISM: ICD-10-CM

## 2025-04-07 LAB — INR BLD: 1.9 (ref 0.9–1.1)

## 2025-04-07 PROCEDURE — 36416 COLLJ CAPILLARY BLOOD SPEC: CPT

## 2025-04-07 PROCEDURE — 85610 PROTHROMBIN TIME: CPT

## 2025-04-07 RX ORDER — WARFARIN SODIUM 6 MG/1
TABLET ORAL
Qty: 28 TABLET | Refills: 0 | Status: SHIPPED | OUTPATIENT
Start: 2025-04-07

## 2025-04-07 NOTE — PROGRESS NOTES
ANTICOAGULATION MANAGEMENT     Petey Archibald 64 year old male is on warfarin with subtherapeutic INR result. (Goal INR 2.0-3.0)    Recent labs: (last 7 days)     04/07/25  1223   INR 1.9*       ASSESSMENT     Source(s): Chart Review and Patient/Caregiver Call     Warfarin doses taken: Warfarin taken as instructed  Diet: No new diet changes identified  Medication/supplement changes: None noted  New illness, injury, or hospitalization: Yes: patient was seen in the ER on 3/16/25 for shortness of breath. Patient states he is short of breath at baseline due to smoking, and that symptoms were not new for him. Patient will report further concerning symptoms.   Signs or symptoms of bleeding or clotting: No  Previous result: Therapeutic last 2(+) visits  Additional findings: None       PLAN     Recommended plan for no diet, medication or health factor changes affecting INR     Dosing Instructions: Continue your current warfarin dose with next INR in 2 weeks       Summary  As of 4/7/2025      Full warfarin instructions:  6 mg every day   Next INR check:  4/21/2025               Telephone call with Petey who verbalizes understanding and agrees to plan. Faxed AVS to group home.    Lab visit scheduled    Education provided: Please call back if any changes to your diet, medications or how you've been taking warfarin    Plan made per [facility] anticoagulation protocol    Myranda Curtis, RN  4/7/2025  Anticoagulation Clinic  [employer] for routing messages: joce JIMENEZ  [facility] patient phone line: [phone]        _______________________________________________________________________     Anticoagulation Episode Summary       Current INR goal:  2.0-3.0   TTR:  68.6% (1 y)   Target end date:  Indefinite   Send INR reminders to:  EDU JIMENEZ    Indications    History of pulmonary embolism [Z86.711]  Long term current use of anticoagulant therapy [Z79.01]             Comments:  [facility]; [facility] and next INR check.             Anticoagulation Care Providers       Provider Role Specialty Phone number    Demetri Archer MD Referring Internal Medicine [phone]

## (undated) DEVICE — SOL WATER IRRIG 1000ML BOTTLE 2F7114

## (undated) DEVICE — TUBING SUCTION MEDI-VAC SOFT 3/16"X20' N520A

## (undated) DEVICE — BAG RED BIOHAZARD 37X50" 40GAL A7450PR

## (undated) DEVICE — KIT PROCEDURE W/CLEAN-A-SCOPE LINERS V2 200800

## (undated) DEVICE — PAD CHUX UNDERPAD 30X36" P3036C

## (undated) DEVICE — BAG CLEAR TRASH 1.3M 39X33" P4040C

## (undated) DEVICE — SPECIMEN TRAP MUCOUS SIMILAC NTRL CARE GRAD 80ML 0045860

## (undated) DEVICE — ENDO SNARE POLYPECTOMY OVAL 15MM LOOP SD-240U-15

## (undated) DEVICE — SUCTION CANISTER MEDIVAC LINER 3000ML W/LID 65651-530

## (undated) RX ORDER — ALBUTEROL SULFATE 0.83 MG/ML
SOLUTION RESPIRATORY (INHALATION)
Status: DISPENSED
Start: 2019-04-15

## (undated) RX ORDER — PROPOFOL 10 MG/ML
INJECTION, EMULSION INTRAVENOUS
Status: DISPENSED
Start: 2021-06-07

## (undated) RX ORDER — TRIAMCINOLONE ACETONIDE 40 MG/ML
INJECTION, SUSPENSION INTRA-ARTICULAR; INTRAMUSCULAR
Status: DISPENSED
Start: 2021-10-21

## (undated) RX ORDER — BUPIVACAINE HYDROCHLORIDE 5 MG/ML
INJECTION, SOLUTION EPIDURAL; INTRACAUDAL
Status: DISPENSED
Start: 2021-08-26

## (undated) RX ORDER — FENTANYL CITRATE 50 UG/ML
INJECTION, SOLUTION INTRAMUSCULAR; INTRAVENOUS
Status: DISPENSED
Start: 2021-06-07

## (undated) RX ORDER — LIDOCAINE HYDROCHLORIDE 10 MG/ML
INJECTION, SOLUTION EPIDURAL; INFILTRATION; INTRACAUDAL; PERINEURAL
Status: DISPENSED
Start: 2021-06-07

## (undated) RX ORDER — SIMETHICONE 40MG/0.6ML
SUSPENSION, DROPS(FINAL DOSAGE FORM)(ML) ORAL
Status: DISPENSED
Start: 2021-06-07

## (undated) RX ORDER — GLYCOPYRROLATE 0.2 MG/ML
INJECTION INTRAMUSCULAR; INTRAVENOUS
Status: DISPENSED
Start: 2021-06-07

## (undated) RX ORDER — DEXAMETHASONE SODIUM PHOSPHATE 4 MG/ML
INJECTION, SOLUTION INTRA-ARTICULAR; INTRALESIONAL; INTRAMUSCULAR; INTRAVENOUS; SOFT TISSUE
Status: DISPENSED
Start: 2021-06-07

## (undated) RX ORDER — ONDANSETRON 2 MG/ML
INJECTION INTRAMUSCULAR; INTRAVENOUS
Status: DISPENSED
Start: 2021-06-07

## (undated) RX ORDER — BUPIVACAINE HYDROCHLORIDE 2.5 MG/ML
INJECTION, SOLUTION EPIDURAL; INFILTRATION; INTRACAUDAL
Status: DISPENSED
Start: 2021-10-21

## (undated) RX ORDER — TRIAMCINOLONE ACETONIDE 40 MG/ML
INJECTION, SUSPENSION INTRA-ARTICULAR; INTRAMUSCULAR
Status: DISPENSED
Start: 2021-08-26